# Patient Record
Sex: MALE | Race: WHITE | Employment: FULL TIME | URBAN - METROPOLITAN AREA
[De-identification: names, ages, dates, MRNs, and addresses within clinical notes are randomized per-mention and may not be internally consistent; named-entity substitution may affect disease eponyms.]

---

## 2018-01-02 ENCOUNTER — HOSPITAL ENCOUNTER (OUTPATIENT)
Dept: CT IMAGING | Age: 58
Discharge: HOME | End: 2018-01-02
Attending: EMERGENCY MEDICINE
Payer: COMMERCIAL

## 2018-01-02 VITALS — HEIGHT: 70 IN | WEIGHT: 225 LBS | BODY MASS INDEX: 32.21 KG/M2

## 2018-01-02 DIAGNOSIS — R10.11 ABDOMINAL PAIN, RIGHT UPPER QUADRANT: ICD-10-CM

## 2018-01-02 DIAGNOSIS — R11.0 NAUSEA: ICD-10-CM

## 2018-01-02 DIAGNOSIS — D72.829 LEUKOCYTOSIS (LEUCOCYTOSIS): ICD-10-CM

## 2018-01-02 LAB — CREAT BLD-MCNC: 1 MG/DL (ref 0.8–1.5)

## 2018-01-02 PROCEDURE — 74177 CT ABD & PELVIS W/CONTRAST: CPT

## 2018-01-02 PROCEDURE — 74011636320 HC RX REV CODE- 636/320

## 2018-01-02 PROCEDURE — 74011000258 HC RX REV CODE- 258

## 2018-01-02 PROCEDURE — 82565 ASSAY OF CREATININE: CPT

## 2018-01-02 RX ORDER — SODIUM CHLORIDE 0.9 % (FLUSH) 0.9 %
10 SYRINGE (ML) INJECTION
Status: COMPLETED | OUTPATIENT
Start: 2018-01-02 | End: 2018-01-02

## 2018-01-02 RX ADMIN — IOPAMIDOL 100 ML: 755 INJECTION, SOLUTION INTRAVENOUS at 18:38

## 2018-01-02 RX ADMIN — SODIUM CHLORIDE 100 ML: 900 INJECTION, SOLUTION INTRAVENOUS at 18:39

## 2018-01-02 RX ADMIN — Medication 10 ML: at 18:39

## 2018-01-02 RX ADMIN — DIATRIZOATE MEGLUMINE AND DIATRIZOATE SODIUM 15 ML: 660; 100 LIQUID ORAL; RECTAL at 18:38

## 2019-12-11 PROBLEM — Z86.010 HISTORY OF COLON POLYPS: Status: ACTIVE | Noted: 2019-12-11

## 2019-12-11 PROBLEM — K21.9 GASTROESOPHAGEAL REFLUX DISEASE: Status: ACTIVE | Noted: 2019-12-11

## 2020-01-20 PROBLEM — J42 CHRONIC BRONCHITIS (HCC): Status: ACTIVE | Noted: 2020-01-20

## 2020-11-30 PROBLEM — M79.602 PARESTHESIA AND PAIN OF BOTH UPPER EXTREMITIES: Status: ACTIVE | Noted: 2020-11-30

## 2020-11-30 PROBLEM — R20.2 PARESTHESIA AND PAIN OF BOTH UPPER EXTREMITIES: Status: ACTIVE | Noted: 2020-11-30

## 2020-11-30 PROBLEM — G89.29 CHRONIC RIGHT SHOULDER PAIN: Status: ACTIVE | Noted: 2020-11-30

## 2020-11-30 PROBLEM — M79.601 PARESTHESIA AND PAIN OF BOTH UPPER EXTREMITIES: Status: ACTIVE | Noted: 2020-11-30

## 2020-11-30 PROBLEM — M25.511 CHRONIC RIGHT SHOULDER PAIN: Status: ACTIVE | Noted: 2020-11-30

## 2020-12-09 PROBLEM — M25.521 RIGHT ELBOW PAIN: Status: ACTIVE | Noted: 2020-12-09

## 2020-12-09 PROBLEM — G56.03 BILATERAL CARPAL TUNNEL SYNDROME: Status: ACTIVE | Noted: 2020-12-09

## 2020-12-29 PROBLEM — E66.09 CLASS 1 OBESITY DUE TO EXCESS CALORIES WITH SERIOUS COMORBIDITY AND BODY MASS INDEX (BMI) OF 34.0 TO 34.9 IN ADULT: Status: ACTIVE | Noted: 2020-12-29

## 2022-01-03 PROBLEM — M54.12 RIGHT CERVICAL RADICULOPATHY: Status: ACTIVE | Noted: 2022-01-03

## 2022-02-25 ENCOUNTER — HOSPITAL ENCOUNTER (OUTPATIENT)
Dept: LAB | Age: 62
Discharge: HOME | End: 2022-02-25

## 2022-02-25 PROCEDURE — 88305 TISSUE EXAM BY PATHOLOGIST: CPT

## 2022-02-25 PROCEDURE — 88312 SPECIAL STAINS GROUP 1: CPT

## 2022-03-14 ENCOUNTER — NURSE TRIAGE (OUTPATIENT)
Dept: OTHER | Facility: CLINIC | Age: 62
End: 2022-03-14

## 2022-03-14 NOTE — TELEPHONE ENCOUNTER
Received call from April at [facility] with Red Flag Complaint. Subjective: Caller states \"I think it started in December. I went to one of the [facility] then, they gave me some medication. I have seen Dr. Melissa Roland and he sent me for Colonoscopy and EGD. I had that done. It is just still hurting. They want to do a CT scan and we did that. It came back okay. It also kind of hurts when I urinate\"     Current Symptoms: my whole stomach, burning with urination, left flank pain    Onset: 3 months ago; worsening- abdominal pain, burning with urination started today    Associated Symptoms: NA    Pain Severity: 10/10; sharp and stabbing; intermittent    Temperature: denies     What has been tried: Tylenol     LMP: NA Pregnant: NA    Recommended disposition: See in Office Today    Care advice provided, patient verbalizes understanding; denies any other questions or concerns; instructed to call back for any new or worsening symptoms. Patient/Caller agrees with recommended disposition; writer provided warm transfer to Christopher Godinez at [facility] for appointment scheduling    Attention Provider: Thank you for allowing me to participate in the care of your patient. The patient was connected to triage in response to information provided to the [facility]. Please do not respond through this encounter as the response is not directed to a shared pool.       Reason for Disposition   Side (flank) or lower back pain present    Additional Information   Commented on: Abdominal pain is a chronic symptom (recurrent or ongoing AND lasting > 4 weeks)     He has had multiple test and see multiple doctors for this, including GI    Protocols used: URINARY SYMPTOMS-ADULT-[place], ABDOMINAL PAIN - MALE-ADULT-[place]

## 2022-03-15 ENCOUNTER — HOSPITAL ENCOUNTER (OUTPATIENT)
Dept: ULTRASOUND IMAGING | Age: 62
Discharge: HOME | End: 2022-03-15
Attending: FAMILY MEDICINE
Payer: COMMERCIAL

## 2022-03-15 DIAGNOSIS — Z72.0 TOBACCO ABUSE: ICD-10-CM

## 2022-03-15 DIAGNOSIS — R10.84 GENERALIZED ABDOMINAL PAIN: ICD-10-CM

## 2022-03-15 PROCEDURE — 93978 VASCULAR STUDY: CPT

## 2022-03-15 NOTE — PROGRESS NOTES
Call back no evidence for abdominal aortic aneurysm. Please contact Dr. Michael Ramírez office and see if patient can see Dr. Sharan Toscano right away if possible with abdominal pain remaining significant/severe. Currently visit is not until mid April.

## 2022-03-18 PROBLEM — G56.03 BILATERAL CARPAL TUNNEL SYNDROME: Status: ACTIVE | Noted: 2020-12-09

## 2022-03-18 PROBLEM — K21.9 GASTROESOPHAGEAL REFLUX DISEASE: Status: ACTIVE | Noted: 2019-12-11

## 2022-03-18 PROBLEM — M25.521 RIGHT ELBOW PAIN: Status: ACTIVE | Noted: 2020-12-09

## 2022-03-18 PROBLEM — M54.12 RIGHT CERVICAL RADICULOPATHY: Status: ACTIVE | Noted: 2022-01-03

## 2022-03-19 PROBLEM — M25.511 CHRONIC RIGHT SHOULDER PAIN: Status: ACTIVE | Noted: 2020-11-30

## 2022-03-19 PROBLEM — G89.29 CHRONIC RIGHT SHOULDER PAIN: Status: ACTIVE | Noted: 2020-11-30

## 2022-03-20 PROBLEM — R20.2 PARESTHESIA AND PAIN OF BOTH UPPER EXTREMITIES: Status: ACTIVE | Noted: 2020-11-30

## 2022-03-20 PROBLEM — M79.601 PARESTHESIA AND PAIN OF BOTH UPPER EXTREMITIES: Status: ACTIVE | Noted: 2020-11-30

## 2022-03-20 PROBLEM — E66.09 CLASS 1 OBESITY DUE TO EXCESS CALORIES WITH SERIOUS COMORBIDITY AND BODY MASS INDEX (BMI) OF 34.0 TO 34.9 IN ADULT: Status: ACTIVE | Noted: 2020-12-29

## 2022-03-20 PROBLEM — Z86.010 HISTORY OF COLON POLYPS: Status: ACTIVE | Noted: 2019-12-11

## 2022-03-20 PROBLEM — Z86.0100 HISTORY OF COLON POLYPS: Status: ACTIVE | Noted: 2019-12-11

## 2022-03-20 PROBLEM — M79.602 PARESTHESIA AND PAIN OF BOTH UPPER EXTREMITIES: Status: ACTIVE | Noted: 2020-11-30

## 2022-03-20 PROBLEM — J42 CHRONIC BRONCHITIS (HCC): Status: ACTIVE | Noted: 2020-01-20

## 2022-03-20 PROBLEM — E66.811 CLASS 1 OBESITY DUE TO EXCESS CALORIES WITH SERIOUS COMORBIDITY AND BODY MASS INDEX (BMI) OF 34.0 TO 34.9 IN ADULT: Status: ACTIVE | Noted: 2020-12-29

## 2022-05-12 ENCOUNTER — HOSPITAL ENCOUNTER (OUTPATIENT)
Age: 62
Setting detail: OBSERVATION
Discharge: HOME | End: 2022-05-14
Attending: EMERGENCY MEDICINE | Admitting: HOSPITALIST
Payer: COMMERCIAL

## 2022-05-12 ENCOUNTER — APPOINTMENT (OUTPATIENT)
Dept: CT IMAGING | Age: 62
End: 2022-05-12
Attending: EMERGENCY MEDICINE
Payer: COMMERCIAL

## 2022-05-12 DIAGNOSIS — R18.0 MALIGNANT ASCITES: ICD-10-CM

## 2022-05-12 DIAGNOSIS — R10.84 ABDOMINAL PAIN, GENERALIZED: Primary | ICD-10-CM

## 2022-05-12 PROBLEM — R10.9 ABDOMINAL PAIN: Status: ACTIVE | Noted: 2022-05-12

## 2022-05-12 LAB
ALBUMIN SERPL-MCNC: 3.4 G/DL (ref 3.2–4.6)
ALBUMIN/GLOB SERPL: 1 {RATIO} (ref 1.2–3.5)
ALP SERPL-CCNC: 76 U/L (ref 50–136)
ALT SERPL-CCNC: 18 U/L (ref 12–65)
ANION GAP SERPL CALC-SCNC: 8 MMOL/L (ref 7–16)
AST SERPL-CCNC: 10 U/L (ref 15–37)
BASOPHILS # BLD: 0.1 K/UL (ref 0–0.2)
BASOPHILS NFR BLD: 1 % (ref 0–2)
BILIRUB SERPL-MCNC: 0.4 MG/DL (ref 0.2–1.1)
BILIRUB UR QL: ABNORMAL
BUN SERPL-MCNC: 12 MG/DL (ref 8–23)
CALCIUM SERPL-MCNC: 8.9 MG/DL (ref 8.3–10.4)
CHLORIDE SERPL-SCNC: 110 MMOL/L (ref 98–107)
CO2 SERPL-SCNC: 25 MMOL/L (ref 21–32)
CREAT SERPL-MCNC: 0.7 MG/DL (ref 0.8–1.5)
DIFFERENTIAL METHOD BLD: NORMAL
EOSINOPHIL # BLD: 0.1 K/UL (ref 0–0.8)
EOSINOPHIL NFR BLD: 2 % (ref 0.5–7.8)
ERYTHROCYTE [DISTWIDTH] IN BLOOD BY AUTOMATED COUNT: 12.5 % (ref 11.9–14.6)
GLOBULIN SER CALC-MCNC: 3.3 G/DL (ref 2.3–3.5)
GLUCOSE SERPL-MCNC: 82 MG/DL (ref 65–100)
GLUCOSE UR QL STRIP.AUTO: NEGATIVE MG/DL
HCT VFR BLD AUTO: 43.9 % (ref 41.1–50.3)
HGB BLD-MCNC: 14.3 G/DL (ref 13.6–17.2)
IMM GRANULOCYTES # BLD AUTO: 0 K/UL (ref 0–0.5)
IMM GRANULOCYTES NFR BLD AUTO: 0 % (ref 0–5)
KETONES UR-MCNC: ABNORMAL MG/DL
LEUKOCYTE ESTERASE UR QL STRIP: NEGATIVE
LIPASE SERPL-CCNC: 51 U/L (ref 73–393)
LYMPHOCYTES # BLD: 2.1 K/UL (ref 0.5–4.6)
LYMPHOCYTES NFR BLD: 26 % (ref 13–44)
MCH RBC QN AUTO: 29.2 PG (ref 26.1–32.9)
MCHC RBC AUTO-ENTMCNC: 32.6 G/DL (ref 31.4–35)
MCV RBC AUTO: 89.8 FL (ref 79.6–97.8)
MONOCYTES # BLD: 0.5 K/UL (ref 0.1–1.3)
MONOCYTES NFR BLD: 6 % (ref 4–12)
NEUTS SEG # BLD: 5.3 K/UL (ref 1.7–8.2)
NEUTS SEG NFR BLD: 66 % (ref 43–78)
NITRITE UR QL: NEGATIVE
NRBC # BLD: 0 K/UL (ref 0–0.2)
PH UR: 5.5 [PH] (ref 5–9)
PLATELET # BLD AUTO: 251 K/UL (ref 150–450)
PMV BLD AUTO: 10.3 FL (ref 9.4–12.3)
POTASSIUM SERPL-SCNC: 3.8 MMOL/L (ref 3.5–5.1)
PROT SERPL-MCNC: 6.7 G/DL (ref 6.3–8.2)
PROT UR QL: NEGATIVE MG/DL
RBC # BLD AUTO: 4.89 M/UL (ref 4.23–5.6)
RBC # UR STRIP: NEGATIVE /UL
SERVICE CMNT-IMP: ABNORMAL
SODIUM SERPL-SCNC: 143 MMOL/L (ref 136–145)
SP GR UR: 1.02 (ref 1–1.02)
UROBILINOGEN UR QL: 1 EU/DL (ref 0.2–1)
WBC # BLD AUTO: 8 K/UL (ref 4.3–11.1)

## 2022-05-12 PROCEDURE — 83690 ASSAY OF LIPASE: CPT

## 2022-05-12 PROCEDURE — 96375 TX/PRO/DX INJ NEW DRUG ADDON: CPT

## 2022-05-12 PROCEDURE — 81003 URINALYSIS AUTO W/O SCOPE: CPT

## 2022-05-12 PROCEDURE — 74011000636 HC RX REV CODE- 636: Performed by: EMERGENCY MEDICINE

## 2022-05-12 PROCEDURE — 74177 CT ABD & PELVIS W/CONTRAST: CPT

## 2022-05-12 PROCEDURE — 74011250636 HC RX REV CODE- 250/636: Performed by: EMERGENCY MEDICINE

## 2022-05-12 PROCEDURE — 96374 THER/PROPH/DIAG INJ IV PUSH: CPT

## 2022-05-12 PROCEDURE — 99285 EMERGENCY DEPT VISIT HI MDM: CPT

## 2022-05-12 PROCEDURE — 85025 COMPLETE CBC W/AUTO DIFF WBC: CPT

## 2022-05-12 PROCEDURE — 80053 COMPREHEN METABOLIC PANEL: CPT

## 2022-05-12 PROCEDURE — 74011000258 HC RX REV CODE- 258: Performed by: EMERGENCY MEDICINE

## 2022-05-12 PROCEDURE — 74011000250 HC RX REV CODE- 250: Performed by: EMERGENCY MEDICINE

## 2022-05-12 PROCEDURE — G0378 HOSPITAL OBSERVATION PER HR: HCPCS

## 2022-05-12 RX ORDER — ONDANSETRON 8 MG/1
4 TABLET, ORALLY DISINTEGRATING ORAL
Status: DISCONTINUED | OUTPATIENT
Start: 2022-05-12 | End: 2022-05-14 | Stop reason: HOSPADM

## 2022-05-12 RX ORDER — PANTOPRAZOLE SODIUM 40 MG/1
40 TABLET, DELAYED RELEASE ORAL
Status: DISCONTINUED | OUTPATIENT
Start: 2022-05-13 | End: 2022-05-14 | Stop reason: HOSPADM

## 2022-05-12 RX ORDER — AMLODIPINE BESYLATE 5 MG/1
5 TABLET ORAL DAILY
Status: DISCONTINUED | OUTPATIENT
Start: 2022-05-13 | End: 2022-05-14 | Stop reason: HOSPADM

## 2022-05-12 RX ORDER — ACETAMINOPHEN 650 MG/1
650 SUPPOSITORY RECTAL
Status: DISCONTINUED | OUTPATIENT
Start: 2022-05-12 | End: 2022-05-14 | Stop reason: HOSPADM

## 2022-05-12 RX ORDER — SODIUM CHLORIDE 0.9 % (FLUSH) 0.9 %
10 SYRINGE (ML) INJECTION
Status: COMPLETED | OUTPATIENT
Start: 2022-05-12 | End: 2022-05-12

## 2022-05-12 RX ORDER — SODIUM CHLORIDE 0.9 % (FLUSH) 0.9 %
5-40 SYRINGE (ML) INJECTION EVERY 8 HOURS
Status: DISCONTINUED | OUTPATIENT
Start: 2022-05-12 | End: 2022-05-14 | Stop reason: HOSPADM

## 2022-05-12 RX ORDER — ACETAMINOPHEN 325 MG/1
650 TABLET ORAL
Status: DISCONTINUED | OUTPATIENT
Start: 2022-05-12 | End: 2022-05-14 | Stop reason: HOSPADM

## 2022-05-12 RX ORDER — SODIUM CHLORIDE 0.9 % (FLUSH) 0.9 %
5-10 SYRINGE (ML) INJECTION EVERY 8 HOURS
Status: DISCONTINUED | OUTPATIENT
Start: 2022-05-12 | End: 2022-05-14

## 2022-05-12 RX ORDER — POLYETHYLENE GLYCOL 3350 17 G/17G
17 POWDER, FOR SOLUTION ORAL DAILY PRN
Status: DISCONTINUED | OUTPATIENT
Start: 2022-05-12 | End: 2022-05-14 | Stop reason: HOSPADM

## 2022-05-12 RX ORDER — MORPHINE SULFATE 10 MG/ML
5 INJECTION, SOLUTION INTRAMUSCULAR; INTRAVENOUS
Status: COMPLETED | OUTPATIENT
Start: 2022-05-12 | End: 2022-05-12

## 2022-05-12 RX ORDER — ONDANSETRON 2 MG/ML
4 INJECTION INTRAMUSCULAR; INTRAVENOUS
Status: DISCONTINUED | OUTPATIENT
Start: 2022-05-12 | End: 2022-05-14 | Stop reason: HOSPADM

## 2022-05-12 RX ORDER — SODIUM CHLORIDE 0.9 % (FLUSH) 0.9 %
5-10 SYRINGE (ML) INJECTION AS NEEDED
Status: DISCONTINUED | OUTPATIENT
Start: 2022-05-12 | End: 2022-05-14

## 2022-05-12 RX ORDER — SODIUM CHLORIDE 450 MG/100ML
75 INJECTION, SOLUTION INTRAVENOUS CONTINUOUS
Status: DISCONTINUED | OUTPATIENT
Start: 2022-05-12 | End: 2022-05-14 | Stop reason: HOSPADM

## 2022-05-12 RX ORDER — SODIUM CHLORIDE 0.9 % (FLUSH) 0.9 %
5-40 SYRINGE (ML) INJECTION AS NEEDED
Status: DISCONTINUED | OUTPATIENT
Start: 2022-05-12 | End: 2022-05-14 | Stop reason: HOSPADM

## 2022-05-12 RX ORDER — SODIUM CHLORIDE, SODIUM LACTATE, POTASSIUM CHLORIDE, CALCIUM CHLORIDE 600; 310; 30; 20 MG/100ML; MG/100ML; MG/100ML; MG/100ML
150 INJECTION, SOLUTION INTRAVENOUS CONTINUOUS
Status: DISCONTINUED | OUTPATIENT
Start: 2022-05-12 | End: 2022-05-14

## 2022-05-12 RX ORDER — ONDANSETRON 2 MG/ML
4 INJECTION INTRAMUSCULAR; INTRAVENOUS
Status: COMPLETED | OUTPATIENT
Start: 2022-05-12 | End: 2022-05-12

## 2022-05-12 RX ORDER — HYDROCODONE BITARTRATE AND ACETAMINOPHEN 5; 325 MG/1; MG/1
1 TABLET ORAL
Status: DISCONTINUED | OUTPATIENT
Start: 2022-05-12 | End: 2022-05-14 | Stop reason: HOSPADM

## 2022-05-12 RX ORDER — LISINOPRIL 20 MG/1
20 TABLET ORAL DAILY
Status: DISCONTINUED | OUTPATIENT
Start: 2022-05-13 | End: 2022-05-14 | Stop reason: HOSPADM

## 2022-05-12 RX ADMIN — SODIUM CHLORIDE, PRESERVATIVE FREE 10 ML: 5 INJECTION INTRAVENOUS at 19:06

## 2022-05-12 RX ADMIN — SODIUM CHLORIDE, POTASSIUM CHLORIDE, SODIUM LACTATE AND CALCIUM CHLORIDE 150 ML/HR: 600; 310; 30; 20 INJECTION, SOLUTION INTRAVENOUS at 17:47

## 2022-05-12 RX ADMIN — DIATRIZOATE MEGLUMINE AND DIATRIZOATE SODIUM 15 ML: 660; 100 LIQUID ORAL; RECTAL at 17:47

## 2022-05-12 RX ADMIN — IOPAMIDOL 100 ML: 755 INJECTION, SOLUTION INTRAVENOUS at 19:06

## 2022-05-12 RX ADMIN — SODIUM CHLORIDE 100 ML: 9 INJECTION, SOLUTION INTRAVENOUS at 19:06

## 2022-05-12 RX ADMIN — ONDANSETRON 4 MG: 2 INJECTION INTRAMUSCULAR; INTRAVENOUS at 18:56

## 2022-05-12 RX ADMIN — MORPHINE SULFATE 5 MG: 10 INJECTION INTRAVENOUS at 18:56

## 2022-05-12 NOTE — ED PROVIDER NOTES
[facility] Emergency Department Provider Note                     PCP:                Destiny Lim MD               Age: 62 y.o. Sex: M           ICD-10-CM ICD-9-CM    1. Abdominal pain, generalized  R10.84 789.07    2. Malignant ascites  R18.0 789.51             MDM  Number of Diagnoses or Management Options  Abdominal pain, generalized  Malignant ascites  Diagnosis management comments: I will not be able to diagnose the source of his chronic pain but we will reevaluate and do another CT scan to make sure he has not had recurrence of his partial small bowel obstruction from Monday. Hydration ordered. 8:13 PM  CT results with the patient and his wife. CT findings concerning for metastatic process in the abdomen. I am reaching out to surgery and oncology for advice on how to proceed with the diagnosis. 8:33 PM  Oncology siggests paracentesis of ascites for diagnosis-given it is mild ascites I will ask hospitalist to admit for IR US guided paracentesis in the morning.        Amount and/or Complexity of Data Reviewed  Clinical lab tests: ordered and reviewed  Tests in the radiology section of CPT®: ordered and reviewed  Discuss the patient with other providers: yes    Risk of Complications, Morbidity, and/or Mortality  Presenting problems: moderate  Diagnostic procedures: low  Management options: moderate    Patient Progress  Patient progress: stable      Orders Placed This Encounter    CT ABD PELV W CONT    CBC with Diff    CMP    Lipase    DIET NPO    POC Urine Dipstick    NURSING-MISCELLANEOUS: start oral contrast now please ONE TIME    POC URINE MACROSCOPIC    SALINE LOCK IV ONE TIME Routine    sodium chloride (NS) flush 5-10 mL    sodium chloride (NS) flush 5-10 mL    diatrizoate nannette-diatrizoat sod (MD-GASTROVIEW,GASTROGRAFIN) 66-10 % contrast solution 15 mL    lactated Ringers infusion    sodium chloride 0.9 % bolus infusion 100 mL    iopamidoL (ISOVUE-370) 76 % injection 100 mL    saline peripheral flush soln 10 mL    morphine 10 mg/ml injection 5 mg    ondansetron (ZOFRAN) injection 4 mg        Gerard Lance is a 62 y.o. male who presents to the Emergency Department with chief complaint of    Chief Complaint   Patient presents with    Abdominal Pain      28-year-old male presents with complaint of abdominal pain. His pain has been chronic for months and he has had EGD and colonoscopy with multiple polyps removed. Earlier this week he was set [place] in the emergency department had a CT scan that showed small bowel obstruction. He was admitted overnight but never left the ER and was discharged the following morning. He has had return of his pain but not nausea or vomiting. He had a black stool today but reports having taken Pepto-Bismol. This pain has been chronic and recurring since approximately February. Patient is unable to describe the pain and denies any aggravating or alleviating factors. No fevers or chills. Denies distention but states it feels \"not right\". Most of history is provided by his spouse. He has had diverticulitis in the past and at one point had to have partial bowel resection due to this. None of his 9 polyps removed were cancerous according to his wife. She did report they did say he still had diverticulosis. Review of Systems   Constitutional: Negative for chills and fever. HENT: Negative for congestion, rhinorrhea and sore throat. Respiratory: Negative for cough and shortness of breath. Cardiovascular: Negative for chest pain and leg swelling. Gastrointestinal: Positive for abdominal pain, nausea and vomiting. Negative for blood in stool ( Stool black today but no bright red blood), constipation and diarrhea. Endocrine: Negative for polydipsia and polyuria. Genitourinary: Negative for dysuria and hematuria. Musculoskeletal: Negative for back pain and myalgias.    Neurological: Negative for weakness, numbness and headaches. Psychiatric/Behavioral: Negative for confusion. All other systems reviewed and are negative. All other systems reviewed and are negative. Past Medical History:   Diagnosis Date    Bilateral carpal tunnel syndrome 12/9/2020    Chronic right shoulder pain 11/30/2020    Colon polyps 3/11/2013    Diverticulitis 3/11/2013    HTN (hypertension) 3/11/2013    Hyperlipidemia 3/11/2013    Paresthesia and pain of both upper extremities 11/30/2020    PUD (peptic ulcer disease) 3/11/2013    History of     Right elbow pain 12/9/2020    Wheezing 3/11/2013        Past Surgical History:   Procedure Laterality Date    ENDOSCOPY, COLON, DIAGNOSTIC  2/25/09    colonoscopy per Dr. Payam Larkin with need for repeat every 3 years    HX COLONOSCOPY  02/25/2022    Dr. Marsha Joyce; polyps of the ascending, transverse, descending, and sigmoid colons; internal hemorrhoid; diverticulosis    HX ENDOSCOPY  02/25/2022    Dr. Marsha Joyce; hiatal hernia gastric polyps    [place] ABDOMEN SURGERY PROC UNLISTED  October 2004    partial colon resection per Dr. Tino Pike       Family History   Problem Relation Age of Onset    Hypertension Father     Alcohol abuse Father     OSTEOARTHRITIS Father     Diabetes Father     Heart Disease Mother     Diabetes Mother     Hypertension Mother     Cancer Sister         breast    No Known Problems Brother            Social Connections:     Frequency of Communication with Friends and Family: Not on file    Frequency of Social Gatherings with Friends and Family: Not on file    Attends [employer] Services: Not on file    Active Member of Clubs or Organizations: Not on file    Attends Club or Organization Meetings: Not on file    Marital Status: Not on file        No Known Allergies     Vitals signs and nursing note reviewed. Patient Vitals for the past 4 hrs:   BP SpO2   05/12/22 1737 130/77 95 %          Physical Exam  Vitals and nursing note reviewed.    Constitutional: Appearance: He is well-developed. HENT:      Mouth/Throat:      Mouth: Mucous membranes are moist.      Pharynx: No oropharyngeal exudate. Eyes:      Conjunctiva/sclera: Conjunctivae normal.      Pupils: Pupils are equal, round, and reactive to light. Cardiovascular:      Rate and Rhythm: Normal rate and regular rhythm. Heart sounds: Normal heart sounds. Pulmonary:      Effort: Pulmonary effort is normal.      Breath sounds: Normal breath sounds. Abdominal:      General: Bowel sounds are normal. There is no distension. Palpations: Abdomen is soft. Tenderness: There is generalized abdominal tenderness and tenderness in the right lower quadrant. There is no guarding or rebound. Hernia: No hernia is present. Musculoskeletal:         General: No tenderness. Normal range of motion. Cervical back: Neck supple. Lymphadenopathy:      Cervical: No cervical adenopathy. Skin:     General: Skin is warm and dry. Neurological:      Mental Status: He is alert and oriented to person, place, and time. Procedures    Results for orders placed or performed during the hospital encounter of 05/12/22   CT ABD PELV W CONT    Narrative    CT OF THE ABDOMEN AND PELVIS    INDICATION: Chronic abdominal pain. Multiple axial images were obtained through the abdomen and pelvis. Oral  contrast was used for bowel opacification. 100mL of Isovue 370 intravenous  contrast was used for better evaluation of solid organs and vascular structures. Radiation dose reduction techniques were used for this study. All CT scans  performed at this facility use one or all of the following: Automated exposure  control, adjustment of the mA and/or kVp according to patient's size, iterative  reconstruction. COMPARISON: None    FINDINGS:  LOWER CHEST: Normal.    HEPATOBILIARY: No evidence of focal liver mass. No calcified gallstones.     PANCREAS: Normal.    SPLEEN: Normal.    ADRENAL GLANDS: Normal.    KIDNEYS/BLADDER: Kidneys and bladder are unremarkable. No hydronephrosis or  urinary tract calculi. BOWEL: Dilated fluid-filled loops of small bowel are present throughout the  abdomen. No definite transition point. Oral contrast noted in the colon. No  definite evidence of bowel mass but there is extensive peritoneal nodularity and  trace ascites highly concerning for peritoneal metastatic disease. LYMPH NODES: No enlarged retroperitoneal or pelvic lymph nodes. Peritoneal  nodularity again noted most likely metastatic disease. VASCULATURE: Unremarkable. PELVIC ORGANS: Prostate gland and rectum are unremarkable. Small amount of free  pelvic fluid is noted. MUSCULOSKELETAL: Degenerative spine changes are noted. Mild sclerosis involving  the S1 vertebral body is present on image 74 of series 602. Impression    1. Extensive peritoneal nodularity and mild ascites consistent with peritoneal  metastatic disease. Primary lesion not definitely identified. 2. Dilated fluid-filled loops of small bowel possibly related to  gastroenteritis. No definite obstruction. No obvious bowel mass. 3. Sclerosis S1 vertebral body. Consider follow-up bone scan to assess for bone  metastases. CBC WITH AUTOMATED DIFF   Result Value Ref Range    WBC 8.0 4.3 - 11.1 K/uL    RBC 4.89 4.23 - 5.6 M/uL    HGB 14.3 13.6 - 17.2 g/dL    HCT 43.9 41.1 - 50.3 %    MCV 89.8 79.6 - 97.8 FL    MCH 29.2 26.1 - 32.9 PG    MCHC 32.6 31.4 - 35.0 g/dL    RDW 12.5 11.9 - 14.6 %    PLATELET 293 196 - 209 K/uL    MPV 10.3 9.4 - 12.3 FL    ABSOLUTE NRBC 0.00 0.0 - 0.2 K/uL    DF AUTOMATED      NEUTROPHILS 66 43 - 78 %    LYMPHOCYTES 26 13 - 44 %    MONOCYTES 6 4.0 - 12.0 %    EOSINOPHILS 2 0.5 - 7.8 %    BASOPHILS 1 0.0 - 2.0 %    IMMATURE GRANULOCYTES 0 0.0 - 5.0 %    ABS. NEUTROPHILS 5.3 1.7 - 8.2 K/UL    ABS. LYMPHOCYTES 2.1 0.5 - 4.6 K/UL    ABS. MONOCYTES 0.5 0.1 - 1.3 K/UL    ABS. EOSINOPHILS 0.1 0.0 - 0.8 K/UL    ABS. BASOPHILS 0.1 0.0 - 0.2 K/UL    ABS. IMM. GRANS. 0.0 0.0 - 0.5 K/UL   METABOLIC PANEL, COMPREHENSIVE   Result Value Ref Range    Sodium 143 136 - 145 mmol/L    Potassium 3.8 3.5 - 5.1 mmol/L    Chloride 110 (H) 98 - 107 mmol/L    CO2 25 21 - 32 mmol/L    Anion gap 8 7 - 16 mmol/L    Glucose 82 65 - 100 mg/dL    BUN 12 8 - 23 MG/DL    Creatinine 0.70 (L) 0.8 - 1.5 MG/DL    GFR est AA >60 >60 ml/min/1.73m2    GFR est non-AA >60 >60 ml/min/1.73m2    Calcium 8.9 8.3 - 10.4 MG/DL    Bilirubin, total 0.4 0.2 - 1.1 MG/DL    ALT (SGPT) 18 12 - 65 U/L    AST (SGOT) 10 (L) 15 - 37 U/L    Alk. phosphatase 76 50 - 136 U/L    Protein, total 6.7 6.3 - 8.2 g/dL    Albumin 3.4 3.2 - 4.6 g/dL    Globulin 3.3 2.3 - 3.5 g/dL    A-G Ratio 1.0 (L) 1.2 - 3.5     LIPASE   Result Value Ref Range    Lipase 51 (L) 73 - 393 U/L   POC URINE MACROSCOPIC   Result Value Ref Range    Spec. gravity (POC) 1.025 (H) 1.001 - 1.023      pH, urine  (POC) 5.5 5.0 - 9.0      Protein (POC) Negative NEG mg/dL    Glucose, urine (POC) Negative NEG mg/dL    Ketones (POC) TRACE (A) NEG mg/dL    Bilirubin (POC) SMALL (A) NEG      Blood (POC) Negative NEG      Urobilinogen (POC) 1.0 0.2 - 1.0 EU/dL    Nitrite (POC) Negative NEG      Leukocyte esterase (POC) Negative NEG      [facility]         CT ABD PELV W CONT   Final Result   1. Extensive peritoneal nodularity and mild ascites consistent with peritoneal   metastatic disease. Primary lesion not definitely identified. 2. Dilated fluid-filled loops of small bowel possibly related to   gastroenteritis. No definite obstruction. No obvious bowel mass. 3. Sclerosis S1 vertebral body. Consider follow-up bone scan to assess for bone   metastases. Voice dictation software was used during the making of this note. This software is not perfect and grammatical and other typographical errors may be present. This note has not been completely proofread for errors.

## 2022-05-12 NOTE — ED TRIAGE NOTES
Pt arrives ambulatory masked stating abdominal pain started couple months ago. Pt states he was discharged from [facility] yesterday for the same issue. Pt states worsening pain. Pt states he has a US scheduled next week. Pt states hx of blockage in colon. Pt states n/v/d. Pt states he is unable to keep foods down.  Denies fever at home

## 2022-05-13 ENCOUNTER — APPOINTMENT (OUTPATIENT)
Dept: CT IMAGING | Age: 62
End: 2022-05-13
Payer: COMMERCIAL

## 2022-05-13 ENCOUNTER — APPOINTMENT (OUTPATIENT)
Dept: INTERVENTIONAL RADIOLOGY/VASCULAR | Age: 62
End: 2022-05-13
Attending: INTERNAL MEDICINE
Payer: COMMERCIAL

## 2022-05-13 LAB — CREAT SERPL-MCNC: 0.9 MG/DL (ref 0.8–1.5)

## 2022-05-13 PROCEDURE — G0378 HOSPITAL OBSERVATION PER HR: HCPCS

## 2022-05-13 PROCEDURE — 2709999900 HC NON-CHARGEABLE SUPPLY

## 2022-05-13 PROCEDURE — 82565 ASSAY OF CREATININE: CPT

## 2022-05-13 PROCEDURE — 76705 ECHO EXAM OF ABDOMEN: CPT

## 2022-05-13 PROCEDURE — 74011000250 HC RX REV CODE- 250: Performed by: EMERGENCY MEDICINE

## 2022-05-13 PROCEDURE — 71260 CT THORAX DX C+: CPT

## 2022-05-13 PROCEDURE — 74011000636 HC RX REV CODE- 636: Performed by: HOSPITALIST

## 2022-05-13 PROCEDURE — APPNB15 APP NON BILLABLE TIME 0-15 MINS: Performed by: NURSE PRACTITIONER

## 2022-05-13 PROCEDURE — 74011000250 HC RX REV CODE- 250: Performed by: HOSPITALIST

## 2022-05-13 PROCEDURE — 77030014146 HC TY THORCENT/PARACENT BD -B

## 2022-05-13 PROCEDURE — 74011250637 HC RX REV CODE- 250/637: Performed by: HOSPITALIST

## 2022-05-13 PROCEDURE — 36415 COLL VENOUS BLD VENIPUNCTURE: CPT

## 2022-05-13 PROCEDURE — 74011000258 HC RX REV CODE- 258: Performed by: HOSPITALIST

## 2022-05-13 RX ORDER — LIDOCAINE HYDROCHLORIDE 20 MG/ML
20-200 INJECTION, SOLUTION INFILTRATION; PERINEURAL
Status: DISCONTINUED | OUTPATIENT
Start: 2022-05-13 | End: 2022-05-14 | Stop reason: HOSPADM

## 2022-05-13 RX ORDER — SODIUM CHLORIDE 0.9 % (FLUSH) 0.9 %
10 SYRINGE (ML) INJECTION
Status: ACTIVE | OUTPATIENT
Start: 2022-05-13 | End: 2022-05-13

## 2022-05-13 RX ORDER — SODIUM CHLORIDE 0.9 % (FLUSH) 0.9 %
10 SYRINGE (ML) INJECTION
Status: COMPLETED | OUTPATIENT
Start: 2022-05-13 | End: 2022-05-13

## 2022-05-13 RX ORDER — NAPROXEN SODIUM 220 MG
220 TABLET ORAL AS NEEDED
COMMUNITY
End: 2022-05-14

## 2022-05-13 RX ADMIN — PANTOPRAZOLE SODIUM 40 MG: 40 TABLET, DELAYED RELEASE ORAL at 17:12

## 2022-05-13 RX ADMIN — SODIUM CHLORIDE 75 ML/HR: 450 INJECTION, SOLUTION INTRAVENOUS at 00:46

## 2022-05-13 RX ADMIN — SODIUM CHLORIDE, PRESERVATIVE FREE 5 ML: 5 INJECTION INTRAVENOUS at 11:47

## 2022-05-13 RX ADMIN — SODIUM CHLORIDE, PRESERVATIVE FREE 5 ML: 5 INJECTION INTRAVENOUS at 11:46

## 2022-05-13 RX ADMIN — SODIUM CHLORIDE, PRESERVATIVE FREE 5 ML: 5 INJECTION INTRAVENOUS at 00:47

## 2022-05-13 RX ADMIN — SODIUM CHLORIDE 100 ML: 9 INJECTION, SOLUTION INTRAVENOUS at 20:24

## 2022-05-13 RX ADMIN — SODIUM CHLORIDE, PRESERVATIVE FREE 5 ML: 5 INJECTION INTRAVENOUS at 13:01

## 2022-05-13 RX ADMIN — PANTOPRAZOLE SODIUM 40 MG: 40 TABLET, DELAYED RELEASE ORAL at 11:44

## 2022-05-13 RX ADMIN — HYDROCODONE BITARTRATE AND ACETAMINOPHEN 1 TABLET: 5; 325 TABLET ORAL at 00:39

## 2022-05-13 RX ADMIN — SODIUM CHLORIDE, PRESERVATIVE FREE 10 ML: 5 INJECTION INTRAVENOUS at 20:25

## 2022-05-13 RX ADMIN — HYDROCODONE BITARTRATE AND ACETAMINOPHEN 1 TABLET: 5; 325 TABLET ORAL at 09:31

## 2022-05-13 RX ADMIN — IOPAMIDOL 100 ML: 755 INJECTION, SOLUTION INTRAVENOUS at 20:24

## 2022-05-13 RX ADMIN — SODIUM CHLORIDE, PRESERVATIVE FREE 10 ML: 5 INJECTION INTRAVENOUS at 22:36

## 2022-05-13 RX ADMIN — AMLODIPINE BESYLATE 5 MG: 5 TABLET ORAL at 11:43

## 2022-05-13 RX ADMIN — ACETAMINOPHEN 650 MG: 325 TABLET ORAL at 14:50

## 2022-05-13 RX ADMIN — LISINOPRIL 20 MG: 20 TABLET ORAL at 11:44

## 2022-05-13 RX ADMIN — SODIUM CHLORIDE 75 ML/HR: 450 INJECTION, SOLUTION INTRAVENOUS at 16:40

## 2022-05-13 RX ADMIN — SODIUM CHLORIDE, PRESERVATIVE FREE 10 ML: 5 INJECTION INTRAVENOUS at 22:35

## 2022-05-13 RX ADMIN — HYDROCODONE BITARTRATE AND ACETAMINOPHEN 1 TABLET: 5; 325 TABLET ORAL at 16:40

## 2022-05-13 NOTE — PROGRESS NOTES
TRANSFER - OUT REPORT:    Verbal report given to Hessmer, RN on [address]  being transferred to 6th floor for routine progression of care       Report consisted of patients Situation, Background, Assessment and   Recommendations(SBAR). Information from the following report(s) ED Summary was reviewed with the receiving nurse. Opportunity for questions and clarification was provided.

## 2022-05-13 NOTE — CONSULTS
Department of Interventional Radiology  [phone]        Consult Note     Patient: Armani Burns MRN: [id number]  SSN: [id number]    YOB: 1960  Age: 62 y.o. Sex: male      Referring Physician: Hospitalist    Consult Date: 5/13/2022     Request for paracentesis. Presented to ED with abdominal pain. CT with omental irregularities, mild ascites. Abdomen studied with US. Very small volume perihepatic and peritoneal ascites inaccessible due to lung and bowel loops. Can can reassess in a few days if desired.      Alessandro Tong PA-C

## 2022-05-13 NOTE — PROGRESS NOTES
Patient is being admitted to [facility] from ER      Per notes:    Lives in [place]    Works for [employer]    Full code    No directives

## 2022-05-13 NOTE — DISCHARGE INSTRUCTIONS
[address]  Department of Interventional Radiology  [facility]  [phone] Office  [phone] Fax    PARACENTESIS DISCHARGE INSTRUCTIONS    General Information:  During this procedure, the doctor will insert a needle into the abdomen to drain fluid. After the procedure, you will be able to take a deep breath much easier. The site of the puncture may ooze the first day. This will decrease and eventually stop. Paracentesis (draining fluid from the abdomen) sometimes makes patients hypotensive (low blood pressure). Your doctor may order for you to receive fluids or albumin (a volume booster) during the procedure through an IV site. Home Care Instructions:  Keep the puncture site clean and dry. No tub baths or swimming until puncture site heals. Showering is acceptable. Resume your normal diet, and resume your normal activity slowly and as you tolerate. If you are short of breath, rest. If shortness of breath does not ease, please call your ordering doctor. Fluid can re-accumulate in the chest and/or in the abdomen. If this should occur, your doctor needs to know as you may need to have the procedure done again. Call If:     You should call your Physician and/or the Radiology Nurse if you notice any signs of infection, like pus draining, or if it is swollen or reddened. Also call if you have a fever, or if you are bleeding from the puncture site more than a small amount on the dressing. Call if the puncture site keeps draining fluid. Some oozing is to be expected, but should slow and then stop. Call if you feel like you have pressure in your abdomen. SEEK IMMEDIATE CARE OR CALL 911 IF YOU SUDDENLY HAVE TROUBLE BREATHING, OR IF YOUR LIPS TURN BLUE, OR IF YOU NOTICE BLOOD IN YOUR SPUTUM. Follow-Up Instructions: Please see your ordering doctor as he/she has requested. To Reach Us: Interventional Radiology General Nurse Discharge    After general anesthesia or intravenous sedation, for 24 hours or while taking prescription Narcotics:  · Limit your activities  · Do not drive and operate hazardous machinery  · Do not make important personal or business decisions  · Do  not drink alcoholic beverages  · If you have not urinated within 8 hours after discharge, please contact your surgeon on call. * Please give a list of your current medications to your Primary Care Provider. * Please update this list whenever your medications are discontinued, doses are     changed, or new medications (including over-the-counter products) are added. * Please carry medication information at all times in case of emergency situations. These are general instructions for a healthy lifestyle:    No smoking/ No tobacco products/ Avoid exposure to second hand smoke  Surgeon General's Warning:  Quitting smoking now greatly reduces serious risk to your health. Obesity, smoking, and sedentary lifestyle greatly increases your risk for illness  A healthy diet, regular physical exercise & weight monitoring are important for maintaining a healthy lifestyle    You may be retaining fluid if you have a history of heart failure or if you experience any of the following symptoms:  Weight gain of 3 pounds or more overnight or 5 pounds in a week, increased swelling in our hands or feet or shortness of breath while lying flat in bed. Please call your doctor as soon as you notice any of these symptoms; do not wait until your next office visit. Recognize signs and symptoms of STROKE:  F-face looks uneven    A-arms unable to move or move unevenly    S-speech slurred or non-existent    T-time-call 911 as soon as signs and symptoms begin-DO NOT go       Back to bed or wait to see if you get better-TIME IS BRAIN. If you have any questions about your procedure, please call the Interventional Radiology department at [phone].    After business hours (5pm) and weekends, call the answering service at [phone] and ask for the Radiologist on call to be paged. Si tiene Preguntas acerca del procedimiento, por favor llame al departamento de Radiología Intervencional al [phone]. Después de horas de oficina (5 pm) y los fines de Mobile, llamar al Geni Nascimento al [phone] y pregunte por el Radiologo de [facility].            Date: 5/13/2022  Discharging Nurse: Dora Curran RN

## 2022-05-13 NOTE — H&P
[facility] History and Physical       Name:  Sagar Page  Age:61 y.o. Sex:male   :  1960    MRN:  [id number]   PCP:  Mendel Carolina MD    ER Arrival date and time:  2022  4:34 PM   Chief Complaint: persistent abd pain    Reason for Admission:     Abdominal pain [R10.9]  - pleasant 61 yo man, h/o HTN and hyperlipidemia, long time smoker, persistent abd pain for several months, progressively getting worse, has had 2 recent ER visits at [facility], now here for further evaluation  and CT showing \" Extensive peritoneal nodularity and mild ascites consistent with peritoneal metastatic disease. Primary lesion not definitely identified. \"    Assessment & Plan:     Principal Problem:    Generalized abdominal pain (2022)   Previous CT abd and pelvis 5/10/22 at [facility] showed significant for partial SBO, free fluid in abdomen/pelvis, nonspecific stranding of omentum (findings not present on CTap 3/2022)      Active Problems:    Essential hypertension (3/11/2013)          Hyperlipidemia (3/11/2013)          Gastroesophageal reflux disease (2019)          Abdominal pain (2022)            PLAN:  1) Admit med bed OBS  2) Will hydrate overnight due to issues with decreased po intake and NV  3) Will place IR consult for diagnostic paracentesis. Patient would be amenable to discharge with followup of results as outpatient. Will need adequate pain control, though he is hesitant to take narcotics. Primary lesion has not been identified on CT abd/pelvis. Will check CT chest for completeness in this long time smoker  4) Use SCDS  5) Resume home meds. Declines nicotine patch. Advised smoking cessation      Disposition/Expected LOS: 1  Diet: DIET NPO  DIET ADULT  VTE ppx: scd  Code status: Full  Surrogate decision-maker: spouse      History of Presenting Illness:     Sagar Page is a 62 y.o. male with medical history of HTN and hyperlipidemia who presented to ED with with complaint of abdominal pain. His pain has been chronic for months and he has had EGD (hiatal hernia) and colonoscopy (with multiple polyps removed earlier this year). He continued with intermittent abd pain and earlier this week he was seen at [facility] in the emergency department, had a CT scan that showed partial small bowel obstruction. He was admitted overnight but never left the ER and was discharged the following morning. He has had return of his pain but not nausea with decreased appetite. He has had black stools past few days but reports having taken Pepto-Bismol along with multiple doses NSAIDS for his abd pain. This pain has been chronic and recurring since approximately February. Patient is unable to describe the pain and denies any aggravating or alleviating factors. No fevers or chills. Denies distention but states it feels \"not right\". He has had diverticulitis in the past and at one point had to have partial bowel resection due to this. None of his 9 polyps removed were cancerous according to his wife. She did report they did say he still had diverticulosis. Labs tonight are benign  CT abd and pelvis resulted:     IMPRESSION  1. Extensive peritoneal nodularity and mild ascites consistent with peritoneal  metastatic disease. Primary lesion not definitely identified. 2. Dilated fluid-filled loops of small bowel possibly related to  gastroenteritis. No definite obstruction. No obvious bowel mass. 3. Sclerosis S1 vertebral body. Consider follow-up bone scan to assess for bone  metastases. ER MD discussed with surgery and oncology who suggests paracentesis of ascites for diagnosis-given it is mild ascites      Review of Systems:  A 14 point review of systems was taken and pertinent positive as per HPI.         Past Medical History:   Diagnosis Date    Bilateral carpal tunnel syndrome 12/9/2020    Chronic right shoulder pain 11/30/2020    Colon polyps 3/11/2013    Diverticulitis 3/11/2013    HTN (hypertension) 3/11/2013    Hyperlipidemia 3/11/2013    Paresthesia and pain of both upper extremities 11/30/2020    PUD (peptic ulcer disease) 3/11/2013    History of     Right elbow pain 12/9/2020    Wheezing 3/11/2013       Past Surgical History:   Procedure Laterality Date    ENDOSCOPY, COLON, DIAGNOSTIC  2/25/09    colonoscopy per Dr. Dianne Wright with need for repeat every 3 years    HX COLONOSCOPY  02/25/2022    Dr. Ellis Moon; polyps of the ascending, transverse, descending, and sigmoid colons; internal hemorrhoid; diverticulosis    HX ENDOSCOPY  02/25/2022    Dr. Ellis Moon; hiatal hernia gastric polyps    [place] ABDOMEN SURGERY PROC UNLISTED  October 2004    partial colon resection per Dr. Maggie Mares       Family History : reviewed  Family History   Problem Relation Age of Onset    Hypertension Father     Alcohol abuse Father     OSTEOARTHRITIS Father     Diabetes Father     Heart Disease Mother     Diabetes Mother     Hypertension Mother     Cancer Sister         breast    No Known Problems Brother         Social History     Tobacco Use    Smoking status: Current Every Day Smoker     Packs/day: 1.00     Years: 30.00     Pack years: 30.00    Smokeless tobacco: Never Used   Substance Use Topics    Alcohol use: No       No Known Allergies    Immunization History   Administered Date(s) Administered    COVID-19, Moderna Booster, PF, 0.25mL Dose 11/16/2021    COVID-19, Moderna, Primary or Immunocompromised Series, MRNA, PF, 100mcg/0.5mL 04/02/2021, 04/30/2021    Influenza Vaccine (Quad) PF (>6 Mo Flulaval, Fluarix, and >3 Yrs Afluria, Fluzone 53061) 10/05/2020, 09/17/2021    Pneumococcal Polysaccharide (PPSV-23) 12/29/2020    Tdap 09/21/2020    Zoster Recombinant 09/18/2020, 11/24/2020         PTA Medications:  Current Outpatient Medications   Medication Instructions    amLODIPine-benazepril (LOTREL) 5-20 mg per capsule TAKE ONE CAPSULE BY MOUTH EVERY DAY    diclofenac (VOLTAREN) 4 g, Topical, 4 TIMES DAILY    dicyclomine (BENTYL) 20 mg, Oral, EVERY 6 HOURS    famotidine (PEPCID) 40 mg tablet 1 tablet orally twice daily with food.  gabapentin (NEURONTIN) 300 mg capsule TAKE 1 TO 2 CAPSULES BY MOUTH NIGHTLY AS NEEDED FOR NERVE INFLAMMATION AND PAIN    ibuprofen (MOTRIN) 200 mg, Oral    omeprazole (PRILOSEC) 40 mg, Oral, DAILY    polyethylene glycol (MIRALAX) 17 g, Oral, DAILY    rosuvastatin (CRESTOR) 10 mg, Oral, EVERY BEDTIME    umeclidinium-vilanteroL (Anoro Ellipta) 62.5-25 mcg/actuation inhaler 1 Puff, Inhalation, DAILY       Objective:     Patient Vitals for the past 24 hrs:   Temp Pulse Resp BP SpO2   05/12/22 1737    130/77 95 %   05/12/22 1722    127/79 96 %   05/12/22 1652    131/88 96 %   05/12/22 1637    139/85 97 %   05/12/22 1351 98.2 °F (36.8 °C) 65 20 125/81 94 %       Oxygen Therapy  O2 Sat (%): 95 % (05/12/22 1737)  Pulse via Oximetry: 58 beats per minute (05/12/22 1737)  O2 Device: None (Room air) (05/12/22 1351)    There is no height or weight on file to calculate BMI. Physical Exam:    General:  Alert and oriented x 3, No acute distress, speaking in full sentences  HEENT:  Pupils equal and reactive to light and accommodation, oropharynx is clear   Neck:   Supple, no lymphadenopathy, no JVD   Lungs:  Clear to auscultation bilaterally   CV:   Regular rate, regular rhythm, with normal S1 and S2   Abdomen:  Soft, mild diffuse tenderness, nondistended, normoactive bowel sounds   Extremities:  No cyanosis clubbing or edema   Neuro:  Nonfocal, A&O x3   Psych:  Normal mood and affect       Data Reviewed: I have reviewed all labs, meds, and studies.       Recent Results (from the past 24 hour(s))   CBC WITH AUTOMATED DIFF    Collection Time: 05/12/22  1:56 PM   Result Value Ref Range    WBC 8.0 4.3 - 11.1 K/uL    RBC 4.89 4.23 - 5.6 M/uL    HGB 14.3 13.6 - 17.2 g/dL    HCT 43.9 41.1 - 50.3 %    MCV 89.8 79.6 - 97.8 FL    MCH 29.2 26.1 - 32.9 PG    MCHC 32.6 31.4 - 35.0 g/dL    RDW 12.5 11.9 - 14.6 %    PLATELET 641 081 - 257 K/uL    MPV 10.3 9.4 - 12.3 FL    ABSOLUTE NRBC 0.00 0.0 - 0.2 K/uL    DF AUTOMATED      NEUTROPHILS 66 43 - 78 %    LYMPHOCYTES 26 13 - 44 %    MONOCYTES 6 4.0 - 12.0 %    EOSINOPHILS 2 0.5 - 7.8 %    BASOPHILS 1 0.0 - 2.0 %    IMMATURE GRANULOCYTES 0 0.0 - 5.0 %    ABS. NEUTROPHILS 5.3 1.7 - 8.2 K/UL    ABS. LYMPHOCYTES 2.1 0.5 - 4.6 K/UL    ABS. MONOCYTES 0.5 0.1 - 1.3 K/UL    ABS. EOSINOPHILS 0.1 0.0 - 0.8 K/UL    ABS. BASOPHILS 0.1 0.0 - 0.2 K/UL    ABS. IMM. GRANS. 0.0 0.0 - 0.5 K/UL   METABOLIC PANEL, COMPREHENSIVE    Collection Time: 05/12/22  1:56 PM   Result Value Ref Range    Sodium 143 136 - 145 mmol/L    Potassium 3.8 3.5 - 5.1 mmol/L    Chloride 110 (H) 98 - 107 mmol/L    CO2 25 21 - 32 mmol/L    Anion gap 8 7 - 16 mmol/L    Glucose 82 65 - 100 mg/dL    BUN 12 8 - 23 MG/DL    Creatinine 0.70 (L) 0.8 - 1.5 MG/DL    GFR est AA >60 >60 ml/min/1.73m2    GFR est non-AA >60 >60 ml/min/1.73m2    Calcium 8.9 8.3 - 10.4 MG/DL    Bilirubin, total 0.4 0.2 - 1.1 MG/DL    ALT (SGPT) 18 12 - 65 U/L    AST (SGOT) 10 (L) 15 - 37 U/L    Alk.  phosphatase 76 50 - 136 U/L    Protein, total 6.7 6.3 - 8.2 g/dL    Albumin 3.4 3.2 - 4.6 g/dL    Globulin 3.3 2.3 - 3.5 g/dL    A-G Ratio 1.0 (L) 1.2 - 3.5     LIPASE    Collection Time: 05/12/22  1:56 PM   Result Value Ref Range    Lipase 51 (L) 73 - 393 U/L   POC URINE MACROSCOPIC    Collection Time: 05/12/22  4:55 PM   Result Value Ref Range    Spec. gravity (POC) 1.025 (H) 1.001 - 1.023      pH, urine  (POC) 5.5 5.0 - 9.0      Protein (POC) Negative NEG mg/dL    Glucose, urine (POC) Negative NEG mg/dL    Ketones (POC) TRACE (A) NEG mg/dL    Bilirubin (POC) SMALL (A) NEG      Blood (POC) Negative NEG      Urobilinogen (POC) 1.0 0.2 - 1.0 EU/dL    Nitrite (POC) Negative NEG      Leukocyte esterase (POC) Negative NEG      Performed by Krista Irvin        EKG Results     None          All Micro Results     None Other Studies:  CT ABD PELV W CONT    Result Date: 5/12/2022  CT OF THE ABDOMEN AND PELVIS INDICATION: Chronic abdominal pain. Multiple axial images were obtained through the abdomen and pelvis. Oral contrast was used for bowel opacification. 100mL of Isovue 370 intravenous contrast was used for better evaluation of solid organs and vascular structures. Radiation dose reduction techniques were used for this study. All CT scans performed at this facility use one or all of the following: Automated exposure control, adjustment of the mA and/or kVp according to patient's size, iterative reconstruction. COMPARISON: None FINDINGS: LOWER CHEST: Normal. HEPATOBILIARY: No evidence of focal liver mass. No calcified gallstones. PANCREAS: Normal. SPLEEN: Normal. ADRENAL GLANDS: Normal. KIDNEYS/BLADDER: Kidneys and bladder are unremarkable. No hydronephrosis or urinary tract calculi. BOWEL: Dilated fluid-filled loops of small bowel are present throughout the abdomen. No definite transition point. Oral contrast noted in the colon. No definite evidence of bowel mass but there is extensive peritoneal nodularity and trace ascites highly concerning for peritoneal metastatic disease. LYMPH NODES: No enlarged retroperitoneal or pelvic lymph nodes. Peritoneal nodularity again noted most likely metastatic disease. VASCULATURE: Unremarkable. PELVIC ORGANS: Prostate gland and rectum are unremarkable. Small amount of free pelvic fluid is noted. MUSCULOSKELETAL: Degenerative spine changes are noted. Mild sclerosis involving the S1 vertebral body is present on image 74 of series 602. 1. Extensive peritoneal nodularity and mild ascites consistent with peritoneal metastatic disease. Primary lesion not definitely identified. 2. Dilated fluid-filled loops of small bowel possibly related to gastroenteritis. No definite obstruction. No obvious bowel mass. 3. Sclerosis S1 vertebral body.  Consider follow-up bone scan to assess for bone metastases.          Medications:  Medications Administered      Medications Administered     diatrizoate nannette-diatrizoat sod (MD-GASTROVIEW,GASTROGRAFIN) 66-10 % contrast solution 15 mL     Admin Date  05/12/2022 Action  Given Dose  15 mL Route  Oral Administered By  Evan Thompson RN          iopamidoL (ISOVUE-370) 76 % injection 100 mL     Admin Date  05/12/2022 Action  Given Dose  100 mL Route  IntraVENous Administered By  Jerome Husbands          lactated Ringers infusion     Admin Date  05/12/2022 Action  New Bag Dose  150 mL/hr Rate  150 mL/hr Route  IntraVENous Administered By  Evan Thompson RN          morphine 10 mg/ml injection 5 mg     Admin Date  05/12/2022 Action  Given Dose  5 mg Route  IntraVENous Administered By  Evan Thompson RN          ondansetron [facility] injection 4 mg     Admin Date  05/12/2022 Action  Given Dose  4 mg Route  IntraVENous Administered By  Evan Thompson RN          saline peripheral flush soln 10 mL     Admin Date  05/12/2022 Action  Given Dose  10 mL Route  InterCATHeter Administered By  Jerome Lyles          sodium chloride 0.9 % bolus infusion 100 mL     Admin Date  05/12/2022 Action  New Bag Dose  100 mL Route  IntraVENous Administered By  Jerome Lyles                    Signed By: Elyssa Shell MD   [facility]    May 12, 2022   10:00 PM

## 2022-05-13 NOTE — PROGRESS NOTES
05/13/22 1556   Dual Skin Pressure Injury Assessment   Dual Skin Pressure Injury Assessment WDL   Second Care Provider (Based on [address] [place] [address]) Sandy FAN   Skin Integumentary   Skin Integumentary (WDL) WDL    Pressure  Injury Documentation No Pressure Injury Noted-Pressure Ulcer Prevention Initiated

## 2022-05-13 NOTE — PROGRESS NOTES
Comprehensive Nutrition Assessment    Type and Reason for Visit: Initial,Positive nutrition screen  Best Practice Alert for Malnutrition Screening Tool: Recently Lost Weight Without Trying: Yes, If Yes, How Much Weight Loss: 14 - 23 lbs,      Nutrition Recommendations/Plan:   Meals and Snacks:  Continue current diet. Nutrition Supplement Therapy:   Medical food supplement therapy:  Initiate Ensure Enlive three times per day (this provides 350 kcal and 20 grams protein per bottle)      Malnutrition Assessment:  Malnutrition Status: At risk for malnutrition (specify) (unintented wt loss, gi barriers to oral intake)    Nutrition Assessment:   Nutrition History:    Cultural/Latter day/Ethnic Food Preference(s): None Pt reports hx of ongoing difficulties with oral intake. Relates to abdominal pain and fullness. He indicates the pain moves about his abdomen and his intake varies related to pain. Eating often makes it worse. He experienced vomiting on Sunday, described as bile like. Intake has further declined since then. -245# w wt loss occurring over several months per his recall. He indicates he has lost close to a full waist size in pants. No bee muscle or fat loss appreciated. Unable to quantify nutrition hx, historical description is + for overall decline. Nutrition Background:   PMH of HTN, HLD, GERD,  tobacco use who presented to the ER with a complaint of persistent abdominal pain for several months that was becoming progressively worse. Admitted with generalized abdominal pain, CT with omental irregularities, mild ascites. Nutrition Interval:  Pt reports he was able to eat am meal in the ED with improved abdominal pain, he reports no intake at lunch as he felt it would be too much. He intends to try dinner tonight. Discussed with [facility], RN. Oncology consult pending.       Current Nutrition Therapies:  ADULT DIET Regular    Current Intake:   Average Meal Intake: Unable to assess Average Supplement Intake: None ordered      Anthropometric Measures:  Height: 5' 10\" (177.8 cm)  Current Body Wt: 98.9 kg (218 lb 0.6 oz), Weight source: Bed scale  BMI: 31.3, Obese class 1 (BMI 30.0-34. 9)  Admission Body Weight: 220 lb (stated)  Ideal Body Weight (lbs) (Calculated): 166 lbs (75 kg), 131.3 %  Usual Body Wt: 108.9 kg (240 lb) (IM office January. Pt confirms. ), Percent weight change: -9.2       Edema: No data recorded  Estimated Daily Nutrient Needs:  Energy (kcal/day): 7996-1845 (Kcal/kg Weight used: Current 98.9 kg  Protein (g/day):  (1-1.2 g/kg) Weight Used: (Current) 98.9 kg  Fluid (ml/day):   (1 ml/kcal)    Nutrition Diagnosis:   · Unintended weight loss related to altered GI function as evidenced by  (abdominal pain, fullness limiting po intake, +9% wt loss)    Nutrition Interventions:   Food and/or Nutrient Delivery: Continue current diet,Start oral nutrition supplement     Coordination of Nutrition Care: Continue to monitor while inpatient       Goals: Active Goal: Meet at least 75% of estimated needs,by next RD assessment       Nutrition Monitoring and Evaluation:      Food/Nutrient Intake Outcomes: Food and nutrient intake,Supplement intake  Physical Signs/Symptoms Outcomes: Biochemical data,GI status,Weight    Discharge Planning:     Too soon to determine    Dixie Schmitz RD, LDN   Contact: [phone]

## 2022-05-13 NOTE — PROGRESS NOTES
Hospitalist Progress Note   Admit Date:  2022  4:34 PM   Name:  Michael Greco   Age:  62 y.o. Sex:  male  :  1960   MRN:  [id number]   Room:  ER27/    Presenting Complaint: Abdominal Pain    Reason(s) for Admission: Abdominal pain [R10.9]     Hospital Course & Interval History:   Giuliana Marshall is a 62year old CM with a PMH of HTN, HLD, GERD,  tobacco use who presented to the ER with a complaint of persistent abdominal pain for several months that was becoming progressively worse. He presented to [facility] ER x 2 for evaluation. He has had CT Abd in 2022 with no acute findings, Colonscopy that did not show diverticulitis, Upper GI series, Aorta US that were normal.    CT AP 5/10/22 at [facility] showing partial SBO, free fluid in abdomen/pelvis, nonspecific stranding of omentum (findings not present on CTap 3/2022). CT 22 with Extensive peritoneal nodularity and mild ascites consistent with peritoneal metastatic disease. Primary lesion not definitely identified. Dilated fluid-filled loops of small bowel possibly related to gastroenteritis. No definite obstruction. No obvious bowel mass. Sclerosis S1 vertebral body. Consider follow-up bone scan to assess for bone metastases. Patient admitted and IR consulted for possible Para. Oncology consulted                                                                   Subjective/24hr Events (22): Denies abdominal pain. Tolerating regular diet. Denies fever, chills, SOB, CP    ROS:  10 systems reviewed and negative except as noted above. Assessment & Plan:     Principal Problem:  Generalized abdominal pain (2022)(Resolved)   22  -CT AP as above; CT Chest pending for completeness   -Oncology consulted; appreciate input  -Patient will likely need OP PET scan  -IR attempted Para but too little fluid available for procedure.  IR available for reassessment prn       Active Problems:  Essential hypertension (3/11/2013)   22  -Stable BP; Continue Norvasc. Lisinopril       Hyperlipidemia (3/11/2013)  -Noted  -Unremarkable Lipid panel 22     Gastroesophageal reflux disease (2019)  -PPI             Discharge Plannin-2 days     Diet:  ADULT DIET Regular  DVT PPx: SCD  Code status: Full Code    Hospital Problems as of 2022 Date Reviewed: 2022          Codes Class Noted - Resolved POA    * (Principal) Generalized abdominal pain ICD-10-CM: R10.84  ICD-9-CM: 789.07  2022 - Present Unknown        Abdominal pain ICD-10-CM: R10.9  ICD-9-CM: 789.00  2022 - Present Unknown        Gastroesophageal reflux disease ICD-10-CM: K21.9  ICD-9-CM: 530.81  2019 - Present Yes        Essential hypertension ICD-10-CM: I10  ICD-9-CM: 401.9  3/11/2013 - Present Yes        Hyperlipidemia ICD-10-CM: E78.5  ICD-9-CM: 272.4  3/11/2013 - Present Yes              Objective:     Patient Vitals for the past 24 hrs:   Temp Pulse Resp BP SpO2   22    (!) 124/92 93 %   22 0558    125/82 95 %   22 0358    119/86 95 %   22 0158    115/75 93 %   22 0043 98 °F (36.7 °C) 60 16 129/87 92 %   22 1737    130/77 95 %   22 1722    127/79 96 %   22 1652    131/88 96 %   22 1637    139/85 97 %   22 1351 98.2 °F (36.8 °C) 65 20 125/81 94 %     Oxygen Therapy  O2 Sat (%): 93 % (22)  Pulse via Oximetry: 69 beats per minute (22)  O2 Device: None (Room air) (22)  O2 Flow Rate (L/min): 2 l/min (22)    Estimated body mass index is 31.84 kg/m² as calculated from the following:    Height as of 22: 5' 10\" (1.778 m). Weight as of 22: 100.7 kg (221 lb 14.4 oz). Intake/Output Summary (Last 24 hours) at 2022 1124  Last data filed at 2022 0955  Gross per 24 hour   Intake 100 ml   Output    Net 100 ml         Physical Exam:     Blood pressure (!) 124/92, pulse 60, temperature 98 °F (36.7 °C), resp.  rate 16, SpO2 93 %.  General:    Well nourished. No overt distress  Head:  Normocephalic, atraumatic  Eyes:  Sclerae appear normal.  Pupils equally round. ENT:  Nares appear normal, no drainage. Moist oral mucosa  Neck:  No restricted ROM. Trachea midline   CV:   RRR. No m/r/g. No jugular venous distension. Lungs:   CTAB. No wheezing, rhonchi, or rales. Respirations even, unlabored  Abdomen: Bowel sounds present. Soft, nontender, nondistended. Extremities: No cyanosis or clubbing. No edema  Skin:     No rashes and normal coloration. Warm and dry. Neuro:  CN II-XII grossly intact. A&Ox3  Psych:  Normal mood and affect. I have reviewed ordered lab tests and independently visualized imaging below:    Recent Labs:  Recent Results (from the past 48 hour(s))   CBC WITH AUTOMATED DIFF    Collection Time: 05/12/22  1:56 PM   Result Value Ref Range    WBC 8.0 4.3 - 11.1 K/uL    RBC 4.89 4.23 - 5.6 M/uL    HGB 14.3 13.6 - 17.2 g/dL    HCT 43.9 41.1 - 50.3 %    MCV 89.8 79.6 - 97.8 FL    MCH 29.2 26.1 - 32.9 PG    MCHC 32.6 31.4 - 35.0 g/dL    RDW 12.5 11.9 - 14.6 %    PLATELET 841 895 - 134 K/uL    MPV 10.3 9.4 - 12.3 FL    ABSOLUTE NRBC 0.00 0.0 - 0.2 K/uL    DF AUTOMATED      NEUTROPHILS 66 43 - 78 %    LYMPHOCYTES 26 13 - 44 %    MONOCYTES 6 4.0 - 12.0 %    EOSINOPHILS 2 0.5 - 7.8 %    BASOPHILS 1 0.0 - 2.0 %    IMMATURE GRANULOCYTES 0 0.0 - 5.0 %    ABS. NEUTROPHILS 5.3 1.7 - 8.2 K/UL    ABS. LYMPHOCYTES 2.1 0.5 - 4.6 K/UL    ABS. MONOCYTES 0.5 0.1 - 1.3 K/UL    ABS. EOSINOPHILS 0.1 0.0 - 0.8 K/UL    ABS. BASOPHILS 0.1 0.0 - 0.2 K/UL    ABS. IMM.  GRANS. 0.0 0.0 - 0.5 K/UL   METABOLIC PANEL, COMPREHENSIVE    Collection Time: 05/12/22  1:56 PM   Result Value Ref Range    Sodium 143 136 - 145 mmol/L    Potassium 3.8 3.5 - 5.1 mmol/L    Chloride 110 (H) 98 - 107 mmol/L    CO2 25 21 - 32 mmol/L    Anion gap 8 7 - 16 mmol/L    Glucose 82 65 - 100 mg/dL    BUN 12 8 - 23 MG/DL    Creatinine 0.70 (L) 0.8 - 1.5 MG/DL GFR est AA >60 >60 ml/min/1.73m2    GFR est non-AA >60 >60 ml/min/1.73m2    Calcium 8.9 8.3 - 10.4 MG/DL    Bilirubin, total 0.4 0.2 - 1.1 MG/DL    ALT (SGPT) 18 12 - 65 U/L    AST (SGOT) 10 (L) 15 - 37 U/L    Alk. phosphatase 76 50 - 136 U/L    Protein, total 6.7 6.3 - 8.2 g/dL    Albumin 3.4 3.2 - 4.6 g/dL    Globulin 3.3 2.3 - 3.5 g/dL    A-G Ratio 1.0 (L) 1.2 - 3.5     LIPASE    Collection Time: 05/12/22  1:56 PM   Result Value Ref Range    Lipase 51 (L) 73 - 393 U/L   POC URINE MACROSCOPIC    Collection Time: 05/12/22  4:55 PM   Result Value Ref Range    Spec. gravity (POC) 1.025 (H) 1.001 - 1.023      pH, urine  (POC) 5.5 5.0 - 9.0      Protein (POC) Negative NEG mg/dL    Glucose, urine (POC) Negative NEG mg/dL    Ketones (POC) TRACE (A) NEG mg/dL    Bilirubin (POC) SMALL (A) NEG      Blood (POC) Negative NEG      Urobilinogen (POC) 1.0 0.2 - 1.0 EU/dL    Nitrite (POC) Negative NEG      Leukocyte esterase (POC) Negative NEG      Performed by Aminta Irvin        All Micro Results     None          Other Studies:  CT ABD PELV W CONT    Result Date: 5/12/2022  CT OF THE ABDOMEN AND PELVIS INDICATION: Chronic abdominal pain. Multiple axial images were obtained through the abdomen and pelvis. Oral contrast was used for bowel opacification. 100mL of Isovue 370 intravenous contrast was used for better evaluation of solid organs and vascular structures. Radiation dose reduction techniques were used for this study. All CT scans performed at this facility use one or all of the following: Automated exposure control, adjustment of the mA and/or kVp according to patient's size, iterative reconstruction. COMPARISON: None FINDINGS: LOWER CHEST: Normal. HEPATOBILIARY: No evidence of focal liver mass. No calcified gallstones. PANCREAS: Normal. SPLEEN: Normal. ADRENAL GLANDS: Normal. KIDNEYS/BLADDER: Kidneys and bladder are unremarkable. No hydronephrosis or urinary tract calculi.  BOWEL: Dilated fluid-filled loops of small bowel are present throughout the abdomen. No definite transition point. Oral contrast noted in the colon. No definite evidence of bowel mass but there is extensive peritoneal nodularity and trace ascites highly concerning for peritoneal metastatic disease. LYMPH NODES: No enlarged retroperitoneal or pelvic lymph nodes. Peritoneal nodularity again noted most likely metastatic disease. VASCULATURE: Unremarkable. PELVIC ORGANS: Prostate gland and rectum are unremarkable. Small amount of free pelvic fluid is noted. MUSCULOSKELETAL: Degenerative spine changes are noted. Mild sclerosis involving the S1 vertebral body is present on image 74 of series 602. 1. Extensive peritoneal nodularity and mild ascites consistent with peritoneal metastatic disease. Primary lesion not definitely identified. 2. Dilated fluid-filled loops of small bowel possibly related to gastroenteritis. No definite obstruction. No obvious bowel mass. 3. Sclerosis S1 vertebral body. Consider follow-up bone scan to assess for bone metastases. US ABD LTD    Result Date: 5/13/2022  Exam: Limited abdominal ultrasound. Indication: Ascites and abdominal pain. Concern for malignancy Comparison: CT abdomen and pelvis, 5/12/2022 Findings: A single limited gray scale image was submitted of an undisclosed location in the abdomen. Images demonstrate a small amount of ascites as seen on comparison CT. 1. Small volume ascites.       Current Meds:  Current Facility-Administered Medications   Medication Dose Route Frequency    sodium chloride 0.9 % bolus infusion 100 mL  100 mL IntraVENous RAD ONCE    iopamidoL (ISOVUE-370) 76 % injection 100 mL  100 mL IntraVENous ONCE    saline peripheral flush soln 10 mL  10 mL InterCATHeter RAD ONCE    lidocaine (XYLOCAINE) 20 mg/mL (2 %) injection  mg   mg IntraDERMal Rad Multiple    sodium chloride (NS) flush 5-10 mL  5-10 mL IntraVENous Q8H    sodium chloride (NS) flush 5-10 mL  5-10 mL IntraVENous PRN    lactated Ringers infusion  150 mL/hr IntraVENous CONTINUOUS    sodium chloride (NS) flush 5-40 mL  5-40 mL IntraVENous Q8H    sodium chloride (NS) flush 5-40 mL  5-40 mL IntraVENous PRN    acetaminophen (TYLENOL) tablet 650 mg  650 mg Oral Q6H PRN    Or    acetaminophen (TYLENOL) suppository 650 mg  650 mg Rectal Q6H PRN    polyethylene glycol (MIRALAX) packet 17 g  17 g Oral DAILY PRN    ondansetron (ZOFRAN ODT) tablet 4 mg  4 mg Oral Q8H PRN    Or    ondansetron (ZOFRAN) injection 4 mg  4 mg IntraVENous Q6H PRN    0.45% sodium chloride infusion  75 mL/hr IntraVENous CONTINUOUS    amLODIPine (NORVASC) tablet 5 mg  5 mg Oral DAILY    pantoprazole (PROTONIX) tablet 40 mg  40 mg Oral ACB&D    HYDROcodone-acetaminophen (NORCO) 5-325 mg per tablet 1 Tablet  1 Tablet Oral Q6H PRN    lisinopriL (PRINIVIL, ZESTRIL) tablet 20 mg  20 mg Oral DAILY     Current Outpatient Medications   Medication Sig    omeprazole (PRILOSEC) 40 mg capsule Take 40 mg by mouth daily.  dicyclomine (BENTYL) 20 mg tablet Take 1 Tablet by mouth every six (6) hours.  polyethylene glycol (MIRALAX) 17 gram/dose powder Take 17 g by mouth daily.  rosuvastatin (CRESTOR) 10 mg tablet Take 1 Tablet by mouth nightly.  ibuprofen (MOTRIN) 200 mg tablet Take 200 mg by mouth.  diclofenac (VOLTAREN) 1 % gel Apply 4 g to affected area four (4) times daily.  amLODIPine-benazepril (LOTREL) 5-20 mg per capsule TAKE ONE CAPSULE BY MOUTH EVERY DAY    gabapentin (NEURONTIN) 300 mg capsule TAKE 1 TO 2 CAPSULES BY MOUTH NIGHTLY AS NEEDED FOR NERVE INFLAMMATION AND PAIN    umeclidinium-vilanteroL (Anoro Ellipta) 62.5-25 mcg/actuation inhaler Take 1 Puff by inhalation daily.  famotidine (PEPCID) 40 mg tablet 1 tablet orally twice daily with food. Signed:  Dillon Tam NP    Part of this note may have been written by using a voice dictation software.   The note has been proof read but may still contain some grammatical/other typographical errors.

## 2022-05-14 VITALS
RESPIRATION RATE: 18 BRPM | SYSTOLIC BLOOD PRESSURE: 127 MMHG | HEIGHT: 70 IN | TEMPERATURE: 97.8 F | WEIGHT: 218 LBS | DIASTOLIC BLOOD PRESSURE: 85 MMHG | OXYGEN SATURATION: 96 % | BODY MASS INDEX: 31.21 KG/M2 | HEART RATE: 60 BPM

## 2022-05-14 PROCEDURE — G0378 HOSPITAL OBSERVATION PER HR: HCPCS

## 2022-05-14 PROCEDURE — 74011250637 HC RX REV CODE- 250/637: Performed by: HOSPITALIST

## 2022-05-14 PROCEDURE — 2709999900 HC NON-CHARGEABLE SUPPLY

## 2022-05-14 PROCEDURE — 74011000250 HC RX REV CODE- 250: Performed by: HOSPITALIST

## 2022-05-14 PROCEDURE — 74011000250 HC RX REV CODE- 250: Performed by: EMERGENCY MEDICINE

## 2022-05-14 RX ORDER — PANTOPRAZOLE SODIUM 40 MG/1
40 TABLET, DELAYED RELEASE ORAL
Qty: 60 TABLET | Refills: 0 | Status: SHIPPED | OUTPATIENT
Start: 2022-05-14 | End: 2022-06-13

## 2022-05-14 RX ORDER — TRAMADOL HYDROCHLORIDE 50 MG/1
50 TABLET ORAL
Qty: 9 TABLET | Refills: 0 | Status: SHIPPED | OUTPATIENT
Start: 2022-05-14 | End: 2022-05-18

## 2022-05-14 RX ORDER — NALOXONE HYDROCHLORIDE 4 MG/.1ML
SPRAY NASAL
Qty: 1 EACH | Refills: 0 | Status: SHIPPED | OUTPATIENT
Start: 2022-05-14

## 2022-05-14 RX ORDER — SUCRALFATE 1 G/10ML
1 SUSPENSION ORAL 4 TIMES DAILY
Qty: 280 ML | Refills: 0 | Status: SHIPPED | OUTPATIENT
Start: 2022-05-14 | End: 2022-05-21

## 2022-05-14 RX ADMIN — SODIUM CHLORIDE, PRESERVATIVE FREE 10 ML: 5 INJECTION INTRAVENOUS at 05:08

## 2022-05-14 RX ADMIN — PANTOPRAZOLE SODIUM 40 MG: 40 TABLET, DELAYED RELEASE ORAL at 05:09

## 2022-05-14 RX ADMIN — ACETAMINOPHEN 650 MG: 325 TABLET ORAL at 06:37

## 2022-05-14 RX ADMIN — LISINOPRIL 20 MG: 20 TABLET ORAL at 09:40

## 2022-05-14 RX ADMIN — ACETAMINOPHEN 650 MG: 325 TABLET ORAL at 00:06

## 2022-05-14 RX ADMIN — SODIUM CHLORIDE 75 ML/HR: 450 INJECTION, SOLUTION INTRAVENOUS at 02:10

## 2022-05-14 RX ADMIN — AMLODIPINE BESYLATE 5 MG: 5 TABLET ORAL at 09:40

## 2022-05-14 NOTE — PROGRESS NOTES
Patient is for discharge home today with family and no needs/supportive care orders received for CM at this time. Care Management Interventions  PCP Verified by CM: Yes  Mode of Transport at Discharge:  Other (see comment) (Patient's Spouse Stone Beaulieu [phone])  Transition of Care Consult (CM Consult): Discharge Planning  Physical Therapy Consult: No  Occupational Therapy Consult: No  Support Systems: Spouse/Significant Other  Confirm Follow Up Transport: Self  Discharge Location  Patient Expects to be Discharged to[de-identified] Home

## 2022-05-14 NOTE — DISCHARGE SUMMARY
Hospitalist Discharge Summary   Admit Date:  2022  4:34 PM   DC Note date: 2022  Name:  Consuelo Dias   Age:  62 y.o. Sex:  male  :  1960   MRN:  [id number]   Room:  [facility]  PCP:  Anthony Eisenberg MD    Presenting Complaint: Abdominal Pain    Initial Admission Diagnosis: Abdominal pain [R10.9]     Problem List for this Hospitalization:  Hospital Problems as of 2022 Date Reviewed: 2022          Codes Class Noted - Resolved POA    * (Principal) Generalized abdominal pain ICD-10-CM: R10.84  ICD-9-CM: 789.07  2022 - Present Unknown        Abdominal pain ICD-10-CM: R10.9  ICD-9-CM: 789.00  2022 - Present Unknown        Gastroesophageal reflux disease ICD-10-CM: K21.9  ICD-9-CM: 530.81  2019 - Present Yes        Essential hypertension ICD-10-CM: I10  ICD-9-CM: 401.9  3/11/2013 - Present Yes        Hyperlipidemia ICD-10-CM: E78.5  ICD-9-CM: 272.4  3/11/2013 - Present Yes            Did Patient have Sepsis (YES OR NO): No    Hospital Course:  Janette Delgado is a 62year old CM with a PMH of HTN, HLD, GERD, tobacco use who presented to the ER with a complaint of persistent abdominal pain for several months that was becoming progressively worse. He presented to [facility] ER x 2 for evaluation. He has had CT Abd in 2022 with no acute findings, Colonscopy that did not show diverticulitis, Upper GI series, Aorta US that were normal.     CT AP on 5/10/22 at [facility] w/ partial SBO, free fluid in abdomen/pelvis, nonspecific stranding of omentum (findings not present on CTap 3/2022). CT 22 with Extensive peritoneal nodularity and mild ascites consistent with peritoneal metastatic disease. Primary lesion not definitely identified. Dilated fluid-filled loops of small bowel possibly related to gastroenteritis. No definite obstruction. No obvious bowel mass. Sclerosis S1 vertebral body.  Consider follow-up bone scan to assess for bone metastases.     Patient was admitted to the Hospitalist service and IR consulted for possible paracentesis however very small volume was inaccessible. Oncology was consulted and the patient will need to follow up with them on an outpatient basis. CT Chest obtained May/13 and w/ no evidence of primary malignancy or metastasis. The patient stated that he has another gastroenterology appointment in July. On inquiry, he endorsed taking large amounts of ibuprofen and naproxen \"for quite some time\". Per his description, he was taking approximately 6251-7672 mg of ibuprofen daily. He was instructed to stop NSAIDs and was educated on a safe amount of acetaminophen. He was instructed to follow up with his PCP within a week. His Hgb is stable at 14.3 at discharge. He is afebrile and VSS. WBC normal.  Renal function is normal.    Mr. Pedroza was encouraged to follow up with his PCP and Oncology. He is to also keep his previously scheduled GI appointment in July. He said he wants to go home. He is an independent person and he is appropriate for discharge home on May/14/2022. Thorough instructions were given prior to discharge and Mr. Pedroza and his spouse expressed understanding. A&P  Principal Problem:  Generalized abdominal pain (5/12/2022)   Mild ascittes  Concern for peritoneal metastatic disease  - UA negative for leuk est, nitrites  - CT abd/pel as above; CT Chest w/ no evidence of primary malignancy or metastasis  - IR attempted paracentesis but too little fluid available for procedure.   IR available for reassessment PRN.   - Oncology consulted; patient educated that he will need outpatient follow up     Active Problems:  S/p recent colonoscopy  - Patient stated he had several polyps that were excised  - Follow up with your Gastroenterology group at discharge    Symptomatic JAMES  - Continue pantoprazole BID x 30 days, then daily  - Sucaralfate QID x 7 days  - Stop NSAIDs; acetaminophen ok  - Follow up with PCP and Gastroenterology    Essential hypertension (3/11/2013)  - Stable BP; Continue home medication      Hyperlipidemia (3/11/2013)  - Resume home statin    Obesity Class 1  - Lifestyle modification    Tobacco habituation  - Counseled  - Of note, CT Chest w/ no evidence of primary malignancy or metastasis      Disposition: Home or Self Care  Diet: ADULT DIET Regular  ADULT ORAL NUTRITION SUPPLEMENT Breakfast, Lunch, Dinner; Standard High Calorie/High Protein  Code Status: Full Code    Follow Up Orders: Follow-up Appointments   Procedures    FOLLOW UP VISIT Appointment in: Other (Specify)     Standing Status:   Standing     Number of Occurrences:   1     Order Specific Question:   Appointment in     Answer: Other (Specify)       Follow-up Information     Follow up With Specialties Details Why Contact Info    Zev Virgen MD Family Medicine In 5 days post discharge check up [address] [id number] [facility]      Sid Ambriz MD Hematology and Oncology, Hematology Physician, Oncology In 1 week post discharge follow up [address]  [phone]          Time spent in patient discharge and coordination 39 minutes. Plan was discussed with Mr. Allyssa Lemus, his spouse, and Case mgmt. All questions answered. Patient was stable at time of discharge. Instructions given to call a physician or return if any concerns. Discharge Info:   Current Discharge Medication List      START taking these medications    Details   pantoprazole (PROTONIX) 40 mg tablet Take 1 Tablet by mouth Before breakfast and dinner for 30 days. Qty: 60 Tablet, Refills: 0  Start date: 5/14/2022, End date: 6/13/2022      sucralfate (CARAFATE) 100 mg/mL suspension Take 10 mL by mouth four (4) times daily for 7 days.   Qty: 280 mL, Refills: 0  Start date: 5/14/2022, End date: 5/21/2022      traMADoL (ULTRAM) 50 mg tablet Take 1 Tablet by mouth every eight (8) hours as needed for Pain for up to 3 days. Max Daily Amount: 150 mg.  Qty: 9 Tablet, Refills: 0  Start date: 5/14/2022, End date: 5/17/2022    Associated Diagnoses: Abdominal pain, generalized      naloxone (Narcan) 4 mg/actuation nasal spray Use 1 spray intranasally, then discard. Repeat with new spray every 2 min as needed for opioid overdose symptoms, alternating nostrils. Qty: 1 Each, Refills: 0  Start date: 5/14/2022         CONTINUE these medications which have NOT CHANGED    Details   acetaminophen/diphenhydramine (TYLENOL PM PO) Take  by mouth as needed. dicyclomine (BENTYL) 20 mg tablet Take 1 Tablet by mouth every six (6) hours. Qty: 120 Tablet, Refills: 3    Associated Diagnoses: Generalized abdominal pain      rosuvastatin (CRESTOR) 10 mg tablet Take 1 Tablet by mouth nightly. Qty: 90 Tablet, Refills: 2    Associated Diagnoses: Mixed hyperlipidemia      amLODIPine-benazepril (LOTREL) 5-20 mg per capsule TAKE ONE CAPSULE BY MOUTH EVERY DAY  Qty: 90 Capsule, Refills: 3    Associated Diagnoses: Essential hypertension      umeclidinium-vilanteroL (Anoro Ellipta) 62.5-25 mcg/actuation inhaler Take 1 Puff by inhalation daily. Qty: 3 Each, Refills: 3    Associated Diagnoses: Mucopurulent chronic bronchitis (HCC)         STOP taking these medications       naproxen sodium (Aleve) 220 mg tablet Comments:   Reason for Stopping:         omeprazole (PRILOSEC) 40 mg capsule Comments:   Reason for Stopping:         polyethylene glycol (MIRALAX) 17 gram/dose powder Comments:   Reason for Stopping:         ibuprofen (MOTRIN) 200 mg tablet Comments:   Reason for Stopping:         diclofenac (VOLTAREN) 1 % gel Comments:   Reason for Stopping:         famotidine (PEPCID) 40 mg tablet Comments:   Reason for Stopping:               Procedures done this admission:  * No surgery found *    Consults this admission:  IP CONSULT TO ONCOLOGY    Echocardiogram/EKG results:  No results found for this or any previous visit.        EKG Results     None          Diagnostic Imaging/Tests:   CT CHEST W CONT    Result Date: 5/13/2022  EXAM: CT CHEST WITH CONTRAST INDICATION: abnormal CT, multiple nodules noted on omentum. Smoker. cancer workup. COMPARISON: CT 5/12/2022 TECHNIQUE:  CT imaging was performed of the chest after intravenous injection of 75 mL Isovue 370. Intravenous contrast was used for better evaluation of solid organs and vascular structures. Coronal reformatted imaging provided. Radiation dose reduction techniques were used for this study. Our CT scanners use one or all of the following: Automated exposure control, adjustment of the mA and/or kV according to patient size, iterative reconstruction. FINDINGS: Mediastinum and visualized thyroid: Normal. Heart: Normal. Large Vessels: Normal. Pleura: Normal. Lungs: No lung mass clearly discerned. Mild scarring or atelectasis in the right lung base. No suspicious lung nodule. No consolidation or bee pulmonary edema. Airways: Normal. Lymph nodes: Normal. Bones/Soft tissues: Normal. Visualized abdomen: Partially imaged omental nodules. Perihepatic ascites noted. No evidence of primary malignancy or metastasis within the chest    CT ABD PELV W CONT    Result Date: 5/12/2022  CT OF THE ABDOMEN AND PELVIS INDICATION: Chronic abdominal pain. Multiple axial images were obtained through the abdomen and pelvis. Oral contrast was used for bowel opacification. 100mL of Isovue 370 intravenous contrast was used for better evaluation of solid organs and vascular structures. Radiation dose reduction techniques were used for this study. All CT scans performed at this facility use one or all of the following: Automated exposure control, adjustment of the mA and/or kVp according to patient's size, iterative reconstruction. COMPARISON: None FINDINGS: LOWER CHEST: Normal. HEPATOBILIARY: No evidence of focal liver mass. No calcified gallstones.  PANCREAS: Normal. SPLEEN: Normal. ADRENAL GLANDS: Normal. KIDNEYS/BLADDER: Kidneys and bladder are unremarkable. No hydronephrosis or urinary tract calculi. BOWEL: Dilated fluid-filled loops of small bowel are present throughout the abdomen. No definite transition point. Oral contrast noted in the colon. No definite evidence of bowel mass but there is extensive peritoneal nodularity and trace ascites highly concerning for peritoneal metastatic disease. LYMPH NODES: No enlarged retroperitoneal or pelvic lymph nodes. Peritoneal nodularity again noted most likely metastatic disease. VASCULATURE: Unremarkable. PELVIC ORGANS: Prostate gland and rectum are unremarkable. Small amount of free pelvic fluid is noted. MUSCULOSKELETAL: Degenerative spine changes are noted. Mild sclerosis involving the S1 vertebral body is present on image 74 of series 602. 1. Extensive peritoneal nodularity and mild ascites consistent with peritoneal metastatic disease. Primary lesion not definitely identified. 2. Dilated fluid-filled loops of small bowel possibly related to gastroenteritis. No definite obstruction. No obvious bowel mass. 3. Sclerosis S1 vertebral body. Consider follow-up bone scan to assess for bone metastases. US ABD LTD    Result Date: 5/13/2022  Exam: Limited abdominal ultrasound. Indication: Ascites and abdominal pain. Concern for malignancy Comparison: CT abdomen and pelvis, 5/12/2022 Findings: A single limited gray scale image was submitted of an undisclosed location in the abdomen. Images demonstrate a small amount of ascites as seen on comparison CT. 1. Small volume ascites.       All Micro Results     None          Labs: Results:       BMP, Mg, Phos Recent Labs     05/13/22  1901 05/12/22  1356   NA  --  143   K  --  3.8   CL  --  110*   CO2  --  25   AGAP  --  8   BUN  --  12   CREA 0.90 0.70*   CA  --  8.9   GLU  --  82      CBC Recent Labs     05/12/22  1356   WBC 8.0   RBC 4.89   HGB 14.3   HCT 43.9      GRANS 66   LYMPH 26   EOS 2   MONOS 6   BASOS 1 IG 0   ANEU 5.3   ABL 2.1   ARVIND 0.1   ABM 0.5   ABB 0.1   AIG 0.0      LFT Recent Labs     05/12/22  1356   ALT 18   AP 76   TP 6.7   ALB 3.4   GLOB 3.3   AGRAT 1.0*      Cardiac Testing No results found for: BNPP, BNP, CPK, RCK1, RCK2, RCK3, RCK4, CKMB, CKNDX, CKND1, TROPT, TROIQ   Coagulation Tests No results found for: PTP, INR, APTT, INREXT   A1c Lab Results   Component Value Date/Time    Hemoglobin A1c 6.0 (H) 04/18/2022 10:32 AM      Lipid Panel Lab Results   Component Value Date/Time    Cholesterol, total 138 04/18/2022 10:32 AM    HDL Cholesterol 26 (L) 04/18/2022 10:32 AM    LDL, calculated 88 04/18/2022 10:32 AM    LDL, calculated 128 (H) 12/11/2019 09:32 AM    VLDL, calculated 24 04/18/2022 10:32 AM    VLDL, calculated 25 12/11/2019 09:32 AM    Triglyceride 135 04/18/2022 10:32 AM      Thyroid Panel Lab Results   Component Value Date/Time    TSH 1.220 12/29/2020 08:36 AM    TSH 0.959 12/11/2019 09:32 AM        Most Recent UA No results found for: COLOR, APPRN, REFSG, REYNALDO, PROTU, GLUCU, KETU, BILU, BLDU, UROU, YUVAL, LEUKU, WBCU, RBCU, UEPI, BACTU, CASTS, UCRY, MUCUS, UCOM       All Labs from Last 24 Hrs:  Recent Results (from the past 24 hour(s))   CREATININE    Collection Time: 05/13/22  7:01 PM   Result Value Ref Range    Creatinine 0.90 0.8 - 1.5 MG/DL       Current Med List in Hospital:   Current Facility-Administered Medications   Medication Dose Route Frequency    lidocaine (XYLOCAINE) 20 mg/mL (2 %) injection  mg   mg IntraDERMal Rad Multiple    sodium chloride (NS) flush 5-40 mL  5-40 mL IntraVENous Q8H    sodium chloride (NS) flush 5-40 mL  5-40 mL IntraVENous PRN    acetaminophen (TYLENOL) tablet 650 mg  650 mg Oral Q6H PRN    Or    acetaminophen (TYLENOL) suppository 650 mg  650 mg Rectal Q6H PRN    polyethylene glycol (MIRALAX) packet 17 g  17 g Oral DAILY PRN    ondansetron (ZOFRAN ODT) tablet 4 mg  4 mg Oral Q8H PRN    Or    ondansetron (ZOFRAN) injection 4 mg  4 mg IntraVENous Q6H PRN    0.45% sodium chloride infusion  75 mL/hr IntraVENous CONTINUOUS    amLODIPine (NORVASC) tablet 5 mg  5 mg Oral DAILY    pantoprazole (PROTONIX) tablet 40 mg  40 mg Oral ACB&D    HYDROcodone-acetaminophen (NORCO) 5-325 mg per tablet 1 Tablet  1 Tablet Oral Q6H PRN    lisinopriL (PRINIVIL, ZESTRIL) tablet 20 mg  20 mg Oral DAILY       No Known Allergies  Immunization History   Administered Date(s) Administered    COVID-19, Moderna Booster, PF, 0.25mL Dose 11/16/2021    COVID-19, Moderna, Primary or Immunocompromised Series, MRNA, PF, 100mcg/0.5mL 04/02/2021, 04/30/2021    Influenza Vaccine (Quad) PF (>6 Mo Flulaval, Fluarix, and >3 Yrs Afluria, Fluzone [id number]) 10/05/2020, 09/17/2021    Pneumococcal Polysaccharide (PPSV-23) 12/29/2020    Tdap 09/21/2020    Zoster Recombinant 09/18/2020, 11/24/2020       Recent Vital Data:  Patient Vitals for the past 24 hrs:   Temp Pulse Resp BP SpO2   05/14/22 0704 97.8 °F (36.6 °C) 60 18 127/85 96 %   05/14/22 0420 97.9 °F (36.6 °C) 79 20 (!) 129/92 94 %   05/13/22 2345 98 °F (36.7 °C) 64 18 (!) 121/92 93 %   05/13/22 1935 98.1 °F (36.7 °C) 62 18 123/80 92 %   05/13/22 1529 97.9 °F (36.6 °C)  20 133/87 92 %     Oxygen Therapy  O2 Sat (%): 96 % (05/14/22 0704)  Pulse via Oximetry: 69 beats per minute (05/13/22 0628)  O2 Device: None (Room air) (05/14/22 0509)  O2 Flow Rate (L/min): 2 l/min (05/13/22 0043)    Estimated body mass index is 31.28 kg/m² as calculated from the following:    Height as of this encounter: 5' 10\" (1.778 m). Weight as of this encounter: 98.9 kg (218 lb). Intake/Output Summary (Last 24 hours) at 5/14/2022 1022  Last data filed at 5/14/2022 0509  Gross per 24 hour   Intake 1030 ml   Output 300 ml   Net 730 ml         Physical Exam:  General:    No overt distress  Head:  Normocephalic, atraumatic  Eyes:  Sclerae appear normal.  Pupils equally round. HENT:  Nares appear normal, no drainage.   Moist mucous membranes  Neck:  No restricted ROM. Trachea midline  CV:   RRR. No m/r/g. Lungs: No wheezing. Even, unlabored on room air. Abdomen:   Soft, nondistended. Extremities: Warm and dry. No cyanosis or clubbing. Skin:     No rashes. Normal coloration  Neuro:  CN II-XII grossly intact. Psych:  Normal mood and affect. Signed:  Shaheen Khan NP    Part of this note may have been written by using a voice dictation software. The note has been proof read but may still contain some grammatical/other typographical errors.

## 2022-05-14 NOTE — PROGRESS NOTES
Discharge information completed, Rx given, pt denies any questions. Pt escort via ambulation, condition appear stable.

## 2022-05-14 NOTE — PROGRESS NOTES
Hourly rounds in progress. Up ad geoff in room. States had 2 loose black BM. Tylenol x 1 for abd pain. IVF infusing. Wife stayed at bedside.

## 2022-05-17 ENCOUNTER — HOSPITAL ENCOUNTER (OUTPATIENT)
Dept: ULTRASOUND IMAGING | Age: 62
Discharge: HOME | DRG: 376 | End: 2022-05-17
Attending: FAMILY MEDICINE
Payer: COMMERCIAL

## 2022-05-17 ENCOUNTER — HOSPITAL ENCOUNTER (OUTPATIENT)
Dept: CT IMAGING | Age: 62
DRG: 376 | End: 2022-05-17
Attending: PHYSICIAN ASSISTANT
Payer: COMMERCIAL

## 2022-05-17 DIAGNOSIS — R10.84 GENERALIZED ABDOMINAL PAIN: ICD-10-CM

## 2022-05-17 PROCEDURE — 76700 US EXAM ABDOM COMPLETE: CPT

## 2022-05-17 PROCEDURE — 9990 CHARGE CONVERSION

## 2022-05-17 NOTE — PROGRESS NOTES
Call back abdominal ultrasound revealed some mild fatty infiltration of the liver without any type of mass. There was a gallbladder polyp but no stones or wall thickening suggestive of need for surgery. He had some mild ascites or fluid present. He had a mildly prominent common bile duct. They recommended a hepatobiliary scan or HIDA scan to assess for any common bile duct obstruction. I will order this for the patient.

## 2022-05-18 DIAGNOSIS — C78.6 PERITONEAL METASTASES (HCC): Primary | ICD-10-CM

## 2022-05-18 PROBLEM — F11.99 OPIOID USE, UNSPECIFIED WITH UNSPECIFIED OPIOID-INDUCED DISORDER (HCC): Status: ACTIVE | Noted: 2022-05-18

## 2022-05-20 ENCOUNTER — HOSPITAL ENCOUNTER (INPATIENT)
Age: 62
LOS: 25 days | Discharge: HOME HEALTH | DRG: 356 | End: 2022-06-14
Attending: EMERGENCY MEDICINE | Admitting: EMERGENCY MEDICINE
Payer: COMMERCIAL

## 2022-05-20 ENCOUNTER — HOSPITAL ENCOUNTER (INPATIENT)
Age: 62
LOS: 1 days | Discharge: STILL ADMITTED OR ADMINISTRATIVE | DRG: 376 | End: 2022-05-21
Attending: EMERGENCY MEDICINE | Admitting: SURGERY
Payer: COMMERCIAL

## 2022-05-20 ENCOUNTER — APPOINTMENT (OUTPATIENT)
Dept: GENERAL RADIOLOGY | Age: 62
DRG: 376 | End: 2022-05-20
Attending: NURSE PRACTITIONER
Payer: COMMERCIAL

## 2022-05-20 VITALS
BODY MASS INDEX: 29.63 KG/M2 | SYSTOLIC BLOOD PRESSURE: 129 MMHG | RESPIRATION RATE: 16 BRPM | OXYGEN SATURATION: 95 % | DIASTOLIC BLOOD PRESSURE: 83 MMHG | WEIGHT: 207 LBS | TEMPERATURE: 98.1 F | HEART RATE: 65 BPM | HEIGHT: 70 IN

## 2022-05-20 DIAGNOSIS — C78.6 PERITONEAL METASTASES (HCC): Primary | ICD-10-CM

## 2022-05-20 DIAGNOSIS — R11.2 INTRACTABLE VOMITING WITH NAUSEA, UNSPECIFIED VOMITING TYPE: ICD-10-CM

## 2022-05-20 DIAGNOSIS — G89.3 CANCER ASSOCIATED PAIN: ICD-10-CM

## 2022-05-20 DIAGNOSIS — C76.2 ABDOMINAL CARCINOMATOSIS (HCC): ICD-10-CM

## 2022-05-20 DIAGNOSIS — R10.84 ABDOMINAL PAIN, GENERALIZED: Primary | ICD-10-CM

## 2022-05-20 PROBLEM — K56.600 PARTIAL SMALL BOWEL OBSTRUCTION (HCC): Status: ACTIVE | Noted: 2022-05-20

## 2022-05-20 PROBLEM — K56.609 SBO (SMALL BOWEL OBSTRUCTION) (HCC): Status: ACTIVE | Noted: 2022-05-20

## 2022-05-20 LAB
ALBUMIN SERPL-MCNC: 3.6 G/DL (ref 3.2–4.6)
ALBUMIN/GLOB SERPL: 1.1 {RATIO} (ref 1.2–3.5)
ALP SERPL-CCNC: 86 U/L (ref 50–136)
ALT SERPL-CCNC: 15 U/L (ref 12–65)
ANION GAP SERPL CALC-SCNC: 6 MMOL/L (ref 7–16)
AST SERPL-CCNC: 13 U/L (ref 15–37)
BASOPHILS # BLD: 0.1 K/UL (ref 0–0.2)
BASOPHILS NFR BLD: 1 % (ref 0–2)
BILIRUB SERPL-MCNC: 0.4 MG/DL (ref 0.2–1.1)
BILIRUB UR QL: ABNORMAL
BUN SERPL-MCNC: 13 MG/DL (ref 8–23)
CALCIUM SERPL-MCNC: 9.2 MG/DL (ref 8.3–10.4)
CHLORIDE SERPL-SCNC: 105 MMOL/L (ref 98–107)
CO2 SERPL-SCNC: 27 MMOL/L (ref 21–32)
CREAT SERPL-MCNC: 0.8 MG/DL (ref 0.8–1.5)
DIFFERENTIAL METHOD BLD: NORMAL
EOSINOPHIL # BLD: 0.1 K/UL (ref 0–0.8)
EOSINOPHIL NFR BLD: 1 % (ref 0.5–7.8)
ERYTHROCYTE [DISTWIDTH] IN BLOOD BY AUTOMATED COUNT: 12.7 % (ref 11.9–14.6)
GLOBULIN SER CALC-MCNC: 3.4 G/DL (ref 2.3–3.5)
GLUCOSE SERPL-MCNC: 113 MG/DL (ref 65–100)
GLUCOSE UR QL STRIP.AUTO: NEGATIVE MG/DL
HCT VFR BLD AUTO: 43.7 % (ref 41.1–50.3)
HGB BLD-MCNC: 14.9 G/DL (ref 13.6–17.2)
IMM GRANULOCYTES # BLD AUTO: 0 K/UL (ref 0–0.5)
IMM GRANULOCYTES NFR BLD AUTO: 0 % (ref 0–5)
KETONES UR-MCNC: ABNORMAL MG/DL
LEUKOCYTE ESTERASE UR QL STRIP: NEGATIVE
LIPASE SERPL-CCNC: 43 U/L (ref 73–393)
LYMPHOCYTES # BLD: 2.4 K/UL (ref 0.5–4.6)
LYMPHOCYTES NFR BLD: 25 % (ref 13–44)
MCH RBC QN AUTO: 29.6 PG (ref 26.1–32.9)
MCHC RBC AUTO-ENTMCNC: 34.1 G/DL (ref 31.4–35)
MCV RBC AUTO: 86.9 FL (ref 79.6–97.8)
MONOCYTES # BLD: 0.6 K/UL (ref 0.1–1.3)
MONOCYTES NFR BLD: 6 % (ref 4–12)
NEUTS SEG # BLD: 6.3 K/UL (ref 1.7–8.2)
NEUTS SEG NFR BLD: 67 % (ref 43–78)
NITRITE UR QL: NEGATIVE
NRBC # BLD: 0 K/UL (ref 0–0.2)
PH UR: 6.5 [PH] (ref 5–9)
PLATELET # BLD AUTO: 285 K/UL (ref 150–450)
PMV BLD AUTO: 9.7 FL (ref 9.4–12.3)
POTASSIUM SERPL-SCNC: 3.4 MMOL/L (ref 3.5–5.1)
PROT SERPL-MCNC: 7 G/DL (ref 6.3–8.2)
PROT UR QL: NEGATIVE MG/DL
RBC # BLD AUTO: 5.03 M/UL (ref 4.23–5.6)
RBC # UR STRIP: NEGATIVE /UL
SERVICE CMNT-IMP: ABNORMAL
SODIUM SERPL-SCNC: 138 MMOL/L (ref 138–145)
SP GR UR: 1.02 (ref 1–1.02)
UROBILINOGEN UR QL: 1 EU/DL (ref 0.2–1)
WBC # BLD AUTO: 9.4 K/UL (ref 4.3–11.1)

## 2022-05-20 PROCEDURE — 9990 CHARGE CONVERSION

## 2022-05-20 PROCEDURE — 96376 TX/PRO/DX INJ SAME DRUG ADON: CPT

## 2022-05-20 PROCEDURE — 74011250637 HC RX REV CODE- 250/637: Performed by: NURSE PRACTITIONER

## 2022-05-20 PROCEDURE — 85025 COMPLETE CBC W/AUTO DIFF WBC: CPT

## 2022-05-20 PROCEDURE — 81003 URINALYSIS AUTO W/O SCOPE: CPT

## 2022-05-20 PROCEDURE — 83690 ASSAY OF LIPASE: CPT

## 2022-05-20 PROCEDURE — 96374 THER/PROPH/DIAG INJ IV PUSH: CPT

## 2022-05-20 PROCEDURE — 99285 EMERGENCY DEPT VISIT HI MDM: CPT

## 2022-05-20 PROCEDURE — 74011250636 HC RX REV CODE- 250/636: Performed by: EMERGENCY MEDICINE

## 2022-05-20 PROCEDURE — 65270000029 HC RM PRIVATE

## 2022-05-20 PROCEDURE — 96361 HYDRATE IV INFUSION ADD-ON: CPT

## 2022-05-20 PROCEDURE — 80053 COMPREHEN METABOLIC PANEL: CPT

## 2022-05-20 PROCEDURE — 74011250636 HC RX REV CODE- 250/636: Performed by: NURSE PRACTITIONER

## 2022-05-20 PROCEDURE — 74011000250 HC RX REV CODE- 250: Performed by: SURGERY

## 2022-05-20 PROCEDURE — 74019 RADEX ABDOMEN 2 VIEWS: CPT

## 2022-05-20 PROCEDURE — 96375 TX/PRO/DX INJ NEW DRUG ADDON: CPT

## 2022-05-20 PROCEDURE — 74011250636 HC RX REV CODE- 250/636: Performed by: SURGERY

## 2022-05-20 RX ORDER — OXYCODONE AND ACETAMINOPHEN 10; 325 MG/1; MG/1
1 TABLET ORAL
Status: DISCONTINUED | OUTPATIENT
Start: 2022-05-20 | End: 2022-05-21 | Stop reason: HOSPADM

## 2022-05-20 RX ORDER — ACETAMINOPHEN/DIPHENHYDRAMINE 500MG-25MG
1 TABLET ORAL NIGHTLY PRN
Status: ON HOLD | COMMUNITY
End: 2022-06-14 | Stop reason: HOSPADM

## 2022-05-20 RX ORDER — SODIUM CHLORIDE 0.9 % (FLUSH) 0.9 %
5-40 SYRINGE (ML) INJECTION EVERY 8 HOURS
Status: DISCONTINUED | OUTPATIENT
Start: 2022-05-20 | End: 2022-05-21 | Stop reason: HOSPADM

## 2022-05-20 RX ORDER — NALOXONE HYDROCHLORIDE 4 MG/.1ML
1 SPRAY NASAL PRN
COMMUNITY

## 2022-05-20 RX ORDER — ONDANSETRON 2 MG/ML
4 INJECTION INTRAMUSCULAR; INTRAVENOUS EVERY 4 HOURS PRN
Status: DISCONTINUED | OUTPATIENT
Start: 2022-05-21 | End: 2022-05-31 | Stop reason: SDUPTHER

## 2022-05-20 RX ORDER — HYDROMORPHONE HYDROCHLORIDE 1 MG/ML
1 INJECTION, SOLUTION INTRAMUSCULAR; INTRAVENOUS; SUBCUTANEOUS ONCE
Status: COMPLETED | OUTPATIENT
Start: 2022-05-20 | End: 2022-05-20

## 2022-05-20 RX ORDER — OXYCODONE AND ACETAMINOPHEN 10; 325 MG/1; MG/1
1 TABLET ORAL EVERY 4 HOURS PRN
Status: DISCONTINUED | OUTPATIENT
Start: 2022-05-21 | End: 2022-05-21

## 2022-05-20 RX ORDER — NALOXONE HYDROCHLORIDE 0.4 MG/ML
0.4 INJECTION, SOLUTION INTRAMUSCULAR; INTRAVENOUS; SUBCUTANEOUS PRN
Status: DISCONTINUED | OUTPATIENT
Start: 2022-05-21 | End: 2022-06-14 | Stop reason: HOSPADM

## 2022-05-20 RX ORDER — SODIUM CHLORIDE 0.9 % (FLUSH) 0.9 %
5-10 SYRINGE (ML) INJECTION AS NEEDED
Status: DISCONTINUED | OUTPATIENT
Start: 2022-05-20 | End: 2022-05-21 | Stop reason: HOSPADM

## 2022-05-20 RX ORDER — SODIUM CHLORIDE 0.9 % (FLUSH) 0.9 %
5-40 SYRINGE (ML) INJECTION PRN
Status: DISCONTINUED | OUTPATIENT
Start: 2022-05-21 | End: 2022-06-02 | Stop reason: SDUPTHER

## 2022-05-20 RX ORDER — PANTOPRAZOLE SODIUM 40 MG/1
TABLET, DELAYED RELEASE ORAL
COMMUNITY
Start: 2022-05-14 | End: 2022-07-28 | Stop reason: SDUPTHER

## 2022-05-20 RX ORDER — ONDANSETRON 2 MG/ML
4 INJECTION INTRAMUSCULAR; INTRAVENOUS
Status: COMPLETED | OUTPATIENT
Start: 2022-05-20 | End: 2022-05-20

## 2022-05-20 RX ORDER — HYDROMORPHONE HYDROCHLORIDE 1 MG/ML
1 INJECTION, SOLUTION INTRAMUSCULAR; INTRAVENOUS; SUBCUTANEOUS EVERY 4 HOURS PRN
Status: DISCONTINUED | OUTPATIENT
Start: 2022-05-21 | End: 2022-05-30

## 2022-05-20 RX ORDER — SUCRALFATE 1 G/10ML
1 SUSPENSION ORAL
Status: ON HOLD | COMMUNITY
Start: 2022-05-15 | End: 2022-06-14 | Stop reason: HOSPADM

## 2022-05-20 RX ORDER — NALOXONE HYDROCHLORIDE 0.4 MG/ML
0.4 INJECTION, SOLUTION INTRAMUSCULAR; INTRAVENOUS; SUBCUTANEOUS AS NEEDED
Status: DISCONTINUED | OUTPATIENT
Start: 2022-05-20 | End: 2022-05-21 | Stop reason: HOSPADM

## 2022-05-20 RX ORDER — SODIUM CHLORIDE 0.9 % (FLUSH) 0.9 %
5-10 SYRINGE (ML) INJECTION EVERY 8 HOURS
Status: DISCONTINUED | OUTPATIENT
Start: 2022-05-20 | End: 2022-05-21 | Stop reason: HOSPADM

## 2022-05-20 RX ORDER — SODIUM CHLORIDE 0.9 % (FLUSH) 0.9 %
5-40 SYRINGE (ML) INJECTION AS NEEDED
Status: DISCONTINUED | OUTPATIENT
Start: 2022-05-20 | End: 2022-05-21 | Stop reason: HOSPADM

## 2022-05-20 RX ORDER — ACETAMINOPHEN 500 MG
500 TABLET ORAL EVERY 6 HOURS PRN
Status: ON HOLD | COMMUNITY
End: 2022-06-14 | Stop reason: HOSPADM

## 2022-05-20 RX ORDER — ONDANSETRON 2 MG/ML
4 INJECTION INTRAMUSCULAR; INTRAVENOUS
Status: DISCONTINUED | OUTPATIENT
Start: 2022-05-20 | End: 2022-05-21 | Stop reason: HOSPADM

## 2022-05-20 RX ORDER — SODIUM CHLORIDE 9 MG/ML
INJECTION, SOLUTION INTRAVENOUS CONTINUOUS
Status: DISCONTINUED | OUTPATIENT
Start: 2022-05-21 | End: 2022-05-28

## 2022-05-20 RX ORDER — HYDROMORPHONE HYDROCHLORIDE 1 MG/ML
0.5 INJECTION, SOLUTION INTRAMUSCULAR; INTRAVENOUS; SUBCUTANEOUS ONCE
Status: COMPLETED | OUTPATIENT
Start: 2022-05-20 | End: 2022-05-20

## 2022-05-20 RX ORDER — SODIUM CHLORIDE 9 MG/ML
125 INJECTION, SOLUTION INTRAVENOUS CONTINUOUS
Status: DISCONTINUED | OUTPATIENT
Start: 2022-05-20 | End: 2022-05-21 | Stop reason: HOSPADM

## 2022-05-20 RX ORDER — HYDROMORPHONE HYDROCHLORIDE 1 MG/ML
1 INJECTION, SOLUTION INTRAMUSCULAR; INTRAVENOUS; SUBCUTANEOUS
Status: DISCONTINUED | OUTPATIENT
Start: 2022-05-20 | End: 2022-05-21 | Stop reason: HOSPADM

## 2022-05-20 RX ORDER — SODIUM CHLORIDE 0.9 % (FLUSH) 0.9 %
5-40 SYRINGE (ML) INJECTION EVERY 8 HOURS
Status: DISCONTINUED | OUTPATIENT
Start: 2022-05-21 | End: 2022-06-14 | Stop reason: HOSPADM

## 2022-05-20 RX ORDER — POTASSIUM CHLORIDE 20 MEQ/1
40 TABLET, EXTENDED RELEASE ORAL
Status: COMPLETED | OUTPATIENT
Start: 2022-05-20 | End: 2022-05-20

## 2022-05-20 RX ORDER — OXYCODONE HYDROCHLORIDE AND ACETAMINOPHEN 5; 325 MG/1; MG/1
1 TABLET ORAL EVERY 6 HOURS PRN
Status: ON HOLD | COMMUNITY
Start: 2022-05-18 | End: 2022-06-06 | Stop reason: HOSPADM

## 2022-05-20 RX ADMIN — SODIUM CHLORIDE, PRESERVATIVE FREE 10 ML: 5 INJECTION INTRAVENOUS at 23:26

## 2022-05-20 RX ADMIN — SODIUM CHLORIDE 1000 ML: 9 INJECTION, SOLUTION INTRAVENOUS at 16:33

## 2022-05-20 RX ADMIN — SODIUM CHLORIDE 125 ML/HR: 9 INJECTION, SOLUTION INTRAVENOUS at 22:42

## 2022-05-20 RX ADMIN — HYDROMORPHONE HYDROCHLORIDE 0.5 MG: 1 INJECTION, SOLUTION INTRAMUSCULAR; INTRAVENOUS; SUBCUTANEOUS at 18:05

## 2022-05-20 RX ADMIN — POTASSIUM CHLORIDE 40 MEQ: 20 TABLET, EXTENDED RELEASE ORAL at 16:33

## 2022-05-20 RX ADMIN — HYDROMORPHONE HYDROCHLORIDE 1 MG: 1 INJECTION, SOLUTION INTRAMUSCULAR; INTRAVENOUS; SUBCUTANEOUS at 21:32

## 2022-05-20 RX ADMIN — ONDANSETRON 4 MG: 2 INJECTION INTRAMUSCULAR; INTRAVENOUS at 18:05

## 2022-05-20 RX ADMIN — ONDANSETRON 4 MG: 2 INJECTION INTRAMUSCULAR; INTRAVENOUS at 21:32

## 2022-05-20 NOTE — ED PROVIDER NOTES
[facility] Emergency Department Provider Note                     PCP:                Padmini Dodd MD               Age: 62 y.o. Sex: M           ICD-10-CM ICD-9-CM    1. Abdominal pain, generalized  R10.84 789.07             MDM  Number of Diagnoses or Management Options  78-year-old male in the ED with abdominal pain, weight loss, other rash described below. As in HPI. Well-appearing appears no distress. Patient and wife in ED with expectation of admission by general surgery for further management. Abdomen is soft and diffusely tender. Normal active bowel sounds. Reviewed recent notes, there is some concern for metastatic disease as well as possible SBO. Reviewed recent labs and imaging. Discussed patient with ED attending. I have consulted general surgery NP, Helen Devi, who states their service will admit the patient for further management, discussed with patient and family and they are agreeable    Orders Placed This Encounter    CBC with Diff    CMP    Lipase    DIET NPO    POC Urine Dipstick    POC URINE MACROSCOPIC    SALINE LOCK IV ONE TIME Routine    sodium chloride (NS) flush 5-10 mL    sodium chloride (NS) flush 5-10 mL        Kev Josue is a 62 y.o. male who presents to the Emergency Department with chief complaint of    Chief Complaint   Patient presents with    Abdominal Pain      HPI  78-year-old male in the ED with his wife, with complaints of diffuse abdominal pain, weight loss; reporting that he was sent from general surgeons office today, to be admitted for further evaluation and management of the symptoms. Reports recent admission at this facility for similar symptoms, diagnosis of SBO, noted concerning for peritoneal metastatic disease, no source noted. Was seen in clinic by Dr. Sophia Boyd, general surgery, who advised the patient to present to the emergency department to be admitted for further management. Patient is alert and appropriate oriented.   Not toxic appearing, appears in no acute distress. Endorses left upper and lower quadrants abdominal pain, though states that the pain is often in other locations of the abdomen. States been ongoing problem for greater than 6 months. Symptoms are constant, nothing known to make worse or better. No other known therapeutic measures. States he had some reduced frequency and quantity of stool volume in recent days, had an enema yesterday which she describes being largely liquid, mucus and describes some \"dark dirt,\" in stool. Due to recent care notes in the EMR, specifically from patient's appointment up surgery clinic today:    \"62 y.o. male who presents with concern for peritoneal metastatic disease.     The patient states that he has been having abdominal pain for about 6 months now. He had a recent EGD and colonoscopy on February 25, 2022 which revealed a hiatal hernia, gastric polyps, benign colon polyps, diverticulosis, and hemorrhoids. He continued to have abdominal pain over the last few months. He states that he has lost about 30 lbs over the last 6 months. He has not had an appetite. He has been having worsening of GERD as well over that time. He was taking omeprazole and recently switched to pantoprazole. He states that he used an enema last night, which allowed him to have BMs this AM and pass flatus, however he is still having minimal PO intake.       The patient went to the emergency department at [facility] about 10 days ago for a partial small bowel obstruction. The patient presented to our emergency department 1 week ago with abdominal pain. The patient had a CT scan at that time which revealed extensive peritoneal nodularity and mild ascites consistent with peritoneal metastatic disease. A primary lesion was not definitely identified. There was also sclerosis of the S1 vertebral body, a bone scan has been ordered to assess for possible bone metastasis.  He also had evidence of a partial bowel obstruction. There is no evidence of any metastatic disease within the chest.     The patient does have a history of a low anterior resection for diverticulitis in 2004. He is not on any blood thinning medications. Cont:     IMPRESSION  1. Extensive peritoneal nodularity and mild ascites consistent with peritoneal  metastatic disease. Primary lesion not definitely identified. 2. Dilated fluid-filled loops of small bowel possibly related to  gastroenteritis. No definite obstruction. No obvious bowel mass. 3. Sclerosis S1 vertebral body. Consider follow-up bone scan to assess for bone  metastases. Armani Burns is a 62 y.o. male who has signs and symptoms consistent with concern for peritoneal metastatic disease.      He also is likely dehydrated due to his poor PO intake, and he likely still has a partial small bowel obstruction from the possible peritoneal metastatic disease. It was recommended that he be admitted to the hospital for IV hydration, and to continue his workup. We will consult IR to see if they are able to biopsy the omental nodules. If they are unable to obtain a biopsy, then we will plan for a diagnostic laparoscopy with biopsy next week.      The patient will go the the ER, then be admitted for his partial small bowel obstruction and dehydration. \"             Review of Systems   All other systems reviewed and are negative. Constitutional: Negative for fever. Negative for appetite change, chills, diaphoresis. + unexpected weight change.     HENT: Negative     Eyes: Negative   Respiratory: Negative  Cardiovascular: Negative  GI/: As in HPI  Musculoskeletal: Negative   Skin: Negative     Allergic/Immunologic: Negative  Neurological: Negative                            Past Medical History:   Diagnosis Date    Bilateral carpal tunnel syndrome 12/9/2020    Chronic right shoulder pain 11/30/2020    Colon polyps 3/11/2013    Diverticulitis 3/11/2013    HTN (hypertension) 3/11/2013    Hyperlipidemia 3/11/2013    Paresthesia and pain of both upper extremities 11/30/2020    PUD (peptic ulcer disease) 3/11/2013    History of     Right elbow pain 12/9/2020    Wheezing 3/11/2013        Past Surgical History:   Procedure Laterality Date    ENDOSCOPY, COLON, DIAGNOSTIC  2/25/09    colonoscopy per Dr. Mohsen Salas with need for repeat every 3 years    HX COLONOSCOPY  02/25/2022    Dr. Meli Magana; polyps of the ascending, transverse, descending, and sigmoid colons; internal hemorrhoid; diverticulosis    HX ENDOSCOPY  02/25/2022    Dr. Meli Magana; hiatal hernia gastric polyps    ID ABDOMEN SURGERY PROC UNLISTED  October 2004    partial colon resection per Dr. Yady Palomo Age of Onset    Hypertension Father     Alcohol abuse Father     OSTEOARTHRITIS Father     Diabetes Father     Heart Disease Mother     Diabetes Mother     Hypertension Mother     Cancer Sister         breast    No Known Problems Brother            Social Connections:     Frequency of Communication with Friends and Family: Not on file    Frequency of Social Gatherings with Friends and Family: Not on file    Attends Cheondoism Services: Not on file    Active Member of Clubs or Organizations: Not on file    Attends Club or Organization Meetings: Not on file    Marital Status: Not on file        No Known Allergies     Vitals signs and nursing note reviewed. Patient Vitals for the past 4 hrs:   Temp Pulse Resp BP SpO2   05/20/22 1532 -- -- -- 120/75 95 %   05/20/22 1259 97.7 °F (36.5 °C) 82 16 123/80 97 %          Physical Exam   Constitutional: Oriented to person, place, and time. Appears well-developed and well-nourished. No distress. HENT:    Head: Normocephalic and atraumatic   Right Ear: External ear normal.    Left Ear: External ear normal.     Nose: Nose normal.   Mouth/Throat: Mouth normal.    Eyes: Conjunctivae are normal.   Neck: Supple. No tracheal deviation.    Cardiovascular: Normal rate, intact distal pulses. Brisk capillary refill intact, less than 2 seconds. Regular rhythm present. No pitting edema. Pulmonary/Chest: Lungs are clear & equal bilaterally. No adventitious sounds. No respiratory distress. Abdominal: Soft. There is diffuse tenderness, nonfocal.  No guarding. Abdomen is protuberant. Normoactive bowel sounds. Musculoskeletal: No obvious deformity, erythema, edema. Neurological: Alert and oriented to person, place, and time. No numbness/tingling. No loss of sensation. Positive PMS ×4. GCS= 15. Skin: Skin is warm and dry. Capillary refill takes less than 2 seconds. No abrasion, no lesion, no petechiae and no rash noted. Not diaphoretic. No cyanosis, erythema, or pallor. Psychiatric: Normal mood and affect. Behavior is normal.    Nursing note and vitals reviewed. Procedures    Results for orders placed or performed during the hospital encounter of 05/20/22   CBC WITH AUTOMATED DIFF   Result Value Ref Range    WBC 9.4 4.3 - 11.1 K/uL    RBC 5.03 4.23 - 5.6 M/uL    HGB 14.9 13.6 - 17.2 g/dL    HCT 43.7 41.1 - 50.3 %    MCV 86.9 79.6 - 97.8 FL    MCH 29.6 26.1 - 32.9 PG    MCHC 34.1 31.4 - 35.0 g/dL    RDW 12.7 11.9 - 14.6 %    PLATELET 494 744 - 803 K/uL    MPV 9.7 9.4 - 12.3 FL    ABSOLUTE NRBC 0.00 0.0 - 0.2 K/uL    DF AUTOMATED      NEUTROPHILS 67 43 - 78 %    LYMPHOCYTES 25 13 - 44 %    MONOCYTES 6 4.0 - 12.0 %    EOSINOPHILS 1 0.5 - 7.8 %    BASOPHILS 1 0.0 - 2.0 %    IMMATURE GRANULOCYTES 0 0.0 - 5.0 %    ABS. NEUTROPHILS 6.3 1.7 - 8.2 K/UL    ABS. LYMPHOCYTES 2.4 0.5 - 4.6 K/UL    ABS. MONOCYTES 0.6 0.1 - 1.3 K/UL    ABS. EOSINOPHILS 0.1 0.0 - 0.8 K/UL    ABS. BASOPHILS 0.1 0.0 - 0.2 K/UL    ABS. IMM.  GRANS. 0.0 0.0 - 0.5 K/UL   METABOLIC PANEL, COMPREHENSIVE   Result Value Ref Range    Sodium 138 138 - 145 mmol/L    Potassium 3.4 (L) 3.5 - 5.1 mmol/L    Chloride 105 98 - 107 mmol/L    CO2 27 21 - 32 mmol/L    Anion gap 6 (L) 7 - 16 mmol/L Glucose 113 (H) 65 - 100 mg/dL    BUN 13 8 - 23 MG/DL    Creatinine 0.80 0.8 - 1.5 MG/DL    GFR est AA >60 >60 ml/min/1.73m2    GFR est non-AA >60 >60 ml/min/1.73m2    Calcium 9.2 8.3 - 10.4 MG/DL    Bilirubin, total 0.4 0.2 - 1.1 MG/DL    ALT (SGPT) 15 12 - 65 U/L    AST (SGOT) 13 (L) 15 - 37 U/L    Alk. phosphatase 86 50 - 136 U/L    Protein, total 7.0 6.3 - 8.2 g/dL    Albumin 3.6 3.2 - 4.6 g/dL    Globulin 3.4 2.3 - 3.5 g/dL    A-G Ratio 1.1 (L) 1.2 - 3.5     LIPASE   Result Value Ref Range    Lipase 43 (L) 73 - 393 U/L   POC URINE MACROSCOPIC   Result Value Ref Range    Spec. gravity (POC) 1.025 (H) 1.001 - 1.023      pH, urine  (POC) 6.5 5.0 - 9.0      Protein (POC) Negative NEG mg/dL    Glucose, urine (POC) Negative NEG mg/dL    Ketones (POC) TRACE (A) NEG mg/dL    Bilirubin (POC) SMALL (A) NEG      Blood (POC) Negative NEG      Urobilinogen (POC) 1.0 0.2 - 1.0 EU/dL    Nitrite (POC) Negative NEG      Leukocyte esterase (POC) Negative NEG      Performed by Milderd Maxim         No orders to display         Voice dictation software was used during the making of this note. This software is not perfect and grammatical and other typographical errors may be present. This note has not been completely proofread for errors.

## 2022-05-21 LAB
CANCER AG19-9 SERPL-ACNC: 5.6 U/ML (ref 2–37)
CEA SERPL-MCNC: 0.7 NG/ML (ref 0–3)

## 2022-05-21 PROCEDURE — 86301 IMMUNOASSAY TUMOR CA 19-9: CPT

## 2022-05-21 PROCEDURE — 2140000001 HC CVICU INTERMEDIATE R&B

## 2022-05-21 PROCEDURE — 2500000003 HC RX 250 WO HCPCS: Performed by: SURGERY

## 2022-05-21 PROCEDURE — 6360000002 HC RX W HCPCS: Performed by: SURGERY

## 2022-05-21 PROCEDURE — C9113 INJ PANTOPRAZOLE SODIUM, VIA: HCPCS | Performed by: SURGERY

## 2022-05-21 PROCEDURE — 6370000000 HC RX 637 (ALT 250 FOR IP): Performed by: NURSE PRACTITIONER

## 2022-05-21 PROCEDURE — 36415 COLL VENOUS BLD VENIPUNCTURE: CPT

## 2022-05-21 PROCEDURE — 2580000003 HC RX 258: Performed by: SURGERY

## 2022-05-21 PROCEDURE — 6360000002 HC RX W HCPCS: Performed by: NURSE PRACTITIONER

## 2022-05-21 PROCEDURE — 82378 CARCINOEMBRYONIC ANTIGEN: CPT

## 2022-05-21 PROCEDURE — 1100000000 HC RM PRIVATE

## 2022-05-21 PROCEDURE — A4216 STERILE WATER/SALINE, 10 ML: HCPCS | Performed by: SURGERY

## 2022-05-21 RX ORDER — NICOTINE 21 MG/24HR
1 PATCH, TRANSDERMAL 24 HOURS TRANSDERMAL DAILY
Status: DISCONTINUED | OUTPATIENT
Start: 2022-05-21 | End: 2022-06-14 | Stop reason: HOSPADM

## 2022-05-21 RX ORDER — ACETAMINOPHEN 325 MG/1
650 TABLET ORAL EVERY 4 HOURS PRN
Status: DISCONTINUED | OUTPATIENT
Start: 2022-05-21 | End: 2022-05-28

## 2022-05-21 RX ORDER — ACETAMINOPHEN 325 MG/1
650 TABLET ORAL ONCE
Status: DISCONTINUED | OUTPATIENT
Start: 2022-05-21 | End: 2022-05-21

## 2022-05-21 RX ORDER — ACETAMINOPHEN 325 MG/1
650 TABLET ORAL ONCE AS NEEDED
Status: DISCONTINUED | OUTPATIENT
Start: 2022-05-21 | End: 2022-05-21

## 2022-05-21 RX ORDER — OXYCODONE HYDROCHLORIDE 5 MG/1
5 TABLET ORAL EVERY 4 HOURS PRN
Status: DISCONTINUED | OUTPATIENT
Start: 2022-05-21 | End: 2022-05-28

## 2022-05-21 RX ORDER — POTASSIUM CHLORIDE 7.45 MG/ML
20 INJECTION INTRAVENOUS ONCE
Status: COMPLETED | OUTPATIENT
Start: 2022-05-21 | End: 2022-05-21

## 2022-05-21 RX ADMIN — SODIUM CHLORIDE, PRESERVATIVE FREE 40 MG: 5 INJECTION INTRAVENOUS at 08:39

## 2022-05-21 RX ADMIN — HYDROMORPHONE HYDROCHLORIDE 1 MG: 1 INJECTION, SOLUTION INTRAMUSCULAR; INTRAVENOUS; SUBCUTANEOUS at 08:37

## 2022-05-21 RX ADMIN — POTASSIUM CHLORIDE 20 MEQ: 7.46 INJECTION, SOLUTION INTRAVENOUS at 15:55

## 2022-05-21 RX ADMIN — ACETAMINOPHEN 650 MG: 325 TABLET ORAL at 19:35

## 2022-05-21 RX ADMIN — ONDANSETRON 4 MG: 2 INJECTION INTRAMUSCULAR; INTRAVENOUS at 18:28

## 2022-05-21 RX ADMIN — SODIUM CHLORIDE: 900 INJECTION, SOLUTION INTRAVENOUS at 07:45

## 2022-05-21 RX ADMIN — SODIUM CHLORIDE: 900 INJECTION, SOLUTION INTRAVENOUS at 15:53

## 2022-05-21 RX ADMIN — ONDANSETRON 4 MG: 2 INJECTION INTRAMUSCULAR; INTRAVENOUS at 13:50

## 2022-05-21 RX ADMIN — SODIUM CHLORIDE: 900 INJECTION, SOLUTION INTRAVENOUS at 23:36

## 2022-05-21 RX ADMIN — ACETAMINOPHEN 650 MG: 325 TABLET ORAL at 23:42

## 2022-05-21 RX ADMIN — SODIUM CHLORIDE, PRESERVATIVE FREE 10 ML: 5 INJECTION INTRAVENOUS at 14:42

## 2022-05-21 RX ADMIN — HYDROMORPHONE HYDROCHLORIDE 1 MG: 1 INJECTION, SOLUTION INTRAMUSCULAR; INTRAVENOUS; SUBCUTANEOUS at 02:50

## 2022-05-21 RX ADMIN — SODIUM CHLORIDE, PRESERVATIVE FREE 10 ML: 5 INJECTION INTRAVENOUS at 21:23

## 2022-05-21 RX ADMIN — HYDROMORPHONE HYDROCHLORIDE 1 MG: 1 INJECTION, SOLUTION INTRAMUSCULAR; INTRAVENOUS; SUBCUTANEOUS at 17:07

## 2022-05-21 RX ADMIN — SODIUM CHLORIDE, PRESERVATIVE FREE 10 ML: 5 INJECTION INTRAVENOUS at 06:33

## 2022-05-21 RX ADMIN — OXYCODONE 5 MG: 5 TABLET ORAL at 14:38

## 2022-05-21 ASSESSMENT — PAIN DESCRIPTION - FREQUENCY
FREQUENCY: INTERMITTENT

## 2022-05-21 ASSESSMENT — PAIN DESCRIPTION - LOCATION
LOCATION: ABDOMEN

## 2022-05-21 ASSESSMENT — PAIN - FUNCTIONAL ASSESSMENT
PAIN_FUNCTIONAL_ASSESSMENT: PREVENTS OR INTERFERES SOME ACTIVE ACTIVITIES AND ADLS
PAIN_FUNCTIONAL_ASSESSMENT: ACTIVITIES ARE NOT PREVENTED
PAIN_FUNCTIONAL_ASSESSMENT: PREVENTS OR INTERFERES SOME ACTIVE ACTIVITIES AND ADLS

## 2022-05-21 ASSESSMENT — PAIN DESCRIPTION - ORIENTATION
ORIENTATION: MID
ORIENTATION: ANTERIOR;MID
ORIENTATION: ANTERIOR
ORIENTATION: MID
ORIENTATION: ANTERIOR;MID
ORIENTATION: MID
ORIENTATION: ANTERIOR

## 2022-05-21 ASSESSMENT — PAIN DESCRIPTION - DESCRIPTORS
DESCRIPTORS: ACHING;STABBING
DESCRIPTORS: ACHING;SHARP
DESCRIPTORS: SHARP
DESCRIPTORS: ACHING;STABBING
DESCRIPTORS: ACHING
DESCRIPTORS: ACHING

## 2022-05-21 ASSESSMENT — PAIN DESCRIPTION - PAIN TYPE
TYPE: CHRONIC PAIN

## 2022-05-21 ASSESSMENT — PAIN SCALES - GENERAL
PAINLEVEL_OUTOF10: 0
PAINLEVEL_OUTOF10: 3
PAINLEVEL_OUTOF10: 8
PAINLEVEL_OUTOF10: 3
PAINLEVEL_OUTOF10: 6
PAINLEVEL_OUTOF10: 0
PAINLEVEL_OUTOF10: 0
PAINLEVEL_OUTOF10: 8
PAINLEVEL_OUTOF10: 7
PAINLEVEL_OUTOF10: 3

## 2022-05-21 ASSESSMENT — PAIN DESCRIPTION - ONSET
ONSET: ON-GOING
ONSET: PROGRESSIVE
ONSET: ON-GOING
ONSET: ON-GOING

## 2022-05-21 NOTE — ED NOTES
TRANSFER - OUT REPORT:    Verbal report given to [facility] on Murray Dudley  being transferred to [facility] [place] [id number] for routine progression of care       Report consisted of patients Situation, Background, Assessment and   Recommendations(SBAR). Information from the following report(s) SBAR, ED Summary and MAR was reviewed with the receiving nurse. Lines:   Peripheral IV 05/20/22 Left Antecubital (Active)   Site Assessment Clean, dry, & intact 05/20/22 1303   Phlebitis Assessment 0 05/20/22 1303   Infiltration Assessment 0 05/20/22 1303   Dressing Status Clean, dry, & intact 05/20/22 1303        Opportunity for questions and clarification was provided.       Patient transported with:   [facility]

## 2022-05-21 NOTE — PROGRESS NOTES
Bedside and Verbal shift change report given to self (oncoming nurse) by Marycarmen Lyles (offgoing nurse). Report included the following information Nurse Handoff Report, Index and MAR.

## 2022-05-21 NOTE — H&P
H&P/Consult Note/Progress Note/Office Note:   Olesya Chaidez  MRN: [id number]  :1960  Age:61 y.o.    HPI: Olesya Chaidez is a 62 y.o. male with a past medical history of PUD, HTN, hyperlipidemia, and diverticulitis who was seen in the office by Dr. Meagan Granados 22     with concerns for peritoneal metastatic disease.     The patient states that he has been having abdominal pain for about 6 months now. He had a recent EGD and colonoscopy on 2022 which revealed a hiatal hernia, gastric polyps, benign colon polyps, diverticulosis, and hemorrhoids. He continued to have abdominal pain over the last few months. He states that he has lost about 30 lbs over the last 6 months. He has not had an appetite. He has been having worsening of GERD as well over that time. He was taking omeprazole and recently switched to pantoprazole. He states that he used an enema last night, which allowed him to have BMs this AM and pass flatus, however he is still having minimal PO intake.       The patient went to the emergency department at [facility] about 10 days ago for a partial small bowel obstruction. The patient presented to our emergency department 1 week ago with abdominal pain. The patient had a CT scan at that time which revealed extensive peritoneal nodularity and mild ascites consistent with peritoneal metastatic disease. A primary lesion was not definitely identified. There was also sclerosis of the S1 vertebral body, a bone scan has been ordered to assess for possible bone metastasis. He also had evidence of a partial bowel obstruction. There is no evidence of any metastatic disease within the chest.    Pt was instructed to go to ED for direct admission due to dehydration.     Past Surgical history - low anterior resection for diverticulitis in . Pt does not take blood thinners.     CT Abdomen/ Pelvis  CT abd/pelvis  CT OF THE ABDOMEN AND PELVIS     INDICATION: Chronic abdominal pain.     Multiple axial images were obtained through the abdomen and pelvis.  Oral  contrast was used for bowel opacification.  100mL of Isovue 370 intravenous  contrast was used for better evaluation of solid organs and vascular structures.   Radiation dose reduction techniques were used for this study.  All CT scans  performed at this facility use one or all of the following: Automated exposure  control, adjustment of the mA and/or kVp according to patient's size, iterative  reconstruction.     COMPARISON: None     FINDINGS:  LOWER CHEST: Normal.     HEPATOBILIARY: No evidence of focal liver mass. No calcified gallstones.     PANCREAS: Normal.     SPLEEN: Normal.     ADRENAL GLANDS: Normal.     KIDNEYS/BLADDER: Kidneys and bladder are unremarkable. No hydronephrosis or  urinary tract calculi.     BOWEL: Dilated fluid-filled loops of small bowel are present throughout the  abdomen. No definite transition point. Oral contrast noted in the colon. No  definite evidence of bowel mass but there is extensive peritoneal nodularity and  trace ascites highly concerning for peritoneal metastatic disease.     LYMPH NODES: No enlarged retroperitoneal or pelvic lymph nodes. Peritoneal  nodularity again noted most likely metastatic disease.     VASCULATURE: Unremarkable.     PELVIC ORGANS: Prostate gland and rectum are unremarkable. Small amount of free  pelvic fluid is noted.     MUSCULOSKELETAL: Degenerative spine changes are noted. Mild sclerosis involving  the S1 vertebral body is present on image 74 of series 602.     IMPRESSION  1. Extensive peritoneal nodularity and mild ascites consistent with peritoneal  metastatic disease. Primary lesion not definitely identified. 2. Dilated fluid-filled loops of small bowel possibly related to  gastroenteritis. No definite obstruction. No obvious bowel mass. 3. Sclerosis S1 vertebral body.  Consider follow-up bone scan to assess for bone  Metastases.       Past Medical History:   Diagnosis Date    Bilateral carpal tunnel syndrome 12/9/2020    Chronic right shoulder pain 11/30/2020    Colon polyps 3/11/2013    Diverticulitis 3/11/2013    HTN (hypertension) 3/11/2013    Hyperlipidemia 3/11/2013    Paresthesia and pain of both upper extremities 11/30/2020    PUD (peptic ulcer disease) 3/11/2013    History of     Right elbow pain 12/9/2020    Wheezing 3/11/2013     Past Surgical History:   Procedure Laterality Date    COLONOSCOPY  2/25/09    colonoscopy per Dr. Zoë Delacruz with need for repeat every 3 years    COLONOSCOPY  02/25/2022    Dr. Marika Torres; polyps of the ascending, transverse, descending, and sigmoid colons; internal hemorrhoid; diverticulosis    NV ABDOMEN SURGERY PROC UNLISTED  October 2004    partial colon resection per Dr. Michael Song  02/25/2022    Dr. Marika Torres; hiatal hernia gastric polyps     Current Facility-Administered Medications   Medication Dose Route Frequency    acetaminophen (TYLENOL) tablet 650 mg  650 mg Oral Q4H PRN    oxyCODONE (ROXICODONE) immediate release tablet 5 mg  5 mg Oral Q4H PRN    0.9 % sodium chloride infusion   IntraVENous Continuous    HYDROmorphone HCl PF (DILAUDID) injection 1 mg  1 mg IntraVENous Q4H PRN    naloxone (NARCAN) injection 0.4 mg  0.4 mg IntraVENous PRN    ondansetron (ZOFRAN) injection 4 mg  4 mg IntraVENous Q4H PRN    pantoprazole (PROTONIX) 40 mg in sodium chloride (PF) 10 mL injection  40 mg IntraVENous Daily    sodium chloride flush 0.9 % injection 5-40 mL  5-40 mL IntraVENous PRN    sodium chloride flush 0.9 % injection 5-40 mL  5-40 mL IntraVENous Q8H     Patient has no known allergies.   Social History     Socioeconomic History    Marital status:      Spouse name: Not on file    Number of children: Not on file    Years of education: Not on file    Highest education level: Not on file   Occupational History    Not on file   Tobacco Use    Smoking status: Current Every Day Smoker     Packs/day: 1.00    Smokeless tobacco: Never Used   Substance and Sexual Activity    Alcohol use: No    Drug use: No    Sexual activity: Not on file   Other Topics Concern    Not on file   Social History Narrative    Not on file     Social Determinants of Health     Financial Resource Strain:     Difficulty of Paying Living Expenses: Not on file   Food Insecurity:     Worried About Running Out of Food in the Last Year: Not on file    Briseida of Food in the Last Year: Not on file   Transportation Needs:     Lack of Transportation (Medical): Not on file    Lack of Transportation (Non-Medical):  Not on file   Physical Activity:     Days of Exercise per Week: Not on file    Minutes of Exercise per Session: Not on file   Stress:     Feeling of Stress : Not on file   Social Connections:     Frequency of Communication with Friends and Family: Not on file    Frequency of Social Gatherings with Friends and Family: Not on file    Attends Protestant Services: Not on file    Active Member of [address] [place] [address] or Organizations: Not on file    Attends Club or Organization Meetings: Not on file    Marital Status: Not on file   Intimate Partner Violence:     Fear of Current or Ex-Partner: Not on file    Emotionally Abused: Not on file    Physically Abused: Not on file    Sexually Abused: Not on file   Housing Stability:     Unable to Pay for Housing in the Last Year: Not on file    Number of [place] in the Last Year: Not on file    Unstable Housing in the Last Year: Not on file     Social History     Tobacco Use   Smoking Status Current Every Day Smoker    Packs/day: 1.00   Smokeless Tobacco Never Used     Family History   Problem Relation Age of Onset    No Known Problems Brother     Cancer Sister         breast    Hypertension Mother     Heart Disease Mother     Diabetes Father     Osteoarthritis Father     Alcohol Abuse Father     Hypertension Father     Diabetes Mother      ROS: The patient has no difficulty with chest pain or shortness of breath. No fever or chills. Comprehensive review of systems was otherwise unremarkable except as noted above. Physical Exam:   /75   Pulse 63   Temp 98.2 °F (36.8 °C) (Temporal)   Resp 20   Ht 5' 10\" (1.778 m)   Wt 207 lb 0.2 oz (93.9 kg)   SpO2 93%   BMI 29.70 kg/m²   Vitals:    05/21/22 0400 05/21/22 0722 05/21/22 0837 05/21/22 1200   BP: 117/76 137/83  116/75   Pulse: 60 66  63   Resp: 16 16 16 20   Temp: 99.1 °F (37.3 °C) 97.1 °F (36.2 °C)  98.2 °F (36.8 °C)   TempSrc: Oral Oral  Temporal   SpO2:  93%  93%   Weight:       Height:         No intake/output data recorded. 05/19 1901 - 05/21 0700  In: 810.4 [I.V.:810.4]  Out: -     Constitutional: Alert, cooperative, NAD   Eyes: Sclera are clear. EOMs intact  ENMT: no external lesions gross hearing normal; no obvious neck masses, no ear or lip lesions, nares normal  CV: RRR. Normal perfusion  Resp: No JVD. Breathing is  non-labored; no audible wheezing. GI: soft, protuberant, and non-distended, ttp mid abdomen radiating to left back     Musculoskeletal: unremarkable with normal function. No embolic signs or cyanosis.    Neuro:  Oriented; moves all 4; no focal deficits  Psychiatric: normal affect and mood, no memory impairment    Recent vitals (if inpt):  Patient Vitals for the past 24 hrs:   BP Temp Temp src Pulse Resp SpO2 Height Weight   05/21/22 1200 116/75 98.2 °F (36.8 °C) Temporal 63 20 93 % -- --   05/21/22 0837 -- -- -- -- 16 -- -- --   05/21/22 0722 137/83 97.1 °F (36.2 °C) Oral 66 16 93 % -- --   05/21/22 0400 117/76 99.1 °F (37.3 °C) Oral 60 16 -- -- --   05/20/22 2343 -- -- -- -- -- -- 5' 10\" (1.778 m) 207 lb 0.2 oz (93.9 kg)   05/20/22 2315 129/83 98.1 °F (36.7 °C) Oral 65 16 -- -- --       Amount and/or Complexity of Data Reviewed and Analyzed:  I reviewed and analyzed all of the unique labs and radiologic studies that are shown below as well as any that are in the HPI, and any that are in the expanded problem list below  *Each unique test, order, or document contributes to the combination of 2 or combination of 3 in Category 1 below. For this visit I also reviewed old records and prior notes. Recent Labs     05/20/22  1303   WBC 9.4   HGB 14.9         K 3.4*      CO2 27   BUN 13   ALT 15     Review of most recent CBC  Lab Results   Component Value Date    WBC 9.4 05/20/2022    HGB 14.9 05/20/2022    HCT 43.7 05/20/2022    MCV 86.9 05/20/2022     05/20/2022       Review of most recent BMP  Lab Results   Component Value Date     05/20/2022    K 3.4 05/20/2022     05/20/2022    CO2 27 05/20/2022    BUN 13 05/20/2022    CREATININE 0.80 05/20/2022    GLUCOSE 113 05/20/2022    CALCIUM 9.2 05/20/2022        Review of most recent LFTs (and lipase if done)  Lab Results   Component Value Date    ALT 15 05/20/2022    AST 13 (L) 05/20/2022    ALKPHOS 86 05/20/2022    BILITOT 0.4 05/20/2022     Lab Results   Component Value Date    LIPASE 43 (L) 05/20/2022       No results found for: INR, APTT, LCAD    Review of most recent HgbA1c  Lab Results   Component Value Date    LABA1C 6.0 (H) 04/18/2022     Lab Results   Component Value Date     04/18/2022       Nutritional assessment screen for wound healing issues:  Lab Results   Component Value Date    LABALBU 3.6 05/20/2022       @lastcovr@  @resultlast@    @lastXray@  @lastCT@  @lastUS@    Admission date (for inpatients): 5/20/2022   * No surgery found *  * No surgery found *        ASSESSMENT/PLAN:  [unfilled]  Active Problems:    * No active hospital problems. *  Resolved Problems:    * No resolved hospital problems.  *     Patient Active Problem List    Diagnosis Date Noted    SBO (small bowel obstruction) ([facility] 05/20/2022    Partial small bowel obstruction ([address] 05/20/2022    Peritoneal metastases ([address] 05/18/2022    Opioid use, unspecified with unspecified opioid-induced disorder [facility] 05/18/2022    Generalized abdominal pain 05/12/2022    Abdominal pain 05/12/2022    Right cervical radiculopathy 01/03/2022    Class 1 obesity due to excess calories with serious comorbidity and body mass index (BMI) of 34.0 to 34.9 in adult 12/29/2020    Right elbow pain 12/09/2020    Bilateral carpal tunnel syndrome 12/09/2020    Chronic right shoulder pain 11/30/2020    Paresthesia and pain of both upper extremities 11/30/2020    Chronic bronchitis ([address] 01/20/2020    Gastroesophageal reflux disease 12/11/2019    History of colon polyps 12/11/2019    Hyperlipidemia 03/11/2013    Essential hypertension 03/11/2013    PUD (peptic ulcer disease) 03/11/2013     History of          Tashi Reeder is a 62 y.o. male who has signs and symptoms consistent with concern for peritoneal metastatic disease.      He also is likely dehydrated due to his poor PO intake, and he likely still has a partial small bowel obstruction from the possible peritoneal metastatic disease. It was recommended that he be admitted to the hospital for IV hydration, and to continue his workup. We will consult IR to see if they are able to biopsy the omental nodules. If they are unable to obtain a biopsy, then we will plan for a diagnostic laparoscopy with biopsy next week.      Number and Complexity of Problems addressed and   Risks of complications and/or morbidity of management    Admit to General Surgery  IVF  Pain control  IR consult - possible biopsy of omental nodules  I&O        Angelina Jiang NP

## 2022-05-21 NOTE — ROUTINE PROCESS
TRANSFER - IN REPORT:    Verbal report received from Evelina Orellana (name) on Agnes Moore  being received from ED (unit) for routine progression of care      Report consisted of patients Situation, Background, Assessment and   Recommendations(SBAR). Information from the following report(s) SBAR, ED Summary and MAR was reviewed with the receiving nurse. Opportunity for questions and clarification was provided. Assessment completed upon patients arrival to unit and care assumed.

## 2022-05-21 NOTE — PROGRESS NOTES
Patient is wanting to be unhooked from IV to go smoke I explained that we can not let him do that and explained that this is a smoke free campus. He is clearly agitated about what I told him. Spoke with patient's wife to see if she could help calm him down. Notified NP Frommel to see about getting orders for nicotine withdraw. Awaiting orders and will continue to monitor.

## 2022-05-21 NOTE — PROGRESS NOTES
Verbal report received from Chao Churchill (Emergency department). Pt to room 201. Dual skin assessment performed with Minh Wu RN. Skin c/d/I. Scar on lower abdomen from previous abdominal surgery.  No redness or breakdown noted

## 2022-05-21 NOTE — PLAN OF CARE
Problem: Discharge Planning  Goal: Discharge to home or other facility with appropriate resources  Outcome: Progressing  Flowsheets (Taken 5/21/2022 0554 by Ping Bustamante, RN)  Discharge to home or other facility with appropriate resources:   Refer to discharge planning if patient needs post-hospital services based on physician order or complex needs related to functional status, cognitive ability or social support system   Identify barriers to discharge with patient and caregiver   Identify discharge learning needs (meds, wound care, etc)   Arrange for needed discharge resources and transportation as appropriate   Arrange for interpreters to assist at discharge as needed     Problem: Pain  Goal: Verbalizes/displays adequate comfort level or baseline comfort level  5/21/2022 1701 by Maddie Arguelles RN  Outcome: Progressing  5/21/2022 0605 by Ping Bustamante RN  Outcome: Progressing     Problem: Pain  Goal: Verbalizes/displays adequate comfort level or baseline comfort level  5/21/2022 1701 by Maddie Arguelles RN  Outcome: Progressing  5/21/2022 0605 by Ping Bustamante, RN  Outcome: Progressing

## 2022-05-22 PROCEDURE — 2580000003 HC RX 258: Performed by: SURGERY

## 2022-05-22 PROCEDURE — 1100000000 HC RM PRIVATE

## 2022-05-22 PROCEDURE — 6360000002 HC RX W HCPCS: Performed by: SURGERY

## 2022-05-22 PROCEDURE — 2140000001 HC CVICU INTERMEDIATE R&B

## 2022-05-22 PROCEDURE — 6370000000 HC RX 637 (ALT 250 FOR IP): Performed by: NURSE PRACTITIONER

## 2022-05-22 PROCEDURE — A4216 STERILE WATER/SALINE, 10 ML: HCPCS | Performed by: SURGERY

## 2022-05-22 PROCEDURE — C9113 INJ PANTOPRAZOLE SODIUM, VIA: HCPCS | Performed by: SURGERY

## 2022-05-22 PROCEDURE — 2580000003 HC RX 258: Performed by: NURSE PRACTITIONER

## 2022-05-22 RX ORDER — OXYCODONE AND ACETAMINOPHEN 7.5; 325 MG/1; MG/1
1 TABLET ORAL EVERY 4 HOURS PRN
Status: DISCONTINUED | OUTPATIENT
Start: 2022-05-22 | End: 2022-05-28

## 2022-05-22 RX ADMIN — ONDANSETRON 4 MG: 2 INJECTION INTRAMUSCULAR; INTRAVENOUS at 19:50

## 2022-05-22 RX ADMIN — SODIUM CHLORIDE, PRESERVATIVE FREE 40 MG: 5 INJECTION INTRAVENOUS at 08:25

## 2022-05-22 RX ADMIN — SODIUM CHLORIDE: 900 INJECTION, SOLUTION INTRAVENOUS at 08:25

## 2022-05-22 RX ADMIN — OXYCODONE 5 MG: 5 TABLET ORAL at 03:31

## 2022-05-22 RX ADMIN — HYDROMORPHONE HYDROCHLORIDE 1 MG: 1 INJECTION, SOLUTION INTRAMUSCULAR; INTRAVENOUS; SUBCUTANEOUS at 12:29

## 2022-05-22 RX ADMIN — SODIUM CHLORIDE, PRESERVATIVE FREE 10 ML: 5 INJECTION INTRAVENOUS at 06:04

## 2022-05-22 RX ADMIN — OXYCODONE AND ACETAMINOPHEN 1 TABLET: 7.5; 325 TABLET ORAL at 19:50

## 2022-05-22 RX ADMIN — ONDANSETRON 4 MG: 2 INJECTION INTRAMUSCULAR; INTRAVENOUS at 06:04

## 2022-05-22 RX ADMIN — ONDANSETRON 4 MG: 2 INJECTION INTRAMUSCULAR; INTRAVENOUS at 13:52

## 2022-05-22 RX ADMIN — SODIUM CHLORIDE: 900 INJECTION, SOLUTION INTRAVENOUS at 17:51

## 2022-05-22 RX ADMIN — HYDROMORPHONE HYDROCHLORIDE 1 MG: 1 INJECTION, SOLUTION INTRAMUSCULAR; INTRAVENOUS; SUBCUTANEOUS at 05:54

## 2022-05-22 RX ADMIN — ACETAMINOPHEN 650 MG: 325 TABLET ORAL at 14:57

## 2022-05-22 RX ADMIN — ACETAMINOPHEN 650 MG: 325 TABLET ORAL at 08:24

## 2022-05-22 ASSESSMENT — PAIN SCALES - GENERAL
PAINLEVEL_OUTOF10: 7
PAINLEVEL_OUTOF10: 8
PAINLEVEL_OUTOF10: 6
PAINLEVEL_OUTOF10: 3
PAINLEVEL_OUTOF10: 8
PAINLEVEL_OUTOF10: 2
PAINLEVEL_OUTOF10: 1
PAINLEVEL_OUTOF10: 4
PAINLEVEL_OUTOF10: 8
PAINLEVEL_OUTOF10: 7
PAINLEVEL_OUTOF10: 9
PAINLEVEL_OUTOF10: 3
PAINLEVEL_OUTOF10: 3
PAINLEVEL_OUTOF10: 5
PAINLEVEL_OUTOF10: 7
PAINLEVEL_OUTOF10: 0

## 2022-05-22 ASSESSMENT — PAIN DESCRIPTION - PAIN TYPE
TYPE: ACUTE PAIN
TYPE: CHRONIC PAIN
TYPE: ACUTE PAIN
TYPE: CHRONIC PAIN

## 2022-05-22 ASSESSMENT — PAIN - FUNCTIONAL ASSESSMENT

## 2022-05-22 ASSESSMENT — PAIN DESCRIPTION - LOCATION
LOCATION: ABDOMEN
LOCATION: BACK;ABDOMEN
LOCATION: ABDOMEN

## 2022-05-22 ASSESSMENT — PAIN DESCRIPTION - ORIENTATION
ORIENTATION: ANTERIOR
ORIENTATION: ANTERIOR;MID
ORIENTATION: ANTERIOR
ORIENTATION: ANTERIOR
ORIENTATION: ANTERIOR;MID
ORIENTATION: MID;ANTERIOR;POSTERIOR
ORIENTATION: ANTERIOR;MID
ORIENTATION: ANTERIOR

## 2022-05-22 ASSESSMENT — PAIN DESCRIPTION - DESCRIPTORS
DESCRIPTORS: ACHING

## 2022-05-22 ASSESSMENT — PAIN DESCRIPTION - FREQUENCY
FREQUENCY: CONTINUOUS
FREQUENCY: CONTINUOUS
FREQUENCY: INTERMITTENT
FREQUENCY: INTERMITTENT
FREQUENCY: CONTINUOUS
FREQUENCY: INTERMITTENT
FREQUENCY: INTERMITTENT

## 2022-05-22 ASSESSMENT — PAIN DESCRIPTION - ONSET
ONSET: ON-GOING

## 2022-05-22 ASSESSMENT — PAIN SCALES - WONG BAKER: WONGBAKER_NUMERICALRESPONSE: 0

## 2022-05-22 NOTE — PLAN OF CARE
Problem: Discharge Planning  Goal: Discharge to home or other facility with appropriate resources  5/21/2022 2028 by Georgina Hoover RN  Outcome: Progressing  5/21/2022 1701 by Flo Santillan RN  Outcome: Progressing  Flowsheets (Taken 5/21/2022 5754 by Ky Kelley RN)  Discharge to home or other facility with appropriate resources:   Refer to discharge planning if patient needs post-hospital services based on physician order or complex needs related to functional status, cognitive ability or social support system   Identify barriers to discharge with patient and caregiver   Identify discharge learning needs (meds, wound care, etc)   Arrange for needed discharge resources and transportation as appropriate   Arrange for interpreters to assist at discharge as needed     Problem: Pain  Goal: Verbalizes/displays adequate comfort level or baseline comfort level  5/21/2022 2028 by Georgina Hoover RN  Outcome: Progressing  5/21/2022 1701 by Flo Santillan RN  Outcome: Progressing

## 2022-05-22 NOTE — PROGRESS NOTES
Comprehensive Nutrition Assessment    Type and Reason for Visit: Initial,Positive Nutrition Screen  Malnutrition Screening Tool: Malnutrition Screen  Have you recently lost weight without trying?: 24 to 33 pounds (3 points)  Have you been eating poorly because of a decreased appetite?: No (0 points)  Malnutrition Screening Tool Score: 3    Nutrition Recommendations/Plan:   Meals and Snacks:  Diet: Continue current order liberalize as medically appropriate  Nutrition Supplement Therapy:   Medical food supplement therapy:  Initiate Ensure Enlive three times per day (this provides 350 kcal and 20 grams protein per bottle)      It is reasonable to wait 7-10 days before initiating nutrition support in a patient with no or low nutrition risk. Malnutrition Status: Severe malnutrition    If anticipated patient will remain NPO/Clear liquid for greater than 5-7 days, consider nutrition support for primary needs (with tube feeding route preferred if medically appropriate). If tube feeding not appropriate, would necessitate a central line for TPN for primary nutrition. Patient currently has PIV in right antecubital.   If nutrition support pursued, order appropriate route and an IP Nutrition Consult for RD to manage. Malnutrition Assessment:  Malnutrition Status: Severe malnutrition  Context: Chronic Illness  Findings of clinical characteristics of malnutrition:   Energy Intake:  75% or less estimated energy requirements for 1 month or longer (minimal intake for 3 months)  Weight Loss:  Greater than 5% over 1 month (10.5% in one month)     Body Fat Loss:  No significant body fat loss     Muscle Mass Loss:  No significant muscle mass loss    Fluid Accumulation:  Unable to assess     Strength:  Not Performed  Nutrition Assessment:  Nutrition History:    Do You Have Any Cultural, Adventism, or Ethnic Food Preferences?: No  5/22 Pt and wife in room discussing hx (daughter present but did not participate).  Wife relates his medical problems began in Dec 2021, but weight loss started in March 2022. Has n/v (with limited relief from medications), limited to no appetite. Patient open to ONS, but is put off by the idea of eating or drinking anything at this time for fear of vomiting. Wife states he did tolerate half a sandwich last week, but was unable to finish it. Nutrition Background:   Admitted with abdominal pain for last 6 months- being seen by Dr. Sweta Baldwin with concerns of peritoneal metastatic disease. History of PUD, HTN, hyperlipidemia, and diverticulitis. Nutrition Interval:  Pt seen reclining in bed. NFPE revealed no apparent wasting despite very low intake and significant weight loss. Pt open to ONS, but nervous to upset his stomach. Nutrition Related Findings:   No s/s wasting per NFPE 5/22      Current Nutrition Therapies:  ADULT DIET; Clear Liquid  Diet NPO    Current Intake:   Average Meal Intake: 1-25% (clear liquid) Average Supplements Intake: None Ordered      Anthropometric Measures:  Height: 5' 10\" (177.8 cm)  Current Body Wt: 207 lb 0.2 oz (93.9 kg) (5/20), Weight source: Stated  BMI: 29.7  Admission Body Weight: 207 lb (93.9 kg) (not specified source)  Ideal Body Weight (Kg) (Calculated): 75 kg (166 lbs), 124.7 %  Usual Body Wt: 231 lb 5 oz (104.9 kg) (1 mo ago 4/13 office visit), Percent weight change: -10.5       Edema: No data recorded  BMI Category Overweight (BMI 25.0-29. 9)  Estimated Daily Nutrient Needs:  Energy (kcal/day): 1878 -2348 (Kcal/kg (20-25) Weight used: 93.9 kg Current  Protein (g/day):  Weight Used: (Current) 93.9 kg  Fluid (ml/day):   (1 ml/kcal)    Nutrition Diagnosis:   · Inadequate oral intake related to  (scared to eat, limited appetite) as evidenced by weight loss greater than or equal to 5% in 1 month,nausea,vomiting    · Severe malnutrition,In context of chronic illness related to catabolic illness (nausea/vomiting) as evidenced by Criteria as identified in malnutrition assessment    Nutrition Interventions:   Food and/or Nutrient Delivery: Continue Current Diet,Start Oral Nutrition Supplement (Advance as medically appropriate)     Coordination of Nutrition Care: Continue to monitor while inpatient       Goals: Active Goal: PO intake 50% or greater,by next RD assessment       Nutrition Monitoring and Evaluation:      Food/Nutrient Intake Outcomes: Diet Advancement/Tolerance,Food and Nutrient Intake,Supplement Intake  Physical Signs/Symptoms Outcomes: Nausea or Vomiting,Nutrition Focused Physical Findings,Weight    Discharge Planning:     Too soon to determine    Carlos Figueroa, [address] [place] [address],   Contact: [phone]

## 2022-05-22 NOTE — PROGRESS NOTES
Bedside and Verbal shift change report given to Maty Calzada RN (oncoming nurse) by self Jannie gold). Report included the following information Nurse Handoff Report, Intake/Output, MAR and Recent Results. Saline gtt reviewed at bedside with oncoming RN.

## 2022-05-22 NOTE — PROGRESS NOTES
H&P/Consult Note/Progress Note/Office Note:   Orly Urbano  MRN: [id number]  :1960  Age:61 y.o.    HPI: Orly Urbano is a 62 y.o. male with a past medical history of PUD, HTN, hyperlipidemia, and diverticulitis who was seen in the office by Dr. Harriet Garcia 22     with concerns for peritoneal metastatic disease.     The patient states that he has been having abdominal pain for about 6 months now. He had a recent EGD and colonoscopy on 2022 which revealed a hiatal hernia, gastric polyps, benign colon polyps, diverticulosis, and hemorrhoids. He continued to have abdominal pain over the last few months. He states that he has lost about 30 lbs over the last 6 months. He has not had an appetite. He has been having worsening of GERD as well over that time. He was taking omeprazole and recently switched to pantoprazole. He states that he used an enema last night, which allowed him to have BMs this AM and pass flatus, however he is still having minimal PO intake.       The patient went to the emergency department at [facility] about 10 days ago for a partial small bowel obstruction. The patient presented to our emergency department 1 week ago with abdominal pain. The patient had a CT scan at that time which revealed extensive peritoneal nodularity and mild ascites consistent with peritoneal metastatic disease. A primary lesion was not definitely identified. There was also sclerosis of the S1 vertebral body, a bone scan has been ordered to assess for possible bone metastasis. He also had evidence of a partial bowel obstruction. There is no evidence of any metastatic disease within the chest.    Pt was instructed to go to ED for direct admission due to dehydration.     Past Surgical history - low anterior resection for diverticulitis in . Pt does not take blood thinners.     CT Abdomen/ Pelvis  CT abd/pelvis  CT OF THE ABDOMEN AND PELVIS     INDICATION: Chronic abdominal pain.     Multiple axial images were obtained through the abdomen and pelvis.  Oral  contrast was used for bowel opacification.  100mL of Isovue 370 intravenous  contrast was used for better evaluation of solid organs and vascular structures.   Radiation dose reduction techniques were used for this study.  All CT scans  performed at this facility use one or all of the following: Automated exposure  control, adjustment of the mA and/or kVp according to patient's size, iterative  reconstruction.     COMPARISON: None     FINDINGS:  LOWER CHEST: Normal.     HEPATOBILIARY: No evidence of focal liver mass. No calcified gallstones.     PANCREAS: Normal.     SPLEEN: Normal.     ADRENAL GLANDS: Normal.     KIDNEYS/BLADDER: Kidneys and bladder are unremarkable. No hydronephrosis or  urinary tract calculi.     BOWEL: Dilated fluid-filled loops of small bowel are present throughout the  abdomen. No definite transition point. Oral contrast noted in the colon. No  definite evidence of bowel mass but there is extensive peritoneal nodularity and  trace ascites highly concerning for peritoneal metastatic disease.     LYMPH NODES: No enlarged retroperitoneal or pelvic lymph nodes. Peritoneal  nodularity again noted most likely metastatic disease.     VASCULATURE: Unremarkable.     PELVIC ORGANS: Prostate gland and rectum are unremarkable. Small amount of free  pelvic fluid is noted.     MUSCULOSKELETAL: Degenerative spine changes are noted. Mild sclerosis involving  the S1 vertebral body is present on image 74 of series 602.     IMPRESSION  1. Extensive peritoneal nodularity and mild ascites consistent with peritoneal  metastatic disease. Primary lesion not definitely identified. 2. Dilated fluid-filled loops of small bowel possibly related to  gastroenteritis. No definite obstruction. No obvious bowel mass. 3. Sclerosis S1 vertebral body. Consider follow-up bone scan to assess for bone  Metastases.       5/22/22: Pt lying in bed.  Continues to c/o pain to lower abdomen and back. Reports vomited x 1 overnight. Has been out of bed. Voiding without difficulty. AF, NAD. Past Medical History:   Diagnosis Date    Bilateral carpal tunnel syndrome 12/9/2020    Chronic right shoulder pain 11/30/2020    Colon polyps 3/11/2013    Diverticulitis 3/11/2013    HTN (hypertension) 3/11/2013    Hyperlipidemia 3/11/2013    Paresthesia and pain of both upper extremities 11/30/2020    PUD (peptic ulcer disease) 3/11/2013    History of     Right elbow pain 12/9/2020    Wheezing 3/11/2013     Past Surgical History:   Procedure Laterality Date    COLONOSCOPY  2/25/09    colonoscopy per Dr. Jarred Maguire with need for repeat every 3 years    COLONOSCOPY  02/25/2022    Dr. Mary Jo Solano; polyps of the ascending, transverse, descending, and sigmoid colons; internal hemorrhoid; diverticulosis    DC ABDOMEN SURGERY PROC UNLISTED  October 2004    partial colon resection per Dr. Fabian Azul  02/25/2022    Dr. Mary Jo Solano; hiatal hernia gastric polyps     Current Facility-Administered Medications   Medication Dose Route Frequency    acetaminophen (TYLENOL) tablet 650 mg  650 mg Oral Q4H PRN    oxyCODONE (ROXICODONE) immediate release tablet 5 mg  5 mg Oral Q4H PRN    nicotine (NICODERM CQ) 14 MG/24HR 1 patch  1 patch TransDERmal Daily    0.9 % sodium chloride infusion   IntraVENous Continuous    HYDROmorphone HCl PF (DILAUDID) injection 1 mg  1 mg IntraVENous Q4H PRN    naloxone (NARCAN) injection 0.4 mg  0.4 mg IntraVENous PRN    ondansetron (ZOFRAN) injection 4 mg  4 mg IntraVENous Q4H PRN    pantoprazole (PROTONIX) 40 mg in sodium chloride (PF) 10 mL injection  40 mg IntraVENous Daily    sodium chloride flush 0.9 % injection 5-40 mL  5-40 mL IntraVENous PRN    sodium chloride flush 0.9 % injection 5-40 mL  5-40 mL IntraVENous Q8H     Patient has no known allergies.   Social History     Socioeconomic History    Marital status:      Spouse name: Not on file    Number of children: Not on file    Years of education: Not on file    Highest education level: Not on file   Occupational History    Not on file   Tobacco Use    Smoking status: Current Every Day Smoker     Packs/day: 1.00    Smokeless tobacco: Never Used   Substance and Sexual Activity    Alcohol use: No    Drug use: No    Sexual activity: Not on file   Other Topics Concern    Not on file   Social History Narrative    Not on file     Social Determinants of Health     Financial Resource Strain:     Difficulty of Paying Living Expenses: Not on file   Food Insecurity:     Worried About Running Out of Food in the Last Year: Not on file    Briseida of Food in the Last Year: Not on file   Transportation Needs:     Lack of Transportation (Medical): Not on file    Lack of Transportation (Non-Medical):  Not on file   Physical Activity:     Days of Exercise per Week: Not on file    Minutes of Exercise per Session: Not on file   Stress:     Feeling of Stress : Not on file   Social Connections:     Frequency of Communication with Friends and Family: Not on file    Frequency of Social Gatherings with Friends and Family: Not on file    Attends Scientologist Services: Not on file    Active Member of [address] [place] [address] [employer] or Organizations: Not on file    Attends Club or Organization Meetings: Not on file    Marital Status: Not on file   Intimate Partner Violence:     Fear of Current or Ex-Partner: Not on file    Emotionally Abused: Not on file    Physically Abused: Not on file    Sexually Abused: Not on file   Housing Stability:     Unable to Pay for Housing in the Last Year: Not on file    Number of [place] in the Last Year: Not on file    Unstable Housing in the Last Year: Not on file     Social History     Tobacco Use   Smoking Status Current Every Day Smoker    Packs/day: 1.00   Smokeless Tobacco Never Used     Family History   Problem Relation Age of Onset    No Known Problems Brother     Cancer 16 --   05/21/22 1600 119/73 98.2 °F (36.8 °C) Temporal 62 16 94 %   05/21/22 1438 -- -- -- -- 20 --   05/21/22 1200 116/75 98.2 °F (36.8 °C) Temporal 63 20 93 %       Amount and/or Complexity of Data Reviewed and Analyzed:  I reviewed and analyzed all of the unique labs and radiologic studies that are shown below as well as any that are in the HPI, and any that are in the expanded problem list below  *Each unique test, order, or document contributes to the combination of 2 or combination of 3 in Category 1 below. For this visit I also reviewed old records and prior notes.       Recent Labs     05/20/22  1303   WBC 9.4   HGB 14.9         K 3.4*      CO2 27   BUN 13   ALT 15     Review of most recent CBC  Lab Results   Component Value Date    WBC 9.4 05/20/2022    HGB 14.9 05/20/2022    HCT 43.7 05/20/2022    MCV 86.9 05/20/2022     05/20/2022       Review of most recent BMP  Lab Results   Component Value Date     05/20/2022    K 3.4 05/20/2022     05/20/2022    CO2 27 05/20/2022    BUN 13 05/20/2022    CREATININE 0.80 05/20/2022    GLUCOSE 113 05/20/2022    CALCIUM 9.2 05/20/2022        Review of most recent LFTs (and lipase if done)  Lab Results   Component Value Date    ALT 15 05/20/2022    AST 13 (L) 05/20/2022    ALKPHOS 86 05/20/2022    BILITOT 0.4 05/20/2022     Lab Results   Component Value Date    LIPASE 43 (L) 05/20/2022       No results found for: INR, APTT, LCAD    Review of most recent HgbA1c  Lab Results   Component Value Date    LABA1C 6.0 (H) 04/18/2022     Lab Results   Component Value Date     04/18/2022       Nutritional assessment screen for wound healing issues:  Lab Results   Component Value Date    LABALBU 3.6 05/20/2022       @lastcovr@  @resultlast@    @lastXray@  @lastCT@  @lastUS@    Admission date (for inpatients): 5/20/2022   * No surgery found *  * No surgery found *        ASSESSMENT/PLAN:  [unfilled]  Active Problems:    * No active hospital problems. *  Resolved Problems:    * No resolved hospital problems. *     Patient Active Problem List    Diagnosis Date Noted    SBO (small bowel obstruction) ([facility] 05/20/2022    Partial small bowel obstruction ([facility] 05/20/2022    Peritoneal metastases ([facility] 05/18/2022    Opioid use, unspecified with unspecified opioid-induced disorder ([facility] 05/18/2022    Generalized abdominal pain 05/12/2022    Abdominal pain 05/12/2022    Right cervical radiculopathy 01/03/2022    Class 1 obesity due to excess calories with serious comorbidity and body mass index (BMI) of 34.0 to 34.9 in adult 12/29/2020    Right elbow pain 12/09/2020    Bilateral carpal tunnel syndrome 12/09/2020    Chronic right shoulder pain 11/30/2020    Paresthesia and pain of both upper extremities 11/30/2020    Chronic bronchitis ([facility] 01/20/2020    Gastroesophageal reflux disease 12/11/2019    History of colon polyps 12/11/2019    Hyperlipidemia 03/11/2013    Essential hypertension 03/11/2013    PUD (peptic ulcer disease) 03/11/2013     History of          Tashi Reeder is a 62 y.o. male who has signs and symptoms consistent with concern for peritoneal metastatic disease.      He also is likely dehydrated due to his poor PO intake, and he likely still has a partial small bowel obstruction from the possible peritoneal metastatic disease. It was recommended that he be admitted to the hospital for IV hydration, and to continue his workup. We will consult IR to see if they are able to biopsy the omental nodules. If they are unable to obtain a biopsy, then we will plan for a diagnostic laparoscopy with biopsy next week.      Number and Complexity of Problems addressed and   Risks of complications and/or morbidity of management    Clear liquids as tolerated  NPO after midnight  Protonix  IVF  Pain control  IR consulted - eval for possible biopsy of omental nodules  I&O  OOB  CA 19-9 - 5.60  CEA 0.7    Angelina Jiang, NINFA

## 2022-05-22 NOTE — PLAN OF CARE
Problem: Discharge Planning  Goal: Discharge to home or other facility with appropriate resources  Outcome: Progressing  Flowsheets (Taken 5/22/2022 0830)  Discharge to home or other facility with appropriate resources: Identify barriers to discharge with patient and caregiver     Problem: Pain  Goal: Verbalizes/displays adequate comfort level or baseline comfort level  Outcome: Progressing

## 2022-05-23 ENCOUNTER — APPOINTMENT (OUTPATIENT)
Dept: INTERVENTIONAL RADIOLOGY/VASCULAR | Age: 62
DRG: 356 | End: 2022-05-23
Payer: COMMERCIAL

## 2022-05-23 ENCOUNTER — APPOINTMENT (OUTPATIENT)
Dept: CT IMAGING | Age: 62
DRG: 356 | End: 2022-05-23
Payer: COMMERCIAL

## 2022-05-23 DIAGNOSIS — C78.6 PERITONEAL METASTASES (HCC): Primary | ICD-10-CM

## 2022-05-23 DIAGNOSIS — R93.7 ABNORMAL CT OF SPINE: ICD-10-CM

## 2022-05-23 DIAGNOSIS — R10.9 ABDOMINAL PAIN, UNSPECIFIED ABDOMINAL LOCATION: ICD-10-CM

## 2022-05-23 DIAGNOSIS — C78.6 PERITONEAL METASTASES (HCC): ICD-10-CM

## 2022-05-23 DIAGNOSIS — R10.84 GENERALIZED ABDOMINAL PAIN: Primary | ICD-10-CM

## 2022-05-23 PROBLEM — E43 SEVERE PROTEIN-CALORIE MALNUTRITION (HCC): Status: ACTIVE | Noted: 2022-05-23

## 2022-05-23 LAB
ALBUMIN SERPL-MCNC: 2.8 G/DL (ref 3.2–4.6)
ALBUMIN/GLOB SERPL: 0.9 {RATIO} (ref 1.2–3.5)
ALP SERPL-CCNC: 68 U/L (ref 50–136)
ALT SERPL-CCNC: 13 U/L (ref 12–65)
ANION GAP SERPL CALC-SCNC: 7 MMOL/L (ref 7–16)
AST SERPL-CCNC: 9 U/L (ref 15–37)
BILIRUB SERPL-MCNC: 0.4 MG/DL (ref 0.2–1.1)
BUN SERPL-MCNC: 8 MG/DL (ref 8–23)
CALCIUM SERPL-MCNC: 8.6 MG/DL (ref 8.3–10.4)
CHLORIDE SERPL-SCNC: 110 MMOL/L (ref 98–107)
CO2 SERPL-SCNC: 24 MMOL/L (ref 21–32)
CREAT SERPL-MCNC: 0.6 MG/DL (ref 0.8–1.5)
ERYTHROCYTE [DISTWIDTH] IN BLOOD BY AUTOMATED COUNT: 12.5 % (ref 11.9–14.6)
GLOBULIN SER CALC-MCNC: 3 G/DL (ref 2.3–3.5)
GLUCOSE SERPL-MCNC: 61 MG/DL (ref 65–100)
HCT VFR BLD AUTO: 40.8 % (ref 41.1–50.3)
HGB BLD-MCNC: 13.5 G/DL (ref 13.6–17.2)
MCH RBC QN AUTO: 29.2 PG (ref 26.1–32.9)
MCHC RBC AUTO-ENTMCNC: 33.1 G/DL (ref 31.4–35)
MCV RBC AUTO: 88.1 FL (ref 79.6–97.8)
NRBC # BLD: 0 K/UL (ref 0–0.2)
PLATELET # BLD AUTO: 244 K/UL (ref 150–450)
PMV BLD AUTO: 10.1 FL (ref 9.4–12.3)
POTASSIUM SERPL-SCNC: 3.6 MMOL/L (ref 3.5–5.1)
PROT SERPL-MCNC: 5.8 G/DL (ref 6.3–8.2)
RBC # BLD AUTO: 4.63 M/UL (ref 4.23–5.6)
SODIUM SERPL-SCNC: 141 MMOL/L (ref 136–145)
WBC # BLD AUTO: 7.7 K/UL (ref 4.3–11.1)

## 2022-05-23 PROCEDURE — 2580000003 HC RX 258: Performed by: SURGERY

## 2022-05-23 PROCEDURE — 74176 CT ABD & PELVIS W/O CONTRAST: CPT

## 2022-05-23 PROCEDURE — 87205 SMEAR GRAM STAIN: CPT

## 2022-05-23 PROCEDURE — 99232 SBSQ HOSP IP/OBS MODERATE 35: CPT | Performed by: SURGERY

## 2022-05-23 PROCEDURE — 88112 CYTOPATH CELL ENHANCE TECH: CPT

## 2022-05-23 PROCEDURE — 87102 FUNGUS ISOLATION CULTURE: CPT

## 2022-05-23 PROCEDURE — 88305 TISSUE EXAM BY PATHOLOGIST: CPT

## 2022-05-23 PROCEDURE — 85027 COMPLETE CBC AUTOMATED: CPT

## 2022-05-23 PROCEDURE — A4216 STERILE WATER/SALINE, 10 ML: HCPCS | Performed by: SURGERY

## 2022-05-23 PROCEDURE — 0W9G3ZZ DRAINAGE OF PERITONEAL CAVITY, PERCUTANEOUS APPROACH: ICD-10-PCS | Performed by: RADIOLOGY

## 2022-05-23 PROCEDURE — 1100000000 HC RM PRIVATE

## 2022-05-23 PROCEDURE — 80053 COMPREHEN METABOLIC PANEL: CPT

## 2022-05-23 PROCEDURE — 6360000002 HC RX W HCPCS: Performed by: SURGERY

## 2022-05-23 PROCEDURE — 2580000003 HC RX 258: Performed by: NURSE PRACTITIONER

## 2022-05-23 PROCEDURE — 6370000000 HC RX 637 (ALT 250 FOR IP): Performed by: NURSE PRACTITIONER

## 2022-05-23 PROCEDURE — 2500000003 HC RX 250 WO HCPCS: Performed by: SURGERY

## 2022-05-23 PROCEDURE — C9113 INJ PANTOPRAZOLE SODIUM, VIA: HCPCS | Performed by: SURGERY

## 2022-05-23 PROCEDURE — C1729 CATH, DRAINAGE: HCPCS

## 2022-05-23 PROCEDURE — 36415 COLL VENOUS BLD VENIPUNCTURE: CPT

## 2022-05-23 PROCEDURE — 2500000003 HC RX 250 WO HCPCS: Performed by: RADIOLOGY

## 2022-05-23 RX ORDER — LIDOCAINE HYDROCHLORIDE 20 MG/ML
INJECTION, SOLUTION INFILTRATION; PERINEURAL
Status: COMPLETED | OUTPATIENT
Start: 2022-05-23 | End: 2022-05-23

## 2022-05-23 RX ADMIN — ACETAMINOPHEN 650 MG: 325 TABLET ORAL at 20:42

## 2022-05-23 RX ADMIN — ONDANSETRON 4 MG: 2 INJECTION INTRAMUSCULAR; INTRAVENOUS at 13:42

## 2022-05-23 RX ADMIN — HYDROMORPHONE HYDROCHLORIDE 1 MG: 1 INJECTION, SOLUTION INTRAMUSCULAR; INTRAVENOUS; SUBCUTANEOUS at 03:19

## 2022-05-23 RX ADMIN — LIDOCAINE HYDROCHLORIDE 200 MG: 20 INJECTION, SOLUTION INFILTRATION; PERINEURAL at 15:50

## 2022-05-23 RX ADMIN — SODIUM CHLORIDE: 900 INJECTION, SOLUTION INTRAVENOUS at 20:27

## 2022-05-23 RX ADMIN — HYDROMORPHONE HYDROCHLORIDE 1 MG: 1 INJECTION, SOLUTION INTRAMUSCULAR; INTRAVENOUS; SUBCUTANEOUS at 08:25

## 2022-05-23 RX ADMIN — SODIUM CHLORIDE: 900 INJECTION, SOLUTION INTRAVENOUS at 03:13

## 2022-05-23 RX ADMIN — SODIUM CHLORIDE, PRESERVATIVE FREE 40 MG: 5 INJECTION INTRAVENOUS at 08:25

## 2022-05-23 RX ADMIN — ONDANSETRON 4 MG: 2 INJECTION INTRAMUSCULAR; INTRAVENOUS at 08:25

## 2022-05-23 RX ADMIN — ACETAMINOPHEN 650 MG: 325 TABLET ORAL at 01:04

## 2022-05-23 RX ADMIN — ONDANSETRON 4 MG: 2 INJECTION INTRAMUSCULAR; INTRAVENOUS at 20:23

## 2022-05-23 RX ADMIN — SODIUM CHLORIDE, PRESERVATIVE FREE 10 ML: 5 INJECTION INTRAVENOUS at 22:00

## 2022-05-23 RX ADMIN — OXYCODONE AND ACETAMINOPHEN 1 TABLET: 7.5; 325 TABLET ORAL at 13:37

## 2022-05-23 RX ADMIN — ONDANSETRON 4 MG: 2 INJECTION INTRAMUSCULAR; INTRAVENOUS at 03:19

## 2022-05-23 ASSESSMENT — PAIN DESCRIPTION - FREQUENCY
FREQUENCY: CONTINUOUS

## 2022-05-23 ASSESSMENT — PAIN DESCRIPTION - DIRECTION
RADIATING_TOWARDS: BACK

## 2022-05-23 ASSESSMENT — PAIN DESCRIPTION - ORIENTATION
ORIENTATION: LEFT
ORIENTATION: MID;ANTERIOR
ORIENTATION: LEFT

## 2022-05-23 ASSESSMENT — PAIN SCALES - GENERAL
PAINLEVEL_OUTOF10: 4
PAINLEVEL_OUTOF10: 8
PAINLEVEL_OUTOF10: 6
PAINLEVEL_OUTOF10: 3
PAINLEVEL_OUTOF10: 3
PAINLEVEL_OUTOF10: 8
PAINLEVEL_OUTOF10: 2
PAINLEVEL_OUTOF10: 3
PAINLEVEL_OUTOF10: 3
PAINLEVEL_OUTOF10: 1
PAINLEVEL_OUTOF10: 6

## 2022-05-23 ASSESSMENT — PAIN DESCRIPTION - DESCRIPTORS
DESCRIPTORS: ACHING;STABBING
DESCRIPTORS: STABBING
DESCRIPTORS: STABBING
DESCRIPTORS: STABBING;SQUEEZING

## 2022-05-23 ASSESSMENT — PAIN DESCRIPTION - LOCATION
LOCATION: ABDOMEN;CHEST
LOCATION: ABDOMEN

## 2022-05-23 ASSESSMENT — PAIN - FUNCTIONAL ASSESSMENT
PAIN_FUNCTIONAL_ASSESSMENT: PREVENTS OR INTERFERES SOME ACTIVE ACTIVITIES AND ADLS
PAIN_FUNCTIONAL_ASSESSMENT: PREVENTS OR INTERFERES SOME ACTIVE ACTIVITIES AND ADLS
PAIN_FUNCTIONAL_ASSESSMENT: 0-10
PAIN_FUNCTIONAL_ASSESSMENT: PREVENTS OR INTERFERES SOME ACTIVE ACTIVITIES AND ADLS
PAIN_FUNCTIONAL_ASSESSMENT: PREVENTS OR INTERFERES SOME ACTIVE ACTIVITIES AND ADLS

## 2022-05-23 ASSESSMENT — PAIN DESCRIPTION - ONSET
ONSET: ON-GOING

## 2022-05-23 ASSESSMENT — PAIN DESCRIPTION - PAIN TYPE
TYPE: ACUTE PAIN

## 2022-05-23 NOTE — OR NURSING
TRANSFER - OUT REPORT:           Verbal report given to SARANYA IBARRA on [address]  being transferred to room 201 for routine progression of patient care              Report consisted of patients Situation, Background, Assessment and      Recommendations(SBAR). Information from the following report(s) SBAR, Procedure Summary and MAR was reviewed with the receiving nurse. Opportunity for questions and clarification was provided. Pt tolerated procedure well.                 Peripheral Intravenous Line:   Peripheral IV 05/23/22 Distal;Left;Posterior Forearm (Active)   Site Assessment Clean, dry & intact 05/23/22 1346   Line Status Capped;Flushed 05/23/22 1346   Line Care Connections checked and tightened;Cap changed 05/23/22 1346   Phlebitis Assessment No symptoms 05/23/22 1346   Infiltration Assessment 0 05/23/22 1346   Alcohol Cap Used Yes 05/23/22 1346   Dressing Status New dressing applied 05/23/22 1346   Dressing Type Transparent 05/23/22 1346   Dressing Intervention New 05/23/22 1346

## 2022-05-23 NOTE — BRIEF OP NOTE
Department of Interventional Radiology  [phone]        Interventional Radiology Brief Procedure Note    Patient: Jah Desouza MRN: [id number]  SSN: [id number]    YOB: 1960  Age: 62 y.o. Sex: male      Date of Procedure: 5/23/2022    Pre-Procedure Diagnosis: abdominal pain, possible omental lesions    Post-Procedure Diagnosis: SAME    Procedure(s): Paracentesis    Brief Description of Procedure: as above    Performed By: Paula Corbin MD     Assistants: None    Anesthesia:Lidocaine    Estimated Blood Loss: Less than 10ml    Specimens:  As ordered    Implants:  ImplantsIR: N/A    Findings: no obvious omental lesions that are amenable to biopsy.   Small amount of perihepatic fluid was aspirated    Complications: None    Recommendations: routine post care     Follow Up: as needed    Signed By: Paula Corbin MD     May 23, 2022        Electronically signed by Paula Corbin MD on 5/23/2022 at 4:02 PM

## 2022-05-23 NOTE — PROGRESS NOTES
PRN    pantoprazole (PROTONIX) 40 mg in sodium chloride (PF) 10 mL injection  40 mg IntraVENous Daily    sodium chloride flush 0.9 % injection 5-40 mL  5-40 mL IntraVENous PRN    sodium chloride flush 0.9 % injection 5-40 mL  5-40 mL IntraVENous Q8H     ALLERGIES:  Patient has no known allergies. Social History     Socioeconomic History    Marital status:      Spouse name: Not on file    Number of children: Not on file    Years of education: Not on file    Highest education level: Not on file   Occupational History    Not on file   Tobacco Use    Smoking status: Current Every Day Smoker     Packs/day: 1.00    Smokeless tobacco: Never Used   Substance and Sexual Activity    Alcohol use: No    Drug use: No    Sexual activity: Not on file   Other Topics Concern    Not on file   Social History Narrative    Not on file     Social Determinants of Health     Financial Resource Strain:     Difficulty of Paying Living Expenses: Not on file   Food Insecurity:     Worried About Running Out of Food in the Last Year: Not on file    Briseida of Food in the Last Year: Not on file   Transportation Needs:     Lack of Transportation (Medical): Not on file    Lack of Transportation (Non-Medical):  Not on file   Physical Activity:     Days of Exercise per Week: Not on file    Minutes of Exercise per Session: Not on file   Stress:     Feeling of Stress : Not on file   Social Connections:     Frequency of Communication with Friends and Family: Not on file    Frequency of Social Gatherings with Friends and Family: Not on file    Attends Latter-day Services: Not on file    Active Member of Clubs or Organizations: Not on file    Attends Club or Organization Meetings: Not on file    Marital Status: Not on file   Intimate Partner Violence:     Fear of Current or Ex-Partner: Not on file    Emotionally Abused: Not on file    Physically Abused: Not on file    Sexually Abused: Not on file   Housing Stability:     Unable to Pay for Housing in the Last Year: Not on file    Number of Places Lived in the Last Year: Not on file    Unstable Housing in the Last Year: Not on file     Social History     Tobacco Use   Smoking Status Current Every Day Smoker    Packs/day: 1.00   Smokeless Tobacco Never Used     Family History   Problem Relation Age of Onset    No Known Problems Brother     Cancer Sister         breast    Hypertension Mother     Heart Disease Mother     Diabetes Father     Osteoarthritis Father     Alcohol Abuse Father     Hypertension Father     Diabetes Mother        ROS: The patient has no difficulty with chest pain or shortness of breath. No fever or chills. Comprehensive review of systems was otherwise unremarkable except as noted above. Physical Exam:   Constitutional: Alert oriented cooperative patient in no acute distress. /76   Pulse 71   Temp 98.3 °F (36.8 °C) (Oral)   Resp 20   Ht 5' 10\" (1.778 m)   Wt 213 lb (96.6 kg)   SpO2 94%   BMI 30.56 kg/m²   Eyes:Sclera are clear, pupils symmetric   ENMT: ears have no obvious external lesions; no obvious neck masses; no lip lesions  CV: RRR. Normal perfusion; no embolic signs  Resp: No JVD. Breathing is  non-labored. No audible wheezing   GI: soft and non-distended     Musculoskeletal: no significant asymmetry; normal tone; unremarkable with normal function. Neuro:  No obvious focal deficits; moves all 4; A&O x3  Psychiatric: normal affect and mood, no memory impairment      Labs:  Lab Results   Component Value Date    WBC 7.7 05/23/2022    HGB 13.5 05/23/2022     05/23/2022     05/23/2022    K 3.6 05/23/2022     05/23/2022    CO2 24 05/23/2022    BUN 8 05/23/2022    ALT 13 05/23/2022       No results found for this or any previous visit. I reviewed patient's medications, labs, and independently reviewed relevant radiologic studies if available.   Labs/radiology studies as ordered in [facility] or in scanned in media tab if pt is pre-op. Amt of data reviewed moderate-extensive          Assessment/Plan:   ?peritoneal disease. Continue symptomatic management with pain, nausea controls. Await IR biopsy. May need diagnostic laparoscopy, even laparotomy if SBO is persistent.          Morris Jensen MD,  FACS

## 2022-05-24 PROCEDURE — C9113 INJ PANTOPRAZOLE SODIUM, VIA: HCPCS | Performed by: SURGERY

## 2022-05-24 PROCEDURE — 6370000000 HC RX 637 (ALT 250 FOR IP): Performed by: NURSE PRACTITIONER

## 2022-05-24 PROCEDURE — 6360000002 HC RX W HCPCS

## 2022-05-24 PROCEDURE — 1100000000 HC RM PRIVATE

## 2022-05-24 PROCEDURE — A4216 STERILE WATER/SALINE, 10 ML: HCPCS | Performed by: SURGERY

## 2022-05-24 PROCEDURE — 2580000003 HC RX 258: Performed by: SURGERY

## 2022-05-24 PROCEDURE — 2580000003 HC RX 258: Performed by: NURSE PRACTITIONER

## 2022-05-24 PROCEDURE — 99232 SBSQ HOSP IP/OBS MODERATE 35: CPT | Performed by: SURGERY

## 2022-05-24 PROCEDURE — 2580000003 HC RX 258

## 2022-05-24 PROCEDURE — 6360000002 HC RX W HCPCS: Performed by: SURGERY

## 2022-05-24 PROCEDURE — 6360000002 HC RX W HCPCS: Performed by: NURSE PRACTITIONER

## 2022-05-24 RX ORDER — DIPHENHYDRAMINE HYDROCHLORIDE 50 MG/ML
25 INJECTION INTRAMUSCULAR; INTRAVENOUS ONCE
Status: COMPLETED | OUTPATIENT
Start: 2022-05-24 | End: 2022-05-24

## 2022-05-24 RX ORDER — KETOROLAC TROMETHAMINE 30 MG/ML
30 INJECTION, SOLUTION INTRAMUSCULAR; INTRAVENOUS EVERY 6 HOURS PRN
Status: DISPENSED | OUTPATIENT
Start: 2022-05-24 | End: 2022-05-26

## 2022-05-24 RX ORDER — MORPHINE SULFATE 2 MG/ML
2 INJECTION, SOLUTION INTRAMUSCULAR; INTRAVENOUS EVERY 6 HOURS PRN
Status: DISPENSED | OUTPATIENT
Start: 2022-05-24 | End: 2022-05-26

## 2022-05-24 RX ADMIN — ACETAMINOPHEN 650 MG: 325 TABLET ORAL at 01:56

## 2022-05-24 RX ADMIN — KETOROLAC TROMETHAMINE 30 MG: 30 INJECTION, SOLUTION INTRAMUSCULAR at 19:59

## 2022-05-24 RX ADMIN — OXYCODONE AND ACETAMINOPHEN 1 TABLET: 7.5; 325 TABLET ORAL at 12:56

## 2022-05-24 RX ADMIN — SODIUM CHLORIDE, PRESERVATIVE FREE 10 ML: 5 INJECTION INTRAVENOUS at 05:54

## 2022-05-24 RX ADMIN — SODIUM CHLORIDE: 900 INJECTION, SOLUTION INTRAVENOUS at 22:51

## 2022-05-24 RX ADMIN — MORPHINE SULFATE 2 MG: 2 INJECTION, SOLUTION INTRAMUSCULAR; INTRAVENOUS at 15:16

## 2022-05-24 RX ADMIN — SODIUM CHLORIDE, PRESERVATIVE FREE 10 ML: 5 INJECTION INTRAVENOUS at 21:49

## 2022-05-24 RX ADMIN — OXYCODONE AND ACETAMINOPHEN 1 TABLET: 7.5; 325 TABLET ORAL at 08:48

## 2022-05-24 RX ADMIN — SODIUM CHLORIDE, PRESERVATIVE FREE 40 MG: 5 INJECTION INTRAVENOUS at 08:48

## 2022-05-24 RX ADMIN — DIPHENHYDRAMINE HYDROCHLORIDE 25 MG: 50 INJECTION, SOLUTION INTRAMUSCULAR; INTRAVENOUS at 02:26

## 2022-05-24 RX ADMIN — ONDANSETRON 4 MG: 2 INJECTION INTRAMUSCULAR; INTRAVENOUS at 12:56

## 2022-05-24 RX ADMIN — ONDANSETRON 4 MG: 2 INJECTION INTRAMUSCULAR; INTRAVENOUS at 08:48

## 2022-05-24 RX ADMIN — SODIUM CHLORIDE: 900 INJECTION, SOLUTION INTRAVENOUS at 07:40

## 2022-05-24 ASSESSMENT — PAIN DESCRIPTION - PAIN TYPE: TYPE: ACUTE PAIN

## 2022-05-24 ASSESSMENT — PAIN SCALES - GENERAL
PAINLEVEL_OUTOF10: 0
PAINLEVEL_OUTOF10: 0
PAINLEVEL_OUTOF10: 6
PAINLEVEL_OUTOF10: 3
PAINLEVEL_OUTOF10: 1
PAINLEVEL_OUTOF10: 8
PAINLEVEL_OUTOF10: 5
PAINLEVEL_OUTOF10: 1
PAINLEVEL_OUTOF10: 8
PAINLEVEL_OUTOF10: 8
PAINLEVEL_OUTOF10: 0
PAINLEVEL_OUTOF10: 1
PAINLEVEL_OUTOF10: 8

## 2022-05-24 ASSESSMENT — PAIN DESCRIPTION - LOCATION
LOCATION: ABDOMEN

## 2022-05-24 ASSESSMENT — PAIN DESCRIPTION - ORIENTATION
ORIENTATION: LEFT
ORIENTATION: RIGHT
ORIENTATION: RIGHT;LOWER
ORIENTATION: LEFT

## 2022-05-24 ASSESSMENT — PAIN - FUNCTIONAL ASSESSMENT
PAIN_FUNCTIONAL_ASSESSMENT: PREVENTS OR INTERFERES SOME ACTIVE ACTIVITIES AND ADLS
PAIN_FUNCTIONAL_ASSESSMENT: ACTIVITIES ARE NOT PREVENTED

## 2022-05-24 ASSESSMENT — PAIN DESCRIPTION - DESCRIPTORS
DESCRIPTORS: ACHING
DESCRIPTORS: ACHING;STABBING

## 2022-05-24 ASSESSMENT — PAIN DESCRIPTION - DIRECTION: RADIATING_TOWARDS: BACK

## 2022-05-24 ASSESSMENT — PAIN DESCRIPTION - FREQUENCY: FREQUENCY: CONTINUOUS

## 2022-05-24 ASSESSMENT — PAIN DESCRIPTION - ONSET: ONSET: ON-GOING

## 2022-05-24 NOTE — PROGRESS NOTES
Patient agitated and upset. Requesting to be discharged. Patient stated that \"this shit is ridiculous, I have been here since Friday and have been ripped away from a floor that was taking good care of me. \" patient continued to state that \"I will leave AMA, I do not care. \"     Donald John NP regarding patient's statements and concerns with care. Educated patient on [facility] and how his insurance will not help cover the cost. Pt stated \"I do not care, I will file a compliant with my insurance and this hospital.\" educated patient on his pain medication orders for Toradol and dilaudid, patient stated that he did not want any pain medication. Explained patient plan of care, waiting for lab test results for paracentesis fluid results. Patient stated that he is \"able to wait for the results at home and is going to leave\"     Patient's wife requested to speak with NP or Julianna Lopez MD. NP notified. Patient in bed, bed low and locked, VSS, PIV intact with no redness/drainage. No needs stated. No distress noted.

## 2022-05-24 NOTE — PROGRESS NOTES
[facility]  [address] [place] [address]  [phone]    Office Note/H&P/Consult Note   David Hilton   MRN: [id number]     : 1960        HPI: David Hilton is a 62 y.o. male who is admitted for poss peritoneal disease and IR found no target to biopsy, but aspirated some peritoneal fluid yesterday. He feels better today, feels hungary. + diarrhea.        Past Medical History:   Diagnosis Date    Bilateral carpal tunnel syndrome 2020    Chronic right shoulder pain 2020    Colon polyps 3/11/2013    Diverticulitis 3/11/2013    HTN (hypertension) 3/11/2013    Hyperlipidemia 3/11/2013    Paresthesia and pain of both upper extremities 2020    PUD (peptic ulcer disease) 3/11/2013    History of     Right elbow pain 2020    Wheezing 3/11/2013     Past Surgical History:   Procedure Laterality Date    COLONOSCOPY  09    colonoscopy per Dr. Ubaldo Chicas with need for repeat every 3 years    COLONOSCOPY  2022    Dr. Atilio Saldivar; polyps of the ascending, transverse, descending, and sigmoid colons; internal hemorrhoid; diverticulosis    [place] ABDOMEN SURGERY PROC UNLISTED  2004    partial colon resection per Dr. Wen Goldberg  2022    Dr. Atilio Saldivar; hiatal hernia gastric polyps     Current Facility-Administered Medications   Medication Dose Route Frequency    oxyCODONE-acetaminophen (PERCOCET) 7.5-325 MG per tablet 1 tablet  1 tablet Oral Q4H PRN    acetaminophen (TYLENOL) tablet 650 mg  650 mg Oral Q4H PRN    oxyCODONE (ROXICODONE) immediate release tablet 5 mg  5 mg Oral Q4H PRN    nicotine (NICODERM CQ) 14 MG/24HR 1 patch  1 patch TransDERmal Daily    0.9 % sodium chloride infusion   IntraVENous Continuous    HYDROmorphone HCl PF (DILAUDID) injection 1 mg  1 mg IntraVENous Q4H PRN    naloxone (NARCAN) injection 0.4 mg  0.4 mg IntraVENous PRN    ondansetron (ZOFRAN) injection 4 mg  4 mg IntraVENous Q4H PRN    pantoprazole (PROTONIX) 40 mg in sodium chloride (PF) 10 mL injection  40 mg IntraVENous Daily    sodium chloride flush 0.9 % injection 5-40 mL  5-40 mL IntraVENous PRN    sodium chloride flush 0.9 % injection 5-40 mL  5-40 mL IntraVENous Q8H     ALLERGIES:  Patient has no known allergies. Social History     Socioeconomic History    Marital status:      Spouse name: Not on file    Number of children: Not on file    Years of education: Not on file    Highest education level: Not on file   Occupational History    Not on file   Tobacco Use    Smoking status: Current Every Day Smoker     Packs/day: 1.00    Smokeless tobacco: Never Used   Substance and Sexual Activity    Alcohol use: No    Drug use: No    Sexual activity: Not on file   Other Topics Concern    Not on file   Social History Narrative    Not on file     Social Determinants of Health     Financial Resource Strain:     Difficulty of Paying Living Expenses: Not on file   Food Insecurity:     Worried About Running Out of Food in the Last Year: Not on file    Briseida of Food in the Last Year: Not on file   Transportation Needs:     Lack of Transportation (Medical): Not on file    Lack of Transportation (Non-Medical):  Not on file   Physical Activity:     Days of Exercise per Week: Not on file    Minutes of Exercise per Session: Not on file   Stress:     Feeling of Stress : Not on file   Social Connections:     Frequency of Communication with Friends and Family: Not on file    Frequency of Social Gatherings with Friends and Family: Not on file    Attends Mandaen Services: Not on file    Active Member of Clubs or Organizations: Not on file    Attends Club or Organization Meetings: Not on file    Marital Status: Not on file   Intimate Partner Violence:     Fear of Current or Ex-Partner: Not on file    Emotionally Abused: Not on file    Physically Abused: Not on file    Sexually Abused: Not on file Housing Stability:     Unable to Pay for Housing in the Last Year: Not on file    Number of Places Lived in the Last Year: Not on file    Unstable Housing in the Last Year: Not on file     Social History     Tobacco Use   Smoking Status Current Every Day Smoker    Packs/day: 1.00   Smokeless Tobacco Never Used     Family History   Problem Relation Age of Onset    No Known Problems Brother     Cancer Sister         breast    Hypertension Mother     Heart Disease Mother     Diabetes Father     Osteoarthritis Father     Alcohol Abuse Father     Hypertension Father     Diabetes Mother        ROS: The patient has no difficulty with chest pain or shortness of breath. No fever or chills. Comprehensive review of systems was otherwise unremarkable except as noted above. Physical Exam:   Constitutional: Alert oriented cooperative patient in no acute distress. /79   Pulse 69   Temp 97.6 °F (36.4 °C) (Temporal)   Resp 19   Ht 5' 10\" (1.778 m)   Wt 213 lb (96.6 kg)   SpO2 92%   BMI 30.56 kg/m²   Eyes:Sclera are clear, pupils symmetric   ENMT: ears have no obvious external lesions; no obvious neck masses; no lip lesions  CV: RRR. Normal perfusion; no embolic signs  Resp: No JVD. Breathing is  non-labored. No audible wheezing   GI: soft and non-distended, soft non tender. Musculoskeletal: no significant asymmetry; normal tone; unremarkable with normal function. Neuro:  No obvious focal deficits; moves all 4; A&O x3  Psychiatric: normal affect and mood, no memory impairment      Labs:  Lab Results   Component Value Date    WBC 7.7 05/23/2022    HGB 13.5 05/23/2022     05/23/2022     05/23/2022    K 3.6 05/23/2022     05/23/2022    CO2 24 05/23/2022    BUN 8 05/23/2022    ALT 13 05/23/2022       No results found for this or any previous visit. I reviewed patient's medications, labs, and independently reviewed relevant radiologic studies if available.   Labs/radiology studies as ordered in connect care or in scanned in media tab if pt is pre-op. Amt of data reviewed moderate-extensive          Assessment/Plan:  ? Peritoneal disease. Await cytology from ascites. But likely need diagnostic laparoscopy. Resolving SBO? Try to advance diet.        Zach Quinn MD,  FACS

## 2022-05-24 NOTE — PROGRESS NOTES
TRANSFER - OUT REPORT:    Verbal report given to Renetta Ray on Anson Lamp  being transferred to [facility] for routine progression of patient care       Report consisted of patient's Situation, Background, Assessment and   Recommendations(SBAR). Information from the following report(s) Nurse Handoff Report was reviewed with the receiving nurse. Lines:   Peripheral IV 05/23/22 Distal;Left;Posterior Forearm (Active)   Site Assessment Clean, dry & intact 05/24/22 1443   Line Status Capped; Infusing 05/24/22 1443   Line Care Connections checked and tightened 05/24/22 1443   Phlebitis Assessment No symptoms 05/24/22 1443   Infiltration Assessment 0 05/24/22 1443   Alcohol Cap Used Yes 05/24/22 1443   Dressing Status Clean, dry & intact 05/24/22 1443   Dressing Type Transparent 05/24/22 1443   Dressing Intervention New 05/23/22 1346        Opportunity for questions and clarification was provided.       Patient transported with:  Monitor and Registered Nurse

## 2022-05-24 NOTE — PROGRESS NOTES
CM continues to follow for any CM needs and/or discharge planning. None noted or voiced at this time. Will continue to update as needed. 05/21/22 1248   Service Assessment   Support Systems Spouse/Significant Other;Children   Social/Functional History   Lives With Spouse   Discharge Planning   Type of Residence House   Living Arrangements Spouse/Significant Other;Children   Current Services Prior To Admission None   Potential Assistance Needed N/A   DME Ordered?  No   Potential Assistance Purchasing Medications No   Type of Home Care Services None   Patient expects to be discharged to: House   One/Two Story Residence One story   History of falls? 0

## 2022-05-25 LAB — SPECIMEN SOURCE: NORMAL

## 2022-05-25 PROCEDURE — 6360000002 HC RX W HCPCS: Performed by: SURGERY

## 2022-05-25 PROCEDURE — 2580000003 HC RX 258

## 2022-05-25 PROCEDURE — 99233 SBSQ HOSP IP/OBS HIGH 50: CPT | Performed by: SURGERY

## 2022-05-25 PROCEDURE — 6360000002 HC RX W HCPCS

## 2022-05-25 PROCEDURE — C9113 INJ PANTOPRAZOLE SODIUM, VIA: HCPCS | Performed by: SURGERY

## 2022-05-25 PROCEDURE — 6370000000 HC RX 637 (ALT 250 FOR IP): Performed by: NURSE PRACTITIONER

## 2022-05-25 PROCEDURE — 1100000000 HC RM PRIVATE

## 2022-05-25 PROCEDURE — 2580000003 HC RX 258: Performed by: SURGERY

## 2022-05-25 RX ADMIN — SODIUM CHLORIDE, PRESERVATIVE FREE 10 ML: 5 INJECTION INTRAVENOUS at 08:40

## 2022-05-25 RX ADMIN — KETOROLAC TROMETHAMINE 30 MG: 30 INJECTION, SOLUTION INTRAMUSCULAR at 15:26

## 2022-05-25 RX ADMIN — SODIUM CHLORIDE, PRESERVATIVE FREE 10 ML: 5 INJECTION INTRAVENOUS at 15:30

## 2022-05-25 RX ADMIN — KETOROLAC TROMETHAMINE 30 MG: 30 INJECTION, SOLUTION INTRAMUSCULAR at 09:18

## 2022-05-25 RX ADMIN — SODIUM CHLORIDE, PRESERVATIVE FREE 10 ML: 5 INJECTION INTRAVENOUS at 21:29

## 2022-05-25 RX ADMIN — KETOROLAC TROMETHAMINE 30 MG: 30 INJECTION, SOLUTION INTRAMUSCULAR at 02:24

## 2022-05-25 RX ADMIN — SODIUM CHLORIDE, PRESERVATIVE FREE 40 MG: 5 INJECTION INTRAVENOUS at 08:46

## 2022-05-25 RX ADMIN — KETOROLAC TROMETHAMINE 30 MG: 30 INJECTION, SOLUTION INTRAMUSCULAR at 21:29

## 2022-05-25 RX ADMIN — ACETAMINOPHEN 650 MG: 325 TABLET ORAL at 18:15

## 2022-05-25 RX ADMIN — SODIUM CHLORIDE: 900 INJECTION, SOLUTION INTRAVENOUS at 18:08

## 2022-05-25 RX ADMIN — OXYCODONE AND ACETAMINOPHEN 1 TABLET: 7.5; 325 TABLET ORAL at 21:34

## 2022-05-25 ASSESSMENT — PAIN SCALES - GENERAL
PAINLEVEL_OUTOF10: 8
PAINLEVEL_OUTOF10: 3
PAINLEVEL_OUTOF10: 0
PAINLEVEL_OUTOF10: 6
PAINLEVEL_OUTOF10: 0
PAINLEVEL_OUTOF10: 6
PAINLEVEL_OUTOF10: 6
PAINLEVEL_OUTOF10: 0
PAINLEVEL_OUTOF10: 6
PAINLEVEL_OUTOF10: 3

## 2022-05-25 ASSESSMENT — PAIN DESCRIPTION - LOCATION
LOCATION: ABDOMEN

## 2022-05-25 ASSESSMENT — PAIN - FUNCTIONAL ASSESSMENT
PAIN_FUNCTIONAL_ASSESSMENT: ACTIVITIES ARE NOT PREVENTED

## 2022-05-25 ASSESSMENT — PAIN DESCRIPTION - ORIENTATION
ORIENTATION: LEFT;MID
ORIENTATION: RIGHT
ORIENTATION: LEFT

## 2022-05-25 ASSESSMENT — PAIN DESCRIPTION - PAIN TYPE
TYPE: CHRONIC PAIN

## 2022-05-25 ASSESSMENT — PAIN DESCRIPTION - DESCRIPTORS
DESCRIPTORS: ACHING
DESCRIPTORS: GNAWING;ACHING

## 2022-05-25 ASSESSMENT — PAIN DESCRIPTION - FREQUENCY
FREQUENCY: INTERMITTENT

## 2022-05-25 ASSESSMENT — PAIN DESCRIPTION - ONSET
ONSET: ON-GOING

## 2022-05-25 ASSESSMENT — PAIN DESCRIPTION - DIRECTION: RADIATING_TOWARDS: BACK

## 2022-05-25 NOTE — PROGRESS NOTES
05/25/22 0843   Service Assessment   Patient Orientation Alert and Oriented;Person;Place; Self   Cognition Alert   History Provided By Patient   Primary Caregiver Self   Accompanied By/Relationship Karla Lopez (spouse)   Support Systems Spouse/Significant Other;Children   Patient's Healthcare Decision Maker is: Legal Next of Kin   PCP Verified by CM Yes  Francisca Gonzalez. Gab Vieira MD)   Last Visit to PCP Within last 3 months   Prior Functional Level Independent in ADLs/IADLs   Current Functional Level Independent in ADLs/IADLs   Can patient return to prior living arrangement Yes   Ability to make needs known: Good   Family able to assist with home care needs: Yes   Would you like for me to discuss the discharge plan with any other family members/significant others, and if so, who? No   Social/Functional History   Lives With Spouse   Type of [facility] One level   Home Access Stairs to enter with rails   Entrance Stairs - Number of Steps 6   Entrance Stairs - R [address] Help From Family   ADL Assistance Independent   Homemaking Assistance Independent   Ambulation Assistance Independent   Transfer Assistance Independent   Active  Yes   Mode of Transportation Car   Discharge Planning   Type of Residence House   Living Arrangements Spouse/Significant Other   Current Services Prior To Admission None   Potential Assistance Needed N/A   DME Ordered? No   Potential Assistance Purchasing Medications No   Type of Home Care Services None   Patient expects to be discharged to: House   One/Two Story Residence One story   Services At/After Discharge   Transition of Care Consult (CM Consult) Discharge Unique 1690 Discharge None   [employer] Information Provided?  No   Mode of Transport at Discharge Other (see comment)  (Family)   Confirm Follow Up Transport Family   Condition of Participation: Discharge Planning   The Plan for Transition of Care is related to the following treatment goals: Pt to obtain care to become medically stable and to return with a safe transition. The Patient and/or Patient Representative was provided with a Choice of Provider? Patient   The Patient and/Or Patient Representative agree with the Discharge Plan? Yes   Freedom of Choice list was provided with basic dialogue that supports the patient's individualized plan of care/goals, treatment preferences, and shares the quality data associated with the providers? Yes     Pt transferred on 5-24-22 from OBS room 1102 to room 515 for progression of care. On 5-23-22 pt had a paracentesis and Dr. Elias Marsh is awaiting the results from the fluid sample. Advancing pt's diet. There have been no PT, OT, or SLP consults ordered. Pt is independent with ADLs. No d/c needs identified at the present time. Current d/c plan is for pt to return home with spouse when medically stable. CM will continue to follow and remain available if any needs arise.

## 2022-05-25 NOTE — PROGRESS NOTES
Patient being transferred to room 515 at this time. Report given to SARANYA ZHOU. SBAR was included at this time. No other questions or concerns were addressed at this time. Will call transport to move patient.

## 2022-05-25 NOTE — PROGRESS NOTES
[facility]  [address] [place] [address], [address] [facility]  [phone]    Office Note/H&P/Consult Note   Kandy Issa   MRN: [id number]     : 1960        HPI: Kandy Issa is a 62 y.o. male who continue to c/o abdominal pain. He didn't eat well due to pain, denies nausea/vomiting. He is frustrated with no answer and waiting, I spent quite some time to explain again the reason, which I have already done several times before, not sure how well he understands. He is here for symptomatic control, (pain control and rehydration for partial SBO), more importantly he needs a diagnosis. Paracentesis was attempted, but likely he need laparoscopy/laparotomy.          Past Medical History:   Diagnosis Date    Bilateral carpal tunnel syndrome 2020    Chronic right shoulder pain 2020    Colon polyps 3/11/2013    Diverticulitis 3/11/2013    HTN (hypertension) 3/11/2013    Hyperlipidemia 3/11/2013    Paresthesia and pain of both upper extremities 2020    PUD (peptic ulcer disease) 3/11/2013    History of     Right elbow pain 2020    Wheezing 3/11/2013     Past Surgical History:   Procedure Laterality Date    COLONOSCOPY  09    colonoscopy per Dr. Esther North with need for repeat every 3 years    COLONOSCOPY  2022    Dr. Kalpana Mixon; polyps of the ascending, transverse, descending, and sigmoid colons; internal hemorrhoid; diverticulosis    [place] ABDOMEN SURGERY PROC UNLISTED  2004    partial colon resection per Dr. David Amezcua  2022    Dr. Kalpana Mixon; hiatal hernia gastric polyps     Current Facility-Administered Medications   Medication Dose Route Frequency    morphine injection 2 mg  2 mg IntraVENous Q6H PRN    ketorolac (TORADOL) injection 30 mg  30 mg IntraVENous Q6H PRN    oxyCODONE-acetaminophen (PERCOCET) 7.5-325 MG per tablet 1 tablet  1 tablet Oral Q4H PRN    acetaminophen (TYLENOL) tablet 650 mg  650 mg Oral Q4H PRN    oxyCODONE (ROXICODONE) immediate release tablet 5 mg  5 mg Oral Q4H PRN    nicotine (NICODERM CQ) 14 MG/24HR 1 patch  1 patch TransDERmal Daily    0.9 % sodium chloride infusion   IntraVENous Continuous    HYDROmorphone HCl PF (DILAUDID) injection 1 mg  1 mg IntraVENous Q4H PRN    naloxone (NARCAN) injection 0.4 mg  0.4 mg IntraVENous PRN    ondansetron (ZOFRAN) injection 4 mg  4 mg IntraVENous Q4H PRN    pantoprazole (PROTONIX) 40 mg in sodium chloride (PF) 10 mL injection  40 mg IntraVENous Daily    sodium chloride flush 0.9 % injection 5-40 mL  5-40 mL IntraVENous PRN    sodium chloride flush 0.9 % injection 5-40 mL  5-40 mL IntraVENous Q8H     ALLERGIES:  Patient has no known allergies. Social History     Socioeconomic History    Marital status:      Spouse name: Not on file    Number of children: Not on file    Years of education: Not on file    Highest education level: Not on file   Occupational History    Not on file   Tobacco Use    Smoking status: Current Every Day Smoker     Packs/day: 1.00    Smokeless tobacco: Never Used   Substance and Sexual Activity    Alcohol use: No    Drug use: No    Sexual activity: Not on file   Other Topics Concern    Not on file   Social History Narrative    Not on file     Social Determinants of Health     Financial Resource Strain:     Difficulty of Paying Living Expenses: Not on file   Food Insecurity:     Worried About Running Out of Food in the Last Year: Not on file    Briseida of Food in the Last Year: Not on file   Transportation Needs:     Lack of Transportation (Medical): Not on file    Lack of Transportation (Non-Medical):  Not on file   Physical Activity:     Days of Exercise per Week: Not on file    Minutes of Exercise per Session: Not on file   Stress:     Feeling of Stress : Not on file   Social Connections:     Frequency of Communication with Friends and Family: Not on file    Frequency of Social Gatherings with Friends and Family: Not on file    Attends Catholic Services: Not on file    Active Member of Clubs or Organizations: Not on file    Attends Club or Organization Meetings: Not on file    Marital Status: Not on file   Intimate Partner Violence:     Fear of Current or Ex-Partner: Not on file    Emotionally Abused: Not on file    Physically Abused: Not on file    Sexually Abused: Not on file   Housing Stability:     Unable to Pay for Housing in the Last Year: Not on file    Number of [place] in the Last Year: Not on file    Unstable Housing in the Last Year: Not on file     Social History     Tobacco Use   Smoking Status Current Every Day Smoker    Packs/day: 1.00   Smokeless Tobacco Never Used     Family History   Problem Relation Age of Onset    No Known Problems Brother     Cancer Sister         breast    Hypertension Mother     Heart Disease Mother     Diabetes Father     Osteoarthritis Father     Alcohol Abuse Father     Hypertension Father     Diabetes Mother        ROS: The patient has no difficulty with chest pain or shortness of breath. No fever or chills. Comprehensive review of systems was otherwise unremarkable except as noted above. Physical Exam:   Constitutional: Alert oriented cooperative patient in no acute distress. /87   Pulse 64   Temp 98.4 °F (36.9 °C) (Oral)   Resp 18   Ht 5' 10\" (1.778 m)   Wt 198 lb 1.6 oz (89.9 kg)   SpO2 90%   BMI 28.42 kg/m²   Eyes:Sclera are clear, pupils symmetric   ENMT: ears have no obvious external lesions; no obvious neck masses; no lip lesions  CV: RRR. Normal perfusion; no embolic signs  Resp: No JVD. Breathing is  non-labored. No audible wheezing   GI: soft and non-distended     Musculoskeletal: no significant asymmetry; normal tone; unremarkable with normal function.    Neuro:  No obvious focal deficits; moves all 4; A&O x3  Psychiatric: normal affect and mood, no memory impairment      Labs:  Lab Results   Component Value Date    WBC 7.7 05/23/2022    HGB 13.5 05/23/2022     05/23/2022     05/23/2022    K 3.6 05/23/2022     05/23/2022    CO2 24 05/23/2022    BUN 8 05/23/2022    ALT 13 05/23/2022       No results found for this or any previous visit. I reviewed patient's medications, labs, and independently reviewed relevant radiologic studies if available. Labs/radiology studies as ordered in connect care or in scanned in media tab if pt is pre-op. Amt of data reviewed moderate-extensive          Assessment/Plan:     Concerning for peritoneal carcinomatosis, continue pain control. Plan surgery this Friday, this is discussed extensively with him today.        Poonam Mcdaniel MD,  FACS

## 2022-05-26 ENCOUNTER — ANESTHESIA EVENT (OUTPATIENT)
Dept: SURGERY | Age: 62
DRG: 356 | End: 2022-05-26
Payer: COMMERCIAL

## 2022-05-26 LAB
BACTERIA SPEC CULT: NORMAL
GRAM STN SPEC: NORMAL
GRAM STN SPEC: NORMAL
SERVICE CMNT-IMP: NORMAL

## 2022-05-26 PROCEDURE — 2580000003 HC RX 258: Performed by: SURGERY

## 2022-05-26 PROCEDURE — 6360000002 HC RX W HCPCS

## 2022-05-26 PROCEDURE — 2580000003 HC RX 258

## 2022-05-26 PROCEDURE — 6370000000 HC RX 637 (ALT 250 FOR IP): Performed by: NURSE PRACTITIONER

## 2022-05-26 PROCEDURE — 1100000000 HC RM PRIVATE

## 2022-05-26 PROCEDURE — 99231 SBSQ HOSP IP/OBS SF/LOW 25: CPT | Performed by: SURGERY

## 2022-05-26 PROCEDURE — A4216 STERILE WATER/SALINE, 10 ML: HCPCS | Performed by: SURGERY

## 2022-05-26 PROCEDURE — 6360000002 HC RX W HCPCS: Performed by: SURGERY

## 2022-05-26 PROCEDURE — C9113 INJ PANTOPRAZOLE SODIUM, VIA: HCPCS | Performed by: SURGERY

## 2022-05-26 PROCEDURE — 2500000003 HC RX 250 WO HCPCS: Performed by: SURGERY

## 2022-05-26 RX ORDER — METRONIDAZOLE 500 MG/100ML
500 INJECTION, SOLUTION INTRAVENOUS
Status: COMPLETED | OUTPATIENT
Start: 2022-05-27 | End: 2022-05-27

## 2022-05-26 RX ADMIN — HYDROMORPHONE HYDROCHLORIDE 1 MG: 1 INJECTION, SOLUTION INTRAMUSCULAR; INTRAVENOUS; SUBCUTANEOUS at 18:25

## 2022-05-26 RX ADMIN — OXYCODONE 5 MG: 5 TABLET ORAL at 21:09

## 2022-05-26 RX ADMIN — ONDANSETRON 4 MG: 2 INJECTION INTRAMUSCULAR; INTRAVENOUS at 23:43

## 2022-05-26 RX ADMIN — SODIUM CHLORIDE, PRESERVATIVE FREE 40 MG: 5 INJECTION INTRAVENOUS at 08:51

## 2022-05-26 RX ADMIN — HYDROMORPHONE HYDROCHLORIDE 1 MG: 1 INJECTION, SOLUTION INTRAMUSCULAR; INTRAVENOUS; SUBCUTANEOUS at 23:43

## 2022-05-26 RX ADMIN — ONDANSETRON 4 MG: 2 INJECTION INTRAMUSCULAR; INTRAVENOUS at 18:32

## 2022-05-26 RX ADMIN — SODIUM CHLORIDE, PRESERVATIVE FREE 10 ML: 5 INJECTION INTRAVENOUS at 15:25

## 2022-05-26 RX ADMIN — SODIUM CHLORIDE, PRESERVATIVE FREE 10 ML: 5 INJECTION INTRAVENOUS at 22:09

## 2022-05-26 RX ADMIN — SODIUM CHLORIDE: 900 INJECTION, SOLUTION INTRAVENOUS at 08:43

## 2022-05-26 RX ADMIN — SODIUM CHLORIDE, PRESERVATIVE FREE 10 ML: 5 INJECTION INTRAVENOUS at 06:18

## 2022-05-26 RX ADMIN — OXYCODONE AND ACETAMINOPHEN 1 TABLET: 7.5; 325 TABLET ORAL at 15:27

## 2022-05-26 RX ADMIN — KETOROLAC TROMETHAMINE 30 MG: 30 INJECTION, SOLUTION INTRAMUSCULAR at 03:59

## 2022-05-26 RX ADMIN — OXYCODONE AND ACETAMINOPHEN 1 TABLET: 7.5; 325 TABLET ORAL at 08:54

## 2022-05-26 RX ADMIN — KETOROLAC TROMETHAMINE 30 MG: 30 INJECTION, SOLUTION INTRAMUSCULAR at 08:54

## 2022-05-26 RX ADMIN — OXYCODONE AND ACETAMINOPHEN 1 TABLET: 7.5; 325 TABLET ORAL at 03:59

## 2022-05-26 ASSESSMENT — PAIN SCALES - GENERAL
PAINLEVEL_OUTOF10: 7
PAINLEVEL_OUTOF10: 6
PAINLEVEL_OUTOF10: 0
PAINLEVEL_OUTOF10: 6
PAINLEVEL_OUTOF10: 7
PAINLEVEL_OUTOF10: 8
PAINLEVEL_OUTOF10: 0
PAINLEVEL_OUTOF10: 0
PAINLEVEL_OUTOF10: 10

## 2022-05-26 ASSESSMENT — PAIN DESCRIPTION - FREQUENCY
FREQUENCY: INTERMITTENT

## 2022-05-26 ASSESSMENT — PAIN DESCRIPTION - LOCATION
LOCATION: ABDOMEN

## 2022-05-26 ASSESSMENT — PAIN - FUNCTIONAL ASSESSMENT
PAIN_FUNCTIONAL_ASSESSMENT: ACTIVITIES ARE NOT PREVENTED

## 2022-05-26 ASSESSMENT — PAIN DESCRIPTION - PAIN TYPE
TYPE: CHRONIC PAIN
TYPE: CHRONIC PAIN

## 2022-05-26 ASSESSMENT — PAIN DESCRIPTION - ORIENTATION
ORIENTATION: MID
ORIENTATION: INNER;MID
ORIENTATION: MID
ORIENTATION: LEFT;MID
ORIENTATION: INNER;MID
ORIENTATION: INNER

## 2022-05-26 ASSESSMENT — PAIN SCALES - WONG BAKER: WONGBAKER_NUMERICALRESPONSE: 0

## 2022-05-26 ASSESSMENT — PAIN DESCRIPTION - DESCRIPTORS
DESCRIPTORS: ACHING;CRAMPING;PRESSURE
DESCRIPTORS: ACHING
DESCRIPTORS: ACHING;CRAMPING;PRESSURE
DESCRIPTORS: ACHING;CRAMPING;PRESSURE

## 2022-05-26 ASSESSMENT — PAIN DESCRIPTION - ONSET
ONSET: ON-GOING

## 2022-05-26 NOTE — PROGRESS NOTES
[facility]  [address] [place] [address]  [phone]    Office Note/H&P/Consult Note   Macarena Meza   MRN: [id number]     : 1960        HPI: Macarena Meza is a 62 y.o. male who continue to c/o abd pain, minimal eating due to pain. Current pain regimen appears effective, especially Toradol. Surgical plan is discussed with him, time, purpose of surgery, he understands and agrees to proceed.          Past Medical History:   Diagnosis Date    Bilateral carpal tunnel syndrome 2020    Chronic right shoulder pain 2020    Colon polyps 3/11/2013    Diverticulitis 3/11/2013    HTN (hypertension) 3/11/2013    Hyperlipidemia 3/11/2013    Paresthesia and pain of both upper extremities 2020    PUD (peptic ulcer disease) 3/11/2013    History of     Right elbow pain 2020    Wheezing 3/11/2013     Past Surgical History:   Procedure Laterality Date    COLONOSCOPY  09    colonoscopy per Dr. Connie Laws with need for repeat every 3 years    COLONOSCOPY  2022    Dr. Kaden Chinchilla; polyps of the ascending, transverse, descending, and sigmoid colons; internal hemorrhoid; diverticulosis    [place] ABDOMEN SURGERY PROC UNLISTED  2004    partial colon resection per Dr. Barry Torre  2022    Dr. Kaden Chinchilla; hiatal hernia gastric polyps     Current Facility-Administered Medications   Medication Dose Route Frequency    morphine injection 2 mg  2 mg IntraVENous Q6H PRN    ketorolac (TORADOL) injection 30 mg  30 mg IntraVENous Q6H PRN    oxyCODONE-acetaminophen (PERCOCET) 7.5-325 MG per tablet 1 tablet  1 tablet Oral Q4H PRN    acetaminophen (TYLENOL) tablet 650 mg  650 mg Oral Q4H PRN    oxyCODONE (ROXICODONE) immediate release tablet 5 mg  5 mg Oral Q4H PRN    nicotine (NICODERM CQ) 14 MG/24HR 1 patch  1 patch TransDERmal Daily    0.9 % sodium chloride infusion   IntraVENous Continuous    HYDROmorphone HCl PF (DILAUDID) injection 1 mg  1 mg IntraVENous Q4H PRN    naloxone (NARCAN) injection 0.4 mg  0.4 mg IntraVENous PRN    ondansetron (ZOFRAN) injection 4 mg  4 mg IntraVENous Q4H PRN    pantoprazole (PROTONIX) 40 mg in sodium chloride (PF) 10 mL injection  40 mg IntraVENous Daily    sodium chloride flush 0.9 % injection 5-40 mL  5-40 mL IntraVENous PRN    sodium chloride flush 0.9 % injection 5-40 mL  5-40 mL IntraVENous Q8H     ALLERGIES:  Patient has no known allergies. Social History     Socioeconomic History    Marital status:      Spouse name: Not on file    Number of children: Not on file    Years of education: Not on file    Highest education level: Not on file   Occupational History    Not on file   Tobacco Use    Smoking status: Current Every Day Smoker     Packs/day: 1.00    Smokeless tobacco: Never Used   Substance and Sexual Activity    Alcohol use: No    Drug use: No    Sexual activity: Not on file   Other Topics Concern    Not on file   Social History Narrative    Not on file     Social Determinants of Health     Financial Resource Strain:     Difficulty of Paying Living Expenses: Not on file   Food Insecurity:     Worried About Running Out of Food in the Last Year: Not on file    Briseida of Food in the Last Year: Not on file   Transportation Needs:     Lack of Transportation (Medical): Not on file    Lack of Transportation (Non-Medical):  Not on file   Physical Activity:     Days of Exercise per Week: Not on file    Minutes of Exercise per Session: Not on file   Stress:     Feeling of Stress : Not on file   Social Connections:     Frequency of Communication with Friends and Family: Not on file    Frequency of Social Gatherings with Friends and Family: Not on file    Attends Pentecostalism Services: Not on file    Active Member of Clubs or Organizations: Not on file    Attends Club or Organization Meetings: Not on file    Marital Status: Not on file   Intimate Partner Violence:     Fear of Current or Ex-Partner: Not on file    Emotionally Abused: Not on file    Physically Abused: Not on file    Sexually Abused: Not on file   Housing Stability:     Unable to Pay for Housing in the Last Year: Not on file    Number of Places Lived in the Last Year: Not on file    Unstable Housing in the Last Year: Not on file     Social History     Tobacco Use   Smoking Status Current Every Day Smoker    Packs/day: 1.00   Smokeless Tobacco Never Used     Family History   Problem Relation Age of Onset    No Known Problems Brother     Cancer Sister         breast    Hypertension Mother     Heart Disease Mother     Diabetes Father     Osteoarthritis Father     Alcohol Abuse Father     Hypertension Father     Diabetes Mother        ROS: The patient has no difficulty with chest pain or shortness of breath. No fever or chills. Comprehensive review of systems was otherwise unremarkable except as noted above. Physical Exam:   Constitutional: Alert oriented cooperative patient in no acute distress. BP (!) 137/93   Pulse 66   Temp 97.2 °F (36.2 °C) (Oral)   Resp 18   Ht 5' 10\" (1.778 m)   Wt 198 lb 1.6 oz (89.9 kg)   SpO2 92%   BMI 28.42 kg/m²   Eyes:Sclera are clear, pupils symmetric   ENMT: ears have no obvious external lesions; no obvious neck masses; no lip lesions  CV: RRR. Normal perfusion; no embolic signs  Resp: No JVD. Breathing is  non-labored. No audible wheezing   GI: soft and non-distended     Musculoskeletal: no significant asymmetry; normal tone; unremarkable with normal function.    Neuro:  No obvious focal deficits; moves all 4; A&O x3  Psychiatric: normal affect and mood, no memory impairment      Labs:  Lab Results   Component Value Date    WBC 7.7 05/23/2022    HGB 13.5 05/23/2022     05/23/2022     05/23/2022    K 3.6 05/23/2022     05/23/2022    CO2 24 05/23/2022    BUN 8 05/23/2022    ALT 13 05/23/2022       No results found for this or any previous visit. I reviewed patient's medications, labs, and independently reviewed relevant radiologic studies if available. Labs/radiology studies as ordered in connect care or in scanned in media tab if pt is pre-op. Amt of data reviewed moderate-extensive          Assessment/Plan:   Concerning for peritoneal disease. Surgery is planned tomorrow.  NPO after KARLENE Arcos MD,  FACS

## 2022-05-26 NOTE — ANESTHESIA PRE PROCEDURE
Department of Anesthesiology  Preprocedure Note       Name:  Faustino Graf   Age:  62 y.o.  :  1960                                          MRN:  [id number]         Date:  2022      Surgeon: Rodrigo Del Rio):  Charmaine Pritchard MD    Procedure: Procedure(s):  LAPAROSCOPY DIAGNOSTIC POSSIBLE LAPAROTOMY/ROOM 515    Medications prior to admission:   Prior to Admission medications    Medication Sig Start Date End Date Taking?  Authorizing Provider   acetaminophen (TYLENOL) 500 MG tablet Take 500 mg by mouth every 6 hours as needed for Pain   Yes Historical Provider, MD   diphenhydrAMINE-APAP, sleep, (TYLENOL PM EXTRA STRENGTH)  MG tablet Take 1 tablet by mouth nightly as needed for Sleep   Yes Historical Provider, MD   naloxone 4 MG/0.1ML LIQD nasal spray 1 spray by Nasal route as needed for Opioid Reversal   Yes Historical Provider, MD   pantoprazole (PROTONIX) 40 MG tablet TAKE 1 TABLET BY MOUTH BEFORE BREAKFAST AND DINNER FOR 30 DAYS 22   Historical Provider, MD   CARAFATE 1 GM/10ML suspension Take 1 g by mouth 4 times daily (before meals and nightly)  5/15/22   Historical Provider, MD   amLODIPine-benazepril (LOTREL) 5-20 MG per capsule TAKE ONE CAPSULE BY MOUTH EVERY DAY 1/3/22   Ar Automatic Reconciliation   diclofenac sodium (VOLTAREN) 1 % GEL Apply 4 g topically 4 times daily 1/10/22   Ar Automatic Reconciliation   dicyclomine (BENTYL) 10 MG capsule Take 20 mg by mouth every 6 hours     Ar Automatic Reconciliation   ibuprofen (ADVIL;MOTRIN) 200 MG tablet Take 200 mg by mouth    Ar Automatic Reconciliation   polyethylene glycol (GLYCOLAX) 17 GM/SCOOP powder Take 17 g by mouth daily 22   Ar Automatic Reconciliation   rosuvastatin (CRESTOR) 10 MG tablet Take 10 mg by mouth 22   Ar Automatic Reconciliation   umeclidinium-vilanterol (ANORO ELLIPTA) 62.5-25 MCG/INH AEPB inhaler Inhale 1 puff into the lungs daily 1/3/22   Ar Automatic Reconciliation       Current medications:    Current Facility-Administered Medications   Medication Dose Route Frequency Provider Last Rate Last Admin    oxyCODONE-acetaminophen (PERCOCET) 7.5-325 MG per tablet 1 tablet  1 tablet Oral Q4H PRN LUCY Williamson - CNP   1 tablet at 05/26/22 1527    acetaminophen (TYLENOL) tablet 650 mg  650 mg Oral Q4H PRN Kaylen Quiroz APRN - CNP   650 mg at 05/25/22 1815    oxyCODONE (ROXICODONE) immediate release tablet 5 mg  5 mg Oral Q4H PRN LUCY Williamson - CNP   5 mg at 05/22/22 0331    nicotine (NICODERM CQ) 14 MG/24HR 1 patch  1 patch TransDERmal Daily LUCY Williamson CNP   1 patch at 05/26/22 0901    0.9 % sodium chloride infusion   IntraVENous Continuous LUCY Howard NP 50 mL/hr at 05/26/22 0843 New Bag at 05/26/22 0843    HYDROmorphone HCl PF (DILAUDID) injection 1 mg  1 mg IntraVENous Q4H PRN Marco Degroot MD   1 mg at 05/26/22 1825    naloxone [facility]) injection 0.4 mg  0.4 mg IntraVENous PRN Marco Degroot MD        ondansetron [facility]) injection 4 mg  4 mg IntraVENous Q4H PRN Marco Degroot MD   4 mg at 05/26/22 1832    pantoprazole (PROTONIX) 40 mg in sodium chloride (PF) 10 mL injection  40 mg IntraVENous Daily Marco Degroot MD   40 mg at 05/26/22 0851    sodium chloride flush 0.9 % injection 5-40 mL  5-40 mL IntraVENous PRN Marco Degroot MD        sodium chloride flush 0.9 % injection 5-40 mL  5-40 mL IntraVENous Tia Fraga MD   10 mL at 05/26/22 1525       Allergies:  No Known Allergies    Problem List:    Patient Active Problem List   Diagnosis Code    Right cervical radiculopathy M54.12    Right elbow pain M25.521    Hyperlipidemia E78.5    Bilateral carpal tunnel syndrome G56.03    Essential hypertension I10    Gastroesophageal reflux disease K21.9    PUD (peptic ulcer disease) K27.9    Chronic right shoulder pain M25.511, G89.29    Paresthesia and pain of both upper extremities R20.2, M79.601, M79.602    History of colon polyps Z86.010    Chronic bronchitis (HCC) J42    Class 1 obesity due to excess calories with serious comorbidity and body mass index (BMI) of 34.0 to 34.9 in adult E66.09, Z68.34    Generalized abdominal pain R10.84    Abdominal pain R10.9    Peritoneal metastases (HCC) C78.6    Opioid use, unspecified with unspecified opioid-induced disorder ([facility] F11.99    SBO (small bowel obstruction) ([facility] K56.609    Partial small bowel obstruction ([facility] K56.600    Severe protein-calorie malnutrition ([facility] E43       Past Medical History:        Diagnosis Date    Bilateral carpal tunnel syndrome 12/9/2020    Chronic right shoulder pain 11/30/2020    Colon polyps 3/11/2013    Diverticulitis 3/11/2013    HTN (hypertension) 3/11/2013    Hyperlipidemia 3/11/2013    Paresthesia and pain of both upper extremities 11/30/2020    PUD (peptic ulcer disease) 3/11/2013    History of     Right elbow pain 12/9/2020    Wheezing 3/11/2013       Past Surgical History:        Procedure Laterality Date    COLONOSCOPY  2/25/09    colonoscopy per Dr. Connie Laws with need for repeat every 3 years    COLONOSCOPY  02/25/2022    Dr. Kaden Chinchilla; polyps of the ascending, transverse, descending, and sigmoid colons; internal hemorrhoid; diverticulosis    UT ABDOMEN SURGERY PROC UNLISTED  October 2004    partial colon resection per Dr. Barry Torre  02/25/2022    Dr. Kaden Chinchilla; hiatal hernia gastric polyps       Social History:    Social History     Tobacco Use    Smoking status: Current Every Day Smoker     Packs/day: 1.00    Smokeless tobacco: Never Used   Substance Use Topics    Alcohol use:  No                                Ready to quit: Not Answered  Counseling given: Not Answered      Vital Signs (Current):   Vitals:    05/26/22 1106 05/26/22 1527 05/26/22 1551 05/26/22 1825   BP: (!) 137/93  137/84    Pulse: 66  60    Resp:  18 18 20   Temp: 97.2 °F (36.2 °C)  97.7 °F (36.5 °C)    TempSrc: Oral  Oral    SpO2: 92%  93%    Weight:       Height: BP Readings from Last 3 Encounters:   05/26/22 137/84   05/20/22 129/83   05/20/22 128/80       NPO Status:                                                   Date of last liquid consumption: 05/22/22                        Date of last solid food consumption: 05/22/22    BMI:   Wt Readings from Last 3 Encounters:   05/25/22 198 lb 1.6 oz (89.9 kg)   05/20/22 207 lb (93.9 kg)   05/20/22 207 lb (93.9 kg)     Body mass index is 28.42 kg/m². CBC:   Lab Results   Component Value Date    WBC 7.7 05/23/2022    RBC 4.63 05/23/2022    HGB 13.5 05/23/2022    HCT 40.8 05/23/2022    MCV 88.1 05/23/2022    RDW 12.5 05/23/2022     05/23/2022       CMP:   Lab Results   Component Value Date     05/23/2022    K 3.6 05/23/2022     05/23/2022    CO2 24 05/23/2022    BUN 8 05/23/2022    CREATININE 0.60 05/23/2022    GFRAA >60 05/23/2022    AGRATIO 1.1 05/20/2022    LABGLOM >60 05/23/2022    GLUCOSE 61 05/23/2022    PROT 5.8 05/23/2022    CALCIUM 8.6 05/23/2022    BILITOT 0.4 05/23/2022    ALKPHOS 68 05/23/2022    ALKPHOS 86 05/20/2022    AST 9 05/23/2022    ALT 13 05/23/2022       POC Tests: No results for input(s): POCGLU, POCNA, POCK, POCCL, POCBUN, POCHEMO, POCHCT in the last 72 hours.     Coags: No results found for: PROTIME, INR, APTT    HCG (If Applicable): No results found for: PREGTESTUR, PREGSERUM, HCG, HCGQUANT     ABGs: No results found for: PHART, PO2ART, GEM3GUS, AKB7GUX, BEART, N2UGALHL     Type & Screen (If Applicable):  No results found for: LABABO, LABRH    Drug/Infectious Status (If Applicable):  No results found for: HIV, HEPCAB    COVID-19 Screening (If Applicable): No results found for: COVID19        Anesthesia Evaluation  Patient summary reviewed and Nursing notes reviewed  Airway: Mallampati: II          Dental:    (+) upper dentures      Pulmonary:normal exam    (+) COPD: moderate,  current smoker          Patient did not smoke on day of surgery. Cardiovascular:    (+) hypertension:,         Rhythm: regular  Rate: normal                    Neuro/Psych:   (+) psychiatric history (Opioid use):             ROS comment: Right cervical radiculopathy GI/Hepatic/Renal:   (+) GERD:,          ROS comment: Admitted with SBO     CTA:  1. Extensive peritoneal nodularity and mild ascites consistent with peritoneal   metastatic disease. Primary lesion not definitely identified. 2. Dilated fluid-filled loops of small bowel possibly related to   gastroenteritis. No definite obstruction. No obvious bowel mass. 3. Sclerosis S1 vertebral body. Consider follow-up bone scan to assess for bone   metastases. .   Endo/Other: Negative Endo/Other ROS                     ROS comment: Severe nutrition deficient   Abdominal:             Vascular: Other Findings:           Anesthesia Plan      general     ASA 3       Induction: intravenous and rapid sequence. MIPS: Postoperative opioids intended and Prophylactic antiemetics administered. Anesthetic plan and risks discussed with patient. Plan discussed with CRNA.     Attending anesthesiologist reviewed and agrees with Antonella Dumont MD   5/26/2022

## 2022-05-27 ENCOUNTER — ANESTHESIA (OUTPATIENT)
Dept: SURGERY | Age: 62
DRG: 356 | End: 2022-05-27
Payer: COMMERCIAL

## 2022-05-27 LAB
ABO + RH BLD: NORMAL
BLOOD GROUP ANTIBODIES SERPL: NORMAL
SPECIMEN EXP DATE BLD: NORMAL
TSH W FREE THYROID IF ABNORMAL: 3.2 UIU/ML (ref 0.36–3.74)

## 2022-05-27 PROCEDURE — 84443 ASSAY THYROID STIM HORMONE: CPT

## 2022-05-27 PROCEDURE — 6370000000 HC RX 637 (ALT 250 FOR IP): Performed by: ANESTHESIOLOGY

## 2022-05-27 PROCEDURE — 0DBV0ZZ EXCISION OF MESENTERY, OPEN APPROACH: ICD-10-PCS | Performed by: SURGERY

## 2022-05-27 PROCEDURE — 6360000002 HC RX W HCPCS: Performed by: SURGERY

## 2022-05-27 PROCEDURE — C9113 INJ PANTOPRAZOLE SODIUM, VIA: HCPCS | Performed by: SURGERY

## 2022-05-27 PROCEDURE — 43830 GSTRST OPEN WO CONSTJ TUBE: CPT | Performed by: SURGERY

## 2022-05-27 PROCEDURE — 6370000000 HC RX 637 (ALT 250 FOR IP): Performed by: NURSE PRACTITIONER

## 2022-05-27 PROCEDURE — 2500000003 HC RX 250 WO HCPCS: Performed by: ANESTHESIOLOGY

## 2022-05-27 PROCEDURE — 2580000003 HC RX 258: Performed by: SURGERY

## 2022-05-27 PROCEDURE — 3600000004 HC SURGERY LEVEL 4 BASE: Performed by: SURGERY

## 2022-05-27 PROCEDURE — 2500000003 HC RX 250 WO HCPCS: Performed by: SURGERY

## 2022-05-27 PROCEDURE — 2709999900 HC NON-CHARGEABLE SUPPLY: Performed by: SURGERY

## 2022-05-27 PROCEDURE — 6360000002 HC RX W HCPCS: Performed by: ANESTHESIOLOGY

## 2022-05-27 PROCEDURE — 2580000003 HC RX 258: Performed by: ANESTHESIOLOGY

## 2022-05-27 PROCEDURE — 3700000000 HC ANESTHESIA ATTENDED CARE: Performed by: SURGERY

## 2022-05-27 PROCEDURE — 3700000001 HC ADD 15 MINUTES (ANESTHESIA): Performed by: SURGERY

## 2022-05-27 PROCEDURE — 88305 TISSUE EXAM BY PATHOLOGIST: CPT

## 2022-05-27 PROCEDURE — 6360000002 HC RX W HCPCS: Performed by: REGISTERED NURSE

## 2022-05-27 PROCEDURE — 49204 PR EXCISION/DESTRUCTION OPEN ABDOMINAL TUMORS 5.1-10.0 CM: CPT | Performed by: SURGERY

## 2022-05-27 PROCEDURE — 3600000014 HC SURGERY LEVEL 4 ADDTL 15MIN: Performed by: SURGERY

## 2022-05-27 PROCEDURE — 86901 BLOOD TYPING SEROLOGIC RH(D): CPT

## 2022-05-27 PROCEDURE — 7100000001 HC PACU RECOVERY - ADDTL 15 MIN: Performed by: SURGERY

## 2022-05-27 PROCEDURE — 2500000003 HC RX 250 WO HCPCS: Performed by: REGISTERED NURSE

## 2022-05-27 PROCEDURE — 0DBU0ZZ EXCISION OF OMENTUM, OPEN APPROACH: ICD-10-PCS | Performed by: SURGERY

## 2022-05-27 PROCEDURE — 1100000000 HC RM PRIVATE

## 2022-05-27 PROCEDURE — 2700000000 HC OXYGEN THERAPY PER DAY

## 2022-05-27 PROCEDURE — 64488 TAP BLOCK BI INJECTION: CPT | Performed by: ANESTHESIOLOGY

## 2022-05-27 PROCEDURE — 3E0T3BZ INTRODUCTION OF ANESTHETIC AGENT INTO PERIPHERAL NERVES AND PLEXI, PERCUTANEOUS APPROACH: ICD-10-PCS | Performed by: ANESTHESIOLOGY

## 2022-05-27 PROCEDURE — A4216 STERILE WATER/SALINE, 10 ML: HCPCS | Performed by: SURGERY

## 2022-05-27 PROCEDURE — 7100000000 HC PACU RECOVERY - FIRST 15 MIN: Performed by: SURGERY

## 2022-05-27 PROCEDURE — 36415 COLL VENOUS BLD VENIPUNCTURE: CPT

## 2022-05-27 RX ORDER — SODIUM CHLORIDE 9 MG/ML
INJECTION, SOLUTION INTRAVENOUS PRN
Status: DISCONTINUED | OUTPATIENT
Start: 2022-05-27 | End: 2022-06-02 | Stop reason: SDUPTHER

## 2022-05-27 RX ORDER — SODIUM CHLORIDE, SODIUM LACTATE, POTASSIUM CHLORIDE, CALCIUM CHLORIDE 600; 310; 30; 20 MG/100ML; MG/100ML; MG/100ML; MG/100ML
INJECTION, SOLUTION INTRAVENOUS CONTINUOUS
Status: DISCONTINUED | OUTPATIENT
Start: 2022-05-27 | End: 2022-05-27 | Stop reason: HOSPADM

## 2022-05-27 RX ORDER — SODIUM CHLORIDE 0.9 % (FLUSH) 0.9 %
5-40 SYRINGE (ML) INJECTION PRN
Status: DISCONTINUED | OUTPATIENT
Start: 2022-05-27 | End: 2022-05-27 | Stop reason: HOSPADM

## 2022-05-27 RX ORDER — ACETAMINOPHEN 500 MG
1000 TABLET ORAL ONCE
Status: COMPLETED | OUTPATIENT
Start: 2022-05-27 | End: 2022-05-27

## 2022-05-27 RX ORDER — LIDOCAINE HYDROCHLORIDE 20 MG/ML
INJECTION, SOLUTION EPIDURAL; INFILTRATION; INTRACAUDAL; PERINEURAL PRN
Status: DISCONTINUED | OUTPATIENT
Start: 2022-05-27 | End: 2022-05-27 | Stop reason: SDUPTHER

## 2022-05-27 RX ORDER — PROMETHAZINE HYDROCHLORIDE 12.5 MG/1
12.5 TABLET ORAL ONCE
Status: COMPLETED | OUTPATIENT
Start: 2022-05-27 | End: 2022-05-27

## 2022-05-27 RX ORDER — OXYCODONE HYDROCHLORIDE 5 MG/1
5 TABLET ORAL PRN
Status: ACTIVE | OUTPATIENT
Start: 2022-05-27 | End: 2022-05-27

## 2022-05-27 RX ORDER — OXYCODONE HYDROCHLORIDE 5 MG/1
10 TABLET ORAL PRN
Status: ACTIVE | OUTPATIENT
Start: 2022-05-27 | End: 2022-05-27

## 2022-05-27 RX ORDER — HYDROMORPHONE HYDROCHLORIDE 2 MG/ML
0.5 INJECTION, SOLUTION INTRAMUSCULAR; INTRAVENOUS; SUBCUTANEOUS EVERY 10 MIN PRN
Status: DISCONTINUED | OUTPATIENT
Start: 2022-05-27 | End: 2022-05-28 | Stop reason: HOSPADM

## 2022-05-27 RX ORDER — SODIUM CHLORIDE 0.9 % (FLUSH) 0.9 %
5-40 SYRINGE (ML) INJECTION PRN
Status: DISCONTINUED | OUTPATIENT
Start: 2022-05-27 | End: 2022-06-02 | Stop reason: SDUPTHER

## 2022-05-27 RX ORDER — SODIUM CHLORIDE 0.9 % (FLUSH) 0.9 %
5-40 SYRINGE (ML) INJECTION EVERY 12 HOURS SCHEDULED
Status: DISCONTINUED | OUTPATIENT
Start: 2022-05-27 | End: 2022-05-27 | Stop reason: HOSPADM

## 2022-05-27 RX ORDER — NEOSTIGMINE METHYLSULFATE 3 MG/3 ML
SYRINGE (ML) INTRAVENOUS PRN
Status: DISCONTINUED | OUTPATIENT
Start: 2022-05-27 | End: 2022-05-27 | Stop reason: SDUPTHER

## 2022-05-27 RX ORDER — FENTANYL CITRATE 50 UG/ML
100 INJECTION, SOLUTION INTRAMUSCULAR; INTRAVENOUS
Status: DISCONTINUED | OUTPATIENT
Start: 2022-05-27 | End: 2022-05-27 | Stop reason: HOSPADM

## 2022-05-27 RX ORDER — SODIUM CHLORIDE, SODIUM LACTATE, POTASSIUM CHLORIDE, CALCIUM CHLORIDE 600; 310; 30; 20 MG/100ML; MG/100ML; MG/100ML; MG/100ML
INJECTION, SOLUTION INTRAVENOUS CONTINUOUS
Status: DISCONTINUED | OUTPATIENT
Start: 2022-05-27 | End: 2022-05-28

## 2022-05-27 RX ORDER — MIDAZOLAM HYDROCHLORIDE 2 MG/2ML
2 INJECTION, SOLUTION INTRAMUSCULAR; INTRAVENOUS
Status: DISCONTINUED | OUTPATIENT
Start: 2022-05-27 | End: 2022-05-27 | Stop reason: HOSPADM

## 2022-05-27 RX ORDER — PROPOFOL 10 MG/ML
INJECTION, EMULSION INTRAVENOUS PRN
Status: DISCONTINUED | OUTPATIENT
Start: 2022-05-27 | End: 2022-05-27 | Stop reason: SDUPTHER

## 2022-05-27 RX ORDER — GLYCOPYRROLATE 0.2 MG/ML
INJECTION INTRAMUSCULAR; INTRAVENOUS PRN
Status: DISCONTINUED | OUTPATIENT
Start: 2022-05-27 | End: 2022-05-27 | Stop reason: SDUPTHER

## 2022-05-27 RX ORDER — ONDANSETRON 2 MG/ML
4 INJECTION INTRAMUSCULAR; INTRAVENOUS EVERY 6 HOURS PRN
Status: DISCONTINUED | OUTPATIENT
Start: 2022-05-27 | End: 2022-06-13

## 2022-05-27 RX ORDER — SODIUM CHLORIDE 0.9 % (FLUSH) 0.9 %
5-40 SYRINGE (ML) INJECTION EVERY 12 HOURS SCHEDULED
Status: DISCONTINUED | OUTPATIENT
Start: 2022-05-27 | End: 2022-06-02 | Stop reason: SDUPTHER

## 2022-05-27 RX ORDER — SODIUM CHLORIDE, SODIUM LACTATE, POTASSIUM CHLORIDE, CALCIUM CHLORIDE 600; 310; 30; 20 MG/100ML; MG/100ML; MG/100ML; MG/100ML
INJECTION, SOLUTION INTRAVENOUS CONTINUOUS
Status: ACTIVE | OUTPATIENT
Start: 2022-05-27 | End: 2022-06-02

## 2022-05-27 RX ORDER — SCOLOPAMINE TRANSDERMAL SYSTEM 1 MG/1
1 PATCH, EXTENDED RELEASE TRANSDERMAL
Status: COMPLETED | OUTPATIENT
Start: 2022-05-27 | End: 2022-05-30

## 2022-05-27 RX ORDER — DIPHENHYDRAMINE HYDROCHLORIDE 50 MG/ML
12.5 INJECTION INTRAMUSCULAR; INTRAVENOUS
Status: ACTIVE | OUTPATIENT
Start: 2022-05-27 | End: 2022-05-27

## 2022-05-27 RX ORDER — PHENYLEPHRINE HYDROCHLORIDE 10 MG/ML
INJECTION INTRAVENOUS PRN
Status: DISCONTINUED | OUTPATIENT
Start: 2022-05-27 | End: 2022-05-27 | Stop reason: SDUPTHER

## 2022-05-27 RX ORDER — HYDROMORPHONE HCL 110MG/55ML
PATIENT CONTROLLED ANALGESIA SYRINGE INTRAVENOUS PRN
Status: DISCONTINUED | OUTPATIENT
Start: 2022-05-27 | End: 2022-05-27 | Stop reason: SDUPTHER

## 2022-05-27 RX ORDER — BUPIVACAINE HYDROCHLORIDE 5 MG/ML
INJECTION, SOLUTION EPIDURAL; INTRACAUDAL
Status: DISCONTINUED | OUTPATIENT
Start: 2022-05-27 | End: 2022-05-27 | Stop reason: SDUPTHER

## 2022-05-27 RX ORDER — ONDANSETRON 2 MG/ML
INJECTION INTRAMUSCULAR; INTRAVENOUS PRN
Status: DISCONTINUED | OUTPATIENT
Start: 2022-05-27 | End: 2022-05-27 | Stop reason: SDUPTHER

## 2022-05-27 RX ORDER — ONDANSETRON 4 MG/1
4 TABLET, ORALLY DISINTEGRATING ORAL EVERY 8 HOURS PRN
Status: DISCONTINUED | OUTPATIENT
Start: 2022-05-27 | End: 2022-06-13

## 2022-05-27 RX ORDER — KETAMINE HYDROCHLORIDE 50 MG/ML
INJECTION, SOLUTION, CONCENTRATE INTRAMUSCULAR; INTRAVENOUS PRN
Status: DISCONTINUED | OUTPATIENT
Start: 2022-05-27 | End: 2022-05-27 | Stop reason: SDUPTHER

## 2022-05-27 RX ORDER — DEXAMETHASONE SODIUM PHOSPHATE 4 MG/ML
INJECTION, SOLUTION INTRA-ARTICULAR; INTRALESIONAL; INTRAMUSCULAR; INTRAVENOUS; SOFT TISSUE PRN
Status: DISCONTINUED | OUTPATIENT
Start: 2022-05-27 | End: 2022-05-27 | Stop reason: SDUPTHER

## 2022-05-27 RX ORDER — FENTANYL CITRATE 50 UG/ML
25 INJECTION, SOLUTION INTRAMUSCULAR; INTRAVENOUS EVERY 5 MIN PRN
Status: DISCONTINUED | OUTPATIENT
Start: 2022-05-27 | End: 2022-05-28 | Stop reason: HOSPADM

## 2022-05-27 RX ORDER — SUCCINYLCHOLINE/SOD CL,ISO/PF 200MG/10ML
SYRINGE (ML) INTRAVENOUS PRN
Status: DISCONTINUED | OUTPATIENT
Start: 2022-05-27 | End: 2022-05-27 | Stop reason: SDUPTHER

## 2022-05-27 RX ORDER — ONDANSETRON 2 MG/ML
4 INJECTION INTRAMUSCULAR; INTRAVENOUS
Status: ACTIVE | OUTPATIENT
Start: 2022-05-27 | End: 2022-05-27

## 2022-05-27 RX ORDER — EPHEDRINE SULFATE/0.9% NACL/PF 50 MG/5 ML
SYRINGE (ML) INTRAVENOUS PRN
Status: DISCONTINUED | OUTPATIENT
Start: 2022-05-27 | End: 2022-05-27 | Stop reason: SDUPTHER

## 2022-05-27 RX ORDER — ENOXAPARIN SODIUM 100 MG/ML
40 INJECTION SUBCUTANEOUS EVERY 24 HOURS
Status: DISCONTINUED | OUTPATIENT
Start: 2022-05-27 | End: 2022-06-14 | Stop reason: HOSPADM

## 2022-05-27 RX ORDER — ROCURONIUM BROMIDE 10 MG/ML
INJECTION, SOLUTION INTRAVENOUS PRN
Status: DISCONTINUED | OUTPATIENT
Start: 2022-05-27 | End: 2022-05-27 | Stop reason: SDUPTHER

## 2022-05-27 RX ADMIN — ROCURONIUM BROMIDE 10 MG: 50 INJECTION, SOLUTION INTRAVENOUS at 14:12

## 2022-05-27 RX ADMIN — ONDANSETRON 4 MG: 2 INJECTION INTRAMUSCULAR; INTRAVENOUS at 21:21

## 2022-05-27 RX ADMIN — ONDANSETRON 4 MG: 2 INJECTION INTRAMUSCULAR; INTRAVENOUS at 06:42

## 2022-05-27 RX ADMIN — SODIUM CHLORIDE, POTASSIUM CHLORIDE, SODIUM LACTATE AND CALCIUM CHLORIDE: 600; 310; 30; 20 INJECTION, SOLUTION INTRAVENOUS at 16:43

## 2022-05-27 RX ADMIN — METRONIDAZOLE 500 MG: 500 INJECTION, SOLUTION INTRAVENOUS at 11:27

## 2022-05-27 RX ADMIN — HYDROMORPHONE HYDROCHLORIDE 0.5 MG: 2 INJECTION INTRAMUSCULAR; INTRAVENOUS; SUBCUTANEOUS at 15:40

## 2022-05-27 RX ADMIN — Medication 160 MG: at 12:48

## 2022-05-27 RX ADMIN — SODIUM CHLORIDE, PRESERVATIVE FREE 10 ML: 5 INJECTION INTRAVENOUS at 16:40

## 2022-05-27 RX ADMIN — SODIUM CHLORIDE, PRESERVATIVE FREE 10 ML: 5 INJECTION INTRAVENOUS at 21:16

## 2022-05-27 RX ADMIN — PHENYLEPHRINE HYDROCHLORIDE 50 MCG: 10 INJECTION INTRAVENOUS at 13:01

## 2022-05-27 RX ADMIN — HYDROMORPHONE HYDROCHLORIDE 1 MG: 1 INJECTION, SOLUTION INTRAMUSCULAR; INTRAVENOUS; SUBCUTANEOUS at 06:42

## 2022-05-27 RX ADMIN — LIDOCAINE HYDROCHLORIDE 90 MG: 20 INJECTION, SOLUTION EPIDURAL; INFILTRATION; INTRACAUDAL; PERINEURAL at 12:48

## 2022-05-27 RX ADMIN — SODIUM CHLORIDE, POTASSIUM CHLORIDE, SODIUM LACTATE AND CALCIUM CHLORIDE: 600; 310; 30; 20 INJECTION, SOLUTION INTRAVENOUS at 23:48

## 2022-05-27 RX ADMIN — PROPOFOL 200 MG: 10 INJECTION, EMULSION INTRAVENOUS at 12:48

## 2022-05-27 RX ADMIN — ROCURONIUM BROMIDE 10 MG: 50 INJECTION, SOLUTION INTRAVENOUS at 13:39

## 2022-05-27 RX ADMIN — SODIUM CHLORIDE, SODIUM LACTATE, POTASSIUM CHLORIDE, AND CALCIUM CHLORIDE: 600; 310; 30; 20 INJECTION, SOLUTION INTRAVENOUS at 11:25

## 2022-05-27 RX ADMIN — ONDANSETRON 4 MG: 2 INJECTION INTRAMUSCULAR; INTRAVENOUS at 14:37

## 2022-05-27 RX ADMIN — PROPOFOL 20 MG: 10 INJECTION, EMULSION INTRAVENOUS at 13:04

## 2022-05-27 RX ADMIN — HYDROMORPHONE HYDROCHLORIDE 0.2 MG: 2 INJECTION INTRAMUSCULAR; INTRAVENOUS; SUBCUTANEOUS at 15:10

## 2022-05-27 RX ADMIN — Medication 2 G: at 13:00

## 2022-05-27 RX ADMIN — Medication 5 MG: at 13:01

## 2022-05-27 RX ADMIN — GLYCOPYRROLATE 0.2 MG: 0.2 INJECTION, SOLUTION INTRAMUSCULAR; INTRAVENOUS at 15:05

## 2022-05-27 RX ADMIN — ROCURONIUM BROMIDE 10 MG: 50 INJECTION, SOLUTION INTRAVENOUS at 13:19

## 2022-05-27 RX ADMIN — ONDANSETRON 4 MG: 2 INJECTION INTRAMUSCULAR; INTRAVENOUS at 09:55

## 2022-05-27 RX ADMIN — HYDROMORPHONE HYDROCHLORIDE 1 MG: 1 INJECTION, SOLUTION INTRAMUSCULAR; INTRAVENOUS; SUBCUTANEOUS at 21:15

## 2022-05-27 RX ADMIN — Medication 1 MCG: at 15:02

## 2022-05-27 RX ADMIN — Medication 10 MG: at 12:57

## 2022-05-27 RX ADMIN — KETAMINE HYDROCHLORIDE 20 MG: 50 INJECTION, SOLUTION INTRAMUSCULAR; INTRAVENOUS at 14:20

## 2022-05-27 RX ADMIN — ACETAMINOPHEN 1000 MG: 500 TABLET ORAL at 11:37

## 2022-05-27 RX ADMIN — HYDROMORPHONE HYDROCHLORIDE 0.2 MG: 2 INJECTION INTRAMUSCULAR; INTRAVENOUS; SUBCUTANEOUS at 14:05

## 2022-05-27 RX ADMIN — SODIUM CHLORIDE, PRESERVATIVE FREE 40 MG: 5 INJECTION INTRAVENOUS at 09:54

## 2022-05-27 RX ADMIN — HYDROMORPHONE HYDROCHLORIDE 0.2 MG: 2 INJECTION INTRAMUSCULAR; INTRAVENOUS; SUBCUTANEOUS at 14:51

## 2022-05-27 RX ADMIN — PROPOFOL 20 MG: 10 INJECTION, EMULSION INTRAVENOUS at 14:12

## 2022-05-27 RX ADMIN — HYDROMORPHONE HYDROCHLORIDE 1 MG: 1 INJECTION, SOLUTION INTRAMUSCULAR; INTRAVENOUS; SUBCUTANEOUS at 10:01

## 2022-05-27 RX ADMIN — CEFAZOLIN SODIUM 2000 MG: 100 INJECTION, POWDER, LYOPHILIZED, FOR SOLUTION INTRAVENOUS at 21:15

## 2022-05-27 RX ADMIN — HYDROMORPHONE HYDROCHLORIDE 0.4 MG: 2 INJECTION INTRAMUSCULAR; INTRAVENOUS; SUBCUTANEOUS at 13:41

## 2022-05-27 RX ADMIN — ROCURONIUM BROMIDE 10 MG: 50 INJECTION, SOLUTION INTRAVENOUS at 13:56

## 2022-05-27 RX ADMIN — BUPIVACAINE HYDROCHLORIDE 30 ML: 5 INJECTION, SOLUTION EPIDURAL; INTRACAUDAL; PERINEURAL at 14:55

## 2022-05-27 RX ADMIN — Medication 1 MCG: at 15:03

## 2022-05-27 RX ADMIN — SODIUM CHLORIDE, SODIUM LACTATE, POTASSIUM CHLORIDE, AND CALCIUM CHLORIDE: 600; 310; 30; 20 INJECTION, SOLUTION INTRAVENOUS at 16:43

## 2022-05-27 RX ADMIN — GLYCOPYRROLATE 0.2 MG: 0.2 INJECTION, SOLUTION INTRAMUSCULAR; INTRAVENOUS at 15:03

## 2022-05-27 RX ADMIN — SODIUM CHLORIDE, PRESERVATIVE FREE 10 ML: 5 INJECTION INTRAVENOUS at 06:26

## 2022-05-27 RX ADMIN — GLYCOPYRROLATE 0.2 MG: 0.2 INJECTION, SOLUTION INTRAMUSCULAR; INTRAVENOUS at 15:02

## 2022-05-27 RX ADMIN — DEXAMETHASONE SODIUM PHOSPHATE 4 MG: 4 INJECTION, SOLUTION INTRAMUSCULAR; INTRAVENOUS at 13:00

## 2022-05-27 RX ADMIN — SODIUM CHLORIDE, SODIUM LACTATE, POTASSIUM CHLORIDE, AND CALCIUM CHLORIDE: 600; 310; 30; 20 INJECTION, SOLUTION INTRAVENOUS at 13:48

## 2022-05-27 RX ADMIN — ROCURONIUM BROMIDE 20 MG: 50 INJECTION, SOLUTION INTRAVENOUS at 13:03

## 2022-05-27 RX ADMIN — FENTANYL CITRATE 50 MCG: 50 INJECTION INTRAMUSCULAR; INTRAVENOUS at 12:48

## 2022-05-27 RX ADMIN — KETAMINE HYDROCHLORIDE 40 MG: 50 INJECTION, SOLUTION INTRAMUSCULAR; INTRAVENOUS at 13:20

## 2022-05-27 RX ADMIN — Medication 2 MCG: at 15:05

## 2022-05-27 RX ADMIN — PROMETHAZINE HYDROCHLORIDE 12.5 MG: 12.5 TABLET ORAL at 03:15

## 2022-05-27 RX ADMIN — FENTANYL CITRATE 50 MCG: 50 INJECTION INTRAMUSCULAR; INTRAVENOUS at 13:25

## 2022-05-27 ASSESSMENT — PAIN - FUNCTIONAL ASSESSMENT
PAIN_FUNCTIONAL_ASSESSMENT: 0-10
PAIN_FUNCTIONAL_ASSESSMENT: PREVENTS OR INTERFERES SOME ACTIVE ACTIVITIES AND ADLS
PAIN_FUNCTIONAL_ASSESSMENT: ACTIVITIES ARE NOT PREVENTED
PAIN_FUNCTIONAL_ASSESSMENT: ACTIVITIES ARE NOT PREVENTED
PAIN_FUNCTIONAL_ASSESSMENT: PREVENTS OR INTERFERES SOME ACTIVE ACTIVITIES AND ADLS

## 2022-05-27 ASSESSMENT — COPD QUESTIONNAIRES: CAT_SEVERITY: MODERATE

## 2022-05-27 ASSESSMENT — PAIN DESCRIPTION - PAIN TYPE
TYPE: ACUTE PAIN
TYPE: CHRONIC PAIN
TYPE: CHRONIC PAIN

## 2022-05-27 ASSESSMENT — PAIN SCALES - GENERAL
PAINLEVEL_OUTOF10: 0
PAINLEVEL_OUTOF10: 7
PAINLEVEL_OUTOF10: 7
PAINLEVEL_OUTOF10: 0
PAINLEVEL_OUTOF10: 8
PAINLEVEL_OUTOF10: 4
PAINLEVEL_OUTOF10: 0
PAINLEVEL_OUTOF10: 5
PAINLEVEL_OUTOF10: 5
PAINLEVEL_OUTOF10: 0
PAINLEVEL_OUTOF10: 8

## 2022-05-27 ASSESSMENT — PAIN DESCRIPTION - LOCATION
LOCATION: ABDOMEN

## 2022-05-27 ASSESSMENT — PAIN DESCRIPTION - DESCRIPTORS
DESCRIPTORS: SORE
DESCRIPTORS: ACHING
DESCRIPTORS: ACHING

## 2022-05-27 ASSESSMENT — PAIN DESCRIPTION - ONSET
ONSET: ON-GOING

## 2022-05-27 ASSESSMENT — PAIN DESCRIPTION - FREQUENCY
FREQUENCY: INTERMITTENT
FREQUENCY: INTERMITTENT
FREQUENCY: CONTINUOUS

## 2022-05-27 ASSESSMENT — PAIN DESCRIPTION - DIRECTION
RADIATING_TOWARDS: BACK
RADIATING_TOWARDS: BACK

## 2022-05-27 ASSESSMENT — PAIN DESCRIPTION - ORIENTATION
ORIENTATION: MID
ORIENTATION: LOWER;LEFT

## 2022-05-27 ASSESSMENT — LIFESTYLE VARIABLES: SMOKING_STATUS: 1

## 2022-05-27 NOTE — PERIOP NOTE
TRANSFER - OUT REPORT:    Verbal report given to Leti Valdovinos on Macarena Meza  being transferred to  for routine progression of patient care       Report consisted of patients Situation, Background, Assessment and   Recommendations(SBAR). Information from the following report(s) Nurse Handoff Report, Surgery Report, Intake/Output, MAR and Cardiac Rhythm NSR was reviewed with the receiving nurse. Lines:   Peripheral IV 05/23/22 Distal;Left;Posterior Forearm (Active)   Site Assessment Clean, dry & intact 05/27/22 1520   Line Status Infusing 05/27/22 [address] Connections checked and tightened 05/27/22 1520   Phlebitis Assessment No symptoms 05/27/22 1520   Infiltration Assessment 0 05/27/22 1520   Alcohol Cap Used No 05/27/22 1520   Dressing Status Clean, dry & intact 05/27/22 1520   Dressing Type Transparent 05/27/22 1520   Dressing Intervention New 05/23/22 1346        Opportunity for questions and clarification was provided. Patient transported with:   O2 @ 2 liters  Tech    VTE prophylaxis orders have been written for Macarena Meza.    Patient and family given floor number and nurses name. Family updated re: pt status after security code verified.

## 2022-05-27 NOTE — PROGRESS NOTES
Chart screened by CM for d/c planning. Today pt is having the following procedure: laparoscopy/laparotomy. There have been no PT, OT, or SLP consults ordered. Anticipated d/c plan is for pt to return home with spouse when medically stable. No d/c needs identified at the present time. CM will continue to follow and remain available if any needs arise.

## 2022-05-27 NOTE — PERIOP NOTE
TRANSFER - IN REPORT:    Verbal report received from [place] on [address]  being received from [phone] for ordered procedure      Report consisted of patient's Situation, Background, Assessment and   Recommendations(SBAR). Information from the following report(s) Nurse Handoff Report and MAR was reviewed with the receiving nurse. Opportunity for questions and clarification was provided. Assessment will be completed upon patient's arrival to unit and care assumed.

## 2022-05-27 NOTE — ANESTHESIA PROCEDURE NOTES
Airway  Date/Time: 5/27/2022 12:51 PM  Urgency: elective    Airway not difficult    General Information and Staff    Patient location during procedure: OR  Resident/CRNA: Delgado Damon APRN - CRNA  Performed: resident/CRNA     Indications and Patient Condition  Indications for airway management: anesthesia  Spontaneous Ventilation: absent  Sedation level: deep  Preoxygenated: yes  Patient position: sniffing  Mask difficulty assessment: not attempted    Final Airway Details  Final airway type: endotracheal airway      Successful airway: ETT  Cuffed: yes   Successful intubation technique: direct laryngoscopy  Endotracheal tube insertion site: oral  Blade: Topher  Blade size: #4  ETT size (mm): 8.0  Cormack-Lehane Classification: grade I - full view of glottis  Placement verified by: chest auscultation and capnometry   Measured from: lips  ETT to lips (cm): 23  Number of attempts at approach: 1    Additional Comments  RSI.  Dentition and lips unchanged from preop  no

## 2022-05-27 NOTE — PROGRESS NOTES
Comprehensive Nutrition Assessment    Type and Reason for Visit: Reassess  Malnutrition Screening Tool: Malnutrition Screen  Have you recently lost weight without trying?: 24 to 33 pounds (3 points)  Have you been eating poorly because of a decreased appetite?: No (0 points)  Malnutrition Screening Tool Score: 3    Nutrition Recommendations/Plan:   Meals and Snacks:  Diet: Continue current order Restart diet as medically able following procedure. Nutrition Supplement Therapy:   Medical food supplement therapy:  Initiate Ensure Enlive three times per day (this provides 350 kcal and 20 grams protein per bottle)      It is reasonable to wait 7-10 days before initiating nutrition support in a patient with no or low nutrition risk. Malnutrition Status: Severe malnutrition    If anticipated patient will remain NPO/Clear liquid for greater than 5-7 days, consider nutrition support for primary needs (with tube feeding route preferred if medically appropriate). If tube feeding not appropriate, would necessitate a central line for TPN for primary nutrition. Patient currently has PIV in right antecubital.   If nutrition support pursued, order appropriate route and an IP Nutrition Consult for RD to manage. Malnutrition Assessment:  Malnutrition Status: Severe malnutrition  Context: Chronic Illness  Findings of clinical characteristics of malnutrition:   Energy Intake:  75% or less estimated energy requirements for 1 month or longer (minimal intake for 3 months)  Weight Loss:  Greater than 5% over 1 month (10.5% in one month)     Body Fat Loss:  No significant body fat loss     Muscle Mass Loss:  No significant muscle mass loss    Fluid Accumulation:  Unable to assess     Strength:  Not Performed     Nutrition Assessment:  Nutrition History:    Do You Have Any Cultural, Islam, or Ethnic Food Preferences?: No  5/22 Pt and wife in room discussing hx (daughter present but did not participate).  Wife relates his medical problems began in Dec 2021, but weight loss started in March 2022. Has n/v (with limited relief from medications), limited to no appetite. Patient open to ONS, but is put off by the idea of eating or drinking anything at this time for fear of vomiting. Wife states he did tolerate half a sandwich last week, but was unable to finish it. Nutrition Background:   Admitted with abdominal pain for last 6 months. Presented with possible SBO.  being seen by Dr. Aileen Bradshaw with concerns of peritoneal metastatic disease, with unknown starting origin. History of PUD, HTN, hyperlipidemia, and diverticulitis. Nutrition Interval:  Plan for ex lap today. Pt seen reclining in bed, with 4 family members in room. Patient reports he is still not eating or drinking well due to pain. Family reports blockage just causes pain, no interest in eating. Patient and family hopeful after surgery today things will improve. Nutrition Related Findings:   No s/s wasting per NFPE 5/27, Diet history 5/22 NPO, 5/23 Clear, 5/24 soft+ bite sized, low fiber      Current Nutrition Therapies:  Diet NPO    Current Intake:   Average Meal Intake: 1-25% Average Supplements Intake: Unable to assess      Anthropometric Measures:  Height: 5' 10\" (177.8 cm)  Current Body Wt: 198 lb 3.1 oz (89.9 kg) (5/25), Weight source: Bed Scale  BMI: 28.4  Admission Body Weight: 207 lb (93.9 kg) (not specified source)  Ideal Body Weight (Kg) (Calculated): 75 kg (166 lbs), 119.4 %  Usual Body Wt: 231 lb 5 oz (104.9 kg) (1 mo ago 4/13 office visit), Percent weight change: -10.5       Edema: No data recorded  BMI Category Overweight (BMI 25.0-29. 9)  Estimated Daily Nutrient Needs:  Energy (kcal/day): 1878 -2348 (Kcal/kg (20-25) Weight used: 93.9 kg Current  Protein (g/day):  Weight Used: (Current) 93.9 kg  Fluid (ml/day):   (1 ml/kcal)    Nutrition Diagnosis:   · Inadequate oral intake related to  (scared to eat, limited appetite) as evidenced by weight loss greater than or equal to 5% in 1 month,nausea,vomiting    · Severe malnutrition,In context of chronic illness related to catabolic illness (nausea/vomiting) as evidenced by Criteria as identified in malnutrition assessment    Nutrition Interventions:   Food and/or Nutrient Delivery: Continue Current Diet,Continue Oral Nutrition Supplement     Coordination of Nutrition Care: Continue to monitor while inpatient       Goals:   Previous Goal Met: No Progress toward Goal(s)  Active Goal: PO intake 50% or greater,by next RD assessment       Nutrition Monitoring and Evaluation:      Food/Nutrient Intake Outcomes: Diet Advancement/Tolerance,Food and Nutrient Intake,Supplement Intake  Physical Signs/Symptoms Outcomes: Weight,Nausea or Vomiting,GI Status,Meal Time Behavior    Discharge Planning:     Too soon to determine    Electronically signed by Shaneka Ford MS, RD, LD on 5/27/2022 at 11:18 AM.

## 2022-05-27 NOTE — BRIEF OP NOTE
Brief Postoperative Note      Patient: Radha Hdez  YOB: 1960  MRN: [id number]    Date of Procedure: 5/27/2022    Pre-Op Diagnosis: Small bowel obstruction ([facility] [T77.307]    Post-Op Diagnosis: diffuse peritoneal carcinomatosis and root of mesentery encasement       Procedure:  1 diagnostic laparoscopy switched to laparotomy  2. Omental mass and mesentery mass excision  3. Gastrostomy tube placement for venting    Surgeon(s):  Gage Wall MD    Assistant:  * No surgical staff found *    Anesthesia: General    Estimated Blood Loss (mL): less than 50     Complications: None    Specimens:   ID Type Source Tests Collected by Time Destination   A : Omentum Mass Tissue Abdomen SURGICAL PATHOLOGY Gage Wall MD 5/27/2022 1348    B : Small Bowel Mesentary Mass  Tissue Abdomen SURGICAL PATHOLOGY Gage Wall MD 5/27/2022 1350        Implants:  * No implants in log *      Drains:   Gastrostomy/Enterostomy/Jejunostomy Tube Gastrostomy LUQ 1 24 fr (Active)       Urinary Catheter 2 Way; Dang (Active)       Findings: 1.  Diffuse peritoneal carcinomatosis with complete infiltration of mesentery root, this is not surgical resectable, not even for bypass    Electronically signed by Gage Wall MD on 5/27/2022 at 3:26 PM

## 2022-05-28 LAB
ANION GAP SERPL CALC-SCNC: 8 MMOL/L (ref 7–16)
BASOPHILS # BLD: 0 K/UL (ref 0–0.2)
BASOPHILS NFR BLD: 0 % (ref 0–2)
BUN SERPL-MCNC: 8 MG/DL (ref 8–23)
CALCIUM SERPL-MCNC: 8.5 MG/DL (ref 8.3–10.4)
CHLORIDE SERPL-SCNC: 103 MMOL/L (ref 98–107)
CO2 SERPL-SCNC: 28 MMOL/L (ref 21–32)
CREAT SERPL-MCNC: 0.7 MG/DL (ref 0.8–1.5)
DIFFERENTIAL METHOD BLD: ABNORMAL
EOSINOPHIL # BLD: 0 K/UL (ref 0–0.8)
EOSINOPHIL NFR BLD: 0 % (ref 0.5–7.8)
ERYTHROCYTE [DISTWIDTH] IN BLOOD BY AUTOMATED COUNT: 12.8 % (ref 11.9–14.6)
GLUCOSE SERPL-MCNC: 81 MG/DL (ref 65–100)
HCT VFR BLD AUTO: 42.6 % (ref 41.1–50.3)
HGB BLD-MCNC: 14.3 G/DL (ref 13.6–17.2)
IMM GRANULOCYTES # BLD AUTO: 0.1 K/UL (ref 0–0.5)
IMM GRANULOCYTES NFR BLD AUTO: 0 % (ref 0–5)
LYMPHOCYTES # BLD: 1.3 K/UL (ref 0.5–4.6)
LYMPHOCYTES NFR BLD: 12 % (ref 13–44)
MCH RBC QN AUTO: 29.7 PG (ref 26.1–32.9)
MCHC RBC AUTO-ENTMCNC: 33.6 G/DL (ref 31.4–35)
MCV RBC AUTO: 88.4 FL (ref 79.6–97.8)
MONOCYTES # BLD: 0.9 K/UL (ref 0.1–1.3)
MONOCYTES NFR BLD: 8 % (ref 4–12)
NEUTS SEG # BLD: 9 K/UL (ref 1.7–8.2)
NEUTS SEG NFR BLD: 80 % (ref 43–78)
NRBC # BLD: 0 K/UL (ref 0–0.2)
PLATELET # BLD AUTO: 255 K/UL (ref 150–450)
PMV BLD AUTO: 10.3 FL (ref 9.4–12.3)
POTASSIUM SERPL-SCNC: 3.7 MMOL/L (ref 3.5–5.1)
RBC # BLD AUTO: 4.82 M/UL (ref 4.23–5.6)
SODIUM SERPL-SCNC: 139 MMOL/L (ref 138–145)
WBC # BLD AUTO: 11.3 K/UL (ref 4.3–11.1)

## 2022-05-28 PROCEDURE — 80048 BASIC METABOLIC PNL TOTAL CA: CPT

## 2022-05-28 PROCEDURE — 6370000000 HC RX 637 (ALT 250 FOR IP): Performed by: NURSE PRACTITIONER

## 2022-05-28 PROCEDURE — 6370000000 HC RX 637 (ALT 250 FOR IP): Performed by: SURGERY

## 2022-05-28 PROCEDURE — 2500000003 HC RX 250 WO HCPCS: Performed by: SURGERY

## 2022-05-28 PROCEDURE — 1100000000 HC RM PRIVATE

## 2022-05-28 PROCEDURE — 2700000000 HC OXYGEN THERAPY PER DAY

## 2022-05-28 PROCEDURE — 6360000002 HC RX W HCPCS: Performed by: NURSE PRACTITIONER

## 2022-05-28 PROCEDURE — 2580000003 HC RX 258: Performed by: NURSE PRACTITIONER

## 2022-05-28 PROCEDURE — 2580000003 HC RX 258: Performed by: ANESTHESIOLOGY

## 2022-05-28 PROCEDURE — 85025 COMPLETE CBC W/AUTO DIFF WBC: CPT

## 2022-05-28 PROCEDURE — 6360000002 HC RX W HCPCS: Performed by: SURGERY

## 2022-05-28 PROCEDURE — 2580000003 HC RX 258: Performed by: SURGERY

## 2022-05-28 PROCEDURE — 36415 COLL VENOUS BLD VENIPUNCTURE: CPT

## 2022-05-28 PROCEDURE — A4216 STERILE WATER/SALINE, 10 ML: HCPCS | Performed by: SURGERY

## 2022-05-28 PROCEDURE — 51798 US URINE CAPACITY MEASURE: CPT

## 2022-05-28 PROCEDURE — C9113 INJ PANTOPRAZOLE SODIUM, VIA: HCPCS | Performed by: SURGERY

## 2022-05-28 RX ORDER — FENTANYL 25 UG/H
1 PATCH TRANSDERMAL
Status: DISCONTINUED | OUTPATIENT
Start: 2022-05-28 | End: 2022-05-30

## 2022-05-28 RX ORDER — KETOROLAC TROMETHAMINE 30 MG/ML
30 INJECTION, SOLUTION INTRAMUSCULAR; INTRAVENOUS EVERY 8 HOURS
Status: COMPLETED | OUTPATIENT
Start: 2022-05-28 | End: 2022-05-31

## 2022-05-28 RX ORDER — BACLOFEN 10 MG/1
10 TABLET ORAL 3 TIMES DAILY PRN
Status: DISCONTINUED | OUTPATIENT
Start: 2022-05-28 | End: 2022-06-14 | Stop reason: HOSPADM

## 2022-05-28 RX ORDER — CALCIUM CARBONATE 200(500)MG
500 TABLET,CHEWABLE ORAL 3 TIMES DAILY PRN
Status: DISCONTINUED | OUTPATIENT
Start: 2022-05-28 | End: 2022-06-14 | Stop reason: HOSPADM

## 2022-05-28 RX ADMIN — SODIUM CHLORIDE, SODIUM LACTATE, POTASSIUM CHLORIDE, AND CALCIUM CHLORIDE: 600; 310; 30; 20 INJECTION, SOLUTION INTRAVENOUS at 05:00

## 2022-05-28 RX ADMIN — SODIUM CHLORIDE, PRESERVATIVE FREE 10 ML: 5 INJECTION INTRAVENOUS at 20:42

## 2022-05-28 RX ADMIN — SODIUM CHLORIDE, PRESERVATIVE FREE 10 ML: 5 INJECTION INTRAVENOUS at 14:00

## 2022-05-28 RX ADMIN — HYDROMORPHONE HYDROCHLORIDE 1 MG: 1 INJECTION, SOLUTION INTRAMUSCULAR; INTRAVENOUS; SUBCUTANEOUS at 05:49

## 2022-05-28 RX ADMIN — SODIUM CHLORIDE, SODIUM LACTATE, POTASSIUM CHLORIDE, AND CALCIUM CHLORIDE: 600; 310; 30; 20 INJECTION, SOLUTION INTRAVENOUS at 14:26

## 2022-05-28 RX ADMIN — PHENOL 1 SPRAY: 1.5 LIQUID ORAL at 14:26

## 2022-05-28 RX ADMIN — KETOROLAC TROMETHAMINE 30 MG: 30 INJECTION, SOLUTION INTRAMUSCULAR at 20:41

## 2022-05-28 RX ADMIN — ONDANSETRON 4 MG: 2 INJECTION INTRAMUSCULAR; INTRAVENOUS at 10:19

## 2022-05-28 RX ADMIN — HYDROMORPHONE HYDROCHLORIDE 1 MG: 1 INJECTION, SOLUTION INTRAMUSCULAR; INTRAVENOUS; SUBCUTANEOUS at 17:19

## 2022-05-28 RX ADMIN — PROMETHAZINE HYDROCHLORIDE 25 MG: 25 INJECTION INTRAMUSCULAR; INTRAVENOUS at 05:00

## 2022-05-28 RX ADMIN — KETOROLAC TROMETHAMINE 30 MG: 30 INJECTION, SOLUTION INTRAMUSCULAR at 12:12

## 2022-05-28 RX ADMIN — CALCIUM CARBONATE (ANTACID) CHEW TAB 500 MG 500 MG: 500 CHEW TAB at 01:34

## 2022-05-28 RX ADMIN — HYDROMORPHONE HYDROCHLORIDE 1 MG: 1 INJECTION, SOLUTION INTRAMUSCULAR; INTRAVENOUS; SUBCUTANEOUS at 10:19

## 2022-05-28 RX ADMIN — ONDANSETRON 4 MG: 2 INJECTION INTRAMUSCULAR; INTRAVENOUS at 05:49

## 2022-05-28 RX ADMIN — ONDANSETRON 4 MG: 2 INJECTION INTRAMUSCULAR; INTRAVENOUS at 01:41

## 2022-05-28 RX ADMIN — SODIUM CHLORIDE, PRESERVATIVE FREE 40 MG: 5 INJECTION INTRAVENOUS at 08:10

## 2022-05-28 RX ADMIN — CEFAZOLIN SODIUM 2000 MG: 100 INJECTION, POWDER, LYOPHILIZED, FOR SOLUTION INTRAVENOUS at 05:00

## 2022-05-28 RX ADMIN — SODIUM CHLORIDE, PRESERVATIVE FREE 10 ML: 5 INJECTION INTRAVENOUS at 09:00

## 2022-05-28 RX ADMIN — HYDROMORPHONE HYDROCHLORIDE 1 MG: 1 INJECTION, SOLUTION INTRAMUSCULAR; INTRAVENOUS; SUBCUTANEOUS at 01:34

## 2022-05-28 ASSESSMENT — PAIN DESCRIPTION - ONSET
ONSET: ON-GOING

## 2022-05-28 ASSESSMENT — PAIN DESCRIPTION - DESCRIPTORS
DESCRIPTORS: ACHING
DESCRIPTORS: ACHING
DESCRIPTORS: SORE
DESCRIPTORS: ACHING

## 2022-05-28 ASSESSMENT — PAIN SCALES - GENERAL
PAINLEVEL_OUTOF10: 10
PAINLEVEL_OUTOF10: 8
PAINLEVEL_OUTOF10: 4
PAINLEVEL_OUTOF10: 9
PAINLEVEL_OUTOF10: 0
PAINLEVEL_OUTOF10: 8
PAINLEVEL_OUTOF10: 10
PAINLEVEL_OUTOF10: 7
PAINLEVEL_OUTOF10: 6

## 2022-05-28 ASSESSMENT — PAIN DESCRIPTION - PAIN TYPE
TYPE: ACUTE PAIN
TYPE: OTHER (COMMENT)
TYPE: ACUTE PAIN

## 2022-05-28 ASSESSMENT — PAIN DESCRIPTION - ORIENTATION
ORIENTATION: MID;LEFT;RIGHT;UPPER;LOWER
ORIENTATION: MID;UPPER;LEFT
ORIENTATION: MID;UPPER;LOWER
ORIENTATION: MID;UPPER;LOWER;LEFT;RIGHT

## 2022-05-28 ASSESSMENT — PAIN DESCRIPTION - LOCATION
LOCATION: ABDOMEN

## 2022-05-28 ASSESSMENT — PAIN DESCRIPTION - FREQUENCY
FREQUENCY: CONTINUOUS

## 2022-05-28 ASSESSMENT — PAIN - FUNCTIONAL ASSESSMENT
PAIN_FUNCTIONAL_ASSESSMENT: PREVENTS OR INTERFERES SOME ACTIVE ACTIVITIES AND ADLS

## 2022-05-28 NOTE — PROGRESS NOTES
[facility]  [address] [place] [address]  [phone]    Office Note/H&P/Consult Note   Nieves Bustos   MRN: [id number]     : 1960        HPI: Nieves Bustos is a 62 y.o. male who continue to c/o abd pain, minimal eating due to pain. Current pain regimen appears effective, especially Toradol. Surgical plan is discussed with him, time, purpose of surgery, he understands and agrees to proceed. 22: POD1: Reports pain and nausea not controlled. Wants Oncology consult. AF, NAD. Family at bedside.      Past Medical History:   Diagnosis Date    Bilateral carpal tunnel syndrome 2020    Chronic right shoulder pain 2020    Colon polyps 3/11/2013    Diverticulitis 3/11/2013    HTN (hypertension) 3/11/2013    Hyperlipidemia 3/11/2013    Paresthesia and pain of both upper extremities 2020    PUD (peptic ulcer disease) 3/11/2013    History of     Right elbow pain 2020    Wheezing 3/11/2013     Past Surgical History:   Procedure Laterality Date    COLONOSCOPY  09    colonoscopy per Dr. Inessa Schneider with need for repeat every 3 years    COLONOSCOPY  2022    Dr. Juaquin Munoz; polyps of the ascending, transverse, descending, and sigmoid colons; internal hemorrhoid; diverticulosis    ID ABDOMEN SURGERY PROC UNLISTED  2004    partial colon resection per Dr. Yvone Goodell  2022    Dr. Juaquin Munoz; hiatal hernia gastric polyps     Current Facility-Administered Medications   Medication Dose Route Frequency    fentaNYL (DURAGESIC) 25 MCG/HR 1 patch  1 patch TransDERmal Q72H    calcium carbonate (TUMS) chewable tablet 500 mg  500 mg Oral TID PRN    phenol 1.4 % mouth spray 1 spray  1 spray Mouth/Throat Q2H PRN    ketorolac (TORADOL) injection 30 mg  30 mg IntraVENous q8h    scopolamine (TRANSDERM-SCOP) transdermal patch 1 patch  1 patch TransDERmal Q72H    lactated ringers infusion   IntraVENous Continuous    sodium chloride flush 0.9 % injection 5-40 mL  5-40 mL IntraVENous 2 times per day    sodium chloride flush 0.9 % injection 5-40 mL  5-40 mL IntraVENous PRN    0.9 % sodium chloride infusion   IntraVENous PRN    ondansetron (ZOFRAN-ODT) disintegrating tablet 4 mg  4 mg Oral Q8H PRN    Or    ondansetron (ZOFRAN) injection 4 mg  4 mg IntraVENous Q6H PRN    enoxaparin (LOVENOX) injection 40 mg  40 mg SubCUTAneous Q24H    nicotine (NICODERM CQ) 14 MG/24HR 1 patch  1 patch TransDERmal Daily    HYDROmorphone HCl PF (DILAUDID) injection 1 mg  1 mg IntraVENous Q4H PRN    naloxone (NARCAN) injection 0.4 mg  0.4 mg IntraVENous PRN    ondansetron (ZOFRAN) injection 4 mg  4 mg IntraVENous Q4H PRN    pantoprazole (PROTONIX) 40 mg in sodium chloride (PF) 10 mL injection  40 mg IntraVENous Daily    sodium chloride flush 0.9 % injection 5-40 mL  5-40 mL IntraVENous PRN    sodium chloride flush 0.9 % injection 5-40 mL  5-40 mL IntraVENous Q8H     ALLERGIES:  Patient has no known allergies. Social History     Socioeconomic History    Marital status:      Spouse name: None    Number of children: None    Years of education: None    Highest education level: None   Occupational History    None   Tobacco Use    Smoking status: Current Every Day Smoker     Packs/day: 1.00    Smokeless tobacco: Never Used   Substance and Sexual Activity    Alcohol use: No    Drug use: No    Sexual activity: None   Other Topics Concern    None   Social History Narrative    None     Social Determinants of Health     Financial Resource Strain:     Difficulty of Paying Living Expenses: Not on file   Food Insecurity:     Worried About Running Out of Food in the Last Year: Not on file    Briseida of Food in the Last Year: Not on file   Transportation Needs:     Lack of Transportation (Medical): Not on file    Lack of Transportation (Non-Medical):  Not on file   Physical Activity:     Days of Exercise per Week: Not on file    Minutes of Exercise per Session: Not on file   Stress:     Feeling of Stress : Not on file   Social Connections:     Frequency of Communication with Friends and Family: Not on file    Frequency of Social Gatherings with Friends and Family: Not on file    Attends Scientologist Services: Not on file    Active Member of Clubs or Organizations: Not on file    Attends Club or Organization Meetings: Not on file    Marital Status: Not on file   Intimate Partner Violence:     Fear of Current or Ex-Partner: Not on file    Emotionally Abused: Not on file    Physically Abused: Not on file    Sexually Abused: Not on file   Housing Stability:     Unable to Pay for Housing in the Last Year: Not on file    Number of [place] in the Last Year: Not on file    Unstable Housing in the Last Year: Not on file     Social History     Tobacco Use   Smoking Status Current Every Day Smoker    Packs/day: 1.00   Smokeless Tobacco Never Used     Family History   Problem Relation Age of Onset    No Known Problems Brother     Cancer Sister         breast    Hypertension Mother     Heart Disease Mother     Diabetes Father     Osteoarthritis Father     Alcohol Abuse Father     Hypertension Father     Diabetes Mother        ROS: The patient has no difficulty with chest pain or shortness of breath. No fever or chills. Comprehensive review of systems was otherwise unremarkable except as noted above. Physical Exam:   Constitutional: Alert oriented cooperative patient in no acute distress. BP (!) 147/76   Pulse 69   Temp 99 °F (37.2 °C) (Oral)   Resp 16   Ht 5' 10\" (1.778 m)   Wt 198 lb 1.6 oz (89.9 kg)   SpO2 96%   BMI 28.42 kg/m²   Eyes: Sclera are clear, pupils symmetric   ENMT: ears have no obvious external lesions; no obvious neck masses; no lip lesions  CV: RRR. Normal perfusion; no embolic signs  Resp: No JVD. Breathing is  non-labored.  No audible wheezing   GI: soft and non-distended, Appropriately ttp,  Post op  Dressing c/d/i, J tube to bedside bag    Musculoskeletal: no significant asymmetry; normal tone; unremarkable with normal function.    Neuro:  No obvious focal deficits; moves all 4; A&O x3  Psychiatric: normal affect and mood, no memory impairment      Labs:  Lab Results   Component Value Date    WBC 11.3 05/28/2022    HGB 14.3 05/28/2022     05/28/2022     05/28/2022    K 3.7 05/28/2022     05/28/2022    CO2 28 05/28/2022    BUN 8 05/28/2022    ALT 13 05/23/2022       Plan:  Pain/ Nausea control  Oncology consult  IV Abx - Ancef  Lovenox  Protonix  IVF  Follow labs  OOB    Arvil Blood, FNP-BC

## 2022-05-28 NOTE — OP NOTE
[facility]  OPERATIVE REPORT    Name:  April Cabezas  MR#:  [id number]  :  1960  ACCOUNT #:  [de-identified]  DATE OF SERVICE:  2022    PREOPERATIVE DIAGNOSIS:  Questionable peritoneal carcinomatosis with small bowel obstruction. POSTOPERATIVE DIAGNOSIS:  Diffuse peritoneal carcinomatosis with root of mesentery encasement and small bowel obstruction. PROCEDURES PERFORMED:  1. Diagnostic laparoscopy, switched to laparotomy. 2.  Omental mass and mesentery mass excision. 3.  Gastrostomy tube placement for venting. SURGEON:  Anabella Latif MD    ANESTHESIA:  general    COMPLICATIONS:  none    SPECIMENS REMOVED: as above    IMPLANTS:  G tube    ESTIMATED BLOOD LOSS:  Less than 50 mL. INDICATION:  This is a 26-year-old male who presented with several month history of abdominal pain, low appetite. When I saw him last week he was in quite a bit of pain with nausea and vomiting. He was admitted to the hospital immediately for further workup and he was managed conservatively. IR attempted paracentesis for cytology which was inconclusive. Clinically, he continued having abdominal pain and signs of bowel obstructions. We had extensive discussion for surgery, exploration. At least two purposes need to be met, first for diagnosis and the second is to do something for his bowel obstruction. He appeared to have some difficulty understanding and this has been addressed over several days to him and he eventually agreed with surgery and understood the risks. FINDINGS:  He has diffuse peritoneal carcinomatosis with complete infiltration of mesentery root. The laparoscopy was switched to laparotomy quickly. I explored the possibility of bowel resection and/or bypass. However is not feasible. The entire mesentery is involved. There is no portion of small bowel spared in terms of mesentery invasion. So, a bowel resection or bypass is not feasible.     PROCEDURE:  After informed consent obtained, the patient was brought into the operating room. General anesthesia was administered. The patient's abdomen was prepped and draped in routine fashion. I first used Veress needle technique to create pneumoperitoneum and then the scope over trocar technique was used to placed a trocar at the left upper quadrant. Abdominal wall was entered under direct vision and I was able to enter free space in the peritoneal cavity. The camera was inserted and we immediately encountered diffuse peritoneal disease with dilated small bowel. We immediately took to attention that laparoscopy is not feasible in this case. We started midline laparotomy incision. Peritoneal cavity was entered easily with pneumoperitoneum assistance and then the incision had to be extended both above and below, so we can run the small intestine. We soon found out the root of mesentery was almost completely infiltrated. This started from ligament of Treitz, was completely encased and then extended down to the root of mesentery entirely. Although the small bowel was not directly invaded, the belly is affixed and tethered onto the mesentery and this cannot be resected or bypassed. During explorations I removed part of omentum. This was clearly infiltrated with mass and I also removed part of the small bowel mesentery mass. Both were sent to pathology. Good hemostasis obtained. Then I looked at the upper stomach and the incision has extended upward to see the stomach. The stomach was also infiltrated around and tethered down. With careful manipulations, I was able to mobilize some of the distal stomach. This was able to be brought down below the ribcage for G-tube. I chose the G-tube site off the subcostal margin. A 24-Swedish G-tube was used and this gets through the anterior abdominal wall and then placed in the distal stomach and two rows of pursestring suture was used with 2-0 silk.   Pursestring suture was tied after the tube was placed. Intraluminal position was confirmed and then the stomach was sutured onto the anterior abdominal wall around the tube in all four quadrants. Then surgical field inspected. No evidence of bleeding identified. Then the midline fascia was closed with #1 looped PDS in running fashion. Skin closed with staples. The patient was stable during the procedure with minimal blood loss. He was transferred to recovery room in stable condition. All the instrument count and lap count correct. Estimated blood loss was less than 50 mL.         Rebecca Soriano MD      [id number]  D:  05/27/2022 15:42  T:  05/28/2022 1:57  JOB #:  [id number]

## 2022-05-28 NOTE — ANESTHESIA PROCEDURE NOTES
Peripheral Block    Patient location during procedure: OR  Start time: 5/27/2022 2:55 PM  End time: 5/27/2022 3:05 PM  Staffing  Performed: anesthesiologist   Anesthesiologist: Jose Ward MD  Preanesthetic Checklist  Completed: patient identified, site marked, risks and benefits discussed, equipment checked, pre-op evaluation, timeout performed, anesthesia consent given, oxygen available and monitors applied/VS acknowledged  Peripheral Block  Patient position: supine  Prep: ChloraPrep  Patient monitoring: cardiac monitor, continuous pulse ox, oxygen, IV access, frequent blood pressure checks, responsive to questions and continuous capnometry  Block type: Rectus sheath  Laterality: bilateral  Injection technique: single-shot  Guidance: ultrasound guided  Local infiltration: bupivacaine  Provider prep: mask and sterile gloves  Local infiltration: bupivacaine  Needle  Needle type: insulated echogenic nerve stimulator needle   Needle gauge: 20 G  Needle length: 10 cm  Needle localization: ultrasound guidance (minimal motor response at >0.4 mA)  Assessment  Injection assessment: negative aspiration for heme, local visualized surrounding nerve on ultrasound and no intravascular symptoms (Patient was anesthetized under general anesthesia)  Slow fractionated injection: yes  Hemodynamics: stablepermanent images obtainedOutcomes: patient tolerated procedure well  Additional Notes  Risks/benefits/alternatives discussed including damage to nerve or muscle in preop. .  Needle inserted and placed in close proximity to the nerve under real time ultrasound guidance. Ultrasound was used to visualize the spread of local anesthetic in close proximity to the nerve being blocked. The nerve appeared anatomically normal and there were no abnormal findings. Ultrasound imaged printed and placed on chart.     30ml 0.5% Bupivacaine with 1:200K epinephrine diluted with 10 ml of saline for a total of 40 ml (20 ml injected on right and 20 ml injected on left).   Medications Administered  Bupivacaine (MARCAINE) PF injection 0.5%, 30 mL  Reason for block: post-op pain management and at surgeon's request

## 2022-05-28 NOTE — PLAN OF CARE
Problem: Pain  Goal: Verbalizes/displays adequate comfort level or baseline comfort level  5/28/2022 0935 by Adrian Presley RN  Outcome: Not Progressing  5/27/2022 2033 by Ashley Bennett RN  Outcome: Progressing  Flowsheets  Taken 5/27/2022 1550 by Barrett Edward RN  Verbalizes/displays adequate comfort level or baseline comfort level: Encourage patient to monitor pain and request assistance  Taken 5/27/2022 1520 by Barrett Edward RN  Verbalizes/displays adequate comfort level or baseline comfort level: Encourage patient to monitor pain and request assistance     Problem: Discharge Planning  Goal: Discharge to home or other facility with appropriate resources  5/27/2022 2033 by Ashley Bennett RN  Outcome: Progressing  Flowsheets (Taken 5/27/2022 0735)  Discharge to home or other facility with appropriate resources: Arrange for needed discharge resources and transportation as appropriate     Problem: Safety - Adult  Goal: Free from fall injury  5/28/2022 0935 by Adrian Presley RN  Outcome: Progressing  5/27/2022 2033 by Ashley Bennett RN  Outcome: Progressing

## 2022-05-28 NOTE — ANESTHESIA POSTPROCEDURE EVALUATION
Department of Anesthesiology  Postprocedure Note    Patient: Rosmery Min  MRN: [id number]  YOB: 1960  Date of evaluation: 5/27/2022  Time:  9:01 PM     Procedure Summary     Date: 05/27/22 Room / Location: [facility] MAIN OR 03 / [facility] MAIN OR    Anesthesia Start: 1501 Anesthesia Stop: 7563    Procedure: LAPAROSCOPY DIAGNOSTIC , OPEN EXPLORATORY LAPAROTOMY, MASS EXCISION, G-TUBE PLACEMENT (N/A Abdomen) Diagnosis:       Small bowel obstruction (HCC)      (Small bowel obstruction ([facility] [address] [K56.609])    Providers: Kain Cooley MD Responsible Provider: Quinn Jorgensen MD    Anesthesia Type: general ASA Status: 3          Anesthesia Type: No value filed. Wen Phase I: Wen Score: 8    Wen Phase II:      Last vitals: Reviewed and per EMR flowsheets.        Anesthesia Post Evaluation    Patient location during evaluation: PACU  Patient participation: complete - patient participated  Level of consciousness: awake and alert  Pain score: 1  Airway patency: patent  Nausea & Vomiting: no nausea and no vomiting  Complications: no  Cardiovascular status: hemodynamically stable  Respiratory status: acceptable, nonlabored ventilation and spontaneous ventilation  Hydration status: euvolemic  Comments: /89   Pulse 70   Temp 97.7 °F (36.5 °C) (Oral)   Resp 18   Ht 5' 10\" (1.778 m)   Wt 198 lb 1.6 oz (89.9 kg)   SpO2 94%   BMI 28.42 kg/m²     Minimal abdominal pain     Multimodal analgesia pain management approach

## 2022-05-28 NOTE — PROGRESS NOTES
END OF SHIFT:    Shift Summary:    Pt VSS pt A&O X's 4 complain of pain X's 3 nausea X's 1 gave medication as ordered in mar no other complaints voice by pt pt resting in bed family at bedside call light in reach     I/Os:    I/O this shift:  In: -   Out: 1900 [Urine:1900]  I/O last 3 completed shifts:   In: 5071.8 [I.V.:5071.8]  Out: 2500 [Urine:2385; Emesis/NG output:100; Blood:15]    Lula Wei RN

## 2022-05-28 NOTE — PROGRESS NOTES
PRN dilaudid 1mg IV given x's 3 for pt report of pain  PRN zofran 4mg IV given x's 3 prior to dilaudid as makes pt nauseas  Pt complained of indigestion - On-call Surgery NP paged - Order for tums 500mg TID prn received - given x's 1  Pt complained of uncontrolled nausea - RN was unable to give PRN zofran at that time per order - On-call surgery NP paged - order for 1 time does phenergan 25mg IV received & transfused   Fentanyl patch 25mcg/hr applied to pts L arm per orders  Pt able to sit up on side of bed & stand w/ help of this RN  Pt & wife would like an Oncology consult to discuss prognosis/possible treatments from oncology standpoint as surgery has given grim prognosis - Day shift RN made aware & to address  Pain & nausea not well controlled - Asked day shift RN to address w/ surger    Shift report given to Sam on-coming RN    Vitals:    05/27/22 1624 05/27/22 1946 05/27/22 2302 05/28/22 0332   BP: 139/87 139/89 139/87 (!) 142/88   Pulse: 72 70 76 71   Resp: 18 18 18 18   Temp: 97.4 °F (36.3 °C) 97.7 °F (36.5 °C) 98.4 °F (36.9 °C) 98.6 °F (37 °C)   TempSrc: Oral Oral Oral Oral   SpO2: 90% 94% 94% 92%   Weight:       Height:

## 2022-05-29 LAB
ANION GAP SERPL CALC-SCNC: 3 MMOL/L (ref 7–16)
BASOPHILS # BLD: 0 K/UL (ref 0–0.2)
BASOPHILS NFR BLD: 1 % (ref 0–2)
BUN SERPL-MCNC: 9 MG/DL (ref 8–23)
CALCIUM SERPL-MCNC: 8.7 MG/DL (ref 8.3–10.4)
CHLORIDE SERPL-SCNC: 107 MMOL/L (ref 98–107)
CO2 SERPL-SCNC: 31 MMOL/L (ref 21–32)
CREAT SERPL-MCNC: 0.6 MG/DL (ref 0.8–1.5)
DIFFERENTIAL METHOD BLD: ABNORMAL
EOSINOPHIL # BLD: 0.1 K/UL (ref 0–0.8)
EOSINOPHIL NFR BLD: 2 % (ref 0.5–7.8)
ERYTHROCYTE [DISTWIDTH] IN BLOOD BY AUTOMATED COUNT: 12.9 % (ref 11.9–14.6)
GLUCOSE SERPL-MCNC: 104 MG/DL (ref 65–100)
HAV IGM SER QL: NONREACTIVE
HBV CORE IGM SER QL: NONREACTIVE
HBV SURFACE AG SER QL: NONREACTIVE
HCT VFR BLD AUTO: 38 % (ref 41.1–50.3)
HCV AB SER QL: NONREACTIVE
HGB BLD-MCNC: 12.6 G/DL (ref 13.6–17.2)
HIV 1+2 AB+HIV1 P24 AG SERPL QL IA: NONREACTIVE
HIV 1/2 RESULT COMMENT: ABNORMAL
IMM GRANULOCYTES # BLD AUTO: 0 K/UL (ref 0–0.5)
IMM GRANULOCYTES NFR BLD AUTO: 0 % (ref 0–5)
LDH SERPL L TO P-CCNC: 158 U/L (ref 100–190)
LYMPHOCYTES # BLD: 1.3 K/UL (ref 0.5–4.6)
LYMPHOCYTES NFR BLD: 20 % (ref 13–44)
MCH RBC QN AUTO: 29.4 PG (ref 26.1–32.9)
MCHC RBC AUTO-ENTMCNC: 33.2 G/DL (ref 31.4–35)
MCV RBC AUTO: 88.8 FL (ref 79.6–97.8)
MONOCYTES # BLD: 0.6 K/UL (ref 0.1–1.3)
MONOCYTES NFR BLD: 9 % (ref 4–12)
NEUTS SEG # BLD: 4.4 K/UL (ref 1.7–8.2)
NEUTS SEG NFR BLD: 68 % (ref 43–78)
NRBC # BLD: 0 K/UL (ref 0–0.2)
PLATELET # BLD AUTO: 227 K/UL (ref 150–450)
PMV BLD AUTO: 9.9 FL (ref 9.4–12.3)
POTASSIUM SERPL-SCNC: 3.4 MMOL/L (ref 3.5–5.1)
RBC # BLD AUTO: 4.28 M/UL (ref 4.23–5.6)
SODIUM SERPL-SCNC: 141 MMOL/L (ref 136–145)
WBC # BLD AUTO: 6.5 K/UL (ref 4.3–11.1)

## 2022-05-29 PROCEDURE — 87389 HIV-1 AG W/HIV-1&-2 AB AG IA: CPT

## 2022-05-29 PROCEDURE — 2700000000 HC OXYGEN THERAPY PER DAY

## 2022-05-29 PROCEDURE — 2580000003 HC RX 258: Performed by: ANESTHESIOLOGY

## 2022-05-29 PROCEDURE — 6360000002 HC RX W HCPCS: Performed by: NURSE PRACTITIONER

## 2022-05-29 PROCEDURE — 2500000003 HC RX 250 WO HCPCS: Performed by: SURGERY

## 2022-05-29 PROCEDURE — 80048 BASIC METABOLIC PNL TOTAL CA: CPT

## 2022-05-29 PROCEDURE — 85025 COMPLETE CBC W/AUTO DIFF WBC: CPT

## 2022-05-29 PROCEDURE — 83615 LACTATE (LD) (LDH) ENZYME: CPT

## 2022-05-29 PROCEDURE — 6360000002 HC RX W HCPCS: Performed by: SURGERY

## 2022-05-29 PROCEDURE — 99222 1ST HOSP IP/OBS MODERATE 55: CPT | Performed by: INTERNAL MEDICINE

## 2022-05-29 PROCEDURE — 6370000000 HC RX 637 (ALT 250 FOR IP): Performed by: NURSE PRACTITIONER

## 2022-05-29 PROCEDURE — 2580000003 HC RX 258: Performed by: SURGERY

## 2022-05-29 PROCEDURE — 1100000000 HC RM PRIVATE

## 2022-05-29 PROCEDURE — 82784 ASSAY IGA/IGD/IGG/IGM EACH: CPT

## 2022-05-29 PROCEDURE — APPSS180 APP SPLIT SHARED TIME > 60 MINUTES: Performed by: NURSE PRACTITIONER

## 2022-05-29 PROCEDURE — A4216 STERILE WATER/SALINE, 10 ML: HCPCS | Performed by: SURGERY

## 2022-05-29 PROCEDURE — 36415 COLL VENOUS BLD VENIPUNCTURE: CPT

## 2022-05-29 PROCEDURE — 80074 ACUTE HEPATITIS PANEL: CPT

## 2022-05-29 PROCEDURE — C9113 INJ PANTOPRAZOLE SODIUM, VIA: HCPCS | Performed by: SURGERY

## 2022-05-29 RX ORDER — POTASSIUM CHLORIDE 7.45 MG/ML
10 INJECTION INTRAVENOUS
Status: COMPLETED | OUTPATIENT
Start: 2022-05-29 | End: 2022-05-29

## 2022-05-29 RX ORDER — POTASSIUM CHLORIDE 7.45 MG/ML
20 INJECTION INTRAVENOUS ONCE
Status: DISCONTINUED | OUTPATIENT
Start: 2022-05-29 | End: 2022-05-29

## 2022-05-29 RX ORDER — POTASSIUM CHLORIDE 7.45 MG/ML
10 INJECTION INTRAVENOUS
Status: DISPENSED | OUTPATIENT
Start: 2022-05-29 | End: 2022-05-29

## 2022-05-29 RX ORDER — POTASSIUM CHLORIDE 29.8 MG/ML
20 INJECTION INTRAVENOUS ONCE
Status: DISCONTINUED | OUTPATIENT
Start: 2022-05-29 | End: 2022-05-29

## 2022-05-29 RX ADMIN — HYDROMORPHONE HYDROCHLORIDE 1 MG: 1 INJECTION, SOLUTION INTRAMUSCULAR; INTRAVENOUS; SUBCUTANEOUS at 09:53

## 2022-05-29 RX ADMIN — HYDROMORPHONE HYDROCHLORIDE 1 MG: 1 INJECTION, SOLUTION INTRAMUSCULAR; INTRAVENOUS; SUBCUTANEOUS at 18:14

## 2022-05-29 RX ADMIN — SODIUM CHLORIDE, SODIUM LACTATE, POTASSIUM CHLORIDE, AND CALCIUM CHLORIDE: 600; 310; 30; 20 INJECTION, SOLUTION INTRAVENOUS at 17:57

## 2022-05-29 RX ADMIN — SODIUM CHLORIDE, PRESERVATIVE FREE 40 MG: 5 INJECTION INTRAVENOUS at 08:38

## 2022-05-29 RX ADMIN — KETOROLAC TROMETHAMINE 30 MG: 30 INJECTION, SOLUTION INTRAMUSCULAR at 03:12

## 2022-05-29 RX ADMIN — SODIUM CHLORIDE, SODIUM LACTATE, POTASSIUM CHLORIDE, AND CALCIUM CHLORIDE: 600; 310; 30; 20 INJECTION, SOLUTION INTRAVENOUS at 03:13

## 2022-05-29 RX ADMIN — POTASSIUM CHLORIDE 10 MEQ: 7.46 INJECTION, SOLUTION INTRAVENOUS at 10:58

## 2022-05-29 RX ADMIN — CALCIUM CARBONATE (ANTACID) CHEW TAB 500 MG 500 MG: 500 CHEW TAB at 20:44

## 2022-05-29 RX ADMIN — KETOROLAC TROMETHAMINE 30 MG: 30 INJECTION, SOLUTION INTRAMUSCULAR at 20:44

## 2022-05-29 RX ADMIN — POTASSIUM CHLORIDE 10 MEQ: 7.46 INJECTION, SOLUTION INTRAVENOUS at 13:51

## 2022-05-29 RX ADMIN — CALCIUM CARBONATE (ANTACID) CHEW TAB 500 MG 500 MG: 500 CHEW TAB at 12:18

## 2022-05-29 RX ADMIN — SODIUM CHLORIDE, PRESERVATIVE FREE 10 ML: 5 INJECTION INTRAVENOUS at 13:50

## 2022-05-29 RX ADMIN — KETOROLAC TROMETHAMINE 30 MG: 30 INJECTION, SOLUTION INTRAMUSCULAR at 11:38

## 2022-05-29 RX ADMIN — HYDROMORPHONE HYDROCHLORIDE 1 MG: 1 INJECTION, SOLUTION INTRAMUSCULAR; INTRAVENOUS; SUBCUTANEOUS at 06:04

## 2022-05-29 RX ADMIN — HYDROMORPHONE HYDROCHLORIDE 1 MG: 1 INJECTION, SOLUTION INTRAMUSCULAR; INTRAVENOUS; SUBCUTANEOUS at 14:54

## 2022-05-29 RX ADMIN — SODIUM CHLORIDE, PRESERVATIVE FREE 10 ML: 5 INJECTION INTRAVENOUS at 09:00

## 2022-05-29 ASSESSMENT — PAIN DESCRIPTION - ORIENTATION
ORIENTATION: MID;UPPER;LEFT;RIGHT
ORIENTATION: MID;LEFT;UPPER
ORIENTATION: MID;UPPER;LEFT
ORIENTATION: MID;LEFT;UPPER
ORIENTATION: MID;LEFT;RIGHT;UPPER;LOWER
ORIENTATION: MID;LEFT;RIGHT;UPPER

## 2022-05-29 ASSESSMENT — PAIN SCALES - WONG BAKER: WONGBAKER_NUMERICALRESPONSE: 0

## 2022-05-29 ASSESSMENT — PAIN SCALES - GENERAL
PAINLEVEL_OUTOF10: 9
PAINLEVEL_OUTOF10: 9
PAINLEVEL_OUTOF10: 7
PAINLEVEL_OUTOF10: 9
PAINLEVEL_OUTOF10: 5
PAINLEVEL_OUTOF10: 0
PAINLEVEL_OUTOF10: 7
PAINLEVEL_OUTOF10: 0
PAINLEVEL_OUTOF10: 0
PAINLEVEL_OUTOF10: 4

## 2022-05-29 ASSESSMENT — PAIN - FUNCTIONAL ASSESSMENT
PAIN_FUNCTIONAL_ASSESSMENT: PREVENTS OR INTERFERES SOME ACTIVE ACTIVITIES AND ADLS

## 2022-05-29 ASSESSMENT — PAIN DESCRIPTION - LOCATION
LOCATION: ABDOMEN

## 2022-05-29 ASSESSMENT — PAIN DESCRIPTION - PAIN TYPE
TYPE: OTHER (COMMENT)

## 2022-05-29 ASSESSMENT — PAIN DESCRIPTION - DESCRIPTORS
DESCRIPTORS: ACHING;SORE

## 2022-05-29 ASSESSMENT — PAIN DESCRIPTION - ONSET
ONSET: ON-GOING

## 2022-05-29 ASSESSMENT — PAIN DESCRIPTION - FREQUENCY
FREQUENCY: CONTINUOUS

## 2022-05-29 NOTE — PROGRESS NOTES
[facility]  [facility]. [address] [place] [address]  [phone]    Office Note/H&P/Consult Note   Carlie Gomez   MRN: [id number]     : 1960        HPI: Carlie Gomez is a 62 y.o. male who continue to c/o abd pain, minimal eating due to pain. Current pain regimen appears effective, especially Toradol. Surgical plan is discussed with him, time, purpose of surgery, he understands and agrees to proceed. 22: POD2: Pt awake in bed. Feeling better today. Tolerating Clear liquids. No nausea or vomiting. Wants Oncology consult. AF, NAD. Family at bedside. 22: POD1: Reports pain and nausea not controlled. Has been seen by Oncology. Venting G tube to bed side bag; 175mL out last 24 hours. AF, NAD. Family at bedside.      Past Medical History:   Diagnosis Date    Bilateral carpal tunnel syndrome 2020    Chronic right shoulder pain 2020    Colon polyps 3/11/2013    Diverticulitis 3/11/2013    HTN (hypertension) 3/11/2013    Hyperlipidemia 3/11/2013    Paresthesia and pain of both upper extremities 2020    PUD (peptic ulcer disease) 3/11/2013    History of     Right elbow pain 2020    Wheezing 3/11/2013     Past Surgical History:   Procedure Laterality Date    COLONOSCOPY  09    colonoscopy per Dr. Kailash May with need for repeat every 3 years    COLONOSCOPY  2022    Dr. Meenu Manrique; polyps of the ascending, transverse, descending, and sigmoid colons; internal hemorrhoid; diverticulosis    [place] ABDOMEN SURGERY PROC UNLISTED  2004    partial colon resection per Dr. Lara Gee  2022    Dr. Meenu Manrique; hiatal hernia gastric polyps     Current Facility-Administered Medications   Medication Dose Route Frequency    fentaNYL (DURAGESIC) 25 MCG/HR 1 patch  1 patch TransDERmal Q72H    calcium carbonate (TUMS) chewable tablet 500 mg  500 mg Oral TID PRN    phenol 1.4 % mouth spray 1 spray  1 spray Mouth/Throat Q2H PRN    ketorolac (TORADOL) injection 30 mg  30 mg IntraVENous q8h    baclofen (LIORESAL) tablet 10 mg  10 mg Oral TID PRN    scopolamine (TRANSDERM-SCOP) transdermal patch 1 patch  1 patch TransDERmal Q72H    lactated ringers infusion   IntraVENous Continuous    sodium chloride flush 0.9 % injection 5-40 mL  5-40 mL IntraVENous 2 times per day    sodium chloride flush 0.9 % injection 5-40 mL  5-40 mL IntraVENous PRN    0.9 % sodium chloride infusion   IntraVENous PRN    ondansetron (ZOFRAN-ODT) disintegrating tablet 4 mg  4 mg Oral Q8H PRN    Or    ondansetron (ZOFRAN) injection 4 mg  4 mg IntraVENous Q6H PRN    enoxaparin (LOVENOX) injection 40 mg  40 mg SubCUTAneous Q24H    nicotine (NICODERM CQ) 14 MG/24HR 1 patch  1 patch TransDERmal Daily    HYDROmorphone HCl PF (DILAUDID) injection 1 mg  1 mg IntraVENous Q4H PRN    naloxone (NARCAN) injection 0.4 mg  0.4 mg IntraVENous PRN    ondansetron (ZOFRAN) injection 4 mg  4 mg IntraVENous Q4H PRN    pantoprazole (PROTONIX) 40 mg in sodium chloride (PF) 10 mL injection  40 mg IntraVENous Daily    sodium chloride flush 0.9 % injection 5-40 mL  5-40 mL IntraVENous PRN    sodium chloride flush 0.9 % injection 5-40 mL  5-40 mL IntraVENous Q8H     ALLERGIES:  Patient has no known allergies.     Social History     Socioeconomic History    Marital status:      Spouse name: None    Number of children: None    Years of education: None    Highest education level: None   Occupational History    None   Tobacco Use    Smoking status: Current Every Day Smoker     Packs/day: 1.00    Smokeless tobacco: Never Used   Substance and Sexual Activity    Alcohol use: No    Drug use: No    Sexual activity: None   Other Topics Concern    None   Social History Narrative    None     Social Determinants of Health     Financial Resource Strain:     Difficulty of Paying Living Expenses: Not on file   Food Insecurity:     Worried About Running Out of Food in the Last Year: Not on file    Ran Out of Food in the Last Year: Not on file   Transportation Needs:     Lack of Transportation (Medical): Not on file    Lack of Transportation (Non-Medical): Not on file   Physical Activity:     Days of Exercise per Week: Not on file    Minutes of Exercise per Session: Not on file   Stress:     Feeling of Stress : Not on file   Social Connections:     Frequency of Communication with Friends and Family: Not on file    Frequency of Social Gatherings with Friends and Family: Not on file    Attends [employer] Services: Not on file    Active Member of [address] [place] [address] [employer] or Organizations: Not on file    Attends Club or Organization Meetings: Not on file    Marital Status: Not on file   Intimate Partner Violence:     Fear of Current or Ex-Partner: Not on file    Emotionally Abused: Not on file    Physically Abused: Not on file    Sexually Abused: Not on file   Housing Stability:     Unable to Pay for Housing in the Last Year: Not on file    Number of [place] in the Last Year: Not on file    Unstable Housing in the Last Year: Not on file     Social History     Tobacco Use   Smoking Status Current Every Day Smoker    Packs/day: 1.00   Smokeless Tobacco Never Used     Family History   Problem Relation Age of Onset    No Known Problems Brother     Cancer Sister         breast    Hypertension Mother     Heart Disease Mother     Diabetes Father     Osteoarthritis Father     Alcohol Abuse Father     Hypertension Father     Diabetes Mother        ROS: The patient has no difficulty with chest pain or shortness of breath. No fever or chills. Comprehensive review of systems was otherwise unremarkable except as noted above. Physical Exam:   Constitutional: Alert oriented cooperative patient in no acute distress.    BP (!) 141/85   Pulse 65   Temp 98.1 °F (36.7 °C) (Oral)   Resp 18   Ht 5' 10\" (1.778 m)   Wt 198 lb 1.6 oz (89.9 kg)   SpO2 92%   BMI 28.42 kg/m²   Eyes: Sclera are clear, pupils symmetric   ENMT: ears have no obvious external lesions; no obvious neck masses; no lip lesions  CV: RRR. Normal perfusion; no embolic signs  Resp: No JVD. Breathing is  non-labored. No audible wheezing   GI: soft and non-distended,  Appropriately ttp,  Post op  Dressing c/d/i, J tube to bedside bag    Musculoskeletal: no significant asymmetry; normal tone; unremarkable with normal function.    Neuro:  No obvious focal deficits; moves all 4; A&O x3  Psychiatric: normal affect and mood, no memory impairment      Labs:  Lab Results   Component Value Date    WBC 6.5 05/29/2022    HGB 12.6 05/29/2022     05/29/2022     05/29/2022    K 3.4 05/29/2022     05/29/2022    CO2 31 05/29/2022    BUN 9 05/29/2022    ALT 13 05/23/2022       Plan:  Pain/ Nausea control  Oncology following  Lovenox  Protonix  Clear Liquids w/ ensure supplement  IVF: LR @ 100  Follow labs  OOB  Venting G tube to bedside bag  Consult Palliative care - identify goals/ pain management    KIMMY Iniguez-BC

## 2022-05-29 NOTE — H&P
[facility] Hematology & Oncology        Inpatient Hematology / Oncology History and Physical    Reason for Admission:  No admission diagnoses are documented for this encounter. History of Present Illness:  Teja Sanchez is a 62 y.o. male with a past medical history of PUD, HTN, hyperlipidemia, and diverticulitis. He was seen by Dr. Elena Inman 5/18 and sent to Dr. Charmaine Raya for expedited workup. He was seen in the office by Dr. Charmaine Raya 5/20/22 with concerns for peritoneal metastatic disease.     The patient stated that he has been having abdominal pain for about 6 months now. He had a recent EGD and colonoscopy on February 25, 2022 which revealed a hiatal hernia, gastric polyps, benign colon polyps, diverticulosis, and hemorrhoids. He continued to have abdominal pain over the last few months. He stated that he has lost about 30 lbs over the last 6 months. He has not had an appetite. He was having worsening of GERD as well over that time.      The patient went to the emergency department at [facility] about 10 days ago for a partial small bowel obstruction. The patient presented to our emergency department 1 week ago with abdominal pain. The patient had a CT scan at that time which revealed extensive peritoneal nodularity and mild ascites consistent with peritoneal metastatic disease.  A primary lesion was not definitely identified. There was also sclerosis of the S1 vertebral body, a bone scan has been ordered to assess for possible bone metastasis. He also had evidence of a partial bowel obstruction. There is no evidence of any metastatic disease within the chest. He is POD 2 sp laparotomy. Per Dr. Zuleika Montelongo note diffuse peritoneal carcinomatosis with complete infiltration of mesentery root, this is not surgical resectable, not even for bypass. Tumor markers were not impressive. We are asked to see patient for recommendations    Review of Systems:  Constitutional Denies fever, chills.  + weight loss, appetite changes,    HEENT Denies trauma, blurry vision, hearing loss, ear pain, nosebleeds, sore throat, neck pain   Skin Denies lesions or rashes. Lungs Denies dyspnea, cough, sputum production or hemoptysis. Cardiovascular Denies chest pain, palpitations, or lower extremity edema. Gastrointestinal Denies nausea, vomiting. +abdominal pain.  Denies dysuria, frequency or hesitancy of urination. Neuro Denies headaches, visual changes or ataxia. Denies dizziness, tingling, tremors, sensory change, speech change, focal weakness or headaches. MSK Denies back pain, arthralgias, myalgias or frequent falls. Psychiatric/Behavioral  The patient is not nervous/anxious.          No Known Allergies  Past Medical History:   Diagnosis Date    Bilateral carpal tunnel syndrome 12/9/2020    Chronic right shoulder pain 11/30/2020    Colon polyps 3/11/2013    Diverticulitis 3/11/2013    HTN (hypertension) 3/11/2013    Hyperlipidemia 3/11/2013    Paresthesia and pain of both upper extremities 11/30/2020    PUD (peptic ulcer disease) 3/11/2013    History of     Right elbow pain 12/9/2020    Wheezing 3/11/2013     Past Surgical History:   Procedure Laterality Date    COLONOSCOPY  2/25/09    colonoscopy per Dr. Bhavna Mahajan with need for repeat every 3 years    COLONOSCOPY  02/25/2022    Dr. Maureen Erickson; polyps of the ascending, transverse, descending, and sigmoid colons; internal hemorrhoid; diverticulosis    [place] ABDOMEN SURGERY PROC UNLISTED  October 2004    partial colon resection per Dr. Thompson Flann  02/25/2022    Dr. Maureen Erickson; hiatal hernia gastric polyps     Family History   Problem Relation Age of Onset    No Known Problems Brother     Cancer Sister         breast    Hypertension Mother     Heart Disease Mother     Diabetes Father     Osteoarthritis Father     Alcohol Abuse Father     Hypertension Father     Diabetes Mother      Social History     Socioeconomic History    Marital status:  Spouse name: Not on file    Number of children: Not on file    Years of education: Not on file    Highest education level: Not on file   Occupational History    Not on file   Tobacco Use    Smoking status: Current Every Day Smoker     Packs/day: 1.00    Smokeless tobacco: Never Used   Substance and Sexual Activity    Alcohol use: No    Drug use: No    Sexual activity: Not on file   Other Topics Concern    Not on file   Social History Narrative    Not on file     Social Determinants of Health     Financial Resource Strain:     Difficulty of Paying Living Expenses: Not on file   Food Insecurity:     Worried About Running Out of Food in the Last Year: Not on file    Briseida of Food in the Last Year: Not on file   Transportation Needs:     Lack of Transportation (Medical): Not on file    Lack of Transportation (Non-Medical):  Not on file   Physical Activity:     Days of Exercise per Week: Not on file    Minutes of Exercise per Session: Not on file   Stress:     Feeling of Stress : Not on file   Social Connections:     Frequency of Communication with Friends and Family: Not on file    Frequency of Social Gatherings with Friends and Family: Not on file    Attends Synagogue Services: Not on file    Active Member of [address] [place] [address] [employer] or Organizations: Not on file    Attends Club or Organization Meetings: Not on file    Marital Status: Not on file   Intimate Partner Violence:     Fear of Current or Ex-Partner: Not on file    Emotionally Abused: Not on file    Physically Abused: Not on file    Sexually Abused: Not on file   Housing Stability:     Unable to Pay for Housing in the Last Year: Not on file    Number of [place] in the Last Year: Not on file    Unstable Housing in the Last Year: Not on file     Current Facility-Administered Medications   Medication Dose Route Frequency Provider Last Rate Last Admin    potassium chloride 10 mEq/100 mL IVPB (Peripheral Line)  10 mEq IntraVENous Maranda Rogers MD 100 mL/hr at 05/29/22 1058 10 mEq at 05/29/22 1058    fentaNYL (DURAGESIC) 25 MCG/HR 1 patch  1 patch TransDERmal Q72H Marco Degroot MD   1 patch at 05/28/22 0134    calcium carbonate (TUMS) chewable tablet 500 mg  500 mg Oral TID PRN LUCY Williamson - CNP   500 mg at 05/28/22 0134    phenol 1.4 % mouth spray 1 spray  1 spray Mouth/Throat Q2H PRN LUCY Williamson - CNP   1 spray at 05/28/22 1426    ketorolac (TORADOL) injection 30 mg  30 mg IntraVENous q8h Kaylen Quiroz APRN - CNP   30 mg at 05/29/22 1138    baclofen (LIORESAL) tablet 10 mg  10 mg Oral TID PRN LUCY Williamson - CNP        scopolamine (TRANSDERM-SCOP) transdermal patch 1 patch  1 patch TransDERmal Q72H James Back MD   1 patch at 05/27/22 1133    lactated ringers infusion   IntraVENous Continuous James Back  mL/hr at 05/29/22 0313 New Bag at 05/29/22 0313    sodium chloride flush 0.9 % injection 5-40 mL  5-40 mL IntraVENous 2 times per day Marco Degroot MD   10 mL at 05/29/22 0900    sodium chloride flush 0.9 % injection 5-40 mL  5-40 mL IntraVENous PRN Marco Degroot MD        0.9 % sodium chloride infusion   IntraVENous PRN Marco Degroot MD        ondansetron (ZOFRAN-ODT) disintegrating tablet 4 mg  4 mg Oral Q8H PRN Marco Degroot MD        Or    [facility]) injection 4 mg  4 mg IntraVENous Q6H PRN Marco Degroot MD        enoxaparin (LOVENOX) injection 40 mg  40 mg SubCUTAneous Q24H Marco Degroot MD        nicotine (NICODERM CQ) 14 MG/24HR 1 patch  1 patch TransDERmal Daily LUCY Williamson - CNP   1 patch at 05/29/22 0843    HYDROmorphone HCl PF (DILAUDID) injection 1 mg  1 mg IntraVENous Q4H PRN Marco Degroot MD   1 mg at 05/29/22 0953    naloxone (NARCAN) injection 0.4 mg  0.4 mg IntraVENous PRN Marco Degroot MD        ondansetron [facility]) injection 4 mg  4 mg IntraVENous Q4H PRN Marco Degroot MD   4 mg at 05/28/22 1019    pantoprazole (PROTONIX) 40 mg in sodium chloride (PF) 10 mL injection  40 mg IntraVENous Daily Missy Jennifer Bergeron MD   40 mg at 22 7803    sodium chloride flush 0.9 % injection 5-40 mL  5-40 mL IntraVENous PRN Vivian Arguelles MD        sodium chloride flush 0.9 % injection 5-40 mL  5-40 mL IntraVENous Q8H Vivian Arguelles MD   10 mL at 22 1400       OBJECTIVE:  Patient Vitals for the past 8 hrs:   BP Temp Temp src Pulse Resp SpO2   22 1059 130/73 98.4 °F (36.9 °C) Oral 72 18 91 %   22 0730 117/75 98.3 °F (36.8 °C) Oral 67 18 90 %     Temp (24hrs), Av.7 °F (37.1 °C), Min:98.3 °F (36.8 °C), Max:99.1 °F (37.3 °C)     0701 -  1900  In: -   Out: 250 [Urine:250]    Physical Exam:  Constitutional: Well developed, well nourished male in no acute distress, sitting comfortably in the hospital bed. HEENT: Normocephalic and atraumatic. Oropharynx is clear, mucous membranes are moist.  Pupils are equal, round, and reactive to light. Extraocular muscles are intact. Sclerae anicteric. Skin Warm and dry. No bruising and no rash noted. No erythema. No pallor. Respiratory Lungs are clear to auscultation bilaterally without wheezes, rales or rhonchi, normal air exchange without accessory muscle use. CVS Normal rate, regular rhythm and normal S1 and S2. No murmurs, gallops, or rubs. Abdomen Soft, tender and nondistended, normoactive bowel sounds. Neuro Grossly nonfocal with no obvious sensory or motor deficits. MSK Normal range of motion in general.  No edema and no tenderness. Psych Appropriate mood and affect.         Labs:    Recent Results (from the past 24 hour(s))   Basic Metabolic Panel    Collection Time: 22  5:15 AM   Result Value Ref Range    Sodium 141 136 - 145 mmol/L    Potassium 3.4 (L) 3.5 - 5.1 mmol/L    Chloride 107 98 - 107 mmol/L    CO2 31 21 - 32 mmol/L    Anion Gap 3 (L) 7 - 16 mmol/L    Glucose 104 (H) 65 - 100 mg/dL    BUN 9 8 - 23 MG/DL    CREATININE 0.60 (L) 0.8 - 1.5 MG/DL    GFR African American >60 >60 ml/min/1.73m2    GFR Non- >60 >60 ml/min/1.73m2    Calcium 8.7 8.3 - 10.4 MG/DL   CBC with Auto Differential    Collection Time: 05/29/22  5:15 AM   Result Value Ref Range    WBC 6.5 4.3 - 11.1 K/uL    RBC 4.28 4.23 - 5.6 M/uL    Hemoglobin 12.6 (L) 13.6 - 17.2 g/dL    Hematocrit 38.0 (L) 41.1 - 50.3 %    MCV 88.8 79.6 - 97.8 FL    MCH 29.4 26.1 - 32.9 PG    MCHC 33.2 31.4 - 35.0 g/dL    RDW 12.9 11.9 - 14.6 %    Platelets 575 164 - 051 K/uL    MPV 9.9 9.4 - 12.3 FL    nRBC 0.00 0.0 - 0.2 K/uL    Differential Type AUTOMATED      Seg Neutrophils 68 43 - 78 %    Lymphocytes 20 13 - 44 %    Monocytes 9 4.0 - 12.0 %    Eosinophils % 2 0.5 - 7.8 %    Basophils 1 0.0 - 2.0 %    Immature Granulocytes 0 0.0 - 5.0 %    Segs Absolute 4.4 1.7 - 8.2 K/UL    Absolute Lymph # 1.3 0.5 - 4.6 K/UL    Absolute Mono # 0.6 0.1 - 1.3 K/UL    Absolute Eos # 0.1 0.0 - 0.8 K/UL    Basophils Absolute 0.0 0.0 - 0.2 K/UL    Absolute Immature Granulocyte 0.0 0.0 - 0.5 K/UL       PLAN:  Diffuse peritoneal carcinomatosis  5/29 POD 2. Biopsy results pending. Concern for lymphoma. Labs sent. Lab studies and imaging studies were personally reviewed. Pertinent old records were reviewed. Thank you for allowing us to participate in the care of Mr. Robbie Mahajan. Once biopsy results Dr. Brittney Harrison will decide on treatment plan. LUCY Contreras NP   [facility] Hematology & Oncology  [address] [address]  Office : [phone]  Fax : [phone]         Attending Addendum:  I have personally performed a face to face diagnostic evaluation on this patient. I have reviewed and agree with the care plan as documented by Alex Eduardo N.P. My findings are as follows:  He has abdominal mass, appears weak, heart rate regular without murmurs, abdomen is non-tender, bowel sounds are positive, we will follow-up the results of his biopsy.               Jose Calle MD    [facility] Hematology/Oncology  [address] [address]  Office : [phone]  Fax : [phone]

## 2022-05-29 NOTE — PROGRESS NOTES
Pt complained of hiccups early in the shift  On-call surgery np paged - Order for baclofen received   Per surgery np OK to dc holloway  Pt requested to leave holloway until AM  Holloway dcd 0545 - Pt able to urinate 75cc right after holloway dcd  Pain well controled w/ scheduled tramadol   PRN dilaudid 1mg IV given x's 1 for pt complaint of pain after standing & moving  Pt given IS - instructed on how to use & importance of use as lung sounds are currently diminished upon auscultation     Shift report given to james Toribio RN    Vitals:    05/28/22 1930 05/28/22 2250 05/29/22 0319 05/29/22 0730   BP: (!) 142/84 133/84 135/82 117/75   Pulse: 67 71 72 67   Resp: 17  16 18   Temp: 98.9 °F (37.2 °C) 98.7 °F (37.1 °C) 99.1 °F (37.3 °C) 98.3 °F (36.8 °C)   TempSrc: Oral Oral Oral Oral   SpO2: 94% 91% 91% 90%   Weight:       Height:

## 2022-05-29 NOTE — PROGRESS NOTES
END OF SHIFT:    Shift Summary:    Pt VSS pt A&O X's 4 complain of pain X's 2 gave medication as ordered in MAR no BM during shift voiding well pt not eating much of meals pt did ambulate once in anaya during shift encouraged pt to try and ambulate as often as he can and to sit up in chair more pt/family  verbalized understanding call light in reach instructed to call with nay needs      I/Os:    I/O this shift:  In: -   Out: 750 [Urine:750]  I/O last 3 completed shifts:   In: 0805 [I.V.:3695]  Out: 8945 [Urine:4900; Emesis/NG output:275]    Pipo Shen RN

## 2022-05-30 LAB
ANION GAP SERPL CALC-SCNC: 6 MMOL/L (ref 7–16)
BASOPHILS # BLD: 0.1 K/UL (ref 0–0.2)
BASOPHILS NFR BLD: 1 % (ref 0–2)
BUN SERPL-MCNC: 7 MG/DL (ref 8–23)
CALCIUM SERPL-MCNC: 8.5 MG/DL (ref 8.3–10.4)
CHLORIDE SERPL-SCNC: 105 MMOL/L (ref 98–107)
CO2 SERPL-SCNC: 29 MMOL/L (ref 21–32)
CREAT SERPL-MCNC: 0.6 MG/DL (ref 0.8–1.5)
DIFFERENTIAL METHOD BLD: ABNORMAL
EOSINOPHIL # BLD: 0.3 K/UL (ref 0–0.8)
EOSINOPHIL NFR BLD: 5 % (ref 0.5–7.8)
ERYTHROCYTE [DISTWIDTH] IN BLOOD BY AUTOMATED COUNT: 12.6 % (ref 11.9–14.6)
GLUCOSE SERPL-MCNC: 75 MG/DL (ref 65–100)
HCT VFR BLD AUTO: 38.8 % (ref 41.1–50.3)
HGB BLD-MCNC: 12.6 G/DL (ref 13.6–17.2)
IMM GRANULOCYTES # BLD AUTO: 0 K/UL (ref 0–0.5)
IMM GRANULOCYTES NFR BLD AUTO: 0 % (ref 0–5)
LYMPHOCYTES # BLD: 1.3 K/UL (ref 0.5–4.6)
LYMPHOCYTES NFR BLD: 22 % (ref 13–44)
MCH RBC QN AUTO: 29.2 PG (ref 26.1–32.9)
MCHC RBC AUTO-ENTMCNC: 32.5 G/DL (ref 31.4–35)
MCV RBC AUTO: 89.8 FL (ref 79.6–97.8)
MONOCYTES # BLD: 0.5 K/UL (ref 0.1–1.3)
MONOCYTES NFR BLD: 9 % (ref 4–12)
NEUTS SEG # BLD: 3.8 K/UL (ref 1.7–8.2)
NEUTS SEG NFR BLD: 63 % (ref 43–78)
NRBC # BLD: 0 K/UL (ref 0–0.2)
PLATELET # BLD AUTO: 225 K/UL (ref 150–450)
PMV BLD AUTO: 10.2 FL (ref 9.4–12.3)
POTASSIUM SERPL-SCNC: 3.6 MMOL/L (ref 3.5–5.1)
RBC # BLD AUTO: 4.32 M/UL (ref 4.23–5.6)
SODIUM SERPL-SCNC: 140 MMOL/L (ref 138–145)
WBC # BLD AUTO: 5.9 K/UL (ref 4.3–11.1)

## 2022-05-30 PROCEDURE — 6370000000 HC RX 637 (ALT 250 FOR IP): Performed by: NURSE PRACTITIONER

## 2022-05-30 PROCEDURE — 6360000002 HC RX W HCPCS: Performed by: NURSE PRACTITIONER

## 2022-05-30 PROCEDURE — 2700000000 HC OXYGEN THERAPY PER DAY

## 2022-05-30 PROCEDURE — 85025 COMPLETE CBC W/AUTO DIFF WBC: CPT

## 2022-05-30 PROCEDURE — 94760 N-INVAS EAR/PLS OXIMETRY 1: CPT

## 2022-05-30 PROCEDURE — 2500000003 HC RX 250 WO HCPCS: Performed by: NURSE PRACTITIONER

## 2022-05-30 PROCEDURE — 2580000003 HC RX 258: Performed by: ANESTHESIOLOGY

## 2022-05-30 PROCEDURE — 36415 COLL VENOUS BLD VENIPUNCTURE: CPT

## 2022-05-30 PROCEDURE — 6360000002 HC RX W HCPCS: Performed by: SURGERY

## 2022-05-30 PROCEDURE — 2580000003 HC RX 258: Performed by: SURGERY

## 2022-05-30 PROCEDURE — C9113 INJ PANTOPRAZOLE SODIUM, VIA: HCPCS | Performed by: SURGERY

## 2022-05-30 PROCEDURE — A4216 STERILE WATER/SALINE, 10 ML: HCPCS | Performed by: SURGERY

## 2022-05-30 PROCEDURE — 80048 BASIC METABOLIC PNL TOTAL CA: CPT

## 2022-05-30 PROCEDURE — 2500000003 HC RX 250 WO HCPCS: Performed by: SURGERY

## 2022-05-30 PROCEDURE — 99223 1ST HOSP IP/OBS HIGH 75: CPT | Performed by: NURSE PRACTITIONER

## 2022-05-30 PROCEDURE — 1100000000 HC RM PRIVATE

## 2022-05-30 RX ORDER — HYDROMORPHONE HYDROCHLORIDE 1 MG/ML
1 INJECTION, SOLUTION INTRAMUSCULAR; INTRAVENOUS; SUBCUTANEOUS
Status: DISCONTINUED | OUTPATIENT
Start: 2022-05-30 | End: 2022-05-30

## 2022-05-30 RX ORDER — FENTANYL 50 UG/H
1 PATCH TRANSDERMAL
Status: DISCONTINUED | OUTPATIENT
Start: 2022-05-30 | End: 2022-06-01 | Stop reason: DRUGHIGH

## 2022-05-30 RX ORDER — HYDROMORPHONE HYDROCHLORIDE 1 MG/ML
1 INJECTION, SOLUTION INTRAMUSCULAR; INTRAVENOUS; SUBCUTANEOUS
Status: DISCONTINUED | OUTPATIENT
Start: 2022-05-30 | End: 2022-06-02

## 2022-05-30 RX ORDER — HYDROMORPHONE HYDROCHLORIDE 1 MG/ML
0.5 INJECTION, SOLUTION INTRAMUSCULAR; INTRAVENOUS; SUBCUTANEOUS
Status: DISCONTINUED | OUTPATIENT
Start: 2022-05-30 | End: 2022-06-02

## 2022-05-30 RX ORDER — HYDROMORPHONE HYDROCHLORIDE 1 MG/ML
0.5 INJECTION, SOLUTION INTRAMUSCULAR; INTRAVENOUS; SUBCUTANEOUS
Status: DISCONTINUED | OUTPATIENT
Start: 2022-05-30 | End: 2022-05-30

## 2022-05-30 RX ADMIN — SODIUM CHLORIDE, PRESERVATIVE FREE 10 ML: 5 INJECTION INTRAVENOUS at 14:55

## 2022-05-30 RX ADMIN — HYDROMORPHONE HYDROCHLORIDE 1 MG: 1 INJECTION, SOLUTION INTRAMUSCULAR; INTRAVENOUS; SUBCUTANEOUS at 14:55

## 2022-05-30 RX ADMIN — HYDROMORPHONE HYDROCHLORIDE 1 MG: 1 INJECTION, SOLUTION INTRAMUSCULAR; INTRAVENOUS; SUBCUTANEOUS at 18:02

## 2022-05-30 RX ADMIN — CALCIUM CARBONATE (ANTACID) CHEW TAB 500 MG 500 MG: 500 CHEW TAB at 20:20

## 2022-05-30 RX ADMIN — SODIUM CHLORIDE, PRESERVATIVE FREE 10 ML: 5 INJECTION INTRAVENOUS at 09:24

## 2022-05-30 RX ADMIN — KETOROLAC TROMETHAMINE 30 MG: 30 INJECTION, SOLUTION INTRAMUSCULAR at 12:04

## 2022-05-30 RX ADMIN — KETOROLAC TROMETHAMINE 30 MG: 30 INJECTION, SOLUTION INTRAMUSCULAR at 03:13

## 2022-05-30 RX ADMIN — SODIUM CHLORIDE, SODIUM LACTATE, POTASSIUM CHLORIDE, AND CALCIUM CHLORIDE: 600; 310; 30; 20 INJECTION, SOLUTION INTRAVENOUS at 10:29

## 2022-05-30 RX ADMIN — HYDROMORPHONE HYDROCHLORIDE 1 MG: 1 INJECTION, SOLUTION INTRAMUSCULAR; INTRAVENOUS; SUBCUTANEOUS at 06:56

## 2022-05-30 RX ADMIN — SODIUM CHLORIDE, PRESERVATIVE FREE 40 MG: 5 INJECTION INTRAVENOUS at 09:12

## 2022-05-30 RX ADMIN — SODIUM CHLORIDE, PRESERVATIVE FREE 10 ML: 5 INJECTION INTRAVENOUS at 20:20

## 2022-05-30 RX ADMIN — HYDROMORPHONE HYDROCHLORIDE 1 MG: 1 INJECTION, SOLUTION INTRAMUSCULAR; INTRAVENOUS; SUBCUTANEOUS at 23:54

## 2022-05-30 RX ADMIN — ONDANSETRON 4 MG: 2 INJECTION INTRAMUSCULAR; INTRAVENOUS at 20:20

## 2022-05-30 RX ADMIN — HYDROMORPHONE HYDROCHLORIDE 1 MG: 1 INJECTION, SOLUTION INTRAMUSCULAR; INTRAVENOUS; SUBCUTANEOUS at 00:01

## 2022-05-30 RX ADMIN — HYDROMORPHONE HYDROCHLORIDE 1 MG: 1 INJECTION, SOLUTION INTRAMUSCULAR; INTRAVENOUS; SUBCUTANEOUS at 10:42

## 2022-05-30 RX ADMIN — SODIUM CHLORIDE, SODIUM LACTATE, POTASSIUM CHLORIDE, AND CALCIUM CHLORIDE: 600; 310; 30; 20 INJECTION, SOLUTION INTRAVENOUS at 00:01

## 2022-05-30 RX ADMIN — KETOROLAC TROMETHAMINE 30 MG: 30 INJECTION, SOLUTION INTRAMUSCULAR at 20:20

## 2022-05-30 ASSESSMENT — PAIN DESCRIPTION - ORIENTATION
ORIENTATION: LEFT
ORIENTATION: ANTERIOR
ORIENTATION: ANTERIOR
ORIENTATION: OTHER (COMMENT)
ORIENTATION: MID;UPPER;LEFT;RIGHT
ORIENTATION: ANTERIOR

## 2022-05-30 ASSESSMENT — PAIN DESCRIPTION - DESCRIPTORS
DESCRIPTORS: ACHING;SORE
DESCRIPTORS: ACHING;DISCOMFORT
DESCRIPTORS: ACHING
DESCRIPTORS: ACHING;DISCOMFORT
DESCRIPTORS: ACHING
DESCRIPTORS: ACHING;DISCOMFORT

## 2022-05-30 ASSESSMENT — PAIN DESCRIPTION - LOCATION
LOCATION: ABDOMEN

## 2022-05-30 ASSESSMENT — PAIN - FUNCTIONAL ASSESSMENT
PAIN_FUNCTIONAL_ASSESSMENT: PREVENTS OR INTERFERES SOME ACTIVE ACTIVITIES AND ADLS
PAIN_FUNCTIONAL_ASSESSMENT: PREVENTS OR INTERFERES SOME ACTIVE ACTIVITIES AND ADLS

## 2022-05-30 ASSESSMENT — PAIN SCALES - GENERAL
PAINLEVEL_OUTOF10: 8
PAINLEVEL_OUTOF10: 7
PAINLEVEL_OUTOF10: 0
PAINLEVEL_OUTOF10: 7
PAINLEVEL_OUTOF10: 4
PAINLEVEL_OUTOF10: 7
PAINLEVEL_OUTOF10: 0
PAINLEVEL_OUTOF10: 7
PAINLEVEL_OUTOF10: 0

## 2022-05-30 ASSESSMENT — PAIN DESCRIPTION - FREQUENCY
FREQUENCY: CONTINUOUS
FREQUENCY: CONTINUOUS

## 2022-05-30 ASSESSMENT — PAIN DESCRIPTION - PAIN TYPE
TYPE: OTHER (COMMENT)
TYPE: OTHER (COMMENT)

## 2022-05-30 ASSESSMENT — PAIN DESCRIPTION - ONSET
ONSET: ON-GOING
ONSET: ON-GOING

## 2022-05-30 ASSESSMENT — PAIN SCALES - WONG BAKER: WONGBAKER_NUMERICALRESPONSE: 0

## 2022-05-30 NOTE — CONSULTS
Palliative Care    Patient: David Hilton MRN: [id number]  SSN: [id number]    YOB: 1960  Age: 62 y.o. Sex: male       Date of Request: 5/29/2022  Date of Consult:  5/30/2022  Reason for Consult:  pain and symptom management and goals of care  Requesting Physician: Dr. Alin Waddell NP     Assessment/Plan:     Principal Diagnosis:     Pain, abdomen  R10.9    Additional Diagnoses:   · Constipation, Unspecified  K59.00  · Counseling, Encounter for Medical Advice  Z71.9  · Encounter for Palliative Care  Z51.5    Palliative Performance Scale (PPS):   80%    Medical Decision Making:   Reviewed and summarized chart from admission to present. Discussed case with appropriate providers: Rabia Underwood, primary RN  Reviewed laboratory and x-ray data. Patient resting in bed, his wife and daughter are at the bedside. Patient with venting g tube clamped. He has occasional grimacing from uncontrolled pain; his next IV Dilaudid is due in 40 minutes. Introduced the role of palliative care, both inpatient and our role at the [facility], where patient is established with Dr. Jayla Velasco. Patient's wife shares about the grim outlook following surgery on 5/27. However, she is hopeful after talking to oncology yesterday that maybe it is not as grim as she thought. Validated her emotions. They are hoping for pathology to result by tomorrow afternoon, at which time they can discuss diagnosis and therefore, prognosis, more clearly. In regards to pain, patient states that Dilaudid makes him drowsy, but controls pain adequately. However,  It does not last long enough. In the past 24 hours, patient has had 100 OME of prn IV  Dilaudid. Fentanyl TD increased from 25mcg to 50mcg every 72 hours. Dilaudid ordered 0.5mg-1mg every 3 hours as needed. We also briefly discussed intensol medications available for sublingual use. He says that morphine doesn't last long enough.   Will discuss oxycodone intensol at next visit. Will discuss findings with members of the interdisciplinary team.      Thank you for this referral.          .    Subjective:     History obtained from:  Patient, Family, Care Provider and Chart    Chief Complaint: Abdominal pain  History of Present Illness:  Mr. [facility] is a 64yo male with PMH of tobacco use, HTN, HLD, PUD, and other history as listed below. He has had persistent abdominal pain for several months; CT showed partial SBO and concern for peritoneal metastatic disease (CT 5/12/22). He had his initial visit with Dr. Dre Pope on 5/18. He was rehospitalized on 5/20 for further work up and partial SBO. IR could not identify omental disease amenable for biopsy. Patient had laparotomy with Dr. Sweta Baldwin on 5/27, and found to have diffuse peritoneal carcinomatosis. He had a venting gastrostomy tube placed. Biopsy pending. Oncology following. Palliative care consulted for pain management and goals of care. Advance Directive: No       Code Status:  Full Code            Health Care Power of : No - Patient does not have a [address]     Past Medical History:   Diagnosis Date    Bilateral carpal tunnel syndrome 12/9/2020    Chronic right shoulder pain 11/30/2020    Colon polyps 3/11/2013    Diverticulitis 3/11/2013    HTN (hypertension) 3/11/2013    Hyperlipidemia 3/11/2013    Paresthesia and pain of both upper extremities 11/30/2020    PUD (peptic ulcer disease) 3/11/2013    History of     Right elbow pain 12/9/2020    Wheezing 3/11/2013      Past Surgical History:   Procedure Laterality Date    COLONOSCOPY  2/25/09    colonoscopy per Dr. Pascual Ortiz with need for repeat every 3 years    COLONOSCOPY  02/25/2022    Dr. Shanda Lee; polyps of the ascending, transverse, descending, and sigmoid colons; internal hemorrhoid; diverticulosis    [place] ABDOMEN SURGERY PROC UNLISTED  October 2004    partial colon resection per Dr. Jamee Aragon 02/25/2022    Dr. Acevedo Breath; hiatal hernia gastric polyps     Family History   Problem Relation Age of Onset    No Known Problems Brother     Cancer Sister         breast    Hypertension Mother     Heart Disease Mother     Diabetes Father     Osteoarthritis Father     Alcohol Abuse Father     Hypertension Father     Diabetes Mother       Social History     Tobacco Use    Smoking status: Current Every Day Smoker     Packs/day: 1.00    Smokeless tobacco: Never Used   Substance Use Topics    Alcohol use: No     Prior to Admission medications    Medication Sig Start Date End Date Taking?  Authorizing Provider   acetaminophen (TYLENOL) 500 MG tablet Take 500 mg by mouth every 6 hours as needed for Pain   Yes Historical Provider, MD   diphenhydrAMINE-APAP, sleep, (TYLENOL PM EXTRA STRENGTH)  MG tablet Take 1 tablet by mouth nightly as needed for Sleep   Yes Historical Provider, MD   naloxone 4 MG/0.1ML LIQD nasal spray 1 spray by Nasal route as needed for Opioid Reversal   Yes Historical Provider, MD   pantoprazole (PROTONIX) 40 MG tablet TAKE 1 TABLET BY MOUTH BEFORE BREAKFAST AND DINNER FOR 30 DAYS 5/14/22   Historical Provider, MD   CARAFATE 1 GM/10ML suspension Take 1 g by mouth 4 times daily (before meals and nightly)  5/15/22   Historical Provider, MD   amLODIPine-benazepril (LOTREL) 5-20 MG per capsule TAKE ONE CAPSULE BY MOUTH EVERY DAY 1/3/22   Ar Automatic Reconciliation   diclofenac sodium (VOLTAREN) 1 % GEL Apply 4 g topically 4 times daily 1/10/22   Ar Automatic Reconciliation   dicyclomine (BENTYL) 10 MG capsule Take 20 mg by mouth every 6 hours     Ar Automatic Reconciliation   ibuprofen (ADVIL;MOTRIN) 200 MG tablet Take 200 mg by mouth    Ar Automatic Reconciliation   polyethylene glycol (GLYCOLAX) 17 GM/SCOOP powder Take 17 g by mouth daily 4/18/22   Ar Automatic Reconciliation   rosuvastatin (CRESTOR) 10 MG tablet Take 10 mg by mouth 4/18/22   Ar Automatic Reconciliation umeclidinium-vilanterol (ANORO ELLIPTA) 62.5-25 MCG/INH AEPB inhaler Inhale 1 puff into the lungs daily 1/3/22   Ar Automatic Reconciliation       No Known Allergies     Review of Systems:  A comprehensive review of systems was negative except for:   Gastrointestinal: Positive for abdominal pain. Denies nausea/vomiting with g tube clamped. Objective:     Visit Vitals  /88   Pulse 62   Temp 98.6 °F (37 °C) (Oral)   Resp 15   Ht 5' 10\" (1.778 m)   Wt 201 lb 6.4 oz (91.4 kg)   SpO2 93%   BMI 28.90 kg/m²        Physical Exam:    General:  Cooperative. No acute distress. Eyes:  Conjunctivae/corneas clear. Nose: Nares normal. Septum midline. Neck: Supple, symmetrical, trachea midline. Lungs:   Unlabored   Heart:  Borderline hypertension, regular rate. Abdomen:   Soft, non-distended, venting g tube dressing CDI. Bruise to paracentesis site. Extremities: Normal, atraumatic, no cyanosis or edema   Skin: Skin color, texture, turgor normal. No rash. Neurologic: Nonfocal   Psych: Alert and oriented. Occasional grimacing noted.      Signed By: LUCY Fierro - HEATHER     May 30, 2022

## 2022-05-30 NOTE — PROGRESS NOTES
[facility]  [address] [place] [address]  [phone]    Office Note/H&P/Consult Note   Tabatha Eisenberg   MRN: [id number]     : 1960        HPI: Tabatha Eisenberg is a 62 y.o. male who continue to c/o abd pain, minimal eating due to pain. Current pain regimen appears effective, especially Toradol. Surgical plan is discussed with him, time, purpose of surgery, he understands and agrees to proceed. 22 POD 3. No new concerns. Tolerating CLD. No N/V. AF/NAD.     22: POD2: Pt awake in bed. Feeling better today. Tolerating Clear liquids. No nausea or vomiting. Wants Oncology consult. AF, NAD. Family at bedside. 22: POD1: Reports pain and nausea not controlled. Has been seen by Oncology. Venting G tube to bed side bag; 175mL out last 24 hours. AF, NAD. Family at bedside.      Past Medical History:   Diagnosis Date    Bilateral carpal tunnel syndrome 2020    Chronic right shoulder pain 2020    Colon polyps 3/11/2013    Diverticulitis 3/11/2013    HTN (hypertension) 3/11/2013    Hyperlipidemia 3/11/2013    Paresthesia and pain of both upper extremities 2020    PUD (peptic ulcer disease) 3/11/2013    History of     Right elbow pain 2020    Wheezing 3/11/2013     Past Surgical History:   Procedure Laterality Date    COLONOSCOPY  09    colonoscopy per Dr. Edward Fountain with need for repeat every 3 years    COLONOSCOPY  2022    Dr. Sophia Joy; polyps of the ascending, transverse, descending, and sigmoid colons; internal hemorrhoid; diverticulosis    [place] ABDOMEN SURGERY PROC UNLISTED  2004    partial colon resection per Dr. Leidy Rebolledo  2022    Dr. Sophia Joy; hiatal hernia gastric polyps     Current Facility-Administered Medications   Medication Dose Route Frequency    fentaNYL (DURAGESIC) 50 MCG/HR 1 patch  1 patch TransDERmal Q72H    HYDROmorphone HCl PF (DILAUDID) injection 1 mg  1 mg IntraVENous Q3H PRN    Or    HYDROmorphone HCl PF (DILAUDID) injection 0.5 mg  0.5 mg IntraVENous Q3H PRN    calcium carbonate (TUMS) chewable tablet 500 mg  500 mg Oral TID PRN    phenol 1.4 % mouth spray 1 spray  1 spray Mouth/Throat Q2H PRN    ketorolac (TORADOL) injection 30 mg  30 mg IntraVENous q8h    baclofen (LIORESAL) tablet 10 mg  10 mg Oral TID PRN    lactated ringers infusion   IntraVENous Continuous    sodium chloride flush 0.9 % injection 5-40 mL  5-40 mL IntraVENous 2 times per day    sodium chloride flush 0.9 % injection 5-40 mL  5-40 mL IntraVENous PRN    0.9 % sodium chloride infusion   IntraVENous PRN    ondansetron (ZOFRAN-ODT) disintegrating tablet 4 mg  4 mg Oral Q8H PRN    Or    ondansetron (ZOFRAN) injection 4 mg  4 mg IntraVENous Q6H PRN    enoxaparin (LOVENOX) injection 40 mg  40 mg SubCUTAneous Q24H    nicotine (NICODERM CQ) 14 MG/24HR 1 patch  1 patch TransDERmal Daily    naloxone (NARCAN) injection 0.4 mg  0.4 mg IntraVENous PRN    ondansetron (ZOFRAN) injection 4 mg  4 mg IntraVENous Q4H PRN    pantoprazole (PROTONIX) 40 mg in sodium chloride (PF) 10 mL injection  40 mg IntraVENous Daily    sodium chloride flush 0.9 % injection 5-40 mL  5-40 mL IntraVENous PRN    sodium chloride flush 0.9 % injection 5-40 mL  5-40 mL IntraVENous Q8H     ALLERGIES:  Patient has no known allergies.     Social History     Socioeconomic History    Marital status:      Spouse name: None    Number of children: None    Years of education: None    Highest education level: None   Occupational History    None   Tobacco Use    Smoking status: Current Every Day Smoker     Packs/day: 1.00    Smokeless tobacco: Never Used   Substance and Sexual Activity    Alcohol use: No    Drug use: No    Sexual activity: None   Other Topics Concern    None   Social History Narrative    None     Social Determinants of Health     Financial Resource Strain:     Difficulty of Paying Living Expenses: Not on file   Food Insecurity:     Worried About Running Out of Food in the Last Year: Not on file    Briseida of Food in the Last Year: Not on file   Transportation Needs:     Lack of Transportation (Medical): Not on file    Lack of Transportation (Non-Medical): Not on file   Physical Activity:     Days of Exercise per Week: Not on file    Minutes of Exercise per Session: Not on file   Stress:     Feeling of Stress : Not on file   Social Connections:     Frequency of Communication with Friends and Family: Not on file    Frequency of Social Gatherings with Friends and Family: Not on file    Attends Oriental orthodox Services: Not on file    Active Member of [address] [place] [address] [employer] or Organizations: Not on file    Attends Club or Organization Meetings: Not on file    Marital Status: Not on file   Intimate Partner Violence:     Fear of Current or Ex-Partner: Not on file    Emotionally Abused: Not on file    Physically Abused: Not on file    Sexually Abused: Not on file   Housing Stability:     Unable to Pay for Housing in the Last Year: Not on file    Number of [place] in the Last Year: Not on file    Unstable Housing in the Last Year: Not on file     Social History     Tobacco Use   Smoking Status Current Every Day Smoker    Packs/day: 1.00   Smokeless Tobacco Never Used     Family History   Problem Relation Age of Onset    No Known Problems Brother     Cancer Sister         breast    Hypertension Mother     Heart Disease Mother     Diabetes Father     Osteoarthritis Father     Alcohol Abuse Father     Hypertension Father     Diabetes Mother        ROS: The patient has no difficulty with chest pain or shortness of breath. No fever or chills. Comprehensive review of systems was otherwise unremarkable except as noted above. Physical Exam:   Constitutional: Alert oriented cooperative patient in no acute distress.    BP (!) 143/93   Pulse 64   Temp 97.5 °F (36.4 °C) (Oral)   Resp 16   Ht 5' 10\" (1.778 m)   Wt 201 lb 6.4 oz (91.4 kg)   SpO2 95%   BMI 28.90 kg/m²   Eyes: Sclera are clear, pupils symmetric   ENMT: ears have no obvious external lesions; no obvious neck masses; no lip lesions  CV: RRR. Normal perfusion; no embolic signs  Resp: No JVD. Breathing is  non-labored. No audible wheezing   GI: soft and non-distended,  Appropriately ttp,  Post op  Dressing c/d/i, J tube to bedside bag    Musculoskeletal: no significant asymmetry; normal tone; unremarkable with normal function.    Neuro:  No obvious focal deficits; moves all 4; A&O x3  Psychiatric: normal affect and mood, no memory impairment      Labs:  Lab Results   Component Value Date    WBC 5.9 05/30/2022    HGB 12.6 05/30/2022     05/30/2022     05/30/2022    K 3.6 05/30/2022     05/30/2022    CO2 29 05/30/2022    BUN 7 05/30/2022    ALT 13 05/23/2022       Plan:  Pain/ Nausea control/goals- Palliative consult today  Oncology following  Lovenox  Protonix  Clear Liquids w/ ensure supplement  IVF: LR @ 100  Follow labs  OOB  Venting G tube to bedside bag      LUCY Gloria - CNP

## 2022-05-30 NOTE — PROGRESS NOTES
Chart screened by CM for d/c planning and pt discussed during IDR. On 5-27-22 pt had the following procedures: 1. Diagnostic laparoscopy, switched to laparotomy. 2.  Omental mass and mesentery mass excision. 3.  Gastrostomy tube placement for venting. Pre-Op Dx: Questionable peritoneal carcinomatosis with small bowel obstruction. Post-Op Dx: Diffuse peritoneal carcinomatosis with complete infiltration of mesentery root encasement and small bowel obstruction. This is not surgically resectable even for bypass. Awaiting Bx results. Pt is on a clear liquid diet. There have been no PT/OT consults ordered. Palliative care consult with pt and spouse today. Pt's pain medication adjusted due to pt's pain not being controlled. D/c date undetermined at the present time. CM will continue to follow and remain available if any needs arise.

## 2022-05-30 NOTE — PROGRESS NOTES
END OF SHIFT:    Shift Summary:    Dilaudid given 3x this shift. Pt ambulated the hallway this shift. G tube remains clamped  Dressings to abdomen are intact. Family at bedside pt resting comfortably. I/Os:    I/O this shift:  In: 1550 [P.O.:220; I.V.:1330]  Out: 760 [Urine:760]  I/O last 3 completed shifts:   In: 4171 [P.O.:180; I.V.:3991]  Out: 2900 [Urine:2550; Emesis/NG output:350]    Charmaine Cesar, RN

## 2022-05-30 NOTE — PROGRESS NOTES
PRN tums 500mg chewable tablet given x's 1 for pt report of heartburn   PRN dilaudid 1mg IV given x's 1 for pt report of pain  Pain seems well controlled w/ current regimen  [facility] and pt's request to advance diet - OK to clamp tube - If pt becomes nauseas, unclamp G-tube  Unable to advance diet at this time    Shift report given to james Wilkins RN    Vitals:    05/29/22 1524 05/29/22 2000 05/29/22 2311 05/30/22 0345   BP:  135/84 (!) 150/98 139/87   Pulse:  70 72 85   Resp: 18 16 16 16   Temp:  98.4 °F (36.9 °C) 98.5 °F (36.9 °C) 98.4 °F (36.9 °C)   TempSrc:  Oral Oral Oral   SpO2:  91% 90% 91%   Weight:  201 lb 6.4 oz (91.4 kg)     Height:

## 2022-05-31 PROCEDURE — 1100000000 HC RM PRIVATE

## 2022-05-31 PROCEDURE — 6360000002 HC RX W HCPCS: Performed by: SURGERY

## 2022-05-31 PROCEDURE — C9113 INJ PANTOPRAZOLE SODIUM, VIA: HCPCS | Performed by: SURGERY

## 2022-05-31 PROCEDURE — 6360000002 HC RX W HCPCS: Performed by: NURSE PRACTITIONER

## 2022-05-31 PROCEDURE — 2580000003 HC RX 258: Performed by: ANESTHESIOLOGY

## 2022-05-31 PROCEDURE — 2580000003 HC RX 258: Performed by: SURGERY

## 2022-05-31 PROCEDURE — A4216 STERILE WATER/SALINE, 10 ML: HCPCS | Performed by: SURGERY

## 2022-05-31 PROCEDURE — 6370000000 HC RX 637 (ALT 250 FOR IP): Performed by: NURSE PRACTITIONER

## 2022-05-31 PROCEDURE — 2500000003 HC RX 250 WO HCPCS: Performed by: NURSE PRACTITIONER

## 2022-05-31 PROCEDURE — 6370000000 HC RX 637 (ALT 250 FOR IP): Performed by: SURGERY

## 2022-05-31 RX ADMIN — HYDROMORPHONE HYDROCHLORIDE 1 MG: 1 INJECTION, SOLUTION INTRAMUSCULAR; INTRAVENOUS; SUBCUTANEOUS at 13:54

## 2022-05-31 RX ADMIN — KETOROLAC TROMETHAMINE 30 MG: 30 INJECTION, SOLUTION INTRAMUSCULAR at 03:01

## 2022-05-31 RX ADMIN — ONDANSETRON 4 MG: 4 TABLET, ORALLY DISINTEGRATING ORAL at 10:36

## 2022-05-31 RX ADMIN — HYDROMORPHONE HYDROCHLORIDE 1 MG: 1 INJECTION, SOLUTION INTRAMUSCULAR; INTRAVENOUS; SUBCUTANEOUS at 10:36

## 2022-05-31 RX ADMIN — SODIUM CHLORIDE, PRESERVATIVE FREE 40 MG: 5 INJECTION INTRAVENOUS at 08:15

## 2022-05-31 RX ADMIN — SODIUM CHLORIDE, PRESERVATIVE FREE 10 ML: 5 INJECTION INTRAVENOUS at 08:16

## 2022-05-31 RX ADMIN — SODIUM CHLORIDE, PRESERVATIVE FREE 10 ML: 5 INJECTION INTRAVENOUS at 14:00

## 2022-05-31 RX ADMIN — HYDROMORPHONE HYDROCHLORIDE 0.5 MG: 1 INJECTION, SOLUTION INTRAMUSCULAR; INTRAVENOUS; SUBCUTANEOUS at 05:43

## 2022-05-31 RX ADMIN — SODIUM CHLORIDE, SODIUM LACTATE, POTASSIUM CHLORIDE, AND CALCIUM CHLORIDE: 600; 310; 30; 20 INJECTION, SOLUTION INTRAVENOUS at 12:39

## 2022-05-31 RX ADMIN — CALCIUM CARBONATE (ANTACID) CHEW TAB 500 MG 500 MG: 500 CHEW TAB at 21:48

## 2022-05-31 RX ADMIN — SODIUM CHLORIDE, SODIUM LACTATE, POTASSIUM CHLORIDE, AND CALCIUM CHLORIDE: 600; 310; 30; 20 INJECTION, SOLUTION INTRAVENOUS at 03:01

## 2022-05-31 RX ADMIN — HYDROMORPHONE HYDROCHLORIDE 1 MG: 1 INJECTION, SOLUTION INTRAMUSCULAR; INTRAVENOUS; SUBCUTANEOUS at 16:56

## 2022-05-31 RX ADMIN — BACLOFEN 10 MG: 10 TABLET ORAL at 12:48

## 2022-05-31 RX ADMIN — CALCIUM CARBONATE (ANTACID) CHEW TAB 500 MG 500 MG: 500 CHEW TAB at 03:05

## 2022-05-31 RX ADMIN — HYDROMORPHONE HYDROCHLORIDE 1 MG: 1 INJECTION, SOLUTION INTRAMUSCULAR; INTRAVENOUS; SUBCUTANEOUS at 21:48

## 2022-05-31 RX ADMIN — SODIUM CHLORIDE, PRESERVATIVE FREE 10 ML: 5 INJECTION INTRAVENOUS at 21:48

## 2022-05-31 ASSESSMENT — PAIN DESCRIPTION - DESCRIPTORS
DESCRIPTORS: ACHING;DISCOMFORT
DESCRIPTORS: ACHING

## 2022-05-31 ASSESSMENT — PAIN DESCRIPTION - FREQUENCY
FREQUENCY: CONTINUOUS

## 2022-05-31 ASSESSMENT — PAIN DESCRIPTION - LOCATION
LOCATION: ABDOMEN

## 2022-05-31 ASSESSMENT — PAIN SCALES - WONG BAKER
WONGBAKER_NUMERICALRESPONSE: 0

## 2022-05-31 ASSESSMENT — PAIN SCALES - GENERAL
PAINLEVEL_OUTOF10: 7
PAINLEVEL_OUTOF10: 9
PAINLEVEL_OUTOF10: 3
PAINLEVEL_OUTOF10: 3
PAINLEVEL_OUTOF10: 0
PAINLEVEL_OUTOF10: 0
PAINLEVEL_OUTOF10: 7
PAINLEVEL_OUTOF10: 8
PAINLEVEL_OUTOF10: 0
PAINLEVEL_OUTOF10: 9
PAINLEVEL_OUTOF10: 4
PAINLEVEL_OUTOF10: 0

## 2022-05-31 ASSESSMENT — PAIN - FUNCTIONAL ASSESSMENT
PAIN_FUNCTIONAL_ASSESSMENT: PREVENTS OR INTERFERES SOME ACTIVE ACTIVITIES AND ADLS

## 2022-05-31 ASSESSMENT — PAIN DESCRIPTION - ORIENTATION
ORIENTATION: ANTERIOR
ORIENTATION: OTHER (COMMENT)
ORIENTATION: OTHER (COMMENT)
ORIENTATION: ANTERIOR
ORIENTATION: OTHER (COMMENT)

## 2022-05-31 ASSESSMENT — PAIN DESCRIPTION - PAIN TYPE
TYPE: OTHER (COMMENT)

## 2022-05-31 ASSESSMENT — PAIN DESCRIPTION - ONSET
ONSET: ON-GOING

## 2022-05-31 NOTE — PROGRESS NOTES
Upon assessment, pt complained of increased pain & nausea  Per order, G-tube unclamped - Immediate return of 300cc drainage  Total output per G-tube overnight - 625cc  Pt verbalized decrease in pain & nausea w/ un-clamping of G-tube  PRN tums 500mg chewable tablet given x's 2 for pt report of heartburn  PRN dilaudid 1mg IV given x's 2 for pt report of pain  Zofran 4mg IV given x's 1 for pt report of nausea     Shift report given to james Avery RN    Vitals:    05/30/22 2000 05/30/22 2106 05/30/22 2245 05/31/22 0255   BP: (!) 142/85  133/85 (!) 148/91   Pulse: 63  64 69   Resp:   15 16   Temp: 98.2 °F (36.8 °C)  98.5 °F (36.9 °C) 98.3 °F (36.8 °C)   TempSrc: Oral  Oral Oral   SpO2: 93% 95% 92% 92%   Weight: 201 lb 9.6 oz (91.4 kg)      Height:

## 2022-05-31 NOTE — PROGRESS NOTES
Pt's spouse Eden Shelton [phone] met with MANI to inquire about possible monetary resources to assist with hospital bills/cancer Tx. MANI provided her with contact information for [address] [facility] with the [facility] and Kathryn Hernandez with the [facility] financial office. Per Mariaa's request MANI emailed both individuals with her questions. Oracioclaudine Bishnu became tearful as she discussed having to wait for the Bx results from the peritoneal mass and the stress of not knowing how much the family will owe with all of their hospital bills. CM provided active listening and emotional support. CM will continue to follow and remain available if any needs arise.

## 2022-05-31 NOTE — PROGRESS NOTES
Admit Date: 2022    POD 4 Days Post-Op    Procedure:  Procedure(s):  LAPAROSCOPY DIAGNOSTIC , OPEN EXPLORATORY LAPAROTOMY, MASS EXCISION, G-TUBE PLACEMENT    Subjective: The patient is lying in bed with family at bedside. States that he is feeling better today. He has been tolerating a small amount of clear liquid diet intake. No further nausea or vomiting. Bilious output from G-tube. Objective:       Vitals:    22 2245 22 0255 22 0754 22 1106   BP: 133/85 (!) 148/91 137/85 (!) 142/98   Pulse: 64 69 69 71   Resp: 15    Temp: 98.5 °F (36.9 °C) 98.3 °F (36.8 °C) 98.2 °F (36.8 °C) 98.1 °F (36.7 °C)   TempSrc: Oral Oral Oral Oral   SpO2: 92% 92% 91% 92%   Weight:       Height:           Temp (24hrs), Av.3 °F (36.8 °C), Min:98.1 °F (36.7 °C), Max:98.6 °F (37 °C)  Eyes: Sclera are clear, pupils symmetric   ENMT: ears have no obvious external lesions; no obvious neck masses; no lip lesions  CV: RRR. Normal perfusion; no embolic signs  Resp: No JVD. Breathing is  non-labored. No audible wheezing   GI: soft and non-distended, incisionally tender, J tube to bedside bag, incision is clean, dry, and intact   Musculoskeletal: no significant asymmetry; normal tone; unremarkable with normal function. Neuro:  No obvious focal deficits; moves all 4; A&O x3  Psychiatric: normal affect and mood, no memory impairment    Assessment:     Active Problems:    Severe protein-calorie malnutrition (HCC)    Abdominal mass  Resolved Problems:    * No resolved hospital problems.  *        Plan/Recommendations/Medical Decision Making:     Patient remains admitted for pain control and to discuss goals of care  Palliative care consulted  Oncology following  Lovenox, Protonix  Clear liquid diet  Venting G-tube    Kaylah Arvizu PA-C

## 2022-05-31 NOTE — PROGRESS NOTES
END OF SHIFT:    Shift Summary:    Dilaudid given x 3 this shift  Zofran given x 1  Pt states he had a small amount emesis in trash can pt states he feels better now. No drainage from G tube  I/Os:    I/O this shift:  In: 3971 [P.O.:240; I.V.:1279]  Out: 700 [Urine:700]  I/O last 3 completed shifts:   In: 4593 [P.O.:580; I.V.:4013]  Out: 7370 [Urine:2735; Emesis/NG output:800]    Phoebe Whalen RN

## 2022-06-01 ENCOUNTER — APPOINTMENT (OUTPATIENT)
Dept: GENERAL RADIOLOGY | Age: 62
DRG: 356 | End: 2022-06-01
Payer: COMMERCIAL

## 2022-06-01 ENCOUNTER — APPOINTMENT (OUTPATIENT)
Dept: INTERVENTIONAL RADIOLOGY/VASCULAR | Age: 62
DRG: 356 | End: 2022-06-01
Payer: COMMERCIAL

## 2022-06-01 PROCEDURE — 2500000003 HC RX 250 WO HCPCS: Performed by: RADIOLOGY

## 2022-06-01 PROCEDURE — 2580000003 HC RX 258: Performed by: SURGERY

## 2022-06-01 PROCEDURE — 2500000003 HC RX 250 WO HCPCS: Performed by: NURSE PRACTITIONER

## 2022-06-01 PROCEDURE — 6360000002 HC RX W HCPCS: Performed by: PHYSICIAN ASSISTANT

## 2022-06-01 PROCEDURE — 99232 SBSQ HOSP IP/OBS MODERATE 35: CPT | Performed by: NURSE PRACTITIONER

## 2022-06-01 PROCEDURE — C9113 INJ PANTOPRAZOLE SODIUM, VIA: HCPCS | Performed by: SURGERY

## 2022-06-01 PROCEDURE — 2580000003 HC RX 258: Performed by: ANESTHESIOLOGY

## 2022-06-01 PROCEDURE — 74018 RADEX ABDOMEN 1 VIEW: CPT

## 2022-06-01 PROCEDURE — 6370000000 HC RX 637 (ALT 250 FOR IP): Performed by: RADIOLOGY

## 2022-06-01 PROCEDURE — 1100000000 HC RM PRIVATE

## 2022-06-01 PROCEDURE — C1894 INTRO/SHEATH, NON-LASER: HCPCS

## 2022-06-01 PROCEDURE — 2580000003 HC RX 258: Performed by: RADIOLOGY

## 2022-06-01 PROCEDURE — 6370000000 HC RX 637 (ALT 250 FOR IP): Performed by: NURSE PRACTITIONER

## 2022-06-01 PROCEDURE — A4216 STERILE WATER/SALINE, 10 ML: HCPCS | Performed by: SURGERY

## 2022-06-01 PROCEDURE — 6360000004 HC RX CONTRAST MEDICATION: Performed by: RADIOLOGY

## 2022-06-01 PROCEDURE — 49452 REPLACE G-J TUBE PERC: CPT

## 2022-06-01 PROCEDURE — 0D9670Z DRAINAGE OF STOMACH WITH DRAINAGE DEVICE, VIA NATURAL OR ARTIFICIAL OPENING: ICD-10-PCS | Performed by: RADIOLOGY

## 2022-06-01 PROCEDURE — 6360000002 HC RX W HCPCS: Performed by: SURGERY

## 2022-06-01 PROCEDURE — 6360000002 HC RX W HCPCS: Performed by: RADIOLOGY

## 2022-06-01 RX ORDER — MIDAZOLAM HYDROCHLORIDE 2 MG/2ML
INJECTION, SOLUTION INTRAMUSCULAR; INTRAVENOUS
Status: COMPLETED | OUTPATIENT
Start: 2022-06-01 | End: 2022-06-01

## 2022-06-01 RX ORDER — LORAZEPAM 2 MG/ML
1 INJECTION INTRAMUSCULAR EVERY 4 HOURS PRN
Status: DISCONTINUED | OUTPATIENT
Start: 2022-06-01 | End: 2022-06-08

## 2022-06-01 RX ORDER — LIDOCAINE HYDROCHLORIDE 20 MG/ML
INJECTION, SOLUTION EPIDURAL; INFILTRATION; INTRACAUDAL; PERINEURAL
Status: COMPLETED | OUTPATIENT
Start: 2022-06-01 | End: 2022-06-01

## 2022-06-01 RX ORDER — FENTANYL 75 UG/H
1 PATCH TRANSDERMAL
Status: DISCONTINUED | OUTPATIENT
Start: 2022-06-01 | End: 2022-06-06

## 2022-06-01 RX ORDER — FENTANYL CITRATE 50 UG/ML
INJECTION, SOLUTION INTRAMUSCULAR; INTRAVENOUS
Status: COMPLETED | OUTPATIENT
Start: 2022-06-01 | End: 2022-06-01

## 2022-06-01 RX ORDER — SODIUM CHLORIDE 9 MG/ML
INJECTION, SOLUTION INTRAVENOUS CONTINUOUS PRN
Status: COMPLETED | OUTPATIENT
Start: 2022-06-01 | End: 2022-06-01

## 2022-06-01 RX ORDER — LIDOCAINE HYDROCHLORIDE 20 MG/ML
SOLUTION OROPHARYNGEAL
Status: COMPLETED | OUTPATIENT
Start: 2022-06-01 | End: 2022-06-01

## 2022-06-01 RX ORDER — MIDAZOLAM HYDROCHLORIDE 5 MG/ML
INJECTION INTRAMUSCULAR; INTRAVENOUS
Status: COMPLETED | OUTPATIENT
Start: 2022-06-01 | End: 2022-06-01

## 2022-06-01 RX ORDER — DIPHENHYDRAMINE HYDROCHLORIDE 50 MG/ML
INJECTION INTRAMUSCULAR; INTRAVENOUS
Status: COMPLETED | OUTPATIENT
Start: 2022-06-01 | End: 2022-06-01

## 2022-06-01 RX ADMIN — ONDANSETRON 4 MG: 2 INJECTION INTRAMUSCULAR; INTRAVENOUS at 10:19

## 2022-06-01 RX ADMIN — SODIUM CHLORIDE, PRESERVATIVE FREE 10 ML: 5 INJECTION INTRAVENOUS at 13:38

## 2022-06-01 RX ADMIN — HYDROMORPHONE HYDROCHLORIDE 1 MG: 1 INJECTION, SOLUTION INTRAMUSCULAR; INTRAVENOUS; SUBCUTANEOUS at 13:33

## 2022-06-01 RX ADMIN — LIDOCAINE HYDROCHLORIDE 10 ML: 20 INJECTION, SOLUTION EPIDURAL; INFILTRATION; INTRACAUDAL; PERINEURAL at 17:14

## 2022-06-01 RX ADMIN — HYDROMORPHONE HYDROCHLORIDE 0.5 MG: 1 INJECTION, SOLUTION INTRAMUSCULAR; INTRAVENOUS; SUBCUTANEOUS at 21:02

## 2022-06-01 RX ADMIN — SODIUM CHLORIDE, PRESERVATIVE FREE 40 MG: 5 INJECTION INTRAVENOUS at 08:37

## 2022-06-01 RX ADMIN — HYDROMORPHONE HYDROCHLORIDE 1 MG: 1 INJECTION, SOLUTION INTRAMUSCULAR; INTRAVENOUS; SUBCUTANEOUS at 23:59

## 2022-06-01 RX ADMIN — SODIUM CHLORIDE, PRESERVATIVE FREE 10 ML: 5 INJECTION INTRAVENOUS at 22:01

## 2022-06-01 RX ADMIN — MIDAZOLAM HYDROCHLORIDE 1 MG: 1 INJECTION, SOLUTION INTRAMUSCULAR; INTRAVENOUS at 17:05

## 2022-06-01 RX ADMIN — ONDANSETRON 4 MG: 2 INJECTION INTRAMUSCULAR; INTRAVENOUS at 00:03

## 2022-06-01 RX ADMIN — CALCIUM CARBONATE (ANTACID) CHEW TAB 500 MG 500 MG: 500 CHEW TAB at 10:19

## 2022-06-01 RX ADMIN — MIDAZOLAM HYDROCHLORIDE 1 MG: 1 INJECTION, SOLUTION INTRAMUSCULAR; INTRAVENOUS at 16:45

## 2022-06-01 RX ADMIN — DIPHENHYDRAMINE HYDROCHLORIDE 50 MG: 50 INJECTION, SOLUTION INTRAMUSCULAR; INTRAVENOUS at 16:45

## 2022-06-01 RX ADMIN — SODIUM CHLORIDE 25 ML/HR: 900 INJECTION INTRAVENOUS at 16:42

## 2022-06-01 RX ADMIN — ENOXAPARIN SODIUM 40 MG: 100 INJECTION SUBCUTANEOUS at 21:01

## 2022-06-01 RX ADMIN — SODIUM CHLORIDE, PRESERVATIVE FREE 10 ML: 5 INJECTION INTRAVENOUS at 21:05

## 2022-06-01 RX ADMIN — SODIUM CHLORIDE, SODIUM LACTATE, POTASSIUM CHLORIDE, AND CALCIUM CHLORIDE: 600; 310; 30; 20 INJECTION, SOLUTION INTRAVENOUS at 05:42

## 2022-06-01 RX ADMIN — FENTANYL CITRATE 50 MCG: 50 INJECTION INTRAMUSCULAR; INTRAVENOUS at 17:04

## 2022-06-01 RX ADMIN — FENTANYL CITRATE 50 MCG: 50 INJECTION INTRAMUSCULAR; INTRAVENOUS at 16:45

## 2022-06-01 RX ADMIN — SODIUM CHLORIDE, PRESERVATIVE FREE 8 ML: 5 INJECTION INTRAVENOUS at 08:46

## 2022-06-01 RX ADMIN — HYDROMORPHONE HYDROCHLORIDE 0.5 MG: 1 INJECTION, SOLUTION INTRAMUSCULAR; INTRAVENOUS; SUBCUTANEOUS at 05:49

## 2022-06-01 RX ADMIN — HYDROMORPHONE HYDROCHLORIDE 1 MG: 1 INJECTION, SOLUTION INTRAMUSCULAR; INTRAVENOUS; SUBCUTANEOUS at 18:04

## 2022-06-01 RX ADMIN — HYDROMORPHONE HYDROCHLORIDE 1 MG: 1 INJECTION, SOLUTION INTRAMUSCULAR; INTRAVENOUS; SUBCUTANEOUS at 01:37

## 2022-06-01 RX ADMIN — FENTANYL CITRATE 50 MCG: 50 INJECTION INTRAMUSCULAR; INTRAVENOUS at 16:59

## 2022-06-01 RX ADMIN — HYDROMORPHONE HYDROCHLORIDE 1 MG: 1 INJECTION, SOLUTION INTRAMUSCULAR; INTRAVENOUS; SUBCUTANEOUS at 08:39

## 2022-06-01 RX ADMIN — SODIUM CHLORIDE, SODIUM LACTATE, POTASSIUM CHLORIDE, AND CALCIUM CHLORIDE: 600; 310; 30; 20 INJECTION, SOLUTION INTRAVENOUS at 18:04

## 2022-06-01 RX ADMIN — LORAZEPAM 1 MG: 2 INJECTION INTRAMUSCULAR; INTRAVENOUS at 11:51

## 2022-06-01 RX ADMIN — MIDAZOLAM HYDROCHLORIDE 1 MG: 5 INJECTION INTRAMUSCULAR; INTRAVENOUS at 16:59

## 2022-06-01 RX ADMIN — Medication 10 ML: at 16:57

## 2022-06-01 RX ADMIN — IOPAMIDOL 65 ML: 612 INJECTION, SOLUTION INTRAVENOUS at 17:27

## 2022-06-01 ASSESSMENT — PAIN DESCRIPTION - ORIENTATION
ORIENTATION: LEFT
ORIENTATION: LEFT
ORIENTATION: OTHER (COMMENT)
ORIENTATION: LEFT;MID
ORIENTATION: OTHER (COMMENT)

## 2022-06-01 ASSESSMENT — PAIN DESCRIPTION - LOCATION
LOCATION: ABDOMEN

## 2022-06-01 ASSESSMENT — PAIN DESCRIPTION - ONSET
ONSET: ON-GOING
ONSET: ON-GOING

## 2022-06-01 ASSESSMENT — PAIN SCALES - GENERAL
PAINLEVEL_OUTOF10: 8
PAINLEVEL_OUTOF10: 9
PAINLEVEL_OUTOF10: 7
PAINLEVEL_OUTOF10: 10
PAINLEVEL_OUTOF10: 2
PAINLEVEL_OUTOF10: 2
PAINLEVEL_OUTOF10: 6
PAINLEVEL_OUTOF10: 6
PAINLEVEL_OUTOF10: 5

## 2022-06-01 ASSESSMENT — PAIN DESCRIPTION - PAIN TYPE
TYPE: OTHER (COMMENT)
TYPE: OTHER (COMMENT)

## 2022-06-01 ASSESSMENT — PAIN DESCRIPTION - DESCRIPTORS
DESCRIPTORS: ACHING
DESCRIPTORS: ACHING;DISCOMFORT
DESCRIPTORS: ACHING;SORE;TENDER
DESCRIPTORS: ACHING;DISCOMFORT
DESCRIPTORS: ACHING

## 2022-06-01 ASSESSMENT — PAIN DESCRIPTION - FREQUENCY
FREQUENCY: CONTINUOUS
FREQUENCY: CONTINUOUS

## 2022-06-01 NOTE — PROGRESS NOTES
's initial visit attempted. Palliative Care staff was at bedside due to consult.  follow-up is planned.      Frederik Goldmann, [address]  Board Certified

## 2022-06-01 NOTE — PROGRESS NOTES
Comprehensive Nutrition Assessment    Type and Reason for Visit: Reassess  Malnutrition Screening Tool: Malnutrition Screen  Have you recently lost weight without trying?: 24 to 33 pounds (3 points)  Have you been eating poorly because of a decreased appetite?: No (0 points)  Malnutrition Screening Tool Score: 3    Nutrition Recommendations/Plan:   Meals and Snacks:   Diet: Continue current order NPO   Patient is now D12 with persistent poor PO when allowed to have oral diet. 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 Should consider nutrition support for primary needs with parenteral nutrition likely due to altered gut function. Please consult RD to manage if nutrition support pursued. Malnutrition Assessment:  Malnutrition Status: Severe malnutrition  Context: Chronic Illness  Findings of clinical characteristics of malnutrition:   Energy Intake:  75% or less estimated energy requirements for 1 month or longer (minimal intake for 3 months)  Weight Loss:  Greater than 5% over 1 month (10.5% in one month)     Body Fat Loss:  No significant body fat loss     Muscle Mass Loss:  No significant muscle mass loss    Fluid Accumulation:  Unable to assess     Strength:  Not Performed     Nutrition Assessment:  Nutrition History:    Do You Have Any Cultural, Cheondoism, or Ethnic Food Preferences?: No  5/22 Pt and wife in room discussing hx (daughter present but did not participate). Wife relates his medical problems began in Dec 2021, but weight loss started in March 2022. Has n/v (with limited relief from medications), limited to no appetite. Patient open to ONS, but is put off by the idea of eating or drinking anything at this time for fear of vomiting. Wife states he did tolerate half a sandwich last week, but was unable to finish it. Nutrition Background:   Admitted with abdominal pain for last 6 months. Presented with possible SBO.  being seen by Dr. Mik Mabry with concerns of peritoneal metastatic disease, with unknown starting origin. History of PUD, HTN, hyperlipidemia, and diverticulitis. Nutrition Interval:  5/27: Diagnostic laparoscopy, switched to laparotomy. Omental mass and mesentery mass excision. Gastrostomy tube placement for venting. Findings of \"Diffuse peritoneal carcinomatosis with root of mesentery encasement and small bowel obstruction. \"    Noted events overnight - patient vomiting despite Gtube to drain. Made NPO again. IR consulted to confirm Gtube in appropriate position and to replace if not. Patient seen reclined in bed with several family members present. He states that he was able to tolerate some solid PO prior to surgery. Wife states that patient has only been allowed clear liquids since surgery. She states patient was getting Ensure but surgery states likely should not be drinking that now either (now cancelled). Wife states someone mentioned receiving nutrition in IVF today and asked if RD had been by. Discussed that nutrition plan ultimately per MD discretion but nutrition to continue to follow. Patient only really asking about biopsy results - deferred to primary. Perfect Serve message to JOEL Mathias with surgery, regarding nutrition status and plan/family asking about IV nutrition. He stated that they were waiting on pathology to be able to discuss goals with family. Nutrition Related Findings:   No s/s wasting per NFPE 5/27, Diet history 5/22 NPO, 5/23 Clear, 5/24 soft+ bite sized, low fiber. 5/27: NPO for lap, then CLD.       Current Nutrition Therapies:  Diet NPO    Current Intake:   Average Meal Intake: NPO Average Supplements Intake: Unable to assess      Anthropometric Measures:  Height: 5' 10\" (177.8 cm)  Current Body Wt: 207 lb 0.2 oz (93.9 kg) (5/31), Weight source: Bed Scale  BMI: 29.7  Admission Body Weight: 207 lb (93.9 kg) (not specified source)  Ideal Body Weight (Kg) (Calculated): 75 kg (166 lbs), 119.4 %  Usual Body Wt: 231 lb 5 oz (104.9 kg) (1 mo ago 4/13 office visit), Percent weight change: -10.5       Edema: No data recorded  BMI Category Overweight (BMI 25.0-29. 9)  Estimated Daily Nutrient Needs:  Energy (kcal/day): 1878 -2348 (Kcal/kg (20-25) Weight used: 93.9 kg  (5/31)  Protein (g/day):  Weight Used: (Current) 93.9 kg  Fluid (ml/day):   (1 ml/kcal)    Nutrition Diagnosis:   · Inadequate protein-energy intake related to altered GI function as evidenced by NPO or clear liquid status due to medical condition (venting Gtube)    · Severe malnutrition,In context of chronic illness related to catabolic illness (nausea/vomiting) as evidenced by Criteria as identified in malnutrition assessment    Nutrition Interventions:   Food and/or Nutrient Delivery: Continue NPO     Coordination of Nutrition Care: Continue to monitor while inpatient       Goals:   Previous Goal Met: Progress towards Goal(s) Declining  Active Goal: PO intake 50% or greater,by next RD assessment       Nutrition Monitoring and Evaluation:      Food/Nutrient Intake Outcomes:  (pending GOC)  Physical Signs/Symptoms Outcomes: GI Status,Nausea or Vomiting,Weight    Discharge Planning:     Too soon to determine    Bhakti Martinez, [address] [place] [address], [address], [address]

## 2022-06-01 NOTE — PROGRESS NOTES
Conjunctivae/corneas clear    Nose: Nares normal. Septum midline. Neck: Supple, symmetrical, trachea midline. Lungs:   Unlabored   Heart:  Hypertensive, regular rate. Abdomen:   Soft. Extremities: Normal, atraumatic, no cyanosis or edema. Skin: Skin color, texture, turgor normal. No rash. Neurologic: Nonfocal.   Psych: Alert and oriented.      Signed By: LUCY Murcia - HEATHER     June 1, 2022

## 2022-06-01 NOTE — BRIEF OP NOTE
Department of Interventional Radiology  [phone]        Interventional Radiology Brief Procedure Note    Patient: Naya Rudd MRN: [id number]  SSN: [id number]    YOB: 1960  Age: 62 y.o. Sex: male      Date of Procedure: 6/1/2022    Pre-Procedure Diagnosis:  Painful, dysfunctional G-Tube    Post-Procedure Diagnosis:  SAME    Procedure(s):  Tract recanalized and new G-Tube placed. Brief Description of Procedure:  As above    Performed By:  Ilir Talamantes MD     Assistants:  None    Anesthesia:  Moderate Sedation    Estimated Blood Loss:  None    Specimens:  None    Implants:  20 Fr Balloon Tipped G-Tube (the largest available given peel-away sheath sizes). Findings:  Indwelling G-Tube is NOT in the stomach, Balloon is deflated. New 20 Fr G-Tube is appropriately positioned. Complications:  None    Recommendations:  G-Tube to gravity bag for decompression.        Follow Up:  PRN    Signed By: Ilir Talamantes MD     June 1, 2022

## 2022-06-01 NOTE — PRE SEDATION
Sedation Pre-Procedure Note    Patient Name: Thu    YOB: 1960  Room/Bed: 0/26  Medical Record Number: [id number]  Date: 6/1/2022   Time: 4:26 PM         Vital Signs:   Vitals:    06/01/22 1500   BP: (!) 143/81   Pulse: 73   Resp: 18   Temp: 98.2 °F (36.8 °C)   SpO2: 92%       Mallampati Airway Assessment:  Mallampati Class III - (soft palate & base of uvula are visible)    ASA Classification:  Class 3 - A patient with severe systemic disease that limits activity but is not incapacitating      Electronically signed by Kyree Irizarry MD on 6/1/2022 at 4:26 PM

## 2022-06-01 NOTE — PROGRESS NOTES
Post-procedure report given to [facility] on 5th floor.

## 2022-06-01 NOTE — PROGRESS NOTES
Admit Date: 2022    POD 5 Days Post-Op    Procedure:  Procedure(s):  LAPAROSCOPY DIAGNOSTIC , OPEN EXPLORATORY LAPAROTOMY, MASS EXCISION, G-TUBE PLACEMENT    Subjective: The patient is lying in bed with family at bedside. He had some nausea and emesis overnight. States that he is feeling anxious. Pain remains present, fentanyl was increased by palliative. Objective:       Vitals:    22 2300 22 0321 22 0807 22 1053   BP: 127/72 130/89 (!) 143/90 (!) 147/92   Pulse: 79 74 75 72   Resp: 16 15 16 19   Temp: 98.8 °F (37.1 °C) 98.4 °F (36.9 °C) 98.2 °F (36.8 °C) 98.1 °F (36.7 °C)   TempSrc: Oral Oral Oral Oral   SpO2: 93% 90% 90% 94%   Weight: 207 lb (93.9 kg)      Height:           Temp (24hrs), Av.5 °F (36.9 °C), Min:98.1 °F (36.7 °C), Max:99.1 °F (37.3 °C)  Eyes: Sclera are clear, pupils symmetric   ENMT: ears have no obvious external lesions; no obvious neck masses; no lip lesions  CV: RRR. Normal perfusion; no embolic signs  Resp: No JVD. Breathing is  non-labored. No audible wheezing   GI: soft and non-distended, incisionally tender, G tube to bedside bag, incision is clean, dry, and intact   Musculoskeletal: no significant asymmetry; normal tone; unremarkable with normal function. Neuro:  No obvious focal deficits; moves all 4; A&O x3  Psychiatric: normal affect and mood, no memory impairment    Assessment:     Active Problems:    Severe protein-calorie malnutrition (HCC)    Abdominal mass  Resolved Problems:    * No resolved hospital problems.  *        Plan/Recommendations/Medical Decision Making:     IR consulted to verify placement of G-tube as output has been minimal, if needed we have asked them to replace G-tube  Patient remains admitted for pain control and to discuss goals of care  Ativan added for anxiety  Palliative care following  Oncology following  Lovenox, Protonix  Clear liquid diet    Phil Adhikari PA-C yes

## 2022-06-01 NOTE — PROGRESS NOTES
Pt had increased N/V & pain L abd near G-Tube site  Vomited multiple times  Episode RN able to assess & measure smelled of stool - 300cc out  [facility]  NP inquired if G-Tube was unclamped & draining   G-Tube unclamped yesterday evening due to increased nausea & pain w/ relief when unclamped  Relayed this to NP  Order to flush G-Tube received & order to place NG tube if still vomiting & pt agreeable   Pt made npo  Attempted to flush G-Tube - Pt complained of excruciating pain during  Able to flush G-Tube in AM w/ 10cc, however no return noted prior to end of this shift  Educated pt on NG tube placement  [place] contacted to assess pt as well  Pt declined placement of NG tube at this time  Order for KUB received   PRN dilaudid 1mg IV given x's 2 for pt report of pain  PRN dilaudid 0.5mg IV given x's 1 for pt report of pain  PRN tums 500mg chewable given x's 1 for pt complaint of heartburn    Shift report given to james Snyder RN    Vitals:    05/31/22 1453 05/31/22 2000 05/31/22 2300 06/01/22 0321   BP: (!) 138/91 (!) 142/92 127/72 130/89   Pulse: 69 67 79 74   Resp: 18 17 16 15   Temp: 98.1 °F (36.7 °C) 99.1 °F (37.3 °C) 98.8 °F (37.1 °C) 98.4 °F (36.9 °C)   TempSrc: Oral Oral Oral Oral   SpO2: 96% 93% 93% 90%   Weight:   207 lb (93.9 kg)    Height:

## 2022-06-01 NOTE — PROGRESS NOTES
Palliative care provider at bedside. Provider aware pts pain uncontrolled. Pt is very anxious about pathology results. Pt requesting to speak with surgery team about possible NGT placement. Per Surgery, they will assess pt in an hr.

## 2022-06-01 NOTE — PROGRESS NOTES
Received briefing and referral from staff.  follow-up is planned.      Tina Harvey 69  Board Certified

## 2022-06-01 NOTE — PROGRESS NOTES
Pt returned from IR at this time. G tube replaced with 20 [place] and is draining green/brown drainage to gravity. Pt alert and oriented. Pts family at bedside. Pt medicated per prn pain orders. PT now resting quietly in bed with eyes closed. Hourly rounding completed per protocol during shift.

## 2022-06-01 NOTE — PROGRESS NOTES
Pt moved from IR to biplane room for procedure. Moved self on to table in supine position. Secured to table for safety.

## 2022-06-02 PROCEDURE — 6370000000 HC RX 637 (ALT 250 FOR IP): Performed by: NURSE PRACTITIONER

## 2022-06-02 PROCEDURE — 2500000003 HC RX 250 WO HCPCS: Performed by: SURGERY

## 2022-06-02 PROCEDURE — 2500000003 HC RX 250 WO HCPCS: Performed by: NURSE PRACTITIONER

## 2022-06-02 PROCEDURE — 0D20XUZ CHANGE FEEDING DEVICE IN UPPER INTESTINAL TRACT, EXTERNAL APPROACH: ICD-10-PCS | Performed by: RADIOLOGY

## 2022-06-02 PROCEDURE — 3E0436Z INTRODUCTION OF NUTRITIONAL SUBSTANCE INTO CENTRAL VEIN, PERCUTANEOUS APPROACH: ICD-10-PCS | Performed by: NURSE PRACTITIONER

## 2022-06-02 PROCEDURE — 2580000003 HC RX 258: Performed by: SURGERY

## 2022-06-02 PROCEDURE — 6370000000 HC RX 637 (ALT 250 FOR IP): Performed by: SURGERY

## 2022-06-02 PROCEDURE — 6360000002 HC RX W HCPCS: Performed by: NURSE PRACTITIONER

## 2022-06-02 PROCEDURE — 94760 N-INVAS EAR/PLS OXIMETRY 1: CPT

## 2022-06-02 PROCEDURE — 1100000000 HC RM PRIVATE

## 2022-06-02 PROCEDURE — 2700000000 HC OXYGEN THERAPY PER DAY

## 2022-06-02 PROCEDURE — 99356 PR PROLONGED SVC I/P OR OBS SETTING 1ST HOUR: CPT | Performed by: NURSE PRACTITIONER

## 2022-06-02 PROCEDURE — C9113 INJ PANTOPRAZOLE SODIUM, VIA: HCPCS | Performed by: SURGERY

## 2022-06-02 PROCEDURE — 99233 SBSQ HOSP IP/OBS HIGH 50: CPT | Performed by: NURSE PRACTITIONER

## 2022-06-02 PROCEDURE — 2580000003 HC RX 258: Performed by: ANESTHESIOLOGY

## 2022-06-02 PROCEDURE — C1751 CATH, INF, PER/CENT/MIDLINE: HCPCS

## 2022-06-02 PROCEDURE — 36573 INSJ PICC RS&I 5 YR+: CPT

## 2022-06-02 PROCEDURE — A4216 STERILE WATER/SALINE, 10 ML: HCPCS | Performed by: SURGERY

## 2022-06-02 PROCEDURE — 2580000003 HC RX 258: Performed by: NURSE PRACTITIONER

## 2022-06-02 PROCEDURE — 6360000002 HC RX W HCPCS: Performed by: SURGERY

## 2022-06-02 RX ORDER — OXYCODONE HCL 20 MG/ML
15 CONCENTRATE, ORAL ORAL EVERY 4 HOURS PRN
Status: DISCONTINUED | OUTPATIENT
Start: 2022-06-02 | End: 2022-06-06

## 2022-06-02 RX ORDER — LIDOCAINE HYDROCHLORIDE 10 MG/ML
5 INJECTION, SOLUTION INFILTRATION; PERINEURAL ONCE
Status: COMPLETED | OUTPATIENT
Start: 2022-06-02 | End: 2022-06-02

## 2022-06-02 RX ORDER — SODIUM CHLORIDE 9 MG/ML
25 INJECTION, SOLUTION INTRAVENOUS PRN
Status: DISCONTINUED | OUTPATIENT
Start: 2022-06-02 | End: 2022-06-14 | Stop reason: HOSPADM

## 2022-06-02 RX ORDER — POTASSIUM CHLORIDE 7.45 MG/ML
10 INJECTION INTRAVENOUS PRN
Status: DISCONTINUED | OUTPATIENT
Start: 2022-06-02 | End: 2022-06-14 | Stop reason: HOSPADM

## 2022-06-02 RX ORDER — DIPHENHYDRAMINE HYDROCHLORIDE 50 MG/ML
12.5 INJECTION INTRAMUSCULAR; INTRAVENOUS EVERY 6 HOURS PRN
Status: DISCONTINUED | OUTPATIENT
Start: 2022-06-02 | End: 2022-06-08

## 2022-06-02 RX ORDER — MAGNESIUM SULFATE IN WATER 40 MG/ML
2000 INJECTION, SOLUTION INTRAVENOUS PRN
Status: DISCONTINUED | OUTPATIENT
Start: 2022-06-02 | End: 2022-06-14 | Stop reason: HOSPADM

## 2022-06-02 RX ORDER — HYDROMORPHONE HYDROCHLORIDE 1 MG/ML
1 INJECTION, SOLUTION INTRAMUSCULAR; INTRAVENOUS; SUBCUTANEOUS EVERY 4 HOURS PRN
Status: DISCONTINUED | OUTPATIENT
Start: 2022-06-02 | End: 2022-06-06

## 2022-06-02 RX ORDER — SODIUM CHLORIDE 0.9 % (FLUSH) 0.9 %
5-40 SYRINGE (ML) INJECTION EVERY 12 HOURS SCHEDULED
Status: DISCONTINUED | OUTPATIENT
Start: 2022-06-02 | End: 2022-06-14 | Stop reason: HOSPADM

## 2022-06-02 RX ORDER — SODIUM CHLORIDE 0.9 % (FLUSH) 0.9 %
5-40 SYRINGE (ML) INJECTION PRN
Status: DISCONTINUED | OUTPATIENT
Start: 2022-06-02 | End: 2022-06-14 | Stop reason: HOSPADM

## 2022-06-02 RX ORDER — POTASSIUM CHLORIDE 29.8 MG/ML
20 INJECTION INTRAVENOUS PRN
Status: DISCONTINUED | OUTPATIENT
Start: 2022-06-02 | End: 2022-06-14 | Stop reason: HOSPADM

## 2022-06-02 RX ORDER — HYDROMORPHONE HYDROCHLORIDE 2 MG/ML
1.5 INJECTION, SOLUTION INTRAMUSCULAR; INTRAVENOUS; SUBCUTANEOUS EVERY 4 HOURS PRN
Status: DISCONTINUED | OUTPATIENT
Start: 2022-06-02 | End: 2022-06-06

## 2022-06-02 RX ADMIN — SODIUM CHLORIDE, SODIUM LACTATE, POTASSIUM CHLORIDE, AND CALCIUM CHLORIDE: 600; 310; 30; 20 INJECTION, SOLUTION INTRAVENOUS at 04:28

## 2022-06-02 RX ADMIN — BACLOFEN 10 MG: 10 TABLET ORAL at 09:36

## 2022-06-02 RX ADMIN — SODIUM CHLORIDE, PRESERVATIVE FREE 10 ML: 5 INJECTION INTRAVENOUS at 05:11

## 2022-06-02 RX ADMIN — HYDROMORPHONE HYDROCHLORIDE 1.5 MG: 2 INJECTION INTRAMUSCULAR; INTRAVENOUS; SUBCUTANEOUS at 21:03

## 2022-06-02 RX ADMIN — OXYCODONE HYDROCHLORIDE 15 MG: 100 SOLUTION ORAL at 20:09

## 2022-06-02 RX ADMIN — SODIUM CHLORIDE, PRESERVATIVE FREE 40 MG: 5 INJECTION INTRAVENOUS at 08:11

## 2022-06-02 RX ADMIN — SODIUM CHLORIDE, PRESERVATIVE FREE 10 ML: 5 INJECTION INTRAVENOUS at 22:23

## 2022-06-02 RX ADMIN — SODIUM CHLORIDE, PRESERVATIVE FREE 10 ML: 5 INJECTION INTRAVENOUS at 08:15

## 2022-06-02 RX ADMIN — ONDANSETRON 4 MG: 2 INJECTION INTRAMUSCULAR; INTRAVENOUS at 23:28

## 2022-06-02 RX ADMIN — SODIUM CHLORIDE, PRESERVATIVE FREE 10 ML: 5 INJECTION INTRAVENOUS at 14:00

## 2022-06-02 RX ADMIN — SODIUM CHLORIDE, SODIUM LACTATE, POTASSIUM CHLORIDE, AND CALCIUM CHLORIDE: 600; 310; 30; 20 INJECTION, SOLUTION INTRAVENOUS at 13:45

## 2022-06-02 RX ADMIN — HYDROMORPHONE HYDROCHLORIDE 1 MG: 1 INJECTION, SOLUTION INTRAMUSCULAR; INTRAVENOUS; SUBCUTANEOUS at 17:03

## 2022-06-02 RX ADMIN — HYDROMORPHONE HYDROCHLORIDE 1 MG: 1 INJECTION, SOLUTION INTRAMUSCULAR; INTRAVENOUS; SUBCUTANEOUS at 04:20

## 2022-06-02 RX ADMIN — CALCIUM GLUCONATE: 98 INJECTION, SOLUTION INTRAVENOUS at 17:54

## 2022-06-02 RX ADMIN — HYDROMORPHONE HYDROCHLORIDE 1.5 MG: 2 INJECTION INTRAMUSCULAR; INTRAVENOUS; SUBCUTANEOUS at 13:18

## 2022-06-02 RX ADMIN — LIDOCAINE HYDROCHLORIDE 5 ML: 10 INJECTION, SOLUTION INFILTRATION; PERINEURAL at 16:30

## 2022-06-02 RX ADMIN — ENOXAPARIN SODIUM 40 MG: 100 INJECTION SUBCUTANEOUS at 19:59

## 2022-06-02 RX ADMIN — ONDANSETRON 4 MG: 2 INJECTION INTRAMUSCULAR; INTRAVENOUS at 04:20

## 2022-06-02 RX ADMIN — HYDROMORPHONE HYDROCHLORIDE 1 MG: 1 INJECTION, SOLUTION INTRAMUSCULAR; INTRAVENOUS; SUBCUTANEOUS at 11:11

## 2022-06-02 ASSESSMENT — PAIN SCALES - GENERAL
PAINLEVEL_OUTOF10: 3
PAINLEVEL_OUTOF10: 9
PAINLEVEL_OUTOF10: 0
PAINLEVEL_OUTOF10: 6
PAINLEVEL_OUTOF10: 6
PAINLEVEL_OUTOF10: 8
PAINLEVEL_OUTOF10: 4
PAINLEVEL_OUTOF10: 3
PAINLEVEL_OUTOF10: 8

## 2022-06-02 ASSESSMENT — PAIN DESCRIPTION - ORIENTATION
ORIENTATION: RIGHT;LEFT

## 2022-06-02 ASSESSMENT — PAIN DESCRIPTION - LOCATION
LOCATION: ABDOMEN

## 2022-06-02 ASSESSMENT — PAIN DESCRIPTION - DESCRIPTORS
DESCRIPTORS: ACHING;DISCOMFORT;CRAMPING
DESCRIPTORS: CRAMPING
DESCRIPTORS: SPASM
DESCRIPTORS: ACHING

## 2022-06-02 NOTE — PLAN OF CARE
Problem: Safety - Adult  Goal: Free from fall injury  Outcome: Progressing     Problem: ABCDS Injury Assessment  Goal: Absence of physical injury  Outcome: Progressing     Problem: ABCDS Injury Assessment  Goal: Absence of physical injury  Outcome: Progressing

## 2022-06-02 NOTE — PROGRESS NOTES
Admit Date: 2022    POD 6 Days Post-Op    Procedure:  Procedure(s):  LAPAROSCOPY DIAGNOSTIC , OPEN EXPLORATORY LAPAROTOMY, MASS EXCISION, G-TUBE PLACEMENT    Subjective: The patient is lying in bed with family at bedside. He feels better since having G-tube replaced. He states his nausea has improved. He states he is thirsty. He states he gets some cramping abdominal pain at times. Objective:       Vitals:    22 2009 22 2341 22 0317 22 0850   BP: 132/87 138/82 132/81 (!) 137/91   Pulse: 76 90 79 71   Resp: 17 17 16 18   Temp: 98.5 °F (36.9 °C) 98.9 °F (37.2 °C) 98.2 °F (36.8 °C) 97.5 °F (36.4 °C)   TempSrc: Oral Oral Oral Oral   SpO2: 94% 92% 96% 96%   Weight:       Height:           Temp (24hrs), Av.3 °F (36.8 °C), Min:97.5 °F (36.4 °C), Max:98.9 °F (37.2 °C)  Eyes: Sclera are clear, pupils symmetric   ENMT: ears have no obvious external lesions; no obvious neck masses; no lip lesions  CV: RRR. Normal perfusion; no embolic signs  Resp: No JVD. Breathing is  non-labored. No audible wheezing   GI: soft and non-distended, incisionally tender, G tube to bedside bag, incision is clean, dry, and intact   Musculoskeletal: no significant asymmetry; normal tone; unremarkable with normal function. Neuro:  No obvious focal deficits; moves all 4; A&O x3  Psychiatric: normal affect and mood, no memory impairment    Assessment:     Active Problems:    Severe protein-calorie malnutrition (HCC)    Abdominal mass  Resolved Problems:    * No resolved hospital problems.  *        Plan/Recommendations/Medical Decision Making:     G-tube intact and draining  Patient remains admitted for pain control and to discuss goals of care  Ativan added for anxiety  Palliative care following  Oncology following  Lovenox, Protonix  Clear liquid diet    Danae Mendez PA-C

## 2022-06-02 NOTE — PROGRESS NOTES
PICC Placement Note    PRE-PROCEDURE VERIFICATION    Correct Procedure: yes  Time out completed with assistant Lee Jordan RN, [facility] and all persons present in agreement with time out. Risks and benefits reviewed with patient and informed consent obtained prior to assessment and procedure. Correct Site: yes  Temperature: Temp: 98.1 °F (36.7 °C), Temperature Source:    No results for input(s): BUN, CREA, PLT, INR, WBC in the last 72 hours. Invalid input(s): APTHR  Allergies: Patient has no known allergies. Education materials for [facility] given to patient or family. PROCEDURE DETAIL  A double lumen PICC line was started for Other (comment). The following documentation is in addition to the PICC properties in the lines/airways flowsheet:    Lot #: [id number]  Xylocaine used: Yes  Mid-Arm Circumference: 34 (cm)  Internal Catheter Length: 42 (cm)  External Catheter Length: 0 (cm)  Total Catheter Length: 42 (cm)  Vein Selection for PICC: right basilic  Central Line Insertion Bundle followed: Yes  Complication Related to Insertion: none    Both the insertion guidewire and ECG guidewire were removed intact all ports have positive blood return and were flush well with normal saline. The location of the tip of the PICC is verified using ECG technology. The tip is in the SVC per ECG reading. See image below.              Line is okay to use: yes      Saloni Dupont RN, DR FRANCOIS [facility]

## 2022-06-02 NOTE — CONSULTS
5/22 Pt and wife in room discussing hx (daughter present but did not participate). Wife relates his medical problems began in Dec 2021, but weight loss started in March 2022. Has n/v (with limited relief from medications), limited to no appetite. Patient open to ONS, but is put off by the idea of eating or drinking anything at this time for fear of vomiting. Wife states he did tolerate half a sandwich last week, but was unable to finish it. Nutrition Background:   Admitted with abdominal pain for last 6 months. Presented with possible SBO.  being seen by Dr. Mariana Martinez with concerns of peritoneal metastatic disease, with unknown starting origin. History of PUD, HTN, hyperlipidemia, and diverticulitis. Nutrition Interval:  5/27: Diagnostic laparoscopy, switched to laparotomy. Omental mass and mesentery mass excision. Gastrostomy tube placement for venting. Findings of \"Diffuse peritoneal carcinomatosis with root of mesentery encasement and small bowel obstruction. \"  6/1: s/p venting gtube exchange. Patient seen early per discussion with Ally Adame, NINFA with palliative care. She has messaged surgery regarding nutrition and okay to start PN. PICC placement ordered and currently with only 1 PIV. CLD resumed today. Patient seen with wife and sister present. They state that NP has already explained PN and they have no questions. Patient tearful during visit. Explained starting low with slow progression due to prolonged NPO/CLD/poor PO status. Discussed with RN, Gia García. Patient is high risk for refeeding. Currently on IVF without dextrose provisions.    Abdominal Status (last documented):   Last BM (including prior to admit): 05/20/22 (per pt), GI Symptoms: Hiccuping,Constipation Gtube output: 500 ml thus far today  Pertinent Medications: baclofen PRN, Tums, Zofran PRN (utilizing daily), Protonix  IVF: LR @ 100 ml/hr  Pertinent Labs:   Lab Results   Component Value Date     05/30/2022    K 3.6 05/30/2022     05/30/2022    CO2 29 05/30/2022    BUN 7 05/30/2022    CREATININE 0.60 05/30/2022    GLUCOSE 75 05/30/2022    CALCIUM 8.5 05/30/2022   Remarks: K low normal     Nutrition Related Findings:   No s/s wasting per NFPE 5/27, Diet history 5/22 NPO, 5/23 Clear, 5/24 soft+ bite sized, low fiber. 5/27: NPO for lap, then CLD. 6/1: NPO overnight due to vomiting despite Gtube to drain. Current Nutrition Therapies:  ADULT DIET; Clear Liquid    Current Intake:   Average Meal Intake:  (CLD does not meet needs + Gtube to drain) Average Supplements Intake: Unable to assess      Anthropometric Measures:  Height: 5' 10\" (177.8 cm)  Current Body Wt: 207 lb 0.2 oz (93.9 kg) (5/31), Weight source: Bed Scale  BMI: 29.7  Admission Body Weight: 207 lb (93.9 kg) (not specified source)  Ideal Body Weight (Kg) (Calculated): 75 kg (166 lbs), 119.4 %  Usual Body Wt: 231 lb 5 oz (104.9 kg) (1 mo ago 4/13 office visit), Percent weight change: -10.5       Edema: No data recorded  BMI Category Overweight (BMI 25.0-29. 9)  Estimated Daily Nutrient Needs:  Energy (kcal/day): 1878 -2348 (Kcal/kg (20-25) Weight used: 93.9 kg  (5/31)  Protein (g/day):  (1-1.2 g/kg) Weight Used: (Current) 93.9 kg  Fluid (ml/day):   (1 ml/kcal)    Nutrition Diagnosis:   · Inadequate protein-energy intake related to altered GI function as evidenced by NPO or clear liquid status due to medical condition (venting Gtube)    · Severe malnutrition,In context of chronic illness related to catabolic illness (nausea/vomiting) as evidenced by Criteria as identified in malnutrition assessment    Nutrition Interventions:   Food and/or Nutrient Delivery: Continue Current Diet,Start Parenteral Nutrition     Coordination of Nutrition Care: Continue to monitor while inpatient       Goals:   Previous Goal Met: Progress towards Goal(s) Declining  Active Goal: Tolerate nutrition support at goal rate (within 5 days)       Nutrition Monitoring and Evaluation:      Food/Nutrient

## 2022-06-02 NOTE — PROGRESS NOTES
Palliative Care Progress Note    Patient: Franky Foley MRN: [id number]  SSN: [id number]    YOB: 1960  Age: 62 y.o. Sex: male       Assessment/Plan:     Chief Complaint/Interval History: Abdominal pain     Principal Diagnosis:    · Pain, abdomen  R10.9    Additional Diagnoses:   · Debility, Unspecified  R53.81  · Fatigue, Lethargy  R53.83  · Nausea/Vomiting  R11.2  · Counseling, Encounter for Medical Advice  Z71.9  · Encounter for Palliative Care  Z51.5    Palliative Performance Scale (PPS): 80%       Medical Decision Making:   Reviewed and summarized notes over previous 24 hours  Discussed case with appropriate providers: Anita Lawson RN; Kel Griffith; Kelsi Bennett RD  Reviewed laboratory and x-ray data. Pt resting in bed, appears uncomfortable. Wife, daughter, and several other family members at bedside. Pt reports ongoing abdominal pain. He states the pain improved after his G tube was re-adjusted yesterday, but has worsened through the night. He reports a cramping sensation, as well as a dull ache. He reports no improvement in his pain with the increase in the Fentanyl patch, and also feels the Dilaudid 1 mg IV has not been as effective. Pt voiced a lot of frustration with his hospitalization, mostly with the length of time it is taking to get his biopsy results back. Pt's wife voiced concern about pt's nutrition status, stating he has essentially been NPO since admission. Allowed pt and wife to express their emotions and concerns, and offered help where I was able. Will increase his Dilaudid to 1 mg IV q 4 hours PRN, and Dilaudid 1.5 mg IV q 4 hours PRN. This allows pt to alternate the dosing and have the interval closer together. Will also add Oxycodone Intensol 15 mg SL q 4 hours. Pt also has complaint of an itchy rash to his back, which he states started prior to the hospitalization. Will order Benadryl 12.5 mg IV q 6 hours PRN.  Offered to discuss nutrition with Dr Annita Garcia, which family was appreciative of. I spoke with JOEL Garcia with General surgery. Although TPN is not going to change the ultimate outcome of pt's disease, it will give him the ability to heal post operatively, and hopefully pursue some treatment, which will give him more time with his family. He has expressed that he will do anything for more time with his family. Dr Annita Garcia is agreeable to TPN. Orders placed for Nutrition consult for TPN initiation and management, as well as PICC placement. Discussed above with pt and family. Assured them of our ongoing care. Will continue to follow. Will discuss findings with members of the interdisciplinary team.         In addition to complete visit, prolonged Time In: 1000     Time Out: 1035   Total time spent:  70 minutes     Subjective:     Review of Systems:  A comprehensive review of systems was negative except for:   Gastrointestinal: Positive for Abdominal pain, nausea, vomiting. Objective:     Visit Vitals  BP (!) 137/91   Pulse 71   Temp 97.5 °F (36.4 °C) (Oral)   Resp 18   Ht 5' 10\" (1.778 m)   Wt 207 lb (93.9 kg)   SpO2 96%   BMI 29.70 kg/m²       Physical Exam:    General:  Cooperative. No acute distress. Eyes:  Conjunctivae/corneas clear    Nose: Nares normal. Septum midline. Neck: Supple, symmetrical, trachea midline. Lungs:   Unlabored   Heart:  Hypertensive, regular rate. Abdomen:   Soft. Extremities: Normal, atraumatic, no cyanosis or edema. Skin: Skin color, texture, turgor normal. No rash. Neurologic: Nonfocal.   Psych: Alert and oriented.      Signed By: LUCY Salinas - CNP     June 2, 2022

## 2022-06-03 ENCOUNTER — ANESTHESIA EVENT (OUTPATIENT)
Dept: SURGERY | Age: 62
DRG: 356 | End: 2022-06-03
Payer: COMMERCIAL

## 2022-06-03 ENCOUNTER — APPOINTMENT (OUTPATIENT)
Dept: GENERAL RADIOLOGY | Age: 62
DRG: 356 | End: 2022-06-03
Payer: COMMERCIAL

## 2022-06-03 ENCOUNTER — ANESTHESIA (OUTPATIENT)
Dept: SURGERY | Age: 62
DRG: 356 | End: 2022-06-03
Payer: COMMERCIAL

## 2022-06-03 LAB
ALBUMIN SERPL ELPH-MCNC: 2.8 G/DL (ref 2.9–4.4)
ALBUMIN/GLOB SERPL: 1.2 {RATIO} (ref 0.7–1.7)
ALPHA1 GLOB SERPL ELPH-MCNC: 0.4 G/DL (ref 0–0.4)
ALPHA2 GLOB SERPL ELPH-MCNC: 0.7 G/DL (ref 0.4–1)
ANION GAP SERPL CALC-SCNC: 8 MMOL/L (ref 7–16)
B-GLOBULIN SERPL ELPH-MCNC: 0.7 G/DL (ref 0.7–1.3)
BUN SERPL-MCNC: 7 MG/DL (ref 8–23)
CALCIUM SERPL-MCNC: 8.6 MG/DL (ref 8.3–10.4)
CHLORIDE SERPL-SCNC: 102 MMOL/L (ref 98–107)
CO2 SERPL-SCNC: 30 MMOL/L (ref 21–32)
CREAT SERPL-MCNC: 0.5 MG/DL (ref 0.8–1.5)
GAMMA GLOB SERPL ELPH-MCNC: 0.6 G/DL (ref 0.4–1.8)
GLOBULIN SER-MCNC: 2.4 G/DL (ref 2.2–3.9)
GLUCOSE SERPL-MCNC: 77 MG/DL (ref 65–100)
IGA SERPL-MCNC: 143 MG/DL (ref 90–386)
IGG SERPL-MCNC: 680 MG/DL (ref 603–1613)
IGM SERPL-MCNC: 44 MG/DL (ref 20–172)
INTERPRETATION SERPL IEP-IMP: ABNORMAL
M PROTEIN SERPL ELPH-MCNC: ABNORMAL G/DL
MAGNESIUM SERPL-MCNC: 2.1 MG/DL (ref 1.8–2.4)
PHOSPHATE SERPL-MCNC: 3.7 MG/DL (ref 2.3–3.7)
POTASSIUM SERPL-SCNC: 3.4 MMOL/L (ref 3.5–5.1)
PROT SERPL-MCNC: 5.2 G/DL (ref 6–8.5)
SODIUM SERPL-SCNC: 140 MMOL/L (ref 136–145)
TRIGL SERPL-MCNC: 117 MG/DL (ref 35–150)

## 2022-06-03 PROCEDURE — 84100 ASSAY OF PHOSPHORUS: CPT

## 2022-06-03 PROCEDURE — 6370000000 HC RX 637 (ALT 250 FOR IP): Performed by: SURGERY

## 2022-06-03 PROCEDURE — APPSS45 APP SPLIT SHARED TIME 31-45 MINUTES: Performed by: NURSE PRACTITIONER

## 2022-06-03 PROCEDURE — 2580000003 HC RX 258: Performed by: NURSE ANESTHETIST, CERTIFIED REGISTERED

## 2022-06-03 PROCEDURE — 2709999900 HC NON-CHARGEABLE SUPPLY: Performed by: SURGERY

## 2022-06-03 PROCEDURE — 6360000002 HC RX W HCPCS: Performed by: NURSE ANESTHETIST, CERTIFIED REGISTERED

## 2022-06-03 PROCEDURE — 84478 ASSAY OF TRIGLYCERIDES: CPT

## 2022-06-03 PROCEDURE — 3700000000 HC ANESTHESIA ATTENDED CARE: Performed by: SURGERY

## 2022-06-03 PROCEDURE — 71045 X-RAY EXAM CHEST 1 VIEW: CPT

## 2022-06-03 PROCEDURE — 7100000000 HC PACU RECOVERY - FIRST 15 MIN: Performed by: SURGERY

## 2022-06-03 PROCEDURE — 2580000003 HC RX 258: Performed by: SURGERY

## 2022-06-03 PROCEDURE — 6360000002 HC RX W HCPCS: Performed by: SURGERY

## 2022-06-03 PROCEDURE — 2500000003 HC RX 250 WO HCPCS: Performed by: SURGERY

## 2022-06-03 PROCEDURE — A4216 STERILE WATER/SALINE, 10 ML: HCPCS | Performed by: SURGERY

## 2022-06-03 PROCEDURE — 99232 SBSQ HOSP IP/OBS MODERATE 35: CPT | Performed by: INTERNAL MEDICINE

## 2022-06-03 PROCEDURE — 2580000003 HC RX 258: Performed by: NURSE PRACTITIONER

## 2022-06-03 PROCEDURE — 83735 ASSAY OF MAGNESIUM: CPT

## 2022-06-03 PROCEDURE — C9113 INJ PANTOPRAZOLE SODIUM, VIA: HCPCS | Performed by: SURGERY

## 2022-06-03 PROCEDURE — 3600000002 HC SURGERY LEVEL 2 BASE: Performed by: SURGERY

## 2022-06-03 PROCEDURE — 0JH60WZ INSERTION OF TOTALLY IMPLANTABLE VASCULAR ACCESS DEVICE INTO CHEST SUBCUTANEOUS TISSUE AND FASCIA, OPEN APPROACH: ICD-10-PCS | Performed by: SURGERY

## 2022-06-03 PROCEDURE — 6360000002 HC RX W HCPCS: Performed by: NURSE PRACTITIONER

## 2022-06-03 PROCEDURE — 1100000000 HC RM PRIVATE

## 2022-06-03 PROCEDURE — 2500000003 HC RX 250 WO HCPCS: Performed by: NURSE PRACTITIONER

## 2022-06-03 PROCEDURE — 80048 BASIC METABOLIC PNL TOTAL CA: CPT

## 2022-06-03 PROCEDURE — 3600000012 HC SURGERY LEVEL 2 ADDTL 15MIN: Performed by: SURGERY

## 2022-06-03 PROCEDURE — 3700000001 HC ADD 15 MINUTES (ANESTHESIA): Performed by: SURGERY

## 2022-06-03 PROCEDURE — 36561 INSERT TUNNELED CV CATH: CPT | Performed by: SURGERY

## 2022-06-03 PROCEDURE — C1788 PORT, INDWELLING, IMP: HCPCS | Performed by: SURGERY

## 2022-06-03 PROCEDURE — 7100000001 HC PACU RECOVERY - ADDTL 15 MIN: Performed by: SURGERY

## 2022-06-03 PROCEDURE — 02HV33Z INSERTION OF INFUSION DEVICE INTO SUPERIOR VENA CAVA, PERCUTANEOUS APPROACH: ICD-10-PCS | Performed by: SURGERY

## 2022-06-03 PROCEDURE — 77001 FLUOROGUIDE FOR VEIN DEVICE: CPT | Performed by: SURGERY

## 2022-06-03 DEVICE — PORT INFUS 8FR PLAS ATTCH OPN END POLYUR CATH SIL FILL SUT: Type: IMPLANTABLE DEVICE | Site: CHEST | Status: FUNCTIONAL

## 2022-06-03 RX ORDER — LIDOCAINE HYDROCHLORIDE 20 MG/ML
INJECTION, SOLUTION EPIDURAL; INFILTRATION; INTRACAUDAL; PERINEURAL PRN
Status: DISCONTINUED | OUTPATIENT
Start: 2022-06-03 | End: 2022-06-03 | Stop reason: ALTCHOICE

## 2022-06-03 RX ORDER — POTASSIUM CHLORIDE 7.45 MG/ML
10 INJECTION INTRAVENOUS ONCE
Status: COMPLETED | OUTPATIENT
Start: 2022-06-03 | End: 2022-06-03

## 2022-06-03 RX ORDER — MIDAZOLAM HYDROCHLORIDE 1 MG/ML
INJECTION INTRAMUSCULAR; INTRAVENOUS PRN
Status: DISCONTINUED | OUTPATIENT
Start: 2022-06-03 | End: 2022-06-03 | Stop reason: SDUPTHER

## 2022-06-03 RX ORDER — HEPARIN SODIUM (PORCINE) LOCK FLUSH IV SOLN 100 UNIT/ML 100 UNIT/ML
SOLUTION INTRAVENOUS PRN
Status: DISCONTINUED | OUTPATIENT
Start: 2022-06-03 | End: 2022-06-03 | Stop reason: ALTCHOICE

## 2022-06-03 RX ORDER — 0.9 % SODIUM CHLORIDE 0.9 %
INTRAVENOUS SOLUTION INTRAVENOUS CONTINUOUS PRN
Status: COMPLETED | OUTPATIENT
Start: 2022-06-03 | End: 2022-06-03

## 2022-06-03 RX ORDER — ONDANSETRON 2 MG/ML
INJECTION INTRAMUSCULAR; INTRAVENOUS PRN
Status: DISCONTINUED | OUTPATIENT
Start: 2022-06-03 | End: 2022-06-03 | Stop reason: SDUPTHER

## 2022-06-03 RX ORDER — FENTANYL CITRATE 50 UG/ML
INJECTION, SOLUTION INTRAMUSCULAR; INTRAVENOUS PRN
Status: DISCONTINUED | OUTPATIENT
Start: 2022-06-03 | End: 2022-06-03 | Stop reason: SDUPTHER

## 2022-06-03 RX ORDER — BUPIVACAINE HYDROCHLORIDE 5 MG/ML
INJECTION, SOLUTION EPIDURAL; INTRACAUDAL PRN
Status: DISCONTINUED | OUTPATIENT
Start: 2022-06-03 | End: 2022-06-03 | Stop reason: ALTCHOICE

## 2022-06-03 RX ORDER — CEFAZOLIN SODIUM/WATER 2 G/20 ML
SYRINGE (ML) INTRAVENOUS PRN
Status: DISCONTINUED | OUTPATIENT
Start: 2022-06-03 | End: 2022-06-03 | Stop reason: SDUPTHER

## 2022-06-03 RX ORDER — SODIUM CHLORIDE, SODIUM LACTATE, POTASSIUM CHLORIDE, CALCIUM CHLORIDE 600; 310; 30; 20 MG/100ML; MG/100ML; MG/100ML; MG/100ML
INJECTION, SOLUTION INTRAVENOUS CONTINUOUS PRN
Status: DISCONTINUED | OUTPATIENT
Start: 2022-06-03 | End: 2022-06-03 | Stop reason: SDUPTHER

## 2022-06-03 RX ADMIN — MIDAZOLAM 2 MG: 1 INJECTION INTRAMUSCULAR; INTRAVENOUS at 14:04

## 2022-06-03 RX ADMIN — Medication 2000 MG: at 13:55

## 2022-06-03 RX ADMIN — HYDROMORPHONE HYDROCHLORIDE 1.5 MG: 2 INJECTION INTRAMUSCULAR; INTRAVENOUS; SUBCUTANEOUS at 16:04

## 2022-06-03 RX ADMIN — HYDROMORPHONE HYDROCHLORIDE 1 MG: 1 INJECTION, SOLUTION INTRAMUSCULAR; INTRAVENOUS; SUBCUTANEOUS at 10:45

## 2022-06-03 RX ADMIN — POTASSIUM CHLORIDE 10 MEQ: 7.46 INJECTION, SOLUTION INTRAVENOUS at 16:05

## 2022-06-03 RX ADMIN — ONDANSETRON 2 MG: 2 INJECTION INTRAMUSCULAR; INTRAVENOUS at 14:17

## 2022-06-03 RX ADMIN — SOYBEAN OIL 250 ML: 20 INJECTION, SOLUTION INTRAVENOUS at 18:24

## 2022-06-03 RX ADMIN — SODIUM CHLORIDE, PRESERVATIVE FREE 10 ML: 5 INJECTION INTRAVENOUS at 07:00

## 2022-06-03 RX ADMIN — MIDAZOLAM 2 MG: 1 INJECTION INTRAMUSCULAR; INTRAVENOUS at 13:50

## 2022-06-03 RX ADMIN — SODIUM CHLORIDE, PRESERVATIVE FREE 10 ML: 5 INJECTION INTRAVENOUS at 16:08

## 2022-06-03 RX ADMIN — FENTANYL CITRATE 50 MCG: 50 INJECTION INTRAMUSCULAR; INTRAVENOUS at 14:01

## 2022-06-03 RX ADMIN — SODIUM CHLORIDE, SODIUM LACTATE, POTASSIUM CHLORIDE, AND CALCIUM CHLORIDE: 600; 310; 30; 20 INJECTION, SOLUTION INTRAVENOUS at 13:44

## 2022-06-03 RX ADMIN — HYDROMORPHONE HYDROCHLORIDE 1.5 MG: 2 INJECTION INTRAMUSCULAR; INTRAVENOUS; SUBCUTANEOUS at 07:00

## 2022-06-03 RX ADMIN — SODIUM CHLORIDE, PRESERVATIVE FREE 40 MG: 5 INJECTION INTRAVENOUS at 08:41

## 2022-06-03 RX ADMIN — HYDROMORPHONE HYDROCHLORIDE 1.5 MG: 2 INJECTION INTRAMUSCULAR; INTRAVENOUS; SUBCUTANEOUS at 01:29

## 2022-06-03 RX ADMIN — ENOXAPARIN SODIUM 40 MG: 100 INJECTION SUBCUTANEOUS at 23:14

## 2022-06-03 RX ADMIN — HYDROMORPHONE HYDROCHLORIDE 1.5 MG: 2 INJECTION INTRAMUSCULAR; INTRAVENOUS; SUBCUTANEOUS at 20:23

## 2022-06-03 RX ADMIN — FENTANYL CITRATE 50 MCG: 50 INJECTION INTRAMUSCULAR; INTRAVENOUS at 13:53

## 2022-06-03 RX ADMIN — THIAMINE HYDROCHLORIDE 100 MG: 100 INJECTION, SOLUTION INTRAMUSCULAR; INTRAVENOUS at 17:26

## 2022-06-03 RX ADMIN — CALCIUM GLUCONATE: 98 INJECTION, SOLUTION INTRAVENOUS at 18:25

## 2022-06-03 ASSESSMENT — PAIN SCALES - GENERAL
PAINLEVEL_OUTOF10: 8
PAINLEVEL_OUTOF10: 2
PAINLEVEL_OUTOF10: 9
PAINLEVEL_OUTOF10: 4
PAINLEVEL_OUTOF10: 5
PAINLEVEL_OUTOF10: 7
PAINLEVEL_OUTOF10: 6
PAINLEVEL_OUTOF10: 9

## 2022-06-03 ASSESSMENT — PAIN DESCRIPTION - DESCRIPTORS
DESCRIPTORS: ACHING;CRAMPING;DISCOMFORT
DESCRIPTORS: ACHING;CRAMPING;DISCOMFORT

## 2022-06-03 ASSESSMENT — PAIN SCALES - WONG BAKER: WONGBAKER_NUMERICALRESPONSE: 0

## 2022-06-03 ASSESSMENT — PAIN DESCRIPTION - ORIENTATION: ORIENTATION: RIGHT;LEFT

## 2022-06-03 ASSESSMENT — PAIN DESCRIPTION - LOCATION
LOCATION: ABDOMEN

## 2022-06-03 NOTE — ANESTHESIA POSTPROCEDURE EVALUATION
Department of Anesthesiology  Postprocedure Note    Patient: Alexis Montalvo  MRN: [id number]  YOB: 1960  Date of evaluation: 6/3/2022  Time:  2:40 PM     Procedure Summary     Date: 06/03/22 Room / Location: [facility] OP OR 03 / [facility]    Anesthesia Start: 1344 Anesthesia Stop: 9661    Procedure: PORT INSERTION (N/A Chest) Diagnosis:       Carcinoma ([address]      (Carcinoma ([address] [C80.1])    Surgeons: Aric Gottlieb MD Responsible Provider: Yoli Figueroa MD    Anesthesia Type: MAC ASA Status: 3          Anesthesia Type: No value filed. Wen Phase I: Wen Score: 10    Wen Phase II:      Last vitals: Reviewed and per EMR flowsheets.        Anesthesia Post Evaluation    Patient location during evaluation: PACU  Patient participation: complete - patient participated  Level of consciousness: awake and alert  Airway patency: patent  Nausea & Vomiting: no nausea and no vomiting  Complications: no  Cardiovascular status: hemodynamically stable  Respiratory status: acceptable, nonlabored ventilation and spontaneous ventilation  Hydration status: euvolemic  Comments: BP (!) 142/80   Pulse 78   Temp 98 °F (36.7 °C) (Temporal)   Resp 13   Ht 5' 10\" (1.778 m)   Wt 207 lb (93.9 kg)   SpO2 91%   BMI 29.70 kg/m²     Multimodal analgesia pain management approach

## 2022-06-03 NOTE — PERIOP NOTE
TRANSFER - OUT REPORT:    Verbal report given to SARANYA Garcia on [address]  being transferred to  for routine post-op       Report consisted of patients Situation, Background, Assessment and   Recommendations(SBAR). Information from the following report(s) Nurse Handoff Report, Surgery Report, Intake/Output and MAR was reviewed with the receiving nurse. Lines:   PICC Double Lumen 53/76/06 Right Basilic (Active)   Central Line Being Utilized Yes 06/03/22 0852   Criteria for Appropriate Use Total parenteral nutrition 06/03/22 0852   Site Assessment Clean, dry & intact 06/03/22 0852   Phlebitis Assessment No symptoms 06/03/22 0852   Infiltration Assessment 0 06/03/22 0852   Extremity Circumference (cm) 34 cm 06/03/22 0852   External Catheter Length (cm) 0 cm 06/03/22 0852   Proximal Lumen Color/Status Red 06/03/22 0852   Distal Lumen Color/Status Purple 06/03/22 0852   Alcohol Cap Used Yes 06/03/22 0852   Date of Last Dressing Change 06/02/22 06/03/22 0852   Dressing Type Transparent 06/03/22 0852   Dressing Status Clean, dry & intact 06/03/22 0852       Peripheral IV 05/23/22 Distal;Left;Posterior Forearm (Active)   Site Assessment Clean, dry & intact 06/03/22 1000   Line Status Flushed 06/03/22 1000   Line Care Connections checked and tightened 06/02/22 2019   Phlebitis Assessment No symptoms 06/03/22 0852   Infiltration Assessment 0 06/03/22 0852   Alcohol Cap Used Yes 06/03/22 0852   Dressing Status Clean, dry & intact 06/03/22 1000   Dressing Type Transparent 06/03/22 0852   Dressing Intervention Other (Comment) 06/02/22 2019        Opportunity for questions and clarification was provided. Patient transported with:   O2 @ 2 liters    VTE prophylaxis orders have been written for [address].    Patient and family given floor number and nurses name. Family updated re: pt status after security code verified.

## 2022-06-03 NOTE — PROGRESS NOTES
[facility] Hematology & Oncology        Inpatient Hematology / Oncology Progress Note      Admission Date: 2022 11:11 PM  Reason for Admission/Hospital Course: No admission diagnoses are documented for this encounter. 24 Hour Events:  Venting G tube  Starting TPN  Biopsy + adenocarcinoma   POD 7    ROS:  Constitutional: negative for fever, chills. +weakness, malaise, fatigue. CV: negative for chest pain, palpitations, edema. Respiratory: negative for dyspnea, cough, wheezing. GI: G tube to bedside bag    10 point review of systems is otherwise negative with the exception of the elements mentioned above in the HPI. No Known Allergies    OBJECTIVE:  Patient Vitals for the past 8 hrs:   BP Temp Temp src Pulse Resp SpO2   22 1116 (!) 143/89 98.6 °F (37 °C) Oral 72 17 100 %   22 1045 -- -- -- -- 18 --   22 0730 -- -- -- -- 18 --   22 0723 118/81 98.3 °F (36.8 °C) Oral 68 18 97 %     Temp (24hrs), Av °F (36.7 °C), Min:97.4 °F (36.3 °C), Max:98.6 °F (37 °C)     0701 -  1900  In: -   Out: 250     Physical Exam:  Constitutional: Well developed,male in no acute distress, sitting comfortably in the hospital bed. HEENT: Normocephalic and atraumatic. Oropharynx is clear, mucous membranes are moist.  Pupils are equal, round, and reactive to light. Extraocular muscles are intact. Sclerae anicteric. Skin Warm and dry. No bruising and no rash noted. No erythema. No pallor. Respiratory Lungs are clear to auscultation bilaterally without wheezes, rales or rhonchi, normal air exchange without accessory muscle use. CVS Normal rate, regular rhythm and normal S1 and S2. No murmurs, gallops, or rubs. Abdomen Soft  Non distended. G tube to bedside bed   Neuro Grossly nonfocal with no obvious sensory or motor deficits. MSK Normal range of motion in general.  No edema and no tenderness. Psych Appropriate mood and affect.         Labs:    No results for input(s): WBC, RBC, HGB, HCT, MCV, MCH, MCHC, RDW, PLT, MPV, MONOS, NOHELIA in the last 72 hours.     Invalid input(s): GRANS, LYMPH, EOS, BASOS, IG, DF, ANEU, ABL, ABM, ABB, AIG     Recent Labs     06/03/22  0307      K 3.4*      CO2 30   BUN 7*   GFRAA >60   MG 2.1   PHOS 3.7         Imaging:    Medications:  Current Facility-Administered Medications   Medication Dose Route Frequency    HYDROmorphone HCl PF (DILAUDID) injection 1 mg  1 mg IntraVENous Q4H PRN    HYDROmorphone HCl PF (DILAUDID) injection 1.5 mg  1.5 mg IntraVENous Q4H PRN    oxyCODONE (ROXICODONE INTENSOL) 100 MG/5ML concentrated solution 15 mg  15 mg SubLINGual Q4H PRN    diphenhydrAMINE (BENADRYL) injection 12.5 mg  12.5 mg IntraVENous Q6H PRN    potassium chloride 20 mEq/50 mL IVPB (Central Line)  20 mEq IntraVENous PRN    Or    potassium chloride 10 mEq/100 mL IVPB (Peripheral Line)  10 mEq IntraVENous PRN    magnesium sulfate 2000 mg in 50 mL IVPB premix  2,000 mg IntraVENous PRN    sodium phosphate 10 mmol in sodium chloride 0.9 % 250 mL IVPB  10 mmol IntraVENous PRN    Or    sodium phosphate 15 mmol in sodium chloride 0.9 % 250 mL IVPB  15 mmol IntraVENous PRN    Or    sodium phosphate 20 mmol in sodium chloride 0.9 % 500 mL IVPB  20 mmol IntraVENous PRN    PN-Adult Premix 4.25/5 - Peripheral Line   IntraVENous Continuous TPN    sodium chloride flush 0.9 % injection 5-40 mL  5-40 mL IntraVENous 2 times per day    sodium chloride flush 0.9 % injection 5-40 mL  5-40 mL IntraVENous PRN    0.9 % sodium chloride infusion  25 mL IntraVENous PRN    LORazepam (ATIVAN) injection 1 mg  1 mg IntraVENous Q4H PRN    fentaNYL (DURAGESIC) 75 MCG/HR 1 patch  1 patch TransDERmal Q72H    calcium carbonate (TUMS) chewable tablet 500 mg  500 mg Oral TID PRN    phenol 1.4 % mouth spray 1 spray  1 spray Mouth/Throat Q2H PRN    baclofen (LIORESAL) tablet 10 mg  10 mg Oral TID PRN    ondansetron (ZOFRAN-ODT) disintegrating tablet 4 mg  4 mg Oral Q8H PRN    Or    ondansetron (ZOFRAN) injection 4 mg  4 mg IntraVENous Q6H PRN    enoxaparin (LOVENOX) injection 40 mg  40 mg SubCUTAneous Q24H    nicotine (NICODERM CQ) 14 MG/24HR 1 patch  1 patch TransDERmal Daily    naloxone (NARCAN) injection 0.4 mg  0.4 mg IntraVENous PRN    pantoprazole (PROTONIX) 40 mg in sodium chloride (PF) 10 mL injection  40 mg IntraVENous Daily    sodium chloride flush 0.9 % injection 5-40 mL  5-40 mL IntraVENous Q8H       Denny Guevara is a 61 y. o. male with a past medical history of PUD, HTN, hyperlipidemia, and diverticulitis. He was seen by Dr. Conteh Number 5/18 and sent to Dr. Diane Herrera for expedited workup. He was seen in the office by Dr. Diane Herrera 5/20/22 with concerns for peritoneal metastatic disease.     The patient stated that he has been having abdominal pain for about 6 months now. He had a recent EGD and colonoscopy on February 25, 2022 which revealed a hiatal hernia, gastric polyps, benign colon polyps, diverticulosis, and hemorrhoids. He continued to have abdominal pain over the last few months. He stated that he has lost about 30 lbs over the last 6 months. He has not had an appetite. He was having worsening of GERD as well over that time.      The patient went to the emergency department at [facility] about 10 days ago for a partial small bowel obstruction. The patient presented to our emergency department 1 week ago with abdominal pain. The patient had a CT scan at that time which revealed extensive peritoneal nodularity and mild ascites consistent with peritoneal metastatic disease.  A primary lesion was not definitely identified. There was also sclerosis of the S1 vertebral body, a bone scan has been ordered to assess for possible bone metastasis. He also had evidence of a partial bowel obstruction. There is no evidence of any metastatic disease within the chest. He is POD 2 sp laparotomy.  Per Dr. Anuj Harris note diffuse peritoneal carcinomatosis with complete infiltration of mesentery root, this is not surgical resectable, not even for bypass. Tumor markers were not impressive. We are asked to see patient for recommendations       PLAN:  Diffuse peritoneal carcinomatosis  5/29 POD 2. Biopsy results pending. 6/2 Has venting G tube. Starting TPN. Biopsy omentum + adenocarcinoma     Case discussed with surgery. Dr. Aileen Bradshaw recommends allow ~another week for recovery prior to starting chemo. He is being discharged once home TPN is set up. We had extensive conversation regarding palliative treatment. Goals and plan of care reviewed with the patient wife and sister. All questions answered to the best of our ability. We will arrange for follow up with Dr. Todd Horton ~one week. LUCY Alvarado - NINFA   [facility] Hematology & Oncology  [address] [place] [address]  Office : [phone]  Fax : [phone]       Attending Addendum:  I have personally performed a face to face diagnostic evaluation on this patient. I have reviewed and agree with the care plan as documented above by Chelsea Escobedo N.P.  My findings are as follows: Patient appears lethargic, heart rate regular without murmurs, abdomen is non-tender, bowel sounds are positive. 73-year-old male patient with abdominal carcinomatosis, with recent surgical biopsy showing adenocarcinoma, with IHC most consistent with upper GI source primary. Results discussed with patient and accompanying family members in detail today. Case discussed with surgery, will give more time to heal prior to systemic therapy initiation. He will continue with his TPN as well as venting tube. Patient to follow-up with outpatient oncology Dr. Cassandra Hartley of within a week of discharge.               Erick Thompson MD  [facility] Hematology/Oncology  [address] [place] [address]  Office : [phone]  Fax : [phone]

## 2022-06-03 NOTE — PROGRESS NOTES
's initial visit with Mr. Evans, his wife and his sister. This morning Mr. Evans received news from a surgeon that he has an \"aggressive cancer. \" Mr. Evans and family were processing the news and Mr. Evans appeared in distress. I offered emotional/spiritual support including active listening, affirmation of emotions & steve, and prayer as requested. Family was anticipating a visit from the oncologist to answer their questions. Since entering this progress note, the oncology team has visited patient/family and provided assistance. Chaplains remain available for spiritual/emotional support.         Tina Logan 69  Board Certified

## 2022-06-03 NOTE — BRIEF OP NOTE
Brief Postoperative Note      Patient: Carlos Ace  YOB: 1960  MRN: [id number]    Date of Procedure: 6/3/2022    Pre-Op Diagnosis: peritoneal carcinomatosis ([address] [C80.1]    Post-Op Diagnosis: Same       Procedure(s):  PORT INSERTION with fluoro    Surgeon(s):  Matthew Menjivar MD    Assistant:  * No surgical staff found *    Anesthesia: Monitor Anesthesia Care    Estimated Blood Loss (mL): Minimal    Complications: None    Specimens:   * No specimens in log *    Implants:  Implant Name Type Inv. Item Serial No.  Lot No. LRB No. Used Action   PORT INFUS 8FR PLAS ATTCH OPN END POLYUR CATH KAROLINE FILL SUT - [id number]  PORT INFUS 8FR PLAS ATTCH OPN END POLYUR CATH KAROLINE FILL SUT  [employer] [id number] N/A 1 Implanted         Drains:   Gastrostomy/Enterostomy/Jejunostomy Tube Gastrostomy LUQ 20 fr (Active)   Drainage Appearance Brown 06/03/22 0853   Site Description Clean, dry & intact 06/03/22 0853   J Port Status Open to gravity drainage 06/02/22 2017   Surrounding Skin Clean, dry & intact 06/03/22 0853   Dressing Status Clean, dry & intact 06/03/22 0853   Dressing Type Dry dressing 06/03/22 0853   Output (mL) 250 ml 06/03/22 0853       [REMOVED] Gastrostomy/Enterostomy/Jejunostomy Tube Gastrostomy LUQ 1 24 fr (Removed)   Drainage Appearance Green 06/01/22 0934   Site Description Clean, dry & intact 06/01/22 0934   J Port Status Open to gravity drainage 06/01/22 0934   G Port Status Other(comment) 05/31/22 2148   Surrounding Skin Clean, dry & intact 06/01/22 0934   Dressing Status Other (Comment) 05/31/22 2148   Dressing Type Open to air 06/01/22 0934   G-Tube Care Completed Yes 05/31/22 2148   Output (mL) 0 ml 05/31/22 2148       [REMOVED] Urinary Catheter 2 Way; Dang (Removed)   $ Urethral catheter insertion Inserted for procedure 05/27/22 1520   Catheter Indications Perioperative use for selected surgical procedures 05/28/22 2041   Site Assessment No urethral drainage 05/28/22 2041 Urine Color Yellow 05/28/22 2041   Urine Appearance Clear 05/28/22 2041   Collection Container Standard 05/28/22 2041   Securement Method Securing device (Describe) 05/28/22 2041   Catheter Care Completed Yes 05/28/22 2041   Catheter Best Practices  Drainage tube clipped to bed;Catheter secured to thigh; Tamper seal intact; Bag below bladder;Bag not on floor; Lack of dependent loop in tubing;Drainage bag less than half full 05/28/22 2041   Status Draining;Patent 05/28/22 2041   Output (mL) 750 mL 05/29/22 0545       Findings: fluoro confirmed positioning    Electronically signed by Anabella Latif MD on 6/3/2022 at 2:35 PM

## 2022-06-03 NOTE — PROGRESS NOTES
Admit Date: 2022    POD 7 Days Post-Op    Procedure:  Procedure(s):  LAPAROSCOPY DIAGNOSTIC , OPEN EXPLORATORY LAPAROTOMY, MASS EXCISION, G-TUBE PLACEMENT    Subjective: The patient is lying in bed with no family present. Currently no complaints. Path report discussed with pt and family member on phone. AF, NAD. K+ 3.4    Objective:       Vitals:    22 2340 22 0310 22 0723 22 0730   BP: (!) 144/85 133/80 118/81    Pulse: 72 69 68    Resp: 18 18 18 18   Temp: 97.4 °F (36.3 °C) 98 °F (36.7 °C) 98.3 °F (36.8 °C)    TempSrc: Oral Oral Oral    SpO2: 92% 97% 97%    Weight:       Height:           Temp (24hrs), Av.9 °F (36.6 °C), Min:97.4 °F (36.3 °C), Max:98.3 °F (36.8 °C)  Eyes: Sclera are clear, pupils symmetric   ENMT: ears have no obvious external lesions; no obvious neck masses; no lip lesions  CV: RRR. Normal perfusion; no embolic signs  Resp: No JVD. Breathing is  non-labored. No audible wheezing   GI: soft and non-distended, incisionally tender, G tube to bedside bag 1325 mL 24hr output, incision is clean, dry, and intact   Musculoskeletal: no significant asymmetry; normal tone; unremarkable with normal function. Neuro:  No obvious focal deficits; moves all 4; A&O x3  Psychiatric: normal affect and mood, no memory impairment    Assessment:     Active Problems:    Severe protein-calorie malnutrition (HCC)    Abdominal mass    Debility    Encounter for palliative care    Itching    Fatigue  Resolved Problems:    * No resolved hospital problems.  *        Plan/Recommendations/Medical Decision Making:     G-tube intact and draining  Patient remains admitted for pain control and to discuss goals of care  Ativan for anxiety  Palliative care following  Oncology following  Lovenox, Protonix  Clear liquid diet  TPN  Pt ok to discharge when home TPN set up    Inna Villar 148

## 2022-06-03 NOTE — CARE COORDINATION
Dietician met with CM to discuss starting TPN. On 6/2 pt had a PICC placed and began PPN. TPN to be started this evening. Pt to have a port placed prior to d/c as he wishes to begin OP chemo. Bx results show Adenocarcinoma. Pt has a venting G tube in place. CM has contacted [facility] to begin the referral for TPN. Will order [facility] for TPN management. CM is awaiting a call back from [facility] once they have checked pt's insurance to see if there will be any OOP cost for home TPN. Anticipate d/c this weekend. CM will continue to follow and remain available if any needs arise.

## 2022-06-03 NOTE — ANESTHESIA PRE PROCEDURE
Department of Anesthesiology  Preprocedure Note       Name:  Veda Ng   Age:  62 y.o.  :  1960                                          MRN:  [id number]         Date:  6/3/2022      Surgeon: Leandra Prajapati):  Anabella Latif MD    Procedure: Procedure(s):  PORT INSERTION    Medications prior to admission:   Prior to Admission medications    Medication Sig Start Date End Date Taking?  Authorizing Provider   acetaminophen (TYLENOL) 500 MG tablet Take 500 mg by mouth every 6 hours as needed for Pain   Yes Historical Provider, MD   diphenhydrAMINE-APAP, sleep, (TYLENOL PM EXTRA STRENGTH)  MG tablet Take 1 tablet by mouth nightly as needed for Sleep   Yes Historical Provider, MD   naloxone 4 MG/0.1ML LIQD nasal spray 1 spray by Nasal route as needed for Opioid Reversal   Yes Historical Provider, MD   pantoprazole (PROTONIX) 40 MG tablet TAKE 1 TABLET BY MOUTH BEFORE BREAKFAST AND DINNER FOR 30 DAYS 22   Historical Provider, MD   CARAFATE 1 GM/10ML suspension Take 1 g by mouth 4 times daily (before meals and nightly)  5/15/22   Historical Provider, MD   amLODIPine-benazepril (LOTREL) 5-20 MG per capsule TAKE ONE CAPSULE BY MOUTH EVERY DAY 1/3/22   Ar Automatic Reconciliation   diclofenac sodium (VOLTAREN) 1 % GEL Apply 4 g topically 4 times daily 1/10/22   Ar Automatic Reconciliation   dicyclomine (BENTYL) 10 MG capsule Take 20 mg by mouth every 6 hours     Ar Automatic Reconciliation   ibuprofen (ADVIL;MOTRIN) 200 MG tablet Take 200 mg by mouth    Ar Automatic Reconciliation   polyethylene glycol (GLYCOLAX) 17 GM/SCOOP powder Take 17 g by mouth daily 22   Ar Automatic Reconciliation   rosuvastatin (CRESTOR) 10 MG tablet Take 10 mg by mouth 22   Ar Automatic Reconciliation   umeclidinium-vilanterol (ANORO ELLIPTA) 62.5-25 MCG/INH AEPB inhaler Inhale 1 puff into the lungs daily 1/3/22   Ar Automatic Reconciliation       Current medications:    Current Facility-Administered Medications   Medication Dose Route Frequency Provider Last Rate Last Admin    potassium chloride 10 mEq/100 mL IVPB (Peripheral Line)  10 mEq IntraVENous Once Morris Jensen MD        PN-Adult Premix 5/15 - Central   IntraVENous Continuous TPN LUCY Ag CNP        fat emulsion 20 % infusion 250 mL  250 mL IntraVENous Daily LUCY Ag CNP        thiamine (B-1) 100 mg in sodium chloride 0.9 % 100 mL IVPB  100 mg IntraVENous Q24H LUCY Ag CNP        ceFAZolin (ANCEF) 2000 mg in sterile water 20 mL IV syringe  2,000 mg IntraVENous Once Morris Jensen MD        HYDROmorphone HCl PF (DILAUDID) injection 1 mg  1 mg IntraVENous Q4H PRN Laurent Putt, APRN - CNP   1 mg at 06/03/22 1045    HYDROmorphone HCl PF (DILAUDID) injection 1.5 mg  1.5 mg IntraVENous Q4H PRN Laurent Putt, APRN - CNP   1.5 mg at 06/03/22 0700    oxyCODONE (ROXICODONE INTENSOL) 100 MG/5ML concentrated solution 15 mg  15 mg SubLINGual Q4H PRN Laurent Putt, APRN - CNP   15 mg at 06/02/22 2009    diphenhydrAMINE (BENADRYL) injection 12.5 mg  12.5 mg IntraVENous Q6H PRN Laurent Putt, APRN - CNP        potassium chloride 20 mEq/50 mL IVPB (Central Line)  20 mEq IntraVENous PRN Rozanna JUAN ANTONIO MasonN - CNP        Or    potassium chloride 10 mEq/100 mL IVPB (Peripheral Line)  10 mEq IntraVENous PRN Rozawesleya JUAN ANTONIO MasonN - CNP        magnesium sulfate 2000 mg in 50 mL IVPB premix  2,000 mg IntraVENous PRN Rozanna Marlon, APRN - CNP        sodium phosphate 10 mmol in sodium chloride 0.9 % 250 mL IVPB  10 mmol IntraVENous PRN Rozanna Marlon, APRN - CNP        Or    sodium phosphate 15 mmol in sodium chloride 0.9 % 250 mL IVPB  15 mmol IntraVENous PRN Rozanna Marlon, APRN - CNP        Or    sodium phosphate 20 mmol in sodium chloride 0.9 % 500 mL IVPB  20 mmol IntraVENous PRN Rozanna Marlon, APRN - CNP        PN-Adult Premix 4.25/5 - Peripheral Line   IntraVENous Continuous TPN Donny Mason, APRN - CNP 45 mL/hr at 06/02/22 1754 New Bag at 06/02/22 1754    sodium chloride flush 0.9 % injection 5-40 mL  5-40 mL IntraVENous 2 times per day Kain Cooley MD   10 mL at 06/03/22 0700    sodium chloride flush 0.9 % injection 5-40 mL  5-40 mL IntraVENous PRN Kain Cooley MD        0.9 % sodium chloride infusion  25 mL IntraVENous PRN Kain Cooley MD        LORazepam (ATIVAN) injection 1 mg  1 mg IntraVENous Q4H PRN Katy Barragan PA-C   1 mg at 06/01/22 1151    fentaNYL (DURAGESIC) 75 MCG/HR 1 patch  1 patch TransDERmal Q72H Vianney Albert APRN - CNP   1 patch at 06/01/22 1333    calcium carbonate (TUMS) chewable tablet 500 mg  500 mg Oral TID PRN Piper Orr APRN - CNP   500 mg at 06/01/22 1019    phenol 1.4 % mouth spray 1 spray  1 spray Mouth/Throat Q2H PRN Piper Orr APRN - CNP   1 spray at 05/28/22 1426    baclofen (LIORESAL) tablet 10 mg  10 mg Oral TID PRN Piper Orr APRN - CNP   10 mg at 06/02/22 0936    ondansetron (ZOFRAN-ODT) disintegrating tablet 4 mg  4 mg Oral Q8H PRN Kain Cooley MD   4 mg at 05/31/22 1036    Or    ondansetron (ZOFRAN) injection 4 mg  4 mg IntraVENous Q6H PRN Kain Cooley MD   4 mg at 06/02/22 2328    enoxaparin (LOVENOX) injection 40 mg  40 mg SubCUTAneous Q24H Kain Cooley MD   40 mg at 06/02/22 1959    nicotine (NICODERM CQ) 14 MG/24HR 1 patch  1 patch TransDERmal Daily Kain Cooley MD   1 patch at 06/03/22 0844    naloxone (NARCAN) injection 0.4 mg  0.4 mg IntraVENous PRN Kain Cooley MD        pantoprazole (PROTONIX) 40 mg in sodium chloride (PF) 10 mL injection  40 mg IntraVENous Daily Kain Cooley MD   40 mg at 06/03/22 0841    sodium chloride flush 0.9 % injection 5-40 mL  5-40 mL IntraVENous Kristen Breath, MD   10 mL at 06/03/22 0700       Allergies:  No Known Allergies    Problem List:    Patient Active Problem List   Diagnosis Code    Right cervical radiculopathy M54.12    Right elbow pain M25.521    Hyperlipidemia E78.5    Bilateral carpal tunnel syndrome G56.03    Essential hypertension I10    Gastroesophageal reflux disease K21.9    PUD (peptic ulcer disease) K27.9    Chronic right shoulder pain M25.511, G89.29    Paresthesia and pain of both upper extremities R20.2, M79.601, M79.602    History of colon polyps Z86.010    Chronic bronchitis (HCC) J42    Class 1 obesity due to excess calories with serious comorbidity and body mass index (BMI) of 34.0 to 34.9 in adult E66.09, Z68.34    Generalized abdominal pain R10.84    Abdominal pain R10.9    Peritoneal metastases (HCC) C78.6    Opioid use, unspecified with unspecified opioid-induced disorder ([facility] [address] F11.99    SBO (small bowel obstruction) ([facility] K56.609    Partial small bowel obstruction (HCC) K56.600    Severe protein-calorie malnutrition ([facility] [facility] E43    Abdominal mass R19.00    Debility R53.81    Encounter for palliative care Z51.5    Itching L29.9    Fatigue R53.83       Past Medical History:        Diagnosis Date    Bilateral carpal tunnel syndrome 12/9/2020    Chronic right shoulder pain 11/30/2020    Colon polyps 3/11/2013    Diverticulitis 3/11/2013    HTN (hypertension) 3/11/2013    Hyperlipidemia 3/11/2013    Paresthesia and pain of both upper extremities 11/30/2020    PUD (peptic ulcer disease) 3/11/2013    History of     Right elbow pain 12/9/2020    Wheezing 3/11/2013       Past Surgical History:        Procedure Laterality Date    COLONOSCOPY  2/25/09    colonoscopy per Dr. Ansley Truong with need for repeat every 3 years    COLONOSCOPY  02/25/2022    Dr. Trevor Power; polyps of the ascending, transverse, descending, and sigmoid colons; internal hemorrhoid; diverticulosis    LAPAROSCOPY N/A 5/27/2022    LAPAROSCOPY DIAGNOSTIC , OPEN EXPLORATORY LAPAROTOMY, MASS EXCISION, G-TUBE PLACEMENT performed by Vivian Arguelles MD at [facility] MAIN OR    NM ABDOMEN SURGERY PROC UNLISTED  October 2004    partial colon resection per Dr. Candance Freshwater  02/25/2022    Dr. Trevor Power; hiatal hernia gastric polyps       Social History:    Social History     Tobacco Use    Smoking status: Current Every Day Smoker     Packs/day: 1.00    Smokeless tobacco: Never Used   Substance Use Topics    Alcohol use: No                                Ready to quit: Not Answered  Counseling given: Not Answered      Vital Signs (Current):   Vitals:    06/03/22 1045 06/03/22 1115 06/03/22 1116 06/03/22 1308   BP:   (!) 143/89 (!) 143/86   Pulse:   72 71   Resp: 18 17 17 17   Temp:   98.6 °F (37 °C) 99 °F (37.2 °C)   TempSrc:   Oral Oral   SpO2:   100% 95%   Weight:       Height:                                                  BP Readings from Last 3 Encounters:   06/03/22 (!) 143/86   05/20/22 129/83   05/20/22 128/80       NPO Status: Time of last liquid consumption: 2000                        Time of last solid consumption: 0000                        Date of last liquid consumption: 06/02/22                        Date of last solid food consumption: 05/31/22    BMI:   Wt Readings from Last 3 Encounters:   05/31/22 207 lb (93.9 kg)   05/20/22 207 lb (93.9 kg)   05/20/22 207 lb (93.9 kg)     Body mass index is 29.7 kg/m². CBC:   Lab Results   Component Value Date    WBC 5.9 05/30/2022    RBC 4.32 05/30/2022    HGB 12.6 05/30/2022    HCT 38.8 05/30/2022    MCV 89.8 05/30/2022    RDW 12.6 05/30/2022     05/30/2022       CMP:   Lab Results   Component Value Date     06/03/2022    K 3.4 06/03/2022     06/03/2022    CO2 30 06/03/2022    BUN 7 06/03/2022    CREATININE 0.50 06/03/2022    GFRAA >60 06/03/2022    AGRATIO 1.1 05/20/2022    LABGLOM >60 06/03/2022    GLUCOSE 77 06/03/2022    PROT 5.8 05/23/2022    CALCIUM 8.6 06/03/2022    BILITOT 0.4 05/23/2022    ALKPHOS 68 05/23/2022    ALKPHOS 86 05/20/2022    AST 9 05/23/2022    ALT 13 05/23/2022       POC Tests: No results for input(s): POCGLU, POCNA, POCK, POCCL, POCBUN, POCHEMO, POCHCT in the last 72 hours.     Coags: No results found for: PROTIME, INR, APTT    HCG (If Applicable): No results found for: PREGTESTUR, PREGSERUM, HCG, HCGQUANT     ABGs: No results found for: PHART, PO2ART, TPC5UGX, WMD6OGK, BEART, L8JFVGHW     Type & Screen (If Applicable):  No results found for: LABABO, LABRH    Drug/Infectious Status (If Applicable):  Lab Results   Component Value Date    HEPCAB NONREACTIVE 05/29/2022       COVID-19 Screening (If Applicable): No results found for: COVID19        Anesthesia Evaluation  Patient summary reviewed  Airway: Mallampati: II          Dental: normal exam         Pulmonary:Negative Pulmonary ROS   (+) COPD:  decreased breath sounds: bibasilar                            Cardiovascular:  Exercise tolerance: good (>4 METS),   (+) hypertension:, peripheral edema (in dependent areas.), hyperlipidemia    (-) past MI and murmur      Rhythm: regular  Rate: normal                    Neuro/Psych:   Negative Neuro/Psych ROS  (+) neuromuscular disease:,    (-) CVA           GI/Hepatic/Renal: Neg GI/Hepatic/Renal ROS  (+) GERD:,          ROS comment: Obesity with hiccups, concern for pSBO, concern for stomach contents. .   Endo/Other: Negative Endo/Other ROS   (+) malignancy/cancer. Abdominal:              PE comment: g-tube drainage   Vascular: negative vascular ROS. Other Findings:           Anesthesia Plan      MAC     ASA 3     (Aspiration risk.)  Induction: intravenous. Anesthetic plan and risks discussed with patient and spouse.       Plan discussed with CRNA and surgical team.                    Greg Schmitz MD   6/3/2022

## 2022-06-03 NOTE — PROGRESS NOTES
Comprehensive Nutrition Assessment    Type and Reason for Visit: Reassess  Malnutrition Screening Tool: Malnutrition Screen  Have you recently lost weight without trying?: 24 to 33 pounds (3 points)  Have you been eating poorly because of a decreased appetite?: No (0 points)  Malnutrition Screening Tool Score: 3  TPN management (Palliative)    Nutrition Recommendations/Plan:   Meals and Snacks:   Diet: Continue current order NPO. Parenteral Nutrition:  Total parenteral nutrition  to begin at 1800  Change: Dex 15%, 5% AA 1 L (45ml/hr)   Initiate 250 ml 20% lipids daily   To provide: 1210 kcal/d (64% of needs), 50 grams of protein/d (53% of needs), 150 grams of CHO/d and 1250 ml of total volume/d. Lytes/L:   Total Sodium 120 meq (120 meq NaCl), Total Phosphorus 15 mmol (15 mmol KPO4), 8 meq Mg, 4.5 meq Calcium  KCL not available for inclusion in TPN at this time anticipate pt will require replacements. Given minimal decline in K with initial PPN formula will not include outside of TPN tonight. Other additives: MTE, MVI Monday Wednesday Friday due to national shortages   Nutrition Related Medication Management: Thiamine  mg daily for 7 days. Nutritional Supplement Therapy:   Active electrolyte replacement per electolyte support protocols  Replacement indicated:  K replacement indicated, Ivan Wyman RN notified of PRN order as RD is currently unable to enter orders with system change. Labs:   BMP daily  Mg MWF and daily x 3 days  Phos MWF and daily x 3 days  Triglyceride tomorrow  POC Glucoses/SSI Not indicated   Anticipate transitioning to 2L 5/15 tomorrow to fully meet estimated needs if pt stable on above regimen.        Malnutrition Assessment:  Malnutrition Status: Severe malnutrition  Context: Chronic Illness  Findings of clinical characteristics of malnutrition:   Energy Intake:  75% or less estimated energy requirements for 1 month or longer (minimal intake for 3 months)  Weight Loss:  Greater than 5% over 1 month (10.5% in one month)     Body Fat Loss:  No significant body fat loss     Muscle Mass Loss:  No significant muscle mass loss    Fluid Accumulation:  Unable to assess     Strength:  Not Performed     Nutrition Assessment:  Nutrition History:    5/22 Pt and wife in room discussing hx (daughter present but did not participate). Wife relates his medical problems began in Dec 2021, but weight loss started in March 2022. Has n/v (with limited relief from medications), limited to no appetite. Patient open to ONS, but is put off by the idea of eating or drinking anything at this time for fear of vomiting. Wife states he did tolerate half a sandwich last week, but was unable to finish it. Nutrition Background:     History of PUD, HTN, hyperlipidemia, and diverticulitis. Admitted with abdominal pain for last 6 months. Presented with possible SBO.  being seen by Dr. Flakito Diaz with concerns of peritoneal metastatic disease, with unknown starting origin. Diffuse peritoneal carcinomatosis. Bx omentum + adenocarcinoma POD 7.   5/27: Diagnostic laparoscopy, switched to laparotomy. Omental mass and mesentery mass excision. Gastrostomy tube placement for venting. Findings of \"Diffuse peritoneal carcinomatosis with root of mesentery encasement and small bowel obstruction. \"  6/1: s/p venting gtube exchange. Nutrition Interval:  Visit with pt and family at bedside. Wife reports plan for port and to begin outpt chemo next week. Discussed with Cat, Care Manager, [facility], [address] [place] [address] with [facility] and Ally Correa RN.      Abdominal Status (last documented):   Last BM (including prior to admit): 05/20/22 (per pt), GI Symptoms: Nausea Gtube output: 1325 ml recorded past 24 hours  Pertinent Medications: fentanyl patch, Protonix  IVF: d/c with PPN start 6/2  PRN: tums (last admin 6/1), zofran (daily use)  Pertinent Labs:   Lab Results   Component Value Date     06/03/2022    K 3.4 06/03/2022     06/03/2022 CO2 30 06/03/2022    BUN 7 06/03/2022    CREATININE 0.50 06/03/2022    GLUCOSE 77 06/03/2022    CALCIUM 8.6 06/03/2022    PHOS 3.7 06/03/2022    MG 2.1 06/03/2022     Lab Results   Component Value Date    TRIG 117 06/03/2022    TRIG 135 04/18/2022    TRIG 186 01/03/2022   Labs are relatively stable as anticipated with minimal nutrition infusion via PPN without lipids. K with slight decline, no Mg or Phos for comparison. Phos currently upper end normal as anticipated based on GI function. Nutrition Related Findings:   No s/s wasting per NFPE 5/27, Diet history 5/22 NPO, 5/23 Clear, 5/24 soft+ bite sized, low fiber. 5/27: NPO for lap, then CLD. 6/1: NPO overnight due to vomiting despite Gtube to drain. 6/2: PPN started, PICC placed late afternoon. Current Nutrition Therapies:  PN-Adult Premix 4.25/5 - Peripheral Line  Diet NPO  Current Parenteral Nutrition Orders:  · Type and Formula: Premix Peripheral (5/4.25)   · Lipids: None  · Duration: Continuous  · Rate/Volume: 1 L (45ml/hr)  · Current PN Order Provides: infusing per current goal order  · Goal PN Orders Provides: 340 kcal/d (18% of needs), 43 grams of protein/d (46% of needs), 50 grams of CHO/d and 1000 ml of total volume/d    Current Intake:   Average Meal Intake: NPO Average Supplements Intake: NPO      Anthropometric Measures:  Height: 5' 10\" (177.8 cm)  Current Body Wt: 207 lb 0.2 oz (93.9 kg) (5/31), Weight source: Bed Scale  BMI: 29.7  Admission Body Weight: 207 lb (93.9 kg) (not specified source)  Ideal Body Weight (Kg) (Calculated): 75 kg (166 lbs), 119.4 %  Usual Body Wt: 231 lb 5 oz (104.9 kg) (1 mo ago 4/13 office visit), Percent weight change: -10.5       Edema: No data recorded  BMI Category Overweight (BMI 25.0-29. 9)  Estimated Daily Nutrient Needs:  Energy (kcal/day): 1878 -2348 (Kcal/kg (20-25) Weight used: 93.9 kg  (5/31)  Protein (g/day):  (1-1.2 g/kg) Weight Used: (Current) 93.9 kg  Fluid (ml/day):   (1 ml/kcal)    Nutrition Diagnosis:   · Inadequate protein-energy intake related to altered GI function as evidenced by NPO or clear liquid status due to medical condition (venting Gtube)    · Severe malnutrition,In context of chronic illness related to catabolic illness (nausea/vomiting) as evidenced by Criteria as identified in malnutrition assessment    Nutrition Interventions:   Food and/or Nutrient Delivery: Continue NPO,Modify Parenteral Nutrition     Coordination of Nutrition Care: Continue to monitor while inpatient       Goals:   Previous Goal Met: Progressing toward Goal(s)  Active Goal: Tolerate nutrition support at goal rate (within 5 days)       Nutrition Monitoring and Evaluation:      Food/Nutrient Intake Outcomes: Parenteral Nutrition Intake/Tolerance  Physical Signs/Symptoms Outcomes: Biochemical Data,GI Status,Fluid Status or Edema,Weight    Discharge Planning:     Too soon to determine    Benigno Evert Surjit [address], [address]

## 2022-06-03 NOTE — OP NOTE
[facility]  OPERATIVE REPORT    Name:  Rocio Stewart  MR#:  [id number]  :  1960  ACCOUNT #:  [de-identified]  DATE OF SERVICE:  2022    PREOPERATIVE DIAGNOSIS:  Peritoneal carcinomatosis. POSTOPERATIVE DIAGNOSIS:  Peritoneal carcinomatosis. PROCEDURE PERFORMED:  Port-A-Cath placement under fluoroscopy. SURGEON:  Mayra Arvizu MD    ANESTHESIA:  mac    COMPLICATIONS:  none    SPECIMENS REMOVED:  none    IMPLANTS: port    ESTIMATED BLOOD LOSS:  Minimum. INDICATION:  This is a 71-year-old male who presented with diffuse peritoneal carcinomatosis, most likely from GI origin. He wants to try chemotherapy. Port is offered to him. He understood risks and benefits, agreed to proceed. FINDINGS:  Intraoperative fluoroscopy confirmed positioning of port placement. PROCEDURE:  After informed consent obtained, the patient was brought into the operating room, left in supine position. Managed anesthesia care was administered. The patient's upper chest and neck area were prepped and draped in a routine fashion. The local anesthetic was used to infiltrate the left upper chest and then the left subclavian vein was accessed. Venous blood return was obtained. Guidewire was then advanced under the guidance of fluoroscopy, followed by a dilator introducer sheath. Then the catheter was advanced along the introducer sheath under the guidance of fluoroscopy. At the same time, pocket was made at the left upper chest.  The catheter was tunneled through the pocket, connected to a reservoir. Both the reservoir and the catheter were flushed with heparin solution 100 units/mL with good aspiration and good flush. Then the port was sutured into place with 2-0 Prolene stitch and incision closed with 3-0 Vicryl stitch on dermal layer, 4-0 Vicryl stitch in subcuticular running fashion. The patient tolerated the procedure well, transferred to recovery room in stable condition.   All the instrument count and lap count were correct.       Gerry Obrien MD      [id number]  D:  06/03/2022 14:41  T:  06/03/2022 18:44  JOB #:  [id number]

## 2022-06-04 LAB
ANION GAP SERPL CALC-SCNC: 7 MMOL/L (ref 7–16)
BUN SERPL-MCNC: 7 MG/DL (ref 8–23)
CALCIUM SERPL-MCNC: 8.2 MG/DL (ref 8.3–10.4)
CHLORIDE SERPL-SCNC: 104 MMOL/L (ref 98–107)
CO2 SERPL-SCNC: 31 MMOL/L (ref 21–32)
CREAT SERPL-MCNC: 0.5 MG/DL (ref 0.8–1.5)
GLUCOSE SERPL-MCNC: 110 MG/DL (ref 65–100)
MAGNESIUM SERPL-MCNC: 2.2 MG/DL (ref 1.8–2.4)
PHOSPHATE SERPL-MCNC: 3.1 MG/DL (ref 2.3–3.7)
POTASSIUM SERPL-SCNC: 3.4 MMOL/L (ref 3.5–5.1)
SODIUM SERPL-SCNC: 142 MMOL/L (ref 136–145)
TRIGL SERPL-MCNC: 155 MG/DL (ref 35–150)

## 2022-06-04 PROCEDURE — 84478 ASSAY OF TRIGLYCERIDES: CPT

## 2022-06-04 PROCEDURE — 6370000000 HC RX 637 (ALT 250 FOR IP): Performed by: SURGERY

## 2022-06-04 PROCEDURE — 6360000002 HC RX W HCPCS

## 2022-06-04 PROCEDURE — 6360000002 HC RX W HCPCS: Performed by: PHYSICIAN ASSISTANT

## 2022-06-04 PROCEDURE — 2500000003 HC RX 250 WO HCPCS: Performed by: NURSE PRACTITIONER

## 2022-06-04 PROCEDURE — A4216 STERILE WATER/SALINE, 10 ML: HCPCS | Performed by: SURGERY

## 2022-06-04 PROCEDURE — 80048 BASIC METABOLIC PNL TOTAL CA: CPT

## 2022-06-04 PROCEDURE — 2580000003 HC RX 258: Performed by: SURGERY

## 2022-06-04 PROCEDURE — 6360000002 HC RX W HCPCS: Performed by: NURSE PRACTITIONER

## 2022-06-04 PROCEDURE — 2580000003 HC RX 258: Performed by: NURSE PRACTITIONER

## 2022-06-04 PROCEDURE — 6370000000 HC RX 637 (ALT 250 FOR IP): Performed by: NURSE PRACTITIONER

## 2022-06-04 PROCEDURE — 84100 ASSAY OF PHOSPHORUS: CPT

## 2022-06-04 PROCEDURE — C9113 INJ PANTOPRAZOLE SODIUM, VIA: HCPCS | Performed by: SURGERY

## 2022-06-04 PROCEDURE — 1100000000 HC RM PRIVATE

## 2022-06-04 PROCEDURE — 83735 ASSAY OF MAGNESIUM: CPT

## 2022-06-04 PROCEDURE — 6360000002 HC RX W HCPCS: Performed by: SURGERY

## 2022-06-04 RX ORDER — POTASSIUM CHLORIDE 7.45 MG/ML
10 INJECTION INTRAVENOUS ONCE
Status: COMPLETED | OUTPATIENT
Start: 2022-06-04 | End: 2022-06-04

## 2022-06-04 RX ADMIN — SODIUM CHLORIDE, PRESERVATIVE FREE 5 ML: 5 INJECTION INTRAVENOUS at 22:00

## 2022-06-04 RX ADMIN — ENOXAPARIN SODIUM 40 MG: 100 INJECTION SUBCUTANEOUS at 20:49

## 2022-06-04 RX ADMIN — HYDROMORPHONE HYDROCHLORIDE 1 MG: 1 INJECTION, SOLUTION INTRAMUSCULAR; INTRAVENOUS; SUBCUTANEOUS at 20:49

## 2022-06-04 RX ADMIN — LORAZEPAM 1 MG: 2 INJECTION INTRAMUSCULAR; INTRAVENOUS at 16:37

## 2022-06-04 RX ADMIN — HYDROMORPHONE HYDROCHLORIDE 1.5 MG: 2 INJECTION INTRAMUSCULAR; INTRAVENOUS; SUBCUTANEOUS at 12:41

## 2022-06-04 RX ADMIN — SODIUM CHLORIDE, PRESERVATIVE FREE 5 ML: 5 INJECTION INTRAVENOUS at 20:50

## 2022-06-04 RX ADMIN — OXYCODONE HYDROCHLORIDE 15 MG: 100 SOLUTION ORAL at 11:45

## 2022-06-04 RX ADMIN — SODIUM CHLORIDE, PRESERVATIVE FREE 10 ML: 5 INJECTION INTRAVENOUS at 14:47

## 2022-06-04 RX ADMIN — SOYBEAN OIL 250 ML: 20 INJECTION, SOLUTION INTRAVENOUS at 18:40

## 2022-06-04 RX ADMIN — HYDROMORPHONE HYDROCHLORIDE 1 MG: 1 INJECTION, SOLUTION INTRAMUSCULAR; INTRAVENOUS; SUBCUTANEOUS at 16:38

## 2022-06-04 RX ADMIN — LORAZEPAM 1 MG: 2 INJECTION INTRAMUSCULAR; INTRAVENOUS at 20:49

## 2022-06-04 RX ADMIN — THIAMINE HYDROCHLORIDE 100 MG: 100 INJECTION, SOLUTION INTRAMUSCULAR; INTRAVENOUS at 18:42

## 2022-06-04 RX ADMIN — HYDROMORPHONE HYDROCHLORIDE 1 MG: 1 INJECTION, SOLUTION INTRAMUSCULAR; INTRAVENOUS; SUBCUTANEOUS at 09:09

## 2022-06-04 RX ADMIN — OXYCODONE HYDROCHLORIDE 15 MG: 100 SOLUTION ORAL at 03:32

## 2022-06-04 RX ADMIN — SODIUM CHLORIDE, PRESERVATIVE FREE 10 ML: 5 INJECTION INTRAVENOUS at 09:16

## 2022-06-04 RX ADMIN — HYDROMORPHONE HYDROCHLORIDE 1.5 MG: 2 INJECTION INTRAMUSCULAR; INTRAVENOUS; SUBCUTANEOUS at 00:35

## 2022-06-04 RX ADMIN — POTASSIUM CHLORIDE 10 MEQ: 7.46 INJECTION, SOLUTION INTRAVENOUS at 12:31

## 2022-06-04 RX ADMIN — HYDROMORPHONE HYDROCHLORIDE 1.5 MG: 2 INJECTION INTRAMUSCULAR; INTRAVENOUS; SUBCUTANEOUS at 05:19

## 2022-06-04 RX ADMIN — MAGNESIUM SULFATE HEPTAHYDRATE: 500 INJECTION, SOLUTION INTRAMUSCULAR; INTRAVENOUS at 18:40

## 2022-06-04 RX ADMIN — SODIUM CHLORIDE, PRESERVATIVE FREE 40 MG: 5 INJECTION INTRAVENOUS at 09:11

## 2022-06-04 ASSESSMENT — PAIN DESCRIPTION - DESCRIPTORS
DESCRIPTORS: ACHING
DESCRIPTORS: ACHING;THROBBING;BURNING
DESCRIPTORS: ACHING
DESCRIPTORS: ACHING
DESCRIPTORS: ACHING;SHARP
DESCRIPTORS: ACHING
DESCRIPTORS: ACHING;BURNING
DESCRIPTORS: ACHING
DESCRIPTORS: ACHING;BURNING;SHARP
DESCRIPTORS: ACHING;SHARP

## 2022-06-04 ASSESSMENT — PAIN DESCRIPTION - ORIENTATION
ORIENTATION: LEFT
ORIENTATION: RIGHT
ORIENTATION: LEFT
ORIENTATION: RIGHT
ORIENTATION: RIGHT
ORIENTATION: MID
ORIENTATION: LEFT
ORIENTATION: RIGHT

## 2022-06-04 ASSESSMENT — PAIN SCALES - WONG BAKER
WONGBAKER_NUMERICALRESPONSE: 2
WONGBAKER_NUMERICALRESPONSE: 0
WONGBAKER_NUMERICALRESPONSE: 0

## 2022-06-04 ASSESSMENT — PAIN - FUNCTIONAL ASSESSMENT
PAIN_FUNCTIONAL_ASSESSMENT: ACTIVITIES ARE NOT PREVENTED
PAIN_FUNCTIONAL_ASSESSMENT: ACTIVITIES ARE NOT PREVENTED

## 2022-06-04 ASSESSMENT — PAIN DESCRIPTION - LOCATION
LOCATION: ABDOMEN
LOCATION: ABDOMEN;SHOULDER
LOCATION: ABDOMEN

## 2022-06-04 ASSESSMENT — PAIN SCALES - GENERAL
PAINLEVEL_OUTOF10: 10
PAINLEVEL_OUTOF10: 2
PAINLEVEL_OUTOF10: 8
PAINLEVEL_OUTOF10: 10
PAINLEVEL_OUTOF10: 7
PAINLEVEL_OUTOF10: 0
PAINLEVEL_OUTOF10: 5
PAINLEVEL_OUTOF10: 5
PAINLEVEL_OUTOF10: 7
PAINLEVEL_OUTOF10: 3
PAINLEVEL_OUTOF10: 6
PAINLEVEL_OUTOF10: 9
PAINLEVEL_OUTOF10: 10

## 2022-06-04 ASSESSMENT — PAIN DESCRIPTION - ONSET: ONSET: GRADUAL

## 2022-06-04 ASSESSMENT — PAIN DESCRIPTION - FREQUENCY: FREQUENCY: OTHER (COMMENT)

## 2022-06-04 ASSESSMENT — PAIN DESCRIPTION - PAIN TYPE: TYPE: SURGICAL PAIN

## 2022-06-04 NOTE — PROGRESS NOTES
Admit Date: 2022    POD 8 Days Post-Op    Procedure:  Procedure(s):  LAPAROSCOPY DIAGNOSTIC , OPEN EXPLORATORY LAPAROTOMY, MASS EXCISION, G-TUBE PLACEMENT    Subjective: The patient is lying in bed with wife at bedside. Currently no complaints. Working to arrange home TPN. AF, NAD. K+ 3.4    Objective:       Vitals:    22 0730 22 0909 22 0939 22 1044   BP: 126/79   (!) 141/79   Pulse: 72   67   Resp: 17 17 16 17   Temp: 98.8 °F (37.1 °C)   97.8 °F (36.6 °C)   TempSrc: Oral   Oral   SpO2: 91%   90%   Weight:       Height:           Temp (24hrs), Av.2 °F (36.8 °C), Min:97.5 °F (36.4 °C), Max:99 °F (37.2 °C)  Eyes: Sclera are clear, pupils symmetric   ENMT: ears have no obvious external lesions; no obvious neck masses; no lip lesions  CV: RRR. Normal perfusion; no embolic signs  Resp: No JVD. Breathing is  non-labored. No audible wheezing   GI: soft and non-distended, incisionally tender, G tube to bedside bag 700 mL 24hr output, incision is clean, dry, and intact   Musculoskeletal: no significant asymmetry; normal tone; unremarkable with normal function. Integument - Port Left chest  Neuro:  No obvious focal deficits; moves all 4; A&O x3  Psychiatric: normal affect and mood, no memory impairment    Assessment:     Active Problems:    Severe protein-calorie malnutrition (HCC)    Abdominal mass    Debility    Encounter for palliative care    Itching    Fatigue    Abdominal carcinomatosis (HCC)    Goals of care, counseling/discussion    On total parenteral nutrition  Resolved Problems:    * No resolved hospital problems.  *        Plan/Recommendations/Medical Decision Making:     G-tube intact and draining  Patient remains admitted for pain control and to discuss goals of care  Ativan for anxiety  Palliative care following  Oncology following  Lovenox, Protonix  Clear liquid diet  TPN  Pt ok to discharge when home TPN set up  Port placed 6/3/22    Inna Ponce

## 2022-06-04 NOTE — FLOWSHEET NOTE
06/04/22 0730   Dual Clinician Skin Assessment   Dual Skin Assessment (4 Eyes) WDL   Second Clinical  (First and Last Name) Farhat López RN   Skin Integumentary    Skin Integumentary (WDL) X   Skin Integrity Incision (see LDA)  (drain, and staples with abd and gauze covering. )   Skin Color Other (comment)  (normal skin color, post surgical site is intact and clean)   Skin Condition/Temp Dry   Location abdomen

## 2022-06-04 NOTE — PROGRESS NOTES
Comprehensive Nutrition Assessment    Type and Reason for Visit: Reassess  Malnutrition Screening Tool: Malnutrition Screen  Have you recently lost weight without trying?: 24 to 33 pounds (3 points)  Have you been eating poorly because of a decreased appetite?: No (0 points)  Malnutrition Screening Tool Score: 3  TPN management (Palliative)    Nutrition Recommendations/Plan:   Meals and Snacks:   Diet: Continue current order Clear liquids held for release at present. Educated wife items appropriate for clear liquids. Parenteral Nutrition:  Total parenteral nutrition  to begin at 1800  Change: Dex 15%, 5% AA 2 L (85ml/hr)   Continue 250 ml 20% lipids daily   To provide: 1920 kcal/d (100% of needs), 100 grams of protein/d (100% of needs), 300 grams of CHO/d and 2250 ml of total volume/d. Lytes/L:   Total Sodium 100 meq (100 meq NaCl), Total Phosphorus 15 mmol (15 mmol KPO4), 8 meq Mg, 4.5 meq Calcium  KCL not available for inclusion in TPN. Current additional need covered with replacement. Other additives: MTE, MVI Monday Wednesday Friday due to national shortages   Nutrition Related Medication Management: Thiamine  mg daily active for 7 days. Nutritional Supplement Therapy:   Active electrolyte replacement per electolyte support protocols  Replacement indicated:  K - discussed with Cipriano Petersen RN need for implementation of K replacement per protocol as RD is currently unable to implement. Labs:   BMP daily  Mg MWF and daily x 3 days then MWF  Phos MWF and daily x 3 days then MWF  Triglyceride weekly starting Monday if pt remains hospitalized  POC Glucoses/SSI Not indicated    Home TPN to be managed by [facility] RD at discharge. If pt is too discharge today, RD and Rx should be notified to prevent making TPN inpatient today.        Malnutrition Assessment:  Malnutrition Status: Severe malnutrition  Context: Chronic Illness  Findings of clinical characteristics of malnutrition:   Energy Intake:  75% or less estimated energy requirements for 1 month or longer (minimal intake for 3 months)  Weight Loss:  Greater than 5% over 1 month (10.5% in one month)     Body Fat Loss:  No significant body fat loss     Muscle Mass Loss:  No significant muscle mass loss    Fluid Accumulation:  Unable to assess     Strength:  Not Performed     Nutrition Assessment:  Nutrition History:    5/22 Pt and wife in room discussing hx (daughter present but did not participate). Wife relates his medical problems began in Dec 2021, but weight loss started in March 2022. Has n/v (with limited relief from medications), limited to no appetite. Patient open to ONS, but is put off by the idea of eating or drinking anything at this time for fear of vomiting. Wife states he did tolerate half a sandwich last week, but was unable to finish it. Nutrition Background:     History of PUD, HTN, hyperlipidemia, and diverticulitis. Admitted with abdominal pain for last 6 months. Presented with possible SBO.  being seen by Dr. Armando Dias with concerns of peritoneal metastatic disease, with unknown starting origin. Diffuse peritoneal carcinomatosis. Bx omentum + adenocarcinoma POD 7.   5/27: Diagnostic laparoscopy, switched to laparotomy. Omental mass and mesentery mass excision. Gastrostomy tube placement for venting. Findings of \"Diffuse peritoneal carcinomatosis with root of mesentery encasement and small bowel obstruction. \"  6/1: s/p venting gtube exchange. 6/3: s/ PORT. Nutrition Interval:  Visit with pt and wife at bedside. Pt reports rough night with pain. Wife indicates Vivienne with [employer] coming at 1:00 for home TPN teaching. Discussed with Ashley Negron RN and [facility] ANDREA Care Manager. Planning for home with TPN uncertain if today or tomorrow at this time.   .     Abdominal Status (last documented):   Last BM (including prior to admit): 05/20/22 (per pt), GI Symptoms: Nausea Gtube output: 700 ml recorded past 24 hours  Pertinent Daily Nutrient Needs:  Energy (kcal/day): 1878 -2348 (Kcal/kg (20-25) Weight used: 93.9 kg  (5/31)  Protein (g/day):  (1-1.2 g/kg) Weight Used: (Current) 93.9 kg  Fluid (ml/day):   (1 ml/kcal)    Nutrition Diagnosis:   · Inadequate protein-energy intake related to altered GI function as evidenced by NPO or clear liquid status due to medical condition (venting Gtube)    · Severe malnutrition,In context of chronic illness related to catabolic illness (nausea/vomiting) as evidenced by Criteria as identified in malnutrition assessment    Nutrition Interventions:   Food and/or Nutrient Delivery: Continue Current Diet,Modify Parenteral Nutrition     Coordination of Nutrition Care: Continue to monitor while inpatient       Goals:   Previous Goal Met: Progressing toward Goal(s)  Active Goal: Tolerate nutrition support at goal rate (within 5 days)       Nutrition Monitoring and Evaluation:      Food/Nutrient Intake Outcomes: Parenteral Nutrition Intake/Tolerance,Diet Advancement/Tolerance,Food and Nutrient Intake  Physical Signs/Symptoms Outcomes: Biochemical Data,GI Status,Fluid Status or Edema,Weight    Discharge Planning:    Parenteral Nutrition    Benigno Evert [address], [address]

## 2022-06-04 NOTE — PROGRESS NOTES
END OF SHIFT:    Shift Summary:   Gave shift report at bedside to [facility] [place]. Pt is resting quietly, gave PRN pain medication, see MAR for administration times and pain scale assessment. Pt's pain was finally relieved after giving 1 mg dilaudid along with 1 mg Ativan. Hung TPN and Lipids at end of shift. DC plan is to go home on TPN once  sets up [facility]. I/Os:    No intake/output data recorded. I/O last 3 completed shifts: In: 4758.1 [P.O.:1190;  I.V.:2698.8]  Out: 1430 [Urine:725; Emesis/NG output:700; Blood:5]    Jad Rubio RN

## 2022-06-05 LAB
ANION GAP SERPL CALC-SCNC: 8 MMOL/L (ref 7–16)
BUN SERPL-MCNC: 11 MG/DL (ref 8–23)
CALCIUM SERPL-MCNC: 8.4 MG/DL (ref 8.3–10.4)
CHLORIDE SERPL-SCNC: 104 MMOL/L (ref 98–107)
CO2 SERPL-SCNC: 28 MMOL/L (ref 21–32)
CREAT SERPL-MCNC: 0.5 MG/DL (ref 0.8–1.5)
GLUCOSE SERPL-MCNC: 136 MG/DL (ref 65–100)
MAGNESIUM SERPL-MCNC: 2.3 MG/DL (ref 1.8–2.4)
PHOSPHATE SERPL-MCNC: 3 MG/DL (ref 2.3–3.7)
POTASSIUM SERPL-SCNC: 3.3 MMOL/L (ref 3.5–5.1)
SODIUM SERPL-SCNC: 140 MMOL/L (ref 138–145)

## 2022-06-05 PROCEDURE — 80048 BASIC METABOLIC PNL TOTAL CA: CPT

## 2022-06-05 PROCEDURE — A4216 STERILE WATER/SALINE, 10 ML: HCPCS | Performed by: SURGERY

## 2022-06-05 PROCEDURE — 6360000002 HC RX W HCPCS: Performed by: SURGERY

## 2022-06-05 PROCEDURE — 84100 ASSAY OF PHOSPHORUS: CPT

## 2022-06-05 PROCEDURE — 1100000000 HC RM PRIVATE

## 2022-06-05 PROCEDURE — 6370000000 HC RX 637 (ALT 250 FOR IP): Performed by: SURGERY

## 2022-06-05 PROCEDURE — 2580000003 HC RX 258: Performed by: SURGERY

## 2022-06-05 PROCEDURE — 2500000003 HC RX 250 WO HCPCS: Performed by: NURSE PRACTITIONER

## 2022-06-05 PROCEDURE — C9113 INJ PANTOPRAZOLE SODIUM, VIA: HCPCS | Performed by: SURGERY

## 2022-06-05 PROCEDURE — 6360000002 HC RX W HCPCS: Performed by: PHYSICIAN ASSISTANT

## 2022-06-05 PROCEDURE — 83735 ASSAY OF MAGNESIUM: CPT

## 2022-06-05 PROCEDURE — 2500000003 HC RX 250 WO HCPCS: Performed by: SURGERY

## 2022-06-05 RX ADMIN — LORAZEPAM 1 MG: 2 INJECTION INTRAMUSCULAR; INTRAVENOUS at 21:47

## 2022-06-05 RX ADMIN — HYDROMORPHONE HYDROCHLORIDE 1 MG: 1 INJECTION, SOLUTION INTRAMUSCULAR; INTRAVENOUS; SUBCUTANEOUS at 04:32

## 2022-06-05 RX ADMIN — HYDROMORPHONE HYDROCHLORIDE 1 MG: 1 INJECTION, SOLUTION INTRAMUSCULAR; INTRAVENOUS; SUBCUTANEOUS at 21:47

## 2022-06-05 RX ADMIN — ENOXAPARIN SODIUM 40 MG: 100 INJECTION SUBCUTANEOUS at 21:47

## 2022-06-05 RX ADMIN — HYDROMORPHONE HYDROCHLORIDE 1.5 MG: 2 INJECTION INTRAMUSCULAR; INTRAVENOUS; SUBCUTANEOUS at 15:37

## 2022-06-05 RX ADMIN — THIAMINE HYDROCHLORIDE 100 MG: 100 INJECTION, SOLUTION INTRAMUSCULAR; INTRAVENOUS at 18:30

## 2022-06-05 RX ADMIN — SODIUM CHLORIDE, PRESERVATIVE FREE 5 ML: 5 INJECTION INTRAVENOUS at 21:56

## 2022-06-05 RX ADMIN — SODIUM CHLORIDE, PRESERVATIVE FREE 10 ML: 5 INJECTION INTRAVENOUS at 08:46

## 2022-06-05 RX ADMIN — OXYCODONE HYDROCHLORIDE 15 MG: 100 SOLUTION ORAL at 13:47

## 2022-06-05 RX ADMIN — SODIUM CHLORIDE, PRESERVATIVE FREE 5 ML: 5 INJECTION INTRAVENOUS at 21:47

## 2022-06-05 RX ADMIN — SODIUM CHLORIDE, PRESERVATIVE FREE 10 ML: 5 INJECTION INTRAVENOUS at 15:27

## 2022-06-05 RX ADMIN — OXYCODONE HYDROCHLORIDE 15 MG: 100 SOLUTION ORAL at 18:35

## 2022-06-05 RX ADMIN — ONDANSETRON 4 MG: 2 INJECTION INTRAMUSCULAR; INTRAVENOUS at 18:58

## 2022-06-05 RX ADMIN — HYDROMORPHONE HYDROCHLORIDE 1 MG: 1 INJECTION, SOLUTION INTRAMUSCULAR; INTRAVENOUS; SUBCUTANEOUS at 10:47

## 2022-06-05 RX ADMIN — LORAZEPAM 1 MG: 2 INJECTION INTRAMUSCULAR; INTRAVENOUS at 04:32

## 2022-06-05 RX ADMIN — POTASSIUM CHLORIDE 10 MEQ: 7.46 INJECTION, SOLUTION INTRAVENOUS at 13:34

## 2022-06-05 RX ADMIN — POTASSIUM CHLORIDE 10 MEQ: 7.46 INJECTION, SOLUTION INTRAVENOUS at 15:26

## 2022-06-05 RX ADMIN — SOYBEAN OIL 250 ML: 20 INJECTION, SOLUTION INTRAVENOUS at 18:11

## 2022-06-05 RX ADMIN — CALCIUM GLUCONATE: 98 INJECTION, SOLUTION INTRAVENOUS at 18:11

## 2022-06-05 RX ADMIN — POTASSIUM CHLORIDE 10 MEQ: 7.46 INJECTION, SOLUTION INTRAVENOUS at 18:15

## 2022-06-05 RX ADMIN — SODIUM CHLORIDE, PRESERVATIVE FREE 40 MG: 5 INJECTION INTRAVENOUS at 08:41

## 2022-06-05 RX ADMIN — SODIUM CHLORIDE, PRESERVATIVE FREE 10 ML: 5 INJECTION INTRAVENOUS at 15:31

## 2022-06-05 RX ADMIN — POTASSIUM CHLORIDE 10 MEQ: 7.46 INJECTION, SOLUTION INTRAVENOUS at 11:38

## 2022-06-05 ASSESSMENT — PAIN SCALES - GENERAL
PAINLEVEL_OUTOF10: 2
PAINLEVEL_OUTOF10: 9
PAINLEVEL_OUTOF10: 4
PAINLEVEL_OUTOF10: 6
PAINLEVEL_OUTOF10: 3
PAINLEVEL_OUTOF10: 10
PAINLEVEL_OUTOF10: 8
PAINLEVEL_OUTOF10: 4
PAINLEVEL_OUTOF10: 3

## 2022-06-05 ASSESSMENT — PAIN DESCRIPTION - LOCATION
LOCATION: ABDOMEN

## 2022-06-05 ASSESSMENT — PAIN DESCRIPTION - FREQUENCY
FREQUENCY: INTERMITTENT
FREQUENCY: INTERMITTENT

## 2022-06-05 ASSESSMENT — PAIN DESCRIPTION - DESCRIPTORS
DESCRIPTORS: ACHING
DESCRIPTORS: ACHING
DESCRIPTORS: ACHING;BURNING;SHARP
DESCRIPTORS: ACHING

## 2022-06-05 ASSESSMENT — PAIN DESCRIPTION - ORIENTATION
ORIENTATION: RIGHT
ORIENTATION: RIGHT

## 2022-06-05 ASSESSMENT — PAIN DESCRIPTION - PAIN TYPE
TYPE: SURGICAL PAIN
TYPE: SURGICAL PAIN

## 2022-06-05 ASSESSMENT — PAIN DESCRIPTION - ONSET: ONSET: GRADUAL

## 2022-06-05 ASSESSMENT — PAIN SCALES - WONG BAKER
WONGBAKER_NUMERICALRESPONSE: 0
WONGBAKER_NUMERICALRESPONSE: 0

## 2022-06-05 ASSESSMENT — PAIN - FUNCTIONAL ASSESSMENT: PAIN_FUNCTIONAL_ASSESSMENT: ACTIVITIES ARE NOT PREVENTED

## 2022-06-05 NOTE — PROGRESS NOTES
Comprehensive Nutrition Assessment    Type and Reason for Visit: Reassess  Malnutrition Screening Tool: Malnutrition Screen  Have you recently lost weight without trying?: 24 to 33 pounds (3 points)  Have you been eating poorly because of a decreased appetite?: No (0 points)  Malnutrition Screening Tool Score: 3  TPN management (Palliative)    Nutrition Recommendations/Plan:   Meals and Snacks:   Diet: Continue current order Clear liquids held for release at present. Educated wife items appropriate for clear liquids. Parenteral Nutrition:  Total parenteral nutrition  to begin at 1800  Continue: Dex 15%, 5% AA 2 L (85ml/hr)   Continue 250 ml 20% lipids daily   Lytes/L:  No changes to lytes indicated today  Total Sodium 100 meq (100 meq NaCl), Total Phosphorus 15 mmol (15 mmol KPO4), 8 meq Mg, 4.5 meq Calcium  KCL not available for inclusion in TPN. Current additional need covered with replacement. Other additives: MTE, MVI Monday Wednesday Friday due to national shortages   Nutrition Related Medication Management: Thiamine  mg daily active for 7 days. Nutritional Supplement Therapy:   Active electrolyte replacement per electolyte support protocols  Replacement indicated:  K - discussed with SARANYA Onofre need for implementation of K replacement per protocol as RD is currently unable to implement. Labs:   BMP daily  Mg MWF and daily x 3 days then MWF  Phos MWF and daily x 3 days then MWF  Triglyceride weekly starting Monday if pt remains hospitalized  POC Glucoses/SSI Not indicated    Meals and Snacks:  Diet: Continue current order Clear for comfort. D/C clear supplement as pt taking primarily sips of clears only.       Malnutrition Assessment:  Malnutrition Status: Severe malnutrition  Context: Chronic Illness  Findings of clinical characteristics of malnutrition:   Energy Intake:  75% or less estimated energy requirements for 1 month or longer (minimal intake for 3 months)  Weight Loss:  Greater than 5% over 1 month (10.5% in one month)     Body Fat Loss:  No significant body fat loss     Muscle Mass Loss:  No significant muscle mass loss    Fluid Accumulation:  Unable to assess     Strength:  Not Performed     Nutrition Assessment:  Nutrition History:    5/22 Pt and wife in room discussing hx (daughter present but did not participate). Wife relates his medical problems began in Dec 2021, but weight loss started in March 2022. Has n/v (with limited relief from medications), limited to no appetite. Patient open to ONS, but is put off by the idea of eating or drinking anything at this time for fear of vomiting. Wife states he did tolerate half a sandwich last week, but was unable to finish it. Nutrition Background:     History of PUD, HTN, hyperlipidemia, and diverticulitis. Admitted with abdominal pain for last 6 months. Presented with possible SBO.  being seen by Dr. Bee Calle with concerns of peritoneal metastatic disease, with unknown starting origin. Diffuse peritoneal carcinomatosis. Bx omentum + adenocarcinoma POD 7.   5/27: Diagnostic laparoscopy, switched to laparotomy. Omental mass and mesentery mass excision. Gastrostomy tube placement for venting. Findings of \"Diffuse peritoneal carcinomatosis with root of mesentery encasement and small bowel obstruction. \"  6/1: s/p venting gtube exchange. 6/3: s/p PORT. Nutrition Interval:  Visit with wife at bedside, pt slept throughout majority of visit. [employer] initiated teaching for home TPN yesterday. Wife expressed concerns about pain control, leakage around G tube. Discussed with Phani Riggs RN at bedside K replacement being implemented. Discussed with Candido Joy NP via [employer] D/C not planned today potentially tomorrow am.  Discussed with Finesse Su via phone no D/C today.     Abdominal Status (last documented):   Last BM (including prior to admit): 05/20/22 (per pt), GI Symptoms: Other (Comment) Gtube output: 700 ml recorded past 24 hours  Pertinent Medications: ancef, fentanyl patch, Protonix, thiamine (completes 6/9)  Electrolyte Replacement: 6/3: 10 meq KCl 6/4: KCL 10 meq  PRN: tums (last admin 6/1), zofran (daily use until 6/2)  Pertinent Labs:   Lab Results   Component Value Date     06/05/2022    K 3.3 06/05/2022     06/05/2022    CO2 28 06/05/2022    BUN 11 06/05/2022    CREATININE 0.50 06/05/2022    GLUCOSE 136 06/05/2022    CALCIUM 8.4 06/05/2022    PHOS 3.0 06/05/2022    MG 2.3 06/05/2022     Lab Results   Component Value Date    TRIG 155 06/04/2022    TRIG 117 06/03/2022    TRIG 135 04/18/2022   Labs relatively stable, ongoing hypokalemia (K in TPN limited secondary no KCL available additional phos stable with current [facility] provisions, replacement of only 10 meq 6/3 and 6/4). Mg trending up (remains WNL). Sligh increase in am glucose. Nutrition Related Findings:   No s/s wasting per NFPE 5/27, Diet history 5/22 NPO, 5/23 Clear, 5/24 soft+ bite sized, low fiber. 5/27: NPO for lap, then CLD. 6/1: NPO overnight due to vomiting despite Gtube to drain. 6/2: PPN started, PICC placed late afternoon. Current Nutrition Therapies:  ADULT DIET;  Clear Liquid  ADULT ORAL NUTRITION SUPPLEMENT; Breakfast, Lunch, Dinner; Clear Liquid Oral Supplement  PN-Adult Premix 5/15 - Central  Current Parenteral Nutrition Orders:  · Type and Formula: Premix Central (5/15)   · Lipids: 250ml,Daily  · Duration: Continuous  · Rate/Volume: 2 L (85ml/hr)  · Current PN Order Provides: infusing per current order  · Goal PN Orders Provides: 1920 kcal/d (100% of needs), 100 grams of protein/d (100% of needs), 300 grams of CHO/d and 2250 ml of total volume/d    Current Intake:   Average Meal Intake:  (clears for comfort) Average Supplements Intake: NPO      Anthropometric Measures:  Height: 5' 10\" (177.8 cm)  Current Body Wt: 199 lb 1.2 oz (90.3 kg) (6/4), Weight source: Not Specified  BMI: 28.6  Admission Body Weight: 207 lb (93.9 kg) (not specified source)  Ideal Body Weight (Kg) (Calculated): 75 kg (166 lbs), 119.4 %  Usual Body Wt: 231 lb 5 oz (104.9 kg) (1 mo ago 4/13 office visit), Percent weight change: -10.5       Edema: No data recorded  BMI Category Overweight (BMI 25.0-29. 9)  Estimated Daily Nutrient Needs:  Energy (kcal/day): 1878 -9429 (Kcal/kg (20-25) Weight used: 93.9 kg  (5/31)  Protein (g/day):  (1-1.2 g/kg) Weight Used: (Current) 93.9 kg  Fluid (ml/day):   (1 ml/kcal)    Nutrition Diagnosis:   · Inadequate protein-energy intake related to altered GI function as evidenced by NPO or clear liquid status due to medical condition (venting Gtube)    · Severe malnutrition,In context of chronic illness related to catabolic illness (nausea/vomiting) as evidenced by Criteria as identified in malnutrition assessment    Nutrition Interventions:   Food and/or Nutrient Delivery: Continue Current Diet,Modify Parenteral Nutrition     Coordination of Nutrition Care: Continue to monitor while inpatient       Goals:   Previous Goal Met: Goal(s) Achieved  Active Goal:  (Maintain nutrition support at goal rate)       Nutrition Monitoring and Evaluation:      Food/Nutrient Intake Outcomes: Parenteral Nutrition Intake/Tolerance,Diet Advancement/Tolerance  Physical Signs/Symptoms Outcomes: Biochemical Data,Diarrhea,Fluid Status or Edema,Weight    Discharge Planning:    Parenteral Nutrition    Benigno Eddy [address], [address]

## 2022-06-05 NOTE — PROGRESS NOTES
Admit Date: 2022    POD 9 Days Post-Op    Procedure:  Procedure(s):  LAPAROSCOPY DIAGNOSTIC , OPEN EXPLORATORY LAPAROTOMY, MASS EXCISION, G-TUBE PLACEMENT    Subjective:     Awake and lying in bed with wife, brother, and sister in law at bedside. Reports had some drainage from around Venting G tube earlier today. New dressing appears c/d/i/. Planning to discharge home tomorrow after seen by palliative care. TPN in progress. AF, NAD. K+ 3.4    Objective:       Vitals:    22 0432 22 0502 22 0728 22 1143   BP:   (!) 150/88 (!) 134/92   Pulse:   79 76   Resp: 15 14 16 16   Temp:   97.6 °F (36.4 °C) 98 °F (36.7 °C)   TempSrc:   Oral Oral   SpO2:   93% 95%   Weight:       Height:           Temp (24hrs), Av.8 °F (36.6 °C), Min:97.3 °F (36.3 °C), Max:98.2 °F (36.8 °C)  Eyes: Sclera are clear, pupils symmetric   ENMT: ears have no obvious external lesions; no obvious neck masses; no lip lesions  CV: RRR. Normal perfusion; no embolic signs  Resp: No JVD. Breathing is  non-labored. No audible wheezing On room air  GI: soft and non-distended, incisionally tender, G tube to bedside bag 700 mL 24hr output, incision is clean, dry, and intact   Musculoskeletal: no significant asymmetry; normal tone; unremarkable with normal function. Integument - Port Left chest with dressing c/d/i  Neuro:  No obvious focal deficits; moves all 4; A&O x3  Psychiatric: normal affect and mood, no memory impairment    Assessment:     Active Problems:    Severe protein-calorie malnutrition (HCC)    Abdominal mass    Debility    Encounter for palliative care    Itching    Fatigue    Abdominal carcinomatosis (HCC)    Goals of care, counseling/discussion    On total parenteral nutrition  Resolved Problems:    * No resolved hospital problems.  *        Plan/Recommendations/Medical Decision Making:     G-tube intact and draining  Patient remains admitted for pain control and to discuss goals of care  Ativan for anxiety  Palliative care following  Oncology following  Lovenox, Protonix  Clear liquid diet  TPN  Port placed 6/3/22  Planning to Discharge home tomorrow with TPN.     Inna Rowan 148

## 2022-06-05 NOTE — PROGRESS NOTES
END OF SHIFT:    Shift Summary:    Ativan x2  Dilaudid x2  Pt slept and rest all night    I/Os:    No intake/output data recorded. I/O last 3 completed shifts: In: 5714.2 [P.O.:1190;  I.V.:2398.8]  Out: 2300 [Urine:1150; Emesis/NG output:1150]    Jamari Cowan RN

## 2022-06-06 LAB
ANION GAP SERPL CALC-SCNC: 4 MMOL/L (ref 7–16)
BUN SERPL-MCNC: 14 MG/DL (ref 8–23)
CALCIUM SERPL-MCNC: 8.4 MG/DL (ref 8.3–10.4)
CHLORIDE SERPL-SCNC: 107 MMOL/L (ref 98–107)
CO2 SERPL-SCNC: 27 MMOL/L (ref 21–32)
CREAT SERPL-MCNC: 0.5 MG/DL (ref 0.8–1.5)
GLUCOSE SERPL-MCNC: 116 MG/DL (ref 65–100)
MAGNESIUM SERPL-MCNC: 2.2 MG/DL (ref 1.8–2.4)
PHOSPHATE SERPL-MCNC: 3.6 MG/DL (ref 2.3–3.7)
POTASSIUM SERPL-SCNC: 3.4 MMOL/L (ref 3.5–5.1)
SODIUM SERPL-SCNC: 138 MMOL/L (ref 136–145)

## 2022-06-06 PROCEDURE — A4216 STERILE WATER/SALINE, 10 ML: HCPCS | Performed by: SURGERY

## 2022-06-06 PROCEDURE — 83735 ASSAY OF MAGNESIUM: CPT

## 2022-06-06 PROCEDURE — C9113 INJ PANTOPRAZOLE SODIUM, VIA: HCPCS | Performed by: SURGERY

## 2022-06-06 PROCEDURE — 6360000002 HC RX W HCPCS: Performed by: SURGERY

## 2022-06-06 PROCEDURE — 84100 ASSAY OF PHOSPHORUS: CPT

## 2022-06-06 PROCEDURE — 36592 COLLECT BLOOD FROM PICC: CPT

## 2022-06-06 PROCEDURE — 6370000000 HC RX 637 (ALT 250 FOR IP): Performed by: SURGERY

## 2022-06-06 PROCEDURE — 2500000003 HC RX 250 WO HCPCS: Performed by: NURSE PRACTITIONER

## 2022-06-06 PROCEDURE — 2580000003 HC RX 258: Performed by: SURGERY

## 2022-06-06 PROCEDURE — 99233 SBSQ HOSP IP/OBS HIGH 50: CPT | Performed by: NURSE PRACTITIONER

## 2022-06-06 PROCEDURE — 99356 PR PROLONGED SVC I/P OR OBS SETTING 1ST HOUR: CPT | Performed by: NURSE PRACTITIONER

## 2022-06-06 PROCEDURE — 99357 PR PROLONGED SVC I/P REQ UNIT/FLOOR TIME EA 30 MIN: CPT | Performed by: NURSE PRACTITIONER

## 2022-06-06 PROCEDURE — 2500000003 HC RX 250 WO HCPCS: Performed by: SURGERY

## 2022-06-06 PROCEDURE — 6360000002 HC RX W HCPCS: Performed by: NURSE PRACTITIONER

## 2022-06-06 PROCEDURE — 6370000000 HC RX 637 (ALT 250 FOR IP): Performed by: NURSE PRACTITIONER

## 2022-06-06 PROCEDURE — 80048 BASIC METABOLIC PNL TOTAL CA: CPT

## 2022-06-06 PROCEDURE — 1100000000 HC RM PRIVATE

## 2022-06-06 RX ORDER — HYDROMORPHONE HYDROCHLORIDE 2 MG/ML
2 INJECTION, SOLUTION INTRAMUSCULAR; INTRAVENOUS; SUBCUTANEOUS
Status: DISCONTINUED | OUTPATIENT
Start: 2022-06-06 | End: 2022-06-14 | Stop reason: HOSPADM

## 2022-06-06 RX ORDER — OXYCODONE HCL 20 MG/ML
25 CONCENTRATE, ORAL ORAL EVERY 4 HOURS PRN
Qty: 15 ML | Refills: 0 | Status: SHIPPED | OUTPATIENT
Start: 2022-06-06 | End: 2022-06-06

## 2022-06-06 RX ORDER — FENTANYL 100 UG/H
1 PATCH TRANSDERMAL
Status: DISCONTINUED | OUTPATIENT
Start: 2022-06-06 | End: 2022-06-14 | Stop reason: HOSPADM

## 2022-06-06 RX ORDER — OXYCODONE HCL 20 MG/ML
25 CONCENTRATE, ORAL ORAL EVERY 4 HOURS PRN
Qty: 30 ML | Refills: 0 | Status: SHIPPED | OUTPATIENT
Start: 2022-06-06 | End: 2022-06-10

## 2022-06-06 RX ORDER — ENOXAPARIN SODIUM 100 MG/ML
40 INJECTION SUBCUTANEOUS DAILY
Qty: 4 ML | Refills: 0 | Status: SHIPPED | OUTPATIENT
Start: 2022-06-06 | End: 2022-06-14 | Stop reason: HOSPADM

## 2022-06-06 RX ORDER — OXYCODONE HCL 20 MG/ML
25 CONCENTRATE, ORAL ORAL EVERY 4 HOURS PRN
Status: DISCONTINUED | OUTPATIENT
Start: 2022-06-06 | End: 2022-06-08

## 2022-06-06 RX ORDER — LORAZEPAM 1 MG/1
1 TABLET ORAL EVERY 8 HOURS PRN
Qty: 10 TABLET | Refills: 0 | Status: SHIPPED | OUTPATIENT
Start: 2022-06-06 | End: 2022-06-11

## 2022-06-06 RX ORDER — FENTANYL 100 UG/H
1 PATCH TRANSDERMAL
Qty: 4 PATCH | Refills: 0 | Status: SHIPPED | OUTPATIENT
Start: 2022-06-09 | End: 2022-06-21

## 2022-06-06 RX ORDER — ONDANSETRON 4 MG/1
4 TABLET, ORALLY DISINTEGRATING ORAL EVERY 8 HOURS PRN
Qty: 12 TABLET | Refills: 0 | Status: SHIPPED | OUTPATIENT
Start: 2022-06-06 | End: 2022-06-14 | Stop reason: HOSPADM

## 2022-06-06 RX ADMIN — POTASSIUM CHLORIDE 20 MEQ: 400 INJECTION, SOLUTION INTRAVENOUS at 11:12

## 2022-06-06 RX ADMIN — HYDROMORPHONE HYDROCHLORIDE 1.5 MG: 2 INJECTION INTRAMUSCULAR; INTRAVENOUS; SUBCUTANEOUS at 02:43

## 2022-06-06 RX ADMIN — SODIUM CHLORIDE, PRESERVATIVE FREE 5 ML: 5 INJECTION INTRAVENOUS at 05:56

## 2022-06-06 RX ADMIN — OXYCODONE HYDROCHLORIDE 15 MG: 100 SOLUTION ORAL at 06:09

## 2022-06-06 RX ADMIN — POTASSIUM CHLORIDE 20 MEQ: 400 INJECTION, SOLUTION INTRAVENOUS at 08:01

## 2022-06-06 RX ADMIN — HYDROMORPHONE HYDROCHLORIDE 1.5 MG: 2 INJECTION INTRAMUSCULAR; INTRAVENOUS; SUBCUTANEOUS at 08:38

## 2022-06-06 RX ADMIN — SODIUM CHLORIDE, PRESERVATIVE FREE 10 ML: 5 INJECTION INTRAVENOUS at 15:29

## 2022-06-06 RX ADMIN — SODIUM CHLORIDE, PRESERVATIVE FREE 40 MG: 5 INJECTION INTRAVENOUS at 08:01

## 2022-06-06 RX ADMIN — OXYCODONE HYDROCHLORIDE 25 MG: 100 SOLUTION ORAL at 14:15

## 2022-06-06 RX ADMIN — THIAMINE HYDROCHLORIDE 100 MG: 100 INJECTION, SOLUTION INTRAMUSCULAR; INTRAVENOUS at 18:51

## 2022-06-06 RX ADMIN — HYDROMORPHONE HYDROCHLORIDE 1.5 MG: 2 INJECTION INTRAMUSCULAR; INTRAVENOUS; SUBCUTANEOUS at 15:34

## 2022-06-06 RX ADMIN — HYDROMORPHONE HYDROCHLORIDE 2 MG: 2 INJECTION INTRAMUSCULAR; INTRAVENOUS; SUBCUTANEOUS at 22:59

## 2022-06-06 RX ADMIN — SODIUM CHLORIDE, PRESERVATIVE FREE 10 ML: 5 INJECTION INTRAVENOUS at 22:14

## 2022-06-06 RX ADMIN — SODIUM CHLORIDE, PRESERVATIVE FREE 10 ML: 5 INJECTION INTRAVENOUS at 08:08

## 2022-06-06 RX ADMIN — SODIUM CHLORIDE: 4 INJECTION, SOLUTION, CONCENTRATE INTRAVENOUS at 18:51

## 2022-06-06 RX ADMIN — SODIUM CHLORIDE, PRESERVATIVE FREE 10 ML: 5 INJECTION INTRAVENOUS at 22:15

## 2022-06-06 RX ADMIN — ENOXAPARIN SODIUM 40 MG: 100 INJECTION SUBCUTANEOUS at 21:37

## 2022-06-06 RX ADMIN — SOYBEAN OIL 250 ML: 20 INJECTION, SOLUTION INTRAVENOUS at 18:51

## 2022-06-06 RX ADMIN — SODIUM CHLORIDE, PRESERVATIVE FREE 10 ML: 5 INJECTION INTRAVENOUS at 15:28

## 2022-06-06 ASSESSMENT — PAIN DESCRIPTION - LOCATION
LOCATION: ABDOMEN

## 2022-06-06 ASSESSMENT — PAIN DESCRIPTION - DESCRIPTORS
DESCRIPTORS: ACHING;CRAMPING
DESCRIPTORS: ACHING;BURNING
DESCRIPTORS: ACHING;CRAMPING
DESCRIPTORS: ACHING;PRESSURE

## 2022-06-06 ASSESSMENT — PAIN SCALES - WONG BAKER
WONGBAKER_NUMERICALRESPONSE: 0
WONGBAKER_NUMERICALRESPONSE: 0

## 2022-06-06 ASSESSMENT — PAIN SCALES - GENERAL
PAINLEVEL_OUTOF10: 10
PAINLEVEL_OUTOF10: 10
PAINLEVEL_OUTOF10: 4
PAINLEVEL_OUTOF10: 4
PAINLEVEL_OUTOF10: 10
PAINLEVEL_OUTOF10: 5
PAINLEVEL_OUTOF10: 4
PAINLEVEL_OUTOF10: 4
PAINLEVEL_OUTOF10: 10
PAINLEVEL_OUTOF10: 10
PAINLEVEL_OUTOF10: 7

## 2022-06-06 ASSESSMENT — PAIN DESCRIPTION - PAIN TYPE: TYPE: SURGICAL PAIN

## 2022-06-06 ASSESSMENT — PAIN DESCRIPTION - ONSET: ONSET: GRADUAL

## 2022-06-06 ASSESSMENT — PAIN DESCRIPTION - ORIENTATION
ORIENTATION: RIGHT;LEFT
ORIENTATION: RIGHT
ORIENTATION: RIGHT

## 2022-06-06 ASSESSMENT — PAIN DESCRIPTION - FREQUENCY: FREQUENCY: INTERMITTENT

## 2022-06-06 NOTE — PROGRESS NOTES
END OF SHIFT:    Shift Summary:    Ativan x1  Dilaudid x2  Oxy x1  Pt stated he has pain all the time, especially with the tube. Pt got up and went to bathroom     I/Os:    06/05 1901 - 06/06 0700  In: 1373.8 [P.O.:60]  Out: 800 [Urine:450]  I/O last 3 completed shifts:   In: 7126.2 [P.O.:1080; I.V.:3920.8]  Out: 2250 [Urine:950; Emesis/NG output:1300]    Tevin Gee, RN

## 2022-06-06 NOTE — DISCHARGE SUMMARY
Franky Foley  MRN: [id number]     : 1960     Age: 62 y.o. Admit date: 2022     Discharge date: 2022   Attending Physician: Dr. Cheko Burgos MD  Primary Discharge Diagnosis:   Active Problems:    Severe protein-calorie malnutrition ([facility] [address]    Abdominal mass    Debility    Encounter for palliative care    Itching    Fatigue    Abdominal carcinomatosis ([facility] [address]    Goals of care, counseling/discussion    On total parenteral nutrition  Resolved Problems:    * No resolved hospital problems. *    Primary Operations or Procedures Performed :  Procedure(s):  PORT INSERTION     Brief History and Reason for Admission: Franky Foley was admitted with the following history of present illness. The patient states that he has been having abdominal pain for about 6 months now. He had a recent EGD and colonoscopy on 2022 which revealed a hiatal hernia, gastric polyps, benign colon polyps, diverticulosis, and hemorrhoids. He continued to have abdominal pain over the last few months. He states that he has lost about 30 lbs over the last 6 months. He has not had an appetite. He has been having worsening of GERD as well over that time. He was taking omeprazole and recently switched to pantoprazole. He states that he used an enema last night, which allowed him to have BMs this AM and pass flatus, however he is still having minimal PO intake.       The patient went to the emergency department at [facility] about 10 days ago for a partial small bowel obstruction. The patient presented to our emergency department 1 week ago with abdominal pain.  The patient had a CT scan at that time which revealed extensive peritoneal nodularity and mild ascites consistent with peritoneal metastatic disease.  A primary lesion was not definitely identified. There was also sclerosis of the S1 vertebral body, a bone scan has been ordered to assess for possible bone metastasis.  He also had evidence of a partial bowel obstruction. There is no evidence of any metastatic disease within the chest.     Pt was instructed to go to ED for direct admission due to dehydration.           Hospital Course: The patient was unfortunately found to have diffuse peritoneal metastasis. He had a venting G-tube placed. He has been started on TPN. Home TPN has been arranged. He has met with oncology who he will follow for chemotherapy on discharge. The patient developed a plan with palliative care as well. The patient helped develop and voices understanding with the plan of care. They are amenable without reservations at this time to moving forward with discharge. Condition at Discharge: good    Discharge Medications:   Current Discharge Medication List      START taking these medications    Details   fentaNYL (DURAGESIC) 100 MCG/HR Place 1 patch onto the skin every 72 hours for 12 days. Qty: 4 patch, Refills: 0    Comments: Reduce doses taken as pain becomes manageable  Associated Diagnoses: Peritoneal metastases (HCC)      oxyCODONE (ROXICODONE INTENSOL) 100 MG/5ML concentrated solution Place 1.25 mLs under the tongue every 4 hours as needed for Pain for up to 3 days. Qty: 15 mL, Refills: 0    Comments: Reduce doses taken as pain becomes manageable  Associated Diagnoses: Peritoneal metastases (HCC)      ondansetron (ZOFRAN-ODT) 4 MG disintegrating tablet Take 1 tablet by mouth every 8 hours as needed for Nausea or Vomiting  Qty: 12 tablet, Refills: 0      enoxaparin (LOVENOX) 40 MG/0.4ML Inject 0.4 mLs into the skin daily  Qty: 4 mL, Refills: 0      LORazepam (ATIVAN) 1 MG tablet Take 1 tablet by mouth every 8 hours as needed for Anxiety for up to 5 days.   Qty: 10 tablet, Refills: 0    Associated Diagnoses: Abdominal carcinomatosis ([facility] [address]         CONTINUE these medications which have NOT CHANGED    Details   acetaminophen (TYLENOL) 500 MG tablet Take 500 mg by mouth every 6 hours as needed for Pain      diphenhydrAMINE-APAP, sleep, (TYLENOL PM EXTRA STRENGTH)  MG tablet Take 1 tablet by mouth nightly as needed for Sleep      naloxone 4 MG/0.1ML LIQD nasal spray 1 spray by Nasal route as needed for Opioid Reversal      pantoprazole (PROTONIX) 40 MG tablet TAKE 1 TABLET BY MOUTH BEFORE BREAKFAST AND DINNER FOR 30 DAYS      CARAFATE 1 GM/10ML suspension Take 1 g by mouth 4 times daily (before meals and nightly)       amLODIPine-benazepril (LOTREL) 5-20 MG per capsule TAKE ONE CAPSULE BY MOUTH EVERY DAY      diclofenac sodium (VOLTAREN) 1 % GEL Apply 4 g topically 4 times daily      dicyclomine (BENTYL) 10 MG capsule Take 20 mg by mouth every 6 hours       polyethylene glycol (GLYCOLAX) 17 GM/SCOOP powder Take 17 g by mouth daily      rosuvastatin (CRESTOR) 10 MG tablet Take 10 mg by mouth      umeclidinium-vilanterol (ANORO ELLIPTA) 62.5-25 MCG/INH AEPB inhaler Inhale 1 puff into the lungs daily         STOP taking these medications       oxyCODONE-acetaminophen (PERCOCET) 5-325 MG per tablet Comments:   Reason for Stopping:         famotidine (PEPCID) 40 MG tablet Comments:   Reason for Stopping:         gabapentin (NEURONTIN) 300 MG capsule Comments:   Reason for Stopping:         ibuprofen (ADVIL;MOTRIN) 200 MG tablet Comments:   Reason for Stopping:         omeprazole (PRILOSEC) 20 MG delayed release capsule Comments:   Reason for Stopping:                 Disposition/Discharge Instructions/Follow-up Care:      Discharge home with home health care for home TPN and venting G-tube  Follow-up with oncology to begin chemotherapy  Fentanyl patch to help with pain control  Ativan as needed for anxiety  Clear liquid diet    Signed:  Tyrone Benjamin PA-C  6/6/2022  12:43 PM

## 2022-06-06 NOTE — PROGRESS NOTES
Brief nutrition note:    Perfect Serve message to JOEL Salmeron, regarding potential d/c. Per PA, patient is discharging today with home TPN. Inpatient TPN will not be re-ordered this evening.     [address], [address] [place] [address], [address], [address]

## 2022-06-06 NOTE — CONSULTS
Comprehensive Nutrition Assessment    Type and Reason for Visit: Reassess  Malnutrition Screening Tool: Malnutrition Screen  Have you recently lost weight without trying?: 24 to 33 pounds (3 points)  Have you been eating poorly because of a decreased appetite?: No (0 points)  Malnutrition Screening Tool Score: 3  TPN management (Palliative)  TF management (palliative) \"Patient is not being discharged today after all.  Please restart TPN as soon as can. \"    Nutrition Recommendations/Plan:   Meals and Snacks:   Diet: Continue current order Clear liquids held for release at present. Educated wife items appropriate for clear liquids. Parenteral Nutrition:  Total parenteral nutrition  to begin at 1800  Continue: Dex 15%, 5% AA 2 L (85ml/hr)   Continue 250 ml 20% lipids daily   Lytes/L:  No changes to lytes indicated today  No lytes due to last minute re-order as patient is not being d/c today  Other additives: MTE - hold, MVI Monday Wednesday Friday due to national shortages   Nutrition Related Medication Management: Thiamine  mg daily active for 7 days. Nutritional Supplement Therapy:   Active electrolyte replacement per electolyte support protocols  Replacement indicated:  K - completed this am  Labs:   BMP daily  Mg MWF and daily x 3 days then MWF  Phos MWF and daily x 3 days then MWF  Triglyceride weekly starting Monday if pt remains hospitalized  POC Glucoses/SSI Not indicated    Meals and Snacks:  Diet: Continue current order Clear for comfort. D/C clear supplement as pt taking primarily sips of clears only.       Malnutrition Assessment:  Malnutrition Status: Severe malnutrition  Context: Chronic Illness  Findings of clinical characteristics of malnutrition:   Energy Intake:  75% or less estimated energy requirements for 1 month or longer (minimal intake for 3 months)  Weight Loss:  Greater than 5% over 1 month (10.5% in one month)     Body Fat Loss:  No significant body fat loss     Muscle Mass Loss:  No significant muscle mass loss    Fluid Accumulation:  Unable to assess     Strength:  Not Performed     Nutrition Assessment:  Nutrition History:    5/22 Pt and wife in room discussing hx (daughter present but did not participate). Wife relates his medical problems began in Dec 2021, but weight loss started in March 2022. Has n/v (with limited relief from medications), limited to no appetite. Patient open to ONS, but is put off by the idea of eating or drinking anything at this time for fear of vomiting. Wife states he did tolerate half a sandwich last week, but was unable to finish it. Nutrition Background:     History of PUD, HTN, hyperlipidemia, and diverticulitis. Admitted with abdominal pain for last 6 months. Presented with possible SBO.  being seen by Dr. Jimi Song with concerns of peritoneal metastatic disease, with unknown starting origin. Diffuse peritoneal carcinomatosis. Bx omentum + adenocarcinoma POD 7.   5/27: Diagnostic laparoscopy, switched to laparotomy. Omental mass and mesentery mass excision. Gastrostomy tube placement for venting. Findings of \"Diffuse peritoneal carcinomatosis with root of mesentery encasement and small bowel obstruction. \"  6/1: s/p venting gtube exchange. 6/3: s/p PORT. Nutrition Interval:  Consult received to resume TPN as patient not discharging today due to uncontrolled pain - received after pharmacy cut off time. Visit with patient, wife, and daughter. Wife very concerned with patient's pain level and gtube with decreased output despite moving around a good bit today. She also informed palliative care. Called pharmacy and spoke with [facility] who stated electrolyte free bag could be sent tonight.   Abdominal Status (last documented):   Last BM (including prior to admit): 05/20/22 (per pt), GI Symptoms: Other (Comment) Gtube output: 700 ml recorded past 24 hours  Pertinent Medications: ancef, fentanyl patch, Protonix, thiamine (completes 6/9)  Electrolyte Replacement: 6/3: 10 meq KCl 6/4: KCL 10 meq, 6/5 KCl 10 meq x4, 6/6 KCl 20 mmeq x2  PRN: tums (last admin 6/1), zofran (daily use until 6/2)  Pertinent Labs:   Lab Results   Component Value Date     06/06/2022    K 3.4 06/06/2022     06/06/2022    CO2 27 06/06/2022    BUN 14 06/06/2022    CREATININE 0.50 06/06/2022    GLUCOSE 116 06/06/2022    CALCIUM 8.4 06/06/2022    PHOS 3.6 06/06/2022    MG 2.2 06/06/2022     Lab Results   Component Value Date    TRIG 155 06/04/2022    TRIG 117 06/03/2022    TRIG 135 04/18/2022   Labs reviewed      Nutrition Related Findings:   No s/s wasting per NFPE 5/27, Diet history 5/22 NPO, 5/23 Clear, 5/24 soft+ bite sized, low fiber. 5/27: NPO for lap, then CLD. 6/1: NPO overnight due to vomiting despite Gtube to drain. 6/2: PPN started, PICC placed late afternoon. TPN to goal 6/4. Current Nutrition Therapies:  ADULT DIET; Clear Liquid  PN-Adult Premix 5/15 - Central  Current Parenteral Nutrition Orders:  · Type and Formula: Premix Central (5/15)   · Lipids: 250ml,Daily  · Duration: Continuous  · Rate/Volume: 2 L (85ml/hr)  · Current PN Order Provides: infusing per current order  · Goal PN Orders Provides: 1920 kcal/d (100% of needs), 100 grams of protein/d (100% of needs), 300 grams of CHO/d and 2250 ml of total volume/d    Current Intake:   Average Meal Intake:  (clears for comfort) Average Supplements Intake: NPO      Anthropometric Measures:  Height: 5' 10\" (177.8 cm)  Current Body Wt: 195 lb 8.8 oz (88.7 kg) (6/5), Weight source: Bed Scale  BMI: 28.1  Admission Body Weight: 207 lb (93.9 kg) (not specified source)  Ideal Body Weight (Kg) (Calculated): 75 kg (166 lbs), 119.4 %  Usual Body Wt: 231 lb 5 oz (104.9 kg) (1 mo ago 4/13 office visit), Percent weight change: -10.5       Edema: No data recorded  BMI Category Overweight (BMI 25.0-29. 9)  Estimated Daily Nutrient Needs:  Energy (kcal/day): 1878 -3966 (Kcal/kg (20-25) Weight used: 93.9 kg  (5/31)  Protein (g/day):  (1-1.2 g/kg) Weight Used: (Current) 93.9 kg  Fluid (ml/day):   (1 ml/kcal)    Nutrition Diagnosis:   · Inadequate protein-energy intake related to altered GI function as evidenced by NPO or clear liquid status due to medical condition (venting Gtube)    · Severe malnutrition,In context of chronic illness related to catabolic illness (nausea/vomiting) as evidenced by Criteria as identified in malnutrition assessment    Nutrition Interventions:   Food and/or Nutrient Delivery: Continue Current Diet     Coordination of Nutrition Care: Continue to monitor while inpatient       Goals:   Previous Goal Met: Goal(s) Achieved  Active Goal:  (Maintain nutrition support at goal rate)       Nutrition Monitoring and Evaluation:      Food/Nutrient Intake Outcomes: Parenteral Nutrition Intake/Tolerance  Physical Signs/Symptoms Outcomes: Biochemical Data,GI Status,Fluid Status or Edema,Weight    Discharge Planning:    Parenteral Nutrition    Gavin Phlegm, [address]

## 2022-06-06 NOTE — CARE COORDINATION
Pt's d/c cancelled due to intractable pain. Pt to be seen by palliative care. Surgery consult. Possibility that pt will begin chemo IP. CM called Vivienne with [facility] as well as [facility] to notify both parties of the d/c cancellation. CM will continue to follow.

## 2022-06-06 NOTE — PROGRESS NOTES
Palliative Care Progress Note    Patient: Dewight Epley MRN: [id number]  SSN: [id number]    YOB: 1960  Age: 62 y.o. Sex: male       Assessment/Plan:     Chief Complaint/Interval History: Patient's abdominal pain still high. Principal Diagnosis:    · Pain, abdomen  R10.9    Additional Diagnoses:   · Encounter for Palliative Care  Z51.5  · Peritoneal metastases    Palliative Performance Scale (PPS)       Medical Decision Making:   Reviewed and summarized notes from last palliative care visit to present. Discussed case with appropriate providers: SARANYA Godinez, acting nursing supervisor Maximo Hedrick, General Surgery PA Annelise Dangelo, Onc NP José Romero  Reviewed lab and X-ray data from last palliative care visit to present. Pt resting in bed, wife, dtr and three other family members present. SARANYA Godinez was concerned that the pt was scheduled for discharge home today, but was still on IV Dilaudid for pain in his abdomen. I increased his fentanyl patch to 100 mcg and his oxycodone Intensol to 25 mg q 4 hrs PRN. Addendum:  In the afternoon SARANYA Godinez reported that the pt's pain was no better, and Mr Nilton Raya said that the [facility] didn't have any effect on his pain. After much discussion with the pt and Ms [place]man, the plan is that General Surgery will cancel their discharge order and will ask Oncology to become primary. Consulted Dietary to restart the TPN. Received text from [employer] that Dr Leandra Duran has agreed to become primary at Dr Tashi Morales request.    Tu Rod, will discuss with Oncology additional means of pain management. Will discuss findings with members of the interdisciplinary team.         More than 50% of this 35 minute visit was spent counseling and coordination of care as outlined above. In addition to the E&M described above, more than 50% of a 115-minute prolonged visit, from 1450 - 1640, was spent on counseling and coordination of care.       Subjective:     Review of Systems:  A comprehensive review of systems was negative except as noted above. Objective:     Visit Vitals  BP (!) 139/94   Pulse 77   Temp 97.6 °F (36.4 °C) (Oral)   Resp 17   Ht 5' 10\" (1.778 m)   Wt 195 lb 9.6 oz (88.7 kg)   SpO2 94%   BMI 28.07 kg/m²       Physical Exam:    General:  Cooperative. Abdominal pain of 9   Eyes:  Conjunctivae/corneas clear    Nose: Nares normal. Septum midline. Neck: Supple, symmetrical, trachea midline, no JVD   Lungs:   Clear to auscultation bilaterally, unlabored   Heart:  Regular rate and rhythm, no murmur    Abdomen:   Soft, non-tender, non-distended   Extremities: Normal, atraumatic, no cyanosis or edema   Skin: Skin color, texture, turgor normal. No rash or lesions.    Neurologic: Nonfocal   Psych: Alert and oriented     Signed By: LUCY Jj - HEATHER     June 6, 2022

## 2022-06-06 NOTE — CARE COORDINATION
Pt to d/c home today. Family will provide transportation home. [facility]: SN ordered and [facility] ordered for new home TPN. [facility] has conducted pt and spouse education while pt was IP. Will d/c with venting G tube as well. All catheter care and G tube care orders are in the [facility] order. No other supportive care needs identified. Pt and spouse agree with d/c plan. Milestones met. 05/25/22 3017   Service Assessment   Patient Orientation Alert and Oriented;Person;Place; Self   Cognition Alert   History Provided By Patient   Primary Caregiver Self   Accompanied By/Relationship Dai Sajennifer (spouse)   Support Systems Spouse/Significant Other;Children   Patient's Healthcare Decision Maker is: Legal Next of Kin   PCP Verified by CM Yes  Daily Mcdonald. Cristina Hancock MD)   Last Visit to PCP Within last 3 months   Prior Functional Level Independent in ADLs/IADLs   Current Functional Level Independent in ADLs/IADLs   Can patient return to prior living arrangement Yes   Ability to make needs known: Good   Family able to assist with home care needs: Yes   Would you like for me to discuss the discharge plan with any other family members/significant others, and if so, who? No   Social/Functional History   Lives With Spouse   Type of [address] One level   Home Access Stairs to enter with rails   Entrance Stairs - Number of Steps 6   Entrance Stairs - R [address] Help From Family   ADL Assistance Independent   Homemaking Assistance Independent   Ambulation Assistance Independent   Transfer Assistance Independent   Active  Yes   Mode of Transportation Car   Discharge Planning   Type of Residence House   Living Arrangements Spouse/Significant Other   Current Services Prior To Admission None   Potential Assistance Needed N/A   DME Ordered?  No   Potential Assistance Purchasing Medications No   Type of Home Care Services None   Patient expects to be discharged to: House   One/Two Story Residence One story   Services At/After Discharge   Transition of Care Consult (CM Consult) Discharge Planning;Home Health; Other  ([facility]: SN and [facility] for home TPN)   Internal Home Health No   Reason Outside Agency Chosen Patient already serviced by other home care/hospice agency   Services At/After Discharge Home Health;IV Therapy  ([facility]: SN and [facility] for home TPN)   [employer] Information Provided? No   Mode of Transport at Discharge Other (see comment)  (Family)   Confirm Follow Up Transport Family   Condition of Participation: Discharge Planning   The Plan for Transition of Care is related to the following treatment goals: Pt requires home health and home TPN to return to functional baseline in the community. The Patient and/or Patient Representative was provided with a Choice of Provider? Patient   The Patient and/Or Patient Representative agree with the Discharge Plan? Yes   Freedom of Choice list was provided with basic dialogue that supports the patient's individualized plan of care/goals, treatment preferences, and shares the quality data associated with the providers?   Yes

## 2022-06-07 ENCOUNTER — APPOINTMENT (OUTPATIENT)
Dept: GENERAL RADIOLOGY | Age: 62
DRG: 356 | End: 2022-06-07
Payer: COMMERCIAL

## 2022-06-07 ENCOUNTER — TELEPHONE (OUTPATIENT)
Dept: ONCOLOGY | Age: 62
End: 2022-06-07

## 2022-06-07 LAB
ANION GAP SERPL CALC-SCNC: 8 MMOL/L (ref 7–16)
BUN SERPL-MCNC: 16 MG/DL (ref 8–23)
CALCIUM SERPL-MCNC: 8.4 MG/DL (ref 8.3–10.4)
CHLORIDE SERPL-SCNC: 102 MMOL/L (ref 98–107)
CO2 SERPL-SCNC: 25 MMOL/L (ref 21–32)
CREAT SERPL-MCNC: 0.6 MG/DL (ref 0.8–1.5)
GLUCOSE SERPL-MCNC: 143 MG/DL (ref 65–100)
POTASSIUM SERPL-SCNC: 3.5 MMOL/L (ref 3.5–5.1)
SODIUM SERPL-SCNC: 135 MMOL/L (ref 138–145)

## 2022-06-07 PROCEDURE — 6370000000 HC RX 637 (ALT 250 FOR IP): Performed by: SURGERY

## 2022-06-07 PROCEDURE — 6360000002 HC RX W HCPCS: Performed by: NURSE PRACTITIONER

## 2022-06-07 PROCEDURE — 6360000002 HC RX W HCPCS: Performed by: SURGERY

## 2022-06-07 PROCEDURE — 80048 BASIC METABOLIC PNL TOTAL CA: CPT

## 2022-06-07 PROCEDURE — 99233 SBSQ HOSP IP/OBS HIGH 50: CPT | Performed by: NURSE PRACTITIONER

## 2022-06-07 PROCEDURE — 2500000003 HC RX 250 WO HCPCS: Performed by: NURSE PRACTITIONER

## 2022-06-07 PROCEDURE — 2580000003 HC RX 258: Performed by: SURGERY

## 2022-06-07 PROCEDURE — 1100000000 HC RM PRIVATE

## 2022-06-07 PROCEDURE — A4216 STERILE WATER/SALINE, 10 ML: HCPCS | Performed by: SURGERY

## 2022-06-07 PROCEDURE — 99233 SBSQ HOSP IP/OBS HIGH 50: CPT | Performed by: INTERNAL MEDICINE

## 2022-06-07 PROCEDURE — APPSS45 APP SPLIT SHARED TIME 31-45 MINUTES: Performed by: NURSE PRACTITIONER

## 2022-06-07 PROCEDURE — 2500000003 HC RX 250 WO HCPCS: Performed by: SURGERY

## 2022-06-07 PROCEDURE — C9113 INJ PANTOPRAZOLE SODIUM, VIA: HCPCS | Performed by: SURGERY

## 2022-06-07 PROCEDURE — 74018 RADEX ABDOMEN 1 VIEW: CPT

## 2022-06-07 RX ORDER — PROCHLORPERAZINE EDISYLATE 5 MG/ML
10 INJECTION INTRAMUSCULAR; INTRAVENOUS EVERY 6 HOURS PRN
Status: DISCONTINUED | OUTPATIENT
Start: 2022-06-07 | End: 2022-06-07

## 2022-06-07 RX ORDER — PROMETHAZINE HYDROCHLORIDE 25 MG/ML
6.25 INJECTION, SOLUTION INTRAMUSCULAR; INTRAVENOUS EVERY 6 HOURS PRN
Status: DISCONTINUED | OUTPATIENT
Start: 2022-06-07 | End: 2022-06-07

## 2022-06-07 RX ORDER — PROCHLORPERAZINE EDISYLATE 5 MG/ML
10 INJECTION INTRAMUSCULAR; INTRAVENOUS EVERY 6 HOURS PRN
Status: DISCONTINUED | OUTPATIENT
Start: 2022-06-07 | End: 2022-06-14 | Stop reason: HOSPADM

## 2022-06-07 RX ADMIN — ENOXAPARIN SODIUM 40 MG: 100 INJECTION SUBCUTANEOUS at 20:56

## 2022-06-07 RX ADMIN — PROCHLORPERAZINE EDISYLATE 10 MG: 5 INJECTION INTRAMUSCULAR; INTRAVENOUS at 12:07

## 2022-06-07 RX ADMIN — ONDANSETRON 4 MG: 2 INJECTION INTRAMUSCULAR; INTRAVENOUS at 15:20

## 2022-06-07 RX ADMIN — MAGNESIUM SULFATE HEPTAHYDRATE: 500 INJECTION, SOLUTION INTRAMUSCULAR; INTRAVENOUS at 17:21

## 2022-06-07 RX ADMIN — HYDROMORPHONE HYDROCHLORIDE 0.5 MG: 2 INJECTION INTRAMUSCULAR; INTRAVENOUS; SUBCUTANEOUS at 12:12

## 2022-06-07 RX ADMIN — HYDROMORPHONE HYDROCHLORIDE 1.5 MG: 2 INJECTION INTRAMUSCULAR; INTRAVENOUS; SUBCUTANEOUS at 11:33

## 2022-06-07 RX ADMIN — SOYBEAN OIL 250 ML: 20 INJECTION, SOLUTION INTRAVENOUS at 17:21

## 2022-06-07 RX ADMIN — THIAMINE HYDROCHLORIDE 100 MG: 100 INJECTION, SOLUTION INTRAMUSCULAR; INTRAVENOUS at 16:57

## 2022-06-07 RX ADMIN — ONDANSETRON 4 MG: 2 INJECTION INTRAMUSCULAR; INTRAVENOUS at 20:56

## 2022-06-07 RX ADMIN — SODIUM CHLORIDE, PRESERVATIVE FREE 10 ML: 5 INJECTION INTRAVENOUS at 21:39

## 2022-06-07 RX ADMIN — HYDROMORPHONE HYDROCHLORIDE 2 MG: 2 INJECTION INTRAMUSCULAR; INTRAVENOUS; SUBCUTANEOUS at 15:20

## 2022-06-07 RX ADMIN — ONDANSETRON 4 MG: 2 INJECTION INTRAMUSCULAR; INTRAVENOUS at 08:50

## 2022-06-07 RX ADMIN — HYDROMORPHONE HYDROCHLORIDE 2 MG: 2 INJECTION INTRAMUSCULAR; INTRAVENOUS; SUBCUTANEOUS at 21:06

## 2022-06-07 ASSESSMENT — PAIN DESCRIPTION - PAIN TYPE
TYPE: CHRONIC PAIN
TYPE: ACUTE PAIN

## 2022-06-07 ASSESSMENT — PAIN DESCRIPTION - LOCATION
LOCATION: ABDOMEN

## 2022-06-07 ASSESSMENT — PAIN SCALES - GENERAL
PAINLEVEL_OUTOF10: 8
PAINLEVEL_OUTOF10: 0
PAINLEVEL_OUTOF10: 7
PAINLEVEL_OUTOF10: 8
PAINLEVEL_OUTOF10: 0
PAINLEVEL_OUTOF10: 0

## 2022-06-07 ASSESSMENT — PAIN DESCRIPTION - ORIENTATION
ORIENTATION: LEFT

## 2022-06-07 ASSESSMENT — PAIN DESCRIPTION - ONSET
ONSET: ON-GOING
ONSET: ON-GOING

## 2022-06-07 ASSESSMENT — PAIN SCALES - WONG BAKER
WONGBAKER_NUMERICALRESPONSE: 0
WONGBAKER_NUMERICALRESPONSE: 0

## 2022-06-07 ASSESSMENT — PAIN DESCRIPTION - DESCRIPTORS
DESCRIPTORS: ACHING

## 2022-06-07 ASSESSMENT — PAIN DESCRIPTION - FREQUENCY: FREQUENCY: INTERMITTENT

## 2022-06-07 NOTE — PROGRESS NOTES
[facility]        Inpatient Hematology / Oncology Progress Note      Admission Date: 2022 11:11 PM  Reason for Admission/Hospital Course: No admission diagnoses are documented for this encounter. 24 Hour Events:  VSS, afebrile  Ongoing pain - PC assisting  Continues TPN   Planning inpatient chemotherapy due to ongoing pain    ROS:  Constitutional: negative for fever, chills. +weakness, malaise, fatigue. CV: negative for chest pain, palpitations, edema. Respiratory: negative for dyspnea, cough, wheezing. 10 point review of systems is otherwise negative with the exception of the elements mentioned above in the HPI. No Known Allergies    OBJECTIVE:  Patient Vitals for the past 8 hrs:   BP Temp Temp src Pulse Resp SpO2   22 0720 134/85 98.3 °F (36.8 °C) Oral 72 16 --   22 0310 121/87 97.5 °F (36.4 °C) Oral 75 16 94 %     Temp (24hrs), Av.9 °F (36.6 °C), Min:97.5 °F (36.4 °C), Max:98.3 °F (36.8 °C)     07 -  1900  In: 2294 [I.V.:2294]  Out: -     Physical Exam:  Constitutional: Well developed,male in no acute distress, sitting comfortably in the hospital bed. HEENT: Normocephalic and atraumatic. Oropharynx is clear, mucous membranes are moist. Extraocular muscles are intact. Sclerae anicteric. Skin Warm and dry. No bruising and no rash noted. No erythema. No pallor. Respiratory Lungs are clear to auscultation bilaterally without wheezes, rales or rhonchi, normal air exchange without accessory muscle use. CVS Normal rate, regular rhythm and normal S1 and S2. No murmurs, gallops, or rubs. Abdomen Soft  Non distended. G tube to drainage bag. Neuro Grossly nonfocal with no obvious sensory or motor deficits. MSK Normal range of motion in general.  No edema and no tenderness. Psych Appropriate mood and affect.         Labs:    No results for input(s): WBC, RBC, HGB, HCT, MCV, MCH, MCHC, RDW, PLT, MPV, MONOS, NOHELIA in the last 72 hours.    Invalid input(s): GRANS, LYMPH, EOS, BASOS, IG, DF, ANEU, ABL, ABM, ABB, AIG     Recent Labs     06/05/22  0400 06/06/22  0243 06/07/22  0606    138 135*   K 3.3* 3.4* 3.5    107 102   CO2 28 27 25   BUN 11 14 16   GFRAA >60 >60 >60   MG 2.3 2.2  --    PHOS 3.0 3.6  --          Imaging:    Medications:  Current Facility-Administered Medications   Medication Dose Route Frequency    fentaNYL (DURAGESIC) 100 MCG/HR 1 patch  1 patch TransDERmal Q72H    oxyCODONE (ROXICODONE INTENSOL) 100 MG/5ML concentrated solution 25 mg  25 mg SubLINGual Q4H PRN    HYDROmorphone HCl PF (DILAUDID) injection 2 mg  2 mg IntraVENous Q3H PRN    PN-Adult Premix 5/15 - Central   IntraVENous Continuous TPN    fat emulsion 20 % infusion 250 mL  250 mL IntraVENous Daily    thiamine (B-1) 100 mg in sodium chloride 0.9 % 100 mL IVPB  100 mg IntraVENous Q24H    diphenhydrAMINE (BENADRYL) injection 12.5 mg  12.5 mg IntraVENous Q6H PRN    potassium chloride 20 mEq/50 mL IVPB (Central Line)  20 mEq IntraVENous PRN    Or    potassium chloride 10 mEq/100 mL IVPB (Peripheral Line)  10 mEq IntraVENous PRN    magnesium sulfate 2000 mg in 50 mL IVPB premix  2,000 mg IntraVENous PRN    sodium phosphate 10 mmol in sodium chloride 0.9 % 250 mL IVPB  10 mmol IntraVENous PRN    Or    sodium phosphate 15 mmol in sodium chloride 0.9 % 250 mL IVPB  15 mmol IntraVENous PRN    Or    sodium phosphate 20 mmol in sodium chloride 0.9 % 500 mL IVPB  20 mmol IntraVENous PRN    sodium chloride flush 0.9 % injection 5-40 mL  5-40 mL IntraVENous 2 times per day    sodium chloride flush 0.9 % injection 5-40 mL  5-40 mL IntraVENous PRN    0.9 % sodium chloride infusion  25 mL IntraVENous PRN    LORazepam (ATIVAN) injection 1 mg  1 mg IntraVENous Q4H PRN    calcium carbonate (TUMS) chewable tablet 500 mg  500 mg Oral TID PRN    phenol 1.4 % mouth spray 1 spray  1 spray Mouth/Throat Q2H PRN    baclofen (LIORESAL) tablet 10 mg  10 mg Oral TID PRN    ondansetron (ZOFRAN-ODT) disintegrating tablet 4 mg  4 mg Oral Q8H PRN    Or    ondansetron (ZOFRAN) injection 4 mg  4 mg IntraVENous Q6H PRN    enoxaparin (LOVENOX) injection 40 mg  40 mg SubCUTAneous Q24H    nicotine (NICODERM CQ) 14 MG/24HR 1 patch  1 patch TransDERmal Daily    naloxone (NARCAN) injection 0.4 mg  0.4 mg IntraVENous PRN    pantoprazole (PROTONIX) 40 mg in sodium chloride (PF) 10 mL injection  40 mg IntraVENous Daily    sodium chloride flush 0.9 % injection 5-40 mL  5-40 mL IntraVENous Q8H       Denny Guevara is a 61 y. o. male with a past medical history of PUD, HTN, hyperlipidemia, and diverticulitis. He was seen by Dr. Nisha Cast 5/18 and sent to Dr. Armando Dias for expedited workup. He was seen in the office by Dr. Armando iDas 5/20/22 with concerns for peritoneal metastatic disease.     The patient stated that he has been having abdominal pain for about 6 months now. He had a recent EGD and colonoscopy on February 25, 2022 which revealed a hiatal hernia, gastric polyps, benign colon polyps, diverticulosis, and hemorrhoids. He continued to have abdominal pain over the last few months. He stated that he has lost about 30 lbs over the last 6 months. He has not had an appetite. He was having worsening of GERD as well over that time.      The patient went to the emergency department at [facility] about 10 days ago for a partial small bowel obstruction. The patient presented to our emergency department 1 week ago with abdominal pain. The patient had a CT scan at that time which revealed extensive peritoneal nodularity and mild ascites consistent with peritoneal metastatic disease.  A primary lesion was not definitely identified. There was also sclerosis of the S1 vertebral body, a bone scan has been ordered to assess for possible bone metastasis. He also had evidence of a partial bowel obstruction. There is no evidence of any metastatic disease within the chest. He is POD 2 sp laparotomy.  Per Dr. Dwayne Suazo note diffuse peritoneal carcinomatosis with complete infiltration of mesentery root, this is not surgical resectable, not even for bypass. Tumor markers were not impressive. We are asked to see patient for recommendations       PLAN:    Adenocarcinoma of upper GI origin, diffuse peritoneal metastasis  5/29 POD 2. Biopsy results pending. 6/2 Has venting G tube. Starting TPN. Biopsy omentum + adenocarcinoma, ICH with upper GI primary. Case discussed with surgery. Dr. Jimi Song recommends allow ~another week for recovery prior to starting chemo. He is being discharged once home TPN is set up. We had extensive conversation regarding palliative treatment. Goals and plan of care reviewed with the patient wife and sister. We will arrange for follow up with Dr. Thierry Mcdonough ~one week. 6/7 Was to be [facility] by surgery yesterday. However, ongoing intractable pain. Asked to assume care for potential chemotherapy. Likely will proceed with dose-reduced FOLFIRINOX. Peritoneal carinomatosis / protein calorie malnutrition  - Secondary to peritoneal disease  - Venting G-tube  - TPN (approved for home use)  - RD following    Cancer-related pain  - PC following  - Fentanyl patch and Intensol PO  6/7 Continues to require IV pain meds    Continues home meds  Araseli SOPs  VTE prophylaxis - Lovenox    Dispo - DC home with HH and TPN after chemotherapy and when pain/symptoms controlled. Goals and plan of care reviewed with the patient wife and sister. All questions answered to the best of our ability. We will gladly assume care of our patient. Moe Matias, LUCY - [address] [facility]  [id number] [place] [address]  Office : [phone]  Fax : [phone]         Attending Addendum:  I have personally performed a face to face diagnostic evaluation on this patient.  I have reviewed and agree with the care plan as documented by Toribio Julian, N.P. 36 minutes were spent on patient care, including but not limited to, reviewing the chart and time with the patient and family, more than 50% of the time documented was spent in face-to-face contact with the patient and in the care of the patient on the floor/unit where the patient is located. My findings are as follows:  He has metastatic adenocarcinoma, appears anxious, heart rate regular without murmurs, abdomen is non-tender, bowel sounds are positive, we will transfer him to our service and administer FOLFIRINOX.               Jem Abraham MD    [facility] Hemat[facility]/On[facility]  [address] [place] [address]  Office : [phone]  Fax : [phone]

## 2022-06-07 NOTE — CARE COORDINATION
Chart screened by CM for d/c planning. Pt has been consulted by palliative care for pain management. Receiving TPN. Will begin C1 of Folfirinox likely in the next 24-48 hours. Once pt completes chemo the d/c plan is for him to d/c home with TPN and HH. CM will continue to follow and remain available if any needs arise.

## 2022-06-07 NOTE — PROGRESS NOTES
Comprehensive Nutrition Assessment    Type and Reason for Visit: Reassess  Malnutrition Screening Tool: Malnutrition Screen  Have you recently lost weight without trying?: 24 to 33 pounds (3 points)  Have you been eating poorly because of a decreased appetite?: No (0 points)  Malnutrition Screening Tool Score: 3  TPN management (Palliative)    Nutrition Recommendations/Plan:   Meals and Snacks:   Diet: Continue current order Clear liquids for pleasure  Parenteral Nutrition:  Total parenteral nutrition  to begin at 1800  Continue: Dex 15%, 5% AA 2 L (85ml/hr)   Continue 250 ml 20% lipids daily   Lytes/L:  Resume previous electrolytes prior to 6/6 TPN (electrolyte free due to late order)  100 meq NaCl, 15 mmol KPO4, 8 meq Mg, 4.5 meq Ca  Other additives: MTE, MVI Monday Wednesday Friday due to national shortages   Nutrition Related Medication Management: Thiamine  mg daily active for 7 days (end date 6/9). Nutritional Supplement Therapy:   Active electrolyte replacement per electolyte support protocols  Replacement indicated:  none  Labs:   BMP daily  Mg MWF and daily x 3 days then MWF  Phos MWF and daily x 3 days then MWF  Triglyceride weekly starting Monday if pt remains hospitalized  POC Glucoses/SSI Not indicated       Malnutrition Assessment:  Malnutrition Status: Severe malnutrition  Context: Chronic Illness  Findings of clinical characteristics of malnutrition:   Energy Intake:  75% or less estimated energy requirements for 1 month or longer (minimal intake for 3 months)  Weight Loss:  Greater than 5% over 1 month (10.5% in one month)     Body Fat Loss:  No significant body fat loss     Muscle Mass Loss:  No significant muscle mass loss    Fluid Accumulation:  Unable to assess     Strength:  Not Performed     Nutrition Assessment:  Nutrition History:    5/22 Pt and wife in room discussing hx (daughter present but did not participate).  Wife relates his medical problems began in Dec 2021, but weight loss started in March 2022. Has n/v (with limited relief from medications), limited to no appetite. Patient open to ONS, but is put off by the idea of eating or drinking anything at this time for fear of vomiting. Wife states he did tolerate half a sandwich last week, but was unable to finish it. Nutrition Background:     History of PUD, HTN, hyperlipidemia, and diverticulitis. Admitted with abdominal pain for last 6 months. Presented with possible SBO.  being seen by Dr. Pedro La with concerns of peritoneal metastatic disease, with unknown starting origin. Diffuse peritoneal carcinomatosis. Bx omentum + adenocarcinoma POD 7.   5/27: Diagnostic laparoscopy, switched to laparotomy. Omental mass and mesentery mass excision. Gastrostomy tube placement for venting. Findings of \"Diffuse peritoneal carcinomatosis with root of mesentery encasement and small bowel obstruction. \"  6/1: s/p venting gtube exchange. 6/3: s/p PORT.   6/7: repeat KUB with \"Worsening stool distention of the right colon. \"  Nutrition Interval:  Patient seen and discussed with RN, Chun García. Likely starting reduced dose chemo today or tomorrow. Sister present with patient. Explained late TPN order yesterday and resuming electrolytes tonight. No questions regarding TPN just asks for ice today.   Abdominal Status (last documented):   Last BM (including prior to admit): 05/20/22 (per pt), GI Symptoms: Nausea Gtube output: 200 ml recorded past 24 hours  Pertinent Medications: ancef, fentanyl patch, Protonix, thiamine (completes 6/9)  Electrolyte Replacement: 6/3: 10 meq KCl 6/4: KCL 10 meq, 6/5 KCl 10 meq x4, 6/6 KCl 20 mmeq x2  PRN: tums (last admin 6/1), zofran (utilizing every other day)  Pertinent Labs:   Lab Results   Component Value Date     06/07/2022    K 3.5 06/07/2022     06/07/2022    CO2 25 06/07/2022    BUN 16 06/07/2022    CREATININE 0.60 06/07/2022    GLUCOSE 143 06/07/2022    CALCIUM 8.4 06/07/2022    PHOS 3.6 06/06/2022    MG 2.2 06/06/2022     Lab Results   Component Value Date    TRIG 155 06/04/2022    TRIG 117 06/03/2022    TRIG 135 04/18/2022   Labs reviewed      Nutrition Related Findings:   No s/s wasting per NFPE 5/27, Diet history 5/22 NPO, 5/23 Clear, 5/24 soft+ bite sized, low fiber. 5/27: NPO for lap, then CLD. 6/1: NPO overnight due to vomiting despite Gtube to drain. 6/2: PPN started, PICC placed late afternoon. TPN to goal 6/4. Current Nutrition Therapies:  ADULT DIET; Clear Liquid  PN-Adult Premix 5/15 - Central  Current Parenteral Nutrition Orders:  · Type and Formula: Premix Central (5/15)   · Lipids: 250ml,Daily  · Duration: Continuous  · Rate/Volume: 2 L (85ml/hr)  · Current PN Order Provides: infusing per current order  · Goal PN Orders Provides: 1920 kcal/d (100% of needs), 100 grams of protein/d (100% of needs), 300 grams of CHO/d and 2250 ml of total volume/d    Current Intake:   Average Meal Intake:  (clears for comfort) Average Supplements Intake: NPO      Anthropometric Measures:  Height: 5' 10\" (177.8 cm)  Current Body Wt: 195 lb 8.8 oz (88.7 kg) (6/5), Weight source: Bed Scale  BMI: 28.1  Admission Body Weight: 207 lb (93.9 kg) (not specified source)  Ideal Body Weight (Kg) (Calculated): 75 kg (166 lbs), 119.4 %  Usual Body Wt: 231 lb 5 oz (104.9 kg) (1 mo ago 4/13 office visit), Percent weight change: -10.5       Edema: No data recorded  BMI Category Overweight (BMI 25.0-29. 9)  Estimated Daily Nutrient Needs:  Energy (kcal/day): 1878 -2348 (Kcal/kg (20-25) Weight used: 93.9 kg  (5/31)  Protein (g/day):  (1-1.2 g/kg) Weight Used: (Current) 93.9 kg  Fluid (ml/day):   (1 ml/kcal)    Nutrition Diagnosis:   · Inadequate protein-energy intake related to altered GI function as evidenced by NPO or clear liquid status due to medical condition (venting Gtube)    · Severe malnutrition,In context of chronic illness related to catabolic illness (nausea/vomiting) as evidenced by Criteria as identified in malnutrition assessment    Nutrition Interventions:   Food and/or Nutrient Delivery: Continue Current Diet,Modify Parenteral Nutrition     Coordination of Nutrition Care: Continue to monitor while inpatient       Goals:   Previous Goal Met: Goal(s) Achieved  Active Goal:  (Maintain nutrition support at goal rate)       Nutrition Monitoring and Evaluation:      Food/Nutrient Intake Outcomes: Parenteral Nutrition Intake/Tolerance  Physical Signs/Symptoms Outcomes: Biochemical Data,GI Status,Fluid Status or Edema,Weight    Discharge Planning:    Parenteral Nutrition    Geovany Roque, [address]

## 2022-06-07 NOTE — PROGRESS NOTES
Palliative Care Progress Note    Patient: Dai Friedman MRN: [id number]  SSN: [id number]    YOB: 1960  Age: 62 y.o. Sex: male       Assessment/Plan:     Chief Complaint/Interval History: Patient's abdominal pain still high. Principal Diagnosis:    · Pain, abdomen  R10.9    Additional Diagnoses:   · Nausea/Vomiting  R11.2  · Encounter for Palliative Care  Z51.5  · Peritoneal metastases    Palliative Performance Scale (PPS)       Medical Decision Making:   Reviewed and summarized notes from last palliative care visit to present. Discussed case with appropriate providers: SARANYA Moyer  Reviewed lab and X-ray data from last palliative care visit to present. Pt resting in bed, family member present. Mr [facility] endorsed ongoing pain in the left abdomen at the site of the venting G-tube. The left abdomen is somewhat swollen. The pt said that the increase in the fentanyl patch does seem to have helped some. Mr [facility] has taken just one dose of IV Dilaudid and one dose of Roxicodone Intensol in the last 24 hours. He said that his pain score is currently a 4 while he lays still in bed, although the tension in his face suggested a pain score closer to 7. He told SARANYA Maurer that he was trying to hold off on taking the PRN pain meds so that he would be more awake. He told me that he thought the PRN pain meds might be causing his nausea. He said that he had dry heaves several times during the night, but does not seem to have told his nurse. I encouraged him to let his nurse know right away when he has nausea/dry heaves. I also encouraged him to take pain meds as soon as the pain starts to increase. Appreciate Oncology taking over the pt. Per NP Nicky Leavitt will be started today or tomorrow. Addendum: Received [facility] from [facility] that pt experiencing nausea and cannot yet receive more Zofran. Ordered Phenergan and Compazine.     Will discuss findings with members of the interdisciplinary team.         More than 50% of this 35 minute visit was spent counseling and coordination of care as outlined above. Subjective:     Review of Systems:  A comprehensive review of systems was negative except as noted above. Objective:     Visit Vitals  /85   Pulse 72   Temp 98.3 °F (36.8 °C) (Oral)   Resp 16   Ht 5' 10\" (1.778 m)   Wt 195 lb 9.6 oz (88.7 kg)   SpO2 94%   BMI 28.07 kg/m²       Physical Exam:    General:  Cooperative. Abdominal pain of 4   Eyes:  Conjunctivae/corneas clear    Nose: Nares normal. Septum midline. Neck: Supple, symmetrical, trachea midline, no JVD   Lungs:   Clear to auscultation bilaterally, unlabored   Heart:  Regular rate and rhythm, no murmur    Abdomen:   Soft, non-tender, non-distended   Extremities: Normal, atraumatic, no cyanosis or edema   Skin: Skin color, texture, turgor normal. No rash or lesions.    Neurologic: Nonfocal   Psych: Alert and oriented     Signed By: LUCY Tyson - HEATHER     June 7, 2022

## 2022-06-07 NOTE — PROGRESS NOTES
Admit Date: 2022    POD 10 Days Post-Op    Procedure:  Procedure(s):  LAPAROSCOPY DIAGNOSTIC , OPEN EXPLORATORY LAPAROTOMY, MASS EXCISION, G-TUBE PLACEMENT    Subjective:     Discharge held yesterday as patient could not tolerate pain without IV Dilaudid. The patient continues to have pain at G-tube site. He states he has been having some nausea. He has been having minimal liquid intake. Objective:       Vitals:    22 1952 22 2256 22 0310 22 0720   BP: (!) 140/93 128/88 121/87 134/85   Pulse: 72 75 75 72   Resp: 16 17 16 16   Temp: 98 °F (36.7 °C) 98.3 °F (36.8 °C) 97.5 °F (36.4 °C) 98.3 °F (36.8 °C)   TempSrc: Oral Oral Oral Oral   SpO2: 95% 97% 94%    Weight:       Height:           Temp (24hrs), Av.9 °F (36.6 °C), Min:97.5 °F (36.4 °C), Max:98.3 °F (36.8 °C)  Eyes: Sclera are clear, pupils symmetric   ENMT: ears have no obvious external lesions; no obvious neck masses; no lip lesions  CV: RRR. Normal perfusion; no embolic signs  Resp: No JVD. Breathing is  non-labored. No audible wheezing   GI: soft and non-distended, incisionally tender, G tube to bedside bag incision is clean, dry, and intact   Musculoskeletal: no significant asymmetry; normal tone; unremarkable with normal function. Integument - Port Left chest  Neuro:  No obvious focal deficits; moves all 4; A&O x3  Psychiatric: normal affect and mood, no memory impairment    Assessment:     Active Problems:    Severe protein-calorie malnutrition (HCC)    Abdominal mass    Debility    Encounter for palliative care    Itching    Fatigue    Abdominal carcinomatosis (HCC)    Goals of care, counseling/discussion    On total parenteral nutrition    Pain, abdominal, LUQ  Resolved Problems:    * No resolved hospital problems.  *        Plan/Recommendations/Medical Decision Making:     Oncology to take over as primary, and start chemotherapy while inpatient  Palliative care following  G-tube intact and draining  Ativan for anxiety  Lovenox, Protonix  Clear liquid diet  TPN  Management per oncology team    Jennifer Mendez PA-C

## 2022-06-08 LAB
ANION GAP SERPL CALC-SCNC: 7 MMOL/L (ref 7–16)
BUN SERPL-MCNC: 14 MG/DL (ref 8–23)
CALCIUM SERPL-MCNC: 8.4 MG/DL (ref 8.3–10.4)
CHLORIDE SERPL-SCNC: 104 MMOL/L (ref 98–107)
CO2 SERPL-SCNC: 28 MMOL/L (ref 21–32)
CREAT SERPL-MCNC: 0.6 MG/DL (ref 0.8–1.5)
GLUCOSE SERPL-MCNC: 128 MG/DL (ref 65–100)
MAGNESIUM SERPL-MCNC: 2.3 MG/DL (ref 1.8–2.4)
PHOSPHATE SERPL-MCNC: 3.5 MG/DL (ref 2.3–3.7)
POTASSIUM SERPL-SCNC: 3.6 MMOL/L (ref 3.5–5.1)
SODIUM SERPL-SCNC: 139 MMOL/L (ref 138–145)

## 2022-06-08 PROCEDURE — C1751 CATH, INF, PER/CENT/MIDLINE: HCPCS

## 2022-06-08 PROCEDURE — 86706 HEP B SURFACE ANTIBODY: CPT

## 2022-06-08 PROCEDURE — 6370000000 HC RX 637 (ALT 250 FOR IP): Performed by: INTERNAL MEDICINE

## 2022-06-08 PROCEDURE — C9113 INJ PANTOPRAZOLE SODIUM, VIA: HCPCS | Performed by: SURGERY

## 2022-06-08 PROCEDURE — 2500000003 HC RX 250 WO HCPCS: Performed by: NURSE PRACTITIONER

## 2022-06-08 PROCEDURE — 6360000002 HC RX W HCPCS: Performed by: NURSE PRACTITIONER

## 2022-06-08 PROCEDURE — 2500000003 HC RX 250 WO HCPCS: Performed by: SURGERY

## 2022-06-08 PROCEDURE — 6370000000 HC RX 637 (ALT 250 FOR IP): Performed by: SURGERY

## 2022-06-08 PROCEDURE — APPSS45 APP SPLIT SHARED TIME 31-45 MINUTES: Performed by: NURSE PRACTITIONER

## 2022-06-08 PROCEDURE — 36592 COLLECT BLOOD FROM PICC: CPT

## 2022-06-08 PROCEDURE — 2580000003 HC RX 258: Performed by: SURGERY

## 2022-06-08 PROCEDURE — 84100 ASSAY OF PHOSPHORUS: CPT

## 2022-06-08 PROCEDURE — 6360000002 HC RX W HCPCS: Performed by: SURGERY

## 2022-06-08 PROCEDURE — A4216 STERILE WATER/SALINE, 10 ML: HCPCS | Performed by: SURGERY

## 2022-06-08 PROCEDURE — 83735 ASSAY OF MAGNESIUM: CPT

## 2022-06-08 PROCEDURE — 99233 SBSQ HOSP IP/OBS HIGH 50: CPT | Performed by: INTERNAL MEDICINE

## 2022-06-08 PROCEDURE — 1100000000 HC RM PRIVATE

## 2022-06-08 PROCEDURE — 99233 SBSQ HOSP IP/OBS HIGH 50: CPT | Performed by: NURSE PRACTITIONER

## 2022-06-08 PROCEDURE — 80048 BASIC METABOLIC PNL TOTAL CA: CPT

## 2022-06-08 RX ORDER — MEPERIDINE HYDROCHLORIDE 25 MG/ML
12.5 INJECTION INTRAMUSCULAR; INTRAVENOUS; SUBCUTANEOUS PRN
Status: CANCELLED | OUTPATIENT
Start: 2022-06-09

## 2022-06-08 RX ORDER — SODIUM CHLORIDE 9 MG/ML
5-40 INJECTION INTRAVENOUS PRN
Status: CANCELLED | OUTPATIENT
Start: 2022-06-09

## 2022-06-08 RX ORDER — ONDANSETRON 2 MG/ML
8 INJECTION INTRAMUSCULAR; INTRAVENOUS
Status: CANCELLED | OUTPATIENT
Start: 2022-06-09

## 2022-06-08 RX ORDER — SODIUM CHLORIDE 9 MG/ML
5-250 INJECTION, SOLUTION INTRAVENOUS PRN
Status: CANCELLED | OUTPATIENT
Start: 2022-06-09

## 2022-06-08 RX ORDER — EPINEPHRINE 1 MG/ML
0.3 INJECTION, SOLUTION, CONCENTRATE INTRAVENOUS PRN
Status: CANCELLED | OUTPATIENT
Start: 2022-06-09

## 2022-06-08 RX ORDER — LORAZEPAM 2 MG/ML
1 INJECTION INTRAMUSCULAR EVERY 4 HOURS PRN
Status: DISCONTINUED | OUTPATIENT
Start: 2022-06-08 | End: 2022-06-14

## 2022-06-08 RX ORDER — ACETAMINOPHEN 325 MG/1
650 TABLET ORAL
Status: CANCELLED | OUTPATIENT
Start: 2022-06-09

## 2022-06-08 RX ORDER — HEPARIN SODIUM (PORCINE) LOCK FLUSH IV SOLN 100 UNIT/ML 100 UNIT/ML
500 SOLUTION INTRAVENOUS PRN
Status: CANCELLED | OUTPATIENT
Start: 2022-06-09

## 2022-06-08 RX ORDER — OXYCODONE HCL 20 MG/ML
20 CONCENTRATE, ORAL ORAL EVERY 4 HOURS PRN
Status: DISCONTINUED | OUTPATIENT
Start: 2022-06-08 | End: 2022-06-08

## 2022-06-08 RX ORDER — SODIUM CHLORIDE 9 MG/ML
INJECTION, SOLUTION INTRAVENOUS CONTINUOUS
Status: CANCELLED | OUTPATIENT
Start: 2022-06-09

## 2022-06-08 RX ORDER — DEXTROSE MONOHYDRATE 50 MG/ML
5-250 INJECTION, SOLUTION INTRAVENOUS PRN
Status: CANCELLED | OUTPATIENT
Start: 2022-06-09

## 2022-06-08 RX ORDER — HEPARIN SODIUM (PORCINE) LOCK FLUSH IV SOLN 100 UNIT/ML 100 UNIT/ML
500 SOLUTION INTRAVENOUS PRN
Status: CANCELLED | OUTPATIENT
Start: 2022-06-11

## 2022-06-08 RX ORDER — SODIUM CHLORIDE 0.9 % (FLUSH) 0.9 %
5-40 SYRINGE (ML) INJECTION PRN
Status: CANCELLED | OUTPATIENT
Start: 2022-06-09

## 2022-06-08 RX ORDER — SODIUM CHLORIDE 9 MG/ML
5-40 INJECTION INTRAVENOUS PRN
Status: CANCELLED | OUTPATIENT
Start: 2022-06-11

## 2022-06-08 RX ORDER — OXYCODONE HCL 20 MG/ML
20 CONCENTRATE, ORAL ORAL EVERY 4 HOURS PRN
Status: DISCONTINUED | OUTPATIENT
Start: 2022-06-08 | End: 2022-06-10

## 2022-06-08 RX ORDER — ALBUTEROL SULFATE 90 UG/1
4 AEROSOL, METERED RESPIRATORY (INHALATION) PRN
Status: CANCELLED | OUTPATIENT
Start: 2022-06-09

## 2022-06-08 RX ORDER — SODIUM CHLORIDE 9 MG/ML
5-250 INJECTION, SOLUTION INTRAVENOUS PRN
Status: CANCELLED | OUTPATIENT
Start: 2022-06-11

## 2022-06-08 RX ORDER — SODIUM CHLORIDE 0.9 % (FLUSH) 0.9 %
5-40 SYRINGE (ML) INJECTION PRN
Status: CANCELLED | OUTPATIENT
Start: 2022-06-11

## 2022-06-08 RX ORDER — DIPHENHYDRAMINE HYDROCHLORIDE 50 MG/ML
12.5 INJECTION INTRAMUSCULAR; INTRAVENOUS EVERY 6 HOURS PRN
Status: DISCONTINUED | OUTPATIENT
Start: 2022-06-08 | End: 2022-06-14 | Stop reason: HOSPADM

## 2022-06-08 RX ORDER — DIPHENHYDRAMINE HYDROCHLORIDE 50 MG/ML
50 INJECTION INTRAMUSCULAR; INTRAVENOUS
Status: CANCELLED | OUTPATIENT
Start: 2022-06-09

## 2022-06-08 RX ADMIN — SODIUM CHLORIDE, PRESERVATIVE FREE 10 ML: 5 INJECTION INTRAVENOUS at 20:02

## 2022-06-08 RX ADMIN — HYDROMORPHONE HYDROCHLORIDE 2 MG: 2 INJECTION INTRAMUSCULAR; INTRAVENOUS; SUBCUTANEOUS at 20:58

## 2022-06-08 RX ADMIN — PROCHLORPERAZINE EDISYLATE 10 MG: 5 INJECTION INTRAMUSCULAR; INTRAVENOUS at 06:13

## 2022-06-08 RX ADMIN — PROCHLORPERAZINE EDISYLATE 10 MG: 5 INJECTION INTRAMUSCULAR; INTRAVENOUS at 00:50

## 2022-06-08 RX ADMIN — THIAMINE HYDROCHLORIDE 100 MG: 100 INJECTION, SOLUTION INTRAMUSCULAR; INTRAVENOUS at 18:18

## 2022-06-08 RX ADMIN — ONDANSETRON 4 MG: 2 INJECTION INTRAMUSCULAR; INTRAVENOUS at 19:55

## 2022-06-08 RX ADMIN — ONDANSETRON 4 MG: 2 INJECTION INTRAMUSCULAR; INTRAVENOUS at 04:37

## 2022-06-08 RX ADMIN — SOYBEAN OIL 250 ML: 20 INJECTION, SOLUTION INTRAVENOUS at 17:48

## 2022-06-08 RX ADMIN — ENOXAPARIN SODIUM 40 MG: 100 INJECTION SUBCUTANEOUS at 19:57

## 2022-06-08 RX ADMIN — HYDROMORPHONE HYDROCHLORIDE 2 MG: 2 INJECTION INTRAMUSCULAR; INTRAVENOUS; SUBCUTANEOUS at 09:52

## 2022-06-08 RX ADMIN — ONDANSETRON 4 MG: 2 INJECTION INTRAMUSCULAR; INTRAVENOUS at 09:53

## 2022-06-08 RX ADMIN — MAGNESIUM SULFATE HEPTAHYDRATE: 500 INJECTION, SOLUTION INTRAMUSCULAR; INTRAVENOUS at 17:46

## 2022-06-08 RX ADMIN — LORAZEPAM 1 MG: 2 INJECTION INTRAMUSCULAR; INTRAVENOUS at 19:55

## 2022-06-08 RX ADMIN — HYDROMORPHONE HYDROCHLORIDE 2 MG: 2 INJECTION INTRAMUSCULAR; INTRAVENOUS; SUBCUTANEOUS at 14:17

## 2022-06-08 RX ADMIN — LORAZEPAM 1 MG: 2 INJECTION INTRAMUSCULAR; INTRAVENOUS at 14:27

## 2022-06-08 RX ADMIN — SODIUM CHLORIDE, PRESERVATIVE FREE 10 ML: 5 INJECTION INTRAVENOUS at 21:00

## 2022-06-08 RX ADMIN — HYDROMORPHONE HYDROCHLORIDE 2 MG: 2 INJECTION INTRAMUSCULAR; INTRAVENOUS; SUBCUTANEOUS at 00:50

## 2022-06-08 RX ADMIN — SODIUM CHLORIDE, PRESERVATIVE FREE 10 ML: 5 INJECTION INTRAVENOUS at 05:23

## 2022-06-08 RX ADMIN — SODIUM CHLORIDE, PRESERVATIVE FREE 10 ML: 5 INJECTION INTRAVENOUS at 14:00

## 2022-06-08 RX ADMIN — SODIUM CHLORIDE, PRESERVATIVE FREE 10 ML: 5 INJECTION INTRAVENOUS at 10:03

## 2022-06-08 RX ADMIN — HYDROMORPHONE HYDROCHLORIDE 2 MG: 2 INJECTION INTRAMUSCULAR; INTRAVENOUS; SUBCUTANEOUS at 04:37

## 2022-06-08 RX ADMIN — OXYCODONE HYDROCHLORIDE 20 MG: 100 SOLUTION ORAL at 19:56

## 2022-06-08 RX ADMIN — SODIUM CHLORIDE, PRESERVATIVE FREE 40 MG: 5 INJECTION INTRAVENOUS at 09:51

## 2022-06-08 RX ADMIN — HYDROMORPHONE HYDROCHLORIDE 2 MG: 2 INJECTION INTRAMUSCULAR; INTRAVENOUS; SUBCUTANEOUS at 18:08

## 2022-06-08 ASSESSMENT — PAIN DESCRIPTION - DESCRIPTORS
DESCRIPTORS: ACHING

## 2022-06-08 ASSESSMENT — PAIN SCALES - GENERAL
PAINLEVEL_OUTOF10: 7
PAINLEVEL_OUTOF10: 0
PAINLEVEL_OUTOF10: 7
PAINLEVEL_OUTOF10: 9
PAINLEVEL_OUTOF10: 8
PAINLEVEL_OUTOF10: 9
PAINLEVEL_OUTOF10: 7
PAINLEVEL_OUTOF10: 0
PAINLEVEL_OUTOF10: 6
PAINLEVEL_OUTOF10: 8
PAINLEVEL_OUTOF10: 0
PAINLEVEL_OUTOF10: 7

## 2022-06-08 ASSESSMENT — PAIN DESCRIPTION - LOCATION
LOCATION: ABDOMEN;BACK
LOCATION: ABDOMEN;BACK
LOCATION: ABDOMEN

## 2022-06-08 ASSESSMENT — PAIN - FUNCTIONAL ASSESSMENT
PAIN_FUNCTIONAL_ASSESSMENT: PREVENTS OR INTERFERES SOME ACTIVE ACTIVITIES AND ADLS

## 2022-06-08 ASSESSMENT — PAIN DESCRIPTION - FREQUENCY
FREQUENCY: CONTINUOUS

## 2022-06-08 ASSESSMENT — PAIN DESCRIPTION - ONSET
ONSET: ON-GOING

## 2022-06-08 ASSESSMENT — PAIN DESCRIPTION - ORIENTATION
ORIENTATION: MID;LEFT
ORIENTATION: LEFT
ORIENTATION: MID
ORIENTATION: LEFT;MID
ORIENTATION: LEFT
ORIENTATION: MID
ORIENTATION: MID
ORIENTATION: LEFT;MID

## 2022-06-08 ASSESSMENT — PAIN DESCRIPTION - DIRECTION
RADIATING_TOWARDS: TOWARDS BACK
RADIATING_TOWARDS: BACL
RADIATING_TOWARDS: BACK

## 2022-06-08 ASSESSMENT — PAIN DESCRIPTION - PAIN TYPE
TYPE: SURGICAL PAIN
TYPE: SURGICAL PAIN
TYPE: SURGICAL PAIN;CHRONIC PAIN

## 2022-06-08 NOTE — PROGRESS NOTES
END OF SHIFT:    Shift Summary:      Gave shift report to oncoming nurse, Raisa Bailey RN. Pt received Pain meds q3-4h throughout shift, see MAR, pts pain was controlled best with prn Ativan and dilaudid combo, pt was explained chemotherapy and the regimen, pt was not accepting of the the idea of chemo, but later expressed he will consider it but wants to talk to his wife again. Pts wife went home and will return tomorrow. Hung pts TPN, still plan is to return home with TPN and HH. I/Os:    No intake/output data recorded. I/O last 3 completed shifts: In: 5395.8 [P.O.:120;  I.V.:5275.8]  Out: 7344 [Urine:475; Emesis/NG output:900]    Seema Root RN

## 2022-06-08 NOTE — PROGRESS NOTES
's follow-up visit requested per staff. Mr. Evans seems to be struggling with his recent diagnosis and treatment. I actively listened and offered spiritual/emotional support until patient stated he was \"going in and out\" with staying awake. I abbreviated the visit to allow rest. Chaplains remain available for support.       Tina Harvey 69  Board Certified

## 2022-06-08 NOTE — PROGRESS NOTES
Palliative Care Progress Note    Patient: Macarena Meza MRN: [id number]  SSN: [id number]    YOB: 1960  Age: 62 y.o. Sex: male       Assessment/Plan:     Chief Complaint/Interval History: Ongoing abdominal pain, nausea     Principal Diagnosis:    · Pain, abdomen  R10.9    Additional Diagnoses:   · Nausea/Vomiting  R11.2  · Encounter for Palliative Care  Z51.5  · Peritoneal metastases    Palliative Performance Scale (PPS): 70%       Medical Decision Making:   Reviewed and summarized notes over last 24 hours  Discussed case with appropriate providers:  Steffanie Miller NP  Reviewed lab and X-ray data over last 24 hours    Patient resting in bed with eyes closed for majority of visit. His wife is at the bedside. Patient continues with intermittent nausea. He has Zofran and Compazine available. I have also added nausea as indications for prn Benadryl and Ativan. Patient remains on fentanyl 100mcg, Dilaudid IV prn, and oxycodone intensol prn for abdominal pain. Oxycodone intensol dose reduced to 20mg to see if he tolerates this better. Opioids have historically made him nauseated. Although I hope as his tolerance builds, the nauseating side effect will subside, I will adjust orders for RN to premedicate with antiemetic if possible before prn opioids. Patient with flat affect and has not opened up much about his condition. He tells me that he is ready \"to get this over with\". When asked to clarify what \"this\" is, patient states \"all of it\". Patient wants to receive chemotherapy today. He will receive chemo education prior to chemo. Will explore patient's emotional experience further at follow-up, ideally in a one-on-one situation. Will discuss findings with members of the interdisciplinary team.         More than 50% of this 35 minute visit was spent counseling and coordination of care as outlined above.       Subjective:     Review of Systems:  A comprehensive review of systems was negative except as noted above. Objective:     Visit Vitals  /88   Pulse 70   Temp 98.6 °F (37 °C) (Oral)   Resp 16   Ht 5' 10\" (1.778 m)   Wt 197 lb 8 oz (89.6 kg)   SpO2 91%   BMI 28.34 kg/m²       Physical Exam:    General:  Cooperative. Eyes:  Conjunctivae/corneas clear    Nose: Nares normal. Septum midline. Neck: Supple, symmetrical, trachea midline. Lungs:   Unlabored. Heart:  Regular rate and normotensive. Abdomen:   Soft, venting g tube to left abdomen. Extremities: Normal, atraumatic, no cyanosis or edema. Skin: Skin color, texture, turgor normal. No rash. Neurologic: Nonfocal.   Psych: Alert and oriented.      Signed By: LUCY Hernandes - HEATHER     June 8, 2022

## 2022-06-08 NOTE — PROGRESS NOTES
[facility] Hematology & Oncology        Inpatient Hematology / Oncology Progress Note      Admission Date: 2022 11:11 PM  Reason for Admission/Hospital Course: No admission diagnoses are documented for this encounter. 24 Hour Events:  VSS, afebrile  Ongoing pain - PC assisting  Continues to require IV pain meds  Continues TPN   C1 FOLFIRINOX pending    ROS:  Constitutional: negative for fever, chills. +weakness, malaise, fatigue. CV: negative for chest pain, palpitations, edema. Respiratory: negative for dyspnea, cough, wheezing. 10 point review of systems is otherwise negative with the exception of the elements mentioned above in the HPI. No Known Allergies    OBJECTIVE:  Patient Vitals for the past 8 hrs:   BP Temp Temp src Pulse Resp SpO2   22 0645 125/74 98.2 °F (36.8 °C) Oral 59 15 91 %   22 0445 123/83 98.3 °F (36.8 °C) Oral 75 16 95 %     Temp (24hrs), Av.1 °F (36.7 °C), Min:97.6 °F (36.4 °C), Max:98.4 °F (36.9 °C)    No intake/output data recorded. Physical Exam:  Constitutional: Well developed,male in no acute distress, sitting comfortably in the hospital bed. HEENT: Normocephalic and atraumatic. Oropharynx is clear, mucous membranes are moist. Extraocular muscles are intact. Sclerae anicteric. Skin Warm and dry. No bruising and no rash noted. No erythema. No pallor. Respiratory Lungs are clear to auscultation bilaterally without wheezes, rales or rhonchi, normal air exchange without accessory muscle use. CVS Normal rate, regular rhythm and normal S1 and S2. No murmurs, gallops, or rubs. Abdomen Soft  Non distended. G tube to drainage bag. Neuro Grossly nonfocal with no obvious sensory or motor deficits. MSK Normal range of motion in general.  No edema and no tenderness. Psych Appropriate mood and affect. Labs:    No results for input(s): WBC, RBC, HGB, HCT, MCV, MCH, MCHC, RDW, PLT, MPV, MONOS, NOHELIA in the last 72 hours.     Invalid input(s): GRANS, LYMPH, EOS, BASOS, IG, DF, ANEU, ABL, ABM, ABB, AIG     Recent Labs     06/06/22  0243 06/07/22  0606 06/08/22  0435    135* 139   K 3.4* 3.5 3.6    102 104   CO2 27 25 28   BUN 14 16 14   GFRAA >60 >60 >60   MG 2.2  --  2.3   PHOS 3.6  --  3.5         Imaging:    Medications:  Current Facility-Administered Medications   Medication Dose Route Frequency    PN-Adult Premix 5/15 - Central   IntraVENous Continuous TPN    prochlorperazine (COMPAZINE) injection 10 mg  10 mg IntraVENous Q6H PRN    fentaNYL (DURAGESIC) 100 MCG/HR 1 patch  1 patch TransDERmal Q72H    oxyCODONE (ROXICODONE INTENSOL) 100 MG/5ML concentrated solution 25 mg  25 mg SubLINGual Q4H PRN    HYDROmorphone HCl PF (DILAUDID) injection 2 mg  2 mg IntraVENous Q3H PRN    fat emulsion 20 % infusion 250 mL  250 mL IntraVENous Daily    thiamine (B-1) 100 mg in sodium chloride 0.9 % 100 mL IVPB  100 mg IntraVENous Q24H    diphenhydrAMINE (BENADRYL) injection 12.5 mg  12.5 mg IntraVENous Q6H PRN    potassium chloride 20 mEq/50 mL IVPB (Central Line)  20 mEq IntraVENous PRN    Or    potassium chloride 10 mEq/100 mL IVPB (Peripheral Line)  10 mEq IntraVENous PRN    magnesium sulfate 2000 mg in 50 mL IVPB premix  2,000 mg IntraVENous PRN    sodium phosphate 10 mmol in sodium chloride 0.9 % 250 mL IVPB  10 mmol IntraVENous PRN    Or    sodium phosphate 15 mmol in sodium chloride 0.9 % 250 mL IVPB  15 mmol IntraVENous PRN    Or    sodium phosphate 20 mmol in sodium chloride 0.9 % 500 mL IVPB  20 mmol IntraVENous PRN    sodium chloride flush 0.9 % injection 5-40 mL  5-40 mL IntraVENous 2 times per day    sodium chloride flush 0.9 % injection 5-40 mL  5-40 mL IntraVENous PRN    0.9 % sodium chloride infusion  25 mL IntraVENous PRN    LORazepam (ATIVAN) injection 1 mg  1 mg IntraVENous Q4H PRN    calcium carbonate (TUMS) chewable tablet 500 mg  500 mg Oral TID PRN    phenol 1.4 % mouth spray 1 spray  1 spray Mouth/Throat Q2H PRN    baclofen (LIORESAL) tablet 10 mg  10 mg Oral TID PRN    ondansetron (ZOFRAN-ODT) disintegrating tablet 4 mg  4 mg Oral Q8H PRN    Or    ondansetron (ZOFRAN) injection 4 mg  4 mg IntraVENous Q6H PRN    enoxaparin (LOVENOX) injection 40 mg  40 mg SubCUTAneous Q24H    nicotine (NICODERM CQ) 14 MG/24HR 1 patch  1 patch TransDERmal Daily    naloxone (NARCAN) injection 0.4 mg  0.4 mg IntraVENous PRN    pantoprazole (PROTONIX) 40 mg in sodium chloride (PF) 10 mL injection  40 mg IntraVENous Daily    sodium chloride flush 0.9 % injection 5-40 mL  5-40 mL IntraVENous Q8H       Denny Guevraa is a 61 y. o. male with a past medical history of PUD, HTN, hyperlipidemia, and diverticulitis. He was seen by Dr. Babak Aguilar 5/18 and sent to Dr. Karly Yee for expedited workup. He was seen in the office by Dr. Karly Yee 5/20/22 with concerns for peritoneal metastatic disease.     The patient stated that he has been having abdominal pain for about 6 months now. He had a recent EGD and colonoscopy on February 25, 2022 which revealed a hiatal hernia, gastric polyps, benign colon polyps, diverticulosis, and hemorrhoids. He continued to have abdominal pain over the last few months. He stated that he has lost about 30 lbs over the last 6 months. He has not had an appetite. He was having worsening of GERD as well over that time.      The patient went to the emergency department at [facility] about 10 days ago for a partial small bowel obstruction. The patient presented to our emergency department 1 week ago with abdominal pain. The patient had a CT scan at that time which revealed extensive peritoneal nodularity and mild ascites consistent with peritoneal metastatic disease.  A primary lesion was not definitely identified. There was also sclerosis of the S1 vertebral body, a bone scan has been ordered to assess for possible bone metastasis. He also had evidence of a partial bowel obstruction.  There is no evidence of any metastatic disease within the chest. He is POD 2 sp laparotomy. Per Dr. Tashi Morales note diffuse peritoneal carcinomatosis with complete infiltration of mesentery root, this is not surgical resectable, not even for bypass. Tumor markers were not impressive. We are asked to see patient for recommendations       PLAN:    Adenocarcinoma of upper GI origin, diffuse peritoneal metastasis  5/29 POD 2. Biopsy results pending. 6/2 Has venting G tube. Starting TPN. Biopsy omentum + adenocarcinoma, ICH with upper GI primary. Case discussed with surgery. Dr. Jennifer Bergeron recommends allow ~another week for recovery prior to starting chemo. He is being discharged once home TPN is set up. We had extensive conversation regarding palliative treatment. Goals and plan of care reviewed with the patient wife and sister. We will arrange for follow up with Dr. Cristine Mcmahon ~one week. 6/7 Was to be [facility] by surgery yesterday. However, ongoing intractable pain. Discussed with Dr Mor Maharaj 6/6, per Dr Jennifer Bergeron ok to proceed with chemotherapy given ongoing pain/symptoms related to peritoneal metastasis. Asked to assume care for potential chemotherapy. Likely will proceed with dose-reduced FOLFIRINOX.  6/8 Discussed with Dr Cristine Mcmahon yesterday, pending treatment plan for FOLFIRINOX. However, during chemo-ed today patient expresses desire to think about this further. May need to f/u with Dr Cristine Mcmahon as outpatient for chemotherapy. NGS testing request sent to [employer]. Peritoneal carinomatosis / protein calorie malnutrition  - Secondary to peritoneal disease  - Venting G-tube  - TPN (approved for home use)  - RD following    Cancer-related pain  - PC following  - Fentanyl patch and Intensol PO  6/8 Continues to require IV pain meds. PC following    Continues home meds  Araseli SOPs  VTE prophylaxis - Lovenox    Dispo - DC home with HH and TPN once pain controlled on oral regimen. Goals and plan of care reviewed with the patient wife and sister. All questions answered to the best of our ability. DomingoLUCY [facility] - [address] [facility]ematology [facility] On[facility]  [address] [place] [address]  Office : [phone]  Fax : [phone]         Attending Addendum:  I have personally performed a face to face diagnostic evaluation on this patient. I have reviewed and agree with the care plan as documented by Tin Mercado N.P. 36 minutes were spent on patient care, including but not limited to, reviewing the chart and time with the patient and family, more than 50% of the time documented was spent in face-to-face contact with the patient and in the care of the patient on the floor/unit where the patient is located. My findings are as follows:  He has peritoneal carcinomatosis, appears anxious, heart rate regular without murmurs, abdomen is non-tender, bowel sounds are positive, we will continue TPN and adjust his pain meds.               Christina Hernandez MD    [facility] Hematology/Oncology  [address] [address]  Office : [phone]  Fax : [phone]

## 2022-06-08 NOTE — PROGRESS NOTES
Comprehensive Nutrition Assessment    Type and Reason for Visit: Reassess  Malnutrition Screening Tool: Malnutrition Screen  Have you recently lost weight without trying?: 24 to 33 pounds (3 points)  Have you been eating poorly because of a decreased appetite?: No (0 points)  Malnutrition Screening Tool Score: 3  TPN management (Palliative)    Nutrition Recommendations/Plan:   Meals and Snacks:   Diet: Continue current order Clear liquids for pleasure  Parenteral Nutrition:  Total parenteral nutrition  to begin at 1800  Continue: Dex 15%, 5% AA 2 L (85ml/hr)   Continue 250 ml 20% lipids daily   Lytes/L:  No changes to lytes indicated today  100 meq NaCl, 15 mmol KPO4, 8 meq Mg, 4.5 meq Ca  Other additives: MTE, MVI Monday Wednesday Friday due to national shortages   Nutrition Related Medication Management: Thiamine  mg daily active for 7 days (end date 6/9). Nutritional Supplement Therapy:   Active electrolyte replacement per electolyte support protocols  Replacement indicated:  none  Labs:   BMP daily  Mg MWF   Phos MWF   Triglyceride tomorrow and weekly starting Monday if pt remains hospitalized  POC Glucoses/SSI Not indicated       Malnutrition Assessment:  Malnutrition Status: Severe malnutrition  Context: Chronic Illness  Findings of clinical characteristics of malnutrition:   Energy Intake:  75% or less estimated energy requirements for 1 month or longer (minimal intake for 3 months)  Weight Loss:  Greater than 5% over 1 month (10.5% in one month)     Body Fat Loss:  No significant body fat loss     Muscle Mass Loss:  No significant muscle mass loss    Fluid Accumulation:  Unable to assess     Strength:  Not Performed     Nutrition Assessment:  Nutrition History:    5/22 Pt and wife in room discussing hx (daughter present but did not participate). Wife relates his medical problems began in Dec 2021, but weight loss started in March 2022.  Has n/v (with limited relief from medications), limited to no appetite. Patient open to ONS, but is put off by the idea of eating or drinking anything at this time for fear of vomiting. Wife states he did tolerate half a sandwich last week, but was unable to finish it. Nutrition Background:     History of PUD, HTN, hyperlipidemia, and diverticulitis. Admitted with abdominal pain for last 6 months. Presented with possible SBO.  being seen by Dr. Brook Shabazz with concerns of peritoneal metastatic disease, with unknown starting origin. Diffuse peritoneal carcinomatosis. Bx omentum + adenocarcinoma POD 7.   5/27: Diagnostic laparoscopy, switched to laparotomy. Omental mass and mesentery mass excision. Gastrostomy tube placement for venting. Findings of \"Diffuse peritoneal carcinomatosis with root of mesentery encasement and small bowel obstruction. \"  6/1: s/p venting gtube exchange. 6/3: s/p PORT.   6/7: repeat KUB with \"Worsening stool distention of the right colon. \" Bx + adenocarcinoma consistent with upper GI origin. Nutrition Interval:  Patient seen with wife and RN, [facility], at bedside. Likely starting reduced dose FOLFIRINOX today. Patient not talking today. Wife states hopeful patient can discharge home later this week after chemo. She states patient seems to be in more pain today. Daughter is bringing soup today, but patient overall has only been taking sips. Wife states he is tired of popsicles and dose not like the broth. Discussed avoiding cold things when chemo started as patient previously only accepting ice and popsicles.   Abdominal Status (last documented):   Last BM (including prior to admit): 05/20/22, GI Symptoms: Nausea Gtube output: 200 ml recorded past 24 hours  Pertinent Medications: fentanyl patch, Protonix, thiamine (completes 6/9)  Electrolyte Replacement: 6/3: 10 meq KCl 6/4: KCL 10 meq, 6/5 KCl 10 meq x4, 6/6 KCl 20 mmeq x2  PRN: tums (last admin 6/1), zofran (utilizing daily), compazine  Pertinent Labs:   Lab Results   Component Value Date     06/08/2022    K 3.6 06/08/2022     06/08/2022    CO2 28 06/08/2022    BUN 14 06/08/2022    CREATININE 0.60 06/08/2022    GLUCOSE 128 06/08/2022    CALCIUM 8.4 06/08/2022    PHOS 3.5 06/08/2022    MG 2.3 06/08/2022     Lab Results   Component Value Date    TRIG 155 06/04/2022    TRIG 117 06/03/2022    TRIG 135 04/18/2022   Labs relatively stable      Nutrition Related Findings:   No s/s wasting per NFPE 5/27, Diet history 5/22 NPO, 5/23 Clear, 5/24 soft+ bite sized, low fiber. 5/27: NPO for lap, then CLD. 6/1: NPO overnight due to vomiting despite Gtube to drain. 6/2: PPN started, PICC placed late afternoon. TPN to goal 6/4. Current Nutrition Therapies:  ADULT DIET; Clear Liquid  PN-Adult Premix 5/15 - Central  Current Parenteral Nutrition Orders:  · Type and Formula: Premix Central (5/15)   · Lipids: 250ml,Daily  · Duration: Continuous  · Rate/Volume: 2 L (85ml/hr)  · Current PN Order Provides: infusing per current order  · Goal PN Orders Provides: 1920 kcal/d (100% of needs), 100 grams of protein/d (100% of needs), 300 grams of CHO/d and 2250 ml of total volume/d    Current Intake:   Average Meal Intake:  (clears for comfort) Average Supplements Intake: None Ordered      Anthropometric Measures:  Height: 5' 10\" (177.8 cm)  Current Body Wt: 197 lb 8.5 oz (89.6 kg) (6/7), Weight source: Bed Scale  BMI: 28.3  Admission Body Weight: 207 lb (93.9 kg) (not specified source)  Ideal Body Weight (Kg) (Calculated): 75 kg (166 lbs), 119.4 %  Usual Body Wt: 231 lb 5 oz (104.9 kg) (1 mo ago 4/13 office visit), Percent weight change: -10.5       Edema: No data recorded  BMI Category Overweight (BMI 25.0-29. 9)  Estimated Daily Nutrient Needs:  Energy (kcal/day): 1878 -2348 (Kcal/kg (20-25) Weight used: 93.9 kg  (5/31)  Protein (g/day):  (1-1.2 g/kg) Weight Used: (Current) 93.9 kg  Fluid (ml/day):   (1 ml/kcal)    Nutrition Diagnosis:   · Inadequate protein-energy intake related to altered GI function as evidenced by NPO or clear liquid status due to medical condition (venting Gtube)    · Severe malnutrition,In context of chronic illness related to catabolic illness (nausea/vomiting) as evidenced by Criteria as identified in malnutrition assessment    Nutrition Interventions:   Food and/or Nutrient Delivery: Continue Current Diet,Continue Current Parenteral Nutrition     Coordination of Nutrition Care: Continue to monitor while inpatient       Goals:   Previous Goal Met: Goal(s) Achieved  Active Goal:  (Maintain nutrition support at goal rate)       Nutrition Monitoring and Evaluation:      Food/Nutrient Intake Outcomes: Parenteral Nutrition Intake/Tolerance  Physical Signs/Symptoms Outcomes: Biochemical Data,GI Status,Fluid Status or Edema,Weight    Discharge Planning:    Parenteral Nutrition    Jaison Obrien

## 2022-06-09 LAB
ALBUMIN SERPL-MCNC: 1.9 G/DL (ref 3.2–4.6)
ALBUMIN/GLOB SERPL: 0.7 {RATIO} (ref 1.2–3.5)
ALP SERPL-CCNC: 61 U/L (ref 50–136)
ALT SERPL-CCNC: 19 U/L (ref 12–65)
ANION GAP SERPL CALC-SCNC: 8 MMOL/L (ref 7–16)
AST SERPL-CCNC: 17 U/L (ref 15–37)
BASOPHILS # BLD: 0.1 K/UL (ref 0–0.2)
BASOPHILS NFR BLD: 1 % (ref 0–2)
BILIRUB DIRECT SERPL-MCNC: 0.3 MG/DL
BILIRUB SERPL-MCNC: 0.5 MG/DL (ref 0.2–1.1)
BUN SERPL-MCNC: 16 MG/DL (ref 8–23)
CALCIUM SERPL-MCNC: 8.7 MG/DL (ref 8.3–10.4)
CHLORIDE SERPL-SCNC: 105 MMOL/L (ref 98–107)
CO2 SERPL-SCNC: 27 MMOL/L (ref 21–32)
CREAT SERPL-MCNC: 0.5 MG/DL (ref 0.8–1.5)
DIFFERENTIAL METHOD BLD: ABNORMAL
EOSINOPHIL # BLD: 0.2 K/UL (ref 0–0.8)
EOSINOPHIL NFR BLD: 3 % (ref 0.5–7.8)
ERYTHROCYTE [DISTWIDTH] IN BLOOD BY AUTOMATED COUNT: 12.6 % (ref 11.9–14.6)
GLOBULIN SER CALC-MCNC: 2.7 G/DL (ref 2.3–3.5)
GLUCOSE SERPL-MCNC: 113 MG/DL (ref 65–100)
HBV SURFACE AB SER QL: REACTIVE INDEX VALUE
HCT VFR BLD AUTO: 37.9 % (ref 41.1–50.3)
HGB BLD-MCNC: 10.9 G/DL (ref 13.6–17.2)
IMM GRANULOCYTES # BLD AUTO: 0 K/UL (ref 0–0.5)
IMM GRANULOCYTES NFR BLD AUTO: 0 % (ref 0–5)
LYMPHOCYTES # BLD: 1.3 K/UL (ref 0.5–4.6)
LYMPHOCYTES NFR BLD: 24 % (ref 13–44)
MCH RBC QN AUTO: 29.8 PG (ref 26.1–32.9)
MCHC RBC AUTO-ENTMCNC: 28.8 G/DL (ref 31.4–35)
MCV RBC AUTO: 103.6 FL (ref 79.6–97.8)
MONOCYTES # BLD: 0.6 K/UL (ref 0.1–1.3)
MONOCYTES NFR BLD: 11 % (ref 4–12)
NEUTS SEG # BLD: 3.2 K/UL (ref 1.7–8.2)
NEUTS SEG NFR BLD: 61 % (ref 43–78)
NRBC # BLD: 0 K/UL (ref 0–0.2)
PLATELET # BLD AUTO: 194 K/UL (ref 150–450)
PMV BLD AUTO: 10.2 FL (ref 9.4–12.3)
POTASSIUM SERPL-SCNC: 3.4 MMOL/L (ref 3.5–5.1)
PROT SERPL-MCNC: 4.6 G/DL (ref 6.3–8.2)
RBC # BLD AUTO: 3.66 M/UL (ref 4.23–5.6)
SODIUM SERPL-SCNC: 140 MMOL/L (ref 136–145)
TRIGL SERPL-MCNC: 140 MG/DL (ref 35–150)
WBC # BLD AUTO: 5.3 K/UL (ref 4.3–11.1)

## 2022-06-09 PROCEDURE — 36592 COLLECT BLOOD FROM PICC: CPT

## 2022-06-09 PROCEDURE — 6370000000 HC RX 637 (ALT 250 FOR IP): Performed by: SURGERY

## 2022-06-09 PROCEDURE — 6370000000 HC RX 637 (ALT 250 FOR IP): Performed by: NURSE PRACTITIONER

## 2022-06-09 PROCEDURE — 99233 SBSQ HOSP IP/OBS HIGH 50: CPT | Performed by: NURSE PRACTITIONER

## 2022-06-09 PROCEDURE — 2500000003 HC RX 250 WO HCPCS: Performed by: NURSE PRACTITIONER

## 2022-06-09 PROCEDURE — 6360000002 HC RX W HCPCS: Performed by: SURGERY

## 2022-06-09 PROCEDURE — 6360000002 HC RX W HCPCS: Performed by: NURSE PRACTITIONER

## 2022-06-09 PROCEDURE — 80048 BASIC METABOLIC PNL TOTAL CA: CPT

## 2022-06-09 PROCEDURE — 85025 COMPLETE CBC W/AUTO DIFF WBC: CPT

## 2022-06-09 PROCEDURE — A4216 STERILE WATER/SALINE, 10 ML: HCPCS | Performed by: SURGERY

## 2022-06-09 PROCEDURE — C9113 INJ PANTOPRAZOLE SODIUM, VIA: HCPCS | Performed by: SURGERY

## 2022-06-09 PROCEDURE — 2500000003 HC RX 250 WO HCPCS: Performed by: SURGERY

## 2022-06-09 PROCEDURE — 99233 SBSQ HOSP IP/OBS HIGH 50: CPT | Performed by: INTERNAL MEDICINE

## 2022-06-09 PROCEDURE — 80076 HEPATIC FUNCTION PANEL: CPT

## 2022-06-09 PROCEDURE — 2580000003 HC RX 258: Performed by: SURGERY

## 2022-06-09 PROCEDURE — 84478 ASSAY OF TRIGLYCERIDES: CPT

## 2022-06-09 PROCEDURE — 3E04305 INTRODUCTION OF OTHER ANTINEOPLASTIC INTO CENTRAL VEIN, PERCUTANEOUS APPROACH: ICD-10-PCS | Performed by: INTERNAL MEDICINE

## 2022-06-09 PROCEDURE — 1100000000 HC RM PRIVATE

## 2022-06-09 PROCEDURE — 99356 PR PROLONGED SVC I/P OR OBS SETTING 1ST HOUR: CPT | Performed by: NURSE PRACTITIONER

## 2022-06-09 RX ORDER — POTASSIUM CHLORIDE 7.45 MG/ML
20 INJECTION INTRAVENOUS
Status: COMPLETED | OUTPATIENT
Start: 2022-06-09 | End: 2022-06-09

## 2022-06-09 RX ORDER — ATROPINE SULFATE 0.4 MG/ML
0.4 AMPUL (ML) INJECTION ONCE
Status: COMPLETED | OUTPATIENT
Start: 2022-06-10 | End: 2022-06-10

## 2022-06-09 RX ORDER — ESCITALOPRAM OXALATE 5 MG/5ML
10 SOLUTION ORAL DAILY
Status: DISCONTINUED | OUTPATIENT
Start: 2022-06-09 | End: 2022-06-14 | Stop reason: HOSPADM

## 2022-06-09 RX ORDER — ONDANSETRON 2 MG/ML
8 INJECTION INTRAMUSCULAR; INTRAVENOUS ONCE
Status: COMPLETED | OUTPATIENT
Start: 2022-06-10 | End: 2022-06-10

## 2022-06-09 RX ORDER — BUSPIRONE HYDROCHLORIDE 10 MG/1
10 TABLET ORAL 3 TIMES DAILY
Status: DISCONTINUED | OUTPATIENT
Start: 2022-06-09 | End: 2022-06-14 | Stop reason: HOSPADM

## 2022-06-09 RX ORDER — ESCITALOPRAM OXALATE 10 MG/1
10 TABLET ORAL DAILY
Status: DISCONTINUED | OUTPATIENT
Start: 2022-06-09 | End: 2022-06-09

## 2022-06-09 RX ORDER — ATROPINE SULFATE 0.4 MG/ML
0.4 AMPUL (ML) INJECTION
Status: DISCONTINUED | OUTPATIENT
Start: 2022-06-10 | End: 2022-06-14 | Stop reason: HOSPADM

## 2022-06-09 RX ADMIN — SODIUM CHLORIDE, PRESERVATIVE FREE 10 ML: 5 INJECTION INTRAVENOUS at 22:17

## 2022-06-09 RX ADMIN — ENOXAPARIN SODIUM 40 MG: 100 INJECTION SUBCUTANEOUS at 21:54

## 2022-06-09 RX ADMIN — HYDROMORPHONE HYDROCHLORIDE 2 MG: 2 INJECTION INTRAMUSCULAR; INTRAVENOUS; SUBCUTANEOUS at 21:54

## 2022-06-09 RX ADMIN — SODIUM CHLORIDE, PRESERVATIVE FREE 5 ML: 5 INJECTION INTRAVENOUS at 14:00

## 2022-06-09 RX ADMIN — SODIUM CHLORIDE, PRESERVATIVE FREE 10 ML: 5 INJECTION INTRAVENOUS at 22:16

## 2022-06-09 RX ADMIN — HYDROMORPHONE HYDROCHLORIDE 2 MG: 2 INJECTION INTRAMUSCULAR; INTRAVENOUS; SUBCUTANEOUS at 17:47

## 2022-06-09 RX ADMIN — HYDROMORPHONE HYDROCHLORIDE 2 MG: 2 INJECTION INTRAMUSCULAR; INTRAVENOUS; SUBCUTANEOUS at 02:27

## 2022-06-09 RX ADMIN — SODIUM CHLORIDE, PRESERVATIVE FREE 10 ML: 5 INJECTION INTRAVENOUS at 08:59

## 2022-06-09 RX ADMIN — ESCITALOPRAM OXALATE 10 MG: 5 SOLUTION ORAL at 13:00

## 2022-06-09 RX ADMIN — HYDROMORPHONE HYDROCHLORIDE 2 MG: 2 INJECTION INTRAMUSCULAR; INTRAVENOUS; SUBCUTANEOUS at 12:37

## 2022-06-09 RX ADMIN — CALCIUM CARBONATE (ANTACID) CHEW TAB 500 MG 500 MG: 500 CHEW TAB at 02:27

## 2022-06-09 RX ADMIN — HYDROMORPHONE HYDROCHLORIDE 2 MG: 2 INJECTION INTRAMUSCULAR; INTRAVENOUS; SUBCUTANEOUS at 05:33

## 2022-06-09 RX ADMIN — SOYBEAN OIL 250 ML: 20 INJECTION, SOLUTION INTRAVENOUS at 17:57

## 2022-06-09 RX ADMIN — SODIUM CHLORIDE, PRESERVATIVE FREE 10 ML: 5 INJECTION INTRAVENOUS at 05:33

## 2022-06-09 RX ADMIN — SODIUM CHLORIDE, PRESERVATIVE FREE 40 MG: 5 INJECTION INTRAVENOUS at 08:58

## 2022-06-09 RX ADMIN — HYDROMORPHONE HYDROCHLORIDE 2 MG: 2 INJECTION INTRAMUSCULAR; INTRAVENOUS; SUBCUTANEOUS at 09:51

## 2022-06-09 RX ADMIN — POTASSIUM CHLORIDE 20 MEQ: 7.46 INJECTION, SOLUTION INTRAVENOUS at 11:00

## 2022-06-09 RX ADMIN — BUSPIRONE HYDROCHLORIDE 10 MG: 10 TABLET ORAL at 13:43

## 2022-06-09 RX ADMIN — PROCHLORPERAZINE EDISYLATE 10 MG: 5 INJECTION INTRAMUSCULAR; INTRAVENOUS at 17:50

## 2022-06-09 RX ADMIN — MAGNESIUM SULFATE HEPTAHYDRATE: 500 INJECTION, SOLUTION INTRAMUSCULAR; INTRAVENOUS at 18:02

## 2022-06-09 RX ADMIN — ONDANSETRON 4 MG: 2 INJECTION INTRAMUSCULAR; INTRAVENOUS at 21:54

## 2022-06-09 RX ADMIN — ONDANSETRON 4 MG: 2 INJECTION INTRAMUSCULAR; INTRAVENOUS at 15:39

## 2022-06-09 RX ADMIN — POTASSIUM CHLORIDE 20 MEQ: 7.46 INJECTION, SOLUTION INTRAVENOUS at 10:47

## 2022-06-09 ASSESSMENT — PAIN DESCRIPTION - PAIN TYPE
TYPE: SURGICAL PAIN;CHRONIC PAIN
TYPE: SURGICAL PAIN
TYPE: SURGICAL PAIN;CHRONIC PAIN

## 2022-06-09 ASSESSMENT — PAIN DESCRIPTION - DESCRIPTORS
DESCRIPTORS: ACHING
DESCRIPTORS: ACHING;CRAMPING
DESCRIPTORS: ACHING

## 2022-06-09 ASSESSMENT — PAIN SCALES - GENERAL
PAINLEVEL_OUTOF10: 7
PAINLEVEL_OUTOF10: 7
PAINLEVEL_OUTOF10: 2
PAINLEVEL_OUTOF10: 1
PAINLEVEL_OUTOF10: 0
PAINLEVEL_OUTOF10: 0
PAINLEVEL_OUTOF10: 7
PAINLEVEL_OUTOF10: 8
PAINLEVEL_OUTOF10: 8

## 2022-06-09 ASSESSMENT — PAIN DESCRIPTION - DIRECTION
RADIATING_TOWARDS: BACK

## 2022-06-09 ASSESSMENT — PAIN DESCRIPTION - FREQUENCY
FREQUENCY: CONTINUOUS

## 2022-06-09 ASSESSMENT — PAIN DESCRIPTION - ONSET
ONSET: AWAKENED FROM SLEEP
ONSET: ON-GOING
ONSET: AWAKENED FROM SLEEP
ONSET: ON-GOING

## 2022-06-09 ASSESSMENT — PAIN - FUNCTIONAL ASSESSMENT
PAIN_FUNCTIONAL_ASSESSMENT: PREVENTS OR INTERFERES SOME ACTIVE ACTIVITIES AND ADLS

## 2022-06-09 ASSESSMENT — PAIN DESCRIPTION - LOCATION
LOCATION: ABDOMEN

## 2022-06-09 ASSESSMENT — PAIN DESCRIPTION - ORIENTATION
ORIENTATION: MID;LOWER
ORIENTATION: MID
ORIENTATION: MID;LOWER
ORIENTATION: MID

## 2022-06-09 NOTE — ADT AUTH CERT
Intestinal Obstruction - Care Day 16 (6/5/2022) by Caleb Yates RN       Review Status Review Entered   Completed 6/9/2022 16:24      Criteria Review      Care Day: 16 Care Date: 6/5/2022 Level of Care: Inpatient Floor    Guideline Day 2    Level Of Care    (X) Floor    Clinical Status    (X) * Hypotension absent    6/9/2022 4:24 PM EDT by Gifty Kilpatrick      98.1 78 17 138/84 89%RA    (X) * Nausea and vomiting absent or improved    6/9/2022 4:24 PM EDT by Gifty Kilpatrick      absent    ( ) * Pain absent or managed    (X) * Abdominal exam stable or improved    6/9/2022 4:24 PM EDT by Gifty Kilpatrick      GI: soft and non-distended, incisionally tender, G tube to bedside bag 700 mL 24hr output, incision is clean, dry, and intact    Activity    (X) Activity as tolerated    6/9/2022 4:24 PM EDT by Gifty [facility]      as tolerated    Routes    (X) IV medications    6/9/2022 4:24 PM [facility]      protonix 40mg ivdaily thiamine 100mg ivq24 dilaudid 1mg ivq4 prnx3 dialudid 1.5mg ivq4 prnx1 ativan 1mg ivq4 prnx2 zofran 4mg ivq6 prnx1 potassium 10meq ivx4    ( ) Diet as tolerated    6/9/2022 4:24 PM EDT by Gifty Kilpatrick      fat emulsion 250ml ivdaily   tpn 85ml/hr    Interventions    (X) * NG tube absent    6/9/2022 4:24 PM EDT by Gifty [facility]      absent    * Milestone   Additional Notes   6/5 LOC IP       Labs   Sodium: 140   Potassium: 3.3 (L)   Chloride: 104   CO2: 28   BUN,BUNPL: 11   Creatinine: 0.50 (L)   Anion Gap: 8   GFR Non-: >60   GFR African American: >60   Magnesium: 2.3   GLUCOSE, FASTING,GF: 136 (H)   CALCIUM, SERUM, [id number]: 8.4   Phosphorus: 3.0      OP Note   POD 9 Days Post-Op   Procedure:  Procedure(s):   LAPAROSCOPY DIAGNOSTIC , OPEN EXPLORATORY LAPAROTOMY, MASS EXCISION, G-TUBE PLACEMENT   Awake and lying in bed with wife, brother, and sister in law at bedside. Reports had some drainage from around Venting G tube earlier today. New dressing appears c/d/i/.  Planning to discharge home tomorrow after seen by palliative care. TPN in progress. AF, NAD. K+ 3.4   Plan/Recommendations/Medical Decision Making:   G-tube intact and draining   Patient remains admitted for pain control and to discuss goals of care   Ativan for anxiety   Palliative care following   Oncology following   Lovenox, Protonix   Clear liquid diet   TPN   Port placed 6/3/22      Exam   Eyes: Sclera are clear, pupils symmetric    ENMT: ears have no obvious external lesions; no obvious neck masses; no lip lesions   CV: RRR. Normal perfusion; no embolic signs   Resp: No JVD.  Breathing is  non-labored. No audible wheezing On room air   GI: soft and non-distended, incisionally tender, G tube to bedside bag 700 mL 24hr output, incision is clean, dry, and intact    Musculoskeletal: no significant asymmetry; normal tone; unremarkable with normal function.     Integument - Port Left chest with dressing c/d/i   Neuro:  No obvious focal deficits; moves all 4; A&O x3   Psychiatric: normal affect and mood, no memory impairment        Intestinal Obstruction - Care Day 15 (6/4/2022) by Perla Galvan RN       Review Status Review Entered   Completed 6/9/2022 16:02      Criteria Review      Care Day: 15 Care Date: 6/4/2022 Level of Care: Inpatient Floor    Guideline Day 2    Level Of Care    (X) Floor    Clinical Status    (X) * Hypotension absent    6/9/2022 4:02 PM EDT by Dianna Garcia      97.5 68 19 142/92 98%RA    (X) * Nausea and vomiting absent or improved    6/9/2022 4:02 PM EDT by Dianna Garcia      absent    ( ) * Pain absent or managed    (X) * Abdominal exam stable or improved    6/9/2022 4:02 PM EDT by Dianna Garcia      GI: soft and non-distended, incisionally tender, G tube to bedside bag 700 mL 24hr output, incision is clean, dry, and intact    Activity    (X) Activity as tolerated    6/9/2022 4:02 PM EDT by Dianna Garcia      as tolerated    Routes    (X) IV medications    6/9/2022 4:02 PM EDT by Dianna Garcia   protonix 40mg ivdaily potassium 10meq ivx1 dilaudid 1mg ivq4 prnx3 dilaudid 1.5mg ivq4 prnx3 ativan 1mg ivq4 prnx2    ( ) Diet as tolerated    6/9/2022 4:02 PM EDT by Gifty Kilpatrick      fat emulsion 250ml ivdaily    Interventions    (X) * NG tube absent    6/9/2022 4:02 PM EDT by Gifty Kilpatrick      absent    * Milestone   Additional Notes   6/4 LOC IP       Labs   Sodium: 142   Potassium: 3.4 (L)   Chloride: 104   CO2: 31   BUN,BUNPL: 7 (L)   Creatinine: 0.50 (L)   Anion Gap: 7   GFR Non-: >60   GFR African American: >60   Magnesium: 2.2   GLUCOSE, FASTING,GF: 110 (H)   CALCIUM, SERUM, [id number]: 8.2 (L)   Phosphorus: 3.1   Triglycerides: 155 (H)      Surgery   POD 8 Days Post-Op   Plan/Recommendations/Medical Decision Making:   G-tube intact and draining   Patient remains admitted for pain control and to discuss goals of care   Ativan for anxiety   Palliative care following   Oncology following   Lovenox, Protonix   Clear liquid diet   TPN   Pt ok to discharge when home TPN set up   Port placed 6/3/22      Exam   Eyes: Sclera are clear, pupils symmetric    ENMT: ears have no obvious external lesions; no obvious neck masses; no lip lesions   CV: RRR. Normal perfusion; no embolic signs   Resp: No JVD.  Breathing is  non-labored. No audible wheezing    GI: soft and non-distended, incisionally tender, G tube to bedside bag 700 mL 24hr output, incision is clean, dry, and intact    Musculoskeletal: no significant asymmetry; normal tone; unremarkable with normal function.     Integument - Port Left chest   Neuro:  No obvious focal deficits; moves all 4; A&O x3   Psychiatric: normal affect and mood, no memory impairment

## 2022-06-09 NOTE — PROGRESS NOTES
Palliative Care Progress Note    Patient: Elysia Healy MRN: [id number]  SSN: [id number]    YOB: 1960  Age: 62 y.o. Sex: male       Assessment/Plan:     Chief Complaint/Interval History: Ongoing abdominal pain, nausea     Principal Diagnosis:    · Pain, abdomen  R10.9    Additional Diagnoses:   · Anxiety  F41.1  · Depression  F32.9  · Encounter for Palliative Care  Z51.5  · Peritoneal metastases    Palliative Performance Scale (PPS): 70%       Medical Decision Making:   Reviewed and summarized notes over last 24 hours  Discussed case with appropriate providers:  Tin Mercado, NP, RN Vee England, PharmD Natalia Summers  Reviewed lab and X-ray data over last 24 hours    Attempted to see pt, but he was almost asleep. Will visit again in a little while. On my second attempt to visit, family members were already present, so did not have the opportunity to talk with Mr Roldan one-on-one. Instead, the pt asked the CNA to let me know that he did not want to talk today at all. Had a good conversation in the hallway with Ms Roldan and the pt's sister James Mireles, who is a hospitalist NP at [facility].      Mr Roldan refused the first chemo infusion yesterday, saying he didn't think the side effects were worth it. Sister James Mireles has since encouraged him to reconsider, as the chemo is expected to reduce his abdominal pain. The wife and sister concurred that Mr Roldan has never talked about his feelings, and now has emotionally shut down. In an attempt to reduce his anxiety and depression, have started Lexapro solution 10 mg. For more immediate relief of his anxiety, have started BuSpar 10 mg tablet TID to be crushed and given orally. These medications were first reviewed with PharmD Natalia Summers. Ms Roldan verbalized her understanding that the chemotherapy is unlikely to bring about remission, but can be expected to decrease the pt's pain and give him more time with his family.   They agreed that sister James Mireles will talk with him about the possibility of reframing his hope to these more achievable ends. Ms [facility] hopes that Makayla Rg, who has visited the pt the last couple of days, can visit the pt again toward the end of the day, preferably after 6 PM, when Ms [facility] usually leaves. Ms [facility] expressed hope that Alvarez Wade may assist him in verbalizing his distress. Have consulted Spiritual Care. Will discuss findings with members of the interdisciplinary team.         More than 50% of this 35 minute visit was spent counseling and coordination of care as outlined above. In addition to the E&M described above, more than 50% of a 45-minute prolonged visit, from 40 530 264 - 1200, was spent on counseling and coordination of care. Subjective:     Review of Systems:  A comprehensive review of systems was negative except as noted above. Objective:     Visit Vitals  BP (!) 130/91   Pulse 85   Temp 97.9 °F (36.6 °C) (Oral)   Resp 18   Ht 5' 10\" (1.778 m)   Wt 197 lb 8 oz (89.6 kg)   SpO2 93%   BMI 28.34 kg/m²       Physical Exam:    General:  Somnolent, depressed    Eyes:  Conjunctivae/corneas clear    Nose: Nares normal. Septum midline. Neck: Supple, symmetrical, trachea midline. Lungs:   Unlabored. Heart:  Regular rate and normotensive. Abdomen:   Soft, venting g tube to left abdomen. Extremities: Normal, atraumatic, no cyanosis or edema. Skin: Skin color, texture, turgor normal. No rash. Neurologic: Somnolent    Psych: Somnolent, but oriented when awake.      Signed By: Yani Sin, APRN - CNP     June 9, 2022

## 2022-06-09 NOTE — PROGRESS NOTES
Comprehensive Nutrition Assessment    Type and Reason for Visit: Reassess  Malnutrition Screening Tool: Malnutrition Screen  Have you recently lost weight without trying?: 24 to 33 pounds (3 points)  Have you been eating poorly because of a decreased appetite?: No (0 points)  Malnutrition Screening Tool Score: 3  TPN management (Palliative)    Nutrition Recommendations/Plan:   Meals and Snacks:   Diet: Continue current order Clear liquids for pleasure  Parenteral Nutrition:  Total parenteral nutrition  to begin at 1800  Continue: Dex 15%, 5% AA 2 L (85ml/hr)   Continue 250 ml 20% lipids daily   Lytes/L:   100 meq NaCl, 20 mmol KPO4, 8 meq Mg, 4.5 meq Ca  Other additives: MTE, MVI Monday Wednesday Friday due to national shortages   Nutrition Related Medication Management: Thiamine  mg daily active for 7 days (last dose completed today). Nutritional Supplement Therapy:   Active electrolyte replacement per electolyte support protocols  Replacement indicated:  Ordered by RN  Labs:   BMP daily  Mg MWF   Phos MWF   Triglyceride weekly starting Monday if pt remains hospitalized  POC Glucoses/SSI Not indicated       Malnutrition Assessment:  Malnutrition Status: Severe malnutrition  Context: Chronic Illness  Findings of clinical characteristics of malnutrition:   Energy Intake:  75% or less estimated energy requirements for 1 month or longer (minimal intake for 3 months)  Weight Loss:  Greater than 5% over 1 month (10.5% in one month)     Body Fat Loss:  No significant body fat loss     Muscle Mass Loss:  No significant muscle mass loss    Fluid Accumulation:  Unable to assess     Strength:  Not Performed     Nutrition Assessment:  Nutrition History:    5/22 Pt and wife in room discussing hx (daughter present but did not participate). Wife relates his medical problems began in Dec 2021, but weight loss started in March 2022. Has n/v (with limited relief from medications), limited to no appetite.  Patient open to ONS, but is put off by the idea of eating or drinking anything at this time for fear of vomiting. Wife states he did tolerate half a sandwich last week, but was unable to finish it. Nutrition Background:     History of PUD, HTN, hyperlipidemia, and diverticulitis. Admitted with abdominal pain for last 6 months. Presented with possible SBO.  being seen by Dr. Jennifer Bergeron with concerns of peritoneal metastatic disease, with unknown starting origin. Diffuse peritoneal carcinomatosis. Bx omentum + adenocarcinoma POD 7.   5/27: Diagnostic laparoscopy, switched to laparotomy. Omental mass and mesentery mass excision. Gastrostomy tube placement for venting. Findings of \"Diffuse peritoneal carcinomatosis with root of mesentery encasement and small bowel obstruction. \"  6/1: s/p venting gtube exchange. 6/3: s/p PORT.   6/7: repeat KUB with \"Worsening stool distention of the right colon. \"  Nutrition Interval:  Patient seen with wife, sister, and brother-in-law at bedside. Was supposed to initiate chemo yesterday but had second thoughts. Chemo plan still pending. No questions regarding TPN just asking for pain medications. Discussed with RN, Ryne Nascimento.  She has ordered 20 meq KCl replacement this am.  Abdominal Status (last documented):   Last BM (including prior to admit): 06/09/22 (g tube), GI Symptoms: Nausea Gtube output: no recorded past 24 hours  Pertinent Medications: ancef, fentanyl patch, Protonix, thiamine (completes 6/9)  Electrolyte Replacement: 6/3: 10 meq KCl 6/4: KCL 10 meq, 6/5 KCl 10 meq x4, 6/6 KCl 20 mmeq x2  PRN: tums (last admin 6/9), zofran (utilizing daily), compazine (utilizing daily)  Pertinent Labs:   Lab Results   Component Value Date     06/09/2022    K 3.4 06/09/2022     06/09/2022    CO2 27 06/09/2022    BUN 16 06/09/2022    CREATININE 0.50 06/09/2022    GLUCOSE 113 06/09/2022    CALCIUM 8.7 06/09/2022    PHOS 3.5 06/08/2022    MG 2.3 06/08/2022     Lab Results   Component Value Date    TRIG 140 06/09/2022    TRIG 155 06/04/2022    TRIG 117 06/03/2022   Labs reviewed and only remarkable for low K - being replaced this am and will increase slightly in TPN this evening, otherwise mostly stable      Nutrition Related Findings:   No s/s wasting per NFPE 5/27, Diet history 5/22 NPO, 5/23 Clear, 5/24 soft+ bite sized, low fiber. 5/27: NPO for lap, then CLD. 6/1: NPO overnight due to vomiting despite Gtube to drain. 6/2: PPN started, PICC placed late afternoon. TPN to goal 6/4. Current Nutrition Therapies:  ADULT DIET; Clear Liquid  PN-Adult Premix 5/15 - Central  Current Parenteral Nutrition Orders:  · Type and Formula: Premix Central (5/15)   · Lipids: 250ml,Daily  · Duration: Continuous  · Rate/Volume: 2 L (85ml/hr)  · Current PN Order Provides: infusing per current order  · Goal PN Orders Provides: 1920 kcal/d (100% of needs), 100 grams of protein/d (100% of needs), 300 grams of CHO/d and 2250 ml of total volume/d    Current Intake:   Average Meal Intake:  (clears for comfort) Average Supplements Intake: None Ordered      Anthropometric Measures:  Height: 5' 10\" (177.8 cm)  Current Body Wt: 197 lb 8.5 oz (89.6 kg) (6/7), Weight source: Bed Scale  BMI: 28.3  Admission Body Weight: 207 lb (93.9 kg) (not specified source)  Ideal Body Weight (Kg) (Calculated): 75 kg (166 lbs), 119.4 %  Usual Body Wt: 231 lb 5 oz (104.9 kg) (1 mo ago 4/13 office visit), Percent weight change: -10.5       Edema: No data recorded  BMI Category Overweight (BMI 25.0-29. 9)  Estimated Daily Nutrient Needs:  Energy (kcal/day): 1878 -2348 (Kcal/kg (20-25) Weight used: 93.9 kg  (5/31)  Protein (g/day):  (1-1.2 g/kg) Weight Used: (Current) 93.9 kg  Fluid (ml/day):   (1 ml/kcal)    Nutrition Diagnosis:   · Inadequate protein-energy intake related to altered GI function as evidenced by NPO or clear liquid status due to medical condition (venting Gtube)    · Severe malnutrition,In context of chronic illness related to catabolic illness (nausea/vomiting) as evidenced by Criteria as identified in malnutrition assessment    Nutrition Interventions:   Food and/or Nutrient Delivery: Continue Current Diet,Modify Parenteral Nutrition     Coordination of Nutrition Care: Continue to monitor while inpatient       Goals:   Previous Goal Met: Goal(s) Achieved  Active Goal:  (Maintain nutrition support at goal rate)       Nutrition Monitoring and Evaluation:      Food/Nutrient Intake Outcomes: Parenteral Nutrition Intake/Tolerance  Physical Signs/Symptoms Outcomes: Biochemical Data,GI Status,Fluid Status or Edema,Weight    Discharge Planning:    Parenteral Nutrition    Jaison Obrien

## 2022-06-09 NOTE — PROGRESS NOTES
's follow-up visit per consult. I was briefed by Palliative Care staff prior to the visit. Mr. Ondina Moralez wife was at bedside and patient's daughter arrived during the visit. In the past, Mr. MARIA [facility] has a close relationship with his daughter and during my initial visit he smiled at the mention of her name. Most of the visit was spent talking to patient's wife. Mr. MARIA [facility] declined talking to me today due to his desire to rest after pain medication, but he requested prayer. I affirmed patient's emotions and steve, and offered prayer as requested.  follow-up is planned as patient is available.      Tina Logan 69  Board Certified

## 2022-06-09 NOTE — PROGRESS NOTES
's follow-up visit attempted this morning. Family was involved in a meeting.  follow-up is planned.      Tina Loco 69  Board Certified

## 2022-06-09 NOTE — PROGRESS NOTES
[facility] Hematology & On[facility]        Inpatient Hematology / Oncology Progress Note      Admission Date: 2022 11:11 PM  Reason for Admission/Hospital Course: No admission diagnoses are documented for this encounter. 24 Hour Events:  VSS, afebrile  Ongoing pain - PC assisting  Continues to require IV pain meds  Continues TPN   C1 FOLFIRINOX today - labs pending    ROS:  Constitutional: negative for fever, chills. +weakness, malaise, fatigue. CV: negative for chest pain, palpitations, edema. Respiratory: negative for dyspnea, cough, wheezing. 10 point review of systems is otherwise negative with the exception of the elements mentioned above in the HPI. No Known Allergies    OBJECTIVE:  Patient Vitals for the past 8 hrs:   BP Temp Temp src Pulse Resp SpO2   22 0731 (!) 130/91 97.9 °F (36.6 °C) Oral 85 18 93 %   22 0603 -- -- -- -- 17 --   22 0257 -- -- -- -- 20 --   22 0230 137/78 98.2 °F (36.8 °C) Oral 89 16 94 %     Temp (24hrs), Av.2 °F (36.8 °C), Min:97.3 °F (36.3 °C), Max:99 °F (37.2 °C)    No intake/output data recorded. Physical Exam:  Constitutional: Well developed,male in no acute distress, sitting comfortably in the hospital bed. HEENT: Normocephalic and atraumatic. Oropharynx is clear, mucous membranes are moist. Extraocular muscles are intact. Sclerae anicteric. Skin Warm and dry. No bruising and no rash noted. No erythema. No pallor. Respiratory Lungs are clear to auscultation bilaterally without wheezes, rales or rhonchi, normal air exchange without accessory muscle use. CVS Normal rate, regular rhythm and normal S1 and S2. No murmurs, gallops, or rubs. Abdomen Soft  Non distended. G tube to drainage bag. Neuro Grossly nonfocal with no obvious sensory or motor deficits. MSK Normal range of motion in general.  No edema and no tenderness. Psych Appropriate mood and affect.         Labs:    No results for input(s): WBC, RBC, HGB, HCT, MCV, MCH, MCHC, RDW, PLT, MPV, MONOS, NOHELIA in the last 72 hours.     Invalid input(s): GRANS, LYMPH, EOS, BASOS, IG, DF, ANEU, ABL, ABM, ABB, AIG     Recent Labs     06/07/22  0606 06/08/22  0435 06/09/22  0227   * 139 140   K 3.5 3.6 3.4*    104 105   CO2 25 28 27   BUN 16 14 16   GFRAA >60 >60 >60   MG  --  2.3  --    PHOS  --  3.5  --          Imaging:    Medications:  Current Facility-Administered Medications   Medication Dose Route Frequency    PN-Adult Premix 5/15 - Central   IntraVENous Continuous TPN    diphenhydrAMINE (BENADRYL) injection 12.5 mg  12.5 mg IntraVENous Q6H PRN    LORazepam (ATIVAN) injection 1 mg  1 mg IntraVENous Q4H PRN    oxyCODONE (ROXICODONE INTENSOL) 100 MG/5ML concentrated solution 20 mg  20 mg SubLINGual Q4H PRN    prochlorperazine (COMPAZINE) injection 10 mg  10 mg IntraVENous Q6H PRN    fentaNYL (DURAGESIC) 100 MCG/HR 1 patch  1 patch TransDERmal Q72H    HYDROmorphone HCl PF (DILAUDID) injection 2 mg  2 mg IntraVENous Q3H PRN    fat emulsion 20 % infusion 250 mL  250 mL IntraVENous Daily    potassium chloride 20 mEq/50 mL IVPB (Central Line)  20 mEq IntraVENous PRN    Or    potassium chloride 10 mEq/100 mL IVPB (Peripheral Line)  10 mEq IntraVENous PRN    magnesium sulfate 2000 mg in 50 mL IVPB premix  2,000 mg IntraVENous PRN    sodium phosphate 10 mmol in sodium chloride 0.9 % 250 mL IVPB  10 mmol IntraVENous PRN    Or    sodium phosphate 15 mmol in sodium chloride 0.9 % 250 mL IVPB  15 mmol IntraVENous PRN    Or    sodium phosphate 20 mmol in sodium chloride 0.9 % 500 mL IVPB  20 mmol IntraVENous PRN    sodium chloride flush 0.9 % injection 5-40 mL  5-40 mL IntraVENous 2 times per day    sodium chloride flush 0.9 % injection 5-40 mL  5-40 mL IntraVENous PRN    0.9 % sodium chloride infusion  25 mL IntraVENous PRN    calcium carbonate (TUMS) chewable tablet 500 mg  500 mg Oral TID PRN    phenol 1.4 % mouth spray 1 spray  1 spray Mouth/Throat Q2H PRN    baclofen (LIORESAL) tablet 10 mg  10 mg Oral TID PRN    ondansetron (ZOFRAN-ODT) disintegrating tablet 4 mg  4 mg Oral Q8H PRN    Or    ondansetron (ZOFRAN) injection 4 mg  4 mg IntraVENous Q6H PRN    enoxaparin (LOVENOX) injection 40 mg  40 mg SubCUTAneous Q24H    nicotine (NICODERM CQ) 14 MG/24HR 1 patch  1 patch TransDERmal Daily    naloxone (NARCAN) injection 0.4 mg  0.4 mg IntraVENous PRN    pantoprazole (PROTONIX) 40 mg in sodium chloride (PF) 10 mL injection  40 mg IntraVENous Daily    sodium chloride flush 0.9 % injection 5-40 mL  5-40 mL IntraVENous Q8H       Denny Guevara is a 61 y. o. male with a past medical history of PUD, HTN, hyperlipidemia, and diverticulitis. He was seen by Dr. Roxann Rodriguez 5/18 and sent to Dr. Chase Garcia for expedited workup. He was seen in the office by Dr. Chase Garcia 5/20/22 with concerns for peritoneal metastatic disease.     The patient stated that he has been having abdominal pain for about 6 months now. He had a recent EGD and colonoscopy on February 25, 2022 which revealed a hiatal hernia, gastric polyps, benign colon polyps, diverticulosis, and hemorrhoids. He continued to have abdominal pain over the last few months. He stated that he has lost about 30 lbs over the last 6 months. He has not had an appetite. He was having worsening of GERD as well over that time.      The patient went to the emergency department at [facility] about 10 days ago for a partial small bowel obstruction. The patient presented to our emergency department 1 week ago with abdominal pain. The patient had a CT scan at that time which revealed extensive peritoneal nodularity and mild ascites consistent with peritoneal metastatic disease.  A primary lesion was not definitely identified. There was also sclerosis of the S1 vertebral body, a bone scan has been ordered to assess for possible bone metastasis. He also had evidence of a partial bowel obstruction.  There is no evidence of any metastatic disease within the chest. He is POD 2 sp laparotomy. Per Dr. Anuradha Ruiz note diffuse peritoneal carcinomatosis with complete infiltration of mesentery root, this is not surgical resectable, not even for bypass. Tumor markers were not impressive. We are asked to see patient for recommendations       PLAN:    Adenocarcinoma of upper GI origin, diffuse peritoneal metastasis  5/29 POD 2. Biopsy results pending. 6/2 Has venting G tube. Starting TPN. Biopsy omentum + adenocarcinoma, ICH with upper GI primary. Case discussed with surgery. Dr. Nicki Martínez recommends allow ~another week for recovery prior to starting chemo. He is being discharged once home TPN is set up. We had extensive conversation regarding palliative treatment. Goals and plan of care reviewed with the patient wife and sister. We will arrange for follow up with Dr. Shakira Goodman ~one week. 6/7 Was to be [facility] by surgery yesterday. However, ongoing intractable pain. Discussed with Dr Boston Block 6/6, per Dr Nicki Martínez ok to proceed with chemotherapy given ongoing pain/symptoms related to peritoneal metastasis. Asked to assume care for potential chemotherapy. Likely will proceed with dose-reduced FOLFIRINOX.  6/8 Discussed with Dr Shakira Goodman yesterday, pending treatment plan for FOLFIRINOX. However, during chemo-ed today patient expresses desire to think about this further. May need to f/u with Dr Shakira Goodman as outpatient for chemotherapy. NGS testing request sent to navigation. 6/9 Is agreeable to receiving C1 FOLFIRINOX today - labs pending. Peritoneal carinomatosis / protein calorie malnutrition  - Secondary to peritoneal disease  - Venting G-tube  - TPN (approved for home use)  - RD following    Cancer-related pain  - PC following  - Fentanyl patch and Intensol PO  6/9 Continues to require IV pain meds. PC following    Continues home meds  Araseli SOPs  VTE prophylaxis - Lovenox    Dispo - DC home with HH and TPN once pain controlled on oral regimen.     Goals and plan of care reviewed with the patient wife and sister. All questions answered to the best of our ability. Renae LUCY Donaldson - [address] Hematology [facility] Oncology  [id number] [place] [address]  Office : [phone]  Fax : [phone]         Attending Addendum:  I have personally performed a face to face diagnostic evaluation on this patient. I have reviewed and agree with the care plan as documented by Isa Golden N.P. 36 minutes were spent on patient care, including but not limited to, reviewing the chart and time with the patient and family, more than 50% of the time documented was spent in face-to-face contact with the patient and in the care of the patient on the floor/unit where the patient is located. My findings are as follows:  He has peritoneal carcinomatosis, appears weak, heart rate regular without murmurs, abdomen is non-tender, bowel sounds are positive, we will start Cycle 1 of FOLFIRINOX today.               Olvin Harper MD    [facility] Hematology/Oncology  [address] [address]  Office : [phone]  Fax : [phone]

## 2022-06-10 LAB
ALBUMIN SERPL-MCNC: 2.6 G/DL (ref 3.2–4.6)
ALBUMIN/GLOB SERPL: 0.8 {RATIO} (ref 1.2–3.5)
ALP SERPL-CCNC: 95 U/L (ref 50–136)
ALT SERPL-CCNC: 29 U/L (ref 12–65)
ANION GAP SERPL CALC-SCNC: 4 MMOL/L (ref 7–16)
AST SERPL-CCNC: 21 U/L (ref 15–37)
BASOPHILS # BLD: 0.1 K/UL (ref 0–0.2)
BASOPHILS NFR BLD: 1 % (ref 0–2)
BILIRUB SERPL-MCNC: 0.5 MG/DL (ref 0.2–1.1)
BUN SERPL-MCNC: 12 MG/DL (ref 8–23)
CALCIUM SERPL-MCNC: 8.5 MG/DL (ref 8.3–10.4)
CHLORIDE SERPL-SCNC: 102 MMOL/L (ref 98–107)
CO2 SERPL-SCNC: 30 MMOL/L (ref 21–32)
CREAT SERPL-MCNC: 0.5 MG/DL (ref 0.8–1.5)
DIFFERENTIAL METHOD BLD: ABNORMAL
EOSINOPHIL # BLD: 0.2 K/UL (ref 0–0.8)
EOSINOPHIL NFR BLD: 4 % (ref 0.5–7.8)
ERYTHROCYTE [DISTWIDTH] IN BLOOD BY AUTOMATED COUNT: 12.5 % (ref 11.9–14.6)
GLOBULIN SER CALC-MCNC: 3.4 G/DL (ref 2.3–3.5)
GLUCOSE SERPL-MCNC: 125 MG/DL (ref 65–100)
HCT VFR BLD AUTO: 38.3 % (ref 41.1–50.3)
HGB BLD-MCNC: 12.6 G/DL (ref 13.6–17.2)
IMM GRANULOCYTES # BLD AUTO: 0 K/UL (ref 0–0.5)
IMM GRANULOCYTES NFR BLD AUTO: 0 % (ref 0–5)
LYMPHOCYTES # BLD: 1.6 K/UL (ref 0.5–4.6)
LYMPHOCYTES NFR BLD: 26 % (ref 13–44)
MAGNESIUM SERPL-MCNC: 2.3 MG/DL (ref 1.8–2.4)
MCH RBC QN AUTO: 29.3 PG (ref 26.1–32.9)
MCHC RBC AUTO-ENTMCNC: 32.9 G/DL (ref 31.4–35)
MCV RBC AUTO: 89.1 FL (ref 79.6–97.8)
MONOCYTES # BLD: 0.6 K/UL (ref 0.1–1.3)
MONOCYTES NFR BLD: 10 % (ref 4–12)
NEUTS SEG # BLD: 3.6 K/UL (ref 1.7–8.2)
NEUTS SEG NFR BLD: 59 % (ref 43–78)
NRBC # BLD: 0 K/UL (ref 0–0.2)
PHOSPHATE SERPL-MCNC: 3.9 MG/DL (ref 2.3–3.7)
PLATELET # BLD AUTO: 219 K/UL (ref 150–450)
PMV BLD AUTO: 10.1 FL (ref 9.4–12.3)
POTASSIUM SERPL-SCNC: 3.9 MMOL/L (ref 3.5–5.1)
PROT SERPL-MCNC: 6 G/DL (ref 6.3–8.2)
RBC # BLD AUTO: 4.3 M/UL (ref 4.23–5.6)
SODIUM SERPL-SCNC: 136 MMOL/L (ref 138–145)
WBC # BLD AUTO: 6.1 K/UL (ref 4.3–11.1)

## 2022-06-10 PROCEDURE — APPSS45 APP SPLIT SHARED TIME 31-45 MINUTES: Performed by: NURSE PRACTITIONER

## 2022-06-10 PROCEDURE — C9113 INJ PANTOPRAZOLE SODIUM, VIA: HCPCS | Performed by: SURGERY

## 2022-06-10 PROCEDURE — 80053 COMPREHEN METABOLIC PANEL: CPT

## 2022-06-10 PROCEDURE — 6360000002 HC RX W HCPCS: Performed by: SURGERY

## 2022-06-10 PROCEDURE — 84100 ASSAY OF PHOSPHORUS: CPT

## 2022-06-10 PROCEDURE — 99233 SBSQ HOSP IP/OBS HIGH 50: CPT | Performed by: INTERNAL MEDICINE

## 2022-06-10 PROCEDURE — 6360000002 HC RX W HCPCS: Performed by: INTERNAL MEDICINE

## 2022-06-10 PROCEDURE — 6370000000 HC RX 637 (ALT 250 FOR IP): Performed by: SURGERY

## 2022-06-10 PROCEDURE — 2500000003 HC RX 250 WO HCPCS: Performed by: SURGERY

## 2022-06-10 PROCEDURE — 2500000003 HC RX 250 WO HCPCS: Performed by: NURSE PRACTITIONER

## 2022-06-10 PROCEDURE — 1100000000 HC RM PRIVATE

## 2022-06-10 PROCEDURE — 2580000003 HC RX 258: Performed by: INTERNAL MEDICINE

## 2022-06-10 PROCEDURE — 6360000002 HC RX W HCPCS: Performed by: NURSE PRACTITIONER

## 2022-06-10 PROCEDURE — 85025 COMPLETE CBC W/AUTO DIFF WBC: CPT

## 2022-06-10 PROCEDURE — 83735 ASSAY OF MAGNESIUM: CPT

## 2022-06-10 PROCEDURE — 6370000000 HC RX 637 (ALT 250 FOR IP): Performed by: NURSE PRACTITIONER

## 2022-06-10 PROCEDURE — 2580000003 HC RX 258: Performed by: SURGERY

## 2022-06-10 RX ORDER — OXYCODONE HCL 20 MG/ML
20 CONCENTRATE, ORAL ORAL EVERY 4 HOURS PRN
Status: DISCONTINUED | OUTPATIENT
Start: 2022-06-10 | End: 2022-06-14 | Stop reason: HOSPADM

## 2022-06-10 RX ORDER — OXYCODONE HCL 20 MG/ML
40 CONCENTRATE, ORAL ORAL EVERY 4 HOURS PRN
Status: DISCONTINUED | OUTPATIENT
Start: 2022-06-10 | End: 2022-06-14 | Stop reason: HOSPADM

## 2022-06-10 RX ORDER — DIPHENHYDRAMINE HYDROCHLORIDE 50 MG/ML
50 INJECTION INTRAMUSCULAR; INTRAVENOUS
Status: DISPENSED | OUTPATIENT
Start: 2022-06-10 | End: 2022-06-10

## 2022-06-10 RX ADMIN — FLUOROURACIL 2525 MG: 50 INJECTION, SOLUTION INTRAVENOUS at 16:41

## 2022-06-10 RX ADMIN — HYDROMORPHONE HYDROCHLORIDE 2 MG: 2 INJECTION INTRAMUSCULAR; INTRAVENOUS; SUBCUTANEOUS at 07:50

## 2022-06-10 RX ADMIN — HYDROMORPHONE HYDROCHLORIDE 2 MG: 2 INJECTION INTRAMUSCULAR; INTRAVENOUS; SUBCUTANEOUS at 18:07

## 2022-06-10 RX ADMIN — SODIUM CHLORIDE, PRESERVATIVE FREE 10 ML: 5 INJECTION INTRAVENOUS at 16:47

## 2022-06-10 RX ADMIN — LEUCOVORIN CALCIUM 850 MG: 350 INJECTION, POWDER, LYOPHILIZED, FOR SUSPENSION INTRAMUSCULAR; INTRAVENOUS at 14:57

## 2022-06-10 RX ADMIN — ENOXAPARIN SODIUM 40 MG: 100 INJECTION SUBCUTANEOUS at 20:28

## 2022-06-10 RX ADMIN — ONDANSETRON 4 MG: 4 TABLET, ORALLY DISINTEGRATING ORAL at 08:05

## 2022-06-10 RX ADMIN — SOYBEAN OIL 250 ML: 20 INJECTION, SOLUTION INTRAVENOUS at 17:58

## 2022-06-10 RX ADMIN — MAGNESIUM SULFATE HEPTAHYDRATE: 500 INJECTION, SOLUTION INTRAMUSCULAR; INTRAVENOUS at 17:58

## 2022-06-10 RX ADMIN — SODIUM CHLORIDE, PRESERVATIVE FREE 40 MG: 5 INJECTION INTRAVENOUS at 07:49

## 2022-06-10 RX ADMIN — ESCITALOPRAM OXALATE 10 MG: 5 SOLUTION ORAL at 08:05

## 2022-06-10 RX ADMIN — FOSAPREPITANT 150 MG: 150 INJECTION, POWDER, LYOPHILIZED, FOR SOLUTION INTRAVENOUS at 11:55

## 2022-06-10 RX ADMIN — ATROPINE SULFATE 0.4 MG: 0.4 INJECTION, SOLUTION INTRAMUSCULAR; INTRAVENOUS; SUBCUTANEOUS at 14:50

## 2022-06-10 RX ADMIN — LORAZEPAM 1 MG: 2 INJECTION INTRAMUSCULAR; INTRAVENOUS at 20:44

## 2022-06-10 RX ADMIN — OXALIPLATIN 105 MG: 5 INJECTION, SOLUTION INTRAVENOUS at 12:47

## 2022-06-10 RX ADMIN — ONDANSETRON 8 MG: 2 INJECTION INTRAMUSCULAR; INTRAVENOUS at 11:21

## 2022-06-10 RX ADMIN — DEXAMETHASONE SODIUM PHOSPHATE 12 MG: 4 INJECTION, SOLUTION INTRAMUSCULAR; INTRAVENOUS at 11:22

## 2022-06-10 RX ADMIN — HYDROMORPHONE HYDROCHLORIDE 1.5 MG: 2 INJECTION INTRAMUSCULAR; INTRAVENOUS; SUBCUTANEOUS at 15:13

## 2022-06-10 RX ADMIN — BUSPIRONE HYDROCHLORIDE 10 MG: 10 TABLET ORAL at 07:49

## 2022-06-10 RX ADMIN — ONDANSETRON 4 MG: 2 INJECTION INTRAMUSCULAR; INTRAVENOUS at 03:26

## 2022-06-10 RX ADMIN — HYDROMORPHONE HYDROCHLORIDE 2 MG: 2 INJECTION INTRAMUSCULAR; INTRAVENOUS; SUBCUTANEOUS at 11:07

## 2022-06-10 RX ADMIN — IRINOTECAN HYDROCHLORIDE 220 MG: 20 INJECTION, SOLUTION INTRAVENOUS at 14:58

## 2022-06-10 RX ADMIN — HYDROMORPHONE HYDROCHLORIDE 1.5 MG: 2 INJECTION INTRAMUSCULAR; INTRAVENOUS; SUBCUTANEOUS at 03:11

## 2022-06-10 RX ADMIN — SODIUM CHLORIDE, PRESERVATIVE FREE 10 ML: 5 INJECTION INTRAVENOUS at 08:08

## 2022-06-10 ASSESSMENT — PAIN SCALES - GENERAL
PAINLEVEL_OUTOF10: 8
PAINLEVEL_OUTOF10: 0
PAINLEVEL_OUTOF10: 8

## 2022-06-10 ASSESSMENT — PAIN DESCRIPTION - PAIN TYPE: TYPE: CHRONIC PAIN

## 2022-06-10 ASSESSMENT — PAIN DESCRIPTION - LOCATION
LOCATION: ABDOMEN

## 2022-06-10 ASSESSMENT — PAIN DESCRIPTION - DESCRIPTORS
DESCRIPTORS: SHARP
DESCRIPTORS: ACHING
DESCRIPTORS: ACHING

## 2022-06-10 ASSESSMENT — PAIN DESCRIPTION - ORIENTATION
ORIENTATION: LEFT
ORIENTATION: LOWER
ORIENTATION: MID

## 2022-06-10 ASSESSMENT — PAIN - FUNCTIONAL ASSESSMENT: PAIN_FUNCTIONAL_ASSESSMENT: PREVENTS OR INTERFERES SOME ACTIVE ACTIVITIES AND ADLS

## 2022-06-10 ASSESSMENT — PAIN DESCRIPTION - ONSET: ONSET: ON-GOING

## 2022-06-10 ASSESSMENT — PAIN DESCRIPTION - FREQUENCY: FREQUENCY: INTERMITTENT

## 2022-06-10 NOTE — PROGRESS NOTES
Comprehensive Nutrition Assessment    Type and Reason for Visit: Reassess  Malnutrition Screening Tool: Malnutrition Screen  Have you recently lost weight without trying?: 24 to 33 pounds (3 points)  Have you been eating poorly because of a decreased appetite?: No (0 points)  Malnutrition Screening Tool Score: 3  TPN management (Palliative)    Nutrition Recommendations/Plan:   Meals and Snacks:   Diet: Continue current order Clear liquids for pleasure  Parenteral Nutrition:  Total parenteral nutrition  to begin at 1800  Continue: Dex 15%, 5% AA 2 L (85ml/hr)   Continue 250 ml 20% lipids daily   Lytes/L: no change to lytes  100 meq NaCl, 20 mmol KPO4, 8 meq Mg, 4.5 meq Ca  Other additives: MTE, MVI Monday Wednesday Friday due to national shortages   Nutrition Related Medication Management: Thiamine  mg daily active for 7 days (last dose completed today). Nutritional Supplement Therapy:   Active electrolyte replacement per electolyte support protocols  Replacement indicated:  Ordered by RN  Labs:   BMP daily  Mg MWF   Phos MWF   Triglyceride weekly starting Monday if pt remains hospitalized  POC Glucoses/SSI Not indicated       Malnutrition Assessment:  Malnutrition Status: Severe malnutrition  Context: Chronic Illness  Findings of clinical characteristics of malnutrition:   Energy Intake:  75% or less estimated energy requirements for 1 month or longer (minimal intake for 3 months)  Weight Loss:  Greater than 5% over 1 month (10.5% in one month)     Body Fat Loss:  No significant body fat loss     Muscle Mass Loss:  No significant muscle mass loss    Fluid Accumulation:  Unable to assess     Strength:  Not Performed     Nutrition Assessment:  Nutrition History:    5/22 Pt and wife in room discussing hx (daughter present but did not participate). Wife relates his medical problems began in Dec 2021, but weight loss started in March 2022.  Has n/v (with limited relief from medications), limited to no appetite. Patient open to ONS, but is put off by the idea of eating or drinking anything at this time for fear of vomiting. Wife states he did tolerate half a sandwich last week, but was unable to finish it. Nutrition Background:     History of PUD, HTN, hyperlipidemia, and diverticulitis. Admitted with abdominal pain for last 6 months. Presented with possible SBO.  being seen by Dr. Lambert Son with concerns of peritoneal metastatic disease, with unknown starting origin. Diffuse peritoneal carcinomatosis. Bx omentum + adenocarcinoma POD 7.   5/27: Diagnostic laparoscopy, switched to laparotomy. Omental mass and mesentery mass excision. Gastrostomy tube placement for venting. Findings of \"Diffuse peritoneal carcinomatosis with root of mesentery encasement and small bowel obstruction. \"  6/1: s/p venting gtube exchange. 6/3: s/p PORT.   6/7: repeat KUB with \"Worsening stool distention of the right colon. \"  Nutrition Interval:  Pt seen lying in bed at time of visit. No visitors present. TPN infusing per order. Noted pt received C1 FOLFIRINOX yesterday. Discussed with RN, Katiuska Aldana. Abdominal Status (last documented):   Last BM (including prior to admit): 06/09/22, GI Symptoms: Nausea Gtube output: no recorded past 24 hours  Pertinent Medications: fentanyl patch, Protonix, Decadron (one dose)  Electrolyte Replacement: 6/3: 10 meq KCl 6/4: KCL 10 meq, 6/5 KCl 10 meq x4, 6/6 KCl 20 mmeq x2  PRN: tums (last admin 6/9), zofran (utilizing daily), compazine (utilizing daily)  Pertinent Labs:   Lab Results   Component Value Date     06/10/2022    K 3.9 06/10/2022     06/10/2022    CO2 30 06/10/2022    BUN 12 06/10/2022    CREATININE 0.50 06/10/2022    GLUCOSE 125 06/10/2022    CALCIUM 8.5 06/10/2022    PHOS 3.9 06/10/2022    MG 2.3 06/10/2022     Lab Results   Component Value Date    TRIG 140 06/09/2022    TRIG 155 06/04/2022    TRIG 117 06/03/2022   Labs reviewed and only remarkable for hyponatremia.  Pt with one low Na lab, will defer adjustment in TPN and assess for trend. Nutrition Related Findings:   No s/s wasting per NFPE 5/27, Diet history 5/22 NPO, 5/23 Clear, 5/24 soft+ bite sized, low fiber. 5/27: NPO for lap, then CLD. 6/1: NPO overnight due to vomiting despite Gtube to drain. 6/2: PPN started, PICC placed late afternoon. TPN to goal 6/4. Current Nutrition Therapies:  ADULT DIET; Clear Liquid  PN-Adult Premix 5/15 - Central  Current Parenteral Nutrition Orders:  · Type and Formula: Premix Central (5/15)   · Lipids: 250ml,Daily  · Duration: Continuous  · Rate/Volume: 2 L (85ml/hr)  · Current PN Order Provides: infusing per current order  · Goal PN Orders Provides: 1920 kcal/d (100% of needs), 100 grams of protein/d (100% of needs), 300 grams of CHO/d and 2250 ml of total volume/d    Current Intake:   Average Meal Intake:  (clears for comfort) Average Supplements Intake: None Ordered      Anthropometric Measures:  Height: 5' 10\" (177.8 cm)  Current Body Wt: 196 lb 10.4 oz (89.2 kg) (6/10), Weight source: Bed Scale  BMI: 28.2  Admission Body Weight: 207 lb (93.9 kg) (not specified source)  Ideal Body Weight (Kg) (Calculated): 75 kg (166 lbs), 119.4 %  Usual Body Wt: 231 lb 5 oz (104.9 kg) (1 mo ago 4/13 office visit), Percent weight change: -10.5       Edema: No data recorded  BMI Category Overweight (BMI 25.0-29. 9)  Estimated Daily Nutrient Needs:  Energy (kcal/day): 1878 -2348 (Kcal/kg (20-25) Weight used: 93.9 kg  (5/31)  Protein (g/day):  (1-1.2 g/kg) Weight Used: (Current) 93.9 kg  Fluid (ml/day):   (1 ml/kcal)    Nutrition Diagnosis:   · Inadequate protein-energy intake related to altered GI function as evidenced by NPO or clear liquid status due to medical condition (venting Gtube)    · Severe malnutrition,In context of chronic illness related to catabolic illness (nausea/vomiting) as evidenced by Criteria as identified in malnutrition assessment    Nutrition Interventions:   Food and/or Nutrient Delivery: Continue Current Diet,Continue Current Parenteral Nutrition     Coordination of Nutrition Care: Continue to monitor while inpatient       Goals:   Previous Goal Met: Goal(s) Achieved  Active Goal:  (Maintain nutrition support at goal rate)       Nutrition Monitoring and Evaluation:      Food/Nutrient Intake Outcomes: Parenteral Nutrition Intake/Tolerance  Physical Signs/Symptoms Outcomes: Biochemical Data,GI Status,Fluid Status or Edema,Weight    Discharge Planning:    Parenteral Nutrition    Windy Lemus, [address] [place]

## 2022-06-10 NOTE — PROGRESS NOTES
Pt tolerating chemo well. Fluorourcil infusing now. Pain medicine around the clock. No other needs voiced at this time. Bedside report given to Shazia Johnson [place].

## 2022-06-10 NOTE — PROGRESS NOTES
To assist the pt in weaning off IV Dilaudid in order to discharge home, have changed his Roxicodone Intensol to a a range - 20 mg or 40 mg q 4 hrs PRN. The 40 mg dose is approximately equivalent to the Dilaudid IV 2 mg he has been receiving.

## 2022-06-10 NOTE — PROGRESS NOTES
[facility]        Inpatient Hematology / Oncology Progress Note      Admission Date: 2022 11:11 PM  Reason for Admission/Hospital Course: No admission diagnoses are documented for this encounter. 24 Hour Events:  VSS, afebrile  Ongoing pain - PC assisting  Continues to require IV pain meds  Continues TPN   D1C1 FOLFIRINOX     ROS:  Constitutional: negative for fever, chills. +weakness, malaise, fatigue. CV: negative for chest pain, palpitations, edema. Respiratory: negative for dyspnea, cough, wheezing. 10 point review of systems is otherwise negative with the exception of the elements mentioned above in the HPI. No Known Allergies    OBJECTIVE:  Patient Vitals for the past 8 hrs:   BP Temp Temp src Pulse Resp SpO2 Weight   06/10/22 0730 (!) 144/82 97.7 °F (36.5 °C) Oral 79 16 94 % --   06/10/22 0426 (!) 135/92 97.3 °F (36.3 °C) -- 76 16 92 % 196 lb 11.2 oz (89.2 kg)   06/10/22 0033 (!) 136/91 98.1 °F (36.7 °C) Oral 81 16 92 % --     Temp (24hrs), Av.8 °F (36.6 °C), Min:97.3 °F (36.3 °C), Max:98.1 °F (36.7 °C)    No intake/output data recorded. Physical Exam:  Constitutional: Well developed,male in no acute distress, sitting comfortably in the hospital bed. HEENT: Normocephalic and atraumatic. Oropharynx is clear, mucous membranes are moist. Extraocular muscles are intact. Sclerae anicteric. Skin Warm and dry. No bruising and no rash noted. No erythema. No pallor. Respiratory Lungs are clear to auscultation bilaterally without wheezes, rales or rhonchi, normal air exchange without accessory muscle use. CVS Normal rate, regular rhythm and normal S1 and S2. No murmurs, gallops, or rubs. Abdomen Soft  Non distended. G tube to drainage bag. Neuro Grossly nonfocal with no obvious sensory or motor deficits. MSK Normal range of motion in general.  No edema and no tenderness. Psych Appropriate mood and affect.         Labs:      Recent Labs 06/09/22  1334 06/10/22  0301   WBC 5.3 6.1   RBC 3.66* 4.30   HGB 10.9* 12.6*   HCT 37.9* 38.3*   .6* 89.1   MCH 29.8 29.3   MCHC 28.8* 32.9   RDW 12.6 12.5    219   MPV 10.2 10.1        Recent Labs     06/08/22  0435 06/09/22  0227 06/09/22  1334 06/10/22  0301    140  --  136*   K 3.6 3.4*  --  3.9    105  --  102   CO2 28 27  --  30   BUN 14 16  --  12   GFRAA >60 >60  --  >60   GLOB  --   --  2.7 3.4   MG 2.3  --   --   --    PHOS 3.5  --   --   --          Imaging:    Medications:  Current Facility-Administered Medications   Medication Dose Route Frequency    fosaprepitant (EMEND) 150 mg in sodium chloride 0.9 % 150 mL IVPB  150 mg IntraVENous Once    dexamethasone (DECADRON) 12 mg in sodium chloride 0.9 % IVPB  12 mg IntraVENous Once    ondansetron (ZOFRAN) injection 8 mg  8 mg IntraVENous Once    atropine injection 0.4 mg  0.4 mg SubCUTAneous Once    atropine injection 0.4 mg  0.4 mg SubCUTAneous Q2H PRN    oxaliplatin (ELOXATIN) 105 mg in dextrose 5 % 250 mL chemo IVPB  50 mg/m2 (Treatment Plan Recorded) IntraVENous Once    irinotecan (CAMPTOSAR) 220 mg in dextrose 5 % 250 mL chemo IVPB  100 mg/m2 (Treatment Plan Recorded) IntraVENous Once    leucovorin calcium (WELLCOVORIN) 850 mg in dextrose 5 % 250 mL IVPB  400 mg/m2 (Treatment Plan Recorded) IntraVENous Once    PN-Adult Premix 5/15 - Central   IntraVENous Continuous TPN    busPIRone (BUSPAR) tablet 10 mg  10 mg Oral TID    escitalopram (LEXAPRO) 5 MG/5ML solution 10 mg  10 mg Oral Daily    fluorouracil (ADRUCIL) 2,525 mg in sodium chloride 0.9 % 500 mL chemo infusion  1,200 mg/m2 (Treatment Plan Recorded) IntraVENous Q24H    diphenhydrAMINE (BENADRYL) injection 12.5 mg  12.5 mg IntraVENous Q6H PRN    LORazepam (ATIVAN) injection 1 mg  1 mg IntraVENous Q4H PRN    oxyCODONE (ROXICODONE INTENSOL) 100 MG/5ML concentrated solution 20 mg  20 mg SubLINGual Q4H PRN    prochlorperazine (COMPAZINE) injection 10 mg  10 mg IntraVENous Q6H PRN    fentaNYL (DURAGESIC) 100 MCG/HR 1 patch  1 patch TransDERmal Q72H    HYDROmorphone HCl PF (DILAUDID) injection 2 mg  2 mg IntraVENous Q3H PRN    fat emulsion 20 % infusion 250 mL  250 mL IntraVENous Daily    potassium chloride 20 mEq/50 mL IVPB (Central Line)  20 mEq IntraVENous PRN    Or    potassium chloride 10 mEq/100 mL IVPB (Peripheral Line)  10 mEq IntraVENous PRN    magnesium sulfate 2000 mg in 50 mL IVPB premix  2,000 mg IntraVENous PRN    sodium phosphate 10 mmol in sodium chloride 0.9 % 250 mL IVPB  10 mmol IntraVENous PRN    Or    sodium phosphate 15 mmol in sodium chloride 0.9 % 250 mL IVPB  15 mmol IntraVENous PRN    Or    sodium phosphate 20 mmol in sodium chloride 0.9 % 500 mL IVPB  20 mmol IntraVENous PRN    sodium chloride flush 0.9 % injection 5-40 mL  5-40 mL IntraVENous 2 times per day    sodium chloride flush 0.9 % injection 5-40 mL  5-40 mL IntraVENous PRN    0.9 % sodium chloride infusion  25 mL IntraVENous PRN    calcium carbonate (TUMS) chewable tablet 500 mg  500 mg Oral TID PRN    phenol 1.4 % mouth spray 1 spray  1 spray Mouth/Throat Q2H PRN    baclofen (LIORESAL) tablet 10 mg  10 mg Oral TID PRN    ondansetron (ZOFRAN-ODT) disintegrating tablet 4 mg  4 mg Oral Q8H PRN    Or    ondansetron (ZOFRAN) injection 4 mg  4 mg IntraVENous Q6H PRN    enoxaparin (LOVENOX) injection 40 mg  40 mg SubCUTAneous Q24H    nicotine (NICODERM CQ) 14 MG/24HR 1 patch  1 patch TransDERmal Daily    naloxone (NARCAN) injection 0.4 mg  0.4 mg IntraVENous PRN    pantoprazole (PROTONIX) 40 mg in sodium chloride (PF) 10 mL injection  40 mg IntraVENous Daily    sodium chloride flush 0.9 % injection 5-40 mL  5-40 mL IntraVENous Q8H       Denny Guevara is a 61 y. o. male with a past medical history of PUD, HTN, hyperlipidemia, and diverticulitis. He was seen by Dr. Brittney Harrison 5/18 and sent to Dr. Mariana Martinez for expedited workup.  He was seen in the office by Dr. Mariana Martinez 5/20/22 with concerns for peritoneal metastatic disease.     The patient stated that he has been having abdominal pain for about 6 months now. He had a recent EGD and colonoscopy on February 25, 2022 which revealed a hiatal hernia, gastric polyps, benign colon polyps, diverticulosis, and hemorrhoids. He continued to have abdominal pain over the last few months. He stated that he has lost about 30 lbs over the last 6 months. He has not had an appetite. He was having worsening of GERD as well over that time.      The patient went to the emergency department at [facility] about 10 days ago for a partial small bowel obstruction. The patient presented to our emergency department 1 week ago with abdominal pain. The patient had a CT scan at that time which revealed extensive peritoneal nodularity and mild ascites consistent with peritoneal metastatic disease.  A primary lesion was not definitely identified. There was also sclerosis of the S1 vertebral body, a bone scan has been ordered to assess for possible bone metastasis. He also had evidence of a partial bowel obstruction. There is no evidence of any metastatic disease within the chest. He is POD 2 sp laparotomy. Per Dr. Mary Barton note diffuse peritoneal carcinomatosis with complete infiltration of mesentery root, this is not surgical resectable, not even for bypass. Tumor markers were not impressive. We are asked to see patient for recommendations       PLAN:    Adenocarcinoma of upper GI origin, diffuse peritoneal metastasis  5/29 POD 2. Biopsy results pending. 6/2 Has venting G tube. Starting TPN. Biopsy omentum + adenocarcinoma, ICH with upper GI primary. Case discussed with surgery. Dr. Bee Calle recommends allow ~another week for recovery prior to starting chemo. He is being discharged once home TPN is set up. We had extensive conversation regarding palliative treatment. Goals and plan of care reviewed with the patient wife and sister.  We will arrange for follow up with Dr. Vargas Elizalde ~one week.   6/7 Was to be [facility] by surgery yesterday. However, ongoing intractable pain. Discussed with Dr Ramsey Mccauley 6/6, per Dr Deborah vee to proceed with chemotherapy given ongoing pain/symptoms related to peritoneal metastasis. Asked to assume care for potential chemotherapy. Likely will proceed with dose-reduced FOLFIRINOX.  6/8 Discussed with Dr Edgar Gonzalez yesterday, pending treatment plan for FOLFIRINOX. However, during chemo-ed today patient expresses desire to think about this further. May need to f/u with Dr Edgar Gonzalez as outpatient for chemotherapy. NGS testing request sent to navigation. 6/9 Is agreeable to receiving C1 FOLFIRINOX today - labs pending. 6/10 C1D1 FOLFIRINOX    Peritoneal carinomatosis / protein calorie malnutrition  - Secondary to peritoneal disease  - Venting G-tube  - TPN (approved for home use)  - RD following    Cancer-related pain  - PC following  - Fentanyl patch and Intensol PO  6/10 Continues to require IV pain meds. PC following    Continues home meds  Araseli SOPs  VTE prophylaxis - Lovenox    Dispo - DC home with HH and TPN once pain controlled on oral regimen. Goals and plan of care reviewed with the patient wife and sister. All questions answered to the best of our ability. Mildred Mo, LUCY - NP   [facility]  [address] [place] [address]  Office : [phone]  Fax : [phone]         Attending Addendum:  I have personally performed a face to face diagnostic evaluation on this patient. I have reviewed and agree with the care plan as documented by Mildred Mo, N.P. 36 minutes were spent on patient care, including but not limited to, reviewing the chart and time with the patient and family, more than 50% of the time documented was spent in face-to-face contact with the patient and in the care of the patient on the floor/unit where the patient is located.  My findings are as follows:  He has abdominal carcinomatosis, appears weak, heart rate regular without murmurs, abdomen is non-tender, bowel sounds are positive, we will continue TPN and start Cycle 1 of FOLFIRINOX.               Jose Daniel Feliciano MD    [facility]  [address] [place] [address]  Office : [phone]  Fax : [phone]

## 2022-06-11 PROBLEM — C80.0 CARCINOMATOSIS (HCC): Status: ACTIVE | Noted: 2022-06-11

## 2022-06-11 LAB
ALBUMIN SERPL-MCNC: 2.8 G/DL (ref 3.2–4.6)
ALBUMIN/GLOB SERPL: 0.7 {RATIO} (ref 1.2–3.5)
ALP SERPL-CCNC: 99 U/L (ref 50–136)
ALT SERPL-CCNC: 29 U/L (ref 12–65)
ANION GAP SERPL CALC-SCNC: 8 MMOL/L (ref 7–16)
AST SERPL-CCNC: 14 U/L (ref 15–37)
BASOPHILS # BLD: 0 K/UL (ref 0–0.2)
BASOPHILS NFR BLD: 0 % (ref 0–2)
BILIRUB SERPL-MCNC: 0.6 MG/DL (ref 0.2–1.1)
BUN SERPL-MCNC: 13 MG/DL (ref 8–23)
CALCIUM SERPL-MCNC: 9.3 MG/DL (ref 8.3–10.4)
CHLORIDE SERPL-SCNC: 101 MMOL/L (ref 98–107)
CO2 SERPL-SCNC: 27 MMOL/L (ref 21–32)
CREAT SERPL-MCNC: 0.6 MG/DL (ref 0.8–1.5)
DIFFERENTIAL METHOD BLD: ABNORMAL
EOSINOPHIL # BLD: 0 K/UL (ref 0–0.8)
EOSINOPHIL NFR BLD: 0 % (ref 0.5–7.8)
ERYTHROCYTE [DISTWIDTH] IN BLOOD BY AUTOMATED COUNT: 12 % (ref 11.9–14.6)
GLOBULIN SER CALC-MCNC: 3.8 G/DL (ref 2.3–3.5)
GLUCOSE SERPL-MCNC: 166 MG/DL (ref 65–100)
HCT VFR BLD AUTO: 38.5 % (ref 41.1–50.3)
HGB BLD-MCNC: 12.7 G/DL (ref 13.6–17.2)
IMM GRANULOCYTES # BLD AUTO: 0 K/UL (ref 0–0.5)
IMM GRANULOCYTES NFR BLD AUTO: 0 % (ref 0–5)
LYMPHOCYTES # BLD: 1 K/UL (ref 0.5–4.6)
LYMPHOCYTES NFR BLD: 11 % (ref 13–44)
MCH RBC QN AUTO: 29.1 PG (ref 26.1–32.9)
MCHC RBC AUTO-ENTMCNC: 33 G/DL (ref 31.4–35)
MCV RBC AUTO: 88.3 FL (ref 79.6–97.8)
MONOCYTES # BLD: 0.4 K/UL (ref 0.1–1.3)
MONOCYTES NFR BLD: 5 % (ref 4–12)
NEUTS SEG # BLD: 7.8 K/UL (ref 1.7–8.2)
NEUTS SEG NFR BLD: 84 % (ref 43–78)
NRBC # BLD: 0 K/UL (ref 0–0.2)
PLATELET # BLD AUTO: 249 K/UL (ref 150–450)
PMV BLD AUTO: 10.4 FL (ref 9.4–12.3)
POTASSIUM SERPL-SCNC: 4.3 MMOL/L (ref 3.5–5.1)
PROT SERPL-MCNC: 6.6 G/DL (ref 6.3–8.2)
RBC # BLD AUTO: 4.36 M/UL (ref 4.23–5.6)
SODIUM SERPL-SCNC: 136 MMOL/L (ref 136–145)
WBC # BLD AUTO: 9.2 K/UL (ref 4.3–11.1)

## 2022-06-11 PROCEDURE — 6360000002 HC RX W HCPCS: Performed by: NURSE PRACTITIONER

## 2022-06-11 PROCEDURE — 2580000003 HC RX 258: Performed by: SURGERY

## 2022-06-11 PROCEDURE — C9113 INJ PANTOPRAZOLE SODIUM, VIA: HCPCS | Performed by: SURGERY

## 2022-06-11 PROCEDURE — 36592 COLLECT BLOOD FROM PICC: CPT

## 2022-06-11 PROCEDURE — 6370000000 HC RX 637 (ALT 250 FOR IP): Performed by: SURGERY

## 2022-06-11 PROCEDURE — 2500000003 HC RX 250 WO HCPCS: Performed by: NURSE PRACTITIONER

## 2022-06-11 PROCEDURE — 1100000000 HC RM PRIVATE

## 2022-06-11 PROCEDURE — A4216 STERILE WATER/SALINE, 10 ML: HCPCS | Performed by: SURGERY

## 2022-06-11 PROCEDURE — 6360000002 HC RX W HCPCS: Performed by: SURGERY

## 2022-06-11 PROCEDURE — 2580000003 HC RX 258: Performed by: INTERNAL MEDICINE

## 2022-06-11 PROCEDURE — 2500000003 HC RX 250 WO HCPCS: Performed by: SURGERY

## 2022-06-11 PROCEDURE — 85025 COMPLETE CBC W/AUTO DIFF WBC: CPT

## 2022-06-11 PROCEDURE — 6370000000 HC RX 637 (ALT 250 FOR IP): Performed by: NURSE PRACTITIONER

## 2022-06-11 PROCEDURE — 99233 SBSQ HOSP IP/OBS HIGH 50: CPT | Performed by: INTERNAL MEDICINE

## 2022-06-11 PROCEDURE — 6360000002 HC RX W HCPCS: Performed by: INTERNAL MEDICINE

## 2022-06-11 PROCEDURE — 80053 COMPREHEN METABOLIC PANEL: CPT

## 2022-06-11 RX ADMIN — SODIUM CHLORIDE, PRESERVATIVE FREE 10 ML: 5 INJECTION INTRAVENOUS at 14:21

## 2022-06-11 RX ADMIN — HYDROMORPHONE HYDROCHLORIDE 2 MG: 2 INJECTION INTRAMUSCULAR; INTRAVENOUS; SUBCUTANEOUS at 18:13

## 2022-06-11 RX ADMIN — SODIUM CHLORIDE, PRESERVATIVE FREE 10 ML: 5 INJECTION INTRAVENOUS at 06:19

## 2022-06-11 RX ADMIN — PROCHLORPERAZINE EDISYLATE 10 MG: 5 INJECTION INTRAMUSCULAR; INTRAVENOUS at 18:13

## 2022-06-11 RX ADMIN — OXYCODONE HYDROCHLORIDE 20 MG: 100 SOLUTION ORAL at 14:14

## 2022-06-11 RX ADMIN — FLUOROURACIL 2525 MG: 50 INJECTION, SOLUTION INTRAVENOUS at 16:15

## 2022-06-11 RX ADMIN — ONDANSETRON 4 MG: 2 INJECTION INTRAMUSCULAR; INTRAVENOUS at 15:05

## 2022-06-11 RX ADMIN — SODIUM CHLORIDE, PRESERVATIVE FREE 10 ML: 5 INJECTION INTRAVENOUS at 21:40

## 2022-06-11 RX ADMIN — SODIUM CHLORIDE, PRESERVATIVE FREE 40 MG: 5 INJECTION INTRAVENOUS at 07:50

## 2022-06-11 RX ADMIN — PROCHLORPERAZINE EDISYLATE 10 MG: 5 INJECTION INTRAMUSCULAR; INTRAVENOUS at 08:40

## 2022-06-11 RX ADMIN — BUSPIRONE HYDROCHLORIDE 10 MG: 10 TABLET ORAL at 14:12

## 2022-06-11 RX ADMIN — OXYCODONE HYDROCHLORIDE 20 MG: 100 SOLUTION ORAL at 08:38

## 2022-06-11 RX ADMIN — SOYBEAN OIL 250 ML: 20 INJECTION, SOLUTION INTRAVENOUS at 17:51

## 2022-06-11 RX ADMIN — MAGNESIUM SULFATE HEPTAHYDRATE: 500 INJECTION, SOLUTION INTRAMUSCULAR; INTRAVENOUS at 17:52

## 2022-06-11 RX ADMIN — SODIUM CHLORIDE, PRESERVATIVE FREE 10 ML: 5 INJECTION INTRAVENOUS at 08:43

## 2022-06-11 RX ADMIN — ONDANSETRON 4 MG: 4 TABLET, ORALLY DISINTEGRATING ORAL at 07:50

## 2022-06-11 RX ADMIN — ENOXAPARIN SODIUM 40 MG: 100 INJECTION SUBCUTANEOUS at 21:40

## 2022-06-11 ASSESSMENT — PAIN SCALES - GENERAL
PAINLEVEL_OUTOF10: 0
PAINLEVEL_OUTOF10: 3
PAINLEVEL_OUTOF10: 0
PAINLEVEL_OUTOF10: 0
PAINLEVEL_OUTOF10: 6
PAINLEVEL_OUTOF10: 5
PAINLEVEL_OUTOF10: 3
PAINLEVEL_OUTOF10: 6
PAINLEVEL_OUTOF10: 5

## 2022-06-11 ASSESSMENT — PAIN DESCRIPTION - ORIENTATION
ORIENTATION: LEFT
ORIENTATION: RIGHT;LEFT
ORIENTATION: RIGHT;LEFT

## 2022-06-11 ASSESSMENT — PAIN DESCRIPTION - LOCATION
LOCATION: ABDOMEN

## 2022-06-11 ASSESSMENT — PAIN DESCRIPTION - DESCRIPTORS
DESCRIPTORS: ACHING

## 2022-06-11 ASSESSMENT — PAIN SCALES - WONG BAKER: WONGBAKER_NUMERICALRESPONSE: 0

## 2022-06-11 NOTE — PROGRESS NOTES
[facility] Hematology & Oncology        Inpatient Hematology / Oncology Progress Note      Admission Date: 2022 11:11 PM  Reason for Admission/Hospital Course: No admission diagnoses are documented for this encounter. 24 Hour Events:  VSS, afebrile  Pain-adjusted yesterday by PC-now on Fent and oral intensol   Continues TPN   D2C1 FOLFIRINOX     ROS:  Constitutional: negative for fever, chills. +weakness, malaise, fatigue. CV: negative for chest pain, palpitations, edema. Respiratory: negative for dyspnea, cough, wheezing. GI: positive for nausea and abdominal pain     10 point review of systems is otherwise negative with the exception of the elements mentioned above in the HPI. No Known Allergies    OBJECTIVE:  Patient Vitals for the past 8 hrs:   BP Temp Temp src Pulse Resp SpO2   22 0838 -- -- -- -- 18 --   22 0725 126/78 98.3 °F (36.8 °C) Oral 75 18 93 %   22 0245 131/82 97.5 °F (36.4 °C) Oral 83 17 --     Temp (24hrs), Av.8 °F (36.6 °C), Min:97.4 °F (36.3 °C), Max:98.3 °F (36.8 °C)     0701 -  1900  In: 170 [P.O.:170]  Out: 1750 [Urine:1500]    Physical Exam:  Constitutional: Well developed,male in no acute distress, lying comfortably in the hospital bed. HEENT: Normocephalic and atraumatic. Oropharynx is clear, mucous membranes are moist.  Sclerae anicteric. Skin Warm and dry. No bruising and no rash noted. No erythema. No pallor. Respiratory Lungs are clear to auscultation bilaterally without wheezes, rales or rhonchi, normal air exchange without accessory muscle use. CVS Normal rate, regular rhythm and normal S1 and S2. No murmurs, gallops, or rubs. Abdomen Soft  Non distended. G tube to drainage bag. Staples-intact no sign of infection   Neuro Grossly nonfocal with no obvious sensory or motor deficits. MSK Normal range of motion in general.  No edema and no tenderness. Psych Appropriate mood and affect.         Labs:      Recent Labs 06/09/22 1334 06/10/22  0301 06/11/22  0228   WBC 5.3 6.1 9.2   RBC 3.66* 4.30 4.36   HGB 10.9* 12.6* 12.7*   HCT 37.9* 38.3* 38.5*   .6* 89.1 88.3   MCH 29.8 29.3 29.1   MCHC 28.8* 32.9 33.0   RDW 12.6 12.5 12.0    219 249   MPV 10.2 10.1 10.4        Recent Labs     06/09/22 0227 06/09/22  1334 06/10/22  0301 06/11/22  0228     --  136* 136   K 3.4*  --  3.9 4.3     --  102 101   CO2 27  --  30 27   BUN 16  --  12 13   GFRAA >60  --  >60 >60   GLOB  --  2.7 3.4 3.8*   MG  --   --  2.3  --    PHOS  --   --  3.9*  --          Imaging:    Medications:  Current Facility-Administered Medications   Medication Dose Route Frequency    oxyCODONE (ROXICODONE INTENSOL) 100 MG/5ML concentrated solution 20 mg  20 mg Oral Q4H PRN    Or    oxyCODONE (ROXICODONE INTENSOL) 100 MG/5ML concentrated solution 40 mg  40 mg Oral Q4H PRN    PN-Adult Premix 5/15 - Central   IntraVENous Continuous TPN    atropine injection 0.4 mg  0.4 mg SubCUTAneous Q2H PRN    busPIRone (BUSPAR) tablet 10 mg  10 mg Oral TID    escitalopram (LEXAPRO) 5 MG/5ML solution 10 mg  10 mg Oral Daily    fluorouracil (ADRUCIL) 2,525 mg in sodium chloride 0.9 % 500 mL chemo infusion  1,200 mg/m2 (Treatment Plan Recorded) IntraVENous Q24H    diphenhydrAMINE (BENADRYL) injection 12.5 mg  12.5 mg IntraVENous Q6H PRN    LORazepam (ATIVAN) injection 1 mg  1 mg IntraVENous Q4H PRN    prochlorperazine (COMPAZINE) injection 10 mg  10 mg IntraVENous Q6H PRN    fentaNYL (DURAGESIC) 100 MCG/HR 1 patch  1 patch TransDERmal Q72H    HYDROmorphone HCl PF (DILAUDID) injection 2 mg  2 mg IntraVENous Q3H PRN    fat emulsion 20 % infusion 250 mL  250 mL IntraVENous Daily    potassium chloride 20 mEq/50 mL IVPB (Central Line)  20 mEq IntraVENous PRN    Or    potassium chloride 10 mEq/100 mL IVPB (Peripheral Line)  10 mEq IntraVENous PRN    magnesium sulfate 2000 mg in 50 mL IVPB premix  2,000 mg IntraVENous PRN    sodium phosphate 10 mmol in sodium chloride 0.9 % 250 mL IVPB  10 mmol IntraVENous PRN    Or    sodium phosphate 15 mmol in sodium chloride 0.9 % 250 mL IVPB  15 mmol IntraVENous PRN    Or    sodium phosphate 20 mmol in sodium chloride 0.9 % 500 mL IVPB  20 mmol IntraVENous PRN    sodium chloride flush 0.9 % injection 5-40 mL  5-40 mL IntraVENous 2 times per day    sodium chloride flush 0.9 % injection 5-40 mL  5-40 mL IntraVENous PRN    0.9 % sodium chloride infusion  25 mL IntraVENous PRN    calcium carbonate (TUMS) chewable tablet 500 mg  500 mg Oral TID PRN    phenol 1.4 % mouth spray 1 spray  1 spray Mouth/Throat Q2H PRN    baclofen (LIORESAL) tablet 10 mg  10 mg Oral TID PRN    ondansetron (ZOFRAN-ODT) disintegrating tablet 4 mg  4 mg Oral Q8H PRN    Or    ondansetron (ZOFRAN) injection 4 mg  4 mg IntraVENous Q6H PRN    enoxaparin (LOVENOX) injection 40 mg  40 mg SubCUTAneous Q24H    nicotine (NICODERM CQ) 14 MG/24HR 1 patch  1 patch TransDERmal Daily    naloxone (NARCAN) injection 0.4 mg  0.4 mg IntraVENous PRN    pantoprazole (PROTONIX) 40 mg in sodium chloride (PF) 10 mL injection  40 mg IntraVENous Daily    sodium chloride flush 0.9 % injection 5-40 mL  5-40 mL IntraVENous Q8H       Denny Guevara is a 61 y. o. male with a past medical history of PUD, HTN, hyperlipidemia, and diverticulitis. He was seen by Dr. Shahbaz Bullock 5/18 and sent to Dr. Flakito Diaz for expedited workup. He was seen in the office by Dr. Flakito Diaz 5/20/22 with concerns for peritoneal metastatic disease.     The patient stated that he has been having abdominal pain for about 6 months now. He had a recent EGD and colonoscopy on February 25, 2022 which revealed a hiatal hernia, gastric polyps, benign colon polyps, diverticulosis, and hemorrhoids. He continued to have abdominal pain over the last few months. He stated that he has lost about 30 lbs over the last 6 months. He has not had an appetite.  He was having worsening of GERD as well over that time.      The patient went to the emergency department at [facility] about 10 days ago for a partial small bowel obstruction. The patient presented to our emergency department 1 week ago with abdominal pain. The patient had a CT scan at that time which revealed extensive peritoneal nodularity and mild ascites consistent with peritoneal metastatic disease.  A primary lesion was not definitely identified. There was also sclerosis of the S1 vertebral body, a bone scan has been ordered to assess for possible bone metastasis. He also had evidence of a partial bowel obstruction. There is no evidence of any metastatic disease within the chest. He is POD 2 sp laparotomy. Per Dr. Anuradha Ruiz note diffuse peritoneal carcinomatosis with complete infiltration of mesentery root, this is not surgical resectable, not even for bypass. Tumor markers were not impressive. We are asked to see patient for recommendations       PLAN:    Adenocarcinoma of upper GI origin, diffuse peritoneal metastasis  5/29 POD 2. Biopsy results pending. 6/2 Has venting G tube. Starting TPN. Biopsy omentum + adenocarcinoma, ICH with upper GI primary. Case discussed with surgery. Dr. Nicki Martínez recommends allow ~another week for recovery prior to starting chemo. He is being discharged once home TPN is set up. We had extensive conversation regarding palliative treatment. Goals and plan of care reviewed with the patient wife and sister. We will arrange for follow up with Dr. Shakira Goodman ~one week. 6/7 Was to be [facility] by surgery yesterday. However, ongoing intractable pain. Discussed with Dr Boston Block 6/6, per Dr Nicki Martínez ok to proceed with chemotherapy given ongoing pain/symptoms related to peritoneal metastasis. Asked to assume care for potential chemotherapy. Likely will proceed with dose-reduced FOLFIRINOX.  6/8 Discussed with Dr Shakira Goodman yesterday, pending treatment plan for FOLFIRINOX. However, during chemo-ed today patient expresses desire to think about this further.  May need to f/u with Dr Shakira Goodman as outpatient for chemotherapy. NGS testing request sent to navigation. 6/9 Is agreeable to receiving C1 FOLFIRINOX today - labs pending. 6/10 C1D1 FOLFIRINOX  6/11 tolerating FOLFIRINOX well-notes mental changes that have resolved    Peritoneal carinomatosis / protein calorie malnutrition  - Secondary to peritoneal disease  - Venting G-tube  - TPN (approved for home use)  - RD following  6/11 continue on TPN    Cancer-related pain  - PC following  - Fentanyl patch and Intensol PO  6/10 Continues to require IV pain meds. PC following  6/11 Fent 100 mcg, yesterday transitioned to oral Intensol 40 mg Q4H prn, has requested 1 dose     Continues home meds  Araseli SOPs  VTE prophylaxis - Lovenox    Dispo - DC home with HH and TPN once pain controlled on oral regimen. Goals and plan of care reviewed with the patient wife and sister. All questions answered to the best of our ability.                LUCY Contreras [address] Hematology & Oncology  [address] [address]  Office : [phone]  Fax : [phone]

## 2022-06-11 NOTE — PROGRESS NOTES
Comprehensive Nutrition Assessment    Type and Reason for Visit: Reassess  Malnutrition Screening Tool: Malnutrition Screen  Have you recently lost weight without trying?: 24 to 33 pounds (3 points)  Have you been eating poorly because of a decreased appetite?: No (0 points)  Malnutrition Screening Tool Score: 3  TPN management (Palliative)    Nutrition Recommendations/Plan:   Meals and Snacks:   Diet: Continue current order Clear liquids for pleasure  Parenteral Nutrition:  Total parenteral nutrition  to begin at 1800  Continue: Dex 15%, 5% AA 2 L (85ml/hr)   Continue 250 ml 20% lipids daily   Lytes/L:   100 meq NaCl, 15 mmol KPO4, 8 meq Mg, 4.5 meq Ca  Other additives: MTE, MVI Monday Wednesday Friday due to national shortages   Nutrition Related Medication Management: Thiamine  mg daily active for 7 days (last dose completed today). Nutritional Supplement Therapy:   Active electrolyte replacement per electolyte support protocols  Replacement indicated:  None  Labs:   BMP daily  Mg MWF   Phos MWF   Triglyceride weekly starting Monday if pt remains hospitalized  POC Glucoses/SSI Not indicated       Malnutrition Assessment:  Malnutrition Status: Severe malnutrition  Context: Chronic Illness  Findings of clinical characteristics of malnutrition:   Energy Intake:  75% or less estimated energy requirements for 1 month or longer (minimal intake for 3 months)  Weight Loss:  Greater than 5% over 1 month (10.5% in one month)     Body Fat Loss:  No significant body fat loss     Muscle Mass Loss:  No significant muscle mass loss    Fluid Accumulation:  Unable to assess     Strength:  Not Performed     Nutrition Assessment:  Nutrition History:    5/22 Pt and wife in room discussing hx (daughter present but did not participate). Wife relates his medical problems began in Dec 2021, but weight loss started in March 2022. Has n/v (with limited relief from medications), limited to no appetite.  Patient open to ONS, but is put off by the idea of eating or drinking anything at this time for fear of vomiting. Wife states he did tolerate half a sandwich last week, but was unable to finish it. Nutrition Background:     History of PUD, HTN, hyperlipidemia, and diverticulitis. Admitted with abdominal pain for last 6 months. Presented with possible SBO.  being seen by Dr. Aletha Holstein with concerns of peritoneal metastatic disease, with unknown starting origin. Diffuse peritoneal carcinomatosis. Bx omentum + adenocarcinoma POD 7.   5/27: Diagnostic laparoscopy, switched to laparotomy. Omental mass and mesentery mass excision. Gastrostomy tube placement for venting. Findings of \"Diffuse peritoneal carcinomatosis with root of mesentery encasement and small bowel obstruction. \"  6/1: s/p venting gtube exchange. 6/3: s/p PORT.   6/7: repeat KUB with \"Worsening stool distention of the right colon. \"  Nutrition Interval:  Pt seen lying in bed at time of visit with family at bedside. TPN infusing per order. Noted decrease in g-tube output in the past 24hrs. Discussed with RN, Marlon Olmedo.   Abdominal Status (last documented):   Last BM (including prior to admit):  (pt states 3 weeks ago), GI Symptoms: Nausea Gtube output: 800ml recorded 6/9; 400ml recorded past 24 hours  Pertinent Medications: fluorouracil, fentanyl patch, Protonix, Decadron (one dose)  Electrolyte Replacement: 6/3: 10 meq KCl 6/4: KCL 10 meq, 6/5 KCl 10 meq x4, 6/6 KCl 20 mmeq x2  PRN: tums (last admin 6/9), zofran (utilizing daily), compazine (utilizing daily)  Pertinent Labs:   Lab Results   Component Value Date     06/11/2022    K 4.3 06/11/2022     06/11/2022    CO2 27 06/11/2022    BUN 13 06/11/2022    CREATININE 0.60 06/11/2022    GLUCOSE 166 06/11/2022    CALCIUM 9.3 06/11/2022    PHOS 3.9 06/10/2022    MG 2.3 06/10/2022     Lab Results   Component Value Date    TRIG 140 06/09/2022    TRIG 155 06/04/2022    TRIG 117 06/03/2022   Labs reviewed and remarkable for stable Na and uptrending K. Pt may benefit with slight decrease in K this evening with decreased g-tube output. Nutrition Related Findings:   No s/s wasting per NFPE 5/27, Diet history 5/22 NPO, 5/23 Clear, 5/24 soft+ bite sized, low fiber. 5/27: NPO for lap, then CLD. 6/1: NPO overnight due to vomiting despite Gtube to drain. 6/2: PPN started, PICC placed late afternoon. TPN to goal 6/4. Current Nutrition Therapies:  ADULT DIET; Clear Liquid  PN-Adult Premix 5/15 - Central  Current Parenteral Nutrition Orders:  · Type and Formula: Premix Central (5/15)   · Lipids: 250ml,Daily  · Duration: Continuous  · Rate/Volume: 2 L (85ml/hr)  · Current PN Order Provides: infusing per current order  · Goal PN Orders Provides: 1920 kcal/d (100% of needs), 100 grams of protein/d (100% of needs), 300 grams of CHO/d and 2250 ml of total volume/d    Current Intake:   Average Meal Intake:  (clears for comfort) Average Supplements Intake: None Ordered      Anthropometric Measures:  Height: 5' 10\" (177.8 cm)  Current Body Wt: 196 lb 10.4 oz (89.2 kg) (6/10), Weight source: Bed Scale  BMI: 28.2  Admission Body Weight: 207 lb (93.9 kg) (not specified source)  Ideal Body Weight (Kg) (Calculated): 75 kg (166 lbs), 119.4 %  Usual Body Wt: 231 lb 5 oz (104.9 kg) (1 mo ago 4/13 office visit), Percent weight change: -10.5       Edema: No data recorded  BMI Category Overweight (BMI 25.0-29. 9)  Estimated Daily Nutrient Needs:  Energy (kcal/day): 1878 -2348 (Kcal/kg (20-25) Weight used: 93.9 kg  (5/31)  Protein (g/day):  (1-1.2 g/kg) Weight Used: (Current) 93.9 kg  Fluid (ml/day):   (1 ml/kcal)    Nutrition Diagnosis:   · Inadequate protein-energy intake related to altered GI function as evidenced by NPO or clear liquid status due to medical condition (venting Gtube)    · Severe malnutrition,In context of chronic illness related to catabolic illness (nausea/vomiting) as evidenced by Criteria as identified in malnutrition assessment    Nutrition Interventions:   Food and/or Nutrient Delivery: Continue Current Diet,Modify Parenteral Nutrition     Coordination of Nutrition Care: Continue to monitor while inpatient       Goals:   Previous Goal Met: Goal(s) Achieved  Active Goal:  (Maintain nutrition support at goal rate)       Nutrition Monitoring and Evaluation:      Food/Nutrient Intake Outcomes: Parenteral Nutrition Intake/Tolerance  Physical Signs/Symptoms Outcomes: Biochemical Data,GI Status,Fluid Status or Edema,Weight    Discharge Planning:    Parenteral Nutrition    Cierra Cosme, [address]

## 2022-06-12 LAB
ALBUMIN SERPL-MCNC: 2.5 G/DL (ref 3.2–4.6)
ALBUMIN/GLOB SERPL: 0.7 {RATIO} (ref 1.2–3.5)
ALP SERPL-CCNC: 90 U/L (ref 50–136)
ALT SERPL-CCNC: 58 U/L (ref 12–65)
ANION GAP SERPL CALC-SCNC: 8 MMOL/L (ref 7–16)
AST SERPL-CCNC: 45 U/L (ref 15–37)
BASOPHILS # BLD: 0.1 K/UL (ref 0–0.2)
BASOPHILS NFR BLD: 1 % (ref 0–2)
BILIRUB SERPL-MCNC: 0.5 MG/DL (ref 0.2–1.1)
BUN SERPL-MCNC: 17 MG/DL (ref 8–23)
CALCIUM SERPL-MCNC: 8.8 MG/DL (ref 8.3–10.4)
CHLORIDE SERPL-SCNC: 104 MMOL/L (ref 98–107)
CO2 SERPL-SCNC: 29 MMOL/L (ref 21–32)
CREAT SERPL-MCNC: 0.6 MG/DL (ref 0.8–1.5)
DIFFERENTIAL METHOD BLD: ABNORMAL
EOSINOPHIL # BLD: 0.1 K/UL (ref 0–0.8)
EOSINOPHIL NFR BLD: 1 % (ref 0.5–7.8)
ERYTHROCYTE [DISTWIDTH] IN BLOOD BY AUTOMATED COUNT: 12.3 % (ref 11.9–14.6)
GLOBULIN SER CALC-MCNC: 3.4 G/DL (ref 2.3–3.5)
GLUCOSE SERPL-MCNC: 121 MG/DL (ref 65–100)
HCT VFR BLD AUTO: 37.9 % (ref 41.1–50.3)
HGB BLD-MCNC: 12.4 G/DL (ref 13.6–17.2)
IMM GRANULOCYTES # BLD AUTO: 0 K/UL (ref 0–0.5)
IMM GRANULOCYTES NFR BLD AUTO: 0 % (ref 0–5)
LYMPHOCYTES # BLD: 1.9 K/UL (ref 0.5–4.6)
LYMPHOCYTES NFR BLD: 25 % (ref 13–44)
MCH RBC QN AUTO: 29 PG (ref 26.1–32.9)
MCHC RBC AUTO-ENTMCNC: 32.7 G/DL (ref 31.4–35)
MCV RBC AUTO: 88.8 FL (ref 79.6–97.8)
MONOCYTES # BLD: 0.5 K/UL (ref 0.1–1.3)
MONOCYTES NFR BLD: 6 % (ref 4–12)
NEUTS SEG # BLD: 5.1 K/UL (ref 1.7–8.2)
NEUTS SEG NFR BLD: 67 % (ref 43–78)
NRBC # BLD: 0 K/UL (ref 0–0.2)
PLATELET # BLD AUTO: 225 K/UL (ref 150–450)
PMV BLD AUTO: 10.8 FL (ref 9.4–12.3)
POTASSIUM SERPL-SCNC: 3.9 MMOL/L (ref 3.5–5.1)
PROT SERPL-MCNC: 5.9 G/DL (ref 6.3–8.2)
RBC # BLD AUTO: 4.27 M/UL (ref 4.23–5.6)
SODIUM SERPL-SCNC: 141 MMOL/L (ref 136–145)
WBC # BLD AUTO: 7.6 K/UL (ref 4.3–11.1)

## 2022-06-12 PROCEDURE — 80053 COMPREHEN METABOLIC PANEL: CPT

## 2022-06-12 PROCEDURE — 99233 SBSQ HOSP IP/OBS HIGH 50: CPT | Performed by: INTERNAL MEDICINE

## 2022-06-12 PROCEDURE — 6370000000 HC RX 637 (ALT 250 FOR IP): Performed by: NURSE PRACTITIONER

## 2022-06-12 PROCEDURE — 6360000002 HC RX W HCPCS: Performed by: NURSE PRACTITIONER

## 2022-06-12 PROCEDURE — 2500000003 HC RX 250 WO HCPCS: Performed by: NURSE PRACTITIONER

## 2022-06-12 PROCEDURE — 36592 COLLECT BLOOD FROM PICC: CPT

## 2022-06-12 PROCEDURE — A4216 STERILE WATER/SALINE, 10 ML: HCPCS | Performed by: SURGERY

## 2022-06-12 PROCEDURE — 2500000003 HC RX 250 WO HCPCS: Performed by: SURGERY

## 2022-06-12 PROCEDURE — 2580000003 HC RX 258: Performed by: SURGERY

## 2022-06-12 PROCEDURE — 1100000000 HC RM PRIVATE

## 2022-06-12 PROCEDURE — 6370000000 HC RX 637 (ALT 250 FOR IP): Performed by: SURGERY

## 2022-06-12 PROCEDURE — C9113 INJ PANTOPRAZOLE SODIUM, VIA: HCPCS | Performed by: SURGERY

## 2022-06-12 PROCEDURE — 85025 COMPLETE CBC W/AUTO DIFF WBC: CPT

## 2022-06-12 PROCEDURE — 6360000002 HC RX W HCPCS: Performed by: SURGERY

## 2022-06-12 RX ADMIN — SODIUM CHLORIDE, PRESERVATIVE FREE 40 MG: 5 INJECTION INTRAVENOUS at 09:25

## 2022-06-12 RX ADMIN — ENOXAPARIN SODIUM 40 MG: 100 INJECTION SUBCUTANEOUS at 22:01

## 2022-06-12 RX ADMIN — SODIUM CHLORIDE, PRESERVATIVE FREE 10 ML: 5 INJECTION INTRAVENOUS at 20:16

## 2022-06-12 RX ADMIN — SODIUM CHLORIDE, PRESERVATIVE FREE 10 ML: 5 INJECTION INTRAVENOUS at 16:13

## 2022-06-12 RX ADMIN — PROCHLORPERAZINE EDISYLATE 10 MG: 5 INJECTION INTRAMUSCULAR; INTRAVENOUS at 16:13

## 2022-06-12 RX ADMIN — SOYBEAN OIL 250 ML: 20 INJECTION, SOLUTION INTRAVENOUS at 17:40

## 2022-06-12 RX ADMIN — ONDANSETRON 4 MG: 2 INJECTION INTRAMUSCULAR; INTRAVENOUS at 13:32

## 2022-06-12 RX ADMIN — ONDANSETRON 4 MG: 2 INJECTION INTRAMUSCULAR; INTRAVENOUS at 07:42

## 2022-06-12 RX ADMIN — SODIUM CHLORIDE, PRESERVATIVE FREE 10 ML: 5 INJECTION INTRAVENOUS at 09:28

## 2022-06-12 RX ADMIN — SODIUM CHLORIDE, PRESERVATIVE FREE 10 ML: 5 INJECTION INTRAVENOUS at 05:34

## 2022-06-12 RX ADMIN — MAGNESIUM SULFATE HEPTAHYDRATE: 500 INJECTION, SOLUTION INTRAMUSCULAR; INTRAVENOUS at 17:41

## 2022-06-12 RX ADMIN — SODIUM CHLORIDE, PRESERVATIVE FREE 10 ML: 5 INJECTION INTRAVENOUS at 16:14

## 2022-06-12 RX ADMIN — HYDROMORPHONE HYDROCHLORIDE 2 MG: 2 INJECTION INTRAMUSCULAR; INTRAVENOUS; SUBCUTANEOUS at 07:42

## 2022-06-12 RX ADMIN — ONDANSETRON 4 MG: 2 INJECTION INTRAMUSCULAR; INTRAVENOUS at 19:49

## 2022-06-12 RX ADMIN — LORAZEPAM 1 MG: 2 INJECTION INTRAMUSCULAR; INTRAVENOUS at 22:06

## 2022-06-12 ASSESSMENT — PAIN SCALES - GENERAL
PAINLEVEL_OUTOF10: 0
PAINLEVEL_OUTOF10: 3
PAINLEVEL_OUTOF10: 8
PAINLEVEL_OUTOF10: 0
PAINLEVEL_OUTOF10: 3

## 2022-06-12 ASSESSMENT — PAIN DESCRIPTION - DESCRIPTORS: DESCRIPTORS: ACHING

## 2022-06-12 ASSESSMENT — PAIN SCALES - WONG BAKER
WONGBAKER_NUMERICALRESPONSE: 0
WONGBAKER_NUMERICALRESPONSE: 0

## 2022-06-12 ASSESSMENT — PAIN DESCRIPTION - LOCATION: LOCATION: ABDOMEN

## 2022-06-12 NOTE — PROGRESS NOTES
[facility] Hematology & Oncology        Inpatient Hematology / Oncology Progress Note      Admission Date: 2022 11:11 PM  Reason for Admission/Hospital Course: Carcinomatosis ([address] [C80.0]      24 Hour Events:  VSS, afebrile  Pain controlled   Continues TPN   D3C1 FOLFIRINOX   Nausea    ROS:  Constitutional: negative for fever, chills. +weakness, malaise, fatigue. CV: negative for chest pain, palpitations, edema. Respiratory: negative for dyspnea, cough, wheezing. GI: positive for nausea and abdominal pain     10 point review of systems is otherwise negative with the exception of the elements mentioned above in the HPI. No Known Allergies    OBJECTIVE:  Patient Vitals for the past 8 hrs:   BP Temp Temp src Pulse Resp SpO2 Weight   22 0724 127/83 97.4 °F (36.3 °C) Oral 83 20 91 % --   22 0606 -- -- -- -- -- -- 199 lb 9.6 oz (90.5 kg)   22 0240 119/80 98.1 °F (36.7 °C) Oral 74 16 93 % --     Temp (24hrs), Av.9 °F (36.6 °C), Min:97.4 °F (36.3 °C), Max:98.1 °F (36.7 °C)    No intake/output data recorded. Physical Exam:  Constitutional: Ill appearing,male in no acute distress, lying comfortably in the hospital bed. HEENT: Normocephalic and atraumatic. Oropharynx is clear, mucous membranes are moist.  Sclerae anicteric. Skin Warm and dry. No bruising and no rash noted. No erythema. No pallor. Respiratory Lungs are clear to auscultation bilaterally without wheezes, rales or rhonchi, normal air exchange without accessory muscle use. CVS Normal rate, regular rhythm and normal S1 and S2. No murmurs, gallops, or rubs. Abdomen Soft  Non distended. G tube to drainage bag. Staples-intact no sign of infection   Neuro Grossly nonfocal with no obvious sensory or motor deficits. MSK Normal range of motion in general.  No edema and no tenderness. Psych Appropriate mood and affect.         Labs:      Recent Labs     06/10/22  0301 22  0228 22  0327   WBC 6.1 9.2 7. 6   RBC 4.30 4.36 4.27   HGB 12.6* 12.7* 12.4*   HCT 38.3* 38.5* 37.9*   MCV 89.1 88.3 88.8   MCH 29.3 29.1 29.0   MCHC 32.9 33.0 32.7   RDW 12.5 12.0 12.3    249 225   MPV 10.1 10.4 10.8        Recent Labs     06/10/22  0301 06/11/22  0228 06/12/22  0327   * 136 141   K 3.9 4.3 3.9    101 104   CO2 30 27 29   BUN 12 13 17   GFRAA >60 >60 >60   GLOB 3.4 3.8* 3.4   MG 2.3  --   --    PHOS 3.9*  --   --          Imaging:    Medications:  Current Facility-Administered Medications   Medication Dose Route Frequency    PN-Adult Premix 5/15 - Central   IntraVENous Continuous TPN    oxyCODONE (ROXICODONE INTENSOL) 100 MG/5ML concentrated solution 20 mg  20 mg Oral Q4H PRN    Or    oxyCODONE (ROXICODONE INTENSOL) 100 MG/5ML concentrated solution 40 mg  40 mg Oral Q4H PRN    atropine injection 0.4 mg  0.4 mg SubCUTAneous Q2H PRN    busPIRone (BUSPAR) tablet 10 mg  10 mg Oral TID    escitalopram (LEXAPRO) 5 MG/5ML solution 10 mg  10 mg Oral Daily    fluorouracil (ADRUCIL) 2,525 mg in sodium chloride 0.9 % 500 mL chemo infusion  1,200 mg/m2 (Treatment Plan Recorded) IntraVENous Q24H    diphenhydrAMINE (BENADRYL) injection 12.5 mg  12.5 mg IntraVENous Q6H PRN    LORazepam (ATIVAN) injection 1 mg  1 mg IntraVENous Q4H PRN    prochlorperazine (COMPAZINE) injection 10 mg  10 mg IntraVENous Q6H PRN    fentaNYL (DURAGESIC) 100 MCG/HR 1 patch  1 patch TransDERmal Q72H    HYDROmorphone HCl PF (DILAUDID) injection 2 mg  2 mg IntraVENous Q3H PRN    fat emulsion 20 % infusion 250 mL  250 mL IntraVENous Daily    potassium chloride 20 mEq/50 mL IVPB (Central Line)  20 mEq IntraVENous PRN    Or    potassium chloride 10 mEq/100 mL IVPB (Peripheral Line)  10 mEq IntraVENous PRN    magnesium sulfate 2000 mg in 50 mL IVPB premix  2,000 mg IntraVENous PRN    sodium phosphate 10 mmol in sodium chloride 0.9 % 250 mL IVPB  10 mmol IntraVENous PRN    Or    sodium phosphate 15 mmol in sodium chloride 0.9 % 250 mL IVPB  15 mmol IntraVENous PRN    Or    sodium phosphate 20 mmol in sodium chloride 0.9 % 500 mL IVPB  20 mmol IntraVENous PRN    sodium chloride flush 0.9 % injection 5-40 mL  5-40 mL IntraVENous 2 times per day    sodium chloride flush 0.9 % injection 5-40 mL  5-40 mL IntraVENous PRN    0.9 % sodium chloride infusion  25 mL IntraVENous PRN    calcium carbonate (TUMS) chewable tablet 500 mg  500 mg Oral TID PRN    phenol 1.4 % mouth spray 1 spray  1 spray Mouth/Throat Q2H PRN    baclofen (LIORESAL) tablet 10 mg  10 mg Oral TID PRN    ondansetron (ZOFRAN-ODT) disintegrating tablet 4 mg  4 mg Oral Q8H PRN    Or    ondansetron (ZOFRAN) injection 4 mg  4 mg IntraVENous Q6H PRN    enoxaparin (LOVENOX) injection 40 mg  40 mg SubCUTAneous Q24H    nicotine (NICODERM CQ) 14 MG/24HR 1 patch  1 patch TransDERmal Daily    naloxone (NARCAN) injection 0.4 mg  0.4 mg IntraVENous PRN    pantoprazole (PROTONIX) 40 mg in sodium chloride (PF) 10 mL injection  40 mg IntraVENous Daily    sodium chloride flush 0.9 % injection 5-40 mL  5-40 mL IntraVENous Q8H       Denny Guevara is a 61 y. o. male with a past medical history of PUD, HTN, hyperlipidemia, and diverticulitis. He was seen by Dr. Roxann Rodriguez 5/18 and sent to Dr. Chase Garcia for expedited workup. He was seen in the office by Dr. Chase Garcia 5/20/22 with concerns for peritoneal metastatic disease.     The patient stated that he has been having abdominal pain for about 6 months now. He had a recent EGD and colonoscopy on February 25, 2022 which revealed a hiatal hernia, gastric polyps, benign colon polyps, diverticulosis, and hemorrhoids. He continued to have abdominal pain over the last few months. He stated that he has lost about 30 lbs over the last 6 months. He has not had an appetite. He was having worsening of GERD as well over that time.      The patient went to the emergency department at [facility] about 10 days ago for a partial small bowel obstruction. The patient presented to our emergency department 1 week ago with abdominal pain. The patient had a CT scan at that time which revealed extensive peritoneal nodularity and mild ascites consistent with peritoneal metastatic disease.  A primary lesion was not definitely identified. There was also sclerosis of the S1 vertebral body, a bone scan has been ordered to assess for possible bone metastasis. He also had evidence of a partial bowel obstruction. There is no evidence of any metastatic disease within the chest. He is POD 2 sp laparotomy. Per Dr. Willard Crisostomo note diffuse peritoneal carcinomatosis with complete infiltration of mesentery root, this is not surgical resectable, not even for bypass. Tumor markers were not impressive. We are asked to see patient for recommendations       PLAN:    Adenocarcinoma of upper GI origin, diffuse peritoneal metastasis  5/29 POD 2. Biopsy results pending. 6/2 Has venting G tube. Starting TPN. Biopsy omentum + adenocarcinoma, ICH with upper GI primary. Case discussed with surgery. Dr. Julianna Lopez recommends allow ~another week for recovery prior to starting chemo. He is being discharged once home TPN is set up. We had extensive conversation regarding palliative treatment. Goals and plan of care reviewed with the patient wife and sister. We will arrange for follow up with Dr. Sam Louis ~one week. 6/7 Was to be [facility] by surgery yesterday. However, ongoing intractable pain. Discussed with Dr Jaida Aly 6/6, per Dr Julianna Lopez ok to proceed with chemotherapy given ongoing pain/symptoms related to peritoneal metastasis. Asked to assume care for potential chemotherapy. Likely will proceed with dose-reduced FOLFIRINOX.  6/8 Discussed with Dr Sam Louis yesterday, pending treatment plan for FOLFIRINOX. However, during chemo-ed today patient expresses desire to think about this further. May need to f/u with Dr Sam Louis as outpatient for chemotherapy. NGS testing request sent to navigation.   6/9 Is agreeable to receiving C1 FOLFIRINOX today - labs pending. 6/10 C1D1 FOLFIRINOX  6/11 tolerating FOLFIRINOX well-notes mental changes that have resolved  6/12 will complete C1 FOLFIRINOX today, mild nausea    Peritoneal carinomatosis / protein calorie malnutrition  - Secondary to peritoneal disease  - Venting G-tube  - TPN (approved for home use)  - RD following  6/11 continue on TPN    Cancer-related pain  - PC following  - Fentanyl patch and Intensol PO  6/10 Continues to require IV pain meds. PC following  6/11 Fent 100 mcg, yesterday transitioned to oral Intensol 40 mg Q4H prn, has requested 1 dose   6/12 Received 2 doses of 20 mg intensol and 1 dose of dilaudid 2mg IV both yesterday and this AM, continue on fentanyl     Continues home meds  Araseli PELAEZs  VTE prophylaxis - Lovenox    Dispo - DC home with HH and TPN once pain controlled on oral regimen. Goals and plan of care reviewed with the patient wife and sister. All questions answered to the best of our ability.                [place]Y [place] [address] Hematology & Oncology  [address] [address]  Office : [phone]  Fax : [phone]

## 2022-06-12 NOTE — PROGRESS NOTES
Comprehensive Nutrition Assessment    Type and Reason for Visit: Reassess  Malnutrition Screening Tool: Malnutrition Screen  Have you recently lost weight without trying?: 24 to 33 pounds (3 points)  Have you been eating poorly because of a decreased appetite?: No (0 points)  Malnutrition Screening Tool Score: 3  TPN management (Palliative)    Nutrition Recommendations/Plan:   Meals and Snacks:   Diet: Continue current order Clear liquids for pleasure  Parenteral Nutrition:  Total parenteral nutrition  to begin at 1800  Continue: Dex 15%, 5% AA 2 L (85ml/hr)   Continue 250 ml 20% lipids daily   Lytes/L: no changes to lytes  100 meq NaCl, 15 mmol KPO4, 8 meq Mg, 4.5 meq Ca  Other additives: MTE, MVI Monday Wednesday Friday due to national shortages   Nutrition Related Medication Management: Thiamine  mg daily active for 7 days (last dose completed today). Nutritional Supplement Therapy:   Active electrolyte replacement per electolyte support protocols  Replacement indicated:  None  Labs:   BMP daily  Mg MWF   Phos MWF   Triglyceride weekly starting Monday if pt remains hospitalized  POC Glucoses/SSI Not indicated       Malnutrition Assessment:  Malnutrition Status: Severe malnutrition  Context: Chronic Illness  Findings of clinical characteristics of malnutrition:   Energy Intake:  75% or less estimated energy requirements for 1 month or longer (minimal intake for 3 months)  Weight Loss:  Greater than 5% over 1 month (10.5% in one month)     Body Fat Loss:  No significant body fat loss     Muscle Mass Loss:  No significant muscle mass loss    Fluid Accumulation:  Unable to assess     Strength:  Not Performed     Nutrition Assessment:  Nutrition History:    5/22 Pt and wife in room discussing hx (daughter present but did not participate). Wife relates his medical problems began in Dec 2021, but weight loss started in March 2022. Has n/v (with limited relief from medications), limited to no appetite. Patient open to ONS, but is put off by the idea of eating or drinking anything at this time for fear of vomiting. Wife states he did tolerate half a sandwich last week, but was unable to finish it. Nutrition Background:     History of PUD, HTN, hyperlipidemia, and diverticulitis. Admitted with abdominal pain for last 6 months. Presented with possible SBO.  being seen by Dr. Orlando Nation with concerns of peritoneal metastatic disease, with unknown starting origin. Diffuse peritoneal carcinomatosis. Bx omentum + adenocarcinoma POD 7.   5/27: Diagnostic laparoscopy, switched to laparotomy. Omental mass and mesentery mass excision. Gastrostomy tube placement for venting. Findings of \"Diffuse peritoneal carcinomatosis with root of mesentery encasement and small bowel obstruction. \"  6/1: s/p venting gtube exchange. 6/3: s/p PORT.   6/7: repeat KUB with \"Worsening stool distention of the right colon. \"  Nutrition Interval:  Pt sleeping in bed at time of visit. Wife present in recliner. She denies having any questions at this time. Pt is only taking sips of clears currently. Noted pt completing C1 of FOLFIRINOX. Discussed with RN, Erin Bueno.   Abdominal Status (last documented):   Last BM (including prior to admit):  (pt states 3 weeks ago), GI Symptoms: Nausea Gtube output: 300ml recorded past 24 hours  Pertinent Medications: fluorouracil, fentanyl patch, Protonix  Electrolyte Replacement: 6/3: 10 meq KCl 6/4: KCL 10 meq, 6/5 KCl 10 meq x4, 6/6 KCl 20 mmeq x2  PRN: tums (last admin 6/9), zofran (utilizing daily), compazine (utilizing daily)  Pertinent Labs:   Lab Results   Component Value Date     06/12/2022    K 3.9 06/12/2022     06/12/2022    CO2 29 06/12/2022    BUN 17 06/12/2022    CREATININE 0.60 06/12/2022    GLUCOSE 121 06/12/2022    CALCIUM 8.8 06/12/2022    PHOS 3.9 06/10/2022    MG 2.3 06/10/2022     Lab Results   Component Value Date    TRIG 140 06/09/2022    TRIG 155 06/04/2022    TRIG 117 06/03/2022   Labs stable. Nutrition Related Findings:   No s/s wasting per NFPE 5/27, Diet history 5/22 NPO, 5/23 Clear, 5/24 soft+ bite sized, low fiber. 5/27: NPO for lap, then CLD. 6/1: NPO overnight due to vomiting despite Gtube to drain. 6/2: PPN started, PICC placed late afternoon. TPN to goal 6/4. Current Nutrition Therapies:  ADULT DIET; Clear Liquid  PN-Adult Premix 5/15 - Central  Current Parenteral Nutrition Orders:  · Type and Formula: Premix Central (5/15)   · Lipids: 250ml,Daily  · Duration: Continuous  · Rate/Volume: 2 L (85ml/hr)  · Current PN Order Provides: infusing per current order  · Goal PN Orders Provides: 1920 kcal/d (100% of needs), 100 grams of protein/d (100% of needs), 300 grams of CHO/d and 2250 ml of total volume/d    Current Intake:   Average Meal Intake:  (clears for comfort) Average Supplements Intake: None Ordered      Anthropometric Measures:  Height: 5' 10\" (177.8 cm)  Current Body Wt: 199 lb 8.3 oz (90.5 kg), Weight source: Bed Scale  BMI: 28.6  Admission Body Weight: 207 lb (93.9 kg) (not specified source)  Ideal Body Weight (Kg) (Calculated): 75 kg (166 lbs), 119.4 %  Usual Body Wt: 231 lb 5 oz (104.9 kg) (1 mo ago 4/13 office visit), Percent weight change: -10.5       Edema: No data recorded  BMI Category Overweight (BMI 25.0-29. 9)  Estimated Daily Nutrient Needs:  Energy (kcal/day): 1878 -2348 (Kcal/kg (20-25) Weight used: 93.9 kg  (5/31)  Protein (g/day):  (1-1.2 g/kg) Weight Used: (Current) 93.9 kg  Fluid (ml/day):   (1 ml/kcal)    Nutrition Diagnosis:   · Inadequate protein-energy intake related to altered GI function as evidenced by NPO or clear liquid status due to medical condition (venting Gtube)    · Severe malnutrition,In context of chronic illness related to catabolic illness (nausea/vomiting) as evidenced by Criteria as identified in malnutrition assessment    Nutrition Interventions:   Food and/or Nutrient Delivery: Continue Current Diet,Continue Current Parenteral Nutrition     Coordination of Nutrition Care: Continue to monitor while inpatient       Goals:   Previous Goal Met: Goal(s) Achieved  Active Goal:  (Maintain nutrition support at goal rate)       Nutrition Monitoring and Evaluation:      Food/Nutrient Intake Outcomes: Parenteral Nutrition Intake/Tolerance  Physical Signs/Symptoms Outcomes: Biochemical Data,GI Status,Fluid Status or Edema,Weight    Discharge Planning:    Parenteral Nutrition    Edu Luna, [address] [place]

## 2022-06-13 LAB
ALBUMIN SERPL-MCNC: 2.6 G/DL (ref 3.2–4.6)
ALBUMIN/GLOB SERPL: 0.8 {RATIO} (ref 1.2–3.5)
ALP SERPL-CCNC: 86 U/L (ref 50–136)
ALT SERPL-CCNC: 49 U/L (ref 12–65)
ANION GAP SERPL CALC-SCNC: 7 MMOL/L (ref 7–16)
AST SERPL-CCNC: 21 U/L (ref 15–37)
BASOPHILS # BLD: 0.1 K/UL (ref 0–0.2)
BASOPHILS NFR BLD: 1 % (ref 0–2)
BILIRUB SERPL-MCNC: 0.4 MG/DL (ref 0.2–1.1)
BUN SERPL-MCNC: 16 MG/DL (ref 8–23)
CALCIUM SERPL-MCNC: 8.8 MG/DL (ref 8.3–10.4)
CHLORIDE SERPL-SCNC: 103 MMOL/L (ref 98–107)
CO2 SERPL-SCNC: 29 MMOL/L (ref 21–32)
CREAT SERPL-MCNC: 0.6 MG/DL (ref 0.8–1.5)
DIFFERENTIAL METHOD BLD: ABNORMAL
EOSINOPHIL # BLD: 0.2 K/UL (ref 0–0.8)
EOSINOPHIL NFR BLD: 2 % (ref 0.5–7.8)
ERYTHROCYTE [DISTWIDTH] IN BLOOD BY AUTOMATED COUNT: 12.3 % (ref 11.9–14.6)
GLOBULIN SER CALC-MCNC: 3.3 G/DL (ref 2.3–3.5)
GLUCOSE SERPL-MCNC: 143 MG/DL (ref 65–100)
HCT VFR BLD AUTO: 37.7 % (ref 41.1–50.3)
HGB BLD-MCNC: 12.5 G/DL (ref 13.6–17.2)
IMM GRANULOCYTES # BLD AUTO: 0 K/UL (ref 0–0.5)
IMM GRANULOCYTES NFR BLD AUTO: 0 % (ref 0–5)
LYMPHOCYTES # BLD: 2 K/UL (ref 0.5–4.6)
LYMPHOCYTES NFR BLD: 31 % (ref 13–44)
MAGNESIUM SERPL-MCNC: 2.5 MG/DL (ref 1.8–2.4)
MCH RBC QN AUTO: 29.3 PG (ref 26.1–32.9)
MCHC RBC AUTO-ENTMCNC: 33.2 G/DL (ref 31.4–35)
MCV RBC AUTO: 88.5 FL (ref 79.6–97.8)
MONOCYTES # BLD: 0.2 K/UL (ref 0.1–1.3)
MONOCYTES NFR BLD: 2 % (ref 4–12)
NEUTS SEG # BLD: 4 K/UL (ref 1.7–8.2)
NEUTS SEG NFR BLD: 64 % (ref 43–78)
NRBC # BLD: 0 K/UL (ref 0–0.2)
PHOSPHATE SERPL-MCNC: 4 MG/DL (ref 2.3–3.7)
PLATELET # BLD AUTO: 203 K/UL (ref 150–450)
PMV BLD AUTO: 10.5 FL (ref 9.4–12.3)
POTASSIUM SERPL-SCNC: 3.8 MMOL/L (ref 3.5–5.1)
PROT SERPL-MCNC: 5.9 G/DL (ref 6.3–8.2)
RBC # BLD AUTO: 4.26 M/UL (ref 4.23–5.6)
SODIUM SERPL-SCNC: 139 MMOL/L (ref 136–145)
TRIGL SERPL-MCNC: 132 MG/DL (ref 35–150)
WBC # BLD AUTO: 6.3 K/UL (ref 4.3–11.1)

## 2022-06-13 PROCEDURE — 99232 SBSQ HOSP IP/OBS MODERATE 35: CPT | Performed by: NURSE PRACTITIONER

## 2022-06-13 PROCEDURE — 84100 ASSAY OF PHOSPHORUS: CPT

## 2022-06-13 PROCEDURE — 2580000003 HC RX 258: Performed by: SURGERY

## 2022-06-13 PROCEDURE — 83735 ASSAY OF MAGNESIUM: CPT

## 2022-06-13 PROCEDURE — C9113 INJ PANTOPRAZOLE SODIUM, VIA: HCPCS | Performed by: SURGERY

## 2022-06-13 PROCEDURE — 6360000002 HC RX W HCPCS: Performed by: SURGERY

## 2022-06-13 PROCEDURE — 6360000002 HC RX W HCPCS: Performed by: NURSE PRACTITIONER

## 2022-06-13 PROCEDURE — 6370000000 HC RX 637 (ALT 250 FOR IP): Performed by: SURGERY

## 2022-06-13 PROCEDURE — 1100000000 HC RM PRIVATE

## 2022-06-13 PROCEDURE — 36592 COLLECT BLOOD FROM PICC: CPT

## 2022-06-13 PROCEDURE — 99233 SBSQ HOSP IP/OBS HIGH 50: CPT | Performed by: INTERNAL MEDICINE

## 2022-06-13 PROCEDURE — 6370000000 HC RX 637 (ALT 250 FOR IP): Performed by: NURSE PRACTITIONER

## 2022-06-13 PROCEDURE — 2500000003 HC RX 250 WO HCPCS: Performed by: SURGERY

## 2022-06-13 PROCEDURE — 2500000003 HC RX 250 WO HCPCS: Performed by: NURSE PRACTITIONER

## 2022-06-13 PROCEDURE — A4216 STERILE WATER/SALINE, 10 ML: HCPCS | Performed by: SURGERY

## 2022-06-13 PROCEDURE — 80053 COMPREHEN METABOLIC PANEL: CPT

## 2022-06-13 PROCEDURE — 85025 COMPLETE CBC W/AUTO DIFF WBC: CPT

## 2022-06-13 PROCEDURE — 84478 ASSAY OF TRIGLYCERIDES: CPT

## 2022-06-13 RX ORDER — PROMETHAZINE HYDROCHLORIDE 12.5 MG/1
12.5 TABLET ORAL EVERY 6 HOURS PRN
Status: DISCONTINUED | OUTPATIENT
Start: 2022-06-13 | End: 2022-06-14 | Stop reason: HOSPADM

## 2022-06-13 RX ORDER — ONDANSETRON 2 MG/ML
8 INJECTION INTRAMUSCULAR; INTRAVENOUS EVERY 8 HOURS
Status: DISCONTINUED | OUTPATIENT
Start: 2022-06-13 | End: 2022-06-14

## 2022-06-13 RX ORDER — ONDANSETRON 8 MG/1
8 TABLET, ORALLY DISINTEGRATING ORAL EVERY 8 HOURS
Status: DISCONTINUED | OUTPATIENT
Start: 2022-06-13 | End: 2022-06-14 | Stop reason: HOSPADM

## 2022-06-13 RX ADMIN — ONDANSETRON 8 MG: 2 INJECTION INTRAMUSCULAR; INTRAVENOUS at 21:30

## 2022-06-13 RX ADMIN — SODIUM CHLORIDE, PRESERVATIVE FREE 10 ML: 5 INJECTION INTRAVENOUS at 06:22

## 2022-06-13 RX ADMIN — BUSPIRONE HYDROCHLORIDE 10 MG: 10 TABLET ORAL at 09:07

## 2022-06-13 RX ADMIN — CALCIUM GLUCONATE: 98 INJECTION, SOLUTION INTRAVENOUS at 18:11

## 2022-06-13 RX ADMIN — SODIUM CHLORIDE, PRESERVATIVE FREE 10 ML: 5 INJECTION INTRAVENOUS at 09:08

## 2022-06-13 RX ADMIN — ESCITALOPRAM OXALATE 10 MG: 5 SOLUTION ORAL at 09:22

## 2022-06-13 RX ADMIN — SOYBEAN OIL 250 ML: 20 INJECTION, SOLUTION INTRAVENOUS at 18:11

## 2022-06-13 RX ADMIN — ONDANSETRON 8 MG: 2 INJECTION INTRAMUSCULAR; INTRAVENOUS at 15:37

## 2022-06-13 RX ADMIN — SODIUM CHLORIDE, PRESERVATIVE FREE 10 ML: 5 INJECTION INTRAVENOUS at 21:30

## 2022-06-13 RX ADMIN — BUSPIRONE HYDROCHLORIDE 10 MG: 10 TABLET ORAL at 21:20

## 2022-06-13 RX ADMIN — SODIUM CHLORIDE, PRESERVATIVE FREE 10 ML: 5 INJECTION INTRAVENOUS at 15:41

## 2022-06-13 RX ADMIN — SODIUM CHLORIDE, PRESERVATIVE FREE 10 ML: 5 INJECTION INTRAVENOUS at 15:42

## 2022-06-13 RX ADMIN — SODIUM CHLORIDE, PRESERVATIVE FREE 40 MG: 5 INJECTION INTRAVENOUS at 09:08

## 2022-06-13 RX ADMIN — ENOXAPARIN SODIUM 40 MG: 100 INJECTION SUBCUTANEOUS at 21:21

## 2022-06-13 ASSESSMENT — PAIN SCALES - GENERAL
PAINLEVEL_OUTOF10: 0

## 2022-06-13 ASSESSMENT — PAIN SCALES - WONG BAKER: WONGBAKER_NUMERICALRESPONSE: 0

## 2022-06-13 NOTE — PROGRESS NOTES
[facility] Hematology & Oncology        Inpatient Hematology / Oncology Progress Note      Admission Date: 2022 11:11 PM  Reason for Admission/Hospital Course: Carcinomatosis ([address] [C80.0]      24 Hour Events:  VSS, afebrile  Pain controlled   Continues TPN   Cycle one  FOLFIRINOX completed   Wife at bedside  Have PT/OT evaluate for discharge needs      ROS:  Constitutional: negative for fever, chills. +weakness, malaise, fatigue. CV: negative for chest pain, palpitations, edema. Respiratory: negative for dyspnea, cough, wheezing. GI: positive for nausea and abdominal pain     10 point review of systems is otherwise negative with the exception of the elements mentioned above in the HPI. No Known Allergies    OBJECTIVE:  Patient Vitals for the past 8 hrs:   BP Temp Temp src Pulse Resp SpO2   22 1200 125/80 97.8 °F (36.6 °C) Oral 68 18 --   22 0745 113/77 98.8 °F (37.1 °C) Oral 70 18 91 %     Temp (24hrs), Av °F (36.7 °C), Min:97.4 °F (36.3 °C), Max:98.8 °F (37.1 °C)     0701 -  1900  In: 5070 [I.V.:1518]  Out: 501 [Urine:300]    Physical Exam:  Constitutional: Ill appearing,male in no acute distress, lying comfortably in the hospital bed. HEENT: Normocephalic and atraumatic. Oropharynx is clear, mucous membranes are moist.  Sclerae anicteric. Skin Warm and dry. No bruising and no rash noted. No erythema. No pallor. Respiratory Lungs are clear to auscultation bilaterally without wheezes, rales or rhonchi, normal air exchange without accessory muscle use. CVS Normal rate, regular rhythm and normal S1 and S2. No murmurs, gallops, or rubs. Abdomen Soft  Non distended. G tube to drainage bag. Staples-intact no sign of infection   Neuro Grossly nonfocal with no obvious sensory or motor deficits. MSK Normal range of motion in general.  No edema and no tenderness. Psych Appropriate mood and affect.         Labs:      Recent Labs     22  9283 06/12/22 0327 06/13/22  0426   WBC 9.2 7.6 6.3   RBC 4.36 4.27 4.26   HGB 12.7* 12.4* 12.5*   HCT 38.5* 37.9* 37.7*   MCV 88.3 88.8 88.5   MCH 29.1 29.0 29.3   MCHC 33.0 32.7 33.2   RDW 12.0 12.3 12.3    225 203   MPV 10.4 10.8 10.5        Recent Labs     06/11/22  0228 06/12/22 0327 06/13/22  0426    141 139   K 4.3 3.9 3.8    104 103   CO2 27 29 29   BUN 13 17 16   GFRAA >60 >60 >60   GLOB 3.8* 3.4 3.3   MG  --   --  2.5*   PHOS  --   --  4.0*         Imaging:    Medications:  Current Facility-Administered Medications   Medication Dose Route Frequency    PN-Adult Premix 5/15 - Central   IntraVENous Continuous TPN    PN-Adult Premix 5/15 - Central   IntraVENous Continuous TPN    oxyCODONE (ROXICODONE INTENSOL) 100 MG/5ML concentrated solution 20 mg  20 mg Oral Q4H PRN    Or    oxyCODONE (ROXICODONE INTENSOL) 100 MG/5ML concentrated solution 40 mg  40 mg Oral Q4H PRN    atropine injection 0.4 mg  0.4 mg SubCUTAneous Q2H PRN    busPIRone (BUSPAR) tablet 10 mg  10 mg Oral TID    escitalopram (LEXAPRO) 5 MG/5ML solution 10 mg  10 mg Oral Daily    diphenhydrAMINE (BENADRYL) injection 12.5 mg  12.5 mg IntraVENous Q6H PRN    LORazepam (ATIVAN) injection 1 mg  1 mg IntraVENous Q4H PRN    prochlorperazine (COMPAZINE) injection 10 mg  10 mg IntraVENous Q6H PRN    fentaNYL (DURAGESIC) 100 MCG/HR 1 patch  1 patch TransDERmal Q72H    HYDROmorphone HCl PF (DILAUDID) injection 2 mg  2 mg IntraVENous Q3H PRN    fat emulsion 20 % infusion 250 mL  250 mL IntraVENous Daily    potassium chloride 20 mEq/50 mL IVPB (Central Line)  20 mEq IntraVENous PRN    Or    potassium chloride 10 mEq/100 mL IVPB (Peripheral Line)  10 mEq IntraVENous PRN    magnesium sulfate 2000 mg in 50 mL IVPB premix  2,000 mg IntraVENous PRN    sodium phosphate 10 mmol in sodium chloride 0.9 % 250 mL IVPB  10 mmol IntraVENous PRN    Or    sodium phosphate 15 mmol in sodium chloride 0.9 % 250 mL IVPB  15 mmol IntraVENous PRN Or    sodium phosphate 20 mmol in sodium chloride 0.9 % 500 mL IVPB  20 mmol IntraVENous PRN    sodium chloride flush 0.9 % injection 5-40 mL  5-40 mL IntraVENous 2 times per day    sodium chloride flush 0.9 % injection 5-40 mL  5-40 mL IntraVENous PRN    0.9 % sodium chloride infusion  25 mL IntraVENous PRN    calcium carbonate (TUMS) chewable tablet 500 mg  500 mg Oral TID PRN    phenol 1.4 % mouth spray 1 spray  1 spray Mouth/Throat Q2H PRN    baclofen (LIORESAL) tablet 10 mg  10 mg Oral TID PRN    ondansetron (ZOFRAN-ODT) disintegrating tablet 4 mg  4 mg Oral Q8H PRN    Or    ondansetron (ZOFRAN) injection 4 mg  4 mg IntraVENous Q6H PRN    enoxaparin (LOVENOX) injection 40 mg  40 mg SubCUTAneous Q24H    nicotine (NICODERM CQ) 14 MG/24HR 1 patch  1 patch TransDERmal Daily    naloxone (NARCAN) injection 0.4 mg  0.4 mg IntraVENous PRN    pantoprazole (PROTONIX) 40 mg in sodium chloride (PF) 10 mL injection  40 mg IntraVENous Daily    sodium chloride flush 0.9 % injection 5-40 mL  5-40 mL IntraVENous Q8H       Denny Guevara is a 61 y. o. male with a past medical history of PUD, HTN, hyperlipidemia, and diverticulitis. He was seen by Dr. Vargas Elizalde 5/18 and sent to Dr. Bee Calle for expedited workup. He was seen in the office by Dr. Bee Calle 5/20/22 with concerns for peritoneal metastatic disease.     The patient stated that he has been having abdominal pain for about 6 months now. He had a recent EGD and colonoscopy on February 25, 2022 which revealed a hiatal hernia, gastric polyps, benign colon polyps, diverticulosis, and hemorrhoids. He continued to have abdominal pain over the last few months. He stated that he has lost about 30 lbs over the last 6 months. He has not had an appetite. He was having worsening of GERD as well over that time.      The patient went to the emergency department at [facility] about 10 days ago for a partial small bowel obstruction. The patient presented to our emergency department 1 week ago with abdominal pain. The patient had a CT scan at that time which revealed extensive peritoneal nodularity and mild ascites consistent with peritoneal metastatic disease.  A primary lesion was not definitely identified. There was also sclerosis of the S1 vertebral body, a bone scan has been ordered to assess for possible bone metastasis. He also had evidence of a partial bowel obstruction. There is no evidence of any metastatic disease within the chest. He is POD 2 sp laparotomy. Per Dr. Willard Crisostomo note diffuse peritoneal carcinomatosis with complete infiltration of mesentery root, this is not surgical resectable, not even for bypass. Tumor markers were not impressive. We are asked to see patient for recommendations       PLAN:    Adenocarcinoma of upper GI origin, diffuse peritoneal metastasis  5/29 POD 2. Biopsy results pending. 6/2 Has venting G tube. Starting TPN. Biopsy omentum + adenocarcinoma, ICH with upper GI primary. Case discussed with surgery. Dr. Julianna Lopez recommends allow ~another week for recovery prior to starting chemo. He is being discharged once home TPN is set up. We had extensive conversation regarding palliative treatment. Goals and plan of care reviewed with the patient wife and sister. We will arrange for follow up with Dr. Sam Louis ~one week. 6/7 Was to be [facility] by surgery yesterday. However, ongoing intractable pain. Discussed with Dr Jaida Aly 6/6, per Dr Julianna Lopez ok to proceed with chemotherapy given ongoing pain/symptoms related to peritoneal metastasis. Asked to assume care for potential chemotherapy. Likely will proceed with dose-reduced FOLFIRINOX.  6/8 Discussed with Dr Sam Louis yesterday, pending treatment plan for FOLFIRINOX. However, during chemo-ed today patient expresses desire to think about this further. May need to f/u with Dr Sam Louis as outpatient for chemotherapy. NGS testing request sent to [employer]. 6/9 Is agreeable to receiving C1 FOLFIRINOX today - labs pending.   6/10 C1D1 FOLFIRINOX  6/11 tolerating FOLFIRINOX well-notes mental changes that have resolved  6/12  C1 FOLFIRINOX completed    Peritoneal carinomatosis / protein calorie malnutrition  - Secondary to peritoneal disease  - Venting G-tube  - TPN (approved for home use)  - RD following  6/11 continue on TPN    Cancer-related pain  - PC following  - Fentanyl patch and Intensol PO  6/10 Continues to require IV pain meds. PC following  6/11 Fent 100 mcg, yesterday transitioned to oral Intensol 40 mg Q4H prn, has requested 1 dose   6/13 last dose IV dilaudid yesterday morning    Nausea  6/13 schedule zofran. Added phenergan prn. Constipation  6/13 wife reports that patient finally had BM last pm. Stated that he had not gone since 5/20. Continues home meds  Araseli SOPs  VTE prophylaxis - Lovenox    Dispo - DC home with HH and TPN once pain controlled on oral regimen. Goals and plan of care reviewed with the patient wife and sister. All questions answered to the best of our ability.                LUCY Aden - NP   [facility] Hematology & Oncology  [id number] [facility] [address]  Office : [phone]  Fax : [phone]

## 2022-06-13 NOTE — CARE COORDINATION
Chart screened by CM for d/c planning. Pt completed C1 FOLFIRINOX on 6/12. Tolerated well. His last IV dose of Dilaudid was on 6/12. Currently only receiving PO meds and monitoring to see if pain can remain controlled off of IV. Pt's wife has requested that a representative from [employer] come back and provide more TPN education. She also requested that she be called first so that she can arrange to have her daughter present for the education. CM left a voicemail for Marquita Padilla with this request.  D/C plan is for pt to return home with [facility]: SN and home TPN through [facility] once medically stable and he remains on PO meds only. CM will continue to follow and remain available if any needs arise. LOS = 24 days.

## 2022-06-13 NOTE — PROGRESS NOTES
Comprehensive Nutrition Assessment    Type and Reason for Visit: Reassess  Malnutrition Screening Tool: Malnutrition Screen  Have you recently lost weight without trying?: 24 to 33 pounds (3 points)  Have you been eating poorly because of a decreased appetite?: No (0 points)  Malnutrition Screening Tool Score: 3  TPN management (Palliative)    Nutrition Recommendations/Plan:   Meals and Snacks:   Diet: Continue current order Clear liquids for pleasure  Parenteral Nutrition:  Total parenteral nutrition  to begin at 1800  Continue: Dex 15%, 5% AA 2 L (85ml/hr)   Continue 250 ml 20% lipids daily   Lytes/L: no changes to lytes  100 meq NaCl, 15 mmol KPO4, 8 meq Mg, 4.5 meq Ca  Other additives: MTE, MVI Monday Wednesday Friday due to national shortages   Nutritional Supplement Therapy:   Active electrolyte replacement per electolyte support protocols  Replacement indicated:  None  Labs:   BMP daily  Mg tomorrow and MWF   Phos tomorrow and MWF   Triglyceride weekly starting Monday if pt remains hospitalized  POC Glucoses/SSI Not indicated       Malnutrition Assessment:  Malnutrition Status: Severe malnutrition  Context: Chronic Illness  Findings of clinical characteristics of malnutrition:   Energy Intake:  75% or less estimated energy requirements for 1 month or longer (minimal intake for 3 months)  Weight Loss:  Greater than 5% over 1 month (10.5% in one month)     Body Fat Loss:  No significant body fat loss     Muscle Mass Loss:  No significant muscle mass loss    Fluid Accumulation:  Unable to assess     Strength:  Not Performed     Nutrition Assessment:  Nutrition History:    5/22 Pt and wife in room discussing hx (daughter present but did not participate). Wife relates his medical problems began in Dec 2021, but weight loss started in March 2022. Has n/v (with limited relief from medications), limited to no appetite.  Patient open to ONS, but is put off by the idea of eating or drinking anything at this time for fear of vomiting. Wife states he did tolerate half a sandwich last week, but was unable to finish it. Nutrition Background:     History of PUD, HTN, hyperlipidemia, and diverticulitis. Admitted with abdominal pain for last 6 months. Presented with possible SBO.  being seen by Dr. Donna Catherine with concerns of peritoneal metastatic disease, with unknown starting origin. Diffuse peritoneal carcinomatosis. Bx omentum + adenocarcinoma POD 7.   5/27: Diagnostic laparoscopy, switched to laparotomy. Omental mass and mesentery mass excision. Gastrostomy tube placement for venting. Findings of \"Diffuse peritoneal carcinomatosis with root of mesentery encasement and small bowel obstruction. \"  6/1: s/p venting gtube exchange. 6/3: s/p PORT.   6/7: repeat KUB with \"Worsening stool distention of the right colon. \"  6/12: C1 D3 FOLFIRINOX completed  Nutrition Interval:  Patient sleeping on and off during RD visit. TPN infusing at ordered rate. Wife at beside requesting second TPN training prior to d/c. Patient nods \"yes\" when asking if belly is feeling better. Wife reports he has not been complaining of pain or asking for pain meds but now with more nausea. Discussed with Abby, JEFFERSON, Jemima, RN, NINFA Garvin, and Dr. Rosalino Thompson.   Abdominal Status (last documented):   Last BM (including prior to admit):  (pt states 3 weeks ago), GI Symptoms: Nausea Gtube output/emeisis: 750 ml recorded past 24 hours  Pertinent Medications: fentanyl patch, Protonix  Electrolyte Replacement: 6/3: 10 meq KCl 6/4: KCL 10 meq, 6/5 KCl 10 meq x4, 6/6 KCl 20 mmeq x2  PRN: tums (last admin 6/9), zofran (utilizing daily), compazine (utilizing daily)  Pertinent Labs:   Lab Results   Component Value Date     06/13/2022    K 3.8 06/13/2022     06/13/2022    CO2 29 06/13/2022    BUN 16 06/13/2022    CREATININE 0.60 06/13/2022    GLUCOSE 143 06/13/2022    CALCIUM 8.8 06/13/2022    PHOS 4.0 06/13/2022    MG 2.5 06/13/2022     Lab Results   Component Value Date    TRIG 132 06/13/2022    TRIG 140 06/09/2022    TRIG 155 06/04/2022   Remarks: Labs mostly stable except Mg and Phos slightly elevated - ? Phos levels related to GI status - will continue to monitor Phos and will decrease Mg in PN this evening    Nutrition Related Findings:   No s/s wasting per NFPE 5/27, Diet history 5/22 NPO, 5/23 Clear, 5/24 soft+ bite sized, low fiber. 5/27: NPO for lap, then CLD. 6/1: NPO overnight due to vomiting despite Gtube to drain. 6/2: PPN started, PICC placed late afternoon. TPN to goal 6/4. Current Nutrition Therapies:  ADULT DIET; Clear Liquid  PN-Adult Premix 5/15 - Central  Current Parenteral Nutrition Orders:  · Type and Formula: Premix Central (5/15)   · Lipids: 250ml,Daily  · Duration: Continuous  · Rate/Volume: 2 L (85ml/hr)  · Current PN Order Provides: infusing per current order  · Goal PN Orders Provides: 1920 kcal/d (100% of needs), 100 grams of protein/d (100% of needs), 300 grams of CHO/d and 2250 ml of total volume/d    Current Intake:   Average Meal Intake: 0% Average Supplements Intake: None Ordered      Anthropometric Measures:  Height: 5' 10\" (177.8 cm)  Current Body Wt: 194 lb 3.6 oz (88.1 kg) (6/12), Weight source: Standing Scale  BMI: 27.9  Admission Body Weight: 207 lb (93.9 kg) (not specified source)  Ideal Body Weight (Kg) (Calculated): 75 kg (166 lbs), 119.4 %  Usual Body Wt: 231 lb 5 oz (104.9 kg) (1 mo ago 4/13 office visit), Percent weight change: -10.5       Edema: No data recorded  BMI Category Overweight (BMI 25.0-29. 9)  Estimated Daily Nutrient Needs:  Energy (kcal/day): 1878 -2348 (Kcal/kg (20-25) Weight used: 93.9 kg  (5/31)  Protein (g/day):  (1-1.2 g/kg) Weight Used: (Current) 93.9 kg  Fluid (ml/day):   (1 ml/kcal)    Nutrition Diagnosis:   · Inadequate protein-energy intake related to altered GI function as evidenced by NPO or clear liquid status due to medical condition (venting Gtube)    · Severe malnutrition,In context of chronic illness related to catabolic illness (nausea/vomiting) as evidenced by Criteria as identified in malnutrition assessment    Nutrition Interventions:   Food and/or Nutrient Delivery: Continue Current Diet,Continue Current Parenteral Nutrition     Coordination of Nutrition Care: Continue to monitor while inpatient       Goals:   Previous Goal Met: Goal(s) Achieved  Active Goal:  (Maintain nutrition support at goal rate)       Nutrition Monitoring and Evaluation:      Food/Nutrient Intake Outcomes: Parenteral Nutrition Intake/Tolerance  Physical Signs/Symptoms Outcomes: Biochemical Data,GI Status,Nausea or Vomiting,Weight    Discharge Planning:    Parenteral Nutrition    Kiana Wilder, [address] [place] [address]

## 2022-06-13 NOTE — PROGRESS NOTES
Physical Therapy Note:    Attempted to see patient this PM for physical therapy evaluation session. Patient is not feeling well per RN and would benefit from deferment until tomorrow. Will follow and re-attempt as schedule permits/patient available.  Thank you,    Juan Manuel Corona [address]

## 2022-06-13 NOTE — PROGRESS NOTES
Palliative Care Progress Note    Patient: Cece Newton MRN: [id number]  SSN: [id number]    YOB: 1960  Age: 62 y.o. Sex: male       Assessment/Plan:     Chief Complaint/Interval History: reports pain is improved, has ongoing nausea       Principal Diagnosis:    · Nausea/Vomiting  R11.2    Additional Diagnoses:   · Anxiety  F41.1  · Depression  F32.9  · Fatigue, Lethargy  R53.83  · Pain, abdomen  R10.9  · Counseling, Encounter for Medical Advice  Z71.9  · Encounter for Palliative Care  Z51.5    Palliative Performance Scale (PPS): 70%       Medical Decision Making:   Reviewed and summarized notes over last 24 hours  Discussed case with appropriate providers  Reviewed lab and X-ray data over last 24 hours    Pt resting in bed, appears comfortable. Wife at bedside. Pt kept his eyes closed during the visit, but answered questions. He reports nausea at present. He reports he had mild nausea this morning, and then took the Lexapro solution, and his nausea worsened, resulting in vomiting. He reports his nausea is a little better now. Pt  Reports his pain is well controlled at present. He currently has a Fentanyl 100 mcg patch q 72 hours. He has not had a breakthrough dose of Dilaudid IV or Oxycodone intensol in 24 hours. Encouraged pt to take Oxycodone intensol if breakthrough medication is needed, to ensure that this regimen is adequate for discharge home. Pt and wife voiced understanding. Wife voiced appreciation for care. Will continue to follow. Will discuss findings with members of the interdisciplinary team.         More than 50% of this 25 minute visit was spent counseling and coordination of care as outlined above.         Subjective:     Review of Systems:  A comprehensive review of systems was negative except for:  Constitutional: positive for fatigue  GI: positive for nausea/vomiting, abdominal pain     Objective:     Visit Vitals  /77   Pulse 70   Temp 98.8 °F (37.1 °C) (Oral) Resp 18   Ht 5' 10\" (1.778 m)   Wt 194 lb 4 oz (88.1 kg)   SpO2 91%   BMI 27.87 kg/m²       Physical Exam:    General:  Appears comfortable. Eyes:  Conjunctivae/corneas clear    Nose: Nares normal. Septum midline. Neck: Supple, symmetrical, trachea midline. Lungs:   Unlabored. Heart:  Regular rate   Abdomen:   Soft, venting g tube to left abdomen. Extremities: Normal, atraumatic, no cyanosis or edema. Skin: Skin color, texture, turgor normal. No rash.    Neurologic: Nonfocal   Psych: Nonfocal     Signed By: LUCY Olivo - HEATHER     June 13, 2022

## 2022-06-14 ENCOUNTER — CLINICAL DOCUMENTATION (OUTPATIENT)
Dept: CASE MANAGEMENT | Age: 62
End: 2022-06-14

## 2022-06-14 VITALS
TEMPERATURE: 97.6 F | RESPIRATION RATE: 18 BRPM | OXYGEN SATURATION: 94 % | HEIGHT: 70 IN | WEIGHT: 194.24 LBS | BODY MASS INDEX: 27.81 KG/M2 | DIASTOLIC BLOOD PRESSURE: 87 MMHG | SYSTOLIC BLOOD PRESSURE: 127 MMHG | HEART RATE: 70 BPM

## 2022-06-14 DIAGNOSIS — G89.3 CANCER ASSOCIATED PAIN: Primary | ICD-10-CM

## 2022-06-14 LAB
ALBUMIN SERPL-MCNC: 2.8 G/DL (ref 3.2–4.6)
ALBUMIN/GLOB SERPL: 0.8 {RATIO} (ref 1.2–3.5)
ALP SERPL-CCNC: 94 U/L (ref 50–136)
ALT SERPL-CCNC: 49 U/L (ref 12–65)
ANION GAP SERPL CALC-SCNC: 9 MMOL/L (ref 7–16)
AST SERPL-CCNC: 20 U/L (ref 15–37)
BASOPHILS # BLD: 0 K/UL (ref 0–0.2)
BASOPHILS NFR BLD: 0 % (ref 0–2)
BILIRUB SERPL-MCNC: 0.6 MG/DL (ref 0.2–1.1)
BUN SERPL-MCNC: 17 MG/DL (ref 8–23)
CALCIUM SERPL-MCNC: 7.7 MG/DL (ref 8.3–10.4)
CHLORIDE SERPL-SCNC: 103 MMOL/L (ref 98–107)
CO2 SERPL-SCNC: 27 MMOL/L (ref 21–32)
CREAT SERPL-MCNC: 0.6 MG/DL (ref 0.8–1.5)
DIFFERENTIAL METHOD BLD: ABNORMAL
EOSINOPHIL # BLD: 0.3 K/UL (ref 0–0.8)
EOSINOPHIL NFR BLD: 5 % (ref 0.5–7.8)
ERYTHROCYTE [DISTWIDTH] IN BLOOD BY AUTOMATED COUNT: 12.2 % (ref 11.9–14.6)
GLOBULIN SER CALC-MCNC: 3.5 G/DL (ref 2.3–3.5)
GLUCOSE SERPL-MCNC: 143 MG/DL (ref 65–100)
HCT VFR BLD AUTO: 39.8 % (ref 41.1–50.3)
HGB BLD-MCNC: 13.2 G/DL (ref 13.6–17.2)
IMM GRANULOCYTES # BLD AUTO: 0 K/UL (ref 0–0.5)
IMM GRANULOCYTES NFR BLD AUTO: 0 % (ref 0–5)
LYMPHOCYTES # BLD: 1.7 K/UL (ref 0.5–4.6)
LYMPHOCYTES NFR BLD: 25 % (ref 13–44)
MAGNESIUM SERPL-MCNC: 2.4 MG/DL (ref 1.8–2.4)
MCH RBC QN AUTO: 29.3 PG (ref 26.1–32.9)
MCHC RBC AUTO-ENTMCNC: 33.2 G/DL (ref 31.4–35)
MCV RBC AUTO: 88.2 FL (ref 79.6–97.8)
MONOCYTES # BLD: 0.1 K/UL (ref 0.1–1.3)
MONOCYTES NFR BLD: 2 % (ref 4–12)
NEUTS SEG # BLD: 4.7 K/UL (ref 1.7–8.2)
NEUTS SEG NFR BLD: 68 % (ref 43–78)
NRBC # BLD: 0 K/UL (ref 0–0.2)
PHOSPHATE SERPL-MCNC: 3.3 MG/DL (ref 2.3–3.7)
PLATELET # BLD AUTO: 207 K/UL (ref 150–450)
PMV BLD AUTO: 11.2 FL (ref 9.4–12.3)
POTASSIUM SERPL-SCNC: 3.8 MMOL/L (ref 3.5–5.1)
PROT SERPL-MCNC: 6.3 G/DL (ref 6.3–8.2)
RBC # BLD AUTO: 4.51 M/UL (ref 4.23–5.6)
SODIUM SERPL-SCNC: 139 MMOL/L (ref 136–145)
WBC # BLD AUTO: 6.9 K/UL (ref 4.3–11.1)

## 2022-06-14 PROCEDURE — 2580000003 HC RX 258: Performed by: SURGERY

## 2022-06-14 PROCEDURE — 6360000002 HC RX W HCPCS: Performed by: NURSE PRACTITIONER

## 2022-06-14 PROCEDURE — 85025 COMPLETE CBC W/AUTO DIFF WBC: CPT

## 2022-06-14 PROCEDURE — A4216 STERILE WATER/SALINE, 10 ML: HCPCS | Performed by: SURGERY

## 2022-06-14 PROCEDURE — 97161 PT EVAL LOW COMPLEX 20 MIN: CPT

## 2022-06-14 PROCEDURE — 6370000000 HC RX 637 (ALT 250 FOR IP): Performed by: NURSE PRACTITIONER

## 2022-06-14 PROCEDURE — 97535 SELF CARE MNGMENT TRAINING: CPT

## 2022-06-14 PROCEDURE — 99239 HOSP IP/OBS DSCHRG MGMT >30: CPT | Performed by: INTERNAL MEDICINE

## 2022-06-14 PROCEDURE — 97530 THERAPEUTIC ACTIVITIES: CPT

## 2022-06-14 PROCEDURE — 6360000002 HC RX W HCPCS

## 2022-06-14 PROCEDURE — 84100 ASSAY OF PHOSPHORUS: CPT

## 2022-06-14 PROCEDURE — 6360000002 HC RX W HCPCS: Performed by: SURGERY

## 2022-06-14 PROCEDURE — C9113 INJ PANTOPRAZOLE SODIUM, VIA: HCPCS | Performed by: SURGERY

## 2022-06-14 PROCEDURE — 80053 COMPREHEN METABOLIC PANEL: CPT

## 2022-06-14 PROCEDURE — 97165 OT EVAL LOW COMPLEX 30 MIN: CPT

## 2022-06-14 PROCEDURE — 83735 ASSAY OF MAGNESIUM: CPT

## 2022-06-14 PROCEDURE — 36592 COLLECT BLOOD FROM PICC: CPT

## 2022-06-14 PROCEDURE — 6370000000 HC RX 637 (ALT 250 FOR IP): Performed by: SURGERY

## 2022-06-14 RX ORDER — LORAZEPAM 1 MG/1
1 TABLET ORAL EVERY 6 HOURS PRN
Status: DISCONTINUED | OUTPATIENT
Start: 2022-06-14 | End: 2022-06-14 | Stop reason: HOSPADM

## 2022-06-14 RX ORDER — LORAZEPAM 1 MG/1
1 TABLET ORAL EVERY 8 HOURS PRN
Qty: 60 TABLET | Refills: 0 | Status: SHIPPED | OUTPATIENT
Start: 2022-06-14 | End: 2022-07-14

## 2022-06-14 RX ORDER — PROMETHAZINE HYDROCHLORIDE 12.5 MG/1
12.5 TABLET ORAL EVERY 6 HOURS PRN
Qty: 90 TABLET | Refills: 0 | Status: SHIPPED | OUTPATIENT
Start: 2022-06-14

## 2022-06-14 RX ORDER — ONDANSETRON 8 MG/1
8 TABLET, ORALLY DISINTEGRATING ORAL EVERY 8 HOURS
Qty: 90 TABLET | Refills: 0 | Status: SHIPPED | OUTPATIENT
Start: 2022-06-14

## 2022-06-14 RX ORDER — ESCITALOPRAM OXALATE 10 MG/1
10 TABLET ORAL DAILY
Qty: 30 TABLET | Refills: 0 | Status: SHIPPED | OUTPATIENT
Start: 2022-06-14 | End: 2022-08-04 | Stop reason: SDUPTHER

## 2022-06-14 RX ORDER — OXYCODONE HCL 20 MG/ML
20 CONCENTRATE, ORAL ORAL EVERY 4 HOURS PRN
Qty: 60 ML | Refills: 0 | Status: SHIPPED | OUTPATIENT
Start: 2022-06-14 | End: 2022-09-01

## 2022-06-14 RX ORDER — BUSPIRONE HYDROCHLORIDE 10 MG/1
10 TABLET ORAL 3 TIMES DAILY
Qty: 90 TABLET | Refills: 0 | Status: SHIPPED | OUTPATIENT
Start: 2022-06-14 | End: 2022-10-28

## 2022-06-14 RX ORDER — NICOTINE 21 MG/24HR
1 PATCH, TRANSDERMAL 24 HOURS TRANSDERMAL DAILY
Qty: 30 PATCH | Refills: 3 | Status: SHIPPED | OUTPATIENT
Start: 2022-06-15 | End: 2022-08-17

## 2022-06-14 RX ADMIN — LORAZEPAM 1 MG: 1 TABLET ORAL at 14:04

## 2022-06-14 RX ADMIN — BUSPIRONE HYDROCHLORIDE 10 MG: 10 TABLET ORAL at 14:05

## 2022-06-14 RX ADMIN — SODIUM CHLORIDE, PRESERVATIVE FREE 10 ML: 5 INJECTION INTRAVENOUS at 06:18

## 2022-06-14 RX ADMIN — ONDANSETRON 8 MG: 8 TABLET, ORALLY DISINTEGRATING ORAL at 14:04

## 2022-06-14 RX ADMIN — ESCITALOPRAM OXALATE 10 MG: 5 SOLUTION ORAL at 10:05

## 2022-06-14 RX ADMIN — SODIUM CHLORIDE, PRESERVATIVE FREE 40 MG: 5 INJECTION INTRAVENOUS at 08:28

## 2022-06-14 RX ADMIN — ONDANSETRON 8 MG: 2 INJECTION INTRAMUSCULAR; INTRAVENOUS at 08:28

## 2022-06-14 RX ADMIN — SODIUM CHLORIDE, PRESERVATIVE FREE 10 ML: 5 INJECTION INTRAVENOUS at 14:03

## 2022-06-14 RX ADMIN — LORAZEPAM 1 MG: 2 INJECTION INTRAMUSCULAR; INTRAVENOUS at 05:13

## 2022-06-14 RX ADMIN — SODIUM CHLORIDE, PRESERVATIVE FREE 10 ML: 5 INJECTION INTRAVENOUS at 08:38

## 2022-06-14 NOTE — CARE COORDINATION
Pt to d/c home today. Family is at the bedside and will provide transportation home. [employer] will meet with pt/family between 1430 - 1530 for re-education and to connect pt for home TPN. Will provide home TPN supplies at this time. CM called to notify [facility] that pt will be d/c/ today. CM will fax the d/c summary when it is available. No other supportive care needs identified. Pt and family are in agreement with d/c plan. Milestones met. LOS = 25 days. 05/25/22 0843   Service Assessment   Patient Orientation Alert and Oriented;Person;Place; Self   Cognition Alert   History Provided By Patient   Primary Caregiver Self   Accompanied By/Relationship Kaden Aguilar (spouse)   Support Systems Spouse/Significant Other;Children   Patient's Healthcare Decision Maker is: Legal Next of Kin   PCP Verified by CM Yes  Matilde Winston. Royce Castro MD)   Last Visit to PCP Within last 3 months   Prior Functional Level Independent in ADLs/IADLs   Current Functional Level Independent in ADLs/IADLs   Can patient return to prior living arrangement Yes   Ability to make needs known: Good   Family able to assist with home care needs: Yes   Would you like for me to discuss the discharge plan with any other family members/significant others, and if so, who? No   Social/Functional History   Lives With Spouse   Type of [address] One level   Home Access Stairs to enter with rails   Entrance Stairs - Number of Steps 6   Entrance Stairs - R [address] Help From Family   ADL Assistance Independent   Homemaking Assistance Independent   Ambulation Assistance Independent   Transfer Assistance Independent   Active  Yes   Mode of Transportation Car   Discharge Planning   Type of Residence House   Living Arrangements Spouse/Significant Other   Current Services Prior To Admission None   Potential Assistance Needed N/A   DME Ordered? Infusion pump; Enteral feedings  (Home TPN through [employer] [facility])   Potential Assistance Purchasing Medications No   Type of Home Care Services None   Patient expects to be discharged to: House   One/Two Story Residence One story   Services At/After Discharge   Transition of Care Consult (CM Consult) Discharge Planning;Home Health; Other  ([facility]: SN and [facility] for home TPN)   Internal Home Health No   Reason Outside Agency Chosen Patient already serviced by other home [facility]  ([facility]: SN and [facility] for home TPN)   [employer] Provided? No   Mode of Transport at Discharge Other (see comment)  (Family)   Confirm Follow Up Transport Family   Condition of Participation: Discharge Planning   The Plan for Transition of Care is related to the following treatment goals: Pt requires home health and home TPN to return to functional baseline in the community. The Patient and/or Patient Representative was provided with a Choice of Provider? Patient   The Patient and/Or Patient Representative agree with the Discharge Plan? Yes   Freedom of Choice list was provided with basic dialogue that supports the patient's individualized plan of care/goals, treatment preferences, and shares the quality data associated with the providers?   Yes

## 2022-06-14 NOTE — PROGRESS NOTES
Pt to be discharged soon, as pain is well controlled with current regimen of Fentanyl  mcg q 72 hours and Oxycodone Intensol 20 mg q 4 hours PRN. Will arrange outpatient follow up with PC at [facility] for ongoing symptom management and goals of care.     ANDRES Luna  Palliative Care

## 2022-06-14 NOTE — CARE COORDINATION
MANI left a detailed voicemail for [facility] with [employer] following up on pt's spouse's request yesterday to have home TPN re-education and to call her first so that she can arrange to have her daughter present for the education as well. MANI is awaiting a return call from [facility]. 1019 - Update: [facility] notified MANI that family home TPN re-education is scheduled for today between [id number].

## 2022-06-14 NOTE — DISCHARGE SUMMARY
[facility] Hematology & Oncology: Inpatient Hematology / Oncology Discharge Summary Note    Patient ID:  Franky Foley  [id number]  62 y.o.  1960    Admit Date: 5/20/2022    Discharge Date: 6/14/2022    Admission Diagnoses: Carcinomatosis ([address] [C80.0]    Discharge Diagnoses:  Principal Diagnosis: Carcinomatosis ([address]  Principal Problem:    Carcinomatosis ([address]  Active Problems:    Severe protein-calorie malnutrition ([address]    Abdominal mass    Debility    Encounter for palliative care    Itching    Fatigue    Abdominal carcinomatosis ([address]    Goals of care, counseling/discussion    On total parenteral nutrition    Pain, abdominal, LUQ    Nausea    Anxiety about dying    Depression  Resolved Problems:    * No resolved hospital problems. [facility] Course:  Luis Armas M 43 y. o. male with a past medical history of PUD, HTN, hyperlipidemia, and diverticulitis. He was seen by Dr. Silvia Alvarado 5/18 and sent to Dr. Annita Garcia for expedited workup. He was seen in the office by Dr. Annita Garcia 5/20/22 with concerns for peritoneal metastatic disease.     The patient stated that he has been having abdominal pain for about 6 months now. He had a recent EGD and colonoscopy on February 25, 2022 which revealed a hiatal hernia, gastric polyps, benign colon polyps, diverticulosis, and hemorrhoids. He continued to have abdominal pain over the last few months. He stated that he has lost about 30 lbs over the last 6 months. He has not had an appetite. He was having worsening of GERD as well over that time.      The patient went to the emergency department at [facility] prior to admission for a partial small bowel obstruction. The patient presented to our emergency department on 5/12 with abdominal pain. The patient had a CT scan at that time which revealed extensive peritoneal nodularity and mild ascites consistent with peritoneal metastatic disease.  A primary lesion was not definitely identified. There was also sclerosis of the S1 vertebral body. He also had evidence of a partial bowel obstruction. There was no evidence of any metastatic disease within the chest. He had laparotomy 5/27/2022. A venting G tube was placed. Per Dr. Ashvin Stanton note diffuse peritoneal carcinomatosis with complete infiltration of mesentery root, this is not surgical resectable, not even for bypass. Tumor markers were not impressive. Biopsy results +adenocarcinoma IHC with upper GI primary. He received cycle one FOLFIRINOX 6/10/2022. He is on TPN for nutrition. PICC line will stay in at discharge. His is now off of IV pain medications. He feels that he will be able to manage any nausea with oral antiemetic. Follow up requested for Dr. Shahbaz Bullock early next week and with Dr. Nisha Batista for staple removal 7-10 days. Patient and wife are advised to call with any concerning symptoms. Adenocarcinoma of upper GI origin, diffuse peritoneal metastasis  5/29 POD 2. Biopsy results pending. 6/2 Has venting G tube. Starting TPN. Biopsy omentum + adenocarcinoma, IHC with upper GI primary. Case discussed with surgery. Dr. Flakito Diaz recommends allow ~another week for recovery prior to starting chemo. He is being discharged once home TPN is set up. We had extensive conversation regarding palliative treatment. Goals and plan of care reviewed with the patient wife and sister. We will arrange for follow up with Dr. Shahbaz Bullock ~one week. 6/7 Was to be [facility] by surgery yesterday. However, ongoing intractable pain. Discussed with Dr Eliana Sousa 6/6, per Dr Flakito Diaz ok to proceed with chemotherapy given ongoing pain/symptoms related to peritoneal metastasis. Asked to assume care for potential chemotherapy. Likely will proceed with dose-reduced FOLFIRINOX.  6/8 Discussed with Dr Shahbaz Bullock yesterday, pending treatment plan for FOLFIRINOX. However, during chemo-ed today patient expresses desire to think about this further. May need to f/u with Dr Shahbaz Bullock as outpatient for chemotherapy. NGS testing request sent to navigation.   6/9 Is agreeable to receiving C1 FOLFIRINOX today - labs pending. 6/10 C1D1 FOLFIRINOX  6/11 tolerating FOLFIRINOX well-notes mental changes that have resolved  6/12  C1 FOLFIRINOX completed     Peritoneal carinomatosis / protein calorie malnutrition  - Secondary to peritoneal disease  - Venting G-tube  - TPN (approved for home use)  - RD following  6/11 continue on TPN     Cancer-related pain  - PC following  - Fentanyl patch and Intensol PO  6/10 Continues to require IV pain meds. PC following  6/11 Fent 100 mcg, yesterday transitioned to oral Intensol 40 mg Q4H prn, has requested 1 dose   6/13 last dose IV dilaudid yesterday morning     Nausea  6/13 schedule zofran. Added phenergan prn.      Constipation  6/13 wife reports that patient finally had BM last pm. Stated that he had not gone since 5/20. Consults:  IP CONSULT TO ONCOLOGY  IP CONSULT TO PALLIATIVE CARE  IP CONSULT TO CASE MANAGEMENT  IP CONSULT TO DIETITIAN  FOLLOW UP VISIT  IP CONSULT HOME HEALTH  IP CONSULT TO DIETITIAN  IP CONSULT TO SPIRITUAL SERVICES    Pertinent Diagnostic Studies:   Labs:    Recent Labs     06/12/22 0327 06/13/22  0426 06/14/22  0522   WBC 7.6 6.3 6.9   HGB 12.4* 12.5* 13.2*    203 207      Recent Labs     06/12/22 0327 06/13/22  0426 06/14/22  0522    139 139   K 3.9 3.8 3.8    103 103   CO2 29 29 27   BUN 17 16 17   MG  --  2.5* 2.4   PHOS  --  4.0* 3.3        Medication List      START taking these medications    busPIRone 10 MG tablet  Commonly known as: BUSPAR  Take 1 tablet by mouth 3 times daily     escitalopram 10 MG tablet  Commonly known as: Lexapro  Take 1 tablet by mouth daily     fentaNYL 100 MCG/HR  Commonly known as: DURAGESIC  Place 1 patch onto the skin every 72 hours for 12 days. * LORazepam 1 MG tablet  Commonly known as: ATIVAN  Take 1 tablet by mouth every 8 hours as needed for Anxiety (nausea) for up to 30 days.      nicotine 14 MG/24HR  Commonly known as: NICODERM CQ  Place 1 patch onto the skin daily  Start taking on: Becky 15, 2022     ondansetron 8 MG Tbdp disintegrating tablet  Commonly known as: ZOFRAN-ODT  Take 1 tablet by mouth every 8 hours     * oxyCODONE 100 MG/5ML concentrated solution  Commonly known as: ROXICODONE INTENSOL  Take 1 mL by mouth every 4 hours as needed for Pain for up to 14 days. promethazine 12.5 MG tablet  Commonly known as: PHENERGAN  Take 1 tablet by mouth every 6 hours as needed for Nausea         * This list has 2 medication(s) that are the same as other medications prescribed for you. Read the directions carefully, and ask your doctor or other care provider to review them with you. CONTINUE taking these medications    Anoro Ellipta 62.5-25 MCG/INH Aepb inhaler  Generic drug: umeclidinium-vilanterol     dicyclomine 10 MG capsule  Commonly known as: BENTYL     naloxone 4 MG/0.1ML Liqd nasal spray     pantoprazole 40 MG tablet  Commonly known as: PROTONIX     polyethylene glycol 17 GM/SCOOP powder  Commonly known as: GLYCOLAX     rosuvastatin 10 MG tablet  Commonly known as: CRESTOR        STOP taking these medications    acetaminophen 500 MG tablet  Commonly known as: TYLENOL     amLODIPine-benazepril 5-20 MG per capsule  Commonly known as: LOTREL     Carafate 1 GM/10ML suspension  Generic drug: sucralfate     diclofenac sodium 1 % Gel  Commonly known as: VOLTAREN     ibuprofen 200 MG tablet  Commonly known as: ADVIL;MOTRIN     oxyCODONE-acetaminophen 5-325 MG per tablet  Commonly known as: PERCOCET     Tylenol PM Extra Strength  MG tablet  Generic drug: diphenhydrAMINE-APAP (sleep)        ASK your doctor about these medications    * LORazepam 1 MG tablet  Commonly known as: ATIVAN  Take 1 tablet by mouth every 8 hours as needed for Anxiety for up to 5 days. Ask about: Should I take this medication?      * oxyCODONE 100 MG/5ML concentrated solution  Commonly known as: ROXICODONE INTENSOL  Place 1.25 mLs under the tongue every 4 hours as needed for Pain for up to 4 days. Ask about: Should I take this medication? * This list has 2 medication(s) that are the same as other medications prescribed for you. Read the directions carefully, and ask your doctor or other care provider to review them with you. Where to Get Your Medications      These medications were sent to [facility]/pharmacy #[id number], [facility]    Phone: [phone]   · busPIRone 10 MG tablet  · escitalopram 10 MG tablet  · fentaNYL 100 MCG/HR  · LORazepam 1 MG tablet  · LORazepam 1 MG tablet  · nicotine 14 MG/24HR  · ondansetron 8 MG Tbdp disintegrating tablet  · oxyCODONE 100 MG/5ML concentrated solution  · oxyCODONE 100 MG/5ML concentrated solution  · promethazine 12.5 MG tablet         OBJECTIVE:  Patient Vitals for the past 8 hrs:   BP Temp Temp src Pulse SpO2   22 1107 117/81 98.6 °F (37 °C) Oral 70 94 %   22 0718 111/77 98.5 °F (36.9 °C) Oral 73 92 %     Temp (24hrs), Av.1 °F (36.7 °C), Min:97.7 °F (36.5 °C), Max:98.6 °F (37 °C)     0701 -  1900  In: 166 [I.V.:166]  Out: -     Physical Exam:  Constitutional: Well developed,male in no acute distress, lying comfortably in the hospital bed. HEENT: Normocephalic and atraumatic. Oropharynx is clear, mucous membranes are moist.  Sclerae anicteric. Skin Warm and dry. No bruising and no rash noted. No erythema. No pallor. Respiratory Lungs are clear to auscultation bilaterally without wheezes, rales or rhonchi, normal air exchange without accessory muscle use. CVS Normal rate, regular rhythm and normal S1 and S2. No murmurs, gallops, or rubs. Abdomen Soft  Non distended. G tube to drainage bag. Staples-intact no sign of infection   Neuro Grossly nonfocal with no obvious sensory or motor deficits. MSK Normal range of motion in general.  No edema and no tenderness.    Psych Appropriate mood and affect. ASSESSMENT:    Principal Problem:    Carcinomatosis ([address]  Active Problems:    Severe protein-calorie malnutrition ([address]    Abdominal mass    Debility    Encounter for palliative care    Itching    Fatigue    Abdominal carcinomatosis (HCC)    Goals of care, counseling/discussion    On total parenteral nutrition    Pain, abdominal, LUQ    Nausea    Anxiety about dying    Depression  Resolved Problems:    * No resolved hospital problems. *      DISPOSITION:  Follow up requested for Dr. Ernst Allen early next week and with Dr. Uriel Mayes for staple removal 7-10 days. I have reviewed the patient's controlled substance prescription history, as maintained in the [place] prescription monitoring program, so that the prescriptions(s) for a controlled substance can be given. Over 60 minutes was spent in discharge planning and coordination of care.             LUCY Justice NP  [facility] Hematology & Oncology  [address] [address]  Office : [phone]  Fax : [phone]

## 2022-06-14 NOTE — ADT AUTH CERT
Utilization Reviews         Intestinal Obstruction - Care Day 23 (6/12/2022) by Audrey Cotton RN       Review Status Review Entered   Completed 6/13/2022 19:17      Criteria Review      Care Day: 23 Care Date: 6/12/2022 Level of Care: Inpatient Floor    Guideline Day 2    Level Of Care    (X) Floor    Clinical Status    (X) * Hypotension absent    6/13/2022 7:17 PM EDT by Tanna Armstrong      /82    ( ) * Nausea and vomiting absent or improved    ( ) * Pain absent or managed    (X) * Abdominal exam stable or improved    Activity    (X) Activity as tolerated    6/13/2022 7:17 PM EDT by Tanna Armstrong      activity as tolerated    Routes    (X) IV medications    6/13/2022 7:17 PM EDT by Tanna Armstrong      fat emulsion 250mL IV daily, protonix 40mg IV daily, TPN 85mL/hr, dilaudid 2mg IV X1, ativan 1mg IV X1, zofran 4mg IV Q6h PRN X3, compazine 10mg IV X1    (X) Diet as tolerated    6/13/2022 7:17 PM EDT by Tanna Armstrong      clear liquid diet, TPN/lipids    Interventions    ( ) * NG tube absent    * Milestone   Additional Notes   6/12/22      Vitals: 97.4, 71, 18, 128/82, 96%   Meds: lovenox 40mg SC Q24h, fentanyl patch Q72h, Labs: cr 0.60, glucose 121, AST 45, hgb 12.4, hct 37.9   Plan: clear liquid diet, PT/OT      Oncology:   VSS, afebrile   Pain controlled    Continues TPN    D3C1 FOLFIRINOX    Nausea              PLAN:       Adenocarcinoma of upper GI origin, diffuse peritoneal metastasis   5/29 POD 2. Biopsy results pending. 6/2 Has venting G tube. Starting TPN. Biopsy omentum + adenocarcinoma, ICH with upper GI primary. Case discussed with surgery. Dr. Dustin Pedersen recommends allow ~another week for recovery prior to starting chemo. He is being discharged once home TPN is set up. We had extensive conversation regarding palliative treatment. Goals and plan of care reviewed with the patient wife and sister. We will arrange for follow up with Dr. Brendon Pittman ~one week. 6/7 Was to be [facility] by surgery yesterday. However, ongoing intractable pain. Discussed with Dr Sherryle Canterbury 6/6, per Dr Kiley Haddad ok to proceed with chemotherapy given ongoing pain/symptoms related to peritoneal metastasis. Asked to assume care for potential chemotherapy. Likely will proceed with dose-reduced FOLFIRINOX.   6/8 Discussed with Dr Vidal Stevenson yesterday, pending treatment plan for FOLFIRINOX. However, during chemo-ed today patient expresses desire to think about this further. May need to f/u with Dr Vidal Stevenson as outpatient for chemotherapy. NGS testing request sent to navigation. 6/9 Is agreeable to receiving C1 FOLFIRINOX today - labs pending. 6/10 C1D1 FOLFIRINOX   6/11 tolerating FOLFIRINOX well-notes mental changes that have resolved   6/12 will complete C1 FOLFIRINOX today, mild nausea       Peritoneal carinomatosis / protein calorie malnutrition   - Secondary to peritoneal disease   - Venting G-tube   - TPN (approved for home use)   - RD following   6/11 continue on TPN       Cancer-related pain   - PC following   - Fentanyl patch and Intensol PO   6/10 Continues to require IV pain meds.  PC following   6/11 Fent 100 mcg, yesterday transitioned to oral Intensol 40 mg Q4H prn, has requested 1 dose    6/12 Received 2 doses of 20 mg intensol and 1 dose of dilaudid 2mg IV both yesterday and this AM, continue on fentanyl        Continues home meds   Araseli SOPs   VTE prophylaxis - Lovenox      Dispo - DC home with HH and TPN once pain controlled on oral regimen.            Intestinal Obstruction - Care Day 22 (6/11/2022) by Liliane Aleman RN       Review Status Review Entered   Completed 6/13/2022 17:40      Criteria Review      Care Day: 22 Care Date: 6/11/2022 Level of Care: Inpatient Floor    Guideline Day 2    Level Of Care    (X) Floor    6/13/2022 5:39 PM EDT by Devaughn Mccurdy      medical floor    Clinical Status    (X) * Hypotension absent    6/13/2022 5:39 PM EDT by Devaughn Mccurdy      /83    ( ) * Nausea and vomiting absent or improved    ( ) * Pain absent or managed    (X) * Abdominal exam stable or improved    Activity    (X) Activity as tolerated    6/13/2022 5:39 PM EDT by Renetta Estevez      activity as tolerated with assist    Routes    (X) IV medications    6/13/2022 5:39 PM EDT by Renetta Estevez      fat emulsion 250mL IV daily, adrucil 2525mg IV Q24h, protonix 40mg IV daily, TPN 85mL/hr, dilaudid 2mg IV X1, zofran 4mg IV X1,   compazine 10mg IV Q6h PRN X2    (X) Diet as tolerated    6/13/2022 5:39 PM EDT by Renetta Estevez      clear liquid diet    Interventions    ( ) * NG tube absent    * Milestone   Additional Notes   6/11/22      Vitals: 97.8, 73, 18, 131/83, 93%   Meds: lovenox 40mg SC q24h, zofran 4mg PO x1, roxicodone 20mg PO X2,    Labs: Cr 0.60, glucose 166, AST 14, hgb 12.7, hct 38.5   Plan: PT/OT      Oncology:   VSS, afebrile   Pain-adjusted yesterday by PC-now on Fent and oral intensol    Continues TPN    D2C1 FOLFIRINOX       Adenocarcinoma of upper GI origin, diffuse peritoneal metastasis   6/11 tolerating FOLFIRINOX well-notes mental changes that have resolved       Peritoneal carinomatosis / protein calorie malnutrition   - Secondary to peritoneal disease   - Venting G-tube   - TPN (approved for home use)   - RD following   6/11 continue on TPN       Cancer-related pain   - PC following   - Fentanyl patch and Intensol PO   6/11 Fent 100 mcg, yesterday transitioned to oral Intensol 40 mg Q4H prn, has requested 1 dose        Continues home meds   Araseli Yancey   VTE prophylaxis - Lovenox       Dispo - DC home with HH and TPN once pain controlled on oral regimen.

## 2022-06-14 NOTE — PROGRESS NOTES
OCCUPATIONAL THERAPY Initial Assessment, Daily Note and Discharge       OT Visit Days: 1  Acknowledge Orders  Time  OT Charge Capture  Rehab Caseload Tracker      Josiane Quach is a 62 y.o. male   PRIMARY DIAGNOSIS: Carcinomatosis ([address]  Carcinomatosis ([address] [C80.0]  Procedure(s) (LRB):  PORT INSERTION (N/A)  11 Days Post-Op  Reason for Referral: Generalized Muscle Weakness (M62.81)  Difficulty in walking, Not elsewhere classified (R26.2)  Other abnormalities of gait and mobility (R26.89)  Inpatient: Payor: [facility] / Plan: Gadiel Troy / Product Type: *No Product type* /     ASSESSMENT:     REHAB RECOMMENDATIONS:   Recommendation to date pending progress:  Setting:   No further skilled therapy after discharge from hospital    Equipment:     None--recommended using SC pt already has      ASSESSMENT:  Mr. Evans is a 63 y/o male presents with severe abdominal pain and found to have peritoneal carcinomatosis. Pt now has G-tube and will be going home with TPN. Pt lives with his wife and will have family support at d/c. Today pt able to perform functional transfers and mobility of household distances managing IV pole with supervision initially to assess balance then progressed to independent. Pt performed LE dressing with mod I sitting EOB and toilet transfer with supervision. Pt educated on energy conservation techniques for ADLs in prep for d/c--pt verbalized understanding. Pt does not have acute OT needs or OT needs at d/c. Will discontinue.       [employer] AM-PAC 6 Clicks Daily Activity Inpatient Short Form:    AM-PAC Daily Activity Inpatient   How much help for putting on and taking off regular lower body clothing?: None  How much help for Bathing?: None  How much help for Toileting?: None  How much help for putting on and taking off regular upper body clothing?: None  How much help for taking care of personal grooming?: None  How much help for eating meals?: None  AM-PAC Inpatient Daily Activity Raw Score: 24  AM-PAC Inpatient ADL T-Scale Score : 57.54  ADL Inpatient CMS 0-100% Score: 0  ADL Inpatient CMS G-Code Modifier : CH           SUBJECTIVE:     Mr. Robbie Mahajan states, \"I am so ready to be home\"     Social/Functional Lives With: Spouse  Type of Home: House  Home Layout: One level  Home Access: Stairs to enter with rails  Entrance Stairs - Number of Steps: 6  Entrance Stairs - Rails: Both  Bathroom Shower/Tub: Walk-in shower  Bathroom Toilet: Standard  Bathroom Equipment: Shower chair  Bathroom Accessibility: Accessible  Receives Help From: Family  ADL Assistance: Independent  Homemaking Assistance: Independent  Ambulation Assistance: Independent  Transfer Assistance: Independent  Active : Yes  Mode of Transportation: Car    OBJECTIVE:     Alexis Delacruz / Lu James / Guzman Floresuffer: IV and G-tube    RESTRICTIONS/PRECAUTIONS:  Restrictions/Precautions: None    PAIN: VITALS / O2:   Pre Treatment:   Pain Assessment: None - Denies Pain      Post Treatment: no c/o pain, resting comfortably in bedside chair       Vitals          Oxygen            GROSS EVALUATION: INTACT IMPAIRED   (See Comments)   UE AROM [x] []   UE PROM [x] []   Strength [x]       Posture / Balance [x]     Sensation [x]     Coordination [x]       Tone [x]       Edema [x]    Activity Tolerance [x]       Hand Dominance R [] L []      COGNITION/  PERCEPTION: INTACT IMPAIRED   (See Comments)   Orientation [x]     Vision [x]     Hearing [x]     Cognition  [x]     Perception [x]       MOBILITY: I Mod I S SBA CGA Min Mod Max Total  NT x2 Comments:   Bed Mobility    Rolling [] [] [] [] [] [] [] [] [] [x] []    Supine to Sit [x] [] [] [] [] [] [] [] [] [] []    Scooting [x] [] [] [] [] [] [] [] [] [] []    Sit to Supine [] [] [] [] [] [] [] [] [] [x] []    Transfers    Sit to Stand [x] [] [x] [] [] [] [] [] [] [] [] No AD   Bed to Chair [x] [] [x] [] [] [] [] [] [] [] [] Managing IV pole   Stand to Sit [x] [] [x] [] [] [] [] [] [] [] [] No AD   Tub/Shower [] [] [] [] [] [] [] [] [] [x] []     Toilet [x] [] [x] [] [] [] [] [] [] [] []  No AD    [] [] [] [] [] [] [] [] [] [x] []    I=Independent, Mod I=Modified Independent, S=Supervision/Setup, SBA=Standby Assistance, CGA=Contact Guard Assistance, Min=Minimal Assistance, Mod=Moderate Assistance, Max=Maximal Assistance, Total=Total Assistance, NT=Not Tested    ACTIVITIES OF DAILY LIVING: I Mod I S SBA CGA Min Mod Max Total NT Comments   BASIC ADLs:              Upper Body Bathing  [] [] [] [] [] [] [] [] [] [x]    Lower Body Bathing [] [] [] [] [] [] [] [] [] [x]    Toileting [] [] [] [] [] [] [] [] [] [x]    Upper Body Dressing [] [x] [] [] [] [] [] [] [] [] Donning gown seated EOB   Lower Body Dressing [] [x] [] [] [] [] [] [] [] [] Donning socks seated EOB   Feeding [] [] [] [] [] [] [] [] [] [x]    Grooming [] [] [] [] [] [] [] [] [] [x]    Personal Device Care [] [] [] [] [] [] [] [] [] [x]    Functional Mobility [x] [] [x] [] [] [] [] [] [] [] Managing IV pole   I=Independent, Mod I=Modified Independent, S=Supervision/Setup, SBA=Standby Assistance, CGA=Contact Guard Assistance, Min=Minimal Assistance, Mod=Moderate Assistance, Max=Maximal Assistance, Total=Total Assistance, NT=Not Tested    PLAN:     FREQUENCY/DURATION   OT Plan of Care:  (eval & d/c) for duration of hospital stay or until stated goals are met, whichever comes first.    ACUTE OCCUPATIONAL THERAPY GOALS:   (Developed with and agreed upon by patient and/or caregiver.)  No goals, evaluation and discharge.  Pt at functional baseline      PROBLEM LIST:   (Skilled intervention is medically necessary to address:)  N/A   INTERVENTIONS PLANNED:  (Benefits and precautions of occupational therapy have been discussed with the patient.)  N/A         TREATMENT:     EVALUATION: LOW COMPLEXITY: (Untimed Charge)    TREATMENT:   Self Care (12 minutes): Patient participated in toileting, upper body dressing and lower body dressing ADLs in unsupported sitting with no   cueing to increase independence, decrease assistance required, increase activity tolerance and increase safety awareness. Patient also participated in functional mobility, functional transfer and energy conservation to increase independence and decrease assistance required. TREATMENT GRID:  N/A    AFTER TREATMENT PRECAUTIONS: Call light within reach, Chair, Needs within reach, RN notified and Visitors at bedside    INTERDISCIPLINARY COLLABORATION:  RN/ PCT, PT/ PTA and OT/ MULLINS    EDUCATION:  Education Given To: Patient; Family  Education Provided: Role of Therapy; Energy Conservation  Education Method: Verbal  Education Outcome: Verbalized understanding    TOTAL TREATMENT DURATION AND TIME:  Time In: 1010  Time Out: [address]  Minutes: [facility] [facility],Sixth Floor, OT

## 2022-06-14 NOTE — PROGRESS NOTES
Nutrition Brief Note:    Patient seen and discussed with RN, Marva Heath and NP, Kathy Mancilla. Pain and nausea controlled on PO medication. Patient to d/c home today with TPN. Will not re-order for this evening.     Jaison Baca

## 2022-06-14 NOTE — PLAN OF CARE
Problem: Discharge Planning  Goal: Discharge to home or other facility with appropriate resources  Outcome: Progressing     Problem: Pain  Goal: Verbalizes/displays adequate comfort level or baseline comfort level  Outcome: Progressing     Problem: Safety - Adult  Goal: Free from fall injury  Outcome: Progressing     Problem: ABCDS Injury Assessment  Goal: Absence of physical injury  Outcome: Progressing     Problem: Skin/Tissue Integrity  Goal: Absence of new skin breakdown  Description: 1. Monitor for areas of redness and/or skin breakdown  2. Assess vascular access sites hourly  3. Every 4-6 hours minimum:  Change oxygen saturation probe site  4. Every 4-6 hours:  If on nasal continuous positive airway pressure, respiratory therapy assess nares and determine need for appliance change or resting period.   Outcome: Progressing     Problem: Discharge Planning  Goal: Discharge to home or other facility with appropriate resources  Outcome: Progressing     Problem: Discharge Planning  Goal: Discharge to home or other facility with appropriate resources  Outcome: Progressing

## 2022-06-15 ENCOUNTER — CARE COORDINATION (OUTPATIENT)
Dept: OTHER | Facility: CLINIC | Age: 62
End: 2022-06-15

## 2022-06-15 NOTE — CARE COORDINATION
[facility] Transitions Initial Follow Up Call    Call within 2 business days of discharge: Yes    Patient: Archie Bonds Patient : 1960   MRN: [id number]  Reason for Admission: Abdominal pain  Discharge Date: 22 RARS: Readmission Risk Score: 12.7 ( )      Last Discharge  [address]       Complaint Diagnosis Description Type Department Provider    22 OTHER Peritoneal metastases ([facility] [address] . .. ED to Hosp-Admission (Discharged) Brandon Beyer MD; Emily Solano. .. Transitions of Care Initial Call    Was this an external facility discharge? No Discharge Facility: [facility]    Challenges to be reviewed by the provider   Additional needs identified to be addressed with provider: No  none             Method of communication with provider : none    Advance Care Planning:   Does patient have an Advance Directive: Not on file; family plans to discuss with provider. Care Transition Nurse contacted the family by telephone to perform post hospital discharge assessment. Verified name and  with family as identifiers. Provided introduction to self, and explanation of the CTN role. CTN completed outreach with the patient's spouse, Elli Landin (on [place]) as patient was resting at the time of outreach. CTN reviewed discharge instructions, medical action plan and red flags with family who verbalized understanding. Family given an opportunity to ask questions and does not have any further questions or concerns at this time. Were discharge instructions available to patient? Yes. Reviewed appropriate site of care based on symptoms and resources available to patient including: PCP  Specialist  Home health  When to call [address]  Condition related references. The family agrees to contact the PCP office for questions related to their healthcare. Medication reconciliation was performed with family, who verbalizes understanding of administration of home medications.  Family reports they will be picking up new medications from the pharmacy today and will start. Was patient discharged with a pulse oximeter? no    Non-face-to-face services provided:  Scheduled appointment with PCP-Family declined at this time, patient has follow up in Aug. Family prefers to follow up with Oncology and Surgery first.  Scheduled appointment with Specialist-Oncology will contact family with a follow up per availability  Obtained and reviewed discharge summary and/or continuity of care documents  Education of patient/family/caregiver/guardian to support self-management-of medical conditions, medication management, and symptoms management. Family states they have supplies for TPN from [facility] and [facility] with start of care for today per staff at agency. Care Transitions 24 Hour Call    Schedule Follow Up Appointment with PCP: Completed  Do you have a copy of your discharge instructions?: Yes  Do you have all of your prescriptions and are they filled?: No  Have you been contacted by a [employer] [address]?: No  Have you scheduled your follow up appointment?: Yes  How are you going to get to your appointment?: Car - family or friend to transport  Do you have support at home?: Partner/Spouse/SO, Child  Do you feel like you have everything you need to keep you well at home?: Yes  Are you an active caregiver in your home?: No  Care Transitions Interventions         Follow Up  Future Appointments   Date Time Provider Monica Allen   6/23/2022 10:45 AM DO PEDRO Nelson   8/22/2022  4:00 PM MD MAN Boswell     CTN provided contact information. Plan for follow-up call in 5-7 days based on severity of symptoms and risk factors. Plan for next call: family management of medical conditions and medication management.   Jesus Armijo RN

## 2022-06-16 ENCOUNTER — TELEPHONE (OUTPATIENT)
Dept: FAMILY MEDICINE CLINIC | Facility: CLINIC | Age: 62
End: 2022-06-16

## 2022-06-16 NOTE — TELEPHONE ENCOUNTER
Zara Bosch with [facility] called stating that they have received orders for pt and wanted to know if Dr. Segundo Hughes will sign plan of care orders. I called and gave them the verbal orders for this.

## 2022-06-21 ENCOUNTER — CARE COORDINATION (OUTPATIENT)
Dept: OTHER | Facility: CLINIC | Age: 62
End: 2022-06-21

## 2022-06-21 ENCOUNTER — TELEPHONE (OUTPATIENT)
Dept: FAMILY MEDICINE CLINIC | Facility: CLINIC | Age: 62
End: 2022-06-21

## 2022-06-21 LAB
FUNGAL CULT/SMEAR: NORMAL
FUNGUS (MYCOLOGY) CULTURE: NEGATIVE
FUNGUS SMEAR: NORMAL
REFLEX TO ID: NORMAL
SPECIMEN PROCESSING: NORMAL
SPECIMEN SOURCE: NORMAL
SPECIMEN SOURCE: NORMAL

## 2022-06-21 NOTE — CARE COORDINATION
[facility] Transitions Follow Up Call    2022    Patient: Juhi Estrada  Patient : 1960   MRN: [id number]  Reason for Admission:Abdominal pain, Carcinomatosis   Discharge Date: 22 RARS: Readmission Risk Score: 12.7 ( )    Care Transitions Follow Up Call    Needs to be reviewed by the provider   Additional needs identified to be addressed with provider: No  none             Method of communication with provider : none      Care Transition Nurse contacted the patient by telephone to follow up after admission on 22. Verified name and  with patient as identifiers. Addressed changes since last contact: Patient denies any new or worsening symptoms at this time. Patient reports he continues to TPN and is engaged with [facility]. Patient has good support ffrom his spouse who continues to assist with medications and care. Patient has appointments with Palliative, Oncology, Surgeon, and GI for follow up. Discussed follow-up appointments. If no appointment was previously scheduled, appointment scheduling offered: Yes. Is follow up appointment scheduled within 7 days of discharge? 8 days. CTN reviewed discharge instructions, medical action plan and red flags with patient and discussed any barriers to care and/or understanding of plan of care after discharge. Discussed appropriate site of care based on symptoms and resources available to patient including: PCP  Specialist  Home health  When to call [address]  Condition related references. The patient agrees to contact the PCP office for questions related to their healthcare.      Patients top risk factors for readmission: medical condition-Carcinomatosis, protein calorie malnutrition, Abdominal mass, TPN, Depression, HTN, PUD, GERD, HLD, Diverticulitis, HLD  Interventions to address risk factors: Scheduled appointment with Specialist-Oncology, Palliative, GI, Surgeon, Obtained and reviewed discharge summary and/or continuity of care documents and Education of patient/family/caregiver/guardian to support self-management-of medical conditions. Care Transitions Subsequent and Final Call    Schedule Follow Up Appointment with PCP: Completed  Subsequent and Final Calls  Do you have any ongoing symptoms?: Yes  Onset of Patient-reported symptoms: In the past 7 days  Patient-reported symptoms: Nausea  Have your medications changed?: No  Do you have any questions related to your medications?: No  Do you currently have any active services?: Yes  Are you currently active with any services?: Home Health  Do you have any needs or concerns that I can assist you with?: No  Identified Barriers: None  Care Transitions Interventions  Other Interventions: Follow Up  Future Appointments   Date Time Provider Monica Allen   6/23/2022 10:45 AM Getachew kennedy DO [facility]   6/24/2022  1:30 PM LUCY Sandoval - CNP [facility]   6/24/2022  2:00 PM Kyle Anne MD [facility]   8/22/2022  4:00 PM Nano Lopez MD [facility]     Non-[facility] follow up appointment(s): no    CTN provided contact information for future needs. Plan for follow-up call in 7-10 days based on severity of symptoms and risk factors. Plan for next call: family management of medical conditions and follow up appointments.       Mary Perez RN

## 2022-06-23 NOTE — PROGRESS NOTES
[facility] Hematology and Oncology: Established patient - follow up     Chief Complaint   Patient presents with    Follow-up        Reason for Referral: Abdominal pain; Concern  for peritoneal metastatic disease   Referring Michelet Mclean NP   Primary Care Provider: Melita Ramirez MD   Family History of Cancer/Hematologic Disorders: Family history is significant for sister with breast cancer. Presenting Symptoms: Progressively worsening, persistent abdominal pain x several months    History of Present Illness:  Mr. Medina Esparza  is a 62 y.o. male who presents today for FU regarding adenocarcinoma with peritoneal metastatic disease. The past medical history is significant for tobacco use (recent pack per day smoker x 30+ years - now down to 1/3PPD), PUD, paresthesia/pain of bilateral upper extremities, HLD, HTN, diverticulitis,  colon polyps, hiatal hernia, chronic right shoulder pain, bilateral carpal tunnel syndrome, and partial colon resection. He presented to the [facility] ED on 5/12/22 with a complaint of persistent abdominal pain for several months that was becoming progressively  worse.  EGD and colonoscopy on 2/25/22 revealed findings of hiatal hernia, gastric polyps, several benign colon polyps, diverticulosis, and internal hemorrhoids. CT of the abdomen on 3/8/22 showed no acute findings. He presented to [facility] ER x 2 for  evaluation (5/3/22 and 5/10/22).  RUQ abdominal ultrasound was completed on 5/3/22 in the ED at [facility] demonstrating no acute findings to explain patient's pain; probable hepatic  steatosis; small echogenic mural foci in the gallbladder which are nonmobile, likely representing cholesterol polyps, largest measuring 4 mm; and small volume simple ascites in the right upper quadrant and left lower quadrant, nonspecific.   CT AP with  contrast at [facility] ED 5/10/22 showed a partial small bowel obstruction; small to moderate amount of free fluid in the abdomen and pelvis; and nonspecific stranding of the omentum primarily in the left upper quadrant. Radiologist noted that follow-up  CT was recommended to differentiate passive congestion from omental disease.  On 5/11/22, abdominal series again showed multiple dilated small bowel loops with enteric contrast appearing to extend into the proximal colon, suggesting partial small  bowel obstruction. CT AP in the [facility] ED on 5/12/22 identified extensive peritoneal nodularity and mild ascites consistent with peritoneal  metastatic disease with primary lesion not definitely identified; dilated fluid-filled loops of small bowel possibly related to gastroenteritis with no definite obstruction and no obvious bowel mass; and sclerosis of S1 vertebral body. Radiologist recommended consideration of follow-up bone scan to assess for bone metastases. Patient was admitted to  the Hospitalist service on 5/12/22, and IR consulted for possible paracentesis however very small volume was inaccessible.  CT Chest obtained on 5/13/22 showed No evidence of primary malignancy or metastasis within the chest.  Follow-up hepatobiliary  scan was suggested to assess for common bile duct obstruction. Oncology was consulted but did not see patient inhouse as pt was dischared. Upon discharge on 5/14/22, patient was instructed to follow up with [facility]. During consultation, we discussed his workup. We looked at the images together demonstrating some of the findings concerning for peritoneal nodularity/peritoneal disease. Discussed anatomy of the findings utilizing images to help pt understand what we are looking at and suspecting. He and wife were appreciative of the time spent to review these. We also addressed pt's pain. We also reviewed images of sclerosing lesion - bone scan recommended. He also was recommended to undergo HIDA scan after recent US per PCP. He is tired of tests, frustrated.   Feelings validated and stressed importance of workup to determine why he has pain. Wt loss from 240 --> 209 noted and expressed concern to pt about this. Of note, prior akbar resection with Dr Greg Santana for diverticulitis per pt. Sent note to dr Alissa Joshua re pt's case to expedite workup with his agreement. Mr. Evans is a 62 y.o. 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 He underwent bx of omentum during hospitalization. Kenia Tam had ongoing abd pain despite best efforts to treat and underwent venting tube placement w continued optimization of pain meds. Ultimately, pt was started on dose modified FOLFIRINOX inpt (completed C1 6/12).  His pain med requirements improved significantly. we were able to convert to PO pain and anti-emetics before d/c.  TPN at home. Tx is palliative in nature. Today, he is here with his wife for HFU. He is doing ok,  Pain much better controlled on Fentanyl patch and PO meds; PC seeing pt today too - recs appreciated. Will be starting Zyprexa for nausea. He wishes to c/w tx. NGS testing pending and may help in modifying tx regimen going forward. no s/s of infection. Chronological Events:   EGD REPORT 2/25/22                      COLONOSCOPY REPORT 2/25/22                 CT ABDOMEN PELVIS W CONTRAST 3/8/2022   FINDINGS:   LOWER CHEST: Unremarkable. ABDOMEN AND PELVIS:   Liver: Unremarkable. Biliary system: Unremarkable.     Pancreas: Unremarkable. Spleen: Unremarkable. Adrenals: Unremarkable.     Genitourinary: Unremarkable. Reproductive organs: Unremarkable. Gastrointestinal tract: Colonic diverticulosis without evidence of diverticulitis. There is no evidence of bowel obstruction or inflammation. The appendix is normal   Peritoneum/Extraperitoneum: Unremarkable. No free fluid or air. Vascular: Unremarkable. Musculoskeletal:  Small fat-containing umbilical hernia. Degenerative  changes of thoracolumbar spine. No acute or aggressive osseous lesion.    IMPRESSION: Colonic diverticula without evidence of acute diverticulitis.        RIGHT UPPER QUADRANT ABDOMINAL ULTRASOUND 5/3/2022    FINDINGS:   Pancreas: Not visualized, obscured by bowel gas. Intrahepatic IVC: Normal.   Main  Portal Vein: Patent with normal directional flow. Liver: Liver contour is normal. Liver appears diffusely increased in echogenicity consistent with hepatic steatosis. There is fatty sparing around the gallbladder fossa. Gallbladder: Gallbladder  is normal in size. No evidence of gallbladder wall thickening. No gallstones are seen. There are several nonmobile echogenic mural foci in the proximal gallbladder body/neck, largest measuring 4 mm, without shadowing, likely small cholesterol polyps. Bile ducts: No evidence of biliary ductal dilation. The common bile duct measures 3 mm in diameter. Right kidney: Normal.   Left Upper Quadrant: Limited images of the left upper quadrant are unremarkable. Spleen measures 12.1 cm in length. Free fluid: Trace simple free fluid in the right upper quadrant and left lower quadrant. IMPRESSION:    1. No acute findings to explain patient's pain. 2. Probable hepatic steatosis. 3. Small echogenic mural foci in the gallbladder which are nonmobile, likely representing cholesterol polyps, largest measuring 4 mm. There are no specific ultrasound  follow up recommendations for polyps of this small size. 4. Small volume simple ascites in the right upper quadrant and left lower quadrant, nonspecific.        CT ABDOMEN - PELVIS WITH CONTRAST 5/10/22   FINDINGS:   Liver: Normal    Portal Vein: Normal   Gallbladder - Biliary Tree: Normal   Pancreas: Normal   Spleen: Normal   Retroperitoneum:   Adrenals: Normal   Kidneys:  Normal    Aorto - Cava:  Calcification of the abdominal aorta without aneurysm formation. Lymphatics:  Normal   GI - Mesentery - Peritoneum: Multiple dilated mid small bowel loops without a focal transition point.  The appendix is normal. Small to  moderate amount of free fluid in the pelvis and right flank. Nonspecific stranding of the omentum in the left upper quadrant. Small fatty umbilical hernia. Pelvis: Normal   Lower Chest: Normal   Soft tissues - MSK: Normal    IMPRESSION:   1. Partial small bowel obstruction. 2. Small to moderate amount of free fluid in the abdomen and pelvis. 3. Nonspecific stranding of the omentum primarily  in the left upper quadrant. Follow-up CT is recommended to differentiate passive congestion from omental a static disease.         ABDOMEN SERIES 5/11/22   FINDINGS:   Chest: Heart size within normal limits. Lungs are clear. No pleural effusion or pneumothorax. Bowel: Multiple dilated small bowel loops with air-fluid levels on the upright view. Contrast distends small bowel loops in the left lateral abdomen and lower abdomen. Contrast in the right hemiabdomen appears to be within proximal colon. Scattered colonic  gas. Peritoneum / Retroperitoneum: No pneumoperitoneum. Soft tissues / Bones: No acute osseous findings. The contrast in urinary bladder. Lines / Support Apparatus: None. IMPRESSION: Redemonstrated of multiple dilated small bowel loops with enteric contrast appearing to extend into the proximal colon, suggesting only partial small bowel obstruction. Continued radiographic follow-up is advised.        CT OF THE ABDOMEN AND PELVIS 5/12/22   FINDINGS:   LOWER CHEST: Normal.   HEPATOBILIARY: No evidence of focal liver mass. No calcified gallstones.    PANCREAS: Normal.   SPLEEN: Normal.    ADRENAL GLANDS: Normal.    KIDNEYS/BLADDER: Kidneys and bladder are unremarkable. No hydronephrosis or urinary tract calculi. BOWEL: Dilated fluid-filled loops of small bowel are present throughout the abdomen. No definite transition point. Oral contrast noted in the colon.  No definite evidence of bowel mass but there is extensive peritoneal nodularity and trace ascites highly  concerning for peritoneal metastatic disease. LYMPH NODES: No enlarged retroperitoneal or pelvic lymph nodes. Peritoneal nodularity again noted most likely metastatic disease.    VASCULATURE: Unremarkable.    PELVIC ORGANS: Prostate gland and rectum are unremarkable. Small amount of free pelvic fluid is noted.    MUSCULOSKELETAL: Degenerative spine changes are noted. Mild sclerosis involving the S1 vertebral body is present on image 74 of series 602.    IMPRESSION   1. Extensive peritoneal nodularity and mild ascites consistent with peritoneal metastatic disease. Primary lesion not definitely identified. 2. Dilated fluid-filled loops of small bowel possibly related to gastroenteritis. No definite obstruction. No obvious bowel mass. 3. Sclerosis S1 vertebral body. Consider follow-up bone scan to assess for bone metastases.       LIMITED ABDOMINAL ULTRASOUND 5/13/22   FINDINGS: A single limited gray scale image was submitted of an undisclosed location in the abdomen. Images demonstrate a small amount of  ascites as seen on comparison CT.    IMPRESSION   1. Small volume ascites.       CT CHEST WITH CONTRAST 5/13/22   FINDINGS:   Mediastinum and visualized thyroid: Normal.   Heart: Normal.    Large Vessels: Normal.    Pleura: Normal.    Lungs: No lung mass clearly discerned. Mild scarring or atelectasis in the right lung base. No suspicious lung nodule. No consolidation or zulma pulmonary edema.    Airways: Normal.    Lymph nodes: Normal.    Bones/Soft tissues: Normal.    Visualized abdomen: Partially imaged omental nodules. Perihepatic ascites noted.    IMPRESSION: No evidence of primary malignancy or metastasis within the chest       ABDOMINAL ULTRASOUND 5/17/22   FINDINGS:    LIVER: 17.3 cm.  Slight increase in echogenicity without focal mass. BILE DUCTS: No intrahepatic bile duct dilatation.  CBD diameter = 8 mm. GALLBLADDER: It is unremarkable in appearance without stones or sludge. Echogenic polyp measures 0.5 cm.  No gallbladder wall thickening. Mild ascites. PANCREAS: Normal.   SPLEEN: Borderline enlarged at 12.2 cm. RIGHT KIDNEY: 13.3 cm.  No mass or hydronephrosis. LEFT KIDNEY: 14.3 cm.  No mass or hydronephrosis. ABDOMINAL AORTA AND IVC: Normal in size. ASCITES: Mild   IMPRESSION: Possible mild fatty infiltration liver. No focal mass. Gallbladder polyp but no stones or gallbladder wall thickening. Mild ascites. Mildly prominent common bile duct.  Follow-up hepatobiliary scan could be performed to assess for common bile duct obstruction.        04/02/2021 (COVID-19, Moderna, Primary or Immunocompromised Series, MRNA, PF, 100mcg/0.5mL)   04/30/2021 (COVID-19, Moderna, Primary or Immunocompromised Series, MRNA, PF, 100mcg/0.5mL)   11/16/2021 (COVID-19, Moderna Booster, PF, 0.25mL Dose)      5/18/22 heme/onc consultation   5/20/22 Dr Alexandra Faustin consult - sent to ED for admission/workup   5/23/22 omental bx - IR   5/27/22 Dr Alexandra Faustin - diagnostic lap, omental mass and mesentery mass excision, G-tube placement for venting  6/1/22 painful G-tube - tract recanalized and new G-tube placed   6/12/22 C1 FOLFIRINOX completed - dose adjusted   6/14/22 d/c   6/24/22 FU sx - G tube maint instructions/staples removed - RTC PRN   6/24/22 FU after hospitalization - discussed PC/plan for tx      Family History   Problem Relation Age of Onset    No Known Problems Brother     Cancer Sister         breast    Hypertension Mother     Heart Disease Mother     Diabetes Father     Osteoarthritis Father     Alcohol Abuse Father     Hypertension Father     Diabetes Mother       Social History     Socioeconomic History    Marital status:      Spouse name: None    Number of children: None    Years of education: None    Highest education level: None   Occupational History    None   Tobacco Use    Smoking status: Current Every Day Smoker     Packs/day: 1.00    Smokeless tobacco: Never Used   Substance and Sexual Activity    Alcohol use: No    Drug use: No    Sexual activity: None   Other Topics Concern    None   Social History Narrative    None     Social Determinants of Health     Financial Resource Strain:     Difficulty of Paying Living Expenses: Not on file   Food Insecurity:     Worried About Running Out of Food in the Last Year: Not on file    Briseida of Food in the Last Year: Not on file   Transportation Needs:     Lack of Transportation (Medical): Not on file    Lack of Transportation (Non-Medical): Not on file   Physical Activity:     Days of Exercise per Week: Not on file    Minutes of Exercise per Session: Not on file   Stress:     Feeling of Stress : Not on file   Social Connections:     Frequency of Communication with Friends and Family: Not on file    Frequency of Social Gatherings with Friends and Family: Not on file    Attends Quaker Services: Not on file    Active Member of [employer] Group or Organizations: Not on file    Attends Club or Organization Meetings: Not on file    Marital Status: Not on file   Intimate Partner Violence:     Fear of Current or Ex-Partner: Not on file    Emotionally Abused: Not on file    Physically Abused: Not on file    Sexually Abused: Not on file   Housing Stability:     Unable to Pay for Housing in the Last Year: Not on file    Number of [place] in the Last Year: Not on file    Unstable Housing in the Last Year: Not on file        Review of Systems   Constitutional: Positive for fatigue. Negative for appetite change, diaphoresis, fever and unexpected weight change. HENT:   Negative for sore throat. Eyes: Negative for eye problems and icterus. Respiratory: Negative for cough, hemoptysis and shortness of breath. Cardiovascular: Negative for chest pain, leg swelling and palpitations. Gastrointestinal: Positive for abdominal pain and nausea. Negative for abdominal distention, blood in stool, constipation, diarrhea and vomiting. Endocrine: Negative for hot flashes.    Genitourinary: Negative for dysuria. Musculoskeletal: Positive for arthralgias. Negative for back pain and gait problem. Skin: Negative for itching, rash and wound. Neurological: Negative for dizziness, extremity weakness, gait problem, headaches, light-headedness, numbness, seizures and speech difficulty. Hematological: Negative for adenopathy. Does not bruise/bleed easily. Psychiatric/Behavioral: Positive for depression. Negative for decreased concentration and sleep disturbance. The patient is nervous/anxious.          No Known Allergies  Past Medical History:   Diagnosis Date    Bilateral carpal tunnel syndrome 12/9/2020    Chronic right shoulder pain 11/30/2020    Colon polyps 3/11/2013    Diverticulitis 3/11/2013    HTN (hypertension) 3/11/2013    Hyperlipidemia 3/11/2013    Paresthesia and pain of both upper extremities 11/30/2020    PUD (peptic ulcer disease) 3/11/2013    History of     Right elbow pain 12/9/2020    Wheezing 3/11/2013     Past Surgical History:   Procedure Laterality Date    COLONOSCOPY  2/25/09    colonoscopy per Dr. Sherol Osgood with need for repeat every 3 years    COLONOSCOPY  02/25/2022    Dr. Rinaldo Epley; polyps of the ascending, transverse, descending, and sigmoid colons; internal hemorrhoid; diverticulosis    LAPAROSCOPY N/A 5/27/2022    LAPAROSCOPY DIAGNOSTIC , OPEN EXPLORATORY LAPAROTOMY, MASS EXCISION, G-TUBE PLACEMENT performed by Kevin Ulloa MD at [address]  N/A 6/3/2022    PORT INSERTION performed by Kevin Ulloa MD at [facility] [address] UNLISTED  October 2004    partial colon resection per Dr. Mallika Meng  02/25/2022    Dr. Rinaldo Epley; hiatal hernia gastric polyps     Current Outpatient Medications   Medication Sig Dispense Refill    busPIRone (BUSPAR) 10 MG tablet Take 1 tablet by mouth 3 times daily 90 tablet 0    nicotine (NICODERM CQ) 14 MG/24HR Place 1 patch onto the skin daily 30 patch 3    ondansetron (ZOFRAN-ODT) 8 MG TBDP disintegrating tablet Take 1 tablet by mouth every 8 hours 90 tablet 0    promethazine (PHENERGAN) 12.5 MG tablet Take 1 tablet by mouth every 6 hours as needed for Nausea 90 tablet 0    LORazepam (ATIVAN) 1 MG tablet Take 1 tablet by mouth every 8 hours as needed for Anxiety (nausea) for up to 30 days. 60 tablet 0    escitalopram (LEXAPRO) 10 MG tablet Take 1 tablet by mouth daily 30 tablet 0    pantoprazole (PROTONIX) 40 MG tablet TAKE 1 TABLET BY MOUTH BEFORE BREAKFAST AND DINNER FOR 30 DAYS      dicyclomine (BENTYL) 10 MG capsule Take 20 mg by mouth every 6 hours       polyethylene glycol (GLYCOLAX) 17 GM/SCOOP powder Take 17 g by mouth daily      rosuvastatin (CRESTOR) 10 MG tablet Take 10 mg by mouth      fentaNYL (DURAGESIC) 100 MCG/HR Place 1 patch onto the skin every 72 hours for 15 days. 5 patch 0    OLANZapine zydis (ZYPREXA) 5 MG disintegrating tablet Take 1 tablet by mouth nightly Do not take with promethazine 30 tablet 3    naloxone 4 MG/0.1ML LIQD nasal spray 1 spray by Nasal route as needed for Opioid Reversal  (Patient not taking: Reported on 6/24/2022)      umeclidinium-vilanterol (ANORO ELLIPTA) 62.5-25 MCG/INH AEPB inhaler Inhale 1 puff into the lungs daily (Patient not taking: Reported on 6/24/2022)       No current facility-administered medications for this visit. No flowsheet data found. OBJECTIVE:  /81   Pulse 76   Temp 98.3 °F (36.8 °C)   Resp 16   Wt 184 lb 3.2 oz (83.6 kg)   SpO2 95%   BMI 26.43 kg/m²       ECOG PERFORMANCE STATUS - 1- Restricted in physically strenuous activity but ambulatory and able to carry out work of a light or sedentary nature such as light house work, office work. Pain - /10. Mild to moderate pain, requiring medication - see MAR  currently controlled on regimen - PC seeing pt today. Fatigue - No flowsheet data found. Distress - No flowsheet data found. Physical Exam  Vitals reviewed.  Exam conducted with a chaperone present. Constitutional:       General: He is not in acute distress. Appearance: Normal appearance. He is normal weight. He is not ill-appearing or toxic-appearing. HENT:      Head: Normocephalic and atraumatic. Nose: Nose normal. No congestion. Mouth/Throat:      Mouth: Mucous membranes are moist.   Eyes:      General: No scleral icterus. Extraocular Movements: Extraocular movements intact. Conjunctiva/sclera: Conjunctivae normal.      Pupils: Pupils are equal, round, and reactive to light. Cardiovascular:      Rate and Rhythm: Normal rate and regular rhythm. Heart sounds: No murmur heard. Pulmonary:      Effort: Pulmonary effort is normal. No respiratory distress. Breath sounds: Normal breath sounds. No wheezing, rhonchi or rales. Chest:   Breasts:      Right: No axillary adenopathy or supraclavicular adenopathy. Left: No axillary adenopathy or supraclavicular adenopathy. Abdominal:      General: There is no distension. Palpations: Abdomen is soft. There is no mass. Tenderness: There is no abdominal tenderness. There is no guarding or rebound. Comments: Venting tube with output/brownish    Musculoskeletal:         General: Normal range of motion. Cervical back: Normal range of motion. No rigidity. Right lower leg: No edema. Left lower leg: No edema. Lymphadenopathy:      Cervical: No cervical adenopathy. Upper Body:      Right upper body: No supraclavicular or axillary adenopathy. Left upper body: No supraclavicular or axillary adenopathy. Skin:     General: Skin is warm and dry. Coloration: Skin is not jaundiced or pale. Findings: No bruising or rash. Neurological:      General: No focal deficit present. Mental Status: He is alert and oriented to person, place, and time. Motor: No weakness.       Coordination: Coordination normal.      Gait: Gait normal. Psychiatric:         Behavior: Behavior normal.         Thought Content: Thought content normal.          Labs:  No results found for this or any previous visit (from the past 168 hour(s)). Imaging: reviewed     PATHOLOGY:             6/2022         ASSESSMENT:     Diagnosis Orders   1. Severe protein-calorie malnutrition ([address]     2. Debility     3. Abdominal carcinomatosis ([address]     4. Goals of care, counseling/discussion     5. On total parenteral nutrition     6. Nausea     7. Anxiety about dying     8. Carcinomatosis ([address]     9. Cancer associated pain     10. Opioid use, unspecified with unspecified opioid-induced disorder [facility])         Mr. Evans is here for FU of metastatic adenocarcinoma. 1. Metastatic adenocarcinoma   - s/p omental and mesenteric mass bx - c/w upper GI adenocarcinoma    - hx of diverticular dz - s/p previous bowel resection by dr Lara Beckford at [address] [facility] pending     - in the interim, started on dose modified FOLFIRINOX - tolerated it well and s/s of disease somewhat improved - ie pain is better and meds have been adjusted. He wishes to c/w tx at this time. - sclerotic lesion on imaging at S1 - bone scan recommended. - pain - better - on Fentanyl patch and oral supportive meds per PC. Seeing pt today.   Bowel regimen.    - nausea - PC following for symptom management   - wt loss/FTT - secondary to disease and tx - on TPN, wt  - monitoring closely     - depression/anxiety - currently controlled on regimen, will adjust modify as needed going forward     RTC for tx next week or sooner as needed   [45min encounter - chart review, coordination of care, visit, communication with other provider, charting]    MDM  Number of Diagnoses or Management Options  Abdominal carcinomatosis ([address]): established, improving  Anxiety about dying: established, improving  Cancer associated pain: established, improving  Carcinomatosis ([address]): established, improving  Debility: established, improving  Nausea: established, worsening  On total parenteral nutrition: established, improving  Opioid use, unspecified with unspecified opioid-induced disorder ([facility]): established, improving  Severe protein-calorie malnutrition ([facility]): established, improving     Amount and/or Complexity of Data Reviewed  Clinical lab tests: ordered and reviewed  Tests in the radiology section of CPT®: ordered and reviewed  Tests in the medicine section of CPT®: ordered  Obtain history from someone other than the patient: yes  Review and summarize past medical records: yes  Discuss the patient with other providers: yes  Independent visualization of images, tracings, or specimens: yes    Risk of Complications, Morbidity, and/or Mortality  Presenting problems: moderate  Diagnostic procedures: moderate  Management options: high        Lab studies and imaging studies were personally reviewed. Pertinent old records were reviewed. Historical:    - here with his wife for consultation. During today's visit, we discussed his current workup. We looked at the images together demonstrating some of the findings concerning for peritoneal nodularity/peritoneal disease. Discussed anatomy of the findings  utilizing images to help pt understand what we are looking at and suspecting. He and wife were appreciative of the time spent to review these. Discussed need for visual dx/tissue pathology to determine plan - recommend ex lap - refer to Dr Kenton Sam - personally  reviewed case with Dr Kenton Sam who will expedite the workup and will see pt Fri. US with mild biliary duct dilation - HIDA/ERCP reviewed by PCP   + BM/flatus; He did not like how IV morphine made him feel in the hospital.  He tried tramadol but found no relief and has been taking acetaminophen at home with minimal relief. Discussed different options and he settled on trying  Percocet - he will contact the office to inform us if this is working for him.   We discussed SE - not to drive while

## 2022-06-24 ENCOUNTER — OFFICE VISIT (OUTPATIENT)
Dept: ONCOLOGY | Age: 62
End: 2022-06-24
Payer: COMMERCIAL

## 2022-06-24 ENCOUNTER — OFFICE VISIT (OUTPATIENT)
Dept: PALLATIVE CARE | Age: 62
End: 2022-06-24
Payer: COMMERCIAL

## 2022-06-24 ENCOUNTER — OFFICE VISIT (OUTPATIENT)
Dept: SURGERY | Age: 62
End: 2022-06-24

## 2022-06-24 ENCOUNTER — HOSPITAL ENCOUNTER (OUTPATIENT)
Dept: INFUSION THERAPY | Age: 62
Discharge: HOME | End: 2022-06-24
Payer: COMMERCIAL

## 2022-06-24 VITALS
BODY MASS INDEX: 26.43 KG/M2 | TEMPERATURE: 98.3 F | DIASTOLIC BLOOD PRESSURE: 81 MMHG | WEIGHT: 184.2 LBS | HEART RATE: 76 BPM | OXYGEN SATURATION: 95 % | SYSTOLIC BLOOD PRESSURE: 123 MMHG | RESPIRATION RATE: 16 BRPM

## 2022-06-24 VITALS — BODY MASS INDEX: 26.63 KG/M2 | HEIGHT: 70 IN | WEIGHT: 186 LBS

## 2022-06-24 DIAGNOSIS — G89.3 CANCER ASSOCIATED PAIN: ICD-10-CM

## 2022-06-24 DIAGNOSIS — C78.6 PERITONEAL METASTASES (HCC): ICD-10-CM

## 2022-06-24 DIAGNOSIS — C76.2 ABDOMINAL CARCINOMATOSIS (HCC): ICD-10-CM

## 2022-06-24 DIAGNOSIS — R53.81 DEBILITY: ICD-10-CM

## 2022-06-24 DIAGNOSIS — Z51.5 ENCOUNTER FOR PALLIATIVE CARE: ICD-10-CM

## 2022-06-24 DIAGNOSIS — Z48.89 AFTERCARE FOLLOWING SURGERY: Primary | ICD-10-CM

## 2022-06-24 DIAGNOSIS — F11.99 OPIOID USE, UNSPECIFIED WITH UNSPECIFIED OPIOID-INDUCED DISORDER (HCC): ICD-10-CM

## 2022-06-24 DIAGNOSIS — C79.9 METASTATIC ADENOCARCINOMA (HCC): ICD-10-CM

## 2022-06-24 DIAGNOSIS — R11.0 CHEMOTHERAPY-INDUCED NAUSEA: ICD-10-CM

## 2022-06-24 DIAGNOSIS — T45.1X5A CHEMOTHERAPY-INDUCED NAUSEA: ICD-10-CM

## 2022-06-24 DIAGNOSIS — Z78.9 ON TOTAL PARENTERAL NUTRITION: ICD-10-CM

## 2022-06-24 DIAGNOSIS — R11.0 NAUSEA: ICD-10-CM

## 2022-06-24 DIAGNOSIS — F41.8 ANXIETY ABOUT DYING: ICD-10-CM

## 2022-06-24 DIAGNOSIS — Z71.89 GOALS OF CARE, COUNSELING/DISCUSSION: ICD-10-CM

## 2022-06-24 DIAGNOSIS — G89.3 CANCER ASSOCIATED PAIN: Primary | ICD-10-CM

## 2022-06-24 DIAGNOSIS — E43 SEVERE PROTEIN-CALORIE MALNUTRITION (HCC): Primary | ICD-10-CM

## 2022-06-24 DIAGNOSIS — C80.0 CARCINOMATOSIS (HCC): ICD-10-CM

## 2022-06-24 LAB
ALBUMIN SERPL-MCNC: 3.2 G/DL (ref 3.2–4.6)
ALBUMIN/GLOB SERPL: 0.9 {RATIO} (ref 1.2–3.5)
ALP SERPL-CCNC: 75 U/L (ref 50–136)
ALT SERPL-CCNC: 19 U/L (ref 12–65)
ANION GAP SERPL CALC-SCNC: 6 MMOL/L (ref 7–16)
AST SERPL-CCNC: 12 U/L (ref 15–37)
BASOPHILS # BLD: 0.1 K/UL (ref 0–0.2)
BASOPHILS NFR BLD: 1 % (ref 0–2)
BILIRUB SERPL-MCNC: 0.5 MG/DL (ref 0.2–1.1)
BUN SERPL-MCNC: 20 MG/DL (ref 8–23)
CALCIUM SERPL-MCNC: 8.7 MG/DL (ref 8.3–10.4)
CHLORIDE SERPL-SCNC: 101 MMOL/L (ref 98–107)
CO2 SERPL-SCNC: 27 MMOL/L (ref 21–32)
CREAT SERPL-MCNC: 0.9 MG/DL (ref 0.8–1.5)
DIFFERENTIAL METHOD BLD: ABNORMAL
EOSINOPHIL # BLD: 0.3 K/UL (ref 0–0.8)
EOSINOPHIL NFR BLD: 7 % (ref 0.5–7.8)
ERYTHROCYTE [DISTWIDTH] IN BLOOD BY AUTOMATED COUNT: 12.6 % (ref 11.9–14.6)
GLOBULIN SER CALC-MCNC: 3.5 G/DL (ref 2.3–3.5)
GLUCOSE SERPL-MCNC: 144 MG/DL (ref 65–100)
HBV SURFACE AB SERPL IA-ACNC: 82.75 MIU/ML
HCT VFR BLD AUTO: 39.6 %
HGB BLD-MCNC: 13.3 G/DL (ref 13.6–17.2)
IMM GRANULOCYTES # BLD AUTO: 0 K/UL (ref 0–0.5)
IMM GRANULOCYTES NFR BLD AUTO: 0 % (ref 0–5)
LYMPHOCYTES # BLD: 1 K/UL (ref 0.5–4.6)
LYMPHOCYTES NFR BLD: 20 % (ref 13–44)
MCH RBC QN AUTO: 29.9 PG (ref 26.1–32.9)
MCHC RBC AUTO-ENTMCNC: 33.6 G/DL (ref 31.4–35)
MCV RBC AUTO: 89 FL (ref 79.6–97.8)
MONOCYTES # BLD: 0.7 K/UL (ref 0.1–1.3)
MONOCYTES NFR BLD: 14 % (ref 4–12)
NEUTS SEG # BLD: 2.8 K/UL (ref 1.7–8.2)
NEUTS SEG NFR BLD: 58 % (ref 43–78)
NRBC # BLD: 0 K/UL (ref 0–0.2)
PLATELET # BLD AUTO: 202 K/UL (ref 150–450)
PMV BLD AUTO: 9.9 FL (ref 9.4–12.3)
POTASSIUM SERPL-SCNC: 4.3 MMOL/L (ref 3.5–5.1)
PROT SERPL-MCNC: 6.7 G/DL (ref 6.3–8.2)
RBC # BLD AUTO: 4.45 M/UL (ref 4.23–5.6)
SODIUM SERPL-SCNC: 134 MMOL/L (ref 136–145)
WBC # BLD AUTO: 4.8 K/UL (ref 4.3–11.1)

## 2022-06-24 PROCEDURE — 86706 HEP B SURFACE ANTIBODY: CPT

## 2022-06-24 PROCEDURE — 86704 HEP B CORE ANTIBODY TOTAL: CPT

## 2022-06-24 PROCEDURE — 36415 COLL VENOUS BLD VENIPUNCTURE: CPT

## 2022-06-24 PROCEDURE — 80053 COMPREHEN METABOLIC PANEL: CPT

## 2022-06-24 PROCEDURE — 87340 HEPATITIS B SURFACE AG IA: CPT

## 2022-06-24 PROCEDURE — 99024 POSTOP FOLLOW-UP VISIT: CPT | Performed by: SURGERY

## 2022-06-24 PROCEDURE — 99204 OFFICE O/P NEW MOD 45 MIN: CPT | Performed by: NURSE PRACTITIONER

## 2022-06-24 PROCEDURE — 85025 COMPLETE CBC W/AUTO DIFF WBC: CPT

## 2022-06-24 PROCEDURE — 99215 OFFICE O/P EST HI 40 MIN: CPT | Performed by: INTERNAL MEDICINE

## 2022-06-24 RX ORDER — FENTANYL 100 UG/H
1 PATCH TRANSDERMAL
Qty: 5 PATCH | Refills: 0 | Status: ON HOLD | OUTPATIENT
Start: 2022-06-27 | End: 2022-07-13

## 2022-06-24 RX ORDER — OLANZAPINE 5 MG/1
5 TABLET, ORALLY DISINTEGRATING ORAL NIGHTLY
Qty: 30 TABLET | Refills: 3 | Status: SHIPPED | OUTPATIENT
Start: 2022-06-27

## 2022-06-24 RX ORDER — FENTANYL 100 UG/H
1 PATCH TRANSDERMAL
COMMUNITY
End: 2022-06-24 | Stop reason: SDUPTHER

## 2022-06-24 RX ORDER — SODIUM CHLORIDE 0.9 % (FLUSH) 0.9 %
10 SYRINGE (ML) INJECTION PRN
Status: CANCELLED | OUTPATIENT
Start: 2022-06-24

## 2022-06-24 ASSESSMENT — PATIENT HEALTH QUESTIONNAIRE - PHQ9
SUM OF ALL RESPONSES TO PHQ QUESTIONS 1-9: 0
2. FEELING DOWN, DEPRESSED OR HOPELESS: 0

## 2022-06-24 ASSESSMENT — ENCOUNTER SYMPTOMS
CONSTIPATION: 1
ABDOMINAL PAIN: 1
DIARRHEA: 1

## 2022-06-24 NOTE — PROGRESS NOTES
Outpatient Palliative Care at the  [facility]: Office Visit New Patient H & P    Diagnosis: adenocarcinoma of upper GI    Treatment Plan: mFOLFIRINOX    Treatment Intent: Palliative    Medical Oncologist: Dr. Vane Chavira Oncologist: N/A    Navigator: Nataliia Dorsey RN      Chief Complaint:    Chief Complaint   Patient presents with    Abdominal Pain       History of Present Illness:  Mr. Roldan is a 62 y.o. 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 Patient initially presented with 6 month history of abdominal pain and 30lb weight loss. A GI workup revealed hiatal hernia, gastric polyps, benign colon polyps, diverticulosis and hemorrhoids. He presented to [facility] ER on 5/12 and CT showed extensive peritoneal nodularity, concerning for metastatic disease, with consequential SBO. He had a laparotomy on 5/27 with venting G tube placement. Biopsies confirmed adenocarcinoma of upper GI primary. He started mFOLFIRINOX on 6/10/22 while hospitalized. He comes to [facility] on 6/24 to establish outpatient follow-up with Dr. Elena Inman and palliative medicine. Patient seen in clinic with Dr. Elena Inman and Nataliia Dorsey RN. Patient wife accompanies him. Patient continues TPN via PICC, but he has been tolerating Gatorade PO. He has venting gastrostomy and after a visit with Dr. Charmaine Raya earlier today, he plans to start clamping it as tolerated for a clear liquid diet. He has abdominal pain, but overall much improved after cycle one of chemotherapy. At the time of discharge, he was prescribed fentanyl 100mcg every 72 hours and oxycodone intensol, 20mg every 4 hours prn. He had been taking oxycodone once per day at the most.  However, several days ago, he tripped on his venting g tube and his insertion site has been more sore. He has been taking oxycodone once or twice per day.   He takes Miralax daily as needed- about every other day right now. He is having bowel movements every 2-3 days. He has intermittent nausea, and has been taking Zofran ODT. He has Phenergan tablets and Ativan tablets available. Review of Systems:  Review of Systems   Constitutional: Positive for fatigue and unexpected weight change. Gastrointestinal: Positive for abdominal pain, constipation and diarrhea.            No Known Allergies  Past Medical History:   Diagnosis Date    Bilateral carpal tunnel syndrome 12/9/2020    Chronic right shoulder pain 11/30/2020    Colon polyps 3/11/2013    Diverticulitis 3/11/2013    HTN (hypertension) 3/11/2013    Hyperlipidemia 3/11/2013    Paresthesia and pain of both upper extremities 11/30/2020    PUD (peptic ulcer disease) 3/11/2013    History of     Right elbow pain 12/9/2020    Wheezing 3/11/2013     Past Surgical History:   Procedure Laterality Date    COLONOSCOPY  2/25/09    colonoscopy per Dr. Deandra Snider with need for repeat every 3 years    COLONOSCOPY  02/25/2022    Dr. Genoveva Arriola; polyps of the ascending, transverse, descending, and sigmoid colons; internal hemorrhoid; diverticulosis    LAPAROSCOPY N/A 5/27/2022    LAPAROSCOPY DIAGNOSTIC , OPEN EXPLORATORY LAPAROTOMY, MASS EXCISION, G-TUBE PLACEMENT performed by Mayra Arvizu MD at [address] [facility]  N/A 6/3/2022    PORT INSERTION performed by Mayra Arvizu MD at   [facility] UNLISTED  October 2004    partial colon resection per Dr. Ofelia Toure  02/25/2022    Dr. Genoveva Arriola; hiatal hernia gastric polyps     Family History   Problem Relation Age of Onset    No Known Problems Brother     Cancer Sister         breast    Hypertension Mother     Heart Disease Mother     Diabetes Father     Osteoarthritis Father     Alcohol Abuse Father     Hypertension Father     Diabetes Mother      Social History     Socioeconomic History    Marital status:      Spouse name: Not on file    Number of children: Not on file    Years of education: Not on file    Highest education level: Not on file   Occupational History    Not on file   Tobacco Use    Smoking status: Current Every Day Smoker     Packs/day: 1.00    Smokeless tobacco: Never Used   Substance and Sexual Activity    Alcohol use: No    Drug use: No    Sexual activity: Not on file   Other Topics Concern    Not on file   Social History Narrative    Not on file     Social Determinants of Health     Financial Resource Strain:     Difficulty of Paying Living Expenses: Not on file   Food Insecurity:     Worried About Running Out of Food in the Last Year: Not on file    Briseida of Food in the Last Year: Not on file   Transportation Needs:     Lack of Transportation (Medical): Not on file    Lack of Transportation (Non-Medical): Not on file   Physical Activity:     Days of Exercise per Week: Not on file    Minutes of Exercise per Session: Not on file   Stress:     Feeling of Stress : Not on file   Social Connections:     Frequency of Communication with Friends and Family: Not on file    Frequency of Social Gatherings with Friends and Family: Not on file    Attends Roman [employer] Services: Not on file    Active Member of [address] [place] [address] [employer] or Organizations: Not on file    Attends Club or Organization Meetings: Not on file    Marital Status: Not on file   Intimate Partner Violence:     Fear of Current or Ex-Partner: Not on file    Emotionally Abused: Not on file    Physically Abused: Not on file    Sexually Abused: Not on file   Housing Stability:     Unable to Pay for Housing in the Last Year: Not on file    Number of [place] in the Last Year: Not on file    Unstable Housing in the Last Year: Not on file     Current Outpatient Medications   Medication Sig Dispense Refill    [START ON 6/27/2022] fentaNYL (DURAGESIC) 100 MCG/HR Place 1 patch onto the skin every 72 hours for 15 days.  5 patch 0  [START ON 6/27/2022] OLANZapine zydis (ZYPREXA) 5 MG disintegrating tablet Take 1 tablet by mouth nightly Do not take with promethazine 30 tablet 3    busPIRone (BUSPAR) 10 MG tablet Take 1 tablet by mouth 3 times daily 90 tablet 0    nicotine (NICODERM CQ) 14 MG/24HR Place 1 patch onto the skin daily 30 patch 3    ondansetron (ZOFRAN-ODT) 8 MG TBDP disintegrating tablet Take 1 tablet by mouth every 8 hours 90 tablet 0    promethazine (PHENERGAN) 12.5 MG tablet Take 1 tablet by mouth every 6 hours as needed for Nausea 90 tablet 0    LORazepam (ATIVAN) 1 MG tablet Take 1 tablet by mouth every 8 hours as needed for Anxiety (nausea) for up to 30 days. 60 tablet 0    escitalopram (LEXAPRO) 10 MG tablet Take 1 tablet by mouth daily 30 tablet 0    oxyCODONE (ROXICODONE INTENSOL) 100 MG/5ML concentrated solution Take 1 mL by mouth every 4 hours as needed for Pain for up to 14 days. 60 mL 0    naloxone 4 MG/0.1ML LIQD nasal spray 1 spray by Nasal route as needed for Opioid Reversal  (Patient not taking: Reported on 6/24/2022)      pantoprazole (PROTONIX) 40 MG tablet TAKE 1 TABLET BY MOUTH BEFORE BREAKFAST AND DINNER FOR 30 DAYS      dicyclomine (BENTYL) 10 MG capsule Take 20 mg by mouth every 6 hours       polyethylene glycol (GLYCOLAX) 17 GM/SCOOP powder Take 17 g by mouth daily      rosuvastatin (CRESTOR) 10 MG tablet Take 10 mg by mouth      umeclidinium-vilanterol (ANORO ELLIPTA) 62.5-25 MCG/INH AEPB inhaler Inhale 1 puff into the lungs daily (Patient not taking: Reported on 6/24/2022)       No current facility-administered medications for this visit.        OBJECTIVE:  Wt Readings from Last 1 Encounters:   06/24/22 184 lb 3.2 oz (83.6 kg)     Temp Readings from Last 1 Encounters:   06/24/22 98.3 °F (36.8 °C)     BP Readings from Last 1 Encounters:   06/24/22 123/81     Pulse Readings from Last 1 Encounters:   06/24/22 76        Pain Score: SIX (left side where tube is )      Physical Exam:  Constitutional: Well developed, well nourished male in no acute distress. HEENT: Normocephalic and atraumatic. Pupils are equal, round, and reactive to light. Extraocular muscles are intact. Sclerae anicteric. Neck supple without JVD. Lymph node   deferred   Skin Warm and dry. No bruising and no rash noted. No erythema. No pallor. Respiratory unlabored respiratory effort. CVS Regular rate and normotensive. Abdomen Soft, nontender and nondistended, normoactive bowel sounds. No palpable mass. No hepatosplenomegaly. Neuro Grossly nonfocal with no obvious sensory or motor deficits. MSK Normal range of motion in general.  No edema and no tenderness. Psych Appropriate mood and affect.         Labs:  Recent Results (from the past 24 hour(s))   CBC with Auto Differential    Collection Time: 06/24/22  2:20 PM   Result Value Ref Range    WBC 4.8 4.3 - 11.1 K/uL    RBC 4.45 4.23 - 5.6 M/uL    Hemoglobin 13.3 (L) 13.6 - 17.2 g/dL    Hematocrit 39.6 %    MCV 89.0 79.6 - 97.8 FL    MCH 29.9 26.1 - 32.9 PG    MCHC 33.6 31.4 - 35.0 g/dL    RDW 12.6 11.9 - 14.6 %    Platelets 687 112 - 958 K/uL    MPV 9.9 9.4 - 12.3 FL    nRBC 0.00 0.0 - 0.2 K/uL    Differential Type AUTOMATED      Seg Neutrophils 58 43 - 78 %    Lymphocytes 20 13 - 44 %    Monocytes 14 (H) 4.0 - 12.0 %    Eosinophils % 7 0.5 - 7.8 %    Basophils 1 0.0 - 2.0 %    Immature Granulocytes 0 0.0 - 5.0 %    Segs Absolute 2.8 1.7 - 8.2 K/UL    Absolute Lymph # 1.0 0.5 - 4.6 K/UL    Absolute Mono # 0.7 0.1 - 1.3 K/UL    Absolute Eos # 0.3 0.0 - 0.8 K/UL    Basophils Absolute 0.1 0.0 - 0.2 K/UL    Absolute Immature Granulocyte 0.0 0.0 - 0.5 K/UL   Comprehensive Metabolic Panel    Collection Time: 06/24/22  2:20 PM   Result Value Ref Range    Sodium 134 (L) 136 - 145 mmol/L    Potassium 4.3 3.5 - 5.1 mmol/L    Chloride 101 98 - 107 mmol/L    CO2 27 21 - 32 mmol/L    Anion Gap 6 (L) 7 - 16 mmol/L    Glucose 144 (H) 65 - 100 mg/dL    BUN 20 8 - 23 MG/DL    CREATININE 0.90 0.8 - 1.5 MG/DL    GFR African American >60 >60 ml/min/1.73m2    GFR Non- >60 >60 ml/min/1.73m2    Calcium 8.7 8.3 - 10.4 MG/DL    Total Bilirubin 0.5 0.2 - 1.1 MG/DL    ALT 19 12 - 65 U/L    AST 12 (L) 15 - 37 U/L    Alk Phosphatase 75 50 - 136 U/L    Total Protein 6.7 6.3 - 8.2 g/dL    Albumin 3.2 3.2 - 4.6 g/dL    Globulin 3.5 2.3 - 3.5 g/dL    Albumin/Globulin Ratio 0.9 (L) 1.2 - 3.5         Imaging:  No results found for this or any previous visit. ASSESSMENT:   Diagnosis Orders   1. Cancer associated pain  fentaNYL (DURAGESIC) 100 MCG/HR   2. Chemotherapy-induced nausea  OLANZapine zydis (ZYPREXA) 5 MG disintegrating tablet   3. Peritoneal metastases ([facility]     4. Metastatic adenocarcinoma ([facility]     5. Encounter for palliative care  fentaNYL (DURAGESIC) 100 MCG/HR    OLANZapine zydis (ZYPREXA) 5 MG disintegrating tablet         PLAN:  Lab studies and imaging studies were personally reviewed. Pertinent old records were reviewed from hospitalization. Case discussed with Dr. Shahbaz Bullock. 1. Abdominal pain: Continue fentanyl 100mcg every 72 hours. 2 week supply given. Given improvement after cycle one of chemo, we anticipate further improvement. We will re evaluate fentanyl dose at follow-up prior to cycle 3. Continue oxycodone intensol. Instructed patient/wife that he may take any dose less than 20mg (1mL) during the daytime especially, to reduce drowsiness. 2. Nausea: Continue Zofran ODT. Although patient has Phenergan and Ativan tablets available, I am not confident he can tolerate clamping his venting G-tube to allow for adequate absorption and metabolism of these medications. We discussed Zyprexa 5 mg ODT. They are instructed that he may take either promethazine or olanzapine, but not both within the same day. 3.  Constipation: Continue MiraLAX daily as needed.     Advanced Care Planning Discussed: patient asked Dr. Shahbaz Bullock how long chemotherapy would continue, and Dr. Vidal Stevenson counseled patient and wife that chemotherapy would continue as long as it is effective or until patient told her to stop. Patient verbalizes desire to proceed with chemotherapy as scheduled on 6/27. Will follow up in: approximately 2 weeks    All questions were asked and answered to the best of my ability. In all, I spent 50 minutes in the care of Mr. Stephen Odonnell today, over 50% of which was in direct counseling and coordination of care about symptom management. I have reviewed the patient's controlled substance prescription history, as maintained in the [place] prescription monitoring program, so that the prescriptions(s) for a controlled substance can be given.   Last Date Reviewed: 6/24/22          LUCY Johnson CNP  Outpatient Palliative Care at the  [address]  Office : [phone]  Fax : [phone]

## 2022-06-24 NOTE — PATIENT INSTRUCTIONS
Eos # 06/24/2022 0.3  0.0 - 0.8 K/UL Final    Basophils Absolute 06/24/2022 0.1  0.0 - 0.2 K/UL Final    Absolute Immature Granulocyte 06/24/2022 0.0  0.0 - 0.5 K/UL Final    Sodium 06/24/2022 134* 136 - 145 mmol/L Final    Potassium 06/24/2022 4.3  3.5 - 5.1 mmol/L Final    Chloride 06/24/2022 101  98 - 107 mmol/L Final    CO2 06/24/2022 27  21 - 32 mmol/L Final    Anion Gap 06/24/2022 6* 7 - 16 mmol/L Final    Glucose 06/24/2022 144* 65 - 100 mg/dL Final    BUN 06/24/2022 20  8 - 23 MG/DL Final    CREATININE 06/24/2022 0.90  0.8 - 1.5 MG/DL Final    GFR  06/24/2022 >60  >60 ml/min/1.73m2 Final    GFR Non- 06/24/2022 >60  >60 ml/min/1.73m2 Final    Comment:      Estimated GFR is calculated using the Modification of Diet in Renal Disease (MDRD) Study equation, reported for both  Americans (GFRAA) and non- Americans (GFRNA), and normalized to 1.73m2 body surface area. The physician must decide which value applies to the patient. The MDRD study equation should only be used in individuals age 25 or older. It has not been validated for the following: pregnant women, patients with serious comorbid conditions,or on certain medications, or persons with extremes of body size, muscle mass, or nutritional status.       Calcium 06/24/2022 8.7  8.3 - 10.4 MG/DL Final    Total Bilirubin 06/24/2022 0.5  0.2 - 1.1 MG/DL Final    ALT 06/24/2022 19  12 - 65 U/L Final    AST 06/24/2022 12* 15 - 37 U/L Final    Alk Phosphatase 06/24/2022 75  50 - 136 U/L Final    Total Protein 06/24/2022 6.7  6.3 - 8.2 g/dL Final    Albumin 06/24/2022 3.2  3.2 - 4.6 g/dL Final    Globulin 06/24/2022 3.5  2.3 - 3.5 g/dL Final    Albumin/Globulin Ratio 06/24/2022 0.9* 1.2 - 3.5   Final         Treatment Summary has been discussed and given to patient: n/a        -------------------------------------------------------------------------------------------------------------------  Please call our office at [phone] if you have any  of the following symptoms:   · Fever of 100.5 or greater  · Chills  · Shortness of breath  · Swelling or pain in one leg    After office hours an answering service is available and will contact a provider for emergencies or if you are experiencing any of the above symptoms.  Patient does express an interest in My Chart. My Chart log in information explained on the after visit summary printout at the [facility] [address] desk.     Kiko Pope RN  Nurse Navigator  [address] [facility] [address] [phone]

## 2022-06-25 LAB
HBV CORE AB SERPL QL IA: NEGATIVE
HBV SURFACE AG SERPL QL IA: NEGATIVE

## 2022-06-25 NOTE — PROGRESS NOTES
[facility]  [facility]. [address] [place] [address], [address] [place] [address], [address] [place] [address]  [phone]      SUBJECTIVE: Archie Bonds is a 62 y.o. male is seen for a routine postop check. He was admitted recently for SBO with extensive peritoneal carcinomatosis, he was brought to OR for exploration, and the entire mesentery was encased with cancer, only a venting G tube could be done. Patient refused hospice care and wanted chemotherapy. He was started with TPN, and then had first cycle of chemo 10 days after surgery. He seems to do well from chemo. Today, he reports no problems with the wound. He starts to have appetite, and he has bowel movement. He denies nausea though he had one episode of vomiting on the way to office today. OBJECTIVE: Appears well. Wound is well healed without complications or infection. His abdomen is much softer. PATH:   A:  \"OMENTUM MASS\":  METASTATIC ADENOCARCINOMA, SEE   IMMUNOHISTOCHEMICAL FINDINGS BELOW. B:  \"SMALL BOWEL MESENTERY MASS\":    METASTATIC ADENOCARCINOMA. Immunohistochemical Stain Panel:  Block A1.          Interpretation:  Immunohistochemical findings consistent with   adenocarcinoma of upper gastrointestinal or pancreatobiliary origin.       ASSESSMENT: normal postoperative course, doing well. Peritoneal carcinomatosis with diffuse mesentery encasement and SBO, s/p first cycle of chemo, he has some encouraging signs of response. PLAN: Staples removed. Instructed him to flush G tube appropriately. Ok to clamp G tube, if tolerating, try more clears and advance. Follow with oncology. Return PRN.     Carlotta Arcos MD

## 2022-06-27 ENCOUNTER — HOSPITAL ENCOUNTER (OUTPATIENT)
Dept: INFUSION THERAPY | Age: 62
Discharge: HOME | End: 2022-06-27
Payer: COMMERCIAL

## 2022-06-27 VITALS
TEMPERATURE: 97.8 F | HEART RATE: 87 BPM | SYSTOLIC BLOOD PRESSURE: 109 MMHG | BODY MASS INDEX: 26.57 KG/M2 | OXYGEN SATURATION: 96 % | DIASTOLIC BLOOD PRESSURE: 64 MMHG | RESPIRATION RATE: 18 BRPM | HEIGHT: 70 IN | WEIGHT: 185.6 LBS

## 2022-06-27 DIAGNOSIS — C76.2 ABDOMINAL CARCINOMATOSIS (HCC): Primary | ICD-10-CM

## 2022-06-27 DIAGNOSIS — C78.6 PERITONEAL METASTASES (HCC): ICD-10-CM

## 2022-06-27 PROCEDURE — 96368 THER/DIAG CONCURRENT INF: CPT

## 2022-06-27 PROCEDURE — 96375 TX/PRO/DX INJ NEW DRUG ADDON: CPT

## 2022-06-27 PROCEDURE — 96415 CHEMO IV INFUSION ADDL HR: CPT

## 2022-06-27 PROCEDURE — 2580000003 HC RX 258: Performed by: NURSE PRACTITIONER

## 2022-06-27 PROCEDURE — 96372 THER/PROPH/DIAG INJ SC/IM: CPT

## 2022-06-27 PROCEDURE — G0498 CHEMO EXTEND IV INFUS W/PUMP: HCPCS

## 2022-06-27 PROCEDURE — 6370000000 HC RX 637 (ALT 250 FOR IP): Performed by: NURSE PRACTITIONER

## 2022-06-27 PROCEDURE — 96367 TX/PROPH/DG ADDL SEQ IV INF: CPT

## 2022-06-27 PROCEDURE — 96417 CHEMO IV INFUS EACH ADDL SEQ: CPT

## 2022-06-27 PROCEDURE — 96413 CHEMO IV INFUSION 1 HR: CPT

## 2022-06-27 PROCEDURE — 6360000002 HC RX W HCPCS: Performed by: NURSE PRACTITIONER

## 2022-06-27 RX ORDER — ATROPINE SULFATE 0.4 MG/ML
0.4 AMPUL (ML) INJECTION
Status: CANCELLED | OUTPATIENT
Start: 2022-06-27

## 2022-06-27 RX ORDER — DEXTROSE MONOHYDRATE 50 MG/ML
5-250 INJECTION, SOLUTION INTRAVENOUS PRN
Status: DISCONTINUED | OUTPATIENT
Start: 2022-06-27 | End: 2022-06-28 | Stop reason: HOSPADM

## 2022-06-27 RX ORDER — SODIUM CHLORIDE 9 MG/ML
5-40 INJECTION INTRAVENOUS PRN
Status: CANCELLED | OUTPATIENT
Start: 2022-06-29

## 2022-06-27 RX ORDER — ALBUTEROL SULFATE 90 UG/1
4 AEROSOL, METERED RESPIRATORY (INHALATION) PRN
Status: CANCELLED | OUTPATIENT
Start: 2022-06-27

## 2022-06-27 RX ORDER — HEPARIN SODIUM (PORCINE) LOCK FLUSH IV SOLN 100 UNIT/ML 100 UNIT/ML
500 SOLUTION INTRAVENOUS PRN
Status: CANCELLED | OUTPATIENT
Start: 2022-06-29

## 2022-06-27 RX ORDER — ONDANSETRON 2 MG/ML
8 INJECTION INTRAMUSCULAR; INTRAVENOUS
Status: CANCELLED | OUTPATIENT
Start: 2022-06-27

## 2022-06-27 RX ORDER — SODIUM CHLORIDE 9 MG/ML
5-250 INJECTION, SOLUTION INTRAVENOUS PRN
Status: CANCELLED | OUTPATIENT
Start: 2022-06-27

## 2022-06-27 RX ORDER — DIPHENHYDRAMINE HYDROCHLORIDE 50 MG/ML
50 INJECTION INTRAMUSCULAR; INTRAVENOUS
Status: CANCELLED | OUTPATIENT
Start: 2022-06-27

## 2022-06-27 RX ORDER — ONDANSETRON 2 MG/ML
8 INJECTION INTRAMUSCULAR; INTRAVENOUS ONCE
Status: COMPLETED | OUTPATIENT
Start: 2022-06-27 | End: 2022-06-27

## 2022-06-27 RX ORDER — SODIUM CHLORIDE 0.9 % (FLUSH) 0.9 %
5-40 SYRINGE (ML) INJECTION PRN
Status: CANCELLED | OUTPATIENT
Start: 2022-06-29

## 2022-06-27 RX ORDER — OXYCODONE HYDROCHLORIDE 5 MG/1
10 TABLET ORAL ONCE
Status: COMPLETED | OUTPATIENT
Start: 2022-06-27 | End: 2022-06-27

## 2022-06-27 RX ORDER — SODIUM CHLORIDE 9 MG/ML
INJECTION, SOLUTION INTRAVENOUS CONTINUOUS
Status: CANCELLED | OUTPATIENT
Start: 2022-06-27

## 2022-06-27 RX ORDER — HEPARIN SODIUM (PORCINE) LOCK FLUSH IV SOLN 100 UNIT/ML 100 UNIT/ML
500 SOLUTION INTRAVENOUS PRN
Status: CANCELLED | OUTPATIENT
Start: 2022-06-27

## 2022-06-27 RX ORDER — ACETAMINOPHEN 325 MG/1
650 TABLET ORAL
Status: CANCELLED | OUTPATIENT
Start: 2022-06-27

## 2022-06-27 RX ORDER — SODIUM CHLORIDE 9 MG/ML
5-40 INJECTION INTRAVENOUS PRN
Status: CANCELLED | OUTPATIENT
Start: 2022-06-27

## 2022-06-27 RX ORDER — SODIUM CHLORIDE 9 MG/ML
5-250 INJECTION, SOLUTION INTRAVENOUS PRN
Status: CANCELLED | OUTPATIENT
Start: 2022-06-29

## 2022-06-27 RX ORDER — MEPERIDINE HYDROCHLORIDE 25 MG/ML
12.5 INJECTION INTRAMUSCULAR; INTRAVENOUS; SUBCUTANEOUS PRN
Status: CANCELLED | OUTPATIENT
Start: 2022-06-27

## 2022-06-27 RX ORDER — ATROPINE SULFATE 0.4 MG/ML
0.4 AMPUL (ML) INJECTION ONCE
Status: COMPLETED | OUTPATIENT
Start: 2022-06-27 | End: 2022-06-27

## 2022-06-27 RX ORDER — EPINEPHRINE 1 MG/ML
0.3 INJECTION, SOLUTION, CONCENTRATE INTRAVENOUS PRN
Status: CANCELLED | OUTPATIENT
Start: 2022-06-27

## 2022-06-27 RX ORDER — SODIUM CHLORIDE 0.9 % (FLUSH) 0.9 %
5-40 SYRINGE (ML) INJECTION PRN
Status: CANCELLED | OUTPATIENT
Start: 2022-06-27

## 2022-06-27 RX ADMIN — OXALIPLATIN 100 MG: 5 INJECTION, SOLUTION, CONCENTRATE INTRAVENOUS at 09:35

## 2022-06-27 RX ADMIN — FLUOROURACIL 5000 MG: 50 INJECTION, SOLUTION INTRAVENOUS at 13:20

## 2022-06-27 RX ADMIN — OXYCODONE 10 MG: 5 TABLET ORAL at 11:10

## 2022-06-27 RX ADMIN — DEXAMETHASONE SODIUM PHOSPHATE 12 MG: 4 INJECTION, SOLUTION INTRAMUSCULAR; INTRAVENOUS at 08:50

## 2022-06-27 RX ADMIN — IRINOTECAN HYDROCHLORIDE 220 MG: 20 INJECTION, SOLUTION INTRAVENOUS at 11:43

## 2022-06-27 RX ADMIN — ONDANSETRON 8 MG: 2 INJECTION INTRAMUSCULAR; INTRAVENOUS at 08:47

## 2022-06-27 RX ADMIN — FOSAPREPITANT 150 MG: 150 INJECTION, POWDER, LYOPHILIZED, FOR SOLUTION INTRAVENOUS at 09:07

## 2022-06-27 RX ADMIN — DEXTROSE MONOHYDRATE 50 ML/HR: 50 INJECTION, SOLUTION INTRAVENOUS at 08:30

## 2022-06-27 RX ADMIN — ATROPINE SULFATE 0.4 MG: 0.4 INJECTION, SOLUTION INTRAMUSCULAR; INTRAVENOUS; SUBCUTANEOUS at 11:38

## 2022-06-27 RX ADMIN — LEUCOVORIN CALCIUM 850 MG: 350 INJECTION, POWDER, LYOPHILIZED, FOR SOLUTION INTRAMUSCULAR; INTRAVENOUS at 11:44

## 2022-06-27 ASSESSMENT — PAIN DESCRIPTION - DESCRIPTORS: DESCRIPTORS: SORE;TENDER

## 2022-06-27 ASSESSMENT — PAIN DESCRIPTION - ORIENTATION: ORIENTATION: LEFT

## 2022-06-27 ASSESSMENT — PAIN DESCRIPTION - LOCATION: LOCATION: ABDOMEN;INCISION

## 2022-06-27 ASSESSMENT — PAIN DESCRIPTION - FREQUENCY: FREQUENCY: INTERMITTENT

## 2022-06-27 ASSESSMENT — PAIN SCALES - GENERAL
PAINLEVEL_OUTOF10: 3
PAINLEVEL_OUTOF10: 6

## 2022-06-27 ASSESSMENT — PAIN DESCRIPTION - PAIN TYPE: TYPE: ACUTE PAIN;SURGICAL PAIN

## 2022-06-27 NOTE — PROGRESS NOTES
Clinical Social Work Note  Name: Herminio Araya    : 1960    MRN: [id number]    Date of Service: 2022    Type of Service: Case Management & Health and Behavior Intervention       Patient Diagnosis:   1. Abdominal carcinomatosis ([address]    2. Peritoneal metastases [facility])         Referral Source: Infusion RN    Reason for Visit: F/U    Subject: Gave patient's wife information on [employer]. Patient was sleeping. Wife of 25 years was appreciative. Next Steps:  PORTER SNELL intends to follow up as needed. No flowsheet data found.         Electronically Signed By:  PORTER Zendejas

## 2022-06-27 NOTE — PROGRESS NOTES
Arrived to the [facility]. FOLFIRINOX given and tolerated well. 5FU pump connected and unclamped. Any issues or concerns during appointment: given pain med x1 for incisional pain at G-tube site. Patient given education on 5FU pump and chemo process  Informed of next infusion appointment scheduled for 6/29/22 at 430pm  Instructed patient to notify ordering provider for any issues or worrisome symptoms. They verbalized understanding. Discharged ambulatory with wife.

## 2022-06-28 ENCOUNTER — CARE COORDINATION (OUTPATIENT)
Dept: OTHER | Facility: CLINIC | Age: 62
End: 2022-06-28

## 2022-06-28 NOTE — CARE COORDINATION
[place] [address] Transitions Follow Up Call    2022    Patient: Josiane Quach  Patient : 1960   MRN: [id number]  Reason for Admission: Abdominal pain, Carcinomatosis   Discharge Date: 22 RARS: Readmission Risk Score: 12.7 ( )    Patient has graduated from the Care Transitions program on 22. Patient/family has the ability to self-manage at this time. Patient is followed closely by Oncology and Palliative care. Patient has services with [facility] as well. Spouse is very supportive. Patient has Care Transition Nurse's contact information for any further questions, concerns, or needs. Patients upcoming visits:    Future Appointments   Date Time Provider Monica Allen   2022  4:30 PM POD1A [facility]   2022  9:30 AM LAB CK [facility]   2022 10:00 AM LUCY Nguyen - NP [facility] AMB   2022  9:30 AM POD1B [facility]   2022 10:30 AM LUCY Scruggs - CNP [facility] AMB   2022  4:30 PM POD1B [facility]   2022  1:15 PM LAB CK [facility]   2022  1:45 PM Gene Ly MD [facility] AMB   2022  9:30 AM POD1B [facility]   2022  4:30 PM SS [facility]   2022 11:00 AM LAB CK [facility]   2022 11:30 AM LUCY Nguyen - NINFA [facility] AMB   2022  9:30 AM POD3C [facility]   8/10/2022  4:30 PM INT [facility]   2022  4:00 PM Purnima He MD [facility] G[facility] AMB      Goals Addressed                 This Visit's Progress     Conditions and Symptoms   On track     I will schedule office visits, as directed by my provider. I will keep my appointment or reschedule if I have to cancel. I will notify my provider of any barriers to my plan of care. I will notify my provider of any symptoms that indicate a worsening of my condition. Patient/Family verbalizes understanding of self-management of chronic disease.   Assess barriers to safe and effective d/c AEB  Patient/family is able to verbalize and obtain medicine after d/c  Patient/Family to monitor for signs of infection and reports to MD  Patient/family is aware and attends follow up appointment's s/p d/c    Barriers: none  Plan for overcoming my barriers: N/A  Confidence: 9/10  Anticipated Goal Completion Date: 7/14/2022              Care Transitions Subsequent and Final Call    Schedule Follow Up Appointment with PCP: Completed  Subsequent and Final Calls  Have your medications changed?: No  Do you have any questions related to your medications?: No  Do you currently have any active services?: Yes  Are you currently active with any services?: Home Health  Do you have any needs or concerns that I can assist you with?: No  Identified Barriers: None  Care Transitions Interventions  Other Interventions:          Follow Up  Future Appointments   Date Time Provider Monica Allen   6/29/2022  4:30 PM POD1A [facility]   7/8/2022  9:30 AM LAB CK [facility]   7/8/2022 10:00 AM Zuleika Mcmahon APRN - NP [facility] AMB   7/11/2022  9:30 AM POD1B [facility]   7/11/2022 10:30 AM Herbie Morales APRN - CNP [facility] AMB   7/13/2022  4:30 PM POD1B [facility]   7/22/2022  1:15 PM LAB CK [facility]   7/22/2022  1:45 PM Moises Moreland MD [facility] AMB   7/25/2022  9:30 AM POD1B [address]   7/27/2022  4:30 PM SS [facility]   8/5/2022 11:00 AM LAB CK [facility]   8/5/2022 11:30 AM Zuleika Mcmahon APRN - NP [facility] AMB   8/8/2022  9:30 AM POD3C [facility]   8/10/2022  4:30 PM INT [facility]   8/22/2022  4:00 PM Teressa Ortiz MD [facility] AMB       Jaya Perez RN

## 2022-06-29 ENCOUNTER — HOSPITAL ENCOUNTER (OUTPATIENT)
Dept: INFUSION THERAPY | Age: 62
Discharge: HOME | End: 2022-06-29
Payer: COMMERCIAL

## 2022-06-29 VITALS
RESPIRATION RATE: 20 BRPM | HEART RATE: 78 BPM | DIASTOLIC BLOOD PRESSURE: 70 MMHG | OXYGEN SATURATION: 97 % | TEMPERATURE: 98 F | SYSTOLIC BLOOD PRESSURE: 110 MMHG

## 2022-06-29 DIAGNOSIS — C78.6 PERITONEAL METASTASES (HCC): ICD-10-CM

## 2022-06-29 DIAGNOSIS — C76.2 ABDOMINAL CARCINOMATOSIS (HCC): Primary | ICD-10-CM

## 2022-06-29 PROCEDURE — 96523 IRRIG DRUG DELIVERY DEVICE: CPT

## 2022-06-29 PROCEDURE — 2580000003 HC RX 258: Performed by: NURSE PRACTITIONER

## 2022-06-29 RX ORDER — SODIUM CHLORIDE 0.9 % (FLUSH) 0.9 %
5-40 SYRINGE (ML) INJECTION PRN
Status: DISCONTINUED | OUTPATIENT
Start: 2022-06-29 | End: 2022-06-30 | Stop reason: HOSPADM

## 2022-06-29 RX ADMIN — SODIUM CHLORIDE, PRESERVATIVE FREE 10 ML: 5 INJECTION INTRAVENOUS at 13:30

## 2022-06-29 NOTE — PROGRESS NOTES
Arrived to the [facility]. Adrucil pump completed. Provided education on oral hydration    Patient instructed to report any side affects to ordering provider. Patient tolerated without problems. Any issues or concerns during appointment: no.  Patient aware of next infusion appointment on 7/11/22 (date) at 56 (time). Discharged via TERESA [address] with spouse. Given WC due to large bag of TPN patient was carrting.

## 2022-07-01 ENCOUNTER — TELEPHONE (OUTPATIENT)
Dept: ONCOLOGY | Age: 62
End: 2022-07-01

## 2022-07-01 NOTE — TELEPHONE ENCOUNTER
[facility] is scheduled to do labs for pt on on 7/8. Dr. Danay Parmar also has her scheduled that day. Can we do one or the other so pt doesn't need to be drawn twice? Please call [employer].

## 2022-07-04 ASSESSMENT — ENCOUNTER SYMPTOMS
SORE THROAT: 0
HEMOPTYSIS: 0
BLOOD IN STOOL: 0
SCLERAL ICTERUS: 0
ABDOMINAL DISTENTION: 0
NAUSEA: 1
SHORTNESS OF BREATH: 0
CONSTIPATION: 0
VOMITING: 0
ABDOMINAL PAIN: 1
DIARRHEA: 0
EYE PROBLEMS: 0
COUGH: 0
BACK PAIN: 0

## 2022-07-05 ENCOUNTER — HOSPITAL ENCOUNTER (OUTPATIENT)
Dept: INFUSION THERAPY | Age: 62
Discharge: HOME | End: 2022-07-05
Payer: COMMERCIAL

## 2022-07-05 ENCOUNTER — OFFICE VISIT (OUTPATIENT)
Dept: SURGERY | Age: 62
End: 2022-07-05

## 2022-07-05 ENCOUNTER — TELEPHONE (OUTPATIENT)
Dept: ONCOLOGY | Age: 62
End: 2022-07-05

## 2022-07-05 VITALS
HEART RATE: 78 BPM | SYSTOLIC BLOOD PRESSURE: 98 MMHG | OXYGEN SATURATION: 97 % | TEMPERATURE: 97.8 F | DIASTOLIC BLOOD PRESSURE: 63 MMHG | RESPIRATION RATE: 18 BRPM

## 2022-07-05 VITALS — HEIGHT: 70 IN | BODY MASS INDEX: 26.48 KG/M2 | WEIGHT: 185 LBS

## 2022-07-05 DIAGNOSIS — Z45.2 PICC (PERIPHERALLY INSERTED CENTRAL CATHETER) FLUSH: ICD-10-CM

## 2022-07-05 DIAGNOSIS — Z48.89 AFTERCARE FOLLOWING SURGERY: Primary | ICD-10-CM

## 2022-07-05 DIAGNOSIS — Z45.2 PICC (PERIPHERALLY INSERTED CENTRAL CATHETER) FLUSH: Primary | ICD-10-CM

## 2022-07-05 PROCEDURE — 2580000003 HC RX 258: Performed by: INTERNAL MEDICINE

## 2022-07-05 PROCEDURE — 99024 POSTOP FOLLOW-UP VISIT: CPT | Performed by: SURGERY

## 2022-07-05 PROCEDURE — 96523 IRRIG DRUG DELIVERY DEVICE: CPT

## 2022-07-05 RX ORDER — SODIUM CHLORIDE 0.9 % (FLUSH) 0.9 %
5-40 SYRINGE (ML) INJECTION PRN
Status: DISCONTINUED | OUTPATIENT
Start: 2022-07-05 | End: 2022-07-06 | Stop reason: HOSPADM

## 2022-07-05 RX ORDER — HEPARIN SODIUM (PORCINE) LOCK FLUSH IV SOLN 100 UNIT/ML 100 UNIT/ML
500 SOLUTION INTRAVENOUS PRN
OUTPATIENT
Start: 2022-07-05

## 2022-07-05 RX ORDER — SODIUM CHLORIDE 9 MG/ML
5-250 INJECTION, SOLUTION INTRAVENOUS PRN
OUTPATIENT
Start: 2022-07-05

## 2022-07-05 RX ORDER — SODIUM CHLORIDE 0.9 % (FLUSH) 0.9 %
5-40 SYRINGE (ML) INJECTION PRN
OUTPATIENT
Start: 2022-07-05

## 2022-07-05 RX ORDER — SODIUM CHLORIDE 0.9 % (FLUSH) 0.9 %
5-40 SYRINGE (ML) INJECTION PRN
Status: CANCELLED | OUTPATIENT
Start: 2022-07-05

## 2022-07-05 RX ADMIN — SODIUM CHLORIDE, PRESERVATIVE FREE 20 ML: 5 INJECTION INTRAVENOUS at 16:10

## 2022-07-05 NOTE — TELEPHONE ENCOUNTER
Patient's spouse says picc line stopped working around 2:30 a.m. she tried flushing and changing out Yassine Lainezin and that didn't work. She is worried she might have messed something up. Patient has been without TPN since then. She said their [facility] nurse said they needed to get cath lupe. She is also worried about his G-tube site being infected after he stepped on the tubing. No fevers and they have appt with Dr. Matthew Luna office today at 1:45 pm to evaluate this. I made an appt with infusion center at 3:30 pm today for cath lupe and for them to look at picc line. Updated navigator Cathryn Bose and she will try to see patient in infusion today as well. Wife also states that they have a second opinion at [address] [place] [address] which will overlap with their chemo appt next week. Advised her that she should let us know after she talked with the nurse at [facility] this Wednesday if she would like to reschedule their chemo appts.

## 2022-07-05 NOTE — PROGRESS NOTES
Patient and spouse report that at 0200 the TPN stopped infusing and both PICC line lumens would not flush. Patient arrived, PICC dressing is clean ,dry and intact. Both lumens flush easily and have positive blood return. Explained to patient and spouse that PICC could have gotten pinched during the night.  Discharged ambulatory

## 2022-07-05 NOTE — PROGRESS NOTES
Tube check    He stepped on his tube, some pain at tube site. The tube is patent, working. It's disconnected from Dang bag and capped. Reconnect prn. Lidocaine gel apply around tube site for discomfort.

## 2022-07-05 NOTE — TELEPHONE ENCOUNTER
PT spouse called on behalf of PT/states is having issues with PT picc lines and getting medication to flow through/states was recommended cath flow from home health nurse but wants to talk to someone about it/

## 2022-07-06 DIAGNOSIS — C76.2 ABDOMINAL CARCINOMATOSIS (HCC): Primary | ICD-10-CM

## 2022-07-06 ASSESSMENT — ENCOUNTER SYMPTOMS
EYE PROBLEMS: 0
NAUSEA: 1
BACK PAIN: 0
SCLERAL ICTERUS: 0
HEMOPTYSIS: 0
CONSTIPATION: 0
SORE THROAT: 0
BLOOD IN STOOL: 0
VOMITING: 0
COUGH: 0
ABDOMINAL PAIN: 1
SHORTNESS OF BREATH: 0
DIARRHEA: 0
ABDOMINAL DISTENTION: 0

## 2022-07-06 NOTE — PROGRESS NOTES
nonspecific stranding of the omentum primarily in the left upper quadrant. Radiologist noted that follow-up  CT was recommended to differentiate passive congestion from omental disease.  On 5/11/22, abdominal series again showed multiple dilated small bowel loops with enteric contrast appearing to extend into the proximal colon, suggesting partial small  bowel obstruction. CT AP in the [facility] ED on 5/12/22 identified extensive peritoneal nodularity and mild ascites consistent with peritoneal  metastatic disease with primary lesion not definitely identified; dilated fluid-filled loops of small bowel possibly related to gastroenteritis with no definite obstruction and no obvious bowel mass; and sclerosis of S1 vertebral body. Radiologist recommended consideration of follow-up bone scan to assess for bone metastases. Patient was admitted to  the Hospitalist service on 5/12/22, and IR consulted for possible paracentesis however very small volume was inaccessible.  CT Chest obtained on 5/13/22 showed No evidence of primary malignancy or metastasis within the chest.  Follow-up hepatobiliary  scan was suggested to assess for common bile duct obstruction. Oncology was consulted but did not see patient inhouse as pt was dischared. Upon discharge on 5/14/22, patient was instructed to follow up with [facility]. During consultation, we discussed his workup. We looked at the images together demonstrating some of the findings concerning for peritoneal nodularity/peritoneal disease. Discussed anatomy of the findings utilizing images to help pt understand what we are looking at and suspecting. He and wife were appreciative of the time spent to review these. We also addressed pt's pain. We also reviewed images of sclerosing lesion - bone scan recommended. He also was recommended to undergo HIDA scan after recent US per PCP. He is tired of tests, frustrated.   Feelings validated and stressed importance of workup to determine why he has pain. Wt loss from 240 --> 209 noted and expressed concern to pt about this. Of note, prior akbar resection with Dr Bermudez Blow for diverticulitis per pt. Sent note to dr Collins Gains re pt's case to expedite workup with his agreement. He is here today for follow up. Pt does not provide any history during the visit, wife provides the history. Current main concern is r/t PICC line not giving blood return. They came earlier this week for Cathflo, however blood return was obtained for PICC line and no cathflo was given this day. They did not sleep well as the PICC line is positional with the TPN and pt sleeps on his right side. They plan to see José Miguel Pham in [facility] on Monday. His BP is low, but wife feels this is related to the TPN not infusing well overnight and they will increase fluids at home. They decline IVFs today. He has difficulty keeping on schedule for pain/nausea medication, wife is trying to keep track with detailed spreadsheet. They deny any other current concerns. They decline cath lupe today after learning the length of time this would require. ALT elevated from BL, chemo effect vs. TPN - labs will be repeated prior to chemo on 7/15. Chronological Events:   EGD REPORT 2/25/22                      COLONOSCOPY REPORT 2/25/22                 CT ABDOMEN PELVIS W CONTRAST 3/8/2022   FINDINGS:   LOWER CHEST: Unremarkable. ABDOMEN AND PELVIS:   Liver: Unremarkable. Biliary system: Unremarkable.     Pancreas: Unremarkable. Spleen: Unremarkable. Adrenals: Unremarkable.     Genitourinary: Unremarkable. Reproductive organs: Unremarkable. Gastrointestinal tract: Colonic diverticulosis without evidence of diverticulitis. There is no evidence of bowel obstruction or inflammation. The appendix is normal   Peritoneum/Extraperitoneum: Unremarkable. No free fluid or air. Vascular: Unremarkable. Musculoskeletal:  Small fat-containing umbilical hernia.  Degenerative changes of thoracolumbar spine. No acute or aggressive osseous lesion. IMPRESSION: Colonic diverticula without evidence of acute diverticulitis.        RIGHT UPPER QUADRANT ABDOMINAL ULTRASOUND 5/3/2022    FINDINGS:   Pancreas: Not visualized, obscured by bowel gas. Intrahepatic IVC: Normal.   Main  Portal Vein: Patent with normal directional flow. Liver: Liver contour is normal. Liver appears diffusely increased in echogenicity consistent with hepatic steatosis. There is fatty sparing around the gallbladder fossa. Gallbladder: Gallbladder  is normal in size. No evidence of gallbladder wall thickening. No gallstones are seen. There are several nonmobile echogenic mural foci in the proximal gallbladder body/neck, largest measuring 4 mm, without shadowing, likely small cholesterol polyps. Bile ducts: No evidence of biliary ductal dilation. The common bile duct measures 3 mm in diameter. Right kidney: Normal.   Left Upper Quadrant: Limited images of the left upper quadrant are unremarkable. Spleen measures 12.1 cm in length. Free fluid: Trace simple free fluid in the right upper quadrant and left lower quadrant. IMPRESSION:    1. No acute findings to explain patient's pain. 2. Probable hepatic steatosis. 3. Small echogenic mural foci in the gallbladder which are nonmobile, likely representing cholesterol polyps, largest measuring 4 mm. There are no specific ultrasound  follow up recommendations for polyps of this small size. 4. Small volume simple ascites in the right upper quadrant and left lower quadrant, nonspecific.        CT ABDOMEN - PELVIS WITH CONTRAST 5/10/22   FINDINGS:   Liver: Normal    Portal Vein: Normal   Gallbladder - Biliary Tree: Normal   Pancreas: Normal   Spleen: Normal   Retroperitoneum:   Adrenals: Normal   Kidneys:  Normal    Aorto - Cava:  Calcification of the abdominal aorta without aneurysm formation.    Lymphatics:  Normal   GI - Mesentery - Peritoneum: Multiple dilated mid small bowel loops without a focal transition point. The appendix is normal. Small to  moderate amount of free fluid in the pelvis and right flank. Nonspecific stranding of the omentum in the left upper quadrant. Small fatty umbilical hernia. Pelvis: Normal   Lower Chest: Normal   Soft tissues - MSK: Normal    IMPRESSION:   1. Partial small bowel obstruction. 2. Small to moderate amount of free fluid in the abdomen and pelvis. 3. Nonspecific stranding of the omentum primarily  in the left upper quadrant. Follow-up CT is recommended to differentiate passive congestion from omental a static disease.         ABDOMEN SERIES 5/11/22   FINDINGS:   Chest: Heart size within normal limits. Lungs are clear. No pleural effusion or pneumothorax. Bowel: Multiple dilated small bowel loops with air-fluid levels on the upright view. Contrast distends small bowel loops in the left lateral abdomen and lower abdomen. Contrast in the right hemiabdomen appears to be within proximal colon. Scattered colonic  gas. Peritoneum / Retroperitoneum: No pneumoperitoneum. Soft tissues / Bones: No acute osseous findings. The contrast in urinary bladder. Lines / Support Apparatus: None. IMPRESSION: Redemonstrated of multiple dilated small bowel loops with enteric contrast appearing to extend into the proximal colon, suggesting only partial small bowel obstruction. Continued radiographic follow-up is advised.        CT OF THE ABDOMEN AND PELVIS 5/12/22   FINDINGS:   LOWER CHEST: Normal.   HEPATOBILIARY: No evidence of focal liver mass. No calcified gallstones.    PANCREAS: Normal.   SPLEEN: Normal.    ADRENAL GLANDS: Normal.    KIDNEYS/BLADDER: Kidneys and bladder are unremarkable. No hydronephrosis or urinary tract calculi. BOWEL: Dilated fluid-filled loops of small bowel are present throughout the abdomen. No definite transition point. Oral contrast noted in the colon.  No definite evidence of bowel mass but there is extensive peritoneal nodularity and trace ascites highly  concerning for peritoneal metastatic disease. LYMPH NODES: No enlarged retroperitoneal or pelvic lymph nodes. Peritoneal nodularity again noted most likely metastatic disease.    VASCULATURE: Unremarkable.    PELVIC ORGANS: Prostate gland and rectum are unremarkable. Small amount of free pelvic fluid is noted.    MUSCULOSKELETAL: Degenerative spine changes are noted. Mild sclerosis involving the S1 vertebral body is present on image 74 of series 602.    IMPRESSION   1. Extensive peritoneal nodularity and mild ascites consistent with peritoneal metastatic disease. Primary lesion not definitely identified. 2. Dilated fluid-filled loops of small bowel possibly related to gastroenteritis. No definite obstruction. No obvious bowel mass. 3. Sclerosis S1 vertebral body. Consider follow-up bone scan to assess for bone metastases.       LIMITED ABDOMINAL ULTRASOUND 5/13/22   FINDINGS: A single limited gray scale image was submitted of an undisclosed location in the abdomen. Images demonstrate a small amount of  ascites as seen on comparison CT.    IMPRESSION   1. Small volume ascites.       CT CHEST WITH CONTRAST 5/13/22   FINDINGS:   Mediastinum and visualized thyroid: Normal.   Heart: Normal.    Large Vessels: Normal.    Pleura: Normal.    Lungs: No lung mass clearly discerned. Mild scarring or atelectasis in the right lung base. No suspicious lung nodule. No consolidation or zulma pulmonary edema.    Airways: Normal.    Lymph nodes: Normal.    Bones/Soft tissues: Normal.    Visualized abdomen: Partially imaged omental nodules. Perihepatic ascites noted.    IMPRESSION: No evidence of primary malignancy or metastasis within the chest       ABDOMINAL ULTRASOUND 5/17/22   FINDINGS:    LIVER: 17.3 cm.  Slight increase in echogenicity without focal mass. BILE DUCTS: No intrahepatic bile duct dilatation.  CBD diameter = 8 mm.    GALLBLADDER: It is unremarkable in appearance without stones or sludge. Echogenic polyp measures 0.5 cm. No gallbladder wall thickening. Mild ascites. PANCREAS: Normal.   SPLEEN: Borderline enlarged at 12.2 cm. RIGHT KIDNEY: 13.3 cm.  No mass or hydronephrosis. LEFT KIDNEY: 14.3 cm.  No mass or hydronephrosis. ABDOMINAL AORTA AND IVC: Normal in size. ASCITES: Mild   IMPRESSION: Possible mild fatty infiltration liver. No focal mass. Gallbladder polyp but no stones or gallbladder wall thickening. Mild ascites. Mildly prominent common bile duct. Follow-up hepatobiliary scan could be performed to assess for common bile duct obstruction.        04/02/2021 (COVID-19, Georgeana Crumble, Primary or Immunocompromised Series, MRNA, PF, 100mcg/0.5mL)   04/30/2021 (COVID-19, Moderna, Primary or Immunocompromised Series, MRNA, PF, 100mcg/0.5mL)   11/16/2021 (COVID-19, Moderna Booster, PF, 0.25mL Dose)      5/18/22 heme/onc consultation   5/20/22 Dr Angel Presley consult - sent to ED for admission/workup   5/23/22 omental bx - IR   5/27/22 Dr Angel Presley - diagnostic lap, omental mass and mesentery mass excision, G-tube placement for venting  6/1/22 painful G-tube - tract recanalized and new G-tube placed   6/12/22 C1 FOLFIRINOX completed - dose adjusted   6/14/22 d/c   6/24/22 FU sx - G tube maint instructions/staples removed - RTC PRN   6/24/22 FU after hospitalization - discussed PC/plan for tx  7/8/22 FU - mainly concerned about PICC line without blood return, decline cathflo, seeing José Miguel Pham in [facility] on Monday. Will increase hydration at home. HH for TPN.         Family History   Problem Relation Age of Onset    No Known Problems Brother     Cancer Sister         breast    Hypertension Mother     Heart Disease Mother     Diabetes Father     Osteoarthritis Father     Alcohol Abuse Father     Hypertension Father     Diabetes Mother       Social History     Socioeconomic History    Marital status:      Spouse name: None    Number of children: None  Years of education: None    Highest education level: None   Occupational History    None   Tobacco Use    Smoking status: Current Every Day Smoker     Packs/day: 1.00    Smokeless tobacco: Never Used   Substance and Sexual Activity    Alcohol use: No    Drug use: No    Sexual activity: None   Other Topics Concern    None   Social History Narrative    None     Social Determinants of Health     Financial Resource Strain:     Difficulty of Paying Living Expenses: Not on file   Food Insecurity:     Worried About Running Out of Food in the Last Year: Not on file    Briseida of Food in the Last Year: Not on file   Transportation Needs:     Lack of Transportation (Medical): Not on file    Lack of Transportation (Non-Medical): Not on file   Physical Activity:     Days of Exercise per Week: Not on file    Minutes of Exercise per Session: Not on file   Stress:     Feeling of Stress : Not on file   Social Connections:     Frequency of Communication with Friends and Family: Not on file    Frequency of Social Gatherings with Friends and Family: Not on file    Attends Catholic Services: Not on file    Active Member of [address] [place] [address] or Organizations: Not on file    Attends Club or Organization Meetings: Not on file    Marital Status: Not on file   Intimate Partner Violence:     Fear of Current or Ex-Partner: Not on file    Emotionally Abused: Not on file    Physically Abused: Not on file    Sexually Abused: Not on file   Housing Stability:     Unable to Pay for Housing in the Last Year: Not on file    Number of [place] in the Last Year: Not on file    Unstable Housing in the Last Year: Not on file        Review of Systems   Constitutional: Positive for fatigue. Negative for appetite change, diaphoresis, fever and unexpected weight change. HENT:   Negative for sore throat. Eyes: Negative for eye problems and icterus. Respiratory: Negative for cough, hemoptysis and shortness of breath. Cardiovascular: Negative for chest pain, leg swelling and palpitations. Gastrointestinal: Positive for abdominal pain and nausea. Negative for abdominal distention, blood in stool, constipation, diarrhea and vomiting. Endocrine: Negative for hot flashes. Genitourinary: Negative for dysuria. Musculoskeletal: Positive for arthralgias. Negative for back pain and gait problem. Skin: Negative for itching, rash and wound. Neurological: Negative for dizziness, extremity weakness, gait problem, headaches, light-headedness, numbness, seizures and speech difficulty. Hematological: Negative for adenopathy. Does not bruise/bleed easily. Psychiatric/Behavioral: Positive for depression. Negative for decreased concentration and sleep disturbance. The patient is nervous/anxious.          No Known Allergies  Past Medical History:   Diagnosis Date    Bilateral carpal tunnel syndrome 12/9/2020    Chronic right shoulder pain 11/30/2020    Colon polyps 3/11/2013    Diverticulitis 3/11/2013    HTN (hypertension) 3/11/2013    Hyperlipidemia 3/11/2013    Paresthesia and pain of both upper extremities 11/30/2020    PUD (peptic ulcer disease) 3/11/2013    History of     Right elbow pain 12/9/2020    Wheezing 3/11/2013     Past Surgical History:   Procedure Laterality Date    COLONOSCOPY  2/25/09    colonoscopy per Dr. Joaquín Beckford with need for repeat every 3 years    COLONOSCOPY  02/25/2022    Dr. Lydia Luna; polyps of the ascending, transverse, descending, and sigmoid colons; internal hemorrhoid; diverticulosis    LAPAROSCOPY N/A 5/27/2022    LAPAROSCOPY DIAGNOSTIC , OPEN EXPLORATORY LAPAROTOMY, MASS EXCISION, G-TUBE PLACEMENT performed by Sally Plascencia MD at [facility] MAIN OR    PORT SURGERY N/A 6/3/2022    PORT INSERTION performed by Sally Plascencia MD at [address] [place] [address] UNLISTED  October 2004    partial colon resection per Dr. Derek Monroy  02/25/2022    Dr. Lydia Luna; hiatal hernia gastric polyps     Current Outpatient Medications   Medication Sig Dispense Refill    fentaNYL (DURAGESIC) 100 MCG/HR Place 1 patch onto the skin every 72 hours for 15 days. 5 patch 0    OLANZapine zydis (ZYPREXA) 5 MG disintegrating tablet Take 1 tablet by mouth nightly Do not take with promethazine 30 tablet 3    busPIRone (BUSPAR) 10 MG tablet Take 1 tablet by mouth 3 times daily 90 tablet 0    nicotine (NICODERM CQ) 14 MG/24HR Place 1 patch onto the skin daily 30 patch 3    ondansetron (ZOFRAN-ODT) 8 MG TBDP disintegrating tablet Take 1 tablet by mouth every 8 hours 90 tablet 0    promethazine (PHENERGAN) 12.5 MG tablet Take 1 tablet by mouth every 6 hours as needed for Nausea 90 tablet 0    LORazepam (ATIVAN) 1 MG tablet Take 1 tablet by mouth every 8 hours as needed for Anxiety (nausea) for up to 30 days. 60 tablet 0    escitalopram (LEXAPRO) 10 MG tablet Take 1 tablet by mouth daily 30 tablet 0    naloxone 4 MG/0.1ML LIQD nasal spray 1 spray by Nasal route as needed for Opioid Reversal       pantoprazole (PROTONIX) 40 MG tablet TAKE 1 TABLET BY MOUTH BEFORE BREAKFAST AND DINNER FOR 30 DAYS      dicyclomine (BENTYL) 10 MG capsule Take 20 mg by mouth every 6 hours       polyethylene glycol (GLYCOLAX) 17 GM/SCOOP powder Take 17 g by mouth daily      rosuvastatin (CRESTOR) 10 MG tablet Take 10 mg by mouth      umeclidinium-vilanterol (ANORO ELLIPTA) 62.5-25 MCG/INH AEPB inhaler Inhale 1 puff into the lungs daily        No current facility-administered medications for this visit. Facility-Administered Medications Ordered in Other Visits   Medication Dose Route Frequency Provider Last Rate Last Admin    sodium chloride flush 0.9 % injection 5-40 mL  5-40 mL IntraVENous PRN Marco A Montoya MD   10 mL at 07/08/22 0949       No flowsheet data found.     OBJECTIVE:  BP 89/65   Pulse 80   Temp 97.9 °F (36.6 °C)   Resp 16   Ht 5' 10\" (1.778 m)   Wt 180 lb 8 oz (81.9 kg)   SpO2 96%   BMI 25.90 kg/m²       ECOG PERFORMANCE STATUS - 1- Restricted in physically strenuous activity but ambulatory and able to carry out work of a light or sedentary nature such as light house work, office work. Pain - Pain Score:   3/10. Mild to moderate pain, requiring medication - see MAR  currently controlled on regimen - PC seeing pt. Fatigue - No flowsheet data found. Distress - No flowsheet data found. Physical Exam  Vitals reviewed. Exam conducted with a chaperone present. Constitutional:       General: He is not in acute distress. Appearance: Normal appearance. He is normal weight. He is not ill-appearing or toxic-appearing. HENT:      Head: Normocephalic and atraumatic. Nose: Nose normal. No congestion. Mouth/Throat:      Mouth: Mucous membranes are moist.   Eyes:      General: No scleral icterus. Extraocular Movements: Extraocular movements intact. Conjunctiva/sclera: Conjunctivae normal.      Pupils: Pupils are equal, round, and reactive to light. Cardiovascular:      Rate and Rhythm: Normal rate and regular rhythm. Heart sounds: No murmur heard. Pulmonary:      Effort: Pulmonary effort is normal. No respiratory distress. Breath sounds: Normal breath sounds. No wheezing, rhonchi or rales. Chest:   Breasts:      Right: No axillary adenopathy or supraclavicular adenopathy. Left: No axillary adenopathy or supraclavicular adenopathy. Abdominal:      General: There is no distension. Palpations: Abdomen is soft. There is no mass. Tenderness: There is no abdominal tenderness. There is no guarding or rebound. Comments: Venting tube with output/brownish    Musculoskeletal:         General: Normal range of motion. Cervical back: Normal range of motion. No rigidity. Right lower leg: No edema. Left lower leg: No edema. Lymphadenopathy:      Cervical: No cervical adenopathy.       Upper Body:      Right upper body: No supraclavicular or axillary adenopathy. Left upper body: No supraclavicular or axillary adenopathy. Skin:     General: Skin is warm and dry. Coloration: Skin is not jaundiced or pale. Findings: No bruising or rash. Neurological:      General: No focal deficit present. Mental Status: He is alert and oriented to person, place, and time. Motor: No weakness. Coordination: Coordination normal.      Gait: Gait normal.   Psychiatric:         Behavior: Behavior normal.         Thought Content:  Thought content normal.          Labs:  Recent Results (from the past 168 hour(s))   CBC with Auto Differential    Collection Time: 07/08/22  9:38 AM   Result Value Ref Range    WBC 3.6 (L) 4.3 - 11.1 K/uL    RBC 4.03 (L) 4.23 - 5.6 M/uL    Hemoglobin 11.8 (L) 13.6 - 17.2 g/dL    Hematocrit 34.9 %    MCV 86.6 79.6 - 97.8 FL    MCH 29.3 26.1 - 32.9 PG    MCHC 33.8 31.4 - 35.0 g/dL    RDW 12.3 11.9 - 14.6 %    Platelets 226 694 - 953 K/uL    MPV 10.3 9.4 - 12.3 FL    nRBC 0.00 0.0 - 0.2 K/uL    Differential Type AUTOMATED      Seg Neutrophils 50 43 - 78 %    Lymphocytes 36 13 - 44 %    Monocytes 8 4.0 - 12.0 %    Eosinophils % 5 0.5 - 7.8 %    Basophils 1 0.0 - 2.0 %    Immature Granulocytes 0 0.0 - 5.0 %    Segs Absolute 1.8 1.7 - 8.2 K/UL    Absolute Lymph # 1.3 0.5 - 4.6 K/UL    Absolute Mono # 0.3 0.1 - 1.3 K/UL    Absolute Eos # 0.2 0.0 - 0.8 K/UL    Basophils Absolute 0.0 0.0 - 0.2 K/UL    Absolute Immature Granulocyte 0.0 0.0 - 0.5 K/UL   Comprehensive Metabolic Panel    Collection Time: 07/08/22  9:38 AM   Result Value Ref Range    Sodium 135 (L) 136 - 145 mmol/L    Potassium 3.4 (L) 3.5 - 5.1 mmol/L    Chloride 103 98 - 107 mmol/L    CO2 24 21 - 32 mmol/L    Anion Gap 8 7 - 16 mmol/L    Glucose 127 (H) 65 - 100 mg/dL    BUN 14 8 - 23 MG/DL    CREATININE 0.70 (L) 0.8 - 1.5 MG/DL    GFR African American >60 >60 ml/min/1.73m2    GFR Non- >60 >60 ml/min/1.73m2    Calcium 8.2 (L) 8.3 - 10.4 MG/DL    Total Bilirubin 0.4 0.2 - 1.1 MG/DL     (H) 12 - 65 U/L    AST 46 (H) 15 - 37 U/L    Alk Phosphatase 158 (H) 50 - 136 U/L    Total Protein 5.7 (L) 6.3 - 8.2 g/dL    Albumin 2.5 (L) 3.2 - 4.6 g/dL    Globulin 3.2 2.3 - 3.5 g/dL    Albumin/Globulin Ratio 0.8 (L) 1.2 - 3.5         Imaging: reviewed     PATHOLOGY:             6/2022         ASSESSMENT:     Diagnosis Orders   1. Carcinomatosis ([facility] [address]     2. Peritoneal metastases [facility])         Mr. Elza Casarez is here for FU of metastatic adenocarcinoma. 1. Metastatic adenocarcinoma   - s/p omental and mesenteric mass bx - c/w upper GI adenocarcinoma    - hx of diverticular dz - s/p previous bowel resection by dr Lara Beckford at [address] pending     - here for check in, concerned about PICC line without blood return, declined cath lupe d/t length of time this would require today. Seeing José Miguel Pham on Monday in [place]. Plan for C3 in 1 week, labs will be rechecked prior. - sclerotic lesion on imaging at S1 - bone scan recommended. - pain - better - on Fentanyl patch and oral supportive meds per PC. Will see PC on 7/15.    - nausea - PC following for symptom management   - wt loss/FTT - secondary to disease and tx - on TPN, wt  - monitoring closely     - depression/anxiety - currently controlled on regimen, will adjust modify as needed going forward     RTC for tx next week or sooner as needed   [45min encounter - chart review, coordination of care, visit, communication with other provider, charting]    MDM    Lab studies and imaging studies were personally reviewed. Pertinent old records were reviewed. Historical:    - here with his wife for consultation. During today's visit, we discussed his current workup. We looked at the images together demonstrating some of the findings concerning for peritoneal nodularity/peritoneal disease.   Discussed anatomy of the findings  utilizing images to help pt understand what we are looking at and suspecting. He and wife were appreciative of the time spent to review these. Discussed need for visual dx/tissue pathology to determine plan - recommend ex lap - refer to Dr Phil Zepeda - personally  reviewed case with Dr Phil Zepeda who will expedite the workup and will see pt Fri. US with mild biliary duct dilation - HIDA/ERCP reviewed by PCP   + BM/flatus; He did not like how IV morphine made him feel in the hospital.  He tried tramadol but found no relief and has been taking acetaminophen at home with minimal relief. Discussed different options and he settled on trying  Percocet - he will contact the office to inform us if this is working for him. We discussed SE - not to drive while on the med. Bowel regimen reviewed. He is tired of tests, frustrated. Feelings validated and stressed importance of workup to determine why he has pain. Wt loss from 240 --> 209 noted and expressed concern to pt about this. All questions were asked and answered to the best of my ability. The patient verbalized understanding and agrees with the plan above.           Jãoo Batista, LUCY - NP  [facility] Hematology and Oncology  [address] [address]  Office : [phone]  Fax : [phone]

## 2022-07-07 ENCOUNTER — TELEPHONE (OUTPATIENT)
Dept: ONCOLOGY | Age: 62
End: 2022-07-07

## 2022-07-08 ENCOUNTER — HOSPITAL ENCOUNTER (OUTPATIENT)
Dept: INFUSION THERAPY | Age: 62
Discharge: HOME | End: 2022-07-08
Payer: COMMERCIAL

## 2022-07-08 ENCOUNTER — CLINICAL DOCUMENTATION (OUTPATIENT)
Dept: CASE MANAGEMENT | Age: 62
End: 2022-07-08

## 2022-07-08 ENCOUNTER — OFFICE VISIT (OUTPATIENT)
Dept: ONCOLOGY | Age: 62
End: 2022-07-08
Payer: COMMERCIAL

## 2022-07-08 ENCOUNTER — OFFICE VISIT (OUTPATIENT)
Dept: ONCOLOGY | Age: 62
End: 2022-07-08

## 2022-07-08 VITALS
OXYGEN SATURATION: 96 % | HEIGHT: 70 IN | RESPIRATION RATE: 16 BRPM | HEART RATE: 80 BPM | WEIGHT: 180.5 LBS | SYSTOLIC BLOOD PRESSURE: 89 MMHG | TEMPERATURE: 97.9 F | BODY MASS INDEX: 25.84 KG/M2 | DIASTOLIC BLOOD PRESSURE: 65 MMHG

## 2022-07-08 DIAGNOSIS — C78.6 PERITONEAL METASTASES (HCC): ICD-10-CM

## 2022-07-08 DIAGNOSIS — C80.0 CARCINOMATOSIS (HCC): Primary | ICD-10-CM

## 2022-07-08 DIAGNOSIS — Z00.8 NUTRITIONAL ASSESSMENT: Primary | ICD-10-CM

## 2022-07-08 DIAGNOSIS — Z78.9 ON TOTAL PARENTERAL NUTRITION (TPN): ICD-10-CM

## 2022-07-08 DIAGNOSIS — C78.6 PERITONEAL METASTASES (HCC): Primary | ICD-10-CM

## 2022-07-08 DIAGNOSIS — C76.2 ABDOMINAL CARCINOMATOSIS (HCC): ICD-10-CM

## 2022-07-08 LAB
ALBUMIN SERPL-MCNC: 2.5 G/DL (ref 3.2–4.6)
ALBUMIN/GLOB SERPL: 0.8 {RATIO} (ref 1.2–3.5)
ALP SERPL-CCNC: 158 U/L (ref 50–136)
ALT SERPL-CCNC: 149 U/L (ref 12–65)
ANION GAP SERPL CALC-SCNC: 8 MMOL/L (ref 7–16)
AST SERPL-CCNC: 46 U/L (ref 15–37)
BASOPHILS # BLD: 0 K/UL (ref 0–0.2)
BASOPHILS NFR BLD: 1 % (ref 0–2)
BILIRUB SERPL-MCNC: 0.4 MG/DL (ref 0.2–1.1)
BUN SERPL-MCNC: 14 MG/DL (ref 8–23)
CALCIUM SERPL-MCNC: 8.2 MG/DL (ref 8.3–10.4)
CHLORIDE SERPL-SCNC: 103 MMOL/L (ref 98–107)
CO2 SERPL-SCNC: 24 MMOL/L (ref 21–32)
CREAT SERPL-MCNC: 0.7 MG/DL (ref 0.8–1.5)
DIFFERENTIAL METHOD BLD: ABNORMAL
EOSINOPHIL # BLD: 0.2 K/UL (ref 0–0.8)
EOSINOPHIL NFR BLD: 5 % (ref 0.5–7.8)
ERYTHROCYTE [DISTWIDTH] IN BLOOD BY AUTOMATED COUNT: 12.3 % (ref 11.9–14.6)
GLOBULIN SER CALC-MCNC: 3.2 G/DL (ref 2.3–3.5)
GLUCOSE SERPL-MCNC: 127 MG/DL (ref 65–100)
HCT VFR BLD AUTO: 34.9 %
HGB BLD-MCNC: 11.8 G/DL (ref 13.6–17.2)
IMM GRANULOCYTES # BLD AUTO: 0 K/UL (ref 0–0.5)
IMM GRANULOCYTES NFR BLD AUTO: 0 % (ref 0–5)
LYMPHOCYTES # BLD: 1.3 K/UL (ref 0.5–4.6)
LYMPHOCYTES NFR BLD: 36 % (ref 13–44)
MCH RBC QN AUTO: 29.3 PG (ref 26.1–32.9)
MCHC RBC AUTO-ENTMCNC: 33.8 G/DL (ref 31.4–35)
MCV RBC AUTO: 86.6 FL (ref 79.6–97.8)
MONOCYTES # BLD: 0.3 K/UL (ref 0.1–1.3)
MONOCYTES NFR BLD: 8 % (ref 4–12)
NEUTS SEG # BLD: 1.8 K/UL (ref 1.7–8.2)
NEUTS SEG NFR BLD: 50 % (ref 43–78)
NRBC # BLD: 0 K/UL (ref 0–0.2)
PLATELET # BLD AUTO: 183 K/UL (ref 150–450)
PMV BLD AUTO: 10.3 FL (ref 9.4–12.3)
POTASSIUM SERPL-SCNC: 3.4 MMOL/L (ref 3.5–5.1)
PROT SERPL-MCNC: 5.7 G/DL (ref 6.3–8.2)
RBC # BLD AUTO: 4.03 M/UL (ref 4.23–5.6)
SODIUM SERPL-SCNC: 135 MMOL/L (ref 136–145)
WBC # BLD AUTO: 3.6 K/UL (ref 4.3–11.1)

## 2022-07-08 PROCEDURE — 85025 COMPLETE CBC W/AUTO DIFF WBC: CPT

## 2022-07-08 PROCEDURE — 80053 COMPREHEN METABOLIC PANEL: CPT

## 2022-07-08 PROCEDURE — 96523 IRRIG DRUG DELIVERY DEVICE: CPT

## 2022-07-08 PROCEDURE — 2580000003 HC RX 258: Performed by: INTERNAL MEDICINE

## 2022-07-08 PROCEDURE — 99214 OFFICE O/P EST MOD 30 MIN: CPT | Performed by: NURSE PRACTITIONER

## 2022-07-08 RX ORDER — SODIUM CHLORIDE 0.9 % (FLUSH) 0.9 %
5-40 SYRINGE (ML) INJECTION PRN
Status: DISCONTINUED | OUTPATIENT
Start: 2022-07-08 | End: 2022-07-09 | Stop reason: HOSPADM

## 2022-07-08 RX ADMIN — SODIUM CHLORIDE, PRESERVATIVE FREE 10 ML: 5 INJECTION INTRAVENOUS at 09:56

## 2022-07-08 NOTE — PROGRESS NOTES
PICC flushed and blood return noted from purple line, but unable to draw labs from PICC due to inadequate blood return. Pt requested to have labs peripherally rather than accessing his port today. Labs drawn and pt discharged ambulatory.

## 2022-07-08 NOTE — PATIENT INSTRUCTIONS
Patient Instructions from Today's Visit    Reason for Visit:  Tri Hernandez visit--chemo due on 7-15    Diagnosis Information:  [email or web address]    Plan:  -We will cathflo the PICC line on 7-15 and have you see palliative care    Follow Up:   On 7-15 for infusion    Recent Lab Results:  Hospital Outpatient Visit on 07/08/2022   Component Date Value Ref Range Status    WBC 07/08/2022 3.6* 4.3 - 11.1 K/uL Final    RBC 07/08/2022 4.03* 4.23 - 5.6 M/uL Final    Hemoglobin 07/08/2022 11.8* 13.6 - 17.2 g/dL Final    Hematocrit 07/08/2022 34.9  % Final    MCV 07/08/2022 86.6  79.6 - 97.8 FL Final    MCH 07/08/2022 29.3  26.1 - 32.9 PG Final    MCHC 07/08/2022 33.8  31.4 - 35.0 g/dL Final    RDW 07/08/2022 12.3  11.9 - 14.6 % Final    Platelets 80/09/7554 183  150 - 450 K/uL Final    MPV 07/08/2022 10.3  9.4 - 12.3 FL Final    nRBC 07/08/2022 0.00  0.0 - 0.2 K/uL Final    **Note: Absolute NRBC parameter is now reported with Hemogram**    Differential Type 07/08/2022 AUTOMATED    Final    Seg Neutrophils 07/08/2022 50  43 - 78 % Final    Lymphocytes 07/08/2022 36  13 - 44 % Final    Monocytes 07/08/2022 8  4.0 - 12.0 % Final    Eosinophils % 07/08/2022 5  0.5 - 7.8 % Final    Basophils 07/08/2022 1  0.0 - 2.0 % Final    Immature Granulocytes 07/08/2022 0  0.0 - 5.0 % Final    Segs Absolute 07/08/2022 1.8  1.7 - 8.2 K/UL Final    Absolute Lymph # 07/08/2022 1.3  0.5 - 4.6 K/UL Final    Absolute Mono # 07/08/2022 0.3  0.1 - 1.3 K/UL Final    Absolute Eos # 07/08/2022 0.2  0.0 - 0.8 K/UL Final    Basophils Absolute 07/08/2022 0.0  0.0 - 0.2 K/UL Final    Absolute Immature Granulocyte 07/08/2022 0.0  0.0 - 0.5 K/UL Final       Treatment Summary has been discussed and given to patient: n/a        -------------------------------------------------------------------------------------------------------------------  Please call our office at [phone] if you have any  of the following symptoms:   · Fever of 100.5 or greater  · Chills  · Shortness of breath  · Swelling or pain in one leg    After office hours an answering service is available and will contact a provider for emergencies or if you are experiencing any of the above symptoms.  Patient does express an interest in My Chart. My Chart log in information explained on the after visit summary printout at the [facility] [address] desk.     Dola Siemens, RN  Nurse Navigator  [address] [place] [id number] [phone]

## 2022-07-10 ENCOUNTER — APPOINTMENT (OUTPATIENT)
Dept: GENERAL RADIOLOGY | Age: 62
DRG: 871 | End: 2022-07-10
Payer: COMMERCIAL

## 2022-07-10 ENCOUNTER — HOSPITAL ENCOUNTER (INPATIENT)
Age: 62
LOS: 3 days | Discharge: HOME HEALTH | DRG: 871 | End: 2022-07-13
Attending: EMERGENCY MEDICINE | Admitting: INTERNAL MEDICINE
Payer: COMMERCIAL

## 2022-07-10 DIAGNOSIS — G89.3 CANCER ASSOCIATED PAIN: ICD-10-CM

## 2022-07-10 DIAGNOSIS — D70.9 NEUTROPENIC FEVER (HCC): Primary | ICD-10-CM

## 2022-07-10 DIAGNOSIS — R50.81 NEUTROPENIC FEVER (HCC): Primary | ICD-10-CM

## 2022-07-10 DIAGNOSIS — R60.0 LOCALIZED EDEMA: ICD-10-CM

## 2022-07-10 DIAGNOSIS — Z51.5 ENCOUNTER FOR PALLIATIVE CARE: ICD-10-CM

## 2022-07-10 PROBLEM — G93.41 SEPTIC ENCEPHALOPATHY: Status: ACTIVE | Noted: 2022-07-10

## 2022-07-10 PROBLEM — R10.9 ABDOMINAL PAIN: Status: ACTIVE | Noted: 2022-05-12

## 2022-07-10 PROBLEM — R79.89 ABNORMAL LIVER FUNCTION TEST: Status: ACTIVE | Noted: 2022-07-10

## 2022-07-10 PROBLEM — D72.819 LEUKOPENIA: Status: ACTIVE | Noted: 2022-07-10

## 2022-07-10 PROBLEM — R65.20 SEVERE SEPSIS (HCC): Status: ACTIVE | Noted: 2022-07-10

## 2022-07-10 PROBLEM — C79.9 METASTATIC ADENOCARCINOMA (HCC): Status: ACTIVE | Noted: 2022-07-10

## 2022-07-10 PROBLEM — C16.9 GASTRIC CANCER (HCC): Status: ACTIVE | Noted: 2022-07-10

## 2022-07-10 PROBLEM — T82.598A MALFUNCTION OF PERIPHERAL INSERTED CENTRAL CATHETER (HCC): Status: ACTIVE | Noted: 2022-07-10

## 2022-07-10 PROBLEM — A41.9 SEPSIS (HCC): Status: ACTIVE | Noted: 2022-07-10

## 2022-07-10 PROBLEM — D64.9 ANEMIA: Status: ACTIVE | Noted: 2022-07-10

## 2022-07-10 PROBLEM — E87.6 HYPOKALEMIA: Status: ACTIVE | Noted: 2022-07-10

## 2022-07-10 LAB
ALBUMIN SERPL-MCNC: 2.3 G/DL (ref 3.2–4.6)
ALBUMIN/GLOB SERPL: 0.7 {RATIO} (ref 1.2–3.5)
ALP SERPL-CCNC: 271 U/L (ref 50–136)
ALT SERPL-CCNC: 208 U/L (ref 12–65)
ANION GAP SERPL CALC-SCNC: 7 MMOL/L (ref 7–16)
APPEARANCE UR: CLEAR
AST SERPL-CCNC: 131 U/L (ref 15–37)
BACTERIA URNS QL MICRO: NEGATIVE /HPF
BASOPHILS # BLD: 0 K/UL (ref 0–0.2)
BASOPHILS NFR BLD: 1 % (ref 0–2)
BILIRUB SERPL-MCNC: 1 MG/DL (ref 0.2–1.1)
BILIRUB UR QL: NEGATIVE
BUN SERPL-MCNC: 9 MG/DL (ref 8–23)
CALCIUM SERPL-MCNC: 8.1 MG/DL (ref 8.3–10.4)
CASTS URNS QL MICRO: ABNORMAL /LPF
CHLORIDE SERPL-SCNC: 102 MMOL/L (ref 98–107)
CO2 SERPL-SCNC: 26 MMOL/L (ref 21–32)
COLOR UR: ABNORMAL
CREAT SERPL-MCNC: 0.7 MG/DL (ref 0.8–1.5)
DIFFERENTIAL METHOD BLD: ABNORMAL
EKG ATRIAL RATE: 96 BPM
EKG DIAGNOSIS: NORMAL
EKG P AXIS: 70 DEGREES
EKG P-R INTERVAL: 131 MS
EKG Q-T INTERVAL: 347 MS
EKG QRS DURATION: 93 MS
EKG QTC CALCULATION (BAZETT): 439 MS
EKG R AXIS: 65 DEGREES
EKG T AXIS: 46 DEGREES
EKG VENTRICULAR RATE: 96 BPM
EOSINOPHIL # BLD: 0.1 K/UL (ref 0–0.8)
EOSINOPHIL NFR BLD: 5 % (ref 0.5–7.8)
EPI CELLS #/AREA URNS HPF: ABNORMAL /HPF
ERYTHROCYTE [DISTWIDTH] IN BLOOD BY AUTOMATED COUNT: 12.3 % (ref 11.9–14.6)
GLOBULIN SER CALC-MCNC: 3.1 G/DL (ref 2.3–3.5)
GLUCOSE BLD STRIP.AUTO-MCNC: 82 MG/DL (ref 65–100)
GLUCOSE SERPL-MCNC: 80 MG/DL (ref 65–100)
GLUCOSE UR STRIP.AUTO-MCNC: NEGATIVE MG/DL
HCT VFR BLD AUTO: 31 % (ref 41.1–50.3)
HGB BLD-MCNC: 10.6 G/DL (ref 13.6–17.2)
HGB UR QL STRIP: NEGATIVE
IMM GRANULOCYTES # BLD AUTO: 0 K/UL (ref 0–0.5)
IMM GRANULOCYTES NFR BLD AUTO: 0 % (ref 0–5)
KETONES UR QL STRIP.AUTO: NEGATIVE MG/DL
LACTATE SERPL-SCNC: 1.2 MMOL/L (ref 0.4–2)
LACTATE SERPL-SCNC: 1.3 MMOL/L (ref 0.4–2)
LACTATE SERPL-SCNC: 1.7 MMOL/L (ref 0.4–2)
LEUKOCYTE ESTERASE UR QL STRIP.AUTO: NEGATIVE
LYMPHOCYTES # BLD: 0.8 K/UL (ref 0.5–4.6)
LYMPHOCYTES NFR BLD: 43 % (ref 13–44)
MCH RBC QN AUTO: 29.1 PG (ref 26.1–32.9)
MCHC RBC AUTO-ENTMCNC: 34.2 G/DL (ref 31.4–35)
MCV RBC AUTO: 85.2 FL (ref 79.6–97.8)
MONOCYTES # BLD: 0.4 K/UL (ref 0.1–1.3)
MONOCYTES NFR BLD: 22 % (ref 4–12)
NEUTS SEG # BLD: 0.5 K/UL (ref 1.7–8.2)
NEUTS SEG NFR BLD: 28 % (ref 43–78)
NITRITE UR QL STRIP.AUTO: NEGATIVE
NRBC # BLD: 0 K/UL (ref 0–0.2)
PH UR STRIP: 5 [PH] (ref 5–9)
PLATELET # BLD AUTO: 166 K/UL (ref 150–450)
PMV BLD AUTO: 10.2 FL (ref 9.4–12.3)
POTASSIUM SERPL-SCNC: 3.3 MMOL/L (ref 3.5–5.1)
PROCALCITONIN SERPL-MCNC: 0.5 NG/ML (ref 0–0.49)
PROT SERPL-MCNC: 5.4 G/DL (ref 6.3–8.2)
PROT UR STRIP-MCNC: ABNORMAL MG/DL
RBC # BLD AUTO: 3.64 M/UL (ref 4.23–5.6)
RBC #/AREA URNS HPF: ABNORMAL /HPF
SARS-COV-2 RDRP RESP QL NAA+PROBE: NOT DETECTED
SERVICE CMNT-IMP: NORMAL
SODIUM SERPL-SCNC: 135 MMOL/L (ref 136–145)
SOURCE: NORMAL
SP GR UR REFRACTOMETRY: 1.01 (ref 1–1.02)
UROBILINOGEN UR QL STRIP.AUTO: 1 EU/DL (ref 0.2–1)
WBC # BLD AUTO: 1.8 K/UL (ref 4.3–11.1)
WBC URNS QL MICRO: ABNORMAL /HPF

## 2022-07-10 PROCEDURE — 6360000002 HC RX W HCPCS: Performed by: INTERNAL MEDICINE

## 2022-07-10 PROCEDURE — 80053 COMPREHEN METABOLIC PANEL: CPT

## 2022-07-10 PROCEDURE — 96366 THER/PROPH/DIAG IV INF ADDON: CPT

## 2022-07-10 PROCEDURE — 87040 BLOOD CULTURE FOR BACTERIA: CPT

## 2022-07-10 PROCEDURE — 81001 URINALYSIS AUTO W/SCOPE: CPT

## 2022-07-10 PROCEDURE — 2580000003 HC RX 258: Performed by: INTERNAL MEDICINE

## 2022-07-10 PROCEDURE — 6370000000 HC RX 637 (ALT 250 FOR IP): Performed by: EMERGENCY MEDICINE

## 2022-07-10 PROCEDURE — 83605 ASSAY OF LACTIC ACID: CPT

## 2022-07-10 PROCEDURE — 96365 THER/PROPH/DIAG IV INF INIT: CPT

## 2022-07-10 PROCEDURE — 99222 1ST HOSP IP/OBS MODERATE 55: CPT | Performed by: INTERNAL MEDICINE

## 2022-07-10 PROCEDURE — 36415 COLL VENOUS BLD VENIPUNCTURE: CPT

## 2022-07-10 PROCEDURE — 85025 COMPLETE CBC W/AUTO DIFF WBC: CPT

## 2022-07-10 PROCEDURE — C9113 INJ PANTOPRAZOLE SODIUM, VIA: HCPCS | Performed by: INTERNAL MEDICINE

## 2022-07-10 PROCEDURE — 99285 EMERGENCY DEPT VISIT HI MDM: CPT

## 2022-07-10 PROCEDURE — 2500000003 HC RX 250 WO HCPCS: Performed by: INTERNAL MEDICINE

## 2022-07-10 PROCEDURE — 6370000000 HC RX 637 (ALT 250 FOR IP): Performed by: INTERNAL MEDICINE

## 2022-07-10 PROCEDURE — 87635 SARS-COV-2 COVID-19 AMP PRB: CPT

## 2022-07-10 PROCEDURE — 1100000000 HC RM PRIVATE

## 2022-07-10 PROCEDURE — 82962 GLUCOSE BLOOD TEST: CPT

## 2022-07-10 PROCEDURE — 84145 PROCALCITONIN (PCT): CPT

## 2022-07-10 PROCEDURE — 93005 ELECTROCARDIOGRAM TRACING: CPT | Performed by: EMERGENCY MEDICINE

## 2022-07-10 PROCEDURE — 71045 X-RAY EXAM CHEST 1 VIEW: CPT

## 2022-07-10 PROCEDURE — A4216 STERILE WATER/SALINE, 10 ML: HCPCS | Performed by: INTERNAL MEDICINE

## 2022-07-10 PROCEDURE — 3E0436Z INTRODUCTION OF NUTRITIONAL SUBSTANCE INTO CENTRAL VEIN, PERCUTANEOUS APPROACH: ICD-10-PCS | Performed by: INTERNAL MEDICINE

## 2022-07-10 PROCEDURE — 2580000003 HC RX 258: Performed by: EMERGENCY MEDICINE

## 2022-07-10 PROCEDURE — 6360000002 HC RX W HCPCS: Performed by: EMERGENCY MEDICINE

## 2022-07-10 RX ORDER — THIAMINE HYDROCHLORIDE 100 MG/ML
100 INJECTION, SOLUTION INTRAMUSCULAR; INTRAVENOUS EVERY 24 HOURS
Status: DISCONTINUED | OUTPATIENT
Start: 2022-07-10 | End: 2022-07-13 | Stop reason: HOSPADM

## 2022-07-10 RX ORDER — SODIUM CHLORIDE, SODIUM LACTATE, POTASSIUM CHLORIDE, AND CALCIUM CHLORIDE .6; .31; .03; .02 G/100ML; G/100ML; G/100ML; G/100ML
30 INJECTION, SOLUTION INTRAVENOUS ONCE
Status: COMPLETED | OUTPATIENT
Start: 2022-07-10 | End: 2022-07-10

## 2022-07-10 RX ORDER — SODIUM CHLORIDE 0.9 % (FLUSH) 0.9 %
5-40 SYRINGE (ML) INJECTION PRN
Status: DISCONTINUED | OUTPATIENT
Start: 2022-07-10 | End: 2022-07-13 | Stop reason: HOSPADM

## 2022-07-10 RX ORDER — NICOTINE 21 MG/24HR
1 PATCH, TRANSDERMAL 24 HOURS TRANSDERMAL DAILY
Status: DISCONTINUED | OUTPATIENT
Start: 2022-07-10 | End: 2022-07-13 | Stop reason: HOSPADM

## 2022-07-10 RX ORDER — POTASSIUM CHLORIDE 7.45 MG/ML
20 INJECTION INTRAVENOUS
Status: COMPLETED | OUTPATIENT
Start: 2022-07-10 | End: 2022-07-10

## 2022-07-10 RX ORDER — MAGNESIUM HYDROXIDE/ALUMINUM HYDROXICE/SIMETHICONE 120; 1200; 1200 MG/30ML; MG/30ML; MG/30ML
30 SUSPENSION ORAL EVERY 6 HOURS PRN
Status: DISCONTINUED | OUTPATIENT
Start: 2022-07-10 | End: 2022-07-13 | Stop reason: HOSPADM

## 2022-07-10 RX ORDER — PROMETHAZINE HYDROCHLORIDE 12.5 MG/1
12.5 TABLET ORAL EVERY 6 HOURS PRN
Status: DISCONTINUED | OUTPATIENT
Start: 2022-07-10 | End: 2022-07-13 | Stop reason: HOSPADM

## 2022-07-10 RX ORDER — SODIUM CHLORIDE 0.9 % (FLUSH) 0.9 %
5-40 SYRINGE (ML) INJECTION EVERY 12 HOURS SCHEDULED
Status: DISCONTINUED | OUTPATIENT
Start: 2022-07-10 | End: 2022-07-13 | Stop reason: HOSPADM

## 2022-07-10 RX ORDER — ACETAMINOPHEN 325 MG/1
650 TABLET ORAL EVERY 6 HOURS PRN
Status: DISCONTINUED | OUTPATIENT
Start: 2022-07-10 | End: 2022-07-13 | Stop reason: HOSPADM

## 2022-07-10 RX ORDER — 0.9 % SODIUM CHLORIDE 0.9 %
30 INTRAVENOUS SOLUTION INTRAVENOUS ONCE
Status: COMPLETED | OUTPATIENT
Start: 2022-07-10 | End: 2022-07-10

## 2022-07-10 RX ORDER — BUSPIRONE HYDROCHLORIDE 10 MG/1
10 TABLET ORAL 3 TIMES DAILY
Status: DISCONTINUED | OUTPATIENT
Start: 2022-07-10 | End: 2022-07-13 | Stop reason: HOSPADM

## 2022-07-10 RX ORDER — ONDANSETRON 8 MG/1
8 TABLET, ORALLY DISINTEGRATING ORAL EVERY 8 HOURS
Status: DISCONTINUED | OUTPATIENT
Start: 2022-07-10 | End: 2022-07-13 | Stop reason: HOSPADM

## 2022-07-10 RX ORDER — ESCITALOPRAM OXALATE 10 MG/1
10 TABLET ORAL DAILY
Status: DISCONTINUED | OUTPATIENT
Start: 2022-07-10 | End: 2022-07-13 | Stop reason: HOSPADM

## 2022-07-10 RX ORDER — ACETAMINOPHEN 500 MG
1000 TABLET ORAL
Status: DISCONTINUED | OUTPATIENT
Start: 2022-07-10 | End: 2022-07-10

## 2022-07-10 RX ORDER — NALOXONE HYDROCHLORIDE 4 MG/.1ML
1 SPRAY NASAL PRN
Status: DISCONTINUED | OUTPATIENT
Start: 2022-07-10 | End: 2022-07-13 | Stop reason: HOSPADM

## 2022-07-10 RX ORDER — SODIUM CHLORIDE 9 MG/ML
INJECTION, SOLUTION INTRAVENOUS PRN
Status: DISCONTINUED | OUTPATIENT
Start: 2022-07-10 | End: 2022-07-13 | Stop reason: HOSPADM

## 2022-07-10 RX ORDER — METRONIDAZOLE 500 MG/100ML
500 INJECTION, SOLUTION INTRAVENOUS EVERY 8 HOURS
Status: DISCONTINUED | OUTPATIENT
Start: 2022-07-10 | End: 2022-07-11

## 2022-07-10 RX ORDER — FENTANYL 100 UG/H
1 PATCH TRANSDERMAL
Status: DISCONTINUED | OUTPATIENT
Start: 2022-07-10 | End: 2022-07-13 | Stop reason: HOSPADM

## 2022-07-10 RX ORDER — OXYCODONE HYDROCHLORIDE 5 MG/1
10 TABLET ORAL EVERY 4 HOURS PRN
Status: DISCONTINUED | OUTPATIENT
Start: 2022-07-10 | End: 2022-07-13 | Stop reason: HOSPADM

## 2022-07-10 RX ORDER — ROSUVASTATIN CALCIUM 5 MG/1
10 TABLET, COATED ORAL NIGHTLY
Status: DISCONTINUED | OUTPATIENT
Start: 2022-07-10 | End: 2022-07-13 | Stop reason: HOSPADM

## 2022-07-10 RX ORDER — ACETAMINOPHEN 650 MG/1
650 SUPPOSITORY RECTAL EVERY 6 HOURS PRN
Status: DISCONTINUED | OUTPATIENT
Start: 2022-07-10 | End: 2022-07-11

## 2022-07-10 RX ORDER — DICYCLOMINE HCL 20 MG
20 TABLET ORAL EVERY 6 HOURS
Status: DISCONTINUED | OUTPATIENT
Start: 2022-07-10 | End: 2022-07-13 | Stop reason: HOSPADM

## 2022-07-10 RX ORDER — ACETAMINOPHEN 650 MG/1
975 SUPPOSITORY RECTAL
Status: COMPLETED | OUTPATIENT
Start: 2022-07-10 | End: 2022-07-10

## 2022-07-10 RX ORDER — HYDROMORPHONE HYDROCHLORIDE 1 MG/ML
0.5 INJECTION, SOLUTION INTRAMUSCULAR; INTRAVENOUS; SUBCUTANEOUS
Status: DISCONTINUED | OUTPATIENT
Start: 2022-07-10 | End: 2022-07-13 | Stop reason: HOSPADM

## 2022-07-10 RX ORDER — SODIUM CHLORIDE 9 MG/ML
INJECTION, SOLUTION INTRAVENOUS CONTINUOUS
Status: ACTIVE | OUTPATIENT
Start: 2022-07-10 | End: 2022-07-10

## 2022-07-10 RX ORDER — LORAZEPAM 1 MG/1
1 TABLET ORAL EVERY 8 HOURS PRN
Status: DISCONTINUED | OUTPATIENT
Start: 2022-07-10 | End: 2022-07-13 | Stop reason: HOSPADM

## 2022-07-10 RX ORDER — DICYCLOMINE HYDROCHLORIDE 10 MG/1
20 CAPSULE ORAL EVERY 6 HOURS
Status: DISCONTINUED | OUTPATIENT
Start: 2022-07-10 | End: 2022-07-10

## 2022-07-10 RX ADMIN — SODIUM CHLORIDE, PRESERVATIVE FREE 40 MG: 5 INJECTION INTRAVENOUS at 22:38

## 2022-07-10 RX ADMIN — METRONIDAZOLE 500 MG: 500 INJECTION, SOLUTION INTRAVENOUS at 16:17

## 2022-07-10 RX ADMIN — SODIUM CHLORIDE, PRESERVATIVE FREE 40 MG: 5 INJECTION INTRAVENOUS at 10:05

## 2022-07-10 RX ADMIN — METRONIDAZOLE 500 MG: 500 INJECTION, SOLUTION INTRAVENOUS at 10:04

## 2022-07-10 RX ADMIN — CEFEPIME 2000 MG: 2 INJECTION, POWDER, FOR SOLUTION INTRAVENOUS at 16:16

## 2022-07-10 RX ADMIN — BUSPIRONE HYDROCHLORIDE 10 MG: 10 TABLET ORAL at 10:05

## 2022-07-10 RX ADMIN — SODIUM CHLORIDE, PRESERVATIVE FREE 20 MG: 5 INJECTION INTRAVENOUS at 10:05

## 2022-07-10 RX ADMIN — CEFEPIME 2000 MG: 2 INJECTION, POWDER, FOR SOLUTION INTRAVENOUS at 01:07

## 2022-07-10 RX ADMIN — SODIUM CHLORIDE, PRESERVATIVE FREE 10 ML: 5 INJECTION INTRAVENOUS at 22:43

## 2022-07-10 RX ADMIN — ONDANSETRON 8 MG: 8 TABLET, ORALLY DISINTEGRATING ORAL at 16:17

## 2022-07-10 RX ADMIN — DICYCLOMINE HYDROCHLORIDE 20 MG: 20 TABLET ORAL at 22:46

## 2022-07-10 RX ADMIN — THIAMINE HYDROCHLORIDE 100 MG: 100 INJECTION, SOLUTION INTRAMUSCULAR; INTRAVENOUS at 22:41

## 2022-07-10 RX ADMIN — DICYCLOMINE HYDROCHLORIDE 20 MG: 20 TABLET ORAL at 16:17

## 2022-07-10 RX ADMIN — BUSPIRONE HYDROCHLORIDE 10 MG: 10 TABLET ORAL at 22:46

## 2022-07-10 RX ADMIN — CEFEPIME 2000 MG: 2 INJECTION, POWDER, FOR SOLUTION INTRAVENOUS at 09:55

## 2022-07-10 RX ADMIN — SODIUM CHLORIDE 2448 ML: 9 INJECTION, SOLUTION INTRAVENOUS at 09:51

## 2022-07-10 RX ADMIN — POTASSIUM CHLORIDE 20 MEQ: 7.46 INJECTION, SOLUTION INTRAVENOUS at 09:53

## 2022-07-10 RX ADMIN — VANCOMYCIN HYDROCHLORIDE 2000 MG: 10 INJECTION, POWDER, LYOPHILIZED, FOR SOLUTION INTRAVENOUS at 02:02

## 2022-07-10 RX ADMIN — ACETAMINOPHEN 975 MG: 650 SUPPOSITORY RECTAL at 06:05

## 2022-07-10 RX ADMIN — SODIUM CHLORIDE: 9 INJECTION, SOLUTION INTRAVENOUS at 09:55

## 2022-07-10 RX ADMIN — SODIUM CHLORIDE, POTASSIUM CHLORIDE, SODIUM LACTATE AND CALCIUM CHLORIDE 1086 ML: 600; 310; 30; 20 INJECTION, SOLUTION INTRAVENOUS at 00:29

## 2022-07-10 RX ADMIN — ROSUVASTATIN CALCIUM 10 MG: 5 TABLET, COATED ORAL at 22:46

## 2022-07-10 RX ADMIN — SODIUM CHLORIDE, PRESERVATIVE FREE 10 ML: 5 INJECTION INTRAVENOUS at 10:06

## 2022-07-10 RX ADMIN — POTASSIUM CHLORIDE 20 MEQ: 7.46 INJECTION, SOLUTION INTRAVENOUS at 12:29

## 2022-07-10 RX ADMIN — VANCOMYCIN HYDROCHLORIDE 1000 MG: 1 INJECTION, POWDER, LYOPHILIZED, FOR SOLUTION INTRAVENOUS at 14:49

## 2022-07-10 RX ADMIN — POTASSIUM PHOSPHATE, MONOBASIC POTASSIUM PHOSPHATE, DIBASIC: 224; 236 INJECTION, SOLUTION, CONCENTRATE INTRAVENOUS at 16:45

## 2022-07-10 RX ADMIN — ESCITALOPRAM OXALATE 10 MG: 10 TABLET ORAL at 10:05

## 2022-07-10 ASSESSMENT — PAIN SCALES - GENERAL: PAINLEVEL_OUTOF10: 0

## 2022-07-10 ASSESSMENT — PAIN - FUNCTIONAL ASSESSMENT: PAIN_FUNCTIONAL_ASSESSMENT: 0-10

## 2022-07-10 NOTE — PROGRESS NOTES
Pt is very drowsy and diaphoretic. Pt hard to arouse but awakens to painful stimuli. VS WNL. Pt SR on tele with rate of 70. BGL 83. Dr. Chey Rushing notified of pts decline. ICU Awilda Cooley, also aware. Fentanyl patch held due to decreased consciousness. Pt denies pain when awake. Pt oriented X 4 when awake. Family questioning why abd scans have not been performed. MD aware of family questions.

## 2022-07-10 NOTE — ED TRIAGE NOTES
EMS called for altered mental status, with ability to follow commands after multiple repeated requests. Family expressed concern about g tube infection and stated he \"had more life and was more talkative than usual.\"  Hx: stage 4 stomach cancer with plans to go to [facility] tomorrow. 1000mg tylenol given at 1800. At 2200 his temp was 102 and the patient was \"in and out of awake and asleep, not speaking or responding. \"  500 cc of fluid given /68, 94% on 4L NC with .   BGL 78

## 2022-07-10 NOTE — CONSULTS
Comprehensive Nutrition Assessment    Type and Reason for Visit: Consult  TPN Management (Hospitalists)    Nutrition Recommendations/Plan:   Parenteral Nutrition:  Central parenteral nutrition  begins at 1800 today  Initiate: Dex 15%, 5% AA 2 L (85ml/hr)   Defer 250 ml 20% lipids until triglyceride lab evaluated in AM  To provide: 1420 kcal/d (80% of needs), 100 grams of protein/d (100% of needs), 300 grams of CHO/d and ~2250 ml of total volume/d. Lytes/L:   Sodium (100 meq NaCl), Potassium (KCl not available for inclusion in TPN at this time secondary national shortages) Phosphorus (12.5 mmol KPO4), Calcium (4.5 meq), Magnesium 8 meq   Other additives:   MVI Monday Wednesday Friday due to [employer], MTE  Nutrition Related Medications:  100mg thiamine x7 doses d/t risk for refeeding syndrome   IVF:  Discontinue at 1800  Labs:   BMP daily  Mg daily x 3 days then MWF  Phos daily x 3 days then MWF    Triglyceride tomorrow  POC Glucoses/SSI Not indicated      Malnutrition Assessment:  Malnutrition Status: Moderate malnutrition  Context: Chronic Illness  Findings of clinical characteristics of malnutrition:   Energy Intake:  Unable to assess (Chronic TPN meeting estimated needs)  Weight Loss:  Greater than 7.5% over 3 months (~23% weight loss since MD visit 4/2022)     Body Fat Loss:  Mild body fat loss Buccal region,Triceps,Orbital   Muscle Mass Loss:  Mild muscle mass loss Thigh (quadraceps),Scapula (trapezius),Temples (temporalis)  Fluid Accumulation:  No significant fluid accumulation     Strength:  Not Performed     Nutrition Assessment:  Nutrition History: Pt known to Nutrition from prior admissions. Per RD assessment \"5/2022, 5/22 Pt and wife in room discussing hx (daughter present but did not participate). Wife relates his medical problems began in Dec 2021, but weight loss started in March 2022. Has n/v (with limited relief from medications), limited to no appetite.  Patient open to ONS, but is put off by the idea of eating or drinking anything at this time for fear of vomiting. Wife states he did tolerate half a sandwich last week, but was unable to finish it. \" Daughter at bedside provides further nutrition background. Pt with continued weight loss despite home TPN since discharge 6/14/2022. Daughter reports pt TPN was recently increased in volume to provide additional nutrition. Pt has been sneaking bites of foods, plan was for pt to attempt CLQ diet w/ transition to soft diet PTA. Last PN infusion 7/9. 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 A venting gastrostomy tube was inserted here during hospital stay May 27, pt discharged w/ TPN from [facility] 6/14. He developed change in mental status with high fevers at home and brought to the emergency department by spouse. He is found to have sepsis unknown source with negative chest x-ray negative urinalysis for pyuria borderline procalcitonin level and concerned about possible PEG tube site infection and PICC line malfunction. Nutrition Interval:  Pt seen at bedside, extremely lethargic but awakes to name and answers orientation questions. Discussed pt with family at bedside, provided updated nutrition hx as above. Pt discussed with Migdalia Upton RN. Discussed ? PICC malfunction. PICC team has not assessed line yet, TPN to infuse via port tonight. RN also states pt w/o BM x6 days PTA.     Abdominal Status (last documented):   Last BM (including prior to admit): 07/07/22, GI Symptoms: Nausea,Loss of appetite,Constipation   Pertinent Medications: pepcid, bentyl, flagyl, protonix, vanc  Electrolyte Replacements: 20 meq KCl x2 (7/10)  IVF: NS @ 125ml/hr    Pertinent Labs:   Lab Results   Component Value Date/Time     07/10/2022 12:30 AM    K 3.3 07/10/2022 12:30 AM    CL 102 07/10/2022 12:30 AM    CO2 26 07/10/2022 12:30 AM    BUN 9 07/10/2022 12:30 AM    CREATININE 0.70 07/10/2022 12:30 AM    GLUCOSE 80 07/10/2022 12:30 AM    CALCIUM 8.1 07/10/2022 12:30 AM    PHOS 3.3 06/14/2022 05:22 AM    MG 2.4 06/14/2022 05:22 AM      ,   Lab Results   Component Value Date/Time    POCGLU 82 07/10/2022 12:19 PM     reviewed, K replaced overnight     Current Nutrition Therapies:  Diet NPO Exceptions are: Other (Specify); Specify Other Exceptions: meds via g tube    Current Intake:   Average Meal Intake: NPO Average Supplements Intake: NPO      Anthropometric Measures:  Height: 5' 10\" (177.8 cm)  Current Body Wt: 178 lb 2.1 oz (80.8 kg) (7/10), Weight source: Bed Scale  BMI: 25.6     Ideal Body Weight (Kg) (Calculated): 75 kg (166 lbs), 107.3 %  Usual Body Wt: 231 lb (104.8 kg) (MD office weight 4/21/2022), Percent weight change: -22.9       Edema:Peripheral Vascular  Peripheral Vascular (WDL): Within Defined Limits   BMI Category Overweight (BMI 25.0-29. 9)  Estimated Daily Nutrient Needs:  Energy (kcal/day): 7172-8434 (22-28kcal/kg) (Kcal/kg Weight used: 80.8 kg Current  Protein (g/day):  (1-1.25g/kg) Weight Used: (Current) 80.8 kg  Fluid (ml/day):   (1 ml/kcal)    Nutrition Diagnosis:   · Inadequate oral intake related to altered GI function as evidenced by NPO or clear liquid status due to medical condition (venting GTube, TPN for primary nutrition)    · Moderate malnutrition,In context of chronic illness related to catabolic illness as evidenced by Criteria as identified in malnutrition assessment       Nutrition Interventions:   Food and/or Nutrient Delivery: Continue NPO,Start Parenteral Nutrition     Coordination of Nutrition Care: Continue to monitor while inpatient  Plan of Care discussed with: Cyril Yanez RN    Goals:       Active Goal: Tolerate nutrition support at goal rate,within 7 days       Nutrition Monitoring and Evaluation:      Food/Nutrient Intake Outcomes: Parenteral Nutrition Intake/Tolerance  Physical Signs/Symptoms Outcomes: Biochemical Data,GI Status,Fluid Status or Edema,Weight    Discharge Planning:     Too soon to determine (likely PN)    Sagrario Nixon RD, LEONARD  Contact: [phone]

## 2022-07-10 NOTE — PROGRESS NOTES
CRITICAL CARE OUTREACH NURSE PROGRESS REPORT      SUBJECTIVE: Called to assess patient secondary to RN/provider concern - septic patient w/ history of stage IV cancer. Vitals:    07/10/22 0900 07/10/22 0942 07/10/22 1042 07/10/22 1305   BP: 100/67 99/65 106/73    Pulse: 94 92 95    Resp: 19 20 20    Temp:  98.1 °F (36.7 °C) 98.4 °F (36.9 °C)    TempSrc:  Oral Oral    SpO2:  91% 92%    Weight:    178 lb 3.2 oz (80.8 kg)   Height:    5' 10\" (1.778 m)        ASSESSMENT:  Patient drowsy, but wakes to voice. Oriented x4. Generally ill appearing. Respirations unlabored. Lungs CTA. Heart with RRR. Reports ongoing abdominal pain around G tube site. Site with some yellow drainage. VS, labs, and progress notes reviewed. PLAN:  Will follow per ICU Outreach protocol.     Yashira Polk RN  [phone]

## 2022-07-10 NOTE — ED PROVIDER NOTES
[employer] Emergency Department Provider Note                   PCP:                Len Gould MD               Age: 62 y.o. Sex: male       ICD-10-CM    1. Neutropenic fever ([facility] [address]  D70.9     R50.81        DISPOSITION Admitted 07/10/2022 08:28:04 AM       MDM  Number of Diagnoses or Management Options  Neutropenic fever ([facility] [address]  Diagnosis management comments: Patient has high fever and is neutropenic I am giving him broad-spectrum antibiotics. I have admitted to the hospitalist.       Amount and/or Complexity of Data Reviewed  Clinical lab tests: ordered and reviewed  Tests in the radiology section of CPT®: ordered and reviewed  Discuss the patient with other providers: yes         Orders Placed This Encounter   Procedures    Culture, Blood 1    Culture, Blood 1    COVID-19, Rapid    XR CHEST PORTABLE    Lactic Acid    Lactic Acid    CBC with Auto Differential    CMP    Procalcitonin    Urinalysis w rflx microscopic    Lactate, Sepsis    Procalcitonin    CBC with Auto Differential    Comprehensive Metabolic Panel w/ Reflex to MG    Magnesium    Lactate, Sepsis    Basic Metabolic Panel    Magnesium    Magnesium    Phosphorus    Phosphorus    Triglycerides    Diet NPO Exceptions are: Other (Specify);  Specify Other Exceptions: meds via g tube    Cardiac Monitor - ED Only    Straight Cath (Select if patient is unable to provide a sample)    Vital Signs (Recheck)    Vital signs per unit routine    Telemetry monitoring - 24 hour duration    Notify physician    Up as tolerated    Daily weights    Intake and output    Place intermittent pneumatic compression device    Nursing communication    Full code    Inpatient consult to Case Management    Pharmacy to Dose: Vancomycin    Inpatient consult to Oncology    Inpatient consult to Dietitian    Inpatient consult to Palliative Care    Initiate Oxygen Therapy Protocol    POCT Glucose    EKG 12 Lead    Saline lock IV    ADMIT TO INPATIENT    Transfer Patient    Neutropenic precautions        Alex Dean is a 27[address] [facility] y.o. male who presents to the Emergency Department with chief complaint of    Chief Complaint   Patient presents with    Fever     102.2 tympanic      Patient is a 45-year-old male who has history of recent diagnosis of colon cancer. He has had 2 chemotherapy sessions. The last 1 was at the very end of June. He has had some trouble with his G-tube site as well as his PICC line. His wife is planning on taking him to [facility] tomorrow for further work-up. However he began to have fevers and become confused this evening. His temperature was 102 at home. She did dissolve 2 Tylenol's and gave them to him through the G-tube. He is currently confused and not answering my questions or focusing. .            All other systems reviewed and are negative.     Review of Systems   Unable to perform ROS: Mental status change       Past Medical History:   Diagnosis Date    Bilateral carpal tunnel syndrome 12/9/2020    Chronic right shoulder pain 11/30/2020    Colon polyps 3/11/2013    Diverticulitis 3/11/2013    HTN (hypertension) 3/11/2013    Hyperlipidemia 3/11/2013    Paresthesia and pain of both upper extremities 11/30/2020    PUD (peptic ulcer disease) 3/11/2013    History of     Right elbow pain 12/9/2020    Wheezing 3/11/2013        Past Surgical History:   Procedure Laterality Date    COLONOSCOPY  2/25/09    colonoscopy per Dr. Collazo Even with need for repeat every 3 years    COLONOSCOPY  02/25/2022    Dr. Ismael Kohli; polyps of the ascending, transverse, descending, and sigmoid colons; internal hemorrhoid; diverticulosis    LAPAROSCOPY N/A 5/27/2022    LAPAROSCOPY DIAGNOSTIC , OPEN EXPLORATORY LAPAROTOMY, MASS EXCISION, G-TUBE PLACEMENT performed by Carlos Velasquez MD at [address] [facility]  N/A 6/3/2022    PORT INSERTION performed by Carlos Velasquez MD at [place] [address] October 2004    partial colon resection per Dr. Viola Oconnell  02/25/2022    Dr. Michael Paredes; hiatal hernia gastric polyps        Family History   Problem Relation Age of Onset    No Known Problems Brother     Cancer Sister         breast    Hypertension Mother     Heart Disease Mother     Diabetes Father     Osteoarthritis Father     Alcohol Abuse Father     Hypertension Father     Diabetes Mother            Social Connections:     Frequency of Communication with Friends and Family: Not on file    Frequency of Social Gatherings with Friends and Family: Not on file    Attends Congregational Services: Not on file    Active Member of Clubs or Organizations: Not on file    Attends Club or Organization Meetings: Not on file    Marital Status: Not on file        No Known Allergies     Vitals signs and nursing note reviewed. Patient Vitals for the past 4 hrs:   Temp Pulse Resp BP SpO2   07/10/22 1943 97.5 °F (36.4 °C) 72 18 109/66 98 %          Physical Exam  Vitals and nursing note reviewed. Constitutional:       General: He is not in acute distress. Appearance: Normal appearance. He is normal weight. He is ill-appearing (appears slightly agitatted by nasal cannula and wires ). He is not toxic-appearing or diaphoretic. HENT:      Head: Normocephalic and atraumatic. Nose: Nose normal. No congestion or rhinorrhea. Mouth/Throat:      Mouth: Mucous membranes are moist.      Pharynx: No oropharyngeal exudate or posterior oropharyngeal erythema. Eyes:      General: No scleral icterus. Extraocular Movements: Extraocular movements intact. Conjunctiva/sclera: Conjunctivae normal.      Pupils: Pupils are equal, round, and reactive to light. Neck:      Vascular: No carotid bruit. Cardiovascular:      Rate and Rhythm: Normal rate and regular rhythm. Pulses: Normal pulses. Heart sounds: Normal heart sounds. No murmur heard. No friction rub.  No (*)     Total Protein 5.4 (*)     Albumin 2.3 (*)     Albumin/Globulin Ratio 0.7 (*)     All other components within normal limits   PROCALCITONIN - Abnormal; Notable for the following components:    Procalcitonin 0.50 (*)     All other components within normal limits   URINALYSIS - Abnormal; Notable for the following components:    Protein, UA TRACE (*)     WBC, UA 0-4 (*)     RBC, UA 0-5 (*)     Epithelial Cells UA 0-5 (*)     BACTERIA, URINE Negative (*)     Casts 0-2 (*)     All other components within normal limits   CULTURE, BLOOD 1   CULTURE, BLOOD 1   COVID-19, RAPID   LACTIC ACID   LACTATE, SEPSIS   LACTATE, SEPSIS   PROCALCITONIN   CBC WITH AUTO DIFFERENTIAL   COMPREHENSIVE METABOLIC PANEL W/ REFLEX TO MG FOR LOW K   MAGNESIUM   BASIC METABOLIC PANEL   MAGNESIUM   PHOSPHORUS   TRIGLYCERIDES   POCT GLUCOSE        XR CHEST PORTABLE   Final Result   Allowing for patient positioning, there is no evidence of an acute intrathoracic   process. Voice dictation software was used during the making of this note. This software is not perfect and grammatical and other typographical errors may be present. This note has not been completely proofread for errors.       Lisa Carter MD  07/10/22 1794

## 2022-07-10 NOTE — CONSULTS
Please see H&P dated 7/10/22.             ANDRES Jacob  [facility] Hematology and Oncology  [address] [facility], [address] [place] [address]  Office : [phone]  Fax : [phone]

## 2022-07-10 NOTE — PROGRESS NOTES
Pts wife left bedside at this time. RN informed wife she will be called if any changes occur. Pt resting quietly in bed with eyes closed. Respirations even and non labored. Pt remains diaphoretic. Pt SR on the monitor. [place] will round on pt throughout night. Hourly rounding completed per protocol.

## 2022-07-10 NOTE — PROGRESS NOTES
VANCO DAILY FOLLOW UP NOTE  [address] Pharmacokinetic Monitoring Service - Vancomycin    Consulting Provider: Marcelina Ricardo   Indication: sepsis  Target Concentration: Goal AUC/JOSH 400-600 mg*hr/L  Day of Therapy: 1  Additional Antimicrobials: cefepime    Pertinent Laboratory Values: Wt Readings from Last 1 Encounters:   07/10/22 180 lb (81.6 kg)     Temp Readings from Last 1 Encounters:   07/10/22 98.1 °F (36.7 °C) (Oral)     Recent Labs     07/08/22  0938 07/10/22  0030   BUN 14 9   CREATININE 0.70* 0.70*   WBC 3.6* 1.8*   PROCAL  --  0.50*   LACACIDPL  --  1.2     Estimated Creatinine Clearance: 116 mL/min (A) (based on SCr of 0.7 mg/dL (L)). No results found for: [facility]    MRSA Nasal Swab: N/A. Non-respiratory infection. .      Assessment:  Date/Time Dose Concentration AUC         Note: Serum concentrations collected for AUC dosing may appear elevated if collected in close proximity to the dose administered, this is not necessarily an indication of toxicity  Plan:  Dosing recommendations based on Bayesian software  Start vancomycin 1000 mg q 12 hours  Anticipated AUC of 420 and trough concentration of 12.5 at steady state  Renal labs as indicated   Vancomycin concentrations will be ordered as clinically appropriate   Pharmacy will continue to monitor patient and adjust therapy as indicated    Thank you for the consult,  Jose Conley [facility]

## 2022-07-10 NOTE — H&P
Hospitalist History and Physical   Admit Date:  7/10/2022 12:00 AM   Name:  Charla Whitman   Age:  62 y.o. Sex:  male  :  1960   MRN:  [id number]   Room:  06/    Presenting Complaint: Fever (102.2 tympanic)     Reason(s) for Admission: Sepsis ([address] [A41.9]     History of Present Illness:   Charla Whitman is a 62 y.o. 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 He has been having abdominal pain for 6 months prior to diagnosis in May. He had an associated 30 pound weight loss. He presented to [facility] was found to have partial small bowel obstruction and peritoneal metastasis and eventually diagnosed with upper GI adenocarcinoma. A venting gastrostomy tube was inserted here during hospital stay May 27. Patient also has a past history of peptic ulcer disease hypertension hyperlipidemia and previous diverticulitis. It was explained to him per documentation that his chemotherapy treatment would be palliative. He did start therapy which was ongoing with oncology. Palliative care has been involved as an outpatient. Patient was seeking another opinion at [place] [id number] and was to travel there tomorrow Monday, . He developed change in mental status with high fevers at home and brought to the emergency department by spouse. He is found to have sepsis unknown source with negative chest x-ray negative urinalysis for pyuria borderline procalcitonin level and concerned about possible PEG tube site infection. He is not able to give history he has chronic diffuse abdominal pain and the above-mentioned history of prior diverticulitis. Repeat CT imaging of GI tract has not been performed-multiple recent imaging studies. Blood cultures have been sent and he has received vancomycin and cefepime. Initially hypotensive but appears to have responded to fluids.   He will be admitted for aggressive treatment of sepsis and supportive care. Discussed CODE STATUS with spouse at bedside. Patient is alert to person and place only. Intermittent confusion. Wife reports that patient is full CODE STATUS and that despite explanation that therapy is palliative --spouse reports family has not had time to process or accept a fatal prognosis. Reviewed home meds and he has been receiving fentanyl patch, oxycodone 10 mg every 4 hours elixir, Ativan as well as BuSpar for anxiety and other meds reviewed with spouse. Difficult historian but patient does have abdominal pain and altered mental status no reports of chest pain diaphoresis dyspnea. High fevers. Also malfunctioning PICC line right arm. Patient has peripheral access and does have a port left subclavian through which he received chemotherapy. Review of Systems:  10 systems reviewed and negative except as noted in HPI. Assessment & Plan:     Principal Problem:  Severe sepsis  Plan: Unclear source. Initial hypotension responded to IV fluids. No high lactic acid level. Increased LFTs anemia leukopenia- agree with cefepime vancomycin. Await blood culture results. History of diverticulitis noted. We will add metronidazole    Altered mental status-secondary to septic encephalopathy- monitor-consider imaging study brain if does not improve with sepsis-no focal motor deficits     Right arm PICC line malfunction-peripheral access for now-consider Cathflo if hemoglobin stable next few days    Hypokalemia- IV supplementation check magnesium level follow-up potassium level    Severe protein calorie malnutrition-consult nutrition for TPN-nonurgent today. Leukopenia and anemia-suspect related to malignancy/chemotherapy-monitor    Anxiety related to prognosis-continue home meds-palliative care consult regarding goals of care- spouse and patient request full CODE STATUS despite explanation of palliative nature of ongoing treatment.     Pain-acute and chronic-continue home meds-IV Dilaudid as needed for severe pain. Metastatic upper GI adenocarcinoma-consult oncology team to assume care of the patient when they are available        Dispo/Discharge Planning:   Ideally home with spouse    Diet: Diet NPO Exceptions are: Other (Specify); Specify Other Exceptions: meds via g tube  VTE ppx: SCDs  Code status: Full Code    Hospital Problems:  Principal Problem:    Severe sepsis ([address]  Active Problems:    Severe protein-calorie malnutrition ([address]    Abdominal carcinomatosis ([address]    On total parenteral nutrition    Anxiety about dying    Septic encephalopathy    Metastatic adenocarcinoma ([address]    Gastric cancer ([address]    Malfunction of peripheral inserted central catheter (HCC)    Hypokalemia    Leukopenia    Anemia    Abnormal liver function test    Essential hypertension    Abdominal pain  Resolved Problems:    * No resolved hospital problems.  *       Past History:     Past Medical History:   Diagnosis Date    Bilateral carpal tunnel syndrome 12/9/2020    Chronic right shoulder pain 11/30/2020    Colon polyps 3/11/2013    Diverticulitis 3/11/2013    HTN (hypertension) 3/11/2013    Hyperlipidemia 3/11/2013    Paresthesia and pain of both upper extremities 11/30/2020    PUD (peptic ulcer disease) 3/11/2013    History of     Right elbow pain 12/9/2020    Wheezing 3/11/2013     Past Surgical History:   Procedure Laterality Date    COLONOSCOPY  2/25/09    colonoscopy per Dr. Seema Villeda with need for repeat every 3 years    COLONOSCOPY  02/25/2022    Dr. Gabriele Tai; polyps of the ascending, transverse, descending, and sigmoid colons; internal hemorrhoid; diverticulosis    LAPAROSCOPY N/A 5/27/2022    LAPAROSCOPY DIAGNOSTIC , OPEN EXPLORATORY LAPAROTOMY, MASS EXCISION, G-TUBE PLACEMENT performed by Destini Sanchez MD at [facility] MAIN OR    PORT SURGERY N/A 6/3/2022    PORT INSERTION performed by Destini Sanchez MD at   [facility] UNLISTED  October 2004    partial colon resection per  [place]    UPPER GASTROINTESTINAL ENDOSCOPY  02/25/2022    Dr. Samantha Anne; hiatal hernia gastric polyps      Social History     Tobacco Use    Smoking status: Current Every Day Smoker     Packs/day: 1.00    Smokeless tobacco: Never Used   Substance Use Topics    Alcohol use: No      Social History     Substance and Sexual Activity   Drug Use No     Family History   Problem Relation Age of Onset    No Known Problems Brother     Cancer Sister         breast    Hypertension Mother     Heart Disease Mother     Diabetes Father     Osteoarthritis Father     Alcohol Abuse Father     Hypertension Father     Diabetes Mother       Family history reviewed and negative except as noted above.       Immunization History   Administered Date(s) Administered    COVID-19, MODERNA BLUE border, Primary or Immunocompromised, (age 12y+), IM, 100 mcg/0.5mL 04/02/2021, 04/30/2021, 11/16/2021    Influenza Virus Vaccine 10/05/2020, 09/17/2021    Influenza, Elemr Marinas, IM, PF (6 mo and older Fluzone, Flulaval, Fluarix, and 3 yrs and older Afluria) 10/05/2020, 09/17/2021    Pneumococcal Polysaccharide ([id number]) 12/29/2020    Tdap (Boostrix, Adacel) 09/21/2020    Zoster Recombinant (Shingrix) 09/18/2020, 11/24/2020     No Known Allergies  Prior to Admit Medications:  Current Outpatient Medications   Medication Instructions    busPIRone (BUSPAR) 10 mg, Oral, 3 TIMES DAILY    dicyclomine (BENTYL) 20 mg, Oral, EVERY 6 HOURS    escitalopram (LEXAPRO) 10 mg, Oral, DAILY    fentaNYL (DURAGESIC) 100 MCG/HR 1 patch, TransDERmal, EVERY 72 HOURS    LORazepam (ATIVAN) 1 mg, Oral, EVERY 8 HOURS PRN    naloxone 4 MG/0.1ML LIQD nasal spray 1 spray, Nasal, PRN    nicotine (NICODERM CQ) 14 MG/24HR 1 patch, TransDERmal, DAILY    OLANZapine zydis (ZYPREXA) 5 mg, Oral, NIGHTLY, Do not take with promethazine    ondansetron (ZOFRAN-ODT) 8 mg, Oral, EVERY 8 HOURS    pantoprazole (PROTONIX) 40 MG tablet TAKE 1 TABLET BY MOUTH BEFORE BREAKFAST AND DINNER FOR 30 DAYS    polyethylene glycol (GLYCOLAX) 17 g, Oral, DAILY    promethazine (PHENERGAN) 12.5 mg, Oral, EVERY 6 HOURS PRN    rosuvastatin (CRESTOR) 10 mg, Oral    umeclidinium-vilanterol (ANORO ELLIPTA) 62.5-25 MCG/INH AEPB inhaler 1 puff, Inhalation, DAILY         Objective:     Patient Vitals for the past 24 hrs:   Temp Pulse Resp BP SpO2   07/10/22 0815 99 °F (37.2 °C) (!) 101 20 94/61 --   07/10/22 0800 -- 96 18 -- --   07/10/22 0645 100 °F (37.8 °C) -- -- -- --   07/10/22 0630 -- (!) 114 20 103/64 95 %   07/10/22 0524 -- -- -- -- 95 %   07/10/22 0500 -- -- -- 102/65 --   07/10/22 0445 -- -- -- 94/66 --   07/10/22 0437 -- (!) 111 25 -- --   07/10/22 0430 -- (!) 107 -- (!) 88/54 --   07/10/22 0423 (!) 103.3 °F (39.6 °C) -- -- -- --   07/10/22 0415 -- (!) 105 -- -- 93 %   07/10/22 0400 -- 99 -- -- 94 %   07/10/22 0345 -- 99 -- 96/65 95 %   07/10/22 0330 -- (!) 104 -- 99/66 94 %   07/10/22 0215 -- (!) 116 -- 97/67 --   07/10/22 0200 -- (!) 101 -- 98/64 93 %   07/10/22 0145 -- 98 -- 97/65 92 %   07/10/22 0130 -- (!) 102 -- 103/68 95 %   07/10/22 0115 -- (!) 101 16 112/68 94 %   07/10/22 0100 -- 100 18 (!) 88/57 95 %   07/10/22 0045 -- 98 18 (!) 92/44 96 %   07/10/22 0030 -- 96 20 101/65 (!) 89 %   07/10/22 0014 (!) 103.3 °F (39.6 °C) 97 18 (!) 93/56 97 %       Oxygen Therapy  SpO2: 95 %  Pulse via Oximetry: 104 beats per minute    Estimated body mass index is 25.83 kg/m² as calculated from the following:    Height as of this encounter: 5' 10\" (1.778 m). Weight as of this encounter: 180 lb (81.6 kg). No intake or output data in the 24 hours ending 07/10/22 0842      Physical Exam:    Blood pressure 94/61, pulse (!) 101, temperature 99 °F (37.2 °C), temperature source Oral, resp. rate 20, height 5' 10\" (1.778 m), weight 180 lb (81.6 kg), SpO2 95 %.   General:    Confused-alert to place and person  Head:  Normocephalic, atraumatic  Eyes:  Sclerae appear normal.  Pupils equally round.  ENT:  Nares appear normal, no drainage. Moist oral mucosa  Neck:  No restricted ROM. Trachea midline   CV:   RRR. No m/r/g. No jugular venous distension. Lungs:   CTAB. No wheezing, rhonchi, or rales. Symmetric expansion. Abdomen:   Nondistended, G-tube site does have erythema no obvious gross cellulitis. Extremities: No cyanosis or clubbing. Moves all extremities   Neuro:  CN II-XII grossly intact. Sensation intact.   Confused septic encephalopathy diagnosed    I have reviewed ordered lab tests and independently visualized imaging below:    Last 24hr Labs:  Recent Results (from the past 24 hour(s))   Culture, Blood 1    Collection Time: 07/10/22 12:24 AM    Specimen: Blood   Result Value Ref Range    Special Requests LEFT ANTECUBITAL      Culture PENDING    Lactic Acid    Collection Time: 07/10/22 12:30 AM   Result Value Ref Range    Lactic Acid, Plasma 1.2 0.4 - 2.0 MMOL/L   CBC with Auto Differential    Collection Time: 07/10/22 12:30 AM   Result Value Ref Range    WBC 1.8 (LL) 4.3 - 11.1 K/uL    RBC 3.64 (L) 4.23 - 5.6 M/uL    Hemoglobin 10.6 (L) 13.6 - 17.2 g/dL    Hematocrit 31.0 (L) 41.1 - 50.3 %    MCV 85.2 79.6 - 97.8 FL    MCH 29.1 26.1 - 32.9 PG    MCHC 34.2 31.4 - 35.0 g/dL    RDW 12.3 11.9 - 14.6 %    Platelets 703 309 - 293 K/uL    MPV 10.2 9.4 - 12.3 FL    nRBC 0.00 0.0 - 0.2 K/uL    Differential Type AUTOMATED      Seg Neutrophils 28 (L) 43 - 78 %    Lymphocytes 43 13 - 44 %    Monocytes 22 (H) 4.0 - 12.0 %    Eosinophils % 5 0.5 - 7.8 %    Basophils 1 0.0 - 2.0 %    Immature Granulocytes 0 0.0 - 5.0 %    Segs Absolute 0.5 (L) 1.7 - 8.2 K/UL    Absolute Lymph # 0.8 0.5 - 4.6 K/UL    Absolute Mono # 0.4 0.1 - 1.3 K/UL    Absolute Eos # 0.1 0.0 - 0.8 K/UL    Basophils Absolute 0.0 0.0 - 0.2 K/UL    Absolute Immature Granulocyte 0.0 0.0 - 0.5 K/UL   CMP    Collection Time: 07/10/22 12:30 AM   Result Value Ref Range    Sodium 135 (L) 136 - 145 mmol/L    Potassium 3.3 (L) 3.5 - 5.1 consolidation, pleural effusion, or pneumothorax. No significant osseous abnormalities are observed. Allowing for patient positioning, there is no evidence of an acute intrathoracic process. Echocardiogram:  No results found for this or any previous visit. Meds previously ordered:  Orders Placed This Encounter   Medications    lactated ringers bolus    DISCONTD: vancomycin (VANCOCIN) 1750 mg in sodium chloride 0.9 % 500 mL IVPB     Order Specific Question:   Antimicrobial Indications     Answer:   Febrile Neutropenia    cefepime (MAXIPIME) 2,000 mg in sodium chloride 0.9 % 50 mL IVPB mini-bag     Order Specific Question:   Antimicrobial Indications     Answer:   Febrile Neutropenia    vancomycin (VANCOCIN) 2000 mg in 0.9% sodium chloride 500 mL IVPB     Order Specific Question:   Antimicrobial Indications     Answer:   Febrile Neutropenia    DISCONTD: acetaminophen (TYLENOL) tablet 1,000 mg    acetaminophen (TYLENOL) suppository 975 mg    0.9 % sodium chloride infusion    sodium chloride flush 0.9 % injection 5-40 mL    sodium chloride flush 0.9 % injection 5-40 mL    0.9 % sodium chloride infusion    famotidine (PEPCID) 20 mg in sodium chloride (PF) 10 mL injection    aluminum & magnesium hydroxide-simethicone (MAALOX) 200-200-20 MG/5ML suspension 30 mL    OR Linked Order Group     acetaminophen (TYLENOL) tablet 650 mg     acetaminophen (TYLENOL) suppository 650 mg    0.9 % sodium chloride IV bolus 2,448 mL     Order Specific Question:   Was full 30mL/kg fluid bolus ordered? Answer: Yes    cefepime (MAXIPIME) 2,000 mg in sodium chloride 0.9 % 50 mL IVPB mini-bag     Order Specific Question:   Antimicrobial Indications     Answer:   Sepsis of Unknown Etiology     Order Specific Question:   Sepsis duration of therapy     Answer: Other     Order Specific Question:   Other Sepsis Duration     Answer:    Once    cefepime (MAXIPIME) 2,000 mg in sodium chloride 0.9 % 50 mL IVPB mini-bag     Order Specific Question:   Antimicrobial Indications     Answer:   Sepsis of Unknown Etiology     Order Specific Question:   Sepsis duration of therapy     Answer:   7 days    vancomycin (VANCOCIN) 1250 mg in sodium chloride 0.9% 250 mL IVPB     Order Specific Question:   Antimicrobial Indications     Answer:   Sepsis of Unknown Etiology     Order Specific Question:   Sepsis duration of therapy     Answer:   7 days    potassium chloride 10 mEq/100 mL IVPB (Peripheral Line)    busPIRone (BUSPAR) tablet 10 mg    dicyclomine (BENTYL) capsule 20 mg    escitalopram (LEXAPRO) tablet 10 mg    fentaNYL (DURAGESIC) 100 MCG/HR 1 patch    naloxone opiate overdose kit 4mg    nicotine (NICODERM CQ) 14 MG/24HR 1 patch    ondansetron (ZOFRAN-ODT) disintegrating tablet 8 mg    promethazine (PHENERGAN) tablet 12.5 mg    rosuvastatin (CRESTOR) tablet 10 mg    LORazepam (ATIVAN) tablet 1 mg    pantoprazole (PROTONIX) 40 mg in sodium chloride (PF) 10 mL injection    HYDROmorphone HCl PF (DILAUDID) injection 0.5 mg    oxyCODONE (ROXICODONE) immediate release tablet 10 mg       Blood Product Consent:  I have discussed with the patient the rationale for blood component transfusion; its benefits in treating or preventing fatigue, organ damage, or death; and its risk which includes mild transfusion reactions, rare risk of blood borne infection, or more serious but rare reactions. I have discussed the alternatives to transfusion, including the risk and consequences of not receiving transfusion. The patient had an opportunity to ask questions and had agreed to proceed with transfusion of blood components. Signed:  Mitzy Guerrero DO    Part of this note may have been written by using a voice dictation software. The note has been proof read but may still contain some grammatical/other typographical errors.

## 2022-07-10 NOTE — H&P
[facility] Hematology & Oncology        Inpatient Hematology / Oncology History and Physical    Reason for Admission:  Neutropenic fever ([facility] [D70.9, R50.81]  Sepsis ([facility] [A41.9]    History of Present Illness:  Mr. Elza Casarez is a 62 y.o. male admitted on 7/10/2022 with a primary diagnosis of The encounter diagnosis was Neutropenic fever ([facility] [facility] .      Mr. 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 He is a patient of Dr. Ernst Garrett undergoing treatment for met. Gastric cancer with palliative FOLFIRINOX, s/p C2 on 6/27. C3 due on 7/11, planned for 7/15 as pt was seeking a second opinion in [place] (planned to travel there tomorrow 7/11). He is a known patient of Dr. Karina Hess as well - previously did exploratory surgery found the entire mesentery encased with cancer, placed venting G tube. He is also on home TPN and has a PICC line. Palliative care seeing OP to manage cancer related abdominal pain. He presented to the ER with change in mental status and high fevers at home. [place] to have sepsis of unknown origin. BCx pending. Started on Vanc/Cef. Hypotension responded to fluids. We were asked for recommendations for our known patient. Review of Systems:  Constitutional +fatigue, weakness, weight loss    HEENT Denies trauma, blurry vision, hearing loss, ear pain, nosebleeds, sore throat, neck pain and ear discharge. Skin Denies lesions or rashes. Lungs Denies dyspnea, cough, sputum production or hemoptysis. Cardiovascular Denies chest pain, palpitations, or lower extremity edema. Gastrointestinal +ongoing abdominal pain, +venting G tube    Denies dysuria, frequency or hesitancy of urination. Neuro Denies headaches, visual changes or ataxia. Denies dizziness, tingling, tremors, sensory change, speech change, focal weakness or headaches.      Hematology Denies easy bruising or bleeding, denies gingival bleeding or epistaxis. Endo Denies heat/cold intolerance, denies diabetes or thyroid abnormalities. MSK Denies back pain, arthralgias, myalgias or frequent falls. Psychiatric/Behavioral Denies depression and substance abuse. The patient is not nervous/anxious.          No Known Allergies  Past Medical History:   Diagnosis Date    Bilateral carpal tunnel syndrome 12/9/2020    Chronic right shoulder pain 11/30/2020    Colon polyps 3/11/2013    Diverticulitis 3/11/2013    HTN (hypertension) 3/11/2013    Hyperlipidemia 3/11/2013    Paresthesia and pain of both upper extremities 11/30/2020    PUD (peptic ulcer disease) 3/11/2013    History of     Right elbow pain 12/9/2020    Wheezing 3/11/2013     Past Surgical History:   Procedure Laterality Date    COLONOSCOPY  2/25/09    colonoscopy per Dr. Eveline Khanna with need for repeat every 3 years    COLONOSCOPY  02/25/2022    Dr. Bruce Rizvi; polyps of the ascending, transverse, descending, and sigmoid colons; internal hemorrhoid; diverticulosis    LAPAROSCOPY N/A 5/27/2022    LAPAROSCOPY DIAGNOSTIC , OPEN EXPLORATORY LAPAROTOMY, MASS EXCISION, G-TUBE PLACEMENT performed by North Enriquez MD at [address] [facility]  N/A 6/3/2022    PORT INSERTION performed by North Enriquez MD at   [facility] UNLISTED  October 2004    partial colon resection per Dr. Lindsay Quiles  02/25/2022    Dr. Bruce Rizvi; hiatal hernia gastric polyps     Family History   Problem Relation Age of Onset    No Known Problems Brother     Cancer Sister         breast    Hypertension Mother     Heart Disease Mother     Diabetes Father     Osteoarthritis Father     Alcohol Abuse Father     Hypertension Father     Diabetes Mother      Social History     Socioeconomic History    Marital status:      Spouse name: Not on file    Number of children: Not on file    Years of education: Not on file    Highest education level: Not on file Occupational History    Not on file   Tobacco Use    Smoking status: Current Every Day Smoker     Packs/day: 1.00    Smokeless tobacco: Never Used   Substance and Sexual Activity    Alcohol use: No    Drug use: No    Sexual activity: Not on file   Other Topics Concern    Not on file   Social History Narrative    Not on file     Social Determinants of Health     Financial Resource Strain:     Difficulty of Paying Living Expenses: Not on file   Food Insecurity:     Worried About Running Out of Food in the Last Year: Not on file    Briseida of Food in the Last Year: Not on file   Transportation Needs:     Lack of Transportation (Medical): Not on file    Lack of Transportation (Non-Medical):  Not on file   Physical Activity:     Days of Exercise per Week: Not on file    Minutes of Exercise per Session: Not on file   Stress:     Feeling of Stress : Not on file   Social Connections:     Frequency of Communication with Friends and Family: Not on file    Frequency of Social Gatherings with Friends and Family: Not on file    Attends Christian Services: Not on file    Active Member of [address] [place] [address] or Organizations: Not on file    Attends Club or Organization Meetings: Not on file    Marital Status: Not on file   Intimate Partner Violence:     Fear of Current or Ex-Partner: Not on file    Emotionally Abused: Not on file    Physically Abused: Not on file    Sexually Abused: Not on file   Housing Stability:     Unable to Pay for Housing in the Last Year: Not on file    Number of [place] in the Last Year: Not on file    Unstable Housing in the Last Year: Not on file     Current Facility-Administered Medications   Medication Dose Route Frequency Provider Last Rate Last Admin    0.9 % sodium chloride infusion   IntraVENous Continuous Jonny Sylvester  mL/hr at 07/10/22 0955 New Bag at 07/10/22 0955    sodium chloride flush 0.9 % injection 5-40 mL  5-40 mL IntraVENous 2 times per day Varun Dang Mccall, DO   10 mL at 07/10/22 1006    sodium chloride flush 0.9 % injection 5-40 mL  5-40 mL IntraVENous PRN Molly Moris, DO        0.9 % sodium chloride infusion   IntraVENous PRN Molly Moris, DO        famotidine (PEPCID) 20 mg in sodium chloride (PF) 10 mL injection  20 mg IntraVENous Daily Molly Moris, DO   20 mg at 07/10/22 1005    aluminum & magnesium hydroxide-simethicone (MAALOX) 200-200-20 MG/5ML suspension 30 mL  30 mL Oral Q6H PRN Molly Moris, DO        acetaminophen (TYLENOL) tablet 650 mg  650 mg Oral Q6H PRN Molly Moris, DO        Or    acetaminophen (TYLENOL) suppository 650 mg  650 mg Rectal Q6H PRN Molly Moris, DO        cefepime (MAXIPIME) 2,000 mg in sodium chloride 0.9 % 50 mL IVPB mini-bag  2,000 mg IntraVENous Q8H Molly Moris, DO        busPIRone (BUSPAR) tablet 10 mg  10 mg Oral TID Molly Moris, DO   10 mg at 07/10/22 1005    escitalopram (LEXAPRO) tablet 10 mg  10 mg Oral Daily Molly Moris, DO   10 mg at 07/10/22 1005    fentaNYL (DURAGESIC) 100 MCG/HR 1 patch  1 patch TransDERmal Q72H Molly Moris, DO        naloxone opiate overdose kit 4mg (Patient Supplied)  1 spray Nasal PRN Molly Moris, DO        nicotine (NICODERM CQ) 14 MG/24HR 1 patch  1 patch TransDERmal Daily Molly Moris, DO        ondansetron (ZOFRAN-ODT) disintegrating tablet 8 mg  8 mg Oral Q8H Molly Moris, DO        promethazine (PHENERGAN) tablet 12.5 mg  12.5 mg Oral Q6H PRN Molly Moris, DO        rosuvastatin (CRESTOR) tablet 10 mg  10 mg Oral Nightly Molly Moris, DO        LORazepam (ATIVAN) tablet 1 mg  1 mg Oral Q8H PRN Molly Moris, DO        pantoprazole (PROTONIX) 40 mg in sodium chloride (PF) 10 mL injection  40 mg IntraVENous Q12H Molly Moris, DO   40 mg at 07/10/22 1005    HYDROmorphone HCl PF (DILAUDID) injection 0.5 mg  0.5 mg IntraVENous Q3H PRN Molly Subramanian,         oxyCODONE (Twan Decent) immediate release tablet 10 mg  10 mg Per G Tube Q4H PRN Cesar , DO        metronidazole (FLAGYL) 500 mg in 0.9% NaCl 100 mL IVPB premix  500 mg IntraVENous Q8H Ecsar, DO   Stopped at 07/10/22 1202    dicyclomine (BENTYL) tablet 20 mg  20 mg Oral Q6H Cesar , DO        vancomycin (VANCOCIN) 1,000 mg in sodium chloride 0.9 % 250 mL IVPB ([id number])  1,000 mg IntraVENous Q12H Cesar , DO           OBJECTIVE:  Patient Vitals for the past 8 hrs:   BP Temp Temp src Pulse Resp SpO2 Height Weight   07/10/22 1305 -- -- -- -- -- -- 5' 10\" (1.778 m) 178 lb 3.2 oz (80.8 kg)   07/10/22 1042 106/73 98.4 °F (36.9 °C) Oral 95 20 92 % -- --   07/10/22 0942 99/65 98.1 °F (36.7 °C) Oral 92 20 91 % -- --   07/10/22 0900 100/67 -- -- 94 19 -- -- --   07/10/22 0830 -- -- -- -- -- 96 % -- --   07/10/22 0815 94/61 99 °F (37.2 °C) Oral (!) 101 20 -- -- --   07/10/22 0800 -- -- -- 96 18 -- -- --   07/10/22 0645 -- 100 °F (37.8 °C) Oral -- -- -- -- --   07/10/22 0630 103/64 -- -- (!) 114 20 95 % -- --   07/10/22 0524 -- -- -- -- -- 95 % -- --     Temp (24hrs), Av.4 °F (38 °C), Min:98.1 °F (36.7 °C), Max:103.3 °F (39.6 °C)    07/10 07 - 07/10 1900  In: -   Out: 4928 [Urine:1350]    Physical Exam:  Constitutional: Ill appearing male in no acute distress, sitting comfortably in the hospital bed. HEENT: Normocephalic and atraumatic. Oropharynx is clear, mucous membranes are moist.  Neck supple. Lymph node   Deferred. Skin Warm and dry. No bruising and no rash noted. No erythema. No pallor. +PICC line   Respiratory Lungs are clear to auscultation bilaterally without wheezes, rales or rhonchi, normal air exchange without accessory muscle use. CVS Normal rate, regular rhythm and normal S1 and S2. No murmurs, gallops, or rubs. Abdomen Soft, nontender and nondistended, normoactive bowel sounds. +venting G tube   Neuro Grossly nonfocal with no obvious sensory or motor deficits. MSK Normal range of motion in general.  No edema and no tenderness.    Psych Flat affect, not very communicative       Labs:    Recent Results (from the past 24 hour(s))   Culture, Blood 1    Collection Time: 07/10/22 12:24 AM    Specimen: Blood   Result Value Ref Range    Special Requests LEFT ANTECUBITAL      Culture PENDING    Lactic Acid    Collection Time: 07/10/22 12:30 AM   Result Value Ref Range    Lactic Acid, Plasma 1.2 0.4 - 2.0 MMOL/L   CBC with Auto Differential    Collection Time: 07/10/22 12:30 AM   Result Value Ref Range    WBC 1.8 (LL) 4.3 - 11.1 K/uL    RBC 3.64 (L) 4.23 - 5.6 M/uL    Hemoglobin 10.6 (L) 13.6 - 17.2 g/dL    Hematocrit 31.0 (L) 41.1 - 50.3 %    MCV 85.2 79.6 - 97.8 FL    MCH 29.1 26.1 - 32.9 PG    MCHC 34.2 31.4 - 35.0 g/dL    RDW 12.3 11.9 - 14.6 %    Platelets 955 706 - 388 K/uL    MPV 10.2 9.4 - 12.3 FL    nRBC 0.00 0.0 - 0.2 K/uL    Differential Type AUTOMATED      Seg Neutrophils 28 (L) 43 - 78 %    Lymphocytes 43 13 - 44 %    Monocytes 22 (H) 4.0 - 12.0 %    Eosinophils % 5 0.5 - 7.8 %    Basophils 1 0.0 - 2.0 %    Immature Granulocytes 0 0.0 - 5.0 %    Segs Absolute 0.5 (L) 1.7 - 8.2 K/UL    Absolute Lymph # 0.8 0.5 - 4.6 K/UL    Absolute Mono # 0.4 0.1 - 1.3 K/UL    Absolute Eos # 0.1 0.0 - 0.8 K/UL    Basophils Absolute 0.0 0.0 - 0.2 K/UL    Absolute Immature Granulocyte 0.0 0.0 - 0.5 K/UL   CMP    Collection Time: 07/10/22 12:30 AM   Result Value Ref Range    Sodium 135 (L) 136 - 145 mmol/L    Potassium 3.3 (L) 3.5 - 5.1 mmol/L    Chloride 102 98 - 107 mmol/L    CO2 26 21 - 32 mmol/L    Anion Gap 7 7 - 16 mmol/L    Glucose 80 65 - 100 mg/dL    BUN 9 8 - 23 MG/DL    CREATININE 0.70 (L) 0.8 - 1.5 MG/DL    GFR African American >60 >60 ml/min/1.73m2    GFR Non- >60 >60 ml/min/1.73m2    Calcium 8.1 (L) 8.3 - 10.4 MG/DL    Total Bilirubin 1.0 0.2 - 1.1 MG/DL     (H) 12 - 65 U/L     (H) 15 - 37 U/L    Alk Phosphatase 271 (H) 50 - 136 U/L    Total Protein 5.4 (L) 6.3 - 8.2 g/dL    Albumin 2.3 (L) 3.2 - 4.6 g/dL    Globulin 3.1 2.3 - 3.5 g/dL    Albumin/Globulin Ratio 0.7 (L) 1.2 - 3.5     Procalcitonin    Collection Time: 07/10/22 12:30 AM   Result Value Ref Range    Procalcitonin 0.50 (H) 0.00 - 0.49 ng/mL   COVID-19, Rapid    Collection Time: 07/10/22  2:21 AM    Specimen: Nasopharyngeal   Result Value Ref Range    Source NASAL      SARS-CoV-2, Rapid Not detected NOTD     Urinalysis w rflx microscopic    Collection Time: 07/10/22  2:28 AM   Result Value Ref Range    Color, UA DARK YELLOW      Appearance CLEAR      Specific Gravity, UA 1.013 1.001 - 1.023      pH, Urine 5.0 5.0 - 9.0      Protein, UA TRACE (A) NEG mg/dL    Glucose, UA Negative mg/dL    Ketones, Urine Negative NEG mg/dL    Bilirubin Urine Negative NEG      Blood, Urine Negative NEG      Urobilinogen, Urine 1.0 0.2 - 1.0 EU/dL    Nitrite, Urine Negative NEG      Leukocyte Esterase, Urine Negative NEG      WBC, UA 0-4 (A) NORM /hpf    RBC, UA 0-5 (A) NORM /hpf    Epithelial Cells UA 0-5 (A) NORM /hpf    BACTERIA, URINE Negative (A) NORM /hpf    Casts 0-2 (A) NORM /lpf   Lactate, Sepsis    Collection Time: 07/10/22 10:26 AM   Result Value Ref Range    Lactic Acid, Sepsis 1.3 0.4 - 2.0 MMOL/L   POCT Glucose    Collection Time: 07/10/22 12:19 PM   Result Value Ref Range    POC Glucose 82 65 - 100 mg/dL    Performed by: Cassi        Imaging:  [unfilled]    ASSESSMENT:  Patient Active Problem List   Diagnosis    Right cervical radiculopathy    Right elbow pain    Hyperlipidemia    Bilateral carpal tunnel syndrome    Essential hypertension    Gastroesophageal reflux disease    PUD (peptic ulcer disease)    Chronic right shoulder pain    Paresthesia and pain of both upper extremities    History of colon polyps    Chronic bronchitis (HCC)    Class 1 obesity due to excess calories with serious comorbidity and body mass index (BMI) of 34.0 to 34.9 in adult    Generalized abdominal pain    Abdominal pain    Peritoneal metastases ([address]    Opioid use, unspecified with unspecified opioid-induced disorder ([address]    SBO (small bowel obstruction) ([address]    Partial small bowel obstruction ([address]    Severe protein-calorie malnutrition ([address]    Abdominal mass    Debility    Encounter for palliative care    Itching    Fatigue    Abdominal carcinomatosis ([address]    Goals of care, counseling/discussion    On total parenteral nutrition    Pain, abdominal, LUQ    Nausea    Anxiety about dying    Depression    Carcinomatosis ([address]    Cancer associated pain    PICC (peripherally inserted central catheter) flush    Severe sepsis (HCC)    Septic encephalopathy    Metastatic adenocarcinoma (HCC)    Gastric cancer ([address]    Malfunction of peripheral inserted central catheter ([address]    Hypokalemia    Leukopenia    Anemia    Abnormal liver function test     PLAN:  Metastatic Gastric Cancer  - pt of Dr. Manuel Flynn, s/p C2 of FOLFIRINOX 6/27, due for C3 7/11 but planned for 7/15 as family/pt planned to seek a second opinion in [facility] (planned for 7/11)    Home TPN  - RD consulted for management    Sepsis  - On Cef/Vanc, follow pending cultures    Confusion/AMS  - May need head CT if mental status does not improve    Ongoing Abdominal Pain  - home pain regimen  - consult PC, known to them from office    Lab studies were personally reviewed. Pertinent old records were reviewed. Discharge Plan:  TBD, pending clinical course. Thank you for allowing us to participate in the care of Mr. MARIA [facility].  We will gladly take over care of our patient. LUCY Nelson - NINFA   [facility] Hematology & Oncology  [address] [address]  Office : [phone]  Fax : [phone]         Attending Addendum:  I have personally performed a face to face diagnostic evaluation on this patient.  I have reviewed and agree with the care plan as documented by Nidia Ny N.P. My findings are as follows:  He has metastatic gastric cancer and sepsis, appears weak, heart rate regular without murmurs, abdomen is non-tender, bowel sounds are positive, we will continue IV ABX and follow-up his cultures.               Earnstine Severs, MD    [facility] Hematology/Oncology  [address] [place] [address]  Office : [phone]  Fax : [phone]

## 2022-07-10 NOTE — PROGRESS NOTES
TRANSFER - IN REPORT:    Verbal report received from Edwige  on Roberto Gross  being received from ED  for routine progression of patient care      Report consisted of patient's Situation, Background, Assessment and   Recommendations(SBAR). Information from the following report(s) Nurse Handoff Report was reviewed with the receiving nurse. Opportunity for questions and clarification was provided. Assessment completed upon patient's arrival to unit and care assumed.

## 2022-07-10 NOTE — ED NOTES
TRANSFER - OUT REPORT:    Verbal report given to receiving nurse on Dev Nicolas  being transferred to 8th floor for urgent transfer       Report consisted of patient's Situation, Background, Assessment and   Recommendations(SBAR). Information from the following report(s) Nurse Handoff Report was reviewed with the receiving nurse. Lines:   PICC Double Lumen 71/85/24 Right Basilic (Active)       Single Lumen Implantable Port 06/03/22 Left Chest (Active)       Peripheral IV 07/10/22 Left Forearm (Active)   Site Assessment Clean, dry & intact 07/10/22 0028   Line Status Blood return noted 07/10/22 0028   Phlebitis Assessment No symptoms 07/10/22 0028   Infiltration Assessment 0 07/10/22 0028       Peripheral IV 07/10/22 Right;Posterior Hand (Active)        Opportunity for questions and clarification was provided.       Patient transported with:  Transport        Alin Gutierrez RN  07/10/22 5985

## 2022-07-11 ENCOUNTER — APPOINTMENT (OUTPATIENT)
Dept: CT IMAGING | Age: 62
DRG: 871 | End: 2022-07-11
Payer: COMMERCIAL

## 2022-07-11 ENCOUNTER — HOSPITAL ENCOUNTER (OUTPATIENT)
Dept: INFUSION THERAPY | Age: 62
End: 2022-07-11

## 2022-07-11 LAB
ALBUMIN SERPL-MCNC: 1.8 G/DL (ref 3.2–4.6)
ALBUMIN/GLOB SERPL: 0.6 {RATIO} (ref 1.2–3.5)
ALP SERPL-CCNC: 165 U/L (ref 50–136)
ALT SERPL-CCNC: 111 U/L (ref 12–65)
ANION GAP SERPL CALC-SCNC: 4 MMOL/L (ref 7–16)
AST SERPL-CCNC: 31 U/L (ref 15–37)
BASOPHILS # BLD: 0 K/UL (ref 0–0.2)
BASOPHILS NFR BLD: 1 % (ref 0–2)
BILIRUB SERPL-MCNC: 0.3 MG/DL (ref 0.2–1.1)
BUN SERPL-MCNC: 12 MG/DL (ref 8–23)
CALCIUM SERPL-MCNC: 8.2 MG/DL (ref 8.3–10.4)
CHLORIDE SERPL-SCNC: 111 MMOL/L (ref 98–107)
CO2 SERPL-SCNC: 26 MMOL/L (ref 21–32)
CREAT SERPL-MCNC: 0.5 MG/DL (ref 0.8–1.5)
DIFFERENTIAL METHOD BLD: ABNORMAL
EOSINOPHIL # BLD: 0.1 K/UL (ref 0–0.8)
EOSINOPHIL NFR BLD: 4 % (ref 0.5–7.8)
ERYTHROCYTE [DISTWIDTH] IN BLOOD BY AUTOMATED COUNT: 12.9 % (ref 11.9–14.6)
GLOBULIN SER CALC-MCNC: 3 G/DL (ref 2.3–3.5)
GLUCOSE BLD STRIP.AUTO-MCNC: 206 MG/DL (ref 65–100)
GLUCOSE SERPL-MCNC: 167 MG/DL (ref 65–100)
HCT VFR BLD AUTO: 28 % (ref 41.1–50.3)
HGB BLD-MCNC: 9.3 G/DL (ref 13.6–17.2)
IMM GRANULOCYTES # BLD AUTO: 0.3 K/UL (ref 0–0.5)
IMM GRANULOCYTES NFR BLD AUTO: 10 % (ref 0–5)
LYMPHOCYTES # BLD: 1.1 K/UL (ref 0.5–4.6)
LYMPHOCYTES NFR BLD: 41 % (ref 13–44)
MAGNESIUM SERPL-MCNC: 2.1 MG/DL (ref 1.8–2.4)
MCH RBC QN AUTO: 29 PG (ref 26.1–32.9)
MCHC RBC AUTO-ENTMCNC: 33.2 G/DL (ref 31.4–35)
MCV RBC AUTO: 87.2 FL (ref 79.6–97.8)
MONOCYTES # BLD: 0.4 K/UL (ref 0.1–1.3)
MONOCYTES NFR BLD: 16 % (ref 4–12)
NEUTS SEG # BLD: 0.8 K/UL (ref 1.7–8.2)
NEUTS SEG NFR BLD: 28 % (ref 43–78)
NRBC # BLD: 0 K/UL (ref 0–0.2)
PHOSPHATE SERPL-MCNC: 1.9 MG/DL (ref 2.3–3.7)
PLATELET # BLD AUTO: 171 K/UL (ref 150–450)
PLATELET COMMENT: ADEQUATE
PMV BLD AUTO: 10.5 FL (ref 9.4–12.3)
POTASSIUM SERPL-SCNC: 3.5 MMOL/L (ref 3.5–5.1)
PROCALCITONIN SERPL-MCNC: 5.31 NG/ML (ref 0–0.49)
PROT SERPL-MCNC: 4.8 G/DL (ref 6.3–8.2)
RBC # BLD AUTO: 3.21 M/UL (ref 4.23–5.6)
RBC MORPH BLD: ABNORMAL
SERVICE CMNT-IMP: ABNORMAL
SODIUM SERPL-SCNC: 141 MMOL/L (ref 136–145)
TRIGL SERPL-MCNC: 83 MG/DL (ref 35–150)
VANCOMYCIN SERPL-MCNC: 5.9 UG/ML
WBC # BLD AUTO: 2.7 K/UL (ref 4.3–11.1)
WBC MORPH BLD: ABNORMAL

## 2022-07-11 PROCEDURE — 85025 COMPLETE CBC W/AUTO DIFF WBC: CPT

## 2022-07-11 PROCEDURE — 6360000002 HC RX W HCPCS: Performed by: INTERNAL MEDICINE

## 2022-07-11 PROCEDURE — 99233 SBSQ HOSP IP/OBS HIGH 50: CPT | Performed by: INTERNAL MEDICINE

## 2022-07-11 PROCEDURE — 6370000000 HC RX 637 (ALT 250 FOR IP): Performed by: INTERNAL MEDICINE

## 2022-07-11 PROCEDURE — 6360000004 HC RX CONTRAST MEDICATION: Performed by: NURSE PRACTITIONER

## 2022-07-11 PROCEDURE — 2580000003 HC RX 258: Performed by: INTERNAL MEDICINE

## 2022-07-11 PROCEDURE — A4216 STERILE WATER/SALINE, 10 ML: HCPCS | Performed by: INTERNAL MEDICINE

## 2022-07-11 PROCEDURE — 36415 COLL VENOUS BLD VENIPUNCTURE: CPT

## 2022-07-11 PROCEDURE — 2580000003 HC RX 258: Performed by: NURSE PRACTITIONER

## 2022-07-11 PROCEDURE — 82962 GLUCOSE BLOOD TEST: CPT

## 2022-07-11 PROCEDURE — APPSS45 APP SPLIT SHARED TIME 31-45 MINUTES: Performed by: NURSE PRACTITIONER

## 2022-07-11 PROCEDURE — 6360000002 HC RX W HCPCS: Performed by: FAMILY MEDICINE

## 2022-07-11 PROCEDURE — 2580000003 HC RX 258: Performed by: HOSPITALIST

## 2022-07-11 PROCEDURE — 6360000002 HC RX W HCPCS: Performed by: NURSE PRACTITIONER

## 2022-07-11 PROCEDURE — 84478 ASSAY OF TRIGLYCERIDES: CPT

## 2022-07-11 PROCEDURE — 87106 FUNGI IDENTIFICATION YEAST: CPT

## 2022-07-11 PROCEDURE — 80053 COMPREHEN METABOLIC PANEL: CPT

## 2022-07-11 PROCEDURE — 84145 PROCALCITONIN (PCT): CPT

## 2022-07-11 PROCEDURE — 2500000003 HC RX 250 WO HCPCS: Performed by: INTERNAL MEDICINE

## 2022-07-11 PROCEDURE — 6370000000 HC RX 637 (ALT 250 FOR IP): Performed by: HOSPITALIST

## 2022-07-11 PROCEDURE — 87186 SC STD MICRODIL/AGAR DIL: CPT

## 2022-07-11 PROCEDURE — 1100000000 HC RM PRIVATE

## 2022-07-11 PROCEDURE — 83735 ASSAY OF MAGNESIUM: CPT

## 2022-07-11 PROCEDURE — C9113 INJ PANTOPRAZOLE SODIUM, VIA: HCPCS | Performed by: INTERNAL MEDICINE

## 2022-07-11 PROCEDURE — 87077 CULTURE AEROBIC IDENTIFY: CPT

## 2022-07-11 PROCEDURE — 6360000002 HC RX W HCPCS: Performed by: HOSPITALIST

## 2022-07-11 PROCEDURE — 84100 ASSAY OF PHOSPHORUS: CPT

## 2022-07-11 PROCEDURE — 80202 ASSAY OF VANCOMYCIN: CPT

## 2022-07-11 PROCEDURE — 87205 SMEAR GRAM STAIN: CPT

## 2022-07-11 PROCEDURE — 74177 CT ABD & PELVIS W/CONTRAST: CPT

## 2022-07-11 PROCEDURE — 2500000003 HC RX 250 WO HCPCS: Performed by: NURSE PRACTITIONER

## 2022-07-11 PROCEDURE — 99222 1ST HOSP IP/OBS MODERATE 55: CPT | Performed by: INTERNAL MEDICINE

## 2022-07-11 PROCEDURE — 2580000003 HC RX 258: Performed by: FAMILY MEDICINE

## 2022-07-11 RX ORDER — SODIUM CHLORIDE 0.9 % (FLUSH) 0.9 %
10 SYRINGE (ML) INJECTION
Status: COMPLETED | OUTPATIENT
Start: 2022-07-11 | End: 2022-07-11

## 2022-07-11 RX ORDER — MIDODRINE HYDROCHLORIDE 5 MG/1
10 TABLET ORAL
Status: DISCONTINUED | OUTPATIENT
Start: 2022-07-11 | End: 2022-07-13 | Stop reason: HOSPADM

## 2022-07-11 RX ORDER — 0.9 % SODIUM CHLORIDE 0.9 %
1000 INTRAVENOUS SOLUTION INTRAVENOUS ONCE
Status: COMPLETED | OUTPATIENT
Start: 2022-07-11 | End: 2022-07-11

## 2022-07-11 RX ORDER — SODIUM CHLORIDE 9 MG/ML
INJECTION, SOLUTION INTRAVENOUS CONTINUOUS
Status: DISCONTINUED | OUTPATIENT
Start: 2022-07-11 | End: 2022-07-12

## 2022-07-11 RX ORDER — ENOXAPARIN SODIUM 100 MG/ML
40 INJECTION SUBCUTANEOUS DAILY
Status: DISCONTINUED | OUTPATIENT
Start: 2022-07-11 | End: 2022-07-13 | Stop reason: HOSPADM

## 2022-07-11 RX ORDER — 0.9 % SODIUM CHLORIDE 0.9 %
100 INTRAVENOUS SOLUTION INTRAVENOUS
Status: COMPLETED | OUTPATIENT
Start: 2022-07-11 | End: 2022-07-11

## 2022-07-11 RX ORDER — METRONIDAZOLE 500 MG/100ML
500 INJECTION, SOLUTION INTRAVENOUS EVERY 12 HOURS
Status: DISCONTINUED | OUTPATIENT
Start: 2022-07-11 | End: 2022-07-13

## 2022-07-11 RX ADMIN — SODIUM CHLORIDE, PRESERVATIVE FREE 10 ML: 5 INJECTION INTRAVENOUS at 09:16

## 2022-07-11 RX ADMIN — SODIUM CHLORIDE, PRESERVATIVE FREE 20 MG: 5 INJECTION INTRAVENOUS at 08:56

## 2022-07-11 RX ADMIN — ESCITALOPRAM OXALATE 10 MG: 10 TABLET ORAL at 09:10

## 2022-07-11 RX ADMIN — MIDODRINE HYDROCHLORIDE 10 MG: 5 TABLET ORAL at 16:49

## 2022-07-11 RX ADMIN — MIDODRINE HYDROCHLORIDE 10 MG: 5 TABLET ORAL at 09:10

## 2022-07-11 RX ADMIN — SODIUM CHLORIDE, PRESERVATIVE FREE 10 ML: 5 INJECTION INTRAVENOUS at 22:22

## 2022-07-11 RX ADMIN — SODIUM CHLORIDE: 9 INJECTION, SOLUTION INTRAVENOUS at 09:51

## 2022-07-11 RX ADMIN — SOYBEAN OIL 250 ML: 20 INJECTION, SOLUTION INTRAVENOUS at 17:46

## 2022-07-11 RX ADMIN — SODIUM CHLORIDE 100 ML: 9 INJECTION, SOLUTION INTRAVENOUS at 14:11

## 2022-07-11 RX ADMIN — HYDROMORPHONE HYDROCHLORIDE 0.5 MG: 1 INJECTION, SOLUTION INTRAMUSCULAR; INTRAVENOUS; SUBCUTANEOUS at 17:46

## 2022-07-11 RX ADMIN — CALCIUM GLUCONATE: 98 INJECTION, SOLUTION INTRAVENOUS at 17:47

## 2022-07-11 RX ADMIN — SODIUM CHLORIDE: 9 INJECTION, SOLUTION INTRAVENOUS at 15:32

## 2022-07-11 RX ADMIN — MIDODRINE HYDROCHLORIDE 10 MG: 5 TABLET ORAL at 11:29

## 2022-07-11 RX ADMIN — METRONIDAZOLE 500 MG: 500 INJECTION, SOLUTION INTRAVENOUS at 21:00

## 2022-07-11 RX ADMIN — VANCOMYCIN HYDROCHLORIDE 1000 MG: 1 INJECTION, POWDER, LYOPHILIZED, FOR SOLUTION INTRAVENOUS at 22:20

## 2022-07-11 RX ADMIN — ALTEPLASE 1 MG: 2.2 INJECTION, POWDER, LYOPHILIZED, FOR SOLUTION INTRAVENOUS at 12:01

## 2022-07-11 RX ADMIN — METRONIDAZOLE 500 MG: 500 INJECTION, SOLUTION INTRAVENOUS at 09:09

## 2022-07-11 RX ADMIN — BUSPIRONE HYDROCHLORIDE 10 MG: 10 TABLET ORAL at 12:49

## 2022-07-11 RX ADMIN — SODIUM CHLORIDE, PRESERVATIVE FREE 40 MG: 5 INJECTION INTRAVENOUS at 08:56

## 2022-07-11 RX ADMIN — CEFEPIME 2000 MG: 2 INJECTION, POWDER, FOR SOLUTION INTRAVENOUS at 09:08

## 2022-07-11 RX ADMIN — SODIUM CHLORIDE, PRESERVATIVE FREE 40 MG: 5 INJECTION INTRAVENOUS at 20:50

## 2022-07-11 RX ADMIN — VANCOMYCIN HYDROCHLORIDE 1000 MG: 1 INJECTION, POWDER, LYOPHILIZED, FOR SOLUTION INTRAVENOUS at 02:05

## 2022-07-11 RX ADMIN — BUSPIRONE HYDROCHLORIDE 10 MG: 10 TABLET ORAL at 09:10

## 2022-07-11 RX ADMIN — DIATRIZOATE MEGLUMINE AND DIATRIZOATE SODIUM 15 ML: 660; 100 LIQUID ORAL; RECTAL at 12:16

## 2022-07-11 RX ADMIN — IOPAMIDOL 100 ML: 755 INJECTION, SOLUTION INTRAVENOUS at 14:11

## 2022-07-11 RX ADMIN — OXYCODONE 10 MG: 5 TABLET ORAL at 20:48

## 2022-07-11 RX ADMIN — HYDROMORPHONE HYDROCHLORIDE 0.5 MG: 1 INJECTION, SOLUTION INTRAMUSCULAR; INTRAVENOUS; SUBCUTANEOUS at 22:31

## 2022-07-11 RX ADMIN — SODIUM CHLORIDE 1000 ML: 9 INJECTION, SOLUTION INTRAVENOUS at 08:54

## 2022-07-11 RX ADMIN — DICYCLOMINE HYDROCHLORIDE 20 MG: 20 TABLET ORAL at 05:04

## 2022-07-11 RX ADMIN — ENOXAPARIN SODIUM 40 MG: 40 INJECTION SUBCUTANEOUS at 12:16

## 2022-07-11 RX ADMIN — HYDROCORTISONE SODIUM SUCCINATE 100 MG: 100 INJECTION, POWDER, FOR SOLUTION INTRAMUSCULAR; INTRAVENOUS at 15:32

## 2022-07-11 RX ADMIN — VANCOMYCIN HYDROCHLORIDE 1000 MG: 1 INJECTION, POWDER, LYOPHILIZED, FOR SOLUTION INTRAVENOUS at 12:49

## 2022-07-11 RX ADMIN — CEFEPIME 2000 MG: 2 INJECTION, POWDER, FOR SOLUTION INTRAVENOUS at 15:32

## 2022-07-11 RX ADMIN — METRONIDAZOLE 500 MG: 500 INJECTION, SOLUTION INTRAVENOUS at 00:29

## 2022-07-11 RX ADMIN — BUSPIRONE HYDROCHLORIDE 10 MG: 10 TABLET ORAL at 20:47

## 2022-07-11 RX ADMIN — DICYCLOMINE HYDROCHLORIDE 20 MG: 20 TABLET ORAL at 15:39

## 2022-07-11 RX ADMIN — ONDANSETRON 8 MG: 8 TABLET, ORALLY DISINTEGRATING ORAL at 09:10

## 2022-07-11 RX ADMIN — HYDROCORTISONE SODIUM SUCCINATE 100 MG: 100 INJECTION, POWDER, FOR SOLUTION INTRAMUSCULAR; INTRAVENOUS at 08:56

## 2022-07-11 RX ADMIN — SODIUM PHOSPHATE, MONOBASIC, MONOHYDRATE 30 MMOL: 276; 142 INJECTION, SOLUTION INTRAVENOUS at 11:29

## 2022-07-11 RX ADMIN — ALTEPLASE 1 MG: 2.2 INJECTION, POWDER, LYOPHILIZED, FOR SOLUTION INTRAVENOUS at 08:57

## 2022-07-11 RX ADMIN — LORAZEPAM 1 MG: 1 TABLET ORAL at 20:47

## 2022-07-11 RX ADMIN — ONDANSETRON 8 MG: 8 TABLET, ORALLY DISINTEGRATING ORAL at 00:14

## 2022-07-11 RX ADMIN — CEFEPIME 2000 MG: 2 INJECTION, POWDER, FOR SOLUTION INTRAVENOUS at 00:12

## 2022-07-11 RX ADMIN — DICYCLOMINE HYDROCHLORIDE 20 MG: 20 TABLET ORAL at 09:10

## 2022-07-11 RX ADMIN — THIAMINE HYDROCHLORIDE 100 MG: 100 INJECTION, SOLUTION INTRAMUSCULAR; INTRAVENOUS at 20:54

## 2022-07-11 RX ADMIN — SODIUM CHLORIDE, PRESERVATIVE FREE 10 ML: 5 INJECTION INTRAVENOUS at 14:12

## 2022-07-11 ASSESSMENT — PAIN DESCRIPTION - LOCATION
LOCATION: ABDOMEN

## 2022-07-11 ASSESSMENT — PAIN DESCRIPTION - DESCRIPTORS
DESCRIPTORS: ACHING
DESCRIPTORS: ACHING

## 2022-07-11 ASSESSMENT — PAIN SCALES - GENERAL
PAINLEVEL_OUTOF10: 8
PAINLEVEL_OUTOF10: 0
PAINLEVEL_OUTOF10: 0
PAINLEVEL_OUTOF10: 8
PAINLEVEL_OUTOF10: 5
PAINLEVEL_OUTOF10: 3
PAINLEVEL_OUTOF10: 0

## 2022-07-11 ASSESSMENT — PAIN DESCRIPTION - FREQUENCY: FREQUENCY: INTERMITTENT

## 2022-07-11 ASSESSMENT — PAIN DESCRIPTION - ONSET: ONSET: ON-GOING

## 2022-07-11 ASSESSMENT — PAIN - FUNCTIONAL ASSESSMENT: PAIN_FUNCTIONAL_ASSESSMENT: PREVENTS OR INTERFERES SOME ACTIVE ACTIVITIES AND ADLS

## 2022-07-11 ASSESSMENT — PAIN DESCRIPTION - ORIENTATION
ORIENTATION: MID
ORIENTATION: MID

## 2022-07-11 ASSESSMENT — PAIN DESCRIPTION - PAIN TYPE: TYPE: ACUTE PAIN

## 2022-07-11 NOTE — PROGRESS NOTES
Comprehensive Nutrition Assessment    Type and Reason for Visit: Reassess  TPN Management (Oncology); Oncology as primary 7/11    Nutrition Recommendations/Plan:   Parenteral Nutrition:  Central parenteral nutrition  new bag to begin at 1800 today  Continue: Dex 15%, 5% AA 2 L (85ml/hr)   Initiate 250 ml 20% lipids until triglyceride lab evaluated in AM  To provide: 1920 kcal/d (100% of needs), 100 grams of protein/d (100% of needs), 300 grams of CHO/d and ~2250 ml of total volume/d. Lytes/L:   Sodium (85 meq NaCl), Potassium (KCl not available for inclusion in TPN at this time secondary national shortages) Phosphorus (15 mmol KPO4), Calcium (4.5 meq), Magnesium 8 meq   Other additives:   MVI Monday Wednesday Friday due to [employer], MTE  Nutrition Related Medications:  100mg thiamine x7 doses d/t risk for refeeding syndrome   Labs:   BMP daily  Mg daily x 3 days then MWF  Phos daily x 3 days then MWF    Triglyceride tomorrow  POC Glucoses/SSI Not indicated      Malnutrition Assessment:  Malnutrition Status: Moderate malnutrition  Context: Chronic Illness  Findings of clinical characteristics of malnutrition:   Energy Intake:  Unable to assess (Chronic TPN meeting estimated needs)  Weight Loss:  Greater than 7.5% over 3 months (~23% weight loss since MD visit 4/2022)     Body Fat Loss:  Mild body fat loss Buccal region,Triceps,Orbital   Muscle Mass Loss:  Mild muscle mass loss Thigh (quadraceps),Scapula (trapezius),Temples (temporalis)  Fluid Accumulation:  No significant fluid accumulation     Strength:  Not Performed     Nutrition Assessment:  Nutrition History: Pt known to Nutrition from prior admissions. Per RD assessment \"5/2022, 5/22 Pt and wife in room discussing hx (daughter present but did not participate). Wife relates his medical problems began in Dec 2021, but weight loss started in March 2022. Has n/v (with limited relief from medications), limited to no appetite.  Patient open to ONS, but is put off by the idea of eating or drinking anything at this time for fear of vomiting. Wife states he did tolerate half a sandwich last week, but was unable to finish it. \" Daughter at bedside provides further nutrition background. Pt with continued weight loss despite home TPN since discharge 6/14/2022. Daughter reports pt TPN was recently increased in volume to provide additional nutrition. Pt has been sneaking bites of foods, plan was for pt to attempt CLQ diet w/ transition to soft diet PTA. Last PN infusion 7/9.   7/11: Wife reports pt was previously on a 20hr infusion with plan to increase kcal d/t ongoing wt loss. Pt reportedly had nausea and wife states he was \"tasting TPN'. Wife reports calling [facility] and his infusion rate was changed to 21hr infusion. Wife also reports pt had g-tube capped 7/5 by Dr. Jose Oshea. She denies having g-tube uncapped at home, but does state pt would occasionally report uncapping it and having \"some\" or \"a little\" output. 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 A venting gastrostomy tube was inserted here during hospital stay May 27, pt discharged w/ TPN from [facility] 6/14. He developed change in mental status with high fevers at home and brought to the emergency department by spouse. He is found to have sepsis unknown source with negative chest x-ray negative urinalysis for pyuria borderline procalcitonin level and concerned about possible PEG tube site infection and PICC line malfunction. Nutrition Interval:  Pt seen awake and lying in bed with wife at bedside. She has questions regarding TPN plan d/t differences in color compared to what pt receives at home. Discussed TPN here does not contain lipids like pt's does at home and will receive MVI MWF.  Informed pt and wife lipids will be provided outside of TPN this evening. Pt receiving 1L NS bolus during visit. Discussed continuing current volume today. Pt denies having nausea at time of visit. Observed 250ml output from g-tube. Pt is able to have sips of clears per oncology. He denies having any nausea at time of visit. Pt and wife deny having any questions. Abdominal Status (last documented):   Last BM (including prior to admit): 07/07/22, GI Symptoms: Nausea,Loss of appetite 900ml G-tube output documented in last 24hrs. Pertinent Medications: Maxipime, Bentyl, Pepcid, Solu-Cortef, Zofran (Q8H), Flagyl, Protonix, Thiamine, Vanc  Electrolyte Replacements: 20 meq KCl x2 (7/10), NaPhos (7/11)  IVF: NS @ 125ml/hr (1L bolus)  Pertinent Labs:   Lab Results   Component Value Date/Time     07/11/2022 04:15 AM    K 3.5 07/11/2022 04:15 AM     07/11/2022 04:15 AM    CO2 26 07/11/2022 04:15 AM    BUN 12 07/11/2022 04:15 AM    CREATININE 0.50 07/11/2022 04:15 AM    GLUCOSE 167 07/11/2022 04:15 AM    CALCIUM 8.2 07/11/2022 04:15 AM    PHOS 1.9 07/11/2022 04:15 AM    MG 2.1 07/11/2022 04:15 AM      Lab Results   Component Value Date/Time    TRIG 83 07/11/2022 04:15 AM     Labs reviewed and remarkable for increased Na from 135 overnight. Pt may benefit with slight decrease in TPN tonight. K low normal and phos low this morning. Pt may benefit with increase in KPO4 in TPN this evening. Pt also receiving PO4 replacement during visit. Mg and TG WNL. Initiating lipids this evening. Glucose slightly elevated, but not requiring coverage at this time. Current Nutrition Therapies:  Diet NPO Exceptions are: Other (Specify);  Specify Other Exceptions: meds via g tube  PN-Adult Premix 5/15 - Central    Current Intake:   Average Meal Intake: NPO Average Supplements Intake: NPO      Anthropometric Measures:  Height: 5' 10\" (177.8 cm)  Current Body Wt: 160 lb 0.9 oz (72.6 kg) (7/11), Weight source: Bed Scale  BMI: 23  Admission Body Weight: 178 lb 2.1 oz (80.8 kg) (7/10; bed scale)  Ideal Body Weight (Kg) (Calculated): 75 kg (166 lbs), 107.3 %  Usual Body Wt: 231 lb (104.8 kg) (MD office weight 4/21/2022), Percent weight change: -22.9       Edema:    BMI Category Normal Weight (BMI 18.5-24. 9)  Estimated Daily Nutrient Needs:  Energy (kcal/day): 3788-9227 (22-28kcal/kg) (Kcal/kg Weight used: 80.8 kg Current  Protein (g/day):  (1-1.25g/kg) Weight Used: (Current) 80.8 kg  Fluid (ml/day):   (1 ml/kcal)    Nutrition Diagnosis:   · Inadequate oral intake related to altered GI function as evidenced by NPO or clear liquid status due to medical condition (venting GTube, TPN for primary nutrition)    · Moderate malnutrition,In context of chronic illness related to catabolic illness as evidenced by Criteria as identified in malnutrition assessment       Nutrition Interventions:   Food and/or Nutrient Delivery: Continue NPO,Modify Parenteral Nutrition     Coordination of Nutrition Care: Continue to monitor while inpatient  Plan of Care discussed with:  SARANYA Minaya    Goals:   Previous Goal Met: Progressing toward Goal(s)  Active Goal: Tolerate nutrition support at goal rate,within 7 days       Nutrition Monitoring and Evaluation:      Food/Nutrient Intake Outcomes: Parenteral Nutrition Intake/Tolerance  Physical Signs/Symptoms Outcomes: Biochemical Data,GI Status,Fluid Status or Edema,Hemodynamic Status,Weight    Discharge Planning:    Parenteral Nutrition    Violette Kirby MS, RDN, LD  Contact: [phone]

## 2022-07-11 NOTE — PROGRESS NOTES
CRITICAL CARE OUTREACH NURSE UPDATE    Vitals:    07/10/22 1943 07/10/22 2255 07/11/22 0016 07/11/22 0259   BP: 109/66 (!) 78/57 (!) 86/57 88/60   Pulse: 72 65 64 71   Resp: 18 16 18 16   Temp: 97.5 °F (36.4 °C) 97.7 °F (36.5 °C)  97.5 °F (36.4 °C)   TempSrc:  Oral     SpO2: 98% 95% 92% 91%   Weight:    160 lb 1.6 oz (72.6 kg)   Height:            ASSESSMENT:  Previous outreach assessment was reviewed. There have been no significant changes since previous assessment. PLAN:  Will follow per ICU Outreach protocol.     Kd Joseph RN  [phone]

## 2022-07-11 NOTE — CARE COORDINATION
MSN CM:  spoke with patient and wife this AM about discharge planning. Patient lives at home with wife who is caretaker. Patient also have [facility] nursing which manages PICC line. Patient declines any other help at home. Case Management will continue to follow for any discharge needs that may arise. 07/11/22 1140   Service Assessment   Patient Orientation Alert and Oriented   Cognition Alert   History Provided By Patient   Primary Caregiver Family   Accompanied By/Relationship Estedina Godinez - Wife   Support Systems Spouse/Significant Other   Patient's Healthcare Decision Maker is: Legal Next of Kin  Paxton Huang - Wife)   PCP Verified by CM Yes   Last Visit to PCP Within last 6 months   Prior Functional Level Independent in ADLs/IADLs   Current Functional Level Independent in ADLs/IADLs   Can patient return to prior living arrangement Yes   Ability to make needs known: Good   Family able to assist with home care needs: Yes   Would you like for me to discuss the discharge plan with any other family members/significant others, and if so, who? Yes  Paxton Diaz)   Financial Resources Other (Comment)  (BCBS)   Social/Functional History   Type of Home Mobile home   Home Layout One level   Home Access Stairs to enter with rails   Entrance Stairs - Number of Steps 7   Entrance Stairs - Rails Both   Bathroom Shower/Tub Walk-in shower   Bathroom Toilet Standard   Bathroom Equipment Shower chair   Bathroom Accessibility Accessible   Receives Help From [address] Responsibilities No   Ambulation Assistance Independent   Transfer Assistance Independent   Active  No   Patient's [address] - Wife   Mode of Transportation SUV;Truck   Occupation On disability   Discharge Planning   Type of [address] Prior To Admission None   DME Ordered?  No   Potential Assistance Purchasing Medications No   Type of Home Care Services None   Patient expects to be discharged to: House   One/Two Story Residence One story   History of falls? 0

## 2022-07-11 NOTE — PROGRESS NOTES
CRITICAL CARE OUTREACH NURSE UPDATE    Vitals:    07/11/22 0016 07/11/22 0259 07/11/22 0810 07/11/22 1213   BP: (!) 86/57 88/60 (!) 84/56 107/72   Pulse: 64 71 67 63   Resp: 18 16 16 16   Temp:  97.5 °F (36.4 °C) 97.7 °F (36.5 °C) 97.7 °F (36.5 °C)   TempSrc:   Oral Oral   SpO2: 92% 91% 94% 93%   Weight:  160 lb 1.6 oz (72.6 kg)     Height:            ASSESSMENT:  Previous outreach assessment was reviewed. Patient had wound swab of G Tube. Awaiting CT scan of abdomen, pelvis. VSS. PLAN:  Continue current outreach protocol.      Guillermo Barajas RN  [phone]

## 2022-07-11 NOTE — PROGRESS NOTES
VANCO DAILY FOLLOW UP NOTE  [facility] Pharmacokinetic Monitoring Service - Vancomycin    Consulting Provider: Tawnya Bernardo   Indication: sepsis  Target Concentration: Goal AUC/JOSH 400-600 mg*hr/L  Day of Therapy: 2  Additional Antimicrobials: cefepime    Pertinent Laboratory Values: Wt Readings from Last 1 Encounters:   07/11/22 160 lb 1.6 oz (72.6 kg)     Temp Readings from Last 1 Encounters:   07/11/22 97.7 °F (36.5 °C) (Oral)     Recent Labs     07/08/22  0938 07/10/22  0030 07/11/22  0415   BUN 14 9 12   CREATININE 0.70* 0.70* 0.50*   WBC 3.6* 1.8* 2.7*   PROCAL  --  0.50* 5.31*   LACACIDPL  --  1.2  --      Estimated Creatinine Clearance: 161 mL/min (A) (based on SCr of 0.5 mg/dL (L)). No results found for: Tiburcio Genao    MRSA Nasal Swab: N/A. Non-respiratory infection. .      Assessment:  Date/Time Dose Concentration AUC         Note: Serum concentrations collected for AUC dosing may appear elevated if collected in close proximity to the dose administered, this is not necessarily an indication of toxicity    Plan:  1. Dosing recommendations based on Bayesian software  2. Continue vancomycin 1000 mg q 12 hours  a. Anticipated AUC of 420 and trough concentration of 12.5 at steady state  3. Renal labs as indicated   4. Will check vancomycin random level today  5. Pharmacy will continue to monitor patient and adjust therapy as indicated    Thank you for the consult,  Dianna Garg. DEVIN Chacon [facility]     Addendum:  Random level resulted below goal. Calculated  on vancomycin 1000 mg IV q12h. Will increase dose to 1000 mg IV q8h for predicted AUC of 419. Pharmacy will continue to follow. Please call with any questions.

## 2022-07-11 NOTE — CONSULTS
Patient: Glynn Winn MRN: [id number]  SSN: [id number]    YOB: 1960  Age: 62 y.o. Sex: male       Date of Request: 7/11/2022  Date of Consult:  7/11/2022  Reason for Consult:  pain and symptom management  Requesting Physician: Dr. Guido Lopez     Assessment/Plan:     Principal Diagnosis:     Pain, abdomen  R10.9    Additional Diagnoses:   · Counseling, Encounter for Medical Advice  Z71.9  · Encounter for Palliative Care  Z51.5    Palliative Performance Scale (PPS):       Medical Decision Making:   Reviewed and summarized labs and imaging from admission. Met with pt and spouse at bedside. Pt appears comfortable currently. He denies any acute pain. Spouse states pain was around tube in abdomen. Family was concerned for infection leading to admission. Pt takes fentanyl 100 mcg patch and has oxycodone 10 mg via peg q 4 hr prn but has not required any recent prn meds. Sepsis workup ongoing with CT abd pending per pt. Will follow and assist with symptom management as needed. Will discuss findings with members of the interdisciplinary team.      Thank you for this referral.         Subjective:     History obtained from:  Patient, Family and Chart    Chief Complaint: abdominal pain  History of Present Illness:   62 y.o. male admitted on 7/10/2022 with a primary diagnosis of The encounter diagnosis was Neutropenic fever ([facility] [address] .       Mr. Noam Kirby has a PMH adenocarcinoma of the upper GI tract (mets to peritoneum), recent 30# weight loss, PUD, hypertension, HLD, diverticulitis. He is a patient of Dr. Didi Muñoz undergoing treatment for met. Gastric cancer with palliative FOLFIRINOX, s/p C2 on 6/27. C3 due on 7/11, planned for 7/15 as pt was seeking a second opinion in [facility] (planned to travel there tomorrow 7/11). He is a known patient of Dr. Janiya Prescott as well - previously did exploratory surgery found the entire mesentery encased with cancer, placed venting G tube. He is also on home TPN and has a PICC line. Palliative care seeing OP to manage cancer related abdominal pain. He presented to the ER with change in mental status and high fevers at home. [place] to have sepsis of unknown origin. BCx pending. Started on Vanc/Cef. Hypotension responded to fluids    Advance Directive: No       Code Status:  Full [address] [facility] [address]: No - Patient does not have a [address]     Past Medical History:   Diagnosis Date    Bilateral carpal tunnel syndrome 12/9/2020    Chronic right shoulder pain 11/30/2020    Colon polyps 3/11/2013    Diverticulitis 3/11/2013    HTN (hypertension) 3/11/2013    Hyperlipidemia 3/11/2013    Paresthesia and pain of both upper extremities 11/30/2020    PUD (peptic ulcer disease) 3/11/2013    History of     Right elbow pain 12/9/2020    Wheezing 3/11/2013      Past Surgical History:   Procedure Laterality Date    COLONOSCOPY  2/25/09    colonoscopy per Dr. Karley Dinero with need for repeat every 3 years    COLONOSCOPY  02/25/2022    Dr. Leida Gonzalez; polyps of the ascending, transverse, descending, and sigmoid colons; internal hemorrhoid; diverticulosis    LAPAROSCOPY N/A 5/27/2022    LAPAROSCOPY DIAGNOSTIC , OPEN EXPLORATORY LAPAROTOMY, MASS EXCISION, G-TUBE PLACEMENT performed by Santosh Marvin MD at [address] [facility] [address] [place] N/A 6/3/2022    PORT INSERTION performed by Santosh Marvin MD at   [facility] [address] UNLISTED  October 2004    partial colon resection per Dr. Chana Krishnan  02/25/2022    Dr. Leida Gonzalez; hiatal hernia gastric polyps     Family History   Problem Relation Age of Onset    No Known Problems Brother     Cancer Sister         breast    Hypertension Mother     Heart Disease Mother     Diabetes Father     Osteoarthritis Father     Alcohol Abuse Father     Hypertension Father     Diabetes Mother       Social History     Tobacco Use    Smoking status: Current Every Day Smoker     Packs/day: 1.00 Comprehensive review of systems completed and negative with exception of noted above     Objective:     Visit Vitals  BP (!) 84/56   Pulse 67   Temp 97.7 °F (36.5 °C) (Oral)   Resp 16   Ht 5' 10\" (1.778 m)   Wt 160 lb 1.6 oz (72.6 kg)   SpO2 94%   BMI 22.97 kg/m²        Physical Exam:    General:  Cooperative. No acute distress. Eyes:  Conjunctivae/corneas clear    Nose: Nares normal. Septum midline. Neck: Supple, symmetrical, trachea midline, no JVD   Lungs:   Clear to auscultation bilaterally, unlabored   Heart:  Regular rate and rhythm, no murmur    Abdomen:   Soft, non-tender, non-distended. Positive bowel sounds   Extremities: Normal, atraumatic, no cyanosis or edema   Skin: Skin color, texture, turgor normal. No rash or lesions.    Neurologic: Nonfocal   Psych: Alert and oriented       Signed By: Elizabeth Lemon MD     July 11, 2022

## 2022-07-11 NOTE — PROGRESS NOTES
Patient alert and oriented X4. Repirations are even and unlabored. G tube in place and draining with 400cc of greenish output noted. TPN infusing. All medications given via G tube. Pt remains NPO except ice chip. Voiding well. Up with assistance. Rounded on pt. Bed is low, locked and call light within reach.

## 2022-07-11 NOTE — CARE COORDINATION
MSN, CM:  patient was admitted on 5/20/2022 from MD office for dehydration, Dx was Carcinomatosis. Consults were oncology, palliative care, and dietitian. Patient was discharged home on 6/14/2022 and started on Buspar, Lexapro, Ativan, Duragesic, Nicoderm, Zofran, and Phenergan. Patient was instructed to stop Tylenol, Lotrel, Carafate, Advil, and percocet. Patient was readmtted on 7/10/2022 for fever. 07/11/22 1352   Readmission Assessment   Number of Days since last admission? 8-30 days   Previous Disposition Home with Family   Who is being Darian Sweeney   What was the patient's/caregiver's perception as to why they think they needed to return back to the hospital? Other (Comment)  (fever)   Did you visit your Primary Care Physician after you left the hospital, before you returned this time? Yes   Did you see a specialist, such as Cardiac, Pulmonary, Orthopedic Physician, etc. after you left the hospital? Yes   Who advised the patient to return to the hospital? Physician   Does the patient report anything that got in the way of taking their medications? No   In our efforts to provide the best possible care to you and others like you, can you think of anything that we could have done to help you after you left the hospital the first time, so that you might not have needed to return so soon?  Other (Comment)  (no)

## 2022-07-11 NOTE — PROGRESS NOTES
Pt resting in bed comfortably at this time, alert and oriented times 4. No distress noted, respirations even and unlabored. Pt denies pain at this time. G tube in place and brown contents draining into holloway bag. TPN infusing per order. Pt instructed to call for assistance if needed, call light in place, will continue to monitor.

## 2022-07-11 NOTE — PROGRESS NOTES
Patient transferred from 8th floor. Patient is current with [facility] for SN for maintenance of PICC line. Discharge plan is undtermend at this time.     Will continue to follow for discharge planning needs  Please consult  if any new issues arise

## 2022-07-11 NOTE — PROGRESS NOTES
TRANSFER - OUT REPORT:    Verbal report given to [address] on [address]  being transferred to [address] [place] [address] for routine progression of patient care       Report consisted of patient's Situation, Background, Assessment and   Recommendations(SBAR). Information from the following report(s) Nurse Handoff Report, Intake/Output, MAR and Recent Results was reviewed with the receiving nurse. Lines:   PICC Double Lumen 71/70/87 Right Basilic (Active)   Central Line Being Utilized Yes 07/11/22 0806   Criteria for Appropriate Use Limited/no vessel suitable for conventional peripheral access 07/11/22 0806   Site Assessment Clean, dry & intact 07/11/22 0806   Phlebitis Assessment No symptoms 07/11/22 0806   Infiltration Assessment 0 07/11/22 0806   Extremity Circumference (cm) 34 cm 06/09/22 0140   External Catheter Length (cm) 0 cm 06/06/22 1927   Proximal Lumen Color/Status Purple;Flushed;Blood return noted 07/08/22 0955   Distal Lumen Color/Status Red;Flushed; No blood return 07/08/22 [address] checked and tightened 07/05/22 1600   Alcohol Cap Used Yes 07/08/22 0955   Date of Last Dressing Change 06/30/22 07/08/22 0955   Dressing Type Transparent; Antimicrobial 07/08/22 0955   Dressing Status Clean, dry & intact 07/08/22 0955   Dressing Intervention Other (Comment) 06/12/22 2346       Single Lumen Implantable Port 06/03/22 Left Chest (Active)   Port A Cath Status Accessed 07/11/22 0806   Criteria for Appropriate Use Irritant/vesicant medication 06/27/22 0834   Site Assessment Clean, dry & intact 07/11/22 0806   Alcohol Cap Used Yes 07/10/22 1450   Date of Last Dressing Change 06/27/22 06/29/22 1330   Dressing Type Antimicrobial;Transparent 07/10/22 1450   Dressing Status Clean, dry & intact 07/11/22 0806   Dressing Intervention New 07/10/22 1450   Date Accessed  07/10/22 07/10/22 1450   Access Attempts  1 07/10/22 1450   Access Needle Gauge 20 G 07/10/22 1450   Access Needle Length 0.75 inches 07/10/22 1450 Accessed By: Sonja Aguero RN 07/10/22 1450   Single Lumen Status Alcohol cap applied 07/10/22 1450   De-Access Date 06/29/22 06/29/22 1330   De-Access Time 1330 06/29/22 1330   De-Accessed By DELIO 06/29/22 1330       Peripheral IV 07/10/22 Left Forearm (Active)   Site Assessment Clean, dry & intact 07/11/22 0806   Line Status Capped 07/11/22 0806   Line Care Cap changed 07/11/22 0806   Phlebitis Assessment No symptoms 07/11/22 0806   Infiltration Assessment 0 07/11/22 0806   Alcohol Cap Used Yes 07/11/22 0806   Dressing Status Clean, dry & intact 07/11/22 0806   Dressing Type Transparent 07/11/22 0806       Peripheral IV 07/10/22 Right;Posterior Hand (Active)   Site Assessment Clean, dry & intact 07/11/22 0806   Line Status Capped 07/11/22 0806   Line Care Cap changed 07/11/22 0806   Phlebitis Assessment No symptoms 07/11/22 0806   Infiltration Assessment 0 07/11/22 0806   Alcohol Cap Used Yes 07/11/22 0806   Dressing Status Clean, dry & intact 07/11/22 502        Opportunity for questions and clarification was provided.       Patient transported with:  Registered Nurse

## 2022-07-11 NOTE — PROGRESS NOTES
CRITICAL CARE OUTREACH NURSE PROGRESS REPORT      SUBJECTIVE: Called to assess patient secondary to transfer from critical care. Vitals:    07/10/22 2255 07/11/22 0016 07/11/22 0259 07/11/22 0810   BP: (!) 78/57 (!) 86/57 88/60 (!) 84/56   Pulse: 65 64 71 67   Resp: 16 18 16 16   Temp: 97.7 °F (36.5 °C)  97.5 °F (36.4 °C) 97.7 °F (36.5 °C)   TempSrc: Oral   Oral   SpO2: 95% 92% 91% 94%   Weight:   160 lb 1.6 oz (72.6 kg)    Height:            ASSESSMENT:  Patient is sitting up in bed with wife at bedside. Denies pain and does not appear in any distress. Marginal Bps noted, currently receiving fluid bolus 1000 ml NS and continuous at 125 ml/hr. Midodrine ordered. WBC 2.7, continues on Vanc and Maxipime. Gtube is draining dark brown thick fluid. He is noted to have fentanyl patch on left shoulder. TPN for nutrition. PLAN:  Continue outreach protocol.      Alix Quintero RN  [phone]

## 2022-07-11 NOTE — PROGRESS NOTES
Pt came from 8th floor this afternoon vSS no complaints of pain or discomfort resting in bed call light in reach instructed to call with any needs

## 2022-07-11 NOTE — PROGRESS NOTES
[facility]        Inpatient Hematology / Oncology Progress Note    Reason for Admission:  Neutropenic fever ([facility] [D70.9, R50.81]  Sepsis ([facility] [address] [A41.9]    24 Hour Events:  Afebrile, hypotensive  C/o purulent drainage from G-tube site  Wife at bedside    Transfusions: None  Replacements: Phos      ROS:  Constitutional: +fatigue, weakness. Negative for fever, chills. CV: Negative for chest pain, palpitations, edema. Respiratory: Negative for dyspnea, cough, wheezing. GI: +abd pain at G-tube site. Negative for nausea, diarrhea. 10 point review of systems is otherwise negative with the exception of the elements mentioned above in the HPI.        No Known Allergies  Past Medical History:   Diagnosis Date    Bilateral carpal tunnel syndrome 12/9/2020    Chronic right shoulder pain 11/30/2020    Colon polyps 3/11/2013    Diverticulitis 3/11/2013    HTN (hypertension) 3/11/2013    Hyperlipidemia 3/11/2013    Paresthesia and pain of both upper extremities 11/30/2020    PUD (peptic ulcer disease) 3/11/2013    History of     Right elbow pain 12/9/2020    Wheezing 3/11/2013     Past Surgical History:   Procedure Laterality Date    COLONOSCOPY  2/25/09    colonoscopy per Dr. Gross Failing with need for repeat every 3 years    COLONOSCOPY  02/25/2022    Dr. Qi Larios; polyps of the ascending, transverse, descending, and sigmoid colons; internal hemorrhoid; diverticulosis    LAPAROSCOPY N/A 5/27/2022    LAPAROSCOPY DIAGNOSTIC , OPEN EXPLORATORY LAPAROTOMY, MASS EXCISION, G-TUBE PLACEMENT performed by Herminio Kidd MD at [facility] MAIN OR    PORT SURGERY N/A 6/3/2022    PORT INSERTION performed by Herminio Kidd MD at -[id number] [place] [address] UNLISTED  October 2004    partial colon resection per Dr. Trent Levy  02/25/2022    Dr. Qi Larios; hiatal hernia gastric polyps     Family History   Problem Relation Age of Onset    No Known Problems Brother     Cancer Sister breast    Hypertension Mother     Heart Disease Mother     Diabetes Father     Osteoarthritis Father     Alcohol Abuse Father     Hypertension Father     Diabetes Mother      Social History     Socioeconomic History    Marital status:      Spouse name: Not on file    Number of children: Not on file    Years of education: Not on file    Highest education level: Not on file   Occupational History    Not on file   Tobacco Use    Smoking status: Current Every Day Smoker     Packs/day: 1.00    Smokeless tobacco: Never Used   Substance and Sexual Activity    Alcohol use: No    Drug use: No    Sexual activity: Not on file   Other Topics Concern    Not on file   Social History Narrative    Not on file     Social Determinants of Health     Financial Resource Strain:     Difficulty of Paying Living Expenses: Not on file   Food Insecurity:     Worried About Running Out of Food in the Last Year: Not on file    Briseida of Food in the Last Year: Not on file   Transportation Needs:     Lack of Transportation (Medical): Not on file    Lack of Transportation (Non-Medical):  Not on file   Physical Activity:     Days of Exercise per Week: Not on file    Minutes of Exercise per Session: Not on file   Stress:     Feeling of Stress : Not on file   Social Connections:     Frequency of Communication with Friends and Family: Not on file    Frequency of Social Gatherings with Friends and Family: Not on file    Attends [employer] Services: Not on file    Active Member of Clubs or Organizations: Not on file    Attends Club or Organization Meetings: Not on file    Marital Status: Not on file   Intimate Partner Violence:     Fear of Current or Ex-Partner: Not on file    Emotionally Abused: Not on file    Physically Abused: Not on file    Sexually Abused: Not on file   Housing Stability:     Unable to Pay for Housing in the Last Year: Not on file    Number of [place] in the Last Year: Not on file    Unstable Housing in the Last Year: Not on file     Current Facility-Administered Medications   Medication Dose Route Frequency Provider Last Rate Last Admin    hydrocortisone sodium succinate PF (SOLU-CORTEF) injection 100 mg  100 mg IntraVENous Q8H Moises Finn MD   100 mg at 07/11/22 0856    midodrine (PROAMATINE) tablet 10 mg  10 mg Oral TID  Moises Finn MD   10 mg at 07/11/22 1129    0.9 % sodium chloride infusion   IntraVENous Continuous Darrall Amour APRN -  mL/hr at 07/11/22 0951 New Bag at 07/11/22 0951    sodium phosphate 30 mmol in sodium chloride 0.9 % 250 mL IVPB  30 mmol IntraVENous Once Darrall Amour APRN - CNP 62.5 mL/hr at 07/11/22 1129 30 mmol at 07/11/22 1129    metronidazole (FLAGYL) 500 mg in 0.9% NaCl 100 mL IVPB premix  500 mg IntraVENous 100 [facility] MD        alteplase (CATHFLO) injection 1 mg  1 mg IntraCATHeter Once Olman Tirado MD        sodium chloride flush 0.9 % injection 5-40 mL  5-40 mL IntraVENous 2 times per day Mary Spikes, DO   10 mL at 07/11/22 0916    sodium chloride flush 0.9 % injection 5-40 mL  5-40 mL IntraVENous PRN Mary Spikes, DO        0.9 % sodium chloride infusion   IntraVENous PRN Mary Spikes, DO        famotidine (PEPCID) 20 mg in sodium chloride (PF) 10 mL injection  20 mg IntraVENous Daily Mary Spikes, DO   20 mg at 07/11/22 0856    aluminum & magnesium hydroxide-simethicone (MAALOX) 200-200-20 MG/5ML suspension 30 mL  30 mL Oral Q6H PRN Mary Spikes, DO        acetaminophen (TYLENOL) tablet 650 mg  650 mg Oral Q6H PRN Mary Spikes, DO        Or    acetaminophen (TYLENOL) suppository 650 mg  650 mg Rectal Q6H PRN Mary Spikes, DO        cefepime (MAXIPIME) 2,000 mg in sodium chloride 0.9 % 50 mL IVPB mini-bag  2,000 mg IntraVENous Q8H Mary Spikes, DO 12.5 mL/hr at 07/11/22 0908 2,000 mg at 07/11/22 0908    busPIRone (BUSPAR) tablet 10 mg  10 mg Oral TID Doretha Ibarra Mccall, DO   10 mg at 22 0910    escitalopram (LEXAPRO) tablet 10 mg  10 mg Oral Daily Shaan Green, DO   10 mg at 22 0910    fentaNYL (DURAGESIC) 100 MCG/HR 1 patch  1 patch TransDERmal Q72H Shaan Green, DO   1 patch at 07/10/22 1617    naloxone opiate overdose kit 4mg (Patient Supplied)  1 spray Nasal PRN Shaan Green, DO        nicotine (NICODERM CQ) 14 MG/24HR 1 patch  1 patch TransDERmal Daily Shaan Green, DO   1 patch at 22 0909    ondansetron (ZOFRAN-ODT) disintegrating tablet 8 mg  8 mg Oral Q8H Shaan Green, DO   8 mg at 22 0910    promethazine (PHENERGAN) tablet 12.5 mg  12.5 mg Oral Q6H PRN Shaan Green, DO        rosuvastatin (CRESTOR) tablet 10 mg  10 mg Oral Nightly Shaan Green, DO   10 mg at 07/10/22 2246    LORazepam (ATIVAN) tablet 1 mg  1 mg Oral Q8H PRN Shaan Green, DO        pantoprazole (PROTONIX) 40 mg in sodium chloride (PF) 10 mL injection  40 mg IntraVENous Q12H Shaan Green, DO   40 mg at 22 0856    HYDROmorphone HCl PF (DILAUDID) injection 0.5 mg  0.5 mg IntraVENous Q3H PRN Shaan Green, DO        oxyCODONE (ROXICODONE) immediate release tablet 10 mg  10 mg Per G Tube Q4H PRN Shaan Green, DO        dicyclomine (BENTYL) tablet 20 mg  20 mg Oral Q6H Shaan Green, DO   20 mg at 22 0910    vancomycin (VANCOCIN) 1,000 mg in sodium chloride 0.9 % 250 mL IVPB ([id number])  1,000 mg IntraVENous Q12H Shaan Green, DO   Stopped at 22 0305    PN-Adult Premix 5/15 - Central   IntraVENous Continuous TPN Shaan Green, DO 85 mL/hr at 07/10/22 1645 New Bag at 07/10/22 1645    thiamine (B-1) injection 100 mg  100 mg IntraVENous Q24H Shaan Green, DO   100 mg at 07/10/22 2241       OBJECTIVE:  Patient Vitals for the past 8 hrs:   BP Temp Temp src Pulse Resp SpO2   22 0810 (!) 84/56 97.7 °F (36.5 °C) Oral 67 16 94 %     Temp (24hrs), Av.6 °F (36.4 °C), Min:97.5 °F (36.4 °C), Max:97.7 °F (36.5 °C)    No intake/output data recorded. Physical Exam:  Constitutional: Ill appearing male in no acute distress, sitting comfortably in the hospital bed. HEENT: Normocephalic and atraumatic. Oropharynx is clear, mucous membranes are moist.  Neck supple. Skin Warm and dry. No bruising and no rash noted. No erythema. No pallor. +PICC line +purulent drainage noted at G-tube site   Respiratory Lungs are clear to auscultation bilaterally without wheezes, rales or rhonchi, normal air exchange without accessory muscle use. CVS Normal rate, regular rhythm and normal S1 and S2. No murmurs, gallops, or rubs. Abdomen Soft, tender at G-tube site and nondistended, normoactive bowel sounds. +venting G tube   Neuro Grossly nonfocal with no obvious sensory or motor deficits. MSK Normal range of motion in general.  No edema and no tenderness.    Psych Flat affect, not very communicative       Labs:    Recent Results (from the past 24 hour(s))   POCT Glucose    Collection Time: 07/10/22 12:19 PM   Result Value Ref Range    POC Glucose 82 65 - 100 mg/dL    Performed by: Cassi    Lactate, Sepsis    Collection Time: 07/10/22  2:49 PM   Result Value Ref Range    Lactic Acid, Sepsis 1.7 0.4 - 2.0 MMOL/L   Procalcitonin    Collection Time: 07/11/22  4:15 AM   Result Value Ref Range    Procalcitonin 5.31 (H) 0.00 - 0.49 ng/mL   CBC with Auto Differential    Collection Time: 07/11/22  4:15 AM   Result Value Ref Range    WBC 2.7 (L) 4.3 - 11.1 K/uL    RBC 3.21 (L) 4.23 - 5.6 M/uL    Hemoglobin 9.3 (L) 13.6 - 17.2 g/dL    Hematocrit 28.0 (L) 41.1 - 50.3 %    MCV 87.2 79.6 - 97.8 FL    MCH 29.0 26.1 - 32.9 PG    MCHC 33.2 31.4 - 35.0 g/dL    RDW 12.9 11.9 - 14.6 %    Platelets 695 888 - 874 K/uL    MPV 10.5 9.4 - 12.3 FL    nRBC 0.00 0.0 - 0.2 K/uL    Seg Neutrophils 28 (L) 43 - 78 %    Lymphocytes 41 13 - 44 %    Monocytes 16 (H) 4.0 - 12.0 %    Eosinophils % 4 0.5 - 7.8 % normal  limits. There is no consolidation, pleural effusion, or pneumothorax. No significant osseous abnormalities are observed. Impression  Allowing for patient positioning, there is no evidence of an acute intrathoracic  process. ASSESSMENT:  Patient Active Problem List   Diagnosis    Right cervical radiculopathy    Right elbow pain    Hyperlipidemia    Bilateral carpal tunnel syndrome    Essential hypertension    Gastroesophageal reflux disease    PUD (peptic ulcer disease)    Chronic right shoulder pain    Paresthesia and pain of both upper extremities    History of colon polyps    Chronic bronchitis (HCC)    Class 1 obesity due to excess calories with serious comorbidity and body mass index (BMI) of 34.0 to 34.9 in adult    Generalized abdominal pain    Abdominal pain    Peritoneal metastases (HCC)    Opioid use, unspecified with unspecified opioid-induced disorder ([address]    SBO (small bowel obstruction) ([address]    Partial small bowel obstruction ([address]    Severe protein-calorie malnutrition ([address]    Abdominal mass    Debility    Encounter for palliative care    Itching    Fatigue    Abdominal carcinomatosis ([address]    Goals of care, counseling/discussion    On total parenteral nutrition    Pain, abdominal, LUQ    Nausea    Anxiety about dying    Depression    Carcinomatosis ([address]    Cancer associated pain    PICC (peripherally inserted central catheter) flush    Severe sepsis (HCC)    Septic encephalopathy    Metastatic adenocarcinoma (HCC)    Gastric cancer (HCC)    Malfunction of peripheral inserted central catheter ([address]    Hypokalemia    Leukopenia    Anemia    Abnormal liver function test    Neutropenic fever [facility])     Mr. Manasa Leahy is a 62 y.o. male admitted on 7/10/2022 with a primary diagnosis of The encounter diagnosis was Neutropenic fever ([address] .      Mr. Manasa Leahy has a 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 He is a patient of Dr. Rachel Saini undergoing treatment for met. Gastric cancer with palliative FOLFIRINOX, s/p C2 on 6/27. C3 due on 7/11, planned for 7/15 as pt was seeking a second opinion in [facility] (planned to travel there tomorrow 7/11). He is a known patient of Dr. Latricia Newton as well - previously did exploratory surgery found the entire mesentery encased with cancer, placed venting G tube. He is also on home TPN and has a PICC line. Palliative care seeing OP to manage cancer related abdominal pain. He presented to the ER with change in mental status and high fevers at home. [place] to have sepsis of unknown origin. BCx pending. Started on Vanc/Cef. Hypotension responded to fluids. We were asked for recommendations for our known patient. PLAN:  Metastatic Gastric Cancer  - pt of Dr. Rachel Saini, s/p C2 of FOLFIRINOX 6/27, due for C3 7/11 but planned for 7/15 as family/pt planned to seek a second opinion in [facility] (planned for 7/11). No plans to give chemo while admitted. Sepsis  - On Cef/Vanc, follow pending cultures  7/11 Afebrile. Hypotensive. BCx-NGTD. G-tube site with purulent drainage. Check CT AP. Wound cx ordered. Con't Cef/Flagyl/Vanc. Confusion/AMS  - May need head CT if mental status does not improve  RESOLVED    Ongoing Abdominal Pain  - home pain regimen  - consult PC, known to them from office    Home TPN  - RD consulted for management    Hypotension  - on IVF  - consult hospitalist for mgmt    Continue home meds  Araseli SOPs  Lovenox for DVT ppx    Goals and plan of care reviewed with the patient. All questions answered to the best of our ability. Disposition:  TBD pending clinical course. Transfer to 5th floor requested.             [facility]  [address] [facility] [address]  Office : [phone]  Fax : [phone]

## 2022-07-11 NOTE — PROGRESS NOTES
CRITICAL CARE OUTREACH NURSE PROGRESS REPORT      SUBJECTIVE: Called to assess patient secondary to transfer from critical care. Vitals:    07/10/22 1042 07/10/22 1305 07/10/22 1453 07/10/22 1943   BP: 106/73  91/60 109/66   Pulse: 95  71 72   Resp: 20  20 18   Temp: 98.4 °F (36.9 °C)  97.5 °F (36.4 °C) 97.5 °F (36.4 °C)   TempSrc: Oral  Oral    SpO2: 92%  95% 98%   Weight:  178 lb 3.2 oz (80.8 kg)     Height:  5' 10\" (1.778 m)          ASSESSMENT:  Upon arriving to room, pt resting quietly in bed. Pt wakes easily to voice, A&Ox4. O2 sat 97% on RA. States he is \"feeling better this evening\", no complaints at this time. PLAN:  Will follow per ICU Outreach protocol.     Nneka Braswell RN  [phone]

## 2022-07-12 ENCOUNTER — TELEPHONE (OUTPATIENT)
Dept: ONCOLOGY | Age: 62
End: 2022-07-12

## 2022-07-12 LAB
ALBUMIN SERPL-MCNC: 2 G/DL (ref 3.2–4.6)
ALBUMIN/GLOB SERPL: 0.6 {RATIO} (ref 1.2–3.5)
ALP SERPL-CCNC: 139 U/L (ref 50–136)
ALT SERPL-CCNC: 90 U/L (ref 12–65)
ANION GAP SERPL CALC-SCNC: 2 MMOL/L (ref 7–16)
AST SERPL-CCNC: 18 U/L (ref 15–37)
BASOPHILS # BLD: 0 K/UL (ref 0–0.2)
BASOPHILS NFR BLD: 1 % (ref 0–2)
BILIRUB SERPL-MCNC: 0.2 MG/DL (ref 0.2–1.1)
BUN SERPL-MCNC: 11 MG/DL (ref 8–23)
CALCIUM SERPL-MCNC: 8.1 MG/DL (ref 8.3–10.4)
CHLORIDE SERPL-SCNC: 112 MMOL/L (ref 98–107)
CO2 SERPL-SCNC: 28 MMOL/L (ref 21–32)
CREAT SERPL-MCNC: 0.6 MG/DL (ref 0.8–1.5)
DIFFERENTIAL METHOD BLD: ABNORMAL
EOSINOPHIL # BLD: 0 K/UL (ref 0–0.8)
EOSINOPHIL NFR BLD: 0 % (ref 0.5–7.8)
ERYTHROCYTE [DISTWIDTH] IN BLOOD BY AUTOMATED COUNT: 12.8 % (ref 11.9–14.6)
GLOBULIN SER CALC-MCNC: 3.2 G/DL (ref 2.3–3.5)
GLUCOSE BLD STRIP.AUTO-MCNC: 160 MG/DL (ref 65–100)
GLUCOSE BLD STRIP.AUTO-MCNC: 170 MG/DL (ref 65–100)
GLUCOSE SERPL-MCNC: 195 MG/DL (ref 65–100)
HCT VFR BLD AUTO: 27.8 % (ref 41.1–50.3)
HGB BLD-MCNC: 9.2 G/DL (ref 13.6–17.2)
IMM GRANULOCYTES # BLD AUTO: 0.1 K/UL (ref 0–0.5)
IMM GRANULOCYTES NFR BLD AUTO: 5 % (ref 0–5)
LYMPHOCYTES # BLD: 0.8 K/UL (ref 0.5–4.6)
LYMPHOCYTES NFR BLD: 27 % (ref 13–44)
MAGNESIUM SERPL-MCNC: 2 MG/DL (ref 1.8–2.4)
MCH RBC QN AUTO: 29.1 PG (ref 26.1–32.9)
MCHC RBC AUTO-ENTMCNC: 33.1 G/DL (ref 31.4–35)
MCV RBC AUTO: 88 FL (ref 79.6–97.8)
MONOCYTES # BLD: 0.4 K/UL (ref 0.1–1.3)
MONOCYTES NFR BLD: 13 % (ref 4–12)
NEUTS SEG # BLD: 1.6 K/UL (ref 1.7–8.2)
NEUTS SEG NFR BLD: 55 % (ref 43–78)
NRBC # BLD: 0 K/UL (ref 0–0.2)
PHOSPHATE SERPL-MCNC: 2.7 MG/DL (ref 2.3–3.7)
PLATELET # BLD AUTO: 199 K/UL (ref 150–450)
PMV BLD AUTO: 10.6 FL (ref 9.4–12.3)
POTASSIUM SERPL-SCNC: 3.4 MMOL/L (ref 3.5–5.1)
POTASSIUM SERPL-SCNC: 3.7 MMOL/L (ref 3.5–5.1)
PROT SERPL-MCNC: 5.2 G/DL (ref 6.3–8.2)
RBC # BLD AUTO: 3.16 M/UL (ref 4.23–5.6)
SERVICE CMNT-IMP: ABNORMAL
SERVICE CMNT-IMP: ABNORMAL
SODIUM SERPL-SCNC: 142 MMOL/L (ref 138–145)
TRIGL SERPL-MCNC: 116 MG/DL (ref 35–150)
WBC # BLD AUTO: 2.8 K/UL (ref 4.3–11.1)

## 2022-07-12 PROCEDURE — 83735 ASSAY OF MAGNESIUM: CPT

## 2022-07-12 PROCEDURE — 6360000002 HC RX W HCPCS: Performed by: INTERNAL MEDICINE

## 2022-07-12 PROCEDURE — 82962 GLUCOSE BLOOD TEST: CPT

## 2022-07-12 PROCEDURE — 2580000003 HC RX 258: Performed by: NURSE PRACTITIONER

## 2022-07-12 PROCEDURE — 2580000003 HC RX 258: Performed by: INTERNAL MEDICINE

## 2022-07-12 PROCEDURE — 85025 COMPLETE CBC W/AUTO DIFF WBC: CPT

## 2022-07-12 PROCEDURE — 84100 ASSAY OF PHOSPHORUS: CPT

## 2022-07-12 PROCEDURE — 84132 ASSAY OF SERUM POTASSIUM: CPT

## 2022-07-12 PROCEDURE — 80053 COMPREHEN METABOLIC PANEL: CPT

## 2022-07-12 PROCEDURE — 6370000000 HC RX 637 (ALT 250 FOR IP): Performed by: HOSPITALIST

## 2022-07-12 PROCEDURE — 36415 COLL VENOUS BLD VENIPUNCTURE: CPT

## 2022-07-12 PROCEDURE — 6360000002 HC RX W HCPCS: Performed by: HOSPITALIST

## 2022-07-12 PROCEDURE — 6370000000 HC RX 637 (ALT 250 FOR IP): Performed by: INTERNAL MEDICINE

## 2022-07-12 PROCEDURE — 6360000002 HC RX W HCPCS: Performed by: NURSE PRACTITIONER

## 2022-07-12 PROCEDURE — 1100000000 HC RM PRIVATE

## 2022-07-12 PROCEDURE — 2500000003 HC RX 250 WO HCPCS: Performed by: INTERNAL MEDICINE

## 2022-07-12 PROCEDURE — APPSS45 APP SPLIT SHARED TIME 31-45 MINUTES: Performed by: NURSE PRACTITIONER

## 2022-07-12 PROCEDURE — 84478 ASSAY OF TRIGLYCERIDES: CPT

## 2022-07-12 PROCEDURE — C9113 INJ PANTOPRAZOLE SODIUM, VIA: HCPCS | Performed by: INTERNAL MEDICINE

## 2022-07-12 PROCEDURE — A4216 STERILE WATER/SALINE, 10 ML: HCPCS | Performed by: INTERNAL MEDICINE

## 2022-07-12 PROCEDURE — 99232 SBSQ HOSP IP/OBS MODERATE 35: CPT | Performed by: INTERNAL MEDICINE

## 2022-07-12 RX ORDER — POTASSIUM CHLORIDE 7.45 MG/ML
20 INJECTION INTRAVENOUS ONCE
Status: COMPLETED | OUTPATIENT
Start: 2022-07-12 | End: 2022-07-12

## 2022-07-12 RX ORDER — INSULIN LISPRO 100 [IU]/ML
0-8 INJECTION, SOLUTION INTRAVENOUS; SUBCUTANEOUS EVERY 6 HOURS
Status: DISCONTINUED | OUTPATIENT
Start: 2022-07-12 | End: 2022-07-13 | Stop reason: HOSPADM

## 2022-07-12 RX ORDER — INSULIN LISPRO 100 [IU]/ML
0-4 INJECTION, SOLUTION INTRAVENOUS; SUBCUTANEOUS NIGHTLY
Status: DISCONTINUED | OUTPATIENT
Start: 2022-07-12 | End: 2022-07-12

## 2022-07-12 RX ORDER — POTASSIUM CHLORIDE 20 MEQ/1
20 TABLET, EXTENDED RELEASE ORAL 2 TIMES DAILY WITH MEALS
Status: DISCONTINUED | OUTPATIENT
Start: 2022-07-12 | End: 2022-07-12

## 2022-07-12 RX ORDER — INSULIN LISPRO 100 [IU]/ML
0-4 INJECTION, SOLUTION INTRAVENOUS; SUBCUTANEOUS EVERY 6 HOURS
Status: DISCONTINUED | OUTPATIENT
Start: 2022-07-12 | End: 2022-07-12

## 2022-07-12 RX ORDER — INSULIN LISPRO 100 [IU]/ML
0-8 INJECTION, SOLUTION INTRAVENOUS; SUBCUTANEOUS
Status: DISCONTINUED | OUTPATIENT
Start: 2022-07-12 | End: 2022-07-12

## 2022-07-12 RX ADMIN — VANCOMYCIN HYDROCHLORIDE 1000 MG: 1 INJECTION, POWDER, LYOPHILIZED, FOR SOLUTION INTRAVENOUS at 20:40

## 2022-07-12 RX ADMIN — POTASSIUM CHLORIDE 20 MEQ: 7.46 INJECTION, SOLUTION INTRAVENOUS at 08:17

## 2022-07-12 RX ADMIN — ONDANSETRON 8 MG: 8 TABLET, ORALLY DISINTEGRATING ORAL at 23:34

## 2022-07-12 RX ADMIN — HYDROCORTISONE SODIUM SUCCINATE 100 MG: 100 INJECTION, POWDER, FOR SOLUTION INTRAMUSCULAR; INTRAVENOUS at 00:35

## 2022-07-12 RX ADMIN — DICYCLOMINE HYDROCHLORIDE 20 MG: 20 TABLET ORAL at 16:27

## 2022-07-12 RX ADMIN — MIDODRINE HYDROCHLORIDE 10 MG: 5 TABLET ORAL at 16:27

## 2022-07-12 RX ADMIN — BUSPIRONE HYDROCHLORIDE 10 MG: 10 TABLET ORAL at 16:27

## 2022-07-12 RX ADMIN — ONDANSETRON 8 MG: 8 TABLET, ORALLY DISINTEGRATING ORAL at 07:58

## 2022-07-12 RX ADMIN — BUSPIRONE HYDROCHLORIDE 10 MG: 10 TABLET ORAL at 08:16

## 2022-07-12 RX ADMIN — CEFEPIME 2000 MG: 2 INJECTION, POWDER, FOR SOLUTION INTRAVENOUS at 23:34

## 2022-07-12 RX ADMIN — BUSPIRONE HYDROCHLORIDE 10 MG: 10 TABLET ORAL at 20:58

## 2022-07-12 RX ADMIN — DICYCLOMINE HYDROCHLORIDE 20 MG: 20 TABLET ORAL at 08:28

## 2022-07-12 RX ADMIN — HYDROCORTISONE SODIUM SUCCINATE 100 MG: 100 INJECTION, POWDER, FOR SOLUTION INTRAMUSCULAR; INTRAVENOUS at 16:27

## 2022-07-12 RX ADMIN — HYDROCORTISONE SODIUM SUCCINATE 100 MG: 100 INJECTION, POWDER, FOR SOLUTION INTRAMUSCULAR; INTRAVENOUS at 23:34

## 2022-07-12 RX ADMIN — CEFEPIME 2000 MG: 2 INJECTION, POWDER, FOR SOLUTION INTRAVENOUS at 09:36

## 2022-07-12 RX ADMIN — VANCOMYCIN HYDROCHLORIDE 1000 MG: 1 INJECTION, POWDER, LYOPHILIZED, FOR SOLUTION INTRAVENOUS at 12:00

## 2022-07-12 RX ADMIN — METRONIDAZOLE 500 MG: 500 INJECTION, SOLUTION INTRAVENOUS at 20:49

## 2022-07-12 RX ADMIN — MIDODRINE HYDROCHLORIDE 10 MG: 5 TABLET ORAL at 11:54

## 2022-07-12 RX ADMIN — CEFEPIME 2000 MG: 2 INJECTION, POWDER, FOR SOLUTION INTRAVENOUS at 00:36

## 2022-07-12 RX ADMIN — SODIUM CHLORIDE, PRESERVATIVE FREE 40 MG: 5 INJECTION INTRAVENOUS at 07:57

## 2022-07-12 RX ADMIN — VANCOMYCIN HYDROCHLORIDE 1000 MG: 1 INJECTION, POWDER, LYOPHILIZED, FOR SOLUTION INTRAVENOUS at 05:48

## 2022-07-12 RX ADMIN — ENOXAPARIN SODIUM 40 MG: 40 INJECTION SUBCUTANEOUS at 08:06

## 2022-07-12 RX ADMIN — CEFEPIME 2000 MG: 2 INJECTION, POWDER, FOR SOLUTION INTRAVENOUS at 16:26

## 2022-07-12 RX ADMIN — MIDODRINE HYDROCHLORIDE 10 MG: 5 TABLET ORAL at 07:58

## 2022-07-12 RX ADMIN — ROSUVASTATIN CALCIUM 10 MG: 5 TABLET, COATED ORAL at 20:58

## 2022-07-12 RX ADMIN — SODIUM CHLORIDE, PRESERVATIVE FREE 40 MG: 5 INJECTION INTRAVENOUS at 20:59

## 2022-07-12 RX ADMIN — POTASSIUM PHOSPHATE, MONOBASIC POTASSIUM PHOSPHATE, DIBASIC: 224; 236 INJECTION, SOLUTION, CONCENTRATE INTRAVENOUS at 18:01

## 2022-07-12 RX ADMIN — SODIUM CHLORIDE, PRESERVATIVE FREE 20 MG: 5 INJECTION INTRAVENOUS at 08:09

## 2022-07-12 RX ADMIN — SODIUM CHLORIDE: 9 INJECTION, SOLUTION INTRAVENOUS at 09:15

## 2022-07-12 RX ADMIN — METRONIDAZOLE 500 MG: 500 INJECTION, SOLUTION INTRAVENOUS at 08:30

## 2022-07-12 RX ADMIN — ONDANSETRON 8 MG: 8 TABLET, ORALLY DISINTEGRATING ORAL at 16:39

## 2022-07-12 RX ADMIN — ESCITALOPRAM OXALATE 10 MG: 10 TABLET ORAL at 08:16

## 2022-07-12 RX ADMIN — SODIUM CHLORIDE, PRESERVATIVE FREE 10 ML: 5 INJECTION INTRAVENOUS at 21:19

## 2022-07-12 RX ADMIN — HYDROCORTISONE SODIUM SUCCINATE 100 MG: 100 INJECTION, POWDER, FOR SOLUTION INTRAMUSCULAR; INTRAVENOUS at 08:16

## 2022-07-12 RX ADMIN — THIAMINE HYDROCHLORIDE 100 MG: 100 INJECTION, SOLUTION INTRAMUSCULAR; INTRAVENOUS at 16:32

## 2022-07-12 RX ADMIN — SODIUM CHLORIDE, PRESERVATIVE FREE 10 ML: 5 INJECTION INTRAVENOUS at 11:15

## 2022-07-12 RX ADMIN — DICYCLOMINE HYDROCHLORIDE 20 MG: 20 TABLET ORAL at 21:05

## 2022-07-12 ASSESSMENT — PAIN SCALES - GENERAL: PAINLEVEL_OUTOF10: 0

## 2022-07-12 ASSESSMENT — PAIN SCALES - WONG BAKER: WONGBAKER_NUMERICALRESPONSE: 0

## 2022-07-12 NOTE — PROGRESS NOTES
Oncology has taken over care of their patient. Hospitalist will sign off at this time, please call us with any questions.

## 2022-07-12 NOTE — PROGRESS NOTES
[facility] Hematology & Oncology        Inpatient Hematology / Oncology Progress Note    Reason for Admission:  Neutropenic fever ([facility] [D70.9, R50.81]  Sepsis ([facility] [A41.9]    24 Hour Events:  Afebrile, VSS, BP improved  CT AP with no evidence of infection  Wound cx pending  Became confused/agitated last PM    Transfusions: None  Replacements: K+, Phos (in TPN)      ROS:  Constitutional: +fatigue, weakness. Negative for fever, chills. CV: Negative for chest pain, palpitations, edema. Respiratory: Negative for dyspnea, cough, wheezing. GI: +abd pain at G-tube site (Improved). Negative for nausea, diarrhea. 10 point review of systems is otherwise negative with the exception of the elements mentioned above in the HPI.        No Known Allergies  Past Medical History:   Diagnosis Date    Bilateral carpal tunnel syndrome 12/9/2020    Chronic right shoulder pain 11/30/2020    Colon polyps 3/11/2013    Diverticulitis 3/11/2013    HTN (hypertension) 3/11/2013    Hyperlipidemia 3/11/2013    Paresthesia and pain of both upper extremities 11/30/2020    PUD (peptic ulcer disease) 3/11/2013    History of     Right elbow pain 12/9/2020    Wheezing 3/11/2013     Past Surgical History:   Procedure Laterality Date    COLONOSCOPY  2/25/09    colonoscopy per Dr. John Mcgovern with need for repeat every 3 years    COLONOSCOPY  02/25/2022    Dr. Ramiro Herrera; polyps of the ascending, transverse, descending, and sigmoid colons; internal hemorrhoid; diverticulosis    LAPAROSCOPY N/A 5/27/2022    LAPAROSCOPY DIAGNOSTIC , OPEN EXPLORATORY LAPAROTOMY, MASS EXCISION, G-TUBE PLACEMENT performed by Deja Wynne MD at [facility] MAIN OR    PORT SURGERY N/A 6/3/2022    PORT INSERTION performed by Deja Wynne MD at -[address] [place] [address] UNLISTED  October 2004    partial colon resection per Dr. Nyasia Bueno  02/25/2022    Dr. Ramiro Herrera; hiatal hernia gastric polyps     Family History   Problem Relation Age of Onset    No Known Problems Brother     Cancer Sister         breast    Hypertension Mother     Heart Disease Mother     Diabetes Father     Osteoarthritis Father     Alcohol Abuse Father     Hypertension Father     Diabetes Mother      Social History     Socioeconomic History    Marital status:      Spouse name: Not on file    Number of children: Not on file    Years of education: Not on file    Highest education level: Not on file   Occupational History    Not on file   Tobacco Use    Smoking status: Current Every Day Smoker     Packs/day: 1.00    Smokeless tobacco: Never Used   Substance and Sexual Activity    Alcohol use: No    Drug use: No    Sexual activity: Not on file   Other Topics Concern    Not on file   Social History Narrative    Not on file     Social Determinants of Health     Financial Resource Strain:     Difficulty of Paying Living Expenses: Not on file   Food Insecurity:     Worried About [address] in the Last Year: Not on file    Briseida of Food in the Last Year: Not on file   Transportation Needs:     Lack of Transportation (Medical): Not on file    Lack of Transportation (Non-Medical):  Not on file   Physical Activity:     Days of Exercise per Week: Not on file    Minutes of Exercise per Session: Not on file   Stress:     Feeling of Stress : Not on file   Social Connections:     Frequency of Communication with Friends and Family: Not on file    Frequency of Social Gatherings with Friends and Family: Not on file    Attends Islam Services: Not on file    Active Member of Clubs or Organizations: Not on file    Attends Club or Organization Meetings: Not on file    Marital Status: Not on file   Intimate Partner Violence:     Fear of Current or Ex-Partner: Not on file    Emotionally Abused: Not on file    Physically Abused: Not on file    Sexually Abused: Not on file   Housing Stability:     Unable to Pay for Housing in the Last Year: Not on file    Number of Places Lived in the Last Year: Not on file    Unstable Housing in the Last Year: Not on file     Current Facility-Administered Medications   Medication Dose Route Frequency Provider Last Rate Last Admin    hydrocortisone sodium succinate PF (SOLU-CORTEF) injection 100 mg  100 mg IntraVENous Q8H Virginia Granda MD   100 mg at 07/12/22 0816    midodrine (PROAMATINE) tablet 10 mg  10 mg Oral TID  Virginia Granda MD   10 mg at 07/12/22 0758    0.9 % sodium chloride infusion   IntraVENous Continuous LUCY Wolff -  mL/hr at 07/12/22 0915 New Bag at 07/12/22 0915    metronidazole (FLAGYL) 500 mg in 0.9% NaCl 100 mL IVPB premix  500 mg IntraVENous Q12H Lalo Braun MD   Stopped at 07/12/22 0935    diatrizoate meglumine-sodium (GASTROGRAFIN) 66-10 % solution 15 mL  15 mL Oral ONCE PRN Don Ashley APRN - CNP   15 mL at 07/11/22 1216    enoxaparin (LOVENOX) injection 40 mg  40 mg SubCUTAneous Daily Don Ashley APRN - CNP   40 mg at 07/12/22 0806    PN-Adult Premix 5/15 - Central   IntraVENous Continuous TPN Chau Melendrez MD 85 mL/hr at 07/11/22 1747 New Bag at 07/11/22 1747    fat emulsion 20 % infusion 250 mL  250 mL IntraVENous Continuous TPN Chau Melendrez MD 21 mL/hr at 07/11/22 1746 250 mL at 07/11/22 1746    vancomycin (VANCOCIN) 1,000 mg in sodium chloride 0.9 % 250 mL IVPB ([id number])  1,000 mg IntraVENous Q8H Neeta Ray APRN - NP   Stopped at 07/12/22 0749    sodium chloride flush 0.9 % injection 5-40 mL  5-40 mL IntraVENous 2 times per day Jonny Sylvester, DO   10 mL at 07/11/22 2222    sodium chloride flush 0.9 % injection 5-40 mL  5-40 mL IntraVENous PRN Jonny Sylvester, DO        0.9 % sodium chloride infusion   IntraVENous PRN Jonny Sylvester, DO        famotidine (PEPCID) 20 mg in sodium chloride (PF) 10 mL injection  20 mg IntraVENous Daily Jonny Sylvester DO   20 mg at 07/12/22 0809    aluminum & magnesium hydroxide-simethicone OBJECTIVE:  Patient Vitals for the past 8 hrs:   BP Temp Temp src Pulse Resp SpO2   22 0743 133/83 97.7 °F (36.5 °C) Oral 59 18 96 %   22 0352 113/79 97.8 °F (36.6 °C) Oral 78 16 93 %     Temp (24hrs), Av.7 °F (36.5 °C), Min:97.6 °F (36.4 °C), Max:97.8 °F (36.6 °C)    701 -  1900  In: 120 [P.O.:120]  Out: 200 [Urine:200]    Physical Exam:  Constitutional: Ill appearing male in no acute distress, sitting comfortably in the hospital bed. HEENT: Normocephalic and atraumatic. Oropharynx is clear, mucous membranes are moist.  Neck supple. Skin Warm and dry. No bruising and no rash noted. No erythema. No pallor. +PICC line +purulent drainage noted at G-tube site   Respiratory Lungs are clear to auscultation bilaterally without wheezes, rales or rhonchi, normal air exchange without accessory muscle use. CVS Normal rate, regular rhythm and normal S1 and S2. No murmurs, gallops, or rubs. Abdomen Soft, tender at G-tube site and nondistended, normoactive bowel sounds. +venting G tube   Neuro Grossly nonfocal with no obvious sensory or motor deficits. MSK Normal range of motion in general.  No edema and no tenderness.    Psych Flat affect, not very communicative       Labs:    Recent Results (from the past 24 hour(s))   Vancomycin Level, Random    Collection Time: 22 11:59 AM   Result Value Ref Range    Vancomycin Rm 5.9 UG/ML   Culture, Wound    Collection Time: 22 12:19 PM    Specimen: Left    G TUBE   Result Value Ref Range    Special Requests NO SPECIAL REQUESTS      Gram stain PENDING     Culture SCANT GRAM NEGATIVE RODS SUBCULTURE IN PROGRESS (A)      Culture LIGHT YEAST SUBCULTURE IN PROGRESS (A)     POCT Glucose    Collection Time: 22  1:39 PM   Result Value Ref Range    POC Glucose 206 (H) 65 - 100 mg/dL    Performed by: Fannie    Phosphorus    Collection Time: 22  2:51 AM   Result Value Ref Range    Phosphorus 2.7 2.3 - 3.7 MG/DL Triglyceride    Collection Time: 07/12/22  2:51 AM   Result Value Ref Range    Triglycerides 116 35 - 150 MG/DL   CBC with Auto Differential    Collection Time: 07/12/22  2:51 AM   Result Value Ref Range    WBC 2.8 (L) 4.3 - 11.1 K/uL    RBC 3.16 (L) 4.23 - 5.6 M/uL    Hemoglobin 9.2 (L) 13.6 - 17.2 g/dL    Hematocrit 27.8 (L) 41.1 - 50.3 %    MCV 88.0 79.6 - 97.8 FL    MCH 29.1 26.1 - 32.9 PG    MCHC 33.1 31.4 - 35.0 g/dL    RDW 12.8 11.9 - 14.6 %    Platelets 120 843 - 942 K/uL    MPV 10.6 9.4 - 12.3 FL    nRBC 0.00 0.0 - 0.2 K/uL    Differential Type AUTOMATED      Seg Neutrophils 55 43 - 78 %    Lymphocytes 27 13 - 44 %    Monocytes 13 (H) 4.0 - 12.0 %    Eosinophils % 0 (L) 0.5 - 7.8 %    Basophils 1 0.0 - 2.0 %    Immature Granulocytes 5 0.0 - 5.0 %    Segs Absolute 1.6 (L) 1.7 - 8.2 K/UL    Absolute Lymph # 0.8 0.5 - 4.6 K/UL    Absolute Mono # 0.4 0.1 - 1.3 K/UL    Absolute Eos # 0.0 0.0 - 0.8 K/UL    Basophils Absolute 0.0 0.0 - 0.2 K/UL    Absolute Immature Granulocyte 0.1 0.0 - 0.5 K/UL   Comprehensive Metabolic Panel    Collection Time: 07/12/22  2:51 AM   Result Value Ref Range    Sodium 142 138 - 145 mmol/L    Potassium 3.4 (L) 3.5 - 5.1 mmol/L    Chloride 112 (H) 98 - 107 mmol/L    CO2 28 21 - 32 mmol/L    Anion Gap 2 (L) 7 - 16 mmol/L    Glucose 195 (H) 65 - 100 mg/dL    BUN 11 8 - 23 MG/DL    CREATININE 0.60 (L) 0.8 - 1.5 MG/DL    GFR African American >60 >60 ml/min/1.73m2    GFR Non- >60 >60 ml/min/1.73m2    Calcium 8.1 (L) 8.3 - 10.4 MG/DL    Total Bilirubin 0.2 0.2 - 1.1 MG/DL    ALT 90 (H) 12 - 65 U/L    AST 18 15 - 37 U/L    Alk Phosphatase 139 (H) 50 - 136 U/L    Total Protein 5.2 (L) 6.3 - 8.2 g/dL    Albumin 2.0 (L) 3.2 - 4.6 g/dL    Globulin 3.2 2.3 - 3.5 g/dL    Albumin/Globulin Ratio 0.6 (L) 1.2 - 3.5     Magnesium    Collection Time: 07/12/22  2:51 AM   Result Value Ref Range    Magnesium 2.0 1.8 - 2.4 mg/dL       Imaging:  Xray Result (most recent):  XR CHEST PORTABLE 07/10/2022    Narrative  EXAM: XR CHEST PORTABLE    HISTORY: Sepsis. TECHNIQUE: Frontal chest.    COMPARISON: 6/3/2022    FINDINGS:  Stable left-sided MediPort. Stable right PICC line. The patient is rotated to the right and is quite lordotic. The cardiac silhouette, mediastinum, and pulmonary vasculature are within normal  limits. There is no consolidation, pleural effusion, or pneumothorax. No significant osseous abnormalities are observed. Impression  Allowing for patient positioning, there is no evidence of an acute intrathoracic  process.         ASSESSMENT:  Patient Active Problem List   Diagnosis    Right cervical radiculopathy    Right elbow pain    Hyperlipidemia    Bilateral carpal tunnel syndrome    Essential hypertension    Gastroesophageal reflux disease    PUD (peptic ulcer disease)    Chronic right shoulder pain    Paresthesia and pain of both upper extremities    History of colon polyps    Chronic bronchitis (HCC)    Class 1 obesity due to excess calories with serious comorbidity and body mass index (BMI) of 34.0 to 34.9 in adult    Generalized abdominal pain    Abdominal pain    Peritoneal metastases (HCC)    Opioid use, unspecified with unspecified opioid-induced disorder ([address]    SBO (small bowel obstruction) ([address]    Partial small bowel obstruction ([address]    Severe protein-calorie malnutrition ([address]    Abdominal mass    Debility    Encounter for palliative care    Itching    Fatigue    Abdominal carcinomatosis ([address]    Goals of care, counseling/discussion    On total parenteral nutrition    Pain, abdominal, LUQ    Nausea    Anxiety about dying    Depression    Carcinomatosis ([address]    Cancer associated pain    PICC (peripherally inserted central catheter) flush    Severe sepsis (HCC)    Septic encephalopathy    Metastatic adenocarcinoma (HCC)    Gastric cancer ([address]    Malfunction of peripheral inserted central catheter ([address]    Hypokalemia    Leukopenia    Anemia    Abnormal liver function test    Neutropenic fever [facility])     Mr. Nilton Raya is a 62 y.o. male admitted on 7/10/2022 with a primary diagnosis of The encounter diagnosis was Neutropenic fever ([address] .      Mr. 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 He is a patient of Dr. Governor Caceres undergoing treatment for met. Gastric cancer with palliative FOLFIRINOX, s/p C2 on 6/27. C3 due on 7/11, planned for 7/15 as pt was seeking a second opinion in [facility] (planned to travel there tomorrow 7/11). He is a known patient of Dr. Roxi Lancaster as well - previously did exploratory surgery found the entire mesentery encased with cancer, placed venting G tube. He is also on home TPN and has a PICC line. Palliative care seeing OP to manage cancer related abdominal pain. He presented to the ER with change in mental status and high fevers at home. [place] to have sepsis of unknown origin. BCx pending. Started on Vanc/Cef. Hypotension responded to fluids. We were asked for recommendations for our known patient. PLAN:  Metastatic Gastric Cancer  - pt of Dr. Governor Caceres, s/p C2 of FOLFIRINOX 6/27, due for C3 7/11 but planned for 7/15 as family/pt planned to seek a second opinion in [facility] (planned for 7/11). No plans to give chemo while admitted. Sepsis  - On Cef/Vanc, follow pending cultures  7/11 Afebrile. Hypotensive. BCx-NGTD. G-tube site with purulent drainage. Check CT AP. Wound cx ordered. Con't Cef/Flagyl/Vanc.  7/12 Remains afebrile, BP improved. CT AP with no evidence of infection. Wound cx pending. Con't Cef/Flagyl/Vanc. Confusion/AMS  - May need head CT if mental status does not improve  RESOLVED    Ongoing Abdominal Pain  - home pain regimen  - consult PC, known to them from office  7/12 Pain improved    Home TPN  - RD consulted for management  7/12 Pt requests advancement in diet.   OK to cap tube and start FLD.  Con't TPN. Hypotension  - on IVF  - consult hospitalist for mgmt  7/12 BP much improved    Continue home meds  Araseli SOPs  Lovenox for DVT ppx    Goals and plan of care reviewed with the patient. All questions answered to the best of our ability. Disposition:  Hopeful for discharge home tomorrow, 7/13.             Justice Faith, APRN - [id number] [facility]ology [facility] Oncology  [address] [address]  Office : [phone]  Fax : [phone]

## 2022-07-12 NOTE — TELEPHONE ENCOUNTER
PT wife called in regards to PT/stated [place] and stated  is septic and will not be able to attend the upcoming appts/would like to discus PT plan

## 2022-07-12 NOTE — PROGRESS NOTES
Called to room by primary RN. Pt belligerent and had pulled out PIV, unhooked IVF's and TPN, disconnected G-tube from BS drainage bag. Reports he needed to use BR and was not waiting for staff to come help. He admitted to not calling for help. Walking around room and very agitated. Refusing PO meds. Pt called wife and was discussing what was going on. Wife called the  and I spoke with her regarding pt's behavior and noncompliance. Wife reported he is very upset and asked if we could give him something to help him rest.     2231: Pt is calm @ present. Allowed RN to restart IVF's, IVAB and pt medicated for c/o pain with Dilaudid IV per order. Dressing changed to G-tube. Site pink, with small amount purulent drainage noted. G-tube reconnected to BSD.       2258: Called wife to update on pt's status.

## 2022-07-12 NOTE — PROGRESS NOTES
CRITICAL CARE OUTREACH NURSE PROGRESS REPORT      SUBJECTIVE: Called to assess patient secondary to RN/provider concern - hypotension. Vitals:    07/11/22 0259 07/11/22 0810 07/11/22 1213 07/11/22 1445   BP: 88/60 (!) 84/56 107/72 109/69   Pulse: 71 67 63 61   Resp: 16 16 16 18   Temp: 97.5 °F (36.4 °C) 97.7 °F (36.5 °C) 97.7 °F (36.5 °C) 97.6 °F (36.4 °C)   TempSrc:  Oral Oral Oral   SpO2: 91% 94% 93% 94%   Weight: 160 lb 1.6 oz (72.6 kg)   188 lb 6.4 oz (85.5 kg)   Height:    5' 10\" (1.778 m)        ASSESSMENT:  Upon arrival to room pt in bed, watching television. A&Ox4, respirations unlabored, denies any shortness of breath/dyspnea. Per pt, Gtube with additional yellowish drainage- already cultured this AM. Pt denies pain, no complaints at this time. PLAN:  Will follow per ICU Outreach protocol.     Boyd Mckeon RN  [phone]

## 2022-07-12 NOTE — PROGRESS NOTES
Palliative Care Progress Note    Patient: Cheryl Araiza MRN: [id number]  SSN: [id number]    YOB: 1960  Age: 62 y.o. Sex: male       Assessment/Plan:     Chief Complaint/Interval History: pt alert, denies pain. Some confusion overnight but improved this am    Principal Diagnosis:    · Pain, abdomen  R10.9    Additional Diagnoses:   · Altered Mental Status R41.82  · Counseling, Encounter for Medical Advice  Z71.9  · Encounter for Palliative Care  Z51.5    Palliative Performance Scale (PPS)       Medical Decision Making:   Reviewed and summarized labs and imaging over past 24 hours. Pt alert and oriented this am.  Mentation improved from admission. States pain in abdomen has improved. Denies any nausea, dyspnea. Asking about discharge plans. Referred pt to oncology regarding plans. Recommend resuming home pain medication regimen on discharge. Will sign off but remain available if needed. Will discuss findings with members of the interdisciplinary team.      More than 50% of this 25 minute visit was spent counseling and coordination of care as outlined above. Subjective:     Comprehensive Review of Systems completed and negative with the exception of as noted above     Objective:     Visit Vitals  /87   Pulse 60   Temp 97.3 °F (36.3 °C) (Oral)   Resp 18   Ht 5' 10\" (1.778 m)   Wt 188 lb 6.4 oz (85.5 kg)   SpO2 96%   BMI 27.03 kg/m²       Physical Exam:    General:  Cooperative. No acute distress. Eyes:  Conjunctivae/corneas clear    Nose: Nares normal. Septum midline. Neck: Supple, symmetrical, trachea midline, no JVD   Lungs:   Clear to auscultation bilaterally, unlabored   Heart:  Regular rate and rhythm, no murmur    Abdomen:   Soft, non-tender, non-distended   Extremities: Normal, atraumatic, no cyanosis or edema   Skin: Skin color, texture, turgor normal. No rash or lesions.    Neurologic: Nonfocal   Psych: Alert and oriented     Signed By: Elizabeth Lemon MD     July 12, 2022

## 2022-07-12 NOTE — PROGRESS NOTES
Came on shift and pt was compliant but not necessarily agreeable. Early in shift patient accused primary rn of not giving all of requested pain meds- despite having told patient each medication before give and amount. Vitamin B was what was given half of the vial dt dose prescribed. Patient was made aware . Called to room later in night to find patient disconnecting all lines, tele, and trying to get to restroom. Had not called for help despite telling patient to. Became very rude with staff. Throughout night was stopping and restarting his own pump, clamping tubes, and continuing to remove tele. Told patient multiple times we were here to help him. Did calm throughout the night .

## 2022-07-12 NOTE — PROGRESS NOTES
Comprehensive Nutrition Assessment    Type and Reason for Visit: Reassess  TPN Management (Oncology)    Nutrition Recommendations/Plan:   Parenteral Nutrition:  Central parenteral nutrition  new bag to begin at 1800 today  Continue: Dex 15%, 5% AA 2 L (85ml/hr)   Continue 250 ml 20% lipids until triglyceride lab evaluated in AM  Lytes/L:   Sodium (85 meq NaCl), Potassium (KCl not available for inclusion in TPN at this time secondary national shortages) Phosphorus (17 mmol KPO4), Calcium (4.5 meq), Magnesium 8 meq   Other additives:   MVI Monday Wednesday Friday due to Inter[employer], MTdoo  Nutrition Related Medications:  100mg thiamine x7 doses d/t risk for refeeding syndrome   Labs:   BMP daily  Mg daily x 3 days then MWF  Phos daily x 3 days then MWF    POC Glucoses/SSI Activate: AM glucose >180 mg/dL   Meals and Snacks:  Diet: Continue current order     Malnutrition Assessment:  Malnutrition Status: Moderate malnutrition  Context: Chronic Illness  Findings of clinical characteristics of malnutrition:   Energy Intake:  Unable to assess (Chronic TPN meeting estimated needs)  Weight Loss:  Greater than 7.5% over 3 months (~23% weight loss since MD visit 4/2022)     Body Fat Loss:  Mild body fat loss Buccal region,Triceps,Orbital   Muscle Mass Loss:  Mild muscle mass loss Thigh (quadraceps),Scapula (trapezius),Temples (temporalis)  Fluid Accumulation:  No significant fluid accumulation     Strength:  Not Performed     Nutrition Assessment:  Nutrition History: Pt known to Nutrition from prior admissions. Per RD assessment \"5/2022, 5/22 Pt and wife in room discussing hx (daughter present but did not participate). Wife relates his medical problems began in Dec 2021, but weight loss started in March 2022. Has n/v (with limited relief from medications), limited to no appetite. Patient open to ONS, but is put off by the idea of eating or drinking anything at this time for fear of vomiting.  Wife states he did tolerate half a sandwich last week, but was unable to finish it. \" Daughter at bedside provides further nutrition background. Pt with continued weight loss despite home TPN since discharge 6/14/2022. Daughter reports pt TPN was recently increased in volume to provide additional nutrition. Pt has been sneaking bites of foods, plan was for pt to attempt CLQ diet w/ transition to soft diet PTA. Last PN infusion 7/9.   7/11: Wife reports pt was previously on a 20hr infusion with plan to increase kcal d/t ongoing wt loss. Pt reportedly had nausea and wife states he was \"tasting TPN'. Wife reports calling [facility] and his infusion rate was changed to 21hr infusion. Wife also reports pt had g-tube capped 7/5 by Dr. Marcelina Carmen. She denies having g-tube uncapped at home, but does state pt would occasionally report uncapping it and having \"some\" or \"a little\" output. 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 A venting gastrostomy tube was inserted here during hospital stay May 27, pt discharged w/ TPN from [facility] 6/14. He developed change in mental status with high fevers at home and brought to the emergency department by spouse. He is found to have sepsis unknown source with negative chest x-ray negative urinalysis for pyuria borderline procalcitonin level and concerned about possible PEG tube site infection and PICC line malfunction. Nutrition Interval:  Pt seen awake and lying in bed with wife at bedside. Diet advanced to full liquids today per oncology and g-tube to be capped today. Per discussion with oncology, ok to have IVF stopped today. Noted pt disconnected TPN overnight. Per discussion with Lourdes Cohen RN, pt doesn't want broth which is already specified in diet order. Pt denies having any other diet preferences.  Pt and wife deny having any questions at this time. Abdominal Status (last documented):   Last BM (including prior to admit): 07/07/22, GI Symptoms: Cramping 1000ml G-tube output documented in last 24hrs. Pertinent Medications: Maalox PRN, Maxipime, Bentyl, Pepcid, Solu-Cortef, Zofran (Q8H), Flagyl, Protonix, Thiamine  Electrolyte Replacements: 20 meq KCl x2 (7/10), NaPhos (7/11), KCl 20meq x1 7/12  IVF: NS @ 125ml/hr (d/c today per oncology)  Pertinent Labs:   Lab Results   Component Value Date/Time     07/12/2022 02:51 AM    K 3.4 07/12/2022 02:51 AM     07/12/2022 02:51 AM    CO2 28 07/12/2022 02:51 AM    BUN 11 07/12/2022 02:51 AM    CREATININE 0.60 07/12/2022 02:51 AM    GLUCOSE 195 07/12/2022 02:51 AM    CALCIUM 8.1 07/12/2022 02:51 AM    PHOS 2.7 07/12/2022 02:51 AM    MG 2.0 07/12/2022 02:51 AM      Lab Results   Component Value Date/Time    TRIG 116 07/12/2022 02:51 AM     Labs reviewed and remarkable slight increase in Na with decrease in Na yesterday. Hypokalemic today with replacement mentioned above. Increasing K slightly in TPN this evening as mentioned above. Possible decrease in K likely d/t g-tube output. As mentioned above, capping g-tube today per oncology. Glucose >180 today. Starting SSI. Mg and phos WNL. TG remain WNL and appropriate for continuing daily lipids. Current Nutrition Therapies:  PN-Adult Premix 5/15 - Central  ADULT DIET;  Full Liquid; does not like broth   Current Parenteral Nutrition Orders:  · Type and Formula: Premix Central (15% Dex/ 5% AA)   · Lipids: 250ml,Daily  · Duration: Continuous  · Rate/Volume: 2L (85ml/hr)  · Current PN Order Provides: infusing per current order  · Goal PN Orders Provides: 1920 kcal/d (100% of needs), 100 grams of protein/d (100% of needs), 300 grams of CHO/d and ~2250 ml of total volume/d    Current Intake:   Average Meal Intake: 0% (Diet started, no meal received yet) Average Supplements Intake: None Ordered      Anthropometric Measures:  Height: 5' 10\" (177.8 cm)  Current Body Wt: 188 lb 7.9 oz (85.5 kg) (7/11), Weight source: Standing Scale  BMI: 27  Admission Body Weight: 178 lb 2.1 oz (80.8 kg) (7/10; bed scale)  Ideal Body Weight (Kg) (Calculated): 75 kg (166 lbs), 107.3 %  Usual Body Wt: 231 lb (104.8 kg) (MD office weight 4/21/2022), Percent weight change: -22.9       Edema:Peripheral Vascular  Peripheral Vascular (WDL): Exceptions to WDL  Edema: Right upper extremity  RUE Edema: +2   BMI Category Normal Weight (BMI 18.5-24. 9)  Estimated Daily Nutrient Needs:  Energy (kcal/day): 9845-4273 (22-28kcal/kg) (Kcal/kg Weight used: 80.8 kg Current  Protein (g/day):  (1-1.25g/kg) Weight Used: (Current) 80.8 kg  Fluid (ml/day):   (1 ml/kcal)    Nutrition Diagnosis:   · Inadequate oral intake related to altered GI function as evidenced by  (gut dismotility d/t gastric carcinomatosis; TPN for primary)    · Moderate malnutrition,In context of chronic illness related to catabolic illness as evidenced by Criteria as identified in malnutrition assessment       Nutrition Interventions:   Food and/or Nutrient Delivery: Continue Current Diet,Modify Parenteral Nutrition     Coordination of Nutrition Care: Continue to monitor while inpatient  Plan of Care discussed with: Daryl De La Torre NP, Dr. Jesus Thompson, RN    Goals:   Previous Goal Met: Goal(s) Achieved  Active Goal:  (Maintain TPN for primary estimated nutrition needs.)       Nutrition Monitoring and Evaluation:      Food/Nutrient Intake Outcomes: Food and Nutrient Intake,Parenteral Nutrition Intake/Tolerance  Physical Signs/Symptoms Outcomes: Biochemical Data,GI Status,Weight,Meal Time Behavior    Discharge Planning:    Parenteral Nutrition    Yaa Carr MS, RDN, LD  Contact: [phone]

## 2022-07-12 NOTE — PROGRESS NOTES
Nutrition:  RD was unable to see pt during today's office visit - originally was going to cancel for appointment at [address] in [facility] - kept this visit, but moved chemo (#3 FOLFIRINOX) to next Friday. Pt is TPN dependent for 100% of estimated nutrition needs, on cyclic TPN, managed by [facility], issues infusing d/t PICC line - in right arm which is the side he prefers to sleep on, also issues w/ blood return this week - declined Cathflo today. Pt w/ venting G-tube, hypotensive - declines IVF today as well. RD to follow up during next office visit for full nutrition assessment.      [address] [facility], [address], [address] [facility]

## 2022-07-12 NOTE — PROGRESS NOTES
Physical Therapy Note:    Attempted to see patient this PM for physical therapy treatment  session. Patient adamantly refused, stating he had just walked the whole floor. Will follow and re-attempt as schedule permits/patient available.  Thank you,    Constantine Saldivar, PT     Rehab Caseload Tracker

## 2022-07-12 NOTE — PLAN OF CARE
Patient progressing towards goals.      Problem: Safety - Adult  Goal: Free from fall injury  Recent Flowsheet Documentation  Taken 7/12/2022 1427 by Vanessa Lomax  Free From Fall Injury:   Instruct family/caregiver on patient safety   Based on caregiver fall risk screen, instruct family/caregiver to ask for assistance with transferring infant if caregiver noted to have fall risk factors  Taken 7/12/2022 0743 by Naty Phoenix RN  Free From Fall Injury:   Instruct family/caregiver on patient safety   Based on caregiver fall risk screen, instruct family/caregiver to ask for assistance with transferring infant if caregiver noted to have fall risk factors     Problem: ABCDS Injury Assessment  Goal: Absence of physical injury  Recent Flowsheet Documentation  Taken 7/12/2022 1427 by Vanessa Lomax  Absence of Physical Injury: Implement safety measures based on patient assessment  Taken 7/12/2022 0743 by Naty Phoenix RN  Absence of Physical Injury: Implement safety measures based on patient assessment

## 2022-07-12 NOTE — PROGRESS NOTES
VANCO DAILY FOLLOW UP NOTE  [facility] Pharmacokinetic Monitoring Service - Vancomycin    Consulting Provider: Michelle Swanson   Indication: sepsis  Target Concentration: Goal AUC/JOSH 400-600 mg*hr/L  Day of Therapy: 3  Additional Antimicrobials: cefepime    Pertinent Laboratory Values: Wt Readings from Last 1 Encounters:   07/11/22 188 lb 6.4 oz (85.5 kg)     Temp Readings from Last 1 Encounters:   07/12/22 97.7 °F (36.5 °C)     Recent Labs     07/10/22  0030 07/11/22  0415 07/12/22  0251   BUN 9 12 11   CREATININE 0.70* 0.50* 0.60*   WBC 1.8* 2.7* 2.8*   PROCAL 0.50* 5.31*  --    LACACIDPL 1.2  --   --      Estimated Creatinine Clearance: 135 mL/min (A) (based on SCr of 0.6 mg/dL (L)). Lab Results   Component Value Date/Time    VANCORANDOM 5.9 07/11/2022 11:59 AM       MRSA Nasal Swab: N/A. Non-respiratory infection. .      Assessment:  Date/Time Dose Concentration AUC   7/11 1159 1000 mg q12h 5.9 279   Note: Serum concentrations collected for AUC dosing may appear elevated if collected in close proximity to the dose administered, this is not necessarily an indication of toxicity    Plan:  Current dosing regimen is therapeutic.   Continue 1000 mg q8h  Repeat vancomycin concentrations will be ordered as clinically appropriate   Pharmacy will continue to monitor patient and adjust therapy as indicated    Thank you for the consult,  Addie Danielle, PharmD, BCOP  Clinical Pharmacist  Contact via [employer] Serve

## 2022-07-13 ENCOUNTER — APPOINTMENT (OUTPATIENT)
Dept: ULTRASOUND IMAGING | Age: 62
DRG: 871 | End: 2022-07-13
Payer: COMMERCIAL

## 2022-07-13 VITALS
HEART RATE: 52 BPM | DIASTOLIC BLOOD PRESSURE: 86 MMHG | WEIGHT: 197.8 LBS | TEMPERATURE: 98.1 F | BODY MASS INDEX: 28.32 KG/M2 | RESPIRATION RATE: 17 BRPM | OXYGEN SATURATION: 95 % | HEIGHT: 70 IN | SYSTOLIC BLOOD PRESSURE: 153 MMHG

## 2022-07-13 LAB
ALBUMIN SERPL-MCNC: 2.2 G/DL (ref 3.2–4.6)
ALBUMIN/GLOB SERPL: 0.7 {RATIO} (ref 1.2–3.5)
ALP SERPL-CCNC: 142 U/L (ref 50–136)
ALT SERPL-CCNC: 82 U/L (ref 12–65)
ANION GAP SERPL CALC-SCNC: 5 MMOL/L (ref 7–16)
AST SERPL-CCNC: 26 U/L (ref 15–37)
BASOPHILS # BLD: 0.1 K/UL (ref 0–0.2)
BASOPHILS NFR BLD: 1 % (ref 0–2)
BILIRUB SERPL-MCNC: 0.3 MG/DL (ref 0.2–1.1)
BUN SERPL-MCNC: 12 MG/DL (ref 8–23)
CALCIUM SERPL-MCNC: 8.4 MG/DL (ref 8.3–10.4)
CHLORIDE SERPL-SCNC: 111 MMOL/L (ref 98–107)
CO2 SERPL-SCNC: 27 MMOL/L (ref 21–32)
CREAT SERPL-MCNC: 0.7 MG/DL (ref 0.8–1.5)
DIFFERENTIAL METHOD BLD: ABNORMAL
EOSINOPHIL # BLD: 0 K/UL (ref 0–0.8)
EOSINOPHIL NFR BLD: 0 % (ref 0.5–7.8)
ERYTHROCYTE [DISTWIDTH] IN BLOOD BY AUTOMATED COUNT: 13 % (ref 11.9–14.6)
GLOBULIN SER CALC-MCNC: 3 G/DL (ref 2.3–3.5)
GLUCOSE BLD STRIP.AUTO-MCNC: 152 MG/DL (ref 65–100)
GLUCOSE BLD STRIP.AUTO-MCNC: 230 MG/DL (ref 65–100)
GLUCOSE BLD STRIP.AUTO-MCNC: 240 MG/DL (ref 65–100)
GLUCOSE SERPL-MCNC: 232 MG/DL (ref 65–100)
HCT VFR BLD AUTO: 28.9 % (ref 41.1–50.3)
HGB BLD-MCNC: 9.4 G/DL (ref 13.6–17.2)
IMM GRANULOCYTES # BLD AUTO: 0.6 K/UL (ref 0–0.5)
IMM GRANULOCYTES NFR BLD AUTO: 11 % (ref 0–5)
LYMPHOCYTES # BLD: 1.1 K/UL (ref 0.5–4.6)
LYMPHOCYTES NFR BLD: 21 % (ref 13–44)
MAGNESIUM SERPL-MCNC: 2.2 MG/DL (ref 1.8–2.4)
MCH RBC QN AUTO: 28.5 PG (ref 26.1–32.9)
MCHC RBC AUTO-ENTMCNC: 32.5 G/DL (ref 31.4–35)
MCV RBC AUTO: 87.6 FL (ref 79.6–97.8)
MONOCYTES # BLD: 0.5 K/UL (ref 0.1–1.3)
MONOCYTES NFR BLD: 10 % (ref 4–12)
NEUTS SEG # BLD: 3.1 K/UL (ref 1.7–8.2)
NEUTS SEG NFR BLD: 57 % (ref 43–78)
NRBC # BLD: 0.03 K/UL (ref 0–0.2)
PHOSPHATE SERPL-MCNC: 2.6 MG/DL (ref 2.3–3.7)
PLATELET # BLD AUTO: 218 K/UL (ref 150–450)
PLATELET COMMENT: ADEQUATE
PMV BLD AUTO: 10.7 FL (ref 9.4–12.3)
POTASSIUM SERPL-SCNC: 3.5 MMOL/L (ref 3.5–5.1)
PROCALCITONIN SERPL-MCNC: 1.89 NG/ML (ref 0–0.49)
PROT SERPL-MCNC: 5.2 G/DL (ref 6.3–8.2)
RBC # BLD AUTO: 3.3 M/UL (ref 4.23–5.6)
RBC MORPH BLD: ABNORMAL
SERVICE CMNT-IMP: ABNORMAL
SODIUM SERPL-SCNC: 143 MMOL/L (ref 136–145)
WBC # BLD AUTO: 5.4 K/UL (ref 4.3–11.1)
WBC MORPH BLD: ABNORMAL

## 2022-07-13 PROCEDURE — 6370000000 HC RX 637 (ALT 250 FOR IP): Performed by: HOSPITALIST

## 2022-07-13 PROCEDURE — 80053 COMPREHEN METABOLIC PANEL: CPT

## 2022-07-13 PROCEDURE — C9113 INJ PANTOPRAZOLE SODIUM, VIA: HCPCS | Performed by: INTERNAL MEDICINE

## 2022-07-13 PROCEDURE — 6370000000 HC RX 637 (ALT 250 FOR IP): Performed by: INTERNAL MEDICINE

## 2022-07-13 PROCEDURE — 6360000002 HC RX W HCPCS: Performed by: INTERNAL MEDICINE

## 2022-07-13 PROCEDURE — APPSS180 APP SPLIT SHARED TIME > 60 MINUTES: Performed by: NURSE PRACTITIONER

## 2022-07-13 PROCEDURE — 85025 COMPLETE CBC W/AUTO DIFF WBC: CPT

## 2022-07-13 PROCEDURE — 6360000002 HC RX W HCPCS: Performed by: NURSE PRACTITIONER

## 2022-07-13 PROCEDURE — 97161 PT EVAL LOW COMPLEX 20 MIN: CPT

## 2022-07-13 PROCEDURE — 83735 ASSAY OF MAGNESIUM: CPT

## 2022-07-13 PROCEDURE — 2500000003 HC RX 250 WO HCPCS: Performed by: INTERNAL MEDICINE

## 2022-07-13 PROCEDURE — 2580000003 HC RX 258: Performed by: NURSE PRACTITIONER

## 2022-07-13 PROCEDURE — 2580000003 HC RX 258: Performed by: INTERNAL MEDICINE

## 2022-07-13 PROCEDURE — 36592 COLLECT BLOOD FROM PICC: CPT

## 2022-07-13 PROCEDURE — 82962 GLUCOSE BLOOD TEST: CPT

## 2022-07-13 PROCEDURE — 93971 EXTREMITY STUDY: CPT

## 2022-07-13 PROCEDURE — 6370000000 HC RX 637 (ALT 250 FOR IP): Performed by: NURSE PRACTITIONER

## 2022-07-13 PROCEDURE — 99239 HOSP IP/OBS DSCHRG MGMT >30: CPT | Performed by: INTERNAL MEDICINE

## 2022-07-13 PROCEDURE — 02PYX3Z REMOVAL OF INFUSION DEVICE FROM GREAT VESSEL, EXTERNAL APPROACH: ICD-10-PCS | Performed by: INTERNAL MEDICINE

## 2022-07-13 PROCEDURE — 84100 ASSAY OF PHOSPHORUS: CPT

## 2022-07-13 PROCEDURE — 84145 PROCALCITONIN (PCT): CPT

## 2022-07-13 RX ORDER — FENTANYL 100 UG/H
1 PATCH TRANSDERMAL
Qty: 10 PATCH | Refills: 0 | Status: SHIPPED | OUTPATIENT
Start: 2022-07-13 | End: 2022-08-12

## 2022-07-13 RX ORDER — LEVOFLOXACIN 500 MG/1
500 TABLET, FILM COATED ORAL DAILY
Status: DISCONTINUED | OUTPATIENT
Start: 2022-07-13 | End: 2022-07-13 | Stop reason: HOSPADM

## 2022-07-13 RX ORDER — OXYCODONE HYDROCHLORIDE 10 MG/1
10 TABLET ORAL EVERY 4 HOURS PRN
Qty: 30 TABLET | Refills: 0 | Status: SHIPPED | OUTPATIENT
Start: 2022-07-13 | End: 2022-09-01

## 2022-07-13 RX ORDER — LEVOFLOXACIN 500 MG/1
500 TABLET, FILM COATED ORAL DAILY
Qty: 10 TABLET | Refills: 0 | Status: SHIPPED | OUTPATIENT
Start: 2022-07-14 | End: 2022-07-24

## 2022-07-13 RX ADMIN — ENOXAPARIN SODIUM 40 MG: 40 INJECTION SUBCUTANEOUS at 08:36

## 2022-07-13 RX ADMIN — SODIUM CHLORIDE, PRESERVATIVE FREE 10 ML: 5 INJECTION INTRAVENOUS at 10:45

## 2022-07-13 RX ADMIN — MIDODRINE HYDROCHLORIDE 10 MG: 5 TABLET ORAL at 11:51

## 2022-07-13 RX ADMIN — MIDODRINE HYDROCHLORIDE 10 MG: 5 TABLET ORAL at 08:37

## 2022-07-13 RX ADMIN — INSULIN LISPRO 2 UNITS: 100 INJECTION, SOLUTION INTRAVENOUS; SUBCUTANEOUS at 02:37

## 2022-07-13 RX ADMIN — ONDANSETRON 8 MG: 8 TABLET, ORALLY DISINTEGRATING ORAL at 08:37

## 2022-07-13 RX ADMIN — VANCOMYCIN HYDROCHLORIDE 1000 MG: 1 INJECTION, POWDER, LYOPHILIZED, FOR SOLUTION INTRAVENOUS at 04:33

## 2022-07-13 RX ADMIN — METRONIDAZOLE 500 MG: 500 INJECTION, SOLUTION INTRAVENOUS at 08:38

## 2022-07-13 RX ADMIN — BUSPIRONE HYDROCHLORIDE 10 MG: 10 TABLET ORAL at 08:38

## 2022-07-13 RX ADMIN — ESCITALOPRAM OXALATE 10 MG: 10 TABLET ORAL at 08:38

## 2022-07-13 RX ADMIN — DICYCLOMINE HYDROCHLORIDE 20 MG: 20 TABLET ORAL at 09:45

## 2022-07-13 RX ADMIN — LEVOFLOXACIN 500 MG: 500 TABLET, FILM COATED ORAL at 09:45

## 2022-07-13 RX ADMIN — INSULIN LISPRO 2 UNITS: 100 INJECTION, SOLUTION INTRAVENOUS; SUBCUTANEOUS at 08:36

## 2022-07-13 RX ADMIN — SODIUM CHLORIDE, PRESERVATIVE FREE 40 MG: 5 INJECTION INTRAVENOUS at 08:37

## 2022-07-13 RX ADMIN — CEFEPIME 2000 MG: 2 INJECTION, POWDER, FOR SOLUTION INTRAVENOUS at 08:36

## 2022-07-13 RX ADMIN — SODIUM CHLORIDE, PRESERVATIVE FREE 20 MG: 5 INJECTION INTRAVENOUS at 08:36

## 2022-07-13 ASSESSMENT — PAIN SCALES - GENERAL: PAINLEVEL_OUTOF10: 0

## 2022-07-13 ASSESSMENT — PAIN SCALES - WONG BAKER: WONGBAKER_NUMERICALRESPONSE: 0

## 2022-07-13 NOTE — DISCHARGE SUMMARY
[address] [place] [address] Hematology & Oncology: Inpatient Hematology / Oncology Discharge Summary Note    Patient ID:  Syd Liriano  [id number]  62 y.o.  1960    Admit Date: 7/10/2022    Discharge Date: 7/13/2022    Admission Diagnoses: Neutropenic fever ([address] [D70.9, R50.81]  Sepsis ([address] [A41.9]    Discharge Diagnoses:  Principal Diagnosis: Severe sepsis ([address]  Principal Problem:    Severe sepsis ([address]  Active Problems:    Severe protein-calorie malnutrition ([address]    Abdominal carcinomatosis ([address]    On total parenteral nutrition    Anxiety about dying    Septic encephalopathy    Metastatic adenocarcinoma ([address]    Gastric cancer ([address]    Malfunction of peripheral inserted central catheter ([address]    Hypokalemia    Leukopenia    Anemia    Abnormal liver function test    Neutropenic fever ([address]    Essential hypertension    Abdominal pain  Resolved Problems:    * No resolved hospital problems. [facility] Course:  Mr. Evans is a 62 y.o. male admitted on 7/10/2022 with a primary diagnosis of The encounter diagnosis was Neutropenic fever ([address] .       Mr. Evans has a PMH adenocarcinoma of the upper GI tract (mets to peritoneum), recent 30# weight loss, PUD, hypertension, HLD, diverticulitis. He is a patient of Dr. Kalpana Cardozo undergoing treatment for met. Gastric cancer with palliative FOLFIRINOX, s/p C2 on 6/27. C3 due on 7/11, planned for 7/15 as pt was seeking a second opinion in [facility] (planned to travel there tomorrow 7/11). He is a known patient of Dr. Florence Wood as well - previously did exploratory surgery found the entire mesentery encased with cancer, placed venting G tube. He is also on home TPN and has a PICC line. Palliative care seeing OP to manage cancer related abdominal pain. He presented to the ER with change in mental status and high fevers at home. [place] to have sepsis of unknown origin. BCx pending. Started on Vanc/Cef. Hypotension responded to fluids. He has remained afebrile. BP improved.   G-tube site cx +GNR, I/S pending. BCx-NGTD. He was treated with Cef/Vanc/Flagyl and will continue po Levaquin for an additional 10 days upon discharge. RUE edema noted today, RUE US with nonocclusive thrombus in the RUE. Removed PICC line. Will not send home on [facility] as thrombus is line-associated. He will continue TPN upon discharge, tolerating full liquid diet. Appt with Dr. Keke Hackett requested within one week. Advised to call with fever, chills, uncontrollable symptoms, or with any other concerns. Metastatic Gastric Cancer  - pt of Dr. Keke Hackett, s/p C2 of FOLFIRINOX 6/27, due for C3 7/11 but planned for 7/15 as family/pt planned to seek a second opinion in [facility] (planned for 7/11). No plans to give chemo while admitted.     Sepsis  - On Cef/Vanc, follow pending cultures  7/11 Afebrile. Hypotensive. BCx-NGTD. G-tube site with purulent drainage. Check CT AP. Wound cx ordered. Con't Cef/Flagyl/Vanc.  7/12 Remains afebrile, BP improved. CT AP with no evidence of infection. Wound cx pending. Con't Cef/Flagyl/Vanc.     Confusion/AMS  - May need head CT if mental status does not improve  RESOLVED     Ongoing Abdominal Pain  - home pain regimen  - consult PC, known to them from office  7/12 Pain improved     Home TPN  - RD consulted for management  7/12 Pt requests advancement in diet. OK to cap tube and start FLD.   Con't TPN.     Hypotension  - on IVF  - consult hospitalist for mgmt  7/12 BP much improved    Consults:  IP CONSULT TO CASE MANAGEMENT  PHARMACY TO DOSE VANCOMYCIN  IP CONSULT TO ONCOLOGY  IP CONSULT TO DIETITIAN  IP CONSULT TO PALLIATIVE CARE  IP CONSULT TO HOSPITALIST    Pertinent Diagnostic Studies:   Labs:    Recent Labs     07/11/22  0415 07/12/22  0251 07/13/22  0226   WBC 2.7* 2.8* 5.4   HGB 9.3* 9.2* 9.4*    199 218      Recent Labs     07/11/22  0415 07/11/22  0415 07/12/22  0251 07/12/22  1147 07/13/22  0226     --  142  --  143   K 3.5   < > 3.4* 3.7 3.5   *  --  112*  --  111* CO2 26  --  28  --  27   BUN 12  --  11  --  12   MG 2.1  --  2.0  --  2.2   PHOS 1.9*  --  2.7  --  2.6    < > = values in this interval not displayed. Imaging:  US Result (most recent):  US ABDOMEN COMPLETE 05/17/2022    Narrative  ABDOMINAL  ULTRASOUND    INDICATION: Epigastric pain. COMPARISON: CT chest 5/13/2022. Gallbladder ultrasound 5/13/2022. Transabdominal imaging of the upper abdomen was performed. FINDINGS:  LIVER: 17.3 cm. Slight increase in echogenicity without focal mass. BILE DUCTS: No intrahepatic bile duct dilatation. CBD diameter = 8 mm. GALLBLADDER: It is unremarkable in appearance without stones or sludge. Echogenic polyp measures 0.5 cm. No gallbladder wall thickening. Mild ascites. PANCREAS: Normal.  SPLEEN: Borderline enlarged at 12.2 cm. RIGHT KIDNEY: 13.3 cm. No mass or hydronephrosis. LEFT KIDNEY: 14.3 cm. No mass or hydronephrosis. ABDOMINAL AORTA AND IVC: Normal in size. ASCITES: Mild    Impression  Possible mild fatty infiltration liver. No focal mass. Gallbladder  polyp but no stones or gallbladder wall thickening. Mild ascites. Mildly  prominent common bile duct. Follow-up hepatobiliary scan could be performed to  assess for common bile duct obstruction. CT Result (most recent):  CT ABDOMEN PELVIS W IV CONTRAST 07/11/2022    Narrative  CT OF THE ABDOMEN AND PELVIS    INDICATION: Fever, drainage around G-tube    Multiple axial images were obtained through the abdomen and pelvis. Oral  contrast was used for bowel opacification. 100mL of Isovue 370 intravenous  contrast was used for better evaluation of solid organs and vascular structures. Radiation dose reduction techniques were used for this study. All CT scans  performed at this facility use one or all of the following: Automated exposure  control, adjustment of the mA and/or kVp according to patient's size, iterative  reconstruction.     COMPARISON: 05/23/2022    FINDINGS:  - LUNG BASES: Minimal bibasilar atelectasis. - LIVER: Normal in size and appearance. - GALLBLADDER/BILE DUCTS: No gallstones or bile duct dilatation.  - PANCREAS: Normal.  - SPLEEN: Normal.    - ADRENALS: Normal.  - KIDNEYS/URETERS: No hydronephrosis or significant mass. - BLADDER: Normal.  - REPRODUCTIVE ORGANS: No pelvic masses. - BOWEL: G-tube in place in the stomach. No inflammatory changes or fluid  collection around the tube. Mild bowel wall thickening noted within pelvic  small bowel loops. No significant bowel distention.  - LYMPH NODES: No significant retroperitoneal, mesenteric, or pelvic adenopathy.  - BONES: No fracture or significant bone lesion.  - VASCULATURE: Normal  - OTHER: Decreased peritoneal nodularity. Impression  1. No evidence of inflammation or fluid around the G-tube. 2.  Decreased peritoneal nodularity. Mild mesenteric edema and small bowel wall  thickening is present in the pelvis. If there are any questions about this report, I can be reached on [employer]  or at [phone]    Xray Result (most recent):  XR CHEST PORTABLE 07/10/2022    Narrative  EXAM: XR CHEST PORTABLE    HISTORY: Sepsis. TECHNIQUE: Frontal chest.    COMPARISON: 6/3/2022    FINDINGS:  Stable left-sided MediPort. Stable right PICC line. The patient is rotated to the right and is quite lordotic. The cardiac silhouette, mediastinum, and pulmonary vasculature are within normal  limits. There is no consolidation, pleural effusion, or pneumothorax. No significant osseous abnormalities are observed. Impression  Allowing for patient positioning, there is no evidence of an acute intrathoracic  process.            Medication List      START taking these medications    levoFLOXacin 500 MG tablet  Commonly known as: LEVAQUIN  Take 1 tablet by mouth daily for 10 days  Start taking on: July 14, 2022     oxyCODONE HCl 10 MG immediate release tablet  Commonly known as: OXY-IR  Take 1 tablet by mouth every 4 hours as needed for Pain for up to 7 days. CONTINUE taking these medications    Anoro Ellipta 62.5-25 MCG/INH Aepb inhaler  Generic drug: umeclidinium-vilanterol     busPIRone 10 MG tablet  Commonly known as: BUSPAR  Take 1 tablet by mouth 3 times daily     dicyclomine 10 MG capsule  Commonly known as: BENTYL     escitalopram 10 MG tablet  Commonly known as: Lexapro  Take 1 tablet by mouth daily     fentaNYL 100 MCG/HR  Commonly known as: DURAGESIC  Place 1 patch onto the skin every 72 hours for 30 days. LORazepam 1 MG tablet  Commonly known as: ATIVAN  Take 1 tablet by mouth every 8 hours as needed for Anxiety (nausea) for up to 30 days. naloxone 4 MG/0.1ML Liqd nasal spray     nicotine 14 MG/24HR  Commonly known as: NICODERM CQ  Place 1 patch onto the skin daily     OLANZapine zydis 5 MG disintegrating tablet  Commonly known as: ZYPREXA  Take 1 tablet by mouth nightly Do not take with promethazine     ondansetron 8 MG Tbdp disintegrating tablet  Commonly known as: ZOFRAN-ODT  Take 1 tablet by mouth every 8 hours     pantoprazole 40 MG tablet  Commonly known as: PROTONIX     polyethylene glycol 17 GM/SCOOP powder  Commonly known as: GLYCOLAX     promethazine 12.5 MG tablet  Commonly known as: PHENERGAN  Take 1 tablet by mouth every 6 hours as needed for Nausea     rosuvastatin 10 MG tablet  Commonly known as: CRESTOR           Where to Get Your Medications      These medications were sent to [facility]/pharmacy #[id number] [place] [address]    Phone: [phone]   · fentaNYL 100 MCG/HR  · levoFLOXacin 500 MG tablet  · oxyCODONE HCl 10 MG immediate release tablet       I have reviewed the patient's controlled substance prescription history, as maintained in the [place] prescription monitoring program, so that the prescriptions(s) for a controlled substance can be given.       OBJECTIVE:  Patient Vitals for the past 8 hrs:   BP Temp Temp src Pulse Resp SpO2   22 0748 (!) 144/97 97.8 °F (36.6 °C) Oral 60 18 98 %     Temp (24hrs), Av.5 °F (36.4 °C), Min:97.3 °F (36.3 °C), Max:97.9 °F (36.6 °C)    No intake/output data recorded. Physical Exam:  Constitutional: Well developed male in no acute distress, sitting comfortably on the hospital bed. Wife at bedside   HEENT: Normocephalic and atraumatic. Oropharynx is clear, mucous membranes are moist.  Extraocular muscles are intact. Sclerae anicteric. Neck supple without JVD. No thyromegaly present. +G-tube with purulent drainage at site (improved)   Skin Warm and dry. No bruising and no rash noted. No erythema. No pallor. Respiratory Lungs are clear to auscultation bilaterally without wheezes, rales or rhonchi, normal air exchange without accessory muscle use. CVS Normal rate, regular rhythm and normal S1 and S2. No murmurs, gallops, or rubs. Abdomen Soft, nontender and nondistended, normoactive bowel sounds. No palpable mass. No hepatosplenomegaly. +Venting G-tube. Neuro Grossly nonfocal with no obvious sensory or motor deficits. MSK Normal range of motion in general.  No edema and no tenderness. Psych Appropriate mood and affect. ASSESSMENT:    Principal Problem:    Severe sepsis (HCC)  Active Problems:    Severe protein-calorie malnutrition ([address]    Abdominal carcinomatosis ([address]    On total parenteral nutrition    Anxiety about dying    Septic encephalopathy    Metastatic adenocarcinoma (HCC)    Gastric cancer ([address]    Malfunction of peripheral inserted central catheter (HCC)    Hypokalemia    Leukopenia    Anemia    Abnormal liver function test    Neutropenic fever (HCC)    Essential hypertension    Abdominal pain  Resolved Problems:    * No resolved hospital problems. *      DISPOSITION:  Follow up appt with Dr. Anthony Magana requested within one week      Over 60 minutes was spent in discharge planning and coordination of care. LUCY [facility] Hematology [facility] Oncology  [address] [address]  Office : [phone]  Fax : [phone]

## 2022-07-13 NOTE — PROGRESS NOTES
Brief Nutrition Note     Type and Reason for Visit: Reassess  TPN Management (Oncology)     Nutrition Recommendations/Plan:   · Wean Parenteral Nutrition:  · Wean TPN to half rate (42ml/hr) now and stop after 1 hr at half rate     Pt to d/c today and resume home TPN. Discussed weaning TPN with Tammy Arrieta RN.     Maggie Nowak MS, RDN, LD  Contact: [phone]

## 2022-07-13 NOTE — PROGRESS NOTES
Attempted PT evaluation but Mr. Guevara asked PT to return later. Will return as time allows.   [facility], PT

## 2022-07-13 NOTE — CARE COORDINATION
Pt is for discharge home today with [facility] for [address]: [facility]  [facility] Port dressing changes per facility protocal  Faxed referral [phone]  Phone: [phone] spoke with Gage Funk and unpdated information given that referral was faxed and patient was being discharged  Home Infusion: [facility]   Order sent via [facility] also [facility] [phone] contacted that patient discharge     No further needs/supportive care orders received for CM at this time.   Pt will have close follow up at the [address]    Milestones met    ASSESSMENT NOTE    Attending Physician: Trista Glez MD  Admit Problem: Neutropenic fever ([address] [D70.9, R50.81]  Sepsis ([address] [A41.9]  Date/Time of Admission: 7/10/2022 12:00 AM  Problem List:  Patient Active Problem List   Diagnosis    Right cervical radiculopathy    Right elbow pain    Hyperlipidemia    Bilateral carpal tunnel syndrome    Essential hypertension    Gastroesophageal reflux disease    PUD (peptic ulcer disease)    Chronic right shoulder pain    Paresthesia and pain of both upper extremities    History of colon polyps    Chronic bronchitis (HCC)    Class 1 obesity due to excess calories with serious comorbidity and body mass index (BMI) of 34.0 to 34.9 in adult    Generalized abdominal pain    Abdominal pain    Peritoneal metastases ([address]    Opioid use, unspecified with unspecified opioid-induced disorder (HCC)    SBO (small bowel obstruction) (HCC)    Partial small bowel obstruction (HCC)    Severe protein-calorie malnutrition ([address]    Abdominal mass    Debility    Encounter for palliative care    Itching    Fatigue    Abdominal carcinomatosis ([address]    Goals of care, counseling/discussion    On total parenteral nutrition    Pain, abdominal, LUQ    Nausea    Anxiety about dying    Depression    Carcinomatosis ([address]    Cancer associated pain    PICC (peripherally inserted central catheter) flush    Severe sepsis (HCC)    Septic encephalopathy    Metastatic adenocarcinoma (HCC)    Gastric cancer (HCC)    Malfunction of peripheral inserted central catheter (HCC)    Hypokalemia    Leukopenia    Anemia    Abnormal liver function test    Neutropenic fever [facility])       Service Assessment  Patient Orientation Alert and Oriented   Cognition Alert   History Provided By Patient   Primary Caregiver Family   Accompanied By/Relationship Maria R Diaz   Support Systems Spouse/Significant Other   Patient's Healthcare Decision Maker is: Legal Next of Annie 70 Mohsen Velasquezn - Wife)   PCP Verified by CM Yes   Last Visit to PCP Within last 6 months   Prior Functional Level Independent in ADLs/IADLs   Current Functional Level Independent in ADLs/IADLs   Can patient return to prior living arrangement Yes   Ability to make needs known: Good   Family able to assist with home care needs: Yes   Would you like for me to discuss the discharge plan with any other family members/significant others, and if so, who?  Yes Mohsen Chatterjee - Wife)   Financial Resources Other (Comment) ([employer]BS)   Community Resources     CM/SW Referral       Social/Functional History  Lives With     Type of [facility] One level   [place] [address] to enter with rails   Entrance Stairs - Number of Steps 7   Entrance Stairs - Rails Both   Bathroom Shower/Tub Walk-in shower   Bathroom Toilet Standard   Bathroom Equipment Shower chair   [address] Help From Marilyn [address] Work     Driving     Shopping          Other (Mae)     [address] Responsibilities No   Meal [facility] Paying/Finance [address] [place] [address] Street provided with a Choice of Provider? [address] Representative   Name of the Patient Representative who was provided with the Choice of Provider and agrees with the Discharge Plan? spouse Flako Smithinrich   The Patient and/or Patient Representative Agree with the Discharge Plan? Yes   Freedom of Choice list was provided with basic dialogue that supports the individualized plan of care/goals, treatment preferences, and shares the quality data associated with the providers?  Yes         Yolis Pickering RN 07/13/22 12:54 PM

## 2022-07-13 NOTE — PLAN OF CARE
Problem: Discharge Planning  Goal: Discharge to home or other facility with appropriate resources  Outcome: Adequate for Discharge  Flowsheets (Taken 7/13/2022 0840)  Discharge to home or other facility with appropriate resources:   Identify barriers to discharge with patient and caregiver   Arrange for needed discharge resources and transportation as appropriate     Problem: Pain  Goal: Verbalizes/displays adequate comfort level or baseline comfort level  7/13/2022 1342 by Claudean Butcher, RN  Outcome: Adequate for Discharge  7/13/2022 0130 by Patricia Lnych RN  Outcome: Progressing     Problem: Safety - Adult  Goal: Free from fall injury  7/13/2022 1342 by Claudean Butcher, RN  Outcome: Adequate for Discharge  Flowsheets (Taken 7/13/2022 0840)  Free From Fall Injury:   Instruct family/caregiver on patient safety   Based on caregiver fall risk screen, instruct family/caregiver to ask for assistance with transferring infant if caregiver noted to have fall risk factors  7/13/2022 0130 by Patricia Lynch RN  Outcome: Progressing  Flowsheets (Taken 7/12/2022 1427 by Benjy Lott)  Free From Fall Injury:   Instruct family/caregiver on patient safety   Based on caregiver fall risk screen, instruct family/caregiver to ask for assistance with transferring infant if caregiver noted to have fall risk factors     Problem: Skin/Tissue Integrity  Goal: Absence of new skin breakdown  Description: 1. Monitor for areas of redness and/or skin breakdown  2. Assess vascular access sites hourly  3. Every 4-6 hours minimum:  Change oxygen saturation probe site  4. Every 4-6 hours:  If on nasal continuous positive airway pressure, respiratory therapy assess nares and determine need for appliance change or resting period.   Outcome: Adequate for Discharge     Problem: ABCDS Injury Assessment  Goal: Absence of physical injury  Outcome: Adequate for Discharge  Flowsheets (Taken 7/13/2022 0840)  Absence of Physical Injury: Implement safety measures based on patient assessment

## 2022-07-13 NOTE — PROGRESS NOTES
PHYSICAL THERAPY Initial Assessment and Discharge  (Link to Caseload Tracking:    Acknowledge Orders  Time In/Out  PT Charge Capture  Rehab Caseload Tracker    Sigrid Jones is a 62 y.o. male   PRIMARY DIAGNOSIS: Severe sepsis [facility]  Neutropenic fever ([facility] [D70.9, R50.81]  Sepsis ([facility] [A41.9]       Reason for Referral: Generalized Muscle Weakness (M62.81)  Difficulty in walking, Not elsewhere classified (R26.2)  Inpatient: Payor: Ally Vance / Plan: Janice THOMAS / Product Type: *No Product type* /     ASSESSMENT:     REHAB RECOMMENDATIONS:   Recommendation to date pending progress:  Setting:   No further skilled therapy after discharge from hospital    Equipment:     None     ASSESSMENT:   [facility] was agreeable to PT this morning. He was able to perform all mobility and gait unassisted. He no longer requires any skilled PT needs. [place] [facility]\"" [address] [address] AM-PAC 6 Clicks Basic Mobility Inpatient Short Form  AM-PAC Mobility Inpatient   How much difficulty turning over in bed?: None  How much difficulty sitting down on / standing up from a chair with arms?: None  How much difficulty moving from lying on back to sitting on side of bed?: None  How much help from another person moving to and from a bed to a chair?: None  How much help from another person needed to walk in hospital room?: None  How much help from another person for climbing 3-5 steps with a railing?: None  [facility] Inpatient Mobility Raw Score : 24  AM-PAC Inpatient T-Scale Score : 61.14  Mobility Inpatient CMS 0-100% Score: 0  Mobility Inpatient CMS G-Code Modifier : CH    SUBJECTIVE:    [facility] states, \"how did I do? \"     Social/Functional Type of Home: Mobile home  Home Layout: One level  Home Access: Stairs to enter with rails  Entrance Stairs - Number of Steps: 7  Entrance Stairs - Rails: Both  Bathroom Shower/Tub: Walk-in shower  Bathroom Toilet: Standard  Bathroom Equipment: Shower chair  Bathroom Accessibility: Accessible  Receives Help From: Family  ADL Assistance: Independent  Homemaking Assistance: Independent  Homemaking Responsibilities: No  Ambulation Assistance: Independent  Transfer Assistance: Independent  Active : No  Patient's  Info: Kathy Dumas - Wife  Mode of Transportation: SUV,Truck  Occupation: On disability    OBJECTIVE:     PAIN: VITALS / O2: PRECAUTION / Shanta Diaz / Valiant Dawley:   Pre Treatment: 0         Post Treatment: 0 Vitals        Oxygen      PEG    RESTRICTIONS/PRECAUTIONS:                    GROSS EVALUATION: Intact Impaired (Comments):   AROM [x]     PROM []    Strength []     Balance [x]     Posture [] N/A   Sensation []     Coordination []      Tone []     Edema []    Activity Tolerance [x]      []      COGNITION/  PERCEPTION: Intact Impaired (Comments):   Orientation [x]     Vision [x]     Hearing [x]     Cognition  [x]       MOBILITY: I Mod I S SBA CGA Min Mod Max Total  NT x2 Comments:   Bed Mobility    Rolling [x] [] [] [] [] [] [] [] [] [] []    Supine to Sit [x] [] [] [] [] [] [] [] [] [] []    Scooting [x] [] [] [] [] [] [] [] [] [] []    Sit to Supine [x] [] [] [] [] [] [] [] [] [] []    Transfers    Sit to Stand [x] [] [] [] [] [] [] [] [] [] []    Bed to Chair [x] [] [] [] [] [] [] [] [] [] []    Stand to Sit [x] [] [] [] [] [] [] [] [] [] []     [] [] [] [] [] [] [] [] [] [] []    I=Independent, Mod I=Modified Independent, S=Supervision, SBA=Standby Assistance, CGA=Contact Guard Assistance,   Min=Minimal Assistance, Mod=Moderate Assistance, Max=Maximal Assistance, Total=Total Assistance, NT=Not Tested    GAIT: I Mod I S SBA CGA Min Mod Max Total  NT x2 Comments:   Level of Assistance [x] [] [] [] [] [] [] [] [] [] []    Distance 300 + feet    DME N/A    Gait Quality N/A    Weightbearing Status      Stairs      I=Independent, Mod I=Modified Independent, S=Supervision, SBA=Standby Assistance, CGA=Contact Guard Assistance,   Min=Minimal Assistance, Mod=Moderate Assistance, Max=Maximal Assistance, Total=Total

## 2022-07-13 NOTE — PROGRESS NOTES
CRITICAL CARE OUTREACH NURSE PROGRESS REPORT      SUBJECTIVE: Called to assess patient secondary to RN/provider concern. Vitals:    07/12/22 1924 07/12/22 2104 07/12/22 2306 07/13/22 0259   BP: 137/89  (!) 140/86 (!) 142/94   Pulse: 55  50 51   Resp: 18  19 18   Temp: 97.3 °F (36.3 °C)  97.9 °F (36.6 °C) 97.3 °F (36.3 °C)   TempSrc: Oral  Oral Oral   SpO2: 98%  97% 96%   Weight:  197 lb 12.8 oz (89.7 kg)     Height:            ASSESSMENT:  Pt resting quietly in bed, A&Ox4. Respirations even and unlabored, no needs at this time. PLAN:  Will follow per ICU Outreach protocol.     Boyd Mckeon RN  [phone]

## 2022-07-14 ENCOUNTER — CARE COORDINATION (OUTPATIENT)
Dept: CARE COORDINATION | Facility: CLINIC | Age: 62
End: 2022-07-14

## 2022-07-14 NOTE — CARE COORDINATION
[facility] Transitions Initial Follow Up Call    Call within 2 business days of discharge: Yes    Patient: Nasima Yip Patient : 1960   MRN: [id number]  Reason for Admission: very lethargic and confused. Discharge Date: 22 RARS: Readmission Risk Score: 23 ( )      Last Discharge [facility]       Complaint Diagnosis Description Type Department Provider    7/10/22 Fever Neutropenic fever ([address] . .. ED to Hosp-Admission (Discharged) (ADMITTED) Ember Bragg MD; Matthew Bowie. .. Transitions of Care Initial Call    Was this an external facility discharge? No Discharge Facility: [facility]    Challenges to be reviewed by the provider   Additional needs identified to be addressed with provider: No  none             Method of communication with provider : none    Advance Care Planning:   Does patient have an Advance Directive: not on file. LPN Care Coordinator contacted the family by telephone to perform post hospital discharge assessment. Verified name and  with family as identifiers. Provided introduction to self, and explanation of the LPN CC role. LPN CC reviewed discharge instructions, medical action plan and red flags with family who verbalized understanding. Family given an opportunity to ask questions and does not have any further questions or concerns at this time. Were discharge instructions available to patient? Yes. Reviewed appropriate site of care based on symptoms and resources available to patient including: PCP  Specialist  Urgent care clinics  [facility]  When to call [address] The family agrees to contact the PCP office for questions related to their healthcare. Medication reconciliation was performed with family, who verbalizes understanding of administration of home medications. Advised obtaining a 90-day supply of all daily and as-needed medications. Was patient discharged with a pulse oximeter?  no    LPN CC provided

## 2022-07-15 ENCOUNTER — TELEPHONE (OUTPATIENT)
Dept: ONCOLOGY | Age: 62
End: 2022-07-15

## 2022-07-15 ENCOUNTER — HOSPITAL ENCOUNTER (OUTPATIENT)
Dept: INFUSION THERAPY | Age: 62
End: 2022-07-15

## 2022-07-15 LAB
BACTERIA SPEC CULT: ABNORMAL
BACTERIA SPEC CULT: NORMAL
BACTERIA SPEC CULT: NORMAL
GRAM STN SPEC: ABNORMAL
GRAM STN SPEC: ABNORMAL
SERVICE CMNT-IMP: ABNORMAL
SERVICE CMNT-IMP: NORMAL
SERVICE CMNT-IMP: NORMAL

## 2022-07-15 NOTE — TELEPHONE ENCOUNTER
Cisco from [facility] LONG TERM stating they have resumed care for him.

## 2022-07-18 ENCOUNTER — OFFICE VISIT (OUTPATIENT)
Dept: PALLATIVE CARE | Age: 62
End: 2022-07-18
Payer: COMMERCIAL

## 2022-07-18 ENCOUNTER — OFFICE VISIT (OUTPATIENT)
Dept: ONCOLOGY | Age: 62
End: 2022-07-18

## 2022-07-18 ENCOUNTER — HOSPITAL ENCOUNTER (OUTPATIENT)
Dept: LAB | Age: 62
Discharge: HOME | End: 2022-07-21
Payer: COMMERCIAL

## 2022-07-18 ENCOUNTER — OFFICE VISIT (OUTPATIENT)
Dept: ONCOLOGY | Age: 62
End: 2022-07-18
Payer: COMMERCIAL

## 2022-07-18 VITALS
SYSTOLIC BLOOD PRESSURE: 86 MMHG | RESPIRATION RATE: 17 BRPM | DIASTOLIC BLOOD PRESSURE: 56 MMHG | HEART RATE: 72 BPM | TEMPERATURE: 97.8 F | HEIGHT: 70 IN | BODY MASS INDEX: 26.56 KG/M2 | WEIGHT: 185.5 LBS | OXYGEN SATURATION: 95 %

## 2022-07-18 DIAGNOSIS — Z00.8 NUTRITIONAL ASSESSMENT: Primary | ICD-10-CM

## 2022-07-18 DIAGNOSIS — K59.01 SLOW TRANSIT CONSTIPATION: ICD-10-CM

## 2022-07-18 DIAGNOSIS — C78.6 PERITONEAL METASTASES (HCC): ICD-10-CM

## 2022-07-18 DIAGNOSIS — R53.83 FATIGUE DUE TO TREATMENT: ICD-10-CM

## 2022-07-18 DIAGNOSIS — Z51.5 ENCOUNTER FOR PALLIATIVE CARE: ICD-10-CM

## 2022-07-18 DIAGNOSIS — I95.1 ORTHOSTATIC HYPOTENSION: ICD-10-CM

## 2022-07-18 DIAGNOSIS — G89.3 CANCER ASSOCIATED PAIN: Primary | ICD-10-CM

## 2022-07-18 DIAGNOSIS — G89.3 CANCER ASSOCIATED PAIN: ICD-10-CM

## 2022-07-18 DIAGNOSIS — C16.9 MALIGNANT NEOPLASM OF STOMACH, UNSPECIFIED LOCATION (HCC): Primary | ICD-10-CM

## 2022-07-18 DIAGNOSIS — Z78.9 ON TOTAL PARENTERAL NUTRITION (TPN): ICD-10-CM

## 2022-07-18 DIAGNOSIS — C79.9 METASTATIC ADENOCARCINOMA (HCC): ICD-10-CM

## 2022-07-18 LAB
ALBUMIN SERPL-MCNC: 2.3 G/DL (ref 3.2–4.6)
ALBUMIN/GLOB SERPL: 0.7 {RATIO} (ref 1.2–3.5)
ALP SERPL-CCNC: 125 U/L (ref 50–136)
ALT SERPL-CCNC: 111 U/L (ref 12–65)
ANION GAP SERPL CALC-SCNC: 4 MMOL/L (ref 7–16)
AST SERPL-CCNC: 70 U/L (ref 15–37)
BASOPHILS # BLD: 0.1 K/UL (ref 0–0.2)
BASOPHILS NFR BLD: 1 % (ref 0–2)
BILIRUB SERPL-MCNC: 0.3 MG/DL (ref 0.2–1.1)
BUN SERPL-MCNC: 12 MG/DL (ref 8–23)
CALCIUM SERPL-MCNC: 8.3 MG/DL (ref 8.3–10.4)
CEA SERPL-MCNC: 1 NG/ML (ref 0–3)
CHLORIDE SERPL-SCNC: 106 MMOL/L (ref 98–107)
CO2 SERPL-SCNC: 29 MMOL/L (ref 21–32)
CREAT SERPL-MCNC: 0.6 MG/DL (ref 0.8–1.5)
DIFFERENTIAL METHOD BLD: ABNORMAL
EOSINOPHIL # BLD: 0.2 K/UL (ref 0–0.8)
EOSINOPHIL NFR BLD: 2 % (ref 0.5–7.8)
ERYTHROCYTE [DISTWIDTH] IN BLOOD BY AUTOMATED COUNT: 13.6 % (ref 11.9–14.6)
GLOBULIN SER CALC-MCNC: 3.5 G/DL (ref 2.3–3.5)
GLUCOSE SERPL-MCNC: 125 MG/DL (ref 65–100)
HCT VFR BLD AUTO: 34.8 %
HGB BLD-MCNC: 11.2 G/DL (ref 13.6–17.2)
IMM GRANULOCYTES # BLD AUTO: 0.3 K/UL (ref 0–0.5)
IMM GRANULOCYTES NFR BLD AUTO: 3 % (ref 0–5)
LYMPHOCYTES # BLD: 2.1 K/UL (ref 0.5–4.6)
LYMPHOCYTES NFR BLD: 23 % (ref 13–44)
MAGNESIUM SERPL-MCNC: 2.2 MG/DL (ref 1.8–2.4)
MCH RBC QN AUTO: 28.7 PG (ref 26.1–32.9)
MCHC RBC AUTO-ENTMCNC: 32.2 G/DL (ref 31.4–35)
MCV RBC AUTO: 89.2 FL (ref 79.6–97.8)
MONOCYTES # BLD: 0.7 K/UL (ref 0.1–1.3)
MONOCYTES NFR BLD: 7 % (ref 4–12)
NEUTS SEG # BLD: 6.1 K/UL (ref 1.7–8.2)
NEUTS SEG NFR BLD: 65 % (ref 43–78)
NRBC # BLD: 0 K/UL (ref 0–0.2)
PLATELET # BLD AUTO: 248 K/UL (ref 150–450)
PMV BLD AUTO: 10.5 FL (ref 9.4–12.3)
POTASSIUM SERPL-SCNC: 3.7 MMOL/L (ref 3.5–5.1)
PROT SERPL-MCNC: 5.8 G/DL (ref 6.3–8.2)
RBC # BLD AUTO: 3.9 M/UL (ref 4.23–5.6)
SODIUM SERPL-SCNC: 139 MMOL/L (ref 136–145)
WBC # BLD AUTO: 9.5 K/UL (ref 4.3–11.1)

## 2022-07-18 PROCEDURE — 83735 ASSAY OF MAGNESIUM: CPT

## 2022-07-18 PROCEDURE — 85025 COMPLETE CBC W/AUTO DIFF WBC: CPT

## 2022-07-18 PROCEDURE — 80053 COMPREHEN METABOLIC PANEL: CPT

## 2022-07-18 PROCEDURE — 82378 CARCINOEMBRYONIC ANTIGEN: CPT

## 2022-07-18 PROCEDURE — 99214 OFFICE O/P EST MOD 30 MIN: CPT | Performed by: NURSE PRACTITIONER

## 2022-07-18 PROCEDURE — 99215 OFFICE O/P EST HI 40 MIN: CPT | Performed by: NURSE PRACTITIONER

## 2022-07-18 ASSESSMENT — ENCOUNTER SYMPTOMS
COUGH: 0
SCLERAL ICTERUS: 0
DIARRHEA: 0
SORE THROAT: 0
EYE PROBLEMS: 0
VOMITING: 0
BACK PAIN: 0
CONSTIPATION: 0
ABDOMINAL PAIN: 1
SHORTNESS OF BREATH: 0
BLOOD IN STOOL: 0
ABDOMINAL DISTENTION: 0
HEMOPTYSIS: 0

## 2022-07-18 ASSESSMENT — PATIENT HEALTH QUESTIONNAIRE - PHQ9
SUM OF ALL RESPONSES TO PHQ QUESTIONS 1-9: 0
1. LITTLE INTEREST OR PLEASURE IN DOING THINGS: 0
2. FEELING DOWN, DEPRESSED OR HOPELESS: 0
SUM OF ALL RESPONSES TO PHQ QUESTIONS 1-9: 0
SUM OF ALL RESPONSES TO PHQ9 QUESTIONS 1 & 2: 0

## 2022-07-18 NOTE — PROGRESS NOTES
[facility] Hematology and Oncology: Established patient - follow up     Chief Complaint   Patient presents with    Follow-up      Reason for Referral: Abdominal pain; Concern  for peritoneal metastatic disease   Referring Provider: Judson Larios NP   Primary Care Provider: Ron Lemus MD   Family History of Cancer/Hematologic Disorders: Family history is significant for sister with breast cancer. Presenting Symptoms: Progressively worsening, persistent abdominal pain x several months    History of Present Illness:  Mr. Roldan  is a 27[address] [facility] y.o. male who presents today for FU regarding adenocarcinoma with peritoneal metastatic disease. The past medical history is significant for tobacco use (recent pack per day smoker x 30+ years - now down to 1/3PPD), PUD, paresthesia/pain of bilateral upper extremities, HLD, HTN, diverticulitis,  colon polyps, hiatal hernia, chronic right shoulder pain, bilateral carpal tunnel syndrome, and partial colon resection. He presented to the [facility] ED on 5/12/22 with a complaint of persistent abdominal pain for several months that was becoming progressively  worse. EGD and colonoscopy on 2/25/22 revealed findings of hiatal hernia, gastric polyps, several benign colon polyps, diverticulosis, and internal hemorrhoids. CT of the abdomen on 3/8/22 showed no acute findings. He presented to [facility] ER x 2 for  evaluation (5/3/22 and 5/10/22). RUQ abdominal ultrasound was completed on 5/3/22 in the ED at [facility] demonstrating no acute findings to explain patient's pain; probable hepatic  steatosis; small echogenic mural foci in the gallbladder which are nonmobile, likely representing cholesterol polyps, largest measuring 4 mm; and small volume simple ascites in the right upper quadrant and left lower quadrant, nonspecific.   CT AP with  contrast at [facility] ED 5/10/22 showed a partial small bowel obstruction; small to moderate amount of free fluid in the abdomen and pelvis; and nonspecific stranding of the omentum primarily in the left upper quadrant. Radiologist noted that follow-up  CT was recommended to differentiate passive congestion from omental disease. On 5/11/22, abdominal series again showed multiple dilated small bowel loops with enteric contrast appearing to extend into the proximal colon, suggesting partial small  bowel obstruction. CT AP in the [facility] ED on 5/12/22 identified extensive peritoneal nodularity and mild ascites consistent with peritoneal  metastatic disease with primary lesion not definitely identified; dilated fluid-filled loops of small bowel possibly related to gastroenteritis with no definite obstruction and no obvious bowel mass; and sclerosis of S1 vertebral body. Radiologist recommended consideration of follow-up bone scan to assess for bone metastases. Patient was admitted to  the Hospitalist service on 5/12/22, and IR consulted for possible paracentesis however very small volume was inaccessible. CT Chest obtained on 5/13/22 showed No evidence of primary malignancy or metastasis within the chest.  Follow-up hepatobiliary  scan was suggested to assess for common bile duct obstruction. Oncology was consulted but did not see patient inhouse as pt was dischared. Upon discharge on 5/14/22, patient was instructed to follow up with [facility]. During consultation, we discussed his workup. We looked at the images together demonstrating some of the findings concerning for peritoneal nodularity/peritoneal disease. Discussed anatomy of the findings utilizing images to help pt understand what we are looking at and suspecting. He and wife were appreciative of the time spent to review these. We also addressed pt's pain. We also reviewed images of sclerosing lesion - bone scan recommended. He also was recommended to undergo HIDA scan after recent US per PCP. He is tired of tests, frustrated.   Feelings validated and stressed importance of workup to determine why he has pain. Wt loss from 240 --> 209 noted and expressed concern to pt about this. Of note, prior akbar resection with Dr Horace Zhong for diverticulitis per pt. Sent note to dr Florence Wood re pt's case to expedite workup with his agreement. He is here today for follow up after hospitalization for abdominal pain and sepsis. He was empirically placed on vancomycin and cefepime. CT scanning disclosed no intra-abdominal fluid collection or abscess but did suggest that his tumor burden was somewhat smaller. He had some purulent drainage from around his G-tube. This was cultured and grew klebsiella pneumoniae and citrobacter freundii both of which were sensitive to Levaquin which he was discharged home on for 10 days. This site now looks excellent without increased pain, no erythema or drainage. His pain is well controlled on 100 mcg Fentanyl patch and one or two oxycodone for breakthrough a day. He has been able to tolerate some soft foods but bulk of nutrition is coming from TPN right now. He denies any nausea or vomiting. Bowels moving but slow. He has some mild hypotension and we will add a liter of IVF every other day as he has responded to fluids in the past. He has had no fevers or other infectious symptoms. He and his wife request another increase in his chemo dosing as he has been doing so well. We will plan to schedule this for Thursday and will have Dr. Kalpana Cardozo adjust doses. Chronological Events:   EGD REPORT 2/25/22                      COLONOSCOPY REPORT 2/25/22                 CT ABDOMEN PELVIS W CONTRAST 3/8/2022   FINDINGS:   LOWER CHEST: Unremarkable. ABDOMEN AND PELVIS:   Liver: Unremarkable. Biliary system: Unremarkable. Pancreas: Unremarkable. Spleen: Unremarkable. Adrenals: Unremarkable. Genitourinary: Unremarkable. Reproductive organs: Unremarkable. Gastrointestinal tract: Colonic diverticulosis without evidence of diverticulitis.   There is no evidence of bowel obstruction or inflammation. The appendix is normal   Peritoneum/Extraperitoneum: Unremarkable. No free fluid or air. Vascular: Unremarkable. Musculoskeletal:  Small fat-containing umbilical hernia. Degenerative  changes of thoracolumbar spine. No acute or aggressive osseous lesion. IMPRESSION: Colonic diverticula without evidence of acute diverticulitis. RIGHT UPPER QUADRANT ABDOMINAL ULTRASOUND 5/3/2022    FINDINGS:   Pancreas: Not visualized, obscured by bowel gas. Intrahepatic IVC: Normal.   Main  Portal Vein: Patent with normal directional flow. Liver: Liver contour is normal. Liver appears diffusely increased in echogenicity consistent with hepatic steatosis. There is fatty sparing around the gallbladder fossa. Gallbladder: Gallbladder  is normal in size. No evidence of gallbladder wall thickening. No gallstones are seen. There are several nonmobile echogenic mural foci in the proximal gallbladder body/neck, largest measuring 4 mm, without shadowing, likely small cholesterol polyps. Bile ducts: No evidence of biliary ductal dilation. The common bile duct measures 3 mm in diameter. Right kidney: Normal.   Left Upper Quadrant: Limited images of the left upper quadrant are unremarkable. Spleen measures 12.1 cm in length. Free fluid: Trace simple free fluid in the right upper quadrant and left lower quadrant. IMPRESSION:    1. No acute findings to explain patient's pain. 2. Probable hepatic steatosis. 3. Small echogenic mural foci in the gallbladder which are nonmobile, likely representing cholesterol polyps, largest measuring 4 mm. There are no specific ultrasound  follow up recommendations for polyps of this small size. 4. Small volume simple ascites in the right upper quadrant and left lower quadrant, nonspecific.         CT ABDOMEN - PELVIS WITH CONTRAST 5/10/22   FINDINGS:   Liver: Normal    Portal Vein: Normal   Gallbladder - Biliary Tree: Normal   Pancreas: Normal   Spleen: tract calculi. BOWEL: Dilated fluid-filled loops of small bowel are present throughout the abdomen. No definite transition point. Oral contrast noted in the colon. No definite evidence of bowel mass but there is extensive peritoneal nodularity and trace ascites highly  concerning for peritoneal metastatic disease. LYMPH NODES: No enlarged retroperitoneal or pelvic lymph nodes. Peritoneal nodularity again noted most likely metastatic disease. VASCULATURE: Unremarkable. PELVIC ORGANS: Prostate gland and rectum are unremarkable. Small amount of free pelvic fluid is noted. MUSCULOSKELETAL: Degenerative spine changes are noted. Mild sclerosis involving the S1 vertebral body is present on image 74 of series 602. IMPRESSION   1. Extensive peritoneal nodularity and mild ascites consistent with peritoneal metastatic disease. Primary lesion not definitely identified. 2. Dilated fluid-filled loops of small bowel possibly related to gastroenteritis. No definite obstruction. No obvious bowel mass. 3. Sclerosis S1 vertebral body. Consider follow-up bone scan to assess for bone metastases. LIMITED ABDOMINAL ULTRASOUND 5/13/22   FINDINGS: A single limited gray scale image was submitted of an undisclosed location in the abdomen. Images demonstrate a small amount of  ascites as seen on comparison CT. IMPRESSION   1. Small volume ascites. CT CHEST WITH CONTRAST 5/13/22   FINDINGS:   Mediastinum and visualized thyroid: Normal.   Heart: Normal.    Large Vessels: Normal.    Pleura: Normal.    Lungs: No lung mass clearly discerned. Mild scarring or atelectasis in the right lung base. No suspicious lung nodule. No consolidation or zulma pulmonary edema. Airways: Normal.    Lymph nodes: Normal.    Bones/Soft tissues: Normal.    Visualized abdomen: Partially imaged omental nodules. Perihepatic ascites noted.     IMPRESSION: No evidence of primary malignancy or metastasis within the chest       ABDOMINAL ULTRASOUND 5/17/22   FINDINGS:    LIVER: 17.3 cm. Slight increase in echogenicity without focal mass. BILE DUCTS: No intrahepatic bile duct dilatation. CBD diameter = 8 mm. GALLBLADDER: It is unremarkable in appearance without stones or sludge. Echogenic polyp measures 0.5 cm. No gallbladder wall thickening. Mild ascites. PANCREAS: Normal.   SPLEEN: Borderline enlarged at 12.2 cm. RIGHT KIDNEY: 13.3 cm. No mass or hydronephrosis. LEFT KIDNEY: 14.3 cm. No mass or hydronephrosis. ABDOMINAL AORTA AND IVC: Normal in size. ASCITES: Mild   IMPRESSION: Possible mild fatty infiltration liver. No focal mass. Gallbladder polyp but no stones or gallbladder wall thickening. Mild ascites. Mildly prominent common bile duct. Follow-up hepatobiliary scan could be performed to assess for common bile duct obstruction. 04/02/2021 (COVID-19, Berton Gals, Primary or Immunocompromised Series, MRNA, PF, 100mcg/0.5mL)   04/30/2021 (COVID-19, Berton Gals, Primary or Immunocompromised Series, MRNA, PF, 100mcg/0.5mL)   11/16/2021 (COVID-19, Moderna Booster, PF, 0.25mL Dose)      5/18/22 heme/onc consultation   5/20/22 Dr Nicole Jasmine consult - sent to ED for admission/workup   5/23/22 omental bx - IR   5/27/22 Dr Nicole Jasmine - diagnostic lap, omental mass and mesentery mass excision, G-tube placement for venting  6/1/22 painful G-tube - tract recanalized and new G-tube placed   6/12/22 C1 FOLFIRINOX completed - dose adjusted   6/14/22 d/c   6/24/22 FU sx - G tube maint instructions/staples removed - RTC PRN   6/24/22 FU after hospitalization - discussed PC/plan for tx  7/8/22 FU - mainly concerned about PICC line without blood return, decline cathflo, seeing José Miguel Pham in [place] on Monday. Will increase hydration at home. HH for TPN.    7/18/22 Follow up after hospitalization. He will complete course of Levaquin as prescribed for infection around G-tube. Would like to proceed with cycle 3 FOLFIRINOX with increased dosing. Family History   Problem Relation Age of Onset    No Known Problems Brother     Cancer Sister         breast    Hypertension Mother     Heart Disease Mother     Diabetes Father     Osteoarthritis Father     Alcohol Abuse Father     Hypertension Father     Diabetes Mother       Social History     Socioeconomic History    Marital status:      Spouse name: None    Number of children: None    Years of education: None    Highest education level: None   Tobacco Use    Smoking status: Every Day     Packs/day: 1.00     Types: Cigarettes    Smokeless tobacco: Never   Substance and Sexual Activity    Alcohol use: No    Drug use: No        Review of Systems   Constitutional:  Positive for fatigue. Negative for appetite change, diaphoresis, fever and unexpected weight change. HENT:   Negative for sore throat. Eyes:  Negative for eye problems and icterus. Respiratory:  Negative for cough, hemoptysis and shortness of breath. Cardiovascular:  Negative for chest pain, leg swelling and palpitations. Gastrointestinal:  Positive for abdominal pain (much improved). Negative for abdominal distention, blood in stool, constipation, diarrhea and vomiting. Endocrine: Negative for hot flashes. Genitourinary:  Negative for dysuria. Musculoskeletal:  Negative for back pain and gait problem. Skin:  Negative for itching, rash and wound. Neurological:  Negative for dizziness, extremity weakness, gait problem, headaches, light-headedness, numbness, seizures and speech difficulty. Hematological:  Negative for adenopathy. Does not bruise/bleed easily. Psychiatric/Behavioral:  Positive for depression. Negative for decreased concentration and sleep disturbance. The patient is nervous/anxious.        No Known Allergies  Past Medical History:   Diagnosis Date    Bilateral carpal tunnel syndrome 12/9/2020    Chronic right shoulder pain 11/30/2020    Colon polyps 3/11/2013    Diverticulitis 3/11/2013    HTN naloxone 4 MG/0.1ML LIQD nasal spray 1 spray by Nasal route as needed for Opioid Reversal       pantoprazole (PROTONIX) 40 MG tablet TAKE 1 TABLET BY MOUTH BEFORE BREAKFAST AND DINNER FOR 30 DAYS      dicyclomine (BENTYL) 10 MG capsule Take 20 mg by mouth every 6 hours       polyethylene glycol (GLYCOLAX) 17 GM/SCOOP powder Take 17 g by mouth daily      rosuvastatin (CRESTOR) 10 MG tablet Take 10 mg by mouth      umeclidinium-vilanterol (ANORO ELLIPTA) 62.5-25 MCG/INH AEPB inhaler Inhale 1 puff into the lungs daily        No current facility-administered medications for this visit. No flowsheet data found. OBJECTIVE:  BP (!) 86/56 (Site: Left Upper Arm, Position: Standing, Cuff Size: Medium Adult)   Pulse 72   Temp 97.8 °F (36.6 °C) (Oral)   Resp 17   Ht 5' 10\" (1.778 m)   Wt 185 lb 8 oz (84.1 kg)   SpO2 95%   BMI 26.62 kg/m²       ECOG PERFORMANCE STATUS - 1- Restricted in physically strenuous activity but ambulatory and able to carry out work of a light or sedentary nature such as light house work, office work. Pain - Pain Score:   5 (fatigue-8)/10. Mild to moderate pain, requiring medication - see MAR  currently controlled on regimen - PC seeing pt. Fatigue - No flowsheet data found. Distress - No flowsheet data found. Physical Exam  Vitals reviewed. Exam conducted with a chaperone present. Constitutional:       General: He is not in acute distress. Appearance: Normal appearance. He is normal weight. He is not ill-appearing or toxic-appearing. HENT:      Head: Normocephalic and atraumatic. Nose: Nose normal. No congestion. Mouth/Throat:      Mouth: Mucous membranes are moist.   Eyes:      General: No scleral icterus. Extraocular Movements: Extraocular movements intact. Conjunctiva/sclera: Conjunctivae normal.      Pupils: Pupils are equal, round, and reactive to light. Cardiovascular:      Rate and Rhythm: Normal rate and regular rhythm.       Heart sounds: No murmur heard. Pulmonary:      Effort: Pulmonary effort is normal. No respiratory distress. Breath sounds: Normal breath sounds. No wheezing, rhonchi or rales. Chest:   Breasts:     Right: No axillary adenopathy or supraclavicular adenopathy. Left: No axillary adenopathy or supraclavicular adenopathy. Abdominal:      General: There is no distension. Palpations: Abdomen is soft. There is no mass. Tenderness: There is no abdominal tenderness. There is no guarding or rebound. Comments: Venting tube with output/brownish    Musculoskeletal:         General: Normal range of motion. Cervical back: Normal range of motion. No rigidity. Right lower leg: No edema. Left lower leg: No edema. Lymphadenopathy:      Cervical: No cervical adenopathy. Upper Body:      Right upper body: No supraclavicular or axillary adenopathy. Left upper body: No supraclavicular or axillary adenopathy. Skin:     General: Skin is warm and dry. Coloration: Skin is not jaundiced or pale. Findings: No bruising or rash. Neurological:      General: No focal deficit present. Mental Status: He is alert and oriented to person, place, and time. Motor: No weakness. Coordination: Coordination normal.      Gait: Gait normal.   Psychiatric:         Behavior: Behavior normal.         Thought Content:  Thought content normal.        Labs:  Recent Results (from the past 168 hour(s))   Phosphorus    Collection Time: 07/12/22  2:51 AM   Result Value Ref Range    Phosphorus 2.7 2.3 - 3.7 MG/DL   Triglyceride    Collection Time: 07/12/22  2:51 AM   Result Value Ref Range    Triglycerides 116 35 - 150 MG/DL   CBC with Auto Differential    Collection Time: 07/12/22  2:51 AM   Result Value Ref Range    WBC 2.8 (L) 4.3 - 11.1 K/uL    RBC 3.16 (L) 4.23 - 5.6 M/uL    Hemoglobin 9.2 (L) 13.6 - 17.2 g/dL    Hematocrit 27.8 (L) 41.1 - 50.3 %    MCV 88.0 79.6 - 97.8 FL    MCH 29.1 26.1 - 32.9 PG    MCHC 33.1 31.4 - 35.0 g/dL    RDW 12.8 11.9 - 14.6 %    Platelets 694 745 - 517 K/uL    MPV 10.6 9.4 - 12.3 FL    nRBC 0.00 0.0 - 0.2 K/uL    Differential Type AUTOMATED      Seg Neutrophils 55 43 - 78 %    Lymphocytes 27 13 - 44 %    Monocytes 13 (H) 4.0 - 12.0 %    Eosinophils % 0 (L) 0.5 - 7.8 %    Basophils 1 0.0 - 2.0 %    Immature Granulocytes 5 0.0 - 5.0 %    Segs Absolute 1.6 (L) 1.7 - 8.2 K/UL    Absolute Lymph # 0.8 0.5 - 4.6 K/UL    Absolute Mono # 0.4 0.1 - 1.3 K/UL    Absolute Eos # 0.0 0.0 - 0.8 K/UL    Basophils Absolute 0.0 0.0 - 0.2 K/UL    Absolute Immature Granulocyte 0.1 0.0 - 0.5 K/UL   Comprehensive Metabolic Panel    Collection Time: 07/12/22  2:51 AM   Result Value Ref Range    Sodium 142 138 - 145 mmol/L    Potassium 3.4 (L) 3.5 - 5.1 mmol/L    Chloride 112 (H) 98 - 107 mmol/L    CO2 28 21 - 32 mmol/L    Anion Gap 2 (L) 7 - 16 mmol/L    Glucose 195 (H) 65 - 100 mg/dL    BUN 11 8 - 23 MG/DL    CREATININE 0.60 (L) 0.8 - 1.5 MG/DL    GFR African American >60 >60 ml/min/1.73m2    GFR Non- >60 >60 ml/min/1.73m2    Calcium 8.1 (L) 8.3 - 10.4 MG/DL    Total Bilirubin 0.2 0.2 - 1.1 MG/DL    ALT 90 (H) 12 - 65 U/L    AST 18 15 - 37 U/L    Alk Phosphatase 139 (H) 50 - 136 U/L    Total Protein 5.2 (L) 6.3 - 8.2 g/dL    Albumin 2.0 (L) 3.2 - 4.6 g/dL    Globulin 3.2 2.3 - 3.5 g/dL    Albumin/Globulin Ratio 0.6 (L) 1.2 - 3.5     Magnesium    Collection Time: 07/12/22  2:51 AM   Result Value Ref Range    Magnesium 2.0 1.8 - 2.4 mg/dL   Potassium    Collection Time: 07/12/22 11:47 AM   Result Value Ref Range    Potassium 3.7 3.5 - 5.1 mmol/L   POCT Glucose    Collection Time: 07/12/22  6:13 PM   Result Value Ref Range    POC Glucose 170 (H) 65 - 100 mg/dL    Performed by: Abigail    POCT Glucose    Collection Time: 07/12/22  9:22 PM   Result Value Ref Range    POC Glucose 160 (H) 65 - 100 mg/dL    Performed by: Amada    Procalcitonin    Collection Time: (L) 1.2 - 3.5     Magnesium    Collection Time: 07/13/22  2:26 AM   Result Value Ref Range    Magnesium 2.2 1.8 - 2.4 mg/dL   POCT Glucose    Collection Time: 07/13/22  2:34 AM   Result Value Ref Range    POC Glucose 230 (H) 65 - 100 mg/dL    Performed by: Wade Perez    POCT Glucose    Collection Time: 07/13/22  7:54 AM   Result Value Ref Range    POC Glucose 240 (H) 65 - 100 mg/dL    Performed by:  Elaine    POCT Glucose    Collection Time: 07/13/22 11:59 AM   Result Value Ref Range    POC Glucose 152 (H) 65 - 100 mg/dL    Performed by: Rosemary    CBC with Auto Differential    Collection Time: 07/18/22  1:43 PM   Result Value Ref Range    WBC 9.5 4.3 - 11.1 K/uL    RBC 3.90 (L) 4.23 - 5.6 M/uL    Hemoglobin 11.2 (L) 13.6 - 17.2 g/dL    Hematocrit 34.8 %    MCV 89.2 79.6 - 97.8 FL    MCH 28.7 26.1 - 32.9 PG    MCHC 32.2 31.4 - 35.0 g/dL    RDW 13.6 11.9 - 14.6 %    Platelets 156 819 - 204 K/uL    MPV 10.5 9.4 - 12.3 FL    nRBC 0.00 0.0 - 0.2 K/uL    Differential Type AUTOMATED      Seg Neutrophils 65 43 - 78 %    Lymphocytes 23 13 - 44 %    Monocytes 7 4.0 - 12.0 %    Eosinophils % 2 0.5 - 7.8 %    Basophils 1 0.0 - 2.0 %    Immature Granulocytes 3 0.0 - 5.0 %    Segs Absolute 6.1 1.7 - 8.2 K/UL    Absolute Lymph # 2.1 0.5 - 4.6 K/UL    Absolute Mono # 0.7 0.1 - 1.3 K/UL    Absolute Eos # 0.2 0.0 - 0.8 K/UL    Basophils Absolute 0.1 0.0 - 0.2 K/UL    Absolute Immature Granulocyte 0.3 0.0 - 0.5 K/UL   Comprehensive Metabolic Panel    Collection Time: 07/18/22  1:43 PM   Result Value Ref Range    Sodium 139 136 - 145 mmol/L    Potassium 3.7 3.5 - 5.1 mmol/L    Chloride 106 98 - 107 mmol/L    CO2 29 21 - 32 mmol/L    Anion Gap 4 (L) 7 - 16 mmol/L    Glucose 125 (H) 65 - 100 mg/dL    BUN 12 8 - 23 MG/DL    CREATININE 0.60 (L) 0.8 - 1.5 MG/DL    GFR African American >60 >60 ml/min/1.73m2    GFR Non- >60 >60 ml/min/1.73m2    Calcium 8.3 8.3 - 10.4 MG/DL    Total Bilirubin 0.3 0.2 - 1.1 MG/DL     (H) 12 - 65 U/L    AST 70 (H) 15 - 37 U/L    Alk Phosphatase 125 50 - 136 U/L    Total Protein 5.8 (L) 6.3 - 8.2 g/dL    Albumin 2.3 (L) 3.2 - 4.6 g/dL    Globulin 3.5 2.3 - 3.5 g/dL    Albumin/Globulin Ratio 0.7 (L) 1.2 - 3.5     Magnesium    Collection Time: 07/18/22  1:43 PM   Result Value Ref Range    Magnesium 2.2 1.8 - 2.4 mg/dL   CEA    Collection Time: 07/18/22  1:43 PM   Result Value Ref Range    CEA 1.0 0.0 - 3.0 ng/mL       Imaging: reviewed     PATHOLOGY:             6/2022         ASSESSMENT:     Diagnosis Orders   1. Malignant neoplasm of stomach, unspecified location ([facility] [address]        2. Peritoneal metastases ([facility] [address]        3. Fatigue due to treatment        4. Cancer associated pain        5. Orthostatic hypotension            Mr. Evans is here for FU of metastatic adenocarcinoma. 1. Metastatic adenocarcinoma   - s/p omental and mesenteric mass bx - c/w upper GI adenocarcinoma    - hx of diverticular dz - s/p previous bowel resection by dr Cindy Calle at [address] [place] [address] pending     - here for hospital follow up - doing well. - labs reviewed and adequate, AST/ALT mildly elevated   - complete course of Levaquin for klebsiella pneumoniae and citrobacter freundii both of which were sensitive to Levaquin found around G-tube site. Now resolved. - ready for cycle 3 - increase doses further per Dr. Rosa M Pringle. - sclerotic lesion on imaging at S1 - bone scan recommended. - pain - better - on Fentanyl patch and oral supportive meds per PC. Can likely start to reduce doses soon. - nausea - resolved. Has Zyprexa QHS (not taking currently) and Zofran PRN. - wt loss/FTT - secondary to disease and tx - on TPN, wt up to 185 180 - monitoring closely     - depression/anxiety - currently controlled on regimen, will adjust modify as needed going forward   - constipation - Miralax as needed.    - hypotension - increase fluids, IVF 1000 mL every other day through home health  - increase diet as tolerated, on TPN now. RD following.   - Continue good oral nutrition and hydration.   - Encouraged frequent activity throughout the day and rest as needed to combat fatigue.   - Call with any fevers, uncontrolled side effects from treatment or any other worrisome/concerning symptoms. RTC for tx later this week or sooner as needed       MDM      Lab studies and imaging studies were personally reviewed. Pertinent old records were reviewed. Historical:    - here with his wife for consultation. During today's visit, we discussed his current workup. We looked at the images together demonstrating some of the findings concerning for peritoneal nodularity/peritoneal disease. Discussed anatomy of the findings  utilizing images to help pt understand what we are looking at and suspecting. He and wife were appreciative of the time spent to review these. Discussed need for visual dx/tissue pathology to determine plan - recommend ex lap - refer to Dr Mica Gtz - personally  reviewed case with Dr Mica Gtz who will expedite the workup and will see pt Fri. US with mild biliary duct dilation - HIDA/ERCP reviewed by PCP   + BM/flatus; He did not like how IV morphine made him feel in the hospital.  He tried tramadol but found no relief and has been taking acetaminophen at home with minimal relief. Discussed different options and he settled on trying  Percocet - he will contact the office to inform us if this is working for him. We discussed SE - not to drive while on the med. Bowel regimen reviewed. He is tired of tests, frustrated. Feelings validated and stressed importance of workup to determine why he has pain. Wt loss from 240 --> 209 noted and expressed concern to pt about this. All questions were asked and answered to the best of my ability. The patient verbalized understanding and agrees with the plan above.           Araceli Garzon, NP-C  [facility]  [address]  Office : [phone]  Fax : [phone]

## 2022-07-18 NOTE — PATIENT INSTRUCTIONS
Patient Instructions from Today's Visit    Reason for Visit  Hospital Follow up    Diagnosis Information:  [email or web address] [email or web address]-us/[email or web address]-[email or web address]-[email or web address]-materials/[email or web address]-kvuuank-[email or web address]-sheets    Plan:  -It is okay to try dairy products  -We will add 1 liter of fluids every other day with Home Health  -We will plan to restart chemotherapy    Follow Up:  As scheduled    Recent Lab Results:  Hospital Outpatient Visit on 07/18/2022   Component Date Value Ref Range Status    WBC 07/18/2022 9.5  4.3 - 11.1 K/uL Final    RBC 07/18/2022 3.90 (A) 4.23 - 5.6 M/uL Final    Hemoglobin 07/18/2022 11.2 (A) 13.6 - 17.2 g/dL Final    Hematocrit 07/18/2022 34.8  % Final    MCV 07/18/2022 89.2  79.6 - 97.8 FL Final    MCH 07/18/2022 28.7  26.1 - 32.9 PG Final    MCHC 07/18/2022 32.2  31.4 - 35.0 g/dL Final    RDW 07/18/2022 13.6  11.9 - 14.6 % Final    Platelets 58/46/7474 248  150 - 450 K/uL Final    MPV 07/18/2022 10.5  9.4 - 12.3 FL Final    nRBC 07/18/2022 0.00  0.0 - 0.2 K/uL Final    **Note: Absolute NRBC parameter is now reported with Hemogram**    Differential Type 07/18/2022 AUTOMATED    Final    Seg Neutrophils 07/18/2022 65  43 - 78 % Final    Lymphocytes 07/18/2022 23  13 - 44 % Final    Monocytes 07/18/2022 7  4.0 - 12.0 % Final    Eosinophils % 07/18/2022 2  0.5 - 7.8 % Final    Basophils 07/18/2022 1  0.0 - 2.0 % Final    Immature Granulocytes 07/18/2022 3  0.0 - 5.0 % Final    Segs Absolute 07/18/2022 6.1  1.7 - 8.2 K/UL Final    Absolute Lymph # 07/18/2022 2.1  0.5 - 4.6 K/UL Final    Absolute Mono # 07/18/2022 0.7  0.1 - 1.3 K/UL Final    Absolute Eos # 07/18/2022 0.2  0.0 - 0.8 K/UL Final    Basophils Absolute 07/18/2022 0.1  0.0 - 0.2 K/UL Final    Absolute Immature Granulocyte 07/18/2022 0.3  0.0 - 0.5 K/UL Final        Treatment Summary has been discussed and given to patient: n/a        -------------------------------------------------------------------------------------------------------------------  Please call our office at [phone] if you have any  of the following symptoms:   Fever of 100.5 or greater  Chills  Shortness of breath  Swelling or pain in one leg    After office hours an answering service is available and will contact a provider for emergencies or if you are experiencing any of the above symptoms. Patient does express an interest in My Chart. My Chart log in information explained on the after visit summary printout at the [facility] [address] desk.     Patricio Begum RN  Nurse Navigator  [address] [place] [phone]

## 2022-07-19 ASSESSMENT — ENCOUNTER SYMPTOMS
ABDOMINAL PAIN: 1
CONSTIPATION: 1
NAUSEA: 0

## 2022-07-19 NOTE — PROGRESS NOTES
Outpatient Palliative Care at the  [facility]: Office Visit Established Patient    Diagnosis: adenocarcinoma of upper GI    Treatment Plan: mFOLFIRINOX    Treatment Intent: Palliative    Medical Oncologist: Dr. Marda Cushing Oncologist: N/A    Navigator: Juan Ramon RN      Chief Complaint:    Chief Complaint   Patient presents with    Abdominal Pain       History of Present Illness:  Mr. Roldan is a 62 y.o. 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 Patient initially presented with 6 month history of abdominal pain and 30lb weight loss. A GI workup revealed hiatal hernia, gastric polyps, benign colon polyps, diverticulosis and hemorrhoids. He presented to [facility] ER on 5/12 and CT showed extensive peritoneal nodularity, concerning for metastatic disease, with consequential SBO. He had a laparotomy on 5/27 with venting G tube placement. Biopsies confirmed adenocarcinoma of upper GI primary. He started mFOLFIRINOX on 6/10/22 while hospitalized. Following cycle 2, patient was admitted for sepsis, source determined to be g tube insertion site. Interval History:  Patient seen in clinic with Uzma Alonzo NP and Juan Ramon RN. Patient's wife is with him. Patient continues with TPN, over 21 hours per day. He has a venting g tube, that he tolerates clamping for periods at a time. Since starting treatment, patient's abdominal bloating has improved. He has no nausea; he has Zyprexa and Zofran available but is not taking. He has advanced his diet to full liquids, and is experimenting with this. He has abdominal pain, for which he is prescribed fentanyl 100mcg every 72 hours and oxycodone 10mg as needed. He is taking 1-2 doses of oxycodone per day. He is having normal bowel movements, but not every day.   Patient is discouraged at his activity intolerance, as he typically likes to spend time outdoors and the heat is limiting him now. He does not have indoor hobbies. This is depressing for him. Review of Systems:  Review of Systems   Constitutional:  Positive for fatigue. Gastrointestinal:  Positive for abdominal pain and constipation. Negative for nausea. Psychiatric/Behavioral:  Positive for dysphoric mood.         No Known Allergies  Past Medical History:   Diagnosis Date    Bilateral carpal tunnel syndrome 12/9/2020    Chronic right shoulder pain 11/30/2020    Colon polyps 3/11/2013    Diverticulitis 3/11/2013    HTN (hypertension) 3/11/2013    Hyperlipidemia 3/11/2013    Paresthesia and pain of both upper extremities 11/30/2020    PUD (peptic ulcer disease) 3/11/2013    History of     Right elbow pain 12/9/2020    Wheezing 3/11/2013     Past Surgical History:   Procedure Laterality Date    COLONOSCOPY  2/25/09    colonoscopy per Dr. Aleks Kimball with need for repeat every 3 years    COLONOSCOPY  02/25/2022    Dr. Everlyn Holstein; polyps of the ascending, transverse, descending, and sigmoid colons; internal hemorrhoid; diverticulosis    LAPAROSCOPY N/A 5/27/2022    LAPAROSCOPY DIAGNOSTIC , OPEN EXPLORATORY LAPAROTOMY, MASS EXCISION, G-TUBE PLACEMENT performed by Claudia Sagastume MD at [address] N/A 6/3/2022    PORT INSERTION performed by Claudia Sagastume MD at [address] UNLISTED  October 2004    partial colon resection per Dr. Du Johnson  02/25/2022    Dr. Everlyn Holstein; hiatal hernia gastric polyps     Family History   Problem Relation Age of Onset    No Known Problems Brother     Cancer Sister         breast    Hypertension Mother     Heart Disease Mother     Diabetes Father     Osteoarthritis Father     Alcohol Abuse Father     Hypertension Father     Diabetes Mother      Social History     Socioeconomic History    Marital status:      Spouse name: Not on file    Number of children: Not on file    Years of education: Not on file    Highest education level: Not on file   Occupational History    Not on file   Tobacco Use    Smoking status: Every Day     Packs/day: 1.00     Types: Cigarettes    Smokeless tobacco: Never   Substance and Sexual Activity    Alcohol use: No    Drug use: No    Sexual activity: Not on file   Other Topics Concern    Not on file   Social History Narrative    Not on file     Social Determinants of Health     Financial Resource Strain: Not on file   Food Insecurity: Not on file   Transportation Needs: Not on file   Physical Activity: Not on file   Stress: Not on file   Social Connections: Not on file   Intimate Partner Violence: Not on file   Housing Stability: Not on file     Current Outpatient Medications   Medication Sig Dispense Refill    fentaNYL (DURAGESIC) 100 MCG/HR Place 1 patch onto the skin every 72 hours for 30 days. 10 patch 0    levoFLOXacin (LEVAQUIN) 500 MG tablet Take 1 tablet by mouth daily for 10 days 10 tablet 0    oxyCODONE (OXY-IR) 10 MG immediate release tablet Take 1 tablet by mouth every 4 hours as needed for Pain for up to 7 days.  30 tablet 0    OLANZapine zydis (ZYPREXA) 5 MG disintegrating tablet Take 1 tablet by mouth nightly Do not take with promethazine 30 tablet 3    busPIRone (BUSPAR) 10 MG tablet Take 1 tablet by mouth 3 times daily 90 tablet 0    nicotine (NICODERM CQ) 14 MG/24HR Place 1 patch onto the skin daily 30 patch 3    ondansetron (ZOFRAN-ODT) 8 MG TBDP disintegrating tablet Take 1 tablet by mouth every 8 hours 90 tablet 0    promethazine (PHENERGAN) 12.5 MG tablet Take 1 tablet by mouth every 6 hours as needed for Nausea 90 tablet 0    escitalopram (LEXAPRO) 10 MG tablet Take 1 tablet by mouth daily 30 tablet 0    naloxone 4 MG/0.1ML LIQD nasal spray 1 spray by Nasal route as needed for Opioid Reversal       pantoprazole (PROTONIX) 40 MG tablet TAKE 1 TABLET BY MOUTH BEFORE BREAKFAST AND DINNER FOR 30 DAYS      dicyclomine (BENTYL) 10 MG capsule Take 20 mg by mouth every 6 hours       polyethylene glycol (GLYCOLAX) 17 GM/SCOOP powder Take 17 g by mouth daily      rosuvastatin (CRESTOR) 10 MG tablet Take 10 mg by mouth      umeclidinium-vilanterol (ANORO ELLIPTA) 62.5-25 MCG/INH AEPB inhaler Inhale 1 puff into the lungs daily        No current facility-administered medications for this visit. OBJECTIVE:  Wt Readings from Last 1 Encounters:   07/18/22 185 lb 8 oz (84.1 kg)     Temp Readings from Last 1 Encounters:   07/18/22 97.8 °F (36.6 °C) (Oral)     BP Readings from Last 1 Encounters:   07/18/22 (!) 86/56     Pulse Readings from Last 1 Encounters:   07/18/22 72        Pain Score:   5 (fatigue-8)      Physical Exam:  Constitutional: Well developed, well nourished male in no acute distress. HEENT: Normocephalic and atraumatic. Pupils are equal, round, and reactive to light. Extraocular muscles are intact. Sclerae anicteric. Neck supple without JVD. Lymph node   deferred   Skin Warm and dry. No bruising and no rash noted. No erythema. No pallor. Respiratory unlabored respiratory effort. CVS Regular rate, hypotensive. Abdomen Soft, nontender and nondistended. Venting g tube clamped. Neuro Grossly nonfocal with no obvious sensory or motor deficits. MSK Normal range of motion in general.  No edema and no tenderness. Psych Flat affect.        Labs:  Recent Results (from the past 24 hour(s))   CBC with Auto Differential    Collection Time: 07/18/22  1:43 PM   Result Value Ref Range    WBC 9.5 4.3 - 11.1 K/uL    RBC 3.90 (L) 4.23 - 5.6 M/uL    Hemoglobin 11.2 (L) 13.6 - 17.2 g/dL    Hematocrit 34.8 %    MCV 89.2 79.6 - 97.8 FL    MCH 28.7 26.1 - 32.9 PG    MCHC 32.2 31.4 - 35.0 g/dL    RDW 13.6 11.9 - 14.6 %    Platelets 602 463 - 047 K/uL    MPV 10.5 9.4 - 12.3 FL    nRBC 0.00 0.0 - 0.2 K/uL    Differential Type AUTOMATED      Seg Neutrophils 65 43 - 78 %    Lymphocytes 23 13 - 44 %    Monocytes 7 4.0 - 12.0 %    Eosinophils % 2 0.5 - 7.8 % Basophils 1 0.0 - 2.0 %    Immature Granulocytes 3 0.0 - 5.0 %    Segs Absolute 6.1 1.7 - 8.2 K/UL    Absolute Lymph # 2.1 0.5 - 4.6 K/UL    Absolute Mono # 0.7 0.1 - 1.3 K/UL    Absolute Eos # 0.2 0.0 - 0.8 K/UL    Basophils Absolute 0.1 0.0 - 0.2 K/UL    Absolute Immature Granulocyte 0.3 0.0 - 0.5 K/UL   Comprehensive Metabolic Panel    Collection Time: 07/18/22  1:43 PM   Result Value Ref Range    Sodium 139 136 - 145 mmol/L    Potassium 3.7 3.5 - 5.1 mmol/L    Chloride 106 98 - 107 mmol/L    CO2 29 21 - 32 mmol/L    Anion Gap 4 (L) 7 - 16 mmol/L    Glucose 125 (H) 65 - 100 mg/dL    BUN 12 8 - 23 MG/DL    CREATININE 0.60 (L) 0.8 - 1.5 MG/DL    GFR African American >60 >60 ml/min/1.73m2    GFR Non- >60 >60 ml/min/1.73m2    Calcium 8.3 8.3 - 10.4 MG/DL    Total Bilirubin 0.3 0.2 - 1.1 MG/DL     (H) 12 - 65 U/L    AST 70 (H) 15 - 37 U/L    Alk Phosphatase 125 50 - 136 U/L    Total Protein 5.8 (L) 6.3 - 8.2 g/dL    Albumin 2.3 (L) 3.2 - 4.6 g/dL    Globulin 3.5 2.3 - 3.5 g/dL    Albumin/Globulin Ratio 0.7 (L) 1.2 - 3.5     Magnesium    Collection Time: 07/18/22  1:43 PM   Result Value Ref Range    Magnesium 2.2 1.8 - 2.4 mg/dL   CEA    Collection Time: 07/18/22  1:43 PM   Result Value Ref Range    CEA 1.0 0.0 - 3.0 ng/mL       Imaging:  No results found for this or any previous visit. ASSESSMENT:   Diagnosis Orders   1. Cancer associated pain        2. Slow transit constipation        3. Peritoneal metastases ([facility] [address]        4. Metastatic adenocarcinoma ([facility] [address]        5. Encounter for palliative care              PLAN:  Lab studies and imaging studies were personally reviewed. Pertinent old records were reviewed from hospitalization. 1. Abdominal pain: Continue fentanyl 100mcg every 72 hours. They have a 1 month supply. Will continue 100mcg dose for now and discuss weaning to 75mcg when refill is due. Continue oxycodone intensol for breakthrough pain. 2. Nausea: Currently denies. Continue Zyprexa HS and Zofran ODT as needed. They have been instructed that he may take either promethazine or olanzapine, but not both within the same day. 3.  Constipation: Continue MiraLAX daily as needed. 4. Dysphoric mood: Validated the frustration felt, and patient's worsening mood at home vs the hospital as he is surrounded by the tasks and activities he wishes he could do. He is prescribed Lexapro 10mg and Buspar 10mg TID; will inquire about compliance with these meds at follow-up. Advanced Care Planning Discussed: Patient and wife verbalize their desire to be aggressive with the dose of chemotherapy and wonder if it could be increased back to standard dosing as patient has improved since receiving 2 cycles of chemo. NP to discuss with Dr. Danay Parmar. Will follow up in: 4 weeks or sooner if needed. All questions were asked and answered to the best of my ability. In all, I spent 50 minutes in the care of Mr. Roldan today, over 50% of which was in direct counseling and coordination of care about symptom management. I have reviewed the patient's controlled substance prescription history, as maintained in the [place] prescription monitoring program, so that the prescriptions(s) for a controlled substance can be given.   Last Date Reviewed: 7/18/22          LUCY Gleason CNP  Outpatient Palliative Care at the  [address]  Office : [phone]  Fax : [phone]

## 2022-07-20 RX ORDER — SODIUM CHLORIDE 0.9 % (FLUSH) 0.9 %
5-40 SYRINGE (ML) INJECTION PRN
Status: CANCELLED | OUTPATIENT
Start: 2022-07-23

## 2022-07-20 RX ORDER — ALBUTEROL SULFATE 90 UG/1
4 AEROSOL, METERED RESPIRATORY (INHALATION) PRN
Status: CANCELLED | OUTPATIENT
Start: 2022-07-21

## 2022-07-20 RX ORDER — SODIUM CHLORIDE 9 MG/ML
5-40 INJECTION INTRAVENOUS PRN
Status: CANCELLED | OUTPATIENT
Start: 2022-07-23

## 2022-07-20 RX ORDER — SODIUM CHLORIDE 0.9 % (FLUSH) 0.9 %
5-40 SYRINGE (ML) INJECTION PRN
Status: CANCELLED | OUTPATIENT
Start: 2022-07-21

## 2022-07-20 RX ORDER — ATROPINE SULFATE 0.4 MG/ML
0.4 AMPUL (ML) INJECTION
Status: CANCELLED | OUTPATIENT
Start: 2022-07-21

## 2022-07-20 RX ORDER — HEPARIN SODIUM (PORCINE) LOCK FLUSH IV SOLN 100 UNIT/ML 100 UNIT/ML
500 SOLUTION INTRAVENOUS PRN
Status: CANCELLED | OUTPATIENT
Start: 2022-07-21

## 2022-07-20 RX ORDER — SODIUM CHLORIDE 9 MG/ML
INJECTION, SOLUTION INTRAVENOUS CONTINUOUS
Status: CANCELLED | OUTPATIENT
Start: 2022-07-21

## 2022-07-20 RX ORDER — ACETAMINOPHEN 325 MG/1
650 TABLET ORAL
Status: CANCELLED | OUTPATIENT
Start: 2022-07-21

## 2022-07-20 RX ORDER — MEPERIDINE HYDROCHLORIDE 25 MG/ML
12.5 INJECTION INTRAMUSCULAR; INTRAVENOUS; SUBCUTANEOUS PRN
Status: CANCELLED | OUTPATIENT
Start: 2022-07-21

## 2022-07-20 RX ORDER — SODIUM CHLORIDE 9 MG/ML
5-250 INJECTION, SOLUTION INTRAVENOUS PRN
Status: CANCELLED | OUTPATIENT
Start: 2022-07-21

## 2022-07-20 RX ORDER — 0.9 % SODIUM CHLORIDE 0.9 %
1000 INTRAVENOUS SOLUTION INTRAVENOUS ONCE
Status: CANCELLED
Start: 2022-07-23 | End: 2022-07-23

## 2022-07-20 RX ORDER — HEPARIN SODIUM (PORCINE) LOCK FLUSH IV SOLN 100 UNIT/ML 100 UNIT/ML
500 SOLUTION INTRAVENOUS PRN
Status: CANCELLED | OUTPATIENT
Start: 2022-07-23

## 2022-07-20 RX ORDER — SODIUM CHLORIDE 9 MG/ML
5-250 INJECTION, SOLUTION INTRAVENOUS PRN
Status: CANCELLED | OUTPATIENT
Start: 2022-07-23

## 2022-07-20 RX ORDER — EPINEPHRINE 1 MG/ML
0.3 INJECTION, SOLUTION, CONCENTRATE INTRAVENOUS PRN
Status: CANCELLED | OUTPATIENT
Start: 2022-07-21

## 2022-07-20 RX ORDER — SODIUM CHLORIDE 9 MG/ML
5-40 INJECTION INTRAVENOUS PRN
Status: CANCELLED | OUTPATIENT
Start: 2022-07-21

## 2022-07-20 RX ORDER — ONDANSETRON 2 MG/ML
8 INJECTION INTRAMUSCULAR; INTRAVENOUS
Status: CANCELLED | OUTPATIENT
Start: 2022-07-21

## 2022-07-20 RX ORDER — DIPHENHYDRAMINE HYDROCHLORIDE 50 MG/ML
50 INJECTION INTRAMUSCULAR; INTRAVENOUS
Status: CANCELLED | OUTPATIENT
Start: 2022-07-21

## 2022-07-21 ENCOUNTER — HOSPITAL ENCOUNTER (OUTPATIENT)
Dept: INFUSION THERAPY | Age: 62
Discharge: HOME | End: 2022-07-21
Payer: COMMERCIAL

## 2022-07-21 ENCOUNTER — CARE COORDINATION (OUTPATIENT)
Dept: CARE COORDINATION | Facility: CLINIC | Age: 62
End: 2022-07-21

## 2022-07-21 VITALS
SYSTOLIC BLOOD PRESSURE: 79 MMHG | RESPIRATION RATE: 18 BRPM | BODY MASS INDEX: 26.75 KG/M2 | TEMPERATURE: 98.1 F | DIASTOLIC BLOOD PRESSURE: 54 MMHG | WEIGHT: 186.4 LBS | HEART RATE: 67 BPM

## 2022-07-21 DIAGNOSIS — C76.2 ABDOMINAL CARCINOMATOSIS (HCC): Primary | ICD-10-CM

## 2022-07-21 DIAGNOSIS — C78.6 PERITONEAL METASTASES (HCC): ICD-10-CM

## 2022-07-21 PROCEDURE — 2580000003 HC RX 258: Performed by: NURSE PRACTITIONER

## 2022-07-21 PROCEDURE — 96375 TX/PRO/DX INJ NEW DRUG ADDON: CPT

## 2022-07-21 PROCEDURE — 6360000002 HC RX W HCPCS: Performed by: NURSE PRACTITIONER

## 2022-07-21 PROCEDURE — 96415 CHEMO IV INFUSION ADDL HR: CPT

## 2022-07-21 PROCEDURE — G0498 CHEMO EXTEND IV INFUS W/PUMP: HCPCS

## 2022-07-21 PROCEDURE — 96367 TX/PROPH/DG ADDL SEQ IV INF: CPT

## 2022-07-21 PROCEDURE — 96368 THER/DIAG CONCURRENT INF: CPT

## 2022-07-21 PROCEDURE — 96413 CHEMO IV INFUSION 1 HR: CPT

## 2022-07-21 PROCEDURE — 96372 THER/PROPH/DIAG INJ SC/IM: CPT

## 2022-07-21 PROCEDURE — 96417 CHEMO IV INFUS EACH ADDL SEQ: CPT

## 2022-07-21 RX ORDER — 0.9 % SODIUM CHLORIDE 0.9 %
1000 INTRAVENOUS SOLUTION INTRAVENOUS ONCE
Status: COMPLETED | OUTPATIENT
Start: 2022-07-21 | End: 2022-07-21

## 2022-07-21 RX ORDER — ONDANSETRON 2 MG/ML
8 INJECTION INTRAMUSCULAR; INTRAVENOUS ONCE
Status: COMPLETED | OUTPATIENT
Start: 2022-07-21 | End: 2022-07-21

## 2022-07-21 RX ORDER — DEXTROSE MONOHYDRATE 50 MG/ML
5-250 INJECTION, SOLUTION INTRAVENOUS PRN
Status: DISCONTINUED | OUTPATIENT
Start: 2022-07-21 | End: 2022-07-22 | Stop reason: HOSPADM

## 2022-07-21 RX ORDER — ATROPINE SULFATE 0.4 MG/ML
0.4 AMPUL (ML) INJECTION ONCE
Status: COMPLETED | OUTPATIENT
Start: 2022-07-21 | End: 2022-07-21

## 2022-07-21 RX ADMIN — ONDANSETRON 8 MG: 2 INJECTION INTRAMUSCULAR; INTRAVENOUS at 08:10

## 2022-07-21 RX ADMIN — IRINOTECAN HYDROCHLORIDE 260 MG: 20 INJECTION, SOLUTION INTRAVENOUS at 11:01

## 2022-07-21 RX ADMIN — SODIUM CHLORIDE 1000 ML: 900 INJECTION, SOLUTION INTRAVENOUS at 07:20

## 2022-07-21 RX ADMIN — OXALIPLATIN 125 MG: 5 INJECTION, SOLUTION INTRAVENOUS at 08:55

## 2022-07-21 RX ADMIN — DEXTROSE MONOHYDRATE 50 ML/HR: 5 INJECTION, SOLUTION INTRAVENOUS at 08:53

## 2022-07-21 RX ADMIN — ATROPINE SULFATE 0.4 MG: 0.4 INJECTION, SOLUTION INTRAMUSCULAR; INTRAVENOUS; SUBCUTANEOUS at 10:56

## 2022-07-21 RX ADMIN — FOSAPREPITANT 150 MG: 150 INJECTION, POWDER, LYOPHILIZED, FOR SOLUTION INTRAVENOUS at 08:14

## 2022-07-21 RX ADMIN — LEUCOVORIN CALCIUM 850 MG: 350 INJECTION, POWDER, LYOPHILIZED, FOR SUSPENSION INTRAMUSCULAR; INTRAVENOUS at 11:01

## 2022-07-21 RX ADMIN — DEXAMETHASONE SODIUM PHOSPHATE 12 MG: 4 INJECTION, SOLUTION INTRAMUSCULAR; INTRAVENOUS at 08:37

## 2022-07-21 RX ADMIN — FLUOROURACIL 5000 MG: 50 INJECTION, SOLUTION INTRAVENOUS at 12:41

## 2022-07-21 NOTE — PROGRESS NOTES
Nutrition:  Assessment based on referral for Nutrition Services per Dr. Christine Pal, on TPN, venting G-tube    Food/Nutrition and Pertinent Medical History:  Diet: Full Liquids as tolerated  Pt with newly diagnosed metastatic gastric cancer, peritoneal carcinomatosis, receiving FOLFIRINOX (cycle 3 this week). Pt has venting G-tube in place - remains clamped most of the time now w/ improvement in nausea, G-tube infection resolved. Pt denies N/V, tolerating small bites of full liquids, and has attempted some soft solids - most advanced food he has tried was a fish stick. Pt has Fentanyl patch (100 mcg) in place and pain controlled, using Oxycodone IR (10 mg) intermittently for breakthrough pain. TPN dependent for majority of estimated nutrition needs, managed by [facility], 2.6 L infuses over 21 hrs/day. Anthropometrics:  Ht: 70 inches, Current BW: 186#, 112% IBW, 78% UBW (240#, 22% wt loss x 6 months c/w severe wt loss), BMI = 26.7 (overweight)    Estimated Nutrition Needs:  EER: 1523-8537 kcal (25-30 kcal/kg BW)   EPR:  gm protein (1-1.3 gm/kg IBW)   H2O: 1 ml/kcal or per MD    Nutrition Diagnosis:  Altered GI function R/T metastatic gastric cancer as evidenced by peritoneal carcinomatosis, venting G-tube in place, slowly advancing po diet w/ G-tube clamped, wt loss of ~55# x 6 months. Intervention:  1. Continue w/ FL diet, advance to soft solids as able  2. Continue w/ current TPN per [facility] - called and spoke w/ RD and Pharm D w/ plans to give 2.6 L of NS on 5-FU pump days (48 hrs), and adding an additional 1 L NS daily in addition to TPN given hypotension. 3. Continue w/ 100 mcg Fentanyl patch, Oxycodone IR (10 mg) q 4 hrs prn for breakthrough pain  4.  Has Zofran and Zyprexa for nausea - improved and has not needed recently  5. G-tube clamped as tolerated      Monitoring/Evaluation:  1. RD to follow up during next office visit - follow up wt status, tolerance/intake of po diet, symptom

## 2022-07-21 NOTE — PROGRESS NOTES
Arrived to the [facility] ambulatory. Folfirinox completed and CI 5fu pump started over 46hours. Patient tolerated well. Any issues or concerns during appointment: no.  Patient aware of next infusion appointment on 7/23  at 1435   Patient instructed to call provider with temperature of 100.4 or greater or nausea/vomiting/ diarrhea or pain not controlled by medications  Discharged to home ambulatory.

## 2022-07-22 ENCOUNTER — TELEPHONE (OUTPATIENT)
Dept: ONCOLOGY | Age: 62
End: 2022-07-22

## 2022-07-22 NOTE — TELEPHONE ENCOUNTER
Patient's wife states pt had no symptoms with his chemo yesterday. Wondering if the chemo was increased as requested on Monday when he was here. He did so well with previous chemo, so requested to increase dosage for yesterday's chemo. Please let her know.

## 2022-07-22 NOTE — TELEPHONE ENCOUNTER
Upon chart review dose for oxali and irinotecan were increased for treatment yesterday. Yesenia Wei notified. She would like to know percentage of increase and percentage of normal dosing pt is currently receiving. Reviewed folfirinox regimen and answered questions to the best of my ability. Further possible dose adjustments can be discussed at next appt based on this cycle's tolerability. Yesenia Wei appreciative of discussion and VU.

## 2022-07-23 ENCOUNTER — HOSPITAL ENCOUNTER (OUTPATIENT)
Dept: INFUSION THERAPY | Age: 62
Discharge: HOME | End: 2022-07-23
Payer: COMMERCIAL

## 2022-07-23 VITALS
HEART RATE: 76 BPM | DIASTOLIC BLOOD PRESSURE: 51 MMHG | SYSTOLIC BLOOD PRESSURE: 85 MMHG | RESPIRATION RATE: 18 BRPM | TEMPERATURE: 97.7 F

## 2022-07-23 DIAGNOSIS — C76.2 ABDOMINAL CARCINOMATOSIS (HCC): Primary | ICD-10-CM

## 2022-07-23 DIAGNOSIS — C78.6 PERITONEAL METASTASES (HCC): ICD-10-CM

## 2022-07-23 PROCEDURE — 2580000003 HC RX 258: Performed by: NURSE PRACTITIONER

## 2022-07-23 PROCEDURE — 96523 IRRIG DRUG DELIVERY DEVICE: CPT

## 2022-07-23 RX ORDER — 0.9 % SODIUM CHLORIDE 0.9 %
1000 INTRAVENOUS SOLUTION INTRAVENOUS ONCE
Status: DISCONTINUED | OUTPATIENT
Start: 2022-07-23 | End: 2022-07-24 | Stop reason: HOSPADM

## 2022-07-23 RX ORDER — SODIUM CHLORIDE 0.9 % (FLUSH) 0.9 %
5-40 SYRINGE (ML) INJECTION PRN
Status: DISCONTINUED | OUTPATIENT
Start: 2022-07-23 | End: 2022-07-24 | Stop reason: HOSPADM

## 2022-07-23 RX ADMIN — SODIUM CHLORIDE, PRESERVATIVE FREE 10 ML: 5 INJECTION INTRAVENOUS at 14:14

## 2022-07-23 NOTE — PROGRESS NOTES
Pt arrived ambulatory, chemo pump removed, pt tolerated well, refused IVF, discharged home ambulatory

## 2022-07-26 ENCOUNTER — TELEPHONE (OUTPATIENT)
Dept: FAMILY MEDICINE CLINIC | Facility: CLINIC | Age: 62
End: 2022-07-26

## 2022-07-27 ENCOUNTER — CARE COORDINATION (OUTPATIENT)
Dept: CARE COORDINATION | Facility: CLINIC | Age: 62
End: 2022-07-27

## 2022-07-27 NOTE — CARE COORDINATION
Care Transitions Outreach Attempt    Call within 2 business days of discharge: Yes   Attempted to reach patient for transitions of care follow up. Unable to reach patient. Patient: Paddy Burkitt Patient : 1960 MRN: [id number]    Last Discharge [address] [address]       Date Complaint Diagnosis Description Type Department Provider    7/10/22 Fever Neutropenic fever ([address] . .. ED to Hosp-Admission (Discharged) (ADMITTED) Deidre Ramsay MD; Ya Hebert. .. Was this an external facility discharge? No Discharge Facility: [facility]    Noted following upcoming appointments from discharge chart review:   [facility] follow up appointment(s):   Future Appointments   Date Time Provider Monica Pali   2022  8:15 AM LAB CK [facility]   2022  8:45 AM LUCIANA Chávez [facility] AMB   2022  8:45 AM Estee English RD [facility] AMB   2022  9:30 AM POD3B  [facility]   2022  3:00 PM POD3A [facility]   2022  9:00 AM LAB CK [facility]   2022  9:30 AM Marco A Montoya MD [facility] AMB   2022 10:00 AM LUCY Becerra PCT[facility] AMB   2022  9:30 AM POD1B [facility]   2022  2:00 PM SS [facility]   2022  4:00 PM Chato Hermosillo MD [facility] AMB   2022  9:15 AM LAB CK [facility]   2022  9:45 AM Marco A Montoya MD [facility] AMB   2022 10:30 AM POD3B [address] [place] [place] [address]   [facility]     Non-BS follow up appointment(s): na    MURTAZA outreach follow up call. Left message, identified self, reason for call, return call contact information, Trish Reid [address]

## 2022-07-28 ENCOUNTER — TELEPHONE (OUTPATIENT)
Dept: ONCOLOGY | Age: 62
End: 2022-07-28

## 2022-07-28 RX ORDER — PANTOPRAZOLE SODIUM 40 MG/1
40 TABLET, DELAYED RELEASE ORAL
Qty: 60 TABLET | Refills: 2 | Status: SHIPPED | OUTPATIENT
Start: 2022-07-28 | End: 2022-09-13

## 2022-07-28 NOTE — TELEPHONE ENCOUNTER
PT need a refill on his prescription pantoprazole 40mg.           Pharmacy: [facility] in [place]

## 2022-07-28 NOTE — TELEPHONE ENCOUNTER
Called to clarify with patient his dose of protonix. Shania states that he takes it twice a day. He has been out for 2 days and has had real bad heartburn.  New prescription sent

## 2022-08-01 DIAGNOSIS — R10.84 GENERALIZED ABDOMINAL PAIN: ICD-10-CM

## 2022-08-01 RX ORDER — DICYCLOMINE HCL 20 MG
TABLET ORAL
Qty: 360 TABLET | Refills: 0 | Status: SHIPPED | OUTPATIENT
Start: 2022-08-01 | End: 2022-10-28

## 2022-08-02 ENCOUNTER — CLINICAL DOCUMENTATION (OUTPATIENT)
Dept: ONCOLOGY | Age: 62
End: 2022-08-02

## 2022-08-02 RX ORDER — FLUCONAZOLE 100 MG/1
200 TABLET ORAL 2 TIMES DAILY
Qty: 28 TABLET | Refills: 0 | Status: SHIPPED | OUTPATIENT
Start: 2022-08-02 | End: 2022-08-09

## 2022-08-02 RX ORDER — LEVOFLOXACIN 750 MG/1
750 TABLET ORAL DAILY
Qty: 7 TABLET | Refills: 0 | Status: SHIPPED | OUTPATIENT
Start: 2022-08-02 | End: 2022-08-09

## 2022-08-02 NOTE — PROGRESS NOTES
Nutrition:  Pt's wife sent My Chart message to NP w/ concerns of infection at venting G-tube site, RD reviewed picture and did not appear to be infected, is red and excoriated w/ some drainage at site, low grade fever. Message also sent to Palliative Care, and discussed with Cody Pfeiffer, NINFA and plan to give Levaquin (750 mg) daily x 7 days, and Fluconazole (200 mg) - 2 tabs BID x 7 days; both prescriptions sent to pt's [facility] and wife aware. Pt's wife reports that he had been keeping G-tube clamped most of the time until the past week - he's been more active and not feeling well in the evenings, wife feels he's \"over doing it\" at times, so he's been venting G-tube at night. Suspect venting throughout the night is pulling on G-tube which is making the tract larger, and creating more drainage of stomach acid onto skin. Pt's scheduled for follow up with NP on Thursday (8/4). Pt's wife also verbalizes concerns about surgeon and requesting new surgeon, current G-tube placed by IR - will call in the AM and have them review the picture in chart to see if it needs to be exchanged. Pt is able to eat and drink po, remains inadequate, and receiving TPN daily for majority of nutrition needs, bowels moving, having intermittent vomiting per wife.       [address] [facility], [address], [address] [facility]

## 2022-08-03 DIAGNOSIS — C16.9 MALIGNANT NEOPLASM OF STOMACH, UNSPECIFIED LOCATION (HCC): Primary | ICD-10-CM

## 2022-08-03 RX ORDER — SODIUM CHLORIDE 0.9 % (FLUSH) 0.9 %
10 SYRINGE (ML) INJECTION PRN
Status: CANCELLED | OUTPATIENT
Start: 2022-08-03

## 2022-08-04 ENCOUNTER — HOSPITAL ENCOUNTER (OUTPATIENT)
Dept: INFUSION THERAPY | Age: 62
Discharge: HOME | End: 2022-08-04
Payer: COMMERCIAL

## 2022-08-04 ENCOUNTER — OFFICE VISIT (OUTPATIENT)
Dept: ONCOLOGY | Age: 62
End: 2022-08-04

## 2022-08-04 ENCOUNTER — OFFICE VISIT (OUTPATIENT)
Dept: ONCOLOGY | Age: 62
End: 2022-08-04
Payer: COMMERCIAL

## 2022-08-04 VITALS
SYSTOLIC BLOOD PRESSURE: 87 MMHG | RESPIRATION RATE: 16 BRPM | HEART RATE: 69 BPM | HEIGHT: 70 IN | WEIGHT: 186.1 LBS | DIASTOLIC BLOOD PRESSURE: 64 MMHG | TEMPERATURE: 97.7 F | BODY MASS INDEX: 26.64 KG/M2 | OXYGEN SATURATION: 98 %

## 2022-08-04 DIAGNOSIS — I95.1 ORTHOSTATIC HYPOTENSION: ICD-10-CM

## 2022-08-04 DIAGNOSIS — Z78.9 ON TOTAL PARENTERAL NUTRITION (TPN): ICD-10-CM

## 2022-08-04 DIAGNOSIS — C78.6 PERITONEAL METASTASES (HCC): ICD-10-CM

## 2022-08-04 DIAGNOSIS — C16.9 MALIGNANT NEOPLASM OF STOMACH, UNSPECIFIED LOCATION (HCC): Primary | ICD-10-CM

## 2022-08-04 DIAGNOSIS — Z00.8 NUTRITIONAL ASSESSMENT: Primary | ICD-10-CM

## 2022-08-04 DIAGNOSIS — R53.83 FATIGUE DUE TO TREATMENT: ICD-10-CM

## 2022-08-04 DIAGNOSIS — C16.9 MALIGNANT NEOPLASM OF STOMACH, UNSPECIFIED LOCATION (HCC): ICD-10-CM

## 2022-08-04 DIAGNOSIS — C76.2 ABDOMINAL CARCINOMATOSIS (HCC): ICD-10-CM

## 2022-08-04 DIAGNOSIS — C76.2 ABDOMINAL CARCINOMATOSIS (HCC): Primary | ICD-10-CM

## 2022-08-04 LAB
ALBUMIN SERPL-MCNC: 2.7 G/DL (ref 3.2–4.6)
ALBUMIN/GLOB SERPL: 0.8 {RATIO} (ref 1.2–3.5)
ALP SERPL-CCNC: 93 U/L (ref 50–136)
ALT SERPL-CCNC: 66 U/L (ref 12–65)
ANION GAP SERPL CALC-SCNC: 5 MMOL/L (ref 7–16)
AST SERPL-CCNC: 30 U/L (ref 15–37)
BASOPHILS # BLD: 0 K/UL (ref 0–0.2)
BASOPHILS NFR BLD: 1 % (ref 0–2)
BILIRUB SERPL-MCNC: 0.3 MG/DL (ref 0.2–1.1)
BUN SERPL-MCNC: 12 MG/DL (ref 8–23)
CALCIUM SERPL-MCNC: 8.5 MG/DL (ref 8.3–10.4)
CHLORIDE SERPL-SCNC: 106 MMOL/L (ref 98–107)
CO2 SERPL-SCNC: 28 MMOL/L (ref 21–32)
CREAT SERPL-MCNC: 0.7 MG/DL (ref 0.8–1.5)
DIFFERENTIAL METHOD BLD: ABNORMAL
EOSINOPHIL # BLD: 0.7 K/UL (ref 0–0.8)
EOSINOPHIL NFR BLD: 13 % (ref 0.5–7.8)
ERYTHROCYTE [DISTWIDTH] IN BLOOD BY AUTOMATED COUNT: 14.8 % (ref 11.9–14.6)
GLOBULIN SER CALC-MCNC: 3.6 G/DL (ref 2.3–3.5)
GLUCOSE SERPL-MCNC: 103 MG/DL (ref 65–100)
HCT VFR BLD AUTO: 35.7 %
HGB BLD-MCNC: 11.2 G/DL (ref 13.6–17.2)
IMM GRANULOCYTES # BLD AUTO: 0 K/UL (ref 0–0.5)
IMM GRANULOCYTES NFR BLD AUTO: 0 % (ref 0–5)
LYMPHOCYTES # BLD: 1.9 K/UL (ref 0.5–4.6)
LYMPHOCYTES NFR BLD: 37 % (ref 13–44)
MCH RBC QN AUTO: 28.2 PG (ref 26.1–32.9)
MCHC RBC AUTO-ENTMCNC: 31.4 G/DL (ref 31.4–35)
MCV RBC AUTO: 89.9 FL (ref 79.6–97.8)
MONOCYTES # BLD: 0.4 K/UL (ref 0.1–1.3)
MONOCYTES NFR BLD: 8 % (ref 4–12)
NEUTS SEG # BLD: 2.2 K/UL (ref 1.7–8.2)
NEUTS SEG NFR BLD: 42 % (ref 43–78)
NRBC # BLD: 0 K/UL (ref 0–0.2)
PLATELET # BLD AUTO: 203 K/UL (ref 150–450)
PMV BLD AUTO: 10.8 FL (ref 9.4–12.3)
POTASSIUM SERPL-SCNC: 3.8 MMOL/L (ref 3.5–5.1)
PROT SERPL-MCNC: 6.3 G/DL (ref 6.3–8.2)
RBC # BLD AUTO: 3.97 M/UL (ref 4.23–5.6)
SODIUM SERPL-SCNC: 139 MMOL/L (ref 136–145)
WBC # BLD AUTO: 5.2 K/UL (ref 4.3–11.1)

## 2022-08-04 PROCEDURE — G0498 CHEMO EXTEND IV INFUS W/PUMP: HCPCS

## 2022-08-04 PROCEDURE — 6360000002 HC RX W HCPCS: Performed by: NURSE PRACTITIONER

## 2022-08-04 PROCEDURE — 85025 COMPLETE CBC W/AUTO DIFF WBC: CPT

## 2022-08-04 PROCEDURE — 36591 DRAW BLOOD OFF VENOUS DEVICE: CPT

## 2022-08-04 PROCEDURE — 96415 CHEMO IV INFUSION ADDL HR: CPT

## 2022-08-04 PROCEDURE — 2580000003 HC RX 258: Performed by: NURSE PRACTITIONER

## 2022-08-04 PROCEDURE — 96372 THER/PROPH/DIAG INJ SC/IM: CPT

## 2022-08-04 PROCEDURE — 96368 THER/DIAG CONCURRENT INF: CPT

## 2022-08-04 PROCEDURE — 80053 COMPREHEN METABOLIC PANEL: CPT

## 2022-08-04 PROCEDURE — 99214 OFFICE O/P EST MOD 30 MIN: CPT | Performed by: NURSE PRACTITIONER

## 2022-08-04 PROCEDURE — 96375 TX/PRO/DX INJ NEW DRUG ADDON: CPT

## 2022-08-04 PROCEDURE — 96367 TX/PROPH/DG ADDL SEQ IV INF: CPT

## 2022-08-04 PROCEDURE — 96417 CHEMO IV INFUS EACH ADDL SEQ: CPT

## 2022-08-04 PROCEDURE — 96413 CHEMO IV INFUSION 1 HR: CPT

## 2022-08-04 RX ORDER — FAMOTIDINE 10 MG/ML
20 INJECTION, SOLUTION INTRAVENOUS
Status: CANCELLED | OUTPATIENT
Start: 2022-08-04

## 2022-08-04 RX ORDER — ONDANSETRON 2 MG/ML
8 INJECTION INTRAMUSCULAR; INTRAVENOUS ONCE
Status: CANCELLED | OUTPATIENT
Start: 2022-08-04 | End: 2022-08-04

## 2022-08-04 RX ORDER — SODIUM CHLORIDE 0.9 % (FLUSH) 0.9 %
5-40 SYRINGE (ML) INJECTION PRN
Status: CANCELLED | OUTPATIENT
Start: 2022-08-04

## 2022-08-04 RX ORDER — MEPERIDINE HYDROCHLORIDE 50 MG/ML
12.5 INJECTION INTRAMUSCULAR; INTRAVENOUS; SUBCUTANEOUS PRN
Status: CANCELLED | OUTPATIENT
Start: 2022-08-04

## 2022-08-04 RX ORDER — 0.9 % SODIUM CHLORIDE 0.9 %
1000 INTRAVENOUS SOLUTION INTRAVENOUS ONCE
Status: CANCELLED
Start: 2022-08-06 | End: 2022-08-06

## 2022-08-04 RX ORDER — DIPHENHYDRAMINE HYDROCHLORIDE 50 MG/ML
50 INJECTION INTRAMUSCULAR; INTRAVENOUS
Status: CANCELLED | OUTPATIENT
Start: 2022-08-04

## 2022-08-04 RX ORDER — 0.9 % SODIUM CHLORIDE 0.9 %
1000 INTRAVENOUS SOLUTION INTRAVENOUS ONCE
Status: COMPLETED | OUTPATIENT
Start: 2022-08-04 | End: 2022-08-04

## 2022-08-04 RX ORDER — HEPARIN SODIUM (PORCINE) LOCK FLUSH IV SOLN 100 UNIT/ML 100 UNIT/ML
500 SOLUTION INTRAVENOUS PRN
Status: CANCELLED | OUTPATIENT
Start: 2022-08-04

## 2022-08-04 RX ORDER — DEXTROSE MONOHYDRATE 50 MG/ML
5-250 INJECTION, SOLUTION INTRAVENOUS PRN
Status: CANCELLED | OUTPATIENT
Start: 2022-08-04

## 2022-08-04 RX ORDER — SODIUM CHLORIDE 0.9 % (FLUSH) 0.9 %
5-40 SYRINGE (ML) INJECTION PRN
Status: CANCELLED | OUTPATIENT
Start: 2022-08-06

## 2022-08-04 RX ORDER — SODIUM CHLORIDE 9 MG/ML
INJECTION, SOLUTION INTRAVENOUS CONTINUOUS
Status: CANCELLED | OUTPATIENT
Start: 2022-08-04

## 2022-08-04 RX ORDER — ATROPINE SULFATE 0.4 MG/ML
0.4 AMPUL (ML) INJECTION
Status: CANCELLED | OUTPATIENT
Start: 2022-08-04

## 2022-08-04 RX ORDER — MIDODRINE HYDROCHLORIDE 5 MG/1
5 TABLET ORAL 2 TIMES DAILY
Qty: 60 TABLET | Refills: 3 | Status: SHIPPED | OUTPATIENT
Start: 2022-08-04 | End: 2022-10-28

## 2022-08-04 RX ORDER — HEPARIN SODIUM (PORCINE) LOCK FLUSH IV SOLN 100 UNIT/ML 100 UNIT/ML
500 SOLUTION INTRAVENOUS PRN
Status: CANCELLED | OUTPATIENT
Start: 2022-08-06

## 2022-08-04 RX ORDER — 0.9 % SODIUM CHLORIDE 0.9 %
1000 INTRAVENOUS SOLUTION INTRAVENOUS ONCE
Status: CANCELLED
Start: 2022-08-04 | End: 2022-08-04

## 2022-08-04 RX ORDER — ACETAMINOPHEN 325 MG/1
650 TABLET ORAL
Status: CANCELLED | OUTPATIENT
Start: 2022-08-04

## 2022-08-04 RX ORDER — ESCITALOPRAM OXALATE 10 MG/1
10 TABLET ORAL DAILY
Qty: 30 TABLET | Refills: 5 | Status: SHIPPED | OUTPATIENT
Start: 2022-08-04

## 2022-08-04 RX ORDER — ATROPINE SULFATE 0.4 MG/ML
0.4 AMPUL (ML) INJECTION ONCE
Status: COMPLETED | OUTPATIENT
Start: 2022-08-04 | End: 2022-08-04

## 2022-08-04 RX ORDER — ATROPINE SULFATE 0.4 MG/ML
0.4 AMPUL (ML) INJECTION ONCE
Status: CANCELLED | OUTPATIENT
Start: 2022-08-04 | End: 2022-08-04

## 2022-08-04 RX ORDER — DEXTROSE MONOHYDRATE 50 MG/ML
5-250 INJECTION, SOLUTION INTRAVENOUS PRN
Status: DISCONTINUED | OUTPATIENT
Start: 2022-08-04 | End: 2022-08-05 | Stop reason: HOSPADM

## 2022-08-04 RX ORDER — ALBUTEROL SULFATE 90 UG/1
4 AEROSOL, METERED RESPIRATORY (INHALATION) PRN
Status: CANCELLED | OUTPATIENT
Start: 2022-08-04

## 2022-08-04 RX ORDER — SODIUM CHLORIDE 9 MG/ML
5-40 INJECTION INTRAVENOUS PRN
Status: CANCELLED | OUTPATIENT
Start: 2022-08-04

## 2022-08-04 RX ORDER — SODIUM CHLORIDE 9 MG/ML
5-250 INJECTION, SOLUTION INTRAVENOUS PRN
Status: CANCELLED | OUTPATIENT
Start: 2022-08-06

## 2022-08-04 RX ORDER — EPINEPHRINE 1 MG/ML
0.3 INJECTION, SOLUTION, CONCENTRATE INTRAVENOUS PRN
Status: CANCELLED | OUTPATIENT
Start: 2022-08-04

## 2022-08-04 RX ORDER — ONDANSETRON 2 MG/ML
8 INJECTION INTRAMUSCULAR; INTRAVENOUS
Status: CANCELLED | OUTPATIENT
Start: 2022-08-04

## 2022-08-04 RX ORDER — ONDANSETRON 2 MG/ML
8 INJECTION INTRAMUSCULAR; INTRAVENOUS ONCE
Status: COMPLETED | OUTPATIENT
Start: 2022-08-04 | End: 2022-08-04

## 2022-08-04 RX ORDER — SODIUM CHLORIDE 9 MG/ML
5-40 INJECTION INTRAVENOUS PRN
Status: CANCELLED | OUTPATIENT
Start: 2022-08-06

## 2022-08-04 RX ORDER — SODIUM CHLORIDE 9 MG/ML
5-250 INJECTION, SOLUTION INTRAVENOUS PRN
Status: CANCELLED | OUTPATIENT
Start: 2022-08-04

## 2022-08-04 RX ORDER — SODIUM CHLORIDE 0.9 % (FLUSH) 0.9 %
5-40 SYRINGE (ML) INJECTION PRN
Status: DISCONTINUED | OUTPATIENT
Start: 2022-08-04 | End: 2022-08-05 | Stop reason: HOSPADM

## 2022-08-04 RX ADMIN — FLUOROURACIL 5000 MG: 50 INJECTION, SOLUTION INTRAVENOUS at 15:20

## 2022-08-04 RX ADMIN — DEXTROSE MONOHYDRATE 150 ML/HR: 50 INJECTION, SOLUTION INTRAVENOUS at 11:26

## 2022-08-04 RX ADMIN — IRINOTECAN HYDROCHLORIDE 320 MG: 20 INJECTION, SOLUTION INTRAVENOUS at 13:40

## 2022-08-04 RX ADMIN — LEUCOVORIN CALCIUM 850 MG: 350 INJECTION, POWDER, LYOPHILIZED, FOR SOLUTION INTRAMUSCULAR; INTRAVENOUS at 13:40

## 2022-08-04 RX ADMIN — OXALIPLATIN 180 MG: 5 INJECTION, SOLUTION INTRAVENOUS at 11:35

## 2022-08-04 RX ADMIN — SODIUM CHLORIDE, PRESERVATIVE FREE 10 ML: 5 INJECTION INTRAVENOUS at 09:46

## 2022-08-04 RX ADMIN — ATROPINE SULFATE 0.4 MG: 0.4 INJECTION, SOLUTION INTRAMUSCULAR; INTRAVENOUS; SUBCUTANEOUS at 15:14

## 2022-08-04 RX ADMIN — ONDANSETRON 8 MG: 2 INJECTION INTRAMUSCULAR; INTRAVENOUS at 10:05

## 2022-08-04 RX ADMIN — DEXAMETHASONE SODIUM PHOSPHATE 12 MG: 4 INJECTION, SOLUTION INTRAMUSCULAR; INTRAVENOUS at 10:24

## 2022-08-04 RX ADMIN — SODIUM CHLORIDE 1000 ML: 9 INJECTION, SOLUTION INTRAVENOUS at 09:47

## 2022-08-04 RX ADMIN — FOSAPREPITANT 150 MG: 150 INJECTION, POWDER, LYOPHILIZED, FOR SOLUTION INTRAVENOUS at 10:40

## 2022-08-04 ASSESSMENT — ENCOUNTER SYMPTOMS
BACK PAIN: 0
SCLERAL ICTERUS: 0
SORE THROAT: 0
DIARRHEA: 0
ABDOMINAL DISTENTION: 0
EYE PROBLEMS: 0
VOMITING: 0
SHORTNESS OF BREATH: 0
COUGH: 0
ABDOMINAL PAIN: 1
CONSTIPATION: 0
BLOOD IN STOOL: 0
HEMOPTYSIS: 0

## 2022-08-04 NOTE — PROGRESS NOTES
[employer] [facility]: Established patient - follow up     Chief Complaint   Patient presents with    Follow-up      Reason for Referral: Abdominal pain; Concern  for peritoneal metastatic disease   Referring Provider: Phyllis Yang NP   Primary Care Provider: Emmy Chowdhury MD   Family History of Cancer/Hematologic Disorders: Family history is significant for sister with breast cancer. Presenting Symptoms: Progressively worsening, persistent abdominal pain x several months    History of Present Illness:  Mr. Nilton Raya  is a 62 y.o. male who presents today for FU regarding adenocarcinoma with peritoneal metastatic disease. The past medical history is significant for tobacco use (recent pack per day smoker x 30+ years - now down to 1/3PPD), PUD, paresthesia/pain of bilateral upper extremities, HLD, HTN, diverticulitis,  colon polyps, hiatal hernia, chronic right shoulder pain, bilateral carpal tunnel syndrome, and partial colon resection. He presented to the [facility] ED on 5/12/22 with a complaint of persistent abdominal pain for several months that was becoming progressively  worse. EGD and colonoscopy on 2/25/22 revealed findings of hiatal hernia, gastric polyps, several benign colon polyps, diverticulosis, and internal hemorrhoids. CT of the abdomen on 3/8/22 showed no acute findings. He presented to [facility] ER x 2 for  evaluation (5/3/22 and 5/10/22). RUQ abdominal ultrasound was completed on 5/3/22 in the ED at [facility] demonstrating no acute findings to explain patient's pain; probable hepatic  steatosis; small echogenic mural foci in the gallbladder which are nonmobile, likely representing cholesterol polyps, largest measuring 4 mm; and small volume simple ascites in the right upper quadrant and left lower quadrant, nonspecific.   CT AP with  contrast at [facility] ED 5/10/22 showed a partial small bowel obstruction; small to moderate amount of free fluid in the abdomen and pelvis; and why he has pain. Wt loss from 240 --> 209 noted and expressed concern to pt about this. Of note, prior akbar resection with Dr Cindy Calle for diverticulitis per pt. Sent note to dr Nasima Dockery re pt's case to expedite workup with his agreement. He was hospitalized for abdominal pain and sepsis. He was empirically placed on vancomycin and cefepime. CT scanning disclosed no intra-abdominal fluid collection or abscess but did suggest that his tumor burden was somewhat smaller. He had some purulent drainage from around his G-tube. This was cultured and grew klebsiella pneumoniae and citrobacter freundii both of which were sensitive to Levaquin which he was discharged home on for 10 days. Here for cycle 4 FOLFIRINOX (dose reduced but slowly increasing with each cycle). He is tolerating chemotherapy excellently. He doesn't have uncontrolled nausea - utilizes Zofran and an occasional Compazine as needed. His bowels are moving appropriately. He denies any neuropathy. His venting G-tube started getting red again with some purulent drainage - he was placed on Levaquin and Diflucan again and has been taking this for 2 days now with improvement. No fevers, chills or other infectious symptoms. He would like to have this removed if able. He unclamps at night and gets minimal output. His pain is well controlled on 100 mcg Fentanyl patch and he has not needed breakthrough oxycodone every day which is an improvement. He has been able to tolerate some soft foods but bulk of nutrition is coming from TPN right now. He has ongoing orthostatic hypotension. We added IVF every other day but this did not solve issue though his sitting BP is improved. He will start midodrine 5 mg BID - once upon wakening and again in mid-afternoon. He is instructed to not lie down after taking. They would like to continue to escalate chemotherapy doses.               Chronological Events:   EGD REPORT 2/25/22                      COLONOSCOPY REPORT 2/25/22                 CT ABDOMEN PELVIS W CONTRAST 3/8/2022   FINDINGS:   LOWER CHEST: Unremarkable. ABDOMEN AND PELVIS:   Liver: Unremarkable. Biliary system: Unremarkable. Pancreas: Unremarkable. Spleen: Unremarkable. Adrenals: Unremarkable. Genitourinary: Unremarkable. Reproductive organs: Unremarkable. Gastrointestinal tract: Colonic diverticulosis without evidence of diverticulitis. There is no evidence of bowel obstruction or inflammation. The appendix is normal   Peritoneum/Extraperitoneum: Unremarkable. No free fluid or air. Vascular: Unremarkable. Musculoskeletal:  Small fat-containing umbilical hernia. Degenerative  changes of thoracolumbar spine. No acute or aggressive osseous lesion. IMPRESSION: Colonic diverticula without evidence of acute diverticulitis. RIGHT UPPER QUADRANT ABDOMINAL ULTRASOUND 5/3/2022    FINDINGS:   Pancreas: Not visualized, obscured by bowel gas. Intrahepatic IVC: Normal.   Main  Portal Vein: Patent with normal directional flow. Liver: Liver contour is normal. Liver appears diffusely increased in echogenicity consistent with hepatic steatosis. There is fatty sparing around the gallbladder fossa. Gallbladder: Gallbladder  is normal in size. No evidence of gallbladder wall thickening. No gallstones are seen. There are several nonmobile echogenic mural foci in the proximal gallbladder body/neck, largest measuring 4 mm, without shadowing, likely small cholesterol polyps. Bile ducts: No evidence of biliary ductal dilation. The common bile duct measures 3 mm in diameter. Right kidney: Normal.   Left Upper Quadrant: Limited images of the left upper quadrant are unremarkable. Spleen measures 12.1 cm in length. Free fluid: Trace simple free fluid in the right upper quadrant and left lower quadrant. IMPRESSION:    1. No acute findings to explain patient's pain. 2. Probable hepatic steatosis.    3. Small echogenic mural foci in the gallbladder which are nonmobile, likely representing cholesterol polyps, largest measuring 4 mm. There are no specific ultrasound  follow up recommendations for polyps of this small size. 4. Small volume simple ascites in the right upper quadrant and left lower quadrant, nonspecific. CT ABDOMEN - PELVIS WITH CONTRAST 5/10/22   FINDINGS:   Liver: Normal    Portal Vein: Normal   Gallbladder - Biliary Tree: Normal   Pancreas: Normal   Spleen: Normal   Retroperitoneum:   Adrenals: Normal   Kidneys:  Normal    Aorto - Cava:  Calcification of the abdominal aorta without aneurysm formation. Lymphatics:  Normal   GI - Mesentery - Peritoneum: Multiple dilated mid small bowel loops without a focal transition point. The appendix is normal. Small to  moderate amount of free fluid in the pelvis and right flank. Nonspecific stranding of the omentum in the left upper quadrant. Small fatty umbilical hernia. Pelvis: Normal   Lower Chest: Normal   Soft tissues - MSK: Normal    IMPRESSION:   1. Partial small bowel obstruction. 2. Small to moderate amount of free fluid in the abdomen and pelvis. 3. Nonspecific stranding of the omentum primarily  in the left upper quadrant. Follow-up CT is recommended to differentiate passive congestion from omental a static disease. ABDOMEN SERIES 5/11/22   FINDINGS:   Chest: Heart size within normal limits. Lungs are clear. No pleural effusion or pneumothorax. Bowel: Multiple dilated small bowel loops with air-fluid levels on the upright view. Contrast distends small bowel loops in the left lateral abdomen and lower abdomen. Contrast in the right hemiabdomen appears to be within proximal colon. Scattered colonic  gas. Peritoneum / Retroperitoneum: No pneumoperitoneum. Soft tissues / Bones: No acute osseous findings. The contrast in urinary bladder. Lines / Support Apparatus: None.     IMPRESSION: Redemonstrated of multiple dilated small bowel loops with enteric contrast appearing to extend into the proximal colon, suggesting only partial small bowel obstruction. Continued radiographic follow-up is advised. CT OF THE ABDOMEN AND PELVIS 5/12/22   FINDINGS:   LOWER CHEST: Normal.   HEPATOBILIARY: No evidence of focal liver mass. No calcified gallstones. PANCREAS: Normal.   SPLEEN: Normal.    ADRENAL GLANDS: Normal.    KIDNEYS/BLADDER: Kidneys and bladder are unremarkable. No hydronephrosis or urinary tract calculi. BOWEL: Dilated fluid-filled loops of small bowel are present throughout the abdomen. No definite transition point. Oral contrast noted in the colon. No definite evidence of bowel mass but there is extensive peritoneal nodularity and trace ascites highly  concerning for peritoneal metastatic disease. LYMPH NODES: No enlarged retroperitoneal or pelvic lymph nodes. Peritoneal nodularity again noted most likely metastatic disease. VASCULATURE: Unremarkable. PELVIC ORGANS: Prostate gland and rectum are unremarkable. Small amount of free pelvic fluid is noted. MUSCULOSKELETAL: Degenerative spine changes are noted. Mild sclerosis involving the S1 vertebral body is present on image 74 of series 602. IMPRESSION   1. Extensive peritoneal nodularity and mild ascites consistent with peritoneal metastatic disease. Primary lesion not definitely identified. 2. Dilated fluid-filled loops of small bowel possibly related to gastroenteritis. No definite obstruction. No obvious bowel mass. 3. Sclerosis S1 vertebral body. Consider follow-up bone scan to assess for bone metastases. LIMITED ABDOMINAL ULTRASOUND 5/13/22   FINDINGS: A single limited gray scale image was submitted of an undisclosed location in the abdomen. Images demonstrate a small amount of  ascites as seen on comparison CT. IMPRESSION   1. Small volume ascites.        CT CHEST WITH CONTRAST 5/13/22   FINDINGS:   Mediastinum and visualized thyroid: Normal.   Heart: Normal.    Large removed - RTC PRN   6/24/22 FU after hospitalization - discussed PC/plan for tx  7/8/22 FU - mainly concerned about PICC line without blood return, decline cathflo, seeing José Miguel Pham in [facility] on Monday. Will increase hydration at home. HH for TPN.    7/18/22 Follow up after hospitalization. He will complete course of Levaquin as prescribed for infection around G-tube. Would like to proceed with cycle 3 FOLFIRINOX with increased dosing. 8/4/22 Cycle 4 FOLFIRINOX - continue dose escalation. He will complete course of Levaquin/Diflucan as prescribed for infection around G-tube, site looks good today. Family History   Problem Relation Age of Onset    No Known Problems Brother     Cancer Sister         breast    Hypertension Mother     Heart Disease Mother     Diabetes Father     Osteoarthritis Father     Alcohol Abuse Father     Hypertension Father     Diabetes Mother       Social History     Socioeconomic History    Marital status:      Spouse name: None    Number of children: None    Years of education: None    Highest education level: None   Tobacco Use    Smoking status: Every Day     Packs/day: 1.00     Types: Cigarettes    Smokeless tobacco: Never   Substance and Sexual Activity    Alcohol use: No    Drug use: No        Review of Systems   Constitutional:  Positive for fatigue. Negative for appetite change, diaphoresis, fever and unexpected weight change. HENT:   Negative for sore throat. Eyes:  Negative for eye problems and icterus. Respiratory:  Negative for cough, hemoptysis and shortness of breath. Cardiovascular:  Negative for chest pain, leg swelling and palpitations. Gastrointestinal:  Positive for abdominal pain (much improved). Negative for abdominal distention, blood in stool, constipation, diarrhea and vomiting. Endocrine: Negative for hot flashes. Genitourinary:  Negative for dysuria. Musculoskeletal:  Negative for back pain and gait problem.    Skin: Negative for itching, rash and wound. Neurological:  Negative for dizziness, extremity weakness, gait problem, headaches, light-headedness, numbness, seizures and speech difficulty. Hematological:  Negative for adenopathy. Does not bruise/bleed easily. Psychiatric/Behavioral:  Positive for depression. Negative for decreased concentration and sleep disturbance. The patient is nervous/anxious. No Known Allergies  Past Medical History:   Diagnosis Date    Bilateral carpal tunnel syndrome 12/9/2020    Chronic right shoulder pain 11/30/2020    Colon polyps 3/11/2013    Diverticulitis 3/11/2013    HTN (hypertension) 3/11/2013    Hyperlipidemia 3/11/2013    Paresthesia and pain of both upper extremities 11/30/2020    PUD (peptic ulcer disease) 3/11/2013    History of     Right elbow pain 12/9/2020    Wheezing 3/11/2013     Past Surgical History:   Procedure Laterality Date    COLONOSCOPY  2/25/09    colonoscopy per Dr. Jose Juan Sanchez with need for repeat every 3 years    COLONOSCOPY  02/25/2022    Dr. Naya Daurte; polyps of the ascending, transverse, descending, and sigmoid colons; internal hemorrhoid; diverticulosis    LAPAROSCOPY N/A 5/27/2022    LAPAROSCOPY DIAGNOSTIC , OPEN EXPLORATORY LAPAROTOMY, MASS EXCISION, G-TUBE PLACEMENT performed by Daniel Erickson MD at [address] N/A 6/3/2022    PORT INSERTION performed by Daniel Erickson MD at [address] UNLISTED  October 2004    partial colon resection per Dr. Wesley Oppenheim  02/25/2022    Dr. Naya Duarte; hiatal hernia gastric polyps     Current Outpatient Medications   Medication Sig Dispense Refill    escitalopram (LEXAPRO) 10 MG tablet Take 1 tablet by mouth in the morning. 30 tablet 5    midodrine (PROAMATINE) 5 MG tablet Take 1 tablet by mouth in the morning and 1 tablet before bedtime. Take 1 tab in the AM and 1 tab mid afternoon.  60 tablet 3    levoFLOXacin (LEVAQUIN) 750 MG tablet Take 1 tablet by mouth in the morning for 7 days. 7 tablet 0    fluconazole (DIFLUCAN) 100 MG tablet Take 2 tablets by mouth in the morning and at bedtime for 7 days 28 tablet 0    dicyclomine (BENTYL) 20 MG tablet TAKE 1 TABLET BY MOUTH EVERY SIX (6) HOURS. 360 tablet 0    pantoprazole (PROTONIX) 40 MG tablet Take 1 tablet by mouth in the morning and 1 tablet in the evening. Take before meals. 60 tablet 2    fentaNYL (DURAGESIC) 100 MCG/HR Place 1 patch onto the skin every 72 hours for 30 days. 10 patch 0    OLANZapine zydis (ZYPREXA) 5 MG disintegrating tablet Take 1 tablet by mouth nightly Do not take with promethazine 30 tablet 3    busPIRone (BUSPAR) 10 MG tablet Take 1 tablet by mouth 3 times daily 90 tablet 0    nicotine (NICODERM CQ) 14 MG/24HR Place 1 patch onto the skin daily 30 patch 3    ondansetron (ZOFRAN-ODT) 8 MG TBDP disintegrating tablet Take 1 tablet by mouth every 8 hours 90 tablet 0    promethazine (PHENERGAN) 12.5 MG tablet Take 1 tablet by mouth every 6 hours as needed for Nausea 90 tablet 0    naloxone 4 MG/0.1ML LIQD nasal spray 1 spray by Nasal route as needed for Opioid Reversal       dicyclomine (BENTYL) 10 MG capsule Take 20 mg by mouth every 6 hours       polyethylene glycol (GLYCOLAX) 17 GM/SCOOP powder Take 17 g by mouth daily      rosuvastatin (CRESTOR) 10 MG tablet Take 10 mg by mouth      umeclidinium-vilanterol (ANORO ELLIPTA) 62.5-25 MCG/INH AEPB inhaler Inhale 1 puff into the lungs daily        No current facility-administered medications for this visit.      Facility-Administered Medications Ordered in Other Visits   Medication Dose Route Frequency Provider Last Rate Last Admin    atropine injection 0.4 mg  0.4 mg SubCUTAneous Once Severiano Jack NP-IVON        oxaliplatin (ELOXATIN) 180 mg in dextrose 5 % 250 mL chemo IVPB  85 mg/m2 (Treatment Plan Recorded) IntraVENous Once Lusapna Jack NP-C 155.5 mL/hr at 08/04/22 1135 180 mg at 08/04/22 1135    irinotecan (CAMPTOSAR) 320 mg in dextrose 5 % 250 mL chemo IVPB  150 mg/m2 (Treatment Plan Recorded) IntraVENous Once Therisa Hoyles, NP-C        leucovorin calcium (WELLCOVORIN) 850 mg in dextrose 5 % 250 mL IVPB  400 mg/m2 (Treatment Plan Recorded) IntraVENous Once Therisa Hoyles, NP-C        fluorouracil (ADRUCIL) 5,000 mg in sodium chloride 0.9 % 230 mL chemo elastomeric infusion  5,000 mg IntraVENous Once Therisa Hoyles, NP-C        sodium chloride flush 0.9 % injection 5-40 mL  5-40 mL IntraVENous PRN Therisa Hoyles, NP-C   10 mL at 08/04/22 0946    dextrose 5 % solution  5-250 mL/hr IntraVENous PRN Therisa Hoyles, NP-C 150 mL/hr at 08/04/22 1126 150 mL/hr at 08/04/22 1126       No flowsheet data found. OBJECTIVE:  BP 87/64 (Position: Standing)   Pulse 69   Temp 97.7 °F (36.5 °C)   Resp 16   Ht 5' 10\" (1.778 m)   Wt 186 lb 1.6 oz (84.4 kg)   SpO2 98%   BMI 26.70 kg/m²       ECOG PERFORMANCE STATUS - 1- Restricted in physically strenuous activity but ambulatory and able to carry out work of a light or sedentary nature such as light house work, office work. Pain - Pain Score:   0 - No pain/10. Mild to moderate pain, requiring medication - see MAR  currently controlled on regimen - PC seeing pt. Fatigue - No flowsheet data found. Distress - No flowsheet data found. Physical Exam  Vitals reviewed. Exam conducted with a chaperone present. Constitutional:       General: He is not in acute distress. Appearance: Normal appearance. He is normal weight. He is not ill-appearing or toxic-appearing. HENT:      Head: Normocephalic and atraumatic. Nose: Nose normal. No congestion. Mouth/Throat:      Mouth: Mucous membranes are moist.   Eyes:      General: No scleral icterus. Extraocular Movements: Extraocular movements intact. Conjunctiva/sclera: Conjunctivae normal.      Pupils: Pupils are equal, round, and reactive to light.    Cardiovascular:      Rate and Rhythm: Normal rate and regular rhythm. Heart sounds: No murmur heard. Pulmonary:      Effort: Pulmonary effort is normal. No respiratory distress. Breath sounds: Normal breath sounds. No wheezing, rhonchi or rales. Chest:   Breasts:     Right: No axillary adenopathy or supraclavicular adenopathy. Left: No axillary adenopathy or supraclavicular adenopathy. Abdominal:      General: There is no distension. Palpations: Abdomen is soft. There is no mass. Tenderness: There is no abdominal tenderness. There is no guarding or rebound. Comments: Venting tube - has minimal redness at site with small amt purulent discharge. Musculoskeletal:         General: Normal range of motion. Cervical back: Normal range of motion. No rigidity. Right lower leg: No edema. Left lower leg: No edema. Lymphadenopathy:      Cervical: No cervical adenopathy. Upper Body:      Right upper body: No supraclavicular or axillary adenopathy. Left upper body: No supraclavicular or axillary adenopathy. Skin:     General: Skin is warm and dry. Coloration: Skin is not jaundiced or pale. Findings: No bruising or rash. Neurological:      General: No focal deficit present. Mental Status: He is alert and oriented to person, place, and time. Motor: No weakness. Coordination: Coordination normal.      Gait: Gait normal.   Psychiatric:         Behavior: Behavior normal.         Thought Content:  Thought content normal.        Labs:  Recent Results (from the past 168 hour(s))   CBC with Auto Differential    Collection Time: 08/04/22  8:27 AM   Result Value Ref Range    WBC 5.2 4.3 - 11.1 K/uL    RBC 3.97 (L) 4.23 - 5.6 M/uL    Hemoglobin 11.2 (L) 13.6 - 17.2 g/dL    Hematocrit 35.7 %    MCV 89.9 79.6 - 97.8 FL    MCH 28.2 26.1 - 32.9 PG    MCHC 31.4 31.4 - 35.0 g/dL    RDW 14.8 (H) 11.9 - 14.6 %    Platelets 634 737 - 152 K/uL    MPV 10.8 9.4 - 12.3 FL    nRBC 0.00 0.0 - 0.2 K/uL of which were sensitive to Levaquin found around G-tube site. - sclerotic lesion on imaging at S1 - bone scan recommended. - pain - better - on Fentanyl patch and oral supportive meds per PC. Does not routinely need oral oxycodone.     - nausea - resolved. Has Zyprexa QHS (not taking currently) and Zofran PRN. - wt loss/FTT - secondary to disease and tx - on TPN, wt up to 187 and stable. - depression/anxiety - currently controlled on regimen, will adjust modify as needed going forward   - constipation - Miralax as needed. - hypotension -  IVF 1000 mL every other day through home health, start Midodrine 5 mg BID. - increase diet as tolerated, on TPN now. RD following.   - Continue good oral nutrition and hydration.   - Encouraged frequent activity throughout the day and rest as needed to combat fatigue.   - Call with any fevers, uncontrolled side effects from treatment or any other worrisome/concerning symptoms. RTC two weeks or sooner if needed. MDM      Lab studies and imaging studies were personally reviewed. Pertinent old records were reviewed. Historical:    - here with his wife for consultation. During today's visit, we discussed his current workup. We looked at the images together demonstrating some of the findings concerning for peritoneal nodularity/peritoneal disease. Discussed anatomy of the findings  utilizing images to help pt understand what we are looking at and suspecting. He and wife were appreciative of the time spent to review these. Discussed need for visual dx/tissue pathology to determine plan - recommend ex lap - refer to Dr Rufino Bynum - personally  reviewed case with Dr Rufino Bynum who will expedite the workup and will see pt Fri. US with mild biliary duct dilation - HIDA/ERCP reviewed by PCP   + BM/flatus;  He did not like how IV morphine made him feel in the hospital.  He tried tramadol but found no relief and has been taking acetaminophen at home with minimal relief. Discussed different options and he settled on trying  Percocet - he will contact the office to inform us if this is working for him. We discussed SE - not to drive while on the med. Bowel regimen reviewed. He is tired of tests, frustrated. Feelings validated and stressed importance of workup to determine why he has pain. Wt loss from 240 --> 209 noted and expressed concern to pt about this. All questions were asked and answered to the best of my ability. The patient verbalized understanding and agrees with the plan above.           Sandeep Elena NP-IVON  [address] [place] [address] Hematology and Oncology  [address] [place] [address]  Office : [phone]  Fax : [phone]

## 2022-08-04 NOTE — PATIENT INSTRUCTIONS
Patient Instructions from Today's Visit    Reason for Visit:  Pre chemo cycle 4 FOLFIRINOX    Plan:  Proceed to infusion   Prescription sent to pharmacy     Follow Up:  2 weeks    Recent Lab Results:  Hospital Outpatient Visit on 08/04/2022   Component Date Value Ref Range Status    WBC 08/04/2022 5.2  4.3 - 11.1 K/uL Final    RBC 08/04/2022 3.97 (A) 4.23 - 5.6 M/uL Final    Hemoglobin 08/04/2022 11.2 (A) 13.6 - 17.2 g/dL Final    Hematocrit 08/04/2022 35.7  % Final    MCV 08/04/2022 89.9  79.6 - 97.8 FL Final    MCH 08/04/2022 28.2  26.1 - 32.9 PG Final    MCHC 08/04/2022 31.4  31.4 - 35.0 g/dL Final    RDW 08/04/2022 14.8 (A) 11.9 - 14.6 % Final    Platelets 65/04/9182 203  150 - 450 K/uL Final    MPV 08/04/2022 10.8  9.4 - 12.3 FL Final    nRBC 08/04/2022 0.00  0.0 - 0.2 K/uL Final    **Note: Absolute NRBC parameter is now reported with Hemogram**    Differential Type 08/04/2022 AUTOMATED    Final    Seg Neutrophils 08/04/2022 42 (A) 43 - 78 % Final    Lymphocytes 08/04/2022 37  13 - 44 % Final    Monocytes 08/04/2022 8  4.0 - 12.0 % Final    Eosinophils % 08/04/2022 13 (A) 0.5 - 7.8 % Final    Basophils 08/04/2022 1  0.0 - 2.0 % Final    Immature Granulocytes 08/04/2022 0  0.0 - 5.0 % Final    Segs Absolute 08/04/2022 2.2  1.7 - 8.2 K/UL Final    Absolute Lymph # 08/04/2022 1.9  0.5 - 4.6 K/UL Final    Absolute Mono # 08/04/2022 0.4  0.1 - 1.3 K/UL Final    Absolute Eos # 08/04/2022 0.7  0.0 - 0.8 K/UL Final    Basophils Absolute 08/04/2022 0.0  0.0 - 0.2 K/UL Final    Absolute Immature Granulocyte 08/04/2022 0.0  0.0 - 0.5 K/UL Final         Treatment Summary has been discussed and given to patient: no        -------------------------------------------------------------------------------------------------------------------  Please call our office at [phone] if you have any  of the following symptoms:   Fever of 100.5 or greater  Chills  Shortness of breath  Swelling or pain in one leg    After office hours an answering service is available and will contact a provider for emergencies or if you are experiencing any of the above symptoms. Patient did express an interest in My Chart. My Chart log in information explained on the after visit summary printout at the [facility] [address] desk.     SARANYA Pratt RN  Nurse Navigator  [address] [place] [id number] [phone]

## 2022-08-04 NOTE — PROGRESS NOTES
Clinical Social Work Note  Name: Yossi Myers    : 1960    MRN: [id number]    Date of Service: 2022    Type of Service: Health and Behavior Intervention     Length of Service: 30 minutes    Patient Diagnosis:   1. Abdominal carcinomatosis ([address]    2. Peritoneal metastases [facility])         Referral Source: Infusion RN    Reason for Visit: F/U    CM Note: Seen patient's wife, patient was asleep. Mrs. Charles Barfield was tearful, provided therapeutic reassurance. Couple will celebrate their  anniversary on December. Their have two children, 26 yo and 25 yo. PORTER-LAUREEN discussed Self-Compassion, Cancer and Mental Health. Patient does not work, wife works for [employer] and is having problems with billing. Undersigned referred to financial navigator, Oskar Valdez. Mini Mental Status Exam: Not able to assess    Protective Factors: Current care for physical and family support. Next Steps: PORTER-CP gave contact information and encouraged to call should any needs arise. Wife verbalized understanding. PORTER -CP intends to follow up as needed. No flowsheet data found.         Electronically Signed By:  PORTER Hull

## 2022-08-04 NOTE — PROGRESS NOTES
Arrived to the [facility]. Folfirinox completed. Patient tolerated well. Any issues or concerns during appointment: none. Patient aware of next infusion appointment on 8/6/22 (date) at 1500 (time). Patient aware of next lab and [facility] office visit on 8/17/22 (date) at 0900 (time). Patient instructed to call provider with temperature of 100.4 or greater or nausea/vomiting/ diarrhea or pain not controlled by medications  Discharged in [facility].

## 2022-08-04 NOTE — PROGRESS NOTES
Patient arrived to port lab for port access and lab draw   [address] accessed and labs drawn per protocol   *Port remains accessed   Patient discharged from port lab ambulatory

## 2022-08-06 ENCOUNTER — HOSPITAL ENCOUNTER (OUTPATIENT)
Dept: INFUSION THERAPY | Age: 62
Discharge: HOME | End: 2022-08-06
Payer: COMMERCIAL

## 2022-08-06 VITALS
TEMPERATURE: 97.6 F | DIASTOLIC BLOOD PRESSURE: 74 MMHG | SYSTOLIC BLOOD PRESSURE: 129 MMHG | HEART RATE: 68 BPM | RESPIRATION RATE: 18 BRPM

## 2022-08-06 DIAGNOSIS — C78.6 PERITONEAL METASTASES (HCC): ICD-10-CM

## 2022-08-06 DIAGNOSIS — C76.2 ABDOMINAL CARCINOMATOSIS (HCC): Primary | ICD-10-CM

## 2022-08-06 PROCEDURE — 96523 IRRIG DRUG DELIVERY DEVICE: CPT

## 2022-08-06 PROCEDURE — 2580000003 HC RX 258: Performed by: NURSE PRACTITIONER

## 2022-08-06 RX ORDER — 0.9 % SODIUM CHLORIDE 0.9 %
1000 INTRAVENOUS SOLUTION INTRAVENOUS ONCE
Status: DISCONTINUED | OUTPATIENT
Start: 2022-08-06 | End: 2022-08-07 | Stop reason: HOSPADM

## 2022-08-06 RX ORDER — SODIUM CHLORIDE 0.9 % (FLUSH) 0.9 %
5-40 SYRINGE (ML) INJECTION PRN
Status: DISCONTINUED | OUTPATIENT
Start: 2022-08-06 | End: 2022-08-07 | Stop reason: HOSPADM

## 2022-08-06 RX ADMIN — SODIUM CHLORIDE, PRESERVATIVE FREE 10 ML: 5 INJECTION INTRAVENOUS at 15:27

## 2022-08-06 NOTE — PROGRESS NOTES
Arrived to the [facility]. Chemotherapy pump completed & disconnected. Patient tolerated well. Any issues or concerns during appointment: none. Patient aware of next infusion appointment on 8-18-22 (date) at 0930 (time). Patient instructed to call provider with temperature of 100.4 or greater or nausea/vomiting/ diarrhea or pain not controlled by medications  Discharged via ambulatory.

## 2022-08-08 NOTE — PROGRESS NOTES
Nutrition F/U:  Assessment:  Pt seen during office visit w/ NP, cycle 4 of FOLFIRINOX this week. Pt has started taking his Levaquin and oral Diflucan for potential G-tube infection - appears excoriated, bumper has pulled away from the skin - tightened during visit. Pt remains on his 100 mcg Fentanyl patch, needing minimal breakthrough pain meds, receiving 1 L NS every other day in addition to his daily TPN, receives 2.6 L of NS on chemo pump days. Pt is tolerating a FL/regular diet (soft foods) po, drinking chocolate milkshakes, intake remains inadequate, bowels moving every few day, venting G-tube at night w/ < 250 ml output, clamps during the day and will try to keep clamped between now and when he has follow up with Dr. Rosa M Pringle. Pt w/ intermittent nausea, not taking anti-emetics consistently - encouraged to try this vs venting G-tube if possible, notices increased nausea w/ high fat foods. Current BW: 186#, stable over the past 2 weeks. Intervention:  1. Continue w/ Regular diet as tolerated, soft foods, continue chocolate milkshakes   2. Bumper on G-tube adjusted, continue antibiotics and antifungal  3. Try to keep G-tube clamped as much as possible over the next 2 weeks - Dr. Rosa M Pringle or surgeon to make decision about removal, has to tolerate not using for venting/decompression. Try to maximize use of anti-emetics, has Zofran and Zyprexa. 4. TPN and IVF per [facility]     Monitoring/Evaluation:  1. RD to follow up during next office visit - follow up wt status, tolerance/intake of po diet, symptom management.     3 [facility], [address], [address] [facility]

## 2022-08-10 ENCOUNTER — CARE COORDINATION (OUTPATIENT)
Dept: CARE COORDINATION | Facility: CLINIC | Age: 62
End: 2022-08-10

## 2022-08-10 NOTE — CARE COORDINATION
Patient has graduated from the Care Transitions program on 08/10/2022. Patient/family has the ability to self-manage at this time. Patient has no further care management needs, no referral to the [facility] team for further management. Patient has Care Transition Nurse's contact information for any further questions, concerns, or needs. Enid [address] [facility] [phone].   Patients upcoming visits:    Future Appointments   Date Time Provider Monica Tiffany   8/17/2022  9:00 AM LAB [facility]   8/17/2022  9:30 AM Sugey Trinidad MD [facility] AMB   8/17/2022  9:30 AM Ramez Mendes RD [facility] AMB   8/17/2022 10:00 AM LUCY Patino [facility] AMB   8/18/2022  9:30 AM POD1B [facility]   8/20/2022  2:00 PM SS [facility] GCC   8/22/2022  4:00 PM Paulette Crowell MD [facility] AMB   9/1/2022  9:15 AM LAB [facility]   9/1/2022  9:45 AM Sugey Trinidad MD [facility] AMB   9/1/2022 10:30 AM POD3B 27 [facility]   9/3/2022  3:30 PM SS [facility] GCC

## 2022-08-12 NOTE — ADDENDUM NOTE
Encounter addended by: Yamilka Campos RN on: 8/12/2022 5:25 PM   Actions taken: MAR administration accepted

## 2022-08-15 ASSESSMENT — ENCOUNTER SYMPTOMS
SHORTNESS OF BREATH: 0
COUGH: 0
EYE PROBLEMS: 0
HEMOPTYSIS: 0
ABDOMINAL DISTENTION: 0
ABDOMINAL PAIN: 1
SCLERAL ICTERUS: 0
CONSTIPATION: 0
BLOOD IN STOOL: 0
BACK PAIN: 0
SORE THROAT: 0
VOMITING: 0
DIARRHEA: 0

## 2022-08-15 NOTE — PROGRESS NOTES
[facility] Hematology and Oncology: Established patient - follow up     Chief Complaint   Patient presents with    Follow-up      Reason for Referral: Abdominal pain; Concern  for peritoneal metastatic disease   Referring Provider: Matisa Elizondo NP   Primary Care Provider: Melquiades Martinez MD   Family History of Cancer/Hematologic Disorders: Family history is significant for sister with breast cancer. Presenting Symptoms: Progressively worsening, persistent abdominal pain x several months    History of Present Illness:  Mr. Evans  is a 62 y.o. male who presents today for FU regarding adenocarcinoma with peritoneal metastatic disease. The past medical history is significant for tobacco use (recent pack per day smoker x 30+ years - now down to 1/3PPD), PUD, paresthesia/pain of bilateral upper extremities, HLD, HTN, diverticulitis,  colon polyps, hiatal hernia, chronic right shoulder pain, bilateral carpal tunnel syndrome, and partial colon resection. He presented to the [facility] ED on 5/12/22 with a complaint of persistent abdominal pain for several months that was becoming progressively  worse. EGD and colonoscopy on 2/25/22 revealed findings of hiatal hernia, gastric polyps, several benign colon polyps, diverticulosis, and internal hemorrhoids. CT of the abdomen on 3/8/22 showed no acute findings. He presented to [facility] ER x 2 for  evaluation (5/3/22 and 5/10/22). RUQ abdominal ultrasound was completed on 5/3/22 in the ED at [facility] demonstrating no acute findings to explain patient's pain; probable hepatic  steatosis; small echogenic mural foci in the gallbladder which are nonmobile, likely representing cholesterol polyps, largest measuring 4 mm; and small volume simple ascites in the right upper quadrant and left lower quadrant, nonspecific.   CT AP with  contrast at [facility] ED 5/10/22 showed a partial small bowel obstruction; small to moderate amount of free fluid in the abdomen and pelvis; and nonspecific stranding of the omentum primarily in the left upper quadrant. Radiologist noted that follow-up  CT was recommended to differentiate passive congestion from omental disease. On 5/11/22, abdominal series again showed multiple dilated small bowel loops with enteric contrast appearing to extend into the proximal colon, suggesting partial small  bowel obstruction. CT AP in the [facility] ED on 5/12/22 identified extensive peritoneal nodularity and mild ascites consistent with peritoneal  metastatic disease with primary lesion not definitely identified; dilated fluid-filled loops of small bowel possibly related to gastroenteritis with no definite obstruction and no obvious bowel mass; and sclerosis of S1 vertebral body. Radiologist recommended consideration of follow-up bone scan to assess for bone metastases. Patient was admitted to  the Hospitalist service on 5/12/22, and IR consulted for possible paracentesis however very small volume was inaccessible. CT Chest obtained on 5/13/22 showed No evidence of primary malignancy or metastasis within the chest.  Follow-up hepatobiliary  scan was suggested to assess for common bile duct obstruction. Oncology was consulted but did not see patient inhouse as pt was dischared. Upon discharge on 5/14/22, patient was instructed to follow up with [facility]. During consultation, we discussed his workup. We looked at the images together demonstrating some of the findings concerning for peritoneal nodularity/peritoneal disease. Discussed anatomy of the findings utilizing images to help pt understand what we are looking at and suspecting. He and wife were appreciative of the time spent to review these. We also addressed pt's pain. We also reviewed images of sclerosing lesion - bone scan recommended. He also was recommended to undergo HIDA scan after recent US per PCP. He is tired of tests, frustrated.   Feelings validated and stressed importance of workup to determine why he has pain. Wt loss from 240 --> 209 noted and expressed concern to pt about this. Of note, prior akbar resection with Dr Edin Silver for diverticulitis per pt. Sent note to dr Robert Ware re pt's case to expedite workup with his agreement. He was hospitalized for abdominal pain and sepsis. He was empirically placed on vancomycin and cefepime. CT scanning disclosed no intra-abdominal fluid collection or abscess but did suggest that his tumor burden was somewhat smaller. He had some purulent drainage from around his G-tube. This was cultured and grew klebsiella pneumoniae and citrobacter freundii both of which were sensitive to Levaquin which he was discharged home on for 10 days. Today, Mr. Evans is here with his wife for follow-up prior to cycle 5 FOLFIRINOX. He has been tolerating treatment quite well. We have been increasing his chemotherapy dosing gradually to assess for tolerance per their wishes. He feels ready to advance again. CT from July reviewed with patient and wife that showed reassuring improvement. He is able to eat more. He continues on TPN. He was somewhat sleepy on 8/8 after last cycle of treatment with increased dosing and also had a temp of 100.4 on 8/15 treated with Tylenol with no other symptoms and resolution. He has started on midodrine with improvement in blood pressure. He continues to be on IV fluids q. other day. Pain is improved so significantly that he is willing to go down on his fentanyl patch to 75 mcg. Anticipate he will tolerate this adjustment well. As for the venting G-tube, he is ready for it to be removed. Discussed risks associated with removal - including infection/admission/sepsis and need to place it back again. He is not using it. His bowels are moving appropriately. His case was discussed with Dr. Izabel Manzano from interventional radiology and they shall remove the tube. This can be replaced as needed in the future.   No current signs and symptoms of infection patient wishes to proceed with chemo as planned. No neuropathy. Chronological Events:   EGD REPORT 2/25/22                      COLONOSCOPY REPORT 2/25/22                 CT ABDOMEN PELVIS W CONTRAST 3/8/2022   FINDINGS:   LOWER CHEST: Unremarkable. ABDOMEN AND PELVIS:   Liver: Unremarkable. Biliary system: Unremarkable. Pancreas: Unremarkable. Spleen: Unremarkable. Adrenals: Unremarkable. Genitourinary: Unremarkable. Reproductive organs: Unremarkable. Gastrointestinal tract: Colonic diverticulosis without evidence of diverticulitis. There is no evidence of bowel obstruction or inflammation. The appendix is normal   Peritoneum/Extraperitoneum: Unremarkable. No free fluid or air. Vascular: Unremarkable. Musculoskeletal:  Small fat-containing umbilical hernia. Degenerative  changes of thoracolumbar spine. No acute or aggressive osseous lesion. IMPRESSION: Colonic diverticula without evidence of acute diverticulitis. RIGHT UPPER QUADRANT ABDOMINAL ULTRASOUND 5/3/2022    FINDINGS:   Pancreas: Not visualized, obscured by bowel gas. Intrahepatic IVC: Normal.   Main  Portal Vein: Patent with normal directional flow. Liver: Liver contour is normal. Liver appears diffusely increased in echogenicity consistent with hepatic steatosis. There is fatty sparing around the gallbladder fossa. Gallbladder: Gallbladder  is normal in size. No evidence of gallbladder wall thickening. No gallstones are seen. There are several nonmobile echogenic mural foci in the proximal gallbladder body/neck, largest measuring 4 mm, without shadowing, likely small cholesterol polyps. Bile ducts: No evidence of biliary ductal dilation. The common bile duct measures 3 mm in diameter. Right kidney: Normal.   Left Upper Quadrant: Limited images of the left upper quadrant are unremarkable. Spleen measures 12.1 cm in length.     Free fluid: Trace simple free fluid in the right upper quadrant findings. The contrast in urinary bladder. Lines / Support Apparatus: None. IMPRESSION: Redemonstrated of multiple dilated small bowel loops with enteric contrast appearing to extend into the proximal colon, suggesting only partial small bowel obstruction. Continued radiographic follow-up is advised. CT OF THE ABDOMEN AND PELVIS 5/12/22   FINDINGS:   LOWER CHEST: Normal.   HEPATOBILIARY: No evidence of focal liver mass. No calcified gallstones. PANCREAS: Normal.   SPLEEN: Normal.    ADRENAL GLANDS: Normal.    KIDNEYS/BLADDER: Kidneys and bladder are unremarkable. No hydronephrosis or urinary tract calculi. BOWEL: Dilated fluid-filled loops of small bowel are present throughout the abdomen. No definite transition point. Oral contrast noted in the colon. No definite evidence of bowel mass but there is extensive peritoneal nodularity and trace ascites highly  concerning for peritoneal metastatic disease. LYMPH NODES: No enlarged retroperitoneal or pelvic lymph nodes. Peritoneal nodularity again noted most likely metastatic disease. VASCULATURE: Unremarkable. PELVIC ORGANS: Prostate gland and rectum are unremarkable. Small amount of free pelvic fluid is noted. MUSCULOSKELETAL: Degenerative spine changes are noted. Mild sclerosis involving the S1 vertebral body is present on image 74 of series 602. IMPRESSION   1. Extensive peritoneal nodularity and mild ascites consistent with peritoneal metastatic disease. Primary lesion not definitely identified. 2. Dilated fluid-filled loops of small bowel possibly related to gastroenteritis. No definite obstruction. No obvious bowel mass. 3. Sclerosis S1 vertebral body. Consider follow-up bone scan to assess for bone metastases. LIMITED ABDOMINAL ULTRASOUND 5/13/22   FINDINGS: A single limited gray scale image was submitted of an undisclosed location in the abdomen. Images demonstrate a small amount of  ascites as seen on comparison CT. IMPRESSION   1. Small volume ascites. CT CHEST WITH CONTRAST 5/13/22   FINDINGS:   Mediastinum and visualized thyroid: Normal.   Heart: Normal.    Large Vessels: Normal.    Pleura: Normal.    Lungs: No lung mass clearly discerned. Mild scarring or atelectasis in the right lung base. No suspicious lung nodule. No consolidation or zulma pulmonary edema. Airways: Normal.    Lymph nodes: Normal.    Bones/Soft tissues: Normal.    Visualized abdomen: Partially imaged omental nodules. Perihepatic ascites noted. IMPRESSION: No evidence of primary malignancy or metastasis within the chest       ABDOMINAL ULTRASOUND 5/17/22   FINDINGS:    LIVER: 17.3 cm. Slight increase in echogenicity without focal mass. BILE DUCTS: No intrahepatic bile duct dilatation. CBD diameter = 8 mm. GALLBLADDER: It is unremarkable in appearance without stones or sludge. Echogenic polyp measures 0.5 cm. No gallbladder wall thickening. Mild ascites. PANCREAS: Normal.   SPLEEN: Borderline enlarged at 12.2 cm. RIGHT KIDNEY: 13.3 cm. No mass or hydronephrosis. LEFT KIDNEY: 14.3 cm. No mass or hydronephrosis. ABDOMINAL AORTA AND IVC: Normal in size. ASCITES: Mild   IMPRESSION: Possible mild fatty infiltration liver. No focal mass. Gallbladder polyp but no stones or gallbladder wall thickening. Mild ascites. Mildly prominent common bile duct. Follow-up hepatobiliary scan could be performed to assess for common bile duct obstruction.         04/02/2021 (COVID-19, Hayes Scrape, Primary or Immunocompromised Series, MRNA, PF, 100mcg/0.5mL)   04/30/2021 (COVID-19, Hayes Scrape, Primary or Immunocompromised Series, MRNA, PF, 100mcg/0.5mL)   11/16/2021 (COVID-19, Moderna Booster, PF, 0.25mL Dose)      5/18/22 heme/onc consultation   5/20/22 Dr Manuel Ashby consult - sent to ED for admission/workup   5/23/22 omental bx - IR   5/27/22 Dr Manuel Ashby - diagnostic lap, omental mass and mesentery mass excision, G-tube placement for venting  6/1/22 painful G-tube - tract recanalized and new G-tube placed   6/12/22 C1 FOLFIRINOX completed - dose adjusted   6/14/22 d/c   6/24/22 FU sx - G tube maint instructions/staples removed - RTC PRN   6/24/22 FU after hospitalization - discussed PC/plan for tx  7/8/22 FU - mainly concerned about PICC line without blood return, decline cathflo, seeing José Miguel Pham in [facility] on Monday. Will increase hydration at home. HH for TPN.    7/18/22 Follow up after hospitalization. He will complete course of Levaquin as prescribed for infection around G-tube. Would like to proceed with cycle 3 FOLFIRINOX with increased dosing. 8/4/22 Cycle 4 FOLFIRINOX - continue dose escalation. He will complete course of Levaquin/Diflucan as prescribed for infection around G-tube, site looks good today. 8/17/22 C5 FOLFIRINOX - wishes to pw full dose accepting risks; wishes for G tube to be removed - will need to time with labs; plan to drop dose in opioids - PC seeing pt today. RD seeing pt. Plan for restaging in ~Oct.      Family History   Problem Relation Age of Onset    No Known Problems Brother     Cancer Sister         breast    Hypertension Mother     Heart Disease Mother     Diabetes Father     Osteoarthritis Father     Alcohol Abuse Father     Hypertension Father     Diabetes Mother       Social History     Socioeconomic History    Marital status:      Spouse name: None    Number of children: None    Years of education: None    Highest education level: None   Tobacco Use    Smoking status: Every Day     Packs/day: 1.00     Types: Cigarettes    Smokeless tobacco: Never   Substance and Sexual Activity    Alcohol use: No    Drug use: No        Review of Systems   Constitutional:  Positive for fatigue. Negative for appetite change, diaphoresis, fever and unexpected weight change. HENT:   Negative for sore throat. Eyes:  Negative for eye problems and icterus. Respiratory:  Negative for cough, hemoptysis and shortness of breath. Cardiovascular:  Negative for chest pain, leg swelling and palpitations. Gastrointestinal:  Positive for abdominal pain (much improved). Negative for abdominal distention, blood in stool, constipation, diarrhea and vomiting. Endocrine: Negative for hot flashes. Genitourinary:  Negative for dysuria. Musculoskeletal:  Negative for back pain and gait problem. Skin:  Negative for itching, rash and wound. Neurological:  Negative for dizziness, extremity weakness, gait problem, headaches, light-headedness, numbness, seizures and speech difficulty. Hematological:  Negative for adenopathy. Does not bruise/bleed easily. Psychiatric/Behavioral:  Positive for depression. Negative for decreased concentration and sleep disturbance. The patient is nervous/anxious.        No Known Allergies  Past Medical History:   Diagnosis Date    Bilateral carpal tunnel syndrome 12/9/2020    Chronic right shoulder pain 11/30/2020    Colon polyps 3/11/2013    Diverticulitis 3/11/2013    HTN (hypertension) 3/11/2013    Hyperlipidemia 3/11/2013    Paresthesia and pain of both upper extremities 11/30/2020    PUD (peptic ulcer disease) 3/11/2013    History of     Right elbow pain 12/9/2020    Wheezing 3/11/2013     Past Surgical History:   Procedure Laterality Date    COLONOSCOPY  2/25/09    colonoscopy per Dr. Maynor Rivers with need for repeat every 3 years    COLONOSCOPY  02/25/2022    Dr. Makeda Sanchez; polyps of the ascending, transverse, descending, and sigmoid colons; internal hemorrhoid; diverticulosis    LAPAROSCOPY N/A 5/27/2022    LAPAROSCOPY DIAGNOSTIC , OPEN EXPLORATORY LAPAROTOMY, MASS EXCISION, G-TUBE PLACEMENT performed by Sergio López MD at [address] N/A 6/3/2022    PORT INSERTION performed by Sergio López MD at [address] UNLISTED  October 2004    partial colon resection per Dr. Zarina Sierra  02/25/2022    Dr. Makeda Sanchez; hiatal hernia gastric polyps     Current Outpatient Medications   Medication Sig Dispense Refill    escitalopram (LEXAPRO) 10 MG tablet Take 1 tablet by mouth in the morning. 30 tablet 5    midodrine (PROAMATINE) 5 MG tablet Take 1 tablet by mouth in the morning and 1 tablet before bedtime. Take 1 tab in the AM and 1 tab mid afternoon. 60 tablet 3    dicyclomine (BENTYL) 20 MG tablet TAKE 1 TABLET BY MOUTH EVERY SIX (6) HOURS. 360 tablet 0    pantoprazole (PROTONIX) 40 MG tablet Take 1 tablet by mouth in the morning and 1 tablet in the evening. Take before meals. 60 tablet 2    OLANZapine zydis (ZYPREXA) 5 MG disintegrating tablet Take 1 tablet by mouth nightly Do not take with promethazine 30 tablet 3    busPIRone (BUSPAR) 10 MG tablet Take 1 tablet by mouth 3 times daily 90 tablet 0    ondansetron (ZOFRAN-ODT) 8 MG TBDP disintegrating tablet Take 1 tablet by mouth every 8 hours 90 tablet 0    promethazine (PHENERGAN) 12.5 MG tablet Take 1 tablet by mouth every 6 hours as needed for Nausea 90 tablet 0    dicyclomine (BENTYL) 10 MG capsule Take 20 mg by mouth every 6 hours       rosuvastatin (CRESTOR) 10 MG tablet Take 10 mg by mouth      umeclidinium-vilanterol (ANORO ELLIPTA) 62.5-25 MCG/INH AEPB inhaler Inhale 1 puff into the lungs daily       fentaNYL (DURAGESIC) 75 MCG/HR Place 1 patch onto the skin every 72 hours for 15 days. 5 patch 0    nicotine (NICODERM CQ) 14 MG/24HR Place 1 patch onto the skin daily (Patient not taking: Reported on 8/17/2022) 30 patch 3    naloxone 4 MG/0.1ML LIQD nasal spray 1 spray by Nasal route as needed for Opioid Reversal       polyethylene glycol (GLYCOLAX) 17 GM/SCOOP powder Take 17 g by mouth daily (Patient not taking: Reported on 8/17/2022)       No current facility-administered medications for this visit. No flowsheet data found.     OBJECTIVE:  /73   Pulse 68   Temp 98.6 °F (37 °C)   Resp 16   Ht 5' 10\" (1.778 m)   Wt 188 lb (85.3 kg)   SpO2 96%   BMI 26.98 kg/m²       ECOG PERFORMANCE STATUS - 1- Restricted in physically strenuous activity but ambulatory and able to carry out work of a light or sedentary nature such as light house work, office work. Pain - 0 - No pain/10. None/Minimal pain - not affecting QOL     Fatigue - No flowsheet data found. Distress - No flowsheet data found. Physical Exam  Vitals reviewed. Exam conducted with a chaperone present. Constitutional:       General: He is not in acute distress. Appearance: Normal appearance. He is normal weight. He is not ill-appearing or toxic-appearing. HENT:      Head: Normocephalic and atraumatic. Nose: Nose normal. No congestion. Mouth/Throat:      Mouth: Mucous membranes are moist.   Eyes:      General: No scleral icterus. Extraocular Movements: Extraocular movements intact. Conjunctiva/sclera: Conjunctivae normal.      Pupils: Pupils are equal, round, and reactive to light. Cardiovascular:      Rate and Rhythm: Normal rate and regular rhythm. Heart sounds: No murmur heard. Pulmonary:      Effort: Pulmonary effort is normal. No respiratory distress. Breath sounds: Normal breath sounds. No wheezing, rhonchi or rales. Chest:   Breasts:     Right: No axillary adenopathy or supraclavicular adenopathy. Left: No axillary adenopathy or supraclavicular adenopathy. Abdominal:      General: There is no distension. Palpations: Abdomen is soft. There is no mass. Tenderness: There is no abdominal tenderness. There is no guarding or rebound. Comments: Venting tube - has minimal redness at site with small amt purulent discharge. Musculoskeletal:         General: Normal range of motion. Cervical back: Normal range of motion. No rigidity. Right lower leg: No edema. Left lower leg: No edema. Lymphadenopathy:      Cervical: No cervical adenopathy. Upper Body:      Right upper body: No supraclavicular or axillary adenopathy.       Left upper body: No supraclavicular or axillary adenopathy. Skin:     General: Skin is warm and dry. Coloration: Skin is not jaundiced or pale. Findings: No bruising or rash. Neurological:      General: No focal deficit present. Mental Status: He is alert and oriented to person, place, and time. Motor: No weakness. Coordination: Coordination normal.      Gait: Gait normal.   Psychiatric:         Behavior: Behavior normal.         Thought Content:  Thought content normal.        Labs:  Recent Results (from the past 168 hour(s))   CBC with Auto Differential    Collection Time: 08/17/22  9:05 AM   Result Value Ref Range    WBC 4.9 4.3 - 11.1 K/uL    RBC 3.92 (L) 4.23 - 5.6 M/uL    Hemoglobin 11.1 (L) 13.6 - 17.2 g/dL    Hematocrit 35.3 %    MCV 90.1 79.6 - 97.8 FL    MCH 28.3 26.1 - 32.9 PG    MCHC 31.4 31.4 - 35.0 g/dL    RDW 15.1 (H) 11.9 - 14.6 %    Platelets 095 386 - 335 K/uL    MPV 10.5 9.4 - 12.3 FL    nRBC 0.00 0.0 - 0.2 K/uL    Differential Type AUTOMATED      Seg Neutrophils 55 43 - 78 %    Lymphocytes 31 13 - 44 %    Monocytes 9 4.0 - 12.0 %    Eosinophils % 3 0.5 - 7.8 %    Basophils 1 0.0 - 2.0 %    Immature Granulocytes 1 0.0 - 5.0 %    Segs Absolute 2.7 1.7 - 8.2 K/UL    Absolute Lymph # 1.5 0.5 - 4.6 K/UL    Absolute Mono # 0.4 0.1 - 1.3 K/UL    Absolute Eos # 0.2 0.0 - 0.8 K/UL    Basophils Absolute 0.0 0.0 - 0.2 K/UL    Absolute Immature Granulocyte 0.0 0.0 - 0.5 K/UL   Comprehensive Metabolic Panel    Collection Time: 08/17/22  9:05 AM   Result Value Ref Range    Sodium 138 136 - 145 mmol/L    Potassium 4.1 3.5 - 5.1 mmol/L    Chloride 107 98 - 107 mmol/L    CO2 27 21 - 32 mmol/L    Anion Gap 4 (L) 7 - 16 mmol/L    Glucose 157 (H) 65 - 100 mg/dL    BUN 13 8 - 23 MG/DL    Creatinine 0.70 (L) 0.8 - 1.5 MG/DL    GFR African American >60 >60 ml/min/1.73m2    GFR Non- >60 >60 ml/min/1.73m2    Calcium 8.3 8.3 - 10.4 MG/DL    Total Bilirubin 0.3 0.2 - 1.1 MG/DL    ALT 45 12 - 65 U/L    AST 19 15 - 37 Mortality  Presenting problems: high  Diagnostic procedures: high  Management options: high        Lab studies and imaging studies were personally reviewed. Pertinent old records were reviewed. Historical:    - here with his wife for consultation. During today's visit, we discussed his current workup. We looked at the images together demonstrating some of the findings concerning for peritoneal nodularity/peritoneal disease. Discussed anatomy of the findings  utilizing images to help pt understand what we are looking at and suspecting. He and wife were appreciative of the time spent to review these. Discussed need for visual dx/tissue pathology to determine plan - recommend ex lap - refer to Dr Parag Haq - personally  reviewed case with Dr Parag Haq who will expedite the workup and will see pt Fri. US with mild biliary duct dilation - HIDA/ERCP reviewed by PCP   + BM/flatus; He did not like how IV morphine made him feel in the hospital.  He tried tramadol but found no relief and has been taking acetaminophen at home with minimal relief. Discussed different options and he settled on trying  Percocet - he will contact the office to inform us if this is working for him. We discussed SE - not to drive while on the med. Bowel regimen reviewed. He is tired of tests, frustrated. Feelings validated and stressed importance of workup to determine why he has pain. Wt loss from 240 --> 209 noted and expressed concern to pt about this. All questions were asked and answered to the best of my ability. The patient verbalized understanding and agrees with the plan above.           Yesenia Solorio MD, MD  [facility] Hematology and Oncology  [address] [place] [address]  Office : [phone]  Fax : [phone]

## 2022-08-17 ENCOUNTER — OFFICE VISIT (OUTPATIENT)
Dept: ONCOLOGY | Age: 62
End: 2022-08-17

## 2022-08-17 ENCOUNTER — HOSPITAL ENCOUNTER (OUTPATIENT)
Dept: LAB | Age: 62
Discharge: HOME | End: 2022-08-20
Payer: COMMERCIAL

## 2022-08-17 ENCOUNTER — OFFICE VISIT (OUTPATIENT)
Dept: PALLATIVE CARE | Age: 62
End: 2022-08-17
Payer: COMMERCIAL

## 2022-08-17 ENCOUNTER — OFFICE VISIT (OUTPATIENT)
Dept: ONCOLOGY | Age: 62
End: 2022-08-17
Payer: COMMERCIAL

## 2022-08-17 VITALS
HEART RATE: 68 BPM | DIASTOLIC BLOOD PRESSURE: 73 MMHG | TEMPERATURE: 98.6 F | RESPIRATION RATE: 16 BRPM | SYSTOLIC BLOOD PRESSURE: 108 MMHG | BODY MASS INDEX: 26.92 KG/M2 | WEIGHT: 188 LBS | OXYGEN SATURATION: 96 % | HEIGHT: 70 IN

## 2022-08-17 DIAGNOSIS — C78.6 MALIGNANT NEOPLASM METASTATIC TO PERITONEUM (HCC): ICD-10-CM

## 2022-08-17 DIAGNOSIS — R11.0 CHEMOTHERAPY-INDUCED NAUSEA: ICD-10-CM

## 2022-08-17 DIAGNOSIS — E43 SEVERE PROTEIN-CALORIE MALNUTRITION (HCC): ICD-10-CM

## 2022-08-17 DIAGNOSIS — C78.6 PERITONEAL METASTASES (HCC): ICD-10-CM

## 2022-08-17 DIAGNOSIS — K59.01 SLOW TRANSIT CONSTIPATION: ICD-10-CM

## 2022-08-17 DIAGNOSIS — G89.3 CANCER ASSOCIATED PAIN: ICD-10-CM

## 2022-08-17 DIAGNOSIS — Z00.8 NUTRITIONAL ASSESSMENT: Primary | ICD-10-CM

## 2022-08-17 DIAGNOSIS — T45.1X5A CHEMOTHERAPY-INDUCED NAUSEA: ICD-10-CM

## 2022-08-17 DIAGNOSIS — C16.9 MALIGNANT NEOPLASM OF STOMACH, UNSPECIFIED LOCATION (HCC): ICD-10-CM

## 2022-08-17 DIAGNOSIS — Z51.5 ENCOUNTER FOR PALLIATIVE CARE: ICD-10-CM

## 2022-08-17 DIAGNOSIS — G89.3 CANCER ASSOCIATED PAIN: Primary | ICD-10-CM

## 2022-08-17 DIAGNOSIS — Z71.89 GOALS OF CARE, COUNSELING/DISCUSSION: ICD-10-CM

## 2022-08-17 DIAGNOSIS — C79.9 METASTATIC ADENOCARCINOMA (HCC): ICD-10-CM

## 2022-08-17 DIAGNOSIS — Z78.9 ON TOTAL PARENTERAL NUTRITION: ICD-10-CM

## 2022-08-17 DIAGNOSIS — C76.2 ABDOMINAL CARCINOMATOSIS (HCC): Primary | ICD-10-CM

## 2022-08-17 DIAGNOSIS — C80.0 CARCINOMATOSIS (HCC): ICD-10-CM

## 2022-08-17 DIAGNOSIS — F11.99 OPIOID USE, UNSPECIFIED WITH UNSPECIFIED OPIOID-INDUCED DISORDER (HCC): ICD-10-CM

## 2022-08-17 LAB
ALBUMIN SERPL-MCNC: 2.7 G/DL (ref 3.2–4.6)
ALBUMIN/GLOB SERPL: 0.7 {RATIO} (ref 1.2–3.5)
ALP SERPL-CCNC: 87 U/L (ref 50–136)
ALT SERPL-CCNC: 45 U/L (ref 12–65)
ANION GAP SERPL CALC-SCNC: 4 MMOL/L (ref 7–16)
AST SERPL-CCNC: 19 U/L (ref 15–37)
BASOPHILS # BLD: 0 K/UL (ref 0–0.2)
BASOPHILS NFR BLD: 1 % (ref 0–2)
BILIRUB SERPL-MCNC: 0.3 MG/DL (ref 0.2–1.1)
BUN SERPL-MCNC: 13 MG/DL (ref 8–23)
CALCIUM SERPL-MCNC: 8.3 MG/DL (ref 8.3–10.4)
CEA SERPL-MCNC: 1.3 NG/ML (ref 0–3)
CHLORIDE SERPL-SCNC: 107 MMOL/L (ref 98–107)
CO2 SERPL-SCNC: 27 MMOL/L (ref 21–32)
CREAT SERPL-MCNC: 0.7 MG/DL (ref 0.8–1.5)
DIFFERENTIAL METHOD BLD: ABNORMAL
EOSINOPHIL # BLD: 0.2 K/UL (ref 0–0.8)
EOSINOPHIL NFR BLD: 3 % (ref 0.5–7.8)
ERYTHROCYTE [DISTWIDTH] IN BLOOD BY AUTOMATED COUNT: 15.1 % (ref 11.9–14.6)
GLOBULIN SER CALC-MCNC: 3.7 G/DL (ref 2.3–3.5)
GLUCOSE SERPL-MCNC: 157 MG/DL (ref 65–100)
HCT VFR BLD AUTO: 35.3 %
HGB BLD-MCNC: 11.1 G/DL (ref 13.6–17.2)
IMM GRANULOCYTES # BLD AUTO: 0 K/UL (ref 0–0.5)
IMM GRANULOCYTES NFR BLD AUTO: 1 % (ref 0–5)
LYMPHOCYTES # BLD: 1.5 K/UL (ref 0.5–4.6)
LYMPHOCYTES NFR BLD: 31 % (ref 13–44)
MAGNESIUM SERPL-MCNC: 2.1 MG/DL (ref 1.8–2.4)
MCH RBC QN AUTO: 28.3 PG (ref 26.1–32.9)
MCHC RBC AUTO-ENTMCNC: 31.4 G/DL (ref 31.4–35)
MCV RBC AUTO: 90.1 FL (ref 79.6–97.8)
MONOCYTES # BLD: 0.4 K/UL (ref 0.1–1.3)
MONOCYTES NFR BLD: 9 % (ref 4–12)
NEUTS SEG # BLD: 2.7 K/UL (ref 1.7–8.2)
NEUTS SEG NFR BLD: 55 % (ref 43–78)
NRBC # BLD: 0 K/UL (ref 0–0.2)
PLATELET # BLD AUTO: 197 K/UL (ref 150–450)
PMV BLD AUTO: 10.5 FL (ref 9.4–12.3)
POTASSIUM SERPL-SCNC: 4.1 MMOL/L (ref 3.5–5.1)
PROT SERPL-MCNC: 6.4 G/DL (ref 6.3–8.2)
RBC # BLD AUTO: 3.92 M/UL (ref 4.23–5.6)
SODIUM SERPL-SCNC: 138 MMOL/L (ref 136–145)
WBC # BLD AUTO: 4.9 K/UL (ref 4.3–11.1)

## 2022-08-17 PROCEDURE — 99214 OFFICE O/P EST MOD 30 MIN: CPT | Performed by: NURSE PRACTITIONER

## 2022-08-17 PROCEDURE — 99215 OFFICE O/P EST HI 40 MIN: CPT | Performed by: INTERNAL MEDICINE

## 2022-08-17 PROCEDURE — 85025 COMPLETE CBC W/AUTO DIFF WBC: CPT

## 2022-08-17 PROCEDURE — 80053 COMPREHEN METABOLIC PANEL: CPT

## 2022-08-17 PROCEDURE — 36415 COLL VENOUS BLD VENIPUNCTURE: CPT

## 2022-08-17 PROCEDURE — 82378 CARCINOEMBRYONIC ANTIGEN: CPT

## 2022-08-17 PROCEDURE — 83735 ASSAY OF MAGNESIUM: CPT

## 2022-08-17 RX ORDER — SODIUM CHLORIDE 9 MG/ML
5-250 INJECTION, SOLUTION INTRAVENOUS PRN
Status: CANCELLED | OUTPATIENT
Start: 2022-08-18

## 2022-08-17 RX ORDER — ACETAMINOPHEN 325 MG/1
650 TABLET ORAL
Status: CANCELLED | OUTPATIENT
Start: 2022-08-18

## 2022-08-17 RX ORDER — SODIUM CHLORIDE 9 MG/ML
INJECTION, SOLUTION INTRAVENOUS CONTINUOUS
Status: CANCELLED | OUTPATIENT
Start: 2022-08-18

## 2022-08-17 RX ORDER — EPINEPHRINE 1 MG/ML
0.3 INJECTION, SOLUTION, CONCENTRATE INTRAVENOUS PRN
Status: CANCELLED | OUTPATIENT
Start: 2022-08-18

## 2022-08-17 RX ORDER — SODIUM CHLORIDE 9 MG/ML
5-40 INJECTION INTRAVENOUS PRN
Status: CANCELLED | OUTPATIENT
Start: 2022-08-18

## 2022-08-17 RX ORDER — ONDANSETRON 2 MG/ML
8 INJECTION INTRAMUSCULAR; INTRAVENOUS ONCE
Status: CANCELLED | OUTPATIENT
Start: 2022-08-18 | End: 2022-08-18

## 2022-08-17 RX ORDER — ATROPINE SULFATE 0.4 MG/ML
0.4 AMPUL (ML) INJECTION ONCE
Status: CANCELLED | OUTPATIENT
Start: 2022-08-18 | End: 2022-08-18

## 2022-08-17 RX ORDER — ALBUTEROL SULFATE 90 UG/1
4 AEROSOL, METERED RESPIRATORY (INHALATION) PRN
Status: CANCELLED | OUTPATIENT
Start: 2022-08-18

## 2022-08-17 RX ORDER — MEPERIDINE HYDROCHLORIDE 50 MG/ML
12.5 INJECTION INTRAMUSCULAR; INTRAVENOUS; SUBCUTANEOUS PRN
Status: CANCELLED | OUTPATIENT
Start: 2022-08-18

## 2022-08-17 RX ORDER — HEPARIN SODIUM (PORCINE) LOCK FLUSH IV SOLN 100 UNIT/ML 100 UNIT/ML
500 SOLUTION INTRAVENOUS PRN
Status: CANCELLED | OUTPATIENT
Start: 2022-08-20

## 2022-08-17 RX ORDER — FAMOTIDINE 10 MG/ML
20 INJECTION, SOLUTION INTRAVENOUS
Status: CANCELLED | OUTPATIENT
Start: 2022-08-18

## 2022-08-17 RX ORDER — HEPARIN SODIUM (PORCINE) LOCK FLUSH IV SOLN 100 UNIT/ML 100 UNIT/ML
500 SOLUTION INTRAVENOUS PRN
Status: CANCELLED | OUTPATIENT
Start: 2022-08-18

## 2022-08-17 RX ORDER — DIPHENHYDRAMINE HYDROCHLORIDE 50 MG/ML
50 INJECTION INTRAMUSCULAR; INTRAVENOUS
Status: CANCELLED | OUTPATIENT
Start: 2022-08-18

## 2022-08-17 RX ORDER — ONDANSETRON 2 MG/ML
8 INJECTION INTRAMUSCULAR; INTRAVENOUS
Status: CANCELLED | OUTPATIENT
Start: 2022-08-18

## 2022-08-17 RX ORDER — SODIUM CHLORIDE 0.9 % (FLUSH) 0.9 %
5-40 SYRINGE (ML) INJECTION PRN
Status: CANCELLED | OUTPATIENT
Start: 2022-08-20

## 2022-08-17 RX ORDER — SODIUM CHLORIDE 9 MG/ML
5-40 INJECTION INTRAVENOUS PRN
Status: CANCELLED | OUTPATIENT
Start: 2022-08-20

## 2022-08-17 RX ORDER — DEXTROSE MONOHYDRATE 50 MG/ML
5-250 INJECTION, SOLUTION INTRAVENOUS PRN
Status: CANCELLED | OUTPATIENT
Start: 2022-08-18

## 2022-08-17 RX ORDER — SODIUM CHLORIDE 9 MG/ML
5-250 INJECTION, SOLUTION INTRAVENOUS PRN
Status: CANCELLED | OUTPATIENT
Start: 2022-08-20

## 2022-08-17 RX ORDER — SODIUM CHLORIDE 0.9 % (FLUSH) 0.9 %
5-40 SYRINGE (ML) INJECTION PRN
Status: CANCELLED | OUTPATIENT
Start: 2022-08-18

## 2022-08-17 RX ORDER — ATROPINE SULFATE 0.4 MG/ML
0.4 AMPUL (ML) INJECTION
Status: CANCELLED | OUTPATIENT
Start: 2022-08-18

## 2022-08-17 RX ORDER — FENTANYL 75 UG/H
1 PATCH TRANSDERMAL
Qty: 5 PATCH | Refills: 0 | Status: SHIPPED | OUTPATIENT
Start: 2022-08-17 | End: 2022-09-01 | Stop reason: DRUGHIGH

## 2022-08-17 ASSESSMENT — PATIENT HEALTH QUESTIONNAIRE - PHQ9
1. LITTLE INTEREST OR PLEASURE IN DOING THINGS: 0
4. FEELING TIRED OR HAVING LITTLE ENERGY: 3
SUM OF ALL RESPONSES TO PHQ QUESTIONS 1-9: 9
SUM OF ALL RESPONSES TO PHQ QUESTIONS 1-9: 9
5. POOR APPETITE OR OVEREATING: 2
SUM OF ALL RESPONSES TO PHQ9 QUESTIONS 1 & 2: 0
SUM OF ALL RESPONSES TO PHQ QUESTIONS 1-9: 9
6. FEELING BAD ABOUT YOURSELF - OR THAT YOU ARE A FAILURE OR HAVE LET YOURSELF OR YOUR FAMILY DOWN: 2
2. FEELING DOWN, DEPRESSED OR HOPELESS: 0
3. TROUBLE FALLING OR STAYING ASLEEP: 2
SUM OF ALL RESPONSES TO PHQ QUESTIONS 1-9: 9
7. TROUBLE CONCENTRATING ON THINGS, SUCH AS READING THE NEWSPAPER OR WATCHING TELEVISION: 0
8. MOVING OR SPEAKING SO SLOWLY THAT OTHER PEOPLE COULD HAVE NOTICED. OR THE OPPOSITE, BEING SO FIGETY OR RESTLESS THAT YOU HAVE BEEN MOVING AROUND A LOT MORE THAN USUAL: 0
9. THOUGHTS THAT YOU WOULD BE BETTER OFF DEAD, OR OF HURTING YOURSELF: 0

## 2022-08-17 ASSESSMENT — ANXIETY QUESTIONNAIRES
3. WORRYING TOO MUCH ABOUT DIFFERENT THINGS: 0
7. FEELING AFRAID AS IF SOMETHING AWFUL MIGHT HAPPEN: 0
GAD7 TOTAL SCORE: 4
6. BECOMING EASILY ANNOYED OR IRRITABLE: 3
2. NOT BEING ABLE TO STOP OR CONTROL WORRYING: 0
5. BEING SO RESTLESS THAT IT IS HARD TO SIT STILL: 0
IF YOU CHECKED OFF ANY PROBLEMS ON THIS QUESTIONNAIRE, HOW DIFFICULT HAVE THESE PROBLEMS MADE IT FOR YOU TO DO YOUR WORK, TAKE CARE OF THINGS AT HOME, OR GET ALONG WITH OTHER PEOPLE: VERY DIFFICULT
1. FEELING NERVOUS, ANXIOUS, OR ON EDGE: 1
4. TROUBLE RELAXING: 0

## 2022-08-17 ASSESSMENT — ENCOUNTER SYMPTOMS
CONSTIPATION: 1
RESPIRATORY NEGATIVE: 1
NAUSEA: 0
ABDOMINAL PAIN: 1
EYES NEGATIVE: 1
ALLERGIC/IMMUNOLOGIC NEGATIVE: 1

## 2022-08-17 NOTE — PATIENT INSTRUCTIONS
Patient Instructions from Today's Visit    Reason for Visit:  Prechemo visit    Diagnosis Information:  [email or web address]-[email or web address]-[email or web address]    Plan:  -Home blood pressure checks once or twice a day   -CT in October  -Remove feeding tube in Interventional Radiology-early next week  -Chemo tomorrow- we will add 5FU bolus  -Decrease Duragesic Fentanyl patch to 75mcg-finish 100mcg patch (3) and we will lower to 75mcg  -We will plan labs prior to tube removal     Follow Up:  As scheduled    Recent Lab Results:  Hospital Outpatient Visit on 08/17/2022   Component Date Value Ref Range Status    WBC 08/17/2022 4.9  4.3 - 11.1 K/uL Final    RBC 08/17/2022 3.92 (A) 4.23 - 5.6 M/uL Final    Hemoglobin 08/17/2022 11.1 (A) 13.6 - 17.2 g/dL Final    Hematocrit 08/17/2022 35.3  % Final    MCV 08/17/2022 90.1  79.6 - 97.8 FL Final    MCH 08/17/2022 28.3  26.1 - 32.9 PG Final    MCHC 08/17/2022 31.4  31.4 - 35.0 g/dL Final    RDW 08/17/2022 15.1 (A) 11.9 - 14.6 % Final    Platelets 42/63/1934 197  150 - 450 K/uL Final    MPV 08/17/2022 10.5  9.4 - 12.3 FL Final    nRBC 08/17/2022 0.00  0.0 - 0.2 K/uL Final    **Note: Absolute NRBC parameter is now reported with Hemogram**    Differential Type 08/17/2022 AUTOMATED    Final    Seg Neutrophils 08/17/2022 55  43 - 78 % Final    Lymphocytes 08/17/2022 31  13 - 44 % Final    Monocytes 08/17/2022 9  4.0 - 12.0 % Final    Eosinophils % 08/17/2022 3  0.5 - 7.8 % Final    Basophils 08/17/2022 1  0.0 - 2.0 % Final    Immature Granulocytes 08/17/2022 1  0.0 - 5.0 % Final    Segs Absolute 08/17/2022 2.7  1.7 - 8.2 K/UL Final    Absolute Lymph # 08/17/2022 1.5  0.5 - 4.6 K/UL Final    Absolute Mono # 08/17/2022 0.4  0.1 - 1.3 K/UL Final    Absolute Eos # 08/17/2022 0.2  0.0 - 0.8 K/UL Final    Basophils Absolute 08/17/2022 0.0  0.0 - 0.2 K/UL Final    Absolute Immature Granulocyte 08/17/2022 0.0  0.0 - 0.5 K/UL Final    Sodium 08/17/2022 138  136 - 145 mmol/L Final    Potassium 08/17/2022 4.1  3.5 - 5.1 mmol/L Final    Chloride 08/17/2022 107  98 - 107 mmol/L Final    CO2 08/17/2022 27  21 - 32 mmol/L Final    Anion Gap 08/17/2022 4 (A) 7 - 16 mmol/L Final    Glucose 08/17/2022 157 (A) 65 - 100 mg/dL Final    BUN 08/17/2022 13  8 - 23 MG/DL Final    Creatinine 08/17/2022 0.70 (A) 0.8 - 1.5 MG/DL Final    GFR  08/17/2022 >60  >60 ml/min/1.73m2 Final    GFR Non- 08/17/2022 >60  >60 ml/min/1.73m2 Final    Comment:      Estimated GFR is calculated using the Modification of Diet in Renal Disease (MDRD) Study equation, reported for both  Americans (GFRAA) and non- Americans (GFRNA), and normalized to 1.73m2 body surface area. The physician must decide which value applies to the patient. The MDRD study equation should only be used in individuals age 25 or older. It has not been validated for the following: pregnant women, patients with serious comorbid conditions,or on certain medications, or persons with extremes of body size, muscle mass, or nutritional status. Calcium 08/17/2022 8.3  8.3 - 10.4 MG/DL Final    Total Bilirubin 08/17/2022 0.3  0.2 - 1.1 MG/DL Final    ALT 08/17/2022 45  12 - 65 U/L Final    AST 08/17/2022 19  15 - 37 U/L Final    Alk Phosphatase 08/17/2022 87  50 - 136 U/L Final    Total Protein 08/17/2022 6.4  6.3 - 8.2 g/dL Final    Albumin 08/17/2022 2.7 (A) 3.2 - 4.6 g/dL Final    Globulin 08/17/2022 3.7 (A) 2.3 - 3.5 g/dL Final    Albumin/Globulin Ratio 08/17/2022 0.7 (A) 1.2 - 3.5   Final    Magnesium 08/17/2022 2.1  1.8 - 2.4 mg/dL Final    CEA 08/17/2022 1.3  0.0 - 3.0 ng/mL Final    Comment: Nonsmoker:  <3.0 ng/mL  Smoker:     <5.0 ng/mL  [employer]. Patient's results of tumor marker testing may not be comparable to labs using different manufacturers/methods.           Treatment Summary has been discussed and given to patient:

## 2022-08-17 NOTE — PROGRESS NOTES
Outpatient Palliative Care at the  [facility]: Office Visit Established Patient    Diagnosis: adenocarcinoma of upper GI    Treatment Plan: mFOLFIRINOX    Treatment Intent: Palliative    Medical Oncologist: Dr. Horace Garcia Oncologist: N/A    Navigator: Patricio Begum RN      Chief Complaint:    Chief Complaint   Patient presents with    Follow-up    Abdominal Pain     History of Present Illness:  Mr. Evans is a 62 y.o. 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 Patient initially presented with 6 month history of abdominal pain and 30lb weight loss. A GI workup revealed hiatal hernia, gastric polyps, benign colon polyps, diverticulosis and hemorrhoids. He presented to [facility] ER on 5/12 and CT showed extensive peritoneal nodularity, concerning for metastatic disease, with consequential SBO. He had a laparotomy on 5/27 with venting G tube placement. Biopsies confirmed adenocarcinoma of upper GI primary. He started mFOLFIRINOX on 6/10/22 while hospitalized. Following cycle 2, patient was admitted for sepsis, source determined to be g tube insertion site. Interval History:  Patient seen in clinic with Dr. Juaquin Padilla and Patricio Begum RN. Patient's wife is with him. Patient continues TPN. He has a venting g tube, that he tolerates clamping for periods at a time. Plan will be to have tube removed in the next week. He has no nausea; he has Zyprexa and Zofran available but is not taking. He has abdominal pain, for which he is prescribed fentanyl 100mcg every 72 hours and oxycodone 10mg as needed. He is taking 1-2 doses of oxycodone per day. States when patch was due to be changed last, he decided to just not change it for another day. He did not have increased pain with this experiment. He is having normal bowel movements, but not every day.   Patient is discouraged at his activity intolerance, as he typically likes to spend time outdoors and the heat is limiting him now. Pt is quiet throughout visit. Is taking lexapro. Has not had buspar for the past week and spouse states she has not noticed any increased in anxiety. Pt is taking ativan intermittently for sleep but isn't sure this is necessary. Review of Systems:  Review of Systems   Constitutional:  Positive for fatigue. HENT: Negative. Eyes: Negative. Respiratory: Negative. Cardiovascular: Negative. Gastrointestinal:  Positive for abdominal pain and constipation. Negative for nausea. Genitourinary: Negative. Musculoskeletal: Negative. Skin: Negative. Allergic/Immunologic: Negative. Neurological: Negative. Psychiatric/Behavioral:  Positive for dysphoric mood.         No Known Allergies  Past Medical History:   Diagnosis Date    Bilateral carpal tunnel syndrome 12/9/2020    Chronic right shoulder pain 11/30/2020    Colon polyps 3/11/2013    Diverticulitis 3/11/2013    HTN (hypertension) 3/11/2013    Hyperlipidemia 3/11/2013    Paresthesia and pain of both upper extremities 11/30/2020    PUD (peptic ulcer disease) 3/11/2013    History of     Right elbow pain 12/9/2020    Wheezing 3/11/2013     Past Surgical History:   Procedure Laterality Date    COLONOSCOPY  2/25/09    colonoscopy per Dr. Elle Stoll with need for repeat every 3 years    COLONOSCOPY  02/25/2022    Dr. Syd Pressley; polyps of the ascending, transverse, descending, and sigmoid colons; internal hemorrhoid; diverticulosis    LAPAROSCOPY N/A 5/27/2022    LAPAROSCOPY DIAGNOSTIC , OPEN EXPLORATORY LAPAROTOMY, MASS EXCISION, G-TUBE PLACEMENT performed by Howard Walsh MD at [address] N/A 6/3/2022    PORT INSERTION performed by Howard Walsh MD at [address] UNLISTED  October 2004    partial colon resection per Dr. Richard Zapata  02/25/2022    Dr. Syd Pressley; hiatal hernia gastric polyps     Family History   Problem Relation Age of Onset    No Known Problems Brother     Cancer Sister         breast    Hypertension Mother     Heart Disease Mother     Diabetes Father     Osteoarthritis Father     Alcohol Abuse Father     Hypertension Father     Diabetes Mother      Social History     Socioeconomic History    Marital status:      Spouse name: Not on file    Number of children: Not on file    Years of education: Not on file    Highest education level: Not on file   Occupational History    Not on file   Tobacco Use    Smoking status: Every Day     Packs/day: 1.00     Types: Cigarettes    Smokeless tobacco: Never   Substance and Sexual Activity    Alcohol use: No    Drug use: No    Sexual activity: Not on file   Other Topics Concern    Not on file   Social History Narrative    Not on file     Social Determinants of Health     Financial Resource Strain: Not on file   Food Insecurity: Not on file   Transportation Needs: Not on file   Physical Activity: Not on file   Stress: Not on file   Social Connections: Not on file   Intimate Partner Violence: Not on file   Housing Stability: Not on file     Current Outpatient Medications   Medication Sig Dispense Refill    escitalopram (LEXAPRO) 10 MG tablet Take 1 tablet by mouth in the morning. 30 tablet 5    midodrine (PROAMATINE) 5 MG tablet Take 1 tablet by mouth in the morning and 1 tablet before bedtime. Take 1 tab in the AM and 1 tab mid afternoon. 60 tablet 3    dicyclomine (BENTYL) 20 MG tablet TAKE 1 TABLET BY MOUTH EVERY SIX (6) HOURS. 360 tablet 0    pantoprazole (PROTONIX) 40 MG tablet Take 1 tablet by mouth in the morning and 1 tablet in the evening. Take before meals.  60 tablet 2    OLANZapine zydis (ZYPREXA) 5 MG disintegrating tablet Take 1 tablet by mouth nightly Do not take with promethazine 30 tablet 3    busPIRone (BUSPAR) 10 MG tablet Take 1 tablet by mouth 3 times daily 90 tablet 0    nicotine (NICODERM CQ) 14 MG/24HR Place 1 patch onto the skin daily 30 patch 3    ondansetron (ZOFRAN-ODT) 8 MG TBDP disintegrating tablet Take 1 tablet by mouth every 8 hours 90 tablet 0    promethazine (PHENERGAN) 12.5 MG tablet Take 1 tablet by mouth every 6 hours as needed for Nausea 90 tablet 0    naloxone 4 MG/0.1ML LIQD nasal spray 1 spray by Nasal route as needed for Opioid Reversal       dicyclomine (BENTYL) 10 MG capsule Take 20 mg by mouth every 6 hours       polyethylene glycol (GLYCOLAX) 17 GM/SCOOP powder Take 17 g by mouth daily      rosuvastatin (CRESTOR) 10 MG tablet Take 10 mg by mouth      umeclidinium-vilanterol (ANORO ELLIPTA) 62.5-25 MCG/INH AEPB inhaler Inhale 1 puff into the lungs daily        No current facility-administered medications for this visit. OBJECTIVE:  Wt Readings from Last 1 Encounters:   08/17/22 188 lb (85.3 kg)     Temp Readings from Last 1 Encounters:   08/17/22 98.6 °F (37 °C)     BP Readings from Last 1 Encounters:   08/17/22 108/73     Pulse Readings from Last 1 Encounters:   08/17/22 68        Pain Score:   0 - No pain      Physical Exam:  Constitutional: Well developed, well nourished male in no acute distress. HEENT: Normocephalic and atraumatic. Pupils are equal, round, and reactive to light. Extraocular muscles are intact. Sclerae anicteric. Neck supple without JVD. Lymph node   deferred   Skin Warm and dry. No bruising and no rash noted. No erythema. No pallor. Respiratory unlabored respiratory effort. CVS Regular rate, hypotensive. Abdomen Soft, nontender and nondistended. Venting g tube clamped. Neuro Grossly nonfocal with no obvious sensory or motor deficits. MSK Normal range of motion in general.  No edema and no tenderness. Psych Flat affect.        Labs:  Recent Results (from the past 24 hour(s))   CBC with Auto Differential    Collection Time: 08/17/22  9:05 AM   Result Value Ref Range    WBC 4.9 4.3 - 11.1 K/uL    RBC 3.92 (L) 4.23 - 5.6 M/uL    Hemoglobin 11.1 (L) 13.6 - 17.2 g/dL    Hematocrit 35.3 %    MCV 90.1 79.6 - 97.8 FL    MCH 28.3 26.1 - 32.9 PG    MCHC 31.4 31.4 - 35.0 g/dL    RDW 15.1 (H) 11.9 - 14.6 %    Platelets 745 642 - 694 K/uL    MPV 10.5 9.4 - 12.3 FL    nRBC 0.00 0.0 - 0.2 K/uL    Differential Type AUTOMATED      Seg Neutrophils 55 43 - 78 %    Lymphocytes 31 13 - 44 %    Monocytes 9 4.0 - 12.0 %    Eosinophils % 3 0.5 - 7.8 %    Basophils 1 0.0 - 2.0 %    Immature Granulocytes 1 0.0 - 5.0 %    Segs Absolute 2.7 1.7 - 8.2 K/UL    Absolute Lymph # 1.5 0.5 - 4.6 K/UL    Absolute Mono # 0.4 0.1 - 1.3 K/UL    Absolute Eos # 0.2 0.0 - 0.8 K/UL    Basophils Absolute 0.0 0.0 - 0.2 K/UL    Absolute Immature Granulocyte 0.0 0.0 - 0.5 K/UL   Comprehensive Metabolic Panel    Collection Time: 08/17/22  9:05 AM   Result Value Ref Range    Sodium 138 136 - 145 mmol/L    Potassium 4.1 3.5 - 5.1 mmol/L    Chloride 107 98 - 107 mmol/L    CO2 27 21 - 32 mmol/L    Anion Gap 4 (L) 7 - 16 mmol/L    Glucose 157 (H) 65 - 100 mg/dL    BUN 13 8 - 23 MG/DL    Creatinine 0.70 (L) 0.8 - 1.5 MG/DL    GFR African American >60 >60 ml/min/1.73m2    GFR Non- >60 >60 ml/min/1.73m2    Calcium 8.3 8.3 - 10.4 MG/DL    Total Bilirubin 0.3 0.2 - 1.1 MG/DL    ALT 45 12 - 65 U/L    AST 19 15 - 37 U/L    Alk Phosphatase 87 50 - 136 U/L    Total Protein 6.4 6.3 - 8.2 g/dL    Albumin 2.7 (L) 3.2 - 4.6 g/dL    Globulin 3.7 (H) 2.3 - 3.5 g/dL    Albumin/Globulin Ratio 0.7 (L) 1.2 - 3.5     Magnesium    Collection Time: 08/17/22  9:05 AM   Result Value Ref Range    Magnesium 2.1 1.8 - 2.4 mg/dL   CEA    Collection Time: 08/17/22  9:05 AM   Result Value Ref Range    CEA 1.3 0.0 - 3.0 ng/mL       Imaging:  No results found for this or any previous visit. ASSESSMENT:   Diagnosis Orders   1. Cancer associated pain        2. Peritoneal metastases ([address]        3. Encounter for palliative care        4. Metastatic adenocarcinoma ([facility] [address]        5. Slow transit constipation        6. Chemotherapy-induced nausea              PLAN:  Lab studies and imaging studies were personally reviewed. Pertinent old records were reviewed from [facility]. 1. Abdominal pain: Continue fentanyl 100mcg every 72 hours for \"two more patches\", then will change to 75mcg. I will send in a 2 week supply as we may need to return to 100mcg dosing, or may be able to wean further. Continue oxycodone intensol for breakthrough pain. 2. Nausea: Currently denies. Continue Zyprexa HS and Zofran ODT as needed. They have been instructed that he may take either promethazine or olanzapine, but not both within the same day. 3.  Constipation: Continue MiraLAX daily as needed. 4. Dysphoric mood: continue Lexapro as prescribed. He agrees with remaining off Buspar as he noticed no change for the past week. Ativan prn qhs. Advanced Care Planning Discussed: none today. Pt will have venting G tube removed. Remains on TPN but will hope to be able to consider weaning this as PO intake improves. Will follow up in: 2 weeks or sooner if needed. I have reviewed the patient's controlled substance prescription history, as maintained in the [place] prescription monitoring program, so that the prescriptions(s) for a controlled substance can be given.   Last Date Reviewed: 8/17/22            LUCY Palma  Outpatient Palliative Care at the  [facility]  [id number] [place] [address]  Office : [phone]  Fax : [phone]

## 2022-08-18 ENCOUNTER — HOSPITAL ENCOUNTER (OUTPATIENT)
Dept: INFUSION THERAPY | Age: 62
Discharge: HOME | End: 2022-08-18
Payer: COMMERCIAL

## 2022-08-18 ENCOUNTER — CLINICAL DOCUMENTATION (OUTPATIENT)
Dept: ONCOLOGY | Age: 62
End: 2022-08-18

## 2022-08-18 VITALS
BODY MASS INDEX: 27.09 KG/M2 | OXYGEN SATURATION: 97 % | DIASTOLIC BLOOD PRESSURE: 71 MMHG | TEMPERATURE: 97.4 F | RESPIRATION RATE: 18 BRPM | SYSTOLIC BLOOD PRESSURE: 106 MMHG | HEART RATE: 71 BPM | WEIGHT: 188.8 LBS

## 2022-08-18 DIAGNOSIS — C76.2 ABDOMINAL CARCINOMATOSIS (HCC): Primary | ICD-10-CM

## 2022-08-18 DIAGNOSIS — C78.6 PERITONEAL METASTASES (HCC): ICD-10-CM

## 2022-08-18 PROCEDURE — G0498 CHEMO EXTEND IV INFUS W/PUMP: HCPCS

## 2022-08-18 PROCEDURE — 96415 CHEMO IV INFUSION ADDL HR: CPT

## 2022-08-18 PROCEDURE — 96372 THER/PROPH/DIAG INJ SC/IM: CPT

## 2022-08-18 PROCEDURE — 96411 CHEMO IV PUSH ADDL DRUG: CPT

## 2022-08-18 PROCEDURE — 96375 TX/PRO/DX INJ NEW DRUG ADDON: CPT

## 2022-08-18 PROCEDURE — 6360000002 HC RX W HCPCS: Performed by: NURSE PRACTITIONER

## 2022-08-18 PROCEDURE — 96368 THER/DIAG CONCURRENT INF: CPT

## 2022-08-18 PROCEDURE — 96417 CHEMO IV INFUS EACH ADDL SEQ: CPT

## 2022-08-18 PROCEDURE — 6360000002 HC RX W HCPCS: Performed by: INTERNAL MEDICINE

## 2022-08-18 PROCEDURE — 96413 CHEMO IV INFUSION 1 HR: CPT

## 2022-08-18 PROCEDURE — 96367 TX/PROPH/DG ADDL SEQ IV INF: CPT

## 2022-08-18 PROCEDURE — 2580000003 HC RX 258: Performed by: INTERNAL MEDICINE

## 2022-08-18 RX ORDER — ONDANSETRON 2 MG/ML
8 INJECTION INTRAMUSCULAR; INTRAVENOUS ONCE
Status: COMPLETED | OUTPATIENT
Start: 2022-08-18 | End: 2022-08-18

## 2022-08-18 RX ORDER — DEXTROSE MONOHYDRATE 50 MG/ML
5-250 INJECTION, SOLUTION INTRAVENOUS PRN
Status: DISCONTINUED | OUTPATIENT
Start: 2022-08-18 | End: 2022-08-19 | Stop reason: HOSPADM

## 2022-08-18 RX ORDER — DIPHENHYDRAMINE HYDROCHLORIDE 50 MG/ML
50 INJECTION INTRAMUSCULAR; INTRAVENOUS
Status: ACTIVE | OUTPATIENT
Start: 2022-08-18 | End: 2022-08-18

## 2022-08-18 RX ORDER — FLUOROURACIL 50 MG/ML
400 INJECTION, SOLUTION INTRAVENOUS ONCE
Status: COMPLETED | OUTPATIENT
Start: 2022-08-18 | End: 2022-08-18

## 2022-08-18 RX ORDER — ATROPINE SULFATE 0.4 MG/ML
0.4 AMPUL (ML) INJECTION ONCE
Status: COMPLETED | OUTPATIENT
Start: 2022-08-18 | End: 2022-08-18

## 2022-08-18 RX ORDER — SODIUM CHLORIDE 0.9 % (FLUSH) 0.9 %
5-40 SYRINGE (ML) INJECTION PRN
Status: DISCONTINUED | OUTPATIENT
Start: 2022-08-18 | End: 2022-08-19 | Stop reason: HOSPADM

## 2022-08-18 RX ADMIN — ATROPINE SULFATE 0.4 MG: 0.4 INJECTION, SOLUTION INTRAMUSCULAR; INTRAVENOUS; SUBCUTANEOUS at 12:12

## 2022-08-18 RX ADMIN — LEUCOVORIN CALCIUM 850 MG: 350 INJECTION, POWDER, LYOPHILIZED, FOR SOLUTION INTRAMUSCULAR; INTRAVENOUS at 13:05

## 2022-08-18 RX ADMIN — ONDANSETRON 8 MG: 2 INJECTION INTRAMUSCULAR; INTRAVENOUS at 09:58

## 2022-08-18 RX ADMIN — DEXTROSE MONOHYDRATE 100 ML/HR: 50 INJECTION, SOLUTION INTRAVENOUS at 09:54

## 2022-08-18 RX ADMIN — FOSAPREPITANT 150 MG: 150 INJECTION, POWDER, LYOPHILIZED, FOR SOLUTION INTRAVENOUS at 10:28

## 2022-08-18 RX ADMIN — FLUOROURACIL 850 MG: 50 INJECTION, SOLUTION INTRAVENOUS at 14:50

## 2022-08-18 RX ADMIN — IRINOTECAN HYDROCHLORIDE 320 MG: 20 INJECTION, SOLUTION INTRAVENOUS at 13:05

## 2022-08-18 RX ADMIN — SODIUM CHLORIDE, PRESERVATIVE FREE 10 ML: 5 INJECTION INTRAVENOUS at 14:58

## 2022-08-18 RX ADMIN — DEXAMETHASONE SODIUM PHOSPHATE 12 MG: 4 INJECTION, SOLUTION INTRAMUSCULAR; INTRAVENOUS at 10:07

## 2022-08-18 RX ADMIN — SODIUM CHLORIDE, PRESERVATIVE FREE 10 ML: 5 INJECTION INTRAVENOUS at 09:50

## 2022-08-18 RX ADMIN — OXALIPLATIN 180 MG: 5 INJECTION, SOLUTION INTRAVENOUS at 10:55

## 2022-08-18 RX ADMIN — FLUOROURACIL 5000 MG: 50 INJECTION, SOLUTION INTRAVENOUS at 14:58

## 2022-08-18 NOTE — PROGRESS NOTES
Nutrition F/U:  Assessment:  Pt seen during office visit w/ Dr. Daniel Rome, receiving his 5th dose of FOLFIRINOX tomorrow, has been clamping G-tube and tolerating a mostly regular diet, does have nausea w/ greasy foods, portions remain small, bowels moving. Pt remains on TPN q HS, managed by [facility], receives 1 L NS every other day for extra hydration, and will receive TPN volume in NS for the next 2 days w/ 5-FU pump on. Dr. Daniel Rome has discussed w/ Dr. Jacobo Hernández and plan is to remove venting G-tube - causing more harm at this time w/ recurrent infections. Labs notable for glucose (157), Albumin (2.7). Current BW: 188#, up 2# over the past 2 weeks. Intervention:  1. Continue w/ Regular diet as tolerated  2. TPN per [facility], 1 L NS every other day in addition to TPN, 2.6 L of NS daily while 5-FU pump infusing. 3. Referral to IR to remove venting G-tube. CBC will be checked the morning prior to check counts for tube removal.     Monitoring/Evaluation:  1. RD to follow up during next office visit - follow up wt status, tolerance/intake of po diet/nutrition related lab values w/ TPN, symptom management.       3 [facility], [address], [address] [facility]

## 2022-08-18 NOTE — PROGRESS NOTES
Clinical Social Work Note  Name: Jesica Au    : 1960    MRN: [id number]    Date of Service: 2022    Type of Service: Health and Behavior Intervention     Length of Service: 16 minutes    Patient Diagnosis:  Gastric cancer    Referral Source: Infusion RN    Reason for Visit: F/U    Seen patient with his wife. Jesica Au was dressed properly. No abnormal psychomotor movements observed. Intellectual functioning appeared to be intact. Insight was adequate. Judgment was adequate. Patient did not report suicidal ideations, intent or plans. Speech was coherent. Thought process was clear. Patient did not report homicidal ideations, intent or plans. Patient was oriented to self, place, time and situation. Current care for physical and mental illness, adequate insight and judgment, family support, cultural and Caodaism beliefs and values that support self-care. PORTER-LAUREEN gave contact information and encouraged pt to call should any needs arise. Pt verbalized understanding. PORTER -CP intends to follow up as needed. No flowsheet data found.         Electronically Signed By:  PORTER Hodge

## 2022-08-20 ENCOUNTER — HOSPITAL ENCOUNTER (OUTPATIENT)
Dept: INFUSION THERAPY | Age: 62
Discharge: HOME | End: 2022-08-20
Payer: COMMERCIAL

## 2022-08-20 VITALS
RESPIRATION RATE: 18 BRPM | HEART RATE: 77 BPM | DIASTOLIC BLOOD PRESSURE: 93 MMHG | OXYGEN SATURATION: 95 % | TEMPERATURE: 97.5 F | SYSTOLIC BLOOD PRESSURE: 129 MMHG

## 2022-08-20 DIAGNOSIS — C78.6 PERITONEAL METASTASES (HCC): ICD-10-CM

## 2022-08-20 DIAGNOSIS — C76.2 ABDOMINAL CARCINOMATOSIS (HCC): Primary | ICD-10-CM

## 2022-08-20 PROCEDURE — 2580000003 HC RX 258: Performed by: INTERNAL MEDICINE

## 2022-08-20 PROCEDURE — 96523 IRRIG DRUG DELIVERY DEVICE: CPT

## 2022-08-20 RX ORDER — SODIUM CHLORIDE 0.9 % (FLUSH) 0.9 %
5-40 SYRINGE (ML) INJECTION PRN
Status: DISCONTINUED | OUTPATIENT
Start: 2022-08-20 | End: 2022-08-21 | Stop reason: HOSPADM

## 2022-08-20 RX ADMIN — SODIUM CHLORIDE, PRESERVATIVE FREE 10 ML: 5 INJECTION INTRAVENOUS at 14:30

## 2022-08-20 NOTE — PROGRESS NOTES
Arrived to the [facility]. Continuous chemo pump completed prior to arrival.  Patient also has continuous IVF infusing, however, patient and wife state that IV stopped at approximately 1000 this AM and unable to flush line. Old port needle removed and port re-accessed per procedure and excellent venous returns. Port flushed and wife reconnected to home fluids. Patient tolerated well. Any issues or concerns during appointment: as noted above. Patient aware of next infusion appointment on 9/1 (date) at 56 (time). Patient aware of next lab and [facility] office visit on 8/22 (date) at 0 (time). Patient instructed to call provider with temperature of 100.4 or greater or nausea/vomiting/ diarrhea or pain not controlled by medications  Discharged ambulatory.

## 2022-08-22 ENCOUNTER — TELEPHONE (OUTPATIENT)
Dept: ONCOLOGY | Age: 62
End: 2022-08-22

## 2022-08-22 ENCOUNTER — HOSPITAL ENCOUNTER (OUTPATIENT)
Dept: LAB | Age: 62
Discharge: HOME | End: 2022-08-25
Payer: COMMERCIAL

## 2022-08-22 ENCOUNTER — HOSPITAL ENCOUNTER (OUTPATIENT)
Dept: INTERVENTIONAL RADIOLOGY/VASCULAR | Age: 62
Discharge: HOME | End: 2022-08-25

## 2022-08-22 ENCOUNTER — TELEPHONE (OUTPATIENT)
Dept: FAMILY MEDICINE CLINIC | Facility: CLINIC | Age: 62
End: 2022-08-22

## 2022-08-22 VITALS
HEART RATE: 67 BPM | SYSTOLIC BLOOD PRESSURE: 108 MMHG | TEMPERATURE: 97.8 F | DIASTOLIC BLOOD PRESSURE: 74 MMHG | RESPIRATION RATE: 18 BRPM | OXYGEN SATURATION: 95 %

## 2022-08-22 DIAGNOSIS — C78.6 MALIGNANT NEOPLASM METASTATIC TO PERITONEUM (HCC): ICD-10-CM

## 2022-08-22 LAB
BASOPHILS # BLD: 0 K/UL (ref 0–0.2)
BASOPHILS NFR BLD: 1 % (ref 0–2)
DIFFERENTIAL METHOD BLD: ABNORMAL
EOSINOPHIL # BLD: 0.2 K/UL (ref 0–0.8)
EOSINOPHIL NFR BLD: 4 % (ref 0.5–7.8)
ERYTHROCYTE [DISTWIDTH] IN BLOOD BY AUTOMATED COUNT: 14.3 % (ref 11.9–14.6)
HCT VFR BLD AUTO: 35.5 %
HGB BLD-MCNC: 11.3 G/DL (ref 13.6–17.2)
IMM GRANULOCYTES # BLD AUTO: 0 K/UL (ref 0–0.5)
IMM GRANULOCYTES NFR BLD AUTO: 0 % (ref 0–5)
LYMPHOCYTES # BLD: 1.7 K/UL (ref 0.5–4.6)
LYMPHOCYTES NFR BLD: 44 % (ref 13–44)
MCH RBC QN AUTO: 28.6 PG (ref 26.1–32.9)
MCHC RBC AUTO-ENTMCNC: 31.8 G/DL (ref 31.4–35)
MCV RBC AUTO: 89.9 FL (ref 79.6–97.8)
MONOCYTES # BLD: 0.1 K/UL (ref 0.1–1.3)
MONOCYTES NFR BLD: 2 % (ref 4–12)
NEUTS SEG # BLD: 1.9 K/UL (ref 1.7–8.2)
NEUTS SEG NFR BLD: 49 % (ref 43–78)
NRBC # BLD: 0 K/UL (ref 0–0.2)
PLATELET # BLD AUTO: 203 K/UL (ref 150–450)
PMV BLD AUTO: 9.8 FL (ref 9.4–12.3)
RBC # BLD AUTO: 3.95 M/UL (ref 4.23–5.6)
WBC # BLD AUTO: 3.9 K/UL (ref 4.3–11.1)

## 2022-08-22 PROCEDURE — 36415 COLL VENOUS BLD VENIPUNCTURE: CPT

## 2022-08-22 PROCEDURE — 85025 COMPLETE CBC W/AUTO DIFF WBC: CPT

## 2022-08-22 NOTE — DISCHARGE INSTRUCTIONS
[facility]     Department of Interventional Radiology     [facility] Radiology     [phone] Office     [phone] Fax         DRAIN/PORT/CATHETER REMOVAL DISCHARGE INSTRUCTIONS           General Information:        Your doctor has ordered for us to remove your drain, port, or catheter. This could be that you do not need it anymore, it is not doing its job, your physician has decided on another plan for your treatment and/or it may need replacing. Home Care Instructions: You can resume your regular diet and medication regimen. Do not drink alcohol, drive, or make any important legal decisions in the next 24 hours. Do not lift anything heavier than a gallon of milk, or do anything strenuous for the next 24 hours. You will notice a dressing over the site of the removal. This dressing should stay in place until the site is healed. The dressing should be changed at least every 48 hours. You should change the dressing sooner if it becomes soiled or wet. The nurse who discharges you to home should review with you any wound care instructions. Resume your normal level of activity slowly. You may shower after 24 hours, but do not take a bath or swim or immerse yourself in water until the site has healed, and keep the dressing dry with plastic wrap while showering. The site may ooze for a couple of days. This drainage should lessen with each passing day. Call If:        You should call your Physician and/or the Radiology Nurse if you have any bleeding other than a small spot on your bandage. Call if you have any signs of infection, fever, or increased pain at the site of the tube. Call if the oozing increases, if it changes color, or begins to have an odor. Follow-Up Instructions: Please see your ordering doctor as he/she has requested. To Reach Us:      If you have any questions about your procedure, please call the notice any of these symptoms; do not wait until your next office visit. Recognize signs and symptoms of STROKE:  F-face looks uneven    A-arms unable to move or move unevenly    S-speech slurred or non-existent    T-time-call 911 as soon as signs and symptoms begin-DO NOT go       Back to bed or wait to see if you get better-TIME IS BRAIN.

## 2022-08-22 NOTE — BRIEF OP NOTE
Brief Postoperative Note      Patient: Amberly Castro  YOB: 1960  MRN: [id number]    Date of Procedure: 8/22/2022    Pre-Op Diagnosis: * No pre-op diagnosis entered * Patient and Wife request G-Tube removal.  They note drainage and \"infection\" around the tube. They state that he is eating and has bowel movements. I reminded them of Dr Mercedes Jackson intraoperative assessment of complete bowel obstruction, but they still want the GT removed. I explained that he will leak gastric content for 3-5 days, longer if he has a bowel stricture or obstruction. They still want the GT removed.       Post-Op Diagnosis: Same       * No procedures listed *GT removal    * No surgeons listed *    Assistant:  * No surgical staff found *    Anesthesia: * No anesthesia type entered *    Estimated Blood Loss (mL): Minimal    Complications: None    Specimens:   * No specimens in log *    Implants:  * No implants in log *      Drains:   Gastrostomy/Enterostomy/Jejunostomy Tube Gastrostomy LUQ 20 fr (Active)       Findings: GT removed in its entirety      Electronically signed by Evelyn Flower MD on 8/22/2022 at 2:13 PM

## 2022-08-22 NOTE — TELEPHONE ENCOUNTER
PT spouse called regarding lab results/states waiting outside of [facility] right now for the lab results to see if PT can have his procedure today

## 2022-08-22 NOTE — PROGRESS NOTES
Recovery period without difficulty. Pt alert and oriented and denies pain. Dressing is clean, dry, and intact. Reviewed discharge instructions with patient and family, both verbalized understanding.

## 2022-08-23 DIAGNOSIS — C78.6 MALIGNANT NEOPLASM METASTATIC TO PERITONEUM (HCC): Primary | ICD-10-CM

## 2022-08-23 NOTE — PROGRESS NOTES
I saw patient on 8-17-22 with Dr Kaden Guzman. Pt would like feeding tube removed and this was scheduled and completed on 8-22-22. Pt had labs prior to tube placement and the okay was given to patient via phone call after labs completed. Pt received added 5FU bolus dose of chemo and we will see how he handles dosing at next viait. Instructed pt and wife to call with any questions or concerns. Pt is now starting to eat small amounts and tolerate. TPN by [place] will continue.

## 2022-08-29 DIAGNOSIS — Z78.9 ON TOTAL PARENTERAL NUTRITION: ICD-10-CM

## 2022-08-29 DIAGNOSIS — C78.6 MALIGNANT NEOPLASM METASTATIC TO PERITONEUM (HCC): Primary | ICD-10-CM

## 2022-08-30 ENCOUNTER — TELEPHONE (OUTPATIENT)
Dept: FAMILY MEDICINE CLINIC | Facility: CLINIC | Age: 62
End: 2022-08-30

## 2022-08-30 NOTE — TELEPHONE ENCOUNTER
Home Health Certification and Plan of Care, order # [id number] received from [facility]. Placed in Dr Yoseph Gonzalez.

## 2022-08-31 ASSESSMENT — ENCOUNTER SYMPTOMS
SCLERAL ICTERUS: 0
CONSTIPATION: 0
ABDOMINAL PAIN: 1
BLOOD IN STOOL: 0
SHORTNESS OF BREATH: 0
SORE THROAT: 0
ABDOMINAL DISTENTION: 0
BACK PAIN: 0
HEMOPTYSIS: 0
VOMITING: 0
EYE PROBLEMS: 0
COUGH: 0
DIARRHEA: 0

## 2022-08-31 NOTE — PROGRESS NOTES
[employer] [facility]: Established patient - follow up     Chief Complaint   Patient presents with    Follow-up      Reason for Referral: Abdominal pain; Concern  for peritoneal metastatic disease   Referring Provider: Kathlee Simmonds, NP   Primary Care Provider: Levi Sprague MD   Family History of Cancer/Hematologic Disorders: Family history is significant for sister with breast cancer. Presenting Symptoms: Progressively worsening, persistent abdominal pain x several months    History of Present Illness:  Mr. Roldan  is a 62 y.o. male who presents today for FU regarding adenocarcinoma with peritoneal metastatic disease. The past medical history is significant for tobacco use (recent pack per day smoker x 30+ years - now down to 1/3PPD), PUD, paresthesia/pain of bilateral upper extremities, HLD, HTN, diverticulitis,  colon polyps, hiatal hernia, chronic right shoulder pain, bilateral carpal tunnel syndrome, and partial colon resection. He presented to the [facility] ED on 5/12/22 with a complaint of persistent abdominal pain for several months that was becoming progressively  worse. EGD and colonoscopy on 2/25/22 revealed findings of hiatal hernia, gastric polyps, several benign colon polyps, diverticulosis, and internal hemorrhoids. CT of the abdomen on 3/8/22 showed no acute findings. He presented to [facility] ER x 2 for  evaluation (5/3/22 and 5/10/22). RUQ abdominal ultrasound was completed on 5/3/22 in the ED at [facility] demonstrating no acute findings to explain patient's pain; probable hepatic  steatosis; small echogenic mural foci in the gallbladder which are nonmobile, likely representing cholesterol polyps, largest measuring 4 mm; and small volume simple ascites in the right upper quadrant and left lower quadrant, nonspecific.   CT AP with  contrast at [facility] ED 5/10/22 showed a partial small bowel obstruction; small to moderate amount of free fluid in the abdomen and pelvis; and nonspecific stranding of the omentum primarily in the left upper quadrant. Radiologist noted that follow-up  CT was recommended to differentiate passive congestion from omental disease. On 5/11/22, abdominal series again showed multiple dilated small bowel loops with enteric contrast appearing to extend into the proximal colon, suggesting partial small  bowel obstruction. CT AP in the [facility] ED on 5/12/22 identified extensive peritoneal nodularity and mild ascites consistent with peritoneal  metastatic disease with primary lesion not definitely identified; dilated fluid-filled loops of small bowel possibly related to gastroenteritis with no definite obstruction and no obvious bowel mass; and sclerosis of S1 vertebral body. Radiologist recommended consideration of follow-up bone scan to assess for bone metastases. Patient was admitted to  the Hospitalist service on 5/12/22, and IR consulted for possible paracentesis however very small volume was inaccessible. CT Chest obtained on 5/13/22 showed No evidence of primary malignancy or metastasis within the chest.  Follow-up hepatobiliary  scan was suggested to assess for common bile duct obstruction. Oncology was consulted but did not see patient inhouse as pt was dischared. Upon discharge on 5/14/22, patient was instructed to follow up with [facility]. During consultation, we discussed his workup. We looked at the images together demonstrating some of the findings concerning for peritoneal nodularity/peritoneal disease. Discussed anatomy of the findings utilizing images to help pt understand what we are looking at and suspecting. He and wife were appreciative of the time spent to review these. We also addressed pt's pain. We also reviewed images of sclerosing lesion - bone scan recommended. He also was recommended to undergo HIDA scan after recent US per PCP. He is tired of tests, frustrated.   Feelings validated and stressed importance of workup to determine why he has pain. Wt loss from 240 --> 209 noted and expressed concern to pt about this. Of note, prior akbar resection with Dr Suyapa Butts for diverticulitis per pt. Sent note to dr Angel Presley re pt's case to expedite workup with his agreement. He was hospitalized for abdominal pain and sepsis. He was empirically placed on vancomycin and cefepime. CT scanning disclosed no intra-abdominal fluid collection or abscess but did suggest that his tumor burden was somewhat smaller. He had some purulent drainage from around his G-tube. This was cultured and grew klebsiella pneumoniae and citrobacter freundii both of which were sensitive to Levaquin which he was discharged home on for 10 days. Today, he is here for follow-up prior to cycle 6 FOLFIRINOX. He did exceptionally well after last cycle which was full dose per their wishes. He expressed some interest in discontinuing treatment since he is feeling so well but we discussed that he has advanced disease and the reason why he is feeling well is because the treatment is working. G-tube out. He continues with TPN but less hours and on increased p.o. intake. RD here for the visit and planning to modify duration dosing of TPN after submitting patient's log of foods he is eating. He wishes for more independence. He wants to drive he wants to be able to go back to work. He is currently on fentanyl patch and this is being weaned by PCP. Patient was advised not to stop anything cold turkey as this may lead to withdrawal symptoms. Having said that, PC is dropping his dose of fentanyl patch from 75-25 and we will reassess. Signs and symptoms of withdrawal discussed with patient and wife. It was his birthday yesterday and wished him best wishes. No current signs and symptoms of infection. He wishes to go fishing which he can do. Plan for surveillance reviewed. Labs discussed.   He wishes to continue on the current regimen until the next scan in October, at that time he may wish to proceed with unconventional scheduling of may be FOLFIRINOX every 3 weeks. He does understand inherent risks with alternate schedule and how this would affect progression of disease. Chronological Events:   EGD REPORT 2/25/22                      COLONOSCOPY REPORT 2/25/22                 CT ABDOMEN PELVIS W CONTRAST 3/8/2022   FINDINGS:   LOWER CHEST: Unremarkable. ABDOMEN AND PELVIS:   Liver: Unremarkable. Biliary system: Unremarkable. Pancreas: Unremarkable. Spleen: Unremarkable. Adrenals: Unremarkable. Genitourinary: Unremarkable. Reproductive organs: Unremarkable. Gastrointestinal tract: Colonic diverticulosis without evidence of diverticulitis. There is no evidence of bowel obstruction or inflammation. The appendix is normal   Peritoneum/Extraperitoneum: Unremarkable. No free fluid or air. Vascular: Unremarkable. Musculoskeletal:  Small fat-containing umbilical hernia. Degenerative  changes of thoracolumbar spine. No acute or aggressive osseous lesion. IMPRESSION: Colonic diverticula without evidence of acute diverticulitis. RIGHT UPPER QUADRANT ABDOMINAL ULTRASOUND 5/3/2022    FINDINGS:   Pancreas: Not visualized, obscured by bowel gas. Intrahepatic IVC: Normal.   Main  Portal Vein: Patent with normal directional flow. Liver: Liver contour is normal. Liver appears diffusely increased in echogenicity consistent with hepatic steatosis. There is fatty sparing around the gallbladder fossa. Gallbladder: Gallbladder  is normal in size. No evidence of gallbladder wall thickening. No gallstones are seen. There are several nonmobile echogenic mural foci in the proximal gallbladder body/neck, largest measuring 4 mm, without shadowing, likely small cholesterol polyps. Bile ducts: No evidence of biliary ductal dilation. The common bile duct measures 3 mm in diameter.    Right kidney: Normal.   Left Upper Quadrant: Limited images of the left upper colon. Scattered colonic  gas. Peritoneum / Retroperitoneum: No pneumoperitoneum. Soft tissues / Bones: No acute osseous findings. The contrast in urinary bladder. Lines / Support Apparatus: None. IMPRESSION: Redemonstrated of multiple dilated small bowel loops with enteric contrast appearing to extend into the proximal colon, suggesting only partial small bowel obstruction. Continued radiographic follow-up is advised. CT OF THE ABDOMEN AND PELVIS 5/12/22   FINDINGS:   LOWER CHEST: Normal.   HEPATOBILIARY: No evidence of focal liver mass. No calcified gallstones. PANCREAS: Normal.   SPLEEN: Normal.    ADRENAL GLANDS: Normal.    KIDNEYS/BLADDER: Kidneys and bladder are unremarkable. No hydronephrosis or urinary tract calculi. BOWEL: Dilated fluid-filled loops of small bowel are present throughout the abdomen. No definite transition point. Oral contrast noted in the colon. No definite evidence of bowel mass but there is extensive peritoneal nodularity and trace ascites highly  concerning for peritoneal metastatic disease. LYMPH NODES: No enlarged retroperitoneal or pelvic lymph nodes. Peritoneal nodularity again noted most likely metastatic disease. VASCULATURE: Unremarkable. PELVIC ORGANS: Prostate gland and rectum are unremarkable. Small amount of free pelvic fluid is noted. MUSCULOSKELETAL: Degenerative spine changes are noted. Mild sclerosis involving the S1 vertebral body is present on image 74 of series 602. IMPRESSION   1. Extensive peritoneal nodularity and mild ascites consistent with peritoneal metastatic disease. Primary lesion not definitely identified. 2. Dilated fluid-filled loops of small bowel possibly related to gastroenteritis. No definite obstruction. No obvious bowel mass. 3. Sclerosis S1 vertebral body. Consider follow-up bone scan to assess for bone metastases.        LIMITED ABDOMINAL ULTRASOUND 5/13/22   FINDINGS: A single limited gray scale image was submitted of an undisclosed location in the abdomen. Images demonstrate a small amount of  ascites as seen on comparison CT. IMPRESSION   1. Small volume ascites. CT CHEST WITH CONTRAST 5/13/22   FINDINGS:   Mediastinum and visualized thyroid: Normal.   Heart: Normal.    Large Vessels: Normal.    Pleura: Normal.    Lungs: No lung mass clearly discerned. Mild scarring or atelectasis in the right lung base. No suspicious lung nodule. No consolidation or zulma pulmonary edema. Airways: Normal.    Lymph nodes: Normal.    Bones/Soft tissues: Normal.    Visualized abdomen: Partially imaged omental nodules. Perihepatic ascites noted. IMPRESSION: No evidence of primary malignancy or metastasis within the chest       ABDOMINAL ULTRASOUND 5/17/22   FINDINGS:    LIVER: 17.3 cm. Slight increase in echogenicity without focal mass. BILE DUCTS: No intrahepatic bile duct dilatation. CBD diameter = 8 mm. GALLBLADDER: It is unremarkable in appearance without stones or sludge. Echogenic polyp measures 0.5 cm. No gallbladder wall thickening. Mild ascites. PANCREAS: Normal.   SPLEEN: Borderline enlarged at 12.2 cm. RIGHT KIDNEY: 13.3 cm. No mass or hydronephrosis. LEFT KIDNEY: 14.3 cm. No mass or hydronephrosis. ABDOMINAL AORTA AND IVC: Normal in size. ASCITES: Mild   IMPRESSION: Possible mild fatty infiltration liver. No focal mass. Gallbladder polyp but no stones or gallbladder wall thickening. Mild ascites. Mildly prominent common bile duct. Follow-up hepatobiliary scan could be performed to assess for common bile duct obstruction.         04/02/2021 (COVID-19, Marco Griffin, Primary or Immunocompromised Series, MRNA, PF, 100mcg/0.5mL)   04/30/2021 (COVID-19, Marco Griffin, Primary or Immunocompromised Series, MRNA, PF, 100mcg/0.5mL)   11/16/2021 (COVID-19, Moderna Booster, PF, 0.25mL Dose)      5/18/22 heme/onc consultation   5/20/22 Dr Jossie Rogers consult - sent to ED for admission/workup   5/23/22 omental bx - IR 5/27/22 Dr Mica Gtz - diagnostic lap, omental mass and mesentery mass excision, G-tube placement for venting  6/1/22 painful G-tube - tract recanalized and new G-tube placed   6/12/22 C1 FOLFIRINOX completed - dose adjusted   6/14/22 d/c   6/24/22 FU sx - G tube maint instructions/staples removed - RTC PRN   6/24/22 FU after hospitalization - discussed PC/plan for tx  7/8/22 FU - mainly concerned about PICC line without blood return, decline cathflo, seeing José Miguel Pham in [facility] on Monday. Will increase hydration at home. HH for TPN.    7/18/22 Follow up after hospitalization. He will complete course of Levaquin as prescribed for infection around G-tube. Would like to proceed with cycle 3 FOLFIRINOX with increased dosing. 8/4/22 Cycle 4 FOLFIRINOX - continue dose escalation. He will complete course of Levaquin/Diflucan as prescribed for infection around G-tube, site looks good today. 8/17/22 C5 FOLFIRINOX - wishes to pw full dose accepting risks; wishes for G tube to be removed - will need to time with labs; plan to drop dose in opioids - PC seeing pt today. RD seeing pt. Plan for restaging in ~Oct.    9/1/22 FU FOLFIRINOX C5 full dose now; imaging in Oct     Family History   Problem Relation Age of Onset    No Known Problems Brother     Cancer Sister         breast    Hypertension Mother     Heart Disease Mother     Diabetes Father     Osteoarthritis Father     Alcohol Abuse Father     Hypertension Father     Diabetes Mother       Social History     Socioeconomic History    Marital status:      Spouse name: None    Number of children: None    Years of education: None    Highest education level: None   Tobacco Use    Smoking status: Every Day     Packs/day: 1.00     Types: Cigarettes    Smokeless tobacco: Never   Substance and Sexual Activity    Alcohol use: No    Drug use: No        Review of Systems   Constitutional:  Positive for fatigue.  Negative for appetite change, diaphoresis, fever and unexpected weight change. HENT:   Negative for sore throat. Eyes:  Negative for eye problems and icterus. Respiratory:  Negative for cough, hemoptysis and shortness of breath. Cardiovascular:  Negative for chest pain, leg swelling and palpitations. Gastrointestinal:  Positive for abdominal pain (much improved). Negative for abdominal distention, blood in stool, constipation, diarrhea, nausea and vomiting. Endocrine: Negative for hot flashes. Genitourinary:  Negative for dysuria. Musculoskeletal:  Negative for arthralgias, back pain and gait problem. Skin:  Negative for itching, rash and wound. Neurological:  Negative for dizziness, extremity weakness, gait problem, headaches, light-headedness, numbness, seizures and speech difficulty. Hematological:  Negative for adenopathy. Does not bruise/bleed easily. Psychiatric/Behavioral:  Positive for depression. Negative for decreased concentration and sleep disturbance. The patient is nervous/anxious.        No Known Allergies  Past Medical History:   Diagnosis Date    Bilateral carpal tunnel syndrome 12/9/2020    Chronic right shoulder pain 11/30/2020    Colon polyps 3/11/2013    Diverticulitis 3/11/2013    HTN (hypertension) 3/11/2013    Hyperlipidemia 3/11/2013    Paresthesia and pain of both upper extremities 11/30/2020    PUD (peptic ulcer disease) 3/11/2013    History of     Right elbow pain 12/9/2020    Wheezing 3/11/2013     Past Surgical History:   Procedure Laterality Date    COLONOSCOPY  2/25/09    colonoscopy per Dr. Riya Tilley with need for repeat every 3 years    COLONOSCOPY  02/25/2022    Dr. Anastasiia Garvey; polyps of the ascending, transverse, descending, and sigmoid colons; internal hemorrhoid; diverticulosis    LAPAROSCOPY N/A 5/27/2022    LAPAROSCOPY DIAGNOSTIC , OPEN EXPLORATORY LAPAROTOMY, MASS EXCISION, G-TUBE PLACEMENT performed by Rosa Rios MD at [address] N/A 6/3/2022    PORT INSERTION performed by Rosa Rios MD at [facility]    [place] ABDOMEN SURGERY PROC UNLISTED  October 2004    partial colon resection per Dr. Eileen Herrera  02/25/2022    Dr. Justino Breaux; hiatal hernia gastric polyps     Current Outpatient Medications   Medication Sig Dispense Refill    escitalopram (LEXAPRO) 10 MG tablet Take 1 tablet by mouth in the morning. 30 tablet 5    dicyclomine (BENTYL) 20 MG tablet TAKE 1 TABLET BY MOUTH EVERY SIX (6) HOURS. 360 tablet 0    pantoprazole (PROTONIX) 40 MG tablet Take 1 tablet by mouth in the morning and 1 tablet in the evening. Take before meals. 60 tablet 2    OLANZapine zydis (ZYPREXA) 5 MG disintegrating tablet Take 1 tablet by mouth nightly Do not take with promethazine 30 tablet 3    ondansetron (ZOFRAN-ODT) 8 MG TBDP disintegrating tablet Take 1 tablet by mouth every 8 hours 90 tablet 0    promethazine (PHENERGAN) 12.5 MG tablet Take 1 tablet by mouth every 6 hours as needed for Nausea 90 tablet 0    rosuvastatin (CRESTOR) 10 MG tablet Take 10 mg by mouth      fentaNYL (DURAGESIC) 25 MCG/HR Place 1 patch onto the skin every 72 hours for 15 days. 5 patch 0    fentaNYL (DURAGESIC) 12 MCG/HR Place 1 patch onto the skin every 3 days for 15 days. Intended supply: 30 days 5 patch 0    midodrine (PROAMATINE) 5 MG tablet Take 1 tablet by mouth in the morning and 1 tablet before bedtime. Take 1 tab in the AM and 1 tab mid afternoon.  (Patient not taking: Reported on 9/1/2022) 60 tablet 3    busPIRone (BUSPAR) 10 MG tablet Take 1 tablet by mouth 3 times daily (Patient not taking: Reported on 9/1/2022) 90 tablet 0    naloxone 4 MG/0.1ML LIQD nasal spray 1 spray by Nasal route as needed for Opioid Reversal  (Patient not taking: No sig reported)      dicyclomine (BENTYL) 10 MG capsule Take 20 mg by mouth every 6 hours  (Patient not taking: Reported on 9/1/2022)      polyethylene glycol (GLYCOLAX) 17 GM/SCOOP powder Take 17 g by mouth daily (Patient not taking: No sig reported)      umeclidinium-vilanterol (ANORO ELLIPTA) 62.5-25 MCG/INH AEPB inhaler Inhale 1 puff into the lungs daily  (Patient not taking: Reported on 9/1/2022)       No current facility-administered medications for this visit. No flowsheet data found. OBJECTIVE:  BP 97/61 Comment: standing  Pulse 73   Temp 97.7 °F (36.5 °C) (Oral)   Resp 18   Ht 5' 10\" (1.778 m)   Wt 183 lb 14.4 oz (83.4 kg)   SpO2 98%   BMI 26.39 kg/m²       ECOG PERFORMANCE STATUS - 1- Restricted in physically strenuous activity but ambulatory and able to carry out work of a light or sedentary nature such as light house work, office work. Pain - 0 - No pain/10. None/Minimal pain - not affecting QOL     Fatigue - No flowsheet data found. Distress - No flowsheet data found. Physical Exam  Vitals reviewed. Exam conducted with a chaperone present. Constitutional:       General: He is not in acute distress. Appearance: Normal appearance. He is normal weight. He is not ill-appearing or toxic-appearing. HENT:      Head: Normocephalic and atraumatic. Nose: Nose normal. No congestion. Mouth/Throat:      Mouth: Mucous membranes are moist.   Eyes:      General: No scleral icterus. Extraocular Movements: Extraocular movements intact. Conjunctiva/sclera: Conjunctivae normal.      Pupils: Pupils are equal, round, and reactive to light. Cardiovascular:      Rate and Rhythm: Normal rate and regular rhythm. Heart sounds: No murmur heard. Pulmonary:      Effort: Pulmonary effort is normal. No respiratory distress. Breath sounds: Normal breath sounds. No wheezing, rhonchi or rales. Chest:   Breasts:     Right: No axillary adenopathy or supraclavicular adenopathy. Left: No axillary adenopathy or supraclavicular adenopathy. Abdominal:      General: There is no distension. Palpations: Abdomen is soft. There is no mass. Tenderness: There is no abdominal tenderness. There is no guarding or rebound.       Comments: Venting Comprehensive Metabolic Panel    Collection Time: 09/01/22  9:33 AM   Result Value Ref Range    Sodium 141 136 - 145 mmol/L    Potassium 4.0 3.5 - 5.1 mmol/L    Chloride 111 (H) 101 - 110 mmol/L    CO2 25 21 - 32 mmol/L    Anion Gap 5 4 - 13 mmol/L    Glucose 144 (H) 65 - 100 mg/dL    BUN 14 8 - 23 MG/DL    Creatinine 0.70 (L) 0.8 - 1.5 MG/DL    GFR African American >60 >60 ml/min/1.73m2    GFR Non- >60 >60 ml/min/1.73m2    Calcium 8.3 8.3 - 10.4 MG/DL    Total Bilirubin 0.2 0.2 - 1.1 MG/DL    ALT 42 12 - 65 U/L    AST 23 15 - 37 U/L    Alk Phosphatase 96 50 - 136 U/L    Total Protein 5.8 (L) 6.3 - 8.2 g/dL    Albumin 2.8 (L) 3.2 - 4.6 g/dL    Globulin 3.0 2.3 - 3.5 g/dL    Albumin/Globulin Ratio 0.9 (L) 1.2 - 3.5     Magnesium    Collection Time: 09/01/22  9:33 AM   Result Value Ref Range    Magnesium 2.1 1.8 - 2.4 mg/dL   Cancer Antigen 19-9    Collection Time: 09/01/22  9:33 AM   Result Value Ref Range    CA 19-9 3.60 2.0 - 37.0 U/mL   Phosphorus    Collection Time: 09/01/22  9:33 AM   Result Value Ref Range    Phosphorus 3.6 2.3 - 3.7 MG/DL       Imaging: reviewed     PATHOLOGY:             6/2022         ASSESSMENT:     Diagnosis Orders   1.  Abdominal carcinomatosis (HCC)  CBC With Auto Differential    Comprehensive metabolic panel    DISCONTINUED: dextrose 5 % solution    DISCONTINUED: fosaprepitant (EMEND) 150 mg in sodium chloride 0.9 % 150 mL IVPB    DISCONTINUED: dexamethasone (DECADRON) 12 mg in sodium chloride 0.9 % 50 mL IVPB    DISCONTINUED: ondansetron (ZOFRAN) injection 8 mg    DISCONTINUED: atropine injection 0.4 mg    DISCONTINUED: oxaliplatin (ELOXATIN) 180 mg in dextrose 5 % 250 mL chemo IVPB    DISCONTINUED: irinotecan (CAMPTOSAR) 320 mg in dextrose 5 % 250 mL chemo IVPB    DISCONTINUED: leucovorin calcium (WELLCOVORIN) 850 mg in dextrose 5 % 250 mL IVPB    DISCONTINUED: fluorouracil (ADRUCIL) chemo syringe 850 mg    DISCONTINUED: fluorouracil (ADRUCIL) 5,050 mg in sodium chloride 0.9 % 230 mL chemo elastomeric infusion    DISCONTINUED: sodium chloride flush 0.9 % injection 5-40 mL    DISCONTINUED: famotidine (PEPCID) injection 20 mg    DISCONTINUED: hydrocortisone sodium succinate PF (SOLU-CORTEF) injection 100 mg    DISCONTINUED: acetaminophen (TYLENOL) tablet 650 mg    DISCONTINUED: sodium chloride flush 0.9 % injection 5-40 mL      2. Peritoneal metastases ([address]  CBC With Auto Differential    Comprehensive metabolic panel    DISCONTINUED: dextrose 5 % solution    DISCONTINUED: fosaprepitant (EMEND) 150 mg in sodium chloride 0.9 % 150 mL IVPB    DISCONTINUED: dexamethasone (DECADRON) 12 mg in sodium chloride 0.9 % 50 mL IVPB    DISCONTINUED: ondansetron (ZOFRAN) injection 8 mg    DISCONTINUED: atropine injection 0.4 mg    DISCONTINUED: oxaliplatin (ELOXATIN) 180 mg in dextrose 5 % 250 mL chemo IVPB    DISCONTINUED: irinotecan (CAMPTOSAR) 320 mg in dextrose 5 % 250 mL chemo IVPB    DISCONTINUED: leucovorin calcium (WELLCOVORIN) 850 mg in dextrose 5 % 250 mL IVPB    DISCONTINUED: fluorouracil (ADRUCIL) chemo syringe 850 mg    DISCONTINUED: fluorouracil (ADRUCIL) 5,050 mg in sodium chloride 0.9 % 230 mL chemo elastomeric infusion    DISCONTINUED: sodium chloride flush 0.9 % injection 5-40 mL    DISCONTINUED: famotidine (PEPCID) injection 20 mg    DISCONTINUED: hydrocortisone sodium succinate PF (SOLU-CORTEF) injection 100 mg    DISCONTINUED: acetaminophen (TYLENOL) tablet 650 mg    DISCONTINUED: sodium chloride flush 0.9 % injection 5-40 mL            Mr. Evans is here for FU of metastatic adenocarcinoma. 1. Metastatic adenocarcinoma   - s/p omental and mesenteric mass bx - c/w upper GI adenocarcinoma    - hx of diverticular dz - s/p previous bowel resection by dr Chaz Hernandez at [address] [place] [address] pending     - here cycle 6 FOLFIRINOX - labs reviewed and proceed. here for follow-up prior to cycle 5 FOLFIRINOX.   He did exceptionally well after last cycle which was full dose per their wishes. He expressed some interest in discontinuing treatment since he is feeling so well but we discussed that he has advanced disease and the reason why he is feeling well is because the treatment is working.    - nutrition - G-tube out. He continues with TPN but less hours and on increased p.o. intake. RD here for the visit and planning to modify duration dosing of TPN after submitting patient's log of foods he is eating. He wishes for more independence. He wants to drive he wants to be able to go back to work. - pain - He is currently on fentanyl patch and this is being weaned by PCP. Patient was advised not to stop anything cold turkey as this may lead to withdrawal symptoms. Having said that, PC is dropping his dose of fentanyl patch from 75-25 and we will reassess. Signs and symptoms of withdrawal discussed with patient and wife. - Plan for surveillance reviewed. Labs discussed. He wishes to continue on the current regimen until the next scan in October, at that time he may wish to proceed with unconventional scheduling of may be FOLFIRINOX every 3 weeks. He does understand inherent risks with alternate schedule and how this would affect progression of disease. - sclerotic lesion on imaging at S[facility] - bone scan recommended. - nausea - resolved. Has Zyprexa QHS (not taking currently) and Zofran PRN. - wt loss/FTT - secondary to disease and tx - on TPN and eating more    - depression/anxiety - better affect, making eye contact and joking today; restarted Lexapro   - constipation - Miralax as needed. - hypotension -  IVF 1000 mL every other day through home health, started Midodrine 5 mg BID; also tapering opioids   - Continue good oral nutrition and hydration.   - Encouraged frequent activity throughout the day and rest as needed to combat fatigue.   - Call with any fevers, uncontrolled side effects from treatment or any other worrisome/concerning symptoms.      RTC for labs prior to IR procedure and then two weeks or sooner if needed. [40min encounter - chart review, visit, discussion re current issues, coordination of care, communication with other provider and charting ]    MDM      Lab studies and imaging studies were personally reviewed. Pertinent old records were reviewed. Historical:    - here with his wife for consultation. During today's visit, we discussed his current workup. We looked at the images together demonstrating some of the findings concerning for peritoneal nodularity/peritoneal disease. Discussed anatomy of the findings  utilizing images to help pt understand what we are looking at and suspecting. He and wife were appreciative of the time spent to review these. Discussed need for visual dx/tissue pathology to determine plan - recommend ex lap - refer to Dr Rafaela Thomas - personally  reviewed case with Dr Rafaela Thomas who will expedite the workup and will see pt Fri. US with mild biliary duct dilation - HIDA/ERCP reviewed by PCP   + BM/flatus; He did not like how IV morphine made him feel in the hospital.  He tried tramadol but found no relief and has been taking acetaminophen at home with minimal relief. Discussed different options and he settled on trying  Percocet - he will contact the office to inform us if this is working for him. We discussed SE - not to drive while on the med. Bowel regimen reviewed. He is tired of tests, frustrated. Feelings validated and stressed importance of workup to determine why he has pain. Wt loss from 240 --> 209 noted and expressed concern to pt about this. - completed course of Levaquin/Diflucan for klebsiella pneumoniae and citrobacter freundii both of which were sensitive to Levaquin found around G-tube site.   Wishes for tube removal - reviewed risks of this during chemotherapy - I would like for him to have labs day before scheduled procedure to make sure his counts are adequate per above; case reviewed with Dr Kayleen Vickers by me via tel today     All questions were asked and answered to the best of my ability. The patient verbalized understanding and agrees with the plan above.           Elizabeth Villeda MD, MD  [facility] Hematology and Oncology  [address] [facility] [address], [address] [place] [address]  Office : [phone]  Fax : [phone]

## 2022-09-01 ENCOUNTER — OFFICE VISIT (OUTPATIENT)
Dept: ONCOLOGY | Age: 62
End: 2022-09-01

## 2022-09-01 ENCOUNTER — HOSPITAL ENCOUNTER (OUTPATIENT)
Dept: INFUSION THERAPY | Age: 62
Discharge: HOME | End: 2022-09-01
Payer: COMMERCIAL

## 2022-09-01 ENCOUNTER — OFFICE VISIT (OUTPATIENT)
Dept: PALLATIVE CARE | Age: 62
End: 2022-09-01
Payer: COMMERCIAL

## 2022-09-01 ENCOUNTER — OFFICE VISIT (OUTPATIENT)
Dept: ONCOLOGY | Age: 62
End: 2022-09-01
Payer: COMMERCIAL

## 2022-09-01 VITALS
WEIGHT: 183.9 LBS | HEIGHT: 70 IN | TEMPERATURE: 97.7 F | DIASTOLIC BLOOD PRESSURE: 61 MMHG | OXYGEN SATURATION: 98 % | RESPIRATION RATE: 18 BRPM | SYSTOLIC BLOOD PRESSURE: 97 MMHG | HEART RATE: 73 BPM | BODY MASS INDEX: 26.33 KG/M2

## 2022-09-01 DIAGNOSIS — Z51.5 ENCOUNTER FOR PALLIATIVE CARE: ICD-10-CM

## 2022-09-01 DIAGNOSIS — C79.9 METASTATIC ADENOCARCINOMA (HCC): ICD-10-CM

## 2022-09-01 DIAGNOSIS — C76.2 ABDOMINAL CARCINOMATOSIS (HCC): Primary | ICD-10-CM

## 2022-09-01 DIAGNOSIS — G89.3 CANCER ASSOCIATED PAIN: Primary | ICD-10-CM

## 2022-09-01 DIAGNOSIS — Z78.9 ON TOTAL PARENTERAL NUTRITION (TPN): ICD-10-CM

## 2022-09-01 DIAGNOSIS — C78.6 MALIGNANT NEOPLASM METASTATIC TO PERITONEUM (HCC): ICD-10-CM

## 2022-09-01 DIAGNOSIS — C78.6 PERITONEAL METASTASES (HCC): ICD-10-CM

## 2022-09-01 DIAGNOSIS — Z78.9 ON TOTAL PARENTERAL NUTRITION: ICD-10-CM

## 2022-09-01 DIAGNOSIS — Z00.8 NUTRITIONAL ASSESSMENT: Primary | ICD-10-CM

## 2022-09-01 LAB
ALBUMIN SERPL-MCNC: 2.8 G/DL (ref 3.2–4.6)
ALBUMIN/GLOB SERPL: 0.9 {RATIO} (ref 1.2–3.5)
ALP SERPL-CCNC: 96 U/L (ref 50–136)
ALT SERPL-CCNC: 42 U/L (ref 12–65)
ANION GAP SERPL CALC-SCNC: 5 MMOL/L (ref 4–13)
AST SERPL-CCNC: 23 U/L (ref 15–37)
BASOPHILS # BLD: 0 K/UL (ref 0–0.2)
BASOPHILS NFR BLD: 1 % (ref 0–2)
BILIRUB SERPL-MCNC: 0.2 MG/DL (ref 0.2–1.1)
BUN SERPL-MCNC: 14 MG/DL (ref 8–23)
CALCIUM SERPL-MCNC: 8.3 MG/DL (ref 8.3–10.4)
CANCER AG19-9 SERPL-ACNC: 3.6 U/ML (ref 2–37)
CHLORIDE SERPL-SCNC: 111 MMOL/L (ref 101–110)
CO2 SERPL-SCNC: 25 MMOL/L (ref 21–32)
CREAT SERPL-MCNC: 0.7 MG/DL (ref 0.8–1.5)
DIFFERENTIAL METHOD BLD: ABNORMAL
EOSINOPHIL # BLD: 0.2 K/UL (ref 0–0.8)
EOSINOPHIL NFR BLD: 4 % (ref 0.5–7.8)
ERYTHROCYTE [DISTWIDTH] IN BLOOD BY AUTOMATED COUNT: 15.9 % (ref 11.9–14.6)
GLOBULIN SER CALC-MCNC: 3 G/DL (ref 2.3–3.5)
GLUCOSE SERPL-MCNC: 144 MG/DL (ref 65–100)
HCT VFR BLD AUTO: 32.6 %
HGB BLD-MCNC: 10.2 G/DL (ref 13.6–17.2)
IMM GRANULOCYTES # BLD AUTO: 0 K/UL (ref 0–0.5)
IMM GRANULOCYTES NFR BLD AUTO: 0 % (ref 0–5)
LYMPHOCYTES # BLD: 1.6 K/UL (ref 0.5–4.6)
LYMPHOCYTES NFR BLD: 41 % (ref 13–44)
MAGNESIUM SERPL-MCNC: 2.1 MG/DL (ref 1.8–2.4)
MCH RBC QN AUTO: 28.6 PG (ref 26.1–32.9)
MCHC RBC AUTO-ENTMCNC: 31.3 G/DL (ref 31.4–35)
MCV RBC AUTO: 91.3 FL (ref 79.6–97.8)
MONOCYTES # BLD: 0.4 K/UL (ref 0.1–1.3)
MONOCYTES NFR BLD: 9 % (ref 4–12)
NEUTS SEG # BLD: 1.8 K/UL (ref 1.7–8.2)
NEUTS SEG NFR BLD: 45 % (ref 43–78)
NRBC # BLD: 0 K/UL (ref 0–0.2)
PHOSPHATE SERPL-MCNC: 3.6 MG/DL (ref 2.3–3.7)
PLATELET # BLD AUTO: 166 K/UL (ref 150–450)
PLATELET COMMENT: ADEQUATE
PMV BLD AUTO: 10.6 FL (ref 9.4–12.3)
POTASSIUM SERPL-SCNC: 4 MMOL/L (ref 3.5–5.1)
PROT SERPL-MCNC: 5.8 G/DL (ref 6.3–8.2)
RBC # BLD AUTO: 3.57 M/UL (ref 4.23–5.6)
RBC MORPH BLD: ABNORMAL
SODIUM SERPL-SCNC: 141 MMOL/L (ref 136–145)
WBC # BLD AUTO: 4 K/UL (ref 4.3–11.1)
WBC MORPH BLD: ABNORMAL

## 2022-09-01 PROCEDURE — 36591 DRAW BLOOD OFF VENOUS DEVICE: CPT

## 2022-09-01 PROCEDURE — G0498 CHEMO EXTEND IV INFUS W/PUMP: HCPCS

## 2022-09-01 PROCEDURE — 96368 THER/DIAG CONCURRENT INF: CPT

## 2022-09-01 PROCEDURE — 86301 IMMUNOASSAY TUMOR CA 19-9: CPT

## 2022-09-01 PROCEDURE — 96367 TX/PROPH/DG ADDL SEQ IV INF: CPT

## 2022-09-01 PROCEDURE — 83735 ASSAY OF MAGNESIUM: CPT

## 2022-09-01 PROCEDURE — 96417 CHEMO IV INFUS EACH ADDL SEQ: CPT

## 2022-09-01 PROCEDURE — 2580000003 HC RX 258: Performed by: INTERNAL MEDICINE

## 2022-09-01 PROCEDURE — 96411 CHEMO IV PUSH ADDL DRUG: CPT

## 2022-09-01 PROCEDURE — 84100 ASSAY OF PHOSPHORUS: CPT

## 2022-09-01 PROCEDURE — 96372 THER/PROPH/DIAG INJ SC/IM: CPT

## 2022-09-01 PROCEDURE — 99214 OFFICE O/P EST MOD 30 MIN: CPT | Performed by: NURSE PRACTITIONER

## 2022-09-01 PROCEDURE — 96415 CHEMO IV INFUSION ADDL HR: CPT

## 2022-09-01 PROCEDURE — 96375 TX/PRO/DX INJ NEW DRUG ADDON: CPT

## 2022-09-01 PROCEDURE — 99215 OFFICE O/P EST HI 40 MIN: CPT | Performed by: INTERNAL MEDICINE

## 2022-09-01 PROCEDURE — 96413 CHEMO IV INFUSION 1 HR: CPT

## 2022-09-01 PROCEDURE — 6360000002 HC RX W HCPCS: Performed by: INTERNAL MEDICINE

## 2022-09-01 PROCEDURE — 85025 COMPLETE CBC W/AUTO DIFF WBC: CPT

## 2022-09-01 PROCEDURE — 80053 COMPREHEN METABOLIC PANEL: CPT

## 2022-09-01 RX ORDER — SODIUM CHLORIDE 0.9 % (FLUSH) 0.9 %
5-40 SYRINGE (ML) INJECTION PRN
Status: CANCELLED | OUTPATIENT
Start: 2022-09-01

## 2022-09-01 RX ORDER — FLUOROURACIL 50 MG/ML
400 INJECTION, SOLUTION INTRAVENOUS ONCE
Status: CANCELLED
Start: 2022-09-01 | End: 2022-09-01

## 2022-09-01 RX ORDER — DEXTROSE MONOHYDRATE 50 MG/ML
5-250 INJECTION, SOLUTION INTRAVENOUS PRN
Status: DISCONTINUED | OUTPATIENT
Start: 2022-09-01 | End: 2022-09-02 | Stop reason: HOSPADM

## 2022-09-01 RX ORDER — SODIUM CHLORIDE 9 MG/ML
5-250 INJECTION, SOLUTION INTRAVENOUS PRN
Status: CANCELLED | OUTPATIENT
Start: 2022-09-03

## 2022-09-01 RX ORDER — FENTANYL 25 UG/H
1 PATCH TRANSDERMAL
Qty: 5 PATCH | Refills: 0 | Status: SHIPPED | OUTPATIENT
Start: 2022-09-01 | End: 2022-09-16

## 2022-09-01 RX ORDER — SODIUM CHLORIDE 9 MG/ML
5-250 INJECTION, SOLUTION INTRAVENOUS PRN
Status: CANCELLED | OUTPATIENT
Start: 2022-09-01

## 2022-09-01 RX ORDER — ATROPINE SULFATE 0.4 MG/ML
0.4 AMPUL (ML) INJECTION
Status: CANCELLED | OUTPATIENT
Start: 2022-09-01

## 2022-09-01 RX ORDER — DEXTROSE MONOHYDRATE 50 MG/ML
5-250 INJECTION, SOLUTION INTRAVENOUS PRN
Status: CANCELLED | OUTPATIENT
Start: 2022-09-01

## 2022-09-01 RX ORDER — SODIUM CHLORIDE 9 MG/ML
INJECTION, SOLUTION INTRAVENOUS CONTINUOUS
Status: CANCELLED | OUTPATIENT
Start: 2022-09-01

## 2022-09-01 RX ORDER — ATROPINE SULFATE 0.4 MG/ML
0.4 AMPUL (ML) INJECTION ONCE
Status: CANCELLED | OUTPATIENT
Start: 2022-09-01 | End: 2022-09-01

## 2022-09-01 RX ORDER — ACETAMINOPHEN 325 MG/1
650 TABLET ORAL
Status: ACTIVE | OUTPATIENT
Start: 2022-09-01 | End: 2022-09-01

## 2022-09-01 RX ORDER — ACETAMINOPHEN 325 MG/1
650 TABLET ORAL
Status: CANCELLED | OUTPATIENT
Start: 2022-09-01

## 2022-09-01 RX ORDER — SODIUM CHLORIDE 0.9 % (FLUSH) 0.9 %
5-40 SYRINGE (ML) INJECTION PRN
Status: DISCONTINUED | OUTPATIENT
Start: 2022-09-01 | End: 2022-09-02 | Stop reason: HOSPADM

## 2022-09-01 RX ORDER — FAMOTIDINE 10 MG/ML
20 INJECTION, SOLUTION INTRAVENOUS
Status: CANCELLED | OUTPATIENT
Start: 2022-09-01

## 2022-09-01 RX ORDER — ONDANSETRON 2 MG/ML
8 INJECTION INTRAMUSCULAR; INTRAVENOUS
Status: CANCELLED | OUTPATIENT
Start: 2022-09-01

## 2022-09-01 RX ORDER — ONDANSETRON 2 MG/ML
8 INJECTION INTRAMUSCULAR; INTRAVENOUS ONCE
Status: CANCELLED | OUTPATIENT
Start: 2022-09-01 | End: 2022-09-01

## 2022-09-01 RX ORDER — FLUOROURACIL 50 MG/ML
400 INJECTION, SOLUTION INTRAVENOUS ONCE
Status: COMPLETED | OUTPATIENT
Start: 2022-09-01 | End: 2022-09-01

## 2022-09-01 RX ORDER — MEPERIDINE HYDROCHLORIDE 50 MG/ML
12.5 INJECTION INTRAMUSCULAR; INTRAVENOUS; SUBCUTANEOUS PRN
Status: CANCELLED | OUTPATIENT
Start: 2022-09-01

## 2022-09-01 RX ORDER — ATROPINE SULFATE 0.4 MG/ML
0.4 AMPUL (ML) INJECTION ONCE
Status: COMPLETED | OUTPATIENT
Start: 2022-09-01 | End: 2022-09-01

## 2022-09-01 RX ORDER — SODIUM CHLORIDE 9 MG/ML
5-40 INJECTION INTRAVENOUS PRN
Status: CANCELLED | OUTPATIENT
Start: 2022-09-01

## 2022-09-01 RX ORDER — SODIUM CHLORIDE 9 MG/ML
5-40 INJECTION INTRAVENOUS PRN
Status: CANCELLED | OUTPATIENT
Start: 2022-09-03

## 2022-09-01 RX ORDER — SODIUM CHLORIDE 0.9 % (FLUSH) 0.9 %
5-40 SYRINGE (ML) INJECTION PRN
Status: CANCELLED | OUTPATIENT
Start: 2022-09-03

## 2022-09-01 RX ORDER — DIPHENHYDRAMINE HYDROCHLORIDE 50 MG/ML
50 INJECTION INTRAMUSCULAR; INTRAVENOUS
Status: CANCELLED | OUTPATIENT
Start: 2022-09-01

## 2022-09-01 RX ORDER — HEPARIN SODIUM (PORCINE) LOCK FLUSH IV SOLN 100 UNIT/ML 100 UNIT/ML
500 SOLUTION INTRAVENOUS PRN
Status: CANCELLED | OUTPATIENT
Start: 2022-09-03

## 2022-09-01 RX ORDER — ALBUTEROL SULFATE 90 UG/1
4 AEROSOL, METERED RESPIRATORY (INHALATION) PRN
Status: CANCELLED | OUTPATIENT
Start: 2022-09-01

## 2022-09-01 RX ORDER — ONDANSETRON 2 MG/ML
8 INJECTION INTRAMUSCULAR; INTRAVENOUS ONCE
Status: COMPLETED | OUTPATIENT
Start: 2022-09-01 | End: 2022-09-01

## 2022-09-01 RX ORDER — EPINEPHRINE 1 MG/ML
0.3 INJECTION, SOLUTION, CONCENTRATE INTRAVENOUS PRN
Status: CANCELLED | OUTPATIENT
Start: 2022-09-01

## 2022-09-01 RX ORDER — HEPARIN SODIUM (PORCINE) LOCK FLUSH IV SOLN 100 UNIT/ML 100 UNIT/ML
500 SOLUTION INTRAVENOUS PRN
Status: CANCELLED | OUTPATIENT
Start: 2022-09-01

## 2022-09-01 RX ORDER — SODIUM CHLORIDE 0.9 % (FLUSH) 0.9 %
10 SYRINGE (ML) INJECTION PRN
Status: DISCONTINUED | OUTPATIENT
Start: 2022-09-01 | End: 2022-09-02 | Stop reason: HOSPADM

## 2022-09-01 RX ORDER — FENTANYL 12 UG/H
1 PATCH TRANSDERMAL
Qty: 5 PATCH | Refills: 0 | Status: SHIPPED | OUTPATIENT
Start: 2022-09-01 | End: 2022-09-16

## 2022-09-01 RX ORDER — FENTANYL 75 UG/H
1 PATCH TRANSDERMAL
COMMUNITY
End: 2022-09-01 | Stop reason: DRUGHIGH

## 2022-09-01 RX ADMIN — LEUCOVORIN CALCIUM 850 MG: 350 INJECTION, POWDER, LYOPHILIZED, FOR SOLUTION INTRAMUSCULAR; INTRAVENOUS at 14:40

## 2022-09-01 RX ADMIN — DEXTROSE MONOHYDRATE 25 ML/HR: 50 INJECTION, SOLUTION INTRAVENOUS at 11:30

## 2022-09-01 RX ADMIN — FOSAPREPITANT 150 MG: 150 INJECTION, POWDER, LYOPHILIZED, FOR SOLUTION INTRAVENOUS at 12:10

## 2022-09-01 RX ADMIN — OXALIPLATIN 180 MG: 5 INJECTION, SOLUTION INTRAVENOUS at 12:35

## 2022-09-01 RX ADMIN — DEXAMETHASONE SODIUM PHOSPHATE 12 MG: 4 INJECTION, SOLUTION INTRAMUSCULAR; INTRAVENOUS at 11:50

## 2022-09-01 RX ADMIN — IRINOTECAN HYDROCHLORIDE 320 MG: 20 INJECTION, SOLUTION INTRAVENOUS at 14:40

## 2022-09-01 RX ADMIN — ONDANSETRON 8 MG: 2 INJECTION INTRAMUSCULAR; INTRAVENOUS at 11:46

## 2022-09-01 RX ADMIN — Medication 10 ML: at 09:41

## 2022-09-01 RX ADMIN — FLUOROURACIL 5000 MG: 50 INJECTION, SOLUTION INTRAVENOUS at 16:35

## 2022-09-01 RX ADMIN — FLUOROURACIL 850 MG: 50 INJECTION, SOLUTION INTRAVENOUS at 16:26

## 2022-09-01 RX ADMIN — SODIUM CHLORIDE, PRESERVATIVE FREE 10 ML: 5 INJECTION INTRAVENOUS at 11:35

## 2022-09-01 RX ADMIN — ATROPINE SULFATE 0.4 MG: 0.4 INJECTION, SOLUTION INTRAMUSCULAR; INTRAVENOUS; SUBCUTANEOUS at 14:36

## 2022-09-01 ASSESSMENT — PATIENT HEALTH QUESTIONNAIRE - PHQ9
SUM OF ALL RESPONSES TO PHQ QUESTIONS 1-9: 0
SUM OF ALL RESPONSES TO PHQ QUESTIONS 1-9: 0
1. LITTLE INTEREST OR PLEASURE IN DOING THINGS: 0
SUM OF ALL RESPONSES TO PHQ9 QUESTIONS 1 & 2: 0
SUM OF ALL RESPONSES TO PHQ QUESTIONS 1-9: 0
2. FEELING DOWN, DEPRESSED OR HOPELESS: 0
SUM OF ALL RESPONSES TO PHQ QUESTIONS 1-9: 0

## 2022-09-01 ASSESSMENT — ENCOUNTER SYMPTOMS
EYES NEGATIVE: 1
CONSTIPATION: 1
ALLERGIC/IMMUNOLOGIC NEGATIVE: 1
NAUSEA: 0
ABDOMINAL PAIN: 1
RESPIRATORY NEGATIVE: 1

## 2022-09-01 NOTE — PROGRESS NOTES
Patient arrived to port lab for port access and lab draw   [address] accessed and labs drawn per protocol   *Port remains accessed   Patient discharged from port lab ambulatory*

## 2022-09-01 NOTE — PATIENT INSTRUCTIONS
Patient Instructions from Today's Visit    Reason for Visit:  Prechemo visit    Diagnosis Information:  [email or web address] [email or web address]-us/[email or web address]-answers-patient-education-materials/nalv-hcbuqwo-rcpd-sheets      Plan:  Prechemo visit    Follow Up:  As scheduled    Recent Lab Results:  Hospital Outpatient Visit on 09/01/2022   Component Date Value Ref Range Status    WBC 09/01/2022 4.0 (A) 4.3 - 11.1 K/uL Final    PERIPHERAL REVIEW TO FOLLOW    RBC 09/01/2022 3.57 (A) 4.23 - 5.6 M/uL Final    Hemoglobin 09/01/2022 10.2 (A) 13.6 - 17.2 g/dL Final    Hematocrit 09/01/2022 32.6  % Final    MCV 09/01/2022 91.3  79.6 - 97.8 FL Final    MCH 09/01/2022 28.6  26.1 - 32.9 PG Final    MCHC 09/01/2022 31.3 (A) 31.4 - 35.0 g/dL Final    RDW 09/01/2022 15.9 (A) 11.9 - 14.6 % Final    Platelets 46/81/2828 166  150 - 450 K/uL Final    MPV 09/01/2022 10.6  9.4 - 12.3 FL Final    nRBC 09/01/2022 0.00  0.0 - 0.2 K/uL Final    **Note: Absolute NRBC parameter is now reported with Hemogram**    Differential Type 09/01/2022 PENDING   Incomplete    Sodium 09/01/2022 141  136 - 145 mmol/L Final    Potassium 09/01/2022 4.0  3.5 - 5.1 mmol/L Final    Chloride 09/01/2022 111 (A) 101 - 110 mmol/L Final    CO2 09/01/2022 25  21 - 32 mmol/L Final    Anion Gap 09/01/2022 5  4 - 13 mmol/L Final    Glucose 09/01/2022 144 (A) 65 - 100 mg/dL Final    BUN 09/01/2022 14  8 - 23 MG/DL Final    Creatinine 09/01/2022 0.70 (A) 0.8 - 1.5 MG/DL Final    GFR  09/01/2022 >60  >60 ml/min/1.73m2 Final    GFR Non- 09/01/2022 >60  >60 ml/min/1.73m2 Final    Comment:      Estimated GFR is calculated using the Modification of Diet in Renal Disease (MDRD) Study equation, reported for both  Americans (GFRAA) and non- Americans (GFRNA), and normalized to 1.73m2 body surface area. The physician must decide which value applies to the patient. The MDRD study equation should only be used in individuals age 25 or older.  It has not been validated for the following: pregnant women, patients with serious comorbid conditions,or on certain medications, or persons with extremes of body size, muscle mass, or nutritional status. Calcium 09/01/2022 8.3  8.3 - 10.4 MG/DL Final    Total Bilirubin 09/01/2022 0.2  0.2 - 1.1 MG/DL Final    ALT 09/01/2022 42  12 - 65 U/L Final    AST 09/01/2022 23  15 - 37 U/L Final    Alk Phosphatase 09/01/2022 96  50 - 136 U/L Final    Total Protein 09/01/2022 5.8 (A) 6.3 - 8.2 g/dL Final    Albumin 09/01/2022 2.8 (A) 3.2 - 4.6 g/dL Final    Globulin 09/01/2022 3.0  2.3 - 3.5 g/dL Final    Albumin/Globulin Ratio 09/01/2022 0.9 (A) 1.2 - 3.5   Final    Magnesium 09/01/2022 2.1  1.8 - 2.4 mg/dL Final    CA 19-9 09/01/2022 3.60  2.0 - 37.0 U/mL Final    [facility]. Patient's results of tumor marker testing may not be comparable to labs using different manufacturers/methods. Phosphorus 09/01/2022 3.6  2.3 - 3.7 MG/DL Final        Treatment Summary has been discussed and given to patient: n/a        -------------------------------------------------------------------------------------------------------------------  Please call our office at [phone] if you have any  of the following symptoms:   Fever of 100.5 or greater  Chills  Shortness of breath  Swelling or pain in one leg    After office hours an answering service is available and will contact a provider for emergencies or if you are experiencing any of the above symptoms. Patient does express an interest in My Chart. My Chart log in information explained on the after visit summary printout at the [facility] desk.     Dola Siemens, RN  Nurse Navigator  [address] [place] [id number] [phone]

## 2022-09-01 NOTE — PROGRESS NOTES
Arrived to the [facility]. Folfirinox completed. Patient tolerated well. Any issues or concerns during appointment: none. Patient aware of next infusion appointment on 9/3/22 (date) at 0 (time). Patient aware of next lab and [facility] office visit on 9/15/22 (date) at 0800 (time). Patient instructed to call provider with temperature of 100.4 or greater or nausea/vomiting/ diarrhea or pain not controlled by medications  Discharged ambulatory accompanied by spouse.

## 2022-09-01 NOTE — PROGRESS NOTES
Outpatient Palliative Care at the  [facility]: Office Visit Established Patient    Diagnosis: adenocarcinoma of upper GI    Treatment Plan: mFOLFIRINOX    Treatment Intent: Palliative    Medical Oncologist: Dr. Pastora Salgado Oncologist: N/A    Navigator: Cole Marsh RN      Chief Complaint:    Chief Complaint   Patient presents with    Abdominal Pain     History of Present Illness:  Mr. Evans is a 65 y.o. 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 Patient initially presented with 6 month history of abdominal pain and 30lb weight loss. A GI workup revealed hiatal hernia, gastric polyps, benign colon polyps, diverticulosis and hemorrhoids. He presented to [facility] ER on 5/12 and CT showed extensive peritoneal nodularity, concerning for metastatic disease, with consequential SBO. He had a laparotomy on 5/27 with venting G tube placement. Biopsies confirmed adenocarcinoma of upper GI primary. He started mFOLFIRINOX on 6/10/22 while hospitalized. Following cycle 2, patient was admitted for sepsis, source determined to be g tube insertion site. Interval History:  Patient seen in clinic in coordination with his office visit with Dr. Kylee Kay RN, and Sarina Mckoy RD. Patient's wife is with him today. Patient appears well, stronger over past weeks. He had his venting g tube removed on 8/22. This site has healed well. Other than immediately post op, he has not had an increase in nausea. He has been eating some by mouth, but remains on TPN 14 hours a day. His abdominal pain remains minimal; tolerated reduction in fentanyl to 75mcg very well with no prn oxycodone. He last changed his fentanyl patch 5 days ago because he doesn't feel that he needs it- he is still wearing the patch applied 5 days ago.     Patient is frustrated that he is not driving nor working. He is not able to tolerate activity as much as he'd like. He was able to go fishing with his children in recent weeks. Review of Systems:  Review of Systems   Constitutional:  Positive for fatigue. HENT: Negative. Eyes: Negative. Respiratory: Negative. Cardiovascular: Negative. Gastrointestinal:  Positive for abdominal pain and constipation. Negative for nausea. Genitourinary: Negative. Musculoskeletal: Negative. Skin: Negative. Allergic/Immunologic: Negative. Neurological: Negative. Psychiatric/Behavioral:  Positive for dysphoric mood.          In better spirits today       No Known Allergies  Past Medical History:   Diagnosis Date    Bilateral carpal tunnel syndrome 12/9/2020    Chronic right shoulder pain 11/30/2020    Colon polyps 3/11/2013    Diverticulitis 3/11/2013    HTN (hypertension) 3/11/2013    Hyperlipidemia 3/11/2013    Paresthesia and pain of both upper extremities 11/30/2020    PUD (peptic ulcer disease) 3/11/2013    History of     Right elbow pain 12/9/2020    Wheezing 3/11/2013     Past Surgical History:   Procedure Laterality Date    COLONOSCOPY  2/25/09    colonoscopy per Dr. Agustin Diaz with need for repeat every 3 years    COLONOSCOPY  02/25/2022    Dr. Amarilys Ogden; polyps of the ascending, transverse, descending, and sigmoid colons; internal hemorrhoid; diverticulosis    LAPAROSCOPY N/A 5/27/2022    LAPAROSCOPY DIAGNOSTIC , OPEN EXPLORATORY LAPAROTOMY, MASS EXCISION, G-TUBE PLACEMENT performed by Kamila Prajapati MD at [facility] MAIN OR    PORT SURGERY N/A 6/3/2022    PORT INSERTION performed by Kamila Prajapati MD at [address] [place], [place] [address]ISTED  October 2004    partial colon resection per Dr. Beckie Martinez  02/25/2022    Dr. Amarilys Ogden; hiatal hernia gastric polyps     Family History   Problem Relation Age of Onset    No Known Problems Brother     Cancer Sister         breast    Hypertension Mother     Heart Disease Mother Diabetes Father     Osteoarthritis Father     Alcohol Abuse Father     Hypertension Father     Diabetes Mother      Social History     Socioeconomic History    Marital status:      Spouse name: Not on file    Number of children: Not on file    Years of education: Not on file    Highest education level: Not on file   Occupational History    Not on file   Tobacco Use    Smoking status: Every Day     Packs/day: 1.00     Types: Cigarettes    Smokeless tobacco: Never   Substance and Sexual Activity    Alcohol use: No    Drug use: No    Sexual activity: Not on file   Other Topics Concern    Not on file   Social History Narrative    Not on file     Social Determinants of Health     Financial Resource Strain: Not on file   Food Insecurity: Not on file   Transportation Needs: Not on file   Physical Activity: Not on file   Stress: Not on file   Social Connections: Not on file   Intimate Partner Violence: Not on file   Housing Stability: Not on file     Current Outpatient Medications   Medication Sig Dispense Refill    fentaNYL (DURAGESIC) 25 MCG/HR Place 1 patch onto the skin every 72 hours for 15 days. 5 patch 0    fentaNYL (DURAGESIC) 12 MCG/HR Place 1 patch onto the skin every 3 days for 15 days. Intended supply: 30 days 5 patch 0    escitalopram (LEXAPRO) 10 MG tablet Take 1 tablet by mouth in the morning. 30 tablet 5    midodrine (PROAMATINE) 5 MG tablet Take 1 tablet by mouth in the morning and 1 tablet before bedtime. Take 1 tab in the AM and 1 tab mid afternoon. (Patient not taking: Reported on 9/1/2022) 60 tablet 3    dicyclomine (BENTYL) 20 MG tablet TAKE 1 TABLET BY MOUTH EVERY SIX (6) HOURS. 360 tablet 0    pantoprazole (PROTONIX) 40 MG tablet Take 1 tablet by mouth in the morning and 1 tablet in the evening. Take before meals. 60 tablet 2    oxyCODONE (OXY-IR) 10 MG immediate release tablet Take 1 tablet by mouth every 4 hours as needed for Pain for up to 7 days.  30 tablet 0    OLANZapine zydis (ZYPREXA) 5 MG disintegrating tablet Take 1 tablet by mouth nightly Do not take with promethazine 30 tablet 3    busPIRone (BUSPAR) 10 MG tablet Take 1 tablet by mouth 3 times daily (Patient not taking: Reported on 9/1/2022) 90 tablet 0    ondansetron (ZOFRAN-ODT) 8 MG TBDP disintegrating tablet Take 1 tablet by mouth every 8 hours 90 tablet 0    promethazine (PHENERGAN) 12.5 MG tablet Take 1 tablet by mouth every 6 hours as needed for Nausea 90 tablet 0    oxyCODONE (ROXICODONE INTENSOL) 100 MG/5ML concentrated solution Take 1 mL by mouth every 4 hours as needed for Pain for up to 14 days. 60 mL 0    naloxone 4 MG/0.1ML LIQD nasal spray 1 spray by Nasal route as needed for Opioid Reversal  (Patient not taking: No sig reported)      dicyclomine (BENTYL) 10 MG capsule Take 20 mg by mouth every 6 hours  (Patient not taking: Reported on 9/1/2022)      polyethylene glycol (GLYCOLAX) 17 GM/SCOOP powder Take 17 g by mouth daily (Patient not taking: No sig reported)      rosuvastatin (CRESTOR) 10 MG tablet Take 10 mg by mouth      umeclidinium-vilanterol (ANORO ELLIPTA) 62.5-25 MCG/INH AEPB inhaler Inhale 1 puff into the lungs daily  (Patient not taking: Reported on 9/1/2022)       No current facility-administered medications for this visit.      Facility-Administered Medications Ordered in Other Visits   Medication Dose Route Frequency Provider Last Rate Last Admin    sodium chloride flush 0.9 % injection 10 mL  10 mL IntraVENous PRN Elizabeth Villeda MD   10 mL at 09/01/22 0941    dextrose 5 % solution  5-250 mL/hr IntraVENous PRN Elizabeth Villeda MD   Stopped at 09/01/22 1634    sodium chloride flush 0.9 % injection 5-40 mL  5-40 mL IntraVENous PRN Elizabeth Villeda MD   10 mL at 09/01/22 1135    hydrocortisone sodium succinate PF (SOLU-CORTEF) injection 100 mg  100 mg IntraVENous Once PRN Elizabeth Villeda MD        acetaminophen (TYLENOL) tablet 650 mg  650 mg Oral Once PRN Elizabeth Villeda MD        famotidine (PEPCID) 20 mg in sodium chloride (PF) 10 mL injection  20 mg IntraVENous Once PRN Sugey Trinidad MD        fluorouracil (ADRUCIL) 5,000 mg in sodium chloride 0.9 % 230 mL chemo elastomeric infusion  5,000 mg IntraVENous Once Sugey Trinidad MD   5,000 mg at 09/01/22 1635       OBJECTIVE:  Wt Readings from Last 1 Encounters:   09/01/22 183 lb 14.4 oz (83.4 kg)     Temp Readings from Last 1 Encounters:   09/01/22 97.7 °F (36.5 °C) (Oral)     BP Readings from Last 1 Encounters:   09/01/22 97/61     Pulse Readings from Last 1 Encounters:   09/01/22 73        Pain Score:   0 - No pain (fatigue-0)      Physical Exam:  Constitutional: Well developed, well nourished male in no acute distress. HEENT: Normocephalic and atraumatic. Pupils are equal, round, and reactive to light. Extraocular muscles are intact. Sclerae anicteric. Neck supple without JVD. Lymph node   deferred   Skin Warm and dry. No bruising and no rash noted. No erythema. No pallor. Respiratory Unlabored respiratory effort. CVS Regular rate, borderline hypotensive. Abdomen Soft, nontender and nondistended. Venting g tube site healed s/p removal.   Neuro Grossly nonfocal with no obvious sensory or motor deficits. MSK Normal range of motion in general.  No edema and no tenderness. Psych Flat affect.        Labs:  Recent Results (from the past 24 hour(s))   CBC with Auto Differential    Collection Time: 09/01/22  9:33 AM   Result Value Ref Range    WBC 4.0 (L) 4.3 - 11.1 K/uL    RBC 3.57 (L) 4.23 - 5.6 M/uL    Hemoglobin 10.2 (L) 13.6 - 17.2 g/dL    Hematocrit 32.6 %    MCV 91.3 79.6 - 97.8 FL    MCH 28.6 26.1 - 32.9 PG    MCHC 31.3 (L) 31.4 - 35.0 g/dL    RDW 15.9 (H) 11.9 - 14.6 %    Platelets 282 022 - 691 K/uL    MPV 10.6 9.4 - 12.3 FL    nRBC 0.00 0.0 - 0.2 K/uL    Seg Neutrophils 45 43 - 78 %    Lymphocytes 41 13 - 44 %    Monocytes 9 4.0 - 12.0 %    Eosinophils % 4 0.5 - 7.8 %    Basophils 1 0.0 - 2.0 %    Immature Granulocytes 0 0.0 - 5.0 %    Segs Absolute 1. 8 1.7 - 8.2 K/UL    Absolute Lymph # 1.6 0.5 - 4.6 K/UL    Absolute Mono # 0.4 0.1 - 1.3 K/UL    Absolute Eos # 0.2 0.0 - 0.8 K/UL    Basophils Absolute 0.0 0.0 - 0.2 K/UL    Absolute Immature Granulocyte 0.0 0.0 - 0.5 K/UL    RBC Comment SLIGHT  ANISOCYTOSIS + POIKILOCYTOSIS        WBC Comment Result Confirmed By Smear      Platelet Comment ADEQUATE      Differential Type AUTOMATED     Comprehensive Metabolic Panel    Collection Time: 09/01/22  9:33 AM   Result Value Ref Range    Sodium 141 136 - 145 mmol/L    Potassium 4.0 3.5 - 5.1 mmol/L    Chloride 111 (H) 101 - 110 mmol/L    CO2 25 21 - 32 mmol/L    Anion Gap 5 4 - 13 mmol/L    Glucose 144 (H) 65 - 100 mg/dL    BUN 14 8 - 23 MG/DL    Creatinine 0.70 (L) 0.8 - 1.5 MG/DL    GFR African American >60 >60 ml/min/1.73m2    GFR Non- >60 >60 ml/min/1.73m2    Calcium 8.3 8.3 - 10.4 MG/DL    Total Bilirubin 0.2 0.2 - 1.1 MG/DL    ALT 42 12 - 65 U/L    AST 23 15 - 37 U/L    Alk Phosphatase 96 50 - 136 U/L    Total Protein 5.8 (L) 6.3 - 8.2 g/dL    Albumin 2.8 (L) 3.2 - 4.6 g/dL    Globulin 3.0 2.3 - 3.5 g/dL    Albumin/Globulin Ratio 0.9 (L) 1.2 - 3.5     Magnesium    Collection Time: 09/01/22  9:33 AM   Result Value Ref Range    Magnesium 2.1 1.8 - 2.4 mg/dL   Cancer Antigen 19-9    Collection Time: 09/01/22  9:33 AM   Result Value Ref Range    CA 19-9 3.60 2.0 - 37.0 U/mL   Phosphorus    Collection Time: 09/01/22  9:33 AM   Result Value Ref Range    Phosphorus 3.6 2.3 - 3.7 MG/DL       Imaging:  No results found for this or any previous visit. ASSESSMENT:   Diagnosis Orders   1. Cancer associated pain  fentaNYL (DURAGESIC) 25 MCG/HR    fentaNYL (DURAGESIC) 12 MCG/HR      2. Peritoneal metastases ([address]        3. Metastatic adenocarcinoma ([facility] [address]        4. Encounter for palliative care  fentaNYL (DURAGESIC) 25 MCG/HR    fentaNYL (DURAGESIC) 12 MCG/HR            PLAN:  Lab studies and imaging studies were personally reviewed.     Pertinent old records were reviewed from [facility]. 1. Abdominal pain: Patient is very eager to wean opioids and resume driving. Counseled patient on the clinical necessity to wean opioids as prescribed. Reduce fentanyl to 25 mcg every 72 hours for 2 weeks, then proceed with fentanyl 12 mcg every 72 hours for 2 weeks. I have given him permission to reduce from 25 to 12 mcg after 1 week if he feels ready. Continue oxycodone for breakthrough pain, and explained that he may need this more frequently as fentanyl patches are discontinued. 2. Nausea: Currently denies. Continue Zyprexa HS and Zofran ODT as needed. They have been instructed that he may take either promethazine or olanzapine, but not both within the same day. 3.  Constipation: Continue MiraLAX daily as needed. 4. Dysphoric mood: Continue Lexapro daily. Advanced Care Planning Discussed: none today. Patient states during visit today that he wants to \"come off of all of this\". He confirms that he is speaking of both TPN and chemotherapy. He states that he feels so much better that he does not even feel like he has cancer sometimes. And he is discouraged at having to come to the cancer center every 2 weeks. Dr. Bronwen Boeck reassured patient that she will always work to improve quality of life and disease control as much as possible. Patient agrees to continue chemotherapy every 2 weeks until CT scan in October and then discuss regimen. Will follow up in: 4 weeks or sooner if needed. I have reviewed the patient's controlled substance prescription history, as maintained in the [facility] prescription monitoring program, so that the prescriptions(s) for a controlled substance can be given.   Last Date Reviewed: 9/1/22            LUCY Carter CNP  Outpatient Palliative Care at the  [place] [address] [employer]  Office : [phone]  Fax : [phone]

## 2022-09-02 NOTE — PROGRESS NOTES
Nutrition F/U:  Assessment:  Pt seen during office visit w/ Dr. Danay Parmar, receiving his 6th cycle of FOLFIRINOX today, G-tube removed after cycle 5 - pt w/ N/V initially after removal but was laying down right after meals - this has resolved now. Pt remains TPN dependent for majority of estimated nutrition needs, infusing over 14 hrs/day, and taking 1 L NS every other day for persistent hypotension, receives 2.5 L of NS vs TPN on the 2 days he has 5-FU pump infusing. Pt is tolerating a Regular diet w/ improving appetite/po intake, wife keeps daily intake log and pt is consuming ~950 kcal and 30 gm protein in his 3 meals/day, po intake meeting 45% of estimated calorie needs (~2100 kcal/day). Current BW: 183#, down 5# over the past 2 weeks. Intervention:  1. Continue w/ Regular diet, try adding 1-2 snacks per day in between meals. Push intake of electrolyte/calorie containing beverages during the day. 2. Calorie count showing pt meeting ~45% of estimated calorie needs daily (minimum of 2100 kcal/day)  3. Continue w/ TPN and IVF at home, will update RD at [facility] with calorie count results. Monitoring/Evaluation:  1. RD to follow up during next office visit - follow up wt status, tolerance/intake of po diet, symptom management.       [facility], [address], [address] [facility]

## 2022-09-03 ENCOUNTER — HOSPITAL ENCOUNTER (OUTPATIENT)
Dept: INFUSION THERAPY | Age: 62
Discharge: HOME | End: 2022-09-03
Payer: COMMERCIAL

## 2022-09-03 VITALS — HEART RATE: 66 BPM | TEMPERATURE: 98.2 F | DIASTOLIC BLOOD PRESSURE: 69 MMHG | SYSTOLIC BLOOD PRESSURE: 102 MMHG

## 2022-09-03 DIAGNOSIS — C78.6 PERITONEAL METASTASES (HCC): ICD-10-CM

## 2022-09-03 DIAGNOSIS — C76.2 ABDOMINAL CARCINOMATOSIS (HCC): Primary | ICD-10-CM

## 2022-09-03 PROCEDURE — 2580000003 HC RX 258: Performed by: INTERNAL MEDICINE

## 2022-09-03 PROCEDURE — 96523 IRRIG DRUG DELIVERY DEVICE: CPT

## 2022-09-03 RX ORDER — SODIUM CHLORIDE 0.9 % (FLUSH) 0.9 %
5-40 SYRINGE (ML) INJECTION PRN
Status: DISCONTINUED | OUTPATIENT
Start: 2022-09-03 | End: 2022-09-04 | Stop reason: HOSPADM

## 2022-09-03 RX ADMIN — SODIUM CHLORIDE, PRESERVATIVE FREE 10 ML: 5 INJECTION INTRAVENOUS at 15:50

## 2022-09-03 NOTE — PROGRESS NOTES
Arrived to the [facility]. Fluorouracil pump removal completed. Patient tolerated without difficulty. Any issues or concerns during appointment: None. Patient aware of next infusion appointment on 9/15 (date) at 0930 (time). Patient instructed to call provider with temperature of 100.4 or greater or nausea/vomiting/ diarrhea or pain not controlled by medications  Discharged ambulatory.

## 2022-09-06 DIAGNOSIS — C78.6 MALIGNANT NEOPLASM METASTATIC TO PERITONEUM (HCC): Primary | ICD-10-CM

## 2022-09-06 ASSESSMENT — ENCOUNTER SYMPTOMS: NAUSEA: 0

## 2022-09-06 NOTE — PROGRESS NOTES
Saw patient on 9-1-22 with Dr Nasima Sousa prior to Cycle 6 Folfirinox. He is doing well overall and building strength. He is started to incorporate more soft foods into diet-we encouraged more po fluids. Pt remains on 3 bags fluids weekly with Homecare services. He verbalizes how he paulino not desire to be on chemo very much longer-education reinforced about nature of disease with Dr Nasima Sousa. Navigation will be following.  Duragesic to 25mcg

## 2022-09-13 RX ORDER — PANTOPRAZOLE SODIUM 40 MG/1
40 TABLET, DELAYED RELEASE ORAL
Qty: 180 TABLET | Refills: 0 | Status: SHIPPED | OUTPATIENT
Start: 2022-09-13 | End: 2022-10-28 | Stop reason: SDUPTHER

## 2022-09-15 ENCOUNTER — HOSPITAL ENCOUNTER (OUTPATIENT)
Dept: INFUSION THERAPY | Age: 62
Discharge: HOME | End: 2022-09-15
Payer: COMMERCIAL

## 2022-09-15 ENCOUNTER — OFFICE VISIT (OUTPATIENT)
Dept: ONCOLOGY | Age: 62
End: 2022-09-15
Payer: COMMERCIAL

## 2022-09-15 ENCOUNTER — OFFICE VISIT (OUTPATIENT)
Dept: ONCOLOGY | Age: 62
End: 2022-09-15

## 2022-09-15 ENCOUNTER — CLINICAL DOCUMENTATION (OUTPATIENT)
Dept: CASE MANAGEMENT | Age: 62
End: 2022-09-15

## 2022-09-15 VITALS
HEIGHT: 70 IN | BODY MASS INDEX: 27.83 KG/M2 | OXYGEN SATURATION: 97 % | WEIGHT: 194.4 LBS | TEMPERATURE: 98.1 F | DIASTOLIC BLOOD PRESSURE: 66 MMHG | SYSTOLIC BLOOD PRESSURE: 101 MMHG | HEART RATE: 82 BPM | RESPIRATION RATE: 14 BRPM

## 2022-09-15 DIAGNOSIS — C76.2 ABDOMINAL CARCINOMATOSIS (HCC): Primary | ICD-10-CM

## 2022-09-15 DIAGNOSIS — C76.2 ABDOMINAL CARCINOMATOSIS (HCC): ICD-10-CM

## 2022-09-15 DIAGNOSIS — Z78.9 ON TOTAL PARENTERAL NUTRITION (TPN): ICD-10-CM

## 2022-09-15 DIAGNOSIS — Z00.8 NUTRITIONAL ASSESSMENT: Primary | ICD-10-CM

## 2022-09-15 DIAGNOSIS — C78.6 MALIGNANT NEOPLASM METASTATIC TO PERITONEUM (HCC): ICD-10-CM

## 2022-09-15 DIAGNOSIS — C78.6 PERITONEAL METASTASES (HCC): ICD-10-CM

## 2022-09-15 DIAGNOSIS — C16.9 MALIGNANT NEOPLASM OF STOMACH, UNSPECIFIED LOCATION (HCC): ICD-10-CM

## 2022-09-15 DIAGNOSIS — C16.9 MALIGNANT NEOPLASM OF STOMACH, UNSPECIFIED LOCATION (HCC): Primary | ICD-10-CM

## 2022-09-15 PROBLEM — G93.41 SEPTIC ENCEPHALOPATHY: Status: RESOLVED | Noted: 2022-07-10 | Resolved: 2022-09-15

## 2022-09-15 PROBLEM — R79.89 ABNORMAL LIVER FUNCTION TEST: Status: RESOLVED | Noted: 2022-07-10 | Resolved: 2022-09-15

## 2022-09-15 PROBLEM — E66.09 CLASS 1 OBESITY DUE TO EXCESS CALORIES WITH SERIOUS COMORBIDITY AND BODY MASS INDEX (BMI) OF 34.0 TO 34.9 IN ADULT: Status: RESOLVED | Noted: 2020-12-29 | Resolved: 2022-09-15

## 2022-09-15 PROBLEM — E66.811 CLASS 1 OBESITY DUE TO EXCESS CALORIES WITH SERIOUS COMORBIDITY AND BODY MASS INDEX (BMI) OF 34.0 TO 34.9 IN ADULT: Status: RESOLVED | Noted: 2020-12-29 | Resolved: 2022-09-15

## 2022-09-15 PROBLEM — A41.9 SEVERE SEPSIS (HCC): Status: RESOLVED | Noted: 2022-07-10 | Resolved: 2022-09-15

## 2022-09-15 PROBLEM — E43 SEVERE PROTEIN-CALORIE MALNUTRITION (HCC): Status: RESOLVED | Noted: 2022-05-23 | Resolved: 2022-09-15

## 2022-09-15 PROBLEM — R65.20 SEVERE SEPSIS (HCC): Status: RESOLVED | Noted: 2022-07-10 | Resolved: 2022-09-15

## 2022-09-15 LAB
ALBUMIN SERPL-MCNC: 2.9 G/DL (ref 3.2–4.6)
ALBUMIN/GLOB SERPL: 0.9 {RATIO} (ref 1.2–3.5)
ALP SERPL-CCNC: 102 U/L (ref 50–136)
ALT SERPL-CCNC: 37 U/L (ref 12–65)
ANION GAP SERPL CALC-SCNC: 7 MMOL/L (ref 4–13)
AST SERPL-CCNC: 20 U/L (ref 15–37)
BASOPHILS # BLD: 0 K/UL (ref 0–0.2)
BASOPHILS NFR BLD: 1 % (ref 0–2)
BILIRUB SERPL-MCNC: 0.2 MG/DL (ref 0.2–1.1)
BUN SERPL-MCNC: 15 MG/DL (ref 8–23)
CALCIUM SERPL-MCNC: 8.5 MG/DL (ref 8.3–10.4)
CEA SERPL-MCNC: 2 NG/ML (ref 0–3)
CHLORIDE SERPL-SCNC: 107 MMOL/L (ref 101–110)
CO2 SERPL-SCNC: 25 MMOL/L (ref 21–32)
CREAT SERPL-MCNC: 0.7 MG/DL (ref 0.8–1.5)
DIFFERENTIAL METHOD BLD: ABNORMAL
EOSINOPHIL # BLD: 0.1 K/UL (ref 0–0.8)
EOSINOPHIL NFR BLD: 1 % (ref 0.5–7.8)
ERYTHROCYTE [DISTWIDTH] IN BLOOD BY AUTOMATED COUNT: 15.8 % (ref 11.9–14.6)
GLOBULIN SER CALC-MCNC: 3.3 G/DL (ref 2.3–3.5)
GLUCOSE SERPL-MCNC: 143 MG/DL (ref 65–100)
HCT VFR BLD AUTO: 32.3 %
HGB BLD-MCNC: 10.3 G/DL (ref 13.6–17.2)
IMM GRANULOCYTES # BLD AUTO: 0 K/UL (ref 0–0.5)
IMM GRANULOCYTES NFR BLD AUTO: 0 % (ref 0–5)
LYMPHOCYTES # BLD: 1.7 K/UL (ref 0.5–4.6)
LYMPHOCYTES NFR BLD: 39 % (ref 13–44)
MAGNESIUM SERPL-MCNC: 2.2 MG/DL (ref 1.8–2.4)
MCH RBC QN AUTO: 28.8 PG (ref 26.1–32.9)
MCHC RBC AUTO-ENTMCNC: 31.9 G/DL (ref 31.4–35)
MCV RBC AUTO: 90.2 FL (ref 79.6–97.8)
MONOCYTES # BLD: 0.6 K/UL (ref 0.1–1.3)
MONOCYTES NFR BLD: 14 % (ref 4–12)
NEUTS SEG # BLD: 1.9 K/UL (ref 1.7–8.2)
NEUTS SEG NFR BLD: 45 % (ref 43–78)
NRBC # BLD: 0 K/UL (ref 0–0.2)
PLATELET # BLD AUTO: 165 K/UL (ref 150–450)
PMV BLD AUTO: 10.2 FL (ref 9.4–12.3)
POTASSIUM SERPL-SCNC: 4 MMOL/L (ref 3.5–5.1)
PROT SERPL-MCNC: 6.2 G/DL (ref 6.3–8.2)
RBC # BLD AUTO: 3.58 M/UL (ref 4.23–5.6)
SODIUM SERPL-SCNC: 139 MMOL/L (ref 136–145)
WBC # BLD AUTO: 4.3 K/UL (ref 4.3–11.1)

## 2022-09-15 PROCEDURE — 96411 CHEMO IV PUSH ADDL DRUG: CPT

## 2022-09-15 PROCEDURE — 96375 TX/PRO/DX INJ NEW DRUG ADDON: CPT

## 2022-09-15 PROCEDURE — 2580000003 HC RX 258: Performed by: NURSE PRACTITIONER

## 2022-09-15 PROCEDURE — 6360000002 HC RX W HCPCS: Performed by: NURSE PRACTITIONER

## 2022-09-15 PROCEDURE — 96367 TX/PROPH/DG ADDL SEQ IV INF: CPT

## 2022-09-15 PROCEDURE — 96372 THER/PROPH/DIAG INJ SC/IM: CPT

## 2022-09-15 PROCEDURE — 85025 COMPLETE CBC W/AUTO DIFF WBC: CPT

## 2022-09-15 PROCEDURE — 96413 CHEMO IV INFUSION 1 HR: CPT

## 2022-09-15 PROCEDURE — 80053 COMPREHEN METABOLIC PANEL: CPT

## 2022-09-15 PROCEDURE — 83735 ASSAY OF MAGNESIUM: CPT

## 2022-09-15 PROCEDURE — 36591 DRAW BLOOD OFF VENOUS DEVICE: CPT

## 2022-09-15 PROCEDURE — G0498 CHEMO EXTEND IV INFUS W/PUMP: HCPCS

## 2022-09-15 PROCEDURE — 96415 CHEMO IV INFUSION ADDL HR: CPT

## 2022-09-15 PROCEDURE — 96368 THER/DIAG CONCURRENT INF: CPT

## 2022-09-15 PROCEDURE — 96417 CHEMO IV INFUS EACH ADDL SEQ: CPT

## 2022-09-15 PROCEDURE — 6370000000 HC RX 637 (ALT 250 FOR IP): Performed by: NURSE PRACTITIONER

## 2022-09-15 PROCEDURE — 2580000003 HC RX 258: Performed by: INTERNAL MEDICINE

## 2022-09-15 PROCEDURE — 82378 CARCINOEMBRYONIC ANTIGEN: CPT

## 2022-09-15 PROCEDURE — 99214 OFFICE O/P EST MOD 30 MIN: CPT | Performed by: NURSE PRACTITIONER

## 2022-09-15 RX ORDER — HEPARIN SODIUM (PORCINE) LOCK FLUSH IV SOLN 100 UNIT/ML 100 UNIT/ML
500 SOLUTION INTRAVENOUS PRN
Status: DISCONTINUED | OUTPATIENT
Start: 2022-09-15 | End: 2022-09-16 | Stop reason: HOSPADM

## 2022-09-15 RX ORDER — SODIUM CHLORIDE 9 MG/ML
INJECTION, SOLUTION INTRAVENOUS CONTINUOUS
Status: CANCELLED | OUTPATIENT
Start: 2022-09-15

## 2022-09-15 RX ORDER — SODIUM CHLORIDE 9 MG/ML
5-40 INJECTION INTRAVENOUS PRN
Status: CANCELLED | OUTPATIENT
Start: 2022-09-15

## 2022-09-15 RX ORDER — FLUOROURACIL 50 MG/ML
400 INJECTION, SOLUTION INTRAVENOUS ONCE
Status: CANCELLED
Start: 2022-09-15 | End: 2022-09-15

## 2022-09-15 RX ORDER — SODIUM CHLORIDE 9 MG/ML
5-250 INJECTION, SOLUTION INTRAVENOUS PRN
Status: CANCELLED | OUTPATIENT
Start: 2022-09-15

## 2022-09-15 RX ORDER — FLUOROURACIL 50 MG/ML
400 INJECTION, SOLUTION INTRAVENOUS ONCE
Status: COMPLETED | OUTPATIENT
Start: 2022-09-15 | End: 2022-09-15

## 2022-09-15 RX ORDER — DEXTROSE MONOHYDRATE 50 MG/ML
5-250 INJECTION, SOLUTION INTRAVENOUS PRN
Status: DISCONTINUED | OUTPATIENT
Start: 2022-09-15 | End: 2022-09-16 | Stop reason: HOSPADM

## 2022-09-15 RX ORDER — HEPARIN SODIUM (PORCINE) LOCK FLUSH IV SOLN 100 UNIT/ML 100 UNIT/ML
500 SOLUTION INTRAVENOUS PRN
Status: CANCELLED | OUTPATIENT
Start: 2022-09-15

## 2022-09-15 RX ORDER — SODIUM CHLORIDE 0.9 % (FLUSH) 0.9 %
10 SYRINGE (ML) INJECTION PRN
Status: DISCONTINUED | OUTPATIENT
Start: 2022-09-15 | End: 2022-09-16 | Stop reason: HOSPADM

## 2022-09-15 RX ORDER — OXYCODONE HCL 20 MG/ML
10 CONCENTRATE, ORAL ORAL EVERY 4 HOURS PRN
COMMUNITY

## 2022-09-15 RX ORDER — ONDANSETRON 2 MG/ML
8 INJECTION INTRAMUSCULAR; INTRAVENOUS
Status: CANCELLED | OUTPATIENT
Start: 2022-09-15

## 2022-09-15 RX ORDER — ACETAMINOPHEN 325 MG/1
650 TABLET ORAL
Status: CANCELLED | OUTPATIENT
Start: 2022-09-15

## 2022-09-15 RX ORDER — ATROPINE SULFATE 0.4 MG/ML
0.4 AMPUL (ML) INJECTION ONCE
Status: COMPLETED | OUTPATIENT
Start: 2022-09-15 | End: 2022-09-15

## 2022-09-15 RX ORDER — MEPERIDINE HYDROCHLORIDE 50 MG/ML
12.5 INJECTION INTRAMUSCULAR; INTRAVENOUS; SUBCUTANEOUS PRN
Status: CANCELLED | OUTPATIENT
Start: 2022-09-15

## 2022-09-15 RX ORDER — HEPARIN SODIUM (PORCINE) LOCK FLUSH IV SOLN 100 UNIT/ML 100 UNIT/ML
500 SOLUTION INTRAVENOUS PRN
Status: CANCELLED | OUTPATIENT
Start: 2022-09-17

## 2022-09-15 RX ORDER — EPINEPHRINE 1 MG/ML
0.3 INJECTION, SOLUTION, CONCENTRATE INTRAVENOUS PRN
Status: CANCELLED | OUTPATIENT
Start: 2022-09-15

## 2022-09-15 RX ORDER — ONDANSETRON 2 MG/ML
8 INJECTION INTRAMUSCULAR; INTRAVENOUS ONCE
Status: COMPLETED | OUTPATIENT
Start: 2022-09-15 | End: 2022-09-15

## 2022-09-15 RX ORDER — SODIUM CHLORIDE 9 MG/ML
5-40 INJECTION INTRAVENOUS PRN
Status: DISCONTINUED | OUTPATIENT
Start: 2022-09-15 | End: 2022-09-16 | Stop reason: HOSPADM

## 2022-09-15 RX ORDER — OXYCODONE HYDROCHLORIDE 5 MG/1
10 TABLET ORAL EVERY 4 HOURS PRN
COMMUNITY
Start: 2022-05-11 | End: 2022-10-14

## 2022-09-15 RX ORDER — DIPHENHYDRAMINE HYDROCHLORIDE 50 MG/ML
50 INJECTION INTRAMUSCULAR; INTRAVENOUS
Status: CANCELLED | OUTPATIENT
Start: 2022-09-15

## 2022-09-15 RX ORDER — ATROPINE SULFATE 0.4 MG/ML
0.4 AMPUL (ML) INJECTION ONCE
Status: CANCELLED | OUTPATIENT
Start: 2022-09-15 | End: 2022-09-15

## 2022-09-15 RX ORDER — SODIUM CHLORIDE 9 MG/ML
5-250 INJECTION, SOLUTION INTRAVENOUS PRN
Status: DISCONTINUED | OUTPATIENT
Start: 2022-09-15 | End: 2022-09-16 | Stop reason: HOSPADM

## 2022-09-15 RX ORDER — ATROPINE SULFATE 0.4 MG/ML
0.4 AMPUL (ML) INJECTION
Status: CANCELLED | OUTPATIENT
Start: 2022-09-15

## 2022-09-15 RX ORDER — ONDANSETRON 2 MG/ML
8 INJECTION INTRAMUSCULAR; INTRAVENOUS ONCE
Status: CANCELLED | OUTPATIENT
Start: 2022-09-15 | End: 2022-09-15

## 2022-09-15 RX ORDER — SODIUM CHLORIDE 9 MG/ML
5-40 INJECTION INTRAVENOUS PRN
Status: CANCELLED | OUTPATIENT
Start: 2022-09-17

## 2022-09-15 RX ORDER — ALBUTEROL SULFATE 90 UG/1
4 AEROSOL, METERED RESPIRATORY (INHALATION) PRN
Status: CANCELLED | OUTPATIENT
Start: 2022-09-15

## 2022-09-15 RX ORDER — SODIUM CHLORIDE 0.9 % (FLUSH) 0.9 %
5-40 SYRINGE (ML) INJECTION PRN
Status: DISCONTINUED | OUTPATIENT
Start: 2022-09-15 | End: 2022-09-16 | Stop reason: HOSPADM

## 2022-09-15 RX ORDER — FAMOTIDINE 10 MG/ML
20 INJECTION, SOLUTION INTRAVENOUS
Status: CANCELLED | OUTPATIENT
Start: 2022-09-15

## 2022-09-15 RX ORDER — SODIUM CHLORIDE 0.9 % (FLUSH) 0.9 %
5-40 SYRINGE (ML) INJECTION PRN
Status: CANCELLED | OUTPATIENT
Start: 2022-09-15

## 2022-09-15 RX ORDER — SODIUM CHLORIDE 0.9 % (FLUSH) 0.9 %
5-40 SYRINGE (ML) INJECTION PRN
Status: CANCELLED | OUTPATIENT
Start: 2022-09-17

## 2022-09-15 RX ORDER — SODIUM CHLORIDE 9 MG/ML
5-250 INJECTION, SOLUTION INTRAVENOUS PRN
Status: CANCELLED | OUTPATIENT
Start: 2022-09-17

## 2022-09-15 RX ORDER — ACETAMINOPHEN 325 MG/1
650 TABLET ORAL
Status: COMPLETED | OUTPATIENT
Start: 2022-09-15 | End: 2022-09-15

## 2022-09-15 RX ORDER — DEXTROSE MONOHYDRATE 50 MG/ML
5-250 INJECTION, SOLUTION INTRAVENOUS PRN
Status: CANCELLED | OUTPATIENT
Start: 2022-09-15

## 2022-09-15 RX ADMIN — IRINOTECAN HYDROCHLORIDE 320 MG: 20 INJECTION, SOLUTION INTRAVENOUS at 13:00

## 2022-09-15 RX ADMIN — LEUCOVORIN CALCIUM 850 MG: 100 INJECTION, POWDER, LYOPHILIZED, FOR SUSPENSION INTRAMUSCULAR; INTRAVENOUS at 11:03

## 2022-09-15 RX ADMIN — ACETAMINOPHEN 650 MG: 325 TABLET ORAL at 12:15

## 2022-09-15 RX ADMIN — ONDANSETRON 8 MG: 2 INJECTION INTRAMUSCULAR; INTRAVENOUS at 09:42

## 2022-09-15 RX ADMIN — OXALIPLATIN 180 MG: 5 INJECTION, SOLUTION INTRAVENOUS at 10:56

## 2022-09-15 RX ADMIN — DEXAMETHASONE SODIUM PHOSPHATE 12 MG: 4 INJECTION, SOLUTION INTRAMUSCULAR; INTRAVENOUS at 09:45

## 2022-09-15 RX ADMIN — DEXTROSE MONOHYDRATE 250 ML/HR: 5 INJECTION, SOLUTION INTRAVENOUS at 10:21

## 2022-09-15 RX ADMIN — FLUOROURACIL 850 MG: 50 INJECTION, SOLUTION INTRAVENOUS at 14:42

## 2022-09-15 RX ADMIN — SODIUM CHLORIDE, PRESERVATIVE FREE 10 ML: 5 INJECTION INTRAVENOUS at 10:00

## 2022-09-15 RX ADMIN — FOSAPREPITANT 150 MG: 150 INJECTION, POWDER, LYOPHILIZED, FOR SOLUTION INTRAVENOUS at 10:01

## 2022-09-15 RX ADMIN — FLUOROURACIL 5000 MG: 50 INJECTION, SOLUTION INTRAVENOUS at 14:50

## 2022-09-15 RX ADMIN — ATROPINE SULFATE 0.4 MG: 0.4 INJECTION, SOLUTION INTRAMUSCULAR; INTRAVENOUS; SUBCUTANEOUS at 10:50

## 2022-09-15 RX ADMIN — SODIUM CHLORIDE, PRESERVATIVE FREE 10 ML: 5 INJECTION INTRAVENOUS at 08:33

## 2022-09-15 ASSESSMENT — ENCOUNTER SYMPTOMS
COUGH: 0
BLOOD IN STOOL: 0
SORE THROAT: 0
CONSTIPATION: 1
NAUSEA: 0
EYE PROBLEMS: 0
VOMITING: 0
BACK PAIN: 0
SCLERAL ICTERUS: 0
ABDOMINAL PAIN: 1
ABDOMINAL DISTENTION: 0
DIARRHEA: 0
SHORTNESS OF BREATH: 0
HEMOPTYSIS: 0

## 2022-09-15 ASSESSMENT — PATIENT HEALTH QUESTIONNAIRE - PHQ9
SUM OF ALL RESPONSES TO PHQ9 QUESTIONS 1 & 2: 0
SUM OF ALL RESPONSES TO PHQ QUESTIONS 1-9: 0
SUM OF ALL RESPONSES TO PHQ QUESTIONS 1-9: 0
1. LITTLE INTEREST OR PLEASURE IN DOING THINGS: 0
SUM OF ALL RESPONSES TO PHQ QUESTIONS 1-9: 0
SUM OF ALL RESPONSES TO PHQ QUESTIONS 1-9: 0
2. FEELING DOWN, DEPRESSED OR HOPELESS: 0

## 2022-09-15 NOTE — PROGRESS NOTES
9/15/22 saw pt today with NINFA Moreno for pre chemo cycle 7 FOLFIRINOX. He is tolerating chemo well. Weight is up. Pain well controlled. Proceed to infusion. Follow up in 2 weeks. Encouraged to call with any concerns. Navigation will continue to follow.

## 2022-09-15 NOTE — PATIENT INSTRUCTIONS
(A) 0.8 - 1.5 MG/DL Final    GFR  09/15/2022 >60  >60 ml/min/1.73m2 Final    GFR Non- 09/15/2022 >60  >60 ml/min/1.73m2 Final    Comment:      Estimated GFR is calculated using the Modification of Diet in Renal Disease (MDRD) Study equation, reported for both  Americans (GFRAA) and non- Americans (GFRNA), and normalized to 1.73m2 body surface area. The physician must decide which value applies to the patient. The MDRD study equation should only be used in individuals age 25 or older. It has not been validated for the following: pregnant women, patients with serious comorbid conditions,or on certain medications, or persons with extremes of body size, muscle mass, or nutritional status. Calcium 09/15/2022 8.5  8.3 - 10.4 MG/DL Final    Total Bilirubin 09/15/2022 0.2  0.2 - 1.1 MG/DL Final    ALT 09/15/2022 37  12 - 65 U/L Final    AST 09/15/2022 20  15 - 37 U/L Final    Alk Phosphatase 09/15/2022 102  50 - 136 U/L Final    Total Protein 09/15/2022 6.2 (A) 6.3 - 8.2 g/dL Final    Albumin 09/15/2022 2.9 (A) 3.2 - 4.6 g/dL Final    Globulin 09/15/2022 3.3  2.3 - 3.5 g/dL Final    Albumin/Globulin Ratio 09/15/2022 0.9 (A) 1.2 - 3.5   Final    Magnesium 09/15/2022 2.2  1.8 - 2.4 mg/dL Final    CEA 09/15/2022 2.0  0.0 - 3.0 ng/mL Final    Comment: Nonsmoker:  <3.0 ng/mL  Smoker:     <5.0 ng/mL  [employer]. Patient's results of tumor marker testing may not be comparable to labs using different manufacturers/methods.            Treatment Summary has been discussed and given to patient: no        -------------------------------------------------------------------------------------------------------------------  Please call our office at [phone] if you have any  of the following symptoms:   Fever of 100.5 or greater  Chills  Shortness of breath  Swelling or pain in one leg    After office hours an answering service is available and will contact a provider for emergencies or if you are experiencing any of the above symptoms. Patient did express an interest in My Chart. My Chart log in information explained on the after visit summary printout at the [facility] [address] desk.     Gemini Mills, RN, BSN  Nurse Navigator    Arley Henry, RN  Nurse Navigator  [address] [place] [id number] [phone]

## 2022-09-15 NOTE — PROGRESS NOTES
[facility]: Established patient - follow up     Chief Complaint   Patient presents with    Follow-up      Reason for Referral: Abdominal pain; Concern  for peritoneal metastatic disease   Referring Provider: Rey Gonzalez NP   Primary Care Provider: Saige Muir MD   Family History of Cancer/Hematologic Disorders: Family history is significant for sister with breast cancer. Presenting Symptoms: Progressively worsening, persistent abdominal pain x several months    History of Present Illness:  Mr. Evans  is a 62 y.o. male who presents today for FU regarding adenocarcinoma with peritoneal metastatic disease. The past medical history is significant for tobacco use (recent pack per day smoker x 30+ years - now down to 1/3PPD), PUD, paresthesia/pain of bilateral upper extremities, HLD, HTN, diverticulitis,  colon polyps, hiatal hernia, chronic right shoulder pain, bilateral carpal tunnel syndrome, and partial colon resection. He presented to the [facility] ED on 5/12/22 with a complaint of persistent abdominal pain for several months that was becoming progressively  worse. EGD and colonoscopy on 2/25/22 revealed findings of hiatal hernia, gastric polyps, several benign colon polyps, diverticulosis, and internal hemorrhoids. CT of the abdomen on 3/8/22 showed no acute findings. He presented to [facility] ER x 2 for  evaluation (5/3/22 and 5/10/22). RUQ abdominal ultrasound was completed on 5/3/22 in the ED at [facility] demonstrating no acute findings to explain patient's pain; probable hepatic  steatosis; small echogenic mural foci in the gallbladder which are nonmobile, likely representing cholesterol polyps, largest measuring 4 mm; and small volume simple ascites in the right upper quadrant and left lower quadrant, nonspecific.   CT AP with  contrast at [facility] ED 5/10/22 showed a partial small bowel obstruction; small to moderate amount of free fluid in the abdomen and pelvis; and nonspecific stranding of the omentum primarily in the left upper quadrant. Radiologist noted that follow-up  CT was recommended to differentiate passive congestion from omental disease. On 5/11/22, abdominal series again showed multiple dilated small bowel loops with enteric contrast appearing to extend into the proximal colon, suggesting partial small  bowel obstruction. CT AP in the [facility] ED on 5/12/22 identified extensive peritoneal nodularity and mild ascites consistent with peritoneal  metastatic disease with primary lesion not definitely identified; dilated fluid-filled loops of small bowel possibly related to gastroenteritis with no definite obstruction and no obvious bowel mass; and sclerosis of S1 vertebral body. Radiologist recommended consideration of follow-up bone scan to assess for bone metastases. Patient was admitted to  the Hospitalist service on 5/12/22, and IR consulted for possible paracentesis however very small volume was inaccessible. CT Chest obtained on 5/13/22 showed No evidence of primary malignancy or metastasis within the chest.  Follow-up hepatobiliary  scan was suggested to assess for common bile duct obstruction. Oncology was consulted but did not see patient inhouse as pt was dischared. Upon discharge on 5/14/22, patient was instructed to follow up with [facility]. During consultation, we discussed his workup. We looked at the images together demonstrating some of the findings concerning for peritoneal nodularity/peritoneal disease. Discussed anatomy of the findings utilizing images to help pt understand what we are looking at and suspecting. He and wife were appreciative of the time spent to review these. We also addressed pt's pain. We also reviewed images of sclerosing lesion - bone scan recommended. He also was recommended to undergo HIDA scan after recent US per PCP. He is tired of tests, frustrated.   Feelings validated and stressed importance of workup to determine why he has pain. Wt loss from 240 --> 209 noted and expressed concern to pt about this. Of note, prior akbar resection with Dr Jeanie Vazquez for diverticulitis per pt. Sent note to dr Roxi Lancaster re pt's case to expedite workup with his agreement. He was hospitalized for abdominal pain and sepsis. He was empirically placed on vancomycin and cefepime. CT scanning disclosed no intra-abdominal fluid collection or abscess but did suggest that his tumor burden was somewhat smaller. He had some purulent drainage from around his G-tube. This was cultured and grew klebsiella pneumoniae and citrobacter freundii both of which were sensitive to Levaquin which he was discharged home on for 10 days. Today, he is here for follow-up prior to cycle 7 FOLFIRINOX. He again tolerated treatment well. There is no nausea and has decreased his miralax. He notes mildly increased intermittent abdominal pain, however this is likely related to his diet being much improved, weight is up 11#! He continues to wean off TPN. There is no cough, shortness of breath, or edema. Cold sensitivity is present, but no true neuropathy. Since last visit fentanyl was decreased to 25 mcg, from 9/4-9/10 he did not take any oxycodone, since 9/11 he has taken 1 dose daily. Denies any fevers or infectious symptoms. Chronological Events:   EGD REPORT 2/25/22                      COLONOSCOPY REPORT 2/25/22                 CT ABDOMEN PELVIS W CONTRAST 3/8/2022   FINDINGS:   LOWER CHEST: Unremarkable. ABDOMEN AND PELVIS:   Liver: Unremarkable. Biliary system: Unremarkable. Pancreas: Unremarkable. Spleen: Unremarkable. Adrenals: Unremarkable. Genitourinary: Unremarkable. Reproductive organs: Unremarkable. Gastrointestinal tract: Colonic diverticulosis without evidence of diverticulitis. There is no evidence of bowel obstruction or inflammation. The appendix is normal   Peritoneum/Extraperitoneum: Unremarkable.  No free abdominal aorta without aneurysm formation. Lymphatics:  Normal   GI - Mesentery - Peritoneum: Multiple dilated mid small bowel loops without a focal transition point. The appendix is normal. Small to  moderate amount of free fluid in the pelvis and right flank. Nonspecific stranding of the omentum in the left upper quadrant. Small fatty umbilical hernia. Pelvis: Normal   Lower Chest: Normal   Soft tissues - MSK: Normal    IMPRESSION:   1. Partial small bowel obstruction. 2. Small to moderate amount of free fluid in the abdomen and pelvis. 3. Nonspecific stranding of the omentum primarily  in the left upper quadrant. Follow-up CT is recommended to differentiate passive congestion from omental a static disease. ABDOMEN SERIES 5/11/22   FINDINGS:   Chest: Heart size within normal limits. Lungs are clear. No pleural effusion or pneumothorax. Bowel: Multiple dilated small bowel loops with air-fluid levels on the upright view. Contrast distends small bowel loops in the left lateral abdomen and lower abdomen. Contrast in the right hemiabdomen appears to be within proximal colon. Scattered colonic  gas. Peritoneum / Retroperitoneum: No pneumoperitoneum. Soft tissues / Bones: No acute osseous findings. The contrast in urinary bladder. Lines / Support Apparatus: None. IMPRESSION: Redemonstrated of multiple dilated small bowel loops with enteric contrast appearing to extend into the proximal colon, suggesting only partial small bowel obstruction. Continued radiographic follow-up is advised. CT OF THE ABDOMEN AND PELVIS 5/12/22   FINDINGS:   LOWER CHEST: Normal.   HEPATOBILIARY: No evidence of focal liver mass. No calcified gallstones. PANCREAS: Normal.   SPLEEN: Normal.    ADRENAL GLANDS: Normal.    KIDNEYS/BLADDER: Kidneys and bladder are unremarkable. No hydronephrosis or urinary tract calculi. BOWEL: Dilated fluid-filled loops of small bowel are present throughout the abdomen.  No definite transition point. Oral contrast noted in the colon. No definite evidence of bowel mass but there is extensive peritoneal nodularity and trace ascites highly  concerning for peritoneal metastatic disease. LYMPH NODES: No enlarged retroperitoneal or pelvic lymph nodes. Peritoneal nodularity again noted most likely metastatic disease. VASCULATURE: Unremarkable. PELVIC ORGANS: Prostate gland and rectum are unremarkable. Small amount of free pelvic fluid is noted. MUSCULOSKELETAL: Degenerative spine changes are noted. Mild sclerosis involving the S1 vertebral body is present on image 74 of series 602. IMPRESSION   1. Extensive peritoneal nodularity and mild ascites consistent with peritoneal metastatic disease. Primary lesion not definitely identified. 2. Dilated fluid-filled loops of small bowel possibly related to gastroenteritis. No definite obstruction. No obvious bowel mass. 3. Sclerosis S1 vertebral body. Consider follow-up bone scan to assess for bone metastases. LIMITED ABDOMINAL ULTRASOUND 5/13/22   FINDINGS: A single limited gray scale image was submitted of an undisclosed location in the abdomen. Images demonstrate a small amount of  ascites as seen on comparison CT. IMPRESSION   1. Small volume ascites. CT CHEST WITH CONTRAST 5/13/22   FINDINGS:   Mediastinum and visualized thyroid: Normal.   Heart: Normal.    Large Vessels: Normal.    Pleura: Normal.    Lungs: No lung mass clearly discerned. Mild scarring or atelectasis in the right lung base. No suspicious lung nodule. No consolidation or zulma pulmonary edema. Airways: Normal.    Lymph nodes: Normal.    Bones/Soft tissues: Normal.    Visualized abdomen: Partially imaged omental nodules. Perihepatic ascites noted. IMPRESSION: No evidence of primary malignancy or metastasis within the chest       ABDOMINAL ULTRASOUND 5/17/22   FINDINGS:    LIVER: 17.3 cm. Slight increase in echogenicity without focal mass. BILE DUCTS: No intrahepatic bile duct dilatation. CBD diameter = 8 mm. GALLBLADDER: It is unremarkable in appearance without stones or sludge. Echogenic polyp measures 0.5 cm. No gallbladder wall thickening. Mild ascites. PANCREAS: Normal.   SPLEEN: Borderline enlarged at 12.2 cm. RIGHT KIDNEY: 13.3 cm. No mass or hydronephrosis. LEFT KIDNEY: 14.3 cm. No mass or hydronephrosis. ABDOMINAL AORTA AND IVC: Normal in size. ASCITES: Mild   IMPRESSION: Possible mild fatty infiltration liver. No focal mass. Gallbladder polyp but no stones or gallbladder wall thickening. Mild ascites. Mildly prominent common bile duct. Follow-up hepatobiliary scan could be performed to assess for common bile duct obstruction. 04/02/2021 (COVID-19, Aldona Raid, Primary or Immunocompromised Series, MRNA, PF, 100mcg/0.5mL)   04/30/2021 (COVID-19, Aldona Raid, Primary or Immunocompromised Series, MRNA, PF, 100mcg/0.5mL)   11/16/2021 (COVID-19, Moderna Booster, PF, 0.25mL Dose)      5/18/22 heme/onc consultation   5/20/22 Dr Carlos Nelson consult - sent to ED for admission/workup   5/23/22 omental bx - IR   5/27/22 Dr Carlos Nelson - diagnostic lap, omental mass and mesentery mass excision, G-tube placement for venting  6/1/22 painful G-tube - tract recanalized and new G-tube placed   6/12/22 C1 FOLFIRINOX completed - dose adjusted   6/14/22 d/c   6/24/22 FU sx - G tube maint instructions/staples removed - RTC PRN   6/24/22 FU after hospitalization - discussed PC/plan for tx  7/8/22 FU - mainly concerned about PICC line without blood return, decline cathflo, [facility]. Will increase hydration at home. HH for TPN.    7/18/22 Follow up after hospitalization. He will complete course of Levaquin as prescribed for infection around G-tube. Would like to proceed with cycle 3 FOLFIRINOX with increased dosing. 8/4/22 Cycle 4 FOLFIRINOX - continue dose escalation.  He will complete course of Levaquin/Diflucan as prescribed for infection around G-tube, site looks good today. 8/17/22 C5 FOLFIRINOX - wishes to pw full dose accepting risks; wishes for G tube to be removed - will need to time with labs; plan to drop dose in opioids - PC seeing pt today. RD seeing pt. Plan for restaging in ~Oct.    9/1/22 FU FOLFIRINOX C5 full dose now; imaging in Oct   9/15/22 FU FOLFIRINOX C6-doing well, weight up 11#    Family History   Problem Relation Age of Onset    No Known Problems Brother     Cancer Sister         breast    Hypertension Mother     Heart Disease Mother     Diabetes Father     Osteoarthritis Father     Alcohol Abuse Father     Hypertension Father     Diabetes Mother       Social History     Socioeconomic History    Marital status:      Spouse name: None    Number of children: None    Years of education: None    Highest education level: None   Tobacco Use    Smoking status: Former     Packs/day: 1.00     Types: Cigarettes    Smokeless tobacco: Never   Substance and Sexual Activity    Alcohol use: No    Drug use: No        Review of Systems   Constitutional:  Positive for fatigue. Negative for appetite change, diaphoresis, fever and unexpected weight change. HENT:   Negative for sore throat. Eyes:  Negative for eye problems and icterus. Respiratory:  Negative for cough, hemoptysis and shortness of breath. Cardiovascular:  Negative for chest pain, leg swelling and palpitations. Gastrointestinal:  Positive for abdominal pain (much improved) and constipation (controlled). Negative for abdominal distention, blood in stool, diarrhea, nausea and vomiting. Endocrine: Negative for hot flashes. Genitourinary:  Negative for dysuria. Musculoskeletal:  Negative for arthralgias, back pain and gait problem. Skin:  Negative for itching, rash and wound. Neurological:  Negative for dizziness, extremity weakness, gait problem, headaches, light-headedness, numbness, seizures and speech difficulty.    Hematological: Negative for adenopathy. Does not bruise/bleed easily. Psychiatric/Behavioral:  Positive for depression. Negative for decreased concentration and sleep disturbance. The patient is nervous/anxious. No Known Allergies  Past Medical History:   Diagnosis Date    Bilateral carpal tunnel syndrome 12/9/2020    Chronic right shoulder pain 11/30/2020    Colon polyps 3/11/2013    Diverticulitis 3/11/2013    HTN (hypertension) 3/11/2013    Hyperlipidemia 3/11/2013    Paresthesia and pain of both upper extremities 11/30/2020    PUD (peptic ulcer disease) 3/11/2013    History of     Right elbow pain 12/9/2020    Wheezing 3/11/2013     Past Surgical History:   Procedure Laterality Date    COLONOSCOPY  2/25/09    colonoscopy per Dr. Victor Hugo Molina with need for repeat every 3 years    COLONOSCOPY  02/25/2022    Dr. Bibi Wayne; polyps of the ascending, transverse, descending, and sigmoid colons; internal hemorrhoid; diverticulosis    LAPAROSCOPY N/A 5/27/2022    LAPAROSCOPY DIAGNOSTIC , OPEN EXPLORATORY LAPAROTOMY, MASS EXCISION, G-TUBE PLACEMENT performed by Tye Baker MD at [address] N/A 6/3/2022    PORT INSERTION performed by Tye Baker MD at [address] UNLISTED  October 2004    partial colon resection per Dr. Zeeshan Berman  02/25/2022    Dr. Bibi Wayne; hiatal hernia gastric polyps     Current Outpatient Medications   Medication Sig Dispense Refill    oxyCODONE (ROXICODONE) 5 MG immediate release tablet Take 10 mg by mouth every 4 hours as needed. oxyCODONE (ROXICODONE INTENSOL) 100 MG/5ML concentrated solution Take 10 mg by mouth every 4 hours as needed for Pain. 1 mls under tongue      pantoprazole (PROTONIX) 40 MG tablet TAKE 1 TABLET BY MOUTH IN THE MORNING AND 1 TABLET IN THE EVENING. TAKE BEFORE MEALS. 180 tablet 0    fentaNYL (DURAGESIC) 25 MCG/HR Place 1 patch onto the skin every 72 hours for 15 days.  5 patch 0    escitalopram (LEXAPRO) 10 MG tablet Take 1 tablet by mouth in the morning. 30 tablet 5    dicyclomine (BENTYL) 20 MG tablet TAKE 1 TABLET BY MOUTH EVERY SIX (6) HOURS. 360 tablet 0    OLANZapine zydis (ZYPREXA) 5 MG disintegrating tablet Take 1 tablet by mouth nightly Do not take with promethazine 30 tablet 3    ondansetron (ZOFRAN-ODT) 8 MG TBDP disintegrating tablet Take 1 tablet by mouth every 8 hours 90 tablet 0    promethazine (PHENERGAN) 12.5 MG tablet Take 1 tablet by mouth every 6 hours as needed for Nausea 90 tablet 0    rosuvastatin (CRESTOR) 10 MG tablet Take 10 mg by mouth      fentaNYL (DURAGESIC) 12 MCG/HR Place 1 patch onto the skin every 3 days for 15 days. Intended supply: 30 days (Patient not taking: Reported on 9/15/2022) 5 patch 0    midodrine (PROAMATINE) 5 MG tablet Take 1 tablet by mouth in the morning and 1 tablet before bedtime. Take 1 tab in the AM and 1 tab mid afternoon. (Patient not taking: Reported on 9/1/2022) 60 tablet 3    busPIRone (BUSPAR) 10 MG tablet Take 1 tablet by mouth 3 times daily (Patient not taking: Reported on 9/1/2022) 90 tablet 0    naloxone 4 MG/0.1ML LIQD nasal spray 1 spray by Nasal route as needed for Opioid Reversal  (Patient not taking: No sig reported)      dicyclomine (BENTYL) 10 MG capsule Take 20 mg by mouth every 6 hours  (Patient not taking: Reported on 9/1/2022)      polyethylene glycol (GLYCOLAX) 17 GM/SCOOP powder Take 17 g by mouth daily (Patient not taking: No sig reported)      umeclidinium-vilanterol (ANORO ELLIPTA) 62.5-25 MCG/INH AEPB inhaler Inhale 1 puff into the lungs daily  (Patient not taking: Reported on 9/1/2022)       No current facility-administered medications for this visit.      Facility-Administered Medications Ordered in Other Visits   Medication Dose Route Frequency Provider Last Rate Last Admin    sodium chloride flush 0.9 % injection 10 mL  10 mL IntraVENous PRN Faith Bang MD   10 mL at 09/15/22 0833    dextrose 5 % solution  5-250 mL/hr IntraVENous PRN Mirian Closs LUCY Cuellar CNP        fosaprepitant (EMEND) 150 mg in sodium chloride 0.9 % 150 mL IVPB  150 mg IntraVENous Once LUCY Estrada - HEATHER        dexamethasone (DECADRON) 12 mg in sodium chloride 0.9 % 58 mL IVPB  12 mg IntraVENous Once LUCY Estrada - HEATHER        oxaliplatin (ELOXATIN) 180 mg in dextrose 5 % 250 mL chemo IVPB  85 mg/m2 (Treatment Plan Recorded) IntraVENous Once Zay Hogan APRN - .5 mL/hr at 09/15/22 1056 180 mg at 09/15/22 1056    irinotecan (CAMPTOSAR) 320 mg in dextrose 5 % 250 mL chemo IVPB  150 mg/m2 (Treatment Plan Recorded) IntraVENous Once LUCY Estrada -  mL/hr at 09/15/22 1053 320 mg at 09/15/22 1053    leucovorin calcium (WELLCOVORIN) 850 mg in dextrose 5 % 250 mL IVPB  400 mg/m2 (Treatment Plan Recorded) IntraVENous Once LUCY Estrada -  mL/hr at 09/15/22 1103 850 mg at 09/15/22 1103    fluorouracil (ADRUCIL) chemo syringe 850 mg  400 mg/m2 (Treatment Plan Recorded) IntraVENous Once LUCY Estrada - HEATHER        fluorouracil (ADRUCIL) 5,000 mg in sodium chloride 0.9 % 230 mL chemo elastomeric infusion  5,000 mg IntraVENous Once LUCY Estrada - CNP        sodium chloride flush 0.9 % injection 5-40 mL  5-40 mL IntraVENous PRN LUCY Estrada - CNP        sodium chloride (PF) 0.9 % injection 5-40 mL  5-40 mL IntraVENous PRN Zay Hogan, APRN - CNP        0.9 % sodium chloride infusion  5-250 mL/hr IntraVENous PRN Zay Hogan, APRN - CNP        heparin flush 100 UNIT/ML injection 500 Units  500 Units IntraCATHeter PRN Zay Hogan, LUCY - CNP        alteplase (CATHFLO) 2 mg in sterile water 2 mL injection  2 mg IntraCATHeter PRN Zay Hogan, APRN - CNP           No flowsheet data found.     OBJECTIVE:  /66 (Site: Left Upper Arm, Position: Standing, Cuff Size: Large Adult)   Pulse 82   Temp 98.1 °F (36.7 °C) (Oral)   Resp 14   Ht 5' 10\" (1.778 m)   Wt 194 lb 6.4 oz (88.2 kg)   SpO2 97%   BMI 27.89 kg/m²       ECOG PERFORMANCE STATUS - 1- Restricted in physically strenuous activity but ambulatory and able to carry out work of a light or sedentary nature such as light house work, office work. Pain - 0 - No pain/10. None/Minimal pain - not affecting QOL     Fatigue - No flowsheet data found. Distress - No flowsheet data found. Physical Exam  Vitals reviewed. Exam conducted with a chaperone present. Constitutional:       General: He is not in acute distress. Appearance: Normal appearance. He is normal weight. He is not ill-appearing or toxic-appearing. HENT:      Head: Normocephalic and atraumatic. Nose: Nose normal. No congestion. Mouth/Throat:      Mouth: Mucous membranes are moist.   Eyes:      General: No scleral icterus. Conjunctiva/sclera: Conjunctivae normal.   Cardiovascular:      Rate and Rhythm: Normal rate and regular rhythm. Heart sounds: No murmur heard. Pulmonary:      Effort: Pulmonary effort is normal. No respiratory distress. Breath sounds: Normal breath sounds. No wheezing, rhonchi or rales. Abdominal:      General: There is no distension. Palpations: Abdomen is soft. There is no mass. Tenderness: There is no abdominal tenderness. There is no guarding or rebound. Musculoskeletal:         General: Normal range of motion. Cervical back: Normal range of motion. No rigidity. Right lower leg: No edema. Left lower leg: No edema. Skin:     General: Skin is warm and dry. Coloration: Skin is not jaundiced or pale. Findings: No bruising or rash. Neurological:      General: No focal deficit present. Mental Status: He is alert and oriented to person, place, and time. Motor: No weakness. Coordination: Coordination normal.      Gait: Gait normal.   Psychiatric:         Behavior: Behavior normal.         Thought Content:  Thought content normal.        Labs:  Recent Results (from the past 168 hour(s)) CBC with Auto Differential    Collection Time: 09/15/22  8:24 AM   Result Value Ref Range    WBC 4.3 4.3 - 11.1 K/uL    RBC 3.58 (L) 4.23 - 5.6 M/uL    Hemoglobin 10.3 (L) 13.6 - 17.2 g/dL    Hematocrit 32.3 %    MCV 90.2 79.6 - 97.8 FL    MCH 28.8 26.1 - 32.9 PG    MCHC 31.9 31.4 - 35.0 g/dL    RDW 15.8 (H) 11.9 - 14.6 %    Platelets 627 052 - 066 K/uL    MPV 10.2 9.4 - 12.3 FL    nRBC 0.00 0.0 - 0.2 K/uL    Differential Type AUTOMATED      Seg Neutrophils 45 43 - 78 %    Lymphocytes 39 13 - 44 %    Monocytes 14 (H) 4.0 - 12.0 %    Eosinophils % 1 0.5 - 7.8 %    Basophils 1 0.0 - 2.0 %    Immature Granulocytes 0 0.0 - 5.0 %    Segs Absolute 1.9 1.7 - 8.2 K/UL    Absolute Lymph # 1.7 0.5 - 4.6 K/UL    Absolute Mono # 0.6 0.1 - 1.3 K/UL    Absolute Eos # 0.1 0.0 - 0.8 K/UL    Basophils Absolute 0.0 0.0 - 0.2 K/UL    Absolute Immature Granulocyte 0.0 0.0 - 0.5 K/UL   Comprehensive Metabolic Panel    Collection Time: 09/15/22  8:24 AM   Result Value Ref Range    Sodium 139 136 - 145 mmol/L    Potassium 4.0 3.5 - 5.1 mmol/L    Chloride 107 101 - 110 mmol/L    CO2 25 21 - 32 mmol/L    Anion Gap 7 4 - 13 mmol/L    Glucose 143 (H) 65 - 100 mg/dL    BUN 15 8 - 23 MG/DL    Creatinine 0.70 (L) 0.8 - 1.5 MG/DL    GFR African American >60 >60 ml/min/1.73m2    GFR Non- >60 >60 ml/min/1.73m2    Calcium 8.5 8.3 - 10.4 MG/DL    Total Bilirubin 0.2 0.2 - 1.1 MG/DL    ALT 37 12 - 65 U/L    AST 20 15 - 37 U/L    Alk Phosphatase 102 50 - 136 U/L    Total Protein 6.2 (L) 6.3 - 8.2 g/dL    Albumin 2.9 (L) 3.2 - 4.6 g/dL    Globulin 3.3 2.3 - 3.5 g/dL    Albumin/Globulin Ratio 0.9 (L) 1.2 - 3.5     Magnesium    Collection Time: 09/15/22  8:24 AM   Result Value Ref Range    Magnesium 2.2 1.8 - 2.4 mg/dL   CEA    Collection Time: 09/15/22  8:24 AM   Result Value Ref Range    CEA 2.0 0.0 - 3.0 ng/mL       Imaging: reviewed     PATHOLOGY:             6/2022         ASSESSMENT:     Diagnosis Orders   1.  Malignant neoplasm of stomach, unspecified location ([facility] [address]  CBC with Auto Differential    Comprehensive Metabolic Panel    Magnesium    CT CHEST ABDOMEN PELVIS W CONTRAST    CBC With Auto Differential    Comprehensive metabolic panel    DISCONTINUED: dextrose 5 % solution    DISCONTINUED: fosaprepitant (EMEND) 150 mg in sodium chloride 0.9 % 150 mL IVPB    DISCONTINUED: dexamethasone (DECADRON) 12 mg in sodium chloride 0.9 % 50 mL IVPB    DISCONTINUED: ondansetron (ZOFRAN) injection 8 mg    DISCONTINUED: atropine injection 0.4 mg    DISCONTINUED: oxaliplatin (ELOXATIN) 180 mg in dextrose 5 % 250 mL chemo IVPB    DISCONTINUED: irinotecan (CAMPTOSAR) 320 mg in dextrose 5 % 250 mL chemo IVPB    DISCONTINUED: leucovorin calcium (WELLCOVORIN) 850 mg in dextrose 5 % 250 mL IVPB    DISCONTINUED: fluorouracil (ADRUCIL) chemo syringe 850 mg    DISCONTINUED: fluorouracil (ADRUCIL) 5,050 mg in sodium chloride 0.9 % 230 mL chemo elastomeric infusion    DISCONTINUED: sodium chloride flush 0.9 % injection 5-40 mL    DISCONTINUED: sodium chloride (PF) 0.9 % injection 5-40 mL    DISCONTINUED: 0.9 % sodium chloride infusion    DISCONTINUED: heparin flush 100 UNIT/ML injection 500 Units    DISCONTINUED: alteplase (CATHFLO) 2 mg in sterile water 2 mL injection      2.  [facility]  CT CHEST ABDOMEN PELVIS W CONTRAST    CBC With Auto Differential    Comprehensive metabolic panel    DISCONTINUED: dextrose 5 % solution    DISCONTINUED: fosaprepitant (EMEND) 150 mg in sodium chloride 0.9 % 150 mL IVPB    DISCONTINUED: dexamethasone (DECADRON) 12 mg in sodium chloride 0.9 % 50 mL IVPB    DISCONTINUED: ondansetron (ZOFRAN) injection 8 mg    DISCONTINUED: atropine injection 0.4 mg    DISCONTINUED: oxaliplatin (ELOXATIN) 180 mg in dextrose 5 % 250 mL chemo IVPB    DISCONTINUED: irinotecan (CAMPTOSAR) 320 mg in dextrose 5 % 250 mL chemo IVPB    DISCONTINUED: leucovorin calcium (WELLCOVORIN) 850 mg in dextrose 5 % 250 mL IVPB    DISCONTINUED: fluorouracil (ADRUCIL) chemo syringe 850 mg    DISCONTINUED: fluorouracil (ADRUCIL) 5,050 mg in sodium chloride 0.9 % 230 mL chemo elastomeric infusion    DISCONTINUED: sodium chloride flush 0.9 % injection 5-40 mL    DISCONTINUED: sodium chloride (PF) 0.9 % injection 5-40 mL    DISCONTINUED: 0.9 % sodium chloride infusion    DISCONTINUED: heparin flush 100 UNIT/ML injection 500 Units    DISCONTINUED: alteplase (CATHFLO) 2 mg in sterile water 2 mL injection      3. Abdominal carcinomatosis ([facility])  CBC With Auto Differential    Comprehensive metabolic panel    DISCONTINUED: dextrose 5 % solution    DISCONTINUED: fosaprepitant (EMEND) 150 mg in sodium chloride 0.9 % 150 mL IVPB    DISCONTINUED: dexamethasone (DECADRON) 12 mg in sodium chloride 0.9 % 50 mL IVPB    DISCONTINUED: ondansetron (ZOFRAN) injection 8 mg    DISCONTINUED: atropine injection 0.4 mg    DISCONTINUED: oxaliplatin (ELOXATIN) 180 mg in dextrose 5 % 250 mL chemo IVPB    DISCONTINUED: irinotecan (CAMPTOSAR) 320 mg in dextrose 5 % 250 mL chemo IVPB    DISCONTINUED: leucovorin calcium (WELLCOVORIN) 850 mg in dextrose 5 % 250 mL IVPB    DISCONTINUED: fluorouracil (ADRUCIL) chemo syringe 850 mg    DISCONTINUED: fluorouracil (ADRUCIL) 5,050 mg in sodium chloride 0.9 % 230 mL chemo elastomeric infusion    DISCONTINUED: sodium chloride flush 0.9 % injection 5-40 mL    DISCONTINUED: sodium chloride (PF) 0.9 % injection 5-40 mL    DISCONTINUED: 0.9 % sodium chloride infusion    DISCONTINUED: heparin flush 100 UNIT/ML injection 500 Units    DISCONTINUED: alteplase (CATHFLO) 2 mg in sterile water 2 mL injection            Mr. Agustin Andujar is here for FU of metastatic adenocarcinoma. 1. Metastatic adenocarcinoma   - s/p omental and mesenteric mass bx - c/w upper GI adenocarcinoma    - hx of diverticular dz - s/p previous bowel resection by dr Solomon Chung at [address] [place] [address] pending     - here cycle 7 FOLFIRINOX - labs reviewed and proceed. - nutrition - G-tube out.   He is being weaned off TPN as his oral intake has greatly improved, weight up 11# in past 2 weeks    - pain - Fentanyl 25mcg with prn oxycodone 10 mg, plans to decrease fentanyl to 12.5 mcg next week -PC following  - Plan for surveillance reviewed. Labs discussed. He wishes to continue on the current regimen until the next scan in October, at that time he may wish to proceed with unconventional scheduling of may be FOLFIRINOX every 3 weeks. He does understand inherent risks with alternate schedule and how this would affect progression of disease. - sclerotic lesion on imaging at S1 - bone scan recommended. - nausea - resolved. Has Zyprexa QHS (not taking currently) and Zofran PRN. - wt loss/FTT - secondary to disease and tx - on TPN and eating more    - depression/anxiety - better affect, making eye contact and joking today; restarted Lexapro   - constipation - Miralax as needed. - hypotension -  IVF 1000 mL every other day through home health, started Midodrine 5 mg BID; also tapering opioids   - Continue good oral nutrition and hydration.   - Encouraged frequent activity throughout the day and rest as needed to combat fatigue.   - Call with any fevers, uncontrolled side effects from treatment or any other worrisome/concerning symptoms. RTC fin 2 weeks for follow up and C8 or sooner if needed      MDM      Lab studies and imaging studies were personally reviewed. Pertinent old records were reviewed. Historical:    - here with his wife for consultation. During today's visit, we discussed his current workup. We looked at the images together demonstrating some of the findings concerning for peritoneal nodularity/peritoneal disease. Discussed anatomy of the findings  utilizing images to help pt understand what we are looking at and suspecting. He and wife were appreciative of the time spent to review these.   Discussed need for visual dx/tissue pathology to determine plan - recommend ex lap - refer to Dr Rafaela Thomas - personally  reviewed case with Dr Rafaela Thomas who will expedite the workup and will see pt Fri. US with mild biliary duct dilation - HIDA/ERCP reviewed by PCP   + BM/marlen; He did not like how IV morphine made him feel in the hospital.  He tried tramadol but found no relief and has been taking acetaminophen at home with minimal relief. Discussed different options and he settled on trying  Percocet - he will contact the office to inform us if this is working for him. We discussed SE - not to drive while on the med. Bowel regimen reviewed. He is tired of tests, frustrated. Feelings validated and stressed importance of workup to determine why he has pain. Wt loss from 240 --> 209 noted and expressed concern to pt about this. - completed course of Levaquin/Diflucan for klebsiella pneumoniae and citrobacter freundii both of which were sensitive to Levaquin found around G-tube site. Wishes for tube removal - reviewed risks of this during chemotherapy - I would like for him to have labs day before scheduled procedure to make sure his counts are adequate per above; case reviewed with Dr Kayleen Vickers by me via tel today     All questions were asked and answered to the best of my ability. The patient verbalized understanding and agrees with the plan above.           LUCY Bob - [address] [facility]  [address] [address]  Office : [phone]  Fax : [phone]

## 2022-09-15 NOTE — PROGRESS NOTES
Pt. Discharged ambulatory. Tolerated infusion well. No distress noted. To call physician with any problems or concerns. Understanding voiced. To return to Infusions on  9/17/22. Elastomeric pump in place and functioning properly.

## 2022-09-15 NOTE — PROGRESS NOTES
Labs drawn from port per protocol. Port was accessed by [place] RN yesterday per pt report. Port remains accessed for infusion appt today. Pt discharged from port lab ambulatory.

## 2022-09-17 ENCOUNTER — HOSPITAL ENCOUNTER (OUTPATIENT)
Dept: INFUSION THERAPY | Age: 62
Discharge: HOME | End: 2022-09-17
Payer: COMMERCIAL

## 2022-09-17 VITALS — RESPIRATION RATE: 18 BRPM | TEMPERATURE: 97.9 F | DIASTOLIC BLOOD PRESSURE: 78 MMHG | SYSTOLIC BLOOD PRESSURE: 118 MMHG

## 2022-09-17 DIAGNOSIS — C76.2 ABDOMINAL CARCINOMATOSIS (HCC): Primary | ICD-10-CM

## 2022-09-17 DIAGNOSIS — C78.6 PERITONEAL METASTASES (HCC): ICD-10-CM

## 2022-09-17 DIAGNOSIS — C16.9 MALIGNANT NEOPLASM OF STOMACH, UNSPECIFIED LOCATION (HCC): ICD-10-CM

## 2022-09-17 PROCEDURE — 2580000003 HC RX 258: Performed by: NURSE PRACTITIONER

## 2022-09-17 PROCEDURE — 96523 IRRIG DRUG DELIVERY DEVICE: CPT

## 2022-09-17 RX ORDER — SODIUM CHLORIDE 0.9 % (FLUSH) 0.9 %
5-40 SYRINGE (ML) INJECTION PRN
Status: DISCONTINUED | OUTPATIENT
Start: 2022-09-17 | End: 2022-09-18 | Stop reason: HOSPADM

## 2022-09-17 RX ADMIN — SODIUM CHLORIDE, PRESERVATIVE FREE 10 ML: 5 INJECTION INTRAVENOUS at 16:28

## 2022-09-30 ENCOUNTER — OFFICE VISIT (OUTPATIENT)
Dept: ONCOLOGY | Age: 62
End: 2022-09-30

## 2022-09-30 ENCOUNTER — OFFICE VISIT (OUTPATIENT)
Dept: ONCOLOGY | Age: 62
End: 2022-09-30
Payer: COMMERCIAL

## 2022-09-30 ENCOUNTER — HOSPITAL ENCOUNTER (OUTPATIENT)
Dept: INFUSION THERAPY | Age: 62
Discharge: HOME | End: 2022-09-30

## 2022-09-30 ENCOUNTER — HOSPITAL ENCOUNTER (OUTPATIENT)
Dept: INFUSION THERAPY | Age: 62
Discharge: HOME | End: 2022-09-30
Payer: COMMERCIAL

## 2022-09-30 ENCOUNTER — OFFICE VISIT (OUTPATIENT)
Dept: PALLATIVE CARE | Age: 62
End: 2022-09-30
Payer: COMMERCIAL

## 2022-09-30 VITALS
RESPIRATION RATE: 14 BRPM | OXYGEN SATURATION: 98 % | BODY MASS INDEX: 27.92 KG/M2 | HEART RATE: 83 BPM | HEIGHT: 70 IN | TEMPERATURE: 97.7 F | SYSTOLIC BLOOD PRESSURE: 92 MMHG | WEIGHT: 195 LBS | DIASTOLIC BLOOD PRESSURE: 68 MMHG

## 2022-09-30 DIAGNOSIS — Z51.5 ENCOUNTER FOR PALLIATIVE CARE: ICD-10-CM

## 2022-09-30 DIAGNOSIS — C76.2 ABDOMINAL CARCINOMATOSIS (HCC): ICD-10-CM

## 2022-09-30 DIAGNOSIS — Z00.8 NUTRITIONAL ASSESSMENT: Primary | ICD-10-CM

## 2022-09-30 DIAGNOSIS — R11.0 CHEMOTHERAPY-INDUCED NAUSEA: ICD-10-CM

## 2022-09-30 DIAGNOSIS — K59.03 THERAPEUTIC OPIOID INDUCED CONSTIPATION: ICD-10-CM

## 2022-09-30 DIAGNOSIS — C16.9 MALIGNANT NEOPLASM OF STOMACH, UNSPECIFIED LOCATION (HCC): Primary | ICD-10-CM

## 2022-09-30 DIAGNOSIS — C78.6 PERITONEAL METASTASES (HCC): ICD-10-CM

## 2022-09-30 DIAGNOSIS — C16.9 MALIGNANT NEOPLASM OF STOMACH, UNSPECIFIED LOCATION (HCC): ICD-10-CM

## 2022-09-30 DIAGNOSIS — T40.2X5A THERAPEUTIC OPIOID INDUCED CONSTIPATION: ICD-10-CM

## 2022-09-30 DIAGNOSIS — Z78.9 ON TOTAL PARENTERAL NUTRITION (TPN): ICD-10-CM

## 2022-09-30 DIAGNOSIS — T45.1X5A CHEMOTHERAPY-INDUCED NAUSEA: ICD-10-CM

## 2022-09-30 DIAGNOSIS — G89.3 CANCER ASSOCIATED PAIN: ICD-10-CM

## 2022-09-30 DIAGNOSIS — R53.83 FATIGUE DUE TO TREATMENT: ICD-10-CM

## 2022-09-30 DIAGNOSIS — G89.3 CANCER ASSOCIATED PAIN: Primary | ICD-10-CM

## 2022-09-30 DIAGNOSIS — C79.9 METASTATIC ADENOCARCINOMA (HCC): ICD-10-CM

## 2022-09-30 LAB
ALBUMIN SERPL-MCNC: 2.9 G/DL (ref 3.2–4.6)
ALBUMIN/GLOB SERPL: 0.9 {RATIO} (ref 1.2–3.5)
ALP SERPL-CCNC: 135 U/L (ref 50–136)
ALT SERPL-CCNC: 94 U/L (ref 12–65)
ANION GAP SERPL CALC-SCNC: 3 MMOL/L (ref 4–13)
AST SERPL-CCNC: 30 U/L (ref 15–37)
BASOPHILS # BLD: 0 K/UL (ref 0–0.2)
BASOPHILS NFR BLD: 1 % (ref 0–2)
BILIRUB SERPL-MCNC: 0.2 MG/DL (ref 0.2–1.1)
BUN SERPL-MCNC: 14 MG/DL (ref 8–23)
CALCIUM SERPL-MCNC: 8.6 MG/DL (ref 8.3–10.4)
CHLORIDE SERPL-SCNC: 107 MMOL/L (ref 101–110)
CO2 SERPL-SCNC: 25 MMOL/L (ref 21–32)
CREAT SERPL-MCNC: 0.7 MG/DL (ref 0.8–1.5)
DIFFERENTIAL METHOD BLD: ABNORMAL
EOSINOPHIL # BLD: 0.1 K/UL (ref 0–0.8)
EOSINOPHIL NFR BLD: 3 % (ref 0.5–7.8)
ERYTHROCYTE [DISTWIDTH] IN BLOOD BY AUTOMATED COUNT: 15.7 % (ref 11.9–14.6)
GLOBULIN SER CALC-MCNC: 3.4 G/DL (ref 2.3–3.5)
GLUCOSE SERPL-MCNC: 135 MG/DL (ref 65–100)
HCT VFR BLD AUTO: 33.1 %
HGB BLD-MCNC: 10.3 G/DL (ref 13.6–17.2)
IMM GRANULOCYTES # BLD AUTO: 0 K/UL (ref 0–0.5)
IMM GRANULOCYTES NFR BLD AUTO: 0 % (ref 0–5)
LYMPHOCYTES # BLD: 1.8 K/UL (ref 0.5–4.6)
LYMPHOCYTES NFR BLD: 63 % (ref 13–44)
MAGNESIUM SERPL-MCNC: 2.2 MG/DL (ref 1.8–2.4)
MCH RBC QN AUTO: 28.2 PG (ref 26.1–32.9)
MCHC RBC AUTO-ENTMCNC: 31.1 G/DL (ref 31.4–35)
MCV RBC AUTO: 90.7 FL (ref 79.6–97.8)
MONOCYTES # BLD: 0.4 K/UL (ref 0.1–1.3)
MONOCYTES NFR BLD: 14 % (ref 4–12)
NEUTS SEG # BLD: 0.5 K/UL (ref 1.7–8.2)
NEUTS SEG NFR BLD: 19 % (ref 43–78)
NRBC # BLD: 0 K/UL (ref 0–0.2)
PLATELET # BLD AUTO: 200 K/UL (ref 150–450)
PLATELET COMMENT: ADEQUATE
PMV BLD AUTO: 10.7 FL (ref 9.4–12.3)
POTASSIUM SERPL-SCNC: 3.9 MMOL/L (ref 3.5–5.1)
PROT SERPL-MCNC: 6.3 G/DL (ref 6.3–8.2)
RBC # BLD AUTO: 3.65 M/UL (ref 4.23–5.6)
RBC MORPH BLD: ABNORMAL
SODIUM SERPL-SCNC: 135 MMOL/L (ref 136–145)
WBC # BLD AUTO: 2.8 K/UL (ref 4.3–11.1)
WBC MORPH BLD: ABNORMAL

## 2022-09-30 PROCEDURE — 83735 ASSAY OF MAGNESIUM: CPT

## 2022-09-30 PROCEDURE — 80053 COMPREHEN METABOLIC PANEL: CPT

## 2022-09-30 PROCEDURE — 2580000003 HC RX 258: Performed by: INTERNAL MEDICINE

## 2022-09-30 PROCEDURE — 85025 COMPLETE CBC W/AUTO DIFF WBC: CPT

## 2022-09-30 PROCEDURE — 99214 OFFICE O/P EST MOD 30 MIN: CPT | Performed by: NURSE PRACTITIONER

## 2022-09-30 PROCEDURE — 99215 OFFICE O/P EST HI 40 MIN: CPT | Performed by: NURSE PRACTITIONER

## 2022-09-30 PROCEDURE — 36591 DRAW BLOOD OFF VENOUS DEVICE: CPT

## 2022-09-30 RX ORDER — SODIUM CHLORIDE 0.9 % (FLUSH) 0.9 %
10 SYRINGE (ML) INJECTION PRN
Status: DISCONTINUED | OUTPATIENT
Start: 2022-09-30 | End: 2022-10-01 | Stop reason: HOSPADM

## 2022-09-30 RX ORDER — FENTANYL 12 UG/H
1 PATCH TRANSDERMAL
Qty: 5 PATCH | Refills: 0 | Status: SHIPPED | OUTPATIENT
Start: 2022-09-30 | End: 2022-10-14

## 2022-09-30 RX ADMIN — SODIUM CHLORIDE, PRESERVATIVE FREE 10 ML: 5 INJECTION INTRAVENOUS at 09:05

## 2022-09-30 ASSESSMENT — ENCOUNTER SYMPTOMS
ALLERGIC/IMMUNOLOGIC NEGATIVE: 1
RESPIRATORY NEGATIVE: 1
BACK PAIN: 0
NAUSEA: 0
BLOOD IN STOOL: 0
DIARRHEA: 0
VOMITING: 0
COUGH: 0
ABDOMINAL DISTENTION: 0
ABDOMINAL PAIN: 1
SHORTNESS OF BREATH: 0
CONSTIPATION: 1
HEMOPTYSIS: 0
CONSTIPATION: 1
EYE PROBLEMS: 0
EYES NEGATIVE: 1
ABDOMINAL PAIN: 1
SORE THROAT: 0
NAUSEA: 0
SCLERAL ICTERUS: 0

## 2022-09-30 NOTE — PROGRESS NOTES
Outpatient Palliative Care at the  [facility]: Office Visit Established Patient    Diagnosis: adenocarcinoma of upper GI    Treatment Plan: mFOLFIRINOX    Treatment Intent: Palliative    Medical Oncologist: Dr. Rhianna Stone Oncologist: N/A    Navigator: Omega Carty RN      Chief Complaint:    Chief Complaint   Patient presents with    Abdominal Pain     History of Present Illness:  Mr. Evans is a 65 y.o. 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 Patient initially presented with 6 month history of abdominal pain and 30lb weight loss. A GI workup revealed hiatal hernia, gastric polyps, benign colon polyps, diverticulosis and hemorrhoids. He presented to [facility] ER on 5/12 and CT showed extensive peritoneal nodularity, concerning for metastatic disease, with consequential SBO. He had a laparotomy on 5/27 with venting G tube placement. Biopsies confirmed adenocarcinoma of upper GI primary. He started mFOLFIRINOX on 6/10/22 while hospitalized. Following cycle 2, patient was admitted for sepsis, source determined to be g tube insertion site. Interval History:  Patient seen in clinic in coordination with his office visit with Carolyn Fisher NP. Patient's wife is present with him. Patient is doing very well. Over the past few weeks since his last palliative care visit, patient has successfully weaned fentanyl from 75 mcg to 12 mcg. He started 12 mcg fentanyl patch on 9/21. Over the past week, he has only utilized oxycodone 1 time. See discussion below for opioid management. He has intermittent constipation, controlled with MiraLAX as needed. Patient only has nausea in the days following chemotherapy. He takes Zyprexa at bedtime, Compazine, and Zofran as needed. Patient has been able to go fishing, do yard work, and other things that improve his mood. He endorses limited endurance. He continues Lexapro 10 mg every morning. He is weaning TPN. Review of Systems:  Review of Systems   Constitutional:  Positive for fatigue. HENT: Negative. Eyes: Negative. Respiratory: Negative. Cardiovascular: Negative. Gastrointestinal:  Positive for abdominal pain and constipation. Negative for nausea. Genitourinary: Negative. Musculoskeletal: Negative. Skin: Negative. Allergic/Immunologic: Negative. Neurological: Negative. Psychiatric/Behavioral:  Positive for agitation and dysphoric mood.          On chemo days       No Known Allergies  Past Medical History:   Diagnosis Date    Bilateral carpal tunnel syndrome 12/9/2020    Chronic right shoulder pain 11/30/2020    Colon polyps 3/11/2013    Diverticulitis 3/11/2013    HTN (hypertension) 3/11/2013    Hyperlipidemia 3/11/2013    Paresthesia and pain of both upper extremities 11/30/2020    PUD (peptic ulcer disease) 3/11/2013    History of     Right elbow pain 12/9/2020    Wheezing 3/11/2013     Past Surgical History:   Procedure Laterality Date    COLONOSCOPY  2/25/09    colonoscopy per Dr. John Mcgovern with need for repeat every 3 years    COLONOSCOPY  02/25/2022    Dr. Ramiro Herrera; polyps of the ascending, transverse, descending, and sigmoid colons; internal hemorrhoid; diverticulosis    LAPAROSCOPY N/A 5/27/2022    LAPAROSCOPY DIAGNOSTIC , OPEN EXPLORATORY LAPAROTOMY, MASS EXCISION, G-TUBE PLACEMENT performed by Deja Wynne MD at [facility] MAIN OR    PORT SURGERY N/A 6/3/2022    PORT INSERTION performed by Deja Wynne MD at [address] [place], [place] [address]ISTED  October 2004    partial colon resection per Dr. Johnston Zamzam  02/25/2022    Dr. Ramiro Herrera; hiatal hernia gastric polyps     Family History   Problem Relation Age of Onset    No Known Problems Brother     Cancer Sister         breast    Hypertension Mother     Heart Disease Mother     Diabetes Father     Osteoarthritis Father     Alcohol Abuse Father     Hypertension Father     Diabetes Mother      Social History     Socioeconomic History    Marital status:      Spouse name: Not on file    Number of children: Not on file    Years of education: Not on file    Highest education level: Not on file   Occupational History    Not on file   Tobacco Use    Smoking status: Former     Packs/day: 1.00     Types: Cigarettes    Smokeless tobacco: Never   Substance and Sexual Activity    Alcohol use: No    Drug use: No    Sexual activity: Not on file   Other Topics Concern    Not on file   Social History Narrative    Not on file     Social Determinants of Health     Financial Resource Strain: Not on file   Food Insecurity: Not on file   Transportation Needs: Not on file   Physical Activity: Not on file   Stress: Not on file   Social Connections: Not on file   Intimate Partner Violence: Not on file   Housing Stability: Not on file     Current Outpatient Medications   Medication Sig Dispense Refill    oxyCODONE (ROXICODONE) 5 MG immediate release tablet Take 10 mg by mouth every 4 hours as needed. oxyCODONE (ROXICODONE INTENSOL) 100 MG/5ML concentrated solution Take 10 mg by mouth every 4 hours as needed for Pain. 1 mls under tongue      pantoprazole (PROTONIX) 40 MG tablet TAKE 1 TABLET BY MOUTH IN THE MORNING AND 1 TABLET IN THE EVENING. TAKE BEFORE MEALS. 180 tablet 0    escitalopram (LEXAPRO) 10 MG tablet Take 1 tablet by mouth in the morning. 30 tablet 5    midodrine (PROAMATINE) 5 MG tablet Take 1 tablet by mouth in the morning and 1 tablet before bedtime. Take 1 tab in the AM and 1 tab mid afternoon.  (Patient not taking: Reported on 9/1/2022) 60 tablet 3    dicyclomine (BENTYL) 20 MG tablet TAKE 1 TABLET BY MOUTH EVERY SIX (6) HOURS. 360 tablet 0    OLANZapine zydis (ZYPREXA) 5 MG disintegrating tablet Take 1 tablet by mouth nightly Do not take with promethazine 30 tablet 3    busPIRone (BUSPAR) 10 MG tablet Take 1 tablet by mouth 3 times daily (Patient not taking: Reported on 9/1/2022) 90 tablet 0    ondansetron (ZOFRAN-ODT) 8 MG TBDP disintegrating tablet Take 1 tablet by mouth every 8 hours 90 tablet 0    promethazine (PHENERGAN) 12.5 MG tablet Take 1 tablet by mouth every 6 hours as needed for Nausea 90 tablet 0    naloxone 4 MG/0.1ML LIQD nasal spray 1 spray by Nasal route as needed for Opioid Reversal  (Patient not taking: No sig reported)      dicyclomine (BENTYL) 10 MG capsule Take 20 mg by mouth every 6 hours  (Patient not taking: Reported on 9/1/2022)      polyethylene glycol (GLYCOLAX) 17 GM/SCOOP powder Take 17 g by mouth daily (Patient not taking: No sig reported)      rosuvastatin (CRESTOR) 10 MG tablet Take 10 mg by mouth      umeclidinium-vilanterol (ANORO ELLIPTA) 62.5-25 MCG/INH AEPB inhaler Inhale 1 puff into the lungs daily  (Patient not taking: Reported on 9/1/2022)       No current facility-administered medications for this visit. Facility-Administered Medications Ordered in Other Visits   Medication Dose Route Frequency Provider Last Rate Last Admin    sodium chloride flush 0.9 % injection 10 mL  10 mL IntraVENous PRN Ismael De Dios MD   10 mL at 09/30/22 0905       OBJECTIVE:  Wt Readings from Last 1 Encounters:   09/30/22 195 lb (88.5 kg)     Temp Readings from Last 1 Encounters:   09/30/22 97.7 °F (36.5 °C) (Oral)     BP Readings from Last 1 Encounters:   09/30/22 92/68     Pulse Readings from Last 1 Encounters:   09/30/22 83        Pain Score: Zero (fatigue-0)      Physical Exam:  Constitutional: Well developed, well nourished male in no acute distress. HEENT: Normocephalic and atraumatic. Pupils are equal, round, and reactive to light. Extraocular muscles are intact. Sclerae anicteric. Neck supple without JVD. Lymph node   deferred   Skin Warm and dry. No bruising and no rash noted. No erythema. No pallor. Respiratory Unlabored respiratory effort. CVS Regular rate, borderline hypotensive. Abdomen Soft, nontender and nondistended. Venting g tube site healed s/p removal.   Neuro Grossly nonfocal with no obvious sensory or motor deficits. MSK Normal range of motion in general.  No edema and no tenderness. Psych Flat affect. Labs:  Recent Results (from the past 24 hour(s))   CBC with Auto Differential    Collection Time: 09/30/22  9:05 AM   Result Value Ref Range    WBC 2.8 (L) 4.3 - 11.1 K/uL    RBC 3.65 (L) 4.23 - 5.6 M/uL    Hemoglobin 10.3 (L) 13.6 - 17.2 g/dL    Hematocrit 33.1 %    MCV 90.7 79.6 - 97.8 FL    MCH 28.2 26.1 - 32.9 PG    MCHC 31.1 (L) 31.4 - 35.0 g/dL    RDW 15.7 (H) 11.9 - 14.6 %    Platelets 302 486 - 739 K/uL    MPV 10.7 9.4 - 12.3 FL    nRBC 0.00 0.0 - 0.2 K/uL    Differential Type PENDING    Comprehensive Metabolic Panel    Collection Time: 09/30/22  9:05 AM   Result Value Ref Range    Sodium 135 (L) 136 - 145 mmol/L    Potassium 3.9 3.5 - 5.1 mmol/L    Chloride 107 101 - 110 mmol/L    CO2 25 21 - 32 mmol/L    Anion Gap 3 (L) 4 - 13 mmol/L    Glucose 135 (H) 65 - 100 mg/dL    BUN 14 8 - 23 MG/DL    Creatinine 0.70 (L) 0.8 - 1.5 MG/DL    GFR African American >60 >60 ml/min/1.73m2    GFR Non- >60 >60 ml/min/1.73m2    Calcium 8.6 8.3 - 10.4 MG/DL    Total Bilirubin 0.2 0.2 - 1.1 MG/DL    ALT 94 (H) 12 - 65 U/L    AST 30 15 - 37 U/L    Alk Phosphatase 135 50 - 136 U/L    Total Protein 6.3 6.3 - 8.2 g/dL    Albumin 2.9 (L) 3.2 - 4.6 g/dL    Globulin 3.4 2.3 - 3.5 g/dL    Albumin/Globulin Ratio 0.9 (L) 1.2 - 3.5     Magnesium    Collection Time: 09/30/22  9:05 AM   Result Value Ref Range    Magnesium 2.2 1.8 - 2.4 mg/dL       Imaging:  No results found for this or any previous visit. ASSESSMENT:   Diagnosis Orders   1. Cancer associated pain        2. Chemotherapy-induced nausea        3. Therapeutic opioid induced constipation        4. Metastatic adenocarcinoma ([facility]        5.  Encounter for palliative care              PLAN:  Lab studies and imaging studies were personally reviewed. Pertinent old records were reviewed from [facility]. 1. Abdominal pain: Patient will remove fentanyl 12mcg patch on 10/2/22. Counseled on side effects of opioid withdraw and instructed patient to take 10mg of oxycodone as needed. He has oxycodone solution 100mg/5mL. For pain, he will take 10-20mg/0.5-1mL. Patient is looking forward to driving more, feeling more independent and contributing more. 2. Nausea: Currently denies. He has mild nausea post chemo. He takes Zyprexa HS, Zofran, and Compazine all prn.    3.  Constipation: Continue MiraLAX daily as needed. 4. Dysphoric mood: He is frustrated on days he needs to come to the cancer center. Continue Lexapro at 10mg daily. Encouraged use of Buspar. Advanced Care Planning Discussed: Patient clarifies his intent and desire to continue chemotherapy. Although this is not where he wants to be and angry at times about his diagnosis, he certainly wants cancer-directed treatment. Will follow up in: 4 weeks or sooner if needed. I have reviewed the patient's controlled substance prescription history, as maintained in the [place] prescription monitoring program, so that the prescriptions(s) for a controlled substance can be given.   Last Date Reviewed: 9/30/22          LUCY Traylor - CNP  Outpatient Palliative Care at the  [facility]  [address] [facility] [address]  Office : [phone]  Fax : [phone]

## 2022-09-30 NOTE — PROGRESS NOTES
Patient arrived to port lab for port access and lab draw   [facility] Carcamo already accessed on 9/28/22 by home health RN.  Port flushed and labs drawn per protocol   *Port remains accessed infusion  Patient discharged from port lab ambulatory*

## 2022-09-30 NOTE — PROGRESS NOTES
[facility] Hematology and Oncology: Established patient - follow up     Chief Complaint   Patient presents with    Follow-up      Reason for Referral: Abdominal pain; Concern  for peritoneal metastatic disease   Referring Provider: Giuseppe Calle NP   Primary Care Provider: Jono Valdez MD   Family History of Cancer/Hematologic Disorders: Family history is significant for sister with breast cancer. Presenting Symptoms: Progressively worsening, persistent abdominal pain x several months    History of Present Illness:  Mr. Roldan  is a 62 y.o. male who presents today for FU regarding adenocarcinoma with peritoneal metastatic disease. The past medical history is significant for tobacco use (recent pack per day smoker x 30+ years - now down to 1/3PPD), PUD, paresthesia/pain of bilateral upper extremities, HLD, HTN, diverticulitis,  colon polyps, hiatal hernia, chronic right shoulder pain, bilateral carpal tunnel syndrome, and partial colon resection. He presented to the [facility] ED on 5/12/22 with a complaint of persistent abdominal pain for several months that was becoming progressively  worse. EGD and colonoscopy on 2/25/22 revealed findings of hiatal hernia, gastric polyps, several benign colon polyps, diverticulosis, and internal hemorrhoids. CT of the abdomen on 3/8/22 showed no acute findings. He presented to [facility] ER x 2 for  evaluation (5/3/22 and 5/10/22). RUQ abdominal ultrasound was completed on 5/3/22 in the ED at [facility] demonstrating no acute findings to explain patient's pain; probable hepatic  steatosis; small echogenic mural foci in the gallbladder which are nonmobile, likely representing cholesterol polyps, largest measuring 4 mm; and small volume simple ascites in the right upper quadrant and left lower quadrant, nonspecific.   CT AP with  contrast at [facility] ED 5/10/22 showed a partial small bowel obstruction; small to moderate amount of free fluid in the abdomen and pelvis; and nonspecific stranding of the omentum primarily in the left upper quadrant. Radiologist noted that follow-up  CT was recommended to differentiate passive congestion from omental disease. On 5/11/22, abdominal series again showed multiple dilated small bowel loops with enteric contrast appearing to extend into the proximal colon, suggesting partial small  bowel obstruction. CT AP in the [facility] ED on 5/12/22 identified extensive peritoneal nodularity and mild ascites consistent with peritoneal  metastatic disease with primary lesion not definitely identified; dilated fluid-filled loops of small bowel possibly related to gastroenteritis with no definite obstruction and no obvious bowel mass; and sclerosis of S1 vertebral body. Radiologist recommended consideration of follow-up bone scan to assess for bone metastases. Patient was admitted to  the Hospitalist service on 5/12/22, and IR consulted for possible paracentesis however very small volume was inaccessible. CT Chest obtained on 5/13/22 showed No evidence of primary malignancy or metastasis within the chest.  Follow-up hepatobiliary  scan was suggested to assess for common bile duct obstruction. Oncology was consulted but did not see patient inhouse as pt was dischared. Upon discharge on 5/14/22, patient was instructed to follow up with [facility]. During consultation, we discussed his workup. We looked at the images together demonstrating some of the findings concerning for peritoneal nodularity/peritoneal disease. Discussed anatomy of the findings utilizing images to help pt understand what we are looking at and suspecting. He and wife were appreciative of the time spent to review these. We also addressed pt's pain. We also reviewed images of sclerosing lesion - bone scan recommended. He also was recommended to undergo HIDA scan after recent US per PCP. He is tired of tests, frustrated.   Feelings validated and stressed importance of workup to determine why he has pain. Wt loss from 240 --> 209 noted and expressed concern to pt about this. Of note, prior akbar resection with Dr Alberto Henry for diverticulitis per pt. Sent note to dr Richmond Melendrez re pt's case to expedite workup with his agreement. He was hospitalized for abdominal pain and sepsis. He was empirically placed on vancomycin and cefepime. CT scanning disclosed no intra-abdominal fluid collection or abscess but did suggest that his tumor burden was somewhat smaller. He had some purulent drainage from around his G-tube. This was cultured and grew klebsiella pneumoniae and citrobacter freundii both of which were sensitive to Levaquin which he was discharged home on for 10 days. He returns for follow-up prior to cycle 8 FOLFIRINOX. He has been well overall since last seen. He has no nausea and He again tolerated treatment well. There is no nausea with use of anti-emetics/zyprexa prn and uses miralax prn for constipation. No mucositis. He is eating and drinking well, weight up 1#, continues to wean off TPN, only 3 days/week now. Fatigue is present, however he remains active. Cold sensitivity is present, but no true neuropathy. He has been on fentanyl 12 mcg patch since 9/21 and not needing breakthrough oxycodone. Denies any fevers or infectious symptoms. Chronological Events:   EGD REPORT 2/25/22                      COLONOSCOPY REPORT 2/25/22                 CT ABDOMEN PELVIS W CONTRAST 3/8/2022   FINDINGS:   LOWER CHEST: Unremarkable. ABDOMEN AND PELVIS:   Liver: Unremarkable. Biliary system: Unremarkable. Pancreas: Unremarkable. Spleen: Unremarkable. Adrenals: Unremarkable. Genitourinary: Unremarkable. Reproductive organs: Unremarkable. Gastrointestinal tract: Colonic diverticulosis without evidence of diverticulitis. There is no evidence of bowel obstruction or inflammation. The appendix is normal   Peritoneum/Extraperitoneum: Unremarkable.  No free fluid or air.   Vascular: Unremarkable. Musculoskeletal:  Small fat-containing umbilical hernia. Degenerative  changes of thoracolumbar spine. No acute or aggressive osseous lesion. IMPRESSION: Colonic diverticula without evidence of acute diverticulitis. RIGHT UPPER QUADRANT ABDOMINAL ULTRASOUND 5/3/2022    FINDINGS:   Pancreas: Not visualized, obscured by bowel gas. Intrahepatic IVC: Normal.   Main  Portal Vein: Patent with normal directional flow. Liver: Liver contour is normal. Liver appears diffusely increased in echogenicity consistent with hepatic steatosis. There is fatty sparing around the gallbladder fossa. Gallbladder: Gallbladder  is normal in size. No evidence of gallbladder wall thickening. No gallstones are seen. There are several nonmobile echogenic mural foci in the proximal gallbladder body/neck, largest measuring 4 mm, without shadowing, likely small cholesterol polyps. Bile ducts: No evidence of biliary ductal dilation. The common bile duct measures 3 mm in diameter. Right kidney: Normal.   Left Upper Quadrant: Limited images of the left upper quadrant are unremarkable. Spleen measures 12.1 cm in length. Free fluid: Trace simple free fluid in the right upper quadrant and left lower quadrant. IMPRESSION:    1. No acute findings to explain patient's pain. 2. Probable hepatic steatosis. 3. Small echogenic mural foci in the gallbladder which are nonmobile, likely representing cholesterol polyps, largest measuring 4 mm. There are no specific ultrasound  follow up recommendations for polyps of this small size. 4. Small volume simple ascites in the right upper quadrant and left lower quadrant, nonspecific.         CT ABDOMEN - PELVIS WITH CONTRAST 5/10/22   FINDINGS:   Liver: Normal    Portal Vein: Normal   Gallbladder - Biliary Tree: Normal   Pancreas: Normal   Spleen: Normal   Retroperitoneum:   Adrenals: Normal   Kidneys:  Normal    Aorto - Cava:  Calcification of the abdominal aorta without aneurysm formation. Lymphatics:  Normal   GI - Mesentery - Peritoneum: Multiple dilated mid small bowel loops without a focal transition point. The appendix is normal. Small to  moderate amount of free fluid in the pelvis and right flank. Nonspecific stranding of the omentum in the left upper quadrant. Small fatty umbilical hernia. Pelvis: Normal   Lower Chest: Normal   Soft tissues - MSK: Normal    IMPRESSION:   1. Partial small bowel obstruction. 2. Small to moderate amount of free fluid in the abdomen and pelvis. 3. Nonspecific stranding of the omentum primarily  in the left upper quadrant. Follow-up CT is recommended to differentiate passive congestion from omental a static disease. ABDOMEN SERIES 5/11/22   FINDINGS:   Chest: Heart size within normal limits. Lungs are clear. No pleural effusion or pneumothorax. Bowel: Multiple dilated small bowel loops with air-fluid levels on the upright view. Contrast distends small bowel loops in the left lateral abdomen and lower abdomen. Contrast in the right hemiabdomen appears to be within proximal colon. Scattered colonic  gas. Peritoneum / Retroperitoneum: No pneumoperitoneum. Soft tissues / Bones: No acute osseous findings. The contrast in urinary bladder. Lines / Support Apparatus: None. IMPRESSION: Redemonstrated of multiple dilated small bowel loops with enteric contrast appearing to extend into the proximal colon, suggesting only partial small bowel obstruction. Continued radiographic follow-up is advised. CT OF THE ABDOMEN AND PELVIS 5/12/22   FINDINGS:   LOWER CHEST: Normal.   HEPATOBILIARY: No evidence of focal liver mass. No calcified gallstones. PANCREAS: Normal.   SPLEEN: Normal.    ADRENAL GLANDS: Normal.    KIDNEYS/BLADDER: Kidneys and bladder are unremarkable. No hydronephrosis or urinary tract calculi. BOWEL: Dilated fluid-filled loops of small bowel are present throughout the abdomen.  No definite transition point. Oral contrast noted in the colon. No definite evidence of bowel mass but there is extensive peritoneal nodularity and trace ascites highly  concerning for peritoneal metastatic disease. LYMPH NODES: No enlarged retroperitoneal or pelvic lymph nodes. Peritoneal nodularity again noted most likely metastatic disease. VASCULATURE: Unremarkable. PELVIC ORGANS: Prostate gland and rectum are unremarkable. Small amount of free pelvic fluid is noted. MUSCULOSKELETAL: Degenerative spine changes are noted. Mild sclerosis involving the S1 vertebral body is present on image 74 of series 602. IMPRESSION   1. Extensive peritoneal nodularity and mild ascites consistent with peritoneal metastatic disease. Primary lesion not definitely identified. 2. Dilated fluid-filled loops of small bowel possibly related to gastroenteritis. No definite obstruction. No obvious bowel mass. 3. Sclerosis S1 vertebral body. Consider follow-up bone scan to assess for bone metastases. LIMITED ABDOMINAL ULTRASOUND 5/13/22   FINDINGS: A single limited gray scale image was submitted of an undisclosed location in the abdomen. Images demonstrate a small amount of  ascites as seen on comparison CT. IMPRESSION   1. Small volume ascites. CT CHEST WITH CONTRAST 5/13/22   FINDINGS:   Mediastinum and visualized thyroid: Normal.   Heart: Normal.    Large Vessels: Normal.    Pleura: Normal.    Lungs: No lung mass clearly discerned. Mild scarring or atelectasis in the right lung base. No suspicious lung nodule. No consolidation or zulma pulmonary edema. Airways: Normal.    Lymph nodes: Normal.    Bones/Soft tissues: Normal.    Visualized abdomen: Partially imaged omental nodules. Perihepatic ascites noted. IMPRESSION: No evidence of primary malignancy or metastasis within the chest       ABDOMINAL ULTRASOUND 5/17/22   FINDINGS:    LIVER: 17.3 cm. Slight increase in echogenicity without focal mass.    BILE DUCTS: No intrahepatic bile duct dilatation. CBD diameter = 8 mm. GALLBLADDER: It is unremarkable in appearance without stones or sludge. Echogenic polyp measures 0.5 cm. No gallbladder wall thickening. Mild ascites. PANCREAS: Normal.   SPLEEN: Borderline enlarged at 12.2 cm. RIGHT KIDNEY: 13.3 cm. No mass or hydronephrosis. LEFT KIDNEY: 14.3 cm. No mass or hydronephrosis. ABDOMINAL AORTA AND IVC: Normal in size. ASCITES: Mild   IMPRESSION: Possible mild fatty infiltration liver. No focal mass. Gallbladder polyp but no stones or gallbladder wall thickening. Mild ascites. Mildly prominent common bile duct. Follow-up hepatobiliary scan could be performed to assess for common bile duct obstruction. 04/02/2021 (COVID-19, Georgeana Crumble, Primary or Immunocompromised Series, MRNA, PF, 100mcg/0.5mL)   04/30/2021 (COVID-19, Georgeana Crumble, Primary or Immunocompromised Series, MRNA, PF, 100mcg/0.5mL)   11/16/2021 (COVID-19, Moderna Booster, PF, 0.25mL Dose)      5/18/22 heme/onc consultation   5/20/22 Dr Angel Presley consult - sent to ED for admission/workup   5/23/22 omental bx - IR   5/27/22 Dr Angel Presley - diagnostic lap, omental mass and mesentery mass excision, G-tube placement for venting  6/1/22 painful G-tube - tract recanalized and new G-tube placed   6/12/22 C1 FOLFIRINOX completed - dose adjusted   6/14/22 d/c   6/24/22 FU sx - G tube maint instructions/staples removed - RTC PRN   6/24/22 FU after hospitalization - discussed PC/plan for tx  7/8/22 FU - mainly concerned about PICC line without blood return, decline cathflo, seeing José Miguel Pham in [facility] on Monday. Will increase hydration at home. HH for TPN.    7/18/22 Follow up after hospitalization. He will complete course of Levaquin as prescribed for infection around G-tube. Would like to proceed with cycle 3 FOLFIRINOX with increased dosing. 8/4/22 Cycle 4 FOLFIRINOX - continue dose escalation.  He will complete course of Levaquin/Diflucan as prescribed for infection around G-tube, site looks good today. 8/17/22 C5 FOLFIRINOX - wishes to pw full dose accepting risks; wishes for G tube to be removed - will need to time with labs; plan to drop dose in opioids - PC seeing pt today. RD seeing pt. Plan for restaging in ~Oct.    9/1/22 FU FOLFIRINOX C5 full dose now; imaging in Oct   9/15/22 FU FOLFIRINOX C6-doing well, weight up 11#  9/30/22 FU FOLFIRINOX-C8 defer 1 week due to neutropenia, ANC 0.5    Family History   Problem Relation Age of Onset    No Known Problems Brother     Cancer Sister         breast    Hypertension Mother     Heart Disease Mother     Diabetes Father     Osteoarthritis Father     Alcohol Abuse Father     Hypertension Father     Diabetes Mother       Social History     Socioeconomic History    Marital status:      Spouse name: None    Number of children: None    Years of education: None    Highest education level: None   Tobacco Use    Smoking status: Former     Packs/day: 1.00     Types: Cigarettes    Smokeless tobacco: Never   Substance and Sexual Activity    Alcohol use: No    Drug use: No        Review of Systems   Constitutional:  Positive for fatigue. Negative for appetite change, diaphoresis, fever and unexpected weight change. HENT:   Negative for sore throat. Eyes:  Negative for eye problems and icterus. Respiratory:  Negative for cough, hemoptysis and shortness of breath. Cardiovascular:  Negative for chest pain, leg swelling and palpitations. Gastrointestinal:  Positive for abdominal pain (much improved) and constipation (controlled). Negative for abdominal distention, blood in stool, diarrhea, nausea and vomiting. Endocrine: Negative for hot flashes. Genitourinary:  Negative for dysuria. Musculoskeletal:  Negative for arthralgias, back pain and gait problem. Skin:  Negative for itching, rash and wound.    Neurological:  Negative for dizziness, extremity weakness, gait problem, headaches, light-headedness, numbness, seizures and speech difficulty. Hematological:  Negative for adenopathy. Does not bruise/bleed easily. Psychiatric/Behavioral:  Positive for depression. Negative for decreased concentration and sleep disturbance. The patient is nervous/anxious. No Known Allergies  Past Medical History:   Diagnosis Date    Bilateral carpal tunnel syndrome 12/9/2020    Chronic right shoulder pain 11/30/2020    Colon polyps 3/11/2013    Diverticulitis 3/11/2013    HTN (hypertension) 3/11/2013    Hyperlipidemia 3/11/2013    Paresthesia and pain of both upper extremities 11/30/2020    PUD (peptic ulcer disease) 3/11/2013    History of     Right elbow pain 12/9/2020    Wheezing 3/11/2013     Past Surgical History:   Procedure Laterality Date    COLONOSCOPY  2/25/09    colonoscopy per Dr. Staci Chavez with need for repeat every 3 years    COLONOSCOPY  02/25/2022    Dr. Ashley Shukla; polyps of the ascending, transverse, descending, and sigmoid colons; internal hemorrhoid; diverticulosis    LAPAROSCOPY N/A 5/27/2022    LAPAROSCOPY DIAGNOSTIC , OPEN EXPLORATORY LAPAROTOMY, MASS EXCISION, G-TUBE PLACEMENT performed by Kirstin Doll MD at [address] N/A 6/3/2022    PORT INSERTION performed by Kirstin Doll MD at [address] UNLISTED  October 2004    partial colon resection per Dr. Gudelia Avila  02/25/2022    Dr. Ashley Shukla; hiatal hernia gastric polyps     Current Outpatient Medications   Medication Sig Dispense Refill    oxyCODONE (ROXICODONE) 5 MG immediate release tablet Take 10 mg by mouth every 4 hours as needed. oxyCODONE (ROXICODONE INTENSOL) 100 MG/5ML concentrated solution Take 10 mg by mouth every 4 hours as needed for Pain. 1 mls under tongue      pantoprazole (PROTONIX) 40 MG tablet TAKE 1 TABLET BY MOUTH IN THE MORNING AND 1 TABLET IN THE EVENING. TAKE BEFORE MEALS. 180 tablet 0    escitalopram (LEXAPRO) 10 MG tablet Take 1 tablet by mouth in the morning.  27 tablet 5    dicyclomine (BENTYL) 20 MG tablet TAKE 1 TABLET BY MOUTH EVERY SIX (6) HOURS. 360 tablet 0    OLANZapine zydis (ZYPREXA) 5 MG disintegrating tablet Take 1 tablet by mouth nightly Do not take with promethazine 30 tablet 3    ondansetron (ZOFRAN-ODT) 8 MG TBDP disintegrating tablet Take 1 tablet by mouth every 8 hours 90 tablet 0    promethazine (PHENERGAN) 12.5 MG tablet Take 1 tablet by mouth every 6 hours as needed for Nausea 90 tablet 0    rosuvastatin (CRESTOR) 10 MG tablet Take 10 mg by mouth      fentaNYL (DURAGESIC) 12 MCG/HR Place 1 patch onto the skin every 3 days for 15 days. Intended supply: 30 days 5 patch 0    midodrine (PROAMATINE) 5 MG tablet Take 1 tablet by mouth in the morning and 1 tablet before bedtime. Take 1 tab in the AM and 1 tab mid afternoon. (Patient not taking: Reported on 9/1/2022) 60 tablet 3    busPIRone (BUSPAR) 10 MG tablet Take 1 tablet by mouth 3 times daily (Patient not taking: Reported on 9/1/2022) 90 tablet 0    naloxone 4 MG/0.1ML LIQD nasal spray 1 spray by Nasal route as needed for Opioid Reversal  (Patient not taking: No sig reported)      dicyclomine (BENTYL) 10 MG capsule Take 20 mg by mouth every 6 hours  (Patient not taking: Reported on 9/1/2022)      polyethylene glycol (GLYCOLAX) 17 GM/SCOOP powder Take 17 g by mouth daily (Patient not taking: No sig reported)      umeclidinium-vilanterol (ANORO ELLIPTA) 62.5-25 MCG/INH AEPB inhaler Inhale 1 puff into the lungs daily  (Patient not taking: Reported on 9/1/2022)       No current facility-administered medications for this visit. Facility-Administered Medications Ordered in Other Visits   Medication Dose Route Frequency Provider Last Rate Last Admin    sodium chloride flush 0.9 % injection 10 mL  10 mL IntraVENous PRN Mirna Chavarria MD   10 mL at 09/30/22 0905       No flowsheet data found.     OBJECTIVE:  BP 92/68 (Site: Left Upper Arm, Position: Standing, Cuff Size: Large Adult)   Pulse 83   Temp 97.7 °F (36.5 °C) (Oral)   Resp 14   Ht 5' 10\" (1.778 m)   Wt 195 lb (88.5 kg)   SpO2 98%   BMI 27.98 kg/m²       ECOG PERFORMANCE STATUS - 1- Restricted in physically strenuous activity but ambulatory and able to carry out work of a light or sedentary nature such as light house work, office work. Pain - 0 - No pain/10. None/Minimal pain - not affecting QOL     Fatigue - No flowsheet data found. Distress - No flowsheet data found. Physical Exam  Vitals reviewed. Exam conducted with a chaperone present. Constitutional:       General: He is not in acute distress. Appearance: Normal appearance. He is normal weight. He is not ill-appearing or toxic-appearing. HENT:      Head: Normocephalic and atraumatic. Nose: Nose normal. No congestion. Mouth/Throat:      Mouth: Mucous membranes are moist.   Eyes:      General: No scleral icterus. Conjunctiva/sclera: Conjunctivae normal.   Cardiovascular:      Rate and Rhythm: Normal rate and regular rhythm. Heart sounds: No murmur heard. Pulmonary:      Effort: Pulmonary effort is normal. No respiratory distress. Breath sounds: Normal breath sounds. No wheezing, rhonchi or rales. Abdominal:      General: There is no distension. Palpations: Abdomen is soft. There is no mass. Tenderness: There is no abdominal tenderness. There is no guarding or rebound. Musculoskeletal:         General: Normal range of motion. Cervical back: Normal range of motion. No rigidity. Right lower leg: No edema. Left lower leg: No edema. Skin:     General: Skin is warm and dry. Coloration: Skin is not jaundiced or pale. Findings: No bruising or rash. Neurological:      General: No focal deficit present. Mental Status: He is alert and oriented to person, place, and time. Motor: No weakness.       Coordination: Coordination normal.      Gait: Gait normal.   Psychiatric:         Behavior: Behavior normal.         Thought Content:  Thought content normal.        Labs:  Recent Results (from the past 168 hour(s))   CBC with Auto Differential    Collection Time: 09/30/22  9:05 AM   Result Value Ref Range    WBC 2.8 (L) 4.3 - 11.1 K/uL    RBC 3.65 (L) 4.23 - 5.6 M/uL    Hemoglobin 10.3 (L) 13.6 - 17.2 g/dL    Hematocrit 33.1 %    MCV 90.7 79.6 - 97.8 FL    MCH 28.2 26.1 - 32.9 PG    MCHC 31.1 (L) 31.4 - 35.0 g/dL    RDW 15.7 (H) 11.9 - 14.6 %    Platelets 498 110 - 562 K/uL    MPV 10.7 9.4 - 12.3 FL    nRBC 0.00 0.0 - 0.2 K/uL    Seg Neutrophils 19 (L) 43 - 78 %    Lymphocytes 63 (H) 13 - 44 %    Monocytes 14 (H) 4.0 - 12.0 %    Eosinophils % 3 0.5 - 7.8 %    Basophils 1 0.0 - 2.0 %    Immature Granulocytes 0 0.0 - 5.0 %    Segs Absolute 0.5 (L) 1.7 - 8.2 K/UL    Absolute Lymph # 1.8 0.5 - 4.6 K/UL    Absolute Mono # 0.4 0.1 - 1.3 K/UL    Absolute Eos # 0.1 0.0 - 0.8 K/UL    Basophils Absolute 0.0 0.0 - 0.2 K/UL    Absolute Immature Granulocyte 0.0 0.0 - 0.5 K/UL    RBC Comment SLIGHT  ANISOCYTOSIS + POIKILOCYTOSIS        RBC Comment OCCASIONAL  OVALOCYTES        RBC Comment SLIGHT  POLYCHROMASIA        WBC Comment Result Confirmed By Smear      Platelet Comment ADEQUATE      Differential Type AUTOMATED     Comprehensive Metabolic Panel    Collection Time: 09/30/22  9:05 AM   Result Value Ref Range    Sodium 135 (L) 136 - 145 mmol/L    Potassium 3.9 3.5 - 5.1 mmol/L    Chloride 107 101 - 110 mmol/L    CO2 25 21 - 32 mmol/L    Anion Gap 3 (L) 4 - 13 mmol/L    Glucose 135 (H) 65 - 100 mg/dL    BUN 14 8 - 23 MG/DL    Creatinine 0.70 (L) 0.8 - 1.5 MG/DL    GFR African American >60 >60 ml/min/1.73m2    GFR Non- >60 >60 ml/min/1.73m2    Calcium 8.6 8.3 - 10.4 MG/DL    Total Bilirubin 0.2 0.2 - 1.1 MG/DL    ALT 94 (H) 12 - 65 U/L    AST 30 15 - 37 U/L    Alk Phosphatase 135 50 - 136 U/L    Total Protein 6.3 6.3 - 8.2 g/dL    Albumin 2.9 (L) 3.2 - 4.6 g/dL    Globulin 3.4 2.3 - 3.5 g/dL    Albumin/Globulin Ratio 0.9 (L) 1.2 - 3.5 Magnesium    Collection Time: 09/30/22  9:05 AM   Result Value Ref Range    Magnesium 2.2 1.8 - 2.4 mg/dL       Imaging: reviewed     PATHOLOGY:             6/2022         ASSESSMENT:     Diagnosis Orders   1. Malignant neoplasm of stomach, unspecified location ([facility] [address]        2. On total parenteral nutrition (TPN)        3. Peritoneal metastases ([facility] [address]        4. Abdominal carcinomatosis ([facility] [address]        5. Cancer associated pain        6. Fatigue due to treatment              Mr. Evans is here for FU of metastatic adenocarcinoma. 1. Metastatic adenocarcinoma   - s/p omental and mesenteric mass bx - c/w upper GI adenocarcinoma    - hx of diverticular dz - s/p previous bowel resection by dr Monica Suárez at [address] [place] [address] pending     - here cycle 8 FOLFIRINOX - labs reviewed-defer 1 week due to neutropenia. - nutrition - G-tube out. He is being weaned off TPN (3x week now) as his oral intake has greatly improved, weight up    - pain - Fentanyl 12mcg with prn oxycodone 10 mg, plans to stop patch on Nader 10/2 PC following  - Plan for surveillance reviewed. Labs discussed. He wishes to continue on the current regimen until the next scan in October, at that time he may wish to proceed with unconventional scheduling of may be FOLFIRINOX every 3 weeks. He does understand inherent risks with alternate schedule and how this would affect progression of disease. - sclerotic lesion on imaging at S[facility] - bone scan recommended. - nausea - resolved. Has Zyprexa QHS (not taking currently) and Zofran PRN. - wt loss/FTT - secondary to disease and tx - on TPN and eating more    - depression/anxiety - better affect, on lexapro 20mg daily, PC adding buspar prn  - constipation - Miralax as needed.    - hypotension -  Midodrine 5 mg BID  - Continue good oral nutrition and hydration.   - Encouraged frequent activity throughout the day and rest as needed to combat fatigue.   - Call with any fevers, uncontrolled side effects from treatment or any other worrisome/concerning symptoms. RTC in 1 week for labs/infusion or sooner if needed      MDM      Lab studies and imaging studies were personally reviewed. Pertinent old records were reviewed. Historical:    - here with his wife for consultation. During today's visit, we discussed his current workup. We looked at the images together demonstrating some of the findings concerning for peritoneal nodularity/peritoneal disease. Discussed anatomy of the findings  utilizing images to help pt understand what we are looking at and suspecting. He and wife were appreciative of the time spent to review these. Discussed need for visual dx/tissue pathology to determine plan - recommend ex lap - refer to Dr Zara Collins - personally  reviewed case with Dr Zara Collins who will expedite the workup and will see pt Fri. US with mild biliary duct dilation - HIDA/ERCP reviewed by PCP   + BM/flatus; He did not like how IV morphine made him feel in the hospital.  He tried tramadol but found no relief and has been taking acetaminophen at home with minimal relief. Discussed different options and he settled on trying  Percocet - he will contact the office to inform us if this is working for him. We discussed SE - not to drive while on the med. Bowel regimen reviewed. He is tired of tests, frustrated. Feelings validated and stressed importance of workup to determine why he has pain. Wt loss from 240 --> 209 noted and expressed concern to pt about this. - completed course of Levaquin/Diflucan for klebsiella pneumoniae and citrobacter freundii both of which were sensitive to Levaquin found around G-tube site. Wishes for tube removal - reviewed risks of this during chemotherapy - I would like for him to have labs day before scheduled procedure to make sure his counts are adequate per above; case reviewed with Dr Brandon Escamilla by me via tel today     All questions were asked and answered to the best of my ability.   The patient verbalized understanding and agrees with the plan above.           Gianna Cummins, APRN - [facility]  [address] [address]  Office : [phone]  Fax : [phone]

## 2022-10-03 NOTE — PROGRESS NOTES
Nutrition F/U:  Assessment:  Pt seen during office visit w/ NP, receiving his 8th dose of FOLFIRINOX, continues w/ a good appetite/po intake - eating 3 meals/day + snacks intermittently, TPN continues to be weaned, pt is more active, Fentanyl patch will be completed/discontinued on Sunday this week. Pt no longer taking extra 1 L NS every other day, has 1 extra bag to use prn this week as wife hold 2.6 L IVF that he was receiving on 5-FU pump days. Labs notable for Na (135), glucose (135), Albumin (2.9). Current BW: 195#, stable over the past 2 weeks. Intervention:  1. Continue w/ Regular diet, 3 meals/day + snacks   2. TPN per [facility] - continue weaning    Monitoring/Evaluation:  1. RD to follow up during next office visit - follow up wt status, tolerance/intake of po diet, symptom management.       [facility], [address] [facility]

## 2022-10-06 ENCOUNTER — TELEPHONE (OUTPATIENT)
Dept: FAMILY MEDICINE CLINIC | Facility: CLINIC | Age: 62
End: 2022-10-06

## 2022-10-06 DIAGNOSIS — K62.89 ANAL OR RECTAL PAIN: Primary | ICD-10-CM

## 2022-10-06 NOTE — TELEPHONE ENCOUNTER
Patient called in for an appointment but Dr Leos [facility] has nothing until Dec. Patient is having some rectal bleeding with severe pain when trying to use the restroom.  Patient has stated he has tried soaking in a tub with baking soda and salt and using  Over the counter cream prep H please advise patient

## 2022-10-06 NOTE — TELEPHONE ENCOUNTER
Patient called in for an appointment but Dr Lyric Valera has nothing until Dec. Patient is having some rectal bleeding with severe pain when trying to use the restroom.  Patient has stated he has tried soaking in a tub with baking soda and salt and using  Over the counter cream prep H please advise patient

## 2022-10-07 ENCOUNTER — TELEPHONE (OUTPATIENT)
Dept: FAMILY MEDICINE CLINIC | Facility: CLINIC | Age: 62
End: 2022-10-07

## 2022-10-07 RX ORDER — PRAMOXINE HYDROCHLORIDE 10 MG/G
AEROSOL, FOAM TOPICAL
Qty: 15 G | Refills: 1 | Status: SHIPPED | OUTPATIENT
Start: 2022-10-07 | End: 2022-10-14

## 2022-10-07 NOTE — TELEPHONE ENCOUNTER
I called pt and no answer , LM that Dr. Blessing Werner did send him in a RX and to call us if he has any questions or concerns

## 2022-10-07 NOTE — TELEPHONE ENCOUNTER
----- Message from Tiki Neff sent at 10/7/2022  2:52 PM EDT -----  Subject: Message to Provider    QUESTIONS  Information for Provider? Patient stated that he has changed physicians.  ---------------------------------------------------------------------------  --------------  [address]  [phone]; OK to leave message on voicemail  ---------------------------------------------------------------------------  --------------  SCRIPT ANSWERS  Relationship to Patient?  Self

## 2022-10-11 ASSESSMENT — ENCOUNTER SYMPTOMS
NAUSEA: 0
HEMOPTYSIS: 0
BACK PAIN: 0
COUGH: 0
EYE PROBLEMS: 0
DIARRHEA: 0
BLOOD IN STOOL: 0
SORE THROAT: 0
SHORTNESS OF BREATH: 0
ABDOMINAL DISTENTION: 0
SCLERAL ICTERUS: 0
CONSTIPATION: 1
VOMITING: 0

## 2022-10-11 NOTE — PROGRESS NOTES
[facility] Hematology and Oncology: Established patient - follow up     Chief Complaint   Patient presents with    Follow-up      Reason for Referral: Abdominal pain; peritoneal metastatic disease   Referring Provider: Radha Collazo NP   Primary Care Provider: Severiano Johnson MD   Family History of Cancer/Hematologic Disorders: Family history is significant for sister with breast cancer. Presenting Symptoms: Progressively worsening, persistent abdominal pain x several months    History of Present Illness:  Mr. MARIA [facility]  is a 62 y.o. male who presents today for FU regarding adenocarcinoma with peritoneal metastatic disease. The past medical history is significant for tobacco use (recent pack per day smoker x 30+ years - now down to 1/3PPD), PUD, paresthesia/pain of bilateral upper extremities, HLD, HTN, diverticulitis,  colon polyps, hiatal hernia, chronic right shoulder pain, bilateral carpal tunnel syndrome, and partial colon resection. He presented to the [facility] ED on 5/12/22 with a complaint of persistent abdominal pain for several months that was becoming progressively  worse. EGD and colonoscopy on 2/25/22 revealed findings of hiatal hernia, gastric polyps, several benign colon polyps, diverticulosis, and internal hemorrhoids. CT of the abdomen on 3/8/22 showed no acute findings. He presented to [facility] ER x 2 for  evaluation (5/3/22 and 5/10/22). RUQ abdominal ultrasound was completed on 5/3/22 in the ED at [facility] demonstrating no acute findings to explain patient's pain; probable hepatic  steatosis; small echogenic mural foci in the gallbladder which are nonmobile, likely representing cholesterol polyps, largest measuring 4 mm; and small volume simple ascites in the right upper quadrant and left lower quadrant, nonspecific.   CT AP with  contrast at [facility] ED 5/10/22 showed a partial small bowel obstruction; small to moderate amount of free fluid in the abdomen and pelvis; and nonspecific stranding of the omentum primarily in the left upper quadrant. Radiologist noted that follow-up  CT was recommended to differentiate passive congestion from omental disease. On 5/11/22, abdominal series again showed multiple dilated small bowel loops with enteric contrast appearing to extend into the proximal colon, suggesting partial small  bowel obstruction. CT AP in the [facility] ED on 5/12/22 identified extensive peritoneal nodularity and mild ascites consistent with peritoneal  metastatic disease with primary lesion not definitely identified; dilated fluid-filled loops of small bowel possibly related to gastroenteritis with no definite obstruction and no obvious bowel mass; and sclerosis of S1 vertebral body. Radiologist recommended consideration of follow-up bone scan to assess for bone metastases. Patient was admitted to  the Hospitalist service on 5/12/22, and IR consulted for possible paracentesis however very small volume was inaccessible. CT Chest obtained on 5/13/22 showed No evidence of primary malignancy or metastasis within the chest.  Follow-up hepatobiliary  scan was suggested to assess for common bile duct obstruction. Oncology was consulted but did not see patient inhouse as pt was dischared. Upon discharge on 5/14/22, patient was instructed to follow up with [facility]. During consultation, we discussed his workup. We looked at the images together demonstrating some of the findings concerning for peritoneal nodularity/peritoneal disease. Discussed anatomy of the findings utilizing images to help pt understand what we are looking at and suspecting. He and wife were appreciative of the time spent to review these. We also addressed pt's pain. We also reviewed images of sclerosing lesion - bone scan recommended. He also was recommended to undergo HIDA scan after recent US per PCP. He is tired of tests, frustrated. Feelings validated and stressed importance of workup to determine why he has pain.   Wt loss from 240 --> 209 noted and expressed concern to pt about this. Of note, prior akbar resection with  [facility] for diverticulitis per pt. Sent note to dr Linda Gray re pt's case to expedite workup with his agreement. He was hospitalized for abdominal pain and sepsis. He was empirically placed on vancomycin and cefepime. CT scanning disclosed no intra-abdominal fluid collection or abscess but did suggest that his tumor burden was somewhat smaller. He had some purulent drainage from around his G-tube. This was cultured and grew klebsiella pneumoniae and citrobacter freundii both of which were sensitive to Levaquin which he was discharged home on for 10 days. Today, he is here for follow-up prior to cycle 8 FOLFIRINOX. Counts were not adequate last time and treatment was held. He is here for reevaluation. Counts are adequate and he wishes to proceed with full dose treatment. CT with no evidence of progressive disease. He and his wife are very pleased with the results. He has been off pain medications completely. Last fentanyl patch was placed at the end of September and he has not needed to put another one on. He is also off Oxy. He has not been using any nausea medicines. I did advise him to use some with chemotherapy as nausea may worsen. TPN is being tapered and is currently at 2 times a week. Weight is up almost 10 pounds at 205 today and his appetite is great. He is advised to increase p.o. intake of fluids. Not dizzy. Able to drive and staying busy. he shared some pictures of his daughter and son today. No current signs and symptoms of infection. We did discuss that his disease is not curable and he VU but wife stated that he believes he can beat this. Chronological Events:   EGD REPORT 2/25/22                      COLONOSCOPY REPORT 2/25/22                 CT ABDOMEN PELVIS W CONTRAST 3/8/2022   FINDINGS:   LOWER CHEST: Unremarkable.    ABDOMEN AND PELVIS:   Liver: Unremarkable. Biliary system: Unremarkable. Pancreas: Unremarkable. Spleen: Unremarkable. Adrenals: Unremarkable. Genitourinary: Unremarkable. Reproductive organs: Unremarkable. Gastrointestinal tract: Colonic diverticulosis without evidence of diverticulitis. There is no evidence of bowel obstruction or inflammation. The appendix is normal   Peritoneum/Extraperitoneum: Unremarkable. No free fluid or air. Vascular: Unremarkable. Musculoskeletal:  Small fat-containing umbilical hernia. Degenerative  changes of thoracolumbar spine. No acute or aggressive osseous lesion. IMPRESSION: Colonic diverticula without evidence of acute diverticulitis. RIGHT UPPER QUADRANT ABDOMINAL ULTRASOUND 5/3/2022    FINDINGS:   Pancreas: Not visualized, obscured by bowel gas. Intrahepatic IVC: Normal.   Main  Portal Vein: Patent with normal directional flow. Liver: Liver contour is normal. Liver appears diffusely increased in echogenicity consistent with hepatic steatosis. There is fatty sparing around the gallbladder fossa. Gallbladder: Gallbladder  is normal in size. No evidence of gallbladder wall thickening. No gallstones are seen. There are several nonmobile echogenic mural foci in the proximal gallbladder body/neck, largest measuring 4 mm, without shadowing, likely small cholesterol polyps. Bile ducts: No evidence of biliary ductal dilation. The common bile duct measures 3 mm in diameter. Right kidney: Normal.   Left Upper Quadrant: Limited images of the left upper quadrant are unremarkable. Spleen measures 12.1 cm in length. Free fluid: Trace simple free fluid in the right upper quadrant and left lower quadrant. IMPRESSION:    1. No acute findings to explain patient's pain. 2. Probable hepatic steatosis. 3. Small echogenic mural foci in the gallbladder which are nonmobile, likely representing cholesterol polyps, largest measuring 4 mm.  There are no specific ultrasound  follow up recommendations for polyps of this small size. 4. Small volume simple ascites in the right upper quadrant and left lower quadrant, nonspecific. CT ABDOMEN - PELVIS WITH CONTRAST 5/10/22   FINDINGS:   Liver: Normal    Portal Vein: Normal   Gallbladder - Biliary Tree: Normal   Pancreas: Normal   Spleen: Normal   Retroperitoneum:   Adrenals: Normal   Kidneys:  Normal    Aorto - Cava:  Calcification of the abdominal aorta without aneurysm formation. Lymphatics:  Normal   GI - Mesentery - Peritoneum: Multiple dilated mid small bowel loops without a focal transition point. The appendix is normal. Small to  moderate amount of free fluid in the pelvis and right flank. Nonspecific stranding of the omentum in the left upper quadrant. Small fatty umbilical hernia. Pelvis: Normal   Lower Chest: Normal   Soft tissues - MSK: Normal    IMPRESSION:   1. Partial small bowel obstruction. 2. Small to moderate amount of free fluid in the abdomen and pelvis. 3. Nonspecific stranding of the omentum primarily  in the left upper quadrant. Follow-up CT is recommended to differentiate passive congestion from omental a static disease. ABDOMEN SERIES 5/11/22   FINDINGS:   Chest: Heart size within normal limits. Lungs are clear. No pleural effusion or pneumothorax. Bowel: Multiple dilated small bowel loops with air-fluid levels on the upright view. Contrast distends small bowel loops in the left lateral abdomen and lower abdomen. Contrast in the right hemiabdomen appears to be within proximal colon. Scattered colonic  gas. Peritoneum / Retroperitoneum: No pneumoperitoneum. Soft tissues / Bones: No acute osseous findings. The contrast in urinary bladder. Lines / Support Apparatus: None. IMPRESSION: Redemonstrated of multiple dilated small bowel loops with enteric contrast appearing to extend into the proximal colon, suggesting only partial small bowel obstruction. Continued radiographic follow-up is advised. CT OF THE ABDOMEN AND PELVIS 5/12/22   FINDINGS:   LOWER CHEST: Normal.   HEPATOBILIARY: No evidence of focal liver mass. No calcified gallstones. PANCREAS: Normal.   SPLEEN: Normal.    ADRENAL GLANDS: Normal.    KIDNEYS/BLADDER: Kidneys and bladder are unremarkable. No hydronephrosis or urinary tract calculi. BOWEL: Dilated fluid-filled loops of small bowel are present throughout the abdomen. No definite transition point. Oral contrast noted in the colon. No definite evidence of bowel mass but there is extensive peritoneal nodularity and trace ascites highly  concerning for peritoneal metastatic disease. LYMPH NODES: No enlarged retroperitoneal or pelvic lymph nodes. Peritoneal nodularity again noted most likely metastatic disease. VASCULATURE: Unremarkable. PELVIC ORGANS: Prostate gland and rectum are unremarkable. Small amount of free pelvic fluid is noted. MUSCULOSKELETAL: Degenerative spine changes are noted. Mild sclerosis involving the S1 vertebral body is present on image 74 of series 602. IMPRESSION   1. Extensive peritoneal nodularity and mild ascites consistent with peritoneal metastatic disease. Primary lesion not definitely identified. 2. Dilated fluid-filled loops of small bowel possibly related to gastroenteritis. No definite obstruction. No obvious bowel mass. 3. Sclerosis S1 vertebral body. Consider follow-up bone scan to assess for bone metastases. LIMITED ABDOMINAL ULTRASOUND 5/13/22   FINDINGS: A single limited gray scale image was submitted of an undisclosed location in the abdomen. Images demonstrate a small amount of  ascites as seen on comparison CT. IMPRESSION   1. Small volume ascites. CT CHEST WITH CONTRAST 5/13/22   FINDINGS:   Mediastinum and visualized thyroid: Normal.   Heart: Normal.    Large Vessels: Normal.    Pleura: Normal.    Lungs: No lung mass clearly discerned. Mild scarring or atelectasis in the right lung base.  No suspicious lung nodule. No consolidation or zulma pulmonary edema. Airways: Normal.    Lymph nodes: Normal.    Bones/Soft tissues: Normal.    Visualized abdomen: Partially imaged omental nodules. Perihepatic ascites noted. IMPRESSION: No evidence of primary malignancy or metastasis within the chest       ABDOMINAL ULTRASOUND 5/17/22   FINDINGS:    LIVER: 17.3 cm. Slight increase in echogenicity without focal mass. BILE DUCTS: No intrahepatic bile duct dilatation. CBD diameter = 8 mm. GALLBLADDER: It is unremarkable in appearance without stones or sludge. Echogenic polyp measures 0.5 cm. No gallbladder wall thickening. Mild ascites. PANCREAS: Normal.   SPLEEN: Borderline enlarged at 12.2 cm. RIGHT KIDNEY: 13.3 cm. No mass or hydronephrosis. LEFT KIDNEY: 14.3 cm. No mass or hydronephrosis. ABDOMINAL AORTA AND IVC: Normal in size. ASCITES: Mild   IMPRESSION: Possible mild fatty infiltration liver. No focal mass. Gallbladder polyp but no stones or gallbladder wall thickening. Mild ascites. Mildly prominent common bile duct. Follow-up hepatobiliary scan could be performed to assess for common bile duct obstruction.         04/02/2021 (COVID-19, Garth Early, Primary or Immunocompromised Series, MRNA, PF, 100mcg/0.5mL)   04/30/2021 (COVID-19, Garth Early, Primary or Immunocompromised Series, MRNA, PF, 100mcg/0.5mL)   11/16/2021 (COVID-19, Moderna Booster, PF, 0.25mL Dose)      5/18/22 heme/onc consultation   5/20/22 Dr Jr Gomez consult - sent to ED for admission/workup   5/23/22 omental bx - IR   5/27/22 Dr Jr Gomez - diagnostic lap, omental mass and mesentery mass excision, G-tube placement for venting  6/1/22 painful G-tube - tract recanalized and new G-tube placed   6/12/22 C1 FOLFIRINOX completed - dose adjusted   6/14/22 d/c   6/24/22 FU sx - G tube maint instructions/staples removed - RTC PRN   6/24/22 FU after hospitalization - discussed PC/plan for tx  7/8/22 FU - mainly concerned about PICC line without blood return, decline melissa, seeing José Miguel Pham in [facility] on Monday. Will increase hydration at home. HH for TPN.    7/18/22 Follow up after hospitalization. He will complete course of Levaquin as prescribed for infection around G-tube. Would like to proceed with cycle 3 FOLFIRINOX with increased dosing. 8/4/22 Cycle 4 FOLFIRINOX - continue dose escalation. He will complete course of Levaquin/Diflucan as prescribed for infection around G-tube, site looks good today. 8/17/22 C5 FOLFIRINOX - wishes to pw full dose accepting risks; wishes for G tube to be removed - will need to time with labs; plan to drop dose in opioids - PC seeing pt today. RD seeing pt. Plan for restaging in ~Oct.    9/1/22 FU FOLFIRINOX C5 full dose now; imaging in Oct   9/15/22 FU FOLFIRINOX C6-doing well, weight up 11#  9/30/22 FU FOLFIRINOX-C8 defer 1 week due to neutropenia, ANC 0.5  10/13/22 FU FOLFIRINOX 8; CT CAP reviewed and no POD     Family History   Problem Relation Age of Onset    No Known Problems Brother     Cancer Sister         breast    Hypertension Mother     Heart Disease Mother     Diabetes Father     Osteoarthritis Father     Alcohol Abuse Father     Hypertension Father     Diabetes Mother       Social History     Socioeconomic History    Marital status:      Spouse name: None    Number of children: None    Years of education: None    Highest education level: None   Tobacco Use    Smoking status: Former     Packs/day: 1.00     Types: Cigarettes    Smokeless tobacco: Never   Substance and Sexual Activity    Alcohol use: No    Drug use: No        Review of Systems   Constitutional:  Negative for appetite change, diaphoresis, fatigue, fever and unexpected weight change. HENT:   Negative for sore throat. Eyes:  Negative for eye problems and icterus. Respiratory:  Negative for cough, hemoptysis and shortness of breath. Cardiovascular:  Negative for chest pain, leg swelling and palpitations.    Gastrointestinal: Positive for constipation (controlled). Negative for abdominal distention, abdominal pain, blood in stool, diarrhea, nausea and vomiting. Endocrine: Negative for hot flashes. Genitourinary:  Negative for dysuria. Musculoskeletal:  Negative for arthralgias, back pain and gait problem. Skin:  Negative for itching, rash and wound. Neurological:  Negative for dizziness, extremity weakness, gait problem, headaches, light-headedness, numbness, seizures and speech difficulty. Hematological:  Negative for adenopathy. Does not bruise/bleed easily. Psychiatric/Behavioral:  Positive for depression (better). Negative for decreased concentration and sleep disturbance. The patient is nervous/anxious (better).        No Known Allergies  Past Medical History:   Diagnosis Date    Bilateral carpal tunnel syndrome 12/9/2020    Chronic right shoulder pain 11/30/2020    Colon polyps 3/11/2013    Diverticulitis 3/11/2013    HTN (hypertension) 3/11/2013    Hyperlipidemia 3/11/2013    Paresthesia and pain of both upper extremities 11/30/2020    PUD (peptic ulcer disease) 3/11/2013    History of     Right elbow pain 12/9/2020    Wheezing 3/11/2013     Past Surgical History:   Procedure Laterality Date    COLONOSCOPY  2/25/09    colonoscopy per Dr. Fagan Files with need for repeat every 3 years    COLONOSCOPY  02/25/2022    Dr. Vinay Romeo; polyps of the ascending, transverse, descending, and sigmoid colons; internal hemorrhoid; diverticulosis    LAPAROSCOPY N/A 5/27/2022    LAPAROSCOPY DIAGNOSTIC , OPEN EXPLORATORY LAPAROTOMY, MASS EXCISION, G-TUBE PLACEMENT performed by Julia Flynn MD at [address] N/A 6/3/2022    PORT INSERTION performed by Julia Flynn MD at [address] UNLISTED  October 2004    partial colon resection per Dr. Srinivasan Marquez  02/25/2022    Dr. Vinay Romeo; hiatal hernia gastric polyps     Current Outpatient Medications   Medication Sig Dispense Refill Multiple Vitamins-Minerals (MULTIVITAMIN MEN 50+ PO) Take by mouth      pantoprazole (PROTONIX) 40 MG tablet TAKE 1 TABLET BY MOUTH IN THE MORNING AND 1 TABLET IN THE EVENING. TAKE BEFORE MEALS. 180 tablet 0    escitalopram (LEXAPRO) 10 MG tablet Take 1 tablet by mouth in the morning. 30 tablet 5    dicyclomine (BENTYL) 20 MG tablet TAKE 1 TABLET BY MOUTH EVERY SIX (6) HOURS. 360 tablet 0    polyethylene glycol (GLYCOLAX) 17 GM/SCOOP powder Take 17 g by mouth daily      rosuvastatin (CRESTOR) 10 MG tablet Take 10 mg by mouth      pramoxine HCl 1 % foam Apply to area of rectal pain every 8 hours as needed (Patient not taking: Reported on 10/13/2022) 15 g 1    fentaNYL (DURAGESIC) 12 MCG/HR Place 1 patch onto the skin every 3 days for 15 days. Intended supply: 30 days (Patient not taking: Reported on 10/13/2022) 5 patch 0    oxyCODONE (ROXICODONE) 5 MG immediate release tablet Take 10 mg by mouth every 4 hours as needed. (Patient not taking: Reported on 10/13/2022)      oxyCODONE (ROXICODONE INTENSOL) 100 MG/5ML concentrated solution Take 10 mg by mouth every 4 hours as needed for Pain. 1 mls under tongue (Patient not taking: Reported on 10/13/2022)      midodrine (PROAMATINE) 5 MG tablet Take 1 tablet by mouth in the morning and 1 tablet before bedtime. Take 1 tab in the AM and 1 tab mid afternoon.  (Patient not taking: No sig reported) 60 tablet 3    OLANZapine zydis (ZYPREXA) 5 MG disintegrating tablet Take 1 tablet by mouth nightly Do not take with promethazine (Patient not taking: Reported on 10/13/2022) 30 tablet 3    busPIRone (BUSPAR) 10 MG tablet Take 1 tablet by mouth 3 times daily (Patient not taking: No sig reported) 90 tablet 0    ondansetron (ZOFRAN-ODT) 8 MG TBDP disintegrating tablet Take 1 tablet by mouth every 8 hours (Patient not taking: Reported on 10/13/2022) 90 tablet 0    promethazine (PHENERGAN) 12.5 MG tablet Take 1 tablet by mouth every 6 hours as needed for Nausea (Patient not taking: Reported on 10/13/2022) 90 tablet 0    naloxone 4 MG/0.1ML LIQD nasal spray 1 spray by Nasal route as needed for Opioid Reversal  (Patient not taking: No sig reported)      dicyclomine (BENTYL) 10 MG capsule Take 20 mg by mouth every 6 hours  (Patient not taking: No sig reported)      umeclidinium-vilanterol (ANORO ELLIPTA) 62.5-25 MCG/INH AEPB inhaler Inhale 1 puff into the lungs daily  (Patient not taking: No sig reported)       No current facility-administered medications for this visit.      Facility-Administered Medications Ordered in Other Visits   Medication Dose Route Frequency Provider Last Rate Last Admin    dextrose 5 % solution  5-250 mL/hr IntraVENous PRN Loni Mcburney, MD 25 mL/hr at 10/14/22 0815 25 mL/hr at 10/14/22 0815    fosaprepitant (EMEND) 150 mg in sodium chloride 0.9 % 150 mL IVPB  150 mg IntraVENous Once Loni Mcburney, MD        dexamethasone (DECADRON) 12 mg in sodium chloride 0.9 % 50 mL IVPB  12 mg IntraVENous Once Loni Mcburney, MD        ondansetron [facility]) injection 8 mg  8 mg IntraVENous Once Loni Mcburney, MD        atropine injection 0.4 mg  0.4 mg SubCUTAneous Once Loni Mcburney, MD        oxaliplatin (ELOXATIN) 180 mg in dextrose 5 % 250 mL chemo IVPB  85 mg/m2 (Treatment Plan Recorded) IntraVENous Once Loni Mcburney, MD        irinotecan (CAMPTOSAR) 320 mg in dextrose 5 % 250 mL chemo IVPB  150 mg/m2 (Treatment Plan Recorded) IntraVENous Once Loni Mcburney, MD        leucovorin calcium (WELLCOVORIN) 850 mg in dextrose 5 % 250 mL IVPB  400 mg/m2 (Treatment Plan Recorded) IntraVENous Once Loni Mcburney, MD        fluorouracil (ADRUCIL) chemo syringe 850 mg  400 mg/m2 (Treatment Plan Recorded) IntraVENous Once Loni Mcburney, MD        fluorouracil (ADRUCIL) 5,050 mg in sodium chloride 0.9 % 230 mL chemo elastomeric infusion  2,400 mg/m2 (Treatment Plan Recorded) IntraVENous Once Loni Mcburney, MD        sodium chloride flush 0.9 % injection 5-40 mL  5-40 mL IntraVENous PRN Mingo Mueller MD   10 mL at 10/14/22 0815    diatrizoate meglumine-sodium (GASTROGRAFIN) 66-10 % solution 15 mL  15 mL Oral ONCE PRN LUCY Godwin - CNP   15 mL at 10/12/22 0950       No flowsheet data found. OBJECTIVE:  BP 95/62 (Site: Right Upper Arm, Position: Standing, Cuff Size: Large Adult)   Pulse 81   Temp 97.7 °F (36.5 °C) (Oral)   Resp 16   Ht 5' 10\" (1.778 m)   Wt 205 lb 1.6 oz (93 kg)   SpO2 96%   BMI 29.43 kg/m²       ECOG PERFORMANCE STATUS - 1- Restricted in physically strenuous activity but ambulatory and able to carry out work of a light or sedentary nature such as light house work, office work. Pain - 0/10. None/Minimal pain - not affecting QOL     Fatigue - No flowsheet data found. Distress - No flowsheet data found. Physical Exam  Vitals reviewed. Exam conducted with a chaperone present. Constitutional:       General: He is not in acute distress. Appearance: Normal appearance. He is normal weight. He is not ill-appearing or toxic-appearing. HENT:      Head: Normocephalic and atraumatic. Nose: Nose normal. No congestion. Mouth/Throat:      Mouth: Mucous membranes are moist.   Eyes:      General: No scleral icterus. Conjunctiva/sclera: Conjunctivae normal.   Cardiovascular:      Rate and Rhythm: Normal rate and regular rhythm. Heart sounds: No murmur heard. Pulmonary:      Effort: Pulmonary effort is normal. No respiratory distress. Breath sounds: Normal breath sounds. No wheezing, rhonchi or rales. Abdominal:      General: There is no distension. Palpations: Abdomen is soft. There is no mass. Tenderness: There is no abdominal tenderness. There is no guarding or rebound. Musculoskeletal:         General: Normal range of motion. Cervical back: Normal range of motion. No rigidity. Right lower leg: No edema. Left lower leg: No edema. Skin:     General: Skin is warm and dry.       Coloration: Skin is not jaundiced or pale. Findings: No bruising or rash. Neurological:      General: No focal deficit present. Mental Status: He is alert and oriented to person, place, and time. Motor: No weakness. Coordination: Coordination normal.      Gait: Gait normal.   Psychiatric:         Behavior: Behavior normal.         Thought Content:  Thought content normal.        Labs:  Recent Results (from the past 168 hour(s))   CBC with Auto Differential    Collection Time: 10/13/22  9:21 AM   Result Value Ref Range    WBC 5.9 4.3 - 11.1 K/uL    RBC 3.86 (L) 4.23 - 5.6 M/uL    Hemoglobin 11.0 (L) 13.6 - 17.2 g/dL    Hematocrit 36.0 %    MCV 93.3 82.0 - 102.0 FL    MCH 28.5 26.1 - 32.9 PG    MCHC 30.6 (L) 31.4 - 35.0 g/dL    RDW 17.0 (H) 11.9 - 14.6 %    Platelets 463 595 - 668 K/uL    MPV 10.6 9.4 - 12.3 FL    nRBC 0.00 0.0 - 0.2 K/uL    Seg Neutrophils 60 43 - 78 %    Lymphocytes 31 13 - 44 %    Monocytes 7 4.0 - 12.0 %    Eosinophils % 1 0.5 - 7.8 %    Basophils 1 0.0 - 2.0 %    Immature Granulocytes 0 0.0 - 5.0 %    Segs Absolute 3.6 1.7 - 8.2 K/UL    Absolute Lymph # 1.8 0.5 - 4.6 K/UL    Absolute Mono # 0.4 0.1 - 1.3 K/UL    Absolute Eos # 0.1 0.0 - 0.8 K/UL    Basophils Absolute 0.1 0.0 - 0.2 K/UL    Absolute Immature Granulocyte 0.0 0.0 - 0.5 K/UL    Differential Type AUTOMATED     Comprehensive Metabolic Panel    Collection Time: 10/13/22  9:21 AM   Result Value Ref Range    Sodium 141 133 - 143 mmol/L    Potassium 3.6 3.5 - 5.1 mmol/L    Chloride 110 101 - 110 mmol/L    CO2 26 21 - 32 mmol/L    Anion Gap 5 2 - 11 mmol/L    Glucose 208 (H) 65 - 100 mg/dL    BUN 13 8 - 23 MG/DL    Creatinine 0.80 0.8 - 1.5 MG/DL    Est, Glom Filt Rate >60 >60 ml/min/1.73m2    Calcium 8.9 8.3 - 10.4 MG/DL    Total Bilirubin 0.3 0.2 - 1.1 MG/DL    ALT 28 12 - 65 U/L    AST 16 15 - 37 U/L    Alk Phosphatase 86 50 - 136 U/L    Total Protein 6.3 6.3 - 8.2 g/dL    Albumin 3.1 (L) 3.2 - 4.6 g/dL    Globulin 3.2 2.8 - 4.5 g/dL DISCONTINUED: sodium chloride flush 0.9 % injection 5-40 mL      3. High risk medication use        4. Chemotherapy follow-up examination              Mr. MARIA [facility] is here for FU of metastatic adenocarcinoma. 1. Metastatic adenocarcinoma   - s/p omental and mesenteric mass bx - c/w upper GI adenocarcinoma    - hx of diverticular dz - s/p previous bowel resection by dr Jose E Figueroa at [address] pending   - We did discuss that his disease is not curable and he VU but wife stated that he believes he can beat this. - here for follow-up prior to cycle 8 FOLFIRINOX. Counts were not adequate last time and treatment was held. He is here for reevaluation. Counts are adequate and he wishes to proceed with full dose treatment.    - CT with no evidence of progressive disease. He and his wife are very pleased with the results. - pain - He has been off pain medications completely. Last fentanyl patch was placed at the end of September and he has not needed to put another one on. He is also off Oxy.    - nausea - He has not been using any nausea medicines. I did advise him to use some with chemotherapy as nausea may worsen. - appetite/wt loss - TPN is being tapered and is currently at 2 times a week. Weight is up almost 10 pounds at 205 today and his appetite is great. He is advised to increase p.o. intake of fluids. Not dizzy. Able to drive and staying busy. - He wishes to continue on the current regimen; he previously expressed that in the future, he may wish to proceed with unconventional scheduling of maybe FOLFIRINOX every 3 weeks. He does understand inherent risks with alternate schedule and how this would affect progression of disease. - sclerotic lesion on imaging at [facility] - bone scan recommended, has not been completed   - constipation - Miralax as needed.    - hypotension -  Midodrine 5 mg BID  - Continue good oral nutrition and hydration.   - Encouraged frequent activity throughout the day and rest as needed to combat fatigue.   - Call with any fevers, uncontrolled side effects from treatment or any other worrisome/concerning symptoms. RTC per protocol or sooner if needed      MDM  Number of Diagnoses or Management Options  Abdominal carcinomatosis ([address]): established, improving  Chemotherapy follow-up examination: established, improving  High risk medication use: established, improving  Peritoneal metastases [facility]): established, improving     Amount and/or Complexity of Data Reviewed  Clinical lab tests: ordered and reviewed  Tests in the radiology section of CPT®: ordered and reviewed  Tests in the medicine section of CPT®: ordered  Obtain history from someone other than the patient: yes  Review and summarize past medical records: yes  Independent visualization of images, tracings, or specimens: yes    Risk of Complications, Morbidity, and/or Mortality  Presenting problems: moderate  Diagnostic procedures: moderate  Management options: high        Lab studies and imaging studies were personally reviewed. Pertinent old records were reviewed. Historical:    - here with his wife for consultation. During today's visit, we discussed his current workup. We looked at the images together demonstrating some of the findings concerning for peritoneal nodularity/peritoneal disease. Discussed anatomy of the findings  utilizing images to help pt understand what we are looking at and suspecting. He and wife were appreciative of the time spent to review these. Discussed need for visual dx/tissue pathology to determine plan - recommend ex lap - refer to Dr Nico Soto - personally  reviewed case with Dr Nico Soto who will expedite the workup and will see pt Fri. US with mild biliary duct dilation - HIDA/ERCP reviewed by PCP   + BM/flatus; He did not like how IV morphine made him feel in the hospital.  He tried tramadol but found no relief and has been taking acetaminophen at home with minimal relief.   Discussed different options and he settled on trying  Percocet - he will contact the office to inform us if this is working for him. We discussed SE - not to drive while on the med. Bowel regimen reviewed. He is tired of tests, frustrated. Feelings validated and stressed importance of workup to determine why he has pain. Wt loss from 240 --> 209 noted and expressed concern to pt about this. - completed course of Levaquin/Diflucan for klebsiella pneumoniae and citrobacter freundii both of which were sensitive to Levaquin found around G-tube site. Wishes for tube removal - reviewed risks of this during chemotherapy - I would like for him to have labs day before scheduled procedure to make sure his counts are adequate per above; case reviewed with Dr Collette Al by me via tel today   - here cycle 8 FOLFIRINOX - labs reviewed-defer 1 week due to neutropenia. - nutrition - G-tube out. He is being weaned off TPN (3x week now) as his oral intake has greatly improved, weight up    - pain - Fentanyl 12mcg with prn oxycodone 10 mg, plans to stop patch on Nader 10/2 PC following  - Plan for surveillance reviewed. Labs discussed. - nausea - resolved. Has Zyprexa QHS (not taking currently) and Zofran PRN. - wt loss/FTT - secondary to disease and tx - on TPN and eating more    - depression/anxiety - better affect, on lexapro 20mg daily, PC adding buspar prn    All questions were asked and answered to the best of my ability. The patient verbalized understanding and agrees with the plan above.           Ray Hernandez MD, MD  [facility]  [address] [place] [address]  Office : [phone]  Fax : [phone]

## 2022-10-12 ENCOUNTER — HOSPITAL ENCOUNTER (OUTPATIENT)
Dept: CT IMAGING | Age: 62
Discharge: HOME | End: 2022-10-15
Payer: COMMERCIAL

## 2022-10-12 DIAGNOSIS — C16.9 MALIGNANT NEOPLASM OF STOMACH, UNSPECIFIED LOCATION (HCC): ICD-10-CM

## 2022-10-12 DIAGNOSIS — C78.6 PERITONEAL METASTASES (HCC): ICD-10-CM

## 2022-10-12 PROCEDURE — 74177 CT ABD & PELVIS W/CONTRAST: CPT

## 2022-10-12 PROCEDURE — 6360000004 HC RX CONTRAST MEDICATION: Performed by: NURSE PRACTITIONER

## 2022-10-12 PROCEDURE — 2580000003 HC RX 258: Performed by: NURSE PRACTITIONER

## 2022-10-12 RX ORDER — 0.9 % SODIUM CHLORIDE 0.9 %
100 INTRAVENOUS SOLUTION INTRAVENOUS
Status: COMPLETED | OUTPATIENT
Start: 2022-10-12 | End: 2022-10-12

## 2022-10-12 RX ORDER — SODIUM CHLORIDE 0.9 % (FLUSH) 0.9 %
10 SYRINGE (ML) INJECTION
Status: COMPLETED | OUTPATIENT
Start: 2022-10-12 | End: 2022-10-12

## 2022-10-12 RX ADMIN — SODIUM CHLORIDE 100 ML: 9 INJECTION, SOLUTION INTRAVENOUS at 09:50

## 2022-10-12 RX ADMIN — IOPAMIDOL 100 ML: 755 INJECTION, SOLUTION INTRAVENOUS at 09:50

## 2022-10-12 RX ADMIN — SODIUM CHLORIDE, PRESERVATIVE FREE 10 ML: 5 INJECTION INTRAVENOUS at 09:50

## 2022-10-12 RX ADMIN — DIATRIZOATE MEGLUMINE AND DIATRIZOATE SODIUM 15 ML: 660; 100 LIQUID ORAL; RECTAL at 09:50

## 2022-10-13 ENCOUNTER — OFFICE VISIT (OUTPATIENT)
Dept: ONCOLOGY | Age: 62
End: 2022-10-13

## 2022-10-13 ENCOUNTER — HOSPITAL ENCOUNTER (OUTPATIENT)
Dept: LAB | Age: 62
Discharge: HOME | End: 2022-10-16
Payer: COMMERCIAL

## 2022-10-13 ENCOUNTER — OFFICE VISIT (OUTPATIENT)
Dept: ONCOLOGY | Age: 62
End: 2022-10-13
Payer: COMMERCIAL

## 2022-10-13 VITALS
RESPIRATION RATE: 16 BRPM | BODY MASS INDEX: 29.36 KG/M2 | DIASTOLIC BLOOD PRESSURE: 62 MMHG | OXYGEN SATURATION: 96 % | HEIGHT: 70 IN | WEIGHT: 205.1 LBS | SYSTOLIC BLOOD PRESSURE: 95 MMHG | HEART RATE: 81 BPM | TEMPERATURE: 97.7 F

## 2022-10-13 DIAGNOSIS — Z79.899 HIGH RISK MEDICATION USE: ICD-10-CM

## 2022-10-13 DIAGNOSIS — C76.2 ABDOMINAL CARCINOMATOSIS (HCC): Primary | ICD-10-CM

## 2022-10-13 DIAGNOSIS — C78.6 PERITONEAL METASTASES (HCC): ICD-10-CM

## 2022-10-13 DIAGNOSIS — C76.2 ABDOMINAL CARCINOMATOSIS (HCC): ICD-10-CM

## 2022-10-13 DIAGNOSIS — Z09 CHEMOTHERAPY FOLLOW-UP EXAMINATION: ICD-10-CM

## 2022-10-13 DIAGNOSIS — Z00.8 NUTRITIONAL ASSESSMENT: Primary | ICD-10-CM

## 2022-10-13 DIAGNOSIS — Z78.9 ON TOTAL PARENTERAL NUTRITION (TPN): ICD-10-CM

## 2022-10-13 LAB
ALBUMIN SERPL-MCNC: 3.1 G/DL (ref 3.2–4.6)
ALBUMIN/GLOB SERPL: 1 {RATIO} (ref 0.4–1.6)
ALP SERPL-CCNC: 86 U/L (ref 50–136)
ALT SERPL-CCNC: 28 U/L (ref 12–65)
ANION GAP SERPL CALC-SCNC: 5 MMOL/L (ref 2–11)
AST SERPL-CCNC: 16 U/L (ref 15–37)
BASOPHILS # BLD: 0.1 K/UL (ref 0–0.2)
BASOPHILS NFR BLD: 1 % (ref 0–2)
BILIRUB SERPL-MCNC: 0.3 MG/DL (ref 0.2–1.1)
BUN SERPL-MCNC: 13 MG/DL (ref 8–23)
CALCIUM SERPL-MCNC: 8.9 MG/DL (ref 8.3–10.4)
CHLORIDE SERPL-SCNC: 110 MMOL/L (ref 101–110)
CO2 SERPL-SCNC: 26 MMOL/L (ref 21–32)
CREAT SERPL-MCNC: 0.8 MG/DL (ref 0.8–1.5)
DIFFERENTIAL METHOD BLD: ABNORMAL
EOSINOPHIL # BLD: 0.1 K/UL (ref 0–0.8)
EOSINOPHIL NFR BLD: 1 % (ref 0.5–7.8)
ERYTHROCYTE [DISTWIDTH] IN BLOOD BY AUTOMATED COUNT: 17 % (ref 11.9–14.6)
GLOBULIN SER CALC-MCNC: 3.2 G/DL (ref 2.8–4.5)
GLUCOSE SERPL-MCNC: 208 MG/DL (ref 65–100)
HCT VFR BLD AUTO: 36 %
HGB BLD-MCNC: 11 G/DL (ref 13.6–17.2)
IMM GRANULOCYTES # BLD AUTO: 0 K/UL (ref 0–0.5)
IMM GRANULOCYTES NFR BLD AUTO: 0 % (ref 0–5)
LYMPHOCYTES # BLD: 1.8 K/UL (ref 0.5–4.6)
LYMPHOCYTES NFR BLD: 31 % (ref 13–44)
MAGNESIUM SERPL-MCNC: 2.2 MG/DL (ref 1.8–2.4)
MCH RBC QN AUTO: 28.5 PG (ref 26.1–32.9)
MCHC RBC AUTO-ENTMCNC: 30.6 G/DL (ref 31.4–35)
MCV RBC AUTO: 93.3 FL (ref 82–102)
MONOCYTES # BLD: 0.4 K/UL (ref 0.1–1.3)
MONOCYTES NFR BLD: 7 % (ref 4–12)
NEUTS SEG # BLD: 3.6 K/UL (ref 1.7–8.2)
NEUTS SEG NFR BLD: 60 % (ref 43–78)
NRBC # BLD: 0 K/UL (ref 0–0.2)
PLATELET # BLD AUTO: 186 K/UL (ref 150–450)
PMV BLD AUTO: 10.6 FL (ref 9.4–12.3)
POTASSIUM SERPL-SCNC: 3.6 MMOL/L (ref 3.5–5.1)
PROT SERPL-MCNC: 6.3 G/DL (ref 6.3–8.2)
RBC # BLD AUTO: 3.86 M/UL (ref 4.23–5.6)
SODIUM SERPL-SCNC: 141 MMOL/L (ref 133–143)
WBC # BLD AUTO: 5.9 K/UL (ref 4.3–11.1)

## 2022-10-13 PROCEDURE — 99214 OFFICE O/P EST MOD 30 MIN: CPT | Performed by: INTERNAL MEDICINE

## 2022-10-13 PROCEDURE — 83735 ASSAY OF MAGNESIUM: CPT

## 2022-10-13 PROCEDURE — 85025 COMPLETE CBC W/AUTO DIFF WBC: CPT

## 2022-10-13 PROCEDURE — 36415 COLL VENOUS BLD VENIPUNCTURE: CPT

## 2022-10-13 PROCEDURE — 80053 COMPREHEN METABOLIC PANEL: CPT

## 2022-10-13 RX ORDER — DEXTROSE MONOHYDRATE 50 MG/ML
5-250 INJECTION, SOLUTION INTRAVENOUS PRN
Status: CANCELLED | OUTPATIENT
Start: 2022-10-14

## 2022-10-13 RX ORDER — SODIUM CHLORIDE 9 MG/ML
5-250 INJECTION, SOLUTION INTRAVENOUS PRN
Status: CANCELLED | OUTPATIENT
Start: 2022-10-16

## 2022-10-13 RX ORDER — HEPARIN SODIUM (PORCINE) LOCK FLUSH IV SOLN 100 UNIT/ML 100 UNIT/ML
500 SOLUTION INTRAVENOUS PRN
Status: CANCELLED | OUTPATIENT
Start: 2022-10-16

## 2022-10-13 RX ORDER — EPINEPHRINE 1 MG/ML
0.3 INJECTION, SOLUTION, CONCENTRATE INTRAVENOUS PRN
Status: CANCELLED | OUTPATIENT
Start: 2022-10-14

## 2022-10-13 RX ORDER — ACETAMINOPHEN 325 MG/1
650 TABLET ORAL
Status: CANCELLED | OUTPATIENT
Start: 2022-10-14

## 2022-10-13 RX ORDER — FAMOTIDINE 10 MG/ML
20 INJECTION, SOLUTION INTRAVENOUS
Status: CANCELLED | OUTPATIENT
Start: 2022-10-14

## 2022-10-13 RX ORDER — SODIUM CHLORIDE 9 MG/ML
5-250 INJECTION, SOLUTION INTRAVENOUS PRN
Status: CANCELLED | OUTPATIENT
Start: 2022-10-14

## 2022-10-13 RX ORDER — SODIUM CHLORIDE 0.9 % (FLUSH) 0.9 %
5-40 SYRINGE (ML) INJECTION PRN
Status: CANCELLED | OUTPATIENT
Start: 2022-10-14

## 2022-10-13 RX ORDER — SODIUM CHLORIDE 9 MG/ML
5-40 INJECTION INTRAVENOUS PRN
Status: CANCELLED | OUTPATIENT
Start: 2022-10-14

## 2022-10-13 RX ORDER — ATROPINE SULFATE 0.4 MG/ML
0.4 AMPUL (ML) INJECTION ONCE
Status: CANCELLED | OUTPATIENT
Start: 2022-10-14 | End: 2022-10-13

## 2022-10-13 RX ORDER — ONDANSETRON 2 MG/ML
8 INJECTION INTRAMUSCULAR; INTRAVENOUS
Status: CANCELLED | OUTPATIENT
Start: 2022-10-14

## 2022-10-13 RX ORDER — SODIUM CHLORIDE 9 MG/ML
INJECTION, SOLUTION INTRAVENOUS CONTINUOUS
Status: CANCELLED | OUTPATIENT
Start: 2022-10-14

## 2022-10-13 RX ORDER — MEPERIDINE HYDROCHLORIDE 50 MG/ML
12.5 INJECTION INTRAMUSCULAR; INTRAVENOUS; SUBCUTANEOUS PRN
Status: CANCELLED | OUTPATIENT
Start: 2022-10-14

## 2022-10-13 RX ORDER — FLUOROURACIL 50 MG/ML
400 INJECTION, SOLUTION INTRAVENOUS ONCE
Status: CANCELLED
Start: 2022-10-14 | End: 2022-10-13

## 2022-10-13 RX ORDER — SODIUM CHLORIDE 9 MG/ML
5-40 INJECTION INTRAVENOUS PRN
Status: CANCELLED | OUTPATIENT
Start: 2022-10-16

## 2022-10-13 RX ORDER — ONDANSETRON 2 MG/ML
8 INJECTION INTRAMUSCULAR; INTRAVENOUS ONCE
Status: CANCELLED | OUTPATIENT
Start: 2022-10-14 | End: 2022-10-13

## 2022-10-13 RX ORDER — SODIUM CHLORIDE 0.9 % (FLUSH) 0.9 %
5-40 SYRINGE (ML) INJECTION PRN
Status: CANCELLED | OUTPATIENT
Start: 2022-10-16

## 2022-10-13 RX ORDER — DIPHENHYDRAMINE HYDROCHLORIDE 50 MG/ML
50 INJECTION INTRAMUSCULAR; INTRAVENOUS
Status: CANCELLED | OUTPATIENT
Start: 2022-10-14

## 2022-10-13 RX ORDER — ALBUTEROL SULFATE 90 UG/1
4 AEROSOL, METERED RESPIRATORY (INHALATION) PRN
Status: CANCELLED | OUTPATIENT
Start: 2022-10-14

## 2022-10-13 RX ORDER — HEPARIN SODIUM (PORCINE) LOCK FLUSH IV SOLN 100 UNIT/ML 100 UNIT/ML
500 SOLUTION INTRAVENOUS PRN
Status: CANCELLED | OUTPATIENT
Start: 2022-10-14

## 2022-10-13 RX ORDER — ATROPINE SULFATE 0.4 MG/ML
0.4 AMPUL (ML) INJECTION
Status: CANCELLED | OUTPATIENT
Start: 2022-10-14

## 2022-10-13 NOTE — PATIENT INSTRUCTIONS
Patient Instructions from Today's Visit    Reason for Visit:  Prechemo visit    Diagnosis Information:  https://www.[employer]/. net/about-us/asco-answers-patient-education-materials/kxdt-iytitfx-aouc-sheets      Plan:  -The scans look great!  -Cycle 8 Folfox today    Follow Up:  As scheduled    Recent Lab Results:  Hospital Outpatient Visit on 10/13/2022   Component Date Value Ref Range Status    WBC 10/13/2022 5.9  4.3 - 11.1 K/uL Final    RBC 10/13/2022 3.86 (A)  4.23 - 5.6 M/uL Final    Hemoglobin 10/13/2022 11.0 (A)  13.6 - 17.2 g/dL Final    Hematocrit 10/13/2022 36.0  % Final    MCV 10/13/2022 93.3  82.0 - 102.0 FL Final    MCH 10/13/2022 28.5  26.1 - 32.9 PG Final    MCHC 10/13/2022 30.6 (A)  31.4 - 35.0 g/dL Final    RDW 10/13/2022 17.0 (A)  11.9 - 14.6 % Final    Platelets 96/72/8149 186  150 - 450 K/uL Final    MPV 10/13/2022 10.6  9.4 - 12.3 FL Final    nRBC 10/13/2022 0.00  0.0 - 0.2 K/uL Final    **Note: Absolute NRBC parameter is now reported with Hemogram**    Seg Neutrophils 10/13/2022 60  43 - 78 % Final    Lymphocytes 10/13/2022 31  13 - 44 % Final    Monocytes 10/13/2022 7  4.0 - 12.0 % Final    Eosinophils % 10/13/2022 1  0.5 - 7.8 % Final    Basophils 10/13/2022 1  0.0 - 2.0 % Final    Immature Granulocytes 10/13/2022 0  0.0 - 5.0 % Final    Segs Absolute 10/13/2022 3.6  1.7 - 8.2 K/UL Final    Absolute Lymph # 10/13/2022 1.8  0.5 - 4.6 K/UL Final    Absolute Mono # 10/13/2022 0.4  0.1 - 1.3 K/UL Final    Absolute Eos # 10/13/2022 0.1  0.0 - 0.8 K/UL Final    Basophils Absolute 10/13/2022 0.1  0.0 - 0.2 K/UL Final    Absolute Immature Granulocyte 10/13/2022 0.0  0.0 - 0.5 K/UL Final    Differential Type 10/13/2022 AUTOMATED    Final    Sodium 10/13/2022 141  133 - 143 mmol/L Final    Potassium 10/13/2022 3.6  3.5 - 5.1 mmol/L Final    Chloride 10/13/2022 110  101 - 110 mmol/L Final    CO2 10/13/2022 26  21 - 32 mmol/L Final    Anion Gap 10/13/2022 5  2 - 11 mmol/L Final    Glucose 10/13/2022 208 (A)  65 - 100 mg/dL Final    BUN 10/13/2022 13  8 - 23 MG/DL Final    Creatinine 10/13/2022 0.80  0.8 - 1.5 MG/DL Final    Est, Glom Filt Rate 10/13/2022 >60  >60 ml/min/1.73m2 Final    Comment:      Pediatric calculator link: Miguel.at. org/professionals/kdoqi/gfr_calculatorped       Effective Oct 3, 2022       These results are not intended for use in patients <25years of age. eGFR results are calculated without a race factor using  the 2021 CKD-EPI equation. Careful clinical correlation is recommended, particularly when comparing to results calculated using previous equations. The CKD-EPI equation is less accurate in patients with extremes of muscle mass, extra-renal metabolism of creatinine, excessive creatine ingestion, or following therapy that affects renal tubular secretion. Calcium 10/13/2022 8.9  8.3 - 10.4 MG/DL Final    Total Bilirubin 10/13/2022 0.3  0.2 - 1.1 MG/DL Final    ALT 10/13/2022 28  12 - 65 U/L Final    AST 10/13/2022 16  15 - 37 U/L Final    Alk Phosphatase 10/13/2022 86  50 - 136 U/L Final    Total Protein 10/13/2022 6.3  6.3 - 8.2 g/dL Final    Albumin 10/13/2022 3.1 (A)  3.2 - 4.6 g/dL Final    Globulin 10/13/2022 3.2  2.8 - 4.5 g/dL Final    Albumin/Globulin Ratio 10/13/2022 1.0  0.4 - 1.6   Final    Magnesium 10/13/2022 2.2  1.8 - 2.4 mg/dL Final        Treatment Summary has been discussed and given to patient: n/a        -------------------------------------------------------------------------------------------------------------------  Please call our office at [phone] if you have any  of the following symptoms:   Fever of 100.5 or greater  Chills  Shortness of breath  Swelling or pain in one leg    After office hours an answering service is available and will contact a provider for emergencies or if you are experiencing any of the above symptoms. Patient does express an interest in My Chart.   My Chart log in information explained on the after visit summary printout at the [facility] 90 desk.     Tamera Michele RN  Nurse Navigator  [address] [facility] [address] [phone]

## 2022-10-14 ENCOUNTER — HOSPITAL ENCOUNTER (OUTPATIENT)
Dept: INFUSION THERAPY | Age: 62
Discharge: HOME | End: 2022-10-14
Payer: COMMERCIAL

## 2022-10-14 VITALS
SYSTOLIC BLOOD PRESSURE: 112 MMHG | RESPIRATION RATE: 16 BRPM | DIASTOLIC BLOOD PRESSURE: 72 MMHG | HEART RATE: 87 BPM | BODY MASS INDEX: 29.84 KG/M2 | TEMPERATURE: 97.6 F | OXYGEN SATURATION: 96 % | WEIGHT: 208 LBS

## 2022-10-14 DIAGNOSIS — C78.6 MALIGNANT NEOPLASM METASTATIC TO PERITONEUM (HCC): Primary | ICD-10-CM

## 2022-10-14 DIAGNOSIS — C76.2 ABDOMINAL CARCINOMATOSIS (HCC): Primary | ICD-10-CM

## 2022-10-14 DIAGNOSIS — C78.6 PERITONEAL METASTASES (HCC): ICD-10-CM

## 2022-10-14 PROBLEM — Z79.899 HIGH RISK MEDICATION USE: Status: ACTIVE | Noted: 2022-10-14

## 2022-10-14 PROBLEM — R10.84 GENERALIZED ABDOMINAL PAIN: Status: RESOLVED | Noted: 2022-05-12 | Resolved: 2022-10-14

## 2022-10-14 PROBLEM — Z09 CHEMOTHERAPY FOLLOW-UP EXAMINATION: Status: ACTIVE | Noted: 2022-10-14

## 2022-10-14 PROBLEM — R10.9 ABDOMINAL PAIN: Status: RESOLVED | Noted: 2022-05-12 | Resolved: 2022-10-14

## 2022-10-14 PROCEDURE — 96368 THER/DIAG CONCURRENT INF: CPT

## 2022-10-14 PROCEDURE — 96411 CHEMO IV PUSH ADDL DRUG: CPT

## 2022-10-14 PROCEDURE — 6360000002 HC RX W HCPCS: Performed by: INTERNAL MEDICINE

## 2022-10-14 PROCEDURE — 96375 TX/PRO/DX INJ NEW DRUG ADDON: CPT

## 2022-10-14 PROCEDURE — 96417 CHEMO IV INFUS EACH ADDL SEQ: CPT

## 2022-10-14 PROCEDURE — 96413 CHEMO IV INFUSION 1 HR: CPT

## 2022-10-14 PROCEDURE — 96415 CHEMO IV INFUSION ADDL HR: CPT

## 2022-10-14 PROCEDURE — 2580000003 HC RX 258: Performed by: INTERNAL MEDICINE

## 2022-10-14 PROCEDURE — 96372 THER/PROPH/DIAG INJ SC/IM: CPT

## 2022-10-14 PROCEDURE — G0498 CHEMO EXTEND IV INFUS W/PUMP: HCPCS

## 2022-10-14 PROCEDURE — 96367 TX/PROPH/DG ADDL SEQ IV INF: CPT

## 2022-10-14 RX ORDER — DEXTROSE MONOHYDRATE 50 MG/ML
5-250 INJECTION, SOLUTION INTRAVENOUS PRN
Status: DISCONTINUED | OUTPATIENT
Start: 2022-10-14 | End: 2022-10-15 | Stop reason: HOSPADM

## 2022-10-14 RX ORDER — SODIUM CHLORIDE 0.9 % (FLUSH) 0.9 %
5-40 SYRINGE (ML) INJECTION PRN
Status: DISCONTINUED | OUTPATIENT
Start: 2022-10-14 | End: 2022-10-15 | Stop reason: HOSPADM

## 2022-10-14 RX ORDER — FLUOROURACIL 50 MG/ML
400 INJECTION, SOLUTION INTRAVENOUS ONCE
Status: COMPLETED | OUTPATIENT
Start: 2022-10-14 | End: 2022-10-14

## 2022-10-14 RX ORDER — ATROPINE SULFATE 0.4 MG/ML
0.4 INJECTION, SOLUTION INTRAVENOUS ONCE
Status: COMPLETED | OUTPATIENT
Start: 2022-10-14 | End: 2022-10-14

## 2022-10-14 RX ORDER — ONDANSETRON 2 MG/ML
8 INJECTION INTRAMUSCULAR; INTRAVENOUS ONCE
Status: COMPLETED | OUTPATIENT
Start: 2022-10-14 | End: 2022-10-14

## 2022-10-14 RX ADMIN — IRINOTECAN HYDROCHLORIDE 320 MG: 20 INJECTION, SOLUTION INTRAVENOUS at 11:56

## 2022-10-14 RX ADMIN — DEXTROSE MONOHYDRATE 25 ML/HR: 50 INJECTION, SOLUTION INTRAVENOUS at 08:15

## 2022-10-14 RX ADMIN — LEUCOVORIN CALCIUM 850 MG: 350 INJECTION, POWDER, LYOPHILIZED, FOR SUSPENSION INTRAMUSCULAR; INTRAVENOUS at 11:55

## 2022-10-14 RX ADMIN — ATROPINE SULFATE 0.4 MG: 0.4 INJECTION, SOLUTION INTRAVENOUS at 12:02

## 2022-10-14 RX ADMIN — DEXAMETHASONE SODIUM PHOSPHATE 12 MG: 4 INJECTION, SOLUTION INTRAMUSCULAR; INTRAVENOUS at 08:50

## 2022-10-14 RX ADMIN — FLUOROURACIL 850 MG: 50 INJECTION, SOLUTION INTRAVENOUS at 13:32

## 2022-10-14 RX ADMIN — ONDANSETRON 8 MG: 2 INJECTION INTRAMUSCULAR; INTRAVENOUS at 08:49

## 2022-10-14 RX ADMIN — SODIUM CHLORIDE, PRESERVATIVE FREE 10 ML: 5 INJECTION INTRAVENOUS at 08:15

## 2022-10-14 RX ADMIN — FOSAPREPITANT 150 MG: 150 INJECTION, POWDER, LYOPHILIZED, FOR SOLUTION INTRAVENOUS at 09:08

## 2022-10-14 RX ADMIN — FLUOROURACIL 5000 MG: 50 INJECTION, SOLUTION INTRAVENOUS at 13:40

## 2022-10-14 RX ADMIN — OXALIPLATIN 180 MG: 5 INJECTION, SOLUTION INTRAVENOUS at 09:35

## 2022-10-14 ASSESSMENT — ENCOUNTER SYMPTOMS: ABDOMINAL PAIN: 0

## 2022-10-14 NOTE — PROGRESS NOTES
Saw patient with Dr Rola Fried prior to cycle 8 Folfirinox. Dr Rola Fried reviewed scans with patient with good results. Pt is still on 2 bags of TPN at home but is weaning and eating by mouth--gaining weight. Pt is doing well overall.  Navigation is following

## 2022-10-14 NOTE — PROGRESS NOTES
Arrived to the [facility]. Folfirinox completed. Patient tolerated well. Any issues or concerns during appointment: well. Patient aware of next infusion appointment on 10/16 (date) at 36 (time). Patient aware of next lab and [facility] office visit on 10/28 (date) at [phone] (time). Patient instructed to call provider with temperature of 100.4 or greater or nausea/vomiting/ diarrhea or pain not controlled by medications  Discharged ambulatory in stable condition.

## 2022-10-16 ENCOUNTER — HOSPITAL ENCOUNTER (OUTPATIENT)
Dept: INFUSION THERAPY | Age: 62
Discharge: HOME | End: 2022-10-16
Payer: COMMERCIAL

## 2022-10-16 VITALS
HEART RATE: 82 BPM | SYSTOLIC BLOOD PRESSURE: 105 MMHG | DIASTOLIC BLOOD PRESSURE: 64 MMHG | TEMPERATURE: 98.2 F | OXYGEN SATURATION: 99 % | RESPIRATION RATE: 16 BRPM

## 2022-10-16 DIAGNOSIS — C76.2 ABDOMINAL CARCINOMATOSIS (HCC): Primary | ICD-10-CM

## 2022-10-16 DIAGNOSIS — C78.6 PERITONEAL METASTASES (HCC): ICD-10-CM

## 2022-10-16 PROCEDURE — 2580000003 HC RX 258: Performed by: INTERNAL MEDICINE

## 2022-10-16 PROCEDURE — 96523 IRRIG DRUG DELIVERY DEVICE: CPT

## 2022-10-16 RX ORDER — SODIUM CHLORIDE 0.9 % (FLUSH) 0.9 %
5-40 SYRINGE (ML) INJECTION PRN
Status: DISCONTINUED | OUTPATIENT
Start: 2022-10-16 | End: 2022-10-17 | Stop reason: HOSPADM

## 2022-10-16 RX ADMIN — SODIUM CHLORIDE, PRESERVATIVE FREE 10 ML: 5 INJECTION INTRAVENOUS at 11:45

## 2022-10-17 NOTE — PROGRESS NOTES
Nutrition F/U:  Assessment:  Pt seen during office visit w/ Dr. Ondina Hernandez, CT scan showing no evidence of disease to the chest/abd/pelvis. Pt receiving his 9th dose of FOLFIRINOX today. Pt continues to tolerate a Regular diet w/ excellent appetite/po intake, hydrating well at home - no longer needing IVF support, TPN weaned down to 3 days/week last week, and 2 days/week this week - hopefully will be discontinued in the next 1-2 weeks. Current BW: 205#, up 10# over the past 2 weeks. Intervention:  1. Continue w/ Regular diet, push intake of fluids   2. Continue to wean TPN, managed by [facility]     Monitoring/Evaluation:  1. RD to follow up during next office visit - follow up wt status, tolerance/intake of po diet, symptom management.       [facility], [address], [address] [facility]

## 2022-10-19 ENCOUNTER — TELEPHONE (OUTPATIENT)
Dept: ONCOLOGY | Age: 62
End: 2022-10-19

## 2022-10-19 NOTE — TELEPHONE ENCOUNTER
Arian Angry from [facility] called on behalf of PT/wanting to know if continuing TPN/states is schedule to see PT next week and is needing to know if that visit will be a discharge visit or not

## 2022-10-19 NOTE — TELEPHONE ENCOUNTER
Nutrition:  Called and spoke to The barbara, RN at [facility] - pt has 1 bag of TPN left to give between now and next Wednesday. TPN will be discontinued as pt no longer needs for nutrition, and home health services to discontinue as well.       [address] [facility], [address], [facility]

## 2022-10-19 NOTE — TELEPHONE ENCOUNTER
PT spouse called in regards to PT chemo schedule/states has chemo on 10/28/22 and 11/10/22/states was advised previously that chemo days needs to be 14 days a part in order for insurance to cover/current schedule is 13 days apart/if that is the case then they wanting to know if 11/10/22 appts can be moved to 11/11/22 for insurance and their schedule

## 2022-10-28 ENCOUNTER — HOSPITAL ENCOUNTER (OUTPATIENT)
Dept: ULTRASOUND IMAGING | Age: 62
Discharge: HOME | End: 2022-10-31

## 2022-10-28 ENCOUNTER — HOSPITAL ENCOUNTER (OUTPATIENT)
Dept: INFUSION THERAPY | Age: 62
Discharge: HOME | End: 2022-10-28
Payer: COMMERCIAL

## 2022-10-28 ENCOUNTER — OFFICE VISIT (OUTPATIENT)
Dept: PALLATIVE CARE | Age: 62
End: 2022-10-28
Payer: COMMERCIAL

## 2022-10-28 ENCOUNTER — OFFICE VISIT (OUTPATIENT)
Dept: ONCOLOGY | Age: 62
End: 2022-10-28

## 2022-10-28 ENCOUNTER — OFFICE VISIT (OUTPATIENT)
Dept: ONCOLOGY | Age: 62
End: 2022-10-28
Payer: COMMERCIAL

## 2022-10-28 ENCOUNTER — CLINICAL DOCUMENTATION (OUTPATIENT)
Dept: ONCOLOGY | Age: 62
End: 2022-10-28

## 2022-10-28 VITALS
TEMPERATURE: 98.5 F | HEART RATE: 73 BPM | DIASTOLIC BLOOD PRESSURE: 80 MMHG | RESPIRATION RATE: 16 BRPM | OXYGEN SATURATION: 99 % | BODY MASS INDEX: 30.71 KG/M2 | SYSTOLIC BLOOD PRESSURE: 120 MMHG | WEIGHT: 214 LBS

## 2022-10-28 DIAGNOSIS — I82.621 ACUTE DEEP VEIN THROMBOSIS (DVT) OF RIGHT UPPER EXTREMITY, UNSPECIFIED VEIN (HCC): ICD-10-CM

## 2022-10-28 DIAGNOSIS — R22.31 LOCALIZED SWELLING OF RIGHT UPPER EXTREMITY: ICD-10-CM

## 2022-10-28 DIAGNOSIS — R11.0 CHEMOTHERAPY-INDUCED NAUSEA: ICD-10-CM

## 2022-10-28 DIAGNOSIS — E83.42 HYPOMAGNESEMIA: ICD-10-CM

## 2022-10-28 DIAGNOSIS — R10.84 GENERALIZED ABDOMINAL PAIN: ICD-10-CM

## 2022-10-28 DIAGNOSIS — Z51.5 ENCOUNTER FOR PALLIATIVE CARE: ICD-10-CM

## 2022-10-28 DIAGNOSIS — C76.2 ABDOMINAL CARCINOMATOSIS (HCC): Primary | ICD-10-CM

## 2022-10-28 DIAGNOSIS — T45.1X5A CHEMOTHERAPY-INDUCED NAUSEA: ICD-10-CM

## 2022-10-28 DIAGNOSIS — C76.2 ABDOMINAL CARCINOMATOSIS (HCC): ICD-10-CM

## 2022-10-28 DIAGNOSIS — C78.6 MALIGNANT NEOPLASM METASTATIC TO PERITONEUM (HCC): ICD-10-CM

## 2022-10-28 DIAGNOSIS — C16.9 MALIGNANT NEOPLASM OF STOMACH, UNSPECIFIED LOCATION (HCC): Primary | ICD-10-CM

## 2022-10-28 DIAGNOSIS — E87.6 HYPOKALEMIA: ICD-10-CM

## 2022-10-28 DIAGNOSIS — C79.9 METASTATIC ADENOCARCINOMA (HCC): ICD-10-CM

## 2022-10-28 DIAGNOSIS — C78.6 PERITONEAL METASTASES (HCC): ICD-10-CM

## 2022-10-28 DIAGNOSIS — F41.8 ANXIETY ABOUT HEALTH: Primary | ICD-10-CM

## 2022-10-28 DIAGNOSIS — Z00.8 NUTRITIONAL ASSESSMENT: Primary | ICD-10-CM

## 2022-10-28 PROBLEM — F11.99 OPIOID USE, UNSPECIFIED WITH UNSPECIFIED OPIOID-INDUCED DISORDER (HCC): Status: RESOLVED | Noted: 2022-05-18 | Resolved: 2022-10-28

## 2022-10-28 LAB
ALBUMIN SERPL-MCNC: 3 G/DL (ref 3.2–4.6)
ALBUMIN/GLOB SERPL: 1 {RATIO} (ref 0.4–1.6)
ALP SERPL-CCNC: 75 U/L (ref 50–136)
ALT SERPL-CCNC: 21 U/L (ref 12–65)
ANION GAP SERPL CALC-SCNC: 4 MMOL/L (ref 2–11)
AST SERPL-CCNC: 18 U/L (ref 15–37)
BASOPHILS # BLD: 0 K/UL (ref 0–0.2)
BASOPHILS NFR BLD: 1 % (ref 0–2)
BILIRUB SERPL-MCNC: 0.3 MG/DL (ref 0.2–1.1)
BUN SERPL-MCNC: 8 MG/DL (ref 8–23)
CALCIUM SERPL-MCNC: 8.3 MG/DL (ref 8.3–10.4)
CEA SERPL-MCNC: 1.5 NG/ML (ref 0–3)
CHLORIDE SERPL-SCNC: 116 MMOL/L (ref 101–110)
CO2 SERPL-SCNC: 28 MMOL/L (ref 21–32)
CREAT SERPL-MCNC: 0.8 MG/DL (ref 0.8–1.5)
DIFFERENTIAL METHOD BLD: ABNORMAL
EOSINOPHIL # BLD: 0.2 K/UL (ref 0–0.8)
EOSINOPHIL NFR BLD: 4 % (ref 0.5–7.8)
ERYTHROCYTE [DISTWIDTH] IN BLOOD BY AUTOMATED COUNT: 15.8 % (ref 11.9–14.6)
GLOBULIN SER CALC-MCNC: 2.9 G/DL (ref 2.8–4.5)
GLUCOSE SERPL-MCNC: 102 MG/DL (ref 65–100)
HCT VFR BLD AUTO: 30.5 %
HGB BLD-MCNC: 9.5 G/DL (ref 13.6–17.2)
IMM GRANULOCYTES # BLD AUTO: 0 K/UL (ref 0–0.5)
IMM GRANULOCYTES NFR BLD AUTO: 0 % (ref 0–5)
LYMPHOCYTES # BLD: 1.7 K/UL (ref 0.5–4.6)
LYMPHOCYTES NFR BLD: 51 % (ref 13–44)
MAGNESIUM SERPL-MCNC: 1.6 MG/DL (ref 1.8–2.4)
MCH RBC QN AUTO: 29.1 PG (ref 26.1–32.9)
MCHC RBC AUTO-ENTMCNC: 31.1 G/DL (ref 31.4–35)
MCV RBC AUTO: 93.6 FL (ref 82–102)
MONOCYTES # BLD: 0.3 K/UL (ref 0.1–1.3)
MONOCYTES NFR BLD: 8 % (ref 4–12)
NEUTS SEG # BLD: 1.2 K/UL (ref 1.7–8.2)
NEUTS SEG NFR BLD: 36 % (ref 43–78)
NRBC # BLD: 0 K/UL (ref 0–0.2)
PLATELET # BLD AUTO: 136 K/UL (ref 150–450)
PMV BLD AUTO: 10.3 FL (ref 9.4–12.3)
POTASSIUM SERPL-SCNC: 3.1 MMOL/L (ref 3.5–5.1)
PROT SERPL-MCNC: 5.9 G/DL (ref 6.3–8.2)
RBC # BLD AUTO: 3.26 M/UL (ref 4.23–5.6)
SODIUM SERPL-SCNC: 148 MMOL/L (ref 133–143)
WBC # BLD AUTO: 3.4 K/UL (ref 4.3–11.1)

## 2022-10-28 PROCEDURE — 96372 THER/PROPH/DIAG INJ SC/IM: CPT

## 2022-10-28 PROCEDURE — 3074F SYST BP LT 130 MM HG: CPT | Performed by: NURSE PRACTITIONER

## 2022-10-28 PROCEDURE — 36591 DRAW BLOOD OFF VENOUS DEVICE: CPT

## 2022-10-28 PROCEDURE — G0498 CHEMO EXTEND IV INFUS W/PUMP: HCPCS

## 2022-10-28 PROCEDURE — 96417 CHEMO IV INFUS EACH ADDL SEQ: CPT

## 2022-10-28 PROCEDURE — 85025 COMPLETE CBC W/AUTO DIFF WBC: CPT

## 2022-10-28 PROCEDURE — 3078F DIAST BP <80 MM HG: CPT | Performed by: NURSE PRACTITIONER

## 2022-10-28 PROCEDURE — 82378 CARCINOEMBRYONIC ANTIGEN: CPT

## 2022-10-28 PROCEDURE — 2580000003 HC RX 258: Performed by: INTERNAL MEDICINE

## 2022-10-28 PROCEDURE — 99213 OFFICE O/P EST LOW 20 MIN: CPT | Performed by: NURSE PRACTITIONER

## 2022-10-28 PROCEDURE — 96411 CHEMO IV PUSH ADDL DRUG: CPT

## 2022-10-28 PROCEDURE — 6370000000 HC RX 637 (ALT 250 FOR IP): Performed by: NURSE PRACTITIONER

## 2022-10-28 PROCEDURE — 96415 CHEMO IV INFUSION ADDL HR: CPT

## 2022-10-28 PROCEDURE — 96413 CHEMO IV INFUSION 1 HR: CPT

## 2022-10-28 PROCEDURE — 99214 OFFICE O/P EST MOD 30 MIN: CPT | Performed by: NURSE PRACTITIONER

## 2022-10-28 PROCEDURE — 96367 TX/PROPH/DG ADDL SEQ IV INF: CPT

## 2022-10-28 PROCEDURE — 96375 TX/PRO/DX INJ NEW DRUG ADDON: CPT

## 2022-10-28 PROCEDURE — 6360000002 HC RX W HCPCS: Performed by: NURSE PRACTITIONER

## 2022-10-28 PROCEDURE — 2580000003 HC RX 258: Performed by: NURSE PRACTITIONER

## 2022-10-28 PROCEDURE — 96368 THER/DIAG CONCURRENT INF: CPT

## 2022-10-28 PROCEDURE — 83735 ASSAY OF MAGNESIUM: CPT

## 2022-10-28 PROCEDURE — 80053 COMPREHEN METABOLIC PANEL: CPT

## 2022-10-28 RX ORDER — DEXTROSE MONOHYDRATE 50 MG/ML
5-250 INJECTION, SOLUTION INTRAVENOUS PRN
Status: CANCELLED | OUTPATIENT
Start: 2022-10-28

## 2022-10-28 RX ORDER — SODIUM CHLORIDE 9 MG/ML
5-40 INJECTION INTRAVENOUS PRN
Status: CANCELLED | OUTPATIENT
Start: 2022-10-30

## 2022-10-28 RX ORDER — POTASSIUM CHLORIDE 750 MG/1
40 TABLET, EXTENDED RELEASE ORAL ONCE
Status: COMPLETED | OUTPATIENT
Start: 2022-10-28 | End: 2022-10-28

## 2022-10-28 RX ORDER — EPINEPHRINE 1 MG/ML
0.3 INJECTION, SOLUTION, CONCENTRATE INTRAVENOUS PRN
Status: CANCELLED | OUTPATIENT
Start: 2022-10-28

## 2022-10-28 RX ORDER — SODIUM CHLORIDE 0.9 % (FLUSH) 0.9 %
5-40 SYRINGE (ML) INJECTION PRN
Status: DISCONTINUED | OUTPATIENT
Start: 2022-10-28 | End: 2022-10-29 | Stop reason: HOSPADM

## 2022-10-28 RX ORDER — DICYCLOMINE HCL 20 MG
TABLET ORAL
Qty: 180 TABLET | Refills: 0 | Status: SHIPPED | OUTPATIENT
Start: 2022-10-28

## 2022-10-28 RX ORDER — POTASSIUM CHLORIDE 20 MEQ/1
40 TABLET, EXTENDED RELEASE ORAL ONCE
Status: CANCELLED
Start: 2022-10-28 | End: 2022-10-28

## 2022-10-28 RX ORDER — MEPERIDINE HYDROCHLORIDE 50 MG/ML
12.5 INJECTION INTRAMUSCULAR; INTRAVENOUS; SUBCUTANEOUS PRN
Status: CANCELLED | OUTPATIENT
Start: 2022-10-28

## 2022-10-28 RX ORDER — ONDANSETRON 2 MG/ML
8 INJECTION INTRAMUSCULAR; INTRAVENOUS
Status: CANCELLED | OUTPATIENT
Start: 2022-10-28

## 2022-10-28 RX ORDER — FAMOTIDINE 10 MG/ML
20 INJECTION, SOLUTION INTRAVENOUS
Status: CANCELLED | OUTPATIENT
Start: 2022-10-28

## 2022-10-28 RX ORDER — HEPARIN SODIUM (PORCINE) LOCK FLUSH IV SOLN 100 UNIT/ML 100 UNIT/ML
500 SOLUTION INTRAVENOUS PRN
Status: CANCELLED | OUTPATIENT
Start: 2022-10-30

## 2022-10-28 RX ORDER — SODIUM CHLORIDE 0.9 % (FLUSH) 0.9 %
5-40 SYRINGE (ML) INJECTION PRN
Status: CANCELLED | OUTPATIENT
Start: 2022-10-30

## 2022-10-28 RX ORDER — BUSPIRONE HYDROCHLORIDE 7.5 MG/1
7.5 TABLET ORAL DAILY
Qty: 15 TABLET | Refills: 0 | Status: SHIPPED | OUTPATIENT
Start: 2022-10-28

## 2022-10-28 RX ORDER — ONDANSETRON 2 MG/ML
8 INJECTION INTRAMUSCULAR; INTRAVENOUS ONCE
Status: COMPLETED | OUTPATIENT
Start: 2022-10-28 | End: 2022-10-28

## 2022-10-28 RX ORDER — DEXTROSE MONOHYDRATE 50 MG/ML
5-250 INJECTION, SOLUTION INTRAVENOUS PRN
Status: DISCONTINUED | OUTPATIENT
Start: 2022-10-28 | End: 2022-10-29 | Stop reason: HOSPADM

## 2022-10-28 RX ORDER — ALBUTEROL SULFATE 90 UG/1
4 AEROSOL, METERED RESPIRATORY (INHALATION) PRN
Status: CANCELLED | OUTPATIENT
Start: 2022-10-28

## 2022-10-28 RX ORDER — POTASSIUM CHLORIDE 20 MEQ/1
20 TABLET, EXTENDED RELEASE ORAL 2 TIMES DAILY
Qty: 14 TABLET | Refills: 0 | Status: SHIPPED | OUTPATIENT
Start: 2022-10-28 | End: 2022-11-11 | Stop reason: SDUPTHER

## 2022-10-28 RX ORDER — SODIUM CHLORIDE 9 MG/ML
5-250 INJECTION, SOLUTION INTRAVENOUS PRN
Status: CANCELLED | OUTPATIENT
Start: 2022-10-28

## 2022-10-28 RX ORDER — SODIUM CHLORIDE 0.9 % (FLUSH) 0.9 %
10 SYRINGE (ML) INJECTION PRN
Status: DISCONTINUED | OUTPATIENT
Start: 2022-10-28 | End: 2022-10-29 | Stop reason: HOSPADM

## 2022-10-28 RX ORDER — FLUOROURACIL 50 MG/ML
400 INJECTION, SOLUTION INTRAVENOUS ONCE
Status: CANCELLED
Start: 2022-10-28 | End: 2022-10-28

## 2022-10-28 RX ORDER — SODIUM CHLORIDE 9 MG/ML
5-250 INJECTION, SOLUTION INTRAVENOUS PRN
Status: CANCELLED | OUTPATIENT
Start: 2022-10-30

## 2022-10-28 RX ORDER — MAGNESIUM OXIDE 400 MG/1
400 TABLET ORAL 3 TIMES DAILY
Qty: 90 TABLET | Refills: 2 | Status: SHIPPED | OUTPATIENT
Start: 2022-10-28

## 2022-10-28 RX ORDER — ATROPINE SULFATE 0.4 MG/ML
0.4 AMPUL (ML) INJECTION ONCE
Status: CANCELLED | OUTPATIENT
Start: 2022-10-28 | End: 2022-10-28

## 2022-10-28 RX ORDER — ATROPINE SULFATE 0.4 MG/ML
0.4 AMPUL (ML) INJECTION
Status: CANCELLED | OUTPATIENT
Start: 2022-10-28

## 2022-10-28 RX ORDER — SODIUM CHLORIDE 0.9 % (FLUSH) 0.9 %
5-40 SYRINGE (ML) INJECTION PRN
Status: CANCELLED | OUTPATIENT
Start: 2022-10-28

## 2022-10-28 RX ORDER — PANTOPRAZOLE SODIUM 40 MG/1
40 TABLET, DELAYED RELEASE ORAL DAILY
Qty: 30 TABLET | Refills: 1 | Status: SHIPPED | OUTPATIENT
Start: 2022-10-28

## 2022-10-28 RX ORDER — DIPHENHYDRAMINE HYDROCHLORIDE 50 MG/ML
50 INJECTION INTRAMUSCULAR; INTRAVENOUS
Status: CANCELLED | OUTPATIENT
Start: 2022-10-28

## 2022-10-28 RX ORDER — ATROPINE SULFATE 0.4 MG/ML
0.4 INJECTION, SOLUTION INTRAVENOUS ONCE
Status: COMPLETED | OUTPATIENT
Start: 2022-10-28 | End: 2022-10-28

## 2022-10-28 RX ORDER — FLUOROURACIL 50 MG/ML
400 INJECTION, SOLUTION INTRAVENOUS ONCE
Status: COMPLETED | OUTPATIENT
Start: 2022-10-28 | End: 2022-10-28

## 2022-10-28 RX ORDER — SODIUM CHLORIDE 9 MG/ML
5-40 INJECTION INTRAVENOUS PRN
Status: CANCELLED | OUTPATIENT
Start: 2022-10-28

## 2022-10-28 RX ORDER — HEPARIN SODIUM (PORCINE) LOCK FLUSH IV SOLN 100 UNIT/ML 100 UNIT/ML
500 SOLUTION INTRAVENOUS PRN
Status: CANCELLED | OUTPATIENT
Start: 2022-10-28

## 2022-10-28 RX ORDER — ACETAMINOPHEN 325 MG/1
650 TABLET ORAL
Status: CANCELLED | OUTPATIENT
Start: 2022-10-28

## 2022-10-28 RX ORDER — ONDANSETRON 2 MG/ML
8 INJECTION INTRAMUSCULAR; INTRAVENOUS ONCE
Status: CANCELLED | OUTPATIENT
Start: 2022-10-28 | End: 2022-10-28

## 2022-10-28 RX ORDER — SODIUM CHLORIDE 9 MG/ML
INJECTION, SOLUTION INTRAVENOUS CONTINUOUS
Status: CANCELLED | OUTPATIENT
Start: 2022-10-28

## 2022-10-28 RX ADMIN — Medication 10 ML: at 10:46

## 2022-10-28 RX ADMIN — FOSAPREPITANT 150 MG: 150 INJECTION, POWDER, LYOPHILIZED, FOR SOLUTION INTRAVENOUS at 14:35

## 2022-10-28 RX ADMIN — ATROPINE SULFATE 0.4 MG: 0.4 INJECTION, SOLUTION INTRAVENOUS at 16:23

## 2022-10-28 RX ADMIN — FLUOROURACIL 5000 MG: 50 INJECTION, SOLUTION INTRAVENOUS at 18:50

## 2022-10-28 RX ADMIN — DEXTROSE MONOHYDRATE 100 ML/HR: 50 INJECTION, SOLUTION INTRAVENOUS at 13:58

## 2022-10-28 RX ADMIN — SODIUM CHLORIDE, PRESERVATIVE FREE 10 ML: 5 INJECTION INTRAVENOUS at 13:55

## 2022-10-28 RX ADMIN — ONDANSETRON 8 MG: 2 INJECTION INTRAMUSCULAR; INTRAVENOUS at 14:03

## 2022-10-28 RX ADMIN — DEXAMETHASONE SODIUM PHOSPHATE 12 MG: 4 INJECTION, SOLUTION INTRAMUSCULAR; INTRAVENOUS at 14:14

## 2022-10-28 RX ADMIN — LEUCOVORIN CALCIUM 850 MG: 350 INJECTION, POWDER, LYOPHILIZED, FOR SUSPENSION INTRAMUSCULAR; INTRAVENOUS at 17:12

## 2022-10-28 RX ADMIN — IRINOTECAN HYDROCHLORIDE 320 MG: 20 INJECTION, SOLUTION INTRAVENOUS at 17:12

## 2022-10-28 RX ADMIN — FLUOROURACIL 850 MG: 50 INJECTION, SOLUTION INTRAVENOUS at 18:46

## 2022-10-28 RX ADMIN — POTASSIUM CHLORIDE 40 MEQ: 750 TABLET, EXTENDED RELEASE ORAL at 14:03

## 2022-10-28 RX ADMIN — OXALIPLATIN 180 MG: 5 INJECTION, SOLUTION INTRAVENOUS at 15:04

## 2022-10-28 RX ADMIN — SODIUM CHLORIDE, PRESERVATIVE FREE 10 ML: 5 INJECTION INTRAVENOUS at 18:50

## 2022-10-28 ASSESSMENT — ENCOUNTER SYMPTOMS
DIARRHEA: 0
HEMOPTYSIS: 0
VOMITING: 0
EYES NEGATIVE: 1
BACK PAIN: 0
SORE THROAT: 0
NAUSEA: 0
BLOOD IN STOOL: 0
NAUSEA: 0
CONSTIPATION: 1
ABDOMINAL PAIN: 0
ABDOMINAL DISTENTION: 0
EYE PROBLEMS: 0
RESPIRATORY NEGATIVE: 1
SHORTNESS OF BREATH: 0
COUGH: 0
ALLERGIC/IMMUNOLOGIC NEGATIVE: 1
SCLERAL ICTERUS: 0
CONSTIPATION: 1
ABDOMINAL PAIN: 0

## 2022-10-28 ASSESSMENT — PATIENT HEALTH QUESTIONNAIRE - PHQ9
SUM OF ALL RESPONSES TO PHQ QUESTIONS 1-9: 0
SUM OF ALL RESPONSES TO PHQ9 QUESTIONS 1 & 2: 0
SUM OF ALL RESPONSES TO PHQ QUESTIONS 1-9: 0
2. FEELING DOWN, DEPRESSED OR HOPELESS: 0
1. LITTLE INTEREST OR PLEASURE IN DOING THINGS: 0
SUM OF ALL RESPONSES TO PHQ QUESTIONS 1-9: 0
SUM OF ALL RESPONSES TO PHQ QUESTIONS 1-9: 0

## 2022-10-28 NOTE — PROGRESS NOTES
Arrived to the [facility]. Folfirinox completed. Patient received additional Potassium Chloride of 40 meq orally for Potassium level of 3.1 as ordered. Patient tolerated without difficulty. Any issues or concerns during appointment: None. Patient aware of next infusion appointment on  at 11/01/2022  at 0800. Patient aware of next Patient instructed to call provider with temperature of 100.4 or greater or nausea/vomiting/ diarrhea or pain not controlled by medications. Patient aware to avoid cold temperatures and cold foods and beverages due to receiving Oxaliplatin medication. Discharged Ambulatory with family member with Fluoruracil chemo pump infusing at 5ml/hr via port with clamps x 2 unclamped.

## 2022-10-28 NOTE — PROGRESS NOTES
[facility]: Established patient - follow up     Chief Complaint   Patient presents with    Cancer    Chemotherapy    Follow-up        Reason for Referral: Abdominal pain; peritoneal metastatic disease   Referring Provider: Darien Hernandez NP   Primary Care Provider: Julissa Bob MD   Family History of Cancer/Hematologic Disorders: Family history is significant for sister with breast cancer. Presenting Symptoms: Progressively worsening, persistent abdominal pain x several months    History of Present Illness:  Mr. Roldan  is a 62 y.o. male who presents today for FU regarding adenocarcinoma with peritoneal metastatic disease. The past medical history is significant for tobacco use (recent pack per day smoker x 30+ years - now down to 1/3PPD), PUD, paresthesia/pain of bilateral upper extremities, HLD, HTN, diverticulitis,  colon polyps, hiatal hernia, chronic right shoulder pain, bilateral carpal tunnel syndrome, and partial colon resection. He presented to the [facility] ED on 5/12/22 with a complaint of persistent abdominal pain for several months that was becoming progressively  worse. EGD and colonoscopy on 2/25/22 revealed findings of hiatal hernia, gastric polyps, several benign colon polyps, diverticulosis, and internal hemorrhoids. CT of the abdomen on 3/8/22 showed no acute findings. He presented to [facility] ER x 2 for  evaluation (5/3/22 and 5/10/22). RUQ abdominal ultrasound was completed on 5/3/22 in the ED at [facility] demonstrating no acute findings to explain patient's pain; probable hepatic  steatosis; small echogenic mural foci in the gallbladder which are nonmobile, likely representing cholesterol polyps, largest measuring 4 mm; and small volume simple ascites in the right upper quadrant and left lower quadrant, nonspecific.   CT AP with  contrast at [facility] ED 5/10/22 showed a partial small bowel obstruction; small to moderate amount of free fluid in the abdomen and pelvis; and nonspecific stranding of the omentum primarily in the left upper quadrant. Radiologist noted that follow-up  CT was recommended to differentiate passive congestion from omental disease. On 5/11/22, abdominal series again showed multiple dilated small bowel loops with enteric contrast appearing to extend into the proximal colon, suggesting partial small  bowel obstruction. CT AP in the [facility] ED on 5/12/22 identified extensive peritoneal nodularity and mild ascites consistent with peritoneal  metastatic disease with primary lesion not definitely identified; dilated fluid-filled loops of small bowel possibly related to gastroenteritis with no definite obstruction and no obvious bowel mass; and sclerosis of S1 vertebral body. Radiologist recommended consideration of follow-up bone scan to assess for bone metastases. Patient was admitted to  the Hospitalist service on 5/12/22, and IR consulted for possible paracentesis however very small volume was inaccessible. CT Chest obtained on 5/13/22 showed No evidence of primary malignancy or metastasis within the chest.  Follow-up hepatobiliary  scan was suggested to assess for common bile duct obstruction. Oncology was consulted but did not see patient inhouse as pt was dischared. Upon discharge on 5/14/22, patient was instructed to follow up with [facility]. During consultation, we discussed his workup. We looked at the images together demonstrating some of the findings concerning for peritoneal nodularity/peritoneal disease. Discussed anatomy of the findings utilizing images to help pt understand what we are looking at and suspecting. He and wife were appreciative of the time spent to review these. We also addressed pt's pain. We also reviewed images of sclerosing lesion - bone scan recommended. He also was recommended to undergo HIDA scan after recent US per PCP. He is tired of tests, frustrated.   Feelings validated and stressed importance of workup to diverticulitis. RIGHT UPPER QUADRANT ABDOMINAL ULTRASOUND 5/3/2022    FINDINGS:   Pancreas: Not visualized, obscured by bowel gas. Intrahepatic IVC: Normal.   Main  Portal Vein: Patent with normal directional flow. Liver: Liver contour is normal. Liver appears diffusely increased in echogenicity consistent with hepatic steatosis. There is fatty sparing around the gallbladder fossa. Gallbladder: Gallbladder  is normal in size. No evidence of gallbladder wall thickening. No gallstones are seen. There are several nonmobile echogenic mural foci in the proximal gallbladder body/neck, largest measuring 4 mm, without shadowing, likely small cholesterol polyps. Bile ducts: No evidence of biliary ductal dilation. The common bile duct measures 3 mm in diameter. Right kidney: Normal.   Left Upper Quadrant: Limited images of the left upper quadrant are unremarkable. Spleen measures 12.1 cm in length. Free fluid: Trace simple free fluid in the right upper quadrant and left lower quadrant. IMPRESSION:    1. No acute findings to explain patient's pain. 2. Probable hepatic steatosis. 3. Small echogenic mural foci in the gallbladder which are nonmobile, likely representing cholesterol polyps, largest measuring 4 mm. There are no specific ultrasound  follow up recommendations for polyps of this small size. 4. Small volume simple ascites in the right upper quadrant and left lower quadrant, nonspecific. CT ABDOMEN - PELVIS WITH CONTRAST 5/10/22   FINDINGS:   Liver: Normal    Portal Vein: Normal   Gallbladder - Biliary Tree: Normal   Pancreas: Normal   Spleen: Normal   Retroperitoneum:   Adrenals: Normal   Kidneys:  Normal    Aorto - Cava:  Calcification of the abdominal aorta without aneurysm formation. Lymphatics:  Normal   GI - Mesentery - Peritoneum: Multiple dilated mid small bowel loops without a focal transition point.  The appendix is normal. Small to  moderate amount of free fluid in the pelvis and right flank. Nonspecific stranding of the omentum in the left upper quadrant. Small fatty umbilical hernia. Pelvis: Normal   Lower Chest: Normal   Soft tissues - MSK: Normal    IMPRESSION:   1. Partial small bowel obstruction. 2. Small to moderate amount of free fluid in the abdomen and pelvis. 3. Nonspecific stranding of the omentum primarily  in the left upper quadrant. Follow-up CT is recommended to differentiate passive congestion from omental a static disease. ABDOMEN SERIES 5/11/22   FINDINGS:   Chest: Heart size within normal limits. Lungs are clear. No pleural effusion or pneumothorax. Bowel: Multiple dilated small bowel loops with air-fluid levels on the upright view. Contrast distends small bowel loops in the left lateral abdomen and lower abdomen. Contrast in the right hemiabdomen appears to be within proximal colon. Scattered colonic  gas. Peritoneum / Retroperitoneum: No pneumoperitoneum. Soft tissues / Bones: No acute osseous findings. The contrast in urinary bladder. Lines / Support Apparatus: None. IMPRESSION: Redemonstrated of multiple dilated small bowel loops with enteric contrast appearing to extend into the proximal colon, suggesting only partial small bowel obstruction. Continued radiographic follow-up is advised. CT OF THE ABDOMEN AND PELVIS 5/12/22   FINDINGS:   LOWER CHEST: Normal.   HEPATOBILIARY: No evidence of focal liver mass. No calcified gallstones. PANCREAS: Normal.   SPLEEN: Normal.    ADRENAL GLANDS: Normal.    KIDNEYS/BLADDER: Kidneys and bladder are unremarkable. No hydronephrosis or urinary tract calculi. BOWEL: Dilated fluid-filled loops of small bowel are present throughout the abdomen. No definite transition point. Oral contrast noted in the colon. No definite evidence of bowel mass but there is extensive peritoneal nodularity and trace ascites highly  concerning for peritoneal metastatic disease.    LYMPH NODES: No enlarged retroperitoneal or pelvic lymph nodes. Peritoneal nodularity again noted most likely metastatic disease. VASCULATURE: Unremarkable. PELVIC ORGANS: Prostate gland and rectum are unremarkable. Small amount of free pelvic fluid is noted. MUSCULOSKELETAL: Degenerative spine changes are noted. Mild sclerosis involving the S1 vertebral body is present on image 74 of series 602. IMPRESSION   1. Extensive peritoneal nodularity and mild ascites consistent with peritoneal metastatic disease. Primary lesion not definitely identified. 2. Dilated fluid-filled loops of small bowel possibly related to gastroenteritis. No definite obstruction. No obvious bowel mass. 3. Sclerosis S1 vertebral body. Consider follow-up bone scan to assess for bone metastases. LIMITED ABDOMINAL ULTRASOUND 5/13/22   FINDINGS: A single limited gray scale image was submitted of an undisclosed location in the abdomen. Images demonstrate a small amount of  ascites as seen on comparison CT. IMPRESSION   1. Small volume ascites. CT CHEST WITH CONTRAST 5/13/22   FINDINGS:   Mediastinum and visualized thyroid: Normal.   Heart: Normal.    Large Vessels: Normal.    Pleura: Normal.    Lungs: No lung mass clearly discerned. Mild scarring or atelectasis in the right lung base. No suspicious lung nodule. No consolidation or zulma pulmonary edema. Airways: Normal.    Lymph nodes: Normal.    Bones/Soft tissues: Normal.    Visualized abdomen: Partially imaged omental nodules. Perihepatic ascites noted. IMPRESSION: No evidence of primary malignancy or metastasis within the chest       ABDOMINAL ULTRASOUND 5/17/22   FINDINGS:    LIVER: 17.3 cm. Slight increase in echogenicity without focal mass. BILE DUCTS: No intrahepatic bile duct dilatation. CBD diameter = 8 mm. GALLBLADDER: It is unremarkable in appearance without stones or sludge. Echogenic polyp measures 0.5 cm. No gallbladder wall thickening. Mild ascites.    PANCREAS: Normal.   SPLEEN: Borderline enlarged at 12.2 cm. RIGHT KIDNEY: 13.3 cm. No mass or hydronephrosis. LEFT KIDNEY: 14.3 cm. No mass or hydronephrosis. ABDOMINAL AORTA AND IVC: Normal in size. ASCITES: Mild   IMPRESSION: Possible mild fatty infiltration liver. No focal mass. Gallbladder polyp but no stones or gallbladder wall thickening. Mild ascites. Mildly prominent common bile duct. Follow-up hepatobiliary scan could be performed to assess for common bile duct obstruction. 04/02/2021 (COVID-19, Wallie Salon, Primary or Immunocompromised Series, MRNA, PF, 100mcg/0.5mL)   04/30/2021 (COVID-19, Wallie Salon, Primary or Immunocompromised Series, MRNA, PF, 100mcg/0.5mL)   11/16/2021 (COVID-19, Moderna Booster, PF, 0.25mL Dose)      5/18/22 heme/onc consultation   5/20/22 Dr Violeta Bueno consult - sent to ED for admission/workup   5/23/22 omental bx - IR   5/27/22 Dr Violeta Bueno - diagnostic lap, omental mass and mesentery mass excision, G-tube placement for venting  6/1/22 painful G-tube - tract recanalized and new G-tube placed   6/12/22 C1 FOLFIRINOX completed - dose adjusted   6/14/22 d/c   6/24/22 FU sx - G tube maint instructions/staples removed - RTC PRN   6/24/22 FU after hospitalization - discussed PC/plan for tx  7/8/22 FU - mainly concerned about PICC line without blood return, decline cathflo, seeing José Miguel Pham in [facility] on Monday. Will increase hydration at home. HH for TPN.    7/18/22 Follow up after hospitalization. He will complete course of Levaquin as prescribed for infection around G-tube. Would like to proceed with cycle 3 FOLFIRINOX with increased dosing. 8/4/22 Cycle 4 FOLFIRINOX - continue dose escalation. He will complete course of Levaquin/Diflucan as prescribed for infection around G-tube, site looks good today. 8/17/22 C5 FOLFIRINOX - wishes to pw full dose accepting risks; wishes for G tube to be removed - will need to time with labs; plan to drop dose in opioids - PC seeing pt today.   RD seeing pt. Plan for restaging in ~Oct.    9/1/22 FU FOLFIRINOX C5 full dose now; imaging in Oct   9/15/22 FU FOLFIRINOX C6-doing well, weight up 11#  9/30/22 FU FOLFIRINOX-C8 defer 1 week due to neutropenia, ANC 0.5  10/13/22 FU FOLFIRINOX 8; CT CAP reviewed and no POD. 10/28/22 Stat ultrasound RUE and then proceed with cycle 9 FOLFIRINOX. Family History   Problem Relation Age of Onset    No Known Problems Brother     Cancer Sister         breast    Hypertension Mother     Heart Disease Mother     Diabetes Father     Osteoarthritis Father     Alcohol Abuse Father     Hypertension Father     Diabetes Mother       Social History     Socioeconomic History    Marital status:      Spouse name: None    Number of children: None    Years of education: None    Highest education level: None   Tobacco Use    Smoking status: Former     Packs/day: 1.00     Types: Cigarettes    Smokeless tobacco: Never   Substance and Sexual Activity    Alcohol use: No    Drug use: No        Review of Systems   Constitutional:  Negative for appetite change, diaphoresis, fatigue, fever and unexpected weight change. HENT:   Negative for sore throat. Eyes:  Negative for eye problems and icterus. Respiratory:  Negative for cough, hemoptysis and shortness of breath. Cardiovascular:  Negative for chest pain, leg swelling and palpitations. Gastrointestinal:  Positive for constipation (controlled). Negative for abdominal distention, abdominal pain, blood in stool, diarrhea, nausea and vomiting. Endocrine: Negative for hot flashes. Genitourinary:  Negative for dysuria. Musculoskeletal:  Negative for arthralgias, back pain and gait problem. Skin:  Negative for itching, rash and wound. Neurological:  Negative for dizziness, extremity weakness, gait problem, headaches, light-headedness, numbness, seizures and speech difficulty. Hematological:  Negative for adenopathy. Does not bruise/bleed easily. Psychiatric/Behavioral:  Positive for depression (better). Negative for decreased concentration and sleep disturbance. The patient is nervous/anxious (better). No Known Allergies  Past Medical History:   Diagnosis Date    Bilateral carpal tunnel syndrome 12/9/2020    Chronic right shoulder pain 11/30/2020    Colon polyps 3/11/2013    Diverticulitis 3/11/2013    HTN (hypertension) 3/11/2013    Hyperlipidemia 3/11/2013    Paresthesia and pain of both upper extremities 11/30/2020    PUD (peptic ulcer disease) 3/11/2013    History of     Right elbow pain 12/9/2020    Wheezing 3/11/2013     Past Surgical History:   Procedure Laterality Date    COLONOSCOPY  2/25/09    colonoscopy per Dr. Merlene Fofana with need for repeat every 3 years    COLONOSCOPY  02/25/2022    Dr. Paige Jackman; polyps of the ascending, transverse, descending, and sigmoid colons; internal hemorrhoid; diverticulosis    LAPAROSCOPY N/A 5/27/2022    LAPAROSCOPY DIAGNOSTIC , OPEN EXPLORATORY LAPAROTOMY, MASS EXCISION, G-TUBE PLACEMENT performed by Rajeev Keith MD at [address] N/A 6/3/2022    PORT INSERTION performed by Rajeev Keith MD at [address] UNLISTED  October 2004    partial colon resection per Dr. Pramod Schwarz  02/25/2022    Dr. Paige Jackman; hiatal hernia gastric polyps     Current Outpatient Medications   Medication Sig Dispense Refill    potassium chloride (KLOR-CON M) 20 MEQ extended release tablet Take 1 tablet by mouth 2 times daily for 7 days 14 tablet 0    magnesium oxide (MAG-OX) 400 MG tablet Take 1 tablet by mouth in the morning, at noon, and at bedtime 90 tablet 2    Multiple Vitamins-Minerals (MULTIVITAMIN MEN 50+ PO) Take by mouth      pantoprazole (PROTONIX) 40 MG tablet TAKE 1 TABLET BY MOUTH IN THE MORNING AND 1 TABLET IN THE EVENING. TAKE BEFORE MEALS. 180 tablet 0    escitalopram (LEXAPRO) 10 MG tablet Take 1 tablet by mouth in the morning.  30 tablet 5    dicyclomine (BENTYL) 20 MG tablet TAKE 1 TABLET BY MOUTH EVERY SIX (6) HOURS. 360 tablet 0    polyethylene glycol (GLYCOLAX) 17 GM/SCOOP powder Take 17 g by mouth daily      rosuvastatin (CRESTOR) 10 MG tablet Take 10 mg by mouth      oxyCODONE (ROXICODONE INTENSOL) 100 MG/5ML concentrated solution Take 10 mg by mouth every 4 hours as needed for Pain. 1 mls under tongue (Patient not taking: Reported on 10/13/2022)      OLANZapine zydis (ZYPREXA) 5 MG disintegrating tablet Take 1 tablet by mouth nightly Do not take with promethazine (Patient not taking: Reported on 10/13/2022) 30 tablet 3    busPIRone (BUSPAR) 10 MG tablet Take 1 tablet by mouth 3 times daily (Patient not taking: No sig reported) 90 tablet 0    ondansetron (ZOFRAN-ODT) 8 MG TBDP disintegrating tablet Take 1 tablet by mouth every 8 hours (Patient not taking: Reported on 10/13/2022) 90 tablet 0    promethazine (PHENERGAN) 12.5 MG tablet Take 1 tablet by mouth every 6 hours as needed for Nausea (Patient not taking: Reported on 10/13/2022) 90 tablet 0    naloxone 4 MG/0.1ML LIQD nasal spray 1 spray by Nasal route as needed for Opioid Reversal  (Patient not taking: No sig reported)      dicyclomine (BENTYL) 10 MG capsule Take 20 mg by mouth every 6 hours  (Patient not taking: No sig reported)      umeclidinium-vilanterol (ANORO ELLIPTA) 62.5-25 MCG/INH AEPB inhaler Inhale 1 puff into the lungs daily  (Patient not taking: No sig reported)       No current facility-administered medications for this visit. Facility-Administered Medications Ordered in Other Visits   Medication Dose Route Frequency Provider Last Rate Last Admin    sodium chloride flush 0.9 % injection 10 mL  10 mL IntraVENous PRN Ernesto Clark MD   10 mL at 10/28/22 1046       No flowsheet data found.     OBJECTIVE:  /80   Pulse 73   Temp 98.5 °F (36.9 °C)   Resp 16   Wt 214 lb (97.1 kg)   SpO2 99%   BMI 30.71 kg/m²       ECOG PERFORMANCE STATUS - 1- Restricted in physically strenuous activity but ambulatory and able to carry out work of a light or sedentary nature such as light house work, office work. Pain - 00 - No pain/10. None/Minimal pain - not affecting QOL     Fatigue - No flowsheet data found. Distress - No flowsheet data found. Physical Exam  Vitals reviewed. Exam conducted with a chaperone present. Constitutional:       General: He is not in acute distress. Appearance: Normal appearance. He is normal weight. He is not ill-appearing or toxic-appearing. HENT:      Head: Normocephalic and atraumatic. Nose: Nose normal. No congestion. Mouth/Throat:      Mouth: Mucous membranes are moist.   Eyes:      General: No scleral icterus. Conjunctiva/sclera: Conjunctivae normal.   Cardiovascular:      Rate and Rhythm: Normal rate and regular rhythm. Heart sounds: No murmur heard. Pulmonary:      Effort: Pulmonary effort is normal. No respiratory distress. Breath sounds: Normal breath sounds. No wheezing, rhonchi or rales. Abdominal:      General: There is no distension. Palpations: Abdomen is soft. There is no mass. Tenderness: There is no abdominal tenderness. There is no guarding or rebound. Musculoskeletal:         General: Swelling (RUE) present. Normal range of motion. Cervical back: Normal range of motion. No rigidity. Right lower leg: No edema. Left lower leg: No edema. Skin:     General: Skin is warm and dry. Coloration: Skin is not jaundiced or pale. Findings: No bruising or rash. Neurological:      General: No focal deficit present. Mental Status: He is alert and oriented to person, place, and time. Motor: No weakness. Coordination: Coordination normal.      Gait: Gait normal.   Psychiatric:         Behavior: Behavior normal.         Thought Content:  Thought content normal.        Labs:  Recent Results (from the past 168 hour(s))   CBC with Auto Differential    Collection Time: 10/28/22 10:38 AM   Result Value Ref Range    WBC 3.4 (L) 4.3 - 11.1 K/uL    RBC 3.26 (L) 4.23 - 5.6 M/uL    Hemoglobin 9.5 (L) 13.6 - 17.2 g/dL    Hematocrit 30.5 %    MCV 93.6 82.0 - 102.0 FL    MCH 29.1 26.1 - 32.9 PG    MCHC 31.1 (L) 31.4 - 35.0 g/dL    RDW 15.8 (H) 11.9 - 14.6 %    Platelets 533 (L) 183 - 450 K/uL    MPV 10.3 9.4 - 12.3 FL    nRBC 0.00 0.0 - 0.2 K/uL    Seg Neutrophils 36 (L) 43 - 78 %    Lymphocytes 51 (H) 13 - 44 %    Monocytes 8 4.0 - 12.0 %    Eosinophils % 4 0.5 - 7.8 %    Basophils 1 0.0 - 2.0 %    Immature Granulocytes 0 0.0 - 5.0 %    Segs Absolute 1.2 (L) 1.7 - 8.2 K/UL    Absolute Lymph # 1.7 0.5 - 4.6 K/UL    Absolute Mono # 0.3 0.1 - 1.3 K/UL    Absolute Eos # 0.2 0.0 - 0.8 K/UL    Basophils Absolute 0.0 0.0 - 0.2 K/UL    Absolute Immature Granulocyte 0.0 0.0 - 0.5 K/UL    Differential Type AUTOMATED     Comprehensive Metabolic Panel    Collection Time: 10/28/22 10:38 AM   Result Value Ref Range    Sodium 148 (H) 133 - 143 mmol/L    Potassium 3.1 (L) 3.5 - 5.1 mmol/L    Chloride 116 (H) 101 - 110 mmol/L    CO2 28 21 - 32 mmol/L    Anion Gap 4 2 - 11 mmol/L    Glucose 102 (H) 65 - 100 mg/dL    BUN 8 8 - 23 MG/DL    Creatinine 0.80 0.8 - 1.5 MG/DL    Est, Glom Filt Rate >60 >60 ml/min/1.73m2    Calcium 8.3 8.3 - 10.4 MG/DL    Total Bilirubin 0.3 0.2 - 1.1 MG/DL    ALT 21 12 - 65 U/L    AST 18 15 - 37 U/L    Alk Phosphatase 75 50 - 136 U/L    Total Protein 5.9 (L) 6.3 - 8.2 g/dL    Albumin 3.0 (L) 3.2 - 4.6 g/dL    Globulin 2.9 2.8 - 4.5 g/dL    Albumin/Globulin Ratio 1.0 0.4 - 1.6     Magnesium    Collection Time: 10/28/22 10:38 AM   Result Value Ref Range    Magnesium 1.6 (L) 1.8 - 2.4 mg/dL   CEA    Collection Time: 10/28/22 10:38 AM   Result Value Ref Range    CEA 1.5 0.0 - 3.0 ng/mL       Imaging: reviewed     PATHOLOGY:             6/2022         ASSESSMENT:     Diagnosis Orders   1.  Malignant neoplasm of stomach, unspecified location ([facility] [address]  CBC with Auto Differential    Comprehensive Metabolic Panel    Magnesium      2. Localized swelling of right upper extremity  Vascular duplex upper extremity venous right      3. Hypokalemia  potassium chloride (KLOR-CON M) 20 MEQ extended release tablet      4. Hypomagnesemia  magnesium oxide (MAG-OX) 400 MG tablet      5. Abdominal carcinomatosis [facility])              Mr. CANDACE CARTY [facility] is here for FU of metastatic adenocarcinoma. 1. Metastatic adenocarcinoma   - s/p omental and mesenteric mass bx - c/w upper GI adenocarcinoma    - hx of diverticular dz - s/p previous bowel resection by dr Shelby De La Vega at [address] pending   - We did discuss that his disease is not curable and he VU but wife stated that he believes he can beat this. - here for follow-up prior to cycle 9 FOLFIRINOX. Counts are adequate and he wishes to proceed with full dose treatment. - new right upper extremity swelling - stat ultrasound with DVT, start Xarelto 15 mg BID x 3 weeks and then 20 mg daily. Will need Rx for 20 mg daily after start pack complete.   - CT with no evidence of progressive disease. He and his wife are very pleased with the results. - pain - denies and off of prescribed medications    - nausea - resolved. - appetite/wt loss - TPN has been stopped and he is eating without issue. Weight continues to increase.     - hypokalemia - K+ 3.1, potassium chloride 40 meq now and start potassium chloride 20 meq BID x 7 days  - hypomagnesemia - Mg+ 1.6, start mag oxide 400 mg TID   - He wishes to continue on the current regimen; he previously expressed that in the future, he may wish to proceed with unconventional scheduling of maybe FOLFIRINOX every 3 weeks. He does understand inherent risks with alternate schedule and how this would affect progression of disease. - sclerotic lesion on imaging at S1 - bone scan recommended, has not been completed   - constipation - Miralax as needed.    - Continue good oral nutrition and hydration.   - Encouraged frequent activity throughout the day and rest as needed to combat fatigue.   - Call with any fevers, uncontrolled side effects from treatment or any other worrisome/concerning symptoms. RTC per protocol or sooner if needed      MDM      Lab studies and imaging studies were personally reviewed. Pertinent old records were reviewed. Historical:    - here with his wife for consultation. During today's visit, we discussed his current workup. We looked at the images together demonstrating some of the findings concerning for peritoneal nodularity/peritoneal disease. Discussed anatomy of the findings  utilizing images to help pt understand what we are looking at and suspecting. He and wife were appreciative of the time spent to review these. Discussed need for visual dx/tissue pathology to determine plan - recommend ex lap - refer to Dr Moira Larios - personally  reviewed case with Dr Moira Larios who will expedite the workup and will see pt Fri. US with mild biliary duct dilation - HIDA/ERCP reviewed by PCP   + BM/flatus; He did not like how IV morphine made him feel in the hospital.  He tried tramadol but found no relief and has been taking acetaminophen at home with minimal relief. Discussed different options and he settled on trying  Percocet - he will contact the office to inform us if this is working for him. We discussed SE - not to drive while on the med. Bowel regimen reviewed. He is tired of tests, frustrated. Feelings validated and stressed importance of workup to determine why he has pain. Wt loss from 240 --> 209 noted and expressed concern to pt about this. - completed course of Levaquin/Diflucan for klebsiella pneumoniae and citrobacter freundii both of which were sensitive to Levaquin found around G-tube site.   Wishes for tube removal - reviewed risks of this during chemotherapy - I would like for him to have labs day before scheduled procedure to make sure his counts are adequate per above; case reviewed with Dr Elissa Brantley by me via tel today   - here cycle 8 FOLFIRINOX - labs reviewed-defer 1 week due to neutropenia. - nutrition - G-tube out. He is being weaned off TPN (3x week now) as his oral intake has greatly improved, weight up    - pain - Fentanyl 12mcg with prn oxycodone 10 mg, plans to stop patch on Nader 10/2 PC following  - Plan for surveillance reviewed. Labs discussed. - nausea - resolved. Has Zyprexa QHS (not taking currently) and Zofran PRN. - wt loss/FTT - secondary to disease and tx - on TPN and eating more    - depression/anxiety - better affect, on lexapro 20mg daily, PC adding buspar prn    All questions were asked and answered to the best of my ability. The patient verbalized understanding and agrees with the plan above.           LUCIANA Gaming  [facility]  [id number] [place] [address]  Office : [phone]  Fax : [phone]

## 2022-10-28 NOTE — PROGRESS NOTES
Patient and wife directed to  blood thinner from [address]  and to take as directed for blood clot to right arm. Instructed to use electric razor and if cut occurs to apply direct steady pressure. Wife verbalizes understanding and reports she will  prescription from pharmacy before 1630 today.

## 2022-10-28 NOTE — PROGRESS NOTES
Outpatient Palliative Care at the  [facility]: Office Visit Established Patient    Diagnosis: adenocarcinoma of upper GI    Treatment Plan: mFOLFIRINOX    Treatment Intent: Palliative    Medical Oncologist: Dr. Farshad Linda Oncologist: N/A    Navigator: Chari Pond RN      Chief Complaint:    Chief Complaint   Patient presents with    Follow-up     History of Present Illness:  Mr. Evans is a 65 y.o. 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 Patient initially presented with 6 month history of abdominal pain and 30lb weight loss. A GI workup revealed hiatal hernia, gastric polyps, benign colon polyps, diverticulosis and hemorrhoids. He presented to [facility] ER on 5/12 and CT showed extensive peritoneal nodularity, concerning for metastatic disease, with consequential SBO. He had a laparotomy on 5/27 with venting G tube placement. Biopsies confirmed adenocarcinoma of upper GI primary. He started mFOLFIRINOX on 6/10/22 while hospitalized. Following cycle 2, patient was admitted for sepsis, source determined to be g tube insertion site. CT scan October 2022 with disease response. Interval History:  Patient seen in clinic in coordination with oncology NP. Patient's wife accompanies him today. Overall, patient is feeling well. He has not had any opioids in weeks. He has Zyprexa, Compazine, and Zofran available for nausea, but has not needed. He continues to have some frustration and anxiety related to treatment days. But he has felt much better since he has been able to drive, is only on 1 day of TPN per week, etc.  He has mild constipation, and takes stool softener or Miralax daily. Patient has 5 day history of RUE swelling. Review of Systems:  Review of Systems   Constitutional:  Positive for fatigue. HENT: Negative. Eyes: Negative. Respiratory: Negative. Cardiovascular: Negative. Gastrointestinal:  Positive for constipation. Negative for abdominal pain and nausea. Genitourinary: Negative. Musculoskeletal: Negative. Skin: Negative. Allergic/Immunologic: Negative. Neurological: Negative. Psychiatric/Behavioral:  Positive for agitation and dysphoric mood.          On chemo days       No Known Allergies  Past Medical History:   Diagnosis Date    Bilateral carpal tunnel syndrome 12/9/2020    Chronic right shoulder pain 11/30/2020    Colon polyps 3/11/2013    Diverticulitis 3/11/2013    HTN (hypertension) 3/11/2013    Hyperlipidemia 3/11/2013    Paresthesia and pain of both upper extremities 11/30/2020    PUD (peptic ulcer disease) 3/11/2013    History of     Right elbow pain 12/9/2020    Wheezing 3/11/2013     Past Surgical History:   Procedure Laterality Date    COLONOSCOPY  2/25/09    colonoscopy per Dr. Keira Mullen with need for repeat every 3 years    COLONOSCOPY  02/25/2022    Dr. Sophia Mckeon; polyps of the ascending, transverse, descending, and sigmoid colons; internal hemorrhoid; diverticulosis    LAPAROSCOPY N/A 5/27/2022    LAPAROSCOPY DIAGNOSTIC , OPEN EXPLORATORY LAPAROTOMY, MASS EXCISION, G-TUBE PLACEMENT performed by Sarah Vicente MD at [address] N/A 6/3/2022    PORT INSERTION performed by Sarah Vicente MD at [address] UNLISTED  October 2004    partial colon resection per Dr. Marquez Schmitt  02/25/2022    Dr. Sophia Mckeon; hiatal hernia gastric polyps     Family History   Problem Relation Age of Onset    No Known Problems Brother     Cancer Sister         breast    Hypertension Mother     Heart Disease Mother     Diabetes Father     Osteoarthritis Father     Alcohol Abuse Father     Hypertension Father     Diabetes Mother      Social History     Socioeconomic History    Marital status:      Spouse name: Not on file    Number of children: Not on file Years of education: Not on file    Highest education level: Not on file   Occupational History    Not on file   Tobacco Use    Smoking status: Former     Packs/day: 1.00     Types: Cigarettes    Smokeless tobacco: Never   Substance and Sexual Activity    Alcohol use: No    Drug use: No    Sexual activity: Not on file   Other Topics Concern    Not on file   Social History Narrative    Not on file     Social Determinants of Health     Financial Resource Strain: Not on file   Food Insecurity: Not on file   Transportation Needs: Not on file   Physical Activity: Not on file   Stress: Not on file   Social Connections: Not on file   Intimate Partner Violence: Not on file   Housing Stability: Not on file     Current Outpatient Medications   Medication Sig Dispense Refill    busPIRone (BUSPAR) 7.5 MG tablet Take 1 tablet by mouth daily 15 tablet 0    pantoprazole (PROTONIX) 40 MG tablet Take 1 tablet by mouth daily 30 tablet 1    dicyclomine (BENTYL) 20 MG tablet Take one tab orally twice daily 180 tablet 0    potassium chloride (KLOR-CON M) 20 MEQ extended release tablet Take 1 tablet by mouth 2 times daily for 7 days 14 tablet 0    magnesium oxide (MAG-OX) 400 MG tablet Take 1 tablet by mouth in the morning, at noon, and at bedtime 90 tablet 2    Multiple Vitamins-Minerals (MULTIVITAMIN MEN 50+ PO) Take by mouth      oxyCODONE (ROXICODONE INTENSOL) 100 MG/5ML concentrated solution Take 10 mg by mouth every 4 hours as needed for Pain. 1 mls under tongue (Patient not taking: Reported on 10/13/2022)      escitalopram (LEXAPRO) 10 MG tablet Take 1 tablet by mouth in the morning.  30 tablet 5    OLANZapine zydis (ZYPREXA) 5 MG disintegrating tablet Take 1 tablet by mouth nightly Do not take with promethazine (Patient not taking: Reported on 10/13/2022) 30 tablet 3    ondansetron (ZOFRAN-ODT) 8 MG TBDP disintegrating tablet Take 1 tablet by mouth every 8 hours (Patient not taking: Reported on 10/13/2022) 90 tablet 0 promethazine (PHENERGAN) 12.5 MG tablet Take 1 tablet by mouth every 6 hours as needed for Nausea (Patient not taking: Reported on 10/13/2022) 90 tablet 0    naloxone 4 MG/0.1ML LIQD nasal spray 1 spray by Nasal route as needed for Opioid Reversal  (Patient not taking: No sig reported)      polyethylene glycol (GLYCOLAX) 17 GM/SCOOP powder Take 17 g by mouth daily      rosuvastatin (CRESTOR) 10 MG tablet Take 10 mg by mouth      umeclidinium-vilanterol (ANORO ELLIPTA) 62.5-25 MCG/INH AEPB inhaler Inhale 1 puff into the lungs daily  (Patient not taking: No sig reported)       No current facility-administered medications for this visit. Facility-Administered Medications Ordered in Other Visits   Medication Dose Route Frequency Provider Last Rate Last Admin    sodium chloride flush 0.9 % injection 10 mL  10 mL IntraVENous PRN Rina Felder MD   10 mL at 10/28/22 1046       OBJECTIVE:  Wt Readings from Last 1 Encounters:   10/28/22 214 lb (97.1 kg)     Temp Readings from Last 1 Encounters:   10/28/22 98.5 °F (36.9 °C)     BP Readings from Last 1 Encounters:   10/28/22 120/80     Pulse Readings from Last 1 Encounters:   10/28/22 73        Pain Score: Zero      Physical Exam:  Constitutional: Well developed, well nourished male in no acute distress. HEENT: Normocephalic and atraumatic. Pupils are equal, round, and reactive to light. Extraocular muscles are intact. Sclerae anicteric. Neck supple without JVD. Lymph node   deferred   Skin Warm and dry. No bruising and no rash noted. No erythema. No pallor. Respiratory Unlabored respiratory effort. CVS Regular rate, borderline hypotensive. Abdomen Soft, nontender and nondistended. Venting g tube site healed s/p removal.   Neuro Grossly nonfocal with no obvious sensory or motor deficits. MSK Normal range of motion in general.  RUE swelling. Psych Appropriate affect.        Labs:  Recent Results (from the past 24 hour(s))   CBC with Auto Differential Collection Time: 10/28/22 10:38 AM   Result Value Ref Range    WBC 3.4 (L) 4.3 - 11.1 K/uL    RBC 3.26 (L) 4.23 - 5.6 M/uL    Hemoglobin 9.5 (L) 13.6 - 17.2 g/dL    Hematocrit 30.5 %    MCV 93.6 82.0 - 102.0 FL    MCH 29.1 26.1 - 32.9 PG    MCHC 31.1 (L) 31.4 - 35.0 g/dL    RDW 15.8 (H) 11.9 - 14.6 %    Platelets 119 (L) 365 - 450 K/uL    MPV 10.3 9.4 - 12.3 FL    nRBC 0.00 0.0 - 0.2 K/uL    Seg Neutrophils 36 (L) 43 - 78 %    Lymphocytes 51 (H) 13 - 44 %    Monocytes 8 4.0 - 12.0 %    Eosinophils % 4 0.5 - 7.8 %    Basophils 1 0.0 - 2.0 %    Immature Granulocytes 0 0.0 - 5.0 %    Segs Absolute 1.2 (L) 1.7 - 8.2 K/UL    Absolute Lymph # 1.7 0.5 - 4.6 K/UL    Absolute Mono # 0.3 0.1 - 1.3 K/UL    Absolute Eos # 0.2 0.0 - 0.8 K/UL    Basophils Absolute 0.0 0.0 - 0.2 K/UL    Absolute Immature Granulocyte 0.0 0.0 - 0.5 K/UL    Differential Type AUTOMATED     Comprehensive Metabolic Panel    Collection Time: 10/28/22 10:38 AM   Result Value Ref Range    Sodium 148 (H) 133 - 143 mmol/L    Potassium 3.1 (L) 3.5 - 5.1 mmol/L    Chloride 116 (H) 101 - 110 mmol/L    CO2 28 21 - 32 mmol/L    Anion Gap 4 2 - 11 mmol/L    Glucose 102 (H) 65 - 100 mg/dL    BUN 8 8 - 23 MG/DL    Creatinine 0.80 0.8 - 1.5 MG/DL    Est, Glom Filt Rate >60 >60 ml/min/1.73m2    Calcium 8.3 8.3 - 10.4 MG/DL    Total Bilirubin 0.3 0.2 - 1.1 MG/DL    ALT 21 12 - 65 U/L    AST 18 15 - 37 U/L    Alk Phosphatase 75 50 - 136 U/L    Total Protein 5.9 (L) 6.3 - 8.2 g/dL    Albumin 3.0 (L) 3.2 - 4.6 g/dL    Globulin 2.9 2.8 - 4.5 g/dL    Albumin/Globulin Ratio 1.0 0.4 - 1.6     Magnesium    Collection Time: 10/28/22 10:38 AM   Result Value Ref Range    Magnesium 1.6 (L) 1.8 - 2.4 mg/dL   CEA    Collection Time: 10/28/22 10:38 AM   Result Value Ref Range    CEA 1.5 0.0 - 3.0 ng/mL       Imaging:  No results found for this or any previous visit. ASSESSMENT:   Diagnosis Orders   1. Anxiety about health  busPIRone (BUSPAR) 7.5 MG tablet      2. Chemotherapy-induced nausea        3. Metastatic adenocarcinoma ([address]        4. Encounter for palliative care  busPIRone (BUSPAR) 7.5 MG tablet            PLAN:  Lab studies and imaging studies were personally reviewed. Pertinent old records were reviewed from [facility]. 1. Abdominal pain: Resolved. Has not taken fentanyl/oxycodone in weeks. 2. Nausea: Currently denies. He has mild nausea post chemo. He takes Zyprexa HS, Zofran, and Compazine all prn.    3.  Constipation: Continue stool softener or Miralax daily. 4. Dysphoric mood: Mainly on chemotherapy days. Continue Lexapro daily. Provided Buspar 7.5mg daily- may take on chemotherapy days. Advanced Care Planning Discussed: None today    Will follow up in: 2-4 weeks or sooner if needed. I have reviewed the patient's controlled substance prescription history, as maintained in the [place] prescription monitoring program, so that the prescriptions(s) for a controlled substance can be given.   Last Date Reviewed: 10/28/22          LUCY Best - CNP  Outpatient Palliative Care at the  [address]  Office : [phone]  Fax : [phone]

## 2022-10-28 NOTE — TELEPHONE ENCOUNTER
Patient takes 20 mg  States only takes 2 pills a day  Does not need a refill at this time.   Made aware to call for a refill when approx 30 pills left

## 2022-10-28 NOTE — PROGRESS NOTES
Stat call from Penelope Draper at [address] still has some residual DVT on right arm since the last US.   I reported to NINFA Landa Tito

## 2022-10-28 NOTE — PATIENT INSTRUCTIONS
Patient Instructions from Today's Visit    Reason for Visit:  Prechemo    Plan:  Doppler Ultrasound of right upper extremity    Follow Up:  Office visit as schedule    Recent Lab Results:  Hospital Outpatient Visit on 10/28/2022   Component Date Value Ref Range Status    WBC 10/28/2022 3.4 (A)  4.3 - 11.1 K/uL Final    RBC 10/28/2022 3.26 (A)  4.23 - 5.6 M/uL Final    Hemoglobin 10/28/2022 9.5 (A)  13.6 - 17.2 g/dL Final    Hematocrit 10/28/2022 30.5  % Final    MCV 10/28/2022 93.6  82.0 - 102.0 FL Final    MCH 10/28/2022 29.1  26.1 - 32.9 PG Final    MCHC 10/28/2022 31.1 (A)  31.4 - 35.0 g/dL Final    RDW 10/28/2022 15.8 (A)  11.9 - 14.6 % Final    Platelets 51/51/8633 136 (A)  150 - 450 K/uL Final    MPV 10/28/2022 10.3  9.4 - 12.3 FL Final    nRBC 10/28/2022 0.00  0.0 - 0.2 K/uL Final    **Note: Absolute NRBC parameter is now reported with Hemogram**    Seg Neutrophils 10/28/2022 36 (A)  43 - 78 % Final    Lymphocytes 10/28/2022 51 (A)  13 - 44 % Final    Monocytes 10/28/2022 8  4.0 - 12.0 % Final    Eosinophils % 10/28/2022 4  0.5 - 7.8 % Final    Basophils 10/28/2022 1  0.0 - 2.0 % Final    Immature Granulocytes 10/28/2022 0  0.0 - 5.0 % Final    Segs Absolute 10/28/2022 1.2 (A)  1.7 - 8.2 K/UL Final    Absolute Lymph # 10/28/2022 1.7  0.5 - 4.6 K/UL Final    Absolute Mono # 10/28/2022 0.3  0.1 - 1.3 K/UL Final    Absolute Eos # 10/28/2022 0.2  0.0 - 0.8 K/UL Final    Basophils Absolute 10/28/2022 0.0  0.0 - 0.2 K/UL Final    Absolute Immature Granulocyte 10/28/2022 0.0  0.0 - 0.5 K/UL Final    Differential Type 10/28/2022 AUTOMATED    Final    Sodium 10/28/2022 148 (A)  133 - 143 mmol/L Final    Potassium 10/28/2022 3.1 (A)  3.5 - 5.1 mmol/L Final    Chloride 10/28/2022 116 (A)  101 - 110 mmol/L Final    CO2 10/28/2022 28  21 - 32 mmol/L Final    Anion Gap 10/28/2022 4  2 - 11 mmol/L Final    Glucose 10/28/2022 102 (A)  65 - 100 mg/dL Final    BUN 10/28/2022 8  8 - 23 MG/DL Final    Creatinine 10/28/2022 0. 80  0.8 - 1.5 MG/DL Final    Est, Glom Filt Rate 10/28/2022 >60  >60 ml/min/1.73m2 Final    Comment:      Pediatric calculator link: Miguel.at. org/professionals/kdoqi/gfr_calculatorped       Effective Oct 3, 2022       These results are not intended for use in patients <25years of age. eGFR results are calculated without a race factor using  the 2021 CKD-EPI equation. Careful clinical correlation is recommended, particularly when comparing to results calculated using previous equations. The CKD-EPI equation is less accurate in patients with extremes of muscle mass, extra-renal metabolism of creatinine, excessive creatine ingestion, or following therapy that affects renal tubular secretion. Calcium 10/28/2022 8.3  8.3 - 10.4 MG/DL Final    Total Bilirubin 10/28/2022 0.3  0.2 - 1.1 MG/DL Final    ALT 10/28/2022 21  12 - 65 U/L Final    AST 10/28/2022 18  15 - 37 U/L Final    Alk Phosphatase 10/28/2022 75  50 - 136 U/L Final    Total Protein 10/28/2022 5.9 (A)  6.3 - 8.2 g/dL Final    Albumin 10/28/2022 3.0 (A)  3.2 - 4.6 g/dL Final    Globulin 10/28/2022 2.9  2.8 - 4.5 g/dL Final    Albumin/Globulin Ratio 10/28/2022 1.0  0.4 - 1.6   Final    Magnesium 10/28/2022 1.6 (A)  1.8 - 2.4 mg/dL Final    CEA 10/28/2022 1.5  0.0 - 3.0 ng/mL Final    Comment: Nonsmoker:  <3.0 ng/mL  Smoker:     <5.0 ng/mL  [facility]. Patient's results of tumor marker testing may not be comparable to labs using different manufacturers/methods.          Treatment Summary has been discussed and given to patient: n/a    -------------------------------------------------------------------------------------------------------------------  Please call our office at [phone] if you have any  of the following symptoms:   Fever of 100.5 or greater  Chills  Shortness of breath  Swelling or pain in one leg    After office hours an answering service is available and will contact a provider for emergencies or if you are experiencing any of the above symptoms. Patient does express an interest in My Chart. My Chart log in information explained on the after visit summary printout at the . [address] desk.     Kenya Martin RN

## 2022-10-30 DIAGNOSIS — C16.9 MALIGNANT NEOPLASM OF STOMACH, UNSPECIFIED LOCATION (HCC): Primary | ICD-10-CM

## 2022-10-31 ENCOUNTER — HOSPITAL ENCOUNTER (OUTPATIENT)
Dept: INFUSION THERAPY | Age: 62
Discharge: HOME | End: 2022-10-31
Payer: COMMERCIAL

## 2022-10-31 VITALS
RESPIRATION RATE: 16 BRPM | OXYGEN SATURATION: 96 % | SYSTOLIC BLOOD PRESSURE: 126 MMHG | TEMPERATURE: 98 F | DIASTOLIC BLOOD PRESSURE: 81 MMHG | HEART RATE: 82 BPM

## 2022-10-31 PROCEDURE — 2580000003 HC RX 258: Performed by: INTERNAL MEDICINE

## 2022-10-31 PROCEDURE — 96523 IRRIG DRUG DELIVERY DEVICE: CPT

## 2022-10-31 RX ORDER — SODIUM CHLORIDE 0.9 % (FLUSH) 0.9 %
5-40 SYRINGE (ML) INJECTION 2 TIMES DAILY
Status: DISCONTINUED | OUTPATIENT
Start: 2022-10-31 | End: 2022-11-01 | Stop reason: HOSPADM

## 2022-10-31 RX ADMIN — SODIUM CHLORIDE, PRESERVATIVE FREE 10 ML: 5 INJECTION INTRAVENOUS at 08:26

## 2022-10-31 NOTE — PROGRESS NOTES
Arrived to the [facility]. Pump D/C completed. Provided education on hydration    Patient instructed to report any side affects to ordering provider. Patient tolerated well. Any issues or concerns during appointment: none. Patient aware of next infusion appointment on 11/11/2022 (date) at 0930 (time). Discharged ambulatory.

## 2022-11-01 NOTE — PROGRESS NOTES
Nutrition F/U:  Assessment:  Pt seen during office visit w/ NP, receiving his 10th dose of FOLFIRINOX today, needs doppler US of right arm prior to chemo today. Pt has been weaned from TPN, continues to tolerate a Regular diet and continues to gain weight. Labs notable for Na (148), potassium (3.1), CL (116), magnesium (1.6). Current BW: 214#, up 9# over the past 2 weeks - swelling in the right arm noted. Intervention:  1. Continue w/ Regular diet, push intake of fluids  2. TPN weaned/discontinued  3. Oral potassium (20 mEq) BID added today, and oral magnesium (400 mg) TID     Monitoring/Evaluation:  1. RD to follow up during next office visit - follow up wt status, tolerance/intake of po diet, symptom management.       3 [facility], [address], [address] [facility]

## 2022-11-05 DIAGNOSIS — Z51.5 ENCOUNTER FOR PALLIATIVE CARE: ICD-10-CM

## 2022-11-05 DIAGNOSIS — F41.8 ANXIETY ABOUT HEALTH: ICD-10-CM

## 2022-11-07 RX ORDER — BUSPIRONE HYDROCHLORIDE 7.5 MG/1
TABLET ORAL
Qty: 15 TABLET | Refills: 0 | OUTPATIENT
Start: 2022-11-07

## 2022-11-09 ASSESSMENT — ENCOUNTER SYMPTOMS
SHORTNESS OF BREATH: 0
SCLERAL ICTERUS: 0
BACK PAIN: 0
ABDOMINAL DISTENTION: 0
HEMOPTYSIS: 0
CONSTIPATION: 1
DIARRHEA: 0
SORE THROAT: 0
COUGH: 0
ABDOMINAL PAIN: 0
VOMITING: 0
NAUSEA: 0
BLOOD IN STOOL: 0
EYE PROBLEMS: 0

## 2022-11-09 NOTE — PROGRESS NOTES
[facility] Hematology and Oncology: Established patient - follow up     Chief Complaint   Patient presents with    Follow-up      Reason for Referral: Abdominal pain; peritoneal metastatic disease   Referring Provider: Miak Vega NP   Primary Care Provider: Cindy Mclaughlin MD   Family History of Cancer/Hematologic Disorders: Family history is significant for sister with breast cancer. Presenting Symptoms: Progressively worsening, persistent abdominal pain x several months    History of Present Illness:  Mr. Roldan  is a 62 y.o. male who presents today for FU regarding adenocarcinoma with peritoneal metastatic disease. The past medical history is significant for tobacco use (recent pack per day smoker x 30+ years - now down to 1/3PPD), PUD, paresthesia/pain of bilateral upper extremities, HLD, HTN, diverticulitis,  colon polyps, hiatal hernia, chronic right shoulder pain, bilateral carpal tunnel syndrome, and partial colon resection. He presented to the [facility] ED on 5/12/22 with a complaint of persistent abdominal pain for several months that was becoming progressively  worse. EGD and colonoscopy on 2/25/22 revealed findings of hiatal hernia, gastric polyps, several benign colon polyps, diverticulosis, and internal hemorrhoids. CT of the abdomen on 3/8/22 showed no acute findings. He presented to [facility] ER x 2 for  evaluation (5/3/22 and 5/10/22). RUQ abdominal ultrasound was completed on 5/3/22 in the ED at [facility] demonstrating no acute findings to explain patient's pain; probable hepatic  steatosis; small echogenic mural foci in the gallbladder which are nonmobile, likely representing cholesterol polyps, largest measuring 4 mm; and small volume simple ascites in the right upper quadrant and left lower quadrant, nonspecific.   CT AP with  contrast at [facility] ED 5/10/22 showed a partial small bowel obstruction; small to moderate amount of free fluid in the abdomen and pelvis; and nonspecific stranding of the omentum primarily in the left upper quadrant. Radiologist noted that follow-up  CT was recommended to differentiate passive congestion from omental disease. On 5/11/22, abdominal series again showed multiple dilated small bowel loops with enteric contrast appearing to extend into the proximal colon, suggesting partial small  bowel obstruction. CT AP in the [facility] ED on 5/12/22 identified extensive peritoneal nodularity and mild ascites consistent with peritoneal  metastatic disease with primary lesion not definitely identified; dilated fluid-filled loops of small bowel possibly related to gastroenteritis with no definite obstruction and no obvious bowel mass; and sclerosis of S1 vertebral body. Radiologist recommended consideration of follow-up bone scan to assess for bone metastases. Patient was admitted to  the Hospitalist service on 5/12/22, and IR consulted for possible paracentesis however very small volume was inaccessible. CT Chest obtained on 5/13/22 showed No evidence of primary malignancy or metastasis within the chest.  Follow-up hepatobiliary  scan was suggested to assess for common bile duct obstruction. Oncology was consulted but did not see patient inhouse as pt was dischared. Upon discharge on 5/14/22, patient was instructed to follow up with [facility]. During consultation, we discussed his workup. We looked at the images together demonstrating some of the findings concerning for peritoneal nodularity/peritoneal disease. Discussed anatomy of the findings utilizing images to help pt understand what we are looking at and suspecting. He and wife were appreciative of the time spent to review these. We also addressed pt's pain. We also reviewed images of sclerosing lesion - bone scan recommended. He also was recommended to undergo HIDA scan after recent US per PCP. He is tired of tests, frustrated. Feelings validated and stressed importance of workup to determine why he has pain.   Wt loss from 240 --> 209 noted and expressed concern to pt about this. Of note, prior akbar resection with Dr Anabella Johnson for diverticulitis per pt. Sent note to dr Jemima Chaudhary re pt's case to expedite workup with his agreement. He was hospitalized for abdominal pain and sepsis. He was empirically placed on vancomycin and cefepime. CT scanning disclosed no intra-abdominal fluid collection or abscess but did suggest that his tumor burden was somewhat smaller. He had some purulent drainage from around his G-tube. This was cultured and grew klebsiella pneumoniae and citrobacter freundii both of which were sensitive to Levaquin which he was discharged home on for 10 days. He is here today for follow up prior to c10 FOLFIRINOX. He has been well since last seen. At last visit, c/o RUE swelling --> found to have DVT and started on Xarelto (on D14 of started pack). He denies any GI or bowel complaints. He denies any shortness of breath. He continues to eat/drink well and weight stable at 214#. He denies any neuropathy. He denies any new pain, chronic joint pains are stable. He denies any fevers or other infectious symptoms. No bleeding on Xarelto. Chronological Events:   EGD REPORT 2/25/22                      COLONOSCOPY REPORT 2/25/22                 CT ABDOMEN PELVIS W CONTRAST 3/8/2022   FINDINGS:   LOWER CHEST: Unremarkable. ABDOMEN AND PELVIS:   Liver: Unremarkable. Biliary system: Unremarkable. Pancreas: Unremarkable. Spleen: Unremarkable. Adrenals: Unremarkable. Genitourinary: Unremarkable. Reproductive organs: Unremarkable. Gastrointestinal tract: Colonic diverticulosis without evidence of diverticulitis. There is no evidence of bowel obstruction or inflammation. The appendix is normal   Peritoneum/Extraperitoneum: Unremarkable. No free fluid or air. Vascular: Unremarkable. Musculoskeletal:  Small fat-containing umbilical hernia.  Degenerative  changes of thoracolumbar spine. No acute or aggressive osseous lesion. IMPRESSION: Colonic diverticula without evidence of acute diverticulitis. RIGHT UPPER QUADRANT ABDOMINAL ULTRASOUND 5/3/2022    FINDINGS:   Pancreas: Not visualized, obscured by bowel gas. Intrahepatic IVC: Normal.   Main  Portal Vein: Patent with normal directional flow. Liver: Liver contour is normal. Liver appears diffusely increased in echogenicity consistent with hepatic steatosis. There is fatty sparing around the gallbladder fossa. Gallbladder: Gallbladder  is normal in size. No evidence of gallbladder wall thickening. No gallstones are seen. There are several nonmobile echogenic mural foci in the proximal gallbladder body/neck, largest measuring 4 mm, without shadowing, likely small cholesterol polyps. Bile ducts: No evidence of biliary ductal dilation. The common bile duct measures 3 mm in diameter. Right kidney: Normal.   Left Upper Quadrant: Limited images of the left upper quadrant are unremarkable. Spleen measures 12.1 cm in length. Free fluid: Trace simple free fluid in the right upper quadrant and left lower quadrant. IMPRESSION:    1. No acute findings to explain patient's pain. 2. Probable hepatic steatosis. 3. Small echogenic mural foci in the gallbladder which are nonmobile, likely representing cholesterol polyps, largest measuring 4 mm. There are no specific ultrasound  follow up recommendations for polyps of this small size. 4. Small volume simple ascites in the right upper quadrant and left lower quadrant, nonspecific. CT ABDOMEN - PELVIS WITH CONTRAST 5/10/22   FINDINGS:   Liver: Normal    Portal Vein: Normal   Gallbladder - Biliary Tree: Normal   Pancreas: Normal   Spleen: Normal   Retroperitoneum:   Adrenals: Normal   Kidneys:  Normal    Aorto - Cava:  Calcification of the abdominal aorta without aneurysm formation.    Lymphatics:  Normal   GI - Mesentery - Peritoneum: Multiple dilated mid small bowel loops without a focal transition point. The appendix is normal. Small to  moderate amount of free fluid in the pelvis and right flank. Nonspecific stranding of the omentum in the left upper quadrant. Small fatty umbilical hernia. Pelvis: Normal   Lower Chest: Normal   Soft tissues - MSK: Normal    IMPRESSION:   1. Partial small bowel obstruction. 2. Small to moderate amount of free fluid in the abdomen and pelvis. 3. Nonspecific stranding of the omentum primarily  in the left upper quadrant. Follow-up CT is recommended to differentiate passive congestion from omental a static disease. ABDOMEN SERIES 5/11/22   FINDINGS:   Chest: Heart size within normal limits. Lungs are clear. No pleural effusion or pneumothorax. Bowel: Multiple dilated small bowel loops with air-fluid levels on the upright view. Contrast distends small bowel loops in the left lateral abdomen and lower abdomen. Contrast in the right hemiabdomen appears to be within proximal colon. Scattered colonic  gas. Peritoneum / Retroperitoneum: No pneumoperitoneum. Soft tissues / Bones: No acute osseous findings. The contrast in urinary bladder. Lines / Support Apparatus: None. IMPRESSION: Redemonstrated of multiple dilated small bowel loops with enteric contrast appearing to extend into the proximal colon, suggesting only partial small bowel obstruction. Continued radiographic follow-up is advised. CT OF THE ABDOMEN AND PELVIS 5/12/22   FINDINGS:   LOWER CHEST: Normal.   HEPATOBILIARY: No evidence of focal liver mass. No calcified gallstones. PANCREAS: Normal.   SPLEEN: Normal.    ADRENAL GLANDS: Normal.    KIDNEYS/BLADDER: Kidneys and bladder are unremarkable. No hydronephrosis or urinary tract calculi. BOWEL: Dilated fluid-filled loops of small bowel are present throughout the abdomen. No definite transition point. Oral contrast noted in the colon.  No definite evidence of bowel mass but there is extensive peritoneal nodularity and trace ascites highly  concerning for peritoneal metastatic disease. LYMPH NODES: No enlarged retroperitoneal or pelvic lymph nodes. Peritoneal nodularity again noted most likely metastatic disease. VASCULATURE: Unremarkable. PELVIC ORGANS: Prostate gland and rectum are unremarkable. Small amount of free pelvic fluid is noted. MUSCULOSKELETAL: Degenerative spine changes are noted. Mild sclerosis involving the S1 vertebral body is present on image 74 of series 602. IMPRESSION   1. Extensive peritoneal nodularity and mild ascites consistent with peritoneal metastatic disease. Primary lesion not definitely identified. 2. Dilated fluid-filled loops of small bowel possibly related to gastroenteritis. No definite obstruction. No obvious bowel mass. 3. Sclerosis S1 vertebral body. Consider follow-up bone scan to assess for bone metastases. LIMITED ABDOMINAL ULTRASOUND 5/13/22   FINDINGS: A single limited gray scale image was submitted of an undisclosed location in the abdomen. Images demonstrate a small amount of  ascites as seen on comparison CT. IMPRESSION   1. Small volume ascites. CT CHEST WITH CONTRAST 5/13/22   FINDINGS:   Mediastinum and visualized thyroid: Normal.   Heart: Normal.    Large Vessels: Normal.    Pleura: Normal.    Lungs: No lung mass clearly discerned. Mild scarring or atelectasis in the right lung base. No suspicious lung nodule. No consolidation or zulma pulmonary edema. Airways: Normal.    Lymph nodes: Normal.    Bones/Soft tissues: Normal.    Visualized abdomen: Partially imaged omental nodules. Perihepatic ascites noted. IMPRESSION: No evidence of primary malignancy or metastasis within the chest       ABDOMINAL ULTRASOUND 5/17/22   FINDINGS:    LIVER: 17.3 cm. Slight increase in echogenicity without focal mass. BILE DUCTS: No intrahepatic bile duct dilatation. CBD diameter = 8 mm.    GALLBLADDER: It is unremarkable in appearance without stones or sludge. Echogenic polyp measures 0.5 cm. No gallbladder wall thickening. Mild ascites. PANCREAS: Normal.   SPLEEN: Borderline enlarged at 12.2 cm. RIGHT KIDNEY: 13.3 cm. No mass or hydronephrosis. LEFT KIDNEY: 14.3 cm. No mass or hydronephrosis. ABDOMINAL AORTA AND IVC: Normal in size. ASCITES: Mild   IMPRESSION: Possible mild fatty infiltration liver. No focal mass. Gallbladder polyp but no stones or gallbladder wall thickening. Mild ascites. Mildly prominent common bile duct. Follow-up hepatobiliary scan could be performed to assess for common bile duct obstruction. 04/02/2021 (COVID-19, Tania Nam, Primary or Immunocompromised Series, MRNA, PF, 100mcg/0.5mL)   04/30/2021 (COVID-19, Collyoni Piper, Primary or Immunocompromised Series, MRNA, PF, 100mcg/0.5mL)   11/16/2021 (COVID-19, Moderna Booster, PF, 0.25mL Dose)      5/18/22 heme/onc consultation   5/20/22 Dr Nico Soto consult - sent to ED for admission/workup   5/23/22 omental bx - IR   5/27/22 Dr Nico Soto - diagnostic lap, omental mass and mesentery mass excision, G-tube placement for venting  6/1/22 painful G-tube - tract recanalized and new G-tube placed   6/12/22 C1 FOLFIRINOX completed - dose adjusted   6/14/22 d/c   6/24/22 FU sx - G tube maint instructions/staples removed - RTC PRN   6/24/22 FU after hospitalization - discussed PC/plan for tx  7/8/22 FU - mainly concerned about PICC line without blood return, decline cathflo, seeing José Miguel Pham in [place] on Monday. Will increase hydration at home. HH for TPN.    7/18/22 Follow up after hospitalization. He will complete course of Levaquin as prescribed for infection around G-tube. Would like to proceed with cycle 3 FOLFIRINOX with increased dosing. 8/4/22 Cycle 4 FOLFIRINOX - continue dose escalation. He will complete course of Levaquin/Diflucan as prescribed for infection around G-tube, site looks good today.    8/17/22 C5 FOLFIRINOX - wishes to pw full dose accepting risks; wishes for G tube to be removed - will need to time with labs; plan to drop dose in opioids - PC seeing pt today. RD seeing pt. Plan for restaging in ~Oct.    9/1/22 FU FOLFIRINOX C5 full dose now; imaging in Oct   9/15/22 FU FOLFIRINOX C6-doing well, weight up 11#  9/30/22 FU FOLFIRINOX-C8 defer 1 week due to neutropenia, ANC 0.5  10/13/22 FU FOLFIRINOX 8; CT CAP reviewed and no POD. 10/28/22 Stat ultrasound RUE and then proceed with cycle 9 FOLFIRINOX. Doppler - Persistent DVT, nonocclusive, within the right axillary and basilic veins. The previously seen nonocclusive thrombus within the right innominate and subclavian veins are no longer seen. No abnormality of the right brachial vein demonstrated. Started on Xarelto. 11/11/22 FU - C10 FOLFIRINOX. Doing well. Wt stable. RUE swelling better. No new issues/concerns. Family History   Problem Relation Age of Onset    No Known Problems Brother     Cancer Sister         breast    Hypertension Mother     Heart Disease Mother     Diabetes Father     Osteoarthritis Father     Alcohol Abuse Father     Hypertension Father     Diabetes Mother       Social History     Socioeconomic History    Marital status:      Spouse name: None    Number of children: None    Years of education: None    Highest education level: None   Tobacco Use    Smoking status: Former     Packs/day: 1.00     Types: Cigarettes    Smokeless tobacco: Never   Substance and Sexual Activity    Alcohol use: No    Drug use: No        Review of Systems   Constitutional:  Negative for appetite change, diaphoresis, fatigue, fever and unexpected weight change. HENT:   Negative for sore throat. Eyes:  Negative for eye problems and icterus. Respiratory:  Negative for cough, hemoptysis and shortness of breath. Cardiovascular:  Negative for chest pain, leg swelling and palpitations. Gastrointestinal:  Positive for constipation (controlled).  Negative for abdominal distention, abdominal pain, blood in stool, diarrhea, nausea and vomiting. Endocrine: Negative for hot flashes. Genitourinary:  Negative for dysuria. Musculoskeletal:  Negative for arthralgias, back pain and gait problem. Skin:  Negative for itching, rash and wound. Neurological:  Negative for dizziness, extremity weakness, gait problem, headaches, light-headedness, numbness, seizures and speech difficulty. Hematological:  Negative for adenopathy. Does not bruise/bleed easily. Psychiatric/Behavioral:  Positive for depression (better). Negative for decreased concentration and sleep disturbance. The patient is nervous/anxious (better).        No Known Allergies  Past Medical History:   Diagnosis Date    Bilateral carpal tunnel syndrome 12/9/2020    Chronic right shoulder pain 11/30/2020    Colon polyps 3/11/2013    Diverticulitis 3/11/2013    HTN (hypertension) 3/11/2013    Hyperlipidemia 3/11/2013    Paresthesia and pain of both upper extremities 11/30/2020    PUD (peptic ulcer disease) 3/11/2013    History of     Right elbow pain 12/9/2020    Wheezing 3/11/2013     Past Surgical History:   Procedure Laterality Date    COLONOSCOPY  2/25/09    colonoscopy per Dr. Nguyen Yusuf with need for repeat every 3 years    COLONOSCOPY  02/25/2022    Dr. Beto Romero; polyps of the ascending, transverse, descending, and sigmoid colons; internal hemorrhoid; diverticulosis    LAPAROSCOPY N/A 5/27/2022    LAPAROSCOPY DIAGNOSTIC , OPEN EXPLORATORY LAPAROTOMY, MASS EXCISION, G-TUBE PLACEMENT performed by Patti Solano MD at [address] N/A 6/3/2022    PORT INSERTION performed by Patti Solano MD at [address] UNLISTED  October 2004    partial colon resection per Dr. Alice Perez  02/25/2022    Dr. Beto Romero; hiatal hernia gastric polyps     Current Outpatient Medications   Medication Sig Dispense Refill    rivaroxaban (XARELTO) 20 MG TABS tablet Take 1 tablet by mouth daily (with breakfast) 30 tablet 3    potassium chloride (KLOR-CON M) 20 MEQ extended release tablet Take 1 tablet by mouth 2 times daily for 28 days 14 days 28 tablet 1    dicyclomine (BENTYL) 20 MG tablet Take one tab orally twice daily 180 tablet 0    magnesium oxide (MAG-OX) 400 MG tablet Take 1 tablet by mouth in the morning, at noon, and at bedtime 90 tablet 2    busPIRone (BUSPAR) 7.5 MG tablet Take 1 tablet by mouth daily 15 tablet 0    pantoprazole (PROTONIX) 40 MG tablet Take 1 tablet by mouth daily 30 tablet 1    rivaroxaban 15 & 20 MG Starter Pack Take as directed on package. 1 each 0    Multiple Vitamins-Minerals (MULTIVITAMIN MEN 50+ PO) Take by mouth      escitalopram (LEXAPRO) 10 MG tablet Take 1 tablet by mouth in the morning. 30 tablet 5    OLANZapine zydis (ZYPREXA) 5 MG disintegrating tablet Take 1 tablet by mouth nightly Do not take with promethazine 30 tablet 3    promethazine (PHENERGAN) 12.5 MG tablet Take 1 tablet by mouth every 6 hours as needed for Nausea 90 tablet 0    polyethylene glycol (GLYCOLAX) 17 GM/SCOOP powder Take 17 g by mouth daily      rosuvastatin (CRESTOR) 10 MG tablet Take 10 mg by mouth      oxyCODONE (ROXICODONE INTENSOL) 100 MG/5ML concentrated solution Take 10 mg by mouth every 4 hours as needed for Pain. 1 mls under tongue (Patient not taking: Reported on 10/13/2022)      ondansetron (ZOFRAN-ODT) 8 MG TBDP disintegrating tablet Take 1 tablet by mouth every 8 hours (Patient not taking: No sig reported) 90 tablet 0    naloxone 4 MG/0.1ML LIQD nasal spray 1 spray by Nasal route as needed for Opioid Reversal  (Patient not taking: No sig reported)      umeclidinium-vilanterol (ANORO ELLIPTA) 62.5-25 MCG/INH AEPB inhaler Inhale 1 puff into the lungs daily  (Patient not taking: No sig reported)       No current facility-administered medications for this visit.      Facility-Administered Medications Ordered in Other Visits   Medication Dose Route Frequency Provider Last Rate Last Admin    sodium chloride flush 0.9 % injection 10 mL  10 mL IntraVENous PRN Etienne Villa MD   10 mL at 11/11/22 0815    potassium chloride 10 mEq/100 mL IVPB (Peripheral Line)  10 mEq IntraVENous Q1H Lieutenant Anastasiya, APRN -  mL/hr at 11/11/22 1050 Restarted at 11/11/22 1050    dextrose 5 % solution  5-250 mL/hr IntraVENous PRN Lieutenant Anastasiya, APRN -  mL/hr at 11/11/22 0933 100 mL/hr at 11/11/22 0933    atropine injection 0.4 mg  0.4 mg SubCUTAneous Once Lieutenant Anastasiya, APRN - NP        oxaliplatin (ELOXATIN) 180 mg in dextrose 5 % 250 mL chemo IVPB  85 mg/m2 (Treatment Plan Recorded) IntraVENous Once Lieutenant Anastasiya, APRN - .5 mL/hr at 11/11/22 1055 180 mg at 11/11/22 1055    irinotecan (CAMPTOSAR) 320 mg in dextrose 5 % 250 mL chemo IVPB  150 mg/m2 (Treatment Plan Recorded) IntraVENous Once Lieutenant Anastasiya, APRN - NP        leucovorin calcium (WELLCOVORIN) 850 mg in dextrose 5 % 250 mL IVPB  400 mg/m2 (Treatment Plan Recorded) IntraVENous Once Lieutenant Anastasiya, APRN - NP        fluorouracil (ADRUCIL) 5,000 mg in sodium chloride 0.9 % 230 mL chemo elastomeric infusion  5,000 mg IntraVENous Over 46 hours Lieutenant Anastasiya, APRN - NP        sodium chloride flush 0.9 % injection 5-40 mL  5-40 mL IntraVENous PRN Lieutenant Anastasiya, APRN - NP        fluorouracil (ADRUCIL) chemo syringe 850 mg  400 mg/m2 (Treatment Plan Recorded) IntraVENous Once Lieutenant Anastasiya, APRN - NP           No flowsheet data found. OBJECTIVE:  /68 (Site: Left Upper Arm, Position: Standing, Cuff Size: Large Adult)   Pulse 70   Temp 98 °F (36.7 °C) (Oral)   Resp 16   Ht 5' 10\" (1.778 m)   Wt 214 lb 12.8 oz (97.4 kg)   SpO2 96%   BMI 30.82 kg/m²       ECOG PERFORMANCE STATUS - 1- Restricted in physically strenuous activity but ambulatory and able to carry out work of a light or sedentary nature such as light house work, office work. Pain - Pain Score:   0 - No pain (fatigue-0)0 - No pain/10.  None/Minimal pain - not affecting QOL     Fatigue - No flowsheet data found. Distress - No flowsheet data found. Physical Exam  Vitals reviewed. Exam conducted with a chaperone present. Constitutional:       General: He is not in acute distress. Appearance: Normal appearance. He is normal weight. He is not ill-appearing or toxic-appearing. HENT:      Head: Normocephalic and atraumatic. Nose: Nose normal. No congestion. Mouth/Throat:      Mouth: Mucous membranes are moist.   Eyes:      General: No scleral icterus. Conjunctiva/sclera: Conjunctivae normal.   Cardiovascular:      Rate and Rhythm: Normal rate and regular rhythm. Heart sounds: No murmur heard. Pulmonary:      Effort: Pulmonary effort is normal. No respiratory distress. Breath sounds: Normal breath sounds. No wheezing, rhonchi or rales. Abdominal:      General: There is no distension. Palpations: Abdomen is soft. There is no mass. Tenderness: There is no abdominal tenderness. There is no guarding or rebound. Musculoskeletal:         General: Swelling (RUE- better) present. Normal range of motion. Cervical back: Normal range of motion. No rigidity. Right lower leg: No edema. Left lower leg: No edema. Skin:     General: Skin is warm and dry. Coloration: Skin is not jaundiced or pale. Findings: No bruising or rash. Neurological:      General: No focal deficit present. Mental Status: He is alert and oriented to person, place, and time. Motor: No weakness. Coordination: Coordination normal.      Gait: Gait normal.   Psychiatric:         Behavior: Behavior normal.         Thought Content:  Thought content normal.        Labs:  Recent Results (from the past 168 hour(s))   CBC with Auto Differential    Collection Time: 11/11/22  8:08 AM   Result Value Ref Range    WBC 3.0 (L) 4.3 - 11.1 K/uL    RBC 3.32 (L) 4.23 - 5.6 M/uL    Hemoglobin 9.7 (L) 13.6 - 17.2 g/dL    Hematocrit 31.1 %    MCV 93.7 82.0 - 102.0 FL    MCH 29.2 26.1 - 32.9 PG    MCHC 31.2 (L) 31.4 - 35.0 g/dL    RDW 15.3 (H) 11.9 - 14.6 %    Platelets 817 (L) 809 - 450 K/uL    MPV 10.4 9.4 - 12.3 FL    nRBC 0.00 0.0 - 0.2 K/uL    Seg Neutrophils 37 (L) 43 - 78 %    Lymphocytes 51 (H) 13 - 44 %    Monocytes 9 4.0 - 12.0 %    Eosinophils % 2 0.5 - 7.8 %    Basophils 1 0.0 - 2.0 %    Immature Granulocytes 0 0.0 - 5.0 %    Segs Absolute 1.1 (L) 1.7 - 8.2 K/UL    Absolute Lymph # 1.5 0.5 - 4.6 K/UL    Absolute Mono # 0.3 0.1 - 1.3 K/UL    Absolute Eos # 0.1 0.0 - 0.8 K/UL    Basophils Absolute 0.0 0.0 - 0.2 K/UL    Absolute Immature Granulocyte 0.0 0.0 - 0.5 K/UL    Differential Type AUTOMATED     Comprehensive Metabolic Panel    Collection Time: 11/11/22  8:08 AM   Result Value Ref Range    Sodium 144 (H) 133 - 143 mmol/L    Potassium 3.0 (L) 3.5 - 5.1 mmol/L    Chloride 113 (H) 101 - 110 mmol/L    CO2 27 21 - 32 mmol/L    Anion Gap 4 2 - 11 mmol/L    Glucose 172 (H) 65 - 100 mg/dL    BUN 8 8 - 23 MG/DL    Creatinine 0.80 0.8 - 1.5 MG/DL    Est, Glom Filt Rate >60 >60 ml/min/1.73m2    Calcium 8.3 8.3 - 10.4 MG/DL    Total Bilirubin 0.3 0.2 - 1.1 MG/DL    ALT 34 12 - 65 U/L    AST 16 15 - 37 U/L    Alk Phosphatase 103 50 - 136 U/L    Total Protein 6.0 (L) 6.3 - 8.2 g/dL    Albumin 3.1 (L) 3.2 - 4.6 g/dL    Globulin 2.9 2.8 - 4.5 g/dL    Albumin/Globulin Ratio 1.1 0.4 - 1.6     Magnesium    Collection Time: 11/11/22  8:08 AM   Result Value Ref Range    Magnesium 1.7 (L) 1.8 - 2.4 mg/dL       Imaging: reviewed     PATHOLOGY:             6/2022         ASSESSMENT:     Diagnosis Orders   1. Malignant neoplasm of stomach, unspecified location (HCC)  rivaroxaban (XARELTO) 20 MG TABS tablet      2. Acute deep vein thrombosis (DVT) of right upper extremity, unspecified vein (HCC)  rivaroxaban (XARELTO) 20 MG TABS tablet      3.  Abdominal carcinomatosis (HCC)  CBC With Auto Differential    Comprehensive metabolic panel    DISCONTINUED: dextrose 5 % solution    DISCONTINUED: fosaprepitant (EMEND) 150 mg in sodium chloride 0.9 % 150 mL IVPB    DISCONTINUED: dexamethasone (DECADRON) 12 mg in sodium chloride 0.9 % 50 mL IVPB    DISCONTINUED: ondansetron (ZOFRAN) injection 8 mg    DISCONTINUED: atropine injection 0.4 mg    DISCONTINUED: oxaliplatin (ELOXATIN) 180 mg in dextrose 5 % 250 mL chemo IVPB    DISCONTINUED: irinotecan (CAMPTOSAR) 320 mg in dextrose 5 % 250 mL chemo IVPB    DISCONTINUED: leucovorin calcium (WELLCOVORIN) 850 mg in dextrose 5 % 250 mL IVPB    DISCONTINUED: fluorouracil (ADRUCIL) 5,050 mg in sodium chloride 0.9 % 230 mL chemo elastomeric infusion    DISCONTINUED: sodium chloride flush 0.9 % injection 5-40 mL      4. Peritoneal metastases ([address]  CBC With Auto Differential    Comprehensive metabolic panel    DISCONTINUED: dextrose 5 % solution    DISCONTINUED: fosaprepitant (EMEND) 150 mg in sodium chloride 0.9 % 150 mL IVPB    DISCONTINUED: dexamethasone (DECADRON) 12 mg in sodium chloride 0.9 % 50 mL IVPB    DISCONTINUED: ondansetron (ZOFRAN) injection 8 mg    DISCONTINUED: atropine injection 0.4 mg    DISCONTINUED: oxaliplatin (ELOXATIN) 180 mg in dextrose 5 % 250 mL chemo IVPB    DISCONTINUED: irinotecan (CAMPTOSAR) 320 mg in dextrose 5 % 250 mL chemo IVPB    DISCONTINUED: leucovorin calcium (WELLCOVORIN) 850 mg in dextrose 5 % 250 mL IVPB    DISCONTINUED: fluorouracil (ADRUCIL) 5,050 mg in sodium chloride 0.9 % 230 mL chemo elastomeric infusion    DISCONTINUED: sodium chloride flush 0.9 % injection 5-40 mL      5. Hypokalemia  potassium chloride (KLOR-CON M) 20 MEQ extended release tablet    DISCONTINUED: potassium chloride 10 mEq/100 mL IVPB (Peripheral Line)            Mr. Evans is here for FU of metastatic adenocarcinoma.       1. Metastatic adenocarcinoma   - s/p omental and mesenteric mass bx - c/w upper GI adenocarcinoma    - hx of diverticular dz - s/p previous bowel resection by dr Anabella Johnson at [address] [place] [address] pending   - We did discuss that his disease is not curable and he VU but wife stated that he believes he can beat this. - here for follow-up prior to cycle 10 FOLFIRINOX. Counts are adequate and he wishes to proceed with full dose treatment. - new right upper extremity swelling - stat ultrasound with DVT, start Xarelto 15 mg BID x 3 weeks and then 20 mg daily. Will need Rx for 20 mg daily after start pack complete - Buzz is aware and sent new RX.   - CT October 2022 previously with no evidence of progressive disease. Due for scan ~ January. - pain - denies and off of prescribed medications    - nausea - resolved. - appetite/wt loss - TPN has been stopped and he is eating without issue. Weight stable. - hypokalemia - K+ 3.0, will give IV replacement and send more potassium 20 meq BID orally  - hypomagnesemia - Mg+ 1.7, on mag oxide 400 mg TID   - He wishes to continue on the current regimen; he previously expressed that in the future, he may wish to proceed with unconventional scheduling of maybe FOLFIRINOX every 3 weeks. He does understand inherent risks with alternate schedule and how this would affect progression of disease. - sclerotic lesion on imaging at S[facility] - bone scan recommended, has not been completed   - constipation - Miralax as needed. - Continue good oral nutrition and hydration.   - Encouraged frequent activity throughout the day and rest as needed to combat fatigue.   - Call with any fevers, uncontrolled side effects from treatment or any other worrisome/concerning symptoms. RTC per protocol or sooner if needed      MDM      Lab studies and imaging studies were personally reviewed. Pertinent old records were reviewed. Historical:    - here with his wife for consultation. During today's visit, we discussed his current workup. We looked at the images together demonstrating some of the findings concerning for peritoneal nodularity/peritoneal disease.   Discussed anatomy of the findings utilizing images to help pt understand what we are looking at and suspecting. He and wife were appreciative of the time spent to review these. Discussed need for visual dx/tissue pathology to determine plan - recommend ex lap - refer to Dr Wilton Kovacs - personally  reviewed case with Dr Wilton Kovacs who will expedite the workup and will see pt Fri. US with mild biliary duct dilation - HIDA/ERCP reviewed by PCP   + BM/flatus; He did not like how IV morphine made him feel in the hospital.  He tried tramadol but found no relief and has been taking acetaminophen at home with minimal relief. Discussed different options and he settled on trying  Percocet - he will contact the office to inform us if this is working for him. We discussed SE - not to drive while on the med. Bowel regimen reviewed. He is tired of tests, frustrated. Feelings validated and stressed importance of workup to determine why he has pain. Wt loss from 240 --> 209 noted and expressed concern to pt about this. - completed course of Levaquin/Diflucan for klebsiella pneumoniae and citrobacter freundii both of which were sensitive to Levaquin found around G-tube site. Wishes for tube removal - reviewed risks of this during chemotherapy - I would like for him to have labs day before scheduled procedure to make sure his counts are adequate per above; case reviewed with Dr Collette Al by me via tel today   - here cycle 8 FOLFIRINOX - labs reviewed-defer 1 week due to neutropenia. - nutrition - G-tube out. He is being weaned off TPN (3x week now) as his oral intake has greatly improved, weight up    - pain - Fentanyl 12mcg with prn oxycodone 10 mg, plans to stop patch on Nader 10/2 PC following  - Plan for surveillance reviewed. Labs discussed. - nausea - resolved. Has Zyprexa QHS (not taking currently) and Zofran PRN.    - wt loss/FTT - secondary to disease and tx - on TPN and eating more    - depression/anxiety - better affect, on lexapro 20mg daily, PC adding buspar prn    All questions were asked and answered to the best of my ability. The patient verbalized understanding and agrees with the plan above.           LUCY Garcia - NP  [facility]  [address] [place] [address]  Office : [phone]  Fax : [phone]

## 2022-11-11 ENCOUNTER — OFFICE VISIT (OUTPATIENT)
Dept: ONCOLOGY | Age: 62
End: 2022-11-11
Payer: COMMERCIAL

## 2022-11-11 ENCOUNTER — HOSPITAL ENCOUNTER (OUTPATIENT)
Dept: INFUSION THERAPY | Age: 62
Discharge: HOME | End: 2022-11-11
Payer: COMMERCIAL

## 2022-11-11 VITALS
TEMPERATURE: 98 F | HEART RATE: 70 BPM | BODY MASS INDEX: 30.75 KG/M2 | RESPIRATION RATE: 16 BRPM | SYSTOLIC BLOOD PRESSURE: 116 MMHG | OXYGEN SATURATION: 96 % | HEIGHT: 70 IN | DIASTOLIC BLOOD PRESSURE: 68 MMHG | WEIGHT: 214.8 LBS

## 2022-11-11 DIAGNOSIS — C16.9 MALIGNANT NEOPLASM OF STOMACH, UNSPECIFIED LOCATION (HCC): ICD-10-CM

## 2022-11-11 DIAGNOSIS — C76.2 ABDOMINAL CARCINOMATOSIS (HCC): ICD-10-CM

## 2022-11-11 DIAGNOSIS — C16.9 MALIGNANT NEOPLASM OF STOMACH, UNSPECIFIED LOCATION (HCC): Primary | ICD-10-CM

## 2022-11-11 DIAGNOSIS — E87.6 HYPOKALEMIA: Primary | ICD-10-CM

## 2022-11-11 DIAGNOSIS — I82.621 ACUTE DEEP VEIN THROMBOSIS (DVT) OF RIGHT UPPER EXTREMITY, UNSPECIFIED VEIN (HCC): ICD-10-CM

## 2022-11-11 DIAGNOSIS — C78.6 PERITONEAL METASTASES (HCC): ICD-10-CM

## 2022-11-11 DIAGNOSIS — E87.6 HYPOKALEMIA: ICD-10-CM

## 2022-11-11 LAB
ALBUMIN SERPL-MCNC: 3.1 G/DL (ref 3.2–4.6)
ALBUMIN/GLOB SERPL: 1.1 {RATIO} (ref 0.4–1.6)
ALP SERPL-CCNC: 103 U/L (ref 50–136)
ALT SERPL-CCNC: 34 U/L (ref 12–65)
ANION GAP SERPL CALC-SCNC: 4 MMOL/L (ref 2–11)
AST SERPL-CCNC: 16 U/L (ref 15–37)
BASOPHILS # BLD: 0 K/UL (ref 0–0.2)
BASOPHILS NFR BLD: 1 % (ref 0–2)
BILIRUB SERPL-MCNC: 0.3 MG/DL (ref 0.2–1.1)
BUN SERPL-MCNC: 8 MG/DL (ref 8–23)
CALCIUM SERPL-MCNC: 8.3 MG/DL (ref 8.3–10.4)
CHLORIDE SERPL-SCNC: 113 MMOL/L (ref 101–110)
CO2 SERPL-SCNC: 27 MMOL/L (ref 21–32)
CREAT SERPL-MCNC: 0.8 MG/DL (ref 0.8–1.5)
DIFFERENTIAL METHOD BLD: ABNORMAL
EOSINOPHIL # BLD: 0.1 K/UL (ref 0–0.8)
EOSINOPHIL NFR BLD: 2 % (ref 0.5–7.8)
ERYTHROCYTE [DISTWIDTH] IN BLOOD BY AUTOMATED COUNT: 15.3 % (ref 11.9–14.6)
GLOBULIN SER CALC-MCNC: 2.9 G/DL (ref 2.8–4.5)
GLUCOSE SERPL-MCNC: 172 MG/DL (ref 65–100)
HCT VFR BLD AUTO: 31.1 %
HGB BLD-MCNC: 9.7 G/DL (ref 13.6–17.2)
IMM GRANULOCYTES # BLD AUTO: 0 K/UL (ref 0–0.5)
IMM GRANULOCYTES NFR BLD AUTO: 0 % (ref 0–5)
LYMPHOCYTES # BLD: 1.5 K/UL (ref 0.5–4.6)
LYMPHOCYTES NFR BLD: 51 % (ref 13–44)
MAGNESIUM SERPL-MCNC: 1.7 MG/DL (ref 1.8–2.4)
MCH RBC QN AUTO: 29.2 PG (ref 26.1–32.9)
MCHC RBC AUTO-ENTMCNC: 31.2 G/DL (ref 31.4–35)
MCV RBC AUTO: 93.7 FL (ref 82–102)
MONOCYTES # BLD: 0.3 K/UL (ref 0.1–1.3)
MONOCYTES NFR BLD: 9 % (ref 4–12)
NEUTS SEG # BLD: 1.1 K/UL (ref 1.7–8.2)
NEUTS SEG NFR BLD: 37 % (ref 43–78)
NRBC # BLD: 0 K/UL (ref 0–0.2)
PLATELET # BLD AUTO: 137 K/UL (ref 150–450)
PMV BLD AUTO: 10.4 FL (ref 9.4–12.3)
POTASSIUM SERPL-SCNC: 3 MMOL/L (ref 3.5–5.1)
PROT SERPL-MCNC: 6 G/DL (ref 6.3–8.2)
RBC # BLD AUTO: 3.32 M/UL (ref 4.23–5.6)
SODIUM SERPL-SCNC: 144 MMOL/L (ref 133–143)
WBC # BLD AUTO: 3 K/UL (ref 4.3–11.1)

## 2022-11-11 PROCEDURE — 96417 CHEMO IV INFUS EACH ADDL SEQ: CPT

## 2022-11-11 PROCEDURE — 80053 COMPREHEN METABOLIC PANEL: CPT

## 2022-11-11 PROCEDURE — 96368 THER/DIAG CONCURRENT INF: CPT

## 2022-11-11 PROCEDURE — 2580000003 HC RX 258: Performed by: NURSE PRACTITIONER

## 2022-11-11 PROCEDURE — 36591 DRAW BLOOD OFF VENOUS DEVICE: CPT

## 2022-11-11 PROCEDURE — 85025 COMPLETE CBC W/AUTO DIFF WBC: CPT

## 2022-11-11 PROCEDURE — 96413 CHEMO IV INFUSION 1 HR: CPT

## 2022-11-11 PROCEDURE — 2580000003 HC RX 258: Performed by: INTERNAL MEDICINE

## 2022-11-11 PROCEDURE — 3074F SYST BP LT 130 MM HG: CPT | Performed by: NURSE PRACTITIONER

## 2022-11-11 PROCEDURE — 96372 THER/PROPH/DIAG INJ SC/IM: CPT

## 2022-11-11 PROCEDURE — 99214 OFFICE O/P EST MOD 30 MIN: CPT | Performed by: NURSE PRACTITIONER

## 2022-11-11 PROCEDURE — 96415 CHEMO IV INFUSION ADDL HR: CPT

## 2022-11-11 PROCEDURE — G0498 CHEMO EXTEND IV INFUS W/PUMP: HCPCS

## 2022-11-11 PROCEDURE — 96375 TX/PRO/DX INJ NEW DRUG ADDON: CPT

## 2022-11-11 PROCEDURE — 96367 TX/PROPH/DG ADDL SEQ IV INF: CPT

## 2022-11-11 PROCEDURE — 6360000002 HC RX W HCPCS: Performed by: NURSE PRACTITIONER

## 2022-11-11 PROCEDURE — 83735 ASSAY OF MAGNESIUM: CPT

## 2022-11-11 PROCEDURE — 96411 CHEMO IV PUSH ADDL DRUG: CPT

## 2022-11-11 PROCEDURE — 3078F DIAST BP <80 MM HG: CPT | Performed by: NURSE PRACTITIONER

## 2022-11-11 RX ORDER — ONDANSETRON 2 MG/ML
8 INJECTION INTRAMUSCULAR; INTRAVENOUS
Status: CANCELLED | OUTPATIENT
Start: 2022-11-11

## 2022-11-11 RX ORDER — DIPHENHYDRAMINE HYDROCHLORIDE 50 MG/ML
50 INJECTION INTRAMUSCULAR; INTRAVENOUS
Status: DISCONTINUED | OUTPATIENT
Start: 2022-11-11 | End: 2022-11-12 | Stop reason: HOSPADM

## 2022-11-11 RX ORDER — HEPARIN SODIUM (PORCINE) LOCK FLUSH IV SOLN 100 UNIT/ML 100 UNIT/ML
500 SOLUTION INTRAVENOUS PRN
Status: DISCONTINUED | OUTPATIENT
Start: 2022-11-11 | End: 2022-11-12 | Stop reason: HOSPADM

## 2022-11-11 RX ORDER — FAMOTIDINE 10 MG/ML
20 INJECTION, SOLUTION INTRAVENOUS
Status: CANCELLED | OUTPATIENT
Start: 2022-11-11

## 2022-11-11 RX ORDER — ALBUTEROL SULFATE 90 UG/1
4 AEROSOL, METERED RESPIRATORY (INHALATION) PRN
Status: CANCELLED | OUTPATIENT
Start: 2022-11-11

## 2022-11-11 RX ORDER — HEPARIN SODIUM (PORCINE) LOCK FLUSH IV SOLN 100 UNIT/ML 100 UNIT/ML
500 SOLUTION INTRAVENOUS PRN
Status: CANCELLED | OUTPATIENT
Start: 2022-11-11

## 2022-11-11 RX ORDER — DEXTROSE MONOHYDRATE 50 MG/ML
5-250 INJECTION, SOLUTION INTRAVENOUS PRN
Status: CANCELLED | OUTPATIENT
Start: 2022-11-11

## 2022-11-11 RX ORDER — SODIUM CHLORIDE 9 MG/ML
5-40 INJECTION INTRAVENOUS PRN
Status: DISCONTINUED | OUTPATIENT
Start: 2022-11-11 | End: 2022-11-12 | Stop reason: HOSPADM

## 2022-11-11 RX ORDER — SODIUM CHLORIDE 9 MG/ML
5-250 INJECTION, SOLUTION INTRAVENOUS PRN
Status: DISCONTINUED | OUTPATIENT
Start: 2022-11-11 | End: 2022-11-12 | Stop reason: HOSPADM

## 2022-11-11 RX ORDER — POTASSIUM CHLORIDE 20 MEQ/1
20 TABLET, EXTENDED RELEASE ORAL 2 TIMES DAILY
Qty: 28 TABLET | Refills: 1 | Status: SHIPPED | OUTPATIENT
Start: 2022-11-11 | End: 2022-12-09

## 2022-11-11 RX ORDER — POTASSIUM CHLORIDE 7.45 MG/ML
10 INJECTION INTRAVENOUS
Status: COMPLETED | OUTPATIENT
Start: 2022-11-11 | End: 2022-11-11

## 2022-11-11 RX ORDER — SODIUM CHLORIDE 0.9 % (FLUSH) 0.9 %
5-40 SYRINGE (ML) INJECTION PRN
Status: CANCELLED | OUTPATIENT
Start: 2022-11-13

## 2022-11-11 RX ORDER — ONDANSETRON 2 MG/ML
8 INJECTION INTRAMUSCULAR; INTRAVENOUS ONCE
Status: CANCELLED | OUTPATIENT
Start: 2022-11-11 | End: 2022-11-11

## 2022-11-11 RX ORDER — DEXTROSE MONOHYDRATE 50 MG/ML
5-250 INJECTION, SOLUTION INTRAVENOUS PRN
Status: DISCONTINUED | OUTPATIENT
Start: 2022-11-11 | End: 2022-11-12 | Stop reason: HOSPADM

## 2022-11-11 RX ORDER — ATROPINE SULFATE 0.4 MG/ML
0.4 AMPUL (ML) INJECTION
Status: CANCELLED | OUTPATIENT
Start: 2022-11-11

## 2022-11-11 RX ORDER — ATROPINE SULFATE 0.4 MG/ML
0.4 AMPUL (ML) INJECTION ONCE
Status: CANCELLED | OUTPATIENT
Start: 2022-11-11 | End: 2022-11-11

## 2022-11-11 RX ORDER — ACETAMINOPHEN 325 MG/1
650 TABLET ORAL
Status: CANCELLED | OUTPATIENT
Start: 2022-11-11

## 2022-11-11 RX ORDER — ACETAMINOPHEN 325 MG/1
650 TABLET ORAL
Status: DISCONTINUED | OUTPATIENT
Start: 2022-11-11 | End: 2022-11-12 | Stop reason: HOSPADM

## 2022-11-11 RX ORDER — SODIUM CHLORIDE 9 MG/ML
INJECTION, SOLUTION INTRAVENOUS CONTINUOUS
Status: DISCONTINUED | OUTPATIENT
Start: 2022-11-11 | End: 2022-11-12 | Stop reason: HOSPADM

## 2022-11-11 RX ORDER — SODIUM CHLORIDE 9 MG/ML
5-250 INJECTION, SOLUTION INTRAVENOUS PRN
Status: CANCELLED | OUTPATIENT
Start: 2022-11-11

## 2022-11-11 RX ORDER — HEPARIN SODIUM (PORCINE) LOCK FLUSH IV SOLN 100 UNIT/ML 100 UNIT/ML
500 SOLUTION INTRAVENOUS PRN
Status: CANCELLED | OUTPATIENT
Start: 2022-11-13

## 2022-11-11 RX ORDER — ATROPINE SULFATE 0.4 MG/ML
0.4 INJECTION, SOLUTION INTRAVENOUS
Status: DISCONTINUED | OUTPATIENT
Start: 2022-11-11 | End: 2022-11-12 | Stop reason: HOSPADM

## 2022-11-11 RX ORDER — SODIUM CHLORIDE 0.9 % (FLUSH) 0.9 %
10 SYRINGE (ML) INJECTION PRN
Status: DISCONTINUED | OUTPATIENT
Start: 2022-11-11 | End: 2022-11-12 | Stop reason: HOSPADM

## 2022-11-11 RX ORDER — FLUOROURACIL 50 MG/ML
400 INJECTION, SOLUTION INTRAVENOUS ONCE
Status: COMPLETED | OUTPATIENT
Start: 2022-11-11 | End: 2022-11-11

## 2022-11-11 RX ORDER — MEPERIDINE HYDROCHLORIDE 25 MG/ML
12.5 INJECTION INTRAMUSCULAR; INTRAVENOUS; SUBCUTANEOUS PRN
Status: DISCONTINUED | OUTPATIENT
Start: 2022-11-11 | End: 2022-11-12 | Stop reason: HOSPADM

## 2022-11-11 RX ORDER — SODIUM CHLORIDE 9 MG/ML
5-40 INJECTION INTRAVENOUS PRN
Status: CANCELLED | OUTPATIENT
Start: 2022-11-11

## 2022-11-11 RX ORDER — DIPHENHYDRAMINE HYDROCHLORIDE 50 MG/ML
50 INJECTION INTRAMUSCULAR; INTRAVENOUS
Status: CANCELLED | OUTPATIENT
Start: 2022-11-11

## 2022-11-11 RX ORDER — EPINEPHRINE 1 MG/ML
0.3 INJECTION, SOLUTION, CONCENTRATE INTRAVENOUS PRN
Status: DISCONTINUED | OUTPATIENT
Start: 2022-11-11 | End: 2022-11-12 | Stop reason: HOSPADM

## 2022-11-11 RX ORDER — SODIUM CHLORIDE 9 MG/ML
5-40 INJECTION INTRAVENOUS PRN
Status: CANCELLED | OUTPATIENT
Start: 2022-11-13

## 2022-11-11 RX ORDER — POTASSIUM CHLORIDE 7.45 MG/ML
10 INJECTION INTRAVENOUS
Status: CANCELLED
Start: 2022-11-11

## 2022-11-11 RX ORDER — ALBUTEROL SULFATE 90 UG/1
4 AEROSOL, METERED RESPIRATORY (INHALATION) PRN
Status: DISCONTINUED | OUTPATIENT
Start: 2022-11-11 | End: 2022-11-12 | Stop reason: HOSPADM

## 2022-11-11 RX ORDER — SODIUM CHLORIDE 9 MG/ML
INJECTION, SOLUTION INTRAVENOUS CONTINUOUS
Status: CANCELLED | OUTPATIENT
Start: 2022-11-11

## 2022-11-11 RX ORDER — ONDANSETRON 2 MG/ML
8 INJECTION INTRAMUSCULAR; INTRAVENOUS
Status: DISCONTINUED | OUTPATIENT
Start: 2022-11-11 | End: 2022-11-12 | Stop reason: HOSPADM

## 2022-11-11 RX ORDER — SODIUM CHLORIDE 0.9 % (FLUSH) 0.9 %
5-40 SYRINGE (ML) INJECTION PRN
Status: CANCELLED | OUTPATIENT
Start: 2022-11-11

## 2022-11-11 RX ORDER — EPINEPHRINE 1 MG/ML
0.3 INJECTION, SOLUTION, CONCENTRATE INTRAVENOUS PRN
Status: CANCELLED | OUTPATIENT
Start: 2022-11-11

## 2022-11-11 RX ORDER — ONDANSETRON 2 MG/ML
8 INJECTION INTRAMUSCULAR; INTRAVENOUS ONCE
Status: COMPLETED | OUTPATIENT
Start: 2022-11-11 | End: 2022-11-11

## 2022-11-11 RX ORDER — MEPERIDINE HYDROCHLORIDE 50 MG/ML
12.5 INJECTION INTRAMUSCULAR; INTRAVENOUS; SUBCUTANEOUS PRN
Status: CANCELLED | OUTPATIENT
Start: 2022-11-11

## 2022-11-11 RX ORDER — ATROPINE SULFATE 0.4 MG/ML
0.4 INJECTION, SOLUTION INTRAVENOUS ONCE
Status: COMPLETED | OUTPATIENT
Start: 2022-11-11 | End: 2022-11-11

## 2022-11-11 RX ORDER — SODIUM CHLORIDE 9 MG/ML
5-250 INJECTION, SOLUTION INTRAVENOUS PRN
Status: CANCELLED | OUTPATIENT
Start: 2022-11-13

## 2022-11-11 RX ORDER — SODIUM CHLORIDE 0.9 % (FLUSH) 0.9 %
5-40 SYRINGE (ML) INJECTION PRN
Status: DISCONTINUED | OUTPATIENT
Start: 2022-11-11 | End: 2022-11-12 | Stop reason: HOSPADM

## 2022-11-11 RX ADMIN — ONDANSETRON 8 MG: 2 INJECTION INTRAMUSCULAR; INTRAVENOUS at 09:55

## 2022-11-11 RX ADMIN — LEUCOVORIN CALCIUM 850 MG: 200 INJECTION, POWDER, LYOPHILIZED, FOR SUSPENSION INTRAMUSCULAR; INTRAVENOUS at 12:56

## 2022-11-11 RX ADMIN — DEXAMETHASONE SODIUM PHOSPHATE 12 MG: 4 INJECTION, SOLUTION INTRAMUSCULAR; INTRAVENOUS at 09:58

## 2022-11-11 RX ADMIN — DEXTROSE MONOHYDRATE 100 ML/HR: 50 INJECTION, SOLUTION INTRAVENOUS at 09:33

## 2022-11-11 RX ADMIN — ATROPINE SULFATE 0.4 MG: 0.4 INJECTION, SOLUTION INTRAVENOUS at 12:53

## 2022-11-11 RX ADMIN — SODIUM CHLORIDE, PRESERVATIVE FREE 10 ML: 5 INJECTION INTRAVENOUS at 09:52

## 2022-11-11 RX ADMIN — SODIUM CHLORIDE, PRESERVATIVE FREE 10 ML: 5 INJECTION INTRAVENOUS at 08:15

## 2022-11-11 RX ADMIN — FOSAPREPITANT 150 MG: 150 INJECTION, POWDER, LYOPHILIZED, FOR SOLUTION INTRAVENOUS at 10:16

## 2022-11-11 RX ADMIN — IRINOTECAN HYDROCHLORIDE 320 MG: 20 INJECTION, SOLUTION INTRAVENOUS at 12:56

## 2022-11-11 RX ADMIN — OXALIPLATIN 180 MG: 5 INJECTION, SOLUTION INTRAVENOUS at 10:55

## 2022-11-11 RX ADMIN — POTASSIUM CHLORIDE 10 MEQ: 7.46 INJECTION, SOLUTION INTRAVENOUS at 11:19

## 2022-11-11 RX ADMIN — FLUOROURACIL 850 MG: 50 INJECTION, SOLUTION INTRAVENOUS at 14:30

## 2022-11-11 RX ADMIN — FLUOROURACIL 5000 MG: 50 INJECTION, SOLUTION INTRAVENOUS at 14:35

## 2022-11-11 RX ADMIN — POTASSIUM CHLORIDE 10 MEQ: 7.46 INJECTION, SOLUTION INTRAVENOUS at 09:32

## 2022-11-11 ASSESSMENT — PATIENT HEALTH QUESTIONNAIRE - PHQ9
1. LITTLE INTEREST OR PLEASURE IN DOING THINGS: 0
SUM OF ALL RESPONSES TO PHQ9 QUESTIONS 1 & 2: 0
2. FEELING DOWN, DEPRESSED OR HOPELESS: 0
SUM OF ALL RESPONSES TO PHQ QUESTIONS 1-9: 0

## 2022-11-11 NOTE — PROGRESS NOTES
Pt arrived ambulatory, chemo completed, chemo pump connected at 1435 and unclamped, pt discharged home ambulatory, aware of appointment on Sunday at 2pm

## 2022-11-11 NOTE — PATIENT INSTRUCTIONS
Patient Instructions from Today's Visit    Reason for Visit:  Prechemo visit    Diagnosis Information:  [email or web address]/. net/about-us/asco-answers-patient-education-materials/wwbx-fiuetdt-jfvh-sheets    Plan:  -Folfirinox today  -Potassium 20 meq twice  a day for 14 days  -We will send Xarelto 20mg daily for you to have after starter pack     Follow Up:  As scheduled    Recent Lab Results:  Hospital Outpatient Visit on 11/11/2022   Component Date Value Ref Range Status    WBC 11/11/2022 3.0 (A)  4.3 - 11.1 K/uL Final    RBC 11/11/2022 3.32 (A)  4.23 - 5.6 M/uL Final    Hemoglobin 11/11/2022 9.7 (A)  13.6 - 17.2 g/dL Final    Hematocrit 11/11/2022 31.1  % Final    MCV 11/11/2022 93.7  82.0 - 102.0 FL Final    MCH 11/11/2022 29.2  26.1 - 32.9 PG Final    MCHC 11/11/2022 31.2 (A)  31.4 - 35.0 g/dL Final    RDW 11/11/2022 15.3 (A)  11.9 - 14.6 % Final    Platelets 15/69/7829 137 (A)  150 - 450 K/uL Final    MPV 11/11/2022 10.4  9.4 - 12.3 FL Final    nRBC 11/11/2022 0.00  0.0 - 0.2 K/uL Final    **Note: Absolute NRBC parameter is now reported with Hemogram**    Seg Neutrophils 11/11/2022 37 (A)  43 - 78 % Final    Lymphocytes 11/11/2022 51 (A)  13 - 44 % Final    Monocytes 11/11/2022 9  4.0 - 12.0 % Final    Eosinophils % 11/11/2022 2  0.5 - 7.8 % Final    Basophils 11/11/2022 1  0.0 - 2.0 % Final    Immature Granulocytes 11/11/2022 0  0.0 - 5.0 % Final    Segs Absolute 11/11/2022 1.1 (A)  1.7 - 8.2 K/UL Final    Absolute Lymph # 11/11/2022 1.5  0.5 - 4.6 K/UL Final    Absolute Mono # 11/11/2022 0.3  0.1 - 1.3 K/UL Final    Absolute Eos # 11/11/2022 0.1  0.0 - 0.8 K/UL Final    Basophils Absolute 11/11/2022 0.0  0.0 - 0.2 K/UL Final    Absolute Immature Granulocyte 11/11/2022 0.0  0.0 - 0.5 K/UL Final    Differential Type 11/11/2022 AUTOMATED    Final    Sodium 11/11/2022 144 (A)  133 - 143 mmol/L Final    Potassium 11/11/2022 3.0 (A)  3.5 - 5.1 mmol/L Final    Chloride 11/11/2022 113 (A)  101 - 110 mmol/L Final CO2 11/11/2022 27  21 - 32 mmol/L Final    Anion Gap 11/11/2022 4  2 - 11 mmol/L Final    Glucose 11/11/2022 172 (A)  65 - 100 mg/dL Final    BUN 11/11/2022 8  8 - 23 MG/DL Final    Creatinine 11/11/2022 0.80  0.8 - 1.5 MG/DL Final    Est, Glom Filt Rate 11/11/2022 >60  >60 ml/min/1.73m2 Final    Comment:      Pediatric calculator link: Miguel.at. org/professionals/kdoqi/gfr_calculatorped       Effective Oct 3, 2022       These results are not intended for use in patients <25years of age. eGFR results are calculated without a race factor using  the 2021 CKD-EPI equation. Careful clinical correlation is recommended, particularly when comparing to results calculated using previous equations. The CKD-EPI equation is less accurate in patients with extremes of muscle mass, extra-renal metabolism of creatinine, excessive creatine ingestion, or following therapy that affects renal tubular secretion.       Calcium 11/11/2022 8.3  8.3 - 10.4 MG/DL Final    Total Bilirubin 11/11/2022 0.3  0.2 - 1.1 MG/DL Final    ALT 11/11/2022 34  12 - 65 U/L Final    AST 11/11/2022 16  15 - 37 U/L Final    Alk Phosphatase 11/11/2022 103  50 - 136 U/L Final    Total Protein 11/11/2022 6.0 (A)  6.3 - 8.2 g/dL Final    Albumin 11/11/2022 3.1 (A)  3.2 - 4.6 g/dL Final    Globulin 11/11/2022 2.9  2.8 - 4.5 g/dL Final    Albumin/Globulin Ratio 11/11/2022 1.1  0.4 - 1.6   Final    Magnesium 11/11/2022 1.7 (A)  1.8 - 2.4 mg/dL Final        Treatment Summary has been discussed and given to patient: n/a        -------------------------------------------------------------------------------------------------------------------  Please call our office at [phone] if you have any  of the following symptoms:   Fever of 100.5 or greater  Chills  Shortness of breath  Swelling or pain in one leg    After office hours an answering service is available and will contact a provider for emergencies or if you are experiencing any of the above symptoms. Patient does express an interest in My Chart. My Chart log in information explained on the after visit summary printout at the . [address] desk.     Leelee Frey RN  Nurse Navigator  [address] [place] [id number] [phone]

## 2022-11-13 ENCOUNTER — HOSPITAL ENCOUNTER (OUTPATIENT)
Dept: INFUSION THERAPY | Age: 62
Discharge: HOME | End: 2022-11-13
Payer: COMMERCIAL

## 2022-11-13 VITALS
RESPIRATION RATE: 18 BRPM | OXYGEN SATURATION: 97 % | SYSTOLIC BLOOD PRESSURE: 132 MMHG | HEART RATE: 78 BPM | DIASTOLIC BLOOD PRESSURE: 85 MMHG | TEMPERATURE: 98.1 F

## 2022-11-13 DIAGNOSIS — C78.6 PERITONEAL METASTASES (HCC): ICD-10-CM

## 2022-11-13 DIAGNOSIS — C76.2 ABDOMINAL CARCINOMATOSIS (HCC): Primary | ICD-10-CM

## 2022-11-13 PROBLEM — Z09 CHEMOTHERAPY FOLLOW-UP EXAMINATION: Status: RESOLVED | Noted: 2022-10-14 | Resolved: 2022-11-13

## 2022-11-13 PROCEDURE — 2580000003 HC RX 258: Performed by: NURSE PRACTITIONER

## 2022-11-13 PROCEDURE — 96523 IRRIG DRUG DELIVERY DEVICE: CPT

## 2022-11-13 RX ORDER — SODIUM CHLORIDE 0.9 % (FLUSH) 0.9 %
5-40 SYRINGE (ML) INJECTION PRN
Status: DISCONTINUED | OUTPATIENT
Start: 2022-11-13 | End: 2022-11-14 | Stop reason: HOSPADM

## 2022-11-13 RX ADMIN — SODIUM CHLORIDE, PRESERVATIVE FREE 10 ML: 5 INJECTION INTRAVENOUS at 12:40

## 2022-11-13 NOTE — PROGRESS NOTES
Arrived to the [facility]. D/c pump completed. Provided education on d/c pump    Patient instructed to report any side affects to ordering provider. Patient tolerated well. Any issues or concerns during appointment: none. Patient aware of next infusion appointment on 11/25/22 (date) at 0930 (time). Discharged ambulatory accompanied by daughter.

## 2022-11-22 ENCOUNTER — CLINICAL DOCUMENTATION (OUTPATIENT)
Dept: ONCOLOGY | Age: 62
End: 2022-11-22

## 2022-11-25 ENCOUNTER — HOSPITAL ENCOUNTER (OUTPATIENT)
Dept: INFUSION THERAPY | Age: 62
Discharge: HOME | End: 2022-11-25
Payer: COMMERCIAL

## 2022-11-25 ENCOUNTER — OFFICE VISIT (OUTPATIENT)
Dept: ONCOLOGY | Age: 62
End: 2022-11-25
Payer: COMMERCIAL

## 2022-11-25 ENCOUNTER — OFFICE VISIT (OUTPATIENT)
Dept: PALLATIVE CARE | Age: 62
End: 2022-11-25
Payer: COMMERCIAL

## 2022-11-25 VITALS
DIASTOLIC BLOOD PRESSURE: 80 MMHG | TEMPERATURE: 97.8 F | SYSTOLIC BLOOD PRESSURE: 127 MMHG | BODY MASS INDEX: 30.42 KG/M2 | OXYGEN SATURATION: 97 % | RESPIRATION RATE: 16 BRPM | WEIGHT: 212 LBS

## 2022-11-25 DIAGNOSIS — C78.6 PERITONEAL METASTASES (HCC): ICD-10-CM

## 2022-11-25 DIAGNOSIS — Z51.5 ENCOUNTER FOR PALLIATIVE CARE: ICD-10-CM

## 2022-11-25 DIAGNOSIS — C16.9 MALIGNANT NEOPLASM OF STOMACH, UNSPECIFIED LOCATION (HCC): ICD-10-CM

## 2022-11-25 DIAGNOSIS — C76.2 ABDOMINAL CARCINOMATOSIS (HCC): Primary | ICD-10-CM

## 2022-11-25 DIAGNOSIS — F41.8 ANXIETY ABOUT HEALTH: Primary | ICD-10-CM

## 2022-11-25 DIAGNOSIS — C79.9 METASTATIC ADENOCARCINOMA (HCC): ICD-10-CM

## 2022-11-25 LAB
ALBUMIN SERPL-MCNC: 3.2 G/DL (ref 3.2–4.6)
ALBUMIN/GLOB SERPL: 1 {RATIO} (ref 0.4–1.6)
ALP SERPL-CCNC: 118 U/L (ref 50–136)
ALT SERPL-CCNC: 22 U/L (ref 12–65)
ANION GAP SERPL CALC-SCNC: 6 MMOL/L (ref 2–11)
AST SERPL-CCNC: 15 U/L (ref 15–37)
BASOPHILS # BLD: 0.1 K/UL (ref 0–0.2)
BASOPHILS NFR BLD: 1 % (ref 0–2)
BILIRUB SERPL-MCNC: 0.2 MG/DL (ref 0.2–1.1)
BUN SERPL-MCNC: 14 MG/DL (ref 8–23)
CALCIUM SERPL-MCNC: 8.7 MG/DL (ref 8.3–10.4)
CEA SERPL-MCNC: 1.7 NG/ML (ref 0–3)
CHLORIDE SERPL-SCNC: 112 MMOL/L (ref 101–110)
CO2 SERPL-SCNC: 26 MMOL/L (ref 21–32)
CREAT SERPL-MCNC: 0.8 MG/DL (ref 0.8–1.5)
DIFFERENTIAL METHOD BLD: ABNORMAL
EOSINOPHIL # BLD: 0 K/UL (ref 0–0.8)
EOSINOPHIL NFR BLD: 1 % (ref 0.5–7.8)
ERYTHROCYTE [DISTWIDTH] IN BLOOD BY AUTOMATED COUNT: 14.6 % (ref 11.9–14.6)
GLOBULIN SER CALC-MCNC: 3.2 G/DL (ref 2.8–4.5)
GLUCOSE SERPL-MCNC: 116 MG/DL (ref 65–100)
HCT VFR BLD AUTO: 34.9 %
HGB BLD-MCNC: 11 G/DL (ref 13.6–17.2)
IMM GRANULOCYTES # BLD AUTO: 0 K/UL (ref 0–0.5)
IMM GRANULOCYTES NFR BLD AUTO: 0 % (ref 0–5)
LYMPHOCYTES # BLD: 1.7 K/UL (ref 0.5–4.6)
LYMPHOCYTES NFR BLD: 43 % (ref 13–44)
MAGNESIUM SERPL-MCNC: 2 MG/DL (ref 1.8–2.4)
MCH RBC QN AUTO: 29.6 PG (ref 26.1–32.9)
MCHC RBC AUTO-ENTMCNC: 31.5 G/DL (ref 31.4–35)
MCV RBC AUTO: 93.8 FL (ref 82–102)
MONOCYTES # BLD: 0.3 K/UL (ref 0.1–1.3)
MONOCYTES NFR BLD: 8 % (ref 4–12)
NEUTS SEG # BLD: 1.9 K/UL (ref 1.7–8.2)
NEUTS SEG NFR BLD: 47 % (ref 43–78)
NRBC # BLD: 0 K/UL (ref 0–0.2)
PLATELET # BLD AUTO: 157 K/UL (ref 150–450)
PMV BLD AUTO: 10.5 FL (ref 9.4–12.3)
POTASSIUM SERPL-SCNC: 3.9 MMOL/L (ref 3.5–5.1)
PROT SERPL-MCNC: 6.4 G/DL (ref 6.3–8.2)
RBC # BLD AUTO: 3.72 M/UL (ref 4.23–5.6)
SODIUM SERPL-SCNC: 144 MMOL/L (ref 133–143)
WBC # BLD AUTO: 4.1 K/UL (ref 4.3–11.1)

## 2022-11-25 PROCEDURE — 96375 TX/PRO/DX INJ NEW DRUG ADDON: CPT

## 2022-11-25 PROCEDURE — 99214 OFFICE O/P EST MOD 30 MIN: CPT | Performed by: NURSE PRACTITIONER

## 2022-11-25 PROCEDURE — 96415 CHEMO IV INFUSION ADDL HR: CPT

## 2022-11-25 PROCEDURE — 3078F DIAST BP <80 MM HG: CPT | Performed by: NURSE PRACTITIONER

## 2022-11-25 PROCEDURE — G0498 CHEMO EXTEND IV INFUS W/PUMP: HCPCS

## 2022-11-25 PROCEDURE — 2580000003 HC RX 258: Performed by: NURSE PRACTITIONER

## 2022-11-25 PROCEDURE — 6360000002 HC RX W HCPCS: Performed by: NURSE PRACTITIONER

## 2022-11-25 PROCEDURE — 80053 COMPREHEN METABOLIC PANEL: CPT

## 2022-11-25 PROCEDURE — 2580000003 HC RX 258: Performed by: INTERNAL MEDICINE

## 2022-11-25 PROCEDURE — 96411 CHEMO IV PUSH ADDL DRUG: CPT

## 2022-11-25 PROCEDURE — 96372 THER/PROPH/DIAG INJ SC/IM: CPT

## 2022-11-25 PROCEDURE — 82378 CARCINOEMBRYONIC ANTIGEN: CPT

## 2022-11-25 PROCEDURE — 3074F SYST BP LT 130 MM HG: CPT | Performed by: NURSE PRACTITIONER

## 2022-11-25 PROCEDURE — 96417 CHEMO IV INFUS EACH ADDL SEQ: CPT

## 2022-11-25 PROCEDURE — 85025 COMPLETE CBC W/AUTO DIFF WBC: CPT

## 2022-11-25 PROCEDURE — 96368 THER/DIAG CONCURRENT INF: CPT

## 2022-11-25 PROCEDURE — 96413 CHEMO IV INFUSION 1 HR: CPT

## 2022-11-25 PROCEDURE — 36591 DRAW BLOOD OFF VENOUS DEVICE: CPT

## 2022-11-25 PROCEDURE — 96367 TX/PROPH/DG ADDL SEQ IV INF: CPT

## 2022-11-25 PROCEDURE — 83735 ASSAY OF MAGNESIUM: CPT

## 2022-11-25 RX ORDER — SODIUM CHLORIDE 0.9 % (FLUSH) 0.9 %
10 SYRINGE (ML) INJECTION PRN
Status: DISCONTINUED | OUTPATIENT
Start: 2022-11-25 | End: 2022-11-26 | Stop reason: HOSPADM

## 2022-11-25 RX ORDER — HEPARIN SODIUM (PORCINE) LOCK FLUSH IV SOLN 100 UNIT/ML 100 UNIT/ML
500 SOLUTION INTRAVENOUS PRN
Status: CANCELLED | OUTPATIENT
Start: 2022-11-27

## 2022-11-25 RX ORDER — SODIUM CHLORIDE 9 MG/ML
5-250 INJECTION, SOLUTION INTRAVENOUS PRN
Status: CANCELLED | OUTPATIENT
Start: 2022-11-27

## 2022-11-25 RX ORDER — DEXTROSE MONOHYDRATE 50 MG/ML
5-250 INJECTION, SOLUTION INTRAVENOUS PRN
Status: CANCELLED | OUTPATIENT
Start: 2022-11-25

## 2022-11-25 RX ORDER — FAMOTIDINE 10 MG/ML
20 INJECTION, SOLUTION INTRAVENOUS
Status: CANCELLED | OUTPATIENT
Start: 2022-11-25

## 2022-11-25 RX ORDER — ACETAMINOPHEN 325 MG/1
650 TABLET ORAL
Status: CANCELLED | OUTPATIENT
Start: 2022-11-25

## 2022-11-25 RX ORDER — ALBUTEROL SULFATE 90 UG/1
4 AEROSOL, METERED RESPIRATORY (INHALATION) PRN
Status: CANCELLED | OUTPATIENT
Start: 2022-11-25

## 2022-11-25 RX ORDER — DEXTROSE MONOHYDRATE 50 MG/ML
5-250 INJECTION, SOLUTION INTRAVENOUS PRN
Status: DISCONTINUED | OUTPATIENT
Start: 2022-11-25 | End: 2022-11-26 | Stop reason: HOSPADM

## 2022-11-25 RX ORDER — FLUOROURACIL 50 MG/ML
400 INJECTION, SOLUTION INTRAVENOUS ONCE
Status: COMPLETED | OUTPATIENT
Start: 2022-11-25 | End: 2022-11-25

## 2022-11-25 RX ORDER — ONDANSETRON 2 MG/ML
8 INJECTION INTRAMUSCULAR; INTRAVENOUS
Status: CANCELLED | OUTPATIENT
Start: 2022-11-25

## 2022-11-25 RX ORDER — EPINEPHRINE 1 MG/ML
0.3 INJECTION, SOLUTION, CONCENTRATE INTRAVENOUS PRN
Status: CANCELLED | OUTPATIENT
Start: 2022-11-25

## 2022-11-25 RX ORDER — FLUOROURACIL 50 MG/ML
400 INJECTION, SOLUTION INTRAVENOUS ONCE
Status: CANCELLED
Start: 2022-11-25 | End: 2022-11-25

## 2022-11-25 RX ORDER — SODIUM CHLORIDE 9 MG/ML
5-250 INJECTION, SOLUTION INTRAVENOUS PRN
Status: CANCELLED | OUTPATIENT
Start: 2022-11-25

## 2022-11-25 RX ORDER — ATROPINE SULFATE 0.4 MG/ML
0.4 INJECTION, SOLUTION INTRAVENOUS ONCE
Status: COMPLETED | OUTPATIENT
Start: 2022-11-25 | End: 2022-11-25

## 2022-11-25 RX ORDER — SODIUM CHLORIDE 0.9 % (FLUSH) 0.9 %
5-40 SYRINGE (ML) INJECTION PRN
Status: CANCELLED | OUTPATIENT
Start: 2022-11-25

## 2022-11-25 RX ORDER — SODIUM CHLORIDE 9 MG/ML
5-40 INJECTION INTRAVENOUS PRN
Status: CANCELLED | OUTPATIENT
Start: 2022-11-27

## 2022-11-25 RX ORDER — ONDANSETRON 2 MG/ML
8 INJECTION INTRAMUSCULAR; INTRAVENOUS ONCE
Status: CANCELLED | OUTPATIENT
Start: 2022-11-25 | End: 2022-11-25

## 2022-11-25 RX ORDER — ATROPINE SULFATE 0.4 MG/ML
0.4 AMPUL (ML) INJECTION ONCE
Status: CANCELLED | OUTPATIENT
Start: 2022-11-25 | End: 2022-11-25

## 2022-11-25 RX ORDER — ATROPINE SULFATE 0.4 MG/ML
0.4 AMPUL (ML) INJECTION
Status: CANCELLED | OUTPATIENT
Start: 2022-11-25

## 2022-11-25 RX ORDER — HEPARIN SODIUM (PORCINE) LOCK FLUSH IV SOLN 100 UNIT/ML 100 UNIT/ML
500 SOLUTION INTRAVENOUS PRN
Status: CANCELLED | OUTPATIENT
Start: 2022-11-25

## 2022-11-25 RX ORDER — MEPERIDINE HYDROCHLORIDE 50 MG/ML
12.5 INJECTION INTRAMUSCULAR; INTRAVENOUS; SUBCUTANEOUS PRN
Status: CANCELLED | OUTPATIENT
Start: 2022-11-25

## 2022-11-25 RX ORDER — SODIUM CHLORIDE 9 MG/ML
INJECTION, SOLUTION INTRAVENOUS CONTINUOUS
Status: CANCELLED | OUTPATIENT
Start: 2022-11-25

## 2022-11-25 RX ORDER — ONDANSETRON 2 MG/ML
8 INJECTION INTRAMUSCULAR; INTRAVENOUS ONCE
Status: COMPLETED | OUTPATIENT
Start: 2022-11-25 | End: 2022-11-25

## 2022-11-25 RX ORDER — SODIUM CHLORIDE 0.9 % (FLUSH) 0.9 %
5-40 SYRINGE (ML) INJECTION PRN
Status: CANCELLED | OUTPATIENT
Start: 2022-11-27

## 2022-11-25 RX ORDER — SODIUM CHLORIDE 0.9 % (FLUSH) 0.9 %
5-40 SYRINGE (ML) INJECTION PRN
Status: DISCONTINUED | OUTPATIENT
Start: 2022-11-25 | End: 2022-11-26 | Stop reason: HOSPADM

## 2022-11-25 RX ORDER — SODIUM CHLORIDE 9 MG/ML
5-40 INJECTION INTRAVENOUS PRN
Status: CANCELLED | OUTPATIENT
Start: 2022-11-25

## 2022-11-25 RX ORDER — DIPHENHYDRAMINE HYDROCHLORIDE 50 MG/ML
50 INJECTION INTRAMUSCULAR; INTRAVENOUS
Status: CANCELLED | OUTPATIENT
Start: 2022-11-25

## 2022-11-25 RX ADMIN — FLUOROURACIL 850 MG: 50 INJECTION, SOLUTION INTRAVENOUS at 14:55

## 2022-11-25 RX ADMIN — DEXAMETHASONE SODIUM PHOSPHATE 12 MG: 4 INJECTION, SOLUTION INTRAMUSCULAR; INTRAVENOUS at 10:35

## 2022-11-25 RX ADMIN — LEUCOVORIN CALCIUM 850 MG: 350 INJECTION, POWDER, LYOPHILIZED, FOR SOLUTION INTRAMUSCULAR; INTRAVENOUS at 13:20

## 2022-11-25 RX ADMIN — FLUOROURACIL 5000 MG: 50 INJECTION, SOLUTION INTRAVENOUS at 15:00

## 2022-11-25 RX ADMIN — ATROPINE SULFATE 0.4 MG: 0.4 INJECTION, SOLUTION INTRAVENOUS at 12:28

## 2022-11-25 RX ADMIN — SODIUM CHLORIDE, PRESERVATIVE FREE 10 ML: 5 INJECTION INTRAVENOUS at 08:30

## 2022-11-25 RX ADMIN — ONDANSETRON 8 MG: 2 INJECTION INTRAMUSCULAR; INTRAVENOUS at 10:30

## 2022-11-25 RX ADMIN — IRINOTECAN HYDROCHLORIDE 320 MG: 20 INJECTION, SOLUTION INTRAVENOUS at 13:20

## 2022-11-25 RX ADMIN — DEXTROSE MONOHYDRATE 50 ML/HR: 50 INJECTION, SOLUTION INTRAVENOUS at 10:00

## 2022-11-25 RX ADMIN — OXALIPLATIN 180 MG: 5 INJECTION, SOLUTION INTRAVENOUS at 11:15

## 2022-11-25 RX ADMIN — FOSAPREPITANT 150 MG: 150 INJECTION, POWDER, LYOPHILIZED, FOR SOLUTION INTRAVENOUS at 10:51

## 2022-11-25 ASSESSMENT — PATIENT HEALTH QUESTIONNAIRE - PHQ9
SUM OF ALL RESPONSES TO PHQ QUESTIONS 1-9: 0
1. LITTLE INTEREST OR PLEASURE IN DOING THINGS: 0
SUM OF ALL RESPONSES TO PHQ QUESTIONS 1-9: 0
SUM OF ALL RESPONSES TO PHQ QUESTIONS 1-9: 0
2. FEELING DOWN, DEPRESSED OR HOPELESS: 0
SUM OF ALL RESPONSES TO PHQ QUESTIONS 1-9: 0
SUM OF ALL RESPONSES TO PHQ9 QUESTIONS 1 & 2: 0

## 2022-11-25 ASSESSMENT — ENCOUNTER SYMPTOMS
NAUSEA: 0
RESPIRATORY NEGATIVE: 1
COUGH: 0
ABDOMINAL PAIN: 0
BLOOD IN STOOL: 0
ALLERGIC/IMMUNOLOGIC NEGATIVE: 1
ABDOMINAL DISTENTION: 0
ABDOMINAL PAIN: 0
EYE PROBLEMS: 0
DIARRHEA: 0
BACK PAIN: 0
CONSTIPATION: 1
SHORTNESS OF BREATH: 0
VOMITING: 0
SCLERAL ICTERUS: 0
NAUSEA: 0
EYES NEGATIVE: 1
SORE THROAT: 0
HEMOPTYSIS: 0
CONSTIPATION: 1

## 2022-11-25 ASSESSMENT — PAIN SCALES - GENERAL: PAINLEVEL_OUTOF10: 0

## 2022-11-25 NOTE — PROGRESS NOTES
Outpatient Palliative Care at the  [facility]: Office Visit Established Patient    Diagnosis: adenocarcinoma of upper GI    Treatment Plan: mFOLFIRINOX    Treatment Intent: Palliative    Medical Oncologist: Dr. Mignon Jose Oncologist: N/A    Navigator: Flo Weiner RN      Chief Complaint:    Chief Complaint   Patient presents with    Follow-up    Anxiety     History of Present Illness:  Mr. Evans is a 65 y.o. 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 Patient initially presented with 6 month history of abdominal pain and 30lb weight loss. A GI workup revealed hiatal hernia, gastric polyps, benign colon polyps, diverticulosis and hemorrhoids. He presented to [facility] ER on 5/12 and CT showed extensive peritoneal nodularity, concerning for metastatic disease, with consequential SBO. He had a laparotomy on 5/27 with venting G tube placement. Biopsies confirmed adenocarcinoma of upper GI primary. He started mFOLFIRINOX on 6/10/22 while hospitalized. Following cycle 2, patient was admitted for sepsis, source determined to be g tube insertion site. CT scan October 2022 with disease response. Interval History:  Patient seen in clinic in coordination with oncology NP. Patient's wife accompanies him today. Overall, patient is feeling well. He remains off opioids. He has Zyprexa, Compazine, and Zofran available for nausea, but has not needed. Continues Lexapro and Buspar. Less anxiety on treatment days, but still states he would rather not have to come q 2 weeks for tx. He has mild constipation, and takes stool softener or Miralax daily. C/o hemorrhoids. Has transient peripheral neuropathy/cold sensitivity following chemotherapy. Review of Systems:  Review of Systems   Constitutional:  Positive for fatigue. HENT: Negative. Eyes: Negative. Respiratory: Negative. Cardiovascular: Negative. Gastrointestinal:  Positive for constipation. Negative for abdominal pain and nausea. Improved  +hemorrhoids   Genitourinary: Negative. Musculoskeletal: Negative. Skin: Negative. Allergic/Immunologic: Negative. Neurological: Negative. Numbness/tingling/cold sensitivity to fingers/toes after chemotherapy   Psychiatric/Behavioral:  Positive for agitation and dysphoric mood.          On chemo days: improving       No Known Allergies  Past Medical History:   Diagnosis Date    Bilateral carpal tunnel syndrome 12/9/2020    Chronic right shoulder pain 11/30/2020    Colon polyps 3/11/2013    Diverticulitis 3/11/2013    HTN (hypertension) 3/11/2013    Hyperlipidemia 3/11/2013    Paresthesia and pain of both upper extremities 11/30/2020    PUD (peptic ulcer disease) 3/11/2013    History of     Right elbow pain 12/9/2020    Wheezing 3/11/2013     Past Surgical History:   Procedure Laterality Date    COLONOSCOPY  2/25/09    colonoscopy per Dr. Regina Gordillo with need for repeat every 3 years    COLONOSCOPY  02/25/2022    Dr. Matilde Foley; polyps of the ascending, transverse, descending, and sigmoid colons; internal hemorrhoid; diverticulosis    LAPAROSCOPY N/A 5/27/2022    LAPAROSCOPY DIAGNOSTIC , OPEN EXPLORATORY LAPAROTOMY, MASS EXCISION, G-TUBE PLACEMENT performed by Rey Serrato MD at [facility] MAIN OR    PORT SURGERY N/A 6/3/2022    PORT INSERTION performed by Rey Serrato MD at [address] [place] [address]  October 2004    partial colon resection per Dr. Herman Cid  02/25/2022    Dr. Matilde Foley; hiatal hernia gastric polyps     Family History   Problem Relation Age of Onset    No Known Problems Brother     Cancer Sister         breast    Hypertension Mother     Heart Disease Mother     Diabetes Father     Osteoarthritis Father     Alcohol Abuse Father     Hypertension Father     Diabetes Mother      Social History Socioeconomic History    Marital status:      Spouse name: Not on file    Number of children: Not on file    Years of education: Not on file    Highest education level: Not on file   Occupational History    Not on file   Tobacco Use    Smoking status: Former     Packs/day: 1.00     Types: Cigarettes    Smokeless tobacco: Never   Substance and Sexual Activity    Alcohol use: No    Drug use: No    Sexual activity: Not on file   Other Topics Concern    Not on file   Social History Narrative    Not on file     Social Determinants of Health     Financial Resource Strain: Not on file   Food Insecurity: Not on file   Transportation Needs: Not on file   Physical Activity: Not on file   Stress: Not on file   Social Connections: Not on file   Intimate Partner Violence: Not on file   Housing Stability: Not on file     Current Outpatient Medications   Medication Sig Dispense Refill    rivaroxaban (XARELTO) 20 MG TABS tablet Take 1 tablet by mouth daily (with breakfast) 30 tablet 3    potassium chloride (KLOR-CON M) 20 MEQ extended release tablet Take 1 tablet by mouth 2 times daily for 28 days 14 days 28 tablet 1    dicyclomine (BENTYL) 20 MG tablet Take one tab orally twice daily 180 tablet 0    magnesium oxide (MAG-OX) 400 MG tablet Take 1 tablet by mouth in the morning, at noon, and at bedtime 90 tablet 2    busPIRone (BUSPAR) 7.5 MG tablet Take 1 tablet by mouth daily 15 tablet 0    pantoprazole (PROTONIX) 40 MG tablet Take 1 tablet by mouth daily 30 tablet 1    rivaroxaban 15 & 20 MG Starter Pack Take as directed on package. 1 each 0    Multiple Vitamins-Minerals (MULTIVITAMIN MEN 50+ PO) Take by mouth      oxyCODONE (ROXICODONE INTENSOL) 100 MG/5ML concentrated solution Take 10 mg by mouth every 4 hours as needed for Pain. 1 mls under tongue (Patient not taking: Reported on 10/13/2022)      escitalopram (LEXAPRO) 10 MG tablet Take 1 tablet by mouth in the morning.  30 tablet 5    OLANZapine zydis (ZYPREXA) 5 MG disintegrating tablet Take 1 tablet by mouth nightly Do not take with promethazine 30 tablet 3    ondansetron (ZOFRAN-ODT) 8 MG TBDP disintegrating tablet Take 1 tablet by mouth every 8 hours (Patient not taking: No sig reported) 90 tablet 0    promethazine (PHENERGAN) 12.5 MG tablet Take 1 tablet by mouth every 6 hours as needed for Nausea 90 tablet 0    naloxone 4 MG/0.1ML LIQD nasal spray 1 spray by Nasal route as needed for Opioid Reversal  (Patient not taking: No sig reported)      polyethylene glycol (GLYCOLAX) 17 GM/SCOOP powder Take 17 g by mouth daily      rosuvastatin (CRESTOR) 10 MG tablet Take 10 mg by mouth      umeclidinium-vilanterol (ANORO ELLIPTA) 62.5-25 MCG/INH AEPB inhaler Inhale 1 puff into the lungs daily  (Patient not taking: No sig reported)       No current facility-administered medications for this visit. Facility-Administered Medications Ordered in Other Visits   Medication Dose Route Frequency Provider Last Rate Last Admin    sodium chloride flush 0.9 % injection 10 mL  10 mL IntraVENous PRN Bharathi Wong MD           OBJECTIVE:  Wt Readings from Last 1 Encounters:   11/25/22 212 lb (96.2 kg)     Temp Readings from Last 1 Encounters:   11/25/22 97.8 °F (36.6 °C)     BP Readings from Last 1 Encounters:   11/25/22 127/80     Pulse Readings from Last 1 Encounters:   11/13/22 78        Pain Score: Zero      Physical Exam:  Constitutional: Well developed, well nourished male in no acute distress. Spouse present. HEENT: Normocephalic and atraumatic. Pupils are equal, round, and reactive to light. Extraocular muscles are intact. Sclerae anicteric. Neck supple without JVD. Lymph node   deferred   Skin Warm and dry. No bruising and no rash noted. No erythema. No pallor. Respiratory Unlabored respiratory effort. CVS Regular rate, normotensive. Abdomen Soft, nontender and nondistended. Neuro Grossly nonfocal with no obvious sensory or motor deficits.    MSK Normal range of motion in general.     Psych Appropriate mood/affect.        Labs:  Recent Results (from the past 24 hour(s))   CEA    Collection Time: 11/25/22  8:16 AM   Result Value Ref Range    CEA 1.7 0.0 - 3.0 ng/mL   Magnesium    Collection Time: 11/25/22  8:16 AM   Result Value Ref Range    Magnesium 2.0 1.8 - 2.4 mg/dL   Comprehensive Metabolic Panel    Collection Time: 11/25/22  8:16 AM   Result Value Ref Range    Sodium 144 (H) 133 - 143 mmol/L    Potassium 3.9 3.5 - 5.1 mmol/L    Chloride 112 (H) 101 - 110 mmol/L    CO2 26 21 - 32 mmol/L    Anion Gap 6 2 - 11 mmol/L    Glucose 116 (H) 65 - 100 mg/dL    BUN 14 8 - 23 MG/DL    Creatinine 0.80 0.8 - 1.5 MG/DL    Est, Glom Filt Rate >60 >60 ml/min/1.73m2    Calcium 8.7 8.3 - 10.4 MG/DL    Total Bilirubin 0.2 0.2 - 1.1 MG/DL    ALT 22 12 - 65 U/L    AST 15 15 - 37 U/L    Alk Phosphatase 118 50 - 136 U/L    Total Protein 6.4 6.3 - 8.2 g/dL    Albumin 3.2 3.2 - 4.6 g/dL    Globulin 3.2 2.8 - 4.5 g/dL    Albumin/Globulin Ratio 1.0 0.4 - 1.6     CBC with Auto Differential    Collection Time: 11/25/22  8:16 AM   Result Value Ref Range    WBC 4.1 (L) 4.3 - 11.1 K/uL    RBC 3.72 (L) 4.23 - 5.6 M/uL    Hemoglobin 11.0 (L) 13.6 - 17.2 g/dL    Hematocrit 34.9 %    MCV 93.8 82.0 - 102.0 FL    MCH 29.6 26.1 - 32.9 PG    MCHC 31.5 31.4 - 35.0 g/dL    RDW 14.6 11.9 - 14.6 %    Platelets 794 160 - 548 K/uL    MPV 10.5 9.4 - 12.3 FL    nRBC 0.00 0.0 - 0.2 K/uL    Seg Neutrophils 47 43 - 78 %    Lymphocytes 43 13 - 44 %    Monocytes 8 4.0 - 12.0 %    Eosinophils % 1 0.5 - 7.8 %    Basophils 1 0.0 - 2.0 %    Immature Granulocytes 0 0.0 - 5.0 %    Segs Absolute 1.9 1.7 - 8.2 K/UL    Absolute Lymph # 1.7 0.5 - 4.6 K/UL    Absolute Mono # 0.3 0.1 - 1.3 K/UL    Absolute Eos # 0.0 0.0 - 0.8 K/UL    Basophils Absolute 0.1 0.0 - 0.2 K/UL    Absolute Immature Granulocyte 0.0 0.0 - 0.5 K/UL    Differential Type AUTOMATED         Imaging:  No results found for this or any previous visit.    ASSESSMENT:   Diagnosis Orders   1. Anxiety about health        2. Encounter for palliative care        3. Metastatic adenocarcinoma ([address]          PLAN:  Lab studies and imaging studies were personally reviewed. Pertinent old records were reviewed from [facility]. 1. Abdominal pain: Resolved. Has not taken fentanyl/oxycodone in weeks. 2. Nausea: Currently denies. He has mild nausea post chemo. He takes Zyprexa HS, Zofran, and Compazine all prn.    3.  Constipation: Continue stool softener or Miralax daily. Preparation H for hemorrhoids. 4. Dysphoric mood: Mainly on chemotherapy days. Continue Lexapro daily. Buspar 7.5mg daily prn; mood improved. Advanced Care Planning Discussed: None today    Will follow up in: 4 weeks or sooner if needed. I have reviewed the patient's controlled substance prescription history, as maintained in the [place] prescription monitoring program, so that the prescriptions(s) for a controlled substance can be given.   Last Date Reviewed: 11/25/22          LUCY Hoang  Outpatient Palliative Care at the  [address]  Office : [phone]  Fax : [phone]

## 2022-11-25 NOTE — PROGRESS NOTES
[facility]: Established patient - follow up     Chief Complaint   Patient presents with    Follow-up      Reason for Referral: Abdominal pain; peritoneal metastatic disease   Referring Provider: Marielle Messina NP   Primary Care Provider: Gato Escamilla MD   Family History of Cancer/Hematologic Disorders: Family history is significant for sister with breast cancer. Presenting Symptoms: Progressively worsening, persistent abdominal pain x several months    History of Present Illness:  Mr. Evans  is a 62 y.o. male who presents today for FU regarding adenocarcinoma with peritoneal metastatic disease. The past medical history is significant for tobacco use (recent pack per day smoker x 30+ years - now down to 1/3PPD), PUD, paresthesia/pain of bilateral upper extremities, HLD, HTN, diverticulitis,  colon polyps, hiatal hernia, chronic right shoulder pain, bilateral carpal tunnel syndrome, and partial colon resection. He presented to the [facility] ED on 5/12/22 with a complaint of persistent abdominal pain for several months that was becoming progressively  worse. EGD and colonoscopy on 2/25/22 revealed findings of hiatal hernia, gastric polyps, several benign colon polyps, diverticulosis, and internal hemorrhoids. CT of the abdomen on 3/8/22 showed no acute findings. He presented to [facility] ER x 2 for  evaluation (5/3/22 and 5/10/22). RUQ abdominal ultrasound was completed on 5/3/22 in the ED at [facility] demonstrating no acute findings to explain patient's pain; probable hepatic  steatosis; small echogenic mural foci in the gallbladder which are nonmobile, likely representing cholesterol polyps, largest measuring 4 mm; and small volume simple ascites in the right upper quadrant and left lower quadrant, nonspecific.   CT AP with  contrast at [facility] ED 5/10/22 showed a partial small bowel obstruction; small to moderate amount of free fluid in the abdomen and pelvis; and nonspecific stranding of the omentum primarily in the left upper quadrant. Radiologist noted that follow-up  CT was recommended to differentiate passive congestion from omental disease. On 5/11/22, abdominal series again showed multiple dilated small bowel loops with enteric contrast appearing to extend into the proximal colon, suggesting partial small  bowel obstruction. CT AP in the [facility] ED on 5/12/22 identified extensive peritoneal nodularity and mild ascites consistent with peritoneal  metastatic disease with primary lesion not definitely identified; dilated fluid-filled loops of small bowel possibly related to gastroenteritis with no definite obstruction and no obvious bowel mass; and sclerosis of S1 vertebral body. Radiologist recommended consideration of follow-up bone scan to assess for bone metastases. Patient was admitted to  the Hospitalist service on 5/12/22, and IR consulted for possible paracentesis however very small volume was inaccessible. CT Chest obtained on 5/13/22 showed No evidence of primary malignancy or metastasis within the chest.  Follow-up hepatobiliary  scan was suggested to assess for common bile duct obstruction. Oncology was consulted but did not see patient inhouse as pt was dischared. Upon discharge on 5/14/22, patient was instructed to follow up with [facility]. During consultation, we discussed his workup. We looked at the images together demonstrating some of the findings concerning for peritoneal nodularity/peritoneal disease. Discussed anatomy of the findings utilizing images to help pt understand what we are looking at and suspecting. He and wife were appreciative of the time spent to review these. We also addressed pt's pain. We also reviewed images of sclerosing lesion - bone scan recommended. He also was recommended to undergo HIDA scan after recent US per PCP. He is tired of tests, frustrated. Feelings validated and stressed importance of workup to determine why he has pain.   Wt loss from 240 --> 209 noted and expressed concern to pt about this. Of note, prior akbar resection with Dr Asia Petersen for diverticulitis per pt. Sent note to dr Mukund Julian re pt's case to expedite workup with his agreement. He was hospitalized for abdominal pain and sepsis. He was empirically placed on vancomycin and cefepime. CT scanning disclosed no intra-abdominal fluid collection or abscess but did suggest that his tumor burden was somewhat smaller. He had some purulent drainage from around his G-tube. This was cultured and grew klebsiella pneumoniae and citrobacter freundii both of which were sensitive to Levaquin which he was discharged home on for 10 days. He returns for follow up prior to c11 FOLFIRINOX. Since last seen he has been well. There are no GI or bowel complaints outside of intermittent hemorrhoid bleeding/discomfort. He is eating and drinking well. Energy level is very good. There is no cough, shortness of breath, or edema. Cold sensitivity  is present, no true neuropathy or pain. Denies any fevers or infectious symptoms. Chronological Events:   EGD REPORT 2/25/22                      COLONOSCOPY REPORT 2/25/22                 CT ABDOMEN PELVIS W CONTRAST 3/8/2022   FINDINGS:   LOWER CHEST: Unremarkable. ABDOMEN AND PELVIS:   Liver: Unremarkable. Biliary system: Unremarkable. Pancreas: Unremarkable. Spleen: Unremarkable. Adrenals: Unremarkable. Genitourinary: Unremarkable. Reproductive organs: Unremarkable. Gastrointestinal tract: Colonic diverticulosis without evidence of diverticulitis. There is no evidence of bowel obstruction or inflammation. The appendix is normal   Peritoneum/Extraperitoneum: Unremarkable. No free fluid or air. Vascular: Unremarkable. Musculoskeletal:  Small fat-containing umbilical hernia. Degenerative  changes of thoracolumbar spine. No acute or aggressive osseous lesion.    IMPRESSION: Colonic diverticula without evidence of acute diverticulitis. RIGHT UPPER QUADRANT ABDOMINAL ULTRASOUND 5/3/2022    FINDINGS:   Pancreas: Not visualized, obscured by bowel gas. Intrahepatic IVC: Normal.   Main  Portal Vein: Patent with normal directional flow. Liver: Liver contour is normal. Liver appears diffusely increased in echogenicity consistent with hepatic steatosis. There is fatty sparing around the gallbladder fossa. Gallbladder: Gallbladder  is normal in size. No evidence of gallbladder wall thickening. No gallstones are seen. There are several nonmobile echogenic mural foci in the proximal gallbladder body/neck, largest measuring 4 mm, without shadowing, likely small cholesterol polyps. Bile ducts: No evidence of biliary ductal dilation. The common bile duct measures 3 mm in diameter. Right kidney: Normal.   Left Upper Quadrant: Limited images of the left upper quadrant are unremarkable. Spleen measures 12.1 cm in length. Free fluid: Trace simple free fluid in the right upper quadrant and left lower quadrant. IMPRESSION:    1. No acute findings to explain patient's pain. 2. Probable hepatic steatosis. 3. Small echogenic mural foci in the gallbladder which are nonmobile, likely representing cholesterol polyps, largest measuring 4 mm. There are no specific ultrasound  follow up recommendations for polyps of this small size. 4. Small volume simple ascites in the right upper quadrant and left lower quadrant, nonspecific. CT ABDOMEN - PELVIS WITH CONTRAST 5/10/22   FINDINGS:   Liver: Normal    Portal Vein: Normal   Gallbladder - Biliary Tree: Normal   Pancreas: Normal   Spleen: Normal   Retroperitoneum:   Adrenals: Normal   Kidneys:  Normal    Aorto - Cava:  Calcification of the abdominal aorta without aneurysm formation. Lymphatics:  Normal   GI - Mesentery - Peritoneum: Multiple dilated mid small bowel loops without a focal transition point.  The appendix is normal. Small to  moderate amount of free fluid in the pelvis and right flank. Nonspecific stranding of the omentum in the left upper quadrant. Small fatty umbilical hernia. Pelvis: Normal   Lower Chest: Normal   Soft tissues - MSK: Normal    IMPRESSION:   1. Partial small bowel obstruction. 2. Small to moderate amount of free fluid in the abdomen and pelvis. 3. Nonspecific stranding of the omentum primarily  in the left upper quadrant. Follow-up CT is recommended to differentiate passive congestion from omental a static disease. ABDOMEN SERIES 5/11/22   FINDINGS:   Chest: Heart size within normal limits. Lungs are clear. No pleural effusion or pneumothorax. Bowel: Multiple dilated small bowel loops with air-fluid levels on the upright view. Contrast distends small bowel loops in the left lateral abdomen and lower abdomen. Contrast in the right hemiabdomen appears to be within proximal colon. Scattered colonic  gas. Peritoneum / Retroperitoneum: No pneumoperitoneum. Soft tissues / Bones: No acute osseous findings. The contrast in urinary bladder. Lines / Support Apparatus: None. IMPRESSION: Redemonstrated of multiple dilated small bowel loops with enteric contrast appearing to extend into the proximal colon, suggesting only partial small bowel obstruction. Continued radiographic follow-up is advised. CT OF THE ABDOMEN AND PELVIS 5/12/22   FINDINGS:   LOWER CHEST: Normal.   HEPATOBILIARY: No evidence of focal liver mass. No calcified gallstones. PANCREAS: Normal.   SPLEEN: Normal.    ADRENAL GLANDS: Normal.    KIDNEYS/BLADDER: Kidneys and bladder are unremarkable. No hydronephrosis or urinary tract calculi. BOWEL: Dilated fluid-filled loops of small bowel are present throughout the abdomen. No definite transition point. Oral contrast noted in the colon. No definite evidence of bowel mass but there is extensive peritoneal nodularity and trace ascites highly  concerning for peritoneal metastatic disease.    LYMPH NODES: No enlarged retroperitoneal or pelvic lymph nodes. Peritoneal nodularity again noted most likely metastatic disease. VASCULATURE: Unremarkable. PELVIC ORGANS: Prostate gland and rectum are unremarkable. Small amount of free pelvic fluid is noted. MUSCULOSKELETAL: Degenerative spine changes are noted. Mild sclerosis involving the S1 vertebral body is present on image 74 of series 602. IMPRESSION   1. Extensive peritoneal nodularity and mild ascites consistent with peritoneal metastatic disease. Primary lesion not definitely identified. 2. Dilated fluid-filled loops of small bowel possibly related to gastroenteritis. No definite obstruction. No obvious bowel mass. 3. Sclerosis S1 vertebral body. Consider follow-up bone scan to assess for bone metastases. LIMITED ABDOMINAL ULTRASOUND 5/13/22   FINDINGS: A single limited gray scale image was submitted of an undisclosed location in the abdomen. Images demonstrate a small amount of  ascites as seen on comparison CT. IMPRESSION   1. Small volume ascites. CT CHEST WITH CONTRAST 5/13/22   FINDINGS:   Mediastinum and visualized thyroid: Normal.   Heart: Normal.    Large Vessels: Normal.    Pleura: Normal.    Lungs: No lung mass clearly discerned. Mild scarring or atelectasis in the right lung base. No suspicious lung nodule. No consolidation or zulma pulmonary edema. Airways: Normal.    Lymph nodes: Normal.    Bones/Soft tissues: Normal.    Visualized abdomen: Partially imaged omental nodules. Perihepatic ascites noted. IMPRESSION: No evidence of primary malignancy or metastasis within the chest       ABDOMINAL ULTRASOUND 5/17/22   FINDINGS:    LIVER: 17.3 cm. Slight increase in echogenicity without focal mass. BILE DUCTS: No intrahepatic bile duct dilatation. CBD diameter = 8 mm. GALLBLADDER: It is unremarkable in appearance without stones or sludge. Echogenic polyp measures 0.5 cm. No gallbladder wall thickening. Mild ascites.    PANCREAS: Normal.   SPLEEN: Borderline enlarged at 12.2 cm. RIGHT KIDNEY: 13.3 cm. No mass or hydronephrosis. LEFT KIDNEY: 14.3 cm. No mass or hydronephrosis. ABDOMINAL AORTA AND IVC: Normal in size. ASCITES: Mild   IMPRESSION: Possible mild fatty infiltration liver. No focal mass. Gallbladder polyp but no stones or gallbladder wall thickening. Mild ascites. Mildly prominent common bile duct. Follow-up hepatobiliary scan could be performed to assess for common bile duct obstruction. 04/02/2021 (COVID-19, Blomkest Brooms, Primary or Immunocompromised Series, MRNA, PF, 100mcg/0.5mL)   04/30/2021 (COVID-19, Blomkest Brooms, Primary or Immunocompromised Series, MRNA, PF, 100mcg/0.5mL)   11/16/2021 (COVID-19, Moderna Booster, PF, 0.25mL Dose)      5/18/22 heme/onc consultation   5/20/22 Dr Ruddy Murphy consult - sent to ED for admission/workup   5/23/22 omental bx - IR   5/27/22 Dr Ruddy Murphy - diagnostic lap, omental mass and mesentery mass excision, G-tube placement for venting  6/1/22 painful G-tube - tract recanalized and new G-tube placed   6/12/22 C1 FOLFIRINOX completed - dose adjusted   6/14/22 d/c   6/24/22 FU sx - G tube maint instructions/staples removed - RTC PRN   6/24/22 FU after hospitalization - discussed PC/plan for tx  7/8/22 FU - mainly concerned about PICC line without blood return, decline cathflo, seeing José Miguel Pahm in [facility] on Monday. Will increase hydration at home. HH for TPN.    7/18/22 Follow up after hospitalization. He will complete course of Levaquin as prescribed for infection around G-tube. Would like to proceed with cycle 3 FOLFIRINOX with increased dosing. 8/4/22 Cycle 4 FOLFIRINOX - continue dose escalation. He will complete course of Levaquin/Diflucan as prescribed for infection around G-tube, site looks good today. 8/17/22 C5 FOLFIRINOX - wishes to pw full dose accepting risks; wishes for G tube to be removed - will need to time with labs; plan to drop dose in opioids - PC seeing pt today.   RD seeing pt. Plan for restaging in ~Oct.    9/1/22 FU FOLFIRINOX C5 full dose now; imaging in Oct   9/15/22 FU FOLFIRINOX C6-doing well, weight up 11#  9/30/22 FU FOLFIRINOX-C8 defer 1 week due to neutropenia, ANC 0.5  10/13/22 FU FOLFIRINOX 8; CT CAP reviewed and no POD. 10/28/22 Stat ultrasound RUE and then proceed with cycle 9 FOLFIRINOX. Doppler - Persistent DVT, nonocclusive, within the right axillary and basilic veins. The previously seen nonocclusive thrombus within the right innominate and subclavian veins are no longer seen. No abnormality of the right brachial vein demonstrated. Started on Xarelto. 11/11/22 FU - C10 FOLFIRINOX. Doing well. Wt stable. RUE swelling better. No new issues/concerns. 11/25/22 FU and C11 FOLFIRINOX, doing very well         Family History   Problem Relation Age of Onset    No Known Problems Brother     Cancer Sister         breast    Hypertension Mother     Heart Disease Mother     Diabetes Father     Osteoarthritis Father     Alcohol Abuse Father     Hypertension Father     Diabetes Mother       Social History     Socioeconomic History    Marital status:      Spouse name: None    Number of children: None    Years of education: None    Highest education level: None   Tobacco Use    Smoking status: Former     Packs/day: 1.00     Types: Cigarettes    Smokeless tobacco: Never   Substance and Sexual Activity    Alcohol use: No    Drug use: No        Review of Systems   Constitutional:  Negative for appetite change, diaphoresis, fatigue, fever and unexpected weight change. HENT:   Negative for sore throat. Eyes:  Negative for eye problems and icterus. Respiratory:  Negative for cough, hemoptysis and shortness of breath. Cardiovascular:  Negative for chest pain, leg swelling and palpitations. Gastrointestinal:  Positive for constipation (controlled). Negative for abdominal distention, abdominal pain, blood in stool, diarrhea, nausea and vomiting. Endocrine: Negative for hot flashes. Genitourinary:  Negative for dysuria. Musculoskeletal:  Negative for arthralgias, back pain and gait problem. Skin:  Negative for itching, rash and wound. Neurological:  Negative for dizziness, extremity weakness, gait problem, headaches, light-headedness, numbness, seizures and speech difficulty. Hematological:  Negative for adenopathy. Does not bruise/bleed easily. Psychiatric/Behavioral:  Positive for depression (better). Negative for decreased concentration and sleep disturbance. The patient is nervous/anxious (better).        No Known Allergies  Past Medical History:   Diagnosis Date    Bilateral carpal tunnel syndrome 12/9/2020    Chronic right shoulder pain 11/30/2020    Colon polyps 3/11/2013    Diverticulitis 3/11/2013    HTN (hypertension) 3/11/2013    Hyperlipidemia 3/11/2013    Paresthesia and pain of both upper extremities 11/30/2020    PUD (peptic ulcer disease) 3/11/2013    History of     Right elbow pain 12/9/2020    Wheezing 3/11/2013     Past Surgical History:   Procedure Laterality Date    COLONOSCOPY  2/25/09    colonoscopy per Dr. Hannah Fountain with need for repeat every 3 years    COLONOSCOPY  02/25/2022    Dr. Jamel Andrade; polyps of the ascending, transverse, descending, and sigmoid colons; internal hemorrhoid; diverticulosis    LAPAROSCOPY N/A 5/27/2022    LAPAROSCOPY DIAGNOSTIC , OPEN EXPLORATORY LAPAROTOMY, MASS EXCISION, G-TUBE PLACEMENT performed by Sagar Genao MD at [address] N/A 6/3/2022    PORT INSERTION performed by Sagar Genao MD at [address] UNLISTED  October 2004    partial colon resection per Dr. Estela Garibay  02/25/2022    Dr. Jamel Andrade; hiatal hernia gastric polyps     Current Outpatient Medications   Medication Sig Dispense Refill    rivaroxaban (XARELTO) 20 MG TABS tablet Take 1 tablet by mouth daily (with breakfast) 30 tablet 3    potassium chloride (KLOR-CON M) 20 MEQ extended release tablet Take 1 tablet by mouth 2 times daily for 28 days 14 days 28 tablet 1    dicyclomine (BENTYL) 20 MG tablet Take one tab orally twice daily 180 tablet 0    magnesium oxide (MAG-OX) 400 MG tablet Take 1 tablet by mouth in the morning, at noon, and at bedtime 90 tablet 2    busPIRone (BUSPAR) 7.5 MG tablet Take 1 tablet by mouth daily 15 tablet 0    pantoprazole (PROTONIX) 40 MG tablet Take 1 tablet by mouth daily 30 tablet 1    rivaroxaban 15 & 20 MG Starter Pack Take as directed on package. 1 each 0    Multiple Vitamins-Minerals (MULTIVITAMIN MEN 50+ PO) Take by mouth      oxyCODONE (ROXICODONE INTENSOL) 100 MG/5ML concentrated solution Take 10 mg by mouth every 4 hours as needed for Pain. 1 mls under tongue (Patient not taking: Reported on 10/13/2022)      escitalopram (LEXAPRO) 10 MG tablet Take 1 tablet by mouth in the morning. 30 tablet 5    OLANZapine zydis (ZYPREXA) 5 MG disintegrating tablet Take 1 tablet by mouth nightly Do not take with promethazine 30 tablet 3    ondansetron (ZOFRAN-ODT) 8 MG TBDP disintegrating tablet Take 1 tablet by mouth every 8 hours (Patient not taking: No sig reported) 90 tablet 0    promethazine (PHENERGAN) 12.5 MG tablet Take 1 tablet by mouth every 6 hours as needed for Nausea 90 tablet 0    naloxone 4 MG/0.1ML LIQD nasal spray 1 spray by Nasal route as needed for Opioid Reversal  (Patient not taking: No sig reported)      polyethylene glycol (GLYCOLAX) 17 GM/SCOOP powder Take 17 g by mouth daily      rosuvastatin (CRESTOR) 10 MG tablet Take 10 mg by mouth      umeclidinium-vilanterol (ANORO ELLIPTA) 62.5-25 MCG/INH AEPB inhaler Inhale 1 puff into the lungs daily  (Patient not taking: No sig reported)       No current facility-administered medications for this visit.      Facility-Administered Medications Ordered in Other Visits   Medication Dose Route Frequency Provider Last Rate Last Admin    sodium chloride flush 0.9 % injection 10 mL  10 mL IntraVENous JYALENE Kitchen MD   10 mL at 11/25/22 0830       No flowsheet data found. OBJECTIVE:  /80   Temp 97.8 °F (36.6 °C)   Resp 16   Wt 212 lb (96.2 kg)   SpO2 97%   BMI 30.42 kg/m²       ECOG PERFORMANCE STATUS - 1- Restricted in physically strenuous activity but ambulatory and able to carry out work of a light or sedentary nature such as light house work, office work. Pain - Pain Score:   0 - No pain0 - No pain/10. None/Minimal pain - not affecting QOL     Fatigue - No flowsheet data found. Distress - No flowsheet data found. Physical Exam  Vitals reviewed. Exam conducted with a chaperone present. Constitutional:       General: He is not in acute distress. Appearance: Normal appearance. He is normal weight. He is not ill-appearing or toxic-appearing. HENT:      Head: Normocephalic and atraumatic. Nose: Nose normal. No congestion. Mouth/Throat:      Mouth: Mucous membranes are moist.   Eyes:      General: No scleral icterus. Conjunctiva/sclera: Conjunctivae normal.   Cardiovascular:      Rate and Rhythm: Normal rate and regular rhythm. Heart sounds: No murmur heard. Pulmonary:      Effort: Pulmonary effort is normal. No respiratory distress. Breath sounds: Normal breath sounds. No wheezing, rhonchi or rales. Abdominal:      General: Bowel sounds are normal. There is no distension. Palpations: Abdomen is soft. There is no mass. Tenderness: There is no abdominal tenderness. There is no guarding or rebound. Musculoskeletal:         General: No swelling. Normal range of motion. Cervical back: Normal range of motion. No rigidity. Right lower leg: No edema. Left lower leg: No edema. Skin:     General: Skin is warm and dry. Coloration: Skin is not jaundiced or pale. Findings: No bruising or rash. Neurological:      General: No focal deficit present. Mental Status: He is alert and oriented to person, place, and time. Motor: No weakness. Coordination: Coordination normal.      Gait: Gait normal.   Psychiatric:         Behavior: Behavior normal.         Thought Content:  Thought content normal.        Labs:  Recent Results (from the past 168 hour(s))   CEA    Collection Time: 11/25/22  8:16 AM   Result Value Ref Range    CEA 1.7 0.0 - 3.0 ng/mL   Magnesium    Collection Time: 11/25/22  8:16 AM   Result Value Ref Range    Magnesium 2.0 1.8 - 2.4 mg/dL   Comprehensive Metabolic Panel    Collection Time: 11/25/22  8:16 AM   Result Value Ref Range    Sodium 144 (H) 133 - 143 mmol/L    Potassium 3.9 3.5 - 5.1 mmol/L    Chloride 112 (H) 101 - 110 mmol/L    CO2 26 21 - 32 mmol/L    Anion Gap 6 2 - 11 mmol/L    Glucose 116 (H) 65 - 100 mg/dL    BUN 14 8 - 23 MG/DL    Creatinine 0.80 0.8 - 1.5 MG/DL    Est, Glom Filt Rate >60 >60 ml/min/1.73m2    Calcium 8.7 8.3 - 10.4 MG/DL    Total Bilirubin 0.2 0.2 - 1.1 MG/DL    ALT 22 12 - 65 U/L    AST 15 15 - 37 U/L    Alk Phosphatase 118 50 - 136 U/L    Total Protein 6.4 6.3 - 8.2 g/dL    Albumin 3.2 3.2 - 4.6 g/dL    Globulin 3.2 2.8 - 4.5 g/dL    Albumin/Globulin Ratio 1.0 0.4 - 1.6     CBC with Auto Differential    Collection Time: 11/25/22  8:16 AM   Result Value Ref Range    WBC 4.1 (L) 4.3 - 11.1 K/uL    RBC 3.72 (L) 4.23 - 5.6 M/uL    Hemoglobin 11.0 (L) 13.6 - 17.2 g/dL    Hematocrit 34.9 %    MCV 93.8 82.0 - 102.0 FL    MCH 29.6 26.1 - 32.9 PG    MCHC 31.5 31.4 - 35.0 g/dL    RDW 14.6 11.9 - 14.6 %    Platelets 772 963 - 365 K/uL    MPV 10.5 9.4 - 12.3 FL    nRBC 0.00 0.0 - 0.2 K/uL    Seg Neutrophils 47 43 - 78 %    Lymphocytes 43 13 - 44 %    Monocytes 8 4.0 - 12.0 %    Eosinophils % 1 0.5 - 7.8 %    Basophils 1 0.0 - 2.0 %    Immature Granulocytes 0 0.0 - 5.0 %    Segs Absolute 1.9 1.7 - 8.2 K/UL    Absolute Lymph # 1.7 0.5 - 4.6 K/UL    Absolute Mono # 0.3 0.1 - 1.3 K/UL    Absolute Eos # 0.0 0.0 - 0.8 K/UL    Basophils Absolute 0.1 0.0 - 0.2 K/UL    Absolute Immature Granulocyte 0.0 0.0 - 0.5 K/UL    Differential Type AUTOMATED         Imaging: reviewed     PATHOLOGY:             6/2022         ASSESSMENT:     Diagnosis Orders   1. Abdominal carcinomatosis (HCC)  CBC With Auto Differential    Comprehensive metabolic panel      2. Peritoneal metastases ([facility] [address]  CBC With Auto Differential    Comprehensive metabolic panel      3. Malignant neoplasm of stomach, unspecified location ([facility] [address]  CBC With Auto Differential    Comprehensive metabolic panel            Mr. Evans is here for FU of metastatic adenocarcinoma. 1. Metastatic adenocarcinoma   - s/p omental and mesenteric mass bx - c/w upper GI adenocarcinoma    - hx of diverticular dz - s/p previous bowel resection by dr Anabella Johnson at [address] pending   - We did discuss that his disease is not curable and he VU but wife stated that he believes he can beat this. - here for follow-up prior to cycle 11 FOLFIRINOX. Labs reviewed and adequate. - RUE DVT, on Xarelto   - CT October 2022 previously with no evidence of progressive disease. Due for scan ~ January. - pain - denies and off of prescribed medications    - appetite-eating well Weight stable. - hypokalemia - resolved  - hypomagnesemia - on mag oxide 400 mg TID   - He wishes to continue on the current regimen; he previously expressed that in the future, he may wish to proceed with unconventional scheduling of maybe FOLFIRINOX every 3 weeks. He does understand inherent risks with alternate schedule and how this would affect progression of disease. - sclerotic lesion on imaging at [facility] - bone scan recommended, has not been completed   - constipation - Miralax as needed. - Continue good oral nutrition and hydration.   - Encouraged frequent activity throughout the day and rest as needed to combat fatigue.   - Call with any fevers, uncontrolled side effects from treatment or any other worrisome/concerning symptoms.      RTC per protocol or sooner if needed MDM      Lab studies and imaging studies were personally reviewed. Pertinent old records were reviewed. Historical:    - here with his wife for consultation. During today's visit, we discussed his current workup. We looked at the images together demonstrating some of the findings concerning for peritoneal nodularity/peritoneal disease. Discussed anatomy of the findings  utilizing images to help pt understand what we are looking at and suspecting. He and wife were appreciative of the time spent to review these. Discussed need for visual dx/tissue pathology to determine plan - recommend ex lap - refer to Dr Jones White - personally  reviewed case with Dr Jones White who will expedite the workup and will see pt Fri. US with mild biliary duct dilation - HIDA/ERCP reviewed by PCP   + BM/flatus; He did not like how IV morphine made him feel in the hospital.  He tried tramadol but found no relief and has been taking acetaminophen at home with minimal relief. Discussed different options and he settled on trying  Percocet - he will contact the office to inform us if this is working for him. We discussed SE - not to drive while on the med. Bowel regimen reviewed. He is tired of tests, frustrated. Feelings validated and stressed importance of workup to determine why he has pain. Wt loss from 240 --> 209 noted and expressed concern to pt about this. - completed course of Levaquin/Diflucan for klebsiella pneumoniae and citrobacter freundii both of which were sensitive to Levaquin found around G-tube site. Wishes for tube removal - reviewed risks of this during chemotherapy - I would like for him to have labs day before scheduled procedure to make sure his counts are adequate per above; case reviewed with Dr Nicole Valladares by me via tel today   - here cycle 8 FOLFIRINOX - labs reviewed-defer 1 week due to neutropenia. - nutrition - G-tube out.   He is being weaned off TPN (3x week now) as his oral intake has greatly improved, weight up    - pain - Fentanyl 12mcg with prn oxycodone 10 mg, plans to stop patch on Nader 10/2 PC following  - Plan for surveillance reviewed. Labs discussed. - nausea - resolved. Has Zyprexa QHS (not taking currently) and Zofran PRN. - wt loss/FTT - secondary to disease and tx - on TPN and eating more    - depression/anxiety - better affect, on lexapro 20mg daily, PC adding buspar prn    All questions were asked and answered to the best of my ability. The patient verbalized understanding and agrees with the plan above.           LUCY Mendez - [id number]  [address] [place] [address]  Office : [phone]  Fax : [phone]

## 2022-11-25 NOTE — PROGRESS NOTES
Patient arrived to port lab for port access and lab draw   [address] accessed and labs drawn per protocol   Port remains accessed  Patient discharged from port lab ambulatory

## 2022-11-25 NOTE — PROGRESS NOTES
Arrived to the [facility]. Folfirinox completed. Patient tolerated well. Fluorouracil elastomeric CI connected to pt. Any issues or concerns during appointment: none  Patient aware of next infusion appointment on 11-27-22 (date) at [phone] (time). Patient instructed to call provider with temperature of 100.4 or greater or nausea/vomiting/ diarrhea or pain not controlled by medications  Discharged via ambulatory.

## 2022-11-25 NOTE — PATIENT INSTRUCTIONS
Patient Instructions from Today's Visit    Reason for Visit:  Prechemo visit    Diagnosis Information:  [email or web address]-[email or web address]-answers-[email or web address]-materials/[email or web address]-[email or web address]-sheets    Plan:  -Chemo today    Follow Up:  As scheduled    Recent Lab Results:  Hospital Outpatient Visit on 11/25/2022   Component Date Value Ref Range Status    WBC 11/25/2022 4.1 (A)  4.3 - 11.1 K/uL Final    RBC 11/25/2022 3.72 (A)  4.23 - 5.6 M/uL Final    Hemoglobin 11/25/2022 11.0 (A)  13.6 - 17.2 g/dL Final    Hematocrit 11/25/2022 34.9  % Final    MCV 11/25/2022 93.8  82.0 - 102.0 FL Final    MCH 11/25/2022 29.6  26.1 - 32.9 PG Final    MCHC 11/25/2022 31.5  31.4 - 35.0 g/dL Final    RDW 11/25/2022 14.6  11.9 - 14.6 % Final    Platelets 74/06/8880 157  150 - 450 K/uL Final    MPV 11/25/2022 10.5  9.4 - 12.3 FL Final    nRBC 11/25/2022 0.00  0.0 - 0.2 K/uL Final    **Note: Absolute NRBC parameter is now reported with Hemogram**    Seg Neutrophils 11/25/2022 47  43 - 78 % Final    Lymphocytes 11/25/2022 43  13 - 44 % Final    Monocytes 11/25/2022 8  4.0 - 12.0 % Final    Eosinophils % 11/25/2022 1  0.5 - 7.8 % Final    Basophils 11/25/2022 1  0.0 - 2.0 % Final    Immature Granulocytes 11/25/2022 0  0.0 - 5.0 % Final    Segs Absolute 11/25/2022 1.9  1.7 - 8.2 K/UL Final    Absolute Lymph # 11/25/2022 1.7  0.5 - 4.6 K/UL Final    Absolute Mono # 11/25/2022 0.3  0.1 - 1.3 K/UL Final    Absolute Eos # 11/25/2022 0.0  0.0 - 0.8 K/UL Final    Basophils Absolute 11/25/2022 0.1  0.0 - 0.2 K/UL Final    Absolute Immature Granulocyte 11/25/2022 0.0  0.0 - 0.5 K/UL Final    Differential Type 11/25/2022 AUTOMATED    Final        Treatment Summary has been discussed and given to patient: n/a        -------------------------------------------------------------------------------------------------------------------  Please call our office at [phone] if you have any  of the following symptoms:   Fever of 100.5 or greater  Chills  Shortness of breath  Swelling or pain in one leg    After office hours an answering service is available and will contact a provider for emergencies or if you are experiencing any of the above symptoms. Patient does  express an interest in My Chart. My Chart log in information explained on the after visit summary printout at the [facility].     Chari Pond RN  Nurse Navigator  [address] [place] [phone]

## 2022-11-27 ENCOUNTER — HOSPITAL ENCOUNTER (OUTPATIENT)
Dept: INFUSION THERAPY | Age: 62
Discharge: HOME | End: 2022-11-27
Payer: COMMERCIAL

## 2022-11-27 VITALS
DIASTOLIC BLOOD PRESSURE: 67 MMHG | OXYGEN SATURATION: 98 % | RESPIRATION RATE: 18 BRPM | TEMPERATURE: 98 F | SYSTOLIC BLOOD PRESSURE: 108 MMHG | HEART RATE: 72 BPM

## 2022-11-27 DIAGNOSIS — C16.9 MALIGNANT NEOPLASM OF STOMACH, UNSPECIFIED LOCATION (HCC): ICD-10-CM

## 2022-11-27 DIAGNOSIS — C78.6 PERITONEAL METASTASES (HCC): ICD-10-CM

## 2022-11-27 DIAGNOSIS — C76.2 ABDOMINAL CARCINOMATOSIS (HCC): Primary | ICD-10-CM

## 2022-11-27 PROCEDURE — 2580000003 HC RX 258: Performed by: NURSE PRACTITIONER

## 2022-11-27 PROCEDURE — 96523 IRRIG DRUG DELIVERY DEVICE: CPT

## 2022-11-27 RX ORDER — SODIUM CHLORIDE 0.9 % (FLUSH) 0.9 %
5-40 SYRINGE (ML) INJECTION PRN
Status: DISCONTINUED | OUTPATIENT
Start: 2022-11-27 | End: 2022-11-28 | Stop reason: HOSPADM

## 2022-11-27 RX ADMIN — SODIUM CHLORIDE, PRESERVATIVE FREE 10 ML: 5 INJECTION INTRAVENOUS at 13:00

## 2022-11-27 NOTE — PROGRESS NOTES
Arrived to the [facility]. Adrucil pump completed. Provided education on oral hydration    Patient instructed to report any side affects to ordering provider. Patient tolerated without problems. Any issues or concerns during appointment: no.  Patient aware of next infusion appointment on 12/9/22 (date) at 0930 (time). Discharged ambulatory.

## 2022-12-09 ENCOUNTER — OFFICE VISIT (OUTPATIENT)
Dept: ONCOLOGY | Age: 62
End: 2022-12-09

## 2022-12-09 ENCOUNTER — HOSPITAL ENCOUNTER (OUTPATIENT)
Dept: INFUSION THERAPY | Age: 62
Discharge: HOME | End: 2022-12-09
Payer: COMMERCIAL

## 2022-12-09 ENCOUNTER — OFFICE VISIT (OUTPATIENT)
Dept: ONCOLOGY | Age: 62
End: 2022-12-09
Payer: COMMERCIAL

## 2022-12-09 ENCOUNTER — CLINICAL DOCUMENTATION (OUTPATIENT)
Dept: ONCOLOGY | Age: 62
End: 2022-12-09

## 2022-12-09 VITALS
TEMPERATURE: 97.5 F | WEIGHT: 215.9 LBS | SYSTOLIC BLOOD PRESSURE: 106 MMHG | RESPIRATION RATE: 18 BRPM | DIASTOLIC BLOOD PRESSURE: 78 MMHG | HEART RATE: 76 BPM | HEIGHT: 70 IN | OXYGEN SATURATION: 98 % | BODY MASS INDEX: 30.91 KG/M2

## 2022-12-09 DIAGNOSIS — C16.9 MALIGNANT NEOPLASM OF STOMACH, UNSPECIFIED LOCATION (HCC): Primary | ICD-10-CM

## 2022-12-09 DIAGNOSIS — C78.6 PERITONEAL METASTASES (HCC): ICD-10-CM

## 2022-12-09 DIAGNOSIS — C16.9 MALIGNANT NEOPLASM OF STOMACH, UNSPECIFIED LOCATION (HCC): ICD-10-CM

## 2022-12-09 DIAGNOSIS — C76.2 ABDOMINAL CARCINOMATOSIS (HCC): Primary | ICD-10-CM

## 2022-12-09 DIAGNOSIS — E87.6 HYPOKALEMIA: ICD-10-CM

## 2022-12-09 DIAGNOSIS — Z00.8 NUTRITIONAL ASSESSMENT: Primary | ICD-10-CM

## 2022-12-09 DIAGNOSIS — C76.2 ABDOMINAL CARCINOMATOSIS (HCC): ICD-10-CM

## 2022-12-09 LAB
ALBUMIN SERPL-MCNC: 3.1 G/DL (ref 3.2–4.6)
ALBUMIN/GLOB SERPL: 0.9 {RATIO} (ref 0.4–1.6)
ALP SERPL-CCNC: 97 U/L (ref 50–136)
ALT SERPL-CCNC: 20 U/L (ref 12–65)
ANION GAP SERPL CALC-SCNC: 5 MMOL/L (ref 2–11)
AST SERPL-CCNC: 16 U/L (ref 15–37)
BASOPHILS # BLD: 0 K/UL (ref 0–0.2)
BASOPHILS NFR BLD: 1 % (ref 0–2)
BILIRUB SERPL-MCNC: 0.2 MG/DL (ref 0.2–1.1)
BUN SERPL-MCNC: 14 MG/DL (ref 8–23)
CALCIUM SERPL-MCNC: 8.6 MG/DL (ref 8.3–10.4)
CHLORIDE SERPL-SCNC: 112 MMOL/L (ref 101–110)
CO2 SERPL-SCNC: 27 MMOL/L (ref 21–32)
CREAT SERPL-MCNC: 0.9 MG/DL (ref 0.8–1.5)
DIFFERENTIAL METHOD BLD: ABNORMAL
EOSINOPHIL # BLD: 0.1 K/UL (ref 0–0.8)
EOSINOPHIL NFR BLD: 2 % (ref 0.5–7.8)
ERYTHROCYTE [DISTWIDTH] IN BLOOD BY AUTOMATED COUNT: 14.5 % (ref 11.9–14.6)
GLOBULIN SER CALC-MCNC: 3.3 G/DL (ref 2.8–4.5)
GLUCOSE SERPL-MCNC: 137 MG/DL (ref 65–100)
HCT VFR BLD AUTO: 32.7 %
HGB BLD-MCNC: 10.3 G/DL (ref 13.6–17.2)
IMM GRANULOCYTES # BLD AUTO: 0 K/UL (ref 0–0.5)
IMM GRANULOCYTES NFR BLD AUTO: 0 % (ref 0–5)
LYMPHOCYTES # BLD: 1.8 K/UL (ref 0.5–4.6)
LYMPHOCYTES NFR BLD: 45 % (ref 13–44)
MAGNESIUM SERPL-MCNC: 1.7 MG/DL (ref 1.8–2.4)
MCH RBC QN AUTO: 29.3 PG (ref 26.1–32.9)
MCHC RBC AUTO-ENTMCNC: 31.5 G/DL (ref 31.4–35)
MCV RBC AUTO: 93.2 FL (ref 82–102)
MONOCYTES # BLD: 0.3 K/UL (ref 0.1–1.3)
MONOCYTES NFR BLD: 8 % (ref 4–12)
NEUTS SEG # BLD: 1.8 K/UL (ref 1.7–8.2)
NEUTS SEG NFR BLD: 44 % (ref 43–78)
NRBC # BLD: 0 K/UL (ref 0–0.2)
PLATELET # BLD AUTO: 132 K/UL (ref 150–450)
PMV BLD AUTO: 9.8 FL (ref 9.4–12.3)
POTASSIUM SERPL-SCNC: 3.4 MMOL/L (ref 3.5–5.1)
PROT SERPL-MCNC: 6.4 G/DL (ref 6.3–8.2)
RBC # BLD AUTO: 3.51 M/UL (ref 4.23–5.6)
SODIUM SERPL-SCNC: 144 MMOL/L (ref 133–143)
WBC # BLD AUTO: 4 K/UL (ref 4.3–11.1)

## 2022-12-09 PROCEDURE — 3078F DIAST BP <80 MM HG: CPT | Performed by: NURSE PRACTITIONER

## 2022-12-09 PROCEDURE — G0498 CHEMO EXTEND IV INFUS W/PUMP: HCPCS

## 2022-12-09 PROCEDURE — 36591 DRAW BLOOD OFF VENOUS DEVICE: CPT

## 2022-12-09 PROCEDURE — 2580000003 HC RX 258: Performed by: NURSE PRACTITIONER

## 2022-12-09 PROCEDURE — 96417 CHEMO IV INFUS EACH ADDL SEQ: CPT

## 2022-12-09 PROCEDURE — 83735 ASSAY OF MAGNESIUM: CPT

## 2022-12-09 PROCEDURE — 96367 TX/PROPH/DG ADDL SEQ IV INF: CPT

## 2022-12-09 PROCEDURE — 99214 OFFICE O/P EST MOD 30 MIN: CPT | Performed by: NURSE PRACTITIONER

## 2022-12-09 PROCEDURE — 3074F SYST BP LT 130 MM HG: CPT | Performed by: NURSE PRACTITIONER

## 2022-12-09 PROCEDURE — 85025 COMPLETE CBC W/AUTO DIFF WBC: CPT

## 2022-12-09 PROCEDURE — 96413 CHEMO IV INFUSION 1 HR: CPT

## 2022-12-09 PROCEDURE — 96411 CHEMO IV PUSH ADDL DRUG: CPT

## 2022-12-09 PROCEDURE — 6360000002 HC RX W HCPCS: Performed by: NURSE PRACTITIONER

## 2022-12-09 PROCEDURE — 96375 TX/PRO/DX INJ NEW DRUG ADDON: CPT

## 2022-12-09 PROCEDURE — 96372 THER/PROPH/DIAG INJ SC/IM: CPT

## 2022-12-09 PROCEDURE — 80053 COMPREHEN METABOLIC PANEL: CPT

## 2022-12-09 PROCEDURE — 2580000003 HC RX 258: Performed by: INTERNAL MEDICINE

## 2022-12-09 PROCEDURE — 96415 CHEMO IV INFUSION ADDL HR: CPT

## 2022-12-09 PROCEDURE — 96368 THER/DIAG CONCURRENT INF: CPT

## 2022-12-09 RX ORDER — ATROPINE SULFATE 0.4 MG/ML
0.4 AMPUL (ML) INJECTION ONCE
Status: CANCELLED | OUTPATIENT
Start: 2022-12-09 | End: 2022-12-09

## 2022-12-09 RX ORDER — ACETAMINOPHEN 325 MG/1
650 TABLET ORAL
Status: CANCELLED | OUTPATIENT
Start: 2022-12-09

## 2022-12-09 RX ORDER — SODIUM CHLORIDE 9 MG/ML
INJECTION, SOLUTION INTRAVENOUS CONTINUOUS
Status: CANCELLED | OUTPATIENT
Start: 2022-12-09

## 2022-12-09 RX ORDER — HEPARIN SODIUM (PORCINE) LOCK FLUSH IV SOLN 100 UNIT/ML 100 UNIT/ML
500 SOLUTION INTRAVENOUS PRN
OUTPATIENT
Start: 2022-12-11

## 2022-12-09 RX ORDER — SODIUM CHLORIDE 9 MG/ML
5-40 INJECTION INTRAVENOUS PRN
Status: CANCELLED | OUTPATIENT
Start: 2022-12-09

## 2022-12-09 RX ORDER — ONDANSETRON 2 MG/ML
8 INJECTION INTRAMUSCULAR; INTRAVENOUS ONCE
Status: CANCELLED | OUTPATIENT
Start: 2022-12-09 | End: 2022-12-09

## 2022-12-09 RX ORDER — HEPARIN SODIUM (PORCINE) LOCK FLUSH IV SOLN 100 UNIT/ML 100 UNIT/ML
500 SOLUTION INTRAVENOUS PRN
Status: CANCELLED | OUTPATIENT
Start: 2022-12-09

## 2022-12-09 RX ORDER — ATROPINE SULFATE 0.4 MG/ML
0.4 INJECTION, SOLUTION INTRAVENOUS ONCE
Status: COMPLETED | OUTPATIENT
Start: 2022-12-09 | End: 2022-12-09

## 2022-12-09 RX ORDER — ATROPINE SULFATE 0.4 MG/ML
0.4 AMPUL (ML) INJECTION
Status: CANCELLED | OUTPATIENT
Start: 2022-12-09

## 2022-12-09 RX ORDER — SODIUM CHLORIDE 9 MG/ML
5-250 INJECTION, SOLUTION INTRAVENOUS PRN
OUTPATIENT
Start: 2022-12-11

## 2022-12-09 RX ORDER — DEXTROSE MONOHYDRATE 50 MG/ML
5-250 INJECTION, SOLUTION INTRAVENOUS PRN
Status: CANCELLED | OUTPATIENT
Start: 2022-12-09

## 2022-12-09 RX ORDER — SODIUM CHLORIDE 9 MG/ML
5-40 INJECTION INTRAVENOUS PRN
OUTPATIENT
Start: 2022-12-11

## 2022-12-09 RX ORDER — DIPHENHYDRAMINE HYDROCHLORIDE 50 MG/ML
50 INJECTION INTRAMUSCULAR; INTRAVENOUS
Status: CANCELLED | OUTPATIENT
Start: 2022-12-09

## 2022-12-09 RX ORDER — FAMOTIDINE 10 MG/ML
20 INJECTION, SOLUTION INTRAVENOUS
Status: CANCELLED | OUTPATIENT
Start: 2022-12-09

## 2022-12-09 RX ORDER — SODIUM CHLORIDE 0.9 % (FLUSH) 0.9 %
5-40 SYRINGE (ML) INJECTION PRN
Status: CANCELLED | OUTPATIENT
Start: 2022-12-11

## 2022-12-09 RX ORDER — SODIUM CHLORIDE 9 MG/ML
5-250 INJECTION, SOLUTION INTRAVENOUS PRN
Status: CANCELLED | OUTPATIENT
Start: 2022-12-09

## 2022-12-09 RX ORDER — MEPERIDINE HYDROCHLORIDE 50 MG/ML
12.5 INJECTION INTRAMUSCULAR; INTRAVENOUS; SUBCUTANEOUS PRN
Status: CANCELLED | OUTPATIENT
Start: 2022-12-09

## 2022-12-09 RX ORDER — SODIUM CHLORIDE 0.9 % (FLUSH) 0.9 %
10 SYRINGE (ML) INJECTION PRN
Status: DISCONTINUED | OUTPATIENT
Start: 2022-12-09 | End: 2022-12-10 | Stop reason: HOSPADM

## 2022-12-09 RX ORDER — POTASSIUM CHLORIDE 20 MEQ/1
20 TABLET, EXTENDED RELEASE ORAL 2 TIMES DAILY
Qty: 28 TABLET | Refills: 1 | Status: SHIPPED | OUTPATIENT
Start: 2022-12-09 | End: 2023-01-06

## 2022-12-09 RX ORDER — SODIUM CHLORIDE 0.9 % (FLUSH) 0.9 %
5-40 SYRINGE (ML) INJECTION PRN
Status: DISCONTINUED | OUTPATIENT
Start: 2022-12-09 | End: 2022-12-10 | Stop reason: HOSPADM

## 2022-12-09 RX ORDER — SODIUM CHLORIDE 0.9 % (FLUSH) 0.9 %
5-40 SYRINGE (ML) INJECTION PRN
Status: CANCELLED | OUTPATIENT
Start: 2022-12-09

## 2022-12-09 RX ORDER — ALBUTEROL SULFATE 90 UG/1
4 AEROSOL, METERED RESPIRATORY (INHALATION) PRN
Status: CANCELLED | OUTPATIENT
Start: 2022-12-09

## 2022-12-09 RX ORDER — ONDANSETRON 2 MG/ML
8 INJECTION INTRAMUSCULAR; INTRAVENOUS ONCE
Status: COMPLETED | OUTPATIENT
Start: 2022-12-09 | End: 2022-12-09

## 2022-12-09 RX ORDER — FLUOROURACIL 50 MG/ML
400 INJECTION, SOLUTION INTRAVENOUS ONCE
Status: COMPLETED | OUTPATIENT
Start: 2022-12-09 | End: 2022-12-09

## 2022-12-09 RX ORDER — FLUOROURACIL 50 MG/ML
400 INJECTION, SOLUTION INTRAVENOUS ONCE
Status: CANCELLED
Start: 2022-12-09 | End: 2022-12-09

## 2022-12-09 RX ORDER — DEXTROSE MONOHYDRATE 50 MG/ML
5-250 INJECTION, SOLUTION INTRAVENOUS PRN
Status: DISCONTINUED | OUTPATIENT
Start: 2022-12-09 | End: 2022-12-10 | Stop reason: HOSPADM

## 2022-12-09 RX ORDER — ONDANSETRON 2 MG/ML
8 INJECTION INTRAMUSCULAR; INTRAVENOUS
Status: CANCELLED | OUTPATIENT
Start: 2022-12-09

## 2022-12-09 RX ORDER — EPINEPHRINE 1 MG/ML
0.3 INJECTION, SOLUTION, CONCENTRATE INTRAVENOUS PRN
Status: CANCELLED | OUTPATIENT
Start: 2022-12-09

## 2022-12-09 RX ADMIN — FOSAPREPITANT 150 MG: 150 INJECTION, POWDER, LYOPHILIZED, FOR SOLUTION INTRAVENOUS at 10:28

## 2022-12-09 RX ADMIN — LEUCOVORIN CALCIUM 850 MG: 500 INJECTION, POWDER, LYOPHILIZED, FOR SOLUTION INTRAMUSCULAR; INTRAVENOUS at 13:08

## 2022-12-09 RX ADMIN — FLUOROURACIL 850 MG: 50 INJECTION, SOLUTION INTRAVENOUS at 14:50

## 2022-12-09 RX ADMIN — DEXTROSE MONOHYDRATE 100 ML/HR: 50 INJECTION, SOLUTION INTRAVENOUS at 09:59

## 2022-12-09 RX ADMIN — FLUOROURACIL 5000 MG: 50 INJECTION, SOLUTION INTRAVENOUS at 14:55

## 2022-12-09 RX ADMIN — ONDANSETRON 8 MG: 2 INJECTION INTRAMUSCULAR; INTRAVENOUS at 09:56

## 2022-12-09 RX ADMIN — SODIUM CHLORIDE, PRESERVATIVE FREE 10 ML: 5 INJECTION INTRAVENOUS at 09:55

## 2022-12-09 RX ADMIN — DEXAMETHASONE SODIUM PHOSPHATE 12 MG: 4 INJECTION, SOLUTION INTRAMUSCULAR; INTRAVENOUS at 10:08

## 2022-12-09 RX ADMIN — OXALIPLATIN 180 MG: 5 INJECTION, SOLUTION, CONCENTRATE INTRAVENOUS at 10:58

## 2022-12-09 RX ADMIN — SODIUM CHLORIDE, PRESERVATIVE FREE 10 ML: 5 INJECTION INTRAVENOUS at 14:55

## 2022-12-09 RX ADMIN — IRINOTECAN HYDROCHLORIDE 320 MG: 20 INJECTION, SOLUTION INTRAVENOUS at 13:08

## 2022-12-09 RX ADMIN — SODIUM CHLORIDE, PRESERVATIVE FREE 10 ML: 5 INJECTION INTRAVENOUS at 08:33

## 2022-12-09 RX ADMIN — ATROPINE SULFATE 0.4 MG: 0.4 INJECTION, SOLUTION INTRAVENOUS at 12:29

## 2022-12-09 ASSESSMENT — ENCOUNTER SYMPTOMS
SCLERAL ICTERUS: 0
EYE PROBLEMS: 0
COUGH: 0
NAUSEA: 0
DIARRHEA: 0
CONSTIPATION: 1
SORE THROAT: 0
SHORTNESS OF BREATH: 0
ABDOMINAL DISTENTION: 0
BACK PAIN: 0
BLOOD IN STOOL: 0
VOMITING: 0
ABDOMINAL PAIN: 0
HEMOPTYSIS: 0

## 2022-12-09 ASSESSMENT — PATIENT HEALTH QUESTIONNAIRE - PHQ9
SUM OF ALL RESPONSES TO PHQ QUESTIONS 1-9: 0
1. LITTLE INTEREST OR PLEASURE IN DOING THINGS: 0
SUM OF ALL RESPONSES TO PHQ QUESTIONS 1-9: 0
SUM OF ALL RESPONSES TO PHQ9 QUESTIONS 1 & 2: 0
2. FEELING DOWN, DEPRESSED OR HOPELESS: 0
SUM OF ALL RESPONSES TO PHQ QUESTIONS 1-9: 0
SUM OF ALL RESPONSES TO PHQ QUESTIONS 1-9: 0

## 2022-12-09 NOTE — PROGRESS NOTES
Arrived to the [facility]. Folfirinox completed. Patient tolerated without difficulty. Any issues or concerns during appointment: None. Fluorouracil pump infusing at 5ml/hr x 46 hours. Clamps x 2 unclamped and secured by Daryl Klein RN. Patient aware of next infusion appointment on 12/11/2022 (date) at [phone] 10 (time). Patient instructed to call provider with temperature of 100.4 or greater or nausea/vomiting/ diarrhea or pain not controlled by medications  Discharged ambulatory.

## 2022-12-09 NOTE — PROGRESS NOTES
[address] [place] [address] Hematology and Oncology: Established patient - follow up     Chief Complaint   Patient presents with    Follow-up      Reason for Referral: Abdominal pain; peritoneal metastatic disease   Referring Provider: Aspen Kingston NP   Primary Care Provider: Sudeep Rodriguez MD   Family History of Cancer/Hematologic Disorders: Family history is significant for sister with breast cancer. Presenting Symptoms: Progressively worsening, persistent abdominal pain x several months    History of Present Illness:  Mr. Soto Zuluaga  is a 62 y.o. male who presents today for FU regarding adenocarcinoma with peritoneal metastatic disease. The past medical history is significant for tobacco use (recent pack per day smoker x 30+ years - now down to 1/3PPD), PUD, paresthesia/pain of bilateral upper extremities, HLD, HTN, diverticulitis,  colon polyps, hiatal hernia, chronic right shoulder pain, bilateral carpal tunnel syndrome, and partial colon resection. He presented to the [facility] ED on 5/12/22 with a complaint of persistent abdominal pain for several months that was becoming progressively  worse. EGD and colonoscopy on 2/25/22 revealed findings of hiatal hernia, gastric polyps, several benign colon polyps, diverticulosis, and internal hemorrhoids. CT of the abdomen on 3/8/22 showed no acute findings. He presented to [facility] ER x 2 for  evaluation (5/3/22 and 5/10/22). RUQ abdominal ultrasound was completed on 5/3/22 in the ED at [facility] demonstrating no acute findings to explain patient's pain; probable hepatic  steatosis; small echogenic mural foci in the gallbladder which are nonmobile, likely representing cholesterol polyps, largest measuring 4 mm; and small volume simple ascites in the right upper quadrant and left lower quadrant, nonspecific.   CT AP with  contrast at [facility] ED 5/10/22 showed a partial small bowel obstruction; small to moderate amount of free fluid in the abdomen and pelvis; and nonspecific stranding of the omentum primarily in the left upper quadrant. Radiologist noted that follow-up  CT was recommended to differentiate passive congestion from omental disease. On 5/11/22, abdominal series again showed multiple dilated small bowel loops with enteric contrast appearing to extend into the proximal colon, suggesting partial small  bowel obstruction. CT AP in the [facility] ED on 5/12/22 identified extensive peritoneal nodularity and mild ascites consistent with peritoneal  metastatic disease with primary lesion not definitely identified; dilated fluid-filled loops of small bowel possibly related to gastroenteritis with no definite obstruction and no obvious bowel mass; and sclerosis of S1 vertebral body. Radiologist recommended consideration of follow-up bone scan to assess for bone metastases. Patient was admitted to  the Hospitalist service on 5/12/22, and IR consulted for possible paracentesis however very small volume was inaccessible. CT Chest obtained on 5/13/22 showed No evidence of primary malignancy or metastasis within the chest.  Follow-up hepatobiliary  scan was suggested to assess for common bile duct obstruction. Oncology was consulted but did not see patient inhouse as pt was dischared. Upon discharge on 5/14/22, patient was instructed to follow up with [facility]. During consultation, we discussed his workup. We looked at the images together demonstrating some of the findings concerning for peritoneal nodularity/peritoneal disease. Discussed anatomy of the findings utilizing images to help pt understand what we are looking at and suspecting. He and wife were appreciative of the time spent to review these. We also addressed pt's pain. We also reviewed images of sclerosing lesion - bone scan recommended. He also was recommended to undergo HIDA scan after recent US per PCP. He is tired of tests, frustrated. Feelings validated and stressed importance of workup to determine why he has pain.   Wt loss from 240 --> 209 noted and expressed concern to pt about this. Of note, prior akbar resection with Dr Sloane Pressley for diverticulitis per pt. Sent note to dr Jones White re pt's case to expedite workup with his agreement. He was hospitalized for abdominal pain and sepsis. He was empirically placed on vancomycin and cefepime. CT scanning disclosed no intra-abdominal fluid collection or abscess but did suggest that his tumor burden was somewhat smaller. He had some purulent drainage from around his G-tube. This was cultured and grew klebsiella pneumoniae and citrobacter freundii both of which were sensitive to Levaquin which he was discharged home on for 10 days. He is here today for follow up and C12 FOLFIRINOX. He has been well overall. He denies any changes or concerns. He continues to have some occasionally painful hemorrhoids, using hemorrhoid cream and soaking in salt baths which is helping. He has had occasional bleeding from the hemorrhoids, has been better recently. He does take stool softeners daily. He has been eating/drinking well and weight has been stable. He denies any shortness of breath. He still has cold sensitivity, no true neuropathy or pain. He denies any fevers or other infectious symptoms. Chronological Events:   EGD REPORT 2/25/22                      COLONOSCOPY REPORT 2/25/22                 CT ABDOMEN PELVIS W CONTRAST 3/8/2022   FINDINGS:   LOWER CHEST: Unremarkable. ABDOMEN AND PELVIS:   Liver: Unremarkable. Biliary system: Unremarkable. Pancreas: Unremarkable. Spleen: Unremarkable. Adrenals: Unremarkable. Genitourinary: Unremarkable. Reproductive organs: Unremarkable. Gastrointestinal tract: Colonic diverticulosis without evidence of diverticulitis. There is no evidence of bowel obstruction or inflammation. The appendix is normal   Peritoneum/Extraperitoneum: Unremarkable. No free fluid or air. Vascular: Unremarkable.    Musculoskeletal: Small fat-containing umbilical hernia. Degenerative  changes of thoracolumbar spine. No acute or aggressive osseous lesion. IMPRESSION: Colonic diverticula without evidence of acute diverticulitis. RIGHT UPPER QUADRANT ABDOMINAL ULTRASOUND 5/3/2022    FINDINGS:   Pancreas: Not visualized, obscured by bowel gas. Intrahepatic IVC: Normal.   Main  Portal Vein: Patent with normal directional flow. Liver: Liver contour is normal. Liver appears diffusely increased in echogenicity consistent with hepatic steatosis. There is fatty sparing around the gallbladder fossa. Gallbladder: Gallbladder  is normal in size. No evidence of gallbladder wall thickening. No gallstones are seen. There are several nonmobile echogenic mural foci in the proximal gallbladder body/neck, largest measuring 4 mm, without shadowing, likely small cholesterol polyps. Bile ducts: No evidence of biliary ductal dilation. The common bile duct measures 3 mm in diameter. Right kidney: Normal.   Left Upper Quadrant: Limited images of the left upper quadrant are unremarkable. Spleen measures 12.1 cm in length. Free fluid: Trace simple free fluid in the right upper quadrant and left lower quadrant. IMPRESSION:    1. No acute findings to explain patient's pain. 2. Probable hepatic steatosis. 3. Small echogenic mural foci in the gallbladder which are nonmobile, likely representing cholesterol polyps, largest measuring 4 mm. There are no specific ultrasound  follow up recommendations for polyps of this small size. 4. Small volume simple ascites in the right upper quadrant and left lower quadrant, nonspecific. CT ABDOMEN - PELVIS WITH CONTRAST 5/10/22   FINDINGS:   Liver: Normal    Portal Vein: Normal   Gallbladder - Biliary Tree: Normal   Pancreas: Normal   Spleen: Normal   Retroperitoneum:   Adrenals: Normal   Kidneys:  Normal    Aorto - Cava:  Calcification of the abdominal aorta without aneurysm formation.    Lymphatics: Normal   GI - Mesentery - Peritoneum: Multiple dilated mid small bowel loops without a focal transition point. The appendix is normal. Small to  moderate amount of free fluid in the pelvis and right flank. Nonspecific stranding of the omentum in the left upper quadrant. Small fatty umbilical hernia. Pelvis: Normal   Lower Chest: Normal   Soft tissues - MSK: Normal    IMPRESSION:   1. Partial small bowel obstruction. 2. Small to moderate amount of free fluid in the abdomen and pelvis. 3. Nonspecific stranding of the omentum primarily  in the left upper quadrant. Follow-up CT is recommended to differentiate passive congestion from omental a static disease. ABDOMEN SERIES 5/11/22   FINDINGS:   Chest: Heart size within normal limits. Lungs are clear. No pleural effusion or pneumothorax. Bowel: Multiple dilated small bowel loops with air-fluid levels on the upright view. Contrast distends small bowel loops in the left lateral abdomen and lower abdomen. Contrast in the right hemiabdomen appears to be within proximal colon. Scattered colonic  gas. Peritoneum / Retroperitoneum: No pneumoperitoneum. Soft tissues / Bones: No acute osseous findings. The contrast in urinary bladder. Lines / Support Apparatus: None. IMPRESSION: Redemonstrated of multiple dilated small bowel loops with enteric contrast appearing to extend into the proximal colon, suggesting only partial small bowel obstruction. Continued radiographic follow-up is advised. CT OF THE ABDOMEN AND PELVIS 5/12/22   FINDINGS:   LOWER CHEST: Normal.   HEPATOBILIARY: No evidence of focal liver mass. No calcified gallstones. PANCREAS: Normal.   SPLEEN: Normal.    ADRENAL GLANDS: Normal.    KIDNEYS/BLADDER: Kidneys and bladder are unremarkable. No hydronephrosis or urinary tract calculi. BOWEL: Dilated fluid-filled loops of small bowel are present throughout the abdomen. No definite transition point. Oral contrast noted in the colon. No definite evidence of bowel mass but there is extensive peritoneal nodularity and trace ascites highly  concerning for peritoneal metastatic disease. LYMPH NODES: No enlarged retroperitoneal or pelvic lymph nodes. Peritoneal nodularity again noted most likely metastatic disease. VASCULATURE: Unremarkable. PELVIC ORGANS: Prostate gland and rectum are unremarkable. Small amount of free pelvic fluid is noted. MUSCULOSKELETAL: Degenerative spine changes are noted. Mild sclerosis involving the S1 vertebral body is present on image 74 of series 602. IMPRESSION   1. Extensive peritoneal nodularity and mild ascites consistent with peritoneal metastatic disease. Primary lesion not definitely identified. 2. Dilated fluid-filled loops of small bowel possibly related to gastroenteritis. No definite obstruction. No obvious bowel mass. 3. Sclerosis S1 vertebral body. Consider follow-up bone scan to assess for bone metastases. LIMITED ABDOMINAL ULTRASOUND 5/13/22   FINDINGS: A single limited gray scale image was submitted of an undisclosed location in the abdomen. Images demonstrate a small amount of  ascites as seen on comparison CT. IMPRESSION   1. Small volume ascites. CT CHEST WITH CONTRAST 5/13/22   FINDINGS:   Mediastinum and visualized thyroid: Normal.   Heart: Normal.    Large Vessels: Normal.    Pleura: Normal.    Lungs: No lung mass clearly discerned. Mild scarring or atelectasis in the right lung base. No suspicious lung nodule. No consolidation or zulma pulmonary edema. Airways: Normal.    Lymph nodes: Normal.    Bones/Soft tissues: Normal.    Visualized abdomen: Partially imaged omental nodules. Perihepatic ascites noted. IMPRESSION: No evidence of primary malignancy or metastasis within the chest       ABDOMINAL ULTRASOUND 5/17/22   FINDINGS:    LIVER: 17.3 cm. Slight increase in echogenicity without focal mass. BILE DUCTS: No intrahepatic bile duct dilatation.   CBD diameter = 8 mm. GALLBLADDER: It is unremarkable in appearance without stones or sludge. Echogenic polyp measures 0.5 cm. No gallbladder wall thickening. Mild ascites. PANCREAS: Normal.   SPLEEN: Borderline enlarged at 12.2 cm. RIGHT KIDNEY: 13.3 cm. No mass or hydronephrosis. LEFT KIDNEY: 14.3 cm. No mass or hydronephrosis. ABDOMINAL AORTA AND IVC: Normal in size. ASCITES: Mild   IMPRESSION: Possible mild fatty infiltration liver. No focal mass. Gallbladder polyp but no stones or gallbladder wall thickening. Mild ascites. Mildly prominent common bile duct. Follow-up hepatobiliary scan could be performed to assess for common bile duct obstruction. 04/02/2021 (COVID-19, [place], Primary or Immunocompromised Series, MRNA, PF, 100mcg/0.5mL)   04/30/2021 (COVID-19, [place], Primary or Immunocompromised Series, MRNA, PF, 100mcg/0.5mL)   11/16/2021 (COVID-19, Moderna Booster, PF, 0.25mL Dose)      5/18/22 heme/onc consultation   5/20/22 Dr Ruddy Murphy consult - sent to ED for admission/workup   5/23/22 omental bx - IR   5/27/22 Dr Ruddy Murphy - diagnostic lap, omental mass and mesentery mass excision, G-tube placement for venting  6/1/22 painful G-tube - tract recanalized and new G-tube placed   6/12/22 C1 FOLFIRINOX completed - dose adjusted   6/14/22 d/c   6/24/22 FU sx - G tube maint instructions/staples removed - RTC PRN   6/24/22 FU after hospitalization - discussed PC/plan for tx  7/8/22 FU - mainly concerned about PICC line without blood return, decline cathflo, seeing José Miguel Pham in [facility] on Monday. Will increase hydration at home.  for TPN.    7/18/22 Follow up after hospitalization. He will complete course of Levaquin as prescribed for infection around G-tube. Would like to proceed with cycle 3 FOLFIRINOX with increased dosing. 8/4/22 Cycle 4 FOLFIRINOX - continue dose escalation. He will complete course of Levaquin/Diflucan as prescribed for infection around G-tube, site looks good today. 8/17/22 C5 FOLFIRINOX - wishes to pw full dose accepting risks; wishes for G tube to be removed - will need to time with labs; plan to drop dose in opioids - PC seeing pt today. RD seeing pt. Plan for restaging in ~Oct.    9/1/22 FU FOLFIRINOX C5 full dose now; imaging in Oct   9/15/22 FU FOLFIRINOX C6-doing well, weight up 11#  9/30/22 FU FOLFIRINOX-C8 defer 1 week due to neutropenia, ANC 0.5  10/13/22 FU FOLFIRINOX 8; CT CAP reviewed and no POD. 10/28/22 Stat ultrasound RUE and then proceed with cycle 9 FOLFIRINOX. Doppler - Persistent DVT, nonocclusive, within the right axillary and basilic veins. The previously seen nonocclusive thrombus within the right innominate and subclavian veins are no longer seen. No abnormality of the right brachial vein demonstrated. Started on Xarelto. 11/11/22 FU - C10 FOLFIRINOX. Doing well. Wt stable. RUE swelling better. No new issues/concerns. 11/25/22 FU and C11 FOLFIRINOX, doing very well   12/9/22 FU and C12 FOLFIRINOX. Doing well. Weight stable. Family History   Problem Relation Age of Onset    No Known Problems Brother     Cancer Sister         breast    Hypertension Mother     Heart Disease Mother     Diabetes Father     Osteoarthritis Father     Alcohol Abuse Father     Hypertension Father     Diabetes Mother       Social History     Socioeconomic History    Marital status:      Spouse name: None    Number of children: None    Years of education: None    Highest education level: None   Tobacco Use    Smoking status: Former     Packs/day: 1.00     Types: Cigarettes    Smokeless tobacco: Never   Substance and Sexual Activity    Alcohol use: No    Drug use: No        Review of Systems   Constitutional:  Negative for appetite change, diaphoresis, fatigue, fever and unexpected weight change. HENT:   Negative for sore throat. Eyes:  Negative for eye problems and icterus.    Respiratory:  Negative for cough, hemoptysis and shortness of breath. Cardiovascular:  Negative for chest pain, leg swelling and palpitations. Gastrointestinal:  Positive for constipation (controlled). Negative for abdominal distention, abdominal pain, blood in stool, diarrhea, nausea and vomiting. Endocrine: Negative for hot flashes. Genitourinary:  Negative for dysuria. Musculoskeletal:  Negative for arthralgias, back pain and gait problem. Skin:  Negative for itching, rash and wound. Neurological:  Negative for dizziness, extremity weakness, gait problem, headaches, light-headedness, numbness, seizures and speech difficulty. Hematological:  Negative for adenopathy. Does not bruise/bleed easily. Psychiatric/Behavioral:  Positive for depression (better). Negative for decreased concentration and sleep disturbance. The patient is nervous/anxious (better).        No Known Allergies  Past Medical History:   Diagnosis Date    Bilateral carpal tunnel syndrome 12/9/2020    Chronic right shoulder pain 11/30/2020    Colon polyps 3/11/2013    Diverticulitis 3/11/2013    HTN (hypertension) 3/11/2013    Hyperlipidemia 3/11/2013    Paresthesia and pain of both upper extremities 11/30/2020    PUD (peptic ulcer disease) 3/11/2013    History of     Right elbow pain 12/9/2020    Wheezing 3/11/2013     Past Surgical History:   Procedure Laterality Date    COLONOSCOPY  2/25/09    colonoscopy per Dr. Darrel Hutton with need for repeat every 3 years    COLONOSCOPY  02/25/2022    Dr. Cosme Campos; polyps of the ascending, transverse, descending, and sigmoid colons; internal hemorrhoid; diverticulosis    LAPAROSCOPY N/A 5/27/2022    LAPAROSCOPY DIAGNOSTIC , OPEN EXPLORATORY LAPAROTOMY, MASS EXCISION, G-TUBE PLACEMENT performed by Courtney Watts MD at [address] N/A 6/3/2022    PORT INSERTION performed by Courtney Watts MD at [address] UNLISTED  October 2004    partial colon resection per Dr. Greg Hurt  02/25/2022     ; hiatal hernia gastric polyps     Current Outpatient Medications   Medication Sig Dispense Refill    potassium chloride (KLOR-CON M) 20 MEQ extended release tablet Take 1 tablet by mouth 2 times daily for 28 days 14 days 28 tablet 1    rivaroxaban (XARELTO) 20 MG TABS tablet Take 1 tablet by mouth daily (with breakfast) 30 tablet 3    dicyclomine (BENTYL) 20 MG tablet Take one tab orally twice daily 180 tablet 0    magnesium oxide (MAG-OX) 400 MG tablet Take 1 tablet by mouth in the morning, at noon, and at bedtime 90 tablet 2    busPIRone (BUSPAR) 7.5 MG tablet Take 1 tablet by mouth daily 15 tablet 0    pantoprazole (PROTONIX) 40 MG tablet Take 1 tablet by mouth daily 30 tablet 1    Multiple Vitamins-Minerals (MULTIVITAMIN MEN 50+ PO) Take by mouth      escitalopram (LEXAPRO) 10 MG tablet Take 1 tablet by mouth in the morning. 30 tablet 5    promethazine (PHENERGAN) 12.5 MG tablet Take 1 tablet by mouth every 6 hours as needed for Nausea 90 tablet 0    naloxone 4 MG/0.1ML LIQD nasal spray 1 spray by Nasal route as needed for Opioid Reversal      rosuvastatin (CRESTOR) 10 MG tablet Take 10 mg by mouth      rivaroxaban 15 & 20 MG Starter Pack Take as directed on package. (Patient not taking: Reported on 12/9/2022) 1 each 0    oxyCODONE (ROXICODONE INTENSOL) 100 MG/5ML concentrated solution Take 10 mg by mouth every 4 hours as needed for Pain.  1 mls under tongue (Patient not taking: No sig reported)      OLANZapine zydis (ZYPREXA) 5 MG disintegrating tablet Take 1 tablet by mouth nightly Do not take with promethazine (Patient not taking: Reported on 12/9/2022) 30 tablet 3    ondansetron (ZOFRAN-ODT) 8 MG TBDP disintegrating tablet Take 1 tablet by mouth every 8 hours (Patient not taking: No sig reported) 90 tablet 0    polyethylene glycol (GLYCOLAX) 17 GM/SCOOP powder Take 17 g by mouth daily (Patient not taking: Reported on 12/9/2022)      umeclidinium-vilanterol (ANORO ELLIPTA) 62.5-25 MCG/INH AEPB inhaler Inhale 1 puff into the lungs daily  (Patient not taking: No sig reported)       No current facility-administered medications for this visit. Facility-Administered Medications Ordered in Other Visits   Medication Dose Route Frequency Provider Last Rate Last Admin    sodium chloride flush 0.9 % injection 10 mL  10 mL IntraVENous PRN Fer García MD   10 mL at 12/09/22 0833       No flowsheet data found. OBJECTIVE:  /78 (Site: Left Upper Arm, Position: Sitting, Cuff Size: Large Adult)   Pulse 76   Temp 97.5 °F (36.4 °C) (Oral)   Resp 18   Ht 5' 10\" (1.778 m)   Wt 215 lb 14.4 oz (97.9 kg)   SpO2 98%   BMI 30.98 kg/m²       ECOG PERFORMANCE STATUS - 1- Restricted in physically strenuous activity but ambulatory and able to carry out work of a light or sedentary nature such as light house work, office work. Pain - Pain Score:   0 - No pain (fatigue-0)0 - No pain/10. None/Minimal pain - not affecting QOL     Fatigue - No flowsheet data found. Distress - No flowsheet data found. Physical Exam  Vitals reviewed. Exam conducted with a chaperone present. Constitutional:       General: He is not in acute distress. Appearance: Normal appearance. He is normal weight. He is not ill-appearing or toxic-appearing. HENT:      Head: Normocephalic and atraumatic. Nose: Nose normal. No congestion. Mouth/Throat:      Mouth: Mucous membranes are moist.   Eyes:      General: No scleral icterus. Conjunctiva/sclera: Conjunctivae normal.   Cardiovascular:      Rate and Rhythm: Normal rate and regular rhythm. Heart sounds: No murmur heard. Pulmonary:      Effort: Pulmonary effort is normal. No respiratory distress. Breath sounds: Normal breath sounds. No wheezing, rhonchi or rales. Abdominal:      General: Bowel sounds are normal. There is no distension. Palpations: Abdomen is soft. There is no mass. Tenderness: There is no abdominal tenderness.  There is no guarding or rebound. Musculoskeletal:         General: No swelling. Normal range of motion. Cervical back: Normal range of motion. No rigidity. Right lower leg: No edema. Left lower leg: No edema. Skin:     General: Skin is warm and dry. Coloration: Skin is not jaundiced or pale. Findings: No bruising or rash. Neurological:      General: No focal deficit present. Mental Status: He is alert and oriented to person, place, and time. Motor: No weakness. Coordination: Coordination normal.      Gait: Gait normal.   Psychiatric:         Behavior: Behavior normal.         Thought Content:  Thought content normal.        Labs:  Recent Results (from the past 168 hour(s))   Comprehensive Metabolic Panel    Collection Time: 12/09/22  8:26 AM   Result Value Ref Range    Sodium 144 (H) 133 - 143 mmol/L    Potassium 3.4 (L) 3.5 - 5.1 mmol/L    Chloride 112 (H) 101 - 110 mmol/L    CO2 27 21 - 32 mmol/L    Anion Gap 5 2 - 11 mmol/L    Glucose 137 (H) 65 - 100 mg/dL    BUN 14 8 - 23 MG/DL    Creatinine 0.90 0.8 - 1.5 MG/DL    Est, Glom Filt Rate >60 >60 ml/min/1.73m2    Calcium 8.6 8.3 - 10.4 MG/DL    Total Bilirubin 0.2 0.2 - 1.1 MG/DL    ALT 20 12 - 65 U/L    AST 16 15 - 37 U/L    Alk Phosphatase 97 50 - 136 U/L    Total Protein 6.4 6.3 - 8.2 g/dL    Albumin 3.1 (L) 3.2 - 4.6 g/dL    Globulin 3.3 2.8 - 4.5 g/dL    Albumin/Globulin Ratio 0.9 0.4 - 1.6     CBC with Auto Differential    Collection Time: 12/09/22  8:26 AM   Result Value Ref Range    WBC 4.0 (L) 4.3 - 11.1 K/uL    RBC 3.51 (L) 4.23 - 5.6 M/uL    Hemoglobin 10.3 (L) 13.6 - 17.2 g/dL    Hematocrit 32.7 %    MCV 93.2 82.0 - 102.0 FL    MCH 29.3 26.1 - 32.9 PG    MCHC 31.5 31.4 - 35.0 g/dL    RDW 14.5 11.9 - 14.6 %    Platelets 388 (L) 750 - 450 K/uL    MPV 9.8 9.4 - 12.3 FL    nRBC 0.00 0.0 - 0.2 K/uL    Differential Type AUTOMATED      Seg Neutrophils 44 43 - 78 %    Lymphocytes 45 (H) 13 - 44 %    Monocytes 8 4.0 - 12.0 % Eosinophils % 2 0.5 - 7.8 %    Basophils 1 0.0 - 2.0 %    Immature Granulocytes 0 0.0 - 5.0 %    Segs Absolute 1.8 1.7 - 8.2 K/UL    Absolute Lymph # 1.8 0.5 - 4.6 K/UL    Absolute Mono # 0.3 0.1 - 1.3 K/UL    Absolute Eos # 0.1 0.0 - 0.8 K/UL    Basophils Absolute 0.0 0.0 - 0.2 K/UL    Absolute Immature Granulocyte 0.0 0.0 - 0.5 K/UL   Magnesium    Collection Time: 12/09/22  8:26 AM   Result Value Ref Range    Magnesium 1.7 (L) 1.8 - 2.4 mg/dL       Imaging: reviewed     PATHOLOGY:             6/2022         ASSESSMENT:     Diagnosis Orders   1. Malignant neoplasm of stomach, unspecified location [facility])  CT CHEST ABDOMEN PELVIS W CONTRAST Additional Contrast? None      2. Peritoneal metastases ([address]  CBC With Auto Differential    Comprehensive metabolic panel      3. Hypokalemia  potassium chloride (KLOR-CON M) 20 MEQ extended release tablet      4. Abdominal carcinomatosis (HCC)  CBC With Auto Differential    Comprehensive metabolic panel             [facility] is here for FU of metastatic adenocarcinoma. 1. Metastatic adenocarcinoma   - s/p omental and mesenteric mass bx - c/w upper GI adenocarcinoma    - hx of diverticular dz - s/p previous bowel resection by dr Chloe Montenegro at [address] [place] [address] pending   - We did discuss that his disease is not curable and he VU but wife stated that he believes he can beat this. - here for follow-up prior to cycle 12 FOLFIRINOX. Labs reviewed and adequate. - hx RUE DVT, on Xarelto   - CT October 2022 previously with no evidence of progressive disease. Due for scan ~ January. - pain - denies and off of prescribed medications    - appetite- eating well and weight stable. - hypokalemia - resolved  - hypomagnesemia - on mag oxide 400 mg TID   - Previously, He wishes to continue on the current regimen; he previously expressed that in the future, he may wish to proceed with unconventional scheduling of maybe FOLFIRINOX every 3 weeks.   He does understand inherent risks with alternate schedule and how this would affect progression of disease. - sclerotic lesion on imaging at S[facility] - bone scan recommended, has not been completed   - constipation - Miralax as needed. - Continue good oral nutrition and hydration.   - Encouraged frequent activity throughout the day and rest as needed to combat fatigue.   - Call with any fevers, uncontrolled side effects from treatment or any other worrisome/concerning symptoms. RTC per protocol or sooner if needed      MDM      Lab studies and imaging studies were personally reviewed. Pertinent old records were reviewed. Historical:    - here with his wife for consultation. During today's visit, we discussed his current workup. We looked at the images together demonstrating some of the findings concerning for peritoneal nodularity/peritoneal disease. Discussed anatomy of the findings  utilizing images to help pt understand what we are looking at and suspecting. He and wife were appreciative of the time spent to review these. Discussed need for visual dx/tissue pathology to determine plan - recommend ex lap - refer to Dr Jr Gomez - personally  reviewed case with Dr Jr Gomez who will expedite the workup and will see pt Fri. US with mild biliary duct dilation - HIDA/ERCP reviewed by PCP   + BM/flatus; He did not like how IV morphine made him feel in the hospital.  He tried tramadol but found no relief and has been taking acetaminophen at home with minimal relief. Discussed different options and he settled on trying  Percocet - he will contact the office to inform us if this is working for him. We discussed SE - not to drive while on the med. Bowel regimen reviewed. He is tired of tests, frustrated. Feelings validated and stressed importance of workup to determine why he has pain. Wt loss from 240 --> 209 noted and expressed concern to pt about this.     - completed course of Levaquin/Diflucan for klebsiella pneumoniae and citrobacter freundii both of which were sensitive to Levaquin found around G-tube site. Wishes for tube removal - reviewed risks of this during chemotherapy - I would like for him to have labs day before scheduled procedure to make sure his counts are adequate per above; case reviewed with Dr Austin Christianson by me via tel today   - here cycle 8 FOLFIRINOX - labs reviewed-defer 1 week due to neutropenia. - nutrition - G-tube out. He is being weaned off TPN (3x week now) as his oral intake has greatly improved, weight up    - pain - Fentanyl 12mcg with prn oxycodone 10 mg, plans to stop patch on Nader 10/2 PC following  - Plan for surveillance reviewed. Labs discussed. - nausea - resolved. Has Zyprexa QHS (not taking currently) and Zofran PRN. - wt loss/FTT - secondary to disease and tx - on TPN and eating more    - depression/anxiety - better affect, on lexapro 20mg daily, PC adding buspar prn    All questions were asked and answered to the best of my ability. The patient verbalized understanding and agrees with the plan above.           LUCY Welch - NP  [facility]  [id number] [place] [address]  Office : [phone]  Fax : [phone]

## 2022-12-09 NOTE — PROGRESS NOTES
12/92022 - Pt seen by Despina Jeans, NP for pre-chemo visit. Weight stable. Tolerating tx well overall. Pt requesting to \"stretch\" tx out to every 3 or 4 weeks, if possible. \" Advised pt to ask Dr. Iker Avina at his 1/6 FU appt. Labs reviewed; OK to proceed to infusion. Encouraged to call with questions. Navigation will continue to follow.

## 2022-12-09 NOTE — PATIENT INSTRUCTIONS
Patient Instructions from Today's Visit    Reason for Visit:  Pre-chemo visit    Diagnosis Information:  https://www.[employer]/. net/about-us/asco-answers-patient-education-materials/ltiw-ymtsgqb-xljk-sheets    Plan:  Continue soaking in Epsom salt for your hemorrhoids. Continue taking your stool softeners for constipation. Keep up the good appetite! Drink, drink dink plenty of fluids! Labs reviewed; OK to proceed to infusion      Constipation  Pain medications, nausea medications and chemo can possibly cause constipation so watch for this very closely  Eat more high fiber foods   Drink at least 8 cups of water or other fluids each day  Prune juice, hot tea, or coffee can sometimes help stimulate bowel movements  Over the counter stool softeners - colace, Senokot S, etc  Take 1-2 tablets/capsules 1-2 times per day  May take Miralax in addition to stool softeners if needed. Controlling this is different for everyone. You will have to play with the medications to figure out what works for you. Follow Up: In 2 weeks    Recent Lab Results:  Hospital Outpatient Visit on 12/09/2022   Component Date Value Ref Range Status    Sodium 12/09/2022 144 (A)  133 - 143 mmol/L Final    Potassium 12/09/2022 3.4 (A)  3.5 - 5.1 mmol/L Final    Chloride 12/09/2022 112 (A)  101 - 110 mmol/L Final    CO2 12/09/2022 27  21 - 32 mmol/L Final    Anion Gap 12/09/2022 5  2 - 11 mmol/L Final    Glucose 12/09/2022 137 (A)  65 - 100 mg/dL Final    BUN 12/09/2022 14  8 - 23 MG/DL Final    Creatinine 12/09/2022 0.90  0.8 - 1.5 MG/DL Final    Est, Glom Filt Rate 12/09/2022 >60  >60 ml/min/1.73m2 Final    Comment:      Pediatric calculator link: Miguel.at. org/professionals/kdoqi/gfr_calculatorped       These results are not intended for use in patients <25years of age. eGFR results are calculated without a race factor using  the 2021 CKD-EPI equation.  Careful clinical correlation is recommended, particularly when comparing to results calculated using previous equations. The CKD-EPI equation is less accurate in patients with extremes of muscle mass, extra-renal metabolism of creatinine, excessive creatine ingestion, or following therapy that affects renal tubular secretion.       Calcium 12/09/2022 8.6  8.3 - 10.4 MG/DL Final    Total Bilirubin 12/09/2022 0.2  0.2 - 1.1 MG/DL Final    ALT 12/09/2022 20  12 - 65 U/L Final    AST 12/09/2022 16  15 - 37 U/L Final    Alk Phosphatase 12/09/2022 97  50 - 136 U/L Final    Total Protein 12/09/2022 6.4  6.3 - 8.2 g/dL Final    Albumin 12/09/2022 3.1 (A)  3.2 - 4.6 g/dL Final    Globulin 12/09/2022 3.3  2.8 - 4.5 g/dL Final    Albumin/Globulin Ratio 12/09/2022 0.9  0.4 - 1.6   Final    WBC 12/09/2022 4.0 (A)  4.3 - 11.1 K/uL Final    RBC 12/09/2022 3.51 (A)  4.23 - 5.6 M/uL Final    Hemoglobin 12/09/2022 10.3 (A)  13.6 - 17.2 g/dL Final    Hematocrit 12/09/2022 32.7  % Final    MCV 12/09/2022 93.2  82.0 - 102.0 FL Final    MCH 12/09/2022 29.3  26.1 - 32.9 PG Final    MCHC 12/09/2022 31.5  31.4 - 35.0 g/dL Final    RDW 12/09/2022 14.5  11.9 - 14.6 % Final    Platelets 48/79/3937 132 (A)  150 - 450 K/uL Final    MPV 12/09/2022 9.8  9.4 - 12.3 FL Final    nRBC 12/09/2022 0.00  0.0 - 0.2 K/uL Final    **Note: Absolute NRBC parameter is now reported with Hemogram**    Differential Type 12/09/2022 AUTOMATED    Final    Seg Neutrophils 12/09/2022 44  43 - 78 % Final    Lymphocytes 12/09/2022 45 (A)  13 - 44 % Final    Monocytes 12/09/2022 8  4.0 - 12.0 % Final    Eosinophils % 12/09/2022 2  0.5 - 7.8 % Final    Basophils 12/09/2022 1  0.0 - 2.0 % Final    Immature Granulocytes 12/09/2022 0  0.0 - 5.0 % Final    Segs Absolute 12/09/2022 1.8  1.7 - 8.2 K/UL Final    Absolute Lymph # 12/09/2022 1.8  0.5 - 4.6 K/UL Final    Absolute Mono # 12/09/2022 0.3  0.1 - 1.3 K/UL Final    Absolute Eos # 12/09/2022 0.1  0.0 - 0.8 K/UL Final    Basophils Absolute 12/09/2022 0.0  0.0 - 0.2 K/UL Final    Absolute

## 2022-12-11 ENCOUNTER — HOSPITAL ENCOUNTER (OUTPATIENT)
Dept: INFUSION THERAPY | Age: 62
Discharge: HOME | End: 2022-12-11
Payer: COMMERCIAL

## 2022-12-11 VITALS
SYSTOLIC BLOOD PRESSURE: 109 MMHG | DIASTOLIC BLOOD PRESSURE: 70 MMHG | RESPIRATION RATE: 16 BRPM | HEART RATE: 59 BPM | TEMPERATURE: 98 F | OXYGEN SATURATION: 92 %

## 2022-12-11 DIAGNOSIS — C76.2 ABDOMINAL CARCINOMATOSIS (HCC): Primary | ICD-10-CM

## 2022-12-11 DIAGNOSIS — C78.6 PERITONEAL METASTASES (HCC): ICD-10-CM

## 2022-12-11 PROCEDURE — 96523 IRRIG DRUG DELIVERY DEVICE: CPT

## 2022-12-11 PROCEDURE — 2580000003 HC RX 258: Performed by: NURSE PRACTITIONER

## 2022-12-11 RX ORDER — SODIUM CHLORIDE 0.9 % (FLUSH) 0.9 %
5-40 SYRINGE (ML) INJECTION PRN
Status: DISCONTINUED | OUTPATIENT
Start: 2022-12-11 | End: 2022-12-12 | Stop reason: HOSPADM

## 2022-12-11 RX ADMIN — SODIUM CHLORIDE, PRESERVATIVE FREE 10 ML: 5 INJECTION INTRAVENOUS at 13:31

## 2022-12-11 RX ADMIN — SODIUM CHLORIDE, PRESERVATIVE FREE 10 ML: 5 INJECTION INTRAVENOUS at 13:33

## 2022-12-11 NOTE — PROGRESS NOTES
Arrived to the [facility]. Adrucil elastomeric pump completed and disconnected. Discarded into chemogator. Pt tolerated well. Patient instructed to report any side affects to ordering provider. Any issues or concerns during appointment: none. Sent home with pouch for elastomeric pump and instructed to return it in a ziplock bag on 12/23 for next chemo appt. Patient aware of next infusion appointment on 12/23/22 (date) at 0900 (time). Discharged ambulatory.

## 2022-12-21 ENCOUNTER — TELEPHONE (OUTPATIENT)
Dept: FAMILY MEDICINE CLINIC | Facility: CLINIC | Age: 62
End: 2022-12-21

## 2022-12-21 NOTE — TELEPHONE ENCOUNTER
Patient came in yesterday stating he had an appointment with Nga Ruelas  but was not on the schedule, patient stated he spoke with \"that lady at the other window\" referring to [facility] and she scheduled him for yesterday,but patient had no appointment, patient then started getting upset and loud even after us continually Miguel A Mcdonald then noticed we had an open appointment with Dr Raven John around the same time so asked the patient if he would be willing to see her instead while he was here and he agreed, after checking patient in and he had a seat in the lobby patient started talking loudly in a irate tone, the started cursing loudly using the \"F\" word saying this office is bullshit I then told him from my window that we will not allow him to talk like that in our lobby and if he didn't calm down we will have to ask him to leave. He then said \"I will leave because this is Bullshit I had an appointment\". I then said I understand and I am sorry but we made it right and your still being seen. After 10 minutes patient again became loud and irate in the lobby, the jumped up out of his seat and told his wife \"lets get the \"F\" out of here\" as they proceeded to the door the patient yelled from the door way \"you can you take me off the scheduled,this office has went to [facility] and I am not waiting for this bullshit\". .. After being disrespectful to other patients and  verbally abusing staff patient should be considered for dismissal as MA states patient is always verbally abuse to her while rooming him during his visits here.

## 2022-12-23 ENCOUNTER — HOSPITAL ENCOUNTER (OUTPATIENT)
Dept: INFUSION THERAPY | Age: 62
Discharge: HOME | End: 2022-12-23
Payer: COMMERCIAL

## 2022-12-23 ENCOUNTER — OFFICE VISIT (OUTPATIENT)
Dept: ONCOLOGY | Age: 62
End: 2022-12-23
Payer: COMMERCIAL

## 2022-12-23 VITALS
TEMPERATURE: 98.9 F | BODY MASS INDEX: 30.92 KG/M2 | HEART RATE: 81 BPM | OXYGEN SATURATION: 99 % | DIASTOLIC BLOOD PRESSURE: 75 MMHG | RESPIRATION RATE: 22 BRPM | SYSTOLIC BLOOD PRESSURE: 108 MMHG | WEIGHT: 216 LBS | HEIGHT: 70 IN

## 2022-12-23 DIAGNOSIS — C16.9 MALIGNANT NEOPLASM OF STOMACH, UNSPECIFIED LOCATION (HCC): ICD-10-CM

## 2022-12-23 DIAGNOSIS — C78.6 PERITONEAL METASTASES (HCC): ICD-10-CM

## 2022-12-23 DIAGNOSIS — C16.9 MALIGNANT NEOPLASM OF STOMACH, UNSPECIFIED LOCATION (HCC): Primary | ICD-10-CM

## 2022-12-23 DIAGNOSIS — E87.6 HYPOKALEMIA: ICD-10-CM

## 2022-12-23 DIAGNOSIS — C76.2 ABDOMINAL CARCINOMATOSIS (HCC): ICD-10-CM

## 2022-12-23 DIAGNOSIS — C76.2 ABDOMINAL CARCINOMATOSIS (HCC): Primary | ICD-10-CM

## 2022-12-23 DIAGNOSIS — R53.83 FATIGUE DUE TO TREATMENT: ICD-10-CM

## 2022-12-23 DIAGNOSIS — E83.42 HYPOMAGNESEMIA: ICD-10-CM

## 2022-12-23 LAB
ALBUMIN SERPL-MCNC: 3.2 G/DL (ref 3.2–4.6)
ALBUMIN/GLOB SERPL: 1 {RATIO} (ref 0.4–1.6)
ALP SERPL-CCNC: 102 U/L (ref 50–136)
ALT SERPL-CCNC: 23 U/L (ref 12–65)
ANION GAP SERPL CALC-SCNC: 3 MMOL/L (ref 2–11)
AST SERPL-CCNC: 17 U/L (ref 15–37)
BASOPHILS # BLD: 0 K/UL (ref 0–0.2)
BASOPHILS NFR BLD: 1 % (ref 0–2)
BILIRUB SERPL-MCNC: 0.3 MG/DL (ref 0.2–1.1)
BUN SERPL-MCNC: 10 MG/DL (ref 8–23)
CALCIUM SERPL-MCNC: 8.5 MG/DL (ref 8.3–10.4)
CHLORIDE SERPL-SCNC: 113 MMOL/L (ref 101–110)
CO2 SERPL-SCNC: 28 MMOL/L (ref 21–32)
CREAT SERPL-MCNC: 0.9 MG/DL (ref 0.8–1.5)
DIFFERENTIAL METHOD BLD: ABNORMAL
EOSINOPHIL # BLD: 0.1 K/UL (ref 0–0.8)
EOSINOPHIL NFR BLD: 2 % (ref 0.5–7.8)
ERYTHROCYTE [DISTWIDTH] IN BLOOD BY AUTOMATED COUNT: 14.7 % (ref 11.9–14.6)
GLOBULIN SER CALC-MCNC: 3.2 G/DL (ref 2.8–4.5)
GLUCOSE SERPL-MCNC: 163 MG/DL (ref 65–100)
HCT VFR BLD AUTO: 33.9 %
HGB BLD-MCNC: 10.7 G/DL (ref 13.6–17.2)
IMM GRANULOCYTES # BLD AUTO: 0 K/UL (ref 0–0.5)
IMM GRANULOCYTES NFR BLD AUTO: 0 % (ref 0–5)
LYMPHOCYTES # BLD: 1.6 K/UL (ref 0.5–4.6)
LYMPHOCYTES NFR BLD: 43 % (ref 13–44)
MCH RBC QN AUTO: 29.5 PG (ref 26.1–32.9)
MCHC RBC AUTO-ENTMCNC: 31.6 G/DL (ref 31.4–35)
MCV RBC AUTO: 93.4 FL (ref 82–102)
MONOCYTES # BLD: 0.4 K/UL (ref 0.1–1.3)
MONOCYTES NFR BLD: 11 % (ref 4–12)
NEUTS SEG # BLD: 1.7 K/UL (ref 1.7–8.2)
NEUTS SEG NFR BLD: 44 % (ref 43–78)
NRBC # BLD: 0 K/UL (ref 0–0.2)
PLATELET # BLD AUTO: 133 K/UL (ref 150–450)
PMV BLD AUTO: 9.5 FL (ref 9.4–12.3)
POTASSIUM SERPL-SCNC: 3.6 MMOL/L (ref 3.5–5.1)
PROT SERPL-MCNC: 6.4 G/DL (ref 6.3–8.2)
RBC # BLD AUTO: 3.63 M/UL (ref 4.23–5.6)
SODIUM SERPL-SCNC: 144 MMOL/L (ref 133–143)
WBC # BLD AUTO: 3.8 K/UL (ref 4.3–11.1)

## 2022-12-23 PROCEDURE — 6360000002 HC RX W HCPCS: Performed by: NURSE PRACTITIONER

## 2022-12-23 PROCEDURE — 96368 THER/DIAG CONCURRENT INF: CPT

## 2022-12-23 PROCEDURE — 99214 OFFICE O/P EST MOD 30 MIN: CPT | Performed by: NURSE PRACTITIONER

## 2022-12-23 PROCEDURE — 80053 COMPREHEN METABOLIC PANEL: CPT

## 2022-12-23 PROCEDURE — 96372 THER/PROPH/DIAG INJ SC/IM: CPT

## 2022-12-23 PROCEDURE — 96415 CHEMO IV INFUSION ADDL HR: CPT

## 2022-12-23 PROCEDURE — 3074F SYST BP LT 130 MM HG: CPT | Performed by: NURSE PRACTITIONER

## 2022-12-23 PROCEDURE — 36591 DRAW BLOOD OFF VENOUS DEVICE: CPT

## 2022-12-23 PROCEDURE — 2580000003 HC RX 258: Performed by: INTERNAL MEDICINE

## 2022-12-23 PROCEDURE — 96411 CHEMO IV PUSH ADDL DRUG: CPT

## 2022-12-23 PROCEDURE — 85025 COMPLETE CBC W/AUTO DIFF WBC: CPT

## 2022-12-23 PROCEDURE — 3078F DIAST BP <80 MM HG: CPT | Performed by: NURSE PRACTITIONER

## 2022-12-23 PROCEDURE — G0498 CHEMO EXTEND IV INFUS W/PUMP: HCPCS

## 2022-12-23 PROCEDURE — 2580000003 HC RX 258: Performed by: NURSE PRACTITIONER

## 2022-12-23 PROCEDURE — 96367 TX/PROPH/DG ADDL SEQ IV INF: CPT

## 2022-12-23 PROCEDURE — 96413 CHEMO IV INFUSION 1 HR: CPT

## 2022-12-23 PROCEDURE — 96417 CHEMO IV INFUS EACH ADDL SEQ: CPT

## 2022-12-23 PROCEDURE — 96375 TX/PRO/DX INJ NEW DRUG ADDON: CPT

## 2022-12-23 RX ORDER — MEPERIDINE HYDROCHLORIDE 50 MG/ML
12.5 INJECTION INTRAMUSCULAR; INTRAVENOUS; SUBCUTANEOUS PRN
Status: CANCELLED | OUTPATIENT
Start: 2022-12-23

## 2022-12-23 RX ORDER — ONDANSETRON 2 MG/ML
8 INJECTION INTRAMUSCULAR; INTRAVENOUS
Status: CANCELLED | OUTPATIENT
Start: 2022-12-23

## 2022-12-23 RX ORDER — SODIUM CHLORIDE 0.9 % (FLUSH) 0.9 %
5-40 SYRINGE (ML) INJECTION PRN
Status: CANCELLED | OUTPATIENT
Start: 2022-12-23

## 2022-12-23 RX ORDER — EPINEPHRINE 1 MG/ML
0.3 INJECTION, SOLUTION, CONCENTRATE INTRAVENOUS PRN
Status: CANCELLED | OUTPATIENT
Start: 2022-12-23

## 2022-12-23 RX ORDER — ATROPINE SULFATE 0.4 MG/ML
0.4 AMPUL (ML) INJECTION
Status: CANCELLED | OUTPATIENT
Start: 2022-12-23

## 2022-12-23 RX ORDER — ATROPINE SULFATE 0.4 MG/ML
0.4 INJECTION, SOLUTION INTRAVENOUS ONCE
Status: COMPLETED | OUTPATIENT
Start: 2022-12-23 | End: 2022-12-23

## 2022-12-23 RX ORDER — FLUOROURACIL 50 MG/ML
400 INJECTION, SOLUTION INTRAVENOUS ONCE
Status: COMPLETED | OUTPATIENT
Start: 2022-12-23 | End: 2022-12-23

## 2022-12-23 RX ORDER — SODIUM CHLORIDE 0.9 % (FLUSH) 0.9 %
10 SYRINGE (ML) INJECTION PRN
Status: DISCONTINUED | OUTPATIENT
Start: 2022-12-23 | End: 2022-12-24 | Stop reason: HOSPADM

## 2022-12-23 RX ORDER — SODIUM CHLORIDE 9 MG/ML
INJECTION, SOLUTION INTRAVENOUS CONTINUOUS
Status: CANCELLED | OUTPATIENT
Start: 2022-12-23

## 2022-12-23 RX ORDER — ATROPINE SULFATE 0.4 MG/ML
0.4 INJECTION, SOLUTION INTRAVENOUS ONCE
Status: CANCELLED | OUTPATIENT
Start: 2022-12-23 | End: 2022-12-23

## 2022-12-23 RX ORDER — SODIUM CHLORIDE 9 MG/ML
5-40 INJECTION INTRAVENOUS PRN
Status: CANCELLED | OUTPATIENT
Start: 2022-12-23

## 2022-12-23 RX ORDER — DEXTROSE MONOHYDRATE 50 MG/ML
5-250 INJECTION, SOLUTION INTRAVENOUS PRN
Status: DISCONTINUED | OUTPATIENT
Start: 2022-12-23 | End: 2022-12-24 | Stop reason: HOSPADM

## 2022-12-23 RX ORDER — ONDANSETRON 2 MG/ML
8 INJECTION INTRAMUSCULAR; INTRAVENOUS ONCE
Status: CANCELLED | OUTPATIENT
Start: 2022-12-23 | End: 2022-12-23

## 2022-12-23 RX ORDER — FAMOTIDINE 10 MG/ML
20 INJECTION, SOLUTION INTRAVENOUS
Status: CANCELLED | OUTPATIENT
Start: 2022-12-23

## 2022-12-23 RX ORDER — SODIUM CHLORIDE 9 MG/ML
5-250 INJECTION, SOLUTION INTRAVENOUS PRN
Status: CANCELLED | OUTPATIENT
Start: 2022-12-23

## 2022-12-23 RX ORDER — DEXTROSE MONOHYDRATE 50 MG/ML
5-250 INJECTION, SOLUTION INTRAVENOUS PRN
Status: CANCELLED | OUTPATIENT
Start: 2022-12-23

## 2022-12-23 RX ORDER — SODIUM CHLORIDE 9 MG/ML
5-40 INJECTION INTRAVENOUS PRN
OUTPATIENT
Start: 2022-12-25

## 2022-12-23 RX ORDER — HEPARIN SODIUM (PORCINE) LOCK FLUSH IV SOLN 100 UNIT/ML 100 UNIT/ML
500 SOLUTION INTRAVENOUS PRN
Status: CANCELLED | OUTPATIENT
Start: 2022-12-23

## 2022-12-23 RX ORDER — ALBUTEROL SULFATE 90 UG/1
4 AEROSOL, METERED RESPIRATORY (INHALATION) PRN
Status: CANCELLED | OUTPATIENT
Start: 2022-12-23

## 2022-12-23 RX ORDER — HEPARIN SODIUM (PORCINE) LOCK FLUSH IV SOLN 100 UNIT/ML 100 UNIT/ML
500 SOLUTION INTRAVENOUS PRN
OUTPATIENT
Start: 2022-12-25

## 2022-12-23 RX ORDER — SODIUM CHLORIDE 9 MG/ML
5-250 INJECTION, SOLUTION INTRAVENOUS PRN
OUTPATIENT
Start: 2022-12-25

## 2022-12-23 RX ORDER — FLUOROURACIL 50 MG/ML
400 INJECTION, SOLUTION INTRAVENOUS ONCE
Status: CANCELLED | OUTPATIENT
Start: 2022-12-23 | End: 2022-12-23

## 2022-12-23 RX ORDER — ONDANSETRON 2 MG/ML
8 INJECTION INTRAMUSCULAR; INTRAVENOUS ONCE
Status: COMPLETED | OUTPATIENT
Start: 2022-12-23 | End: 2022-12-23

## 2022-12-23 RX ORDER — DIPHENHYDRAMINE HYDROCHLORIDE 50 MG/ML
50 INJECTION INTRAMUSCULAR; INTRAVENOUS
Status: CANCELLED | OUTPATIENT
Start: 2022-12-23

## 2022-12-23 RX ORDER — SODIUM CHLORIDE 0.9 % (FLUSH) 0.9 %
5-40 SYRINGE (ML) INJECTION PRN
OUTPATIENT
Start: 2022-12-25

## 2022-12-23 RX ORDER — ACETAMINOPHEN 325 MG/1
650 TABLET ORAL
Status: CANCELLED | OUTPATIENT
Start: 2022-12-23

## 2022-12-23 RX ADMIN — OXALIPLATIN 180 MG: 5 INJECTION, SOLUTION INTRAVENOUS at 10:22

## 2022-12-23 RX ADMIN — IRINOTECAN HYDROCHLORIDE 320 MG: 20 INJECTION, SOLUTION INTRAVENOUS at 12:28

## 2022-12-23 RX ADMIN — FOSAPREPITANT 150 MG: 150 INJECTION, POWDER, LYOPHILIZED, FOR SOLUTION INTRAVENOUS at 09:45

## 2022-12-23 RX ADMIN — SODIUM CHLORIDE, PRESERVATIVE FREE 10 ML: 5 INJECTION INTRAVENOUS at 08:30

## 2022-12-23 RX ADMIN — ATROPINE SULFATE 0.4 MG: 0.4 INJECTION, SOLUTION INTRAVENOUS at 12:25

## 2022-12-23 RX ADMIN — ONDANSETRON 8 MG: 2 INJECTION INTRAMUSCULAR; INTRAVENOUS at 09:25

## 2022-12-23 RX ADMIN — FLUOROURACIL 5000 MG: 50 INJECTION, SOLUTION INTRAVENOUS at 14:09

## 2022-12-23 RX ADMIN — LEUCOVORIN CALCIUM 850 MG: 350 INJECTION, POWDER, LYOPHILIZED, FOR SUSPENSION INTRAMUSCULAR; INTRAVENOUS at 12:28

## 2022-12-23 RX ADMIN — DEXAMETHASONE SODIUM PHOSPHATE 12 MG: 4 INJECTION, SOLUTION INTRAMUSCULAR; INTRAVENOUS at 09:28

## 2022-12-23 RX ADMIN — FLUOROURACIL 850 MG: 50 INJECTION, SOLUTION INTRAVENOUS at 14:03

## 2022-12-23 RX ADMIN — DEXTROSE MONOHYDRATE 25 ML/HR: 50 INJECTION, SOLUTION INTRAVENOUS at 09:20

## 2022-12-23 ASSESSMENT — PATIENT HEALTH QUESTIONNAIRE - PHQ9
1. LITTLE INTEREST OR PLEASURE IN DOING THINGS: 0
SUM OF ALL RESPONSES TO PHQ QUESTIONS 1-9: 0
2. FEELING DOWN, DEPRESSED OR HOPELESS: 0
SUM OF ALL RESPONSES TO PHQ QUESTIONS 1-9: 0
SUM OF ALL RESPONSES TO PHQ9 QUESTIONS 1 & 2: 0
SUM OF ALL RESPONSES TO PHQ QUESTIONS 1-9: 0
SUM OF ALL RESPONSES TO PHQ QUESTIONS 1-9: 0

## 2022-12-23 ASSESSMENT — ENCOUNTER SYMPTOMS
BLOOD IN STOOL: 0
CONSTIPATION: 1
EYE PROBLEMS: 0
NAUSEA: 0
ABDOMINAL PAIN: 0
VOMITING: 0
DIARRHEA: 0
ABDOMINAL DISTENTION: 0
COUGH: 0
SCLERAL ICTERUS: 0
SORE THROAT: 0
HEMOPTYSIS: 0
BACK PAIN: 0
SHORTNESS OF BREATH: 0

## 2022-12-23 NOTE — PROGRESS NOTES
[employer] [facility]: Established patient - follow up     Chief Complaint   Patient presents with    Follow-up      Reason for Referral: Abdominal pain; peritoneal metastatic disease   Referring Provider: Merry Wolfe NP   Primary Care Provider: Cherylene Antu, MD   Family History of Cancer/Hematologic Disorders: Family history is significant for sister with breast cancer. Presenting Symptoms: Progressively worsening, persistent abdominal pain x several months    History of Present Illness:  Mr. Evans  is a 62 y.o. male who presents today for FU regarding adenocarcinoma with peritoneal metastatic disease. The past medical history is significant for tobacco use (recent pack per day smoker x 30+ years - now down to 1/3PPD), PUD, paresthesia/pain of bilateral upper extremities, HLD, HTN, diverticulitis,  colon polyps, hiatal hernia, chronic right shoulder pain, bilateral carpal tunnel syndrome, and partial colon resection. He presented to the [facility] ED on 5/12/22 with a complaint of persistent abdominal pain for several months that was becoming progressively  worse. EGD and colonoscopy on 2/25/22 revealed findings of hiatal hernia, gastric polyps, several benign colon polyps, diverticulosis, and internal hemorrhoids. CT of the abdomen on 3/8/22 showed no acute findings. He presented to [facility] ER x 2 for  evaluation (5/3/22 and 5/10/22). RUQ abdominal ultrasound was completed on 5/3/22 in the ED at [facility] demonstrating no acute findings to explain patient's pain; probable hepatic  steatosis; small echogenic mural foci in the gallbladder which are nonmobile, likely representing cholesterol polyps, largest measuring 4 mm; and small volume simple ascites in the right upper quadrant and left lower quadrant, nonspecific.   CT AP with  contrast at [facility] ED 5/10/22 showed a partial small bowel obstruction; small to moderate amount of free fluid in the abdomen and pelvis; and nonspecific stranding of the omentum primarily in the left upper quadrant. Radiologist noted that follow-up  CT was recommended to differentiate passive congestion from omental disease. On 5/11/22, abdominal series again showed multiple dilated small bowel loops with enteric contrast appearing to extend into the proximal colon, suggesting partial small  bowel obstruction. CT AP in the [facility] ED on 5/12/22 identified extensive peritoneal nodularity and mild ascites consistent with peritoneal  metastatic disease with primary lesion not definitely identified; dilated fluid-filled loops of small bowel possibly related to gastroenteritis with no definite obstruction and no obvious bowel mass; and sclerosis of S1 vertebral body. Radiologist recommended consideration of follow-up bone scan to assess for bone metastases. Patient was admitted to  the Hospitalist service on 5/12/22, and IR consulted for possible paracentesis however very small volume was inaccessible. CT Chest obtained on 5/13/22 showed No evidence of primary malignancy or metastasis within the chest.  Follow-up hepatobiliary  scan was suggested to assess for common bile duct obstruction. Oncology was consulted but did not see patient inhouse as pt was dischared. Upon discharge on 5/14/22, patient was instructed to follow up with [facility]. During consultation, we discussed his workup. We looked at the images together demonstrating some of the findings concerning for peritoneal nodularity/peritoneal disease. Discussed anatomy of the findings utilizing images to help pt understand what we are looking at and suspecting. He and wife were appreciative of the time spent to review these. We also addressed pt's pain. We also reviewed images of sclerosing lesion - bone scan recommended. He also was recommended to undergo HIDA scan after recent US per PCP. He is tired of tests, frustrated. Feelings validated and stressed importance of workup to determine why he has pain.   Wt loss from 240 --> 209 noted and expressed concern to pt about this. Of note, prior akbar resection with Dr Aníbal Lora for diverticulitis per pt. Sent note to dr Mohsen Brown re pt's case to expedite workup with his agreement. He was hospitalized for abdominal pain and sepsis. He was empirically placed on vancomycin and cefepime. CT scanning disclosed no intra-abdominal fluid collection or abscess but did suggest that his tumor burden was somewhat smaller. He had some purulent drainage from around his G-tube. This was cultured and grew klebsiella pneumoniae and citrobacter freundii both of which were sensitive to Levaquin which he was discharged home on for 10 days. He is here today for follow up and C13 FOLFIRINOX. Overall, he has been well since last seen. There are no GI or bowel complaints, hemorrhoids remain. Appetite is great, weight up 1#. Energy level is OK. There is an occasional cough, no shortness of breath or edema. No true neuropathy, however cold sensitivity is ongoing. No pain. Denies any fevers or infectious symptoms. Chronological Events:   EGD REPORT 2/25/22                      COLONOSCOPY REPORT 2/25/22                 CT ABDOMEN PELVIS W CONTRAST 3/8/2022   FINDINGS:   LOWER CHEST: Unremarkable. ABDOMEN AND PELVIS:   Liver: Unremarkable. Biliary system: Unremarkable. Pancreas: Unremarkable. Spleen: Unremarkable. Adrenals: Unremarkable. Genitourinary: Unremarkable. Reproductive organs: Unremarkable. Gastrointestinal tract: Colonic diverticulosis without evidence of diverticulitis. There is no evidence of bowel obstruction or inflammation. The appendix is normal   Peritoneum/Extraperitoneum: Unremarkable. No free fluid or air. Vascular: Unremarkable. Musculoskeletal:  Small fat-containing umbilical hernia. Degenerative  changes of thoracolumbar spine. No acute or aggressive osseous lesion.    IMPRESSION: Colonic diverticula without evidence of acute diverticulitis. RIGHT UPPER QUADRANT ABDOMINAL ULTRASOUND 5/3/2022    FINDINGS:   Pancreas: Not visualized, obscured by bowel gas. Intrahepatic IVC: Normal.   Main  Portal Vein: Patent with normal directional flow. Liver: Liver contour is normal. Liver appears diffusely increased in echogenicity consistent with hepatic steatosis. There is fatty sparing around the gallbladder fossa. Gallbladder: Gallbladder  is normal in size. No evidence of gallbladder wall thickening. No gallstones are seen. There are several nonmobile echogenic mural foci in the proximal gallbladder body/neck, largest measuring 4 mm, without shadowing, likely small cholesterol polyps. Bile ducts: No evidence of biliary ductal dilation. The common bile duct measures 3 mm in diameter. Right kidney: Normal.   Left Upper Quadrant: Limited images of the left upper quadrant are unremarkable. Spleen measures 12.1 cm in length. Free fluid: Trace simple free fluid in the right upper quadrant and left lower quadrant. IMPRESSION:    1. No acute findings to explain patient's pain. 2. Probable hepatic steatosis. 3. Small echogenic mural foci in the gallbladder which are nonmobile, likely representing cholesterol polyps, largest measuring 4 mm. There are no specific ultrasound  follow up recommendations for polyps of this small size. 4. Small volume simple ascites in the right upper quadrant and left lower quadrant, nonspecific. CT ABDOMEN - PELVIS WITH CONTRAST 5/10/22   FINDINGS:   Liver: Normal    Portal Vein: Normal   Gallbladder - Biliary Tree: Normal   Pancreas: Normal   Spleen: Normal   Retroperitoneum:   Adrenals: Normal   Kidneys:  Normal    Aorto - Cava:  Calcification of the abdominal aorta without aneurysm formation. Lymphatics:  Normal   GI - Mesentery - Peritoneum: Multiple dilated mid small bowel loops without a focal transition point.  The appendix is normal. Small to  moderate amount of free fluid in the pelvis and right flank. Nonspecific stranding of the omentum in the left upper quadrant. Small fatty umbilical hernia. Pelvis: Normal   Lower Chest: Normal   Soft tissues - MSK: Normal    IMPRESSION:   1. Partial small bowel obstruction. 2. Small to moderate amount of free fluid in the abdomen and pelvis. 3. Nonspecific stranding of the omentum primarily  in the left upper quadrant. Follow-up CT is recommended to differentiate passive congestion from omental a static disease. ABDOMEN SERIES 5/11/22   FINDINGS:   Chest: Heart size within normal limits. Lungs are clear. No pleural effusion or pneumothorax. Bowel: Multiple dilated small bowel loops with air-fluid levels on the upright view. Contrast distends small bowel loops in the left lateral abdomen and lower abdomen. Contrast in the right hemiabdomen appears to be within proximal colon. Scattered colonic  gas. Peritoneum / Retroperitoneum: No pneumoperitoneum. Soft tissues / Bones: No acute osseous findings. The contrast in urinary bladder. Lines / Support Apparatus: None. IMPRESSION: Redemonstrated of multiple dilated small bowel loops with enteric contrast appearing to extend into the proximal colon, suggesting only partial small bowel obstruction. Continued radiographic follow-up is advised. CT OF THE ABDOMEN AND PELVIS 5/12/22   FINDINGS:   LOWER CHEST: Normal.   HEPATOBILIARY: No evidence of focal liver mass. No calcified gallstones. PANCREAS: Normal.   SPLEEN: Normal.    ADRENAL GLANDS: Normal.    KIDNEYS/BLADDER: Kidneys and bladder are unremarkable. No hydronephrosis or urinary tract calculi. BOWEL: Dilated fluid-filled loops of small bowel are present throughout the abdomen. No definite transition point. Oral contrast noted in the colon. No definite evidence of bowel mass but there is extensive peritoneal nodularity and trace ascites highly  concerning for peritoneal metastatic disease.    LYMPH NODES: No enlarged retroperitoneal or pelvic lymph nodes. Peritoneal nodularity again noted most likely metastatic disease. VASCULATURE: Unremarkable. PELVIC ORGANS: Prostate gland and rectum are unremarkable. Small amount of free pelvic fluid is noted. MUSCULOSKELETAL: Degenerative spine changes are noted. Mild sclerosis involving the S1 vertebral body is present on image 74 of series 602. IMPRESSION   1. Extensive peritoneal nodularity and mild ascites consistent with peritoneal metastatic disease. Primary lesion not definitely identified. 2. Dilated fluid-filled loops of small bowel possibly related to gastroenteritis. No definite obstruction. No obvious bowel mass. 3. Sclerosis S1 vertebral body. Consider follow-up bone scan to assess for bone metastases. LIMITED ABDOMINAL ULTRASOUND 5/13/22   FINDINGS: A single limited gray scale image was submitted of an undisclosed location in the abdomen. Images demonstrate a small amount of  ascites as seen on comparison CT. IMPRESSION   1. Small volume ascites. CT CHEST WITH CONTRAST 5/13/22   FINDINGS:   Mediastinum and visualized thyroid: Normal.   Heart: Normal.    Large Vessels: Normal.    Pleura: Normal.    Lungs: No lung mass clearly discerned. Mild scarring or atelectasis in the right lung base. No suspicious lung nodule. No consolidation or zulma pulmonary edema. Airways: Normal.    Lymph nodes: Normal.    Bones/Soft tissues: Normal.    Visualized abdomen: Partially imaged omental nodules. Perihepatic ascites noted. IMPRESSION: No evidence of primary malignancy or metastasis within the chest       ABDOMINAL ULTRASOUND 5/17/22   FINDINGS:    LIVER: 17.3 cm. Slight increase in echogenicity without focal mass. BILE DUCTS: No intrahepatic bile duct dilatation. CBD diameter = 8 mm. GALLBLADDER: It is unremarkable in appearance without stones or sludge. Echogenic polyp measures 0.5 cm. No gallbladder wall thickening. Mild ascites.    PANCREAS: Normal.   SPLEEN: Borderline enlarged at 12.2 cm. RIGHT KIDNEY: 13.3 cm. No mass or hydronephrosis. LEFT KIDNEY: 14.3 cm. No mass or hydronephrosis. ABDOMINAL AORTA AND IVC: Normal in size. ASCITES: Mild   IMPRESSION: Possible mild fatty infiltration liver. No focal mass. Gallbladder polyp but no stones or gallbladder wall thickening. Mild ascites. Mildly prominent common bile duct. Follow-up hepatobiliary scan could be performed to assess for common bile duct obstruction. 04/02/2021 (COVID-19, López Pester, Primary or Immunocompromised Series, MRNA, PF, 100mcg/0.5mL)   04/30/2021 (COVID-19, López Pester, Primary or Immunocompromised Series, MRNA, PF, 100mcg/0.5mL)   11/16/2021 (COVID-19, Moderna Booster, PF, 0.25mL Dose)      5/18/22 heme/onc consultation   5/20/22 Dr Natalia Maguire consult - sent to ED for admission/workup   5/23/22 omental bx - IR   5/27/22 Dr Natalia Maguire - diagnostic lap, omental mass and mesentery mass excision, G-tube placement for venting  6/1/22 painful G-tube - tract recanalized and new G-tube placed   6/12/22 C1 FOLFIRINOX completed - dose adjusted   6/14/22 d/c   6/24/22 FU sx - G tube maint instructions/staples removed - RTC PRN   6/24/22 FU after hospitalization - discussed PC/plan for tx  7/8/22 FU - mainly concerned about PICC line without blood return, decline cathflo, seeing José Miguel Pham in [facility] on Monday. Will increase hydration at home. HH for TPN.    7/18/22 Follow up after hospitalization. He will complete course of Levaquin as prescribed for infection around G-tube. Would like to proceed with cycle 3 FOLFIRINOX with increased dosing. 8/4/22 Cycle 4 FOLFIRINOX - continue dose escalation. He will complete course of Levaquin/Diflucan as prescribed for infection around G-tube, site looks good today. 8/17/22 C5 FOLFIRINOX - wishes to pw full dose accepting risks; wishes for G tube to be removed - will need to time with labs; plan to drop dose in opioids - PC seeing pt today.   RD seeing pt. Plan for restaging in ~Oct.    9/1/22 FU FOLFIRINOX C5 full dose now; imaging in Oct   9/15/22 FU FOLFIRINOX C6-doing well, weight up 11#  9/30/22 FU FOLFIRINOX-C8 defer 1 week due to neutropenia, ANC 0.5  10/13/22 FU FOLFIRINOX 8; CT CAP reviewed and no POD. 10/28/22 Stat ultrasound RUE and then proceed with cycle 9 FOLFIRINOX. Doppler - Persistent DVT, nonocclusive, within the right axillary and basilic veins. The previously seen nonocclusive thrombus within the right innominate and subclavian veins are no longer seen. No abnormality of the right brachial vein demonstrated. Started on Xarelto. 11/11/22 FU - C10 FOLFIRINOX. Doing well. Wt stable. RUE swelling better. No new issues/concerns. 11/25/22 FU and C11 FOLFIRINOX, doing very well   12/9/22 FU and C12 FOLFIRINOX. Doing well. Weight stable. 12/23/22 FU and C13 FOLFIRINOX, doing well            Family History   Problem Relation Age of Onset    No Known Problems Brother     Cancer Sister         breast    Hypertension Mother     Heart Disease Mother     Diabetes Father     Osteoarthritis Father     Alcohol Abuse Father     Hypertension Father     Diabetes Mother       Social History     Socioeconomic History    Marital status:      Spouse name: None    Number of children: None    Years of education: None    Highest education level: None   Tobacco Use    Smoking status: Former     Packs/day: 1.00     Types: Cigarettes    Smokeless tobacco: Never   Substance and Sexual Activity    Alcohol use: No    Drug use: No        Review of Systems   Constitutional:  Negative for appetite change, diaphoresis, fatigue, fever and unexpected weight change. HENT:   Negative for sore throat. Eyes:  Negative for eye problems and icterus. Respiratory:  Negative for cough, hemoptysis and shortness of breath. Cardiovascular:  Negative for chest pain, leg swelling and palpitations.    Gastrointestinal:  Positive for constipation (controlled). Negative for abdominal distention, abdominal pain, blood in stool, diarrhea, nausea and vomiting. Endocrine: Negative for hot flashes. Genitourinary:  Negative for dysuria. Musculoskeletal:  Negative for arthralgias, back pain and gait problem. Skin:  Negative for itching, rash and wound. Neurological:  Negative for dizziness, extremity weakness, gait problem, headaches, light-headedness, numbness, seizures and speech difficulty. Hematological:  Negative for adenopathy. Does not bruise/bleed easily. Psychiatric/Behavioral:  Positive for depression (better). Negative for decreased concentration and sleep disturbance. The patient is nervous/anxious (better).        No Known Allergies  Past Medical History:   Diagnosis Date    Bilateral carpal tunnel syndrome 12/9/2020    Chronic right shoulder pain 11/30/2020    Colon polyps 3/11/2013    Diverticulitis 3/11/2013    HTN (hypertension) 3/11/2013    Hyperlipidemia 3/11/2013    Paresthesia and pain of both upper extremities 11/30/2020    PUD (peptic ulcer disease) 3/11/2013    History of     Right elbow pain 12/9/2020    Wheezing 3/11/2013     Past Surgical History:   Procedure Laterality Date    COLONOSCOPY  2/25/09    colonoscopy per Dr. Meliza Simmons with need for repeat every 3 years    COLONOSCOPY  02/25/2022    Dr. Yahaira Valera; polyps of the ascending, transverse, descending, and sigmoid colons; internal hemorrhoid; diverticulosis    LAPAROSCOPY N/A 5/27/2022    LAPAROSCOPY DIAGNOSTIC , OPEN EXPLORATORY LAPAROTOMY, MASS EXCISION, G-TUBE PLACEMENT performed by Ciara Koenig MD at [address] N/A 6/3/2022    PORT INSERTION performed by Ciara Koenig MD at [address] UNLISTED  October 2004    partial colon resection per Dr. Bhavik Galloway  02/25/2022    Dr. Yahaira Valera; hiatal hernia gastric polyps     Current Outpatient Medications   Medication Sig Dispense Refill    potassium chloride (KLOR-CON M) 20 MEQ extended release tablet Take 1 tablet by mouth 2 times daily for 28 days 14 days 28 tablet 1    rivaroxaban (XARELTO) 20 MG TABS tablet Take 1 tablet by mouth daily (with breakfast) 30 tablet 3    dicyclomine (BENTYL) 20 MG tablet Take one tab orally twice daily 180 tablet 0    magnesium oxide (MAG-OX) 400 MG tablet Take 1 tablet by mouth in the morning, at noon, and at bedtime 90 tablet 2    busPIRone (BUSPAR) 7.5 MG tablet Take 1 tablet by mouth daily 15 tablet 0    pantoprazole (PROTONIX) 40 MG tablet Take 1 tablet by mouth daily 30 tablet 1    Multiple Vitamins-Minerals (MULTIVITAMIN MEN 50+ PO) Take by mouth      escitalopram (LEXAPRO) 10 MG tablet Take 1 tablet by mouth in the morning. 30 tablet 5    promethazine (PHENERGAN) 12.5 MG tablet Take 1 tablet by mouth every 6 hours as needed for Nausea 90 tablet 0    naloxone 4 MG/0.1ML LIQD nasal spray 1 spray by Nasal route as needed for Opioid Reversal      rosuvastatin (CRESTOR) 10 MG tablet Take 10 mg by mouth      rivaroxaban 15 & 20 MG Starter Pack Take as directed on package. (Patient not taking: No sig reported) 1 each 0    oxyCODONE (ROXICODONE INTENSOL) 100 MG/5ML concentrated solution Take 10 mg by mouth every 4 hours as needed for Pain. 1 mls under tongue (Patient not taking: No sig reported)      OLANZapine zydis (ZYPREXA) 5 MG disintegrating tablet Take 1 tablet by mouth nightly Do not take with promethazine (Patient not taking: No sig reported) 30 tablet 3    ondansetron (ZOFRAN-ODT) 8 MG TBDP disintegrating tablet Take 1 tablet by mouth every 8 hours (Patient not taking: No sig reported) 90 tablet 0    polyethylene glycol (GLYCOLAX) 17 GM/SCOOP powder Take 17 g by mouth daily (Patient not taking: No sig reported)      umeclidinium-vilanterol (ANORO ELLIPTA) 62.5-25 MCG/INH AEPB inhaler Inhale 1 puff into the lungs daily  (Patient not taking: No sig reported)       No current facility-administered medications for this visit. No flowsheet data found. OBJECTIVE:  /75 Comment: standing  Pulse 81   Temp 98.9 °F (37.2 °C) (Oral)   Resp 22   Ht 5' 10\" (1.778 m)   Wt 216 lb (98 kg)   SpO2 99%   BMI 30.99 kg/m²       ECOG PERFORMANCE STATUS - 1- Restricted in physically strenuous activity but ambulatory and able to carry out work of a light or sedentary nature such as light house work, office work. Pain - Pain Score:   0 - No pain (fatigue-0)0 - No pain/10. None/Minimal pain - not affecting QOL     Fatigue - No flowsheet data found. Distress - No flowsheet data found. Physical Exam  Vitals reviewed. Exam conducted with a chaperone present. Constitutional:       General: He is not in acute distress. Appearance: Normal appearance. He is normal weight. He is not ill-appearing or toxic-appearing. HENT:      Head: Normocephalic and atraumatic. Nose: Nose normal. No congestion. Mouth/Throat:      Mouth: Mucous membranes are moist.   Eyes:      General: No scleral icterus. Conjunctiva/sclera: Conjunctivae normal.   Cardiovascular:      Rate and Rhythm: Normal rate and regular rhythm. Heart sounds: No murmur heard. Pulmonary:      Effort: Pulmonary effort is normal. No respiratory distress. Breath sounds: Normal breath sounds. No wheezing, rhonchi or rales. Abdominal:      General: Bowel sounds are normal. There is no distension. Palpations: Abdomen is soft. There is no mass. Tenderness: There is no abdominal tenderness. There is no guarding or rebound. Musculoskeletal:         General: No swelling. Normal range of motion. Cervical back: Normal range of motion. No rigidity. Right lower leg: No edema. Left lower leg: No edema. Skin:     General: Skin is warm and dry. Coloration: Skin is not jaundiced or pale. Findings: No bruising or rash. Neurological:      General: No focal deficit present.       Mental Status: He is alert and oriented to person, place, and time. Motor: No weakness. Coordination: Coordination normal.      Gait: Gait normal.   Psychiatric:         Behavior: Behavior normal.         Thought Content: Thought content normal.        Labs:  No results found for this or any previous visit (from the past 168 hour(s)). Imaging: reviewed     PATHOLOGY:             6/2022         ASSESSMENT:     Diagnosis Orders   1. Malignant neoplasm of stomach, unspecified location ([address]        2. Peritoneal metastases ([address]        3. Abdominal carcinomatosis ([address]        4. Fatigue due to treatment        5. Hypokalemia        6. Hypomagnesemia              Mr. [facility] is here for FU of metastatic adenocarcinoma. 1. Metastatic adenocarcinoma   - s/p omental and mesenteric mass bx - c/w upper GI adenocarcinoma    - hx of diverticular dz - s/p previous bowel resection by dr Shelby De La Vega at [facility] [place] pending   - We did discuss that his disease is not curable and he VU but wife stated that he believes he can beat this. - here for follow-up prior to cycle 13 FOLFIRINOX. Labs reviewed and adequate. - hx RUE DVT, on Xarelto   - CT October 2022 previously with no evidence of progressive disease. Due for scan ~ January. - pain - denies and off of prescribed medications    - appetite- eating well and weight stable. - hypokalemia - resolved  - hypomagnesemia - on mag oxide 400 mg TID   - Previously, He wishes to continue on the current regimen; he previously expressed that in the future, he may wish to proceed with unconventional scheduling of maybe FOLFIRINOX every 3 weeks. He does understand inherent risks with alternate schedule and how this would affect progression of disease. - sclerotic lesion on imaging at S1 - bone scan recommended, has not been completed   - constipation - Miralax as needed.    - Continue good oral nutrition and hydration.   - Encouraged frequent activity throughout the day and rest as needed to combat fatigue.   - Call with any fevers, uncontrolled side effects from treatment or any other worrisome/concerning symptoms. RTC per protocol or sooner if needed      MDM      Lab studies and imaging studies were personally reviewed. Pertinent old records were reviewed. Historical:    - here with his wife for consultation. During today's visit, we discussed his current workup. We looked at the images together demonstrating some of the findings concerning for peritoneal nodularity/peritoneal disease. Discussed anatomy of the findings  utilizing images to help pt understand what we are looking at and suspecting. He and wife were appreciative of the time spent to review these. Discussed need for visual dx/tissue pathology to determine plan - recommend ex lap - refer to Dr Ruddy Murphy - personally  reviewed case with Dr Ruddy Murphy who will expedite the workup and will see pt Fri. US with mild biliary duct dilation - HIDA/ERCP reviewed by PCP   + BM/flatus; He did not like how IV morphine made him feel in the hospital.  He tried tramadol but found no relief and has been taking acetaminophen at home with minimal relief. Discussed different options and he settled on trying  Percocet - he will contact the office to inform us if this is working for him. We discussed SE - not to drive while on the med. Bowel regimen reviewed. He is tired of tests, frustrated. Feelings validated and stressed importance of workup to determine why he has pain. Wt loss from 240 --> 209 noted and expressed concern to pt about this. - completed course of Levaquin/Diflucan for klebsiella pneumoniae and citrobacter freundii both of which were sensitive to Levaquin found around G-tube site.   Wishes for tube removal - reviewed risks of this during chemotherapy - I would like for him to have labs day before scheduled procedure to make sure his counts are adequate per above; case reviewed with Dr Austin Christianson by me via tel today   - here cycle 8 FOLFIRINOX - labs reviewed-defer 1 week due to neutropenia. - nutrition - G-tube out. He is being weaned off TPN (3x week now) as his oral intake has greatly improved, weight up    - pain - Fentanyl 12mcg with prn oxycodone 10 mg, plans to stop patch on Nader 10/2 PC following  - Plan for surveillance reviewed. Labs discussed. - nausea - resolved. Has Zyprexa QHS (not taking currently) and Zofran PRN. - wt loss/FTT - secondary to disease and tx - on TPN and eating more    - depression/anxiety - better affect, on lexapro 20mg daily, PC adding buspar prn    All questions were asked and answered to the best of my ability. The patient verbalized understanding and agrees with the plan above.           [facility]  [address] [place] [address]  Office : [phone]  Fax : [phone]

## 2022-12-23 NOTE — PROGRESS NOTES
Patient arrived to port lab for port access and lab draw   [address] accessed and labs drawn per protocol   *Port remains accessed  Patient discharged from port lab in satisfactory condition

## 2022-12-23 NOTE — PROGRESS NOTES
Arrived to the [facility]. Folfirinox completed CI 5fu pump started over 46hours. Patient tolerated well. Any issues or concerns during appointment: no.  Patient aware of next infusion appointment on 12/26 at 0830  Patient instructed to call provider with temperature of 100.4 or greater or nausea/vomiting/ diarrhea or pain not controlled by medications  Discharged to home ambulatory.

## 2022-12-23 NOTE — PATIENT INSTRUCTIONS
Patient Instructions from Today's Visit    Reason for Visit:  Prechemo visit    Diagnosis Information:  [email or web address] [email or web address]-us/[email or web address]-answers-[email or web address]-education-materials/[email or web address]-[email or web address]-vfhk-sheets    Plan:  -Cycle 13 Folfirinox (CMP not resulted in office)    Follow Up:  -as scheduled    Recent Lab Results:  Hospital Outpatient Visit on 12/23/2022   Component Date Value Ref Range Status    WBC 12/23/2022 3.8 (A)  4.3 - 11.1 K/uL Final    RBC 12/23/2022 3.63 (A)  4.23 - 5.6 M/uL Final    Hemoglobin 12/23/2022 10.7 (A)  13.6 - 17.2 g/dL Final    Hematocrit 12/23/2022 33.9  % Final    MCV 12/23/2022 93.4  82.0 - 102.0 FL Final    MCH 12/23/2022 29.5  26.1 - 32.9 PG Final    MCHC 12/23/2022 31.6  31.4 - 35.0 g/dL Final    RDW 12/23/2022 14.7 (A)  11.9 - 14.6 % Final    Platelets 26/44/1703 133 (A)  150 - 450 K/uL Final    MPV 12/23/2022 9.5  9.4 - 12.3 FL Final    nRBC 12/23/2022 0.00  0.0 - 0.2 K/uL Final    **Note: Absolute NRBC parameter is now reported with Hemogram**    Differential Type 12/23/2022 AUTOMATED    Final    Seg Neutrophils 12/23/2022 44  43 - 78 % Final    Lymphocytes 12/23/2022 43  13 - 44 % Final    Monocytes 12/23/2022 11  4.0 - 12.0 % Final    Eosinophils % 12/23/2022 2  0.5 - 7.8 % Final    Basophils 12/23/2022 1  0.0 - 2.0 % Final    Immature Granulocytes 12/23/2022 0  0.0 - 5.0 % Final    Segs Absolute 12/23/2022 1.7  1.7 - 8.2 K/UL Final    Absolute Lymph # 12/23/2022 1.6  0.5 - 4.6 K/UL Final    Absolute Mono # 12/23/2022 0.4  0.1 - 1.3 K/UL Final    Absolute Eos # 12/23/2022 0.1  0.0 - 0.8 K/UL Final    Basophils Absolute 12/23/2022 0.0  0.0 - 0.2 K/UL Final    Absolute Immature Granulocyte 12/23/2022 0.0  0.0 - 0.5 K/UL Final        Treatment Summary has been discussed and given to patient: n/a        -------------------------------------------------------------------------------------------------------------------  Please call our office at [phone] if you have any  of the following symptoms:   Fever of 100.5 or greater  Chills  Shortness of breath  Swelling or pain in one leg    After office hours an answering service is available and will contact a provider for emergencies or if you are experiencing any of the above symptoms. Patient does express an interest in My Chart. My Chart log in information explained on the after visit summary printout at the [facility] desk.     Elizabeth De La Fuente RN  Nurse Navigator  [address] [place] [phone]

## 2022-12-23 NOTE — Clinical Note
Arrived to the port lab. Port accessed and labs drawn. Flushed and left in placed. Discharged to MD visit in satisfactory condition.

## 2022-12-25 ENCOUNTER — HOSPITAL ENCOUNTER (OUTPATIENT)
Dept: INFUSION THERAPY | Age: 62
End: 2022-12-25

## 2022-12-26 ENCOUNTER — HOSPITAL ENCOUNTER (OUTPATIENT)
Dept: INFUSION THERAPY | Age: 62
Discharge: HOME | End: 2022-12-26
Payer: COMMERCIAL

## 2022-12-26 VITALS
HEART RATE: 88 BPM | RESPIRATION RATE: 16 BRPM | DIASTOLIC BLOOD PRESSURE: 71 MMHG | OXYGEN SATURATION: 96 % | SYSTOLIC BLOOD PRESSURE: 119 MMHG | TEMPERATURE: 98.1 F

## 2022-12-26 DIAGNOSIS — C16.9 MALIGNANT NEOPLASM OF STOMACH, UNSPECIFIED LOCATION (HCC): Primary | ICD-10-CM

## 2022-12-26 PROCEDURE — 2580000003 HC RX 258: Performed by: INTERNAL MEDICINE

## 2022-12-26 PROCEDURE — 96523 IRRIG DRUG DELIVERY DEVICE: CPT

## 2022-12-26 RX ORDER — SODIUM CHLORIDE 0.9 % (FLUSH) 0.9 %
5-40 SYRINGE (ML) INJECTION PRN
Status: DISCONTINUED | OUTPATIENT
Start: 2022-12-26 | End: 2022-12-27 | Stop reason: HOSPADM

## 2022-12-26 RX ADMIN — SODIUM CHLORIDE, PRESERVATIVE FREE 10 ML: 5 INJECTION INTRAVENOUS at 10:40

## 2022-12-26 NOTE — PROGRESS NOTES
12/26/2022  Pt to Infusion without complaints. Adrucil pump removed per order, tolerated well. Aware of next Infusion appt on  [email or web address] . Patient instructed to call provider with any issues/concerns. Discharged home.

## 2022-12-31 ENCOUNTER — TELEPHONE (OUTPATIENT)
Dept: ONCOLOGY | Age: 62
End: 2022-12-31

## 2022-12-31 NOTE — PROGRESS NOTES
Wife called for report that he tested + for Covid today on home test. Report mild symptoms and does not desire RX for Paxlovid. Encouraged supportive care and to report to ED if any breathing difficulties.

## 2023-01-09 ENCOUNTER — TELEPHONE (OUTPATIENT)
Dept: FAMILY MEDICINE CLINIC | Facility: CLINIC | Age: 63
End: 2023-01-09

## 2023-01-09 NOTE — TELEPHONE ENCOUNTER
I talked to this patient this a.m and he was extremely upset that he was dismissed. I explained to him that he was dismissed due to actions from his last visit. He and his wife declined his behavior that is documented even though this was seen by several of our staff members. He then mentioned that he was upset because \" I can never see Dr. Lima Vides". I told him that Dr. Neyda Menezes has a lot of patients and that it is not always possible to be seen by Dr. Neyda Menezes and that is why we have Darby Vázquez. He then started telling me how juwan De Souza was to him at the window and how it is unfair that he is getting punished for our mistake ( thought he had appt but did not) I then proceeded to tell him that we indeed tried to fix our mistake the day he was here by offering him an appt with Dr. Josie Nicholson as a work-in but then he walked out angry. He was upset that myself or Dr. Neyda Menezes did not call and get his side, however, he was still cursing this morning and placing blame on everyone else but himself. I let him know that all the documentation was sent to Dr. Neyda Menezes and that it was Dr. Neyda Menezes who sent us the message to dismiss him.  Nothing I said to him or his wife helped the situation, I disconnected the call telling him that I would send a message but that we stood by our decision of dismissal.

## 2023-01-10 DIAGNOSIS — E87.6 HYPOKALEMIA: ICD-10-CM

## 2023-01-13 RX ORDER — POTASSIUM CHLORIDE 1500 MG/1
TABLET, EXTENDED RELEASE ORAL
Qty: 28 TABLET | Refills: 1 | OUTPATIENT
Start: 2023-01-13

## 2023-01-16 ENCOUNTER — HOSPITAL ENCOUNTER (OUTPATIENT)
Dept: CT IMAGING | Age: 63
Discharge: HOME | End: 2023-01-19

## 2023-01-16 DIAGNOSIS — C16.9 MALIGNANT NEOPLASM OF STOMACH, UNSPECIFIED LOCATION (HCC): ICD-10-CM

## 2023-01-16 PROCEDURE — 2580000003 HC RX 258: Performed by: INTERNAL MEDICINE

## 2023-01-16 PROCEDURE — 6360000004 HC RX CONTRAST MEDICATION: Performed by: INTERNAL MEDICINE

## 2023-01-16 PROCEDURE — 6360000002 HC RX W HCPCS: Performed by: INTERNAL MEDICINE

## 2023-01-16 PROCEDURE — 71260 CT THORAX DX C+: CPT

## 2023-01-16 RX ORDER — HEPARIN SODIUM (PORCINE) LOCK FLUSH IV SOLN 100 UNIT/ML 100 UNIT/ML
500 SOLUTION INTRAVENOUS PRN
Status: DISCONTINUED | OUTPATIENT
Start: 2023-01-16 | End: 2023-01-20 | Stop reason: HOSPADM

## 2023-01-16 RX ORDER — HEPARIN SODIUM (PORCINE) LOCK FLUSH IV SOLN 100 UNIT/ML 100 UNIT/ML
100 SOLUTION INTRAVENOUS PRN
Status: DISCONTINUED | OUTPATIENT
Start: 2023-01-16 | End: 2023-01-16

## 2023-01-16 RX ORDER — 0.9 % SODIUM CHLORIDE 0.9 %
100 INTRAVENOUS SOLUTION INTRAVENOUS
Status: COMPLETED | OUTPATIENT
Start: 2023-01-16 | End: 2023-01-16

## 2023-01-16 RX ORDER — SODIUM CHLORIDE 0.9 % (FLUSH) 0.9 %
10 SYRINGE (ML) INJECTION
Status: COMPLETED | OUTPATIENT
Start: 2023-01-16 | End: 2023-01-16

## 2023-01-16 RX ADMIN — SODIUM CHLORIDE 100 ML: 9 INJECTION, SOLUTION INTRAVENOUS at 15:13

## 2023-01-16 RX ADMIN — Medication 500 UNITS: at 15:18

## 2023-01-16 RX ADMIN — SODIUM CHLORIDE, PRESERVATIVE FREE 10 ML: 5 INJECTION INTRAVENOUS at 15:13

## 2023-01-16 RX ADMIN — DIATRIZOATE MEGLUMINE AND DIATRIZOATE SODIUM 15 ML: 660; 100 LIQUID ORAL; RECTAL at 15:13

## 2023-01-16 RX ADMIN — IOPAMIDOL 100 ML: 755 INJECTION, SOLUTION INTRAVENOUS at 15:13

## 2023-01-19 ASSESSMENT — ENCOUNTER SYMPTOMS
BACK PAIN: 0
CONSTIPATION: 1
NAUSEA: 0
BLOOD IN STOOL: 0
SORE THROAT: 0
EYE PROBLEMS: 0
SCLERAL ICTERUS: 0
ABDOMINAL PAIN: 0
VOMITING: 0
ABDOMINAL DISTENTION: 0
COUGH: 0
HEMOPTYSIS: 0
SHORTNESS OF BREATH: 0
DIARRHEA: 0

## 2023-01-19 NOTE — PROGRESS NOTES
[facility]ology [facility] On[facility]: Established patient - follow up     Chief Complaint   Patient presents with    Follow-up      Reason for Referral: Abdominal pain; peritoneal metastatic disease   Referring Provider: Lilliam Barth NP   Primary Care Provider: Parag Davila MD   Family History of Cancer/Hematologic Disorders: Family history is significant for sister with breast cancer. Presenting Symptoms: Progressively worsening, persistent abdominal pain x several months    History of Present Illness:  Mr. Alberto Yao  is a 62 y.o. male who presents today for FU regarding adenocarcinoma with peritoneal metastatic disease. The past medical history is significant for tobacco use (recent pack per day smoker x 30+ years - now down to 1/3PPD), PUD, paresthesia/pain of bilateral upper extremities, HLD, HTN, diverticulitis,  colon polyps, hiatal hernia, chronic right shoulder pain, bilateral carpal tunnel syndrome, and partial colon resection. He presented to the [facility] ED on 5/12/22 with a complaint of persistent abdominal pain for several months that was becoming progressively  worse. EGD and colonoscopy on 2/25/22 revealed findings of hiatal hernia, gastric polyps, several benign colon polyps, diverticulosis, and internal hemorrhoids. CT of the abdomen on 3/8/22 showed no acute findings. He presented to [facility] ER x 2 for  evaluation (5/3/22 and 5/10/22). RUQ abdominal ultrasound was completed on 5/3/22 in the ED at [facility] demonstrating no acute findings to explain patient's pain; probable hepatic  steatosis; small echogenic mural foci in the gallbladder which are nonmobile, likely representing cholesterol polyps, largest measuring 4 mm; and small volume simple ascites in the right upper quadrant and left lower quadrant, nonspecific.   CT AP with  contrast at [facility] ED 5/10/22 showed a partial small bowel obstruction; small to moderate amount of free fluid in the abdomen and pelvis; and nonspecific stranding of the omentum primarily in the left upper quadrant. Radiologist noted that follow-up  CT was recommended to differentiate passive congestion from omental disease. On 5/11/22, abdominal series again showed multiple dilated small bowel loops with enteric contrast appearing to extend into the proximal colon, suggesting partial small  bowel obstruction. CT AP in the [facility] ED on 5/12/22 identified extensive peritoneal nodularity and mild ascites consistent with peritoneal  metastatic disease with primary lesion not definitely identified; dilated fluid-filled loops of small bowel possibly related to gastroenteritis with no definite obstruction and no obvious bowel mass; and sclerosis of S1 vertebral body. Radiologist recommended consideration of follow-up bone scan to assess for bone metastases. Patient was admitted to  the Hospitalist service on 5/12/22, and IR consulted for possible paracentesis however very small volume was inaccessible. CT Chest obtained on 5/13/22 showed No evidence of primary malignancy or metastasis within the chest.  Follow-up hepatobiliary  scan was suggested to assess for common bile duct obstruction. Oncology was consulted but did not see patient inhouse as pt was dischared. Upon discharge on 5/14/22, patient was instructed to follow up with [facility]. During consultation, we discussed his workup. We looked at the images together demonstrating some of the findings concerning for peritoneal nodularity/peritoneal disease. Discussed anatomy of the findings utilizing images to help pt understand what we are looking at and suspecting. He and wife were appreciative of the time spent to review these. We also addressed pt's pain. We also reviewed images of sclerosing lesion - bone scan recommended. He also was recommended to undergo HIDA scan after recent US per PCP. He is tired of tests, frustrated. Feelings validated and stressed importance of workup to determine why he has pain.   Wt loss from 240 --> 209 noted and expressed concern to pt about this. Of note, prior akbar resection with Dr Dannie Fay for diverticulitis per pt. Sent note to dr Darel Peabody re pt's case to expedite workup with his agreement. He was hospitalized for abdominal pain and sepsis. He was empirically placed on vancomycin and cefepime. CT scanning disclosed no intra-abdominal fluid collection or abscess but did suggest that his tumor burden was somewhat smaller. He had some purulent drainage from around his G-tube. This was cultured and grew klebsiella pneumoniae and citrobacter freundii both of which were sensitive to Levaquin which he was discharged home on for 10 days. He is here today for follow up and C14 FOLFIRINOX. Reporting cold sensitivity and some neuropathy - at this time, will d/c oxaliplatin and continue to monitor. CT CAP with LAYTON and no evid of POD. He is pleased with the results. He understands that he will remain on tx as it is working. He can start B complex for some neuropathy. He is also reporting some numbness/tingling down both arms and we will p/w cervical spine imaging. Labs reviewed. He is here with his wife. Stays active and busy. No current resp/GI or cardiac concerns. Wt at 223lbs. Eating well. No s/s of infection. No pain. Chronological Events:   EGD REPORT 2/25/22                      COLONOSCOPY REPORT 2/25/22                 CT ABDOMEN PELVIS W CONTRAST 3/8/2022   FINDINGS:   LOWER CHEST: Unremarkable. ABDOMEN AND PELVIS:   Liver: Unremarkable. Biliary system: Unremarkable. Pancreas: Unremarkable. Spleen: Unremarkable. Adrenals: Unremarkable. Genitourinary: Unremarkable. Reproductive organs: Unremarkable. Gastrointestinal tract: Colonic diverticulosis without evidence of diverticulitis. There is no evidence of bowel obstruction or inflammation. The appendix is normal   Peritoneum/Extraperitoneum: Unremarkable. No free fluid or air. Vascular: Unremarkable. Musculoskeletal:  Small fat-containing umbilical hernia. Degenerative  changes of thoracolumbar spine. No acute or aggressive osseous lesion. IMPRESSION: Colonic diverticula without evidence of acute diverticulitis. RIGHT UPPER QUADRANT ABDOMINAL ULTRASOUND 5/3/2022    FINDINGS:   Pancreas: Not visualized, obscured by bowel gas. Intrahepatic IVC: Normal.   Main  Portal Vein: Patent with normal directional flow. Liver: Liver contour is normal. Liver appears diffusely increased in echogenicity consistent with hepatic steatosis. There is fatty sparing around the gallbladder fossa. Gallbladder: Gallbladder  is normal in size. No evidence of gallbladder wall thickening. No gallstones are seen. There are several nonmobile echogenic mural foci in the proximal gallbladder body/neck, largest measuring 4 mm, without shadowing, likely small cholesterol polyps. Bile ducts: No evidence of biliary ductal dilation. The common bile duct measures 3 mm in diameter. Right kidney: Normal.   Left Upper Quadrant: Limited images of the left upper quadrant are unremarkable. Spleen measures 12.1 cm in length. Free fluid: Trace simple free fluid in the right upper quadrant and left lower quadrant. IMPRESSION:    1. No acute findings to explain patient's pain. 2. Probable hepatic steatosis. 3. Small echogenic mural foci in the gallbladder which are nonmobile, likely representing cholesterol polyps, largest measuring 4 mm. There are no specific ultrasound  follow up recommendations for polyps of this small size. 4. Small volume simple ascites in the right upper quadrant and left lower quadrant, nonspecific.         CT ABDOMEN - PELVIS WITH CONTRAST 5/10/22   FINDINGS:   Liver: Normal    Portal Vein: Normal   Gallbladder - Biliary Tree: Normal   Pancreas: Normal   Spleen: Normal   Retroperitoneum:   Adrenals: Normal   Kidneys:  Normal    Aorto - Cava:  Calcification of the abdominal aorta without aneurysm formation. Lymphatics:  Normal   GI - Mesentery - Peritoneum: Multiple dilated mid small bowel loops without a focal transition point. The appendix is normal. Small to  moderate amount of free fluid in the pelvis and right flank. Nonspecific stranding of the omentum in the left upper quadrant. Small fatty umbilical hernia. Pelvis: Normal   Lower Chest: Normal   Soft tissues - MSK: Normal    IMPRESSION:   1. Partial small bowel obstruction. 2. Small to moderate amount of free fluid in the abdomen and pelvis. 3. Nonspecific stranding of the omentum primarily  in the left upper quadrant. Follow-up CT is recommended to differentiate passive congestion from omental a static disease. ABDOMEN SERIES 5/11/22   FINDINGS:   Chest: Heart size within normal limits. Lungs are clear. No pleural effusion or pneumothorax. Bowel: Multiple dilated small bowel loops with air-fluid levels on the upright view. Contrast distends small bowel loops in the left lateral abdomen and lower abdomen. Contrast in the right hemiabdomen appears to be within proximal colon. Scattered colonic  gas. Peritoneum / Retroperitoneum: No pneumoperitoneum. Soft tissues / Bones: No acute osseous findings. The contrast in urinary bladder. Lines / Support Apparatus: None. IMPRESSION: Redemonstrated of multiple dilated small bowel loops with enteric contrast appearing to extend into the proximal colon, suggesting only partial small bowel obstruction. Continued radiographic follow-up is advised. CT OF THE ABDOMEN AND PELVIS 5/12/22   FINDINGS:   LOWER CHEST: Normal.   HEPATOBILIARY: No evidence of focal liver mass. No calcified gallstones. PANCREAS: Normal.   SPLEEN: Normal.    ADRENAL GLANDS: Normal.    KIDNEYS/BLADDER: Kidneys and bladder are unremarkable. No hydronephrosis or urinary tract calculi. BOWEL: Dilated fluid-filled loops of small bowel are present throughout the abdomen.  No definite transition point. Oral contrast noted in the colon. No definite evidence of bowel mass but there is extensive peritoneal nodularity and trace ascites highly  concerning for peritoneal metastatic disease. LYMPH NODES: No enlarged retroperitoneal or pelvic lymph nodes. Peritoneal nodularity again noted most likely metastatic disease. VASCULATURE: Unremarkable. PELVIC ORGANS: Prostate gland and rectum are unremarkable. Small amount of free pelvic fluid is noted. MUSCULOSKELETAL: Degenerative spine changes are noted. Mild sclerosis involving the S1 vertebral body is present on image 74 of series 602. IMPRESSION   1. Extensive peritoneal nodularity and mild ascites consistent with peritoneal metastatic disease. Primary lesion not definitely identified. 2. Dilated fluid-filled loops of small bowel possibly related to gastroenteritis. No definite obstruction. No obvious bowel mass. 3. Sclerosis S1 vertebral body. Consider follow-up bone scan to assess for bone metastases. LIMITED ABDOMINAL ULTRASOUND 5/13/22   FINDINGS: A single limited gray scale image was submitted of an undisclosed location in the abdomen. Images demonstrate a small amount of  ascites as seen on comparison CT. IMPRESSION   1. Small volume ascites. CT CHEST WITH CONTRAST 5/13/22   FINDINGS:   Mediastinum and visualized thyroid: Normal.   Heart: Normal.    Large Vessels: Normal.    Pleura: Normal.    Lungs: No lung mass clearly discerned. Mild scarring or atelectasis in the right lung base. No suspicious lung nodule. No consolidation or zulma pulmonary edema. Airways: Normal.    Lymph nodes: Normal.    Bones/Soft tissues: Normal.    Visualized abdomen: Partially imaged omental nodules. Perihepatic ascites noted. IMPRESSION: No evidence of primary malignancy or metastasis within the chest       ABDOMINAL ULTRASOUND 5/17/22   FINDINGS:    LIVER: 17.3 cm. Slight increase in echogenicity without focal mass.    BILE DUCTS: No intrahepatic bile duct dilatation. CBD diameter = 8 mm. GALLBLADDER: It is unremarkable in appearance without stones or sludge. Echogenic polyp measures 0.5 cm. No gallbladder wall thickening. Mild ascites. PANCREAS: Normal.   SPLEEN: Borderline enlarged at 12.2 cm. RIGHT KIDNEY: 13.3 cm. No mass or hydronephrosis. LEFT KIDNEY: 14.3 cm. No mass or hydronephrosis. ABDOMINAL AORTA AND IVC: Normal in size. ASCITES: Mild   IMPRESSION: Possible mild fatty infiltration liver. No focal mass. Gallbladder polyp but no stones or gallbladder wall thickening. Mild ascites. Mildly prominent common bile duct. Follow-up hepatobiliary scan could be performed to assess for common bile duct obstruction. 04/02/2021 (COVID-19, Kaylynn April, Primary or Immunocompromised Series, MRNA, PF, 100mcg/0.5mL)   04/30/2021 (COVID-19, Kaylynn April, Primary or Immunocompromised Series, MRNA, PF, 100mcg/0.5mL)   11/16/2021 (COVID-19, Moderna Booster, PF, 0.25mL Dose)      5/18/22 heme/onc consultation   5/20/22 Dr Aydee Santos consult - sent to ED for admission/workup   5/23/22 omental bx - IR   5/27/22 Dr Aydee Santos - diagnostic lap, omental mass and mesentery mass excision, G-tube placement for venting  6/1/22 painful G-tube - tract recanalized and new G-tube placed   6/12/22 C1 FOLFIRINOX completed - dose adjusted   6/14/22 d/c   6/24/22 FU sx - G tube maint instructions/staples removed - RTC PRN   6/24/22 FU after hospitalization - discussed PC/plan for tx  7/8/22 FU - mainly concerned about PICC line without blood return, decline cathflo, seeing José Miguel Pham in [facility] on Monday. Will increase hydration at home. HH for TPN.    7/18/22 Follow up after hospitalization. He will complete course of Levaquin as prescribed for infection around G-tube. Would like to proceed with cycle 3 FOLFIRINOX with increased dosing. 8/4/22 Cycle 4 FOLFIRINOX - continue dose escalation.  He will complete course of Levaquin/Diflucan as prescribed for infection around G-tube, site looks good today. 8/17/22 C5 FOLFIRINOX - wishes to pw full dose accepting risks; wishes for G tube to be removed - will need to time with labs; plan to drop dose in opioids - PC seeing pt today. RD seeing pt. Plan for restaging in ~Oct.    9/1/22 FU FOLFIRINOX C5 full dose now; imaging in Oct   9/15/22 FU FOLFIRINOX C6-doing well, weight up 11#  9/30/22 FU FOLFIRINOX-C8 defer 1 week due to neutropenia, ANC 0.5  10/13/22 FU FOLFIRINOX 8; CT CAP reviewed and no POD. 10/28/22 Stat ultrasound RUE and then proceed with cycle 9 FOLFIRINOX. Doppler - Persistent DVT, nonocclusive, within the right axillary and basilic veins. The previously seen nonocclusive thrombus within the right innominate and subclavian veins are no longer seen. No abnormality of the right brachial vein demonstrated. Started on Xarelto. 11/11/22 FU - C10 FOLFIRINOX. Doing well. Wt stable. RUE swelling better. No new issues/concerns. 11/25/22 FU and C11 FOLFIRINOX, doing very well   12/9/22 FU and C12 FOLFIRINOX. Doing well. Weight stable.   12/23/22 FU and C13 FOLFIRINOX, doing well      1/20/23 FU and C14 - will stop oxali due to cold/neuropathy - p/w FOLFIRI for now; CT reviewed; cervical spine imaging         Family History   Problem Relation Age of Onset    No Known Problems Brother     Cancer Sister         breast    Hypertension Mother     Heart Disease Mother     Diabetes Father     Osteoarthritis Father     Alcohol Abuse Father     Hypertension Father     Diabetes Mother       Social History     Socioeconomic History    Marital status:      Spouse name: None    Number of children: None    Years of education: None    Highest education level: None   Tobacco Use    Smoking status: Former     Packs/day: 1.00     Types: Cigarettes    Smokeless tobacco: Never   Substance and Sexual Activity    Alcohol use: No    Drug use: No        Review of Systems   Constitutional:  Negative for appetite change, diaphoresis, fatigue, fever and unexpected weight change. HENT:   Negative for sore throat. Eyes:  Negative for eye problems and icterus. Respiratory:  Negative for cough, hemoptysis and shortness of breath. Cardiovascular:  Negative for chest pain, leg swelling and palpitations. Gastrointestinal:  Positive for constipation (controlled). Negative for abdominal distention, abdominal pain, blood in stool, diarrhea, nausea and vomiting. Endocrine: Negative for hot flashes. Genitourinary:  Negative for dysuria. Musculoskeletal:  Negative for arthralgias, back pain and gait problem. Skin:  Negative for itching, rash and wound. Neurological:  Negative for dizziness, extremity weakness, gait problem, headaches, light-headedness, numbness, seizures and speech difficulty. Hematological:  Negative for adenopathy. Does not bruise/bleed easily. Psychiatric/Behavioral:  Positive for depression (better). Negative for decreased concentration and sleep disturbance. The patient is nervous/anxious (better).        No Known Allergies  Past Medical History:   Diagnosis Date    Bilateral carpal tunnel syndrome 12/9/2020    Chronic right shoulder pain 11/30/2020    Colon polyps 3/11/2013    Diverticulitis 3/11/2013    HTN (hypertension) 3/11/2013    Hyperlipidemia 3/11/2013    Paresthesia and pain of both upper extremities 11/30/2020    PUD (peptic ulcer disease) 3/11/2013    History of     Right elbow pain 12/9/2020    Wheezing 3/11/2013     Past Surgical History:   Procedure Laterality Date    COLONOSCOPY  2/25/09    colonoscopy per Dr. Chris Bravo with need for repeat every 3 years    COLONOSCOPY  02/25/2022    Dr. Taylor Rae; polyps of the ascending, transverse, descending, and sigmoid colons; internal hemorrhoid; diverticulosis    LAPAROSCOPY N/A 5/27/2022    LAPAROSCOPY DIAGNOSTIC , OPEN EXPLORATORY LAPAROTOMY, MASS EXCISION, G-TUBE PLACEMENT performed by Latoya Wilkins MD at [facility] [place] 3 6/3/2022    PORT INSERTION performed by Santosh Marvin MD at [address] [facility]  October 2004    partial colon resection per Dr. Radha Richardson  02/25/2022    Dr. Leida Gonzalez; hiatal hernia gastric polyps     Current Outpatient Medications   Medication Sig Dispense Refill    potassium chloride (KLOR-CON M) 20 MEQ extended release tablet Take 1 tablet by mouth 2 times daily for 28 days 14 days 28 tablet 1    rivaroxaban (XARELTO) 20 MG TABS tablet Take 1 tablet by mouth daily (with breakfast) 30 tablet 3    dicyclomine (BENTYL) 20 MG tablet Take one tab orally twice daily 180 tablet 0    magnesium oxide (MAG-OX) 400 MG tablet Take 1 tablet by mouth in the morning, at noon, and at bedtime 90 tablet 2    busPIRone (BUSPAR) 7.5 MG tablet Take 1 tablet by mouth daily 15 tablet 0    pantoprazole (PROTONIX) 40 MG tablet Take 1 tablet by mouth daily 30 tablet 1    Multiple Vitamins-Minerals (MULTIVITAMIN MEN 50+ PO) Take by mouth      escitalopram (LEXAPRO) 10 MG tablet Take 1 tablet by mouth in the morning. 30 tablet 5    naloxone 4 MG/0.1ML LIQD nasal spray 1 spray by Nasal route as needed for Opioid Reversal      rosuvastatin (CRESTOR) 10 MG tablet Take 10 mg by mouth      rivaroxaban 15 & 20 MG Starter Pack Take as directed on package. (Patient not taking: No sig reported) 1 each 0    oxyCODONE (ROXICODONE INTENSOL) 100 MG/5ML concentrated solution Take 10 mg by mouth every 4 hours as needed for Pain.  1 mls under tongue (Patient not taking: No sig reported)      OLANZapine zydis (ZYPREXA) 5 MG disintegrating tablet Take 1 tablet by mouth nightly Do not take with promethazine (Patient not taking: No sig reported) 30 tablet 3    ondansetron (ZOFRAN-ODT) 8 MG TBDP disintegrating tablet Take 1 tablet by mouth every 8 hours (Patient not taking: No sig reported) 90 tablet 0    promethazine (PHENERGAN) 12.5 MG tablet Take 1 tablet by mouth every 6 hours as needed for Nausea 90 tablet 0    polyethylene glycol (GLYCOLAX) 17 GM/SCOOP powder Take 17 g by mouth daily (Patient not taking: No sig reported)      umeclidinium-vilanterol (ANORO ELLIPTA) 62.5-25 MCG/INH AEPB inhaler Inhale 1 puff into the lungs daily  (Patient not taking: No sig reported)       No current facility-administered medications for this visit. No flowsheet data found. OBJECTIVE:  /79   Pulse 83   Temp 97.7 °F (36.5 °C)   Resp 16   Ht 5' 10\" (1.778 m)   Wt 223 lb (101.2 kg)   SpO2 95%   BMI 32.00 kg/m²       ECOG PERFORMANCE STATUS - 1- Restricted in physically strenuous activity but ambulatory and able to carry out work of a light or sedentary nature such as light house work, office work. Pain - Pain Score:   22/10. None/Minimal pain - not affecting QOL     Fatigue - No flowsheet data found. Distress - No flowsheet data found. Physical Exam  Vitals reviewed. Exam conducted with a chaperone present. Constitutional:       General: He is not in acute distress. Appearance: Normal appearance. He is normal weight. He is not ill-appearing or toxic-appearing. HENT:      Head: Normocephalic and atraumatic. Nose: Nose normal. No congestion. Mouth/Throat:      Mouth: Mucous membranes are moist.   Eyes:      General: No scleral icterus. Conjunctiva/sclera: Conjunctivae normal.   Cardiovascular:      Rate and Rhythm: Normal rate and regular rhythm. Heart sounds: No murmur heard. Pulmonary:      Effort: Pulmonary effort is normal. No respiratory distress. Breath sounds: Normal breath sounds. No wheezing, rhonchi or rales. Abdominal:      General: Bowel sounds are normal. There is no distension. Palpations: Abdomen is soft. There is no mass. Tenderness: There is no abdominal tenderness. There is no guarding or rebound. Musculoskeletal:         General: No swelling. Normal range of motion. Cervical back: Normal range of motion.  No rigidity. Right lower leg: No edema. Left lower leg: No edema. Skin:     General: Skin is warm and dry. Coloration: Skin is not jaundiced or pale. Findings: No bruising or rash. Neurological:      General: No focal deficit present. Mental Status: He is alert and oriented to person, place, and time. Motor: No weakness. Coordination: Coordination normal.      Gait: Gait normal.   Psychiatric:         Behavior: Behavior normal.         Thought Content:  Thought content normal.        Labs:  Recent Results (from the past 168 hour(s))   Magnesium    Collection Time: 01/20/23  8:25 AM   Result Value Ref Range    Magnesium 1.8 1.8 - 2.4 mg/dL   Comprehensive Metabolic Panel    Collection Time: 01/20/23  8:25 AM   Result Value Ref Range    Sodium 142 133 - 143 mmol/L    Potassium 3.6 3.5 - 5.1 mmol/L    Chloride 110 101 - 110 mmol/L    CO2 27 21 - 32 mmol/L    Anion Gap 5 2 - 11 mmol/L    Glucose 206 (H) 65 - 100 mg/dL    BUN 9 8 - 23 MG/DL    Creatinine 0.90 0.8 - 1.5 MG/DL    Est, Glom Filt Rate >60 >60 ml/min/1.73m2    Calcium 8.8 8.3 - 10.4 MG/DL    Total Bilirubin 0.3 0.2 - 1.1 MG/DL    ALT 26 12 - 65 U/L    AST 18 15 - 37 U/L    Alk Phosphatase 113 50 - 136 U/L    Total Protein 6.7 6.3 - 8.2 g/dL    Albumin 3.2 3.2 - 4.6 g/dL    Globulin 3.5 2.8 - 4.5 g/dL    Albumin/Globulin Ratio 0.9 0.4 - 1.6     CBC with Auto Differential    Collection Time: 01/20/23  8:25 AM   Result Value Ref Range    WBC 5.7 4.3 - 11.1 K/uL    RBC 3.80 (L) 4.23 - 5.6 M/uL    Hemoglobin 11.2 (L) 13.6 - 17.2 g/dL    Hematocrit 35.6 %    MCV 93.7 82.0 - 102.0 FL    MCH 29.5 26.1 - 32.9 PG    MCHC 31.5 31.4 - 35.0 g/dL    RDW 15.8 (H) 11.9 - 14.6 %    Platelets 886 244 - 688 K/uL    MPV 10.5 9.4 - 12.3 FL    nRBC 0.00 0.0 - 0.2 K/uL    Differential Type AUTOMATED      Seg Neutrophils 51 43 - 78 %    Lymphocytes 36 13 - 44 %    Monocytes 8 4.0 - 12.0 %    Eosinophils % 2 0.5 - 7.8 %    Basophils 1 0.0 - 2.0 % Immature Granulocytes 1 0.0 - 5.0 %    Segs Absolute 2.9 1.7 - 8.2 K/UL    Absolute Lymph # 2.1 0.5 - 4.6 K/UL    Absolute Mono # 0.5 0.1 - 1.3 K/UL    Absolute Eos # 0.1 0.0 - 0.8 K/UL    Basophils Absolute 0.1 0.0 - 0.2 K/UL    Absolute Immature Granulocyte 0.1 0.0 - 0.5 K/UL       Imaging: reviewed     PATHOLOGY:             6/2022         ASSESSMENT:     Diagnosis Orders   1. Neck pain  MRI CERVICAL SPINE W WO CONTRAST      2. Referred by primary care physician        3. Referred by health care professional  External Referral to Internal Medicine      4. Abdominal carcinomatosis (HCC)  CBC With Auto Differential    Comprehensive metabolic panel    DISCONTINUED: dextrose 5 % solution    DISCONTINUED: fosaprepitant (EMEND) 150 mg in sodium chloride 0.9 % 150 mL IVPB    DISCONTINUED: dexamethasone (DECADRON) 12 mg in sodium chloride 0.9 % 50 mL IVPB    DISCONTINUED: ondansetron (ZOFRAN) injection 8 mg    DISCONTINUED: atropine injection 0.4 mg    DISCONTINUED: irinotecan (CAMPTOSAR) 320 mg in dextrose 5 % 250 mL chemo IVPB    DISCONTINUED: leucovorin calcium (WELLCOVORIN) 850 mg in dextrose 5 % 250 mL IVPB    DISCONTINUED: fluorouracil (ADRUCIL) chemo syringe 850 mg    DISCONTINUED: fluorouracil (ADRUCIL) 5,000 mg in sodium chloride 0.9 % 230 mL chemo elastomeric infusion    DISCONTINUED: sodium chloride flush 0.9 % injection 5-40 mL    DISCONTINUED: diphenhydrAMINE (BENADRYL) injection 50 mg    DISCONTINUED: famotidine (PEPCID) injection 20 mg    DISCONTINUED: hydrocortisone sodium succinate PF (SOLU-CORTEF) injection 100 mg    DISCONTINUED: acetaminophen (TYLENOL) tablet 650 mg    DISCONTINUED: sodium chloride flush 0.9 % injection 5-40 mL      5. Peritoneal metastases (HCC)  CBC With Auto Differential    Comprehensive metabolic panel    DISCONTINUED: dextrose 5 % solution    DISCONTINUED: fosaprepitant (EMEND) 150 mg in sodium chloride 0.9 % 150 mL IVPB    DISCONTINUED: dexamethasone (DECADRON) 12 mg in sodium  chloride 0.9 % 50 mL IVPB    DISCONTINUED: ondansetron (ZOFRAN) injection 8 mg    DISCONTINUED: atropine injection 0.4 mg    DISCONTINUED: irinotecan (CAMPTOSAR) 320 mg in dextrose 5 % 250 mL chemo IVPB    DISCONTINUED: leucovorin calcium (WELLCOVORIN) 850 mg in dextrose 5 % 250 mL IVPB    DISCONTINUED: fluorouracil (ADRUCIL) chemo syringe 850 mg    DISCONTINUED: fluorouracil (ADRUCIL) 5,000 mg in sodium chloride 0.9 % 230 mL chemo elastomeric infusion    DISCONTINUED: sodium chloride flush 0.9 % injection 5-40 mL    DISCONTINUED: diphenhydrAMINE (BENADRYL) injection 50 mg    DISCONTINUED: famotidine (PEPCID) injection 20 mg    DISCONTINUED: hydrocortisone sodium succinate PF (SOLU-CORTEF) injection 100 mg    DISCONTINUED: acetaminophen (TYLENOL) tablet 650 mg    DISCONTINUED: sodium chloride flush 0.9 % injection 5-40 mL          Mr. CANDACE [facility] is here for FU of metastatic adenocarcinoma. 1. Metastatic adenocarcinoma   - s/p omental and mesenteric mass bx - c/w upper GI adenocarcinoma    - hx of diverticular dz - s/p previous bowel resection by dr Cherry Kong at [address] [facility] -    - We did discuss that his disease is not curable and he VU but wife stated that he believes he can beat this. - here today for follow up and C14 FOLFIRINOX. Reporting cold sensitivity and some neuropathy - at this time, will d/c oxaliplatin and continue to monitor. CT CAP with LAYTON and no evid of POD. He is pleased with the results. He understands that he will remain on tx as it is working. Next restaging imaging will be in April  - neuropathy - He can start B complex for some neuropathy. - bilateral down arms numbness/tingling - p/w cervical spine imaging.    - appetite - Wt at 223lbs. Eating well.    - hx RUE DVT, on Xarelto   - pain - denies and off of prescribed medications    - hypokalemia - resolved  - hypomagnesemia - on mag oxide 400 mg TID   - Previously, He wishes to continue on the current regimen; he previously expressed that in the future, he may wish to proceed with unconventional scheduling of maybe every 3 weeks. He does understand inherent risks with alternate schedule and how this would affect progression of disease. - sclerotic lesion on imaging at S[facility] - bone scan recommended, has not been completed   - constipation - Miralax as needed. - Continue good oral nutrition and hydration.   - Encouraged frequent activity throughout the day and rest as needed to combat fatigue.   - Call with any fevers, uncontrolled side effects from treatment or any other worrisome/concerning symptoms. - asked for PCP referral in [facility] per protocol or sooner if needed    [40min - chart review, review of results and discussion of options, next steps, coordination of care and charting ]      MDM  Number of Diagnoses or Management Options  Abdominal carcinomatosis ([address]): established, improving  Neck pain: new, needed workup  Peritoneal metastases [facility]): established, improving  Referred by health care professional: new, no workup  Referred by primary care physician: new, no workup     Amount and/or Complexity of Data Reviewed  Clinical lab tests: ordered and reviewed  Tests in the radiology section of CPT®: ordered and reviewed  Tests in the medicine section of CPT®: ordered  Obtain history from someone other than the patient: yes  Review and summarize past medical records: yes  Independent visualization of images, tracings, or specimens: yes    Risk of Complications, Morbidity, and/or Mortality  Presenting problems: moderate  Diagnostic procedures: moderate  Management options: high        Lab studies and imaging studies were personally reviewed. Pertinent old records were reviewed. Historical:    - here with his wife for consultation. During today's visit, we discussed his current workup. We looked at the images together demonstrating some of the findings concerning for peritoneal nodularity/peritoneal disease. Discussed anatomy of the findings  utilizing images to help pt understand what we are looking at and suspecting. He and wife were appreciative of the time spent to review these. Discussed need for visual dx/tissue pathology to determine plan - recommend ex lap - refer to Dr Jose Oshea - personally  reviewed case with Dr Jose Oshea who will expedite the workup and will see pt Fri. US with mild biliary duct dilation - HIDA/ERCP reviewed by PCP   + BM/flatus; He did not like how IV morphine made him feel in the hospital.  He tried tramadol but found no relief and has been taking acetaminophen at home with minimal relief. Discussed different options and he settled on trying  Percocet - he will contact the office to inform us if this is working for him. We discussed SE - not to drive while on the med. Bowel regimen reviewed. He is tired of tests, frustrated. Feelings validated and stressed importance of workup to determine why he has pain. Wt loss from 240 --> 209 noted and expressed concern to pt about this. - completed course of Levaquin/Diflucan for klebsiella pneumoniae and citrobacter freundii both of which were sensitive to Levaquin found around G-tube site. Wishes for tube removal - reviewed risks of this during chemotherapy - I would like for him to have labs day before scheduled procedure to make sure his counts are adequate per above; case reviewed with Dr Eliezer Ulloa by me via tel today   - here cycle 8 FOLFIRINOX - labs reviewed-defer 1 week due to neutropenia. - nutrition - G-tube out. He is being weaned off TPN (3x week now) as his oral intake has greatly improved, weight up    - pain - Fentanyl 12mcg with prn oxycodone 10 mg, plans to stop patch on Nader 10/2 PC following  - Plan for surveillance reviewed. Labs discussed. - nausea - resolved. Has Zyprexa QHS (not taking currently) and Zofran PRN.    - wt loss/FTT - secondary to disease and tx - on TPN and eating more    - depression/anxiety - better affect, on lexapro 20mg daily, PC adding buspar prn  - here for follow-up prior to cycle 13 FOLFIRINOX. Labs reviewed and adequate. - CT October 2022 previously with no evidence of progressive disease. Due for scan ~ January. All questions were asked and answered to the best of my ability. The patient verbalized understanding and agrees with the plan above.           Susana Hardin MD, MD  [facility]  [address] [place] [address]  Office : [phone]  Fax : [phone]

## 2023-01-20 ENCOUNTER — HOSPITAL ENCOUNTER (OUTPATIENT)
Dept: INFUSION THERAPY | Age: 63
Discharge: HOME | End: 2023-01-20
Payer: COMMERCIAL

## 2023-01-20 ENCOUNTER — OFFICE VISIT (OUTPATIENT)
Dept: ONCOLOGY | Age: 63
End: 2023-01-20
Payer: COMMERCIAL

## 2023-01-20 VITALS
SYSTOLIC BLOOD PRESSURE: 120 MMHG | OXYGEN SATURATION: 95 % | WEIGHT: 223 LBS | TEMPERATURE: 97.7 F | DIASTOLIC BLOOD PRESSURE: 79 MMHG | HEIGHT: 70 IN | HEART RATE: 83 BPM | BODY MASS INDEX: 31.92 KG/M2 | RESPIRATION RATE: 16 BRPM

## 2023-01-20 DIAGNOSIS — C76.2 ABDOMINAL CARCINOMATOSIS (HCC): Primary | ICD-10-CM

## 2023-01-20 DIAGNOSIS — C76.2 ABDOMINAL CARCINOMATOSIS (HCC): ICD-10-CM

## 2023-01-20 DIAGNOSIS — C78.6 PERITONEAL METASTASES (HCC): ICD-10-CM

## 2023-01-20 DIAGNOSIS — Z02.89 REFERRED BY HEALTH CARE PROFESSIONAL: ICD-10-CM

## 2023-01-20 DIAGNOSIS — C16.9 MALIGNANT NEOPLASM OF STOMACH, UNSPECIFIED LOCATION (HCC): ICD-10-CM

## 2023-01-20 DIAGNOSIS — M54.2 NECK PAIN: ICD-10-CM

## 2023-01-20 DIAGNOSIS — Z76.89 REFERRED BY PRIMARY CARE PHYSICIAN: ICD-10-CM

## 2023-01-20 DIAGNOSIS — C79.9 METASTATIC ADENOCARCINOMA (HCC): Primary | ICD-10-CM

## 2023-01-20 DIAGNOSIS — R20.0 NUMBNESS OF UPPER EXTREMITY: ICD-10-CM

## 2023-01-20 LAB
ALBUMIN SERPL-MCNC: 3.2 G/DL (ref 3.2–4.6)
ALBUMIN/GLOB SERPL: 0.9 (ref 0.4–1.6)
ALP SERPL-CCNC: 113 U/L (ref 50–136)
ALT SERPL-CCNC: 26 U/L (ref 12–65)
ANION GAP SERPL CALC-SCNC: 5 MMOL/L (ref 2–11)
AST SERPL-CCNC: 18 U/L (ref 15–37)
BASOPHILS # BLD: 0.1 K/UL (ref 0–0.2)
BASOPHILS NFR BLD: 1 % (ref 0–2)
BILIRUB SERPL-MCNC: 0.3 MG/DL (ref 0.2–1.1)
BUN SERPL-MCNC: 9 MG/DL (ref 8–23)
CALCIUM SERPL-MCNC: 8.8 MG/DL (ref 8.3–10.4)
CHLORIDE SERPL-SCNC: 110 MMOL/L (ref 101–110)
CO2 SERPL-SCNC: 27 MMOL/L (ref 21–32)
CREAT SERPL-MCNC: 0.9 MG/DL (ref 0.8–1.5)
DIFFERENTIAL METHOD BLD: ABNORMAL
EOSINOPHIL # BLD: 0.1 K/UL (ref 0–0.8)
EOSINOPHIL NFR BLD: 2 % (ref 0.5–7.8)
ERYTHROCYTE [DISTWIDTH] IN BLOOD BY AUTOMATED COUNT: 15.8 % (ref 11.9–14.6)
GLOBULIN SER CALC-MCNC: 3.5 G/DL (ref 2.8–4.5)
GLUCOSE SERPL-MCNC: 206 MG/DL (ref 65–100)
HCT VFR BLD AUTO: 35.6 %
HGB BLD-MCNC: 11.2 G/DL (ref 13.6–17.2)
IMM GRANULOCYTES # BLD AUTO: 0.1 K/UL (ref 0–0.5)
IMM GRANULOCYTES NFR BLD AUTO: 1 % (ref 0–5)
LYMPHOCYTES # BLD: 2.1 K/UL (ref 0.5–4.6)
LYMPHOCYTES NFR BLD: 36 % (ref 13–44)
MAGNESIUM SERPL-MCNC: 1.8 MG/DL (ref 1.8–2.4)
MCH RBC QN AUTO: 29.5 PG (ref 26.1–32.9)
MCHC RBC AUTO-ENTMCNC: 31.5 G/DL (ref 31.4–35)
MCV RBC AUTO: 93.7 FL (ref 82–102)
MONOCYTES # BLD: 0.5 K/UL (ref 0.1–1.3)
MONOCYTES NFR BLD: 8 % (ref 4–12)
NEUTS SEG # BLD: 2.9 K/UL (ref 1.7–8.2)
NEUTS SEG NFR BLD: 51 % (ref 43–78)
NRBC # BLD: 0 K/UL (ref 0–0.2)
PLATELET # BLD AUTO: 167 K/UL (ref 150–450)
PMV BLD AUTO: 10.5 FL (ref 9.4–12.3)
POTASSIUM SERPL-SCNC: 3.6 MMOL/L (ref 3.5–5.1)
PROT SERPL-MCNC: 6.7 G/DL (ref 6.3–8.2)
RBC # BLD AUTO: 3.8 M/UL (ref 4.23–5.6)
SODIUM SERPL-SCNC: 142 MMOL/L (ref 133–143)
WBC # BLD AUTO: 5.7 K/UL (ref 4.3–11.1)

## 2023-01-20 PROCEDURE — 99215 OFFICE O/P EST HI 40 MIN: CPT | Performed by: INTERNAL MEDICINE

## 2023-01-20 PROCEDURE — 80053 COMPREHEN METABOLIC PANEL: CPT

## 2023-01-20 PROCEDURE — 2580000003 HC RX 258: Performed by: INTERNAL MEDICINE

## 2023-01-20 PROCEDURE — 85025 COMPLETE CBC W/AUTO DIFF WBC: CPT

## 2023-01-20 PROCEDURE — 96375 TX/PRO/DX INJ NEW DRUG ADDON: CPT

## 2023-01-20 PROCEDURE — 96413 CHEMO IV INFUSION 1 HR: CPT

## 2023-01-20 PROCEDURE — 83735 ASSAY OF MAGNESIUM: CPT

## 2023-01-20 PROCEDURE — 6360000002 HC RX W HCPCS: Performed by: INTERNAL MEDICINE

## 2023-01-20 PROCEDURE — 3078F DIAST BP <80 MM HG: CPT | Performed by: INTERNAL MEDICINE

## 2023-01-20 PROCEDURE — G0498 CHEMO EXTEND IV INFUS W/PUMP: HCPCS

## 2023-01-20 PROCEDURE — 3074F SYST BP LT 130 MM HG: CPT | Performed by: INTERNAL MEDICINE

## 2023-01-20 PROCEDURE — 96367 TX/PROPH/DG ADDL SEQ IV INF: CPT

## 2023-01-20 PROCEDURE — 36591 DRAW BLOOD OFF VENOUS DEVICE: CPT

## 2023-01-20 PROCEDURE — 96411 CHEMO IV PUSH ADDL DRUG: CPT

## 2023-01-20 PROCEDURE — 96372 THER/PROPH/DIAG INJ SC/IM: CPT

## 2023-01-20 PROCEDURE — 96368 THER/DIAG CONCURRENT INF: CPT

## 2023-01-20 RX ORDER — ONDANSETRON 2 MG/ML
8 INJECTION INTRAMUSCULAR; INTRAVENOUS ONCE
Status: CANCELLED | OUTPATIENT
Start: 2023-01-20 | End: 2023-01-20

## 2023-01-20 RX ORDER — DEXTROSE MONOHYDRATE 50 MG/ML
5-250 INJECTION, SOLUTION INTRAVENOUS PRN
Status: CANCELLED | OUTPATIENT
Start: 2023-01-20

## 2023-01-20 RX ORDER — FLUOROURACIL 50 MG/ML
400 INJECTION, SOLUTION INTRAVENOUS ONCE
Status: COMPLETED | OUTPATIENT
Start: 2023-01-20 | End: 2023-01-20

## 2023-01-20 RX ORDER — HEPARIN SODIUM (PORCINE) LOCK FLUSH IV SOLN 100 UNIT/ML 100 UNIT/ML
500 SOLUTION INTRAVENOUS PRN
OUTPATIENT
Start: 2023-01-22

## 2023-01-20 RX ORDER — ONDANSETRON 2 MG/ML
8 INJECTION INTRAMUSCULAR; INTRAVENOUS ONCE
Status: COMPLETED | OUTPATIENT
Start: 2023-01-20 | End: 2023-01-20

## 2023-01-20 RX ORDER — SODIUM CHLORIDE 0.9 % (FLUSH) 0.9 %
5-40 SYRINGE (ML) INJECTION PRN
Status: CANCELLED | OUTPATIENT
Start: 2023-01-22

## 2023-01-20 RX ORDER — DIPHENHYDRAMINE HYDROCHLORIDE 50 MG/ML
50 INJECTION INTRAMUSCULAR; INTRAVENOUS
Status: CANCELLED | OUTPATIENT
Start: 2023-01-20

## 2023-01-20 RX ORDER — SODIUM CHLORIDE 9 MG/ML
5-40 INJECTION INTRAVENOUS PRN
OUTPATIENT
Start: 2023-01-22

## 2023-01-20 RX ORDER — FAMOTIDINE 10 MG/ML
20 INJECTION, SOLUTION INTRAVENOUS
Status: CANCELLED | OUTPATIENT
Start: 2023-01-20

## 2023-01-20 RX ORDER — SODIUM CHLORIDE 0.9 % (FLUSH) 0.9 %
5-40 SYRINGE (ML) INJECTION PRN
Status: CANCELLED | OUTPATIENT
Start: 2023-01-20

## 2023-01-20 RX ORDER — SODIUM CHLORIDE 9 MG/ML
INJECTION, SOLUTION INTRAVENOUS CONTINUOUS
Status: CANCELLED | OUTPATIENT
Start: 2023-01-20

## 2023-01-20 RX ORDER — ALBUTEROL SULFATE 90 UG/1
4 AEROSOL, METERED RESPIRATORY (INHALATION) PRN
Status: CANCELLED | OUTPATIENT
Start: 2023-01-20

## 2023-01-20 RX ORDER — SODIUM CHLORIDE 0.9 % (FLUSH) 0.9 %
5-40 SYRINGE (ML) INJECTION PRN
Status: DISCONTINUED | OUTPATIENT
Start: 2023-01-20 | End: 2023-01-21 | Stop reason: HOSPADM

## 2023-01-20 RX ORDER — FLUOROURACIL 50 MG/ML
400 INJECTION, SOLUTION INTRAVENOUS ONCE
Status: CANCELLED | OUTPATIENT
Start: 2023-01-20 | End: 2023-01-20

## 2023-01-20 RX ORDER — ATROPINE SULFATE 0.4 MG/ML
0.4 INJECTION, SOLUTION INTRAVENOUS ONCE
Status: COMPLETED | OUTPATIENT
Start: 2023-01-20 | End: 2023-01-20

## 2023-01-20 RX ORDER — HEPARIN SODIUM (PORCINE) LOCK FLUSH IV SOLN 100 UNIT/ML 100 UNIT/ML
500 SOLUTION INTRAVENOUS PRN
Status: CANCELLED | OUTPATIENT
Start: 2023-01-20

## 2023-01-20 RX ORDER — ONDANSETRON 2 MG/ML
8 INJECTION INTRAMUSCULAR; INTRAVENOUS
Status: CANCELLED | OUTPATIENT
Start: 2023-01-20

## 2023-01-20 RX ORDER — SODIUM CHLORIDE 9 MG/ML
5-250 INJECTION, SOLUTION INTRAVENOUS PRN
OUTPATIENT
Start: 2023-01-22

## 2023-01-20 RX ORDER — DIPHENHYDRAMINE HYDROCHLORIDE 50 MG/ML
50 INJECTION INTRAMUSCULAR; INTRAVENOUS
Status: DISCONTINUED | OUTPATIENT
Start: 2023-01-20 | End: 2023-01-21 | Stop reason: HOSPADM

## 2023-01-20 RX ORDER — SODIUM CHLORIDE 9 MG/ML
5-40 INJECTION INTRAVENOUS PRN
Status: CANCELLED | OUTPATIENT
Start: 2023-01-20

## 2023-01-20 RX ORDER — MEPERIDINE HYDROCHLORIDE 50 MG/ML
12.5 INJECTION INTRAMUSCULAR; INTRAVENOUS; SUBCUTANEOUS PRN
Status: CANCELLED | OUTPATIENT
Start: 2023-01-20

## 2023-01-20 RX ORDER — SODIUM CHLORIDE 9 MG/ML
5-250 INJECTION, SOLUTION INTRAVENOUS PRN
Status: CANCELLED | OUTPATIENT
Start: 2023-01-20

## 2023-01-20 RX ORDER — EPINEPHRINE 1 MG/ML
0.3 INJECTION, SOLUTION, CONCENTRATE INTRAVENOUS PRN
Status: CANCELLED | OUTPATIENT
Start: 2023-01-20

## 2023-01-20 RX ORDER — ACETAMINOPHEN 325 MG/1
650 TABLET ORAL
Status: CANCELLED | OUTPATIENT
Start: 2023-01-20

## 2023-01-20 RX ORDER — ATROPINE SULFATE 0.4 MG/ML
0.4 AMPUL (ML) INJECTION
Status: CANCELLED | OUTPATIENT
Start: 2023-01-20

## 2023-01-20 RX ORDER — ACETAMINOPHEN 325 MG/1
650 TABLET ORAL
Status: DISCONTINUED | OUTPATIENT
Start: 2023-01-20 | End: 2023-01-21 | Stop reason: HOSPADM

## 2023-01-20 RX ORDER — DEXTROSE MONOHYDRATE 50 MG/ML
5-250 INJECTION, SOLUTION INTRAVENOUS PRN
Status: DISCONTINUED | OUTPATIENT
Start: 2023-01-20 | End: 2023-01-21 | Stop reason: HOSPADM

## 2023-01-20 RX ORDER — ATROPINE SULFATE 0.4 MG/ML
0.4 AMPUL (ML) INJECTION ONCE
Status: CANCELLED | OUTPATIENT
Start: 2023-01-20 | End: 2023-01-20

## 2023-01-20 RX ADMIN — SODIUM CHLORIDE, PRESERVATIVE FREE 10 ML: 5 INJECTION INTRAVENOUS at 08:25

## 2023-01-20 RX ADMIN — SODIUM CHLORIDE, PRESERVATIVE FREE 10 ML: 5 INJECTION INTRAVENOUS at 10:00

## 2023-01-20 RX ADMIN — FOSAPREPITANT 150 MG: 150 INJECTION, POWDER, LYOPHILIZED, FOR SOLUTION INTRAVENOUS at 11:00

## 2023-01-20 RX ADMIN — IRINOTECAN HYDROCHLORIDE 340 MG: 20 INJECTION, SOLUTION INTRAVENOUS at 11:38

## 2023-01-20 RX ADMIN — DEXTROSE MONOHYDRATE 50 ML/HR: 50 INJECTION, SOLUTION INTRAVENOUS at 10:00

## 2023-01-20 RX ADMIN — FLUOROURACIL 5375 MG: 50 INJECTION, SOLUTION INTRAVENOUS at 13:25

## 2023-01-20 RX ADMIN — FLUOROURACIL 900 MG: 50 INJECTION, SOLUTION INTRAVENOUS at 13:20

## 2023-01-20 RX ADMIN — LEUCOVORIN CALCIUM 900 MG: 200 INJECTION, POWDER, LYOPHILIZED, FOR SUSPENSION INTRAMUSCULAR; INTRAVENOUS at 11:37

## 2023-01-20 RX ADMIN — ATROPINE SULFATE 0.4 MG: 0.4 INJECTION, SOLUTION INTRAVENOUS at 10:35

## 2023-01-20 RX ADMIN — DEXAMETHASONE SODIUM PHOSPHATE 12 MG: 4 INJECTION, SOLUTION INTRAMUSCULAR; INTRAVENOUS at 10:40

## 2023-01-20 RX ADMIN — ONDANSETRON 8 MG: 2 INJECTION INTRAMUSCULAR; INTRAVENOUS at 10:34

## 2023-01-20 ASSESSMENT — PATIENT HEALTH QUESTIONNAIRE - PHQ9
1. LITTLE INTEREST OR PLEASURE IN DOING THINGS: 0
SUM OF ALL RESPONSES TO PHQ QUESTIONS 1-9: 0
SUM OF ALL RESPONSES TO PHQ9 QUESTIONS 1 & 2: 0
2. FEELING DOWN, DEPRESSED OR HOPELESS: 0

## 2023-01-20 NOTE — PATIENT INSTRUCTIONS
Patient Instructions from Today's Visit    Reason for Visit:  Prechemo visit    Diagnosis Information:  [email or web address]    Plan:  -Cycle 14 Folfirinox today however we will omit Oxaliplatin due to neuropathy   -We will get MRI of cervical spine for neck pain  -Refill Potassium  -Take B Complex to help with neuopathy    Follow Up:  As scheduled    Recent Lab Results:  Hospital Outpatient Visit on 01/20/2023   Component Date Value Ref Range Status    WBC 01/20/2023 5.7  4.3 - 11.1 K/uL Final    RBC 01/20/2023 3.80 (A)  4.23 - 5.6 M/uL Final    Hemoglobin 01/20/2023 11.2 (A)  13.6 - 17.2 g/dL Final    Hematocrit 01/20/2023 35.6  % Final    MCV 01/20/2023 93.7  82.0 - 102.0 FL Final    MCH 01/20/2023 29.5  26.1 - 32.9 PG Final    MCHC 01/20/2023 31.5  31.4 - 35.0 g/dL Final    RDW 01/20/2023 15.8 (A)  11.9 - 14.6 % Final    Platelets 89/40/8711 167  150 - 450 K/uL Final    MPV 01/20/2023 10.5  9.4 - 12.3 FL Final    nRBC 01/20/2023 0.00  0.0 - 0.2 K/uL Final    **Note: Absolute NRBC parameter is now reported with Hemogram**    Differential Type 01/20/2023 AUTOMATED    Final    Seg Neutrophils 01/20/2023 51  43 - 78 % Final    Lymphocytes 01/20/2023 36  13 - 44 % Final    Monocytes 01/20/2023 8  4.0 - 12.0 % Final    Eosinophils % 01/20/2023 2  0.5 - 7.8 % Final    Basophils 01/20/2023 1  0.0 - 2.0 % Final    Immature Granulocytes 01/20/2023 1  0.0 - 5.0 % Final    Segs Absolute 01/20/2023 2.9  1.7 - 8.2 K/UL Final    Absolute Lymph # 01/20/2023 2.1  0.5 - 4.6 K/UL Final    Absolute Mono # 01/20/2023 0.5  0.1 - 1.3 K/UL Final    Absolute Eos # 01/20/2023 0.1  0.0 - 0.8 K/UL Final    Basophils Absolute 01/20/2023 0.1  0.0 - 0.2 K/UL Final    Absolute Immature Granulocyte 01/20/2023 0.1  0.0 - 0.5 K/UL Final        Treatment Summary has been discussed and given to patient: n/a        -------------------------------------------------------------------------------------------------------------------  Please call our office at [phone] if you have any  of the following symptoms:   Fever of 100.5 or greater  Chills  Shortness of breath  Swelling or pain in one leg    After office hours an answering service is available and will contact a provider for emergencies or if you are experiencing any of the above symptoms. Patient does express an interest in My Chart. My Chart log in information explained on the after visit summary printout at the . [address] desk.     Dola Siemens, RN  Nurse Navigator  [address] [facility] [id number] [phone]

## 2023-01-20 NOTE — PROGRESS NOTES
Arrived to the [facility]. Folfiri infusions completed. Patient tolerated well. Any issues or concerns during appointment: no.  Patient aware of next infusion appointment on 1/22/2023 (date) at [phone] (time). Patient aware of next lab and [facility] office visit on 2/3/2023 (date) at 0830 (time). Patient instructed to call provider with temperature of 100.4 or greater or nausea/vomiting/ diarrhea or pain not controlled by medications  Discharged ambulatory.

## 2023-01-22 ENCOUNTER — HOSPITAL ENCOUNTER (OUTPATIENT)
Dept: INFUSION THERAPY | Age: 63
Discharge: HOME | End: 2023-01-22
Payer: COMMERCIAL

## 2023-01-22 VITALS
DIASTOLIC BLOOD PRESSURE: 70 MMHG | HEART RATE: 79 BPM | SYSTOLIC BLOOD PRESSURE: 117 MMHG | RESPIRATION RATE: 17 BRPM | OXYGEN SATURATION: 97 % | TEMPERATURE: 98 F

## 2023-01-22 DIAGNOSIS — C78.6 PERITONEAL METASTASES (HCC): ICD-10-CM

## 2023-01-22 DIAGNOSIS — C76.2 ABDOMINAL CARCINOMATOSIS (HCC): Primary | ICD-10-CM

## 2023-01-22 PROCEDURE — 96523 IRRIG DRUG DELIVERY DEVICE: CPT

## 2023-01-22 PROCEDURE — 2580000003 HC RX 258: Performed by: INTERNAL MEDICINE

## 2023-01-22 RX ORDER — SODIUM CHLORIDE 0.9 % (FLUSH) 0.9 %
5-40 SYRINGE (ML) INJECTION PRN
Status: DISCONTINUED | OUTPATIENT
Start: 2023-01-22 | End: 2023-01-23 | Stop reason: HOSPADM

## 2023-01-22 RX ADMIN — SODIUM CHLORIDE, PRESERVATIVE FREE 10 ML: 5 INJECTION INTRAVENOUS at 11:58

## 2023-01-22 NOTE — PROGRESS NOTES
Arrived to the [facility]. Pump D/C completed. Provided education on hydration    Patient instructed to report any side affects to ordering provider. Patient tolerated well. Any issues or concerns during appointment: none. Patient aware of next infusion appointment on 02/03/2023 (date) at 0930 (time). Discharged ambulatory.

## 2023-01-24 DIAGNOSIS — Z02.89 REFERRED BY HEALTH CARE PROFESSIONAL: Primary | ICD-10-CM

## 2023-01-24 DIAGNOSIS — R10.84 GENERALIZED ABDOMINAL PAIN: ICD-10-CM

## 2023-01-24 PROBLEM — M54.2 NECK PAIN: Status: ACTIVE | Noted: 2023-01-24

## 2023-01-24 PROBLEM — R20.0 NUMBNESS OF UPPER EXTREMITY: Status: ACTIVE | Noted: 2023-01-24

## 2023-01-24 RX ORDER — DICYCLOMINE HCL 20 MG
TABLET ORAL
Qty: 180 TABLET | Refills: 0 | OUTPATIENT
Start: 2023-01-24

## 2023-01-26 ENCOUNTER — HOSPITAL ENCOUNTER (OUTPATIENT)
Dept: MRI IMAGING | Age: 63
Discharge: HOME | End: 2023-01-26
Payer: COMMERCIAL

## 2023-01-26 DIAGNOSIS — M54.2 NECK PAIN: ICD-10-CM

## 2023-01-26 PROCEDURE — A9579 GAD-BASE MR CONTRAST NOS,1ML: HCPCS | Performed by: INTERNAL MEDICINE

## 2023-01-26 PROCEDURE — 72156 MRI NECK SPINE W/O & W/DYE: CPT

## 2023-01-26 PROCEDURE — 2580000003 HC RX 258: Performed by: INTERNAL MEDICINE

## 2023-01-26 PROCEDURE — 6360000004 HC RX CONTRAST MEDICATION: Performed by: INTERNAL MEDICINE

## 2023-01-26 RX ORDER — SODIUM CHLORIDE 0.9 % (FLUSH) 0.9 %
10 SYRINGE (ML) INJECTION AS NEEDED
Status: DISCONTINUED | OUTPATIENT
Start: 2023-01-26 | End: 2023-01-30 | Stop reason: HOSPADM

## 2023-01-26 RX ADMIN — GADOTERIDOL 20 ML: 279.3 INJECTION, SOLUTION INTRAVENOUS at 09:54

## 2023-01-26 RX ADMIN — SODIUM CHLORIDE, PRESERVATIVE FREE 10 ML: 5 INJECTION INTRAVENOUS at 09:54

## 2023-02-03 ENCOUNTER — HOSPITAL ENCOUNTER (OUTPATIENT)
Dept: INFUSION THERAPY | Age: 63
Discharge: HOME | End: 2023-02-03
Payer: COMMERCIAL

## 2023-02-03 ENCOUNTER — OFFICE VISIT (OUTPATIENT)
Dept: ONCOLOGY | Age: 63
End: 2023-02-03
Payer: COMMERCIAL

## 2023-02-03 VITALS
HEIGHT: 70 IN | DIASTOLIC BLOOD PRESSURE: 79 MMHG | WEIGHT: 225.9 LBS | BODY MASS INDEX: 32.34 KG/M2 | TEMPERATURE: 98.2 F | RESPIRATION RATE: 16 BRPM | HEART RATE: 79 BPM | SYSTOLIC BLOOD PRESSURE: 104 MMHG | OXYGEN SATURATION: 6 %

## 2023-02-03 DIAGNOSIS — C16.9 MALIGNANT NEOPLASM OF STOMACH, UNSPECIFIED LOCATION (HCC): ICD-10-CM

## 2023-02-03 DIAGNOSIS — C78.6 PERITONEAL METASTASES (HCC): ICD-10-CM

## 2023-02-03 DIAGNOSIS — E87.6 HYPOKALEMIA: ICD-10-CM

## 2023-02-03 DIAGNOSIS — Z51.5 ENCOUNTER FOR PALLIATIVE CARE: ICD-10-CM

## 2023-02-03 DIAGNOSIS — C76.2 ABDOMINAL CARCINOMATOSIS (HCC): ICD-10-CM

## 2023-02-03 DIAGNOSIS — C16.9 MALIGNANT NEOPLASM OF STOMACH, UNSPECIFIED LOCATION (HCC): Primary | ICD-10-CM

## 2023-02-03 DIAGNOSIS — R53.83 FATIGUE DUE TO TREATMENT: ICD-10-CM

## 2023-02-03 DIAGNOSIS — F41.8 ANXIETY ABOUT HEALTH: ICD-10-CM

## 2023-02-03 DIAGNOSIS — C76.2 ABDOMINAL CARCINOMATOSIS (HCC): Primary | ICD-10-CM

## 2023-02-03 LAB
ALBUMIN SERPL-MCNC: 3.1 G/DL (ref 3.2–4.6)
ALBUMIN/GLOB SERPL: 0.9 (ref 0.4–1.6)
ALP SERPL-CCNC: 92 U/L (ref 50–136)
ALT SERPL-CCNC: 22 U/L (ref 12–65)
ANION GAP SERPL CALC-SCNC: 4 MMOL/L (ref 2–11)
AST SERPL-CCNC: 15 U/L (ref 15–37)
BASOPHILS # BLD: 0 K/UL (ref 0–0.2)
BASOPHILS NFR BLD: 1 % (ref 0–2)
BILIRUB SERPL-MCNC: 0.3 MG/DL (ref 0.2–1.1)
BUN SERPL-MCNC: 7 MG/DL (ref 8–23)
CALCIUM SERPL-MCNC: 8.5 MG/DL (ref 8.3–10.4)
CHLORIDE SERPL-SCNC: 111 MMOL/L (ref 101–110)
CO2 SERPL-SCNC: 28 MMOL/L (ref 21–32)
CREAT SERPL-MCNC: 0.8 MG/DL (ref 0.8–1.5)
DIFFERENTIAL METHOD BLD: ABNORMAL
EOSINOPHIL # BLD: 0.2 K/UL (ref 0–0.8)
EOSINOPHIL NFR BLD: 3 % (ref 0.5–7.8)
ERYTHROCYTE [DISTWIDTH] IN BLOOD BY AUTOMATED COUNT: 14.8 % (ref 11.9–14.6)
GLOBULIN SER CALC-MCNC: 3.4 G/DL (ref 2.8–4.5)
GLUCOSE SERPL-MCNC: 164 MG/DL (ref 65–100)
HCT VFR BLD AUTO: 32.3 % (ref 41.1–50.3)
HGB BLD-MCNC: 10.4 G/DL (ref 13.6–17.2)
IMM GRANULOCYTES # BLD AUTO: 0 K/UL (ref 0–0.5)
IMM GRANULOCYTES NFR BLD AUTO: 0 % (ref 0–5)
LYMPHOCYTES # BLD: 1.7 K/UL (ref 0.5–4.6)
LYMPHOCYTES NFR BLD: 32 % (ref 13–44)
MCH RBC QN AUTO: 30.1 PG (ref 26.1–32.9)
MCHC RBC AUTO-ENTMCNC: 32.2 G/DL (ref 31.4–35)
MCV RBC AUTO: 93.4 FL (ref 82–102)
MONOCYTES # BLD: 0.3 K/UL (ref 0.1–1.3)
MONOCYTES NFR BLD: 5 % (ref 4–12)
NEUTS SEG # BLD: 3 K/UL (ref 1.7–8.2)
NEUTS SEG NFR BLD: 58 % (ref 43–78)
NRBC # BLD: 0 K/UL (ref 0–0.2)
PLATELET # BLD AUTO: 150 K/UL (ref 150–450)
PMV BLD AUTO: 10.3 FL (ref 9.4–12.3)
POTASSIUM SERPL-SCNC: 3.4 MMOL/L (ref 3.5–5.1)
PROT SERPL-MCNC: 6.5 G/DL (ref 6.3–8.2)
RBC # BLD AUTO: 3.46 M/UL (ref 4.23–5.6)
SODIUM SERPL-SCNC: 143 MMOL/L (ref 133–143)
WBC # BLD AUTO: 5.2 K/UL (ref 4.3–11.1)

## 2023-02-03 PROCEDURE — 2580000003 HC RX 258: Performed by: NURSE PRACTITIONER

## 2023-02-03 PROCEDURE — 3078F DIAST BP <80 MM HG: CPT | Performed by: NURSE PRACTITIONER

## 2023-02-03 PROCEDURE — 3074F SYST BP LT 130 MM HG: CPT | Performed by: NURSE PRACTITIONER

## 2023-02-03 PROCEDURE — 85025 COMPLETE CBC W/AUTO DIFF WBC: CPT

## 2023-02-03 PROCEDURE — 96413 CHEMO IV INFUSION 1 HR: CPT

## 2023-02-03 PROCEDURE — 96368 THER/DIAG CONCURRENT INF: CPT

## 2023-02-03 PROCEDURE — 96415 CHEMO IV INFUSION ADDL HR: CPT

## 2023-02-03 PROCEDURE — 80053 COMPREHEN METABOLIC PANEL: CPT

## 2023-02-03 PROCEDURE — 2580000003 HC RX 258: Performed by: INTERNAL MEDICINE

## 2023-02-03 PROCEDURE — 96375 TX/PRO/DX INJ NEW DRUG ADDON: CPT

## 2023-02-03 PROCEDURE — 99214 OFFICE O/P EST MOD 30 MIN: CPT | Performed by: NURSE PRACTITIONER

## 2023-02-03 PROCEDURE — 6360000002 HC RX W HCPCS: Performed by: NURSE PRACTITIONER

## 2023-02-03 PROCEDURE — 96367 TX/PROPH/DG ADDL SEQ IV INF: CPT

## 2023-02-03 PROCEDURE — G0498 CHEMO EXTEND IV INFUS W/PUMP: HCPCS

## 2023-02-03 PROCEDURE — 36591 DRAW BLOOD OFF VENOUS DEVICE: CPT

## 2023-02-03 PROCEDURE — 96411 CHEMO IV PUSH ADDL DRUG: CPT

## 2023-02-03 PROCEDURE — 96372 THER/PROPH/DIAG INJ SC/IM: CPT

## 2023-02-03 RX ORDER — ATROPINE SULFATE 0.4 MG/ML
0.4 AMPUL (ML) INJECTION ONCE
Status: CANCELLED | OUTPATIENT
Start: 2023-02-03 | End: 2023-02-03

## 2023-02-03 RX ORDER — HEPARIN SODIUM (PORCINE) LOCK FLUSH IV SOLN 100 UNIT/ML 100 UNIT/ML
500 SOLUTION INTRAVENOUS PRN
Status: CANCELLED | OUTPATIENT
Start: 2023-02-03

## 2023-02-03 RX ORDER — HEPARIN SODIUM (PORCINE) LOCK FLUSH IV SOLN 100 UNIT/ML 100 UNIT/ML
500 SOLUTION INTRAVENOUS PRN
OUTPATIENT
Start: 2023-02-05

## 2023-02-03 RX ORDER — MEPERIDINE HYDROCHLORIDE 50 MG/ML
12.5 INJECTION INTRAMUSCULAR; INTRAVENOUS; SUBCUTANEOUS PRN
Status: CANCELLED | OUTPATIENT
Start: 2023-02-03

## 2023-02-03 RX ORDER — ONDANSETRON 2 MG/ML
8 INJECTION INTRAMUSCULAR; INTRAVENOUS ONCE
Status: COMPLETED | OUTPATIENT
Start: 2023-02-03 | End: 2023-02-03

## 2023-02-03 RX ORDER — ACETAMINOPHEN 325 MG/1
650 TABLET ORAL
Status: CANCELLED | OUTPATIENT
Start: 2023-02-03

## 2023-02-03 RX ORDER — BUSPIRONE HYDROCHLORIDE 7.5 MG/1
7.5 TABLET ORAL DAILY
Qty: 90 TABLET | Refills: 0 | Status: SHIPPED | OUTPATIENT
Start: 2023-02-03

## 2023-02-03 RX ORDER — SODIUM CHLORIDE 0.9 % (FLUSH) 0.9 %
5-40 SYRINGE (ML) INJECTION PRN
Status: DISCONTINUED | OUTPATIENT
Start: 2023-02-03 | End: 2023-02-04 | Stop reason: HOSPADM

## 2023-02-03 RX ORDER — ATROPINE SULFATE 0.4 MG/ML
0.4 INJECTION, SOLUTION INTRAVENOUS
Status: DISCONTINUED | OUTPATIENT
Start: 2023-02-03 | End: 2023-02-04 | Stop reason: HOSPADM

## 2023-02-03 RX ORDER — SODIUM CHLORIDE 9 MG/ML
5-40 INJECTION INTRAVENOUS PRN
Status: CANCELLED | OUTPATIENT
Start: 2023-02-03

## 2023-02-03 RX ORDER — FLUOROURACIL 50 MG/ML
400 INJECTION, SOLUTION INTRAVENOUS ONCE
Status: COMPLETED | OUTPATIENT
Start: 2023-02-03 | End: 2023-02-03

## 2023-02-03 RX ORDER — DEXTROSE MONOHYDRATE 50 MG/ML
5-250 INJECTION, SOLUTION INTRAVENOUS PRN
Status: DISCONTINUED | OUTPATIENT
Start: 2023-02-03 | End: 2023-02-04 | Stop reason: HOSPADM

## 2023-02-03 RX ORDER — SODIUM CHLORIDE 9 MG/ML
5-250 INJECTION, SOLUTION INTRAVENOUS PRN
OUTPATIENT
Start: 2023-02-05

## 2023-02-03 RX ORDER — SODIUM CHLORIDE 0.9 % (FLUSH) 0.9 %
10 SYRINGE (ML) INJECTION PRN
Status: DISCONTINUED | OUTPATIENT
Start: 2023-02-03 | End: 2023-02-04 | Stop reason: HOSPADM

## 2023-02-03 RX ORDER — SODIUM CHLORIDE 9 MG/ML
INJECTION, SOLUTION INTRAVENOUS CONTINUOUS
Status: CANCELLED | OUTPATIENT
Start: 2023-02-03

## 2023-02-03 RX ORDER — SODIUM CHLORIDE 0.9 % (FLUSH) 0.9 %
5-40 SYRINGE (ML) INJECTION PRN
Status: CANCELLED | OUTPATIENT
Start: 2023-02-05

## 2023-02-03 RX ORDER — SODIUM CHLORIDE 0.9 % (FLUSH) 0.9 %
5-40 SYRINGE (ML) INJECTION PRN
Status: CANCELLED | OUTPATIENT
Start: 2023-02-03

## 2023-02-03 RX ORDER — POTASSIUM CHLORIDE 20 MEQ/1
20 TABLET, EXTENDED RELEASE ORAL DAILY
Qty: 30 TABLET | Refills: 1 | Status: SHIPPED | OUTPATIENT
Start: 2023-02-03 | End: 2023-03-03

## 2023-02-03 RX ORDER — ONDANSETRON 2 MG/ML
8 INJECTION INTRAMUSCULAR; INTRAVENOUS
Status: CANCELLED | OUTPATIENT
Start: 2023-02-03

## 2023-02-03 RX ORDER — EPINEPHRINE 1 MG/ML
0.3 INJECTION, SOLUTION, CONCENTRATE INTRAVENOUS PRN
Status: CANCELLED | OUTPATIENT
Start: 2023-02-03

## 2023-02-03 RX ORDER — ATROPINE SULFATE 0.4 MG/ML
0.4 INJECTION, SOLUTION INTRAVENOUS ONCE
Status: COMPLETED | OUTPATIENT
Start: 2023-02-03 | End: 2023-02-03

## 2023-02-03 RX ORDER — ESCITALOPRAM OXALATE 10 MG/1
10 TABLET ORAL DAILY
Qty: 30 TABLET | Refills: 5 | Status: SHIPPED | OUTPATIENT
Start: 2023-02-03

## 2023-02-03 RX ORDER — DIPHENHYDRAMINE HYDROCHLORIDE 50 MG/ML
50 INJECTION INTRAMUSCULAR; INTRAVENOUS
Status: CANCELLED | OUTPATIENT
Start: 2023-02-03

## 2023-02-03 RX ORDER — SODIUM CHLORIDE 9 MG/ML
5-250 INJECTION, SOLUTION INTRAVENOUS PRN
Status: CANCELLED | OUTPATIENT
Start: 2023-02-03

## 2023-02-03 RX ORDER — ONDANSETRON 2 MG/ML
8 INJECTION INTRAMUSCULAR; INTRAVENOUS ONCE
Status: CANCELLED | OUTPATIENT
Start: 2023-02-03 | End: 2023-02-03

## 2023-02-03 RX ORDER — FAMOTIDINE 10 MG/ML
20 INJECTION, SOLUTION INTRAVENOUS
Status: CANCELLED | OUTPATIENT
Start: 2023-02-03

## 2023-02-03 RX ORDER — DEXTROSE MONOHYDRATE 50 MG/ML
5-250 INJECTION, SOLUTION INTRAVENOUS PRN
Status: CANCELLED | OUTPATIENT
Start: 2023-02-03

## 2023-02-03 RX ORDER — ALBUTEROL SULFATE 90 UG/1
4 AEROSOL, METERED RESPIRATORY (INHALATION) PRN
Status: CANCELLED | OUTPATIENT
Start: 2023-02-03

## 2023-02-03 RX ORDER — ATROPINE SULFATE 0.4 MG/ML
0.4 AMPUL (ML) INJECTION
Status: CANCELLED | OUTPATIENT
Start: 2023-02-03

## 2023-02-03 RX ORDER — FLUOROURACIL 50 MG/ML
400 INJECTION, SOLUTION INTRAVENOUS ONCE
Status: CANCELLED | OUTPATIENT
Start: 2023-02-03 | End: 2023-02-03

## 2023-02-03 RX ORDER — SODIUM CHLORIDE 9 MG/ML
5-40 INJECTION INTRAVENOUS PRN
OUTPATIENT
Start: 2023-02-05

## 2023-02-03 RX ADMIN — FLUOROURACIL 900 MG: 50 INJECTION, SOLUTION INTRAVENOUS at 13:18

## 2023-02-03 RX ADMIN — DEXAMETHASONE SODIUM PHOSPHATE 12 MG: 4 INJECTION, SOLUTION INTRAMUSCULAR; INTRAVENOUS at 10:42

## 2023-02-03 RX ADMIN — LEUCOVORIN CALCIUM 900 MG: 100 INJECTION, POWDER, LYOPHILIZED, FOR SUSPENSION INTRAMUSCULAR; INTRAVENOUS at 11:38

## 2023-02-03 RX ADMIN — ATROPINE SULFATE 0.4 MG: 0.4 INJECTION, SOLUTION INTRAVENOUS at 10:36

## 2023-02-03 RX ADMIN — FOSAPREPITANT 150 MG: 150 INJECTION, POWDER, LYOPHILIZED, FOR SOLUTION INTRAVENOUS at 11:03

## 2023-02-03 RX ADMIN — FLUOROURACIL 5375 MG: 50 INJECTION, SOLUTION INTRAVENOUS at 13:22

## 2023-02-03 RX ADMIN — SODIUM CHLORIDE, PRESERVATIVE FREE 10 ML: 5 INJECTION INTRAVENOUS at 10:30

## 2023-02-03 RX ADMIN — IRINOTECAN HYDROCHLORIDE 340 MG: 20 INJECTION, SOLUTION INTRAVENOUS at 11:39

## 2023-02-03 RX ADMIN — ONDANSETRON 8 MG: 2 INJECTION INTRAMUSCULAR; INTRAVENOUS at 10:38

## 2023-02-03 RX ADMIN — SODIUM CHLORIDE, PRESERVATIVE FREE 10 ML: 5 INJECTION INTRAVENOUS at 08:48

## 2023-02-03 RX ADMIN — DEXTROSE MONOHYDRATE 50 ML/HR: 50 INJECTION, SOLUTION INTRAVENOUS at 10:30

## 2023-02-03 ASSESSMENT — PATIENT HEALTH QUESTIONNAIRE - PHQ9
SUM OF ALL RESPONSES TO PHQ QUESTIONS 1-9: 0
SUM OF ALL RESPONSES TO PHQ9 QUESTIONS 1 & 2: 0
2. FEELING DOWN, DEPRESSED OR HOPELESS: 0
SUM OF ALL RESPONSES TO PHQ QUESTIONS 1-9: 0
1. LITTLE INTEREST OR PLEASURE IN DOING THINGS: 0

## 2023-02-03 ASSESSMENT — ENCOUNTER SYMPTOMS
VOMITING: 0
ABDOMINAL PAIN: 0
BLOOD IN STOOL: 0
SORE THROAT: 0
HEMOPTYSIS: 0
NAUSEA: 0
SHORTNESS OF BREATH: 0
EYE PROBLEMS: 0
COUGH: 0
ABDOMINAL DISTENTION: 0
SCLERAL ICTERUS: 0
DIARRHEA: 0
CONSTIPATION: 1
BACK PAIN: 0

## 2023-02-03 NOTE — PROGRESS NOTES
[employer] [facility]: Established patient - follow up     Chief Complaint   Patient presents with    Follow-up      Reason for Referral: Abdominal pain; peritoneal metastatic disease   Referring Provider: Erin Estrada NP   Primary Care Provider: Aj Morgan MD   Family History of Cancer/Hematologic Disorders: Family history is significant for sister with breast cancer. Presenting Symptoms: Progressively worsening, persistent abdominal pain x several months    History of Present Illness:  [facility]  is a [age] [facility] y.o. male who presents today for FU regarding adenocarcinoma with peritoneal metastatic disease. The past medical history is significant for tobacco use (recent pack per day smoker x 30+ years - now down to 1/3PPD), PUD, paresthesia/pain of bilateral upper extremities, HLD, HTN, diverticulitis,  colon polyps, hiatal hernia, chronic right shoulder pain, bilateral carpal tunnel syndrome, and partial colon resection. He presented to the [facility] ED on 5/12/22 with a complaint of persistent abdominal pain for several months that was becoming progressively  worse. EGD and colonoscopy on 2/25/22 revealed findings of hiatal hernia, gastric polyps, several benign colon polyps, diverticulosis, and internal hemorrhoids. CT of the abdomen on 3/8/22 showed no acute findings. He presented to [facility] ER x 2 for  evaluation (5/3/22 and 5/10/22). RUQ abdominal ultrasound was completed on 5/3/22 in the ED at [facility] demonstrating no acute findings to explain patient's pain; probable hepatic  steatosis; small echogenic mural foci in the gallbladder which are nonmobile, likely representing cholesterol polyps, largest measuring 4 mm; and small volume simple ascites in the right upper quadrant and left lower quadrant, nonspecific.   CT AP with  contrast at [facility] ED 5/10/22 showed a partial small bowel obstruction; small to moderate amount of free fluid in the abdomen and pelvis; and nonspecific stranding of the omentum primarily in the left upper quadrant. Radiologist noted that follow-up  CT was recommended to differentiate passive congestion from omental disease. On 5/11/22, abdominal series again showed multiple dilated small bowel loops with enteric contrast appearing to extend into the proximal colon, suggesting partial small  bowel obstruction. CT AP in the [facility] ED on 5/12/22 identified extensive peritoneal nodularity and mild ascites consistent with peritoneal  metastatic disease with primary lesion not definitely identified; dilated fluid-filled loops of small bowel possibly related to gastroenteritis with no definite obstruction and no obvious bowel mass; and sclerosis of S1 vertebral body. Radiologist recommended consideration of follow-up bone scan to assess for bone metastases. Patient was admitted to  the Hospitalist service on 5/12/22, and IR consulted for possible paracentesis however very small volume was inaccessible. CT Chest obtained on 5/13/22 showed No evidence of primary malignancy or metastasis within the chest.  Follow-up hepatobiliary  scan was suggested to assess for common bile duct obstruction. Oncology was consulted but did not see patient inhouse as pt was dischared. Upon discharge on 5/14/22, patient was instructed to follow up with [facility]. During consultation, we discussed his workup. We looked at the images together demonstrating some of the findings concerning for peritoneal nodularity/peritoneal disease. Discussed anatomy of the findings utilizing images to help pt understand what we are looking at and suspecting. He and wife were appreciative of the time spent to review these. We also addressed pt's pain. We also reviewed images of sclerosing lesion - bone scan recommended. He also was recommended to undergo HIDA scan after recent US per PCP. He is tired of tests, frustrated. Feelings validated and stressed importance of workup to determine why he has pain.   Wt loss from 240 --> 209 noted and expressed concern to pt about this. Of note, prior akbar resection with Dr Robel Armendariz for diverticulitis per pt. Sent note to dr Violeta Bueno re pt's case to expedite workup with his agreement. He was hospitalized for abdominal pain and sepsis. He was empirically placed on vancomycin and cefepime. CT scanning disclosed no intra-abdominal fluid collection or abscess but did suggest that his tumor burden was somewhat smaller. He had some purulent drainage from around his G-tube. This was cultured and grew klebsiella pneumoniae and citrobacter freundii both of which were sensitive to Levaquin which he was discharged home on for 10 days. He returns today with wife for follow up and C15 FOLFIRI (Oxaliplatin removed with C14 due to neuropathy). He reports going back to work for a full day and then paying for it with fatigue and some pain. He is looking into working part time or another job that isn't so physical.  There is no nausea and bowels are OK. No oral sores, but dry mouth is ongoing. Appetite is good and he definitely enjoyed having cold items/milkshakes, weight up 2#! There is no cough, shortness of breath, or edema. Neuropathy is unchanged, may be slightly increased. Denies any fevers or infectious symptoms. Chronological Events:   EGD REPORT 2/25/22                      COLONOSCOPY REPORT 2/25/22                 CT ABDOMEN PELVIS W CONTRAST 3/8/2022   FINDINGS:   LOWER CHEST: Unremarkable. ABDOMEN AND PELVIS:   Liver: Unremarkable. Biliary system: Unremarkable. Pancreas: Unremarkable. Spleen: Unremarkable. Adrenals: Unremarkable. Genitourinary: Unremarkable. Reproductive organs: Unremarkable. Gastrointestinal tract: Colonic diverticulosis without evidence of diverticulitis. There is no evidence of bowel obstruction or inflammation. The appendix is normal   Peritoneum/Extraperitoneum: Unremarkable. No free fluid or air.    Vascular: Unremarkable. Musculoskeletal:  Small fat-containing umbilical hernia. Degenerative  changes of thoracolumbar spine. No acute or aggressive osseous lesion. IMPRESSION: Colonic diverticula without evidence of acute diverticulitis. RIGHT UPPER QUADRANT ABDOMINAL ULTRASOUND 5/3/2022    FINDINGS:   Pancreas: Not visualized, obscured by bowel gas. Intrahepatic IVC: Normal.   Main  Portal Vein: Patent with normal directional flow. Liver: Liver contour is normal. Liver appears diffusely increased in echogenicity consistent with hepatic steatosis. There is fatty sparing around the gallbladder fossa. Gallbladder: Gallbladder  is normal in size. No evidence of gallbladder wall thickening. No gallstones are seen. There are several nonmobile echogenic mural foci in the proximal gallbladder body/neck, largest measuring 4 mm, without shadowing, likely small cholesterol polyps. Bile ducts: No evidence of biliary ductal dilation. The common bile duct measures 3 mm in diameter. Right kidney: Normal.   Left Upper Quadrant: Limited images of the left upper quadrant are unremarkable. Spleen measures 12.1 cm in length. Free fluid: Trace simple free fluid in the right upper quadrant and left lower quadrant. IMPRESSION:    1. No acute findings to explain patient's pain. 2. Probable hepatic steatosis. 3. Small echogenic mural foci in the gallbladder which are nonmobile, likely representing cholesterol polyps, largest measuring 4 mm. There are no specific ultrasound  follow up recommendations for polyps of this small size. 4. Small volume simple ascites in the right upper quadrant and left lower quadrant, nonspecific.         CT ABDOMEN - PELVIS WITH CONTRAST 5/10/22   FINDINGS:   Liver: Normal    Portal Vein: Normal   Gallbladder - Biliary Tree: Normal   Pancreas: Normal   Spleen: Normal   Retroperitoneum:   Adrenals: Normal   Kidneys:  Normal    Aorto - Cava:  Calcification of the abdominal aorta without aneurysm formation. Lymphatics:  Normal   GI - Mesentery - Peritoneum: Multiple dilated mid small bowel loops without a focal transition point. The appendix is normal. Small to  moderate amount of free fluid in the pelvis and right flank. Nonspecific stranding of the omentum in the left upper quadrant. Small fatty umbilical hernia. Pelvis: Normal   Lower Chest: Normal   Soft tissues - MSK: Normal    IMPRESSION:   1. Partial small bowel obstruction. 2. Small to moderate amount of free fluid in the abdomen and pelvis. 3. Nonspecific stranding of the omentum primarily  in the left upper quadrant. Follow-up CT is recommended to differentiate passive congestion from omental a static disease. ABDOMEN SERIES 5/11/22   FINDINGS:   Chest: Heart size within normal limits. Lungs are clear. No pleural effusion or pneumothorax. Bowel: Multiple dilated small bowel loops with air-fluid levels on the upright view. Contrast distends small bowel loops in the left lateral abdomen and lower abdomen. Contrast in the right hemiabdomen appears to be within proximal colon. Scattered colonic  gas. Peritoneum / Retroperitoneum: No pneumoperitoneum. Soft tissues / Bones: No acute osseous findings. The contrast in urinary bladder. Lines / Support Apparatus: None. IMPRESSION: Redemonstrated of multiple dilated small bowel loops with enteric contrast appearing to extend into the proximal colon, suggesting only partial small bowel obstruction. Continued radiographic follow-up is advised. CT OF THE ABDOMEN AND PELVIS 5/12/22   FINDINGS:   LOWER CHEST: Normal.   HEPATOBILIARY: No evidence of focal liver mass. No calcified gallstones. PANCREAS: Normal.   SPLEEN: Normal.    ADRENAL GLANDS: Normal.    KIDNEYS/BLADDER: Kidneys and bladder are unremarkable. No hydronephrosis or urinary tract calculi. BOWEL: Dilated fluid-filled loops of small bowel are present throughout the abdomen. No definite transition point. Oral contrast noted in the colon. No definite evidence of bowel mass but there is extensive peritoneal nodularity and trace ascites highly  concerning for peritoneal metastatic disease. LYMPH NODES: No enlarged retroperitoneal or pelvic lymph nodes. Peritoneal nodularity again noted most likely metastatic disease. VASCULATURE: Unremarkable. PELVIC ORGANS: Prostate gland and rectum are unremarkable. Small amount of free pelvic fluid is noted. MUSCULOSKELETAL: Degenerative spine changes are noted. Mild sclerosis involving the S1 vertebral body is present on image 74 of series 602. IMPRESSION   1. Extensive peritoneal nodularity and mild ascites consistent with peritoneal metastatic disease. Primary lesion not definitely identified. 2. Dilated fluid-filled loops of small bowel possibly related to gastroenteritis. No definite obstruction. No obvious bowel mass. 3. Sclerosis S1 vertebral body. Consider follow-up bone scan to assess for bone metastases. LIMITED ABDOMINAL ULTRASOUND 5/13/22   FINDINGS: A single limited gray scale image was submitted of an undisclosed location in the abdomen. Images demonstrate a small amount of  ascites as seen on comparison CT. IMPRESSION   1. Small volume ascites. CT CHEST WITH CONTRAST 5/13/22   FINDINGS:   Mediastinum and visualized thyroid: Normal.   Heart: Normal.    Large Vessels: Normal.    Pleura: Normal.    Lungs: No lung mass clearly discerned. Mild scarring or atelectasis in the right lung base. No suspicious lung nodule. No consolidation or zulma pulmonary edema. Airways: Normal.    Lymph nodes: Normal.    Bones/Soft tissues: Normal.    Visualized abdomen: Partially imaged omental nodules. Perihepatic ascites noted. IMPRESSION: No evidence of primary malignancy or metastasis within the chest       ABDOMINAL ULTRASOUND 5/17/22   FINDINGS:    LIVER: 17.3 cm. Slight increase in echogenicity without focal mass.    BILE DUCTS: No intrahepatic bile duct dilatation. CBD diameter = 8 mm. GALLBLADDER: It is unremarkable in appearance without stones or sludge. Echogenic polyp measures 0.5 cm. No gallbladder wall thickening. Mild ascites. PANCREAS: Normal.   SPLEEN: Borderline enlarged at 12.2 cm. RIGHT KIDNEY: 13.3 cm. No mass or hydronephrosis. LEFT KIDNEY: 14.3 cm. No mass or hydronephrosis. ABDOMINAL AORTA AND IVC: Normal in size. ASCITES: Mild   IMPRESSION: Possible mild fatty infiltration liver. No focal mass. Gallbladder polyp but no stones or gallbladder wall thickening. Mild ascites. Mildly prominent common bile duct. Follow-up hepatobiliary scan could be performed to assess for common bile duct obstruction. 04/02/2021 (COVID-19, [facility] Brooms, Primary or Immunocompromised Series, MRNA, PF, 100mcg/0.5mL)   04/30/2021 (COVID-19, [facility] Brooms, Primary or Immunocompromised Series, MRNA, PF, 100mcg/0.5mL)   11/16/2021 (COVID-19, Moderna Booster, PF, 0.25mL Dose)      5/18/22 heme/onc consultation   5/20/22 Dr Ruddy Murphy consult - sent to ED for admission/workup   5/23/22 omental bx - IR   5/27/22 Dr Ruddy Murphy - diagnostic lap, omental mass and mesentery mass excision, G-tube placement for venting  6/1/22 painful G-tube - tract recanalized and new G-tube placed   6/12/22 C1 FOLFIRINOX completed - dose adjusted   6/14/22 d/c   6/24/22 FU sx - G tube maint instructions/staples removed - RTC PRN   6/24/22 FU after hospitalization - discussed PC/plan for tx  7/8/22 FU - mainly concerned about PICC line without blood return, decline cathflo, seeing José Miguel Pham in [facility] on Monday. Will increase hydration at home. HH for TPN.    7/18/22 Follow up after hospitalization. He will complete course of Levaquin as prescribed for infection around G-tube. Would like to proceed with cycle 3 FOLFIRINOX with increased dosing. 8/4/22 Cycle 4 FOLFIRINOX - continue dose escalation.  He will complete course of Levaquin/Diflucan as prescribed for infection around G-tube, site looks good today. 8/17/22 C5 FOLFIRINOX - wishes to pw full dose accepting risks; wishes for G tube to be removed - will need to time with labs; plan to drop dose in opioids - PC seeing pt today. RD seeing pt. Plan for restaging in ~Oct.    9/1/22 FU FOLFIRINOX C5 full dose now; imaging in Oct   9/15/22 FU FOLFIRINOX C6-doing well, weight up 11#  9/30/22 FU FOLFIRINOX-C8 defer 1 week due to neutropenia, ANC 0.5  10/13/22 FU FOLFIRINOX 8; CT CAP reviewed and no POD. 10/28/22 Stat ultrasound RUE and then proceed with cycle 9 FOLFIRINOX. Doppler - Persistent DVT, nonocclusive, within the right axillary and basilic veins. The previously seen nonocclusive thrombus within the right innominate and subclavian veins are no longer seen. No abnormality of the right brachial vein demonstrated. Started on Xarelto. 11/11/22 FU - C10 FOLFIRINOX. Doing well. Wt stable. RUE swelling better. No new issues/concerns. 11/25/22 FU and C11 FOLFIRINOX, doing very well   12/9/22 FU and C12 FOLFIRINOX. Doing well. Weight stable.   12/23/22 FU and C13 FOLFIRINOX, doing well      1/20/23 FU and C14 - will stop oxali due to cold/neuropathy - p/w FOLFIRI for now; CT reviewed; cervical spine imaging   2/3/23 FU and C15 FOLFIRI        Family History   Problem Relation Age of Onset    No Known Problems Brother     Cancer Sister         breast    Hypertension Mother     Heart Disease Mother     Diabetes Father     Osteoarthritis Father     Alcohol Abuse Father     Hypertension Father     Diabetes Mother       Social History     Socioeconomic History    Marital status:      Spouse name: None    Number of children: None    Years of education: None    Highest education level: None   Tobacco Use    Smoking status: Former     Packs/day: 1.00     Types: Cigarettes    Smokeless tobacco: Never   Substance and Sexual Activity    Alcohol use: No    Drug use: No        Review of Systems   Constitutional:  Negative for appetite change, diaphoresis, fatigue, fever and unexpected weight change. HENT:   Negative for sore throat. Eyes:  Negative for eye problems and icterus. Respiratory:  Negative for cough, hemoptysis and shortness of breath. Cardiovascular:  Negative for chest pain, leg swelling and palpitations. Gastrointestinal:  Positive for constipation (controlled). Negative for abdominal distention, abdominal pain, blood in stool, diarrhea, nausea and vomiting. Endocrine: Negative for hot flashes. Genitourinary:  Negative for dysuria. Musculoskeletal:  Negative for arthralgias, back pain and gait problem. Skin:  Negative for itching, rash and wound. Neurological:  Negative for dizziness, extremity weakness, gait problem, headaches, light-headedness, numbness, seizures and speech difficulty. Hematological:  Negative for adenopathy. Does not bruise/bleed easily. Psychiatric/Behavioral:  Positive for depression (better). Negative for decreased concentration and sleep disturbance. The patient is nervous/anxious (better).        No Known Allergies  Past Medical History:   Diagnosis Date    Bilateral carpal tunnel syndrome 12/9/2020    Chronic right shoulder pain 11/30/2020    Colon polyps 3/11/2013    Diverticulitis 3/11/2013    HTN (hypertension) 3/11/2013    Hyperlipidemia 3/11/2013    Paresthesia and pain of both upper extremities 11/30/2020    PUD (peptic ulcer disease) 3/11/2013    History of     Right elbow pain 12/9/2020    Wheezing 3/11/2013     Past Surgical History:   Procedure Laterality Date    COLONOSCOPY  2/25/09    colonoscopy per Dr. Darrel Hutton with need for repeat every 3 years    COLONOSCOPY  02/25/2022    Dr. Cosme Campos; polyps of the ascending, transverse, descending, and sigmoid colons; internal hemorrhoid; diverticulosis    LAPAROSCOPY N/A 5/27/2022    LAPAROSCOPY DIAGNOSTIC , OPEN EXPLORATORY LAPAROTOMY, MASS EXCISION, G-TUBE PLACEMENT [facility] 6[facility]    PORT INSERTION performed by Sp Johnston MD at [facility]  October 2004    partial colon resection per Dr. Warren Pen  02/25/2022    Dr. Gus Ogden; hiatal hernia gastric polyps     Current Outpatient Medications   Medication Sig Dispense Refill    escitalopram (LEXAPRO) 10 MG tablet Take 1 tablet by mouth daily 30 tablet 5    potassium chloride (KLOR-CON M) 20 MEQ extended release tablet Take 1 tablet by mouth daily for 28 days 14 days 30 tablet 1    rivaroxaban (XARELTO) 20 MG TABS tablet Take 1 tablet by mouth daily (with breakfast) 30 tablet 3    dicyclomine (BENTYL) 20 MG tablet Take one tab orally twice daily 180 tablet 0    magnesium oxide (MAG-OX) 400 MG tablet Take 1 tablet by mouth in the morning, at noon, and at bedtime 90 tablet 2    busPIRone (BUSPAR) 7.5 MG tablet Take 1 tablet by mouth daily 15 tablet 0    pantoprazole (PROTONIX) 40 MG tablet Take 1 tablet by mouth daily 30 tablet 1    Multiple Vitamins-Minerals (MULTIVITAMIN MEN 50+ PO) Take by mouth      promethazine (PHENERGAN) 12.5 MG tablet Take 1 tablet by mouth every 6 hours as needed for Nausea 90 tablet 0    rosuvastatin (CRESTOR) 10 MG tablet Take 10 mg by mouth      rivaroxaban 15 & 20 MG Starter Pack Take as directed on package. (Patient not taking: No sig reported) 1 each 0    oxyCODONE (ROXICODONE INTENSOL) 100 MG/5ML concentrated solution Take 10 mg by mouth every 4 hours as needed for Pain.  1 mls under tongue (Patient not taking: No sig reported)      OLANZapine zydis (ZYPREXA) 5 MG disintegrating tablet Take 1 tablet by mouth nightly Do not take with promethazine (Patient not taking: No sig reported) 30 tablet 3    ondansetron (ZOFRAN-ODT) 8 MG TBDP disintegrating tablet Take 1 tablet by mouth every 8 hours (Patient not taking: No sig reported) 90 tablet 0    naloxone 4 MG/0.1ML LIQD nasal spray 1 spray by Nasal route as needed for Opioid Reversal (Patient not taking: Reported on 2/3/2023)      polyethylene glycol (GLYCOLAX) 17 GM/SCOOP powder Take 17 g by mouth daily (Patient not taking: No sig reported)      umeclidinium-vilanterol (ANORO ELLIPTA) 62.5-25 MCG/INH AEPB inhaler Inhale 1 puff into the lungs daily  (Patient not taking: No sig reported)       No current facility-administered medications for this visit. Facility-Administered Medications Ordered in Other Visits   Medication Dose Route Frequency Provider Last Rate Last Admin    sodium chloride flush 0.9 % injection 10 mL  10 mL IntraVENous PRN Herminio Driver MD   10 mL at 02/03/23 0848       No flowsheet data found. OBJECTIVE:  /79 (Site: Right Upper Arm, Position: Standing, Cuff Size: Medium Adult)   Pulse 79   Temp 98.2 °F (36.8 °C) (Oral)   Resp 16   Ht 5' 10\" (1.778 m)   Wt 225 lb 14.4 oz (102.5 kg)   SpO2 (!) 6%   BMI 32.41 kg/m²       ECOG PERFORMANCE STATUS - 1- Restricted in physically strenuous activity but ambulatory and able to carry out work of a light or sedentary nature such as light house work, office work. Pain - Pain Score:   0 - No pain (fatigue-0)0 - No pain/10. None/Minimal pain - not affecting QOL     Fatigue - No flowsheet data found. Distress - No flowsheet data found. Physical Exam  Vitals reviewed. Exam conducted with a chaperone present. Constitutional:       General: He is not in acute distress. Appearance: Normal appearance. He is normal weight. He is not ill-appearing or toxic-appearing. HENT:      Head: Normocephalic and atraumatic. Nose: Nose normal. No congestion. Mouth/Throat:      Mouth: Mucous membranes are moist.   Eyes:      General: No scleral icterus. Conjunctiva/sclera: Conjunctivae normal.   Cardiovascular:      Rate and Rhythm: Normal rate and regular rhythm. Heart sounds: No murmur heard. Pulmonary:      Effort: Pulmonary effort is normal. No respiratory distress.       Breath sounds: Normal breath sounds. No wheezing, rhonchi or rales. Abdominal:      General: Bowel sounds are normal. There is no distension. Palpations: Abdomen is soft. There is no mass. Tenderness: There is no abdominal tenderness. There is no guarding or rebound. Musculoskeletal:         General: No swelling. Normal range of motion. Cervical back: Normal range of motion. No rigidity. Right lower leg: No edema. Left lower leg: No edema. Skin:     General: Skin is warm and dry. Coloration: Skin is not jaundiced or pale. Findings: No bruising or rash. Neurological:      General: No focal deficit present. Mental Status: He is alert and oriented to person, place, and time. Motor: No weakness. Coordination: Coordination normal.      Gait: Gait normal.   Psychiatric:         Behavior: Behavior normal.         Thought Content:  Thought content normal.        Labs:  Recent Results (from the past 168 hour(s))   CBC With Auto Differential    Collection Time: 02/03/23  8:48 AM   Result Value Ref Range    WBC 5.2 4.3 - 11.1 K/uL    RBC 3.46 (L) 4.23 - 5.6 M/uL    Hemoglobin 10.4 (L) 13.6 - 17.2 g/dL    Hematocrit 32.3 (L) 41.1 - 50.3 %    MCV 93.4 82.0 - 102.0 FL    MCH 30.1 26.1 - 32.9 PG    MCHC 32.2 31.4 - 35.0 g/dL    RDW 14.8 (H) 11.9 - 14.6 %    Platelets 861 183 - 475 K/uL    MPV 10.3 9.4 - 12.3 FL    nRBC 0.00 0.0 - 0.2 K/uL    Differential Type AUTOMATED      Seg Neutrophils 58 43 - 78 %    Lymphocytes 32 13 - 44 %    Monocytes 5 4.0 - 12.0 %    Eosinophils % 3 0.5 - 7.8 %    Basophils 1 0.0 - 2.0 %    Immature Granulocytes 0 0.0 - 5.0 %    Segs Absolute 3.0 1.7 - 8.2 K/UL    Absolute Lymph # 1.7 0.5 - 4.6 K/UL    Absolute Mono # 0.3 0.1 - 1.3 K/UL    Absolute Eos # 0.2 0.0 - 0.8 K/UL    Basophils Absolute 0.0 0.0 - 0.2 K/UL    Absolute Immature Granulocyte 0.0 0.0 - 0.5 K/UL   Comprehensive metabolic panel    Collection Time: 02/03/23  8:48 AM   Result Value Ref Range Sodium 143 133 - 143 mmol/L    Potassium 3.4 (L) 3.5 - 5.1 mmol/L    Chloride 111 (H) 101 - 110 mmol/L    CO2 28 21 - 32 mmol/L    Anion Gap 4 2 - 11 mmol/L    Glucose 164 (H) 65 - 100 mg/dL    BUN 7 (L) 8 - 23 MG/DL    Creatinine 0.80 0.8 - 1.5 MG/DL    Est, Glom Filt Rate >60 >60 ml/min/1.73m2    Calcium 8.5 8.3 - 10.4 MG/DL    Total Bilirubin 0.3 0.2 - 1.1 MG/DL    ALT 22 12 - 65 U/L    AST 15 15 - 37 U/L    Alk Phosphatase 92 50 - 136 U/L    Total Protein 6.5 6.3 - 8.2 g/dL    Albumin 3.1 (L) 3.2 - 4.6 g/dL    Globulin 3.4 2.8 - 4.5 g/dL    Albumin/Globulin Ratio 0.9 0.4 - 1.6         Imaging: reviewed     PATHOLOGY:             6/2022         ASSESSMENT:     Diagnosis Orders   1. Malignant neoplasm of stomach, unspecified location ([facility] [address] [facility]  CBC With Auto Differential    Comprehensive metabolic panel      2. Hypokalemia  potassium chloride (KLOR-CON M) 20 MEQ extended release tablet      3. Abdominal carcinomatosis (HCC)  CBC With Auto Differential    Comprehensive metabolic panel      4. Peritoneal metastases ([facility] [address]  CBC With Auto Differential    Comprehensive metabolic panel      5. Fatigue due to treatment            [facility] adenocarcinoma. 1. Metastatic adenocarcinoma   - s/p omental and mesenteric mass bx - c/w upper GI adenocarcinoma    - hx of diverticular dz - s/p previous bowel resection by dr Nadia Gottlieb at [address] [place] [address] -    - We did discuss that his disease is not curable and he VU but wife stated that he believes he can beat this. - here today for follow up and C15 FOLFIRI (Oxaliplatin removed with C14). - neuropathy - He can start B complex for some neuropathy. - bilateral down arms numbness/tingling - p/w cervical spine imaging-negative. - appetite - Eating well.    - hx RUE DVT, on Xarelto   - pain - denies and off of prescribed medications    - hypokalemia - resolved  - hypomagnesemia - on mag oxide 400 mg TID   - Previously, He wishes to continue on the current regimen; he previously expressed that in the future, he may wish to proceed with unconventional scheduling of maybe every 3 weeks. He does understand inherent risks with alternate schedule and how this would affect progression of disease. - sclerotic lesion on imaging at S1 - bone scan recommended, has not been completed   - constipation - Miralax as needed. - Continue good oral nutrition and hydration.   - Encouraged frequent activity throughout the day and rest as needed to combat fatigue.   - Call with any fevers, uncontrolled side effects from treatment or any other worrisome/concerning symptoms. - asked for PCP referral in [facility] per protocol or sooner if needed    [40min - chart review, review of results and discussion of options, next steps, coordination of care and charting ]      MDM      Lab studies and imaging studies were personally reviewed. Pertinent old records were reviewed. Historical:    - here with his wife for consultation. During today's visit, we discussed his current workup. We looked at the images together demonstrating some of the findings concerning for peritoneal nodularity/peritoneal disease. Discussed anatomy of the findings  utilizing images to help pt understand what we are looking at and suspecting. He and wife were appreciative of the time spent to review these. Discussed need for visual dx/tissue pathology to determine plan - recommend ex lap - refer to Dr Vic Saul - personally  reviewed case with Dr Vic Saul who will expedite the workup and will see pt Fri. US with mild biliary duct dilation - HIDA/ERCP reviewed by PCP   + BM/flatus; He did not like how IV morphine made him feel in the hospital.  He tried tramadol but found no relief and has been taking acetaminophen at home with minimal relief. Discussed different options and he settled on trying  Percocet - he will contact the office to inform us if this is working for him.   We discussed SE - not to drive while on the med. Bowel regimen reviewed. He is tired of tests, frustrated. Feelings validated and stressed importance of workup to determine why he has pain. Wt loss from 240 --> 209 noted and expressed concern to pt about this. - completed course of Levaquin/Diflucan for klebsiella pneumoniae and citrobacter freundii both of which were sensitive to Levaquin found around G-tube site. Wishes for tube removal - reviewed risks of this during chemotherapy - I would like for him to have labs day before scheduled procedure to make sure his counts are adequate per above; case reviewed with Dr Leslie Perez by me via tel today   - here cycle 8 FOLFIRINOX - labs reviewed-defer 1 week due to neutropenia. - nutrition - G-tube out. He is being weaned off TPN (3x week now) as his oral intake has greatly improved, weight up    - pain - Fentanyl 12mcg with prn oxycodone 10 mg, plans to stop patch on Nader 10/2 PC following  - Plan for surveillance reviewed. Labs discussed. - nausea - resolved. Has Zyprexa QHS (not taking currently) and Zofran PRN. - wt loss/FTT - secondary to disease and tx - on TPN and eating more    - depression/anxiety - better affect, on lexapro 20mg daily, PC adding buspar prn  - here for follow-up prior to cycle 13 FOLFIRINOX. Labs reviewed and adequate. - CT October 2022 previously with no evidence of progressive disease. Due for scan ~ January. All questions were asked and answered to the best of my ability. The patient verbalized understanding and agrees with the plan above.           Lacey Alvarez, APRN - [facility]  [id number] [place] [address]  Office : [phone]  Fax : [phone]

## 2023-02-03 NOTE — PROGRESS NOTES
Arrived to the formerly [facility]. Folfiri infusions completed. Patient tolerated well. Any issues or concerns during appointment: no.  Patient aware of next infusion appointment on 2/5/2023 (date) at [phone] [id number] (time). Patient aware of next lab and [facility] office visit on 2/17/2023 (date) at 0830 (time). Patient instructed to call provider with temperature of 100.4 or greater or nausea/vomiting/ diarrhea or pain not controlled by medications  Discharged ambulatory.

## 2023-02-03 NOTE — PATIENT INSTRUCTIONS
Patient Instructions from Today's Visit    Reason for Visit:  Prechemo visit    Diagnosis Information:  https://www."[employer]/. net/about-us/asco-answers-patient-education-materials/qman-xkxrpnx-nfgp-sheets    Plan:  -Cycle 15 5FU, Irinotecan, Leucovorin  -Start Potassium 20meq daily until next visit    Follow Up:  As scheduled    Recent Lab Results:  Hospital Outpatient Visit on 02/03/2023   Component Date Value Ref Range Status    WBC 02/03/2023 5.2  4.3 - 11.1 K/uL Final    RBC 02/03/2023 3.46 (A)  4.23 - 5.6 M/uL Final    Hemoglobin 02/03/2023 10.4 (A)  13.6 - 17.2 g/dL Final    Hematocrit 02/03/2023 32.3 (A)  41.1 - 50.3 % Final    MCV 02/03/2023 93.4  82.0 - 102.0 FL Final    MCH 02/03/2023 30.1  26.1 - 32.9 PG Final    MCHC 02/03/2023 32.2  31.4 - 35.0 g/dL Final    RDW 02/03/2023 14.8 (A)  11.9 - 14.6 % Final    Platelets 06/41/4633 150  150 - 450 K/uL Final    MPV 02/03/2023 10.3  9.4 - 12.3 FL Final    nRBC 02/03/2023 0.00  0.0 - 0.2 K/uL Final    **Note: Absolute NRBC parameter is now reported with Hemogram**    Differential Type 02/03/2023 AUTOMATED    Final    Seg Neutrophils 02/03/2023 58  43 - 78 % Final    Lymphocytes 02/03/2023 32  13 - 44 % Final    Monocytes 02/03/2023 5  4.0 - 12.0 % Final    Eosinophils % 02/03/2023 3  0.5 - 7.8 % Final    Basophils 02/03/2023 1  0.0 - 2.0 % Final    Immature Granulocytes 02/03/2023 0  0.0 - 5.0 % Final    Segs Absolute 02/03/2023 3.0  1.7 - 8.2 K/UL Final    Absolute Lymph # 02/03/2023 1.7  0.5 - 4.6 K/UL Final    Absolute Mono # 02/03/2023 0.3  0.1 - 1.3 K/UL Final    Absolute Eos # 02/03/2023 0.2  0.0 - 0.8 K/UL Final    Basophils Absolute 02/03/2023 0.0  0.0 - 0.2 K/UL Final    Absolute Immature Granulocyte 02/03/2023 0.0  0.0 - 0.5 K/UL Final    Sodium 02/03/2023 143  133 - 143 mmol/L Final    Potassium 02/03/2023 3.4 (A)  3.5 - 5.1 mmol/L Final    Chloride 02/03/2023 111 (A)  101 - 110 mmol/L Final    CO2 02/03/2023 28  21 - 32 mmol/L Final    Anion Gap 02/03/2023 4  2 - 11 mmol/L Final    Glucose 02/03/2023 164 (A)  65 - 100 mg/dL Final    BUN 02/03/2023 7 (A)  8 - 23 MG/DL Final    Creatinine 02/03/2023 0.80  0.8 - 1.5 MG/DL Final    EstSteven Filt Rate 02/03/2023 >60  >60 ml/min/1.73m2 Final    Comment:      Pediatric calculator link: https://www.kidney.org/professionals/kdoqi/gfr_calculatorped       These results are not intended for use in patients <18 years of age.       eGFR results are calculated without a race factor using  the 2021 CKD-EPI equation. Careful clinical correlation is recommended, particularly when comparing to results calculated using previous equations.  The CKD-EPI equation is less accurate in patients with extremes of muscle mass, extra-renal metabolism of creatinine, excessive creatine ingestion, or following therapy that affects renal tubular secretion.      Calcium 02/03/2023 8.5  8.3 - 10.4 MG/DL Final    Total Bilirubin 02/03/2023 0.3  0.2 - 1.1 MG/DL Final    ALT 02/03/2023 22  12 - 65 U/L Final    AST 02/03/2023 15  15 - 37 U/L Final    Alk Phosphatase 02/03/2023 92  50 - 136 U/L Final    Total Protein 02/03/2023 6.5  6.3 - 8.2 g/dL Final    Albumin 02/03/2023 3.1 (A)  3.2 - 4.6 g/dL Final    Globulin 02/03/2023 3.4  2.8 - 4.5 g/dL Final    Albumin/Globulin Ratio 02/03/2023 0.9  0.4 - 1.6   Final        Treatment Summary has been discussed and given to patient: n/a        -------------------------------------------------------------------------------------------------------------------  Please call our office at [phone] if you have any  of the following symptoms:   Fever of 100.5 or greater  Chills  Shortness of breath  Swelling or pain in one leg    After office hours an answering service is available and will contact a provider for emergencies or if you are experiencing any of the above symptoms.    Patient does express an interest in My Chart.  My Chart log in information explained on the after visit summary printout at  the check-out desk.     Annelise Early, RN  Nurse Navigator  [address] [facility] [address] [phone]

## 2023-02-05 ENCOUNTER — HOSPITAL ENCOUNTER (OUTPATIENT)
Dept: INFUSION THERAPY | Age: 63
Discharge: HOME | End: 2023-02-05
Payer: COMMERCIAL

## 2023-02-05 VITALS
RESPIRATION RATE: 16 BRPM | HEART RATE: 75 BPM | TEMPERATURE: 97.8 F | OXYGEN SATURATION: 96 % | SYSTOLIC BLOOD PRESSURE: 109 MMHG | DIASTOLIC BLOOD PRESSURE: 69 MMHG

## 2023-02-05 DIAGNOSIS — C76.2 ABDOMINAL CARCINOMATOSIS (HCC): Primary | ICD-10-CM

## 2023-02-05 DIAGNOSIS — C78.6 PERITONEAL METASTASES (HCC): ICD-10-CM

## 2023-02-05 DIAGNOSIS — C16.9 MALIGNANT NEOPLASM OF STOMACH, UNSPECIFIED LOCATION (HCC): ICD-10-CM

## 2023-02-05 PROCEDURE — 96523 IRRIG DRUG DELIVERY DEVICE: CPT

## 2023-02-05 PROCEDURE — 2580000003 HC RX 258: Performed by: NURSE PRACTITIONER

## 2023-02-05 RX ORDER — SODIUM CHLORIDE 0.9 % (FLUSH) 0.9 %
5-40 SYRINGE (ML) INJECTION PRN
Status: DISCONTINUED | OUTPATIENT
Start: 2023-02-05 | End: 2023-02-06 | Stop reason: HOSPADM

## 2023-02-05 RX ADMIN — SODIUM CHLORIDE, PRESERVATIVE FREE 10 ML: 5 INJECTION INTRAVENOUS at 11:35

## 2023-02-05 NOTE — PROGRESS NOTES
Arrived to the [facility]. Pump DC completed. Provided education on pump DC    Patient instructed to report any side affects to ordering provider. Patient tolerated well. Any issues or concerns during appointment: no.  Patient aware of next infusion appointment on 2/17/23 (date) at 0830 (time). Discharged ambulatory.

## 2023-02-06 ENCOUNTER — CLINICAL DOCUMENTATION (OUTPATIENT)
Dept: ONCOLOGY | Age: 63
End: 2023-02-06

## 2023-02-06 RX ORDER — PANTOPRAZOLE SODIUM 40 MG/1
TABLET, DELAYED RELEASE ORAL
Qty: 30 TABLET | Refills: 1 | OUTPATIENT
Start: 2023-02-06

## 2023-02-06 NOTE — PROGRESS NOTES
Refill request received from pharmacy for protonix.  Call to patient/wife in regards to this request. Jean Paul Lucas states that at this time they do not need a refill on this medication

## 2023-02-07 DIAGNOSIS — C76.2 ABDOMINAL CARCINOMATOSIS (HCC): Primary | ICD-10-CM

## 2023-02-10 ENCOUNTER — OFFICE VISIT (OUTPATIENT)
Dept: INTERNAL MEDICINE CLINIC | Facility: CLINIC | Age: 63
End: 2023-02-10
Payer: COMMERCIAL

## 2023-02-10 VITALS
HEART RATE: 78 BPM | DIASTOLIC BLOOD PRESSURE: 64 MMHG | BODY MASS INDEX: 32.21 KG/M2 | HEIGHT: 70 IN | SYSTOLIC BLOOD PRESSURE: 128 MMHG | WEIGHT: 225 LBS | OXYGEN SATURATION: 95 %

## 2023-02-10 DIAGNOSIS — I10 ESSENTIAL HYPERTENSION: ICD-10-CM

## 2023-02-10 DIAGNOSIS — E83.42 HYPOMAGNESEMIA: ICD-10-CM

## 2023-02-10 DIAGNOSIS — J41.1 MUCOPURULENT CHRONIC BRONCHITIS (HCC): ICD-10-CM

## 2023-02-10 DIAGNOSIS — E78.5 HYPERLIPIDEMIA, UNSPECIFIED HYPERLIPIDEMIA TYPE: ICD-10-CM

## 2023-02-10 DIAGNOSIS — C78.6 PERITONEAL METASTASES (HCC): ICD-10-CM

## 2023-02-10 DIAGNOSIS — G62.9 PERIPHERAL POLYNEUROPATHY: Primary | ICD-10-CM

## 2023-02-10 DIAGNOSIS — F32.A DEPRESSION, UNSPECIFIED DEPRESSION TYPE: ICD-10-CM

## 2023-02-10 DIAGNOSIS — I82.621 ACUTE DEEP VEIN THROMBOSIS (DVT) OF RIGHT UPPER EXTREMITY, UNSPECIFIED VEIN (HCC): ICD-10-CM

## 2023-02-10 PROBLEM — Z87.19 HX SBO: Status: ACTIVE | Noted: 2022-05-20

## 2023-02-10 PROCEDURE — 3078F DIAST BP <80 MM HG: CPT | Performed by: STUDENT IN AN ORGANIZED HEALTH CARE EDUCATION/TRAINING PROGRAM

## 2023-02-10 PROCEDURE — 3074F SYST BP LT 130 MM HG: CPT | Performed by: STUDENT IN AN ORGANIZED HEALTH CARE EDUCATION/TRAINING PROGRAM

## 2023-02-10 PROCEDURE — 99204 OFFICE O/P NEW MOD 45 MIN: CPT | Performed by: STUDENT IN AN ORGANIZED HEALTH CARE EDUCATION/TRAINING PROGRAM

## 2023-02-10 RX ORDER — ASCORBIC ACID 500 MG
500 TABLET ORAL DAILY
COMMUNITY

## 2023-02-10 RX ORDER — ONDANSETRON 8 MG/1
8 TABLET, ORALLY DISINTEGRATING ORAL EVERY 8 HOURS PRN
Qty: 90 TABLET | Refills: 0
Start: 2023-02-10

## 2023-02-10 RX ORDER — POLYETHYLENE GLYCOL 3350 17 G/17G
17 POWDER, FOR SOLUTION ORAL DAILY PRN
Qty: 1 EACH | Refills: 0
Start: 2023-02-10

## 2023-02-10 RX ORDER — MAGNESIUM OXIDE 400 MG/1
400 TABLET ORAL 3 TIMES DAILY
Qty: 90 TABLET | Refills: 2 | Status: SHIPPED | OUTPATIENT
Start: 2023-02-10

## 2023-02-10 SDOH — ECONOMIC STABILITY: FOOD INSECURITY: WITHIN THE PAST 12 MONTHS, THE FOOD YOU BOUGHT JUST DIDN'T LAST AND YOU DIDN'T HAVE MONEY TO GET MORE.: NEVER TRUE

## 2023-02-10 SDOH — ECONOMIC STABILITY: INCOME INSECURITY: HOW HARD IS IT FOR YOU TO PAY FOR THE VERY BASICS LIKE FOOD, HOUSING, MEDICAL CARE, AND HEATING?: NOT HARD AT ALL

## 2023-02-10 SDOH — ECONOMIC STABILITY: HOUSING INSECURITY
IN THE LAST 12 MONTHS, WAS THERE A TIME WHEN YOU DID NOT HAVE A STEADY PLACE TO SLEEP OR SLEPT IN A SHELTER (INCLUDING NOW)?: NO

## 2023-02-10 SDOH — ECONOMIC STABILITY: FOOD INSECURITY: WITHIN THE PAST 12 MONTHS, YOU WORRIED THAT YOUR FOOD WOULD RUN OUT BEFORE YOU GOT MONEY TO BUY MORE.: NEVER TRUE

## 2023-02-10 ASSESSMENT — PATIENT HEALTH QUESTIONNAIRE - PHQ9
10. IF YOU CHECKED OFF ANY PROBLEMS, HOW DIFFICULT HAVE THESE PROBLEMS MADE IT FOR YOU TO DO YOUR WORK, TAKE CARE OF THINGS AT HOME, OR GET ALONG WITH OTHER PEOPLE: 1
7. TROUBLE CONCENTRATING ON THINGS, SUCH AS READING THE NEWSPAPER OR WATCHING TELEVISION: 0
SUM OF ALL RESPONSES TO PHQ QUESTIONS 1-9: 3
8. MOVING OR SPEAKING SO SLOWLY THAT OTHER PEOPLE COULD HAVE NOTICED. OR THE OPPOSITE, BEING SO FIGETY OR RESTLESS THAT YOU HAVE BEEN MOVING AROUND A LOT MORE THAN USUAL: 0
9. THOUGHTS THAT YOU WOULD BE BETTER OFF DEAD, OR OF HURTING YOURSELF: 0
2. FEELING DOWN, DEPRESSED OR HOPELESS: 1
SUM OF ALL RESPONSES TO PHQ QUESTIONS 1-9: 3
SUM OF ALL RESPONSES TO PHQ QUESTIONS 1-9: 3
3. TROUBLE FALLING OR STAYING ASLEEP: 0
SUM OF ALL RESPONSES TO PHQ9 QUESTIONS 1 & 2: 2
4. FEELING TIRED OR HAVING LITTLE ENERGY: 1
1. LITTLE INTEREST OR PLEASURE IN DOING THINGS: 1
SUM OF ALL RESPONSES TO PHQ QUESTIONS 1-9: 3
6. FEELING BAD ABOUT YOURSELF - OR THAT YOU ARE A FAILURE OR HAVE LET YOURSELF OR YOUR FAMILY DOWN: 0
5. POOR APPETITE OR OVEREATING: 0

## 2023-02-10 ASSESSMENT — ENCOUNTER SYMPTOMS
DIARRHEA: 0
VOMITING: 0
NAUSEA: 0
SHORTNESS OF BREATH: 0
ABDOMINAL PAIN: 0
CONSTIPATION: 0

## 2023-02-10 NOTE — PROGRESS NOTES
SUBJECTIVE:   Nelli Young is a 65 y.o. male seen for a visit regarding   Chief Complaint   Patient presents with    Establish Care    Numbness     Fingers and feet at least a month        HPI: Here with his wife, who takes excellent notes. He was diagnosed with metastatic adenocarcinoma of upper GI primary metastatic disease to the peritoneum 5/2022, CT showed extensive peritoneal nodularity, concerning for metastatic disease, with consequential SBO. He initially presented with 6 months of abdominal pain, 30 pound weight loss, GI work-up revealed hiatal hernia, gastric polyps, benign colon polyps, diverticulosis and hemorrhoids. he was started on FOLFIRINOX, underwent laparotomy 5/27/2022 with venting G-tube. Later developed sepsis with source felt to be from G-tube, treated with vancomycin and cefepime discharged on Levaquin, culture grew scant Citrobacter freundii, Klebsiella pneumoniae, alpha strep, Candida albicans. He was on TPN. RUE DVT: Noted to have right upper extremity swelling, ultrasound on 10/28/2022 showed right axillary and basilic DVT, he was started on Xarelto. Neuropathy of hands and feet: Started around time of chemo. Taking B complex. Depression, anxiety: buspar takes in the AM on chemo days only. Lexapro may have helped symptoms, currently he denies depression or anxiety. Symptoms were worse with initial diagnosis of cancer and with his hospitalizations. Currently not having abdominal pain, nausea, vomiting. He does not like to take so many medications. He is on bentyl, wife and patient asking if he can stop taking this. Having bowel movements, he denies constipation or diarrhea. When he bends head forward he feels electric sensation on the feet. MRI c-spine was normal.    Hypokalemia, hypomagnesemia: on potassium and magnesium    Hypertension, hyperlipidemia: Blood pressure controlled off antihypertensives. On Crestor.     Past Medical History, Past Surgical History, Family history, Social History, and Medications were all reviewed with the patient today and updated as necessary.        Patient Active Problem List   Diagnosis    Right cervical radiculopathy    Right elbow pain    Hyperlipidemia    Bilateral carpal tunnel syndrome    Essential hypertension    Gastroesophageal reflux disease    PUD (peptic ulcer disease)    Chronic right shoulder pain    Paresthesia and pain of both upper extremities    History of colon polyps    Chronic bronchitis (HCC)    Peritoneal metastases (HCC)    Hx SBO    Partial small bowel obstruction ([address]    Debility    Encounter for palliative care    Itching    Fatigue    Abdominal carcinomatosis ([address]    Goals of care, counseling/discussion    Pain, abdominal, LUQ    Nausea    Anxiety about dying    Depression    Carcinomatosis ([address]    Cancer associated pain    PICC (peripherally inserted central catheter) flush    Metastatic adenocarcinoma (HCC)    Gastric cancer (HCC)    Malfunction of peripheral inserted central catheter (HCC)    Hypokalemia    Leukopenia    Anemia    High risk medication use    Neck pain    Numbness of upper extremity    Acute deep vein thrombosis (DVT) of right upper extremity, unspecified vein ([address]     Past Surgical History:   Procedure Laterality Date    COLONOSCOPY  2/25/09    colonoscopy per Dr. Wil Francisco with need for repeat every 3 years    COLONOSCOPY  02/25/2022    Dr. Bijan Avitia; polyps of the ascending, transverse, descending, and sigmoid colons; internal hemorrhoid; diverticulosis    LAPAROSCOPY N/A 5/27/2022    LAPAROSCOPY DIAGNOSTIC , OPEN EXPLORATORY LAPAROTOMY, MASS EXCISION, G-TUBE PLACEMENT performed by Hang Jerome MD at [facility] MAIN OR    PORT SURGERY N/A 6/3/2022    PORT INSERTION performed by Hang Jerome MD at [address] [place] [address]  October 2004    partial colon resection per Dr. Nick Munoz  02/25/2022    Dr. Bijan Avitia; hiatal hernia gastric polyps Family History   Problem Relation Age of Onset    No Known Problems Brother     Cancer Sister         breast    Hypertension Mother     Heart Disease Mother     Diabetes Father     Osteoarthritis Father     Alcohol Abuse Father     Hypertension Father     Diabetes Mother      Social History     Socioeconomic History    Marital status:      Spouse name: Not on file    Number of children: Not on file    Years of education: Not on file    Highest education level: Not on file   Occupational History    Not on file   Tobacco Use    Smoking status: Former     Packs/day: 1.00     Types: Cigarettes    Smokeless tobacco: Never   Vaping Use    Vaping Use: Never used   Substance and Sexual Activity    Alcohol use: No    Drug use: No    Sexual activity: Yes     Partners: Female   Other Topics Concern    Not on file   Social History Narrative    Not on file     Social Determinants of Health     Financial Resource Strain: Low Risk     Difficulty of Paying Living Expenses: Not hard at all   Food Insecurity: No Food Insecurity    Worried About Running Out of Food in the Last Year: Never true    [address] in the Last Year: Never true   Transportation Needs: Unknown    Lack of Transportation (Medical): Not on file    Lack of Transportation (Non-Medical):  No   Physical Activity: Not on file   Stress: Not on file   Social Connections: Not on file   Intimate Partner Violence: Not on file   Housing Stability: Unknown    Unable to Pay for Housing in the Last Year: Not on file    Number of Places Lived in the Last Year: Not on file    Unstable Housing in the Last Year: No     Current Outpatient Medications   Medication Sig Dispense Refill    Docusate Calcium (STOOL SOFTENER PO) Take by mouth      B Complex-C (SUPER B COMPLEX PO) Take by mouth      vitamin C (ASCORBIC ACID) 500 MG tablet Take 500 mg by mouth daily      zinc 50 MG CAPS Take by mouth      ondansetron (ZOFRAN-ODT) 8 MG TBDP disintegrating tablet Take 1 tablet by mouth every 8 hours as needed for Nausea or Vomiting 90 tablet 0    polyethylene glycol (GLYCOLAX) 17 GM/SCOOP powder Take 17 g by mouth daily as needed (constipation) 1 each 0    escitalopram (LEXAPRO) 10 MG tablet Take 1 tablet by mouth daily 30 tablet 5    potassium chloride (KLOR-CON M) 20 MEQ extended release tablet Take 1 tablet by mouth daily for 28 days 14 days 30 tablet 1    busPIRone (BUSPAR) 7.5 MG tablet Take 1 tablet by mouth daily 90 tablet 0    rivaroxaban (XARELTO) 20 MG TABS tablet Take 1 tablet by mouth daily (with breakfast) 30 tablet 3    magnesium oxide (MAG-OX) 400 MG tablet Take 1 tablet by mouth in the morning, at noon, and at bedtime 90 tablet 2    pantoprazole (PROTONIX) 40 MG tablet Take 1 tablet by mouth daily 30 tablet 1    Multiple Vitamins-Minerals (MULTIVITAMIN MEN 50+ PO) Take by mouth      promethazine (PHENERGAN) 12.5 MG tablet Take 1 tablet by mouth every 6 hours as needed for Nausea 90 tablet 0    rosuvastatin (CRESTOR) 10 MG tablet Take 10 mg by mouth      oxyCODONE (ROXICODONE INTENSOL) 100 MG/5ML concentrated solution Take 10 mg by mouth every 4 hours as needed for Pain. 1 mls under tongue (Patient not taking: No sig reported)      naloxone 4 MG/0.1ML LIQD nasal spray 1 spray by Nasal route as needed for Opioid Reversal (Patient not taking: No sig reported)       No current facility-administered medications for this visit. Allergies as of 02/10/2023    (No Known Allergies)         Review of Systems   Constitutional:  Negative for chills and fever. Respiratory:  Negative for shortness of breath. Gastrointestinal:  Negative for abdominal pain, constipation, diarrhea, nausea and vomiting.        OBJECTIVE:    Vitals:    02/10/23 0922   BP: 128/64   Site: Left Upper Arm   Position: Sitting   Cuff Size: Large Adult   Pulse: 78   SpO2: 95%   Weight: 225 lb (102.1 kg)   Height: 5' 10\" (1.778 m)        Physical Exam  Constitutional:       General: He is not in acute distress. Appearance: Normal appearance. HENT:      Head: Normocephalic and atraumatic. Eyes:      Extraocular Movements: Extraocular movements intact. Cardiovascular:      Rate and Rhythm: Normal rate and regular rhythm. Heart sounds: Normal heart sounds. Pulmonary:      Breath sounds: Normal breath sounds. Abdominal:      General: There is no distension. Palpations: Abdomen is soft. Tenderness: There is no abdominal tenderness. There is no guarding. Musculoskeletal:         General: No deformity. Skin:     General: Skin is warm and dry. Neurological:      Mental Status: He is alert. Mental status is at baseline. Psychiatric:         Mood and Affect: Mood normal.          Medical problems and test results were reviewed with the patient today.      Lab Results   Component Value Date    WBC 5.2 02/03/2023    HGB 10.4 (L) 02/03/2023    HCT 32.3 (L) 02/03/2023    MCV 93.4 02/03/2023     02/03/2023      Lab Results   Component Value Date/Time     02/03/2023 08:48 AM    K 3.4 02/03/2023 08:48 AM     02/03/2023 08:48 AM    CO2 28 02/03/2023 08:48 AM    BUN 7 02/03/2023 08:48 AM    CREATININE 0.80 02/03/2023 08:48 AM    GLUCOSE 164 02/03/2023 08:48 AM    CALCIUM 8.5 02/03/2023 08:48 AM       Lab Results   Component Value Date     02/03/2023    K 3.4 (L) 02/03/2023     (H) 02/03/2023    CO2 28 02/03/2023    BUN 7 (L) 02/03/2023    CREATININE 0.80 02/03/2023    GLUCOSE 164 (H) 02/03/2023    CALCIUM 8.5 02/03/2023    PROT 6.5 02/03/2023    LABALBU 3.1 (L) 02/03/2023    BILITOT 0.3 02/03/2023    ALKPHOS 92 02/03/2023    AST 15 02/03/2023    ALT 22 02/03/2023    LABGLOM >60 02/03/2023    GFRAA >60 09/30/2022    AGRATIO 1.1 (L) 05/20/2022    GLOB 3.4 02/03/2023     Lab Results   Component Value Date    TSH 1.220 12/29/2020      Lab Results   Component Value Date    CHOL 138 04/18/2022    CHOL 223 (H) 01/03/2022    CHOL 205 (H) 12/29/2020     Lab Results Component Value Date    TRIG 116 07/12/2022    TRIG 83 07/11/2022    TRIG 132 06/13/2022     Lab Results   Component Value Date    HDL 26 (L) 04/18/2022    HDL 28 (L) 01/03/2022    HDL 29 (L) 12/29/2020     Lab Results   Component Value Date    LDLCALC 88 04/18/2022    LDLCALC 160 (H) 01/03/2022    LDLCALC 147 (H) 12/29/2020     Lab Results   Component Value Date    LABVLDL 25 12/11/2019    VLDL 24 04/18/2022    VLDL 35 01/03/2022    VLDL 29 12/29/2020     No results found for: CHOLHDLRATIO   Hemoglobin A1C   Date Value Ref Range Status   04/18/2022 6.0 (H) 4.8 - 5.6 % Final     Comment:              Prediabetes: 5.7 - 6.4           Diabetes: >6.4           Glycemic control for adults with diabetes: <7.0          ASSESSMENT and PLAN     1. Peripheral polyneuropathy  2. Depression, unspecified depression type  3. Hyperlipidemia, unspecified hyperlipidemia type  -     Lipid Panel; Future  4. Essential hypertension  -     TSH with Reflex; Future  5. Acute deep vein thrombosis (DVT) of right upper extremity, unspecified vein (HCC)  6. Mucopurulent chronic bronchitis (HCC)     Depression, anxiety symptoms controlled, will taper off Lexapro, take half a pill daily for 1 week and then stop, carefully monitor for worsening anxiety or depression and let us know if this occurs. Stop Bentyl and monitor for new or worsening GI symptoms. As needed MiraLAX for constipation. For neuropathy, try menthol topical ointment or Bengay. May consider trying Cymbalta in the future. Continues on Xarelto for DVT    Return in about 3 weeks (around 3/3/2023).      Dai Byrd MD

## 2023-02-16 ASSESSMENT — ENCOUNTER SYMPTOMS
SHORTNESS OF BREATH: 0
COUGH: 0
VOMITING: 0
SCLERAL ICTERUS: 0
NAUSEA: 0
SORE THROAT: 0
BACK PAIN: 0
EYE PROBLEMS: 0
ABDOMINAL DISTENTION: 0
ABDOMINAL PAIN: 0
CONSTIPATION: 1
DIARRHEA: 0
HEMOPTYSIS: 0
BLOOD IN STOOL: 0

## 2023-02-16 NOTE — PROGRESS NOTES
[facility]: Established patient - follow up     Chief Complaint   Patient presents with    Follow-up        Reason for Referral: Abdominal pain; peritoneal metastatic disease   Referring Provider: Yany Mosher NP   Primary Care Provider: Jazlyn Duran MD   Family History of Cancer/Hematologic Disorders: Family history is significant for sister with breast cancer. Presenting Symptoms: Progressively worsening, persistent abdominal pain x several months    History of Present Illness:  Mr. Matthew Carrasco  is a 62 y.o. male who presents today for FU regarding adenocarcinoma with peritoneal metastatic disease. The past medical history is significant for tobacco use (recent pack per day smoker x 30+ years - now down to 1/3PPD), PUD, paresthesia/pain of bilateral upper extremities, HLD, HTN, diverticulitis,  colon polyps, hiatal hernia, chronic right shoulder pain, bilateral carpal tunnel syndrome, and partial colon resection. He presented to the [facility] ED on 5/12/22 with a complaint of persistent abdominal pain for several months that was becoming progressively  worse. EGD and colonoscopy on 2/25/22 revealed findings of hiatal hernia, gastric polyps, several benign colon polyps, diverticulosis, and internal hemorrhoids. CT of the abdomen on 3/8/22 showed no acute findings. He presented to [facility] ER x 2 for  evaluation (5/3/22 and 5/10/22). RUQ abdominal ultrasound was completed on 5/3/22 in the ED at [facility] demonstrating no acute findings to explain patient's pain; probable hepatic  steatosis; small echogenic mural foci in the gallbladder which are nonmobile, likely representing cholesterol polyps, largest measuring 4 mm; and small volume simple ascites in the right upper quadrant and left lower quadrant, nonspecific.   CT AP with  contrast at [facility] ED 5/10/22 showed a partial small bowel obstruction; small to moderate amount of free fluid in the abdomen and pelvis; and nonspecific stranding of the omentum primarily in the left upper quadrant. Radiologist noted that follow-up  CT was recommended to differentiate passive congestion from omental disease. On 5/11/22, abdominal series again showed multiple dilated small bowel loops with enteric contrast appearing to extend into the proximal colon, suggesting partial small  bowel obstruction. CT AP in the [facility] ED on 5/12/22 identified extensive peritoneal nodularity and mild ascites consistent with peritoneal  metastatic disease with primary lesion not definitely identified; dilated fluid-filled loops of small bowel possibly related to gastroenteritis with no definite obstruction and no obvious bowel mass; and sclerosis of S1 vertebral body. Radiologist recommended consideration of follow-up bone scan to assess for bone metastases. Patient was admitted to  the Hospitalist service on 5/12/22, and IR consulted for possible paracentesis however very small volume was inaccessible. CT Chest obtained on 5/13/22 showed No evidence of primary malignancy or metastasis within the chest.  Follow-up hepatobiliary  scan was suggested to assess for common bile duct obstruction. Oncology was consulted but did not see patient inhouse as pt was dischared. Upon discharge on 5/14/22, patient was instructed to follow up with [facility]. During consultation, we discussed his workup. We looked at the images together demonstrating some of the findings concerning for peritoneal nodularity/peritoneal disease. Discussed anatomy of the findings utilizing images to help pt understand what we are looking at and suspecting. He and wife were appreciative of the time spent to review these. We also addressed pt's pain. We also reviewed images of sclerosing lesion - bone scan recommended. He also was recommended to undergo HIDA scan after recent US per PCP. He is tired of tests, frustrated.   Feelings validated and stressed importance of workup to determine why he has pain.  Wt loss from 240 --> 209 noted and expressed concern to pt about this. Of note, prior akbar resection with Dr Kathy Lora for diverticulitis per pt. Sent note to dr Kamini Pan re pt's case to expedite workup with his agreement. He was hospitalized for abdominal pain and sepsis. He was empirically placed on vancomycin and cefepime. CT scanning disclosed no intra-abdominal fluid collection or abscess but did suggest that his tumor burden was somewhat smaller. He had some purulent drainage from around his G-tube. This was cultured and grew klebsiella pneumoniae and citrobacter freundii both of which were sensitive to Levaquin which he was discharged home on for 10 days. He returns today with wife for follow up and C16 FOLFIRI (Oxaliplatin removed with C14 due to neuropathy). He has been okay overall since last seen. His neuropathy is painful at times, taking B complex and will add Alpha lipoic acid. Pt wishes to hold off on a prescription for neuropathy currently. Hopefully neuropathy will improve soon now that we have omitted Oxaliplatin. He denies any GI or bowel complaints. His weight is stable. He is pleased that he can drink milkshakes/cold items now. He denies any shortness of breath. He has some slight pain in the right rib area, feels he twisted and has a cramp in that area - will monitor this, has been present two days currently. Using a heating pad with some improvement. He denies any fevers or other infectious symptoms. Looking forward to boating/fishing when the weather gets warmer. Chronological Events:   EGD REPORT 2/25/22                      COLONOSCOPY REPORT 2/25/22                 CT ABDOMEN PELVIS W CONTRAST 3/8/2022   FINDINGS:   LOWER CHEST: Unremarkable. ABDOMEN AND PELVIS:   Liver: Unremarkable. Biliary system: Unremarkable. Pancreas: Unremarkable. Spleen: Unremarkable. Adrenals: Unremarkable. Genitourinary: Unremarkable.    Reproductive organs: Unremarkable. Gastrointestinal tract: Colonic diverticulosis without evidence of diverticulitis. There is no evidence of bowel obstruction or inflammation. The appendix is normal   Peritoneum/Extraperitoneum: Unremarkable. No free fluid or air. Vascular: Unremarkable. Musculoskeletal:  Small fat-containing umbilical hernia. Degenerative  changes of thoracolumbar spine. No acute or aggressive osseous lesion. IMPRESSION: Colonic diverticula without evidence of acute diverticulitis. RIGHT UPPER QUADRANT ABDOMINAL ULTRASOUND 5/3/2022    FINDINGS:   Pancreas: Not visualized, obscured by bowel gas. Intrahepatic IVC: Normal.   Main  Portal Vein: Patent with normal directional flow. Liver: Liver contour is normal. Liver appears diffusely increased in echogenicity consistent with hepatic steatosis. There is fatty sparing around the gallbladder fossa. Gallbladder: Gallbladder  is normal in size. No evidence of gallbladder wall thickening. No gallstones are seen. There are several nonmobile echogenic mural foci in the proximal gallbladder body/neck, largest measuring 4 mm, without shadowing, likely small cholesterol polyps. Bile ducts: No evidence of biliary ductal dilation. The common bile duct measures 3 mm in diameter. Right kidney: Normal.   Left Upper Quadrant: Limited images of the left upper quadrant are unremarkable. Spleen measures 12.1 cm in length. Free fluid: Trace simple free fluid in the right upper quadrant and left lower quadrant. IMPRESSION:    1. No acute findings to explain patient's pain. 2. Probable hepatic steatosis. 3. Small echogenic mural foci in the gallbladder which are nonmobile, likely representing cholesterol polyps, largest measuring 4 mm. There are no specific ultrasound  follow up recommendations for polyps of this small size. 4. Small volume simple ascites in the right upper quadrant and left lower quadrant, nonspecific.         CT ABDOMEN - PELVIS WITH CONTRAST 5/10/22   FINDINGS:   Liver: Normal    Portal Vein: Normal   Gallbladder - Biliary Tree: Normal   Pancreas: Normal   Spleen: Normal   Retroperitoneum:   Adrenals: Normal   Kidneys:  Normal    Aorto - Cava:  Calcification of the abdominal aorta without aneurysm formation.   Lymphatics:  Normal   GI - Mesentery - Peritoneum: Multiple dilated mid small bowel loops without a focal transition point. The appendix is normal. Small to  moderate amount of free fluid in the pelvis and right flank. Nonspecific stranding of the omentum in the left upper quadrant. Small fatty umbilical hernia.   Pelvis: Normal   Lower Chest: Normal   Soft tissues - MSK: Normal    IMPRESSION:   1. Partial small bowel obstruction.   2. Small to moderate amount of free fluid in the abdomen and pelvis.   3. Nonspecific stranding of the omentum primarily  in the left upper quadrant. Follow-up CT is recommended to differentiate passive congestion from omental a static disease.         ABDOMEN SERIES 5/11/22   FINDINGS:   Chest: Heart size within normal limits. Lungs are clear. No pleural effusion or pneumothorax.    Bowel: Multiple dilated small bowel loops with air-fluid levels on the upright view. Contrast distends small bowel loops in the left lateral abdomen and lower abdomen. Contrast in the right hemiabdomen appears to be within proximal colon. Scattered colonic  gas.   Peritoneum / Retroperitoneum: No pneumoperitoneum.   Soft tissues / Bones: No acute osseous findings. The contrast in urinary bladder.   Lines / Support Apparatus: None.    IMPRESSION: Redemonstrated of multiple dilated small bowel loops with enteric contrast appearing to extend into the proximal colon, suggesting only partial small bowel obstruction.  Continued radiographic follow-up is advised.        CT OF THE ABDOMEN AND PELVIS 5/12/22   FINDINGS:   LOWER CHEST: Normal.   HEPATOBILIARY: No evidence of focal liver mass. No calcified gallstones.    PANCREAS: Normal.    SPLEEN: Normal.    ADRENAL GLANDS: Normal.    KIDNEYS/BLADDER: Kidneys and bladder are unremarkable. No hydronephrosis or urinary tract calculi. BOWEL: Dilated fluid-filled loops of small bowel are present throughout the abdomen. No definite transition point. Oral contrast noted in the colon. No definite evidence of bowel mass but there is extensive peritoneal nodularity and trace ascites highly  concerning for peritoneal metastatic disease. LYMPH NODES: No enlarged retroperitoneal or pelvic lymph nodes. Peritoneal nodularity again noted most likely metastatic disease. VASCULATURE: Unremarkable. PELVIC ORGANS: Prostate gland and rectum are unremarkable. Small amount of free pelvic fluid is noted. MUSCULOSKELETAL: Degenerative spine changes are noted. Mild sclerosis involving the S1 vertebral body is present on image 74 of series 602. IMPRESSION   1. Extensive peritoneal nodularity and mild ascites consistent with peritoneal metastatic disease. Primary lesion not definitely identified. 2. Dilated fluid-filled loops of small bowel possibly related to gastroenteritis. No definite obstruction. No obvious bowel mass. 3. Sclerosis S1 vertebral body. Consider follow-up bone scan to assess for bone metastases. LIMITED ABDOMINAL ULTRASOUND 5/13/22   FINDINGS: A single limited gray scale image was submitted of an undisclosed location in the abdomen. Images demonstrate a small amount of  ascites as seen on comparison CT. IMPRESSION   1. Small volume ascites. CT CHEST WITH CONTRAST 5/13/22   FINDINGS:   Mediastinum and visualized thyroid: Normal.   Heart: Normal.    Large Vessels: Normal.    Pleura: Normal.    Lungs: No lung mass clearly discerned. Mild scarring or atelectasis in the right lung base. No suspicious lung nodule. No consolidation or zulma pulmonary edema.     Airways: Normal.    Lymph nodes: Normal.    Bones/Soft tissues: Normal.    Visualized abdomen: Partially imaged omental nodules. Perihepatic ascites noted. IMPRESSION: No evidence of primary malignancy or metastasis within the chest       ABDOMINAL ULTRASOUND 5/17/22   FINDINGS:    LIVER: 17.3 cm. Slight increase in echogenicity without focal mass. BILE DUCTS: No intrahepatic bile duct dilatation. CBD diameter = 8 mm. GALLBLADDER: It is unremarkable in appearance without stones or sludge. Echogenic polyp measures 0.5 cm. No gallbladder wall thickening. Mild ascites. PANCREAS: Normal.   SPLEEN: Borderline enlarged at 12.2 cm. RIGHT KIDNEY: 13.3 cm. No mass or hydronephrosis. LEFT KIDNEY: 14.3 cm. No mass or hydronephrosis. ABDOMINAL AORTA AND IVC: Normal in size. ASCITES: Mild   IMPRESSION: Possible mild fatty infiltration liver. No focal mass. Gallbladder polyp but no stones or gallbladder wall thickening. Mild ascites. Mildly prominent common bile duct. Follow-up hepatobiliary scan could be performed to assess for common bile duct obstruction. 04/02/2021 (COVID-19, Benavides Batch, Primary or Immunocompromised Series, MRNA, PF, 100mcg/0.5mL)   04/30/2021 (COVID-19, Benavides Batch, Primary or Immunocompromised Series, MRNA, PF, 100mcg/0.5mL)   11/16/2021 (COVID-19, Moderna Booster, PF, 0.25mL Dose)      5/18/22 heme/onc consultation   5/20/22 Dr Florence Wood consult - sent to ED for admission/workup   5/23/22 omental bx - IR   5/27/22 Dr Florence Wood - diagnostic lap, omental mass and mesentery mass excision, G-tube placement for venting  6/1/22 painful G-tube - tract recanalized and new G-tube placed   6/12/22 C1 FOLFIRINOX completed - dose adjusted   6/14/22 d/c   6/24/22 FU sx - G tube maint instructions/staples removed - RTC PRN   6/24/22 FU after hospitalization - discussed PC/plan for tx  7/8/22 FU - mainly concerned about PICC line without blood return, decline cathflo, seeing José Miguel Pham in [facility] on Monday. Will increase hydration at home. HH for TPN.    7/18/22 Follow up after hospitalization.  He will complete course of Levaquin as prescribed for infection around G-tube. Would like to proceed with cycle 3 FOLFIRINOX with increased dosing. 8/4/22 Cycle 4 FOLFIRINOX - continue dose escalation. He will complete course of Levaquin/Diflucan as prescribed for infection around G-tube, site looks good today. 8/17/22 C5 FOLFIRINOX - wishes to pw full dose accepting risks; wishes for G tube to be removed - will need to time with labs; plan to drop dose in opioids - PC seeing pt today. RD seeing pt. Plan for restaging in ~Oct.    9/1/22 FU FOLFIRINOX C5 full dose now; imaging in Oct   9/15/22 FU FOLFIRINOX C6-doing well, weight up 11#  9/30/22 FU FOLFIRINOX-C8 defer 1 week due to neutropenia, ANC 0.5  10/13/22 FU FOLFIRINOX 8; CT CAP reviewed and no POD. 10/28/22 Stat ultrasound RUE and then proceed with cycle 9 FOLFIRINOX. Doppler - Persistent DVT, nonocclusive, within the right axillary and basilic veins. The previously seen nonocclusive thrombus within the right innominate and subclavian veins are no longer seen. No abnormality of the right brachial vein demonstrated. Started on Xarelto. 11/11/22 FU - C10 FOLFIRINOX. Doing well. Wt stable. RUE swelling better. No new issues/concerns. 11/25/22 FU and C11 FOLFIRINOX, doing very well   12/9/22 FU and C12 FOLFIRINOX. Doing well. Weight stable. 12/23/22 FU and C13 FOLFIRINOX, doing well      1/20/23 FU and C14 - will stop oxali due to cold/neuropathy - p/w FOLFIRI for now; CT reviewed; cervical spine imaging   2/3/23 FU and C15 FOLFIRI      2/17/23 FU and C16 FOLFIRI. Doing okay aside from neuropathy. Labs reviewed.       Family History   Problem Relation Age of Onset    No Known Problems Brother     Cancer Sister         breast    Hypertension Mother     Heart Disease Mother     Diabetes Father     Osteoarthritis Father     Alcohol Abuse Father     Hypertension Father     Diabetes Mother       Social History     Socioeconomic History    Marital status:      Spouse name: None    Number of children: None    Years of education: None    Highest education level: None   Tobacco Use    Smoking status: Former     Packs/day: 1.00     Types: Cigarettes    Smokeless tobacco: Never   Vaping Use    Vaping Use: Never used   Substance and Sexual Activity    Alcohol use: No    Drug use: No    Sexual activity: Yes     Partners: Female     Social Determinants of Health     Financial Resource Strain: Low Risk     Difficulty of Paying Living Expenses: Not hard at all   Food Insecurity: No Food Insecurity    Worried About [address] in the Last Year: Never true    [address] [employer] in the Last Year: Never true   Transportation Needs: Unknown    Lack of Transportation (Non-Medical): No   Housing Stability: Unknown    Unstable Housing in the Last Year: No        Review of Systems   Constitutional:  Negative for appetite change, diaphoresis, fatigue, fever and unexpected weight change. HENT:   Negative for sore throat. Eyes:  Negative for eye problems and icterus. Respiratory:  Negative for cough, hemoptysis and shortness of breath. Cardiovascular:  Negative for chest pain, leg swelling and palpitations. Gastrointestinal:  Positive for constipation (controlled). Negative for abdominal distention, abdominal pain, blood in stool, diarrhea, nausea and vomiting. Endocrine: Negative for hot flashes. Genitourinary:  Negative for dysuria. Musculoskeletal:  Negative for arthralgias, back pain and gait problem. Skin:  Negative for itching, rash and wound. Neurological:  Negative for dizziness, extremity weakness, gait problem, headaches, light-headedness, numbness, seizures and speech difficulty. Hematological:  Negative for adenopathy. Does not bruise/bleed easily. Psychiatric/Behavioral:  Positive for depression (better). Negative for decreased concentration and sleep disturbance. The patient is nervous/anxious (better).        No Known Allergies  Past Medical History: Diagnosis Date    Bilateral carpal tunnel syndrome 12/9/2020    Cancer ([address]     Chronic right shoulder pain 11/30/2020    Colon polyps 3/11/2013    Diverticulitis 3/11/2013    HTN (hypertension) 3/11/2013    Hyperlipidemia 3/11/2013    Paresthesia and pain of both upper extremities 11/30/2020    PUD (peptic ulcer disease) 3/11/2013    History of     Right elbow pain 12/9/2020    Wheezing 3/11/2013     Past Surgical History:   Procedure Laterality Date    COLONOSCOPY  2/25/09    colonoscopy per Dr. Mckayla Solano with need for repeat every 3 years    COLONOSCOPY  02/25/2022    Dr. Derrick Baez; polyps of the ascending, transverse, descending, and sigmoid colons; internal hemorrhoid; diverticulosis    LAPAROSCOPY N/A 5/27/2022    LAPAROSCOPY DIAGNOSTIC , OPEN EXPLORATORY LAPAROTOMY, MASS EXCISION, G-TUBE PLACEMENT performed by Stephanie Jones MD at [address] [facility] N/A 6/3/2022    PORT INSERTION performed by Stephanie Jones MD at [address] [facility]  October 2004    partial colon resection per Dr. Samuel Joya  02/25/2022    Dr. Derrick Baez; hiatal hernia gastric polyps     Current Outpatient Medications   Medication Sig Dispense Refill    Docusate Calcium (STOOL SOFTENER PO) Take by mouth      B Complex-C (SUPER B COMPLEX PO) Take by mouth      polyethylene glycol (GLYCOLAX) 17 GM/SCOOP powder Take 17 g by mouth daily as needed (constipation) 1 each 0    magnesium oxide (MAG-OX) 400 MG tablet Take 1 tablet by mouth in the morning, at noon, and at bedtime 90 tablet 2    escitalopram (LEXAPRO) 10 MG tablet Take 1 tablet by mouth daily (Patient taking differently: Take 5 mg by mouth daily Will stop on 2/19) 30 tablet 5    potassium chloride (KLOR-CON M) 20 MEQ extended release tablet Take 1 tablet by mouth daily for 28 days 14 days 30 tablet 1    busPIRone (BUSPAR) 7.5 MG tablet Take 1 tablet by mouth daily 90 tablet 0    rivaroxaban (XARELTO) 20 MG TABS tablet Take 1 tablet by mouth daily (with breakfast) 30 tablet 3    pantoprazole (PROTONIX) 40 MG tablet Take 1 tablet by mouth daily 30 tablet 1    Multiple Vitamins-Minerals (MULTIVITAMIN MEN 50+ PO) Take by mouth      promethazine (PHENERGAN) 12.5 MG tablet Take 1 tablet by mouth every 6 hours as needed for Nausea 90 tablet 0    rosuvastatin (CRESTOR) 10 MG tablet Take 10 mg by mouth      vitamin C (ASCORBIC ACID) 500 MG tablet Take 500 mg by mouth daily (Patient not taking: Reported on 2/17/2023)      zinc 50 MG CAPS Take by mouth (Patient not taking: Reported on 2/17/2023)      ondansetron (ZOFRAN-ODT) 8 MG TBDP disintegrating tablet Take 1 tablet by mouth every 8 hours as needed for Nausea or Vomiting (Patient not taking: Reported on 2/17/2023) 90 tablet 0    oxyCODONE (ROXICODONE INTENSOL) 100 MG/5ML concentrated solution Take 10 mg by mouth every 4 hours as needed for Pain. 1 mls under tongue (Patient not taking: No sig reported)      naloxone 4 MG/0.1ML LIQD nasal spray 1 spray by Nasal route as needed for Opioid Reversal (Patient not taking: No sig reported)       No current facility-administered medications for this visit. Facility-Administered Medications Ordered in Other Visits   Medication Dose Route Frequency Provider Last Rate Last Admin    sodium chloride flush 0.9 % injection 10 mL  10 mL IntraVENous PRN Alba Torres MD   10 mL at 02/17/23 0853       No flowsheet data found. OBJECTIVE:  /79 (Site: Left Upper Arm, Position: Standing, Cuff Size: Medium Adult)   Pulse 76   Temp 97.9 °F (36.6 °C) (Oral)   Resp 16   Ht 5' 10\" (1.778 m)   Wt 225 lb (102.1 kg)   SpO2 98%   BMI 32.28 kg/m²       ECOG PERFORMANCE STATUS - 1- Restricted in physically strenuous activity but ambulatory and able to carry out work of a light or sedentary nature such as light house work, office work. Pain - Pain Score:   7 (fatigue-5)7/10.  None/Minimal pain - not affecting QOL     Fatigue - No flowsheet data found.  Distress - No flowsheet data found. Physical Exam  Vitals reviewed. Exam conducted with a chaperone present. Constitutional:       General: He is not in acute distress. Appearance: Normal appearance. He is normal weight. He is not ill-appearing or toxic-appearing. HENT:      Head: Normocephalic and atraumatic. Nose: Nose normal. No congestion. Mouth/Throat:      Mouth: Mucous membranes are moist.   Eyes:      General: No scleral icterus. Conjunctiva/sclera: Conjunctivae normal.   Cardiovascular:      Rate and Rhythm: Normal rate and regular rhythm. Heart sounds: No murmur heard. Pulmonary:      Effort: Pulmonary effort is normal. No respiratory distress. Breath sounds: Normal breath sounds. No wheezing, rhonchi or rales. Abdominal:      General: Bowel sounds are normal. There is no distension. Palpations: Abdomen is soft. There is no mass. Tenderness: There is no abdominal tenderness. There is no guarding or rebound. Musculoskeletal:         General: No swelling. Normal range of motion. Cervical back: Normal range of motion. No rigidity. Right lower leg: No edema. Left lower leg: No edema. Skin:     General: Skin is warm and dry. Coloration: Skin is not jaundiced or pale. Findings: No bruising or rash. Neurological:      General: No focal deficit present. Mental Status: He is alert and oriented to person, place, and time. Motor: No weakness. Coordination: Coordination normal.      Gait: Gait normal.   Psychiatric:         Behavior: Behavior normal.         Thought Content:  Thought content normal.        Labs:  Recent Results (from the past 168 hour(s))   CBC with Auto Differential    Collection Time: 02/17/23  8:45 AM   Result Value Ref Range    WBC 4.2 (L) 4.3 - 11.1 K/uL    RBC 3.41 (L) 4.23 - 5.6 M/uL    Hemoglobin 10.2 (L) 13.6 - 17.2 g/dL    Hematocrit 32.4 (L) 41.1 - 50.3 %    MCV 95.0 82.0 - 102.0 FL MCH 29.9 26.1 - 32.9 PG    MCHC 31.5 31.4 - 35.0 g/dL    RDW 15.0 (H) 11.9 - 14.6 %    Platelets 418 505 - 539 K/uL    MPV 9.9 9.4 - 12.3 FL    nRBC 0.00 0.0 - 0.2 K/uL    Differential Type AUTOMATED      Seg Neutrophils 51 43 - 78 %    Lymphocytes 38 13 - 44 %    Monocytes 7 4.0 - 12.0 %    Eosinophils % 2 0.5 - 7.8 %    Basophils 1 0.0 - 2.0 %    Immature Granulocytes 0 0.0 - 5.0 %    Segs Absolute 2.2 1.7 - 8.2 K/UL    Absolute Lymph # 1.6 0.5 - 4.6 K/UL    Absolute Mono # 0.3 0.1 - 1.3 K/UL    Absolute Eos # 0.1 0.0 - 0.8 K/UL    Basophils Absolute 0.1 0.0 - 0.2 K/UL    Absolute Immature Granulocyte 0.0 0.0 - 0.5 K/UL   Comprehensive Metabolic Panel    Collection Time: 02/17/23  8:45 AM   Result Value Ref Range    Sodium 145 (H) 133 - 143 mmol/L    Potassium 3.6 3.5 - 5.1 mmol/L    Chloride 114 (H) 101 - 110 mmol/L    CO2 27 21 - 32 mmol/L    Anion Gap 4 2 - 11 mmol/L    Glucose 152 (H) 65 - 100 mg/dL    BUN 9 8 - 23 MG/DL    Creatinine 0.90 0.8 - 1.5 MG/DL    Est, Glom Filt Rate >60 >60 ml/min/1.73m2    Calcium 8.4 8.3 - 10.4 MG/DL    Total Bilirubin 0.4 0.2 - 1.1 MG/DL    ALT 20 12 - 65 U/L    AST 14 (L) 15 - 37 U/L    Alk Phosphatase 111 50 - 136 U/L    Total Protein 6.5 6.3 - 8.2 g/dL    Albumin 3.2 3.2 - 4.6 g/dL    Globulin 3.3 2.8 - 4.5 g/dL    Albumin/Globulin Ratio 1.0 0.4 - 1.6     Magnesium    Collection Time: 02/17/23  8:45 AM   Result Value Ref Range    Magnesium 2.1 1.8 - 2.4 mg/dL   CEA    Collection Time: 02/17/23  8:45 AM   Result Value Ref Range    CEA 1.3 0.0 - 3.0 ng/mL         Imaging: reviewed     PATHOLOGY:             6/2022         ASSESSMENT:     Diagnosis Orders   1. Malignant neoplasm of stomach, unspecified location ([facility] [address]  CBC with Auto Differential    Comprehensive Metabolic Panel    Magnesium      2. Peritoneal metastases [facility])            . Nilton Juneau is here for FU of metastatic adenocarcinoma.       1. Metastatic adenocarcinoma   - s/p omental and mesenteric mass bx - c/w upper GI adenocarcinoma    - hx of diverticular dz - s/p previous bowel resection by dr Gabriele Kang at [facility] -    - We did discuss that his disease is not curable and he VU but wife stated that he believes he can beat this. - here today for follow up and C16 FOLFIRI (Oxaliplatin removed with C14). - neuropathy - B complex; add Alpha Lipoic acid, pt does not wish to have a prescription for this currently. Able to perform ADLs. - bilateral down arms numbness/tingling - p/w cervical spine imaging-negative. - appetite - Eating well. - hx RUE DVT, on Xarelto   - pain - denies and off of prescribed medications    - hypokalemia - resolved  - hypomagnesemia - on mag oxide 400 mg TID   - Previously, He wishes to continue on the current regimen; he previously expressed that in the future, he may wish to proceed with unconventional scheduling of maybe every 3 weeks. He does understand inherent risks with alternate schedule and how this would affect progression of disease. - sclerotic lesion on imaging at S[facility] - bone scan recommended, has not been completed   - constipation - Miralax as needed. - Continue good oral nutrition and hydration.   - Encouraged frequent activity throughout the day and rest as needed to combat fatigue.   - Call with any fevers, uncontrolled side effects from treatment or any other worrisome/concerning symptoms. - asked for PCP referral in [facility] per protocol or sooner if needed    [40min - chart review, review of results and discussion of options, next steps, coordination of care and charting ]      MDM      Lab studies and imaging studies were personally reviewed. Pertinent old records were reviewed. Historical:    - here with his wife for consultation. During today's visit, we discussed his current workup. We looked at the images together demonstrating some of the findings concerning for peritoneal nodularity/peritoneal disease.   Discussed anatomy of the findings utilizing images to help pt understand what we are looking at and suspecting. He and wife were appreciative of the time spent to review these. Discussed need for visual dx/tissue pathology to determine plan - recommend ex lap - refer to Dr Florence Wood - personally  reviewed case with Dr Florence Wood who will expedite the workup and will see pt Fri. US with mild biliary duct dilation - HIDA/ERCP reviewed by PCP   + BM/flatus; He did not like how IV morphine made him feel in the hospital.  He tried tramadol but found no relief and has been taking acetaminophen at home with minimal relief. Discussed different options and he settled on trying  Percocet - he will contact the office to inform us if this is working for him. We discussed SE - not to drive while on the med. Bowel regimen reviewed. He is tired of tests, frustrated. Feelings validated and stressed importance of workup to determine why he has pain. Wt loss from 240 --> 209 noted and expressed concern to pt about this. - completed course of Levaquin/Diflucan for klebsiella pneumoniae and citrobacter freundii both of which were sensitive to Levaquin found around G-tube site. Wishes for tube removal - reviewed risks of this during chemotherapy - I would like for him to have labs day before scheduled procedure to make sure his counts are adequate per above; case reviewed with Dr Reji Barakat by me via tel today   - here cycle 8 FOLFIRINOX - labs reviewed-defer 1 week due to neutropenia. - nutrition - G-tube out. He is being weaned off TPN (3x week now) as his oral intake has greatly improved, weight up    - pain - Fentanyl 12mcg with prn oxycodone 10 mg, plans to stop patch on Nader 10/2 PC following  - Plan for surveillance reviewed. Labs discussed. - nausea - resolved. Has Zyprexa QHS (not taking currently) and Zofran PRN.    - wt loss/FTT - secondary to disease and tx - on TPN and eating more    - depression/anxiety - better affect, on lexapro 20mg daily, PC adding buspar prn  - here for follow-up prior to cycle 13 FOLFIRINOX. Labs reviewed and adequate. - CT October 2022 previously with no evidence of progressive disease. Due for scan ~ January. All questions were asked and answered to the best of my ability. The patient verbalized understanding and agrees with the plan above.           LUCY Dillon - NP  [facility]  [address] [place] [address], [address] [place] [address]  Office : [phone]  Fax : [phone]

## 2023-02-17 ENCOUNTER — CLINICAL DOCUMENTATION (OUTPATIENT)
Dept: CASE MANAGEMENT | Age: 63
End: 2023-02-17

## 2023-02-17 ENCOUNTER — HOSPITAL ENCOUNTER (OUTPATIENT)
Dept: INFUSION THERAPY | Age: 63
Discharge: HOME | End: 2023-02-17
Payer: COMMERCIAL

## 2023-02-17 ENCOUNTER — CLINICAL DOCUMENTATION (OUTPATIENT)
Dept: ONCOLOGY | Age: 63
End: 2023-02-17

## 2023-02-17 ENCOUNTER — OFFICE VISIT (OUTPATIENT)
Dept: ONCOLOGY | Age: 63
End: 2023-02-17
Payer: COMMERCIAL

## 2023-02-17 VITALS
HEIGHT: 70 IN | OXYGEN SATURATION: 98 % | TEMPERATURE: 97.9 F | BODY MASS INDEX: 32.21 KG/M2 | RESPIRATION RATE: 16 BRPM | DIASTOLIC BLOOD PRESSURE: 79 MMHG | WEIGHT: 225 LBS | SYSTOLIC BLOOD PRESSURE: 118 MMHG | HEART RATE: 76 BPM

## 2023-02-17 DIAGNOSIS — C78.6 PERITONEAL METASTASES (HCC): ICD-10-CM

## 2023-02-17 DIAGNOSIS — C16.9 MALIGNANT NEOPLASM OF STOMACH, UNSPECIFIED LOCATION (HCC): Primary | ICD-10-CM

## 2023-02-17 DIAGNOSIS — C76.2 ABDOMINAL CARCINOMATOSIS (HCC): ICD-10-CM

## 2023-02-17 DIAGNOSIS — C76.2 ABDOMINAL CARCINOMATOSIS (HCC): Primary | ICD-10-CM

## 2023-02-17 DIAGNOSIS — I10 ESSENTIAL HYPERTENSION: ICD-10-CM

## 2023-02-17 DIAGNOSIS — E78.5 HYPERLIPIDEMIA, UNSPECIFIED HYPERLIPIDEMIA TYPE: ICD-10-CM

## 2023-02-17 LAB
ALBUMIN SERPL-MCNC: 3.2 G/DL (ref 3.2–4.6)
ALBUMIN/GLOB SERPL: 1 (ref 0.4–1.6)
ALP SERPL-CCNC: 111 U/L (ref 50–136)
ALT SERPL-CCNC: 20 U/L (ref 12–65)
ANION GAP SERPL CALC-SCNC: 4 MMOL/L (ref 2–11)
AST SERPL-CCNC: 14 U/L (ref 15–37)
BASOPHILS # BLD: 0.1 K/UL (ref 0–0.2)
BASOPHILS NFR BLD: 1 % (ref 0–2)
BILIRUB SERPL-MCNC: 0.4 MG/DL (ref 0.2–1.1)
BUN SERPL-MCNC: 9 MG/DL (ref 8–23)
CALCIUM SERPL-MCNC: 8.4 MG/DL (ref 8.3–10.4)
CEA SERPL-MCNC: 1.3 NG/ML (ref 0–3)
CHLORIDE SERPL-SCNC: 114 MMOL/L (ref 101–110)
CHOLEST SERPL-MCNC: 102 MG/DL
CO2 SERPL-SCNC: 27 MMOL/L (ref 21–32)
CREAT SERPL-MCNC: 0.9 MG/DL (ref 0.8–1.5)
DIFFERENTIAL METHOD BLD: ABNORMAL
EOSINOPHIL # BLD: 0.1 K/UL (ref 0–0.8)
EOSINOPHIL NFR BLD: 2 % (ref 0.5–7.8)
ERYTHROCYTE [DISTWIDTH] IN BLOOD BY AUTOMATED COUNT: 15 % (ref 11.9–14.6)
GLOBULIN SER CALC-MCNC: 3.3 G/DL (ref 2.8–4.5)
GLUCOSE SERPL-MCNC: 152 MG/DL (ref 65–100)
HCT VFR BLD AUTO: 32.4 % (ref 41.1–50.3)
HDLC SERPL-MCNC: 32 MG/DL (ref 40–60)
HDLC SERPL: 3.2
HGB BLD-MCNC: 10.2 G/DL (ref 13.6–17.2)
IMM GRANULOCYTES # BLD AUTO: 0 K/UL (ref 0–0.5)
IMM GRANULOCYTES NFR BLD AUTO: 0 % (ref 0–5)
LDLC SERPL CALC-MCNC: 47 MG/DL
LYMPHOCYTES # BLD: 1.6 K/UL (ref 0.5–4.6)
LYMPHOCYTES NFR BLD: 38 % (ref 13–44)
MAGNESIUM SERPL-MCNC: 2.1 MG/DL (ref 1.8–2.4)
MCH RBC QN AUTO: 29.9 PG (ref 26.1–32.9)
MCHC RBC AUTO-ENTMCNC: 31.5 G/DL (ref 31.4–35)
MCV RBC AUTO: 95 FL (ref 82–102)
MONOCYTES # BLD: 0.3 K/UL (ref 0.1–1.3)
MONOCYTES NFR BLD: 7 % (ref 4–12)
NEUTS SEG # BLD: 2.2 K/UL (ref 1.7–8.2)
NEUTS SEG NFR BLD: 51 % (ref 43–78)
NRBC # BLD: 0 K/UL (ref 0–0.2)
PLATELET # BLD AUTO: 159 K/UL (ref 150–450)
PMV BLD AUTO: 9.9 FL (ref 9.4–12.3)
POTASSIUM SERPL-SCNC: 3.6 MMOL/L (ref 3.5–5.1)
PROT SERPL-MCNC: 6.5 G/DL (ref 6.3–8.2)
RBC # BLD AUTO: 3.41 M/UL (ref 4.23–5.6)
SODIUM SERPL-SCNC: 145 MMOL/L (ref 133–143)
TRIGL SERPL-MCNC: 115 MG/DL (ref 35–150)
TSH W FREE THYROID IF ABNORMAL: 0.86 UIU/ML (ref 0.36–3.74)
VLDLC SERPL CALC-MCNC: 23 MG/DL (ref 6–23)
WBC # BLD AUTO: 4.2 K/UL (ref 4.3–11.1)

## 2023-02-17 PROCEDURE — 82378 CARCINOEMBRYONIC ANTIGEN: CPT

## 2023-02-17 PROCEDURE — 96367 TX/PROPH/DG ADDL SEQ IV INF: CPT

## 2023-02-17 PROCEDURE — 6360000002 HC RX W HCPCS: Performed by: NURSE PRACTITIONER

## 2023-02-17 PROCEDURE — 96368 THER/DIAG CONCURRENT INF: CPT

## 2023-02-17 PROCEDURE — 85025 COMPLETE CBC W/AUTO DIFF WBC: CPT

## 2023-02-17 PROCEDURE — 36591 DRAW BLOOD OFF VENOUS DEVICE: CPT

## 2023-02-17 PROCEDURE — 3078F DIAST BP <80 MM HG: CPT | Performed by: NURSE PRACTITIONER

## 2023-02-17 PROCEDURE — 99214 OFFICE O/P EST MOD 30 MIN: CPT | Performed by: NURSE PRACTITIONER

## 2023-02-17 PROCEDURE — 96413 CHEMO IV INFUSION 1 HR: CPT

## 2023-02-17 PROCEDURE — 80053 COMPREHEN METABOLIC PANEL: CPT

## 2023-02-17 PROCEDURE — 96372 THER/PROPH/DIAG INJ SC/IM: CPT

## 2023-02-17 PROCEDURE — 2580000003 HC RX 258: Performed by: NURSE PRACTITIONER

## 2023-02-17 PROCEDURE — G0498 CHEMO EXTEND IV INFUS W/PUMP: HCPCS

## 2023-02-17 PROCEDURE — 96415 CHEMO IV INFUSION ADDL HR: CPT

## 2023-02-17 PROCEDURE — 3074F SYST BP LT 130 MM HG: CPT | Performed by: NURSE PRACTITIONER

## 2023-02-17 PROCEDURE — 96411 CHEMO IV PUSH ADDL DRUG: CPT

## 2023-02-17 PROCEDURE — 96375 TX/PRO/DX INJ NEW DRUG ADDON: CPT

## 2023-02-17 PROCEDURE — 83735 ASSAY OF MAGNESIUM: CPT

## 2023-02-17 PROCEDURE — 2580000003 HC RX 258: Performed by: INTERNAL MEDICINE

## 2023-02-17 RX ORDER — ATROPINE SULFATE 0.4 MG/ML
0.4 AMPUL (ML) INJECTION
Status: CANCELLED | OUTPATIENT
Start: 2023-02-17

## 2023-02-17 RX ORDER — FLUOROURACIL 50 MG/ML
400 INJECTION, SOLUTION INTRAVENOUS ONCE
Status: CANCELLED | OUTPATIENT
Start: 2023-02-17 | End: 2023-02-17

## 2023-02-17 RX ORDER — SODIUM CHLORIDE 0.9 % (FLUSH) 0.9 %
5-40 SYRINGE (ML) INJECTION PRN
Status: DISCONTINUED | OUTPATIENT
Start: 2023-02-17 | End: 2023-02-18 | Stop reason: HOSPADM

## 2023-02-17 RX ORDER — SODIUM CHLORIDE 9 MG/ML
INJECTION, SOLUTION INTRAVENOUS CONTINUOUS
Status: CANCELLED | OUTPATIENT
Start: 2023-02-17

## 2023-02-17 RX ORDER — ATROPINE SULFATE 0.4 MG/ML
0.4 INJECTION, SOLUTION INTRAVENOUS ONCE
Status: COMPLETED | OUTPATIENT
Start: 2023-02-17 | End: 2023-02-17

## 2023-02-17 RX ORDER — FAMOTIDINE 10 MG/ML
20 INJECTION, SOLUTION INTRAVENOUS
Status: CANCELLED | OUTPATIENT
Start: 2023-02-17

## 2023-02-17 RX ORDER — ONDANSETRON 2 MG/ML
8 INJECTION INTRAMUSCULAR; INTRAVENOUS
Status: CANCELLED | OUTPATIENT
Start: 2023-02-17

## 2023-02-17 RX ORDER — SODIUM CHLORIDE 9 MG/ML
5-250 INJECTION, SOLUTION INTRAVENOUS PRN
Status: CANCELLED | OUTPATIENT
Start: 2023-02-17

## 2023-02-17 RX ORDER — SODIUM CHLORIDE 9 MG/ML
5-40 INJECTION INTRAVENOUS PRN
OUTPATIENT
Start: 2023-02-19

## 2023-02-17 RX ORDER — EPINEPHRINE 1 MG/ML
0.3 INJECTION, SOLUTION, CONCENTRATE INTRAVENOUS PRN
Status: CANCELLED | OUTPATIENT
Start: 2023-02-17

## 2023-02-17 RX ORDER — SODIUM CHLORIDE 0.9 % (FLUSH) 0.9 %
5-40 SYRINGE (ML) INJECTION PRN
Status: CANCELLED | OUTPATIENT
Start: 2023-02-17

## 2023-02-17 RX ORDER — FLUOROURACIL 50 MG/ML
400 INJECTION, SOLUTION INTRAVENOUS ONCE
Status: COMPLETED | OUTPATIENT
Start: 2023-02-17 | End: 2023-02-17

## 2023-02-17 RX ORDER — DEXTROSE MONOHYDRATE 50 MG/ML
5-250 INJECTION, SOLUTION INTRAVENOUS PRN
Status: DISCONTINUED | OUTPATIENT
Start: 2023-02-17 | End: 2023-02-18 | Stop reason: HOSPADM

## 2023-02-17 RX ORDER — ONDANSETRON 2 MG/ML
8 INJECTION INTRAMUSCULAR; INTRAVENOUS ONCE
Status: CANCELLED | OUTPATIENT
Start: 2023-02-17 | End: 2023-02-17

## 2023-02-17 RX ORDER — SODIUM CHLORIDE 9 MG/ML
5-250 INJECTION, SOLUTION INTRAVENOUS PRN
OUTPATIENT
Start: 2023-02-19

## 2023-02-17 RX ORDER — ALBUTEROL SULFATE 90 UG/1
4 AEROSOL, METERED RESPIRATORY (INHALATION) PRN
Status: CANCELLED | OUTPATIENT
Start: 2023-02-17

## 2023-02-17 RX ORDER — DIPHENHYDRAMINE HYDROCHLORIDE 50 MG/ML
50 INJECTION INTRAMUSCULAR; INTRAVENOUS
Status: CANCELLED | OUTPATIENT
Start: 2023-02-17

## 2023-02-17 RX ORDER — DEXTROSE MONOHYDRATE 50 MG/ML
5-250 INJECTION, SOLUTION INTRAVENOUS PRN
Status: CANCELLED | OUTPATIENT
Start: 2023-02-17

## 2023-02-17 RX ORDER — ONDANSETRON 2 MG/ML
8 INJECTION INTRAMUSCULAR; INTRAVENOUS ONCE
Status: COMPLETED | OUTPATIENT
Start: 2023-02-17 | End: 2023-02-17

## 2023-02-17 RX ORDER — SODIUM CHLORIDE 0.9 % (FLUSH) 0.9 %
5-40 SYRINGE (ML) INJECTION PRN
Status: CANCELLED | OUTPATIENT
Start: 2023-02-19

## 2023-02-17 RX ORDER — MEPERIDINE HYDROCHLORIDE 50 MG/ML
12.5 INJECTION INTRAMUSCULAR; INTRAVENOUS; SUBCUTANEOUS PRN
Status: CANCELLED | OUTPATIENT
Start: 2023-02-17

## 2023-02-17 RX ORDER — ACETAMINOPHEN 325 MG/1
650 TABLET ORAL
Status: CANCELLED | OUTPATIENT
Start: 2023-02-17

## 2023-02-17 RX ORDER — SODIUM CHLORIDE 9 MG/ML
5-40 INJECTION INTRAVENOUS PRN
Status: CANCELLED | OUTPATIENT
Start: 2023-02-17

## 2023-02-17 RX ORDER — ATROPINE SULFATE 0.4 MG/ML
0.4 AMPUL (ML) INJECTION ONCE
Status: CANCELLED | OUTPATIENT
Start: 2023-02-17 | End: 2023-02-17

## 2023-02-17 RX ORDER — HEPARIN SODIUM (PORCINE) LOCK FLUSH IV SOLN 100 UNIT/ML 100 UNIT/ML
500 SOLUTION INTRAVENOUS PRN
Status: CANCELLED | OUTPATIENT
Start: 2023-02-19

## 2023-02-17 RX ORDER — HEPARIN SODIUM (PORCINE) LOCK FLUSH IV SOLN 100 UNIT/ML 100 UNIT/ML
500 SOLUTION INTRAVENOUS PRN
Status: CANCELLED | OUTPATIENT
Start: 2023-02-17

## 2023-02-17 RX ORDER — SODIUM CHLORIDE 0.9 % (FLUSH) 0.9 %
10 SYRINGE (ML) INJECTION PRN
Status: DISCONTINUED | OUTPATIENT
Start: 2023-02-17 | End: 2023-02-18 | Stop reason: HOSPADM

## 2023-02-17 RX ADMIN — ATROPINE SULFATE 0.4 MG: 0.4 INJECTION, SOLUTION INTRAVENOUS at 11:24

## 2023-02-17 RX ADMIN — FLUOROURACIL 900 MG: 50 INJECTION, SOLUTION INTRAVENOUS at 13:15

## 2023-02-17 RX ADMIN — LEUCOVORIN CALCIUM 900 MG: 350 INJECTION, POWDER, LYOPHILIZED, FOR SOLUTION INTRAMUSCULAR; INTRAVENOUS at 11:25

## 2023-02-17 RX ADMIN — DEXAMETHASONE SODIUM PHOSPHATE 12 MG: 4 INJECTION, SOLUTION INTRAMUSCULAR; INTRAVENOUS at 10:24

## 2023-02-17 RX ADMIN — ONDANSETRON 8 MG: 2 INJECTION INTRAMUSCULAR; INTRAVENOUS at 10:20

## 2023-02-17 RX ADMIN — SODIUM CHLORIDE, PRESERVATIVE FREE 10 ML: 5 INJECTION INTRAVENOUS at 10:00

## 2023-02-17 RX ADMIN — DEXTROSE MONOHYDRATE 100 ML/HR: 50 INJECTION, SOLUTION INTRAVENOUS at 10:00

## 2023-02-17 RX ADMIN — FLUOROURACIL 5375 MG: 50 INJECTION, SOLUTION INTRAVENOUS at 13:20

## 2023-02-17 RX ADMIN — IRINOTECAN HYDROCHLORIDE 340 MG: 20 INJECTION, SOLUTION INTRAVENOUS at 11:27

## 2023-02-17 RX ADMIN — SODIUM CHLORIDE, PRESERVATIVE FREE 10 ML: 5 INJECTION INTRAVENOUS at 08:53

## 2023-02-17 RX ADMIN — FOSAPREPITANT DIMEGLUMINE 150 MG: 150 INJECTION, POWDER, LYOPHILIZED, FOR SOLUTION INTRAVENOUS at 10:52

## 2023-02-17 ASSESSMENT — PATIENT HEALTH QUESTIONNAIRE - PHQ9
2. FEELING DOWN, DEPRESSED OR HOPELESS: 1
SUM OF ALL RESPONSES TO PHQ QUESTIONS 1-9: 1
SUM OF ALL RESPONSES TO PHQ QUESTIONS 1-9: 1
SUM OF ALL RESPONSES TO PHQ9 QUESTIONS 1 & 2: 1
SUM OF ALL RESPONSES TO PHQ QUESTIONS 1-9: 1
SUM OF ALL RESPONSES TO PHQ QUESTIONS 1-9: 1
1. LITTLE INTEREST OR PLEASURE IN DOING THINGS: 0

## 2023-02-17 NOTE — PATIENT INSTRUCTIONS
Patient Instructions from Today's Visit    Reason for Visit:  Pre chemo cycle 16    Plan:  Proceed to infusion  Over the counter alpha lipoic acid for neuropathy     Follow Up:  2 weeks    Recent Lab Results:  Hospital Outpatient Visit on 02/17/2023   Component Date Value Ref Range Status    WBC 02/17/2023 4.2 (A)  4.3 - 11.1 K/uL Final    RBC 02/17/2023 3.41 (A)  4.23 - 5.6 M/uL Final    Hemoglobin 02/17/2023 10.2 (A)  13.6 - 17.2 g/dL Final    Hematocrit 02/17/2023 32.4 (A)  41.1 - 50.3 % Final    MCV 02/17/2023 95.0  82.0 - 102.0 FL Final    MCH 02/17/2023 29.9  26.1 - 32.9 PG Final    MCHC 02/17/2023 31.5  31.4 - 35.0 g/dL Final    RDW 02/17/2023 15.0 (A)  11.9 - 14.6 % Final    Platelets 65/33/5578 159  150 - 450 K/uL Final    MPV 02/17/2023 9.9  9.4 - 12.3 FL Final    nRBC 02/17/2023 0.00  0.0 - 0.2 K/uL Final    **Note: Absolute NRBC parameter is now reported with Hemogram**    Differential Type 02/17/2023 AUTOMATED    Final    Seg Neutrophils 02/17/2023 51  43 - 78 % Final    Lymphocytes 02/17/2023 38  13 - 44 % Final    Monocytes 02/17/2023 7  4.0 - 12.0 % Final    Eosinophils % 02/17/2023 2  0.5 - 7.8 % Final    Basophils 02/17/2023 1  0.0 - 2.0 % Final    Immature Granulocytes 02/17/2023 0  0.0 - 5.0 % Final    Segs Absolute 02/17/2023 2.2  1.7 - 8.2 K/UL Final    Absolute Lymph # 02/17/2023 1.6  0.5 - 4.6 K/UL Final    Absolute Mono # 02/17/2023 0.3  0.1 - 1.3 K/UL Final    Absolute Eos # 02/17/2023 0.1  0.0 - 0.8 K/UL Final    Basophils Absolute 02/17/2023 0.1  0.0 - 0.2 K/UL Final    Absolute Immature Granulocyte 02/17/2023 0.0  0.0 - 0.5 K/UL Final         Treatment Summary has been discussed and given to patient: no        -------------------------------------------------------------------------------------------------------------------  Please call our office at [phone] if you have any  of the following symptoms:   Fever of 100.5 or greater  Chills  Shortness of breath  Swelling or pain in one leg    After office hours an answering service is available and will contact a provider for emergencies or if you are experiencing any of the above symptoms. Patient did express an interest in My Chart. My Chart log in information explained on the after visit summary printout at the [facility] desk.     SARANYA Iyer, RN  Nurse Navigator  [address] [place] [id number] [phone]

## 2023-02-17 NOTE — PROGRESS NOTES
Name: Amy Delgado  : 1960  MRN: [id number]  Date of Service: 2023  Type of Service: Behavioral Health  Length of Service: 30 minutes      Subjective: Seen patient with his loving wife, Teri Leyva. Patient denied psychosocial needs at this time. Objective:  Appearance  Hygiene: clean and  grooming  Dress: clean,  neat, climate appropriate   Speech  General:  clarity  Rate: normal  Latency: none  Volume: normal  Behavior  General: relaxed  Eye Contact: appropriate  Cooperativeness  Cooperative and friendly. Thinking  Thought Processes   logical and goal directed (self-care)  Thought Content  Future oriented  Mood  Good\"  Affect  Type:  congruent  Range: full range  Appropriateness to content and congruence with stated mood  Insight/Judgment  Adequate    Assessment/Plans:   Will continue to meet with and be available to patient as as needed        PORTER Zhao

## 2023-02-17 NOTE — PROGRESS NOTES
2/17/23 saw pt today with Rena Ravi NP for pre chemo cycle 16 FOLFIRI. He is tolerating chemo well. PO intake is good. Neuropathy is stable. Denies any other issues. Proceed to infusion. Follow up in 2 weeks. Encouraged to call with any concerns. Navigation will continue to follow.

## 2023-02-17 NOTE — PROGRESS NOTES
Arrived to the [facility]. Folfiri completed. Patient tolerated well. Any issues or concerns during appointment: none. Patient aware of next infusion appointment on 2/19 at 2:30 pm.   Patient instructed to call provider with temperature of 100.4 or greater or nausea/vomiting/ diarrhea or pain not controlled by medications  Discharged ambulatory to home with 5fu pump attached, all connections intact.

## 2023-02-19 ENCOUNTER — HOSPITAL ENCOUNTER (OUTPATIENT)
Dept: INFUSION THERAPY | Age: 63
Discharge: HOME | End: 2023-02-19
Payer: COMMERCIAL

## 2023-02-19 VITALS
TEMPERATURE: 97.6 F | OXYGEN SATURATION: 97 % | HEART RATE: 81 BPM | RESPIRATION RATE: 16 BRPM | DIASTOLIC BLOOD PRESSURE: 81 MMHG | SYSTOLIC BLOOD PRESSURE: 163 MMHG

## 2023-02-19 DIAGNOSIS — C78.6 PERITONEAL METASTASES (HCC): ICD-10-CM

## 2023-02-19 DIAGNOSIS — C76.2 ABDOMINAL CARCINOMATOSIS (HCC): Primary | ICD-10-CM

## 2023-02-19 PROCEDURE — 96523 IRRIG DRUG DELIVERY DEVICE: CPT

## 2023-02-19 PROCEDURE — 2580000003 HC RX 258: Performed by: NURSE PRACTITIONER

## 2023-02-19 RX ORDER — SODIUM CHLORIDE 0.9 % (FLUSH) 0.9 %
5-40 SYRINGE (ML) INJECTION PRN
Status: DISCONTINUED | OUTPATIENT
Start: 2023-02-19 | End: 2023-02-20 | Stop reason: HOSPADM

## 2023-02-19 RX ORDER — HEPARIN SODIUM (PORCINE) LOCK FLUSH IV SOLN 100 UNIT/ML 100 UNIT/ML
500 SOLUTION INTRAVENOUS PRN
Status: DISCONTINUED | OUTPATIENT
Start: 2023-02-19 | End: 2023-02-20 | Stop reason: HOSPADM

## 2023-02-19 RX ADMIN — SODIUM CHLORIDE, PRESERVATIVE FREE 10 ML: 5 INJECTION INTRAVENOUS at 11:29

## 2023-02-19 NOTE — PROGRESS NOTES
Arrived to the [facility]. DC pump completed. Provided education on DC pump    Patient instructed to report any side affects to ordering provider. Patient tolerated well. Any issues or concerns during appointment: no.  Patient aware of next infusion appointment on 3/3/23 (date) at 5 (time). Discharged ambulatory.

## 2023-02-27 DIAGNOSIS — E87.6 HYPOKALEMIA: ICD-10-CM

## 2023-03-03 ENCOUNTER — OFFICE VISIT (OUTPATIENT)
Dept: INTERNAL MEDICINE CLINIC | Facility: CLINIC | Age: 63
End: 2023-03-03
Payer: COMMERCIAL

## 2023-03-03 ENCOUNTER — HOSPITAL ENCOUNTER (OUTPATIENT)
Dept: INFUSION THERAPY | Age: 63
Discharge: HOME | End: 2023-03-03
Payer: COMMERCIAL

## 2023-03-03 ENCOUNTER — OFFICE VISIT (OUTPATIENT)
Dept: ONCOLOGY | Age: 63
End: 2023-03-03
Payer: COMMERCIAL

## 2023-03-03 VITALS
HEART RATE: 78 BPM | SYSTOLIC BLOOD PRESSURE: 106 MMHG | TEMPERATURE: 98.2 F | OXYGEN SATURATION: 97 % | WEIGHT: 222 LBS | DIASTOLIC BLOOD PRESSURE: 65 MMHG | HEIGHT: 70 IN | RESPIRATION RATE: 16 BRPM | BODY MASS INDEX: 31.78 KG/M2

## 2023-03-03 VITALS
WEIGHT: 221 LBS | SYSTOLIC BLOOD PRESSURE: 124 MMHG | OXYGEN SATURATION: 95 % | BODY MASS INDEX: 31.64 KG/M2 | HEART RATE: 86 BPM | DIASTOLIC BLOOD PRESSURE: 62 MMHG | HEIGHT: 70 IN

## 2023-03-03 DIAGNOSIS — E87.6 HYPOKALEMIA: ICD-10-CM

## 2023-03-03 DIAGNOSIS — E78.5 HYPERLIPIDEMIA, UNSPECIFIED HYPERLIPIDEMIA TYPE: ICD-10-CM

## 2023-03-03 DIAGNOSIS — G62.0 NEUROPATHY DUE TO CHEMOTHERAPEUTIC DRUG (HCC): ICD-10-CM

## 2023-03-03 DIAGNOSIS — T45.1X5A NEUROPATHY DUE TO CHEMOTHERAPEUTIC DRUG (HCC): ICD-10-CM

## 2023-03-03 DIAGNOSIS — R73.9 HYPERGLYCEMIA: ICD-10-CM

## 2023-03-03 DIAGNOSIS — I82.621 ACUTE DEEP VEIN THROMBOSIS (DVT) OF RIGHT UPPER EXTREMITY, UNSPECIFIED VEIN (HCC): ICD-10-CM

## 2023-03-03 DIAGNOSIS — R53.83 FATIGUE DUE TO TREATMENT: ICD-10-CM

## 2023-03-03 DIAGNOSIS — C16.9 MALIGNANT NEOPLASM OF STOMACH, UNSPECIFIED LOCATION (HCC): ICD-10-CM

## 2023-03-03 DIAGNOSIS — C78.6 PERITONEAL METASTASES (HCC): ICD-10-CM

## 2023-03-03 DIAGNOSIS — G62.9 NEUROPATHY: Primary | ICD-10-CM

## 2023-03-03 DIAGNOSIS — C79.9 METASTATIC ADENOCARCINOMA (HCC): ICD-10-CM

## 2023-03-03 DIAGNOSIS — G62.9 PERIPHERAL POLYNEUROPATHY: ICD-10-CM

## 2023-03-03 DIAGNOSIS — C76.2 ABDOMINAL CARCINOMATOSIS (HCC): Primary | ICD-10-CM

## 2023-03-03 DIAGNOSIS — K21.9 GASTROESOPHAGEAL REFLUX DISEASE WITHOUT ESOPHAGITIS: ICD-10-CM

## 2023-03-03 LAB
ALBUMIN SERPL-MCNC: 3 G/DL (ref 3.2–4.6)
ALBUMIN/GLOB SERPL: 0.9 (ref 0.4–1.6)
ALP SERPL-CCNC: 96 U/L (ref 50–136)
ALT SERPL-CCNC: 20 U/L (ref 12–65)
ANION GAP SERPL CALC-SCNC: 3 MMOL/L (ref 2–11)
AST SERPL-CCNC: 14 U/L (ref 15–37)
BASOPHILS # BLD: 0 K/UL (ref 0–0.2)
BASOPHILS NFR BLD: 1 % (ref 0–2)
BILIRUB SERPL-MCNC: 0.3 MG/DL (ref 0.2–1.1)
BUN SERPL-MCNC: 8 MG/DL (ref 8–23)
CALCIUM SERPL-MCNC: 8.2 MG/DL (ref 8.3–10.4)
CHLORIDE SERPL-SCNC: 111 MMOL/L (ref 101–110)
CO2 SERPL-SCNC: 27 MMOL/L (ref 21–32)
CREAT SERPL-MCNC: 0.8 MG/DL (ref 0.8–1.5)
DIFFERENTIAL METHOD BLD: ABNORMAL
EOSINOPHIL # BLD: 0.1 K/UL (ref 0–0.8)
EOSINOPHIL NFR BLD: 2 % (ref 0.5–7.8)
ERYTHROCYTE [DISTWIDTH] IN BLOOD BY AUTOMATED COUNT: 14.2 % (ref 11.9–14.6)
EST. AVERAGE GLUCOSE BLD GHB EST-MCNC: 114 MG/DL
GLOBULIN SER CALC-MCNC: 3.2 G/DL (ref 2.8–4.5)
GLUCOSE SERPL-MCNC: 157 MG/DL (ref 65–100)
HBA1C MFR BLD: 5.6 % (ref 4.8–5.6)
HCT VFR BLD AUTO: 30.2 % (ref 41.1–50.3)
HGB BLD-MCNC: 9.6 G/DL (ref 13.6–17.2)
IMM GRANULOCYTES # BLD AUTO: 0 K/UL (ref 0–0.5)
IMM GRANULOCYTES NFR BLD AUTO: 0 % (ref 0–5)
LYMPHOCYTES # BLD: 1.4 K/UL (ref 0.5–4.6)
LYMPHOCYTES NFR BLD: 33 % (ref 13–44)
MAGNESIUM SERPL-MCNC: 1.8 MG/DL (ref 1.8–2.4)
MCH RBC QN AUTO: 29.6 PG (ref 26.1–32.9)
MCHC RBC AUTO-ENTMCNC: 31.8 G/DL (ref 31.4–35)
MCV RBC AUTO: 93.2 FL (ref 82–102)
MONOCYTES # BLD: 0.3 K/UL (ref 0.1–1.3)
MONOCYTES NFR BLD: 8 % (ref 4–12)
NEUTS SEG # BLD: 2.5 K/UL (ref 1.7–8.2)
NEUTS SEG NFR BLD: 56 % (ref 43–78)
NRBC # BLD: 0 K/UL (ref 0–0.2)
PLATELET # BLD AUTO: 178 K/UL (ref 150–450)
PLATELET COMMENT: ADEQUATE
PMV BLD AUTO: 10.1 FL (ref 9.4–12.3)
POTASSIUM SERPL-SCNC: 3.1 MMOL/L (ref 3.5–5.1)
PROT SERPL-MCNC: 6.2 G/DL (ref 6.3–8.2)
RBC # BLD AUTO: 3.24 M/UL (ref 4.23–5.6)
RBC MORPH BLD: ABNORMAL
RBC MORPH BLD: ABNORMAL
SODIUM SERPL-SCNC: 141 MMOL/L (ref 133–143)
WBC # BLD AUTO: 4.3 K/UL (ref 4.3–11.1)
WBC MORPH BLD: ABNORMAL

## 2023-03-03 PROCEDURE — 85025 COMPLETE CBC W/AUTO DIFF WBC: CPT

## 2023-03-03 PROCEDURE — 83735 ASSAY OF MAGNESIUM: CPT

## 2023-03-03 PROCEDURE — 6360000002 HC RX W HCPCS: Performed by: NURSE PRACTITIONER

## 2023-03-03 PROCEDURE — 96368 THER/DIAG CONCURRENT INF: CPT

## 2023-03-03 PROCEDURE — 3078F DIAST BP <80 MM HG: CPT | Performed by: STUDENT IN AN ORGANIZED HEALTH CARE EDUCATION/TRAINING PROGRAM

## 2023-03-03 PROCEDURE — 99214 OFFICE O/P EST MOD 30 MIN: CPT | Performed by: NURSE PRACTITIONER

## 2023-03-03 PROCEDURE — 96372 THER/PROPH/DIAG INJ SC/IM: CPT

## 2023-03-03 PROCEDURE — 80053 COMPREHEN METABOLIC PANEL: CPT

## 2023-03-03 PROCEDURE — 3074F SYST BP LT 130 MM HG: CPT | Performed by: NURSE PRACTITIONER

## 2023-03-03 PROCEDURE — 96411 CHEMO IV PUSH ADDL DRUG: CPT

## 2023-03-03 PROCEDURE — 36591 DRAW BLOOD OFF VENOUS DEVICE: CPT

## 2023-03-03 PROCEDURE — 3074F SYST BP LT 130 MM HG: CPT | Performed by: STUDENT IN AN ORGANIZED HEALTH CARE EDUCATION/TRAINING PROGRAM

## 2023-03-03 PROCEDURE — 96367 TX/PROPH/DG ADDL SEQ IV INF: CPT

## 2023-03-03 PROCEDURE — 6370000000 HC RX 637 (ALT 250 FOR IP): Performed by: NURSE PRACTITIONER

## 2023-03-03 PROCEDURE — 96375 TX/PRO/DX INJ NEW DRUG ADDON: CPT

## 2023-03-03 PROCEDURE — 99214 OFFICE O/P EST MOD 30 MIN: CPT | Performed by: STUDENT IN AN ORGANIZED HEALTH CARE EDUCATION/TRAINING PROGRAM

## 2023-03-03 PROCEDURE — 2580000003 HC RX 258: Performed by: NURSE PRACTITIONER

## 2023-03-03 PROCEDURE — 3078F DIAST BP <80 MM HG: CPT | Performed by: NURSE PRACTITIONER

## 2023-03-03 PROCEDURE — G0498 CHEMO EXTEND IV INFUS W/PUMP: HCPCS

## 2023-03-03 PROCEDURE — 2580000003 HC RX 258: Performed by: INTERNAL MEDICINE

## 2023-03-03 PROCEDURE — 96413 CHEMO IV INFUSION 1 HR: CPT

## 2023-03-03 PROCEDURE — 96415 CHEMO IV INFUSION ADDL HR: CPT

## 2023-03-03 RX ORDER — ATROPINE SULFATE 0.4 MG/ML
0.4 INJECTION, SOLUTION INTRAVENOUS ONCE
Status: CANCELLED | OUTPATIENT
Start: 2023-03-03 | End: 2023-03-03

## 2023-03-03 RX ORDER — ROSUVASTATIN CALCIUM 10 MG/1
10 TABLET, COATED ORAL DAILY
Qty: 90 TABLET | Refills: 1 | Status: SHIPPED | OUTPATIENT
Start: 2023-03-03

## 2023-03-03 RX ORDER — MEPERIDINE HYDROCHLORIDE 50 MG/ML
12.5 INJECTION INTRAMUSCULAR; INTRAVENOUS; SUBCUTANEOUS PRN
Status: CANCELLED | OUTPATIENT
Start: 2023-03-03

## 2023-03-03 RX ORDER — SODIUM CHLORIDE 9 MG/ML
5-250 INJECTION, SOLUTION INTRAVENOUS PRN
Status: CANCELLED | OUTPATIENT
Start: 2023-03-05

## 2023-03-03 RX ORDER — VENLAFAXINE HYDROCHLORIDE 37.5 MG/1
37.5 CAPSULE, EXTENDED RELEASE ORAL DAILY
Qty: 30 CAPSULE | Refills: 2 | Status: SHIPPED | OUTPATIENT
Start: 2023-03-03 | End: 2023-03-03

## 2023-03-03 RX ORDER — SODIUM CHLORIDE 9 MG/ML
INJECTION, SOLUTION INTRAVENOUS CONTINUOUS
Status: CANCELLED | OUTPATIENT
Start: 2023-03-03

## 2023-03-03 RX ORDER — ACETAMINOPHEN 325 MG/1
650 TABLET ORAL
Status: CANCELLED | OUTPATIENT
Start: 2023-03-03

## 2023-03-03 RX ORDER — SODIUM CHLORIDE 9 MG/ML
5-40 INJECTION INTRAVENOUS PRN
Status: CANCELLED | OUTPATIENT
Start: 2023-03-05

## 2023-03-03 RX ORDER — DIPHENHYDRAMINE HYDROCHLORIDE 50 MG/ML
50 INJECTION INTRAMUSCULAR; INTRAVENOUS
Status: CANCELLED | OUTPATIENT
Start: 2023-03-03

## 2023-03-03 RX ORDER — FLUOROURACIL 50 MG/ML
400 INJECTION, SOLUTION INTRAVENOUS ONCE
Status: CANCELLED | OUTPATIENT
Start: 2023-03-03 | End: 2023-03-03

## 2023-03-03 RX ORDER — ALBUTEROL SULFATE 90 UG/1
4 AEROSOL, METERED RESPIRATORY (INHALATION) PRN
Status: CANCELLED | OUTPATIENT
Start: 2023-03-03

## 2023-03-03 RX ORDER — POTASSIUM CHLORIDE 20 MEQ/1
40 TABLET, EXTENDED RELEASE ORAL ONCE
Status: CANCELLED
Start: 2023-03-03 | End: 2023-03-03

## 2023-03-03 RX ORDER — HEPARIN SODIUM (PORCINE) LOCK FLUSH IV SOLN 100 UNIT/ML 100 UNIT/ML
500 SOLUTION INTRAVENOUS PRN
Status: CANCELLED | OUTPATIENT
Start: 2023-03-03

## 2023-03-03 RX ORDER — SODIUM CHLORIDE 9 MG/ML
5-40 INJECTION INTRAVENOUS PRN
Status: CANCELLED | OUTPATIENT
Start: 2023-03-03

## 2023-03-03 RX ORDER — SODIUM CHLORIDE 9 MG/ML
5-250 INJECTION, SOLUTION INTRAVENOUS PRN
Status: CANCELLED | OUTPATIENT
Start: 2023-03-03

## 2023-03-03 RX ORDER — SODIUM CHLORIDE 0.9 % (FLUSH) 0.9 %
5-40 SYRINGE (ML) INJECTION PRN
Status: CANCELLED | OUTPATIENT
Start: 2023-03-05

## 2023-03-03 RX ORDER — FAMOTIDINE 10 MG/ML
20 INJECTION, SOLUTION INTRAVENOUS
Status: CANCELLED | OUTPATIENT
Start: 2023-03-03

## 2023-03-03 RX ORDER — HEPARIN SODIUM (PORCINE) LOCK FLUSH IV SOLN 100 UNIT/ML 100 UNIT/ML
500 SOLUTION INTRAVENOUS PRN
Status: CANCELLED | OUTPATIENT
Start: 2023-03-05

## 2023-03-03 RX ORDER — POTASSIUM CHLORIDE 750 MG/1
40 TABLET, EXTENDED RELEASE ORAL ONCE
Status: COMPLETED | OUTPATIENT
Start: 2023-03-03 | End: 2023-03-03

## 2023-03-03 RX ORDER — FLUOROURACIL 50 MG/ML
400 INJECTION, SOLUTION INTRAVENOUS ONCE
Status: COMPLETED | OUTPATIENT
Start: 2023-03-03 | End: 2023-03-03

## 2023-03-03 RX ORDER — EPINEPHRINE 1 MG/ML
0.3 INJECTION, SOLUTION, CONCENTRATE INTRAVENOUS PRN
Status: CANCELLED | OUTPATIENT
Start: 2023-03-03

## 2023-03-03 RX ORDER — SODIUM CHLORIDE 0.9 % (FLUSH) 0.9 %
5-40 SYRINGE (ML) INJECTION PRN
Status: DISCONTINUED | OUTPATIENT
Start: 2023-03-03 | End: 2023-03-04 | Stop reason: HOSPADM

## 2023-03-03 RX ORDER — DEXTROSE MONOHYDRATE 50 MG/ML
5-250 INJECTION, SOLUTION INTRAVENOUS PRN
Status: CANCELLED | OUTPATIENT
Start: 2023-03-03

## 2023-03-03 RX ORDER — PANTOPRAZOLE SODIUM 40 MG/1
40 TABLET, DELAYED RELEASE ORAL DAILY
Qty: 90 TABLET | Refills: 1 | Status: SHIPPED | OUTPATIENT
Start: 2023-03-03

## 2023-03-03 RX ORDER — ATROPINE SULFATE 0.4 MG/ML
0.4 INJECTION, SOLUTION INTRAVENOUS ONCE
Status: COMPLETED | OUTPATIENT
Start: 2023-03-03 | End: 2023-03-03

## 2023-03-03 RX ORDER — SODIUM CHLORIDE 0.9 % (FLUSH) 0.9 %
10 SYRINGE (ML) INJECTION PRN
Status: DISCONTINUED | OUTPATIENT
Start: 2023-03-03 | End: 2023-03-04 | Stop reason: HOSPADM

## 2023-03-03 RX ORDER — DEXTROSE MONOHYDRATE 50 MG/ML
5-250 INJECTION, SOLUTION INTRAVENOUS PRN
Status: DISCONTINUED | OUTPATIENT
Start: 2023-03-03 | End: 2023-03-04 | Stop reason: HOSPADM

## 2023-03-03 RX ORDER — ATROPINE SULFATE 0.4 MG/ML
0.4 AMPUL (ML) INJECTION
Status: CANCELLED | OUTPATIENT
Start: 2023-03-03

## 2023-03-03 RX ORDER — SODIUM CHLORIDE 0.9 % (FLUSH) 0.9 %
5-40 SYRINGE (ML) INJECTION PRN
Status: CANCELLED | OUTPATIENT
Start: 2023-03-03

## 2023-03-03 RX ORDER — ONDANSETRON 2 MG/ML
8 INJECTION INTRAMUSCULAR; INTRAVENOUS ONCE
Status: CANCELLED | OUTPATIENT
Start: 2023-03-03 | End: 2023-03-03

## 2023-03-03 RX ORDER — ONDANSETRON 2 MG/ML
8 INJECTION INTRAMUSCULAR; INTRAVENOUS ONCE
Status: COMPLETED | OUTPATIENT
Start: 2023-03-03 | End: 2023-03-03

## 2023-03-03 RX ORDER — ONDANSETRON 2 MG/ML
8 INJECTION INTRAMUSCULAR; INTRAVENOUS
Status: CANCELLED | OUTPATIENT
Start: 2023-03-03

## 2023-03-03 RX ADMIN — POTASSIUM CHLORIDE 40 MEQ: 750 TABLET, EXTENDED RELEASE ORAL at 10:51

## 2023-03-03 RX ADMIN — FOSAPREPITANT DIMEGLUMINE 150 MG: 150 INJECTION, POWDER, LYOPHILIZED, FOR SOLUTION INTRAVENOUS at 11:10

## 2023-03-03 RX ADMIN — DEXAMETHASONE SODIUM PHOSPHATE 12 MG: 4 INJECTION, SOLUTION INTRAMUSCULAR; INTRAVENOUS at 10:53

## 2023-03-03 RX ADMIN — IRINOTECAN HYDROCHLORIDE 340 MG: 20 INJECTION, SOLUTION INTRAVENOUS at 11:47

## 2023-03-03 RX ADMIN — SODIUM CHLORIDE, PRESERVATIVE FREE 10 ML: 5 INJECTION INTRAVENOUS at 09:33

## 2023-03-03 RX ADMIN — DEXTROSE MONOHYDRATE 50 ML/HR: 50 INJECTION, SOLUTION INTRAVENOUS at 10:29

## 2023-03-03 RX ADMIN — LEUCOVORIN CALCIUM 900 MG: 350 INJECTION, POWDER, LYOPHILIZED, FOR SOLUTION INTRAMUSCULAR; INTRAVENOUS at 11:47

## 2023-03-03 RX ADMIN — FLUOROURACIL 900 MG: 50 INJECTION, SOLUTION INTRAVENOUS at 13:24

## 2023-03-03 RX ADMIN — ONDANSETRON 8 MG: 2 INJECTION INTRAMUSCULAR; INTRAVENOUS at 10:51

## 2023-03-03 RX ADMIN — ATROPINE SULFATE 0.4 MG: 0.4 INJECTION, SOLUTION INTRAVENOUS at 11:23

## 2023-03-03 RX ADMIN — SODIUM CHLORIDE, PRESERVATIVE FREE 10 ML: 5 INJECTION INTRAVENOUS at 13:30

## 2023-03-03 RX ADMIN — FLUOROURACIL 5375 MG: 50 INJECTION, SOLUTION INTRAVENOUS at 13:32

## 2023-03-03 ASSESSMENT — ENCOUNTER SYMPTOMS
SCLERAL ICTERUS: 0
BLOOD IN STOOL: 0
SHORTNESS OF BREATH: 0
NAUSEA: 0
COUGH: 0
ABDOMINAL PAIN: 0
NAUSEA: 0
CONSTIPATION: 1
SORE THROAT: 0
BACK PAIN: 0
VOMITING: 0
DIARRHEA: 0
SHORTNESS OF BREATH: 0
HEMOPTYSIS: 0
ABDOMINAL PAIN: 0
VOMITING: 0
EYE PROBLEMS: 0
ABDOMINAL DISTENTION: 0

## 2023-03-03 ASSESSMENT — PATIENT HEALTH QUESTIONNAIRE - PHQ9
SUM OF ALL RESPONSES TO PHQ QUESTIONS 1-9: 0
SUM OF ALL RESPONSES TO PHQ9 QUESTIONS 1 & 2: 0
2. FEELING DOWN, DEPRESSED OR HOPELESS: 0
SUM OF ALL RESPONSES TO PHQ QUESTIONS 1-9: 0
SUM OF ALL RESPONSES TO PHQ QUESTIONS 1-9: 0
7. TROUBLE CONCENTRATING ON THINGS, SUCH AS READING THE NEWSPAPER OR WATCHING TELEVISION: 0
2. FEELING DOWN, DEPRESSED OR HOPELESS: 0
SUM OF ALL RESPONSES TO PHQ QUESTIONS 1-9: 0
SUM OF ALL RESPONSES TO PHQ QUESTIONS 1-9: 0
5. POOR APPETITE OR OVEREATING: 0
4. FEELING TIRED OR HAVING LITTLE ENERGY: 0
8. MOVING OR SPEAKING SO SLOWLY THAT OTHER PEOPLE COULD HAVE NOTICED. OR THE OPPOSITE, BEING SO FIGETY OR RESTLESS THAT YOU HAVE BEEN MOVING AROUND A LOT MORE THAN USUAL: 0
SUM OF ALL RESPONSES TO PHQ QUESTIONS 1-9: 0
1. LITTLE INTEREST OR PLEASURE IN DOING THINGS: 0
SUM OF ALL RESPONSES TO PHQ QUESTIONS 1-9: 0
SUM OF ALL RESPONSES TO PHQ QUESTIONS 1-9: 0
6. FEELING BAD ABOUT YOURSELF - OR THAT YOU ARE A FAILURE OR HAVE LET YOURSELF OR YOUR FAMILY DOWN: 0
10. IF YOU CHECKED OFF ANY PROBLEMS, HOW DIFFICULT HAVE THESE PROBLEMS MADE IT FOR YOU TO DO YOUR WORK, TAKE CARE OF THINGS AT HOME, OR GET ALONG WITH OTHER PEOPLE: 0
3. TROUBLE FALLING OR STAYING ASLEEP: 0
9. THOUGHTS THAT YOU WOULD BE BETTER OFF DEAD, OR OF HURTING YOURSELF: 0

## 2023-03-03 NOTE — PATIENT INSTRUCTIONS
Patient Instructions from Today's Visit    Reason for Visit:  Prechemo    Plan:  Proceed as planned.      Follow Up:  Office visits as scheduled    Recent Lab Results:  Hospital Outpatient Visit on 03/03/2023   Component Date Value Ref Range Status    WBC 03/03/2023 4.3  4.3 - 11.1 K/uL Final    Comment: RESULTS CHECKED X 2  PERIPHERAL REVIEW TO FOLLOW      RBC 03/03/2023 3.24 (A)  4.23 - 5.6 M/uL Final    Hemoglobin 03/03/2023 9.6 (A)  13.6 - 17.2 g/dL Final    Hematocrit 03/03/2023 30.2 (A)  41.1 - 50.3 % Final    MCV 03/03/2023 93.2  82.0 - 102.0 FL Final    MCH 03/03/2023 29.6  26.1 - 32.9 PG Final    MCHC 03/03/2023 31.8  31.4 - 35.0 g/dL Final    RDW 03/03/2023 14.2  11.9 - 14.6 % Final    Platelets 85/58/9291 178  150 - 450 K/uL Final    MPV 03/03/2023 10.1  9.4 - 12.3 FL Final    nRBC 03/03/2023 0.00  0.0 - 0.2 K/uL Final    **Note: Absolute NRBC parameter is now reported with Hemogram**    Seg Neutrophils 03/03/2023 56  43 - 78 % Final    Lymphocytes 03/03/2023 33  13 - 44 % Final    Monocytes 03/03/2023 8  4.0 - 12.0 % Final    Eosinophils % 03/03/2023 2  0.5 - 7.8 % Final    Basophils 03/03/2023 1  0.0 - 2.0 % Final    Immature Granulocytes 03/03/2023 0  0.0 - 5.0 % Final    Segs Absolute 03/03/2023 2.5  1.7 - 8.2 K/UL Final    Absolute Lymph # 03/03/2023 1.4  0.5 - 4.6 K/UL Final    Absolute Mono # 03/03/2023 0.3  0.1 - 1.3 K/UL Final    Absolute Eos # 03/03/2023 0.1  0.0 - 0.8 K/UL Final    Basophils Absolute 03/03/2023 0.0  0.0 - 0.2 K/UL Final    Absolute Immature Granulocyte 03/03/2023 0.0  0.0 - 0.5 K/UL Final    RBC Comment 03/03/2023     Final                    Value:SLIGHT  ANISOCYTOSIS + POIKILOCYTOSIS      RBC Comment 03/03/2023     Final                    Value:OCCASIONAL  OVALOCYTES      WBC Comment 03/03/2023 Result Confirmed By Smear    Final    Comment: OCCASIONAL  ATYPICAL LYMPHOCYTES PRESENT      Platelet Comment 70/12/7907 ADEQUATE    Final    Differential Type 03/03/2023 AUTOMATED  Final       Treatment Summary has been discussed and given to patient: n/a    -------------------------------------------------------------------------------------------------------------------  Please call our office at [phone] if you have any  of the following symptoms:   Fever of 100.5 or greater  Chills  Shortness of breath  Swelling or pain in one leg    After office hours an answering service is available and will contact a provider for emergencies or if you are experiencing any of the above symptoms.    Patient does express an interest in My Chart.  My Chart log in information explained on the after visit summary printout at the check-out desk.    Vasile Julio RN

## 2023-03-03 NOTE — PROGRESS NOTES
[facility]: Established patient - follow up     Chief Complaint   Patient presents with    Follow-up        Reason for Referral: Abdominal pain; peritoneal metastatic disease   Referring Provider: Margarita Dove NP   Primary Care Provider: Lala Alexandre MD   Family History of Cancer/Hematologic Disorders: Family history is significant for sister with breast cancer. Presenting Symptoms: Progressively worsening, persistent abdominal pain x several months    History of Present Illness:  Mr. MARIA [facility]  is a 62 y.o. male who presents today for FU regarding adenocarcinoma with peritoneal metastatic disease. The past medical history is significant for tobacco use (recent pack per day smoker x 30+ years - now down to 1/3PPD), PUD, paresthesia/pain of bilateral upper extremities, HLD, HTN, diverticulitis,  colon polyps, hiatal hernia, chronic right shoulder pain, bilateral carpal tunnel syndrome, and partial colon resection. He presented to the [facility] ED on 5/12/22 with a complaint of persistent abdominal pain for several months that was becoming progressively  worse. EGD and colonoscopy on 2/25/22 revealed findings of hiatal hernia, gastric polyps, several benign colon polyps, diverticulosis, and internal hemorrhoids. CT of the abdomen on 3/8/22 showed no acute findings. He presented to [facility] ER x 2 for  evaluation (5/3/22 and 5/10/22). RUQ abdominal ultrasound was completed on 5/3/22 in the ED at [facility] demonstrating no acute findings to explain patient's pain; probable hepatic  steatosis; small echogenic mural foci in the gallbladder which are nonmobile, likely representing cholesterol polyps, largest measuring 4 mm; and small volume simple ascites in the right upper quadrant and left lower quadrant, nonspecific.   CT AP with  contrast at [facility] ED 5/10/22 showed a partial small bowel obstruction; small to moderate amount of free fluid in the abdomen and pelvis; and nonspecific stranding of the omentum primarily in the left upper quadrant. Radiologist noted that follow-up  CT was recommended to differentiate passive congestion from omental disease. On 5/11/22, abdominal series again showed multiple dilated small bowel loops with enteric contrast appearing to extend into the proximal colon, suggesting partial small  bowel obstruction. CT AP in the [facility] ED on 5/12/22 identified extensive peritoneal nodularity and mild ascites consistent with peritoneal  metastatic disease with primary lesion not definitely identified; dilated fluid-filled loops of small bowel possibly related to gastroenteritis with no definite obstruction and no obvious bowel mass; and sclerosis of S1 vertebral body. Radiologist recommended consideration of follow-up bone scan to assess for bone metastases. Patient was admitted to  the Hospitalist service on 5/12/22, and IR consulted for possible paracentesis however very small volume was inaccessible. CT Chest obtained on 5/13/22 showed No evidence of primary malignancy or metastasis within the chest.  Follow-up hepatobiliary  scan was suggested to assess for common bile duct obstruction. Oncology was consulted but did not see patient inhouse as pt was dischared. Upon discharge on 5/14/22, patient was instructed to follow up with [facility]. During consultation, we discussed his workup. We looked at the images together demonstrating some of the findings concerning for peritoneal nodularity/peritoneal disease. Discussed anatomy of the findings utilizing images to help pt understand what we are looking at and suspecting. He and wife were appreciative of the time spent to review these. We also addressed pt's pain. We also reviewed images of sclerosing lesion - bone scan recommended. He also was recommended to undergo HIDA scan after recent US per PCP. He is tired of tests, frustrated.   Feelings validated and stressed importance of workup to determine why he has pain.  Wt loss from 240 --> 209 noted and expressed concern to pt about this. Of note, prior akbar resection with Dr Maxey Skiff for diverticulitis per pt. Sent note to dr Meredith Reyes re pt's case to expedite workup with his agreement. He was hospitalized for abdominal pain and sepsis. He was empirically placed on vancomycin and cefepime. CT scanning disclosed no intra-abdominal fluid collection or abscess but did suggest that his tumor burden was somewhat smaller. He had some purulent drainage from around his G-tube. This was cultured and grew klebsiella pneumoniae and citrobacter freundii both of which were sensitive to Levaquin which he was discharged home on for 10 days. He returns today with wife for follow up and C17 FOLFIRI (Oxaliplatin removed with C14 due to neuropathy). He continues to do well overall. Neuropathy ongoing - PCP switched his lexapro to effexor to see if this would be helpful. He is on B complex and had a hard time finding Alpha lipoic acid - discussed where to obtain this. He denies any GI or bowel complaints. His weight is stable. He denies any shortness of breath. He denies any fevers or other infectious symptoms. Chronological Events:   EGD REPORT 2/25/22                      COLONOSCOPY REPORT 2/25/22                 CT ABDOMEN PELVIS W CONTRAST 3/8/2022   FINDINGS:   LOWER CHEST: Unremarkable. ABDOMEN AND PELVIS:   Liver: Unremarkable. Biliary system: Unremarkable. Pancreas: Unremarkable. Spleen: Unremarkable. Adrenals: Unremarkable. Genitourinary: Unremarkable. Reproductive organs: Unremarkable. Gastrointestinal tract: Colonic diverticulosis without evidence of diverticulitis. There is no evidence of bowel obstruction or inflammation. The appendix is normal   Peritoneum/Extraperitoneum: Unremarkable. No free fluid or air. Vascular: Unremarkable. Musculoskeletal:  Small fat-containing umbilical hernia.  Degenerative  changes of thoracolumbar spine. No acute or aggressive osseous lesion. IMPRESSION: Colonic diverticula without evidence of acute diverticulitis. RIGHT UPPER QUADRANT ABDOMINAL ULTRASOUND 5/3/2022    FINDINGS:   Pancreas: Not visualized, obscured by bowel gas. Intrahepatic IVC: Normal.   Main  Portal Vein: Patent with normal directional flow. Liver: Liver contour is normal. Liver appears diffusely increased in echogenicity consistent with hepatic steatosis. There is fatty sparing around the gallbladder fossa. Gallbladder: Gallbladder  is normal in size. No evidence of gallbladder wall thickening. No gallstones are seen. There are several nonmobile echogenic mural foci in the proximal gallbladder body/neck, largest measuring 4 mm, without shadowing, likely small cholesterol polyps. Bile ducts: No evidence of biliary ductal dilation. The common bile duct measures 3 mm in diameter. Right kidney: Normal.   Left Upper Quadrant: Limited images of the left upper quadrant are unremarkable. Spleen measures 12.1 cm in length. Free fluid: Trace simple free fluid in the right upper quadrant and left lower quadrant. IMPRESSION:    1. No acute findings to explain patient's pain. 2. Probable hepatic steatosis. 3. Small echogenic mural foci in the gallbladder which are nonmobile, likely representing cholesterol polyps, largest measuring 4 mm. There are no specific ultrasound  follow up recommendations for polyps of this small size. 4. Small volume simple ascites in the right upper quadrant and left lower quadrant, nonspecific. CT ABDOMEN - PELVIS WITH CONTRAST 5/10/22   FINDINGS:   Liver: Normal    Portal Vein: Normal   Gallbladder - Biliary Tree: Normal   Pancreas: Normal   Spleen: Normal   Retroperitoneum:   Adrenals: Normal   Kidneys:  Normal    Aorto - Cava:  Calcification of the abdominal aorta without aneurysm formation.    Lymphatics:  Normal   GI - Mesentery - Peritoneum: Multiple dilated mid small bowel loops without a focal transition point. The appendix is normal. Small to  moderate amount of free fluid in the pelvis and right flank. Nonspecific stranding of the omentum in the left upper quadrant. Small fatty umbilical hernia. Pelvis: Normal   Lower Chest: Normal   Soft tissues - MSK: Normal    IMPRESSION:   1. Partial small bowel obstruction. 2. Small to moderate amount of free fluid in the abdomen and pelvis. 3. Nonspecific stranding of the omentum primarily  in the left upper quadrant. Follow-up CT is recommended to differentiate passive congestion from omental a static disease. ABDOMEN SERIES 5/11/22   FINDINGS:   Chest: Heart size within normal limits. Lungs are clear. No pleural effusion or pneumothorax. Bowel: Multiple dilated small bowel loops with air-fluid levels on the upright view. Contrast distends small bowel loops in the left lateral abdomen and lower abdomen. Contrast in the right hemiabdomen appears to be within proximal colon. Scattered colonic  gas. Peritoneum / Retroperitoneum: No pneumoperitoneum. Soft tissues / Bones: No acute osseous findings. The contrast in urinary bladder. Lines / Support Apparatus: None. IMPRESSION: Redemonstrated of multiple dilated small bowel loops with enteric contrast appearing to extend into the proximal colon, suggesting only partial small bowel obstruction. Continued radiographic follow-up is advised. CT OF THE ABDOMEN AND PELVIS 5/12/22   FINDINGS:   LOWER CHEST: Normal.   HEPATOBILIARY: No evidence of focal liver mass. No calcified gallstones. PANCREAS: Normal.   SPLEEN: Normal.    ADRENAL GLANDS: Normal.    KIDNEYS/BLADDER: Kidneys and bladder are unremarkable. No hydronephrosis or urinary tract calculi. BOWEL: Dilated fluid-filled loops of small bowel are present throughout the abdomen. No definite transition point. Oral contrast noted in the colon.  No definite evidence of bowel mass but there is extensive peritoneal nodularity and trace ascites highly  concerning for peritoneal metastatic disease.   LYMPH NODES: No enlarged retroperitoneal or pelvic lymph nodes. Peritoneal nodularity again noted most likely metastatic disease.    VASCULATURE: Unremarkable.    PELVIC ORGANS: Prostate gland and rectum are unremarkable. Small amount of free pelvic fluid is noted.    MUSCULOSKELETAL: Degenerative spine changes are noted. Mild sclerosis involving the S1 vertebral body is present on image 74 of series 602.    IMPRESSION   1. Extensive peritoneal nodularity and mild ascites consistent with peritoneal metastatic disease. Primary lesion not definitely identified.   2. Dilated fluid-filled loops of small bowel possibly related to gastroenteritis. No definite obstruction. No obvious bowel mass.   3. Sclerosis S1 vertebral body. Consider follow-up bone scan to assess for bone metastases.       LIMITED ABDOMINAL ULTRASOUND 5/13/22   FINDINGS: A single limited gray scale image was submitted of an undisclosed location in the abdomen. Images demonstrate a small amount of  ascites as seen on comparison CT.    IMPRESSION   1. Small volume ascites.       CT CHEST WITH CONTRAST 5/13/22   FINDINGS:   Mediastinum and visualized thyroid: Normal.   Heart: Normal.    Large Vessels: Normal.    Pleura: Normal.    Lungs: No lung mass clearly discerned. Mild scarring or atelectasis in the right lung base. No suspicious lung nodule. No consolidation or zulma pulmonary edema.    Airways: Normal.    Lymph nodes: Normal.    Bones/Soft tissues: Normal.    Visualized abdomen: Partially imaged omental nodules. Perihepatic ascites noted.    IMPRESSION: No evidence of primary malignancy or metastasis within the chest       ABDOMINAL ULTRASOUND 5/17/22   FINDINGS:    LIVER: 17.3 cm.  Slight increase in echogenicity without focal mass.   BILE DUCTS: No intrahepatic bile duct dilatation.  CBD diameter = 8 mm.   GALLBLADDER: It is unremarkable in appearance without  stones or sludge. Echogenic polyp measures 0.5 cm. No gallbladder wall thickening. Mild ascites. PANCREAS: Normal.   SPLEEN: Borderline enlarged at 12.2 cm. RIGHT KIDNEY: 13.3 cm. No mass or hydronephrosis. LEFT KIDNEY: 14.3 cm. No mass or hydronephrosis. ABDOMINAL AORTA AND IVC: Normal in size. ASCITES: Mild   IMPRESSION: Possible mild fatty infiltration liver. No focal mass. Gallbladder polyp but no stones or gallbladder wall thickening. Mild ascites. Mildly prominent common bile duct. Follow-up hepatobiliary scan could be performed to assess for common bile duct obstruction. 04/02/2021 (COVID-19, Lollie Mulligan, Primary or Immunocompromised Series, MRNA, PF, 100mcg/0.5mL)   04/30/2021 (COVID-19, Lollie Mulligan, Primary or Immunocompromised Series, MRNA, PF, 100mcg/0.5mL)   11/16/2021 (COVID-19, Moderna Booster, PF, 0.25mL Dose)      5/18/22 heme/onc consultation   5/20/22 Dr Romulo Lee consult - sent to ED for admission/workup   5/23/22 omental bx - IR   5/27/22 Dr Romulo Lee - diagnostic lap, omental mass and mesentery mass excision, G-tube placement for venting  6/1/22 painful G-tube - tract recanalized and new G-tube placed   6/12/22 C1 FOLFIRINOX completed - dose adjusted   6/14/22 d/c   6/24/22 FU sx - G tube maint instructions/staples removed - RTC PRN   6/24/22 FU after hospitalization - discussed PC/plan for tx  7/8/22 FU - mainly concerned about PICC line without blood return, decline cathflo, seeing José Miguel Pham in [facility] on Monday. Will increase hydration at home.  for TPN.    7/18/22 Follow up after hospitalization. He will complete course of Levaquin as prescribed for infection around G-tube. Would like to proceed with cycle 3 FOLFIRINOX with increased dosing. 8/4/22 Cycle 4 FOLFIRINOX - continue dose escalation. He will complete course of Levaquin/Diflucan as prescribed for infection around G-tube, site looks good today.    8/17/22 C5 FOLFIRINOX - wishes to pw full dose accepting risks; wishes for G tube to be removed - will need to time with labs; plan to drop dose in opioids - PC seeing pt today. RD seeing pt. Plan for restaging in ~Oct.    9/1/22 FU FOLFIRINOX C5 full dose now; imaging in Oct   9/15/22 FU FOLFIRINOX C6-doing well, weight up 11#  9/30/22 FU FOLFIRINOX-C8 defer 1 week due to neutropenia, ANC 0.5  10/13/22 FU FOLFIRINOX 8; CT CAP reviewed and no POD. 10/28/22 Stat ultrasound RUE and then proceed with cycle 9 FOLFIRINOX. Doppler - Persistent DVT, nonocclusive, within the right axillary and basilic veins. The previously seen nonocclusive thrombus within the right innominate and subclavian veins are no longer seen. No abnormality of the right brachial vein demonstrated. Started on Xarelto. 11/11/22 FU - C10 FOLFIRINOX. Doing well. Wt stable. RUE swelling better. No new issues/concerns. 11/25/22 FU and C11 FOLFIRINOX, doing very well   12/9/22 FU and C12 FOLFIRINOX. Doing well. Weight stable. 12/23/22 FU and C13 FOLFIRINOX, doing well      1/20/23 FU and C14 - will stop oxali due to cold/neuropathy - p/w FOLFIRI for now; CT reviewed; cervical spine imaging   2/3/23 FU and C15 FOLFIRI      2/17/23 FU and C16 FOLFIRI. Doing okay aside from neuropathy. Labs reviewed. 3/3/23 FU for C17 FOLFIRI.      Family History   Problem Relation Age of Onset    No Known Problems Brother     Cancer Sister         breast    Hypertension Mother     Heart Disease Mother     Diabetes Father     Osteoarthritis Father     Alcohol Abuse Father     Hypertension Father     Diabetes Mother       Social History     Socioeconomic History    Marital status:      Spouse name: None    Number of children: None    Years of education: None    Highest education level: None   Tobacco Use    Smoking status: Former     Packs/day: 1.00     Types: Cigarettes    Smokeless tobacco: Never   Vaping Use    Vaping Use: Never used   Substance and Sexual Activity    Alcohol use: No    Drug use: No    Sexual activity: Yes     Partners: Female     Social Determinants of Health     Financial Resource Strain: Low Risk     Difficulty of Paying Living Expenses: Not hard at all   Food Insecurity: No Food Insecurity    Worried About [employer] in the Last Year: Never true    Ran Out of Food in the Last Year: Never true   Transportation Needs: Unknown    Lack of Transportation (Non-Medical): No   Housing Stability: Unknown    Unstable Housing in the Last Year: No        Review of Systems   Constitutional:  Negative for appetite change, diaphoresis, fatigue, fever and unexpected weight change. HENT:   Negative for sore throat. Eyes:  Negative for eye problems and icterus. Respiratory:  Negative for cough, hemoptysis and shortness of breath. Cardiovascular:  Negative for chest pain, leg swelling and palpitations. Gastrointestinal:  Positive for constipation (controlled). Negative for abdominal distention, abdominal pain, blood in stool, diarrhea, nausea and vomiting. Endocrine: Negative for hot flashes. Genitourinary:  Negative for dysuria. Musculoskeletal:  Negative for arthralgias, back pain and gait problem. Skin:  Negative for itching, rash and wound. Neurological:  Positive for numbness. Negative for dizziness, extremity weakness, gait problem, headaches, light-headedness, seizures and speech difficulty. Hematological:  Negative for adenopathy. Does not bruise/bleed easily. Psychiatric/Behavioral:  Positive for depression (better). Negative for decreased concentration and sleep disturbance. The patient is nervous/anxious (better).        No Known Allergies  Past Medical History:   Diagnosis Date    Bilateral carpal tunnel syndrome 12/9/2020    Cancer [facility])     Chronic right shoulder pain 11/30/2020    Colon polyps 3/11/2013    Diverticulitis 3/11/2013    HTN (hypertension) 3/11/2013    Hyperlipidemia 3/11/2013    Paresthesia and pain of both upper extremities 11/30/2020    PUD (peptic ulcer disease) 3/11/2013    History of     Right elbow pain 12/9/2020    Wheezing 3/11/2013     Past Surgical History:   Procedure Laterality Date    COLONOSCOPY  2/25/09    colonoscopy per Dr. Trang Tsang with need for repeat every 3 years    COLONOSCOPY  02/25/2022    Dr. Wicho Lawson; polyps of the ascending, transverse, descending, and sigmoid colons; internal hemorrhoid; diverticulosis    LAPAROSCOPY N/A 5/27/2022    LAPAROSCOPY DIAGNOSTIC , OPEN EXPLORATORY LAPAROTOMY, MASS EXCISION, G-TUBE PLACEMENT performed by Miguelina Bobo MD at [address] [facility] N/A 6/3/2022    PORT INSERTION performed by Miguelina Bobo MD at [address] [facility]  October 2004    partial colon resection per Dr. Clif Castro  02/25/2022    Dr. Wicho Lawson; hiatal hernia gastric polyps     Current Outpatient Medications   Medication Sig Dispense Refill    pantoprazole (PROTONIX) 40 MG tablet Take 1 tablet by mouth daily 90 tablet 1    rosuvastatin (CRESTOR) 10 MG tablet Take 1 tablet by mouth daily 90 tablet 1    Docusate Calcium (STOOL SOFTENER PO) Take by mouth      B Complex-C (SUPER B COMPLEX PO) Take by mouth      magnesium oxide (MAG-OX) 400 MG tablet Take 1 tablet by mouth in the morning, at noon, and at bedtime 90 tablet 2    escitalopram (LEXAPRO) 10 MG tablet Take 1 tablet by mouth daily 30 tablet 5    potassium chloride (KLOR-CON M) 20 MEQ extended release tablet Take 1 tablet by mouth daily for 28 days 14 days 30 tablet 1    busPIRone (BUSPAR) 7.5 MG tablet Take 1 tablet by mouth daily 90 tablet 0    rivaroxaban (XARELTO) 20 MG TABS tablet Take 1 tablet by mouth daily (with breakfast) 30 tablet 3    Multiple Vitamins-Minerals (MULTIVITAMIN MEN 50+ PO) Take by mouth      promethazine (PHENERGAN) 12.5 MG tablet Take 1 tablet by mouth every 6 hours as needed for Nausea 90 tablet 0    ondansetron (ZOFRAN-ODT) 8 MG TBDP disintegrating tablet Take 1 tablet by mouth every 8 hours as needed for Nausea or Vomiting (Patient not taking: No sig reported) 90 tablet 0    polyethylene glycol (GLYCOLAX) 17 GM/SCOOP powder Take 17 g by mouth daily as needed (constipation) (Patient not taking: No sig reported) 1 each 0    oxyCODONE (ROXICODONE INTENSOL) 100 MG/5ML concentrated solution Take 10 mg by mouth every 4 hours as needed for Pain. 1 mls under tongue (Patient not taking: No sig reported)      naloxone 4 MG/0.1ML LIQD nasal spray 1 spray by Nasal route as needed for Opioid Reversal (Patient not taking: No sig reported)       No current facility-administered medications for this visit.      Facility-Administered Medications Ordered in Other Visits   Medication Dose Route Frequency Provider Last Rate Last Admin    sodium chloride flush 0.9 % injection 10 mL  10 mL IntraVENous PRN Kong Morales MD   10 mL at 03/03/23 0933    dextrose 5 % solution  5-250 mL/hr IntraVENous PRN Kendy Torrez NP-C 50 mL/hr at 03/03/23 1029 50 mL/hr at 03/03/23 1029    fosaprepitant (EMEND) 150 mg in sodium chloride 0.9 % 150 mL IVPB  150 mg IntraVENous Once Kendy Torrez NP-C 450 mL/hr at 03/03/23 1110 150 mg at 03/03/23 1110    atropine injection 0.4 mg  0.4 mg SubCUTAneous Once Kendy Torrez NP-IVON        irinotecan (CAMPTOSAR) 340 mg in dextrose 5 % 250 mL chemo IVPB  150 mg/m2 (Treatment Plan Recorded) IntraVENous Once Kendy Torrez NP-C        leucovorin calcium (WELLCOVORIN) 900 mg in dextrose 5 % 250 mL IVPB  400 mg/m2 (Treatment Plan Recorded) IntraVENous Once Kendy Torrez NP-C        fluorouracil (ADRUCIL) chemo syringe 900 mg  400 mg/m2 (Treatment Plan Recorded) IntraVENous Once Kendy Torrez NP-IOVN        fluorouracil (ADRUCIL) 5,375 mg in sodium chloride 0.9 % 230 mL chemo elastomeric infusion  2,400 mg/m2 (Treatment Plan Recorded) IntraVENous Over 46 hours Kendy Torrez NP-C        sodium chloride flush 0.9 % injection 5-40 mL  5-40 mL IntraVENous PRN Roxana Xie NP-C           No flowsheet data found. OBJECTIVE:  /65 (Site: Left Upper Arm, Position: Standing)   Pulse 78   Temp 98.2 °F (36.8 °C)   Resp 16   Ht 5' 10\" (1.778 m)   Wt 222 lb (100.7 kg)   SpO2 97%   BMI 31.85 kg/m²       ECOG PERFORMANCE STATUS - 1- Restricted in physically strenuous activity but ambulatory and able to carry out work of a light or sedentary nature such as light house work, office work. Pain - Pain Score:   0 - No pain (Fatigue- 4)0 - No pain/10. None/Minimal pain - not affecting QOL     Fatigue - No flowsheet data found. Distress - No flowsheet data found. Physical Exam  Vitals reviewed. Exam conducted with a chaperone present. Constitutional:       General: He is not in acute distress. Appearance: Normal appearance. He is normal weight. He is not ill-appearing or toxic-appearing. HENT:      Head: Normocephalic and atraumatic. Nose: Nose normal. No congestion. Mouth/Throat:      Mouth: Mucous membranes are moist.   Eyes:      General: No scleral icterus. Conjunctiva/sclera: Conjunctivae normal.   Cardiovascular:      Rate and Rhythm: Normal rate and regular rhythm. Heart sounds: No murmur heard. Pulmonary:      Effort: Pulmonary effort is normal. No respiratory distress. Breath sounds: Normal breath sounds. No wheezing, rhonchi or rales. Abdominal:      General: Bowel sounds are normal. There is no distension. Palpations: Abdomen is soft. There is no mass. Tenderness: There is no abdominal tenderness. There is no guarding or rebound. Musculoskeletal:         General: No swelling. Normal range of motion. Cervical back: Normal range of motion. No rigidity. Right lower leg: No edema. Left lower leg: No edema. Skin:     General: Skin is warm and dry. Coloration: Skin is not jaundiced or pale. Findings: No bruising or rash.    Neurological:      General: No focal deficit present. Mental Status: He is alert and oriented to person, place, and time. Motor: No weakness. Coordination: Coordination normal.      Gait: Gait normal.   Psychiatric:         Behavior: Behavior normal.         Thought Content:  Thought content normal.        Labs:  Recent Results (from the past 168 hour(s))   CBC with Auto Differential    Collection Time: 03/03/23  9:20 AM   Result Value Ref Range    WBC 4.3 4.3 - 11.1 K/uL    RBC 3.24 (L) 4.23 - 5.6 M/uL    Hemoglobin 9.6 (L) 13.6 - 17.2 g/dL    Hematocrit 30.2 (L) 41.1 - 50.3 %    MCV 93.2 82.0 - 102.0 FL    MCH 29.6 26.1 - 32.9 PG    MCHC 31.8 31.4 - 35.0 g/dL    RDW 14.2 11.9 - 14.6 %    Platelets 471 966 - 381 K/uL    MPV 10.1 9.4 - 12.3 FL    nRBC 0.00 0.0 - 0.2 K/uL    Seg Neutrophils 56 43 - 78 %    Lymphocytes 33 13 - 44 %    Monocytes 8 4.0 - 12.0 %    Eosinophils % 2 0.5 - 7.8 %    Basophils 1 0.0 - 2.0 %    Immature Granulocytes 0 0.0 - 5.0 %    Segs Absolute 2.5 1.7 - 8.2 K/UL    Absolute Lymph # 1.4 0.5 - 4.6 K/UL    Absolute Mono # 0.3 0.1 - 1.3 K/UL    Absolute Eos # 0.1 0.0 - 0.8 K/UL    Basophils Absolute 0.0 0.0 - 0.2 K/UL    Absolute Immature Granulocyte 0.0 0.0 - 0.5 K/UL    RBC Comment SLIGHT  ANISOCYTOSIS + POIKILOCYTOSIS        RBC Comment OCCASIONAL  OVALOCYTES        WBC Comment Result Confirmed By Smear      Platelet Comment ADEQUATE      Differential Type AUTOMATED     Comprehensive Metabolic Panel    Collection Time: 03/03/23  9:20 AM   Result Value Ref Range    Sodium 141 133 - 143 mmol/L    Potassium 3.1 (L) 3.5 - 5.1 mmol/L    Chloride 111 (H) 101 - 110 mmol/L    CO2 27 21 - 32 mmol/L    Anion Gap 3 2 - 11 mmol/L    Glucose 157 (H) 65 - 100 mg/dL    BUN 8 8 - 23 MG/DL    Creatinine 0.80 0.8 - 1.5 MG/DL    Est, Glom Filt Rate >60 >60 ml/min/1.73m2    Calcium 8.2 (L) 8.3 - 10.4 MG/DL    Total Bilirubin 0.3 0.2 - 1.1 MG/DL    ALT 20 12 - 65 U/L    AST 14 (L) 15 - 37 U/L    Alk Phosphatase 96 50 - 136 U/L Total Protein 6.2 (L) 6.3 - 8.2 g/dL    Albumin 3.0 (L) 3.2 - 4.6 g/dL    Globulin 3.2 2.8 - 4.5 g/dL    Albumin/Globulin Ratio 0.9 0.4 - 1.6     Magnesium    Collection Time: 03/03/23  9:20 AM   Result Value Ref Range    Magnesium 1.8 1.8 - 2.4 mg/dL         Imaging: reviewed     PATHOLOGY:             6/2022         ASSESSMENT:     Diagnosis Orders   1. Abdominal carcinomatosis (HCC)  CBC With Auto Differential    Comprehensive metabolic panel    DISCONTINUED: dextrose 5 % solution    DISCONTINUED: fosaprepitant (EMEND) 150 mg in sodium chloride 0.9 % 150 mL IVPB    DISCONTINUED: dexamethasone (DECADRON) 12 mg in sodium chloride 0.9 % 50 mL IVPB    DISCONTINUED: ondansetron (ZOFRAN) injection 8 mg    DISCONTINUED: atropine injection 0.4 mg    DISCONTINUED: irinotecan (CAMPTOSAR) 340 mg in dextrose 5 % 250 mL chemo IVPB    DISCONTINUED: leucovorin calcium (WELLCOVORIN) 900 mg in dextrose 5 % 250 mL IVPB    DISCONTINUED: fluorouracil (ADRUCIL) chemo syringe 900 mg    DISCONTINUED: fluorouracil (ADRUCIL) 5,375 mg in sodium chloride 0.9 % 230 mL chemo elastomeric infusion    DISCONTINUED: sodium chloride flush 0.9 % injection 5-40 mL    DISCONTINUED: potassium chloride (KLOR-CON M) extended release tablet 40 mEq      2.  Peritoneal metastases ([address]  CBC With Auto Differential    Comprehensive metabolic panel    DISCONTINUED: dextrose 5 % solution    DISCONTINUED: fosaprepitant (EMEND) 150 mg in sodium chloride 0.9 % 150 mL IVPB    DISCONTINUED: dexamethasone (DECADRON) 12 mg in sodium chloride 0.9 % 50 mL IVPB    DISCONTINUED: ondansetron (ZOFRAN) injection 8 mg    DISCONTINUED: atropine injection 0.4 mg    DISCONTINUED: irinotecan (CAMPTOSAR) 340 mg in dextrose 5 % 250 mL chemo IVPB    DISCONTINUED: leucovorin calcium (WELLCOVORIN) 900 mg in dextrose 5 % 250 mL IVPB    DISCONTINUED: fluorouracil (ADRUCIL) chemo syringe 900 mg    DISCONTINUED: fluorouracil (ADRUCIL) 5,375 mg in sodium chloride 0.9 % 230 mL chemo elastomeric infusion    DISCONTINUED: sodium chloride flush 0.9 % injection 5-40 mL    DISCONTINUED: potassium chloride (KLOR-CON M) extended release tablet 40 mEq      3. Fatigue due to treatment        4. Hypokalemia  DISCONTINUED: potassium chloride (KLOR-CON M) extended release tablet 40 mEq      5. Neuropathy due to chemotherapeutic drug [facility])            Mr. Nora Pratt is here for FU of metastatic adenocarcinoma. 1. Metastatic adenocarcinoma   - s/p omental and mesenteric mass bx - c/w upper GI adenocarcinoma    - hx of diverticular dz - s/p previous bowel resection by dr Yoly Burgos at [address] [place] [address] -    - We did discuss that his disease is not curable and he VU but wife stated that he believes he can beat this. - here today for follow up and C17 FOLFIRI (Oxaliplatin removed with C14). Labs reviewed and acceptable. - neuropathy - B complex; add Alpha Lipoic acid. Going to switch lexapro to effexor to see if this helps his neuropathy per PCP. - bilateral down arms numbness/tingling - p/w cervical spine imaging-negative. - appetite - Eating well. - hx RUE DVT, on Xarelto   - pain - denies and off of prescribed medications    - hypokalemia - give 40 meq now PO, continue oral supplement at home  - hypomagnesemia - on mag oxide 400 mg TID   - Previously, He wishes to continue on the current regimen; he previously expressed that in the future, he may wish to proceed with unconventional scheduling of maybe every 3 weeks. He does understand inherent risks with alternate schedule and how this would affect progression of disease. - sclerotic lesion on imaging at S1 - bone scan recommended, has not been completed   - constipation - Miralax as needed. - Continue good oral nutrition and hydration.   - Encouraged frequent activity throughout the day and rest as needed to combat fatigue.   - Call with any fevers, uncontrolled side effects from treatment or any other worrisome/concerning symptoms.    - asked for PCP referral in [facility] per protocol or sooner if needed          MDM      Lab studies and imaging studies were personally reviewed. Pertinent old records were reviewed. Historical:    - here with his wife for consultation. During today's visit, we discussed his current workup. We looked at the images together demonstrating some of the findings concerning for peritoneal nodularity/peritoneal disease. Discussed anatomy of the findings  utilizing images to help pt understand what we are looking at and suspecting. He and wife were appreciative of the time spent to review these. Discussed need for visual dx/tissue pathology to determine plan - recommend ex lap - refer to Dr Elissa James - personally  reviewed case with Dr Elissa Jmaes who will expedite the workup and will see pt Fri. US with mild biliary duct dilation - HIDA/ERCP reviewed by PCP   + BM/flatus; He did not like how IV morphine made him feel in the hospital.  He tried tramadol but found no relief and has been taking acetaminophen at home with minimal relief. Discussed different options and he settled on trying  Percocet - he will contact the office to inform us if this is working for him. We discussed SE - not to drive while on the med. Bowel regimen reviewed. He is tired of tests, frustrated. Feelings validated and stressed importance of workup to determine why he has pain. Wt loss from 240 --> 209 noted and expressed concern to pt about this. - completed course of Levaquin/Diflucan for klebsiella pneumoniae and citrobacter freundii both of which were sensitive to Levaquin found around G-tube site. Wishes for tube removal - reviewed risks of this during chemotherapy - I would like for him to have labs day before scheduled procedure to make sure his counts are adequate per above; case reviewed with Dr Meera Lyn by me via tel today   - here cycle 8 FOLFIRINOX - labs reviewed-defer 1 week due to neutropenia. - nutrition - G-tube out.   He is being weaned off TPN (3x week now) as his oral intake has greatly improved, weight up    - pain - Fentanyl 12mcg with prn oxycodone 10 mg, plans to stop patch on Nader 10/2 PC following  - Plan for surveillance reviewed. Labs discussed. - nausea - resolved. Has Zyprexa QHS (not taking currently) and Zofran PRN. - wt loss/FTT - secondary to disease and tx - on TPN and eating more    - depression/anxiety - better affect, on lexapro 20mg daily, PC adding buspar prn  - here for follow-up prior to cycle 13 FOLFIRINOX. Labs reviewed and adequate. - CT October 2022 previously with no evidence of progressive disease. Due for scan ~ January. All questions were asked and answered to the best of my ability. The patient verbalized understanding and agrees with the plan above.           LUCIANA Rudolph  [facility]  [address] [place] [address]  Office : [phone]  Fax : [phone]

## 2023-03-03 NOTE — PROGRESS NOTES
Pt arrived ambulatory. Premeds, Folfiri completed without complications. Pt discharged with pump infusing. Pt aware of next appt 3/5 at 1430.   Patient instructed to call provider with temperature of 100.4 or greater or nausea/vomiting/ diarrhea or pain not controlled by medications

## 2023-03-03 NOTE — PROGRESS NOTES
FOLLOW UP VISIT    Subjective:    Olesya Garza (: 1960) is a 65 y.o., male,   Chief Complaint   Patient presents with    Results    Cholesterol Problem    Depression       HPI:    Depression, anxiety, mood: Went off lexapro for a week, however got temperamental and angry, so went back on this. Anxiety doing ok. On chemo days he is more quiet. Denies depression. Neuropathy: persistent of the hands and feet since chemo started, platinum agent has been d/c'ed, bengay didn't help, taking B complex. Having issues finding alpha lipoic acid. Continues to have electric-like sensation in the feet with flexion of the head. Labs reviewed. Magnesium, lipid panel, TSH are WNL. Remains on magnesium for hypomagnesemia. 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 He initially presented with 6 months of abdominal pain, 30 pound weight loss, GI work-up revealed hiatal hernia, gastric polyps, benign colon polyps, diverticulosis and hemorrhoids. he was started on FOLFIRINOX, underwent laparotomy 2022 with venting G-tube. Later developed sepsis with source felt to be from G-tube, treated with vancomycin and cefepime discharged on Levaquin, culture grew scant Citrobacter freundii, Klebsiella pneumoniae, alpha strep, Candida albicans. He was on TPN. -RUE DVT: Noted to have right upper extremity swelling, ultrasound on 10/28/2022 showed right axillary and basilic DVT, he was started on Xarelto. -Hypertension, hyperlipidemia: Blood pressure controlled off antihypertensives. On Crestor.     The following portions of the patient's history were reviewed and updated as appropriate:      Patient Active Problem List   Diagnosis    Right cervical radiculopathy    Right elbow pain    Hyperlipidemia    Bilateral carpal tunnel syndrome Essential hypertension    Gastroesophageal reflux disease    PUD (peptic ulcer disease)    Chronic right shoulder pain    Paresthesia and pain of both upper extremities    History of colon polyps    Chronic bronchitis (HCC)    Peritoneal metastases (HCC)    Hx SBO    Partial small bowel obstruction ([address]    Debility    Encounter for palliative care    Itching    Fatigue    Abdominal carcinomatosis ([address]    Goals of care, counseling/discussion    Pain, abdominal, LUQ    Nausea    Anxiety about dying    Depression    Carcinomatosis ([address]    Cancer associated pain    PICC (peripherally inserted central catheter) flush    Metastatic adenocarcinoma (HCC)    Gastric cancer (HCC)    Malfunction of peripheral inserted central catheter (HCC)    Hypokalemia    Leukopenia    Anemia    High risk medication use    Neck pain    Numbness of upper extremity    Acute deep vein thrombosis (DVT) of right upper extremity, unspecified vein ([address]     Past Surgical History:   Procedure Laterality Date    COLONOSCOPY  2/25/09    colonoscopy per Dr. Idalia Benjamin with need for repeat every 3 years    COLONOSCOPY  02/25/2022    Dr. Celestine Redd; polyps of the ascending, transverse, descending, and sigmoid colons; internal hemorrhoid; diverticulosis    LAPAROSCOPY N/A 5/27/2022    LAPAROSCOPY DIAGNOSTIC , OPEN EXPLORATORY LAPAROTOMY, MASS EXCISION, G-TUBE PLACEMENT performed by Nidia Maher MD at [facility] MAIN OR    PORT SURGERY N/A 6/3/2022    PORT INSERTION performed by Nidia Maher MD at [address] [place] [address]  October 2004    partial colon resection per Dr. Smith Larger  02/25/2022    Dr. Celestine Redd; hiatal hernia gastric polyps     Family History   Problem Relation Age of Onset    No Known Problems Brother     Cancer Sister         breast    Hypertension Mother     Heart Disease Mother     Diabetes Father     Osteoarthritis Father     Alcohol Abuse Father     Hypertension Father     Diabetes Mother      Social History     Socioeconomic History    Marital status:      Spouse name: Not on file    Number of children: Not on file    Years of education: Not on file    Highest education level: Not on file   Occupational History    Not on file   Tobacco Use    Smoking status: Former     Packs/day: 1.00     Types: Cigarettes    Smokeless tobacco: Never   Vaping Use    Vaping Use: Never used   Substance and Sexual Activity    Alcohol use: No    Drug use: No    Sexual activity: Yes     Partners: Female   Other Topics Concern    Not on file   Social History Narrative    Not on file     Social Determinants of Health     Financial Resource Strain: Low Risk     Difficulty of Paying Living Expenses: Not hard at all   Food Insecurity: No Food Insecurity    Worried About [address] [employer] in the Last Year: Never true    [employer] in the Last Year: Never true   Transportation Needs: Unknown    Lack of Transportation (Medical): Not on file    Lack of Transportation (Non-Medical):  No   Physical Activity: Not on file   Stress: Not on file   Social Connections: Not on file   Intimate Partner Violence: Not on file   Housing Stability: Unknown    Unable to Pay for Housing in the Last Year: Not on file    Number of Places Lived in the Last Year: Not on file    Unstable Housing in the Last Year: No     Current Outpatient Medications   Medication Sig Dispense Refill    pantoprazole (PROTONIX) 40 MG tablet Take 1 tablet by mouth daily 90 tablet 1    rosuvastatin (CRESTOR) 10 MG tablet Take 1 tablet by mouth daily 90 tablet 1    venlafaxine (EFFEXOR XR) 37.5 MG extended release capsule Take 1 capsule by mouth daily 30 capsule 2    Docusate Calcium (STOOL SOFTENER PO) Take by mouth      B Complex-C (SUPER B COMPLEX PO) Take by mouth      magnesium oxide (MAG-OX) 400 MG tablet Take 1 tablet by mouth in the morning, at noon, and at bedtime 90 tablet 2    escitalopram (LEXAPRO) 10 MG tablet Take 1 tablet by mouth daily 30 tablet 5    potassium chloride (KLOR-CON M) 20 MEQ extended release tablet Take 1 tablet by mouth daily for 28 days 14 days 30 tablet 1    busPIRone (BUSPAR) 7.5 MG tablet Take 1 tablet by mouth daily 90 tablet 0    rivaroxaban (XARELTO) 20 MG TABS tablet Take 1 tablet by mouth daily (with breakfast) 30 tablet 3    Multiple Vitamins-Minerals (MULTIVITAMIN MEN 50+ PO) Take by mouth      promethazine (PHENERGAN) 12.5 MG tablet Take 1 tablet by mouth every 6 hours as needed for Nausea 90 tablet 0    ondansetron (ZOFRAN-ODT) 8 MG TBDP disintegrating tablet Take 1 tablet by mouth every 8 hours as needed for Nausea or Vomiting (Patient not taking: No sig reported) 90 tablet 0    polyethylene glycol (GLYCOLAX) 17 GM/SCOOP powder Take 17 g by mouth daily as needed (constipation) (Patient not taking: Reported on 3/3/2023) 1 each 0    oxyCODONE (ROXICODONE INTENSOL) 100 MG/5ML concentrated solution Take 10 mg by mouth every 4 hours as needed for Pain. 1 mls under tongue (Patient not taking: No sig reported)      naloxone 4 MG/0.1ML LIQD nasal spray 1 spray by Nasal route as needed for Opioid Reversal (Patient not taking: No sig reported)       No current facility-administered medications for this visit. Facility-Administered Medications Ordered in Other Visits   Medication Dose Route Frequency Provider Last Rate Last Admin    sodium chloride flush 0.9 % injection 10 mL  10 mL IntraVENous PRN Taiwo Ortiz MD   10 mL at 03/03/23 0933     Allergies as of 03/03/2023    (No Known Allergies)       Review of Systems   Constitutional:  Negative for chills and fever. Respiratory:  Negative for shortness of breath. Cardiovascular:  Negative for chest pain. Gastrointestinal:  Negative for abdominal pain, nausea and vomiting.      Objective:    Vitals:    03/03/23 0807   BP: 124/62   Site: Left Upper Arm   Position: Sitting   Cuff Size: Large Adult   Pulse: 86   SpO2: 95%   Weight: 221 lb (100.2 kg)   Height: 5' 10\" (1.778 m)        Physical Exam  Constitutional:       General: He is not in acute distress. Appearance: Normal appearance. HENT:      Head: Normocephalic and atraumatic. Eyes:      Extraocular Movements: Extraocular movements intact. Conjunctiva/sclera: Conjunctivae normal.   Cardiovascular:      Rate and Rhythm: Normal rate and regular rhythm. Heart sounds: No murmur heard. Pulmonary:      Effort: Pulmonary effort is normal.      Breath sounds: Normal breath sounds. No wheezing, rhonchi or rales. Skin:     General: Skin is warm and dry. Neurological:      Mental Status: He is alert. Mental status is at baseline.    Psychiatric:         Mood and Affect: Mood normal.         Behavior: Behavior normal.       Lab Results   Component Value Date    WBC 4.2 (L) 02/17/2023    HGB 10.2 (L) 02/17/2023    HCT 32.4 (L) 02/17/2023    MCV 95.0 02/17/2023     02/17/2023      Lab Results   Component Value Date/Time     02/17/2023 08:45 AM    K 3.6 02/17/2023 08:45 AM     02/17/2023 08:45 AM    CO2 27 02/17/2023 08:45 AM    BUN 9 02/17/2023 08:45 AM    CREATININE 0.90 02/17/2023 08:45 AM    GLUCOSE 152 02/17/2023 08:45 AM    CALCIUM 8.4 02/17/2023 08:45 AM       Lab Results   Component Value Date     (H) 02/17/2023    K 3.6 02/17/2023     (H) 02/17/2023    CO2 27 02/17/2023    BUN 9 02/17/2023    CREATININE 0.90 02/17/2023    GLUCOSE 152 (H) 02/17/2023    CALCIUM 8.4 02/17/2023    PROT 6.5 02/17/2023    LABALBU 3.2 02/17/2023    BILITOT 0.4 02/17/2023    ALKPHOS 111 02/17/2023    AST 14 (L) 02/17/2023    ALT 20 02/17/2023    LABGLOM >60 02/17/2023    GFRAA >60 09/30/2022    AGRATIO 1.1 (L) 05/20/2022    GLOB 3.3 02/17/2023     Lab Results   Component Value Date    TSH 1.220 12/29/2020      Lab Results   Component Value Date    CHOL 102 02/17/2023    CHOL 138 04/18/2022    CHOL 223 (H) 01/03/2022     Lab Results   Component Value Date    TRIG 115 02/17/2023    TRIG 116 07/12/2022    TRIG 83 07/11/2022     Lab Results   Component Value Date    HDL 32 (L) 02/17/2023    HDL 26 (L) 04/18/2022    HDL 28 (L) 01/03/2022     Lab Results   Component Value Date    LDLCALC 47 02/17/2023    LDLCALC 88 04/18/2022    LDLCALC 160 (H) 01/03/2022     Lab Results   Component Value Date    LABVLDL 23 02/17/2023    LABVLDL 25 12/11/2019    VLDL 24 04/18/2022    VLDL 35 01/03/2022    VLDL 29 12/29/2020     Lab Results   Component Value Date    CHOLHDLRATIO 3.2 02/17/2023      Hemoglobin A1C   Date Value Ref Range Status   04/18/2022 6.0 (H) 4.8 - 5.6 % Final     Comment:              Prediabetes: 5.7 - 6.4           Diabetes: >6.4           Glycemic control for adults with diabetes: <7.0          Assessent & Plan    1. Neuropathy  -     [facility] [place] (NCS/Nerve Conduction)  2. Hyperlipidemia, unspecified hyperlipidemia type  -     rosuvastatin (CRESTOR) 10 MG tablet; Take 1 tablet by mouth daily, Disp-90 tablet, R-1Normal  3. Gastroesophageal reflux disease without esophagitis  -     pantoprazole (PROTONIX) 40 MG tablet; Take 1 tablet by mouth daily, Disp-90 tablet, R-1Normal  4. Hyperglycemia  -     Hemoglobin A1C; Future  5. Peripheral polyneuropathy  6. Metastatic adenocarcinoma (HCC)  7. Peritoneal metastases (HCC)  8. Acute deep vein thrombosis (DVT) of right upper extremity, unspecified vein (HCC)     Will switch from lexapro to effexor, 5 mg lexapro daily for 1 week then start effexor with hopes this may help with nerve pain, can titrate up as tolerated, advised to let us know if issues or side effects  Refer to neurology for the electric sensation with forward flexion of the head - lhermitte sign? MRI c-spine was normal.  Continue to follow with oncology with chemo    The patient and/or patient representative voiced understanding and agreement with the current diagnoses, recommendations, and possible side effects.    Return in about 4 weeks (around 3/31/2023) for routine  follow up, may be virtual.     Eveline Troncoso MD

## 2023-03-05 ENCOUNTER — HOSPITAL ENCOUNTER (OUTPATIENT)
Dept: INFUSION THERAPY | Age: 63
Discharge: HOME | End: 2023-03-05
Payer: COMMERCIAL

## 2023-03-05 VITALS
TEMPERATURE: 97 F | DIASTOLIC BLOOD PRESSURE: 65 MMHG | SYSTOLIC BLOOD PRESSURE: 105 MMHG | HEART RATE: 70 BPM | RESPIRATION RATE: 18 BRPM

## 2023-03-05 DIAGNOSIS — C76.2 ABDOMINAL CARCINOMATOSIS (HCC): Primary | ICD-10-CM

## 2023-03-05 DIAGNOSIS — C78.6 PERITONEAL METASTASES (HCC): ICD-10-CM

## 2023-03-05 PROCEDURE — 2580000003 HC RX 258: Performed by: NURSE PRACTITIONER

## 2023-03-05 PROCEDURE — 96523 IRRIG DRUG DELIVERY DEVICE: CPT

## 2023-03-05 RX ORDER — SODIUM CHLORIDE 0.9 % (FLUSH) 0.9 %
5-40 SYRINGE (ML) INJECTION PRN
Status: DISCONTINUED | OUTPATIENT
Start: 2023-03-05 | End: 2023-03-06 | Stop reason: HOSPADM

## 2023-03-05 RX ADMIN — SODIUM CHLORIDE, PRESERVATIVE FREE 10 ML: 5 INJECTION INTRAVENOUS at 12:45

## 2023-03-05 NOTE — PROGRESS NOTES
Arrived to the [facility]. Chemotherapy pump completed and disconnected. Patient tolerated well. Any issues or concerns during appointment: none. Patient aware of next infusion appointment on 3-17-23 (date) at 56 (time). Patient instructed to call provider with temperature of 100.4 or greater or nausea/vomiting/ diarrhea or pain not controlled by medications  Discharged via ambulatory.

## 2023-03-11 DIAGNOSIS — C16.9 MALIGNANT NEOPLASM OF STOMACH, UNSPECIFIED LOCATION (HCC): ICD-10-CM

## 2023-03-11 DIAGNOSIS — I82.621 ACUTE DEEP VEIN THROMBOSIS (DVT) OF RIGHT UPPER EXTREMITY, UNSPECIFIED VEIN (HCC): ICD-10-CM

## 2023-03-17 ENCOUNTER — OFFICE VISIT (OUTPATIENT)
Dept: ONCOLOGY | Age: 63
End: 2023-03-17
Payer: COMMERCIAL

## 2023-03-17 ENCOUNTER — HOSPITAL ENCOUNTER (OUTPATIENT)
Dept: INFUSION THERAPY | Age: 63
Discharge: HOME | End: 2023-03-17
Payer: COMMERCIAL

## 2023-03-17 VITALS
SYSTOLIC BLOOD PRESSURE: 107 MMHG | HEIGHT: 70 IN | TEMPERATURE: 98 F | HEART RATE: 91 BPM | RESPIRATION RATE: 16 BRPM | WEIGHT: 223.8 LBS | OXYGEN SATURATION: 95 % | DIASTOLIC BLOOD PRESSURE: 71 MMHG | BODY MASS INDEX: 32.04 KG/M2

## 2023-03-17 DIAGNOSIS — C78.6 PERITONEAL METASTASES: ICD-10-CM

## 2023-03-17 DIAGNOSIS — C76.2 ABDOMINAL CARCINOMATOSIS (HCC): ICD-10-CM

## 2023-03-17 DIAGNOSIS — G62.0 NEUROPATHY DUE TO CHEMOTHERAPEUTIC DRUG (HCC): ICD-10-CM

## 2023-03-17 DIAGNOSIS — C78.6 PERITONEAL METASTASES (HCC): ICD-10-CM

## 2023-03-17 DIAGNOSIS — I82.621 ACUTE DEEP VEIN THROMBOSIS (DVT) OF RIGHT UPPER EXTREMITY, UNSPECIFIED VEIN (HCC): ICD-10-CM

## 2023-03-17 DIAGNOSIS — T45.1X5A NEUROPATHY DUE TO CHEMOTHERAPEUTIC DRUG (HCC): ICD-10-CM

## 2023-03-17 DIAGNOSIS — C76.2 ABDOMINAL CARCINOMATOSIS (HCC): Primary | ICD-10-CM

## 2023-03-17 DIAGNOSIS — C16.9 MALIGNANT NEOPLASM OF STOMACH, UNSPECIFIED LOCATION (HCC): Primary | ICD-10-CM

## 2023-03-17 LAB
ALBUMIN SERPL-MCNC: 3.2 G/DL (ref 3.2–4.6)
ALBUMIN/GLOB SERPL: 1 (ref 0.4–1.6)
ALP SERPL-CCNC: 127 U/L (ref 50–136)
ALT SERPL-CCNC: 19 U/L (ref 12–65)
ANION GAP SERPL CALC-SCNC: 5 MMOL/L (ref 2–11)
AST SERPL-CCNC: 14 U/L (ref 15–37)
BASOPHILS # BLD: 0 K/UL (ref 0–0.2)
BASOPHILS NFR BLD: 1 % (ref 0–2)
BILIRUB SERPL-MCNC: 0.3 MG/DL (ref 0.2–1.1)
BUN SERPL-MCNC: 7 MG/DL (ref 8–23)
CALCIUM SERPL-MCNC: 8.9 MG/DL (ref 8.3–10.4)
CHLORIDE SERPL-SCNC: 113 MMOL/L (ref 101–110)
CO2 SERPL-SCNC: 26 MMOL/L (ref 21–32)
CREAT SERPL-MCNC: 0.9 MG/DL (ref 0.8–1.5)
DIFFERENTIAL METHOD BLD: ABNORMAL
EOSINOPHIL # BLD: 0.1 K/UL (ref 0–0.8)
EOSINOPHIL NFR BLD: 2 % (ref 0.5–7.8)
ERYTHROCYTE [DISTWIDTH] IN BLOOD BY AUTOMATED COUNT: 14.6 % (ref 11.9–14.6)
GLOBULIN SER CALC-MCNC: 3.3 G/DL (ref 2.8–4.5)
GLUCOSE SERPL-MCNC: 212 MG/DL (ref 65–100)
HCT VFR BLD AUTO: 34.8 % (ref 41.1–50.3)
HGB BLD-MCNC: 10.7 G/DL (ref 13.6–17.2)
IMM GRANULOCYTES # BLD AUTO: 0 K/UL (ref 0–0.5)
IMM GRANULOCYTES NFR BLD AUTO: 0 % (ref 0–5)
LYMPHOCYTES # BLD: 1.9 K/UL (ref 0.5–4.6)
LYMPHOCYTES NFR BLD: 41 % (ref 13–44)
MCH RBC QN AUTO: 29.1 PG (ref 26.1–32.9)
MCHC RBC AUTO-ENTMCNC: 30.7 G/DL (ref 31.4–35)
MCV RBC AUTO: 94.6 FL (ref 82–102)
MONOCYTES # BLD: 0.2 K/UL (ref 0.1–1.3)
MONOCYTES NFR BLD: 5 % (ref 4–12)
NEUTS SEG # BLD: 2.3 K/UL (ref 1.7–8.2)
NEUTS SEG NFR BLD: 51 % (ref 43–78)
NRBC # BLD: 0 K/UL (ref 0–0.2)
PLATELET # BLD AUTO: 191 K/UL (ref 150–450)
PMV BLD AUTO: 9.3 FL (ref 9.4–12.3)
POTASSIUM SERPL-SCNC: 3.7 MMOL/L (ref 3.5–5.1)
PROT SERPL-MCNC: 6.5 G/DL (ref 6.3–8.2)
RBC # BLD AUTO: 3.68 M/UL (ref 4.23–5.6)
SODIUM SERPL-SCNC: 144 MMOL/L (ref 133–143)
WBC # BLD AUTO: 4.6 K/UL (ref 4.3–11.1)

## 2023-03-17 PROCEDURE — G0498 CHEMO EXTEND IV INFUS W/PUMP: HCPCS

## 2023-03-17 PROCEDURE — 96372 THER/PROPH/DIAG INJ SC/IM: CPT

## 2023-03-17 PROCEDURE — 80053 COMPREHEN METABOLIC PANEL: CPT

## 2023-03-17 PROCEDURE — 2580000003 HC RX 258: Performed by: NURSE PRACTITIONER

## 2023-03-17 PROCEDURE — 96368 THER/DIAG CONCURRENT INF: CPT

## 2023-03-17 PROCEDURE — 36591 DRAW BLOOD OFF VENOUS DEVICE: CPT

## 2023-03-17 PROCEDURE — 99214 OFFICE O/P EST MOD 30 MIN: CPT | Performed by: NURSE PRACTITIONER

## 2023-03-17 PROCEDURE — 6360000002 HC RX W HCPCS: Performed by: NURSE PRACTITIONER

## 2023-03-17 PROCEDURE — 96413 CHEMO IV INFUSION 1 HR: CPT

## 2023-03-17 PROCEDURE — 96411 CHEMO IV PUSH ADDL DRUG: CPT

## 2023-03-17 PROCEDURE — 85025 COMPLETE CBC W/AUTO DIFF WBC: CPT

## 2023-03-17 PROCEDURE — 3078F DIAST BP <80 MM HG: CPT | Performed by: NURSE PRACTITIONER

## 2023-03-17 PROCEDURE — 3074F SYST BP LT 130 MM HG: CPT | Performed by: NURSE PRACTITIONER

## 2023-03-17 PROCEDURE — 2580000003 HC RX 258: Performed by: INTERNAL MEDICINE

## 2023-03-17 PROCEDURE — 96367 TX/PROPH/DG ADDL SEQ IV INF: CPT

## 2023-03-17 PROCEDURE — 96375 TX/PRO/DX INJ NEW DRUG ADDON: CPT

## 2023-03-17 RX ORDER — ALBUTEROL SULFATE 90 UG/1
4 AEROSOL, METERED RESPIRATORY (INHALATION) PRN
Status: CANCELLED | OUTPATIENT
Start: 2023-03-17

## 2023-03-17 RX ORDER — FAMOTIDINE 10 MG/ML
20 INJECTION, SOLUTION INTRAVENOUS
Status: CANCELLED | OUTPATIENT
Start: 2023-03-17

## 2023-03-17 RX ORDER — SODIUM CHLORIDE 9 MG/ML
INJECTION, SOLUTION INTRAVENOUS CONTINUOUS
Status: DISCONTINUED | OUTPATIENT
Start: 2023-03-17 | End: 2023-03-18 | Stop reason: HOSPADM

## 2023-03-17 RX ORDER — ATROPINE SULFATE 0.4 MG/ML
0.4 AMPUL (ML) INJECTION ONCE
Status: CANCELLED | OUTPATIENT
Start: 2023-03-17 | End: 2023-03-17

## 2023-03-17 RX ORDER — ATROPINE SULFATE 0.4 MG/ML
0.4 INJECTION, SOLUTION INTRAVENOUS
Status: DISCONTINUED | OUTPATIENT
Start: 2023-03-17 | End: 2023-03-18 | Stop reason: HOSPADM

## 2023-03-17 RX ORDER — VENLAFAXINE 37.5 MG/1
37.5 TABLET ORAL DAILY
COMMUNITY

## 2023-03-17 RX ORDER — FLUOROURACIL 50 MG/ML
400 INJECTION, SOLUTION INTRAVENOUS ONCE
Status: CANCELLED | OUTPATIENT
Start: 2023-03-17 | End: 2023-03-17

## 2023-03-17 RX ORDER — SODIUM CHLORIDE 9 MG/ML
5-250 INJECTION, SOLUTION INTRAVENOUS PRN
Status: CANCELLED | OUTPATIENT
Start: 2023-03-17

## 2023-03-17 RX ORDER — HEPARIN SODIUM (PORCINE) LOCK FLUSH IV SOLN 100 UNIT/ML 100 UNIT/ML
500 SOLUTION INTRAVENOUS PRN
Status: CANCELLED | OUTPATIENT
Start: 2023-03-19

## 2023-03-17 RX ORDER — ATROPINE SULFATE 0.4 MG/ML
0.4 INJECTION, SOLUTION INTRAVENOUS ONCE
Status: COMPLETED | OUTPATIENT
Start: 2023-03-17 | End: 2023-03-17

## 2023-03-17 RX ORDER — EPINEPHRINE 1 MG/ML
0.3 INJECTION, SOLUTION, CONCENTRATE INTRAVENOUS PRN
Status: DISCONTINUED | OUTPATIENT
Start: 2023-03-17 | End: 2023-03-18 | Stop reason: HOSPADM

## 2023-03-17 RX ORDER — EPINEPHRINE 1 MG/ML
0.3 INJECTION, SOLUTION, CONCENTRATE INTRAVENOUS PRN
Status: CANCELLED | OUTPATIENT
Start: 2023-03-17

## 2023-03-17 RX ORDER — SODIUM CHLORIDE 9 MG/ML
5-40 INJECTION INTRAVENOUS PRN
Status: CANCELLED | OUTPATIENT
Start: 2023-03-17

## 2023-03-17 RX ORDER — ONDANSETRON 2 MG/ML
8 INJECTION INTRAMUSCULAR; INTRAVENOUS
Status: CANCELLED | OUTPATIENT
Start: 2023-03-17

## 2023-03-17 RX ORDER — SODIUM CHLORIDE 0.9 % (FLUSH) 0.9 %
5-40 SYRINGE (ML) INJECTION PRN
Status: CANCELLED | OUTPATIENT
Start: 2023-03-19

## 2023-03-17 RX ORDER — DIPHENHYDRAMINE HYDROCHLORIDE 50 MG/ML
50 INJECTION INTRAMUSCULAR; INTRAVENOUS
Status: DISCONTINUED | OUTPATIENT
Start: 2023-03-17 | End: 2023-03-18 | Stop reason: HOSPADM

## 2023-03-17 RX ORDER — ACETAMINOPHEN 325 MG/1
650 TABLET ORAL
Status: DISCONTINUED | OUTPATIENT
Start: 2023-03-17 | End: 2023-03-18 | Stop reason: HOSPADM

## 2023-03-17 RX ORDER — MEPERIDINE HYDROCHLORIDE 50 MG/ML
12.5 INJECTION INTRAMUSCULAR; INTRAVENOUS; SUBCUTANEOUS PRN
Status: CANCELLED | OUTPATIENT
Start: 2023-03-17

## 2023-03-17 RX ORDER — FLUOROURACIL 50 MG/ML
400 INJECTION, SOLUTION INTRAVENOUS ONCE
Status: COMPLETED | OUTPATIENT
Start: 2023-03-17 | End: 2023-03-17

## 2023-03-17 RX ORDER — ONDANSETRON 2 MG/ML
8 INJECTION INTRAMUSCULAR; INTRAVENOUS ONCE
Status: COMPLETED | OUTPATIENT
Start: 2023-03-17 | End: 2023-03-17

## 2023-03-17 RX ORDER — ACETAMINOPHEN 325 MG/1
650 TABLET ORAL
Status: CANCELLED | OUTPATIENT
Start: 2023-03-17

## 2023-03-17 RX ORDER — DEXTROSE MONOHYDRATE 50 MG/ML
5-250 INJECTION, SOLUTION INTRAVENOUS PRN
Status: CANCELLED | OUTPATIENT
Start: 2023-03-17

## 2023-03-17 RX ORDER — ATROPINE SULFATE 0.4 MG/ML
0.4 AMPUL (ML) INJECTION
Status: CANCELLED | OUTPATIENT
Start: 2023-03-17

## 2023-03-17 RX ORDER — DEXTROSE MONOHYDRATE 50 MG/ML
5-250 INJECTION, SOLUTION INTRAVENOUS PRN
Status: DISCONTINUED | OUTPATIENT
Start: 2023-03-17 | End: 2023-03-18 | Stop reason: HOSPADM

## 2023-03-17 RX ORDER — ALBUTEROL SULFATE 90 UG/1
4 AEROSOL, METERED RESPIRATORY (INHALATION) PRN
Status: DISCONTINUED | OUTPATIENT
Start: 2023-03-17 | End: 2023-03-18 | Stop reason: HOSPADM

## 2023-03-17 RX ORDER — SODIUM CHLORIDE 0.9 % (FLUSH) 0.9 %
5-40 SYRINGE (ML) INJECTION PRN
Status: DISCONTINUED | OUTPATIENT
Start: 2023-03-17 | End: 2023-03-18 | Stop reason: HOSPADM

## 2023-03-17 RX ORDER — SODIUM CHLORIDE 9 MG/ML
5-250 INJECTION, SOLUTION INTRAVENOUS PRN
Status: CANCELLED | OUTPATIENT
Start: 2023-03-19

## 2023-03-17 RX ORDER — HEPARIN SODIUM (PORCINE) LOCK FLUSH IV SOLN 100 UNIT/ML 100 UNIT/ML
500 SOLUTION INTRAVENOUS PRN
Status: CANCELLED | OUTPATIENT
Start: 2023-03-17

## 2023-03-17 RX ORDER — MEPERIDINE HYDROCHLORIDE 25 MG/ML
12.5 INJECTION INTRAMUSCULAR; INTRAVENOUS; SUBCUTANEOUS PRN
Status: DISCONTINUED | OUTPATIENT
Start: 2023-03-17 | End: 2023-03-18 | Stop reason: HOSPADM

## 2023-03-17 RX ORDER — SODIUM CHLORIDE 9 MG/ML
5-40 INJECTION INTRAVENOUS PRN
Status: CANCELLED | OUTPATIENT
Start: 2023-03-19

## 2023-03-17 RX ORDER — ATROPINE SULFATE 0.4 MG/ML
0.4 AMPUL (ML) INJECTION
Status: DISCONTINUED | OUTPATIENT
Start: 2023-03-17 | End: 2023-03-17

## 2023-03-17 RX ORDER — SODIUM CHLORIDE 0.9 % (FLUSH) 0.9 %
5-40 SYRINGE (ML) INJECTION PRN
Status: CANCELLED | OUTPATIENT
Start: 2023-03-17

## 2023-03-17 RX ORDER — SODIUM CHLORIDE 0.9 % (FLUSH) 0.9 %
10 SYRINGE (ML) INJECTION PRN
Status: DISCONTINUED | OUTPATIENT
Start: 2023-03-17 | End: 2023-03-18 | Stop reason: HOSPADM

## 2023-03-17 RX ORDER — SODIUM CHLORIDE 9 MG/ML
INJECTION, SOLUTION INTRAVENOUS CONTINUOUS
Status: CANCELLED | OUTPATIENT
Start: 2023-03-17

## 2023-03-17 RX ORDER — ONDANSETRON 2 MG/ML
8 INJECTION INTRAMUSCULAR; INTRAVENOUS
Status: DISCONTINUED | OUTPATIENT
Start: 2023-03-17 | End: 2023-03-18 | Stop reason: HOSPADM

## 2023-03-17 RX ORDER — DIPHENHYDRAMINE HYDROCHLORIDE 50 MG/ML
50 INJECTION INTRAMUSCULAR; INTRAVENOUS
Status: CANCELLED | OUTPATIENT
Start: 2023-03-17

## 2023-03-17 RX ORDER — ONDANSETRON 2 MG/ML
8 INJECTION INTRAMUSCULAR; INTRAVENOUS ONCE
Status: CANCELLED | OUTPATIENT
Start: 2023-03-17 | End: 2023-03-17

## 2023-03-17 RX ADMIN — LEUCOVORIN CALCIUM 900 MG: 350 INJECTION, POWDER, LYOPHILIZED, FOR SOLUTION INTRAMUSCULAR; INTRAVENOUS at 11:19

## 2023-03-17 RX ADMIN — DEXTROSE MONOHYDRATE 50 ML/HR: 50 INJECTION, SOLUTION INTRAVENOUS at 10:30

## 2023-03-17 RX ADMIN — FLUOROURACIL 900 MG: 50 INJECTION, SOLUTION INTRAVENOUS at 12:55

## 2023-03-17 RX ADMIN — SODIUM CHLORIDE, PRESERVATIVE FREE 10 ML: 5 INJECTION INTRAVENOUS at 08:37

## 2023-03-17 RX ADMIN — ONDANSETRON 8 MG: 2 INJECTION INTRAMUSCULAR; INTRAVENOUS at 10:22

## 2023-03-17 RX ADMIN — ATROPINE SULFATE 0.4 MG: 0.4 INJECTION, SOLUTION INTRAVENOUS at 10:41

## 2023-03-17 RX ADMIN — IRINOTECAN HYDROCHLORIDE 340 MG: 20 INJECTION, SOLUTION INTRAVENOUS at 11:19

## 2023-03-17 RX ADMIN — FOSAPREPITANT DIMEGLUMINE 150 MG: 150 INJECTION, POWDER, LYOPHILIZED, FOR SOLUTION INTRAVENOUS at 10:45

## 2023-03-17 RX ADMIN — FLUOROURACIL 5375 MG: 50 INJECTION, SOLUTION INTRAVENOUS at 13:00

## 2023-03-17 RX ADMIN — DEXAMETHASONE SODIUM PHOSPHATE 12 MG: 4 INJECTION, SOLUTION INTRAMUSCULAR; INTRAVENOUS at 10:25

## 2023-03-17 ASSESSMENT — ENCOUNTER SYMPTOMS
SORE THROAT: 0
CONSTIPATION: 1
ABDOMINAL PAIN: 0
BACK PAIN: 0
HEMOPTYSIS: 0
EYE PROBLEMS: 0
SCLERAL ICTERUS: 0
SHORTNESS OF BREATH: 0
NAUSEA: 0
ABDOMINAL DISTENTION: 0
COUGH: 0
DIARRHEA: 0
VOMITING: 0
BLOOD IN STOOL: 0

## 2023-03-17 ASSESSMENT — PATIENT HEALTH QUESTIONNAIRE - PHQ9
SUM OF ALL RESPONSES TO PHQ QUESTIONS 1-9: 0
SUM OF ALL RESPONSES TO PHQ9 QUESTIONS 1 & 2: 0
SUM OF ALL RESPONSES TO PHQ QUESTIONS 1-9: 0
1. LITTLE INTEREST OR PLEASURE IN DOING THINGS: 0
SUM OF ALL RESPONSES TO PHQ QUESTIONS 1-9: 0
2. FEELING DOWN, DEPRESSED OR HOPELESS: 0
SUM OF ALL RESPONSES TO PHQ QUESTIONS 1-9: 0

## 2023-03-17 NOTE — PATIENT INSTRUCTIONS
Patient Instructions from Today's Visit    Reason for Visit:  Prechemo visit    Diagnosis Information:  [email or web address] [email or web address]-us/[email or web address]-answers-patient-education-[email or web address]-[email or web address]-[email or web address]    Plan:  -Cycle 18 Folfiri today    Follow Up:  -as scheduled    Recent Lab Results:  Hospital Outpatient Visit on 03/17/2023   Component Date Value Ref Range Status    WBC 03/17/2023 4.6  4.3 - 11.1 K/uL Final    RBC 03/17/2023 3.68 (A)  4.23 - 5.6 M/uL Final    Hemoglobin 03/17/2023 10.7 (A)  13.6 - 17.2 g/dL Final    Hematocrit 03/17/2023 34.8 (A)  41.1 - 50.3 % Final    MCV 03/17/2023 94.6  82.0 - 102.0 FL Final    MCH 03/17/2023 29.1  26.1 - 32.9 PG Final    MCHC 03/17/2023 30.7 (A)  31.4 - 35.0 g/dL Final    RDW 03/17/2023 14.6  11.9 - 14.6 % Final    Platelets 65/29/5227 191  150 - 450 K/uL Final    MPV 03/17/2023 9.3 (A)  9.4 - 12.3 FL Final    nRBC 03/17/2023 0.00  0.0 - 0.2 K/uL Final    **Note: Absolute NRBC parameter is now reported with Hemogram**    Differential Type 03/17/2023 AUTOMATED    Final    Seg Neutrophils 03/17/2023 51  43 - 78 % Final    Lymphocytes 03/17/2023 41  13 - 44 % Final    Monocytes 03/17/2023 5  4.0 - 12.0 % Final    Eosinophils % 03/17/2023 2  0.5 - 7.8 % Final    Basophils 03/17/2023 1  0.0 - 2.0 % Final    Immature Granulocytes 03/17/2023 0  0.0 - 5.0 % Final    Segs Absolute 03/17/2023 2.3  1.7 - 8.2 K/UL Final    Absolute Lymph # 03/17/2023 1.9  0.5 - 4.6 K/UL Final    Absolute Mono # 03/17/2023 0.2  0.1 - 1.3 K/UL Final    Absolute Eos # 03/17/2023 0.1  0.0 - 0.8 K/UL Final    Basophils Absolute 03/17/2023 0.0  0.0 - 0.2 K/UL Final    Absolute Immature Granulocyte 03/17/2023 0.0  0.0 - 0.5 K/UL Final        Treatment Summary has been discussed and given to patient: n/a        -------------------------------------------------------------------------------------------------------------------  Please call our office at [phone] if you have any  of the following symptoms: Fever of 100.5 or greater  Chills  Shortness of breath  Swelling or pain in one leg    After office hours an answering service is available and will contact a provider for emergencies or if you are experiencing any of the above symptoms. Patient does express an interest in My Chart. My Chart log in information explained on the after visit summary printout at the [facility] [address] desk.     Cate Shannon RN  Nurse Navigator  [address] [place] [id number] [phone]

## 2023-03-17 NOTE — PROGRESS NOTES
[facility] Hematology and Oncology: Established patient - follow up     Chief Complaint   Patient presents with    Follow-up        Reason for Referral: Abdominal pain; peritoneal metastatic disease   Referring Provider: Harinder Hutson NP   Primary Care Provider: Nura López MD   Family History of Cancer/Hematologic Disorders: Family history is significant for sister with breast cancer. Presenting Symptoms: Progressively worsening, persistent abdominal pain x several months    History of Present Illness:  Mr. Nilton Raya  is a 62 y.o. male who presents today for FU regarding adenocarcinoma with peritoneal metastatic disease. The past medical history is significant for tobacco use (recent pack per day smoker x 30+ years - now down to 1/3PPD), PUD, paresthesia/pain of bilateral upper extremities, HLD, HTN, diverticulitis,  colon polyps, hiatal hernia, chronic right shoulder pain, bilateral carpal tunnel syndrome, and partial colon resection. He presented to the [facility] ED on 5/12/22 with a complaint of persistent abdominal pain for several months that was becoming progressively  worse. EGD and colonoscopy on 2/25/22 revealed findings of hiatal hernia, gastric polyps, several benign colon polyps, diverticulosis, and internal hemorrhoids. CT of the abdomen on 3/8/22 showed no acute findings. He presented to [facility] ER x 2 for  evaluation (5/3/22 and 5/10/22). RUQ abdominal ultrasound was completed on 5/3/22 in the ED at [facility] demonstrating no acute findings to explain patient's pain; probable hepatic  steatosis; small echogenic mural foci in the gallbladder which are nonmobile, likely representing cholesterol polyps, largest measuring 4 mm; and small volume simple ascites in the right upper quadrant and left lower quadrant, nonspecific.   CT AP with  contrast at [facility] ED 5/10/22 showed a partial small bowel obstruction; small to moderate amount of free fluid in the abdomen and pelvis; and nonspecific stranding of the omentum primarily in the left upper quadrant. Radiologist noted that follow-up  CT was recommended to differentiate passive congestion from omental disease. On 5/11/22, abdominal series again showed multiple dilated small bowel loops with enteric contrast appearing to extend into the proximal colon, suggesting partial small  bowel obstruction. CT AP in the [facility] ED on 5/12/22 identified extensive peritoneal nodularity and mild ascites consistent with peritoneal  metastatic disease with primary lesion not definitely identified; dilated fluid-filled loops of small bowel possibly related to gastroenteritis with no definite obstruction and no obvious bowel mass; and sclerosis of S1 vertebral body. Radiologist recommended consideration of follow-up bone scan to assess for bone metastases. Patient was admitted to  the Hospitalist service on 5/12/22, and IR consulted for possible paracentesis however very small volume was inaccessible. CT Chest obtained on 5/13/22 showed No evidence of primary malignancy or metastasis within the chest.  Follow-up hepatobiliary  scan was suggested to assess for common bile duct obstruction. Oncology was consulted but did not see patient inhouse as pt was dischared. Upon discharge on 5/14/22, patient was instructed to follow up with [facility]. During consultation, we discussed his workup. We looked at the images together demonstrating some of the findings concerning for peritoneal nodularity/peritoneal disease. Discussed anatomy of the findings utilizing images to help pt understand what we are looking at and suspecting. He and wife were appreciative of the time spent to review these. We also addressed pt's pain. We also reviewed images of sclerosing lesion - bone scan recommended. He also was recommended to undergo HIDA scan after recent US per PCP. He is tired of tests, frustrated.   Feelings validated and stressed importance of workup to determine why he has pain.  Wt loss from 240 --> 209 noted and expressed concern to pt about this. Of note, prior akbar resection with Dr Ashley Nichols for diverticulitis per pt. Sent note to dr Farnsworth  re pt's case to expedite workup with his agreement. He was hospitalized for abdominal pain and sepsis. He was empirically placed on vancomycin and cefepime. CT scanning disclosed no intra-abdominal fluid collection or abscess but did suggest that his tumor burden was somewhat smaller. He had some purulent drainage from around his G-tube. This was cultured and grew klebsiella pneumoniae and citrobacter freundii both of which were sensitive to Levaquin which he was discharged home on for 10 days. He returns today with wife for follow up and C18 FOLFIRI (Oxaliplatin removed with C14 due to neuropathy). He continues to do well overall. Neuropathy ongoing - her recently started Effexor for neuropathy as well as alpha lipoic acid. No relief yet. His bowels are moving appropriately - did experience a hemorrhoid that bled a bit. Now improved on Preparation H and warm baths. He denies any shortness of breath. He denies any fevers or other infectious symptoms. Chronological Events:   EGD REPORT 2/25/22                      COLONOSCOPY REPORT 2/25/22                 CT ABDOMEN PELVIS W CONTRAST 3/8/2022   FINDINGS:   LOWER CHEST: Unremarkable. ABDOMEN AND PELVIS:   Liver: Unremarkable. Biliary system: Unremarkable. Pancreas: Unremarkable. Spleen: Unremarkable. Adrenals: Unremarkable. Genitourinary: Unremarkable. Reproductive organs: Unremarkable. Gastrointestinal tract: Colonic diverticulosis without evidence of diverticulitis. There is no evidence of bowel obstruction or inflammation. The appendix is normal   Peritoneum/Extraperitoneum: Unremarkable. No free fluid or air. Vascular: Unremarkable. Musculoskeletal:  Small fat-containing umbilical hernia. Degenerative  changes of thoracolumbar spine.  No acute or aggressive osseous lesion. IMPRESSION: Colonic diverticula without evidence of acute diverticulitis. RIGHT UPPER QUADRANT ABDOMINAL ULTRASOUND 5/3/2022    FINDINGS:   Pancreas: Not visualized, obscured by bowel gas. Intrahepatic IVC: Normal.   Main  Portal Vein: Patent with normal directional flow. Liver: Liver contour is normal. Liver appears diffusely increased in echogenicity consistent with hepatic steatosis. There is fatty sparing around the gallbladder fossa. Gallbladder: Gallbladder  is normal in size. No evidence of gallbladder wall thickening. No gallstones are seen. There are several nonmobile echogenic mural foci in the proximal gallbladder body/neck, largest measuring 4 mm, without shadowing, likely small cholesterol polyps. Bile ducts: No evidence of biliary ductal dilation. The common bile duct measures 3 mm in diameter. Right kidney: Normal.   Left Upper Quadrant: Limited images of the left upper quadrant are unremarkable. Spleen measures 12.1 cm in length. Free fluid: Trace simple free fluid in the right upper quadrant and left lower quadrant. IMPRESSION:    1. No acute findings to explain patient's pain. 2. Probable hepatic steatosis. 3. Small echogenic mural foci in the gallbladder which are nonmobile, likely representing cholesterol polyps, largest measuring 4 mm. There are no specific ultrasound  follow up recommendations for polyps of this small size. 4. Small volume simple ascites in the right upper quadrant and left lower quadrant, nonspecific. CT ABDOMEN - PELVIS WITH CONTRAST 5/10/22   FINDINGS:   Liver: Normal    Portal Vein: Normal   Gallbladder - Biliary Tree: Normal   Pancreas: Normal   Spleen: Normal   Retroperitoneum:   Adrenals: Normal   Kidneys:  Normal    Aorto - Cava:  Calcification of the abdominal aorta without aneurysm formation.    Lymphatics:  Normal   GI - Mesentery - Peritoneum: Multiple dilated mid small bowel loops without a focal transition point. The appendix is normal. Small to  moderate amount of free fluid in the pelvis and right flank. Nonspecific stranding of the omentum in the left upper quadrant. Small fatty umbilical hernia. Pelvis: Normal   Lower Chest: Normal   Soft tissues - MSK: Normal    IMPRESSION:   1. Partial small bowel obstruction. 2. Small to moderate amount of free fluid in the abdomen and pelvis. 3. Nonspecific stranding of the omentum primarily  in the left upper quadrant. Follow-up CT is recommended to differentiate passive congestion from omental a static disease. ABDOMEN SERIES 5/11/22   FINDINGS:   Chest: Heart size within normal limits. Lungs are clear. No pleural effusion or pneumothorax. Bowel: Multiple dilated small bowel loops with air-fluid levels on the upright view. Contrast distends small bowel loops in the left lateral abdomen and lower abdomen. Contrast in the right hemiabdomen appears to be within proximal colon. Scattered colonic  gas. Peritoneum / Retroperitoneum: No pneumoperitoneum. Soft tissues / Bones: No acute osseous findings. The contrast in urinary bladder. Lines / Support Apparatus: None. IMPRESSION: Redemonstrated of multiple dilated small bowel loops with enteric contrast appearing to extend into the proximal colon, suggesting only partial small bowel obstruction. Continued radiographic follow-up is advised. CT OF THE ABDOMEN AND PELVIS 5/12/22   FINDINGS:   LOWER CHEST: Normal.   HEPATOBILIARY: No evidence of focal liver mass. No calcified gallstones. PANCREAS: Normal.   SPLEEN: Normal.    ADRENAL GLANDS: Normal.    KIDNEYS/BLADDER: Kidneys and bladder are unremarkable. No hydronephrosis or urinary tract calculi. BOWEL: Dilated fluid-filled loops of small bowel are present throughout the abdomen. No definite transition point. Oral contrast noted in the colon.  No definite evidence of bowel mass but there is extensive peritoneal nodularity and trace ascites highly  concerning for peritoneal metastatic disease. LYMPH NODES: No enlarged retroperitoneal or pelvic lymph nodes. Peritoneal nodularity again noted most likely metastatic disease. VASCULATURE: Unremarkable. PELVIC ORGANS: Prostate gland and rectum are unremarkable. Small amount of free pelvic fluid is noted. MUSCULOSKELETAL: Degenerative spine changes are noted. Mild sclerosis involving the S1 vertebral body is present on image 74 of series 602. IMPRESSION   1. Extensive peritoneal nodularity and mild ascites consistent with peritoneal metastatic disease. Primary lesion not definitely identified. 2. Dilated fluid-filled loops of small bowel possibly related to gastroenteritis. No definite obstruction. No obvious bowel mass. 3. Sclerosis S1 vertebral body. Consider follow-up bone scan to assess for bone metastases. LIMITED ABDOMINAL ULTRASOUND 5/13/22   FINDINGS: A single limited gray scale image was submitted of an undisclosed location in the abdomen. Images demonstrate a small amount of  ascites as seen on comparison CT. IMPRESSION   1. Small volume ascites. CT CHEST WITH CONTRAST 5/13/22   FINDINGS:   Mediastinum and visualized thyroid: Normal.   Heart: Normal.    Large Vessels: Normal.    Pleura: Normal.    Lungs: No lung mass clearly discerned. Mild scarring or atelectasis in the right lung base. No suspicious lung nodule. No consolidation or zulma pulmonary edema. Airways: Normal.    Lymph nodes: Normal.    Bones/Soft tissues: Normal.    Visualized abdomen: Partially imaged omental nodules. Perihepatic ascites noted. IMPRESSION: No evidence of primary malignancy or metastasis within the chest       ABDOMINAL ULTRASOUND 5/17/22   FINDINGS:    LIVER: 17.3 cm. Slight increase in echogenicity without focal mass. BILE DUCTS: No intrahepatic bile duct dilatation. CBD diameter = 8 mm. GALLBLADDER: It is unremarkable in appearance without stones or sludge. Echogenic polyp measures 0.5 cm. No gallbladder wall thickening. Mild ascites. PANCREAS: Normal.   SPLEEN: Borderline enlarged at 12.2 cm. RIGHT KIDNEY: 13.3 cm. No mass or hydronephrosis. LEFT KIDNEY: 14.3 cm. No mass or hydronephrosis. ABDOMINAL AORTA AND IVC: Normal in size. ASCITES: Mild   IMPRESSION: Possible mild fatty infiltration liver. No focal mass. Gallbladder polyp but no stones or gallbladder wall thickening. Mild ascites. Mildly prominent common bile duct. Follow-up hepatobiliary scan could be performed to assess for common bile duct obstruction. 04/02/2021 (COVID-19, Ples Gavin, Primary or Immunocompromised Series, MRNA, PF, 100mcg/0.5mL)   04/30/2021 (COVID-19, Ples Caroline, Primary or Immunocompromised Series, MRNA, PF, 100mcg/0.5mL)   11/16/2021 (COVID-19, Moderna Booster, PF, 0.25mL Dose)      5/18/22 heme/onc consultation   5/20/22 Dr Mohsen Brown consult - sent to ED for admission/workup   5/23/22 omental bx - IR   5/27/22 Dr Mohsen Brown - diagnostic lap, omental mass and mesentery mass excision, G-tube placement for venting  6/1/22 painful G-tube - tract recanalized and new G-tube placed   6/12/22 C1 FOLFIRINOX completed - dose adjusted   6/14/22 d/c   6/24/22 FU sx - G tube maint instructions/staples removed - RTC PRN   6/24/22 FU after hospitalization - discussed PC/plan for tx  7/8/22 FU - mainly concerned about PICC line without blood return, decline cathflo, seeing José Miguel Pham in [facility] on Monday. Will increase hydration at home. HH for TPN.    7/18/22 Follow up after hospitalization. He will complete course of Levaquin as prescribed for infection around G-tube. Would like to proceed with cycle 3 FOLFIRINOX with increased dosing. 8/4/22 Cycle 4 FOLFIRINOX - continue dose escalation. He will complete course of Levaquin/Diflucan as prescribed for infection around G-tube, site looks good today.    8/17/22 C5 FOLFIRINOX - wishes to pw full dose accepting risks; wishes for G tube to be removed - will need to time with labs; plan to drop dose in opioids - PC seeing pt today. RD seeing pt. Plan for restaging in ~Oct.    9/1/22 FU FOLFIRINOX C5 full dose now; imaging in Oct   9/15/22 FU FOLFIRINOX C6-doing well, weight up 11#  9/30/22 FU FOLFIRINOX-C8 defer 1 week due to neutropenia, ANC 0.5  10/13/22 FU FOLFIRINOX 8; CT CAP reviewed and no POD. 10/28/22 Stat ultrasound RUE and then proceed with cycle 9 FOLFIRINOX. Doppler - Persistent DVT, nonocclusive, within the right axillary and basilic veins. The previously seen nonocclusive thrombus within the right innominate and subclavian veins are no longer seen. No abnormality of the right brachial vein demonstrated. Started on Xarelto. 11/11/22 FU - C10 FOLFIRINOX. Doing well. Wt stable. RUE swelling better. No new issues/concerns. 11/25/22 FU and C11 FOLFIRINOX, doing very well   12/9/22 FU and C12 FOLFIRINOX. Doing well. Weight stable. 12/23/22 FU and C13 FOLFIRINOX, doing well      1/20/23 FU and C14 - will stop oxali due to cold/neuropathy - p/w FOLFIRI for now; CT reviewed; cervical spine imaging   2/3/23 FU and C15 FOLFIRI      2/17/23 FU and C16 FOLFIRI. Doing okay aside from neuropathy. Labs reviewed. 3/3/23 FU for C17 FOLFIRI. 3/17/23 FU for C18 FOLFIRI. Family History   Problem Relation Age of Onset    No Known Problems Brother     Cancer Sister         breast    Hypertension Mother     Heart Disease Mother     Diabetes Father     Osteoarthritis Father     Alcohol Abuse Father     Hypertension Father     Diabetes Mother       Social History     Socioeconomic History    Marital status:      Spouse name: None    Number of children: None    Years of education: None    Highest education level: None   Tobacco Use    Smoking status: Former     Packs/day: 1.00     Types: Cigarettes    Smokeless tobacco: Never   Vaping Use    Vaping Use: Never used   Substance and Sexual Activity    Alcohol use: No    Drug use:  No Sexual activity: Yes     Partners: Female     Social Determinants of Health     Financial Resource Strain: Low Risk     Difficulty of Paying Living Expenses: Not hard at all   Food Insecurity: No Food Insecurity    Worried About [address] in the Last Year: Never true    Ran Out of Food in the Last Year: Never true   Transportation Needs: Unknown    Lack of Transportation (Non-Medical): No   Housing Stability: Unknown    Unstable Housing in the Last Year: No        Review of Systems   Constitutional:  Negative for appetite change, diaphoresis, fatigue, fever and unexpected weight change. HENT:   Negative for sore throat. Eyes:  Negative for eye problems and icterus. Respiratory:  Negative for cough, hemoptysis and shortness of breath. Cardiovascular:  Negative for chest pain, leg swelling and palpitations. Gastrointestinal:  Positive for constipation (controlled). Negative for abdominal distention, abdominal pain, blood in stool, diarrhea, nausea and vomiting. Endocrine: Negative for hot flashes. Genitourinary:  Negative for dysuria. Musculoskeletal:  Negative for arthralgias, back pain and gait problem. Skin:  Negative for itching, rash and wound. Neurological:  Positive for numbness. Negative for dizziness, extremity weakness, gait problem, headaches, light-headedness, seizures and speech difficulty. Hematological:  Negative for adenopathy. Does not bruise/bleed easily. Psychiatric/Behavioral:  Positive for depression (better). Negative for decreased concentration and sleep disturbance. The patient is nervous/anxious (better).        No Known Allergies  Past Medical History:   Diagnosis Date    Bilateral carpal tunnel syndrome 12/9/2020    Cancer [facility])     Chronic right shoulder pain 11/30/2020    Colon polyps 3/11/2013    Diverticulitis 3/11/2013    HTN (hypertension) 3/11/2013    Hyperlipidemia 3/11/2013    Paresthesia and pain of both upper extremities 11/30/2020    PUD (peptic ulcer disease) 3/11/2013    History of     Right elbow pain 12/9/2020    Wheezing 3/11/2013     Past Surgical History:   Procedure Laterality Date    COLONOSCOPY  2/25/09    colonoscopy per Dr. Chasity Boles with need for repeat every 3 years    COLONOSCOPY  02/25/2022    Dr. Aleah Castaneda; polyps of the ascending, transverse, descending, and sigmoid colons; internal hemorrhoid; diverticulosis    LAPAROSCOPY N/A 5/27/2022    LAPAROSCOPY DIAGNOSTIC , OPEN EXPLORATORY LAPAROTOMY, MASS EXCISION, G-TUBE PLACEMENT performed by Alberto Dodson MD at [address] [facility] N/A 6/3/2022    PORT INSERTION performed by Alberto Dodson MD at [facility]  October 2004    partial colon resection per Dr. Elsi Levin  02/25/2022    Dr. Aleah Castaneda; hiatal hernia gastric polyps     Current Outpatient Medications   Medication Sig Dispense Refill    venlafaxine (EFFEXOR) 37.5 MG tablet Take 37.5 mg by mouth daily      rivaroxaban (XARELTO) 20 MG TABS tablet Take 1 tablet by mouth daily (with breakfast) 30 tablet 3    pantoprazole (PROTONIX) 40 MG tablet Take 1 tablet by mouth daily 90 tablet 1    rosuvastatin (CRESTOR) 10 MG tablet Take 1 tablet by mouth daily 90 tablet 1    Docusate Calcium (STOOL SOFTENER PO) Take by mouth      B Complex-C (SUPER B COMPLEX PO) Take by mouth      magnesium oxide (MAG-OX) 400 MG tablet Take 1 tablet by mouth in the morning, at noon, and at bedtime 90 tablet 2    potassium chloride (KLOR-CON M) 20 MEQ extended release tablet Take 1 tablet by mouth daily for 28 days 14 days 30 tablet 1    busPIRone (BUSPAR) 7.5 MG tablet Take 1 tablet by mouth daily 90 tablet 0    Multiple Vitamins-Minerals (MULTIVITAMIN MEN 50+ PO) Take by mouth      promethazine (PHENERGAN) 12.5 MG tablet Take 1 tablet by mouth every 6 hours as needed for Nausea 90 tablet 0    ondansetron (ZOFRAN-ODT) 8 MG TBDP disintegrating tablet Take 1 tablet by mouth every 8 hours as needed for Nausea or Vomiting (Patient not taking: No sig reported) 90 tablet 0    polyethylene glycol (GLYCOLAX) 17 GM/SCOOP powder Take 17 g by mouth daily as needed (constipation) (Patient not taking: No sig reported) 1 each 0    oxyCODONE (ROXICODONE INTENSOL) 100 MG/5ML concentrated solution Take 10 mg by mouth every 4 hours as needed for Pain. 1 mls under tongue (Patient not taking: No sig reported)      naloxone 4 MG/0.1ML LIQD nasal spray 1 spray by Nasal route as needed for Opioid Reversal (Patient not taking: No sig reported)       No current facility-administered medications for this visit.      Facility-Administered Medications Ordered in Other Visits   Medication Dose Route Frequency Provider Last Rate Last Admin    sodium chloride flush 0.9 % injection 10 mL  10 mL IntraVENous PRN Denice Downs MD   10 mL at 03/17/23 0837    dextrose 5 % solution  5-250 mL/hr IntraVENous PRN Kandee Bumpers, NP-C        fosaprepitant (EMEND) 150 mg in sodium chloride 0.9 % 150 mL IVPB  150 mg IntraVENous Once Kandee Bumpers, NP-C 450 mL/hr at 03/17/23 1045 150 mg at 03/17/23 1045    atropine injection 0.4 mg  0.4 mg SubCUTAneous Once Kandee Bumpers, NP-C        irinotecan (CAMPTOSAR) 340 mg in dextrose 5 % 250 mL chemo IVPB  150 mg/m2 (Treatment Plan Recorded) IntraVENous Once Kandee Bumpers, NP-C        leucovorin calcium (WELLCOVORIN) 900 mg in dextrose 5 % 250 mL IVPB  400 mg/m2 (Treatment Plan Recorded) IntraVENous Once Elder Josephmpers, NP-C        fluorouracil (ADRUCIL) chemo syringe 900 mg  400 mg/m2 (Treatment Plan Recorded) IntraVENous Once Jahdee Opalmpers, NP-C        fluorouracil (ADRUCIL) 5,375 mg in sodium chloride 0.9 % 230 mL chemo elastomeric infusion  2,400 mg/m2 (Treatment Plan Recorded) IntraVENous Over 46 hours Jahdee Opalmpers, NP-C        sodium chloride flush 0.9 % injection 5-40 mL  5-40 mL IntraVENous PRN Madie Wright Mata, NP-C        0.9 % sodium chloride infusion   IntraVENous Continuous Serina Gile, NP-C        diphenhydrAMINE (BENADRYL) injection 50 mg  50 mg IntraVENous Once PRN [place] Gile, NP-C        famotidine (PEPCID) 20 mg in sodium chloride (PF) 0.9 % 10 mL injection  20 mg IntraVENous Once PRN Serina Gile, NP-C        hydrocortisone sodium succinate PF (SOLU-CORTEF) injection 100 mg  100 mg IntraVENous Once PRN Serina Gile, NP-C        acetaminophen (TYLENOL) tablet 650 mg  650 mg Oral Once PRN Serina Gile, NP-C        meperidine (DEMEROL) injection 12.5 mg  12.5 mg IntraVENous PRN Serina Gile, NP-C        ondansetron [facility]) injection 8 mg  8 mg IntraVENous Once PRN [place] Gile, NP-C        EPINEPHrine PF 1 MG/ML injection (Anaphylaxis) 0.3 mg  0.3 mg IntraMUSCular PRN Serina Gile, NP-C        albuterol sulfate HFA (PROVENTIL;VENTOLIN;PROAIR) 108 (90 Base) MCG/ACT inhaler 4 puff  4 puff Inhalation PRN Latisha August, NP-C        atropine injection 0.4 mg  0.4 mg SubCUTAneous Q2H PRN Maida Kitchen MD           No flowsheet data found. OBJECTIVE:  /71 Comment: standing  Pulse 91   Temp 98 °F (36.7 °C) (Oral)   Resp 16   Ht 5' 10\" (1.778 m)   Wt 223 lb 12.8 oz (101.5 kg)   SpO2 95%   BMI 32.11 kg/m²       ECOG PERFORMANCE STATUS - 1- Restricted in physically strenuous activity but ambulatory and able to carry out work of a light or sedentary nature such as light house work, office work. Pain - Pain Score:   0 - No pain (fatigue 0)0 - No pain/10. None/Minimal pain - not affecting QOL     Fatigue - No flowsheet data found. Distress - No flowsheet data found. Physical Exam  Vitals reviewed. Exam conducted with a chaperone present. Constitutional:       General: He is not in acute distress. Appearance: Normal appearance. He is normal weight.  He is not ill-appearing or toxic-appearing. HENT:      Head: Normocephalic and atraumatic. Nose: Nose normal. No congestion. Mouth/Throat:      Mouth: Mucous membranes are moist.   Eyes:      General: No scleral icterus. Conjunctiva/sclera: Conjunctivae normal.   Cardiovascular:      Rate and Rhythm: Normal rate and regular rhythm. Heart sounds: No murmur heard. Pulmonary:      Effort: Pulmonary effort is normal. No respiratory distress. Breath sounds: Normal breath sounds. No wheezing, rhonchi or rales. Abdominal:      General: Bowel sounds are normal. There is no distension. Palpations: Abdomen is soft. There is no mass. Tenderness: There is no abdominal tenderness. There is no guarding or rebound. Musculoskeletal:         General: No swelling. Normal range of motion. Cervical back: Normal range of motion. No rigidity. Right lower leg: No edema. Left lower leg: No edema. Skin:     General: Skin is warm and dry. Coloration: Skin is not jaundiced or pale. Findings: No bruising or rash. Neurological:      General: No focal deficit present. Mental Status: He is alert and oriented to person, place, and time. Motor: No weakness. Coordination: Coordination normal.      Gait: Gait normal.   Psychiatric:         Behavior: Behavior normal.         Thought Content:  Thought content normal.        Labs:  Recent Results (from the past 168 hour(s))   CBC With Auto Differential    Collection Time: 03/17/23  8:29 AM   Result Value Ref Range    WBC 4.6 4.3 - 11.1 K/uL    RBC 3.68 (L) 4.23 - 5.6 M/uL    Hemoglobin 10.7 (L) 13.6 - 17.2 g/dL    Hematocrit 34.8 (L) 41.1 - 50.3 %    MCV 94.6 82.0 - 102.0 FL    MCH 29.1 26.1 - 32.9 PG    MCHC 30.7 (L) 31.4 - 35.0 g/dL    RDW 14.6 11.9 - 14.6 %    Platelets 983 105 - 162 K/uL    MPV 9.3 (L) 9.4 - 12.3 FL    nRBC 0.00 0.0 - 0.2 K/uL    Differential Type AUTOMATED      Seg Neutrophils 51 43 - 78 %    Lymphocytes 41 13 - 44 % Monocytes 5 4.0 - 12.0 %    Eosinophils % 2 0.5 - 7.8 %    Basophils 1 0.0 - 2.0 %    Immature Granulocytes 0 0.0 - 5.0 %    Segs Absolute 2.3 1.7 - 8.2 K/UL    Absolute Lymph # 1.9 0.5 - 4.6 K/UL    Absolute Mono # 0.2 0.1 - 1.3 K/UL    Absolute Eos # 0.1 0.0 - 0.8 K/UL    Basophils Absolute 0.0 0.0 - 0.2 K/UL    Absolute Immature Granulocyte 0.0 0.0 - 0.5 K/UL   Comprehensive metabolic panel    Collection Time: 03/17/23  8:29 AM   Result Value Ref Range    Sodium 144 (H) 133 - 143 mmol/L    Potassium 3.7 3.5 - 5.1 mmol/L    Chloride 113 (H) 101 - 110 mmol/L    CO2 26 21 - 32 mmol/L    Anion Gap 5 2 - 11 mmol/L    Glucose 212 (H) 65 - 100 mg/dL    BUN 7 (L) 8 - 23 MG/DL    Creatinine 0.90 0.8 - 1.5 MG/DL    Est, Glom Filt Rate >60 >60 ml/min/1.73m2    Calcium 8.9 8.3 - 10.4 MG/DL    Total Bilirubin 0.3 0.2 - 1.1 MG/DL    ALT 19 12 - 65 U/L    AST 14 (L) 15 - 37 U/L    Alk Phosphatase 127 50 - 136 U/L    Total Protein 6.5 6.3 - 8.2 g/dL    Albumin 3.2 3.2 - 4.6 g/dL    Globulin 3.3 2.8 - 4.5 g/dL    Albumin/Globulin Ratio 1.0 0.4 - 1.6           Imaging: reviewed     PATHOLOGY:             6/2022         ASSESSMENT:     Diagnosis Orders   1. Malignant neoplasm of stomach, unspecified location (HCC)  rivaroxaban (XARELTO) 20 MG TABS tablet      2.  Peritoneal metastases ([facility] [address]  CBC With Auto Differential    Comprehensive metabolic panel    DISCONTINUED: dextrose 5 % solution    DISCONTINUED: fosaprepitant (EMEND) 150 mg in sodium chloride 0.9 % 150 mL IVPB    DISCONTINUED: dexamethasone (DECADRON) 12 mg in sodium chloride 0.9 % 50 mL IVPB    DISCONTINUED: ondansetron (ZOFRAN) injection 8 mg    DISCONTINUED: atropine injection 0.4 mg    DISCONTINUED: atropine injection 0.4 mg    DISCONTINUED: irinotecan (CAMPTOSAR) 340 mg in dextrose 5 % 250 mL chemo IVPB    DISCONTINUED: leucovorin calcium (WELLCOVORIN) 900 mg in dextrose 5 % 250 mL IVPB    DISCONTINUED: fluorouracil (ADRUCIL) chemo syringe 900 mg    DISCONTINUED: fluorouracil (ADRUCIL) 5,375 mg in sodium chloride 0.9 % 230 mL chemo elastomeric infusion    DISCONTINUED: sodium chloride flush 0.9 % injection 5-40 mL    DISCONTINUED: 0.9 % sodium chloride infusion    DISCONTINUED: diphenhydrAMINE (BENADRYL) injection 50 mg    DISCONTINUED: famotidine (PEPCID) injection 20 mg    DISCONTINUED: hydrocortisone sodium succinate PF (SOLU-CORTEF) injection 100 mg    DISCONTINUED: acetaminophen (TYLENOL) tablet 650 mg    DISCONTINUED: meperidine (DEMEROL) injection 12.5 mg    DISCONTINUED: ondansetron (ZOFRAN) injection 8 mg    DISCONTINUED: EPINEPHrine PF 1 MG/ML injection (Anaphylaxis) 0.3 mg    DISCONTINUED: albuterol sulfate HFA (PROVENTIL;VENTOLIN;PROAIR) 108 (90 Base) MCG/ACT inhaler 4 puff      3. Acute deep vein thrombosis (DVT) of right upper extremity, unspecified vein (HCC)  rivaroxaban (XARELTO) 20 MG TABS tablet      4. Neuropathy due to chemotherapeutic drug [facility])            Mr. Donna Chamorro is here for FU of metastatic adenocarcinoma. 1. Metastatic adenocarcinoma   - s/p omental and mesenteric mass bx - c/w upper GI adenocarcinoma    - hx of diverticular dz - s/p previous bowel resection by dr Kaylee Dunn at [address] [place] [address] -    - We did discuss that his disease is not curable and he VU but wife stated that he believes he can beat this. - here today for follow up and C18 FOLFIRI (Oxaliplatin removed with C14). Labs reviewed and acceptable. - neuropathy - B complex; add Alpha Lipoic acid. Just started effexor ~ a week ago to see if this helps his neuropathy per PCP. - bilateral down arms numbness/tingling - p/w cervical spine imaging-negative. - appetite - Eating well. - hx RUE DVT, on Xarelto. Refill provided. - pain - denies and off of prescribed medications    - hypokalemia - 20 meq BI  - hemorrhoids - Preparation H, warm baths.    - hypomagnesemia - on mag oxide 400 mg TID   - Previously, He wishes to continue on the current regimen; he previously expressed that in the future, he may wish to proceed with unconventional scheduling of maybe every 3 weeks. He does understand inherent risks with alternate schedule and how this would affect progression of disease. - sclerotic lesion on imaging at S1 - bone scan recommended, has not been completed   - constipation - Miralax as needed. - Continue good oral nutrition and hydration.   - Encouraged frequent activity throughout the day and rest as needed to combat fatigue.   - Call with any fevers, uncontrolled side effects from treatment or any other worrisome/concerning symptoms. - asked for PCP referral in [facility] per protocol or sooner if needed          MDM      Lab studies and imaging studies were personally reviewed. Pertinent old records were reviewed. Historical:    - here with his wife for consultation. During today's visit, we discussed his current workup. We looked at the images together demonstrating some of the findings concerning for peritoneal nodularity/peritoneal disease. Discussed anatomy of the findings  utilizing images to help pt understand what we are looking at and suspecting. He and wife were appreciative of the time spent to review these. Discussed need for visual dx/tissue pathology to determine plan - recommend ex lap - refer to Dr Dunia Rocha - personally  reviewed case with Dr Dunia Rocha who will expedite the workup and will see pt Fri. US with mild biliary duct dilation - HIDA/ERCP reviewed by PCP   + BM/flatus; He did not like how IV morphine made him feel in the hospital.  He tried tramadol but found no relief and has been taking acetaminophen at home with minimal relief. Discussed different options and he settled on trying  Percocet - he will contact the office to inform us if this is working for him. We discussed SE - not to drive while on the med. Bowel regimen reviewed. He is tired of tests, frustrated.   Feelings validated and stressed importance of workup to determine why he has pain.  Wt loss from 240 --> 209 noted and expressed concern to pt about this. - completed course of Levaquin/Diflucan for klebsiella pneumoniae and citrobacter freundii both of which were sensitive to Levaquin found around G-tube site. Wishes for tube removal - reviewed risks of this during chemotherapy - I would like for him to have labs day before scheduled procedure to make sure his counts are adequate per above; case reviewed with Dr Nico Barnard by me via tel today   - here cycle 8 FOLFIRINOX - labs reviewed-defer 1 week due to neutropenia. - nutrition - G-tube out. He is being weaned off TPN (3x week now) as his oral intake has greatly improved, weight up    - pain - Fentanyl 12mcg with prn oxycodone 10 mg, plans to stop patch on Nader 10/2 PC following  - Plan for surveillance reviewed. Labs discussed. - nausea - resolved. Has Zyprexa QHS (not taking currently) and Zofran PRN. - wt loss/FTT - secondary to disease and tx - on TPN and eating more    - depression/anxiety - better affect, on lexapro 20mg daily, PC adding buspar prn  - here for follow-up prior to cycle 13 FOLFIRINOX. Labs reviewed and adequate. - CT October 2022 previously with no evidence of progressive disease. Due for scan ~ January. All questions were asked and answered to the best of my ability. The patient verbalized understanding and agrees with the plan above.           LUCIANA Farrell  [facility]  [id number] [place] [address]  Office : [phone]  Fax : [phone]

## 2023-03-17 NOTE — PROGRESS NOTES
Arrived to the [facility]. Folfiri completed. Fluorouracil pump connected to pt. Patient tolerated well. Any issues or concerns during appointment: well. Patient aware of next infusion appointment on 3-19-23 (date) at 36 (time). Patient instructed to call provider with temperature of 100.4 or greater or nausea/vomiting/ diarrhea or pain not controlled by medications  Discharged via ambulatory.

## 2023-03-19 ENCOUNTER — HOSPITAL ENCOUNTER (OUTPATIENT)
Dept: INFUSION THERAPY | Age: 63
Discharge: HOME | End: 2023-03-19
Payer: COMMERCIAL

## 2023-03-19 VITALS
RESPIRATION RATE: 17 BRPM | OXYGEN SATURATION: 97 % | TEMPERATURE: 97.9 F | HEART RATE: 86 BPM | DIASTOLIC BLOOD PRESSURE: 63 MMHG | SYSTOLIC BLOOD PRESSURE: 115 MMHG

## 2023-03-19 DIAGNOSIS — C78.6 PERITONEAL METASTASES: ICD-10-CM

## 2023-03-19 DIAGNOSIS — C76.2 ABDOMINAL CARCINOMATOSIS (HCC): Primary | ICD-10-CM

## 2023-03-19 PROCEDURE — 2580000003 HC RX 258: Performed by: NURSE PRACTITIONER

## 2023-03-19 PROCEDURE — 99211 OFF/OP EST MAY X REQ PHY/QHP: CPT

## 2023-03-19 RX ORDER — SODIUM CHLORIDE 0.9 % (FLUSH) 0.9 %
5-40 SYRINGE (ML) INJECTION PRN
Status: DISCONTINUED | OUTPATIENT
Start: 2023-03-19 | End: 2023-03-20 | Stop reason: HOSPADM

## 2023-03-19 NOTE — PROGRESS NOTES
Arrived to the [facility]. Pump accidentally de-accessed pr patient prior to arrival. Chemo was complete when port was de-accessed per patient  Provided education on Pump d/c, patient to be careful with port at home with next infusion. Patient instructed to report any side affects to ordering provider. Patient tolerated well. Any issues or concerns during appointment: none. Patient aware of next infusion appointment on 3/31 (date) at 0900 (time). Discharged ambulatory, no distress noted. Patient instructed to call provider with temperature of 100.4 or greater or nausea/vomiting/ diarrhea or pain not controlled by medications  .

## 2023-03-20 DIAGNOSIS — C78.6 PERITONEAL METASTASES: Primary | ICD-10-CM

## 2023-03-28 ASSESSMENT — ENCOUNTER SYMPTOMS
SORE THROAT: 0
SCLERAL ICTERUS: 0
EYE PROBLEMS: 0
BLOOD IN STOOL: 0
VOMITING: 0
COUGH: 0
DIARRHEA: 0
BACK PAIN: 0
NAUSEA: 0
CONSTIPATION: 1
ABDOMINAL DISTENTION: 0
SHORTNESS OF BREATH: 0
HEMOPTYSIS: 0

## 2023-03-28 NOTE — PROGRESS NOTES
History of     Right elbow pain 12/9/2020    Wheezing 3/11/2013     Past Surgical History:   Procedure Laterality Date    COLONOSCOPY  2/25/09    colonoscopy per Dr. Gabrielle Lin with need for repeat every 3 years    COLONOSCOPY  02/25/2022    Dr. Natalie Dennis; polyps of the ascending, transverse, descending, and sigmoid colons; internal hemorrhoid; diverticulosis    LAPAROSCOPY N/A 5/27/2022    LAPAROSCOPY DIAGNOSTIC , OPEN EXPLORATORY LAPAROTOMY, MASS EXCISION, G-TUBE PLACEMENT performed by Maranda Ya MD at [address] [facility] N/A 6/3/2022    PORT INSERTION performed by Maranda Ya MD at [address] [facility]  October 2004    partial colon resection per Dr. Ross Alexander  02/25/2022    Dr. Natalie Dennis; hiatal hernia gastric polyps     Current Outpatient Medications   Medication Sig Dispense Refill    Alpha-Lipoic Acid 600 MG TABS Take by mouth      venlafaxine (EFFEXOR) 37.5 MG tablet Take 37.5 mg by mouth daily      rivaroxaban (XARELTO) 20 MG TABS tablet Take 1 tablet by mouth daily (with breakfast) 30 tablet 3    pantoprazole (PROTONIX) 40 MG tablet Take 1 tablet by mouth daily 90 tablet 1    rosuvastatin (CRESTOR) 10 MG tablet Take 1 tablet by mouth daily 90 tablet 1    Docusate Calcium (STOOL SOFTENER PO) Take by mouth      B Complex-C (SUPER B COMPLEX PO) Take by mouth      ondansetron (ZOFRAN-ODT) 8 MG TBDP disintegrating tablet Take 1 tablet by mouth every 8 hours as needed for Nausea or Vomiting 90 tablet 0    polyethylene glycol (GLYCOLAX) 17 GM/SCOOP powder Take 17 g by mouth daily as needed (constipation) 1 each 0    magnesium oxide (MAG-OX) 400 MG tablet Take 1 tablet by mouth in the morning, at noon, and at bedtime 90 tablet 2    potassium chloride (KLOR-CON M) 20 MEQ extended release tablet Take 1 tablet by mouth daily for 28 days 14 days 30 tablet 1    busPIRone (BUSPAR) 7.5 MG tablet Take 1 tablet by mouth daily 90 tablet 0    Multiple

## 2023-03-29 ENCOUNTER — OFFICE VISIT (OUTPATIENT)
Dept: INTERNAL MEDICINE CLINIC | Facility: CLINIC | Age: 63
End: 2023-03-29
Payer: COMMERCIAL

## 2023-03-29 VITALS
SYSTOLIC BLOOD PRESSURE: 122 MMHG | WEIGHT: 227 LBS | OXYGEN SATURATION: 96 % | BODY MASS INDEX: 32.57 KG/M2 | DIASTOLIC BLOOD PRESSURE: 70 MMHG | HEART RATE: 88 BPM

## 2023-03-29 DIAGNOSIS — R10.9 ABDOMINAL PAIN, UNSPECIFIED ABDOMINAL LOCATION: Primary | ICD-10-CM

## 2023-03-29 PROCEDURE — 99213 OFFICE O/P EST LOW 20 MIN: CPT | Performed by: INTERNAL MEDICINE

## 2023-03-29 PROCEDURE — 3078F DIAST BP <80 MM HG: CPT | Performed by: INTERNAL MEDICINE

## 2023-03-29 PROCEDURE — 3074F SYST BP LT 130 MM HG: CPT | Performed by: INTERNAL MEDICINE

## 2023-03-29 ASSESSMENT — ENCOUNTER SYMPTOMS
SHORTNESS OF BREATH: 0
ABDOMINAL PAIN: 0
COUGH: 0

## 2023-03-29 ASSESSMENT — PATIENT HEALTH QUESTIONNAIRE - PHQ9
7. TROUBLE CONCENTRATING ON THINGS, SUCH AS READING THE NEWSPAPER OR WATCHING TELEVISION: 0
8. MOVING OR SPEAKING SO SLOWLY THAT OTHER PEOPLE COULD HAVE NOTICED. OR THE OPPOSITE, BEING SO FIGETY OR RESTLESS THAT YOU HAVE BEEN MOVING AROUND A LOT MORE THAN USUAL: 0
4. FEELING TIRED OR HAVING LITTLE ENERGY: 0
SUM OF ALL RESPONSES TO PHQ9 QUESTIONS 1 & 2: 0
1. LITTLE INTEREST OR PLEASURE IN DOING THINGS: 0
SUM OF ALL RESPONSES TO PHQ QUESTIONS 1-9: 0
9. THOUGHTS THAT YOU WOULD BE BETTER OFF DEAD, OR OF HURTING YOURSELF: 0
2. FEELING DOWN, DEPRESSED OR HOPELESS: 0
10. IF YOU CHECKED OFF ANY PROBLEMS, HOW DIFFICULT HAVE THESE PROBLEMS MADE IT FOR YOU TO DO YOUR WORK, TAKE CARE OF THINGS AT HOME, OR GET ALONG WITH OTHER PEOPLE: 0
5. POOR APPETITE OR OVEREATING: 0
3. TROUBLE FALLING OR STAYING ASLEEP: 0
6. FEELING BAD ABOUT YOURSELF - OR THAT YOU ARE A FAILURE OR HAVE LET YOURSELF OR YOUR FAMILY DOWN: 0
SUM OF ALL RESPONSES TO PHQ QUESTIONS 1-9: 0

## 2023-03-29 NOTE — PROGRESS NOTES
3.2 - 4.6 g/dL    Globulin 3.3 2.8 - 4.5 g/dL    Albumin/Globulin Ratio 1.0 0.4 - 1.6           ASSESSMENT and Kobe Martin was seen today for other. Diagnoses and all orders for this visit:    Abdominal pain, unspecified abdominal location    Advised patient to reach out to oncology for a f/u CT scan ordered in April - patient will call oncology    No follow-ups on file. It took more than 20 min to care for this pt today  Over 50% of today's office visit was spent in face to face time in counseling   (may include anyor all of the following: reviewing test results, prognosis, importance of compliance, education about disease process, benefits of medications, instructions for management of acute flare-ups, and follow up plans).

## 2023-03-31 ENCOUNTER — HOSPITAL ENCOUNTER (OUTPATIENT)
Dept: INFUSION THERAPY | Age: 63
Discharge: HOME | End: 2023-03-31
Payer: COMMERCIAL

## 2023-03-31 ENCOUNTER — OFFICE VISIT (OUTPATIENT)
Dept: ONCOLOGY | Age: 63
End: 2023-03-31
Payer: COMMERCIAL

## 2023-03-31 VITALS
TEMPERATURE: 97.9 F | WEIGHT: 227.2 LBS | OXYGEN SATURATION: 97 % | SYSTOLIC BLOOD PRESSURE: 116 MMHG | RESPIRATION RATE: 16 BRPM | HEART RATE: 79 BPM | DIASTOLIC BLOOD PRESSURE: 81 MMHG | BODY MASS INDEX: 32.53 KG/M2 | HEIGHT: 70 IN

## 2023-03-31 DIAGNOSIS — C78.6 PERITONEAL METASTASES: ICD-10-CM

## 2023-03-31 DIAGNOSIS — C16.9 MALIGNANT NEOPLASM OF STOMACH, UNSPECIFIED LOCATION (HCC): ICD-10-CM

## 2023-03-31 DIAGNOSIS — C78.6 PERITONEAL METASTASES: Primary | ICD-10-CM

## 2023-03-31 DIAGNOSIS — C76.2 ABDOMINAL CARCINOMATOSIS (HCC): Primary | ICD-10-CM

## 2023-03-31 DIAGNOSIS — G62.9 NEUROPATHY: ICD-10-CM

## 2023-03-31 DIAGNOSIS — T85.848A PAIN AROUND PERCUTANEOUS ENDOSCOPIC GASTROSTOMY (PEG) TUBE SITE, INITIAL ENCOUNTER: ICD-10-CM

## 2023-03-31 DIAGNOSIS — C76.2 ABDOMINAL CARCINOMATOSIS (HCC): ICD-10-CM

## 2023-03-31 LAB
ALBUMIN SERPL-MCNC: 3.2 G/DL (ref 3.2–4.6)
ALBUMIN/GLOB SERPL: 1 (ref 0.4–1.6)
ALP SERPL-CCNC: 120 U/L (ref 50–136)
ALT SERPL-CCNC: 21 U/L (ref 12–65)
ANION GAP SERPL CALC-SCNC: 5 MMOL/L (ref 2–11)
AST SERPL-CCNC: 13 U/L (ref 15–37)
BASOPHILS # BLD: 0 K/UL (ref 0–0.2)
BASOPHILS NFR BLD: 1 % (ref 0–2)
BILIRUB SERPL-MCNC: 0.3 MG/DL (ref 0.2–1.1)
BUN SERPL-MCNC: 11 MG/DL (ref 8–23)
CALCIUM SERPL-MCNC: 8.3 MG/DL (ref 8.3–10.4)
CEA SERPL-MCNC: 1.6 NG/ML (ref 0–3)
CHLORIDE SERPL-SCNC: 113 MMOL/L (ref 101–110)
CO2 SERPL-SCNC: 25 MMOL/L (ref 21–32)
CREAT SERPL-MCNC: 0.9 MG/DL (ref 0.8–1.5)
DIFFERENTIAL METHOD BLD: ABNORMAL
EOSINOPHIL # BLD: 0.1 K/UL (ref 0–0.8)
EOSINOPHIL NFR BLD: 2 % (ref 0.5–7.8)
ERYTHROCYTE [DISTWIDTH] IN BLOOD BY AUTOMATED COUNT: 14.8 % (ref 11.9–14.6)
GLOBULIN SER CALC-MCNC: 3.2 G/DL (ref 2.8–4.5)
GLUCOSE SERPL-MCNC: 181 MG/DL (ref 65–100)
HCT VFR BLD AUTO: 34 % (ref 41.1–50.3)
HGB BLD-MCNC: 10.7 G/DL (ref 13.6–17.2)
IMM GRANULOCYTES # BLD AUTO: 0 K/UL (ref 0–0.5)
IMM GRANULOCYTES NFR BLD AUTO: 0 % (ref 0–5)
LYMPHOCYTES # BLD: 1.7 K/UL (ref 0.5–4.6)
LYMPHOCYTES NFR BLD: 36 % (ref 13–44)
MAGNESIUM SERPL-MCNC: 1.8 MG/DL (ref 1.8–2.4)
MCH RBC QN AUTO: 29.6 PG (ref 26.1–32.9)
MCHC RBC AUTO-ENTMCNC: 31.5 G/DL (ref 31.4–35)
MCV RBC AUTO: 93.9 FL (ref 82–102)
MONOCYTES # BLD: 0.3 K/UL (ref 0.1–1.3)
MONOCYTES NFR BLD: 6 % (ref 4–12)
NEUTS SEG # BLD: 2.6 K/UL (ref 1.7–8.2)
NEUTS SEG NFR BLD: 55 % (ref 43–78)
NRBC # BLD: 0 K/UL (ref 0–0.2)
PLATELET # BLD AUTO: 163 K/UL (ref 150–450)
PMV BLD AUTO: 9.8 FL (ref 9.4–12.3)
POTASSIUM SERPL-SCNC: 3.4 MMOL/L (ref 3.5–5.1)
PROT SERPL-MCNC: 6.4 G/DL (ref 6.3–8.2)
RBC # BLD AUTO: 3.62 M/UL (ref 4.23–5.6)
SODIUM SERPL-SCNC: 143 MMOL/L (ref 133–143)
WBC # BLD AUTO: 4.7 K/UL (ref 4.3–11.1)

## 2023-03-31 PROCEDURE — G0498 CHEMO EXTEND IV INFUS W/PUMP: HCPCS

## 2023-03-31 PROCEDURE — 96367 TX/PROPH/DG ADDL SEQ IV INF: CPT

## 2023-03-31 PROCEDURE — 96368 THER/DIAG CONCURRENT INF: CPT

## 2023-03-31 PROCEDURE — 2580000003 HC RX 258: Performed by: INTERNAL MEDICINE

## 2023-03-31 PROCEDURE — 3078F DIAST BP <80 MM HG: CPT | Performed by: INTERNAL MEDICINE

## 2023-03-31 PROCEDURE — 3074F SYST BP LT 130 MM HG: CPT | Performed by: INTERNAL MEDICINE

## 2023-03-31 PROCEDURE — 6360000002 HC RX W HCPCS: Performed by: INTERNAL MEDICINE

## 2023-03-31 PROCEDURE — 96413 CHEMO IV INFUSION 1 HR: CPT

## 2023-03-31 PROCEDURE — 36591 DRAW BLOOD OFF VENOUS DEVICE: CPT

## 2023-03-31 PROCEDURE — 96411 CHEMO IV PUSH ADDL DRUG: CPT

## 2023-03-31 PROCEDURE — 96415 CHEMO IV INFUSION ADDL HR: CPT

## 2023-03-31 PROCEDURE — 85025 COMPLETE CBC W/AUTO DIFF WBC: CPT

## 2023-03-31 PROCEDURE — 96372 THER/PROPH/DIAG INJ SC/IM: CPT

## 2023-03-31 PROCEDURE — 82378 CARCINOEMBRYONIC ANTIGEN: CPT

## 2023-03-31 PROCEDURE — 80053 COMPREHEN METABOLIC PANEL: CPT

## 2023-03-31 PROCEDURE — 99214 OFFICE O/P EST MOD 30 MIN: CPT | Performed by: INTERNAL MEDICINE

## 2023-03-31 PROCEDURE — 83735 ASSAY OF MAGNESIUM: CPT

## 2023-03-31 PROCEDURE — 96375 TX/PRO/DX INJ NEW DRUG ADDON: CPT

## 2023-03-31 RX ORDER — ONDANSETRON 2 MG/ML
8 INJECTION INTRAMUSCULAR; INTRAVENOUS ONCE
Status: COMPLETED | OUTPATIENT
Start: 2023-03-31 | End: 2023-03-31

## 2023-03-31 RX ORDER — SODIUM CHLORIDE 0.9 % (FLUSH) 0.9 %
5-40 SYRINGE (ML) INJECTION PRN
Status: DISCONTINUED | OUTPATIENT
Start: 2023-03-31 | End: 2023-04-01 | Stop reason: HOSPADM

## 2023-03-31 RX ORDER — SODIUM CHLORIDE 9 MG/ML
5-40 INJECTION INTRAVENOUS PRN
Status: CANCELLED | OUTPATIENT
Start: 2023-03-31

## 2023-03-31 RX ORDER — SODIUM CHLORIDE 9 MG/ML
5-250 INJECTION, SOLUTION INTRAVENOUS PRN
OUTPATIENT
Start: 2023-04-02

## 2023-03-31 RX ORDER — SODIUM CHLORIDE 0.9 % (FLUSH) 0.9 %
5-40 SYRINGE (ML) INJECTION PRN
Status: CANCELLED | OUTPATIENT
Start: 2023-04-02

## 2023-03-31 RX ORDER — DEXTROSE MONOHYDRATE 50 MG/ML
5-250 INJECTION, SOLUTION INTRAVENOUS PRN
Status: CANCELLED | OUTPATIENT
Start: 2023-03-31

## 2023-03-31 RX ORDER — SODIUM CHLORIDE 9 MG/ML
5-40 INJECTION INTRAVENOUS PRN
OUTPATIENT
Start: 2023-04-02

## 2023-03-31 RX ORDER — SODIUM CHLORIDE 9 MG/ML
5-250 INJECTION, SOLUTION INTRAVENOUS PRN
Status: CANCELLED | OUTPATIENT
Start: 2023-03-31

## 2023-03-31 RX ORDER — ONDANSETRON 2 MG/ML
8 INJECTION INTRAMUSCULAR; INTRAVENOUS ONCE
Status: CANCELLED | OUTPATIENT
Start: 2023-03-31 | End: 2023-03-31

## 2023-03-31 RX ORDER — ONDANSETRON 2 MG/ML
8 INJECTION INTRAMUSCULAR; INTRAVENOUS
Status: CANCELLED | OUTPATIENT
Start: 2023-03-31

## 2023-03-31 RX ORDER — MEPERIDINE HYDROCHLORIDE 50 MG/ML
12.5 INJECTION INTRAMUSCULAR; INTRAVENOUS; SUBCUTANEOUS PRN
Status: CANCELLED | OUTPATIENT
Start: 2023-03-31

## 2023-03-31 RX ORDER — FLUOROURACIL 50 MG/ML
400 INJECTION, SOLUTION INTRAVENOUS ONCE
Status: CANCELLED | OUTPATIENT
Start: 2023-03-31 | End: 2023-03-31

## 2023-03-31 RX ORDER — EPINEPHRINE 1 MG/ML
0.3 INJECTION, SOLUTION, CONCENTRATE INTRAVENOUS PRN
Status: CANCELLED | OUTPATIENT
Start: 2023-03-31

## 2023-03-31 RX ORDER — ATROPINE SULFATE 0.4 MG/ML
0.4 AMPUL (ML) INJECTION
Status: CANCELLED | OUTPATIENT
Start: 2023-03-31

## 2023-03-31 RX ORDER — DIPHENHYDRAMINE HYDROCHLORIDE 50 MG/ML
50 INJECTION INTRAMUSCULAR; INTRAVENOUS
Status: CANCELLED | OUTPATIENT
Start: 2023-03-31

## 2023-03-31 RX ORDER — ACETAMINOPHEN 325 MG/1
650 TABLET ORAL
Status: CANCELLED | OUTPATIENT
Start: 2023-03-31

## 2023-03-31 RX ORDER — ALBUTEROL SULFATE 90 UG/1
4 AEROSOL, METERED RESPIRATORY (INHALATION) PRN
Status: CANCELLED | OUTPATIENT
Start: 2023-03-31

## 2023-03-31 RX ORDER — ATROPINE SULFATE 0.4 MG/ML
0.4 INJECTION, SOLUTION INTRAVENOUS ONCE
Status: COMPLETED | OUTPATIENT
Start: 2023-03-31 | End: 2023-03-31

## 2023-03-31 RX ORDER — FAMOTIDINE 10 MG/ML
20 INJECTION, SOLUTION INTRAVENOUS
Status: CANCELLED | OUTPATIENT
Start: 2023-03-31

## 2023-03-31 RX ORDER — HEPARIN SODIUM (PORCINE) LOCK FLUSH IV SOLN 100 UNIT/ML 100 UNIT/ML
500 SOLUTION INTRAVENOUS PRN
OUTPATIENT
Start: 2023-04-02

## 2023-03-31 RX ORDER — TRAMADOL HYDROCHLORIDE 50 MG/1
25 TABLET ORAL EVERY 12 HOURS PRN
Qty: 28 TABLET | Refills: 3 | Status: SHIPPED | OUTPATIENT
Start: 2023-03-31 | End: 2023-04-30

## 2023-03-31 RX ORDER — ATROPINE SULFATE 0.4 MG/ML
0.4 AMPUL (ML) INJECTION ONCE
Status: CANCELLED | OUTPATIENT
Start: 2023-03-31 | End: 2023-03-31

## 2023-03-31 RX ORDER — DULOXETIN HYDROCHLORIDE 30 MG/1
30 CAPSULE, DELAYED RELEASE ORAL DAILY
Qty: 30 CAPSULE | Refills: 3 | Status: SHIPPED | OUTPATIENT
Start: 2023-03-31 | End: 2023-04-30

## 2023-03-31 RX ORDER — SODIUM CHLORIDE 0.9 % (FLUSH) 0.9 %
5-40 SYRINGE (ML) INJECTION PRN
Status: CANCELLED | OUTPATIENT
Start: 2023-03-31

## 2023-03-31 RX ORDER — FLUOROURACIL 50 MG/ML
400 INJECTION, SOLUTION INTRAVENOUS ONCE
Status: COMPLETED | OUTPATIENT
Start: 2023-03-31 | End: 2023-03-31

## 2023-03-31 RX ORDER — SODIUM CHLORIDE 9 MG/ML
INJECTION, SOLUTION INTRAVENOUS CONTINUOUS
Status: CANCELLED | OUTPATIENT
Start: 2023-03-31

## 2023-03-31 RX ORDER — DEXTROSE MONOHYDRATE 50 MG/ML
5-250 INJECTION, SOLUTION INTRAVENOUS PRN
Status: DISCONTINUED | OUTPATIENT
Start: 2023-03-31 | End: 2023-04-01 | Stop reason: HOSPADM

## 2023-03-31 RX ORDER — HEPARIN SODIUM (PORCINE) LOCK FLUSH IV SOLN 100 UNIT/ML 100 UNIT/ML
500 SOLUTION INTRAVENOUS PRN
Status: CANCELLED | OUTPATIENT
Start: 2023-03-31

## 2023-03-31 RX ADMIN — IRINOTECAN HYDROCHLORIDE 340 MG: 20 INJECTION, SOLUTION INTRAVENOUS at 10:23

## 2023-03-31 RX ADMIN — DEXAMETHASONE SODIUM PHOSPHATE 12 MG: 4 INJECTION, SOLUTION INTRAMUSCULAR; INTRAVENOUS at 09:34

## 2023-03-31 RX ADMIN — FOSAPREPITANT DIMEGLUMINE 150 MG: 150 INJECTION, POWDER, LYOPHILIZED, FOR SOLUTION INTRAVENOUS at 09:53

## 2023-03-31 RX ADMIN — FLUOROURACIL 900 MG: 50 INJECTION, SOLUTION INTRAVENOUS at 12:02

## 2023-03-31 RX ADMIN — FLUOROURACIL 5375 MG: 50 INJECTION, SOLUTION INTRAVENOUS at 12:10

## 2023-03-31 RX ADMIN — LEUCOVORIN CALCIUM 900 MG: 350 INJECTION, POWDER, LYOPHILIZED, FOR SOLUTION INTRAMUSCULAR; INTRAVENOUS at 10:23

## 2023-03-31 RX ADMIN — ATROPINE SULFATE 0.4 MG: 0.4 INJECTION, SOLUTION INTRAVENOUS at 10:20

## 2023-03-31 RX ADMIN — SODIUM CHLORIDE, PRESERVATIVE FREE 10 ML: 5 INJECTION INTRAVENOUS at 09:00

## 2023-03-31 RX ADMIN — SODIUM CHLORIDE, PRESERVATIVE FREE 10 ML: 5 INJECTION INTRAVENOUS at 08:06

## 2023-03-31 RX ADMIN — DEXTROSE MONOHYDRATE 100 ML/HR: 50 INJECTION, SOLUTION INTRAVENOUS at 09:00

## 2023-03-31 RX ADMIN — ONDANSETRON 8 MG: 2 INJECTION INTRAMUSCULAR; INTRAVENOUS at 09:31

## 2023-03-31 ASSESSMENT — PATIENT HEALTH QUESTIONNAIRE - PHQ9
SUM OF ALL RESPONSES TO PHQ QUESTIONS 1-9: 0
SUM OF ALL RESPONSES TO PHQ9 QUESTIONS 1 & 2: 0
SUM OF ALL RESPONSES TO PHQ QUESTIONS 1-9: 0
SUM OF ALL RESPONSES TO PHQ QUESTIONS 1-9: 0
2. FEELING DOWN, DEPRESSED OR HOPELESS: 0
SUM OF ALL RESPONSES TO PHQ QUESTIONS 1-9: 0
1. LITTLE INTEREST OR PLEASURE IN DOING THINGS: 0

## 2023-03-31 ASSESSMENT — ENCOUNTER SYMPTOMS: ABDOMINAL PAIN: 1

## 2023-03-31 NOTE — PATIENT INSTRUCTIONS
n/a        -------------------------------------------------------------------------------------------------------------------  Please call our office at [phone] if you have any  of the following symptoms:   Fever of 100.5 or greater  Chills  Shortness of breath  Swelling or pain in one leg    After office hours an answering service is available and will contact a provider for emergencies or if you are experiencing any of the above symptoms. Patient does express an interest in My Chart. My Chart log in information explained on the after visit summary printout at the . [address] desk.     Sophia Sharma RN  Nurse Navigator  [address] [facility] [address] [phone]

## 2023-03-31 NOTE — PROGRESS NOTES
Arrived to the [facility]. FOLFIRI completed. Patient tolerated without difficulty. Any issues or concerns during appointment: None. Patient aware of next infusion appointment on 04/02 (date) at [phone] (time). Patient instructed to call provider with temperature of 100.4 or greater or nausea/vomiting/ diarrhea or pain not controlled by medications  Discharged ambulatory.

## 2023-04-02 ENCOUNTER — HOSPITAL ENCOUNTER (OUTPATIENT)
Dept: INFUSION THERAPY | Age: 63
Discharge: HOME | End: 2023-04-02
Payer: COMMERCIAL

## 2023-04-02 VITALS
HEART RATE: 76 BPM | DIASTOLIC BLOOD PRESSURE: 70 MMHG | OXYGEN SATURATION: 97 % | TEMPERATURE: 98 F | SYSTOLIC BLOOD PRESSURE: 114 MMHG

## 2023-04-02 DIAGNOSIS — C78.6 MALIGNANT NEOPLASM METASTATIC TO PERITONEUM (HCC): ICD-10-CM

## 2023-04-02 DIAGNOSIS — C76.2 ABDOMINAL CARCINOMATOSIS (HCC): Primary | ICD-10-CM

## 2023-04-02 PROCEDURE — 96523 IRRIG DRUG DELIVERY DEVICE: CPT

## 2023-04-02 PROCEDURE — 2580000003 HC RX 258: Performed by: INTERNAL MEDICINE

## 2023-04-02 RX ORDER — SODIUM CHLORIDE 0.9 % (FLUSH) 0.9 %
5-40 SYRINGE (ML) INJECTION PRN
Status: DISCONTINUED | OUTPATIENT
Start: 2023-04-02 | End: 2023-04-03 | Stop reason: HOSPADM

## 2023-04-02 RX ADMIN — SODIUM CHLORIDE, PRESERVATIVE FREE 10 ML: 5 INJECTION INTRAVENOUS at 11:03

## 2023-04-02 NOTE — PROGRESS NOTES
Patient arrived ambulatory to infusion center. Pump d/c completed. VS stable. Port flushed and deaccessed. Discharged ambulatory. Patient aware of next infusion appt on 4/14.

## 2023-04-03 ENCOUNTER — TELEMEDICINE (OUTPATIENT)
Dept: INTERNAL MEDICINE CLINIC | Facility: CLINIC | Age: 63
End: 2023-04-03
Payer: COMMERCIAL

## 2023-04-03 DIAGNOSIS — E87.6 HYPOKALEMIA: ICD-10-CM

## 2023-04-03 DIAGNOSIS — K64.9 HEMORRHOIDS, UNSPECIFIED HEMORRHOID TYPE: Primary | ICD-10-CM

## 2023-04-03 PROCEDURE — 99422 OL DIG E/M SVC 11-20 MIN: CPT | Performed by: STUDENT IN AN ORGANIZED HEALTH CARE EDUCATION/TRAINING PROGRAM

## 2023-04-03 RX ORDER — HYDROCORTISONE 25 MG/G
CREAM TOPICAL
Qty: 1 EACH | Refills: 0 | Status: SHIPPED | OUTPATIENT
Start: 2023-04-03

## 2023-04-03 RX ORDER — POTASSIUM CHLORIDE 20 MEQ/1
20 TABLET, EXTENDED RELEASE ORAL DAILY
Qty: 30 TABLET | Refills: 1 | Status: SHIPPED | OUTPATIENT
Start: 2023-04-03 | End: 2023-05-01

## 2023-04-03 NOTE — PROGRESS NOTES
education: Not on file    Highest education level: Not on file   Occupational History    Not on file   Tobacco Use    Smoking status: Former     Packs/day: 1.00     Types: Cigarettes    Smokeless tobacco: Never   Vaping Use    Vaping Use: Never used   Substance and Sexual Activity    Alcohol use: No    Drug use: No    Sexual activity: Yes     Partners: Female   Other Topics Concern    Not on file   Social History Narrative    Not on file     Social Determinants of Health     Financial Resource Strain: Low Risk     Difficulty of Paying Living Expenses: Not hard at all   Food Insecurity: No Food Insecurity    Worried About [address] in the Last Year: Never true    [address] in the Last Year: Never true   Transportation Needs: Unknown    Lack of Transportation (Medical): Not on file    Lack of Transportation (Non-Medical): No   Physical Activity: Not on file   Stress: Not on file   Social Connections: Not on file   Intimate Partner Violence: Not on file   Housing Stability: Unknown    Unable to Pay for Housing in the Last Year: Not on file    Number of Places Lived in the Last Year: Not on file    Unstable Housing in the Last Year: No     Current Outpatient Medications   Medication Sig Dispense Refill    potassium chloride (KLOR-CON M) 20 MEQ extended release tablet Take 1 tablet by mouth daily for 28 days 14 days 30 tablet 1    hydrocortisone (ANUSOL-HC) 2.5 % CREA rectal cream Apply topically as needed two times per day for hemorrhoids up to 7 days 1 each 0    Alpha-Lipoic Acid 600 MG TABS Take by mouth      DULoxetine (CYMBALTA) 30 MG extended release capsule Take 1 capsule by mouth daily 30 capsule 3    traMADol (ULTRAM) 50 MG tablet Take 0.5 tablets by mouth every 12 hours as needed for Pain for up to 30 days. Intended supply: 7 days.  Take lowest dose possible to manage pain Max Daily Amount: 50 mg 28 tablet 3    rivaroxaban (XARELTO) 20 MG TABS tablet Take 1 tablet by mouth daily (with breakfast)

## 2023-04-04 ASSESSMENT — ENCOUNTER SYMPTOMS
SHORTNESS OF BREATH: 0
VOMITING: 0
NAUSEA: 0
ABDOMINAL PAIN: 0

## 2023-04-14 ENCOUNTER — HOSPITAL ENCOUNTER (OUTPATIENT)
Dept: INFUSION THERAPY | Age: 63
Discharge: HOME | End: 2023-04-14
Payer: COMMERCIAL

## 2023-04-14 DIAGNOSIS — C76.2 ABDOMINAL CARCINOMATOSIS (HCC): ICD-10-CM

## 2023-04-14 DIAGNOSIS — C76.2 ABDOMINAL CARCINOMATOSIS (HCC): Primary | ICD-10-CM

## 2023-04-14 DIAGNOSIS — C78.6 MALIGNANT NEOPLASM METASTATIC TO PERITONEUM (HCC): ICD-10-CM

## 2023-04-14 LAB
ALBUMIN SERPL-MCNC: 3.3 G/DL (ref 3.2–4.6)
ALBUMIN/GLOB SERPL: 1 (ref 0.4–1.6)
ALP SERPL-CCNC: 122 U/L (ref 50–136)
ALT SERPL-CCNC: 29 U/L (ref 12–65)
ANION GAP SERPL CALC-SCNC: 5 MMOL/L (ref 2–11)
AST SERPL-CCNC: 20 U/L (ref 15–37)
BASOPHILS # BLD: 0.1 K/UL (ref 0–0.2)
BASOPHILS NFR BLD: 1 % (ref 0–2)
BILIRUB SERPL-MCNC: 0.3 MG/DL (ref 0.2–1.1)
BUN SERPL-MCNC: 11 MG/DL (ref 8–23)
CALCIUM SERPL-MCNC: 8.8 MG/DL (ref 8.3–10.4)
CHLORIDE SERPL-SCNC: 115 MMOL/L (ref 101–110)
CO2 SERPL-SCNC: 24 MMOL/L (ref 21–32)
CREAT SERPL-MCNC: 1 MG/DL (ref 0.8–1.5)
DIFFERENTIAL METHOD BLD: ABNORMAL
EOSINOPHIL # BLD: 0.1 K/UL (ref 0–0.8)
EOSINOPHIL NFR BLD: 2 % (ref 0.5–7.8)
ERYTHROCYTE [DISTWIDTH] IN BLOOD BY AUTOMATED COUNT: 14.9 % (ref 11.9–14.6)
GLOBULIN SER CALC-MCNC: 3.2 G/DL (ref 2.8–4.5)
GLUCOSE SERPL-MCNC: 155 MG/DL (ref 65–100)
HCT VFR BLD AUTO: 34.9 % (ref 41.1–50.3)
HGB BLD-MCNC: 11 G/DL (ref 13.6–17.2)
IMM GRANULOCYTES # BLD AUTO: 0 K/UL (ref 0–0.5)
IMM GRANULOCYTES NFR BLD AUTO: 0 % (ref 0–5)
LYMPHOCYTES # BLD: 1.7 K/UL (ref 0.5–4.6)
LYMPHOCYTES NFR BLD: 37 % (ref 13–44)
MCH RBC QN AUTO: 29.6 PG (ref 26.1–32.9)
MCHC RBC AUTO-ENTMCNC: 31.5 G/DL (ref 31.4–35)
MCV RBC AUTO: 94.1 FL (ref 82–102)
MONOCYTES # BLD: 0.3 K/UL (ref 0.1–1.3)
MONOCYTES NFR BLD: 6 % (ref 4–12)
NEUTS SEG # BLD: 2.4 K/UL (ref 1.7–8.2)
NEUTS SEG NFR BLD: 54 % (ref 43–78)
NRBC # BLD: 0 K/UL (ref 0–0.2)
PLATELET # BLD AUTO: 162 K/UL (ref 150–450)
PMV BLD AUTO: 9.9 FL (ref 9.4–12.3)
POTASSIUM SERPL-SCNC: 3.8 MMOL/L (ref 3.5–5.1)
PROT SERPL-MCNC: 6.5 G/DL (ref 6.3–8.2)
RBC # BLD AUTO: 3.71 M/UL (ref 4.23–5.6)
SODIUM SERPL-SCNC: 144 MMOL/L (ref 133–143)
WBC # BLD AUTO: 4.5 K/UL (ref 4.3–11.1)

## 2023-04-14 PROCEDURE — 85025 COMPLETE CBC W/AUTO DIFF WBC: CPT

## 2023-04-14 PROCEDURE — 96375 TX/PRO/DX INJ NEW DRUG ADDON: CPT

## 2023-04-14 PROCEDURE — 6360000002 HC RX W HCPCS: Performed by: NURSE PRACTITIONER

## 2023-04-14 PROCEDURE — 96372 THER/PROPH/DIAG INJ SC/IM: CPT

## 2023-04-14 PROCEDURE — G0498 CHEMO EXTEND IV INFUS W/PUMP: HCPCS

## 2023-04-14 PROCEDURE — 96411 CHEMO IV PUSH ADDL DRUG: CPT

## 2023-04-14 PROCEDURE — 2580000003 HC RX 258: Performed by: NURSE PRACTITIONER

## 2023-04-14 PROCEDURE — 2580000003 HC RX 258: Performed by: INTERNAL MEDICINE

## 2023-04-14 PROCEDURE — 96368 THER/DIAG CONCURRENT INF: CPT

## 2023-04-14 PROCEDURE — 80053 COMPREHEN METABOLIC PANEL: CPT

## 2023-04-14 PROCEDURE — 96413 CHEMO IV INFUSION 1 HR: CPT

## 2023-04-14 PROCEDURE — 36591 DRAW BLOOD OFF VENOUS DEVICE: CPT

## 2023-04-14 PROCEDURE — 96367 TX/PROPH/DG ADDL SEQ IV INF: CPT

## 2023-04-14 RX ORDER — ATROPINE SULFATE 0.4 MG/ML
0.4 INJECTION, SOLUTION INTRAVENOUS ONCE
Status: COMPLETED | OUTPATIENT
Start: 2023-04-14 | End: 2023-04-14

## 2023-04-14 RX ORDER — SODIUM CHLORIDE 0.9 % (FLUSH) 0.9 %
5-40 SYRINGE (ML) INJECTION PRN
Status: DISCONTINUED | OUTPATIENT
Start: 2023-04-14 | End: 2023-04-15 | Stop reason: HOSPADM

## 2023-04-14 RX ORDER — ONDANSETRON 2 MG/ML
8 INJECTION INTRAMUSCULAR; INTRAVENOUS ONCE
Status: COMPLETED | OUTPATIENT
Start: 2023-04-14 | End: 2023-04-14

## 2023-04-14 RX ORDER — ATROPINE SULFATE 0.4 MG/ML
0.4 INJECTION, SOLUTION INTRAVENOUS
Status: DISCONTINUED | OUTPATIENT
Start: 2023-04-14 | End: 2023-04-15 | Stop reason: HOSPADM

## 2023-04-14 RX ORDER — DEXTROSE MONOHYDRATE 50 MG/ML
5-250 INJECTION, SOLUTION INTRAVENOUS PRN
Status: DISCONTINUED | OUTPATIENT
Start: 2023-04-14 | End: 2023-04-15 | Stop reason: HOSPADM

## 2023-04-14 RX ORDER — FLUOROURACIL 50 MG/ML
400 INJECTION, SOLUTION INTRAVENOUS ONCE
Status: COMPLETED | OUTPATIENT
Start: 2023-04-14 | End: 2023-04-14

## 2023-04-14 RX ADMIN — DEXTROSE MONOHYDRATE 50 ML/HR: 50 INJECTION, SOLUTION INTRAVENOUS at 09:56

## 2023-04-14 RX ADMIN — LEUCOVORIN CALCIUM 900 MG: 350 INJECTION, POWDER, LYOPHILIZED, FOR SOLUTION INTRAMUSCULAR; INTRAVENOUS at 10:55

## 2023-04-14 RX ADMIN — DEXAMETHASONE SODIUM PHOSPHATE 12 MG: 4 INJECTION, SOLUTION INTRAMUSCULAR; INTRAVENOUS at 10:03

## 2023-04-14 RX ADMIN — FOSAPREPITANT DIMEGLUMINE 150 MG: 150 INJECTION, POWDER, LYOPHILIZED, FOR SOLUTION INTRAVENOUS at 10:20

## 2023-04-14 RX ADMIN — SODIUM CHLORIDE, PRESERVATIVE FREE 10 ML: 5 INJECTION INTRAVENOUS at 08:50

## 2023-04-14 RX ADMIN — FLUOROURACIL 5375 MG: 50 INJECTION, SOLUTION INTRAVENOUS at 12:40

## 2023-04-14 RX ADMIN — ATROPINE SULFATE 0.4 MG: 0.4 INJECTION, SOLUTION INTRAVENOUS at 10:00

## 2023-04-14 RX ADMIN — ONDANSETRON 8 MG: 2 INJECTION INTRAMUSCULAR; INTRAVENOUS at 10:00

## 2023-04-14 RX ADMIN — IRINOTECAN HYDROCHLORIDE 340 MG: 20 INJECTION, SOLUTION INTRAVENOUS at 10:55

## 2023-04-14 RX ADMIN — FLUOROURACIL 900 MG: 50 INJECTION, SOLUTION INTRAVENOUS at 12:32

## 2023-04-14 NOTE — PROGRESS NOTES
Pt arrived to infusion center. Folfiri completed. Fluorouracil pump connected to pt. Patient tolerated well. Any issues or concerns during appointment: well. Patient aware of next infusion appointment on 4-16-23(date) at 1430 (time). Patient instructed to call provider with temperature of 100.4 or greater or nausea/vomiting/ diarrhea or pain not controlled by medications  Discharged via ambulatory.

## 2023-04-14 NOTE — PROGRESS NOTES
Patient arrived to port lab for port access and lab draw   [address] accessed and labs drawn per protocol   Port remains accessed  Patient discharged ambulatory from port lab

## 2023-04-16 ENCOUNTER — HOSPITAL ENCOUNTER (OUTPATIENT)
Dept: INFUSION THERAPY | Age: 63
Discharge: HOME | End: 2023-04-16
Payer: COMMERCIAL

## 2023-04-16 DIAGNOSIS — C76.2 ABDOMINAL CARCINOMATOSIS (HCC): Primary | ICD-10-CM

## 2023-04-16 DIAGNOSIS — C78.6 MALIGNANT NEOPLASM METASTATIC TO PERITONEUM (HCC): ICD-10-CM

## 2023-04-16 PROCEDURE — 96523 IRRIG DRUG DELIVERY DEVICE: CPT

## 2023-04-16 PROCEDURE — 2580000003 HC RX 258: Performed by: NURSE PRACTITIONER

## 2023-04-16 RX ORDER — SODIUM CHLORIDE 0.9 % (FLUSH) 0.9 %
5-40 SYRINGE (ML) INJECTION PRN
Status: DISCONTINUED | OUTPATIENT
Start: 2023-04-16 | End: 2023-04-17 | Stop reason: HOSPADM

## 2023-04-16 RX ADMIN — SODIUM CHLORIDE, PRESERVATIVE FREE 10 ML: 5 INJECTION INTRAVENOUS at 12:04

## 2023-04-25 DIAGNOSIS — E87.6 HYPOKALEMIA: ICD-10-CM

## 2023-04-25 RX ORDER — POTASSIUM CHLORIDE 1500 MG/1
TABLET, EXTENDED RELEASE ORAL
Qty: 30 TABLET | Refills: 2 | Status: SHIPPED | OUTPATIENT
Start: 2023-04-25

## 2023-04-28 ENCOUNTER — HOSPITAL ENCOUNTER (OUTPATIENT)
Dept: INFUSION THERAPY | Age: 63
Discharge: HOME | End: 2023-04-28
Payer: COMMERCIAL

## 2023-04-28 ENCOUNTER — OFFICE VISIT (OUTPATIENT)
Dept: ONCOLOGY | Age: 63
End: 2023-04-28
Payer: COMMERCIAL

## 2023-04-28 VITALS
HEIGHT: 70 IN | HEART RATE: 86 BPM | SYSTOLIC BLOOD PRESSURE: 112 MMHG | BODY MASS INDEX: 32.4 KG/M2 | DIASTOLIC BLOOD PRESSURE: 69 MMHG | TEMPERATURE: 98.5 F | WEIGHT: 226.3 LBS | RESPIRATION RATE: 14 BRPM | OXYGEN SATURATION: 96 %

## 2023-04-28 DIAGNOSIS — C76.2 ABDOMINAL CARCINOMATOSIS (HCC): Primary | ICD-10-CM

## 2023-04-28 DIAGNOSIS — C76.2 ABDOMINAL CARCINOMATOSIS (HCC): ICD-10-CM

## 2023-04-28 DIAGNOSIS — G62.0 NEUROPATHY DUE TO CHEMOTHERAPEUTIC DRUG (HCC): ICD-10-CM

## 2023-04-28 DIAGNOSIS — C16.9 MALIGNANT NEOPLASM OF STOMACH, UNSPECIFIED LOCATION (HCC): Primary | ICD-10-CM

## 2023-04-28 DIAGNOSIS — C78.6 MALIGNANT NEOPLASM METASTATIC TO PERITONEUM (HCC): ICD-10-CM

## 2023-04-28 DIAGNOSIS — R14.1 GAS PAIN: ICD-10-CM

## 2023-04-28 DIAGNOSIS — T45.1X5A NEUROPATHY DUE TO CHEMOTHERAPEUTIC DRUG (HCC): ICD-10-CM

## 2023-04-28 DIAGNOSIS — R53.83 FATIGUE DUE TO TREATMENT: ICD-10-CM

## 2023-04-28 LAB
ALBUMIN SERPL-MCNC: 3.4 G/DL (ref 3.2–4.6)
ALBUMIN/GLOB SERPL: 1.1 (ref 0.4–1.6)
ALP SERPL-CCNC: 123 U/L (ref 50–136)
ALT SERPL-CCNC: 31 U/L (ref 12–65)
ANION GAP SERPL CALC-SCNC: 7 MMOL/L (ref 2–11)
AST SERPL-CCNC: 21 U/L (ref 15–37)
BASOPHILS # BLD: 0.1 K/UL (ref 0–0.2)
BASOPHILS NFR BLD: 1 % (ref 0–2)
BILIRUB SERPL-MCNC: 0.3 MG/DL (ref 0.2–1.1)
BUN SERPL-MCNC: 12 MG/DL (ref 8–23)
CALCIUM SERPL-MCNC: 8.8 MG/DL (ref 8.3–10.4)
CHLORIDE SERPL-SCNC: 110 MMOL/L (ref 101–110)
CO2 SERPL-SCNC: 25 MMOL/L (ref 21–32)
CREAT SERPL-MCNC: 1 MG/DL (ref 0.8–1.5)
DIFFERENTIAL METHOD BLD: ABNORMAL
EOSINOPHIL # BLD: 0.1 K/UL (ref 0–0.8)
EOSINOPHIL NFR BLD: 2 % (ref 0.5–7.8)
ERYTHROCYTE [DISTWIDTH] IN BLOOD BY AUTOMATED COUNT: 14.9 % (ref 11.9–14.6)
GLOBULIN SER CALC-MCNC: 3.2 G/DL (ref 2.8–4.5)
GLUCOSE SERPL-MCNC: 149 MG/DL (ref 65–100)
HCT VFR BLD AUTO: 35.6 % (ref 41.1–50.3)
HGB BLD-MCNC: 11.3 G/DL (ref 13.6–17.2)
IMM GRANULOCYTES # BLD AUTO: 0 K/UL (ref 0–0.5)
IMM GRANULOCYTES NFR BLD AUTO: 0 % (ref 0–5)
LYMPHOCYTES # BLD: 1.8 K/UL (ref 0.5–4.6)
LYMPHOCYTES NFR BLD: 32 % (ref 13–44)
MAGNESIUM SERPL-MCNC: 2.1 MG/DL (ref 1.8–2.4)
MCH RBC QN AUTO: 29.2 PG (ref 26.1–32.9)
MCHC RBC AUTO-ENTMCNC: 31.7 G/DL (ref 31.4–35)
MCV RBC AUTO: 92 FL (ref 82–102)
MONOCYTES # BLD: 0.4 K/UL (ref 0.1–1.3)
MONOCYTES NFR BLD: 7 % (ref 4–12)
NEUTS SEG # BLD: 3.2 K/UL (ref 1.7–8.2)
NEUTS SEG NFR BLD: 58 % (ref 43–78)
NRBC # BLD: 0 K/UL (ref 0–0.2)
PLATELET # BLD AUTO: 174 K/UL (ref 150–450)
PMV BLD AUTO: 10.2 FL (ref 9.4–12.3)
POTASSIUM SERPL-SCNC: 3.8 MMOL/L (ref 3.5–5.1)
PROT SERPL-MCNC: 6.6 G/DL (ref 6.3–8.2)
RBC # BLD AUTO: 3.87 M/UL (ref 4.23–5.6)
SODIUM SERPL-SCNC: 142 MMOL/L (ref 133–143)
WBC # BLD AUTO: 5.5 K/UL (ref 4.3–11.1)

## 2023-04-28 PROCEDURE — 96367 TX/PROPH/DG ADDL SEQ IV INF: CPT

## 2023-04-28 PROCEDURE — G0498 CHEMO EXTEND IV INFUS W/PUMP: HCPCS

## 2023-04-28 PROCEDURE — 96368 THER/DIAG CONCURRENT INF: CPT

## 2023-04-28 PROCEDURE — 96411 CHEMO IV PUSH ADDL DRUG: CPT

## 2023-04-28 PROCEDURE — 3074F SYST BP LT 130 MM HG: CPT | Performed by: NURSE PRACTITIONER

## 2023-04-28 PROCEDURE — 99214 OFFICE O/P EST MOD 30 MIN: CPT | Performed by: NURSE PRACTITIONER

## 2023-04-28 PROCEDURE — 96375 TX/PRO/DX INJ NEW DRUG ADDON: CPT

## 2023-04-28 PROCEDURE — 96372 THER/PROPH/DIAG INJ SC/IM: CPT

## 2023-04-28 PROCEDURE — 3078F DIAST BP <80 MM HG: CPT | Performed by: NURSE PRACTITIONER

## 2023-04-28 PROCEDURE — 36591 DRAW BLOOD OFF VENOUS DEVICE: CPT

## 2023-04-28 PROCEDURE — 6360000002 HC RX W HCPCS: Performed by: NURSE PRACTITIONER

## 2023-04-28 PROCEDURE — 96413 CHEMO IV INFUSION 1 HR: CPT

## 2023-04-28 PROCEDURE — 80053 COMPREHEN METABOLIC PANEL: CPT

## 2023-04-28 PROCEDURE — 83735 ASSAY OF MAGNESIUM: CPT

## 2023-04-28 PROCEDURE — 85025 COMPLETE CBC W/AUTO DIFF WBC: CPT

## 2023-04-28 PROCEDURE — 2580000003 HC RX 258: Performed by: INTERNAL MEDICINE

## 2023-04-28 PROCEDURE — 2580000003 HC RX 258: Performed by: NURSE PRACTITIONER

## 2023-04-28 RX ORDER — SODIUM CHLORIDE 9 MG/ML
5-250 INJECTION, SOLUTION INTRAVENOUS PRN
OUTPATIENT
Start: 2023-04-30

## 2023-04-28 RX ORDER — SODIUM CHLORIDE 0.9 % (FLUSH) 0.9 %
5-40 SYRINGE (ML) INJECTION PRN
Status: CANCELLED | OUTPATIENT
Start: 2023-04-30

## 2023-04-28 RX ORDER — DEXTROSE MONOHYDRATE 50 MG/ML
5-250 INJECTION, SOLUTION INTRAVENOUS PRN
Status: CANCELLED | OUTPATIENT
Start: 2023-04-28

## 2023-04-28 RX ORDER — HEPARIN SODIUM (PORCINE) LOCK FLUSH IV SOLN 100 UNIT/ML 100 UNIT/ML
500 SOLUTION INTRAVENOUS PRN
Status: CANCELLED | OUTPATIENT
Start: 2023-04-28

## 2023-04-28 RX ORDER — SODIUM CHLORIDE 0.9 % (FLUSH) 0.9 %
5-40 SYRINGE (ML) INJECTION PRN
Status: CANCELLED | OUTPATIENT
Start: 2023-04-28

## 2023-04-28 RX ORDER — SODIUM CHLORIDE 9 MG/ML
INJECTION, SOLUTION INTRAVENOUS CONTINUOUS
Status: CANCELLED | OUTPATIENT
Start: 2023-04-28

## 2023-04-28 RX ORDER — SODIUM CHLORIDE 9 MG/ML
5-250 INJECTION, SOLUTION INTRAVENOUS PRN
Status: CANCELLED | OUTPATIENT
Start: 2023-04-28

## 2023-04-28 RX ORDER — FLUOROURACIL 50 MG/ML
400 INJECTION, SOLUTION INTRAVENOUS ONCE
Status: CANCELLED | OUTPATIENT
Start: 2023-04-28 | End: 2023-04-28

## 2023-04-28 RX ORDER — ONDANSETRON 2 MG/ML
8 INJECTION INTRAMUSCULAR; INTRAVENOUS ONCE
Status: COMPLETED | OUTPATIENT
Start: 2023-04-28 | End: 2023-04-28

## 2023-04-28 RX ORDER — OMEPRAZOLE 20 MG/1
20 CAPSULE, DELAYED RELEASE ORAL DAILY
COMMUNITY

## 2023-04-28 RX ORDER — MEPERIDINE HYDROCHLORIDE 50 MG/ML
12.5 INJECTION INTRAMUSCULAR; INTRAVENOUS; SUBCUTANEOUS PRN
Status: CANCELLED | OUTPATIENT
Start: 2023-04-28

## 2023-04-28 RX ORDER — ACETAMINOPHEN 325 MG/1
650 TABLET ORAL
Status: CANCELLED | OUTPATIENT
Start: 2023-04-28

## 2023-04-28 RX ORDER — ATROPINE SULFATE 0.4 MG/ML
0.4 INJECTION, SOLUTION INTRAVENOUS ONCE
Status: COMPLETED | OUTPATIENT
Start: 2023-04-28 | End: 2023-04-28

## 2023-04-28 RX ORDER — DIPHENHYDRAMINE HYDROCHLORIDE 50 MG/ML
50 INJECTION INTRAMUSCULAR; INTRAVENOUS
Status: DISCONTINUED | OUTPATIENT
Start: 2023-04-28 | End: 2023-04-29 | Stop reason: HOSPADM

## 2023-04-28 RX ORDER — ATROPINE SULFATE 0.4 MG/ML
0.4 AMPUL (ML) INJECTION
Status: CANCELLED | OUTPATIENT
Start: 2023-04-28

## 2023-04-28 RX ORDER — HEPARIN SODIUM (PORCINE) LOCK FLUSH IV SOLN 100 UNIT/ML 100 UNIT/ML
500 SOLUTION INTRAVENOUS PRN
OUTPATIENT
Start: 2023-04-30

## 2023-04-28 RX ORDER — ONDANSETRON 2 MG/ML
8 INJECTION INTRAMUSCULAR; INTRAVENOUS
Status: CANCELLED | OUTPATIENT
Start: 2023-04-28

## 2023-04-28 RX ORDER — ATROPINE SULFATE 0.4 MG/ML
0.4 INJECTION, SOLUTION INTRAVENOUS
Status: ACTIVE | OUTPATIENT
Start: 2023-04-28 | End: 2023-04-28

## 2023-04-28 RX ORDER — FAMOTIDINE 10 MG/ML
20 INJECTION, SOLUTION INTRAVENOUS
Status: CANCELLED | OUTPATIENT
Start: 2023-04-28

## 2023-04-28 RX ORDER — ALBUTEROL SULFATE 90 UG/1
4 AEROSOL, METERED RESPIRATORY (INHALATION) PRN
Status: CANCELLED | OUTPATIENT
Start: 2023-04-28

## 2023-04-28 RX ORDER — ONDANSETRON 2 MG/ML
8 INJECTION INTRAMUSCULAR; INTRAVENOUS ONCE
Status: CANCELLED | OUTPATIENT
Start: 2023-04-28 | End: 2023-04-28

## 2023-04-28 RX ORDER — EPINEPHRINE 1 MG/ML
0.3 INJECTION, SOLUTION, CONCENTRATE INTRAVENOUS PRN
Status: CANCELLED | OUTPATIENT
Start: 2023-04-28

## 2023-04-28 RX ORDER — DEXTROSE MONOHYDRATE 50 MG/ML
5-250 INJECTION, SOLUTION INTRAVENOUS PRN
Status: DISCONTINUED | OUTPATIENT
Start: 2023-04-28 | End: 2023-04-29 | Stop reason: HOSPADM

## 2023-04-28 RX ORDER — DIPHENHYDRAMINE HYDROCHLORIDE 50 MG/ML
50 INJECTION INTRAMUSCULAR; INTRAVENOUS
Status: CANCELLED | OUTPATIENT
Start: 2023-04-28

## 2023-04-28 RX ORDER — ATROPINE SULFATE 0.4 MG/ML
0.4 AMPUL (ML) INJECTION ONCE
Status: CANCELLED | OUTPATIENT
Start: 2023-04-28 | End: 2023-04-28

## 2023-04-28 RX ORDER — SODIUM CHLORIDE 0.9 % (FLUSH) 0.9 %
5-40 SYRINGE (ML) INJECTION PRN
Status: DISCONTINUED | OUTPATIENT
Start: 2023-04-28 | End: 2023-04-29 | Stop reason: HOSPADM

## 2023-04-28 RX ORDER — SODIUM CHLORIDE 0.9 % (FLUSH) 0.9 %
10 SYRINGE (ML) INJECTION PRN
Status: DISCONTINUED | OUTPATIENT
Start: 2023-04-28 | End: 2023-04-29 | Stop reason: HOSPADM

## 2023-04-28 RX ORDER — SODIUM CHLORIDE 0.9 % (FLUSH) 0.9 %
5-40 SYRINGE (ML) INJECTION PRN
OUTPATIENT
Start: 2023-04-30

## 2023-04-28 RX ORDER — FLUOROURACIL 50 MG/ML
400 INJECTION, SOLUTION INTRAVENOUS ONCE
Status: COMPLETED | OUTPATIENT
Start: 2023-04-28 | End: 2023-04-28

## 2023-04-28 RX ADMIN — SODIUM CHLORIDE, PRESERVATIVE FREE 10 ML: 5 INJECTION INTRAVENOUS at 08:36

## 2023-04-28 RX ADMIN — FLUOROURACIL 5375 MG: 50 INJECTION, SOLUTION INTRAVENOUS at 12:54

## 2023-04-28 RX ADMIN — FLUOROURACIL 900 MG: 50 INJECTION, SOLUTION INTRAVENOUS at 12:50

## 2023-04-28 RX ADMIN — FOSAPREPITANT 150 MG: 150 INJECTION, POWDER, LYOPHILIZED, FOR SOLUTION INTRAVENOUS at 10:40

## 2023-04-28 RX ADMIN — DEXTROSE MONOHYDRATE 25 ML/HR: 50 INJECTION, SOLUTION INTRAVENOUS at 10:20

## 2023-04-28 RX ADMIN — LEUCOVORIN CALCIUM 900 MG: 350 INJECTION, POWDER, LYOPHILIZED, FOR SOLUTION INTRAMUSCULAR; INTRAVENOUS at 11:15

## 2023-04-28 RX ADMIN — SODIUM CHLORIDE, PRESERVATIVE FREE 10 ML: 5 INJECTION INTRAVENOUS at 12:50

## 2023-04-28 RX ADMIN — ONDANSETRON 8 MG: 2 INJECTION INTRAMUSCULAR; INTRAVENOUS at 10:21

## 2023-04-28 RX ADMIN — DEXAMETHASONE SODIUM PHOSPHATE 12 MG: 4 INJECTION, SOLUTION INTRAMUSCULAR; INTRAVENOUS at 10:25

## 2023-04-28 RX ADMIN — ATROPINE SULFATE 0.4 MG: 0.4 INJECTION, SOLUTION INTRAVENOUS at 10:23

## 2023-04-28 RX ADMIN — IRINOTECAN HYDROCHLORIDE 340 MG: 20 INJECTION, SOLUTION INTRAVENOUS at 11:15

## 2023-04-28 ASSESSMENT — ENCOUNTER SYMPTOMS
COUGH: 0
SHORTNESS OF BREATH: 0
BACK PAIN: 0
SORE THROAT: 0
SCLERAL ICTERUS: 0
NAUSEA: 0
HEMOPTYSIS: 0
VOMITING: 0
EYE PROBLEMS: 0
ABDOMINAL DISTENTION: 0
BLOOD IN STOOL: 0
CONSTIPATION: 1

## 2023-04-28 ASSESSMENT — PATIENT HEALTH QUESTIONNAIRE - PHQ9
2. FEELING DOWN, DEPRESSED OR HOPELESS: 0
SUM OF ALL RESPONSES TO PHQ QUESTIONS 1-9: 0

## 2023-04-28 NOTE — PATIENT INSTRUCTIONS
Patient Instructions from Today's Visit    Reason for Visit:  Prechemo visit    Diagnosis Information:  [email or web address] [email or web address]-us/[email or web address]-answers-patient-education-[email or web address]-meeofej-[email or web address]-sheets    Plan:  -Cycle 21 Folfiri today  -Follow up MRI on Tuesday    Follow Up:  As scheduled    Recent Lab Results:  Hospital Outpatient Visit on 04/28/2023   Component Date Value Ref Range Status    WBC 04/28/2023 5.5  4.3 - 11.1 K/uL Final    RBC 04/28/2023 3.87 (L)  4.23 - 5.6 M/uL Final    Hemoglobin 04/28/2023 11.3 (L)  13.6 - 17.2 g/dL Final    Hematocrit 04/28/2023 35.6 (L)  41.1 - 50.3 % Final    MCV 04/28/2023 92.0  82.0 - 102.0 FL Final    MCH 04/28/2023 29.2  26.1 - 32.9 PG Final    MCHC 04/28/2023 31.7  31.4 - 35.0 g/dL Final    RDW 04/28/2023 14.9 (H)  11.9 - 14.6 % Final    Platelets 33/92/0592 174  150 - 450 K/uL Final    MPV 04/28/2023 10.2  9.4 - 12.3 FL Final    nRBC 04/28/2023 0.00  0.0 - 0.2 K/uL Final    **Note: Absolute NRBC parameter is now reported with Hemogram**    Seg Neutrophils 04/28/2023 58  43 - 78 % Final    Lymphocytes 04/28/2023 32  13 - 44 % Final    Monocytes 04/28/2023 7  4.0 - 12.0 % Final    Eosinophils % 04/28/2023 2  0.5 - 7.8 % Final    Basophils 04/28/2023 1  0.0 - 2.0 % Final    Immature Granulocytes 04/28/2023 0  0.0 - 5.0 % Final    Segs Absolute 04/28/2023 3.2  1.7 - 8.2 K/UL Final    Absolute Lymph # 04/28/2023 1.8  0.5 - 4.6 K/UL Final    Absolute Mono # 04/28/2023 0.4  0.1 - 1.3 K/UL Final    Absolute Eos # 04/28/2023 0.1  0.0 - 0.8 K/UL Final    Basophils Absolute 04/28/2023 0.1  0.0 - 0.2 K/UL Final    Absolute Immature Granulocyte 04/28/2023 0.0  0.0 - 0.5 K/UL Final    Differential Type 04/28/2023 AUTOMATED    Final    Sodium 04/28/2023 142  133 - 143 mmol/L Final    Potassium 04/28/2023 3.8  3.5 - 5.1 mmol/L Final    Chloride 04/28/2023 110  101 - 110 mmol/L Final    CO2 04/28/2023 25  21 - 32 mmol/L Final    Anion Gap 04/28/2023 7  2 - 11 mmol/L Final    Glucose

## 2023-04-28 NOTE — PROGRESS NOTES
Arrived to the [facility]. Folfiri completed. Patient tolerated well. Pump connections checked by Christo Srivastava RN   Any issues or concerns during appointment: none. Patient aware of next infusion appointment on 4/30/23 (date) at 0 (time).   Patient instructed to call provider with temperature of 100.4 or greater or nausea/vomiting/ diarrhea or pain not controlled by medications  Discharged ambulatory

## 2023-04-28 NOTE — PROGRESS NOTES
2. Neuropathy due to chemotherapeutic drug ([facility] [address]        3. Fatigue due to treatment        4. Gas pain            Mr. Marcie Blanca is here for FU of metastatic adenocarcinoma. 1. Metastatic adenocarcinoma   - s/p omental and mesenteric mass bx - c/w upper GI adenocarcinoma    - hx of diverticular dz - s/p previous bowel resection by dr Shelby De La Vega at [address] [facility] -    - We did discuss that his disease is not curable and he VU but wife stated that he believes he can beat this. - here for FU. Upon exam and labs - he will pw tx   - gas despite GasX, will try BeanO prior to meals  - CT restaging new indeterminate liver lesion - suggested dedicated MRI which will be completed next week. - Neuropathy is ongoing - continue duloxetine 60mg. - He is eating well. - hx RUE DVT, on Xarelto. - hypokalemia - 20 meq BI  - hemorrhoids - Preparation H, warm baths. - hypomagnesemia - on mag oxide 400 mg TID   - Previously, He wishes to continue on the current regimen; he previously expressed that in the future, he may wish to proceed with unconventional scheduling of maybe every 3 weeks. He does understand inherent risks with alternate schedule and how this would affect progression of disease. - sclerotic lesion on imaging at S1 - bone scan recommended, has not been completed   - Continue good oral nutrition and hydration.   - Encouraged frequent activity throughout the day and rest as needed to combat fatigue.   - Call with any fevers, uncontrolled side effects from treatment or any other worrisome/concerning symptoms. RTC per protocol or sooner if needed        MDM      Lab studies and imaging studies were personally reviewed. Pertinent old records were reviewed. Historical:    - here with his wife for consultation. During today's visit, we discussed his current workup. We looked at the images together demonstrating some of the findings concerning for peritoneal nodularity/peritoneal disease.

## 2023-04-30 ENCOUNTER — HOSPITAL ENCOUNTER (OUTPATIENT)
Dept: INFUSION THERAPY | Age: 63
Discharge: HOME | End: 2023-04-30
Payer: COMMERCIAL

## 2023-04-30 VITALS
DIASTOLIC BLOOD PRESSURE: 59 MMHG | SYSTOLIC BLOOD PRESSURE: 122 MMHG | HEART RATE: 79 BPM | TEMPERATURE: 98.2 F | RESPIRATION RATE: 16 BRPM

## 2023-04-30 DIAGNOSIS — C78.6 MALIGNANT NEOPLASM METASTATIC TO PERITONEUM (HCC): ICD-10-CM

## 2023-04-30 DIAGNOSIS — C76.2 ABDOMINAL CARCINOMATOSIS (HCC): Primary | ICD-10-CM

## 2023-04-30 PROCEDURE — 96523 IRRIG DRUG DELIVERY DEVICE: CPT

## 2023-04-30 PROCEDURE — 2580000003 HC RX 258: Performed by: INTERNAL MEDICINE

## 2023-04-30 RX ORDER — SODIUM CHLORIDE 0.9 % (FLUSH) 0.9 %
5-40 SYRINGE (ML) INJECTION 2 TIMES DAILY
Status: DISCONTINUED | OUTPATIENT
Start: 2023-04-30 | End: 2023-05-01 | Stop reason: HOSPADM

## 2023-04-30 RX ORDER — SODIUM CHLORIDE 0.9 % (FLUSH) 0.9 %
5-40 SYRINGE (ML) INJECTION PRN
Status: DISCONTINUED | OUTPATIENT
Start: 2023-04-30 | End: 2023-05-01 | Stop reason: HOSPADM

## 2023-04-30 RX ADMIN — SODIUM CHLORIDE, PRESERVATIVE FREE 10 ML: 5 INJECTION INTRAVENOUS at 12:10

## 2023-05-02 ENCOUNTER — HOSPITAL ENCOUNTER (OUTPATIENT)
Dept: MRI IMAGING | Age: 63
Discharge: HOME | End: 2023-05-05
Payer: COMMERCIAL

## 2023-05-02 DIAGNOSIS — C79.9 METASTATIC ADENOCARCINOMA (HCC): ICD-10-CM

## 2023-05-02 DIAGNOSIS — K76.9 LIVER LESION: ICD-10-CM

## 2023-05-02 DIAGNOSIS — C76.2 ABDOMINAL CARCINOMATOSIS (HCC): ICD-10-CM

## 2023-05-02 PROCEDURE — 2580000003 HC RX 258: Performed by: NURSE PRACTITIONER

## 2023-05-02 PROCEDURE — A9579 GAD-BASE MR CONTRAST NOS,1ML: HCPCS | Performed by: NURSE PRACTITIONER

## 2023-05-02 PROCEDURE — 6360000004 HC RX CONTRAST MEDICATION: Performed by: NURSE PRACTITIONER

## 2023-05-02 PROCEDURE — 74183 MRI ABD W/O CNTR FLWD CNTR: CPT

## 2023-05-02 RX ORDER — SODIUM CHLORIDE 0.9 % (FLUSH) 0.9 %
30 SYRINGE (ML) INJECTION AS NEEDED
Status: DISCONTINUED | OUTPATIENT
Start: 2023-05-02 | End: 2023-05-06 | Stop reason: HOSPADM

## 2023-05-02 RX ADMIN — GADOTERIDOL 20 ML: 279.3 INJECTION, SOLUTION INTRAVENOUS at 08:27

## 2023-05-02 RX ADMIN — SODIUM CHLORIDE, PRESERVATIVE FREE 30 ML: 5 INJECTION INTRAVENOUS at 08:26

## 2023-05-09 DIAGNOSIS — C78.6 MALIGNANT NEOPLASM METASTATIC TO PERITONEUM (HCC): Primary | ICD-10-CM

## 2023-05-09 DIAGNOSIS — Z79.899 HIGH RISK MEDICATION USE: ICD-10-CM

## 2023-05-09 ASSESSMENT — ENCOUNTER SYMPTOMS
HEMOPTYSIS: 0
EYE PROBLEMS: 0
SHORTNESS OF BREATH: 0
ABDOMINAL DISTENTION: 0
BLOOD IN STOOL: 0
NAUSEA: 0
SCLERAL ICTERUS: 0
SORE THROAT: 0
VOMITING: 0
BACK PAIN: 0
COUGH: 0
CONSTIPATION: 1

## 2023-05-12 ENCOUNTER — HOSPITAL ENCOUNTER (OUTPATIENT)
Dept: INFUSION THERAPY | Age: 63
Discharge: HOME | End: 2023-05-12
Payer: COMMERCIAL

## 2023-05-12 ENCOUNTER — OFFICE VISIT (OUTPATIENT)
Dept: ONCOLOGY | Age: 63
End: 2023-05-12
Payer: COMMERCIAL

## 2023-05-12 VITALS
OXYGEN SATURATION: 94 % | TEMPERATURE: 98.3 F | HEART RATE: 97 BPM | BODY MASS INDEX: 32.71 KG/M2 | RESPIRATION RATE: 16 BRPM | SYSTOLIC BLOOD PRESSURE: 99 MMHG | HEIGHT: 70 IN | DIASTOLIC BLOOD PRESSURE: 68 MMHG | WEIGHT: 228.5 LBS

## 2023-05-12 DIAGNOSIS — R14.3 FLATULENCE: ICD-10-CM

## 2023-05-12 DIAGNOSIS — C78.6 MALIGNANT NEOPLASM METASTATIC TO PERITONEUM (HCC): Primary | ICD-10-CM

## 2023-05-12 DIAGNOSIS — C78.6 MALIGNANT NEOPLASM METASTATIC TO PERITONEUM (HCC): ICD-10-CM

## 2023-05-12 DIAGNOSIS — Z79.899 HIGH RISK MEDICATION USE: ICD-10-CM

## 2023-05-12 DIAGNOSIS — C76.2 ABDOMINAL CARCINOMATOSIS (HCC): Primary | ICD-10-CM

## 2023-05-12 DIAGNOSIS — G62.9 NEUROPATHY: ICD-10-CM

## 2023-05-12 DIAGNOSIS — C76.2 ABDOMINAL CARCINOMATOSIS (HCC): ICD-10-CM

## 2023-05-12 LAB
ALBUMIN SERPL-MCNC: 3.3 G/DL (ref 3.2–4.6)
ALBUMIN/GLOB SERPL: 1 (ref 0.4–1.6)
ALP SERPL-CCNC: 128 U/L (ref 50–136)
ALT SERPL-CCNC: 26 U/L (ref 12–65)
ANION GAP SERPL CALC-SCNC: 5 MMOL/L (ref 2–11)
AST SERPL-CCNC: 19 U/L (ref 15–37)
BASOPHILS # BLD: 0.1 K/UL (ref 0–0.2)
BASOPHILS NFR BLD: 1 % (ref 0–2)
BILIRUB SERPL-MCNC: 0.4 MG/DL (ref 0.2–1.1)
BUN SERPL-MCNC: 13 MG/DL (ref 8–23)
CALCIUM SERPL-MCNC: 8.9 MG/DL (ref 8.3–10.4)
CHLORIDE SERPL-SCNC: 113 MMOL/L (ref 101–110)
CO2 SERPL-SCNC: 25 MMOL/L (ref 21–32)
CREAT SERPL-MCNC: 1.2 MG/DL (ref 0.8–1.5)
DIFFERENTIAL METHOD BLD: ABNORMAL
EOSINOPHIL # BLD: 0.1 K/UL (ref 0–0.8)
EOSINOPHIL NFR BLD: 2 % (ref 0.5–7.8)
ERYTHROCYTE [DISTWIDTH] IN BLOOD BY AUTOMATED COUNT: 14.9 % (ref 11.9–14.6)
GLOBULIN SER CALC-MCNC: 3.4 G/DL (ref 2.8–4.5)
GLUCOSE SERPL-MCNC: 143 MG/DL (ref 65–100)
HCT VFR BLD AUTO: 34.8 % (ref 41.1–50.3)
HGB BLD-MCNC: 11.1 G/DL (ref 13.6–17.2)
IMM GRANULOCYTES # BLD AUTO: 0 K/UL (ref 0–0.5)
IMM GRANULOCYTES NFR BLD AUTO: 0 % (ref 0–5)
LYMPHOCYTES # BLD: 2.3 K/UL (ref 0.5–4.6)
LYMPHOCYTES NFR BLD: 40 % (ref 13–44)
MAGNESIUM SERPL-MCNC: 2.2 MG/DL (ref 1.8–2.4)
MCH RBC QN AUTO: 29.4 PG (ref 26.1–32.9)
MCHC RBC AUTO-ENTMCNC: 31.9 G/DL (ref 31.4–35)
MCV RBC AUTO: 92.3 FL (ref 82–102)
MONOCYTES # BLD: 0.5 K/UL (ref 0.1–1.3)
MONOCYTES NFR BLD: 8 % (ref 4–12)
NEUTS SEG # BLD: 2.9 K/UL (ref 1.7–8.2)
NEUTS SEG NFR BLD: 49 % (ref 43–78)
NRBC # BLD: 0 K/UL (ref 0–0.2)
PLATELET # BLD AUTO: 186 K/UL (ref 150–450)
PMV BLD AUTO: 10 FL (ref 9.4–12.3)
POTASSIUM SERPL-SCNC: 3.8 MMOL/L (ref 3.5–5.1)
PROT SERPL-MCNC: 6.7 G/DL (ref 6.3–8.2)
RBC # BLD AUTO: 3.77 M/UL (ref 4.23–5.6)
SODIUM SERPL-SCNC: 143 MMOL/L (ref 133–143)
WBC # BLD AUTO: 5.9 K/UL (ref 4.3–11.1)

## 2023-05-12 PROCEDURE — 2580000003 HC RX 258: Performed by: INTERNAL MEDICINE

## 2023-05-12 PROCEDURE — 3074F SYST BP LT 130 MM HG: CPT | Performed by: INTERNAL MEDICINE

## 2023-05-12 PROCEDURE — 96411 CHEMO IV PUSH ADDL DRUG: CPT

## 2023-05-12 PROCEDURE — 96413 CHEMO IV INFUSION 1 HR: CPT

## 2023-05-12 PROCEDURE — 80053 COMPREHEN METABOLIC PANEL: CPT

## 2023-05-12 PROCEDURE — 83735 ASSAY OF MAGNESIUM: CPT

## 2023-05-12 PROCEDURE — 36591 DRAW BLOOD OFF VENOUS DEVICE: CPT

## 2023-05-12 PROCEDURE — G0498 CHEMO EXTEND IV INFUS W/PUMP: HCPCS

## 2023-05-12 PROCEDURE — 96372 THER/PROPH/DIAG INJ SC/IM: CPT

## 2023-05-12 PROCEDURE — 6360000002 HC RX W HCPCS: Performed by: INTERNAL MEDICINE

## 2023-05-12 PROCEDURE — 85025 COMPLETE CBC W/AUTO DIFF WBC: CPT

## 2023-05-12 PROCEDURE — 96415 CHEMO IV INFUSION ADDL HR: CPT

## 2023-05-12 PROCEDURE — 96367 TX/PROPH/DG ADDL SEQ IV INF: CPT

## 2023-05-12 PROCEDURE — 96368 THER/DIAG CONCURRENT INF: CPT

## 2023-05-12 PROCEDURE — 99214 OFFICE O/P EST MOD 30 MIN: CPT | Performed by: INTERNAL MEDICINE

## 2023-05-12 PROCEDURE — 96375 TX/PRO/DX INJ NEW DRUG ADDON: CPT

## 2023-05-12 PROCEDURE — 3078F DIAST BP <80 MM HG: CPT | Performed by: INTERNAL MEDICINE

## 2023-05-12 RX ORDER — SODIUM CHLORIDE 9 MG/ML
5-250 INJECTION, SOLUTION INTRAVENOUS PRN
Status: CANCELLED | OUTPATIENT
Start: 2023-05-12

## 2023-05-12 RX ORDER — ACETAMINOPHEN 325 MG/1
650 TABLET ORAL
Status: CANCELLED | OUTPATIENT
Start: 2023-05-12

## 2023-05-12 RX ORDER — SODIUM CHLORIDE 9 MG/ML
INJECTION, SOLUTION INTRAVENOUS CONTINUOUS
Status: CANCELLED | OUTPATIENT
Start: 2023-05-12

## 2023-05-12 RX ORDER — SODIUM CHLORIDE 0.9 % (FLUSH) 0.9 %
5-40 SYRINGE (ML) INJECTION PRN
Status: DISCONTINUED | OUTPATIENT
Start: 2023-05-12 | End: 2023-05-13 | Stop reason: HOSPADM

## 2023-05-12 RX ORDER — MEPERIDINE HYDROCHLORIDE 25 MG/ML
12.5 INJECTION INTRAMUSCULAR; INTRAVENOUS; SUBCUTANEOUS PRN
Status: DISCONTINUED | OUTPATIENT
Start: 2023-05-12 | End: 2023-05-13 | Stop reason: HOSPADM

## 2023-05-12 RX ORDER — HEPARIN SODIUM (PORCINE) LOCK FLUSH IV SOLN 100 UNIT/ML 100 UNIT/ML
500 SOLUTION INTRAVENOUS PRN
OUTPATIENT
Start: 2023-05-14

## 2023-05-12 RX ORDER — SODIUM CHLORIDE 0.9 % (FLUSH) 0.9 %
5-40 SYRINGE (ML) INJECTION PRN
Status: CANCELLED | OUTPATIENT
Start: 2023-05-14

## 2023-05-12 RX ORDER — SODIUM CHLORIDE 0.9 % (FLUSH) 0.9 %
5-40 SYRINGE (ML) INJECTION PRN
Status: CANCELLED | OUTPATIENT
Start: 2023-05-12

## 2023-05-12 RX ORDER — EPINEPHRINE 1 MG/ML
0.3 INJECTION, SOLUTION, CONCENTRATE INTRAVENOUS PRN
Status: DISCONTINUED | OUTPATIENT
Start: 2023-05-12 | End: 2023-05-13 | Stop reason: HOSPADM

## 2023-05-12 RX ORDER — HEPARIN SODIUM (PORCINE) LOCK FLUSH IV SOLN 100 UNIT/ML 100 UNIT/ML
500 SOLUTION INTRAVENOUS PRN
Status: CANCELLED | OUTPATIENT
Start: 2023-05-12

## 2023-05-12 RX ORDER — ALBUTEROL SULFATE 90 UG/1
4 AEROSOL, METERED RESPIRATORY (INHALATION) PRN
Status: CANCELLED | OUTPATIENT
Start: 2023-05-12

## 2023-05-12 RX ORDER — SODIUM CHLORIDE 9 MG/ML
INJECTION, SOLUTION INTRAVENOUS CONTINUOUS
Status: DISCONTINUED | OUTPATIENT
Start: 2023-05-12 | End: 2023-05-13 | Stop reason: HOSPADM

## 2023-05-12 RX ORDER — SODIUM CHLORIDE 9 MG/ML
5-250 INJECTION, SOLUTION INTRAVENOUS PRN
OUTPATIENT
Start: 2023-05-14

## 2023-05-12 RX ORDER — FLUOROURACIL 50 MG/ML
400 INJECTION, SOLUTION INTRAVENOUS ONCE
Status: COMPLETED | OUTPATIENT
Start: 2023-05-12 | End: 2023-05-12

## 2023-05-12 RX ORDER — ALBUTEROL SULFATE 90 UG/1
4 AEROSOL, METERED RESPIRATORY (INHALATION) PRN
Status: DISCONTINUED | OUTPATIENT
Start: 2023-05-12 | End: 2023-05-13 | Stop reason: HOSPADM

## 2023-05-12 RX ORDER — MEPERIDINE HYDROCHLORIDE 50 MG/ML
12.5 INJECTION INTRAMUSCULAR; INTRAVENOUS; SUBCUTANEOUS PRN
Status: CANCELLED | OUTPATIENT
Start: 2023-05-12

## 2023-05-12 RX ORDER — ATROPINE SULFATE 0.4 MG/ML
0.4 INJECTION, SOLUTION INTRAVENOUS ONCE
Status: COMPLETED | OUTPATIENT
Start: 2023-05-12 | End: 2023-05-12

## 2023-05-12 RX ORDER — ATROPINE SULFATE 0.4 MG/ML
0.4 AMPUL (ML) INJECTION
Status: CANCELLED | OUTPATIENT
Start: 2023-05-12

## 2023-05-12 RX ORDER — ONDANSETRON 2 MG/ML
8 INJECTION INTRAMUSCULAR; INTRAVENOUS
Status: CANCELLED | OUTPATIENT
Start: 2023-05-12

## 2023-05-12 RX ORDER — DIPHENHYDRAMINE HYDROCHLORIDE 50 MG/ML
50 INJECTION INTRAMUSCULAR; INTRAVENOUS
Status: CANCELLED | OUTPATIENT
Start: 2023-05-12

## 2023-05-12 RX ORDER — ONDANSETRON 2 MG/ML
8 INJECTION INTRAMUSCULAR; INTRAVENOUS ONCE
Status: CANCELLED | OUTPATIENT
Start: 2023-05-12 | End: 2023-05-12

## 2023-05-12 RX ORDER — ONDANSETRON 2 MG/ML
8 INJECTION INTRAMUSCULAR; INTRAVENOUS ONCE
Status: COMPLETED | OUTPATIENT
Start: 2023-05-12 | End: 2023-05-12

## 2023-05-12 RX ORDER — FLUOROURACIL 50 MG/ML
400 INJECTION, SOLUTION INTRAVENOUS ONCE
Status: CANCELLED | OUTPATIENT
Start: 2023-05-12 | End: 2023-05-12

## 2023-05-12 RX ORDER — DIPHENHYDRAMINE HYDROCHLORIDE 50 MG/ML
50 INJECTION INTRAMUSCULAR; INTRAVENOUS
Status: DISCONTINUED | OUTPATIENT
Start: 2023-05-12 | End: 2023-05-13 | Stop reason: HOSPADM

## 2023-05-12 RX ORDER — EPINEPHRINE 1 MG/ML
0.3 INJECTION, SOLUTION, CONCENTRATE INTRAVENOUS PRN
Status: CANCELLED | OUTPATIENT
Start: 2023-05-12

## 2023-05-12 RX ORDER — ONDANSETRON 2 MG/ML
8 INJECTION INTRAMUSCULAR; INTRAVENOUS
Status: DISCONTINUED | OUTPATIENT
Start: 2023-05-12 | End: 2023-05-13 | Stop reason: HOSPADM

## 2023-05-12 RX ORDER — HYOSCYAMINE SULFATE 0.125 MG
125 TABLET ORAL EVERY 8 HOURS PRN
Qty: 180 TABLET | Refills: 3 | Status: SHIPPED | OUTPATIENT
Start: 2023-05-12

## 2023-05-12 RX ORDER — FAMOTIDINE 10 MG/ML
20 INJECTION, SOLUTION INTRAVENOUS
Status: CANCELLED | OUTPATIENT
Start: 2023-05-12

## 2023-05-12 RX ORDER — DEXTROSE MONOHYDRATE 50 MG/ML
5-250 INJECTION, SOLUTION INTRAVENOUS PRN
Status: DISCONTINUED | OUTPATIENT
Start: 2023-05-12 | End: 2023-05-13 | Stop reason: HOSPADM

## 2023-05-12 RX ORDER — ACETAMINOPHEN 325 MG/1
650 TABLET ORAL
Status: DISCONTINUED | OUTPATIENT
Start: 2023-05-12 | End: 2023-05-13 | Stop reason: HOSPADM

## 2023-05-12 RX ORDER — DEXTROSE MONOHYDRATE 50 MG/ML
5-250 INJECTION, SOLUTION INTRAVENOUS PRN
Status: CANCELLED | OUTPATIENT
Start: 2023-05-12

## 2023-05-12 RX ORDER — ATROPINE SULFATE 0.4 MG/ML
0.4 AMPUL (ML) INJECTION ONCE
Status: CANCELLED | OUTPATIENT
Start: 2023-05-12 | End: 2023-05-12

## 2023-05-12 RX ORDER — SODIUM CHLORIDE 0.9 % (FLUSH) 0.9 %
5-40 SYRINGE (ML) INJECTION PRN
OUTPATIENT
Start: 2023-05-14

## 2023-05-12 RX ORDER — GABAPENTIN 100 MG/1
100 CAPSULE ORAL 2 TIMES DAILY PRN
Qty: 180 CAPSULE | Refills: 1 | Status: SHIPPED | OUTPATIENT
Start: 2023-05-12 | End: 2023-11-08

## 2023-05-12 RX ADMIN — SODIUM CHLORIDE, PRESERVATIVE FREE 10 ML: 5 INJECTION INTRAVENOUS at 09:17

## 2023-05-12 RX ADMIN — ONDANSETRON 8 MG: 2 INJECTION INTRAMUSCULAR; INTRAVENOUS at 09:26

## 2023-05-12 RX ADMIN — DEXAMETHASONE SODIUM PHOSPHATE 12 MG: 4 INJECTION, SOLUTION INTRAMUSCULAR; INTRAVENOUS at 09:36

## 2023-05-12 RX ADMIN — DEXTROSE MONOHYDRATE 25 ML/HR: 50 INJECTION, SOLUTION INTRAVENOUS at 09:17

## 2023-05-12 RX ADMIN — FLUOROURACIL 900 MG: 50 INJECTION, SOLUTION INTRAVENOUS at 12:18

## 2023-05-12 RX ADMIN — FOSAPREPITANT DIMEGLUMINE 150 MG: 150 INJECTION, POWDER, LYOPHILIZED, FOR SOLUTION INTRAVENOUS at 09:57

## 2023-05-12 RX ADMIN — FLUOROURACIL 5375 MG: 50 INJECTION, SOLUTION INTRAVENOUS at 12:25

## 2023-05-12 RX ADMIN — SODIUM CHLORIDE, PRESERVATIVE FREE 20 ML: 5 INJECTION INTRAVENOUS at 07:31

## 2023-05-12 RX ADMIN — LEUCOVORIN CALCIUM 900 MG: 350 INJECTION, POWDER, LYOPHILIZED, FOR SOLUTION INTRAMUSCULAR; INTRAVENOUS at 10:35

## 2023-05-12 RX ADMIN — ATROPINE SULFATE 0.4 MG: 0.4 INJECTION, SOLUTION INTRAVENOUS at 09:35

## 2023-05-12 RX ADMIN — IRINOTECAN HYDROCHLORIDE 340 MG: 20 INJECTION, SOLUTION INTRAVENOUS at 10:35

## 2023-05-12 NOTE — PROGRESS NOTES
Arrived to the [facility]. Folfiri completed. Patient tolerated without difficulty. Any issues or concerns during appointment: None. Patient aware of next infusion appointment on 05/14/2023 (date) at [phone] (time). Patient instructed to call provider with temperature of 100.4 or greater or nausea/vomiting/ diarrhea or pain not controlled by medications  Discharged ambulatory to home with clamps x 2 unclamped and PhaSeal intact with blue protective casing in place.

## 2023-05-14 ENCOUNTER — HOSPITAL ENCOUNTER (OUTPATIENT)
Dept: INFUSION THERAPY | Age: 63
Discharge: HOME | End: 2023-05-14
Payer: COMMERCIAL

## 2023-05-14 VITALS
OXYGEN SATURATION: 93 % | DIASTOLIC BLOOD PRESSURE: 84 MMHG | SYSTOLIC BLOOD PRESSURE: 123 MMHG | TEMPERATURE: 98.8 F | HEART RATE: 85 BPM | RESPIRATION RATE: 17 BRPM

## 2023-05-14 DIAGNOSIS — C78.6 MALIGNANT NEOPLASM METASTATIC TO PERITONEUM (HCC): ICD-10-CM

## 2023-05-14 DIAGNOSIS — C76.2 ABDOMINAL CARCINOMATOSIS (HCC): Primary | ICD-10-CM

## 2023-05-14 PROCEDURE — 2580000003 HC RX 258: Performed by: INTERNAL MEDICINE

## 2023-05-14 PROCEDURE — 96523 IRRIG DRUG DELIVERY DEVICE: CPT

## 2023-05-14 RX ORDER — SODIUM CHLORIDE 0.9 % (FLUSH) 0.9 %
5-40 SYRINGE (ML) INJECTION PRN
Status: DISCONTINUED | OUTPATIENT
Start: 2023-05-14 | End: 2023-05-15 | Stop reason: HOSPADM

## 2023-05-14 RX ADMIN — SODIUM CHLORIDE, PRESERVATIVE FREE 10 ML: 5 INJECTION INTRAVENOUS at 11:54

## 2023-05-16 PROBLEM — T85.848A PAIN AROUND PEG TUBE SITE: Status: RESOLVED | Noted: 2023-03-31 | Resolved: 2023-05-16

## 2023-05-16 PROBLEM — G62.9 NEUROPATHY: Status: ACTIVE | Noted: 2023-05-16

## 2023-05-16 PROBLEM — R14.3 FLATULENCE: Status: ACTIVE | Noted: 2023-05-16

## 2023-05-26 ENCOUNTER — OFFICE VISIT (OUTPATIENT)
Dept: ONCOLOGY | Age: 63
End: 2023-05-26
Payer: COMMERCIAL

## 2023-05-26 ENCOUNTER — HOSPITAL ENCOUNTER (OUTPATIENT)
Dept: INFUSION THERAPY | Age: 63
Discharge: HOME | End: 2023-05-26
Payer: COMMERCIAL

## 2023-05-26 VITALS
HEIGHT: 70 IN | BODY MASS INDEX: 33.33 KG/M2 | HEART RATE: 83 BPM | SYSTOLIC BLOOD PRESSURE: 131 MMHG | TEMPERATURE: 98.4 F | WEIGHT: 232.8 LBS | OXYGEN SATURATION: 94 % | DIASTOLIC BLOOD PRESSURE: 73 MMHG | RESPIRATION RATE: 14 BRPM

## 2023-05-26 DIAGNOSIS — C78.6 MALIGNANT NEOPLASM METASTATIC TO PERITONEUM (HCC): ICD-10-CM

## 2023-05-26 DIAGNOSIS — C76.2 ABDOMINAL CARCINOMATOSIS (HCC): ICD-10-CM

## 2023-05-26 DIAGNOSIS — G62.0 NEUROPATHY DUE TO CHEMOTHERAPEUTIC DRUG (HCC): ICD-10-CM

## 2023-05-26 DIAGNOSIS — T45.1X5A NEUROPATHY DUE TO CHEMOTHERAPEUTIC DRUG (HCC): ICD-10-CM

## 2023-05-26 DIAGNOSIS — C76.2 ABDOMINAL CARCINOMATOSIS (HCC): Primary | ICD-10-CM

## 2023-05-26 DIAGNOSIS — C78.6 MALIGNANT NEOPLASM METASTATIC TO PERITONEUM (HCC): Primary | ICD-10-CM

## 2023-05-26 LAB
ALBUMIN SERPL-MCNC: 3 G/DL (ref 3.2–4.6)
ALBUMIN/GLOB SERPL: 0.9 (ref 0.4–1.6)
ALP SERPL-CCNC: 114 U/L (ref 50–136)
ALT SERPL-CCNC: 33 U/L (ref 12–65)
ANION GAP SERPL CALC-SCNC: 5 MMOL/L (ref 2–11)
AST SERPL-CCNC: 17 U/L (ref 15–37)
BASOPHILS # BLD: 0 K/UL (ref 0–0.2)
BASOPHILS NFR BLD: 1 % (ref 0–2)
BILIRUB SERPL-MCNC: 0.4 MG/DL (ref 0.2–1.1)
BUN SERPL-MCNC: 11 MG/DL (ref 8–23)
CALCIUM SERPL-MCNC: 8.7 MG/DL (ref 8.3–10.4)
CHLORIDE SERPL-SCNC: 114 MMOL/L (ref 101–110)
CO2 SERPL-SCNC: 29 MMOL/L (ref 21–32)
CREAT SERPL-MCNC: 1 MG/DL (ref 0.8–1.5)
DIFFERENTIAL METHOD BLD: ABNORMAL
EOSINOPHIL # BLD: 0.1 K/UL (ref 0–0.8)
EOSINOPHIL NFR BLD: 2 % (ref 0.5–7.8)
ERYTHROCYTE [DISTWIDTH] IN BLOOD BY AUTOMATED COUNT: 15.1 % (ref 11.9–14.6)
GLOBULIN SER CALC-MCNC: 3.3 G/DL (ref 2.8–4.5)
GLUCOSE SERPL-MCNC: 163 MG/DL (ref 65–100)
HCT VFR BLD AUTO: 32.2 % (ref 41.1–50.3)
HGB BLD-MCNC: 10 G/DL (ref 13.6–17.2)
IMM GRANULOCYTES # BLD AUTO: 0 K/UL (ref 0–0.5)
IMM GRANULOCYTES NFR BLD AUTO: 0 % (ref 0–5)
LYMPHOCYTES # BLD: 1.8 K/UL (ref 0.5–4.6)
LYMPHOCYTES NFR BLD: 33 % (ref 13–44)
MCH RBC QN AUTO: 28.9 PG (ref 26.1–32.9)
MCHC RBC AUTO-ENTMCNC: 31.1 G/DL (ref 31.4–35)
MCV RBC AUTO: 93.1 FL (ref 82–102)
MONOCYTES # BLD: 0.4 K/UL (ref 0.1–1.3)
MONOCYTES NFR BLD: 7 % (ref 4–12)
NEUTS SEG # BLD: 3.2 K/UL (ref 1.7–8.2)
NEUTS SEG NFR BLD: 57 % (ref 43–78)
NRBC # BLD: 0 K/UL (ref 0–0.2)
PLATELET # BLD AUTO: 154 K/UL (ref 150–450)
PMV BLD AUTO: 9.9 FL (ref 9.4–12.3)
POTASSIUM SERPL-SCNC: 3.5 MMOL/L (ref 3.5–5.1)
PROT SERPL-MCNC: 6.3 G/DL (ref 6.3–8.2)
RBC # BLD AUTO: 3.46 M/UL (ref 4.23–5.6)
SODIUM SERPL-SCNC: 148 MMOL/L (ref 133–143)
WBC # BLD AUTO: 5.6 K/UL (ref 4.3–11.1)

## 2023-05-26 PROCEDURE — 96375 TX/PRO/DX INJ NEW DRUG ADDON: CPT

## 2023-05-26 PROCEDURE — 96368 THER/DIAG CONCURRENT INF: CPT

## 2023-05-26 PROCEDURE — 2580000003 HC RX 258: Performed by: NURSE PRACTITIONER

## 2023-05-26 PROCEDURE — 2580000003 HC RX 258: Performed by: INTERNAL MEDICINE

## 2023-05-26 PROCEDURE — 85025 COMPLETE CBC W/AUTO DIFF WBC: CPT

## 2023-05-26 PROCEDURE — 96411 CHEMO IV PUSH ADDL DRUG: CPT

## 2023-05-26 PROCEDURE — 96372 THER/PROPH/DIAG INJ SC/IM: CPT

## 2023-05-26 PROCEDURE — 96415 CHEMO IV INFUSION ADDL HR: CPT

## 2023-05-26 PROCEDURE — 99214 OFFICE O/P EST MOD 30 MIN: CPT | Performed by: NURSE PRACTITIONER

## 2023-05-26 PROCEDURE — G0498 CHEMO EXTEND IV INFUS W/PUMP: HCPCS

## 2023-05-26 PROCEDURE — 96367 TX/PROPH/DG ADDL SEQ IV INF: CPT

## 2023-05-26 PROCEDURE — 36591 DRAW BLOOD OFF VENOUS DEVICE: CPT

## 2023-05-26 PROCEDURE — 6360000002 HC RX W HCPCS: Performed by: NURSE PRACTITIONER

## 2023-05-26 PROCEDURE — 3078F DIAST BP <80 MM HG: CPT | Performed by: NURSE PRACTITIONER

## 2023-05-26 PROCEDURE — 96413 CHEMO IV INFUSION 1 HR: CPT

## 2023-05-26 PROCEDURE — 80053 COMPREHEN METABOLIC PANEL: CPT

## 2023-05-26 PROCEDURE — 3075F SYST BP GE 130 - 139MM HG: CPT | Performed by: NURSE PRACTITIONER

## 2023-05-26 RX ORDER — SODIUM CHLORIDE 0.9 % (FLUSH) 0.9 %
5-40 SYRINGE (ML) INJECTION PRN
Status: CANCELLED | OUTPATIENT
Start: 2023-05-28

## 2023-05-26 RX ORDER — GABAPENTIN 300 MG/1
300 CAPSULE ORAL 2 TIMES DAILY
Qty: 60 CAPSULE | Refills: 0 | Status: SHIPPED | OUTPATIENT
Start: 2023-05-26 | End: 2023-06-25

## 2023-05-26 RX ORDER — MEPERIDINE HYDROCHLORIDE 25 MG/ML
12.5 INJECTION INTRAMUSCULAR; INTRAVENOUS; SUBCUTANEOUS PRN
Status: DISCONTINUED | OUTPATIENT
Start: 2023-05-26 | End: 2023-05-27 | Stop reason: HOSPADM

## 2023-05-26 RX ORDER — SODIUM CHLORIDE 9 MG/ML
5-250 INJECTION, SOLUTION INTRAVENOUS PRN
Status: CANCELLED | OUTPATIENT
Start: 2023-05-26

## 2023-05-26 RX ORDER — FLUOROURACIL 50 MG/ML
400 INJECTION, SOLUTION INTRAVENOUS ONCE
Status: COMPLETED | OUTPATIENT
Start: 2023-05-26 | End: 2023-05-26

## 2023-05-26 RX ORDER — EPINEPHRINE 1 MG/ML
0.3 INJECTION, SOLUTION, CONCENTRATE INTRAVENOUS PRN
Status: DISCONTINUED | OUTPATIENT
Start: 2023-05-26 | End: 2023-05-27 | Stop reason: HOSPADM

## 2023-05-26 RX ORDER — ATROPINE SULFATE 0.4 MG/ML
0.4 INJECTION, SOLUTION INTRAVENOUS ONCE
Status: COMPLETED | OUTPATIENT
Start: 2023-05-26 | End: 2023-05-26

## 2023-05-26 RX ORDER — SODIUM CHLORIDE 9 MG/ML
5-250 INJECTION, SOLUTION INTRAVENOUS PRN
Status: DISCONTINUED | OUTPATIENT
Start: 2023-05-26 | End: 2023-05-27 | Stop reason: HOSPADM

## 2023-05-26 RX ORDER — SODIUM CHLORIDE 0.9 % (FLUSH) 0.9 %
5-40 SYRINGE (ML) INJECTION PRN
Status: DISCONTINUED | OUTPATIENT
Start: 2023-05-26 | End: 2023-05-27 | Stop reason: HOSPADM

## 2023-05-26 RX ORDER — ALBUTEROL SULFATE 90 UG/1
4 AEROSOL, METERED RESPIRATORY (INHALATION) PRN
Status: CANCELLED | OUTPATIENT
Start: 2023-05-26

## 2023-05-26 RX ORDER — ATROPINE SULFATE 0.4 MG/ML
0.4 AMPUL (ML) INJECTION
Status: CANCELLED | OUTPATIENT
Start: 2023-05-26

## 2023-05-26 RX ORDER — FAMOTIDINE 10 MG/ML
20 INJECTION, SOLUTION INTRAVENOUS
Status: CANCELLED | OUTPATIENT
Start: 2023-05-26

## 2023-05-26 RX ORDER — SODIUM CHLORIDE 0.9 % (FLUSH) 0.9 %
5-40 SYRINGE (ML) INJECTION PRN
Status: CANCELLED | OUTPATIENT
Start: 2023-05-26

## 2023-05-26 RX ORDER — MEPERIDINE HYDROCHLORIDE 50 MG/ML
12.5 INJECTION INTRAMUSCULAR; INTRAVENOUS; SUBCUTANEOUS PRN
Status: CANCELLED | OUTPATIENT
Start: 2023-05-26

## 2023-05-26 RX ORDER — DIPHENHYDRAMINE HYDROCHLORIDE 50 MG/ML
50 INJECTION INTRAMUSCULAR; INTRAVENOUS
Status: DISCONTINUED | OUTPATIENT
Start: 2023-05-26 | End: 2023-05-27 | Stop reason: HOSPADM

## 2023-05-26 RX ORDER — DEXTROSE MONOHYDRATE 50 MG/ML
5-250 INJECTION, SOLUTION INTRAVENOUS PRN
Status: CANCELLED | OUTPATIENT
Start: 2023-05-26

## 2023-05-26 RX ORDER — ACETAMINOPHEN 325 MG/1
650 TABLET ORAL
Status: DISCONTINUED | OUTPATIENT
Start: 2023-05-26 | End: 2023-05-27 | Stop reason: HOSPADM

## 2023-05-26 RX ORDER — SODIUM CHLORIDE 9 MG/ML
INJECTION, SOLUTION INTRAVENOUS CONTINUOUS
Status: DISCONTINUED | OUTPATIENT
Start: 2023-05-26 | End: 2023-05-27 | Stop reason: HOSPADM

## 2023-05-26 RX ORDER — SODIUM CHLORIDE 9 MG/ML
5-250 INJECTION, SOLUTION INTRAVENOUS PRN
Status: CANCELLED | OUTPATIENT
Start: 2023-05-28

## 2023-05-26 RX ORDER — EPINEPHRINE 1 MG/ML
0.3 INJECTION, SOLUTION, CONCENTRATE INTRAVENOUS PRN
Status: CANCELLED | OUTPATIENT
Start: 2023-05-26

## 2023-05-26 RX ORDER — HEPARIN SODIUM (PORCINE) LOCK FLUSH IV SOLN 100 UNIT/ML 100 UNIT/ML
500 SOLUTION INTRAVENOUS PRN
Status: DISCONTINUED | OUTPATIENT
Start: 2023-05-26 | End: 2023-05-27 | Stop reason: HOSPADM

## 2023-05-26 RX ORDER — ONDANSETRON 2 MG/ML
8 INJECTION INTRAMUSCULAR; INTRAVENOUS
Status: DISCONTINUED | OUTPATIENT
Start: 2023-05-26 | End: 2023-05-27 | Stop reason: HOSPADM

## 2023-05-26 RX ORDER — HEPARIN SODIUM (PORCINE) LOCK FLUSH IV SOLN 100 UNIT/ML 100 UNIT/ML
500 SOLUTION INTRAVENOUS PRN
Status: CANCELLED | OUTPATIENT
Start: 2023-05-26

## 2023-05-26 RX ORDER — ACETAMINOPHEN 325 MG/1
650 TABLET ORAL
Status: CANCELLED | OUTPATIENT
Start: 2023-05-26

## 2023-05-26 RX ORDER — DEXTROSE MONOHYDRATE 50 MG/ML
5-250 INJECTION, SOLUTION INTRAVENOUS PRN
Status: DISCONTINUED | OUTPATIENT
Start: 2023-05-26 | End: 2023-05-27 | Stop reason: HOSPADM

## 2023-05-26 RX ORDER — SODIUM CHLORIDE 0.9 % (FLUSH) 0.9 %
5-40 SYRINGE (ML) INJECTION ONCE
Status: COMPLETED | OUTPATIENT
Start: 2023-05-26 | End: 2023-05-26

## 2023-05-26 RX ORDER — ALBUTEROL SULFATE 90 UG/1
4 AEROSOL, METERED RESPIRATORY (INHALATION) PRN
Status: DISCONTINUED | OUTPATIENT
Start: 2023-05-26 | End: 2023-05-27 | Stop reason: HOSPADM

## 2023-05-26 RX ORDER — ONDANSETRON 2 MG/ML
8 INJECTION INTRAMUSCULAR; INTRAVENOUS ONCE
Status: CANCELLED | OUTPATIENT
Start: 2023-05-26 | End: 2023-05-26

## 2023-05-26 RX ORDER — SODIUM CHLORIDE 9 MG/ML
INJECTION, SOLUTION INTRAVENOUS CONTINUOUS
Status: CANCELLED | OUTPATIENT
Start: 2023-05-26

## 2023-05-26 RX ORDER — ONDANSETRON 2 MG/ML
8 INJECTION INTRAMUSCULAR; INTRAVENOUS ONCE
Status: COMPLETED | OUTPATIENT
Start: 2023-05-26 | End: 2023-05-26

## 2023-05-26 RX ORDER — ONDANSETRON 2 MG/ML
8 INJECTION INTRAMUSCULAR; INTRAVENOUS
Status: CANCELLED | OUTPATIENT
Start: 2023-05-26

## 2023-05-26 RX ORDER — FLUOROURACIL 50 MG/ML
400 INJECTION, SOLUTION INTRAVENOUS ONCE
Status: CANCELLED | OUTPATIENT
Start: 2023-05-26 | End: 2023-05-26

## 2023-05-26 RX ORDER — DIPHENHYDRAMINE HYDROCHLORIDE 50 MG/ML
50 INJECTION INTRAMUSCULAR; INTRAVENOUS
Status: CANCELLED | OUTPATIENT
Start: 2023-05-26

## 2023-05-26 RX ORDER — ATROPINE SULFATE 0.4 MG/ML
0.4 AMPUL (ML) INJECTION ONCE
Status: CANCELLED | OUTPATIENT
Start: 2023-05-26 | End: 2023-05-26

## 2023-05-26 RX ORDER — HEPARIN SODIUM (PORCINE) LOCK FLUSH IV SOLN 100 UNIT/ML 100 UNIT/ML
500 SOLUTION INTRAVENOUS PRN
Status: CANCELLED | OUTPATIENT
Start: 2023-05-28

## 2023-05-26 RX ADMIN — SODIUM CHLORIDE, PRESERVATIVE FREE 10 ML: 5 INJECTION INTRAVENOUS at 09:15

## 2023-05-26 RX ADMIN — LEUCOVORIN CALCIUM 900 MG: 500 INJECTION, POWDER, LYOPHILIZED, FOR SOLUTION INTRAMUSCULAR; INTRAVENOUS at 10:53

## 2023-05-26 RX ADMIN — FOSAPREPITANT 150 MG: 150 INJECTION, POWDER, LYOPHILIZED, FOR SOLUTION INTRAVENOUS at 10:15

## 2023-05-26 RX ADMIN — FLUOROURACIL 5375 MG: 50 INJECTION, SOLUTION INTRAVENOUS at 12:35

## 2023-05-26 RX ADMIN — ATROPINE SULFATE 0.4 MG: 0.4 INJECTION, SOLUTION INTRAVENOUS at 10:20

## 2023-05-26 RX ADMIN — SODIUM CHLORIDE, PRESERVATIVE FREE 10 ML: 5 INJECTION INTRAVENOUS at 08:29

## 2023-05-26 RX ADMIN — DEXAMETHASONE SODIUM PHOSPHATE 12 MG: 4 INJECTION, SOLUTION INTRAMUSCULAR; INTRAVENOUS at 09:54

## 2023-05-26 RX ADMIN — IRINOTECAN HYDROCHLORIDE 340 MG: 20 INJECTION, SOLUTION INTRAVENOUS at 10:53

## 2023-05-26 RX ADMIN — ONDANSETRON 8 MG: 2 INJECTION INTRAMUSCULAR; INTRAVENOUS at 09:52

## 2023-05-26 RX ADMIN — FLUOROURACIL 900 MG: 50 INJECTION, SOLUTION INTRAVENOUS at 12:30

## 2023-05-26 RX ADMIN — DEXTROSE MONOHYDRATE 100 ML/HR: 50 INJECTION, SOLUTION INTRAVENOUS at 09:15

## 2023-05-26 ASSESSMENT — PATIENT HEALTH QUESTIONNAIRE - PHQ9
SUM OF ALL RESPONSES TO PHQ QUESTIONS 1-9: 0
SUM OF ALL RESPONSES TO PHQ9 QUESTIONS 1 & 2: 0
SUM OF ALL RESPONSES TO PHQ QUESTIONS 1-9: 0
SUM OF ALL RESPONSES TO PHQ QUESTIONS 1-9: 0
1. LITTLE INTEREST OR PLEASURE IN DOING THINGS: 0
2. FEELING DOWN, DEPRESSED OR HOPELESS: 0
SUM OF ALL RESPONSES TO PHQ QUESTIONS 1-9: 0

## 2023-05-26 ASSESSMENT — ENCOUNTER SYMPTOMS
CONSTIPATION: 1
BACK PAIN: 0
SHORTNESS OF BREATH: 0
VOMITING: 0
NAUSEA: 0
BLOOD IN STOOL: 1
SCLERAL ICTERUS: 0
ABDOMINAL DISTENTION: 0
SORE THROAT: 0
EYE PROBLEMS: 0
COUGH: 0
HEMOPTYSIS: 0

## 2023-05-26 NOTE — PROGRESS NOTES
Arrived to the [facility]. FOLFIRI completed. Patient tolerated without difficulty. Any issues or concerns during appointment: without difficulty. Patient aware of next infusion appointment on 05/28 (date) at [phone] (time). Patient instructed to call provider with temperature of 100.4 or greater or nausea/vomiting/ diarrhea or pain not controlled by medications  Discharged ambulatory.

## 2023-05-26 NOTE — PROGRESS NOTES
[facility] Hematology and Oncology: Established patient - follow up     Chief Complaint   Patient presents with    Follow-up      Reason for Referral: Abdominal pain; peritoneal metastatic disease   Referring Provider: Jordin Moses NP   Primary Care Provider: Carmel Correa MD   Family History of Cancer/Hematologic Disorders: Family history is significant for sister with breast cancer. Presenting Symptoms: Progressively worsening, persistent abdominal pain x several months    History of Present Illness:  Mr. Sara Davidson  is a 62 y.o. male who presents today for FU regarding adenocarcinoma with peritoneal metastatic disease. The past medical history is significant for tobacco use (recent pack per day smoker x 30+ years - now down to 1/3PPD), PUD, paresthesia/pain of bilateral upper extremities, HLD, HTN, diverticulitis,  colon polyps, hiatal hernia, chronic right shoulder pain, bilateral carpal tunnel syndrome, and partial colon resection. He presented to the [facility] ED on 5/12/22 with a complaint of persistent abdominal pain for several months that was becoming progressively  worse. EGD and colonoscopy on 2/25/22 revealed findings of hiatal hernia, gastric polyps, several benign colon polyps, diverticulosis, and internal hemorrhoids. CT of the abdomen on 3/8/22 showed no acute findings. He presented to [facility] ER x 2 for  evaluation (5/3/22 and 5/10/22). RUQ abdominal ultrasound was completed on 5/3/22 in the ED at [facility] demonstrating no acute findings to explain patient's pain; probable hepatic  steatosis; small echogenic mural foci in the gallbladder which are nonmobile, likely representing cholesterol polyps, largest measuring 4 mm; and small volume simple ascites in the right upper quadrant and left lower quadrant, nonspecific.   CT AP with  contrast at [facility] ED 5/10/22 showed a partial small bowel obstruction; small to moderate amount of free fluid in the abdomen and pelvis; and nonspecific stranding

## 2023-05-26 NOTE — PROGRESS NOTES
Patient arrived to port lab for port access and lab draw. Port accessed and labs drawn per protocol. Port flushed & remains accessed. Patient discharged from port lab via ambulatory.

## 2023-05-28 ENCOUNTER — HOSPITAL ENCOUNTER (OUTPATIENT)
Dept: INFUSION THERAPY | Age: 63
Discharge: HOME | End: 2023-05-28
Payer: COMMERCIAL

## 2023-05-28 VITALS
SYSTOLIC BLOOD PRESSURE: 120 MMHG | DIASTOLIC BLOOD PRESSURE: 78 MMHG | TEMPERATURE: 97.6 F | OXYGEN SATURATION: 96 % | RESPIRATION RATE: 16 BRPM | HEART RATE: 84 BPM

## 2023-05-28 DIAGNOSIS — C76.2 ABDOMINAL CARCINOMATOSIS (HCC): Primary | ICD-10-CM

## 2023-05-28 DIAGNOSIS — C78.6 MALIGNANT NEOPLASM METASTATIC TO PERITONEUM (HCC): ICD-10-CM

## 2023-05-28 PROCEDURE — 2580000003 HC RX 258: Performed by: NURSE PRACTITIONER

## 2023-05-28 PROCEDURE — 96523 IRRIG DRUG DELIVERY DEVICE: CPT

## 2023-05-28 RX ORDER — SODIUM CHLORIDE 0.9 % (FLUSH) 0.9 %
5-40 SYRINGE (ML) INJECTION PRN
Status: DISCONTINUED | OUTPATIENT
Start: 2023-05-28 | End: 2023-05-29 | Stop reason: HOSPADM

## 2023-05-28 RX ADMIN — SODIUM CHLORIDE, PRESERVATIVE FREE 10 ML: 5 INJECTION INTRAVENOUS at 11:20

## 2023-06-01 ENCOUNTER — TELEPHONE (OUTPATIENT)
Dept: INTERNAL MEDICINE CLINIC | Facility: CLINIC | Age: 63
End: 2023-06-01

## 2023-06-01 DIAGNOSIS — Z86.718 HX OF DEEP VENOUS THROMBOSIS: Primary | ICD-10-CM

## 2023-06-01 RX ORDER — RIVAROXABAN 10 MG/1
10 TABLET, FILM COATED ORAL
Qty: 30 TABLET | Refills: 3 | Status: SHIPPED | OUTPATIENT
Start: 2023-06-01

## 2023-06-01 NOTE — TELEPHONE ENCOUNTER
Pt notified of Dr Latonya Cee instructions and states he has been  doing all these things. He will discuss with Dr Danae Dawson in the morning.

## 2023-06-01 NOTE — TELEPHONE ENCOUNTER
Pt was called to verify his appt for tomorrow and while on the phone pt said he has been in a great deal of pain with his hemorrhoids. Pt said he also has begun to have blood in his stool because of them. Pt would like to know is there anything he can do until tomorrow to get some relief.

## 2023-06-01 NOTE — PROGRESS NOTES
Pt called and complained of bleeding hemorrhoids. He questioned if he could stop blood thinner Xarelto. Spoke with Charla Herring and she stated may half his current dose to 10mg daily.  Pt will see PCP tomorrow concerning hemorrhoids

## 2023-06-02 ENCOUNTER — OFFICE VISIT (OUTPATIENT)
Dept: INTERNAL MEDICINE CLINIC | Facility: CLINIC | Age: 63
End: 2023-06-02
Payer: COMMERCIAL

## 2023-06-02 ENCOUNTER — HOSPITAL ENCOUNTER (OUTPATIENT)
Dept: GENERAL RADIOLOGY | Age: 63
Discharge: HOME | End: 2023-06-05

## 2023-06-02 VITALS
OXYGEN SATURATION: 96 % | WEIGHT: 227 LBS | HEIGHT: 70 IN | BODY MASS INDEX: 32.5 KG/M2 | DIASTOLIC BLOOD PRESSURE: 64 MMHG | SYSTOLIC BLOOD PRESSURE: 120 MMHG | HEART RATE: 78 BPM

## 2023-06-02 DIAGNOSIS — K59.00 CONSTIPATION, UNSPECIFIED CONSTIPATION TYPE: ICD-10-CM

## 2023-06-02 DIAGNOSIS — R19.7 DIARRHEA, UNSPECIFIED TYPE: ICD-10-CM

## 2023-06-02 DIAGNOSIS — R14.2 BELCHING: ICD-10-CM

## 2023-06-02 DIAGNOSIS — K64.9 HEMORRHOIDS, UNSPECIFIED HEMORRHOID TYPE: ICD-10-CM

## 2023-06-02 DIAGNOSIS — K64.8 INTERNAL HEMORRHOID: Primary | ICD-10-CM

## 2023-06-02 PROCEDURE — 3074F SYST BP LT 130 MM HG: CPT | Performed by: STUDENT IN AN ORGANIZED HEALTH CARE EDUCATION/TRAINING PROGRAM

## 2023-06-02 PROCEDURE — 99213 OFFICE O/P EST LOW 20 MIN: CPT | Performed by: STUDENT IN AN ORGANIZED HEALTH CARE EDUCATION/TRAINING PROGRAM

## 2023-06-02 PROCEDURE — 3078F DIAST BP <80 MM HG: CPT | Performed by: STUDENT IN AN ORGANIZED HEALTH CARE EDUCATION/TRAINING PROGRAM

## 2023-06-02 RX ORDER — HYDROCORTISONE ACETATE 25 MG/1
25 SUPPOSITORY RECTAL EVERY 12 HOURS
Qty: 14 SUPPOSITORY | Refills: 0 | Status: SHIPPED | OUTPATIENT
Start: 2023-06-02

## 2023-06-02 ASSESSMENT — PATIENT HEALTH QUESTIONNAIRE - PHQ9
4. FEELING TIRED OR HAVING LITTLE ENERGY: 3
SUM OF ALL RESPONSES TO PHQ9 QUESTIONS 1 & 2: 0
SUM OF ALL RESPONSES TO PHQ QUESTIONS 1-9: 6
SUM OF ALL RESPONSES TO PHQ QUESTIONS 1-9: 6
6. FEELING BAD ABOUT YOURSELF - OR THAT YOU ARE A FAILURE OR HAVE LET YOURSELF OR YOUR FAMILY DOWN: 0
SUM OF ALL RESPONSES TO PHQ QUESTIONS 1-9: 6
5. POOR APPETITE OR OVEREATING: 0
10. IF YOU CHECKED OFF ANY PROBLEMS, HOW DIFFICULT HAVE THESE PROBLEMS MADE IT FOR YOU TO DO YOUR WORK, TAKE CARE OF THINGS AT HOME, OR GET ALONG WITH OTHER PEOPLE: 0
7. TROUBLE CONCENTRATING ON THINGS, SUCH AS READING THE NEWSPAPER OR WATCHING TELEVISION: 0
1. LITTLE INTEREST OR PLEASURE IN DOING THINGS: 0
9. THOUGHTS THAT YOU WOULD BE BETTER OFF DEAD, OR OF HURTING YOURSELF: 0
8. MOVING OR SPEAKING SO SLOWLY THAT OTHER PEOPLE COULD HAVE NOTICED. OR THE OPPOSITE, BEING SO FIGETY OR RESTLESS THAT YOU HAVE BEEN MOVING AROUND A LOT MORE THAN USUAL: 0
3. TROUBLE FALLING OR STAYING ASLEEP: 3
2. FEELING DOWN, DEPRESSED OR HOPELESS: 0
SUM OF ALL RESPONSES TO PHQ QUESTIONS 1-9: 6

## 2023-06-02 ASSESSMENT — ENCOUNTER SYMPTOMS: SHORTNESS OF BREATH: 0

## 2023-06-02 NOTE — PROGRESS NOTES
FOLLOW UP VISIT    Subjective:    Jazmín Portillo (: 1960) is a 65 y.o., male,   Chief Complaint   Patient presents with    Hemorrhoids     Hemorrhoids have been Bleeding for about a week    Gas    Other     Burping started yesterday    Diarrhea     Started this morning       HPI:    Hemorrhoids: ongoing for a year. Tried external anusol cream did not help. Notices blood in water and toilet paper, no blood clots or blood in stool. Daily rectal pain, tried preparation H, soaking in bath not helping anymore. Has been taking MiraLAX, making sure not constipated. Levsin not helping. No abdominal pain, nausea, vomiting. Diarrhea: woke up this AM with loose stool not watery, previously 2 Bms per day. Belching and passing gas: deep and bad smell, when he sits up it triggers the belching. Equate gas relief 125 mg, beano not helping.     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 ([address]    Hx SBO    Partial small bowel obstruction ([address]    Encounter for palliative care    Itching    Fatigue    Abdominal carcinomatosis ([address]    Goals of care, counseling/discussion    Pain, abdominal, LUQ    Nausea    Anxiety about dying    Depression    Carcinomatosis ([address]    Cancer associated pain    PICC (peripherally inserted central catheter) flush    Metastatic adenocarcinoma (HCC)    Gastric cancer ([address]    Malfunction of peripheral inserted central catheter ([address]    Hypokalemia    Leukopenia    Anemia    High risk medication use    Neck pain    Numbness

## 2023-06-08 DIAGNOSIS — C76.2 ABDOMINAL CARCINOMATOSIS (HCC): Primary | ICD-10-CM

## 2023-06-09 ENCOUNTER — HOSPITAL ENCOUNTER (OUTPATIENT)
Dept: INFUSION THERAPY | Age: 63
Discharge: HOME | End: 2023-06-09
Payer: COMMERCIAL

## 2023-06-09 ENCOUNTER — OFFICE VISIT (OUTPATIENT)
Dept: ONCOLOGY | Age: 63
End: 2023-06-09
Payer: COMMERCIAL

## 2023-06-09 VITALS
RESPIRATION RATE: 18 BRPM | TEMPERATURE: 97.9 F | WEIGHT: 236.8 LBS | OXYGEN SATURATION: 95 % | SYSTOLIC BLOOD PRESSURE: 115 MMHG | HEIGHT: 70 IN | HEART RATE: 87 BPM | DIASTOLIC BLOOD PRESSURE: 70 MMHG | BODY MASS INDEX: 33.9 KG/M2

## 2023-06-09 DIAGNOSIS — C76.2 ABDOMINAL CARCINOMATOSIS (HCC): ICD-10-CM

## 2023-06-09 DIAGNOSIS — C76.2 ABDOMINAL CARCINOMATOSIS (HCC): Primary | ICD-10-CM

## 2023-06-09 DIAGNOSIS — C78.6 MALIGNANT NEOPLASM METASTATIC TO PERITONEUM (HCC): ICD-10-CM

## 2023-06-09 DIAGNOSIS — C78.6 MALIGNANT NEOPLASM METASTATIC TO PERITONEUM (HCC): Primary | ICD-10-CM

## 2023-06-09 LAB
ALBUMIN SERPL-MCNC: 3.1 G/DL (ref 3.2–4.6)
ALBUMIN/GLOB SERPL: 1 (ref 0.4–1.6)
ALP SERPL-CCNC: 127 U/L (ref 50–136)
ALT SERPL-CCNC: 31 U/L (ref 12–65)
ANION GAP SERPL CALC-SCNC: 5 MMOL/L (ref 2–11)
AST SERPL-CCNC: 17 U/L (ref 15–37)
BASOPHILS # BLD: 0 K/UL (ref 0–0.2)
BASOPHILS NFR BLD: 1 % (ref 0–2)
BILIRUB SERPL-MCNC: 0.3 MG/DL (ref 0.2–1.1)
BUN SERPL-MCNC: 9 MG/DL (ref 8–23)
CALCIUM SERPL-MCNC: 8.2 MG/DL (ref 8.3–10.4)
CHLORIDE SERPL-SCNC: 114 MMOL/L (ref 101–110)
CO2 SERPL-SCNC: 26 MMOL/L (ref 21–32)
CREAT SERPL-MCNC: 1 MG/DL (ref 0.8–1.5)
DIFFERENTIAL METHOD BLD: ABNORMAL
EOSINOPHIL # BLD: 0.1 K/UL (ref 0–0.8)
EOSINOPHIL NFR BLD: 2 % (ref 0.5–7.8)
ERYTHROCYTE [DISTWIDTH] IN BLOOD BY AUTOMATED COUNT: 15.5 % (ref 11.9–14.6)
GLOBULIN SER CALC-MCNC: 3.1 G/DL (ref 2.8–4.5)
GLUCOSE SERPL-MCNC: 188 MG/DL (ref 65–100)
HCT VFR BLD AUTO: 32.3 % (ref 41.1–50.3)
HGB BLD-MCNC: 10.2 G/DL (ref 13.6–17.2)
IMM GRANULOCYTES # BLD AUTO: 0 K/UL (ref 0–0.5)
IMM GRANULOCYTES NFR BLD AUTO: 0 % (ref 0–5)
LYMPHOCYTES # BLD: 1.6 K/UL (ref 0.5–4.6)
LYMPHOCYTES NFR BLD: 38 % (ref 13–44)
MAGNESIUM SERPL-MCNC: 1.7 MG/DL (ref 1.8–2.4)
MCH RBC QN AUTO: 29.3 PG (ref 26.1–32.9)
MCHC RBC AUTO-ENTMCNC: 31.6 G/DL (ref 31.4–35)
MCV RBC AUTO: 92.8 FL (ref 82–102)
MONOCYTES # BLD: 0.3 K/UL (ref 0.1–1.3)
MONOCYTES NFR BLD: 6 % (ref 4–12)
NEUTS SEG # BLD: 2.2 K/UL (ref 1.7–8.2)
NEUTS SEG NFR BLD: 53 % (ref 43–78)
NRBC # BLD: 0 K/UL (ref 0–0.2)
PLATELET # BLD AUTO: 167 K/UL (ref 150–450)
PMV BLD AUTO: 9.9 FL (ref 9.4–12.3)
POTASSIUM SERPL-SCNC: 3.4 MMOL/L (ref 3.5–5.1)
PROT SERPL-MCNC: 6.2 G/DL (ref 6.3–8.2)
RBC # BLD AUTO: 3.48 M/UL (ref 4.23–5.6)
SODIUM SERPL-SCNC: 145 MMOL/L (ref 133–143)
WBC # BLD AUTO: 4.2 K/UL (ref 4.3–11.1)

## 2023-06-09 PROCEDURE — 3074F SYST BP LT 130 MM HG: CPT | Performed by: NURSE PRACTITIONER

## 2023-06-09 PROCEDURE — 2580000003 HC RX 258: Performed by: NURSE PRACTITIONER

## 2023-06-09 PROCEDURE — 96375 TX/PRO/DX INJ NEW DRUG ADDON: CPT

## 2023-06-09 PROCEDURE — 3078F DIAST BP <80 MM HG: CPT | Performed by: NURSE PRACTITIONER

## 2023-06-09 PROCEDURE — 96372 THER/PROPH/DIAG INJ SC/IM: CPT

## 2023-06-09 PROCEDURE — G0498 CHEMO EXTEND IV INFUS W/PUMP: HCPCS

## 2023-06-09 PROCEDURE — 85025 COMPLETE CBC W/AUTO DIFF WBC: CPT

## 2023-06-09 PROCEDURE — 83735 ASSAY OF MAGNESIUM: CPT

## 2023-06-09 PROCEDURE — 6360000002 HC RX W HCPCS: Performed by: NURSE PRACTITIONER

## 2023-06-09 PROCEDURE — 96368 THER/DIAG CONCURRENT INF: CPT

## 2023-06-09 PROCEDURE — 80053 COMPREHEN METABOLIC PANEL: CPT

## 2023-06-09 PROCEDURE — 99214 OFFICE O/P EST MOD 30 MIN: CPT | Performed by: NURSE PRACTITIONER

## 2023-06-09 PROCEDURE — 2580000003 HC RX 258: Performed by: INTERNAL MEDICINE

## 2023-06-09 PROCEDURE — 96411 CHEMO IV PUSH ADDL DRUG: CPT

## 2023-06-09 PROCEDURE — 96413 CHEMO IV INFUSION 1 HR: CPT

## 2023-06-09 PROCEDURE — 96367 TX/PROPH/DG ADDL SEQ IV INF: CPT

## 2023-06-09 PROCEDURE — 36591 DRAW BLOOD OFF VENOUS DEVICE: CPT

## 2023-06-09 RX ORDER — DEXTROSE MONOHYDRATE 50 MG/ML
5-250 INJECTION, SOLUTION INTRAVENOUS PRN
Status: DISCONTINUED | OUTPATIENT
Start: 2023-06-09 | End: 2023-06-10 | Stop reason: HOSPADM

## 2023-06-09 RX ORDER — MEPERIDINE HYDROCHLORIDE 25 MG/ML
12.5 INJECTION INTRAMUSCULAR; INTRAVENOUS; SUBCUTANEOUS PRN
Status: DISCONTINUED | OUTPATIENT
Start: 2023-06-09 | End: 2023-06-10 | Stop reason: HOSPADM

## 2023-06-09 RX ORDER — SODIUM CHLORIDE 9 MG/ML
5-250 INJECTION, SOLUTION INTRAVENOUS PRN
Status: CANCELLED | OUTPATIENT
Start: 2023-06-09

## 2023-06-09 RX ORDER — FLUOROURACIL 50 MG/ML
400 INJECTION, SOLUTION INTRAVENOUS ONCE
Status: CANCELLED | OUTPATIENT
Start: 2023-06-09 | End: 2023-06-09

## 2023-06-09 RX ORDER — HEPARIN SODIUM (PORCINE) LOCK FLUSH IV SOLN 100 UNIT/ML 100 UNIT/ML
500 SOLUTION INTRAVENOUS PRN
Status: CANCELLED | OUTPATIENT
Start: 2023-06-11

## 2023-06-09 RX ORDER — SODIUM CHLORIDE 0.9 % (FLUSH) 0.9 %
5-40 SYRINGE (ML) INJECTION PRN
Status: CANCELLED | OUTPATIENT
Start: 2023-06-09

## 2023-06-09 RX ORDER — SODIUM CHLORIDE 0.9 % (FLUSH) 0.9 %
5-40 SYRINGE (ML) INJECTION PRN
Status: CANCELLED | OUTPATIENT
Start: 2023-06-11

## 2023-06-09 RX ORDER — ATROPINE SULFATE 0.4 MG/ML
0.4 INJECTION, SOLUTION INTRAVENOUS ONCE
Status: COMPLETED | OUTPATIENT
Start: 2023-06-09 | End: 2023-06-09

## 2023-06-09 RX ORDER — SODIUM CHLORIDE 0.9 % (FLUSH) 0.9 %
5-40 SYRINGE (ML) INJECTION PRN
Status: DISCONTINUED | OUTPATIENT
Start: 2023-06-09 | End: 2023-06-10 | Stop reason: HOSPADM

## 2023-06-09 RX ORDER — DIPHENHYDRAMINE HYDROCHLORIDE 50 MG/ML
50 INJECTION INTRAMUSCULAR; INTRAVENOUS
Status: DISCONTINUED | OUTPATIENT
Start: 2023-06-09 | End: 2023-06-10 | Stop reason: HOSPADM

## 2023-06-09 RX ORDER — ATROPINE SULFATE 0.4 MG/ML
0.4 INJECTION, SOLUTION INTRAMUSCULAR; INTRAVENOUS; SUBCUTANEOUS ONCE
Status: CANCELLED | OUTPATIENT
Start: 2023-06-09 | End: 2023-06-09

## 2023-06-09 RX ORDER — ATROPINE SULFATE 0.4 MG/ML
0.4 INJECTION, SOLUTION INTRAMUSCULAR; INTRAVENOUS; SUBCUTANEOUS
Status: CANCELLED | OUTPATIENT
Start: 2023-06-09

## 2023-06-09 RX ORDER — FLUOROURACIL 50 MG/ML
400 INJECTION, SOLUTION INTRAVENOUS ONCE
Status: COMPLETED | OUTPATIENT
Start: 2023-06-09 | End: 2023-06-09

## 2023-06-09 RX ORDER — FAMOTIDINE 10 MG/ML
20 INJECTION, SOLUTION INTRAVENOUS
Status: CANCELLED | OUTPATIENT
Start: 2023-06-09

## 2023-06-09 RX ORDER — SODIUM CHLORIDE 9 MG/ML
5-250 INJECTION, SOLUTION INTRAVENOUS PRN
Status: CANCELLED | OUTPATIENT
Start: 2023-06-11

## 2023-06-09 RX ORDER — ONDANSETRON 2 MG/ML
8 INJECTION INTRAMUSCULAR; INTRAVENOUS ONCE
Status: CANCELLED | OUTPATIENT
Start: 2023-06-09 | End: 2023-06-09

## 2023-06-09 RX ORDER — ACETAMINOPHEN 325 MG/1
650 TABLET ORAL
Status: CANCELLED | OUTPATIENT
Start: 2023-06-09

## 2023-06-09 RX ORDER — DIPHENHYDRAMINE HYDROCHLORIDE 50 MG/ML
50 INJECTION INTRAMUSCULAR; INTRAVENOUS
Status: CANCELLED | OUTPATIENT
Start: 2023-06-09

## 2023-06-09 RX ORDER — DEXTROSE MONOHYDRATE 50 MG/ML
5-250 INJECTION, SOLUTION INTRAVENOUS PRN
Status: CANCELLED | OUTPATIENT
Start: 2023-06-09

## 2023-06-09 RX ORDER — ONDANSETRON 2 MG/ML
8 INJECTION INTRAMUSCULAR; INTRAVENOUS
Status: CANCELLED | OUTPATIENT
Start: 2023-06-09

## 2023-06-09 RX ORDER — SODIUM CHLORIDE 9 MG/ML
INJECTION, SOLUTION INTRAVENOUS CONTINUOUS
Status: CANCELLED | OUTPATIENT
Start: 2023-06-09

## 2023-06-09 RX ORDER — HEPARIN SODIUM (PORCINE) LOCK FLUSH IV SOLN 100 UNIT/ML 100 UNIT/ML
500 SOLUTION INTRAVENOUS PRN
Status: CANCELLED | OUTPATIENT
Start: 2023-06-09

## 2023-06-09 RX ORDER — ALBUTEROL SULFATE 90 UG/1
4 AEROSOL, METERED RESPIRATORY (INHALATION) PRN
Status: CANCELLED | OUTPATIENT
Start: 2023-06-09

## 2023-06-09 RX ORDER — SODIUM CHLORIDE 0.9 % (FLUSH) 0.9 %
10 SYRINGE (ML) INJECTION PRN
Status: DISCONTINUED | OUTPATIENT
Start: 2023-06-09 | End: 2023-06-10 | Stop reason: HOSPADM

## 2023-06-09 RX ORDER — EPINEPHRINE 1 MG/ML
0.3 INJECTION, SOLUTION, CONCENTRATE INTRAVENOUS PRN
Status: CANCELLED | OUTPATIENT
Start: 2023-06-09

## 2023-06-09 RX ORDER — MEPERIDINE HYDROCHLORIDE 50 MG/ML
12.5 INJECTION INTRAMUSCULAR; INTRAVENOUS; SUBCUTANEOUS PRN
Status: CANCELLED | OUTPATIENT
Start: 2023-06-09

## 2023-06-09 RX ORDER — ONDANSETRON 2 MG/ML
8 INJECTION INTRAMUSCULAR; INTRAVENOUS ONCE
Status: COMPLETED | OUTPATIENT
Start: 2023-06-09 | End: 2023-06-09

## 2023-06-09 RX ADMIN — FOSAPREPITANT 150 MG: 150 INJECTION, POWDER, LYOPHILIZED, FOR SOLUTION INTRAVENOUS at 09:59

## 2023-06-09 RX ADMIN — SODIUM CHLORIDE, PRESERVATIVE FREE 10 ML: 5 INJECTION INTRAVENOUS at 09:14

## 2023-06-09 RX ADMIN — FLUOROURACIL 5375 MG: 50 INJECTION, SOLUTION INTRAVENOUS at 12:22

## 2023-06-09 RX ADMIN — SODIUM CHLORIDE, PRESERVATIVE FREE 10 ML: 5 INJECTION INTRAVENOUS at 08:10

## 2023-06-09 RX ADMIN — DEXAMETHASONE SODIUM PHOSPHATE 12 MG: 4 INJECTION, SOLUTION INTRAMUSCULAR; INTRAVENOUS at 09:44

## 2023-06-09 RX ADMIN — ATROPINE SULFATE 0.4 MG: 0.4 INJECTION, SOLUTION INTRAVENOUS at 09:46

## 2023-06-09 RX ADMIN — LEUCOVORIN CALCIUM 900 MG: 500 INJECTION, POWDER, LYOPHILIZED, FOR SOLUTION INTRAMUSCULAR; INTRAVENOUS at 10:33

## 2023-06-09 RX ADMIN — DEXTROSE MONOHYDRATE 25 ML/HR: 50 INJECTION, SOLUTION INTRAVENOUS at 09:14

## 2023-06-09 RX ADMIN — IRINOTECAN HYDROCHLORIDE 340 MG: 20 INJECTION, SOLUTION INTRAVENOUS at 10:33

## 2023-06-09 RX ADMIN — ONDANSETRON 8 MG: 2 INJECTION INTRAMUSCULAR; INTRAVENOUS at 09:43

## 2023-06-09 RX ADMIN — FLUOROURACIL 900 MG: 50 INJECTION, SOLUTION INTRAVENOUS at 12:18

## 2023-06-09 ASSESSMENT — ENCOUNTER SYMPTOMS
BACK PAIN: 0
SCLERAL ICTERUS: 0
CONSTIPATION: 0
ABDOMINAL DISTENTION: 0
BLOOD IN STOOL: 0
HEMOPTYSIS: 0
EYE PROBLEMS: 0
NAUSEA: 0
SHORTNESS OF BREATH: 0
COUGH: 0
SORE THROAT: 0
VOMITING: 0

## 2023-06-09 ASSESSMENT — PATIENT HEALTH QUESTIONNAIRE - PHQ9
SUM OF ALL RESPONSES TO PHQ QUESTIONS 1-9: 0
SUM OF ALL RESPONSES TO PHQ9 QUESTIONS 1 & 2: 0
SUM OF ALL RESPONSES TO PHQ QUESTIONS 1-9: 0
SUM OF ALL RESPONSES TO PHQ QUESTIONS 1-9: 0
1. LITTLE INTEREST OR PLEASURE IN DOING THINGS: 0
SUM OF ALL RESPONSES TO PHQ QUESTIONS 1-9: 0
2. FEELING DOWN, DEPRESSED OR HOPELESS: 0

## 2023-06-09 NOTE — PROGRESS NOTES
Arrived to the [facility]. FOLFIRI completed. Patient tolerated well. Any issues or concerns during appointment: none. Patient aware of next infusion appointment on 6/11 (date) at 2:30 PM (time). Patient instructed to call provider with temperature of 100.4 or greater or nausea/vomiting/ diarrhea or pain not controlled by medications  Discharged ambulatory.

## 2023-06-09 NOTE — PROGRESS NOTES
speech difficulty. Hematological:  Negative for adenopathy. Does not bruise/bleed easily. Psychiatric/Behavioral:  Positive for depression (better). Negative for decreased concentration and sleep disturbance. The patient is nervous/anxious (better). No Known Allergies  Past Medical History:   Diagnosis Date    Bilateral carpal tunnel syndrome 12/9/2020    Cancer [facility]     Chronic right shoulder pain 11/30/2020    Colon polyps 3/11/2013    Diverticulitis 3/11/2013    GERD (gastroesophageal reflux disease) 2021    HTN (hypertension) 3/11/2013    Hyperlipidemia 3/11/2013    Paresthesia and pain of both upper extremities 11/30/2020    PUD (peptic ulcer disease) 3/11/2013    History of     Right elbow pain 12/9/2020    Wheezing 3/11/2013     Past Surgical History:   Procedure Laterality Date    COLONOSCOPY  2/25/09    colonoscopy per Dr. Zuleyma Gaffney with need for repeat every 3 years    COLONOSCOPY  02/25/2022    Dr. Homa Weston; polyps of the ascending, transverse, descending, and sigmoid colons; internal hemorrhoid; diverticulosis    LAPAROSCOPY N/A 5/27/2022    LAPAROSCOPY DIAGNOSTIC , OPEN EXPLORATORY LAPAROTOMY, MASS EXCISION, G-TUBE PLACEMENT performed by Lee Ann Joe MD at [address] [place] [address] N/A 6/3/2022    PORT INSERTION performed by Lee Ann Joe MD at [facility]  October 2004    partial colon resection per Dr. Sai Gallego  02/25/2022    Dr. Homa Weston; hiatal hernia gastric polyps     Current Outpatient Medications   Medication Sig Dispense Refill    gabapentin (NEURONTIN) 300 MG capsule Take 1 capsule by mouth 2 times daily for 30 days.  Intended supply: 90 days 60 capsule 0    hyoscyamine (LEVSIN) 125 MCG tablet Take 1 tablet by mouth every 8 hours as needed (Use as needed every 8 hours for gas relief.) 180 tablet 3    omeprazole (PRILOSEC) 20 MG delayed release capsule Take 1 capsule by mouth daily      Simethicone (GAS RELIEF

## 2023-06-20 DIAGNOSIS — Z79.899 HIGH RISK MEDICATION USE: ICD-10-CM

## 2023-06-20 DIAGNOSIS — C76.2 ABDOMINAL CARCINOMATOSIS (HCC): Primary | ICD-10-CM

## 2023-06-20 ASSESSMENT — ENCOUNTER SYMPTOMS
BLOOD IN STOOL: 0
VOMITING: 0
SCLERAL ICTERUS: 0
EYE PROBLEMS: 0
COUGH: 0
CONSTIPATION: 0
NAUSEA: 0
BACK PAIN: 0
SORE THROAT: 0
SHORTNESS OF BREATH: 0
HEMOPTYSIS: 0
ABDOMINAL DISTENTION: 0

## 2023-06-23 ENCOUNTER — OFFICE VISIT (OUTPATIENT)
Dept: ONCOLOGY | Age: 63
End: 2023-06-23
Payer: COMMERCIAL

## 2023-06-23 ENCOUNTER — HOSPITAL ENCOUNTER (OUTPATIENT)
Dept: INFUSION THERAPY | Age: 63
Discharge: HOME | End: 2023-06-23
Payer: COMMERCIAL

## 2023-06-23 VITALS
BODY MASS INDEX: 33.76 KG/M2 | HEIGHT: 70 IN | RESPIRATION RATE: 16 BRPM | DIASTOLIC BLOOD PRESSURE: 78 MMHG | TEMPERATURE: 98.4 F | OXYGEN SATURATION: 96 % | SYSTOLIC BLOOD PRESSURE: 105 MMHG | WEIGHT: 235.8 LBS | HEART RATE: 83 BPM

## 2023-06-23 DIAGNOSIS — Z79.899 HIGH RISK MEDICATION USE: ICD-10-CM

## 2023-06-23 DIAGNOSIS — D64.9 ANEMIA, UNSPECIFIED TYPE: ICD-10-CM

## 2023-06-23 DIAGNOSIS — C76.2 ABDOMINAL CARCINOMATOSIS (HCC): ICD-10-CM

## 2023-06-23 DIAGNOSIS — E83.42 HYPOMAGNESEMIA: ICD-10-CM

## 2023-06-23 DIAGNOSIS — C76.2 ABDOMINAL CARCINOMATOSIS (HCC): Primary | ICD-10-CM

## 2023-06-23 DIAGNOSIS — I82.721 CHRONIC DEEP VEIN THROMBOSIS (DVT) OF RIGHT UPPER EXTREMITY, UNSPECIFIED VEIN (HCC): ICD-10-CM

## 2023-06-23 DIAGNOSIS — K13.79 MOUTH SORES: ICD-10-CM

## 2023-06-23 DIAGNOSIS — C78.6 MALIGNANT NEOPLASM METASTATIC TO PERITONEUM (HCC): ICD-10-CM

## 2023-06-23 DIAGNOSIS — C78.6 MALIGNANT NEOPLASM METASTATIC TO PERITONEUM (HCC): Primary | ICD-10-CM

## 2023-06-23 LAB
ALBUMIN SERPL-MCNC: 3.2 G/DL (ref 3.2–4.6)
ALBUMIN/GLOB SERPL: 1 (ref 0.4–1.6)
ALP SERPL-CCNC: 130 U/L (ref 50–136)
ALT SERPL-CCNC: 33 U/L (ref 12–65)
ANION GAP SERPL CALC-SCNC: 5 MMOL/L (ref 2–11)
AST SERPL-CCNC: 18 U/L (ref 15–37)
BASOPHILS # BLD: 0.1 K/UL (ref 0–0.2)
BASOPHILS NFR BLD: 1 % (ref 0–2)
BILIRUB SERPL-MCNC: 0.3 MG/DL (ref 0.2–1.1)
BUN SERPL-MCNC: 10 MG/DL (ref 8–23)
CALCIUM SERPL-MCNC: 8.4 MG/DL (ref 8.3–10.4)
CHLORIDE SERPL-SCNC: 112 MMOL/L (ref 101–110)
CO2 SERPL-SCNC: 26 MMOL/L (ref 21–32)
CREAT SERPL-MCNC: 1 MG/DL (ref 0.8–1.5)
DIFFERENTIAL METHOD BLD: ABNORMAL
EOSINOPHIL # BLD: 0.1 K/UL (ref 0–0.8)
EOSINOPHIL NFR BLD: 2 % (ref 0.5–7.8)
ERYTHROCYTE [DISTWIDTH] IN BLOOD BY AUTOMATED COUNT: 14.9 % (ref 11.9–14.6)
FERRITIN SERPL-MCNC: 459 NG/ML (ref 8–388)
GLOBULIN SER CALC-MCNC: 3.3 G/DL (ref 2.8–4.5)
GLUCOSE SERPL-MCNC: 176 MG/DL (ref 65–100)
HCT VFR BLD AUTO: 33.8 % (ref 41.1–50.3)
HGB BLD-MCNC: 10.6 G/DL (ref 13.6–17.2)
IMM GRANULOCYTES # BLD AUTO: 0 K/UL (ref 0–0.5)
IMM GRANULOCYTES NFR BLD AUTO: 0 % (ref 0–5)
IRON SATN MFR SERPL: 17 %
IRON SERPL-MCNC: 41 UG/DL (ref 35–150)
LYMPHOCYTES # BLD: 1.8 K/UL (ref 0.5–4.6)
LYMPHOCYTES NFR BLD: 35 % (ref 13–44)
MAGNESIUM SERPL-MCNC: 1.9 MG/DL (ref 1.8–2.4)
MCH RBC QN AUTO: 29 PG (ref 26.1–32.9)
MCHC RBC AUTO-ENTMCNC: 31.4 G/DL (ref 31.4–35)
MCV RBC AUTO: 92.3 FL (ref 82–102)
MONOCYTES # BLD: 0.3 K/UL (ref 0.1–1.3)
MONOCYTES NFR BLD: 6 % (ref 4–12)
NEUTS SEG # BLD: 2.9 K/UL (ref 1.7–8.2)
NEUTS SEG NFR BLD: 56 % (ref 43–78)
NRBC # BLD: 0 K/UL (ref 0–0.2)
PLATELET # BLD AUTO: 185 K/UL (ref 150–450)
PMV BLD AUTO: 10.4 FL (ref 9.4–12.3)
POTASSIUM SERPL-SCNC: 3.6 MMOL/L (ref 3.5–5.1)
PROT SERPL-MCNC: 6.5 G/DL (ref 6.3–8.2)
RBC # BLD AUTO: 3.66 M/UL (ref 4.23–5.6)
SODIUM SERPL-SCNC: 143 MMOL/L (ref 133–143)
TIBC SERPL-MCNC: 238 UG/DL (ref 250–450)
WBC # BLD AUTO: 5.2 K/UL (ref 4.3–11.1)

## 2023-06-23 PROCEDURE — 96368 THER/DIAG CONCURRENT INF: CPT

## 2023-06-23 PROCEDURE — 36591 DRAW BLOOD OFF VENOUS DEVICE: CPT

## 2023-06-23 PROCEDURE — 96415 CHEMO IV INFUSION ADDL HR: CPT

## 2023-06-23 PROCEDURE — 2580000003 HC RX 258: Performed by: INTERNAL MEDICINE

## 2023-06-23 PROCEDURE — 96367 TX/PROPH/DG ADDL SEQ IV INF: CPT

## 2023-06-23 PROCEDURE — 83735 ASSAY OF MAGNESIUM: CPT

## 2023-06-23 PROCEDURE — 3074F SYST BP LT 130 MM HG: CPT | Performed by: INTERNAL MEDICINE

## 2023-06-23 PROCEDURE — 80053 COMPREHEN METABOLIC PANEL: CPT

## 2023-06-23 PROCEDURE — 96375 TX/PRO/DX INJ NEW DRUG ADDON: CPT

## 2023-06-23 PROCEDURE — 96413 CHEMO IV INFUSION 1 HR: CPT

## 2023-06-23 PROCEDURE — 99215 OFFICE O/P EST HI 40 MIN: CPT | Performed by: INTERNAL MEDICINE

## 2023-06-23 PROCEDURE — 83540 ASSAY OF IRON: CPT

## 2023-06-23 PROCEDURE — G0498 CHEMO EXTEND IV INFUS W/PUMP: HCPCS

## 2023-06-23 PROCEDURE — 96411 CHEMO IV PUSH ADDL DRUG: CPT

## 2023-06-23 PROCEDURE — 82728 ASSAY OF FERRITIN: CPT

## 2023-06-23 PROCEDURE — 83550 IRON BINDING TEST: CPT

## 2023-06-23 PROCEDURE — 3078F DIAST BP <80 MM HG: CPT | Performed by: INTERNAL MEDICINE

## 2023-06-23 PROCEDURE — 96372 THER/PROPH/DIAG INJ SC/IM: CPT

## 2023-06-23 PROCEDURE — 85025 COMPLETE CBC W/AUTO DIFF WBC: CPT

## 2023-06-23 PROCEDURE — 6360000002 HC RX W HCPCS: Performed by: INTERNAL MEDICINE

## 2023-06-23 RX ORDER — SODIUM CHLORIDE 0.9 % (FLUSH) 0.9 %
5-40 SYRINGE (ML) INJECTION PRN
OUTPATIENT
Start: 2023-06-25

## 2023-06-23 RX ORDER — SODIUM CHLORIDE 9 MG/ML
INJECTION, SOLUTION INTRAVENOUS CONTINUOUS
Status: CANCELLED | OUTPATIENT
Start: 2023-06-23

## 2023-06-23 RX ORDER — SODIUM CHLORIDE 0.9 % (FLUSH) 0.9 %
5-40 SYRINGE (ML) INJECTION PRN
Status: DISCONTINUED | OUTPATIENT
Start: 2023-06-23 | End: 2023-06-24 | Stop reason: HOSPADM

## 2023-06-23 RX ORDER — ACETAMINOPHEN 325 MG/1
650 TABLET ORAL
Status: CANCELLED | OUTPATIENT
Start: 2023-06-23

## 2023-06-23 RX ORDER — SODIUM CHLORIDE 9 MG/ML
INJECTION, SOLUTION INTRAVENOUS CONTINUOUS
Status: DISCONTINUED | OUTPATIENT
Start: 2023-06-23 | End: 2023-06-24 | Stop reason: HOSPADM

## 2023-06-23 RX ORDER — ALBUTEROL SULFATE 90 UG/1
4 AEROSOL, METERED RESPIRATORY (INHALATION) PRN
Status: DISCONTINUED | OUTPATIENT
Start: 2023-06-23 | End: 2023-06-24 | Stop reason: HOSPADM

## 2023-06-23 RX ORDER — HEPARIN SODIUM (PORCINE) LOCK FLUSH IV SOLN 100 UNIT/ML 100 UNIT/ML
500 SOLUTION INTRAVENOUS PRN
Status: CANCELLED | OUTPATIENT
Start: 2023-06-23

## 2023-06-23 RX ORDER — SODIUM CHLORIDE 9 MG/ML
5-250 INJECTION, SOLUTION INTRAVENOUS PRN
Status: CANCELLED | OUTPATIENT
Start: 2023-06-23

## 2023-06-23 RX ORDER — ATROPINE SULFATE 0.4 MG/ML
0.4 INJECTION, SOLUTION INTRAMUSCULAR; INTRAVENOUS; SUBCUTANEOUS ONCE
Status: CANCELLED | OUTPATIENT
Start: 2023-06-23 | End: 2023-06-23

## 2023-06-23 RX ORDER — EPINEPHRINE 1 MG/ML
0.3 INJECTION, SOLUTION, CONCENTRATE INTRAVENOUS PRN
Status: DISCONTINUED | OUTPATIENT
Start: 2023-06-23 | End: 2023-06-24 | Stop reason: HOSPADM

## 2023-06-23 RX ORDER — ATROPINE SULFATE 0.4 MG/ML
0.4 INJECTION, SOLUTION INTRAVENOUS ONCE
Status: COMPLETED | OUTPATIENT
Start: 2023-06-23 | End: 2023-06-23

## 2023-06-23 RX ORDER — FAMOTIDINE 10 MG/ML
20 INJECTION, SOLUTION INTRAVENOUS
Status: CANCELLED | OUTPATIENT
Start: 2023-06-23

## 2023-06-23 RX ORDER — EPINEPHRINE 1 MG/ML
0.3 INJECTION, SOLUTION, CONCENTRATE INTRAVENOUS PRN
Status: CANCELLED | OUTPATIENT
Start: 2023-06-23

## 2023-06-23 RX ORDER — MAGNESIUM SULFATE IN WATER 40 MG/ML
2000 INJECTION, SOLUTION INTRAVENOUS ONCE
Status: COMPLETED | OUTPATIENT
Start: 2023-06-23 | End: 2023-06-23

## 2023-06-23 RX ORDER — ALBUTEROL SULFATE 90 UG/1
4 AEROSOL, METERED RESPIRATORY (INHALATION) PRN
Status: CANCELLED | OUTPATIENT
Start: 2023-06-23

## 2023-06-23 RX ORDER — ONDANSETRON 2 MG/ML
8 INJECTION INTRAMUSCULAR; INTRAVENOUS
Status: DISCONTINUED | OUTPATIENT
Start: 2023-06-23 | End: 2023-06-24 | Stop reason: HOSPADM

## 2023-06-23 RX ORDER — ATROPINE SULFATE 0.4 MG/ML
0.4 INJECTION, SOLUTION INTRAMUSCULAR; INTRAVENOUS; SUBCUTANEOUS
Status: CANCELLED | OUTPATIENT
Start: 2023-06-23

## 2023-06-23 RX ORDER — SODIUM CHLORIDE 0.9 % (FLUSH) 0.9 %
5-40 SYRINGE (ML) INJECTION PRN
Status: CANCELLED | OUTPATIENT
Start: 2023-06-23

## 2023-06-23 RX ORDER — DEXTROSE MONOHYDRATE 50 MG/ML
5-250 INJECTION, SOLUTION INTRAVENOUS PRN
Status: CANCELLED | OUTPATIENT
Start: 2023-06-23

## 2023-06-23 RX ORDER — HEPARIN SODIUM (PORCINE) LOCK FLUSH IV SOLN 100 UNIT/ML 100 UNIT/ML
500 SOLUTION INTRAVENOUS PRN
OUTPATIENT
Start: 2023-06-25

## 2023-06-23 RX ORDER — MEPERIDINE HYDROCHLORIDE 25 MG/ML
12.5 INJECTION INTRAMUSCULAR; INTRAVENOUS; SUBCUTANEOUS PRN
Status: DISCONTINUED | OUTPATIENT
Start: 2023-06-23 | End: 2023-06-24 | Stop reason: HOSPADM

## 2023-06-23 RX ORDER — ACETAMINOPHEN 325 MG/1
650 TABLET ORAL
Status: DISCONTINUED | OUTPATIENT
Start: 2023-06-23 | End: 2023-06-24 | Stop reason: HOSPADM

## 2023-06-23 RX ORDER — DEXTROSE MONOHYDRATE 50 MG/ML
5-250 INJECTION, SOLUTION INTRAVENOUS PRN
Status: DISCONTINUED | OUTPATIENT
Start: 2023-06-23 | End: 2023-06-24 | Stop reason: HOSPADM

## 2023-06-23 RX ORDER — MEPERIDINE HYDROCHLORIDE 50 MG/ML
12.5 INJECTION INTRAMUSCULAR; INTRAVENOUS; SUBCUTANEOUS PRN
Status: CANCELLED | OUTPATIENT
Start: 2023-06-23

## 2023-06-23 RX ORDER — ONDANSETRON 2 MG/ML
8 INJECTION INTRAMUSCULAR; INTRAVENOUS ONCE
Status: COMPLETED | OUTPATIENT
Start: 2023-06-23 | End: 2023-06-23

## 2023-06-23 RX ORDER — DIPHENHYDRAMINE HYDROCHLORIDE 50 MG/ML
50 INJECTION INTRAMUSCULAR; INTRAVENOUS
Status: DISCONTINUED | OUTPATIENT
Start: 2023-06-23 | End: 2023-06-24 | Stop reason: HOSPADM

## 2023-06-23 RX ORDER — SODIUM CHLORIDE 0.9 % (FLUSH) 0.9 %
5-40 SYRINGE (ML) INJECTION PRN
Status: CANCELLED | OUTPATIENT
Start: 2023-06-25

## 2023-06-23 RX ORDER — ONDANSETRON 2 MG/ML
8 INJECTION INTRAMUSCULAR; INTRAVENOUS ONCE
Status: CANCELLED | OUTPATIENT
Start: 2023-06-23 | End: 2023-06-23

## 2023-06-23 RX ORDER — DIPHENHYDRAMINE HYDROCHLORIDE 50 MG/ML
50 INJECTION INTRAMUSCULAR; INTRAVENOUS
Status: CANCELLED | OUTPATIENT
Start: 2023-06-23

## 2023-06-23 RX ORDER — FLUOROURACIL 50 MG/ML
400 INJECTION, SOLUTION INTRAVENOUS ONCE
Status: CANCELLED | OUTPATIENT
Start: 2023-06-23 | End: 2023-06-23

## 2023-06-23 RX ORDER — SODIUM CHLORIDE 0.9 % (FLUSH) 0.9 %
10 SYRINGE (ML) INJECTION PRN
Status: DISCONTINUED | OUTPATIENT
Start: 2023-06-23 | End: 2023-06-24 | Stop reason: HOSPADM

## 2023-06-23 RX ORDER — SODIUM CHLORIDE 9 MG/ML
5-250 INJECTION, SOLUTION INTRAVENOUS PRN
OUTPATIENT
Start: 2023-06-25

## 2023-06-23 RX ORDER — FLUOROURACIL 50 MG/ML
400 INJECTION, SOLUTION INTRAVENOUS ONCE
Status: COMPLETED | OUTPATIENT
Start: 2023-06-23 | End: 2023-06-23

## 2023-06-23 RX ORDER — ONDANSETRON 2 MG/ML
8 INJECTION INTRAMUSCULAR; INTRAVENOUS
Status: CANCELLED | OUTPATIENT
Start: 2023-06-23

## 2023-06-23 RX ADMIN — SODIUM CHLORIDE, PRESERVATIVE FREE 10 ML: 5 INJECTION INTRAVENOUS at 07:54

## 2023-06-23 RX ADMIN — IRINOTECAN HYDROCHLORIDE 340 MG: 20 INJECTION, SOLUTION INTRAVENOUS at 10:59

## 2023-06-23 RX ADMIN — SODIUM CHLORIDE, PRESERVATIVE FREE 10 ML: 5 INJECTION INTRAVENOUS at 12:49

## 2023-06-23 RX ADMIN — ATROPINE SULFATE 0.4 MG: 0.4 INJECTION, SOLUTION INTRAVENOUS at 10:46

## 2023-06-23 RX ADMIN — MAGNESIUM SULFATE HEPTAHYDRATE 2000 MG: 40 INJECTION, SOLUTION INTRAVENOUS at 10:51

## 2023-06-23 RX ADMIN — FLUOROURACIL 5375 MG: 50 INJECTION, SOLUTION INTRAVENOUS at 12:50

## 2023-06-23 RX ADMIN — FOSAPREPITANT 150 MG: 150 INJECTION, POWDER, LYOPHILIZED, FOR SOLUTION INTRAVENOUS at 10:23

## 2023-06-23 RX ADMIN — FLUOROURACIL 900 MG: 50 INJECTION, SOLUTION INTRAVENOUS at 12:45

## 2023-06-23 RX ADMIN — ONDANSETRON 8 MG: 2 INJECTION INTRAMUSCULAR; INTRAVENOUS at 10:06

## 2023-06-23 RX ADMIN — DEXTROSE MONOHYDRATE 50 ML/HR: 50 INJECTION, SOLUTION INTRAVENOUS at 09:44

## 2023-06-23 RX ADMIN — LEUCOVORIN CALCIUM 900 MG: 350 INJECTION, POWDER, LYOPHILIZED, FOR SOLUTION INTRAMUSCULAR; INTRAVENOUS at 10:59

## 2023-06-23 RX ADMIN — DEXAMETHASONE SODIUM PHOSPHATE 12 MG: 4 INJECTION, SOLUTION INTRAMUSCULAR; INTRAVENOUS at 10:07

## 2023-06-23 ASSESSMENT — PATIENT HEALTH QUESTIONNAIRE - PHQ9
SUM OF ALL RESPONSES TO PHQ9 QUESTIONS 1 & 2: 0
SUM OF ALL RESPONSES TO PHQ QUESTIONS 1-9: 0
1. LITTLE INTEREST OR PLEASURE IN DOING THINGS: 0
SUM OF ALL RESPONSES TO PHQ QUESTIONS 1-9: 0
2. FEELING DOWN, DEPRESSED OR HOPELESS: 0
SUM OF ALL RESPONSES TO PHQ QUESTIONS 1-9: 0
SUM OF ALL RESPONSES TO PHQ QUESTIONS 1-9: 0

## 2023-06-23 NOTE — PROGRESS NOTES
Pt arrived ambulatory. Premeds, Mag bolus, FOLFIRI completed without complications. Pt discharged ambulatory with 5FU pump infusing. No distress noted. Pt aware of appt 6/25 at 1200.   Patient instructed to call provider with temperature of 100.4 or greater or nausea/vomiting/ diarrhea or pain not controlled by medications

## 2023-06-25 ENCOUNTER — HOSPITAL ENCOUNTER (OUTPATIENT)
Dept: INFUSION THERAPY | Age: 63
Discharge: HOME | End: 2023-06-25
Payer: COMMERCIAL

## 2023-06-25 VITALS
OXYGEN SATURATION: 96 % | HEART RATE: 80 BPM | RESPIRATION RATE: 16 BRPM | DIASTOLIC BLOOD PRESSURE: 81 MMHG | TEMPERATURE: 98.2 F | SYSTOLIC BLOOD PRESSURE: 139 MMHG

## 2023-06-25 DIAGNOSIS — C76.2 ABDOMINAL CARCINOMATOSIS (HCC): Primary | ICD-10-CM

## 2023-06-25 DIAGNOSIS — E83.42 HYPOMAGNESEMIA: ICD-10-CM

## 2023-06-25 DIAGNOSIS — C78.6 MALIGNANT NEOPLASM METASTATIC TO PERITONEUM (HCC): ICD-10-CM

## 2023-06-25 PROCEDURE — 96523 IRRIG DRUG DELIVERY DEVICE: CPT

## 2023-06-25 PROCEDURE — 2580000003 HC RX 258: Performed by: INTERNAL MEDICINE

## 2023-06-25 RX ORDER — SODIUM CHLORIDE 0.9 % (FLUSH) 0.9 %
5-40 SYRINGE (ML) INJECTION PRN
Status: DISCONTINUED | OUTPATIENT
Start: 2023-06-25 | End: 2023-06-26 | Stop reason: HOSPADM

## 2023-06-25 RX ADMIN — SODIUM CHLORIDE, PRESERVATIVE FREE 10 ML: 5 INJECTION INTRAVENOUS at 11:50

## 2023-06-28 DIAGNOSIS — E83.42 HYPOMAGNESEMIA: ICD-10-CM

## 2023-06-28 PROBLEM — K13.79 MOUTH SORES: Status: ACTIVE | Noted: 2023-06-28

## 2023-07-03 ENCOUNTER — OFFICE VISIT (OUTPATIENT)
Dept: INTERNAL MEDICINE CLINIC | Facility: CLINIC | Age: 63
End: 2023-07-03
Payer: COMMERCIAL

## 2023-07-03 VITALS
WEIGHT: 235 LBS | HEIGHT: 70 IN | DIASTOLIC BLOOD PRESSURE: 76 MMHG | BODY MASS INDEX: 33.64 KG/M2 | TEMPERATURE: 94 F | OXYGEN SATURATION: 96 % | SYSTOLIC BLOOD PRESSURE: 110 MMHG

## 2023-07-03 DIAGNOSIS — F41.9 ANXIETY: ICD-10-CM

## 2023-07-03 DIAGNOSIS — E87.6 HYPOKALEMIA: ICD-10-CM

## 2023-07-03 DIAGNOSIS — E78.5 HYPERLIPIDEMIA, UNSPECIFIED HYPERLIPIDEMIA TYPE: ICD-10-CM

## 2023-07-03 DIAGNOSIS — F43.21 SITUATIONAL DEPRESSION: ICD-10-CM

## 2023-07-03 DIAGNOSIS — C76.2 ABDOMINAL CARCINOMATOSIS (HCC): ICD-10-CM

## 2023-07-03 DIAGNOSIS — K60.2 ANAL FISSURE: Primary | ICD-10-CM

## 2023-07-03 PROCEDURE — 3078F DIAST BP <80 MM HG: CPT | Performed by: STUDENT IN AN ORGANIZED HEALTH CARE EDUCATION/TRAINING PROGRAM

## 2023-07-03 PROCEDURE — 3074F SYST BP LT 130 MM HG: CPT | Performed by: STUDENT IN AN ORGANIZED HEALTH CARE EDUCATION/TRAINING PROGRAM

## 2023-07-03 PROCEDURE — 99213 OFFICE O/P EST LOW 20 MIN: CPT | Performed by: STUDENT IN AN ORGANIZED HEALTH CARE EDUCATION/TRAINING PROGRAM

## 2023-07-03 RX ORDER — ALPHA-D-GALACTOSIDASE
2 TABLET ORAL
COMMUNITY

## 2023-07-03 RX ORDER — FERROUS SULFATE 325(65) MG
325 TABLET ORAL
COMMUNITY

## 2023-07-03 RX ORDER — POTASSIUM CHLORIDE 20 MEQ/1
20 TABLET, EXTENDED RELEASE ORAL DAILY
Qty: 30 TABLET | Refills: 0
Start: 2023-07-03

## 2023-07-03 RX ORDER — GABAPENTIN 400 MG/1
400 CAPSULE ORAL 3 TIMES DAILY PRN
Qty: 90 CAPSULE | Refills: 2 | Status: SHIPPED | OUTPATIENT
Start: 2023-07-03 | End: 2023-10-01

## 2023-07-03 RX ORDER — PANTOPRAZOLE SODIUM 40 MG/1
40 TABLET, DELAYED RELEASE ORAL
Qty: 90 TABLET | Refills: 1 | Status: SHIPPED | OUTPATIENT
Start: 2023-07-03

## 2023-07-03 RX ORDER — ROSUVASTATIN CALCIUM 10 MG/1
10 TABLET, COATED ORAL DAILY
Qty: 90 TABLET | Refills: 3 | Status: SHIPPED | OUTPATIENT
Start: 2023-07-03

## 2023-07-03 ASSESSMENT — ENCOUNTER SYMPTOMS: SHORTNESS OF BREATH: 0

## 2023-07-03 NOTE — PROGRESS NOTES
tablets by mouth 3 times daily (with meals)      rosuvastatin (CRESTOR) 10 MG tablet Take 1 tablet by mouth daily 90 tablet 3    gabapentin (NEURONTIN) 400 MG capsule Take 1 capsule by mouth 3 times daily as needed (pain) for up to 90 days. 90 capsule 2    potassium chloride (KLOR-CON M20) 20 MEQ extended release tablet Take 1 tablet by mouth daily 30 tablet 0    pantoprazole (PROTONIX) 40 MG tablet Take 1 tablet by mouth every morning (before breakfast) 90 tablet 1    Magic Mouthwash (MIRACLE MOUTHWASH) Swish and spit 5 mLs 4 times daily as needed for Irritation (Use 5 ml 4 times daily as needed for mouth irritation/mouth sores.) 240 mL 0    rivaroxaban (XARELTO) 10 MG TABS tablet Take 1 tablet by mouth daily (with breakfast) 30 tablet 3    hyoscyamine (LEVSIN) 125 MCG tablet Take 1 tablet by mouth every 8 hours as needed (Use as needed every 8 hours for gas relief.) 180 tablet 3    Simethicone (GAS RELIEF 125 MAX ST PO) Take 1 caplet by mouth 2 times daily      DULoxetine (CYMBALTA) 60 MG extended release capsule Take 1 capsule by mouth daily 90 capsule 1    Docusate Calcium (STOOL SOFTENER PO) Take 1 capsule by mouth in the morning and at bedtime      B Complex-C (SUPER B COMPLEX PO) Take 1 tablet by mouth daily      magnesium oxide (MAG-OX) 400 MG tablet Take 1 tablet by mouth in the morning, at noon, and at bedtime 90 tablet 2    busPIRone (BUSPAR) 7.5 MG tablet Take 1 tablet by mouth daily (Patient taking differently: Take 1 tablet by mouth daily Only on Chemo days) 90 tablet 0    Multiple Vitamins-Minerals (MULTIVITAMIN MEN 50+ PO) Take 1 tablet by mouth daily       No current facility-administered medications for this visit. Allergies as of 07/03/2023    (No Known Allergies)       Review of Systems   Constitutional:  Negative for chills and fever. Respiratory:  Negative for shortness of breath. Cardiovascular:  Negative for chest pain.      Objective:    Vitals:    07/03/23 0832   BP: 110/76   Site:

## 2023-07-06 DIAGNOSIS — E83.42 HYPOMAGNESEMIA: Primary | ICD-10-CM

## 2023-07-06 DIAGNOSIS — C78.6 MALIGNANT NEOPLASM METASTATIC TO PERITONEUM (HCC): ICD-10-CM

## 2023-07-07 ENCOUNTER — OFFICE VISIT (OUTPATIENT)
Dept: ONCOLOGY | Age: 63
End: 2023-07-07
Payer: COMMERCIAL

## 2023-07-07 ENCOUNTER — HOSPITAL ENCOUNTER (OUTPATIENT)
Dept: INFUSION THERAPY | Age: 63
Discharge: HOME | End: 2023-07-07
Payer: COMMERCIAL

## 2023-07-07 VITALS
DIASTOLIC BLOOD PRESSURE: 76 MMHG | SYSTOLIC BLOOD PRESSURE: 103 MMHG | TEMPERATURE: 98.7 F | RESPIRATION RATE: 12 BRPM | HEART RATE: 91 BPM | BODY MASS INDEX: 33.77 KG/M2 | HEIGHT: 70 IN | WEIGHT: 235.9 LBS | OXYGEN SATURATION: 96 %

## 2023-07-07 DIAGNOSIS — C76.2 ABDOMINAL CARCINOMATOSIS (HCC): Primary | ICD-10-CM

## 2023-07-07 DIAGNOSIS — C78.6 MALIGNANT NEOPLASM METASTATIC TO PERITONEUM (HCC): ICD-10-CM

## 2023-07-07 DIAGNOSIS — G62.0 NEUROPATHY DUE TO CHEMOTHERAPEUTIC DRUG (HCC): ICD-10-CM

## 2023-07-07 DIAGNOSIS — E83.42 HYPOMAGNESEMIA: ICD-10-CM

## 2023-07-07 DIAGNOSIS — T45.1X5A NEUROPATHY DUE TO CHEMOTHERAPEUTIC DRUG (HCC): ICD-10-CM

## 2023-07-07 LAB
ALBUMIN SERPL-MCNC: 3.3 G/DL (ref 3.2–4.6)
ALBUMIN/GLOB SERPL: 1.1 (ref 0.4–1.6)
ALP SERPL-CCNC: 125 U/L (ref 50–136)
ALT SERPL-CCNC: 37 U/L (ref 12–65)
ANION GAP SERPL CALC-SCNC: 6 MMOL/L (ref 2–11)
AST SERPL-CCNC: 22 U/L (ref 15–37)
BASOPHILS # BLD: 0.1 K/UL (ref 0–0.2)
BASOPHILS NFR BLD: 1 % (ref 0–2)
BILIRUB SERPL-MCNC: 0.4 MG/DL (ref 0.2–1.1)
BUN SERPL-MCNC: 9 MG/DL (ref 8–23)
CALCIUM SERPL-MCNC: 8.6 MG/DL (ref 8.3–10.4)
CHLORIDE SERPL-SCNC: 113 MMOL/L (ref 101–110)
CO2 SERPL-SCNC: 24 MMOL/L (ref 21–32)
CREAT SERPL-MCNC: 0.9 MG/DL (ref 0.8–1.5)
DIFFERENTIAL METHOD BLD: ABNORMAL
EOSINOPHIL # BLD: 0.1 K/UL (ref 0–0.8)
EOSINOPHIL NFR BLD: 2 % (ref 0.5–7.8)
ERYTHROCYTE [DISTWIDTH] IN BLOOD BY AUTOMATED COUNT: 15.1 % (ref 11.9–14.6)
FERRITIN SERPL-MCNC: 405 NG/ML (ref 8–388)
GLOBULIN SER CALC-MCNC: 3.1 G/DL (ref 2.8–4.5)
GLUCOSE SERPL-MCNC: 188 MG/DL (ref 65–100)
HCT VFR BLD AUTO: 33.4 % (ref 41.1–50.3)
HGB BLD-MCNC: 10.5 G/DL (ref 13.6–17.2)
IMM GRANULOCYTES # BLD AUTO: 0 K/UL (ref 0–0.5)
IMM GRANULOCYTES NFR BLD AUTO: 0 % (ref 0–5)
IRON SATN MFR SERPL: 19 %
IRON SERPL-MCNC: 46 UG/DL (ref 35–150)
LYMPHOCYTES # BLD: 2 K/UL (ref 0.5–4.6)
LYMPHOCYTES NFR BLD: 37 % (ref 13–44)
MAGNESIUM SERPL-MCNC: 1.9 MG/DL (ref 1.8–2.4)
MCH RBC QN AUTO: 29.2 PG (ref 26.1–32.9)
MCHC RBC AUTO-ENTMCNC: 31.4 G/DL (ref 31.4–35)
MCV RBC AUTO: 93 FL (ref 82–102)
MONOCYTES # BLD: 0.4 K/UL (ref 0.1–1.3)
MONOCYTES NFR BLD: 7 % (ref 4–12)
NEUTS SEG # BLD: 2.8 K/UL (ref 1.7–8.2)
NEUTS SEG NFR BLD: 53 % (ref 43–78)
NRBC # BLD: 0 K/UL (ref 0–0.2)
PLATELET # BLD AUTO: 174 K/UL (ref 150–450)
PMV BLD AUTO: 10.8 FL (ref 9.4–12.3)
POTASSIUM SERPL-SCNC: 3.5 MMOL/L (ref 3.5–5.1)
PROT SERPL-MCNC: 6.4 G/DL (ref 6.3–8.2)
RBC # BLD AUTO: 3.59 M/UL (ref 4.23–5.6)
SODIUM SERPL-SCNC: 143 MMOL/L (ref 133–143)
TIBC SERPL-MCNC: 244 UG/DL (ref 250–450)
WBC # BLD AUTO: 5.4 K/UL (ref 4.3–11.1)

## 2023-07-07 PROCEDURE — 3074F SYST BP LT 130 MM HG: CPT | Performed by: NURSE PRACTITIONER

## 2023-07-07 PROCEDURE — 96415 CHEMO IV INFUSION ADDL HR: CPT

## 2023-07-07 PROCEDURE — 6360000002 HC RX W HCPCS: Performed by: NURSE PRACTITIONER

## 2023-07-07 PROCEDURE — 82728 ASSAY OF FERRITIN: CPT

## 2023-07-07 PROCEDURE — 99214 OFFICE O/P EST MOD 30 MIN: CPT | Performed by: NURSE PRACTITIONER

## 2023-07-07 PROCEDURE — 36591 DRAW BLOOD OFF VENOUS DEVICE: CPT

## 2023-07-07 PROCEDURE — 83550 IRON BINDING TEST: CPT

## 2023-07-07 PROCEDURE — 2580000003 HC RX 258: Performed by: NURSE PRACTITIONER

## 2023-07-07 PROCEDURE — 96411 CHEMO IV PUSH ADDL DRUG: CPT

## 2023-07-07 PROCEDURE — 85025 COMPLETE CBC W/AUTO DIFF WBC: CPT

## 2023-07-07 PROCEDURE — 80053 COMPREHEN METABOLIC PANEL: CPT

## 2023-07-07 PROCEDURE — G0498 CHEMO EXTEND IV INFUS W/PUMP: HCPCS

## 2023-07-07 PROCEDURE — 83540 ASSAY OF IRON: CPT

## 2023-07-07 PROCEDURE — 96367 TX/PROPH/DG ADDL SEQ IV INF: CPT

## 2023-07-07 PROCEDURE — 96368 THER/DIAG CONCURRENT INF: CPT

## 2023-07-07 PROCEDURE — 96375 TX/PRO/DX INJ NEW DRUG ADDON: CPT

## 2023-07-07 PROCEDURE — 3078F DIAST BP <80 MM HG: CPT | Performed by: NURSE PRACTITIONER

## 2023-07-07 PROCEDURE — 2580000003 HC RX 258: Performed by: INTERNAL MEDICINE

## 2023-07-07 PROCEDURE — 96372 THER/PROPH/DIAG INJ SC/IM: CPT

## 2023-07-07 PROCEDURE — 83735 ASSAY OF MAGNESIUM: CPT

## 2023-07-07 PROCEDURE — 96413 CHEMO IV INFUSION 1 HR: CPT

## 2023-07-07 RX ORDER — FLUOROURACIL 50 MG/ML
400 INJECTION, SOLUTION INTRAVENOUS ONCE
Status: CANCELLED | OUTPATIENT
Start: 2023-07-07 | End: 2023-07-07

## 2023-07-07 RX ORDER — FLUOROURACIL 50 MG/ML
400 INJECTION, SOLUTION INTRAVENOUS ONCE
Status: COMPLETED | OUTPATIENT
Start: 2023-07-07 | End: 2023-07-07

## 2023-07-07 RX ORDER — ONDANSETRON 2 MG/ML
8 INJECTION INTRAMUSCULAR; INTRAVENOUS ONCE
Status: CANCELLED | OUTPATIENT
Start: 2023-07-07 | End: 2023-07-07

## 2023-07-07 RX ORDER — EPINEPHRINE 1 MG/ML
0.3 INJECTION, SOLUTION, CONCENTRATE INTRAVENOUS PRN
Status: CANCELLED | OUTPATIENT
Start: 2023-07-07

## 2023-07-07 RX ORDER — ALBUTEROL SULFATE 90 UG/1
4 AEROSOL, METERED RESPIRATORY (INHALATION) PRN
Status: DISCONTINUED | OUTPATIENT
Start: 2023-07-07 | End: 2023-07-08 | Stop reason: HOSPADM

## 2023-07-07 RX ORDER — FAMOTIDINE 10 MG/ML
20 INJECTION, SOLUTION INTRAVENOUS
Status: CANCELLED | OUTPATIENT
Start: 2023-07-07

## 2023-07-07 RX ORDER — HEPARIN SODIUM (PORCINE) LOCK FLUSH IV SOLN 100 UNIT/ML 100 UNIT/ML
500 SOLUTION INTRAVENOUS PRN
Status: CANCELLED | OUTPATIENT
Start: 2023-07-07

## 2023-07-07 RX ORDER — EPINEPHRINE 1 MG/ML
0.3 INJECTION, SOLUTION, CONCENTRATE INTRAVENOUS PRN
Status: DISCONTINUED | OUTPATIENT
Start: 2023-07-07 | End: 2023-07-08 | Stop reason: HOSPADM

## 2023-07-07 RX ORDER — SODIUM CHLORIDE 0.9 % (FLUSH) 0.9 %
5-40 SYRINGE (ML) INJECTION PRN
Status: CANCELLED | OUTPATIENT
Start: 2023-07-07

## 2023-07-07 RX ORDER — SODIUM CHLORIDE 0.9 % (FLUSH) 0.9 %
5-40 SYRINGE (ML) INJECTION PRN
OUTPATIENT
Start: 2023-07-09

## 2023-07-07 RX ORDER — SODIUM CHLORIDE 9 MG/ML
5-250 INJECTION, SOLUTION INTRAVENOUS PRN
Status: CANCELLED | OUTPATIENT
Start: 2023-07-07

## 2023-07-07 RX ORDER — ONDANSETRON 2 MG/ML
8 INJECTION INTRAMUSCULAR; INTRAVENOUS ONCE
Status: COMPLETED | OUTPATIENT
Start: 2023-07-07 | End: 2023-07-07

## 2023-07-07 RX ORDER — MEPERIDINE HYDROCHLORIDE 50 MG/ML
12.5 INJECTION INTRAMUSCULAR; INTRAVENOUS; SUBCUTANEOUS PRN
Status: CANCELLED | OUTPATIENT
Start: 2023-07-07

## 2023-07-07 RX ORDER — SODIUM CHLORIDE 9 MG/ML
5-250 INJECTION, SOLUTION INTRAVENOUS PRN
OUTPATIENT
Start: 2023-07-09

## 2023-07-07 RX ORDER — HEPARIN SODIUM (PORCINE) LOCK FLUSH IV SOLN 100 UNIT/ML 100 UNIT/ML
500 SOLUTION INTRAVENOUS PRN
OUTPATIENT
Start: 2023-07-09

## 2023-07-07 RX ORDER — ONDANSETRON 2 MG/ML
8 INJECTION INTRAMUSCULAR; INTRAVENOUS
Status: CANCELLED | OUTPATIENT
Start: 2023-07-07

## 2023-07-07 RX ORDER — ATROPINE SULFATE 0.4 MG/ML
0.4 INJECTION, SOLUTION INTRAVENOUS ONCE
Status: COMPLETED | OUTPATIENT
Start: 2023-07-07 | End: 2023-07-07

## 2023-07-07 RX ORDER — ACETAMINOPHEN 325 MG/1
650 TABLET ORAL
Status: CANCELLED | OUTPATIENT
Start: 2023-07-07

## 2023-07-07 RX ORDER — SODIUM CHLORIDE 0.9 % (FLUSH) 0.9 %
5-40 SYRINGE (ML) INJECTION PRN
Status: DISCONTINUED | OUTPATIENT
Start: 2023-07-07 | End: 2023-07-08 | Stop reason: HOSPADM

## 2023-07-07 RX ORDER — ALBUTEROL SULFATE 90 UG/1
4 AEROSOL, METERED RESPIRATORY (INHALATION) PRN
Status: CANCELLED | OUTPATIENT
Start: 2023-07-07

## 2023-07-07 RX ORDER — SODIUM CHLORIDE 0.9 % (FLUSH) 0.9 %
10 SYRINGE (ML) INJECTION PRN
Status: DISCONTINUED | OUTPATIENT
Start: 2023-07-07 | End: 2023-07-07 | Stop reason: HOSPADM

## 2023-07-07 RX ORDER — MEPERIDINE HYDROCHLORIDE 25 MG/ML
12.5 INJECTION INTRAMUSCULAR; INTRAVENOUS; SUBCUTANEOUS PRN
Status: DISCONTINUED | OUTPATIENT
Start: 2023-07-07 | End: 2023-07-08 | Stop reason: HOSPADM

## 2023-07-07 RX ORDER — ACETAMINOPHEN 325 MG/1
650 TABLET ORAL
Status: DISCONTINUED | OUTPATIENT
Start: 2023-07-07 | End: 2023-07-08 | Stop reason: HOSPADM

## 2023-07-07 RX ORDER — ATROPINE SULFATE 0.4 MG/ML
0.4 INJECTION, SOLUTION INTRAMUSCULAR; INTRAVENOUS; SUBCUTANEOUS
Status: CANCELLED | OUTPATIENT
Start: 2023-07-07

## 2023-07-07 RX ORDER — SODIUM CHLORIDE 9 MG/ML
INJECTION, SOLUTION INTRAVENOUS CONTINUOUS
Status: CANCELLED | OUTPATIENT
Start: 2023-07-07

## 2023-07-07 RX ORDER — DIPHENHYDRAMINE HYDROCHLORIDE 50 MG/ML
50 INJECTION INTRAMUSCULAR; INTRAVENOUS
Status: CANCELLED | OUTPATIENT
Start: 2023-07-07

## 2023-07-07 RX ORDER — DEXTROSE MONOHYDRATE 50 MG/ML
5-250 INJECTION, SOLUTION INTRAVENOUS PRN
Status: CANCELLED | OUTPATIENT
Start: 2023-07-07

## 2023-07-07 RX ORDER — ATROPINE SULFATE 0.4 MG/ML
0.4 INJECTION, SOLUTION INTRAMUSCULAR; INTRAVENOUS; SUBCUTANEOUS ONCE
Status: CANCELLED | OUTPATIENT
Start: 2023-07-07 | End: 2023-07-07

## 2023-07-07 RX ORDER — DIPHENHYDRAMINE HYDROCHLORIDE 50 MG/ML
50 INJECTION INTRAMUSCULAR; INTRAVENOUS
Status: DISCONTINUED | OUTPATIENT
Start: 2023-07-07 | End: 2023-07-08 | Stop reason: HOSPADM

## 2023-07-07 RX ORDER — ONDANSETRON 2 MG/ML
8 INJECTION INTRAMUSCULAR; INTRAVENOUS
Status: DISCONTINUED | OUTPATIENT
Start: 2023-07-07 | End: 2023-07-08 | Stop reason: HOSPADM

## 2023-07-07 RX ORDER — DEXTROSE MONOHYDRATE 50 MG/ML
5-250 INJECTION, SOLUTION INTRAVENOUS PRN
Status: DISCONTINUED | OUTPATIENT
Start: 2023-07-07 | End: 2023-07-08 | Stop reason: HOSPADM

## 2023-07-07 RX ADMIN — FLUOROURACIL 5375 MG: 50 INJECTION, SOLUTION INTRAVENOUS at 11:38

## 2023-07-07 RX ADMIN — ATROPINE SULFATE 0.4 MG: 0.4 INJECTION, SOLUTION INTRAVENOUS at 09:37

## 2023-07-07 RX ADMIN — FLUOROURACIL 900 MG: 50 INJECTION, SOLUTION INTRAVENOUS at 11:33

## 2023-07-07 RX ADMIN — FOSAPREPITANT 150 MG: 150 INJECTION, POWDER, LYOPHILIZED, FOR SOLUTION INTRAVENOUS at 09:24

## 2023-07-07 RX ADMIN — IRINOTECAN HYDROCHLORIDE 340 MG: 20 INJECTION, SOLUTION INTRAVENOUS at 09:56

## 2023-07-07 RX ADMIN — SODIUM CHLORIDE, PRESERVATIVE FREE 10 ML: 5 INJECTION INTRAVENOUS at 07:21

## 2023-07-07 RX ADMIN — DEXAMETHASONE SODIUM PHOSPHATE 12 MG: 4 INJECTION, SOLUTION INTRAMUSCULAR; INTRAVENOUS at 09:08

## 2023-07-07 RX ADMIN — ONDANSETRON 8 MG: 2 INJECTION INTRAMUSCULAR; INTRAVENOUS at 09:06

## 2023-07-07 RX ADMIN — SODIUM CHLORIDE, PRESERVATIVE FREE 10 ML: 5 INJECTION INTRAVENOUS at 09:13

## 2023-07-07 RX ADMIN — DEXTROSE MONOHYDRATE 125 ML/HR: 50 INJECTION, SOLUTION INTRAVENOUS at 10:01

## 2023-07-07 RX ADMIN — LEUCOVORIN CALCIUM 900 MG: 350 INJECTION, POWDER, LYOPHILIZED, FOR SOLUTION INTRAMUSCULAR; INTRAVENOUS at 09:55

## 2023-07-07 ASSESSMENT — ENCOUNTER SYMPTOMS
COUGH: 0
ABDOMINAL DISTENTION: 0
BLOOD IN STOOL: 0
SORE THROAT: 0
NAUSEA: 0
BACK PAIN: 0
HEMOPTYSIS: 0
CONSTIPATION: 0
EYE PROBLEMS: 0
SCLERAL ICTERUS: 0
VOMITING: 0
SHORTNESS OF BREATH: 0

## 2023-07-07 ASSESSMENT — PATIENT HEALTH QUESTIONNAIRE - PHQ9
SUM OF ALL RESPONSES TO PHQ QUESTIONS 1-9: 0
SUM OF ALL RESPONSES TO PHQ9 QUESTIONS 1 & 2: 0
1. LITTLE INTEREST OR PLEASURE IN DOING THINGS: 0
SUM OF ALL RESPONSES TO PHQ QUESTIONS 1-9: 0
SUM OF ALL RESPONSES TO PHQ QUESTIONS 1-9: 0
2. FEELING DOWN, DEPRESSED OR HOPELESS: 0
SUM OF ALL RESPONSES TO PHQ QUESTIONS 1-9: 0

## 2023-07-07 NOTE — PROGRESS NOTES
Patient arrived to [address] [facility] for Folfirinox. Assessment completed. No needs voiced at this time. Patient tolerated infusion well and is aware of next appointment on 7/9/2023 @1330. Patient discharged ambulatory with spouse.

## 2023-07-07 NOTE — PROGRESS NOTES
[facility] Hematology and Oncology: Established patient - follow up     Chief Complaint   Patient presents with    Follow-up      Reason for Referral: Abdominal pain; peritoneal metastatic disease   Referring Provider: Mohsen Medel NP   Primary Care Provider: Stephani Eisenberg MD   Family History of Cancer/Hematologic Disorders: Family history is significant for sister with breast cancer. Presenting Symptoms: Progressively worsening, persistent abdominal pain x several months    History of Present Illness:  Mr. Roxann Lagunas  is a 62 y.o. male who presents today for FU regarding adenocarcinoma with peritoneal metastatic disease. The past medical history is significant for tobacco use (recent pack per day smoker x 30+ years - now down to 1/3PPD), PUD, paresthesia/pain of bilateral upper extremities, HLD, HTN, diverticulitis,  colon polyps, hiatal hernia, chronic right shoulder pain, bilateral carpal tunnel syndrome, and partial colon resection. He presented to the [facility] ED on 5/12/22 with a complaint of persistent abdominal pain for several months that was becoming progressively  worse. EGD and colonoscopy on 2/25/22 revealed findings of hiatal hernia, gastric polyps, several benign colon polyps, diverticulosis, and internal hemorrhoids. CT of the abdomen on 3/8/22 showed no acute findings. He presented to [facility] ER x 2 for  evaluation (5/3/22 and 5/10/22). RUQ abdominal ultrasound was completed on 5/3/22 in the ED at [facility] demonstrating no acute findings to explain patient's pain; probable hepatic  steatosis; small echogenic mural foci in the gallbladder which are nonmobile, likely representing cholesterol polyps, largest measuring 4 mm; and small volume simple ascites in the right upper quadrant and left lower quadrant, nonspecific.   CT AP with  contrast at [facility] ED 5/10/22 showed a partial small bowel obstruction; small to moderate amount of free fluid in the abdomen and pelvis; and nonspecific stranding

## 2023-07-07 NOTE — PROGRESS NOTES
Patient arrived to port lab for port access and lab draw   [address] accessed and labs drawn per protocol   *Port remains accessed   Patient discharged from port lab ambulatory

## 2023-07-09 ENCOUNTER — HOSPITAL ENCOUNTER (OUTPATIENT)
Dept: INFUSION THERAPY | Age: 63
Discharge: HOME | End: 2023-07-09
Payer: COMMERCIAL

## 2023-07-09 VITALS
TEMPERATURE: 97.8 F | RESPIRATION RATE: 16 BRPM | SYSTOLIC BLOOD PRESSURE: 121 MMHG | HEART RATE: 88 BPM | DIASTOLIC BLOOD PRESSURE: 72 MMHG

## 2023-07-09 PROCEDURE — 96523 IRRIG DRUG DELIVERY DEVICE: CPT

## 2023-07-09 PROCEDURE — 2580000003 HC RX 258: Performed by: INTERNAL MEDICINE

## 2023-07-09 RX ORDER — SODIUM CHLORIDE 0.9 % (FLUSH) 0.9 %
5-40 SYRINGE (ML) INJECTION PRN
Status: DISCONTINUED | OUTPATIENT
Start: 2023-07-09 | End: 2023-07-10 | Stop reason: HOSPADM

## 2023-07-09 RX ADMIN — SODIUM CHLORIDE, PRESERVATIVE FREE 10 ML: 5 INJECTION INTRAVENOUS at 11:50

## 2023-07-11 ENCOUNTER — HOSPITAL ENCOUNTER (OUTPATIENT)
Dept: CT IMAGING | Age: 63
Discharge: HOME | End: 2023-07-14
Attending: INTERNAL MEDICINE
Payer: COMMERCIAL

## 2023-07-11 DIAGNOSIS — C78.6 MALIGNANT NEOPLASM METASTATIC TO PERITONEUM (HCC): ICD-10-CM

## 2023-07-11 PROCEDURE — 2580000003 HC RX 258: Performed by: INTERNAL MEDICINE

## 2023-07-11 PROCEDURE — 71260 CT THORAX DX C+: CPT

## 2023-07-11 PROCEDURE — 6360000004 HC RX CONTRAST MEDICATION: Performed by: INTERNAL MEDICINE

## 2023-07-11 RX ORDER — 0.9 % SODIUM CHLORIDE 0.9 %
100 INTRAVENOUS SOLUTION INTRAVENOUS
Status: COMPLETED | OUTPATIENT
Start: 2023-07-11 | End: 2023-07-11

## 2023-07-11 RX ORDER — SODIUM CHLORIDE 0.9 % (FLUSH) 0.9 %
10 SYRINGE (ML) INJECTION
Status: COMPLETED | OUTPATIENT
Start: 2023-07-11 | End: 2023-07-11

## 2023-07-11 RX ADMIN — SODIUM CHLORIDE, PRESERVATIVE FREE 10 ML: 5 INJECTION INTRAVENOUS at 11:11

## 2023-07-11 RX ADMIN — DIATRIZOATE MEGLUMINE AND DIATRIZOATE SODIUM 15 ML: 660; 100 LIQUID ORAL; RECTAL at 11:10

## 2023-07-11 RX ADMIN — SODIUM CHLORIDE 100 ML: 9 INJECTION, SOLUTION INTRAVENOUS at 11:11

## 2023-07-11 RX ADMIN — IOPAMIDOL 100 ML: 755 INJECTION, SOLUTION INTRAVENOUS at 11:10

## 2023-07-20 DIAGNOSIS — C76.2 ABDOMINAL CARCINOMATOSIS (HCC): Primary | ICD-10-CM

## 2023-07-20 DIAGNOSIS — E83.42 HYPOMAGNESEMIA: ICD-10-CM

## 2023-07-20 DIAGNOSIS — E87.6 HYPOKALEMIA: ICD-10-CM

## 2023-07-20 DIAGNOSIS — C78.6 MALIGNANT NEOPLASM METASTATIC TO PERITONEUM (HCC): ICD-10-CM

## 2023-07-20 RX ORDER — POTASSIUM CHLORIDE 1500 MG/1
TABLET, EXTENDED RELEASE ORAL
Qty: 90 TABLET | Refills: 0 | Status: SHIPPED | OUTPATIENT
Start: 2023-07-20

## 2023-07-21 ENCOUNTER — HOSPITAL ENCOUNTER (OUTPATIENT)
Dept: INFUSION THERAPY | Age: 63
Discharge: HOME | End: 2023-07-21
Payer: COMMERCIAL

## 2023-07-21 ENCOUNTER — OFFICE VISIT (OUTPATIENT)
Dept: ONCOLOGY | Age: 63
End: 2023-07-21
Payer: COMMERCIAL

## 2023-07-21 VITALS
DIASTOLIC BLOOD PRESSURE: 79 MMHG | BODY MASS INDEX: 34.36 KG/M2 | HEIGHT: 70 IN | HEART RATE: 83 BPM | TEMPERATURE: 97.7 F | SYSTOLIC BLOOD PRESSURE: 118 MMHG | OXYGEN SATURATION: 98 % | WEIGHT: 240 LBS | RESPIRATION RATE: 16 BRPM

## 2023-07-21 DIAGNOSIS — C76.2 ABDOMINAL CARCINOMATOSIS (HCC): ICD-10-CM

## 2023-07-21 DIAGNOSIS — E83.42 HYPOMAGNESEMIA: ICD-10-CM

## 2023-07-21 DIAGNOSIS — T45.1X5A NEUROPATHY DUE TO CHEMOTHERAPEUTIC DRUG (HCC): ICD-10-CM

## 2023-07-21 DIAGNOSIS — C78.6 MALIGNANT NEOPLASM METASTATIC TO PERITONEUM (HCC): ICD-10-CM

## 2023-07-21 DIAGNOSIS — C78.6 MALIGNANT NEOPLASM METASTATIC TO PERITONEUM (HCC): Primary | ICD-10-CM

## 2023-07-21 DIAGNOSIS — C76.2 ABDOMINAL CARCINOMATOSIS (HCC): Primary | ICD-10-CM

## 2023-07-21 DIAGNOSIS — G62.0 NEUROPATHY DUE TO CHEMOTHERAPEUTIC DRUG (HCC): ICD-10-CM

## 2023-07-21 LAB
ALBUMIN SERPL-MCNC: 3.2 G/DL (ref 3.2–4.6)
ALBUMIN/GLOB SERPL: 1 (ref 0.4–1.6)
ALP SERPL-CCNC: 135 U/L (ref 50–136)
ALT SERPL-CCNC: 40 U/L (ref 12–65)
ANION GAP SERPL CALC-SCNC: 6 MMOL/L (ref 2–11)
AST SERPL-CCNC: 22 U/L (ref 15–37)
BASOPHILS # BLD: 0 K/UL (ref 0–0.2)
BASOPHILS NFR BLD: 1 % (ref 0–2)
BILIRUB SERPL-MCNC: 0.3 MG/DL (ref 0.2–1.1)
BUN SERPL-MCNC: 11 MG/DL (ref 8–23)
CALCIUM SERPL-MCNC: 8.6 MG/DL (ref 8.3–10.4)
CHLORIDE SERPL-SCNC: 114 MMOL/L (ref 101–110)
CO2 SERPL-SCNC: 26 MMOL/L (ref 21–32)
CREAT SERPL-MCNC: 0.9 MG/DL (ref 0.8–1.5)
DIFFERENTIAL METHOD BLD: ABNORMAL
EOSINOPHIL # BLD: 0.1 K/UL (ref 0–0.8)
EOSINOPHIL NFR BLD: 3 % (ref 0.5–7.8)
ERYTHROCYTE [DISTWIDTH] IN BLOOD BY AUTOMATED COUNT: 15.4 % (ref 11.9–14.6)
FERRITIN SERPL-MCNC: 505 NG/ML (ref 8–388)
GLOBULIN SER CALC-MCNC: 3.1 G/DL (ref 2.8–4.5)
GLUCOSE SERPL-MCNC: 147 MG/DL (ref 65–100)
HCT VFR BLD AUTO: 34.3 % (ref 41.1–50.3)
HGB BLD-MCNC: 10.7 G/DL (ref 13.6–17.2)
IMM GRANULOCYTES # BLD AUTO: 0 K/UL (ref 0–0.5)
IMM GRANULOCYTES NFR BLD AUTO: 0 % (ref 0–5)
IRON SATN MFR SERPL: 25 %
IRON SERPL-MCNC: 62 UG/DL (ref 35–150)
LYMPHOCYTES # BLD: 1.7 K/UL (ref 0.5–4.6)
LYMPHOCYTES NFR BLD: 38 % (ref 13–44)
MAGNESIUM SERPL-MCNC: 1.8 MG/DL (ref 1.8–2.4)
MCH RBC QN AUTO: 29 PG (ref 26.1–32.9)
MCHC RBC AUTO-ENTMCNC: 31.2 G/DL (ref 31.4–35)
MCV RBC AUTO: 93 FL (ref 82–102)
MONOCYTES # BLD: 0.3 K/UL (ref 0.1–1.3)
MONOCYTES NFR BLD: 7 % (ref 4–12)
NEUTS SEG # BLD: 2.3 K/UL (ref 1.7–8.2)
NEUTS SEG NFR BLD: 51 % (ref 43–78)
NRBC # BLD: 0 K/UL (ref 0–0.2)
PLATELET # BLD AUTO: 172 K/UL (ref 150–450)
PMV BLD AUTO: 10.8 FL (ref 9.4–12.3)
POTASSIUM SERPL-SCNC: 3.8 MMOL/L (ref 3.5–5.1)
PROT SERPL-MCNC: 6.3 G/DL (ref 6.3–8.2)
RBC # BLD AUTO: 3.69 M/UL (ref 4.23–5.6)
SODIUM SERPL-SCNC: 146 MMOL/L (ref 133–143)
TIBC SERPL-MCNC: 246 UG/DL (ref 250–450)
WBC # BLD AUTO: 4.5 K/UL (ref 4.3–11.1)

## 2023-07-21 PROCEDURE — 99214 OFFICE O/P EST MOD 30 MIN: CPT | Performed by: NURSE PRACTITIONER

## 2023-07-21 PROCEDURE — 36591 DRAW BLOOD OFF VENOUS DEVICE: CPT

## 2023-07-21 PROCEDURE — 96375 TX/PRO/DX INJ NEW DRUG ADDON: CPT

## 2023-07-21 PROCEDURE — 96367 TX/PROPH/DG ADDL SEQ IV INF: CPT

## 2023-07-21 PROCEDURE — 96372 THER/PROPH/DIAG INJ SC/IM: CPT

## 2023-07-21 PROCEDURE — 3074F SYST BP LT 130 MM HG: CPT | Performed by: NURSE PRACTITIONER

## 2023-07-21 PROCEDURE — 85025 COMPLETE CBC W/AUTO DIFF WBC: CPT

## 2023-07-21 PROCEDURE — 83735 ASSAY OF MAGNESIUM: CPT

## 2023-07-21 PROCEDURE — 96413 CHEMO IV INFUSION 1 HR: CPT

## 2023-07-21 PROCEDURE — 2580000003 HC RX 258: Performed by: NURSE PRACTITIONER

## 2023-07-21 PROCEDURE — 83540 ASSAY OF IRON: CPT

## 2023-07-21 PROCEDURE — 82728 ASSAY OF FERRITIN: CPT

## 2023-07-21 PROCEDURE — 83550 IRON BINDING TEST: CPT

## 2023-07-21 PROCEDURE — 80053 COMPREHEN METABOLIC PANEL: CPT

## 2023-07-21 PROCEDURE — 96368 THER/DIAG CONCURRENT INF: CPT

## 2023-07-21 PROCEDURE — 2580000003 HC RX 258: Performed by: INTERNAL MEDICINE

## 2023-07-21 PROCEDURE — 6360000002 HC RX W HCPCS: Performed by: NURSE PRACTITIONER

## 2023-07-21 PROCEDURE — 96411 CHEMO IV PUSH ADDL DRUG: CPT

## 2023-07-21 PROCEDURE — G0498 CHEMO EXTEND IV INFUS W/PUMP: HCPCS

## 2023-07-21 PROCEDURE — 3078F DIAST BP <80 MM HG: CPT | Performed by: NURSE PRACTITIONER

## 2023-07-21 RX ORDER — SODIUM CHLORIDE 0.9 % (FLUSH) 0.9 %
5-40 SYRINGE (ML) INJECTION PRN
Status: CANCELLED | OUTPATIENT
Start: 2023-07-21

## 2023-07-21 RX ORDER — HEPARIN SODIUM (PORCINE) LOCK FLUSH IV SOLN 100 UNIT/ML 100 UNIT/ML
500 SOLUTION INTRAVENOUS PRN
Status: CANCELLED | OUTPATIENT
Start: 2023-07-23

## 2023-07-21 RX ORDER — SODIUM CHLORIDE 0.9 % (FLUSH) 0.9 %
5-40 SYRINGE (ML) INJECTION PRN
Status: DISCONTINUED | OUTPATIENT
Start: 2023-07-21 | End: 2023-07-22 | Stop reason: HOSPADM

## 2023-07-21 RX ORDER — SODIUM CHLORIDE 0.9 % (FLUSH) 0.9 %
5-40 SYRINGE (ML) INJECTION PRN
Status: CANCELLED | OUTPATIENT
Start: 2023-07-23

## 2023-07-21 RX ORDER — DIPHENHYDRAMINE HYDROCHLORIDE 50 MG/ML
50 INJECTION INTRAMUSCULAR; INTRAVENOUS
Status: CANCELLED | OUTPATIENT
Start: 2023-07-21

## 2023-07-21 RX ORDER — FLUOROURACIL 50 MG/ML
400 INJECTION, SOLUTION INTRAVENOUS ONCE
Status: COMPLETED | OUTPATIENT
Start: 2023-07-21 | End: 2023-07-21

## 2023-07-21 RX ORDER — ALBUTEROL SULFATE 90 UG/1
4 AEROSOL, METERED RESPIRATORY (INHALATION) PRN
Status: CANCELLED | OUTPATIENT
Start: 2023-07-21

## 2023-07-21 RX ORDER — FAMOTIDINE 10 MG/ML
20 INJECTION, SOLUTION INTRAVENOUS
Status: CANCELLED | OUTPATIENT
Start: 2023-07-21

## 2023-07-21 RX ORDER — HEPARIN SODIUM (PORCINE) LOCK FLUSH IV SOLN 100 UNIT/ML 100 UNIT/ML
500 SOLUTION INTRAVENOUS PRN
Status: CANCELLED | OUTPATIENT
Start: 2023-07-21

## 2023-07-21 RX ORDER — DIPHENHYDRAMINE HYDROCHLORIDE 50 MG/ML
50 INJECTION INTRAMUSCULAR; INTRAVENOUS
Status: DISCONTINUED | OUTPATIENT
Start: 2023-07-21 | End: 2023-07-22 | Stop reason: HOSPADM

## 2023-07-21 RX ORDER — ATROPINE SULFATE 0.4 MG/ML
0.4 INJECTION, SOLUTION INTRAMUSCULAR; INTRAVENOUS; SUBCUTANEOUS
Status: CANCELLED | OUTPATIENT
Start: 2023-07-21

## 2023-07-21 RX ORDER — ALBUTEROL SULFATE 90 UG/1
4 AEROSOL, METERED RESPIRATORY (INHALATION) PRN
Status: DISCONTINUED | OUTPATIENT
Start: 2023-07-21 | End: 2023-07-22 | Stop reason: HOSPADM

## 2023-07-21 RX ORDER — SODIUM CHLORIDE 9 MG/ML
5-250 INJECTION, SOLUTION INTRAVENOUS PRN
Status: CANCELLED | OUTPATIENT
Start: 2023-07-21

## 2023-07-21 RX ORDER — ONDANSETRON 2 MG/ML
8 INJECTION INTRAMUSCULAR; INTRAVENOUS ONCE
Status: COMPLETED | OUTPATIENT
Start: 2023-07-21 | End: 2023-07-21

## 2023-07-21 RX ORDER — ATROPINE SULFATE 0.4 MG/ML
0.4 INJECTION, SOLUTION INTRAVENOUS ONCE
Status: COMPLETED | OUTPATIENT
Start: 2023-07-21 | End: 2023-07-21

## 2023-07-21 RX ORDER — MEPERIDINE HYDROCHLORIDE 50 MG/ML
12.5 INJECTION INTRAMUSCULAR; INTRAVENOUS; SUBCUTANEOUS PRN
Status: CANCELLED | OUTPATIENT
Start: 2023-07-21

## 2023-07-21 RX ORDER — ACETAMINOPHEN 325 MG/1
650 TABLET ORAL
Status: DISCONTINUED | OUTPATIENT
Start: 2023-07-21 | End: 2023-07-22 | Stop reason: HOSPADM

## 2023-07-21 RX ORDER — ACETAMINOPHEN 325 MG/1
650 TABLET ORAL
Status: CANCELLED | OUTPATIENT
Start: 2023-07-21

## 2023-07-21 RX ORDER — ONDANSETRON 2 MG/ML
8 INJECTION INTRAMUSCULAR; INTRAVENOUS ONCE
Status: CANCELLED | OUTPATIENT
Start: 2023-07-21 | End: 2023-07-21

## 2023-07-21 RX ORDER — FLUOROURACIL 50 MG/ML
400 INJECTION, SOLUTION INTRAVENOUS ONCE
Status: CANCELLED | OUTPATIENT
Start: 2023-07-21 | End: 2023-07-21

## 2023-07-21 RX ORDER — DEXTROSE MONOHYDRATE 50 MG/ML
5-250 INJECTION, SOLUTION INTRAVENOUS PRN
Status: DISCONTINUED | OUTPATIENT
Start: 2023-07-21 | End: 2023-07-22 | Stop reason: HOSPADM

## 2023-07-21 RX ORDER — SODIUM CHLORIDE 9 MG/ML
5-250 INJECTION, SOLUTION INTRAVENOUS PRN
Status: CANCELLED | OUTPATIENT
Start: 2023-07-23

## 2023-07-21 RX ORDER — SODIUM CHLORIDE 9 MG/ML
INJECTION, SOLUTION INTRAVENOUS CONTINUOUS
Status: CANCELLED | OUTPATIENT
Start: 2023-07-21

## 2023-07-21 RX ORDER — EPINEPHRINE 1 MG/ML
0.3 INJECTION, SOLUTION, CONCENTRATE INTRAVENOUS PRN
Status: CANCELLED | OUTPATIENT
Start: 2023-07-21

## 2023-07-21 RX ORDER — ONDANSETRON 2 MG/ML
8 INJECTION INTRAMUSCULAR; INTRAVENOUS
Status: DISCONTINUED | OUTPATIENT
Start: 2023-07-21 | End: 2023-07-22 | Stop reason: HOSPADM

## 2023-07-21 RX ORDER — MEPERIDINE HYDROCHLORIDE 25 MG/ML
12.5 INJECTION INTRAMUSCULAR; INTRAVENOUS; SUBCUTANEOUS PRN
Status: DISCONTINUED | OUTPATIENT
Start: 2023-07-21 | End: 2023-07-22 | Stop reason: HOSPADM

## 2023-07-21 RX ORDER — DEXTROSE MONOHYDRATE 50 MG/ML
5-250 INJECTION, SOLUTION INTRAVENOUS PRN
Status: CANCELLED | OUTPATIENT
Start: 2023-07-21

## 2023-07-21 RX ORDER — ATROPINE SULFATE 0.4 MG/ML
0.4 INJECTION, SOLUTION INTRAMUSCULAR; INTRAVENOUS; SUBCUTANEOUS ONCE
Status: CANCELLED | OUTPATIENT
Start: 2023-07-21 | End: 2023-07-21

## 2023-07-21 RX ORDER — ONDANSETRON 2 MG/ML
8 INJECTION INTRAMUSCULAR; INTRAVENOUS
Status: CANCELLED | OUTPATIENT
Start: 2023-07-21

## 2023-07-21 RX ORDER — EPINEPHRINE 1 MG/ML
0.3 INJECTION, SOLUTION, CONCENTRATE INTRAVENOUS PRN
Status: DISCONTINUED | OUTPATIENT
Start: 2023-07-21 | End: 2023-07-22 | Stop reason: HOSPADM

## 2023-07-21 RX ADMIN — ATROPINE SULFATE 0.4 MG: 0.4 INJECTION, SOLUTION INTRAVENOUS at 11:35

## 2023-07-21 RX ADMIN — SODIUM CHLORIDE, PRESERVATIVE FREE 10 ML: 5 INJECTION INTRAVENOUS at 11:00

## 2023-07-21 RX ADMIN — IRINOTECAN HYDROCHLORIDE 340 MG: 20 INJECTION, SOLUTION INTRAVENOUS at 12:04

## 2023-07-21 RX ADMIN — DEXTROSE MONOHYDRATE 5 ML/HR: 50 INJECTION, SOLUTION INTRAVENOUS at 11:01

## 2023-07-21 RX ADMIN — FLUOROURACIL 900 MG: 50 INJECTION, SOLUTION INTRAVENOUS at 13:56

## 2023-07-21 RX ADMIN — FOSAPREPITANT 150 MG: 150 INJECTION, POWDER, LYOPHILIZED, FOR SOLUTION INTRAVENOUS at 11:36

## 2023-07-21 RX ADMIN — ONDANSETRON 8 MG: 2 INJECTION INTRAMUSCULAR; INTRAVENOUS at 11:10

## 2023-07-21 RX ADMIN — LEUCOVORIN CALCIUM 900 MG: 500 INJECTION, POWDER, LYOPHILIZED, FOR SOLUTION INTRAMUSCULAR; INTRAVENOUS at 12:03

## 2023-07-21 RX ADMIN — SODIUM CHLORIDE, PRESERVATIVE FREE 10 ML: 5 INJECTION INTRAVENOUS at 09:20

## 2023-07-21 RX ADMIN — DEXAMETHASONE SODIUM PHOSPHATE 12 MG: 4 INJECTION, SOLUTION INTRAMUSCULAR; INTRAVENOUS at 11:13

## 2023-07-21 RX ADMIN — FLUOROURACIL 5375 MG: 50 INJECTION, SOLUTION INTRAVENOUS at 14:00

## 2023-07-21 ASSESSMENT — ENCOUNTER SYMPTOMS
EYE PROBLEMS: 0
SHORTNESS OF BREATH: 0
VOMITING: 0
BLOOD IN STOOL: 0
SCLERAL ICTERUS: 0
HEMOPTYSIS: 0
BACK PAIN: 0
CONSTIPATION: 0
NAUSEA: 0
ABDOMINAL DISTENTION: 0
SORE THROAT: 0
COUGH: 0

## 2023-07-21 NOTE — PROGRESS NOTES
Arrived to the [address] [place] [address]. Irinotecan, leucovorin and adrucil completed. Patient tolerated well. Patient discharged home with adrucil elastomeric infusion. All connections verified. Any issues or concerns during appointment: none. Patient aware of next infusion appointment on 7/23/2023 (date) at 1330 (time). Patient aware of next lab and [facility] office visit on 8/4/2023 (date) at 0730 (time). Patient instructed to call provider with temperature of 100.4 or greater or nausea/vomiting/ diarrhea or pain not controlled by medications  Discharged ambulatory.

## 2023-07-21 NOTE — PROGRESS NOTES
[facility] Hematology and Oncology: Established patient - follow up     Chief Complaint   Patient presents with    Follow-up      Reason for Referral: Abdominal pain; peritoneal metastatic disease   Referring Provider: Rosa Smalls NP   Primary Care Provider: Ingris Vuong MD   Family History of Cancer/Hematologic Disorders: Family history is significant for sister with breast cancer. Presenting Symptoms: Progressively worsening, persistent abdominal pain x several months    History of Present Illness:  Mr. Katharina Muñiz  is a 62 y.o. male who presents today for FU regarding adenocarcinoma with peritoneal metastatic disease. The past medical history is significant for tobacco use (recent pack per day smoker x 30+ years - now down to 1/3PPD), PUD, paresthesia/pain of bilateral upper extremities, HLD, HTN, diverticulitis,  colon polyps, hiatal hernia, chronic right shoulder pain, bilateral carpal tunnel syndrome, and partial colon resection. He presented to the [facility] ED on 5/12/22 with a complaint of persistent abdominal pain for several months that was becoming progressively  worse. EGD and colonoscopy on 2/25/22 revealed findings of hiatal hernia, gastric polyps, several benign colon polyps, diverticulosis, and internal hemorrhoids. CT of the abdomen on 3/8/22 showed no acute findings. He presented to [facility] ER x 2 for  evaluation (5/3/22 and 5/10/22). RUQ abdominal ultrasound was completed on 5/3/22 in the ED at [facility] demonstrating no acute findings to explain patient's pain; probable hepatic  steatosis; small echogenic mural foci in the gallbladder which are nonmobile, likely representing cholesterol polyps, largest measuring 4 mm; and small volume simple ascites in the right upper quadrant and left lower quadrant, nonspecific.   CT AP with  contrast at [facility] ED 5/10/22 showed a partial small bowel obstruction; small to moderate amount of free fluid in the abdomen and pelvis; and nonspecific stranding

## 2023-07-21 NOTE — PROGRESS NOTES
Patient arrived to port lab for port access and lab draw   [address] accessed and labs drawn per protocol   Port remains accessed. Patient discharged from port lab ambulatory.

## 2023-07-23 ENCOUNTER — HOSPITAL ENCOUNTER (OUTPATIENT)
Dept: INFUSION THERAPY | Age: 63
Discharge: HOME | End: 2023-07-23
Payer: COMMERCIAL

## 2023-07-23 VITALS
RESPIRATION RATE: 15 BRPM | DIASTOLIC BLOOD PRESSURE: 93 MMHG | SYSTOLIC BLOOD PRESSURE: 148 MMHG | HEART RATE: 84 BPM | TEMPERATURE: 98.1 F

## 2023-07-23 DIAGNOSIS — C78.6 MALIGNANT NEOPLASM METASTATIC TO PERITONEUM (HCC): ICD-10-CM

## 2023-07-23 DIAGNOSIS — C76.2 ABDOMINAL CARCINOMATOSIS (HCC): Primary | ICD-10-CM

## 2023-07-23 PROCEDURE — 96523 IRRIG DRUG DELIVERY DEVICE: CPT

## 2023-07-23 PROCEDURE — 2580000003 HC RX 258: Performed by: NURSE PRACTITIONER

## 2023-07-23 RX ORDER — SODIUM CHLORIDE 0.9 % (FLUSH) 0.9 %
5-40 SYRINGE (ML) INJECTION PRN
Status: DISCONTINUED | OUTPATIENT
Start: 2023-07-23 | End: 2023-07-24 | Stop reason: HOSPADM

## 2023-07-23 RX ADMIN — SODIUM CHLORIDE, PRESERVATIVE FREE 10 ML: 5 INJECTION INTRAVENOUS at 12:01

## 2023-07-23 NOTE — PROGRESS NOTES
Arrived to the [address] [place] [address]. Pump d/c completed. Provided education on side effects    Patient instructed to report any side affects to ordering provider. Patient tolerated well. Any issues or concerns during appointment: no  Patient aware of next infusion appointment on 8/4/2023 (date) at 0830 (time). Discharged ambulatory.

## 2023-07-23 NOTE — PROGRESS NOTES
Patient arrived to [facility] for DC pump removal. Port flushed and de-accessed. Patient tolerated well and is aware of next appointment on 8/4/2023 @5276. Patient discharged ambulatory.

## 2023-07-27 ENCOUNTER — HOSPITAL ENCOUNTER (OUTPATIENT)
Dept: ULTRASOUND IMAGING | Age: 63
Discharge: HOME | End: 2023-07-30
Attending: INTERNAL MEDICINE

## 2023-07-27 DIAGNOSIS — I82.721 CHRONIC DEEP VEIN THROMBOSIS (DVT) OF RIGHT UPPER EXTREMITY, UNSPECIFIED VEIN (HCC): ICD-10-CM

## 2023-08-01 DIAGNOSIS — C78.6 MALIGNANT NEOPLASM METASTATIC TO PERITONEUM (HCC): Primary | ICD-10-CM

## 2023-08-01 DIAGNOSIS — Z79.899 HIGH RISK MEDICATION USE: ICD-10-CM

## 2023-08-01 ASSESSMENT — ENCOUNTER SYMPTOMS
SORE THROAT: 0
HEMOPTYSIS: 0
EYE PROBLEMS: 0
COUGH: 0
BLOOD IN STOOL: 0
CONSTIPATION: 0
ABDOMINAL DISTENTION: 0
VOMITING: 0
SCLERAL ICTERUS: 0
BACK PAIN: 0
SHORTNESS OF BREATH: 0

## 2023-08-01 NOTE — PROGRESS NOTES
of the omentum primarily in the left upper quadrant. Radiologist noted that follow-up  CT was recommended to differentiate passive congestion from omental disease. On 5/11/22, abdominal series again showed multiple dilated small bowel loops with enteric contrast appearing to extend into the proximal colon, suggesting partial small  bowel obstruction. CT AP in the [facility] ED on 5/12/22 identified extensive peritoneal nodularity and mild ascites consistent with peritoneal  metastatic disease with primary lesion not definitely identified; dilated fluid-filled loops of small bowel possibly related to gastroenteritis with no definite obstruction and no obvious bowel mass; and sclerosis of S1 vertebral body. Radiologist recommended consideration of follow-up bone scan to assess for bone metastases. Patient was admitted to  the Hospitalist service on 5/12/22, and IR consulted for possible paracentesis however very small volume was inaccessible. CT Chest obtained on 5/13/22 showed No evidence of primary malignancy or metastasis within the chest.  Follow-up hepatobiliary  scan was suggested to assess for common bile duct obstruction. Oncology was consulted but did not see patient inhouse as pt was dischared. Upon discharge on 5/14/22, patient was instructed to follow up with [facility]. During consultation, we discussed his workup. We looked at the images together demonstrating some of the findings concerning for peritoneal nodularity/peritoneal disease. Discussed anatomy of the findings utilizing images to help pt understand what we are looking at and suspecting. He and wife were appreciative of the time spent to review these. We also addressed pt's pain. We also reviewed images of sclerosing lesion - bone scan recommended. He also was recommended to undergo HIDA scan after recent US per PCP. He is tired of tests, frustrated. Feelings validated and stressed importance of workup to determine why he has pain.   Wt

## 2023-08-04 ENCOUNTER — HOSPITAL ENCOUNTER (OUTPATIENT)
Dept: INFUSION THERAPY | Age: 63
Discharge: HOME | End: 2023-08-04
Payer: COMMERCIAL

## 2023-08-04 ENCOUNTER — OFFICE VISIT (OUTPATIENT)
Dept: ONCOLOGY | Age: 63
End: 2023-08-04
Payer: COMMERCIAL

## 2023-08-04 VITALS
WEIGHT: 226 LBS | HEIGHT: 70 IN | RESPIRATION RATE: 16 BRPM | BODY MASS INDEX: 32.35 KG/M2 | TEMPERATURE: 98.8 F | HEART RATE: 93 BPM | OXYGEN SATURATION: 97 % | SYSTOLIC BLOOD PRESSURE: 127 MMHG | DIASTOLIC BLOOD PRESSURE: 77 MMHG

## 2023-08-04 DIAGNOSIS — Z45.2 PICC (PERIPHERALLY INSERTED CENTRAL CATHETER) FLUSH: ICD-10-CM

## 2023-08-04 DIAGNOSIS — E83.42 HYPOMAGNESEMIA: ICD-10-CM

## 2023-08-04 DIAGNOSIS — E87.6 HYPOKALEMIA: Primary | ICD-10-CM

## 2023-08-04 DIAGNOSIS — R19.7 DIARRHEA, UNSPECIFIED TYPE: ICD-10-CM

## 2023-08-04 DIAGNOSIS — E86.0 DEHYDRATION: ICD-10-CM

## 2023-08-04 DIAGNOSIS — E87.6 HYPOKALEMIA: ICD-10-CM

## 2023-08-04 DIAGNOSIS — C78.6 MALIGNANT NEOPLASM METASTATIC TO PERITONEUM (HCC): ICD-10-CM

## 2023-08-04 DIAGNOSIS — Z79.899 HIGH RISK MEDICATION USE: ICD-10-CM

## 2023-08-04 DIAGNOSIS — C76.2 ABDOMINAL CARCINOMATOSIS (HCC): ICD-10-CM

## 2023-08-04 DIAGNOSIS — C16.9 MALIGNANT NEOPLASM OF STOMACH, UNSPECIFIED LOCATION (HCC): Primary | ICD-10-CM

## 2023-08-04 LAB
ALBUMIN SERPL-MCNC: 3.5 G/DL (ref 3.2–4.6)
ALBUMIN/GLOB SERPL: 1.1 (ref 0.4–1.6)
ALP SERPL-CCNC: 124 U/L (ref 50–136)
ALT SERPL-CCNC: 44 U/L (ref 12–65)
ANION GAP SERPL CALC-SCNC: 6 MMOL/L (ref 2–11)
AST SERPL-CCNC: 20 U/L (ref 15–37)
BASOPHILS # BLD: 0.1 K/UL (ref 0–0.2)
BASOPHILS NFR BLD: 1 % (ref 0–2)
BILIRUB SERPL-MCNC: 0.8 MG/DL (ref 0.2–1.1)
BUN SERPL-MCNC: 13 MG/DL (ref 8–23)
CALCIUM SERPL-MCNC: 8.2 MG/DL (ref 8.3–10.4)
CHLORIDE SERPL-SCNC: 114 MMOL/L (ref 101–110)
CO2 SERPL-SCNC: 21 MMOL/L (ref 21–32)
CREAT SERPL-MCNC: 1.2 MG/DL (ref 0.8–1.5)
DIFFERENTIAL METHOD BLD: ABNORMAL
EOSINOPHIL # BLD: 0.1 K/UL (ref 0–0.8)
EOSINOPHIL NFR BLD: 2 % (ref 0.5–7.8)
ERYTHROCYTE [DISTWIDTH] IN BLOOD BY AUTOMATED COUNT: 14.7 % (ref 11.9–14.6)
GLOBULIN SER CALC-MCNC: 3.3 G/DL (ref 2.8–4.5)
GLUCOSE SERPL-MCNC: 134 MG/DL (ref 65–100)
HCT VFR BLD AUTO: 36 % (ref 41.1–50.3)
HGB BLD-MCNC: 11.6 G/DL (ref 13.6–17.2)
IMM GRANULOCYTES # BLD AUTO: 0 K/UL (ref 0–0.5)
IMM GRANULOCYTES NFR BLD AUTO: 0 % (ref 0–5)
LYMPHOCYTES # BLD: 2.1 K/UL (ref 0.5–4.6)
LYMPHOCYTES NFR BLD: 31 % (ref 13–44)
MAGNESIUM SERPL-MCNC: 1.5 MG/DL (ref 1.8–2.4)
MCH RBC QN AUTO: 29.1 PG (ref 26.1–32.9)
MCHC RBC AUTO-ENTMCNC: 32.2 G/DL (ref 31.4–35)
MCV RBC AUTO: 90.2 FL (ref 82–102)
MONOCYTES # BLD: 0.8 K/UL (ref 0.1–1.3)
MONOCYTES NFR BLD: 12 % (ref 4–12)
NEUTS SEG # BLD: 3.7 K/UL (ref 1.7–8.2)
NEUTS SEG NFR BLD: 54 % (ref 43–78)
NRBC # BLD: 0 K/UL (ref 0–0.2)
PLATELET # BLD AUTO: 190 K/UL (ref 150–450)
PMV BLD AUTO: 10.3 FL (ref 9.4–12.3)
POTASSIUM SERPL-SCNC: 2.7 MMOL/L (ref 3.5–5.1)
PROT SERPL-MCNC: 6.8 G/DL (ref 6.3–8.2)
RBC # BLD AUTO: 3.99 M/UL (ref 4.23–5.6)
SODIUM SERPL-SCNC: 141 MMOL/L (ref 133–143)
WBC # BLD AUTO: 6.7 K/UL (ref 4.3–11.1)

## 2023-08-04 PROCEDURE — 99215 OFFICE O/P EST HI 40 MIN: CPT | Performed by: INTERNAL MEDICINE

## 2023-08-04 PROCEDURE — 2580000003 HC RX 258: Performed by: INTERNAL MEDICINE

## 2023-08-04 PROCEDURE — 83735 ASSAY OF MAGNESIUM: CPT

## 2023-08-04 PROCEDURE — 80053 COMPREHEN METABOLIC PANEL: CPT

## 2023-08-04 PROCEDURE — 85025 COMPLETE CBC W/AUTO DIFF WBC: CPT

## 2023-08-04 PROCEDURE — 96365 THER/PROPH/DIAG IV INF INIT: CPT

## 2023-08-04 PROCEDURE — 96368 THER/DIAG CONCURRENT INF: CPT

## 2023-08-04 PROCEDURE — 6360000002 HC RX W HCPCS: Performed by: INTERNAL MEDICINE

## 2023-08-04 PROCEDURE — 3078F DIAST BP <80 MM HG: CPT | Performed by: INTERNAL MEDICINE

## 2023-08-04 PROCEDURE — 96367 TX/PROPH/DG ADDL SEQ IV INF: CPT

## 2023-08-04 PROCEDURE — 3074F SYST BP LT 130 MM HG: CPT | Performed by: INTERNAL MEDICINE

## 2023-08-04 PROCEDURE — 96366 THER/PROPH/DIAG IV INF ADDON: CPT

## 2023-08-04 PROCEDURE — 6370000000 HC RX 637 (ALT 250 FOR IP): Performed by: INTERNAL MEDICINE

## 2023-08-04 PROCEDURE — 36591 DRAW BLOOD OFF VENOUS DEVICE: CPT

## 2023-08-04 RX ORDER — POTASSIUM CHLORIDE 7.45 MG/ML
10 INJECTION INTRAVENOUS
Status: COMPLETED | OUTPATIENT
Start: 2023-08-04 | End: 2023-08-04

## 2023-08-04 RX ORDER — POTASSIUM CHLORIDE 29.8 MG/ML
20 INJECTION INTRAVENOUS ONCE
Status: CANCELLED | OUTPATIENT
Start: 2023-08-04 | End: 2023-08-04

## 2023-08-04 RX ORDER — 0.9 % SODIUM CHLORIDE 0.9 %
1000 INTRAVENOUS SOLUTION INTRAVENOUS ONCE
Status: CANCELLED
Start: 2023-08-04 | End: 2023-08-04

## 2023-08-04 RX ORDER — EPINEPHRINE 1 MG/ML
0.3 INJECTION, SOLUTION, CONCENTRATE INTRAVENOUS PRN
Status: CANCELLED | OUTPATIENT
Start: 2023-08-04

## 2023-08-04 RX ORDER — CALCIUM GLUCONATE 20 MG/ML
1000 INJECTION, SOLUTION INTRAVENOUS ONCE
Status: CANCELLED
Start: 2023-08-04 | End: 2023-08-04

## 2023-08-04 RX ORDER — DIPHENHYDRAMINE HYDROCHLORIDE 50 MG/ML
50 INJECTION INTRAMUSCULAR; INTRAVENOUS
Status: CANCELLED | OUTPATIENT
Start: 2023-08-04

## 2023-08-04 RX ORDER — SODIUM CHLORIDE 0.9 % (FLUSH) 0.9 %
5-40 SYRINGE (ML) INJECTION PRN
Status: CANCELLED | OUTPATIENT
Start: 2023-08-04

## 2023-08-04 RX ORDER — SODIUM CHLORIDE 9 MG/ML
5-250 INJECTION, SOLUTION INTRAVENOUS PRN
Status: CANCELLED | OUTPATIENT
Start: 2023-08-04

## 2023-08-04 RX ORDER — POTASSIUM CHLORIDE 750 MG/1
20 TABLET, EXTENDED RELEASE ORAL ONCE
Status: CANCELLED | OUTPATIENT
Start: 2023-08-04 | End: 2023-08-04

## 2023-08-04 RX ORDER — ALBUTEROL SULFATE 90 UG/1
4 AEROSOL, METERED RESPIRATORY (INHALATION) PRN
Status: CANCELLED | OUTPATIENT
Start: 2023-08-04

## 2023-08-04 RX ORDER — SODIUM CHLORIDE 9 MG/ML
INJECTION, SOLUTION INTRAVENOUS CONTINUOUS
Status: CANCELLED | OUTPATIENT
Start: 2023-08-04

## 2023-08-04 RX ORDER — DIPHENHYDRAMINE HYDROCHLORIDE 50 MG/ML
50 INJECTION INTRAMUSCULAR; INTRAVENOUS
Status: DISCONTINUED | OUTPATIENT
Start: 2023-08-04 | End: 2023-08-05 | Stop reason: HOSPADM

## 2023-08-04 RX ORDER — POTASSIUM CHLORIDE 750 MG/1
20 TABLET, EXTENDED RELEASE ORAL ONCE
Status: COMPLETED | OUTPATIENT
Start: 2023-08-04 | End: 2023-08-04

## 2023-08-04 RX ORDER — FAMOTIDINE 10 MG/ML
20 INJECTION, SOLUTION INTRAVENOUS
Status: CANCELLED | OUTPATIENT
Start: 2023-08-04

## 2023-08-04 RX ORDER — ONDANSETRON 2 MG/ML
8 INJECTION INTRAMUSCULAR; INTRAVENOUS
Status: CANCELLED | OUTPATIENT
Start: 2023-08-04

## 2023-08-04 RX ORDER — ACETAMINOPHEN 325 MG/1
650 TABLET ORAL
Status: CANCELLED | OUTPATIENT
Start: 2023-08-04

## 2023-08-04 RX ORDER — SODIUM CHLORIDE 0.9 % (FLUSH) 0.9 %
10 SYRINGE (ML) INJECTION PRN
Status: DISCONTINUED | OUTPATIENT
Start: 2023-08-04 | End: 2023-08-04 | Stop reason: HOSPADM

## 2023-08-04 RX ORDER — SODIUM CHLORIDE 0.9 % (FLUSH) 0.9 %
5-40 SYRINGE (ML) INJECTION PRN
Status: DISCONTINUED | OUTPATIENT
Start: 2023-08-04 | End: 2023-08-05 | Stop reason: HOSPADM

## 2023-08-04 RX ORDER — HEPARIN SODIUM (PORCINE) LOCK FLUSH IV SOLN 100 UNIT/ML 100 UNIT/ML
500 SOLUTION INTRAVENOUS PRN
Status: CANCELLED | OUTPATIENT
Start: 2023-08-04

## 2023-08-04 RX ORDER — HEPARIN 100 UNIT/ML
500 SYRINGE INTRAVENOUS PRN
Status: CANCELLED | OUTPATIENT
Start: 2023-08-04

## 2023-08-04 RX ORDER — 0.9 % SODIUM CHLORIDE 0.9 %
1000 INTRAVENOUS SOLUTION INTRAVENOUS ONCE
Status: COMPLETED | OUTPATIENT
Start: 2023-08-04 | End: 2023-08-04

## 2023-08-04 RX ADMIN — SODIUM CHLORIDE, PRESERVATIVE FREE 10 ML: 5 INJECTION INTRAVENOUS at 12:00

## 2023-08-04 RX ADMIN — CALCIUM GLUCONATE 1000 MG: 98 INJECTION, SOLUTION INTRAVENOUS at 11:00

## 2023-08-04 RX ADMIN — POTASSIUM CHLORIDE 10 MEQ: 7.46 INJECTION, SOLUTION INTRAVENOUS at 09:00

## 2023-08-04 RX ADMIN — POTASSIUM CHLORIDE 10 MEQ: 7.46 INJECTION, SOLUTION INTRAVENOUS at 10:00

## 2023-08-04 RX ADMIN — SODIUM CHLORIDE, PRESERVATIVE FREE 10 ML: 5 INJECTION INTRAVENOUS at 07:33

## 2023-08-04 RX ADMIN — POTASSIUM CHLORIDE 20 MEQ: 750 TABLET, EXTENDED RELEASE ORAL at 08:59

## 2023-08-04 RX ADMIN — MAGNESIUM SULFATE HEPTAHYDRATE 3000 MG: 500 INJECTION, SOLUTION INTRAMUSCULAR; INTRAVENOUS at 09:17

## 2023-08-04 RX ADMIN — SODIUM CHLORIDE, PRESERVATIVE FREE 10 ML: 5 INJECTION INTRAVENOUS at 09:00

## 2023-08-04 RX ADMIN — SODIUM CHLORIDE 1000 ML: 9 INJECTION, SOLUTION INTRAVENOUS at 09:00

## 2023-08-04 ASSESSMENT — PATIENT HEALTH QUESTIONNAIRE - PHQ9
SUM OF ALL RESPONSES TO PHQ QUESTIONS 1-9: 0
1. LITTLE INTEREST OR PLEASURE IN DOING THINGS: 0
SUM OF ALL RESPONSES TO PHQ QUESTIONS 1-9: 0
2. FEELING DOWN, DEPRESSED OR HOPELESS: 0
SUM OF ALL RESPONSES TO PHQ9 QUESTIONS 1 & 2: 0
SUM OF ALL RESPONSES TO PHQ QUESTIONS 1-9: 0
SUM OF ALL RESPONSES TO PHQ QUESTIONS 1-9: 0

## 2023-08-04 NOTE — PROGRESS NOTES
Arrived to the [address] [place] [address]. 1 liter NS bolus, Calcium gluconate, IV Mg, and IV/PO Potassium completed. Patient tolerated well. Any issues or concerns during appointment:  Stool samples ordered, however patient unable to go to the bathroom while here in infusion. Stool sample kit with instructions given to patient. Patient verbalized understanding. Patient aware of next infusion appointment on 8/18 (date) at 9:00 AM (time). Patient instructed to call provider with temperature of 100.4 or greater or nausea/vomiting/ diarrhea or pain not controlled by medications  Discharged ambulatory.

## 2023-08-04 NOTE — PATIENT INSTRUCTIONS
the above symptoms. Patient did express an interest in My Chart. My Chart log in information explained on the after visit summary printout at the [address] desk.     Lacy Ignacio RN

## 2023-08-06 ENCOUNTER — HOSPITAL ENCOUNTER (OUTPATIENT)
Dept: INFUSION THERAPY | Age: 63
Discharge: HOME | End: 2023-08-06
Payer: COMMERCIAL

## 2023-08-06 VITALS
SYSTOLIC BLOOD PRESSURE: 126 MMHG | OXYGEN SATURATION: 96 % | RESPIRATION RATE: 18 BRPM | HEART RATE: 78 BPM | DIASTOLIC BLOOD PRESSURE: 78 MMHG | TEMPERATURE: 97.6 F

## 2023-08-06 DIAGNOSIS — E86.0 DEHYDRATION: ICD-10-CM

## 2023-08-06 DIAGNOSIS — E87.6 HYPOKALEMIA: Primary | ICD-10-CM

## 2023-08-06 DIAGNOSIS — Z45.2 PICC (PERIPHERALLY INSERTED CENTRAL CATHETER) FLUSH: ICD-10-CM

## 2023-08-06 DIAGNOSIS — E83.42 HYPOMAGNESEMIA: ICD-10-CM

## 2023-08-06 PROCEDURE — 87209 SMEAR COMPLEX STAIN: CPT

## 2023-08-06 PROCEDURE — 87329 GIARDIA AG IA: CPT

## 2023-08-06 PROCEDURE — 2580000003 HC RX 258: Performed by: INTERNAL MEDICINE

## 2023-08-06 PROCEDURE — 96360 HYDRATION IV INFUSION INIT: CPT

## 2023-08-06 PROCEDURE — 89055 LEUKOCYTE ASSESSMENT FECAL: CPT

## 2023-08-06 PROCEDURE — 87507 IADNA-DNA/RNA PROBE TQ 12-25: CPT

## 2023-08-06 PROCEDURE — 87177 OVA AND PARASITES SMEARS: CPT

## 2023-08-06 PROCEDURE — 87324 CLOSTRIDIUM AG IA: CPT

## 2023-08-06 PROCEDURE — 87449 NOS EACH ORGANISM AG IA: CPT

## 2023-08-06 PROCEDURE — 2580000003 HC RX 258: Performed by: NURSE PRACTITIONER

## 2023-08-06 RX ORDER — SODIUM CHLORIDE 9 MG/ML
5-250 INJECTION, SOLUTION INTRAVENOUS PRN
OUTPATIENT
Start: 2023-08-06

## 2023-08-06 RX ORDER — HEPARIN 100 UNIT/ML
500 SYRINGE INTRAVENOUS PRN
OUTPATIENT
Start: 2023-08-06

## 2023-08-06 RX ORDER — EPINEPHRINE 1 MG/ML
0.3 INJECTION, SOLUTION, CONCENTRATE INTRAVENOUS PRN
OUTPATIENT
Start: 2023-08-06

## 2023-08-06 RX ORDER — ALBUTEROL SULFATE 90 UG/1
4 AEROSOL, METERED RESPIRATORY (INHALATION) PRN
OUTPATIENT
Start: 2023-08-06

## 2023-08-06 RX ORDER — SODIUM CHLORIDE 9 MG/ML
INJECTION, SOLUTION INTRAVENOUS ONCE
Status: CANCELLED
Start: 2023-08-06 | End: 2023-08-06

## 2023-08-06 RX ORDER — DIPHENHYDRAMINE HYDROCHLORIDE 50 MG/ML
50 INJECTION INTRAMUSCULAR; INTRAVENOUS
OUTPATIENT
Start: 2023-08-06

## 2023-08-06 RX ORDER — SODIUM CHLORIDE 9 MG/ML
INJECTION, SOLUTION INTRAVENOUS ONCE
Start: 2023-08-06 | End: 2023-08-06

## 2023-08-06 RX ORDER — SODIUM CHLORIDE 0.9 % (FLUSH) 0.9 %
5-40 SYRINGE (ML) INJECTION PRN
Status: DISCONTINUED | OUTPATIENT
Start: 2023-08-06 | End: 2023-08-07 | Stop reason: HOSPADM

## 2023-08-06 RX ORDER — SODIUM CHLORIDE 9 MG/ML
INJECTION, SOLUTION INTRAVENOUS ONCE
Status: COMPLETED | OUTPATIENT
Start: 2023-08-06 | End: 2023-08-06

## 2023-08-06 RX ORDER — ACETAMINOPHEN 325 MG/1
650 TABLET ORAL
OUTPATIENT
Start: 2023-08-06

## 2023-08-06 RX ORDER — SODIUM CHLORIDE 0.9 % (FLUSH) 0.9 %
5-40 SYRINGE (ML) INJECTION PRN
OUTPATIENT
Start: 2023-08-06

## 2023-08-06 RX ORDER — ONDANSETRON 2 MG/ML
8 INJECTION INTRAMUSCULAR; INTRAVENOUS
OUTPATIENT
Start: 2023-08-06

## 2023-08-06 RX ORDER — SODIUM CHLORIDE 9 MG/ML
INJECTION, SOLUTION INTRAVENOUS CONTINUOUS
OUTPATIENT
Start: 2023-08-06

## 2023-08-06 RX ADMIN — SODIUM CHLORIDE: 9 INJECTION, SOLUTION INTRAVENOUS at 10:33

## 2023-08-06 RX ADMIN — SODIUM CHLORIDE, PRESERVATIVE FREE 10 ML: 5 INJECTION INTRAVENOUS at 10:33

## 2023-08-06 NOTE — PROGRESS NOTES
Arrived to the [facility]. 1L NS bolus completed, stool sample given to lab for GI panel; in process. Patient tolerated well. Any issues or concerns during appointment: none. Patient aware of next infusion appointment on 8/18/23 (date) at 6 AM (time). Patient instructed to call provider with temperature of 100.4 or greater or nausea/vomiting/diarrhea or pain not controlled by medications  Discharged ambulatory with wife.

## 2023-08-07 LAB
C DIFF GDH STL QL: NORMAL
C DIFF TOX A+B STL QL IA: NORMAL
C DIFF TOXIN INTERPRETATION: NORMAL
CLINICAL CONSIDERATION: NORMAL
REFLEX: NORMAL

## 2023-08-08 LAB
ADV 40+41 DNA STL QL NAA+NON-PROBE: NOT DETECTED
ASTRO TYP 1-8 RNA STL QL NAA+NON-PROBE: NOT DETECTED
C CAYETANENSIS DNA STL QL NAA+NON-PROBE: NOT DETECTED
C COLI+JEJ+UPSA DNA STL QL NAA+NON-PROBE: NOT DETECTED
C DIF TOX TCDA+TCDB STL QL NAA+NON-PROBE: NOT DETECTED
CRYPTOSP DNA STL QL NAA+NON-PROBE: NOT DETECTED
E COLI O157 DNA STL QL NAA+NON-PROBE: NORMAL
E HISTOLYT DNA STL QL NAA+NON-PROBE: NOT DETECTED
EAEC PAA PLAS AGGR+AATA ST NAA+NON-PRB: NOT DETECTED
EC STX1+STX2 GENES STL QL NAA+NON-PROBE: NOT DETECTED
EPEC EAE GENE STL QL NAA+NON-PROBE: NOT DETECTED
ETEC LTA+ST1A+ST1B TOX ST NAA+NON-PROBE: NOT DETECTED
G LAMBLIA AG STL QL IA: NEGATIVE
G LAMBLIA DNA STL QL NAA+NON-PROBE: NOT DETECTED
NOROVIRUS GI+II RNA STL QL NAA+NON-PROBE: NOT DETECTED
P SHIGELLOIDES DNA STL QL NAA+NON-PROBE: NOT DETECTED
RVA RNA STL QL NAA+NON-PROBE: NOT DETECTED
S ENT+BONG DNA STL QL NAA+NON-PROBE: NOT DETECTED
SAPO I+II+IV+V RNA STL QL NAA+NON-PROBE: NOT DETECTED
SHIGELLA SP+EIEC IPAH ST NAA+NON-PROBE: NOT DETECTED
SPECIMEN SOURCE: NORMAL
SPECIMEN SOURCE: NORMAL
V CHOL+PARA+VUL DNA STL QL NAA+NON-PROBE: NOT DETECTED
V CHOLERAE DNA STL QL NAA+NON-PROBE: NOT DETECTED
Y ENTEROCOL DNA STL QL NAA+NON-PROBE: NOT DETECTED

## 2023-08-09 LAB
O+P SPEC MICRO: NORMAL
O+P STL CONC: NORMAL
SPECIMEN SOURCE: NORMAL

## 2023-08-10 PROBLEM — R19.7 DIARRHEA: Status: ACTIVE | Noted: 2023-08-10

## 2023-08-10 ASSESSMENT — ENCOUNTER SYMPTOMS
DIARRHEA: 1
NAUSEA: 1

## 2023-08-15 LAB
SPECIMEN SOURCE: NORMAL
WBC RESULT, STOOL: NORMAL
WHITE BLOOD CELLS (WBC), STOOL: NORMAL

## 2023-08-17 DIAGNOSIS — C16.9 MALIGNANT NEOPLASM OF STOMACH, UNSPECIFIED LOCATION (HCC): ICD-10-CM

## 2023-08-17 DIAGNOSIS — Z79.899 HIGH RISK MEDICATION USE: Primary | ICD-10-CM

## 2023-08-17 ASSESSMENT — ENCOUNTER SYMPTOMS
BLOOD IN STOOL: 0
DIARRHEA: 1
VOMITING: 0
NAUSEA: 1
COUGH: 0
SHORTNESS OF BREATH: 0
SORE THROAT: 0
SCLERAL ICTERUS: 0
CONSTIPATION: 0
HEMOPTYSIS: 0
ABDOMINAL DISTENTION: 0
EYE PROBLEMS: 0
BACK PAIN: 0

## 2023-08-18 ENCOUNTER — HOSPITAL ENCOUNTER (OUTPATIENT)
Dept: INFUSION THERAPY | Age: 63
Discharge: HOME | End: 2023-08-18
Payer: COMMERCIAL

## 2023-08-18 ENCOUNTER — OFFICE VISIT (OUTPATIENT)
Dept: ONCOLOGY | Age: 63
End: 2023-08-18
Payer: COMMERCIAL

## 2023-08-18 VITALS
WEIGHT: 245.2 LBS | SYSTOLIC BLOOD PRESSURE: 124 MMHG | OXYGEN SATURATION: 96 % | BODY MASS INDEX: 35.1 KG/M2 | HEART RATE: 73 BPM | DIASTOLIC BLOOD PRESSURE: 78 MMHG | RESPIRATION RATE: 18 BRPM | HEIGHT: 70 IN | TEMPERATURE: 98.2 F

## 2023-08-18 DIAGNOSIS — Z79.899 HIGH RISK MEDICATION USE: ICD-10-CM

## 2023-08-18 DIAGNOSIS — C76.2 ABDOMINAL CARCINOMATOSIS (HCC): Primary | ICD-10-CM

## 2023-08-18 DIAGNOSIS — I82.721 CHRONIC DEEP VEIN THROMBOSIS (DVT) OF RIGHT UPPER EXTREMITY, UNSPECIFIED VEIN (HCC): ICD-10-CM

## 2023-08-18 DIAGNOSIS — C78.6 MALIGNANT NEOPLASM METASTATIC TO PERITONEUM (HCC): ICD-10-CM

## 2023-08-18 DIAGNOSIS — C16.9 MALIGNANT NEOPLASM OF STOMACH, UNSPECIFIED LOCATION (HCC): Primary | ICD-10-CM

## 2023-08-18 DIAGNOSIS — C16.9 MALIGNANT NEOPLASM OF STOMACH, UNSPECIFIED LOCATION (HCC): ICD-10-CM

## 2023-08-18 DIAGNOSIS — C76.2 ABDOMINAL CARCINOMATOSIS (HCC): ICD-10-CM

## 2023-08-18 LAB
ALBUMIN SERPL-MCNC: 2.9 G/DL (ref 3.2–4.6)
ALBUMIN/GLOB SERPL: 0.9 (ref 0.4–1.6)
ALP SERPL-CCNC: 109 U/L (ref 50–136)
ALT SERPL-CCNC: 26 U/L (ref 12–65)
ANION GAP SERPL CALC-SCNC: 4 MMOL/L (ref 2–11)
AST SERPL-CCNC: 18 U/L (ref 15–37)
BASOPHILS # BLD: 0.1 K/UL (ref 0–0.2)
BASOPHILS NFR BLD: 1 % (ref 0–2)
BILIRUB SERPL-MCNC: 0.3 MG/DL (ref 0.2–1.1)
BUN SERPL-MCNC: 11 MG/DL (ref 8–23)
CALCIUM SERPL-MCNC: 8 MG/DL (ref 8.3–10.4)
CEA SERPL-MCNC: 1.5 NG/ML (ref 0–3)
CHLORIDE SERPL-SCNC: 110 MMOL/L (ref 101–110)
CO2 SERPL-SCNC: 28 MMOL/L (ref 21–32)
CREAT SERPL-MCNC: 0.9 MG/DL (ref 0.8–1.5)
DIFFERENTIAL METHOD BLD: ABNORMAL
EOSINOPHIL # BLD: 0.1 K/UL (ref 0–0.8)
EOSINOPHIL NFR BLD: 1 % (ref 0.5–7.8)
ERYTHROCYTE [DISTWIDTH] IN BLOOD BY AUTOMATED COUNT: 15.3 % (ref 11.9–14.6)
GLOBULIN SER CALC-MCNC: 3.2 G/DL (ref 2.8–4.5)
GLUCOSE SERPL-MCNC: 238 MG/DL (ref 65–100)
HCT VFR BLD AUTO: 33.5 % (ref 41.1–50.3)
HGB BLD-MCNC: 10.4 G/DL (ref 13.6–17.2)
IMM GRANULOCYTES # BLD AUTO: 0 K/UL (ref 0–0.5)
IMM GRANULOCYTES NFR BLD AUTO: 0 % (ref 0–5)
LYMPHOCYTES # BLD: 1.8 K/UL (ref 0.5–4.6)
LYMPHOCYTES NFR BLD: 29 % (ref 13–44)
MAGNESIUM SERPL-MCNC: 2 MG/DL (ref 1.8–2.4)
MCH RBC QN AUTO: 28.9 PG (ref 26.1–32.9)
MCHC RBC AUTO-ENTMCNC: 31 G/DL (ref 31.4–35)
MCV RBC AUTO: 93.1 FL (ref 82–102)
MONOCYTES # BLD: 0.4 K/UL (ref 0.1–1.3)
MONOCYTES NFR BLD: 6 % (ref 4–12)
NEUTS SEG # BLD: 3.9 K/UL (ref 1.7–8.2)
NEUTS SEG NFR BLD: 62 % (ref 43–78)
NRBC # BLD: 0 K/UL (ref 0–0.2)
PLATELET # BLD AUTO: 196 K/UL (ref 150–450)
PMV BLD AUTO: 11 FL (ref 9.4–12.3)
POTASSIUM SERPL-SCNC: 3.4 MMOL/L (ref 3.5–5.1)
PROT SERPL-MCNC: 6.1 G/DL (ref 6.3–8.2)
RBC # BLD AUTO: 3.6 M/UL (ref 4.23–5.6)
SODIUM SERPL-SCNC: 142 MMOL/L (ref 133–143)
WBC # BLD AUTO: 6.2 K/UL (ref 4.3–11.1)

## 2023-08-18 PROCEDURE — 3078F DIAST BP <80 MM HG: CPT | Performed by: NURSE PRACTITIONER

## 2023-08-18 PROCEDURE — 3074F SYST BP LT 130 MM HG: CPT | Performed by: NURSE PRACTITIONER

## 2023-08-18 PROCEDURE — 2580000003 HC RX 258: Performed by: NURSE PRACTITIONER

## 2023-08-18 PROCEDURE — 80053 COMPREHEN METABOLIC PANEL: CPT

## 2023-08-18 PROCEDURE — 96372 THER/PROPH/DIAG INJ SC/IM: CPT

## 2023-08-18 PROCEDURE — 82378 CARCINOEMBRYONIC ANTIGEN: CPT

## 2023-08-18 PROCEDURE — 36591 DRAW BLOOD OFF VENOUS DEVICE: CPT

## 2023-08-18 PROCEDURE — 96367 TX/PROPH/DG ADDL SEQ IV INF: CPT

## 2023-08-18 PROCEDURE — 96375 TX/PRO/DX INJ NEW DRUG ADDON: CPT

## 2023-08-18 PROCEDURE — 83735 ASSAY OF MAGNESIUM: CPT

## 2023-08-18 PROCEDURE — 96411 CHEMO IV PUSH ADDL DRUG: CPT

## 2023-08-18 PROCEDURE — 2580000003 HC RX 258: Performed by: INTERNAL MEDICINE

## 2023-08-18 PROCEDURE — 85025 COMPLETE CBC W/AUTO DIFF WBC: CPT

## 2023-08-18 PROCEDURE — 96415 CHEMO IV INFUSION ADDL HR: CPT

## 2023-08-18 PROCEDURE — 99214 OFFICE O/P EST MOD 30 MIN: CPT | Performed by: NURSE PRACTITIONER

## 2023-08-18 PROCEDURE — 96368 THER/DIAG CONCURRENT INF: CPT

## 2023-08-18 PROCEDURE — G0498 CHEMO EXTEND IV INFUS W/PUMP: HCPCS

## 2023-08-18 PROCEDURE — 6360000002 HC RX W HCPCS: Performed by: NURSE PRACTITIONER

## 2023-08-18 PROCEDURE — 96413 CHEMO IV INFUSION 1 HR: CPT

## 2023-08-18 RX ORDER — ATROPINE SULFATE 0.4 MG/ML
0.4 INJECTION, SOLUTION INTRAMUSCULAR; INTRAVENOUS; SUBCUTANEOUS
Status: CANCELLED | OUTPATIENT
Start: 2023-08-18

## 2023-08-18 RX ORDER — MEPERIDINE HYDROCHLORIDE 25 MG/ML
12.5 INJECTION INTRAMUSCULAR; INTRAVENOUS; SUBCUTANEOUS PRN
Status: DISCONTINUED | OUTPATIENT
Start: 2023-08-18 | End: 2023-08-19 | Stop reason: HOSPADM

## 2023-08-18 RX ORDER — SODIUM CHLORIDE 9 MG/ML
5-250 INJECTION, SOLUTION INTRAVENOUS PRN
Status: CANCELLED | OUTPATIENT
Start: 2023-08-18

## 2023-08-18 RX ORDER — ONDANSETRON 2 MG/ML
8 INJECTION INTRAMUSCULAR; INTRAVENOUS ONCE
Status: CANCELLED | OUTPATIENT
Start: 2023-08-18 | End: 2023-08-18

## 2023-08-18 RX ORDER — ONDANSETRON 2 MG/ML
8 INJECTION INTRAMUSCULAR; INTRAVENOUS ONCE
Status: COMPLETED | OUTPATIENT
Start: 2023-08-18 | End: 2023-08-18

## 2023-08-18 RX ORDER — FLUOROURACIL 50 MG/ML
400 INJECTION, SOLUTION INTRAVENOUS ONCE
Status: COMPLETED | OUTPATIENT
Start: 2023-08-18 | End: 2023-08-18

## 2023-08-18 RX ORDER — HEPARIN SODIUM (PORCINE) LOCK FLUSH IV SOLN 100 UNIT/ML 100 UNIT/ML
500 SOLUTION INTRAVENOUS PRN
Status: CANCELLED | OUTPATIENT
Start: 2023-08-20

## 2023-08-18 RX ORDER — HEPARIN SODIUM (PORCINE) LOCK FLUSH IV SOLN 100 UNIT/ML 100 UNIT/ML
500 SOLUTION INTRAVENOUS PRN
Status: CANCELLED | OUTPATIENT
Start: 2023-08-18

## 2023-08-18 RX ORDER — SODIUM CHLORIDE 9 MG/ML
INJECTION, SOLUTION INTRAVENOUS CONTINUOUS
Status: CANCELLED | OUTPATIENT
Start: 2023-08-18

## 2023-08-18 RX ORDER — EPINEPHRINE 1 MG/ML
0.3 INJECTION, SOLUTION, CONCENTRATE INTRAVENOUS PRN
Status: CANCELLED | OUTPATIENT
Start: 2023-08-18

## 2023-08-18 RX ORDER — ACETAMINOPHEN 325 MG/1
650 TABLET ORAL
Status: CANCELLED | OUTPATIENT
Start: 2023-08-18

## 2023-08-18 RX ORDER — SODIUM CHLORIDE 0.9 % (FLUSH) 0.9 %
5-40 SYRINGE (ML) INJECTION PRN
Status: CANCELLED | OUTPATIENT
Start: 2023-08-18

## 2023-08-18 RX ORDER — FAMOTIDINE 10 MG/ML
20 INJECTION, SOLUTION INTRAVENOUS
Status: CANCELLED | OUTPATIENT
Start: 2023-08-18

## 2023-08-18 RX ORDER — DIPHENHYDRAMINE HYDROCHLORIDE 50 MG/ML
50 INJECTION INTRAMUSCULAR; INTRAVENOUS
Status: CANCELLED | OUTPATIENT
Start: 2023-08-18

## 2023-08-18 RX ORDER — DIPHENHYDRAMINE HYDROCHLORIDE 50 MG/ML
50 INJECTION INTRAMUSCULAR; INTRAVENOUS
Status: DISCONTINUED | OUTPATIENT
Start: 2023-08-18 | End: 2023-08-19 | Stop reason: HOSPADM

## 2023-08-18 RX ORDER — ATROPINE SULFATE 0.4 MG/ML
0.4 INJECTION, SOLUTION INTRAVENOUS ONCE
Status: COMPLETED | OUTPATIENT
Start: 2023-08-18 | End: 2023-08-18

## 2023-08-18 RX ORDER — ATROPINE SULFATE 0.4 MG/ML
0.4 INJECTION, SOLUTION INTRAMUSCULAR; INTRAVENOUS; SUBCUTANEOUS ONCE
Status: CANCELLED | OUTPATIENT
Start: 2023-08-18 | End: 2023-08-18

## 2023-08-18 RX ORDER — ALBUTEROL SULFATE 90 UG/1
4 AEROSOL, METERED RESPIRATORY (INHALATION) PRN
Status: DISCONTINUED | OUTPATIENT
Start: 2023-08-18 | End: 2023-08-19 | Stop reason: HOSPADM

## 2023-08-18 RX ORDER — ONDANSETRON 2 MG/ML
8 INJECTION INTRAMUSCULAR; INTRAVENOUS
Status: DISCONTINUED | OUTPATIENT
Start: 2023-08-18 | End: 2023-08-19 | Stop reason: HOSPADM

## 2023-08-18 RX ORDER — ACETAMINOPHEN 325 MG/1
650 TABLET ORAL
Status: DISCONTINUED | OUTPATIENT
Start: 2023-08-18 | End: 2023-08-19 | Stop reason: HOSPADM

## 2023-08-18 RX ORDER — SODIUM CHLORIDE 0.9 % (FLUSH) 0.9 %
5-40 SYRINGE (ML) INJECTION PRN
Status: CANCELLED | OUTPATIENT
Start: 2023-08-20

## 2023-08-18 RX ORDER — FLUOROURACIL 50 MG/ML
400 INJECTION, SOLUTION INTRAVENOUS ONCE
Status: CANCELLED | OUTPATIENT
Start: 2023-08-18 | End: 2023-08-18

## 2023-08-18 RX ORDER — SODIUM CHLORIDE 9 MG/ML
5-250 INJECTION, SOLUTION INTRAVENOUS PRN
Status: CANCELLED | OUTPATIENT
Start: 2023-08-20

## 2023-08-18 RX ORDER — SODIUM CHLORIDE 9 MG/ML
INJECTION, SOLUTION INTRAVENOUS CONTINUOUS
Status: DISCONTINUED | OUTPATIENT
Start: 2023-08-18 | End: 2023-08-19 | Stop reason: HOSPADM

## 2023-08-18 RX ORDER — DEXTROSE MONOHYDRATE 50 MG/ML
5-250 INJECTION, SOLUTION INTRAVENOUS PRN
Status: CANCELLED | OUTPATIENT
Start: 2023-08-18

## 2023-08-18 RX ORDER — MEPERIDINE HYDROCHLORIDE 50 MG/ML
12.5 INJECTION INTRAMUSCULAR; INTRAVENOUS; SUBCUTANEOUS PRN
Status: CANCELLED | OUTPATIENT
Start: 2023-08-18

## 2023-08-18 RX ORDER — ALBUTEROL SULFATE 90 UG/1
4 AEROSOL, METERED RESPIRATORY (INHALATION) PRN
Status: CANCELLED | OUTPATIENT
Start: 2023-08-18

## 2023-08-18 RX ORDER — DEXTROSE MONOHYDRATE 50 MG/ML
5-250 INJECTION, SOLUTION INTRAVENOUS PRN
Status: DISCONTINUED | OUTPATIENT
Start: 2023-08-18 | End: 2023-08-19 | Stop reason: HOSPADM

## 2023-08-18 RX ORDER — EPINEPHRINE 1 MG/ML
0.3 INJECTION, SOLUTION, CONCENTRATE INTRAVENOUS PRN
Status: DISCONTINUED | OUTPATIENT
Start: 2023-08-18 | End: 2023-08-19 | Stop reason: HOSPADM

## 2023-08-18 RX ORDER — SODIUM CHLORIDE 0.9 % (FLUSH) 0.9 %
5-40 SYRINGE (ML) INJECTION PRN
Status: DISCONTINUED | OUTPATIENT
Start: 2023-08-18 | End: 2023-08-19 | Stop reason: HOSPADM

## 2023-08-18 RX ORDER — ONDANSETRON 2 MG/ML
8 INJECTION INTRAMUSCULAR; INTRAVENOUS
Status: CANCELLED | OUTPATIENT
Start: 2023-08-18

## 2023-08-18 RX ADMIN — ATROPINE SULFATE 0.4 MG: 0.4 INJECTION, SOLUTION INTRAVENOUS at 09:30

## 2023-08-18 RX ADMIN — IRINOTECAN HYDROCHLORIDE 340 MG: 20 INJECTION, SOLUTION INTRAVENOUS at 10:38

## 2023-08-18 RX ADMIN — DEXTROSE MONOHYDRATE 150 ML/HR: 50 INJECTION, SOLUTION INTRAVENOUS at 09:15

## 2023-08-18 RX ADMIN — DEXAMETHASONE SODIUM PHOSPHATE 12 MG: 4 INJECTION INTRA-ARTICULAR; INTRALESIONAL; INTRAMUSCULAR; INTRAVENOUS; SOFT TISSUE at 09:28

## 2023-08-18 RX ADMIN — FOSAPREPITANT 150 MG: 150 INJECTION, POWDER, LYOPHILIZED, FOR SOLUTION INTRAVENOUS at 09:45

## 2023-08-18 RX ADMIN — ONDANSETRON 8 MG: 2 INJECTION INTRAMUSCULAR; INTRAVENOUS at 09:26

## 2023-08-18 RX ADMIN — FLUOROURACIL 900 MG: 50 INJECTION, SOLUTION INTRAVENOUS at 12:10

## 2023-08-18 RX ADMIN — FLUOROURACIL 5375 MG: 50 INJECTION, SOLUTION INTRAVENOUS at 12:15

## 2023-08-18 RX ADMIN — SODIUM CHLORIDE, PRESERVATIVE FREE 10 ML: 5 INJECTION INTRAVENOUS at 09:15

## 2023-08-18 RX ADMIN — LEUCOVORIN CALCIUM 900 MG: 350 INJECTION, POWDER, LYOPHILIZED, FOR SOLUTION INTRAMUSCULAR; INTRAVENOUS at 10:38

## 2023-08-18 RX ADMIN — SODIUM CHLORIDE, PRESERVATIVE FREE 10 ML: 5 INJECTION INTRAVENOUS at 08:01

## 2023-08-18 ASSESSMENT — ENCOUNTER SYMPTOMS
EYE PROBLEMS: 0
BLOOD IN STOOL: 0
SORE THROAT: 0
COUGH: 0
SCLERAL ICTERUS: 0
CONSTIPATION: 0
ABDOMINAL DISTENTION: 0
BACK PAIN: 0
VOMITING: 0
SHORTNESS OF BREATH: 0
NAUSEA: 0
HEMOPTYSIS: 0
DIARRHEA: 0

## 2023-08-18 ASSESSMENT — PATIENT HEALTH QUESTIONNAIRE - PHQ9
SUM OF ALL RESPONSES TO PHQ QUESTIONS 1-9: 0
1. LITTLE INTEREST OR PLEASURE IN DOING THINGS: 0
SUM OF ALL RESPONSES TO PHQ QUESTIONS 1-9: 0
SUM OF ALL RESPONSES TO PHQ QUESTIONS 1-9: 0
2. FEELING DOWN, DEPRESSED OR HOPELESS: 0
SUM OF ALL RESPONSES TO PHQ9 QUESTIONS 1 & 2: 0
SUM OF ALL RESPONSES TO PHQ QUESTIONS 1-9: 0

## 2023-08-18 NOTE — PROGRESS NOTES
spent to review these. Discussed need for visual dx/tissue pathology to determine plan - recommend ex lap - refer to Dr Francisco Grant - personally  reviewed case with Dr Francisco Grant who will expedite the workup and will see pt Fri. US with mild biliary duct dilation - HIDA/ERCP reviewed by PCP   + BM/flatus; He did not like how IV morphine made him feel in the hospital.  He tried tramadol but found no relief and has been taking acetaminophen at home with minimal relief. Discussed different options and he settled on trying  Percocet - he will contact the office to inform us if this is working for him. We discussed SE - not to drive while on the med. Bowel regimen reviewed. He is tired of tests, frustrated. Feelings validated and stressed importance of workup to determine why he has pain. Wt loss from 240 --> 209 noted and expressed concern to pt about this. - completed course of Levaquin/Diflucan for klebsiella pneumoniae and citrobacter freundii both of which were sensitive to Levaquin found around G-tube site. Wishes for tube removal - reviewed risks of this during chemotherapy - I would like for him to have labs day before scheduled procedure to make sure his counts are adequate per above; case reviewed with Dr Eddye Nyhan by me via tel today   - here cycle 8 FOLFIRINOX - labs reviewed-defer 1 week due to neutropenia. - nutrition - G-tube out. He is being weaned off TPN (3x week now) as his oral intake has greatly improved, weight up    - pain - Fentanyl 12mcg with prn oxycodone 10 mg, plans to stop patch on Nader 10/2 PC following  - Plan for surveillance reviewed. Labs discussed. - nausea - resolved. Has Zyprexa QHS (not taking currently) and Zofran PRN. - wt loss/FTT - secondary to disease and tx - on TPN and eating more    - depression/anxiety - better affect, on lexapro 20mg daily, PC adding buspar prn  - here for follow-up prior to cycle 13 FOLFIRINOX. Labs reviewed and adequate.     - CT October 2022
Previously, discussed sunscreen as he has not been using that and spends quite a bit of time in the sun. Risks of worsening burn while on chemotherapy reviewed with patient and wife. She said that she will encourage him to use sunblock when they are at the beach and in general.    - tinea corporis - We also discussed risks of tinea which the patient has experienced in the past and how to mitigate the risk. All questions were asked and answered to the best of my ability. The patient verbalized understanding and agrees with the plan above.           Ewelina Caraballo [facility] Hematology and Oncology  [address]  Office : [phone]  Fax : [phone]

## 2023-08-18 NOTE — PROGRESS NOTES
Arrived to the [address] [facility]. [id number] Folfiri  completed. Patient tolerated well. Any issues or concerns during appointment: none. Patient aware of next infusion appointment on 8/20/23 (date) at 1330 (time). Patient aware of next lab and [facility] office visit on 9/1/23 (date) at 0730 (time). Patient instructed to call provider with temperature of 100.4 or greater or nausea/vomiting/ diarrhea or pain not controlled by medications  Discharged home ambulatory.

## 2023-08-20 ENCOUNTER — HOSPITAL ENCOUNTER (OUTPATIENT)
Dept: INFUSION THERAPY | Age: 63
Discharge: HOME | End: 2023-08-20
Payer: COMMERCIAL

## 2023-08-20 VITALS
SYSTOLIC BLOOD PRESSURE: 145 MMHG | RESPIRATION RATE: 18 BRPM | TEMPERATURE: 98.1 F | OXYGEN SATURATION: 96 % | HEART RATE: 76 BPM | DIASTOLIC BLOOD PRESSURE: 87 MMHG

## 2023-08-20 DIAGNOSIS — C78.6 MALIGNANT NEOPLASM METASTATIC TO PERITONEUM (HCC): ICD-10-CM

## 2023-08-20 DIAGNOSIS — C76.2 ABDOMINAL CARCINOMATOSIS (HCC): Primary | ICD-10-CM

## 2023-08-20 PROCEDURE — 96523 IRRIG DRUG DELIVERY DEVICE: CPT

## 2023-08-20 PROCEDURE — 2580000003 HC RX 258: Performed by: NURSE PRACTITIONER

## 2023-08-20 RX ORDER — SODIUM CHLORIDE 0.9 % (FLUSH) 0.9 %
5-40 SYRINGE (ML) INJECTION PRN
Status: DISCONTINUED | OUTPATIENT
Start: 2023-08-20 | End: 2023-08-21 | Stop reason: HOSPADM

## 2023-08-20 RX ADMIN — SODIUM CHLORIDE, PRESERVATIVE FREE 10 ML: 5 INJECTION INTRAVENOUS at 13:16

## 2023-08-20 NOTE — PROGRESS NOTES
Patient ambualtory to infusion. Pump d/c completed. Port deaccessed. Discharged ambulatory. Patient aware of next infusion appt on 9/1/23.

## 2023-08-24 DIAGNOSIS — C16.9 MALIGNANT NEOPLASM OF STOMACH, UNSPECIFIED LOCATION (HCC): Primary | ICD-10-CM

## 2023-08-31 DIAGNOSIS — C16.9 MALIGNANT NEOPLASM OF STOMACH, UNSPECIFIED LOCATION (HCC): Primary | ICD-10-CM

## 2023-09-01 ENCOUNTER — HOSPITAL ENCOUNTER (OUTPATIENT)
Dept: LAB | Age: 63
Discharge: HOME | End: 2023-09-01
Payer: COMMERCIAL

## 2023-09-01 ENCOUNTER — HOSPITAL ENCOUNTER (OUTPATIENT)
Dept: INFUSION THERAPY | Age: 63
Discharge: HOME | End: 2023-09-01
Payer: COMMERCIAL

## 2023-09-01 ENCOUNTER — OFFICE VISIT (OUTPATIENT)
Dept: ONCOLOGY | Age: 63
End: 2023-09-01
Payer: COMMERCIAL

## 2023-09-01 VITALS
TEMPERATURE: 98.1 F | HEART RATE: 73 BPM | RESPIRATION RATE: 16 BRPM | HEIGHT: 70 IN | SYSTOLIC BLOOD PRESSURE: 121 MMHG | OXYGEN SATURATION: 95 % | DIASTOLIC BLOOD PRESSURE: 79 MMHG | BODY MASS INDEX: 34.72 KG/M2 | WEIGHT: 242.5 LBS

## 2023-09-01 DIAGNOSIS — C76.2 ABDOMINAL CARCINOMATOSIS (HCC): ICD-10-CM

## 2023-09-01 DIAGNOSIS — C78.6 MALIGNANT NEOPLASM METASTATIC TO PERITONEUM (HCC): Primary | ICD-10-CM

## 2023-09-01 DIAGNOSIS — I82.721 CHRONIC DEEP VEIN THROMBOSIS (DVT) OF RIGHT UPPER EXTREMITY, UNSPECIFIED VEIN (HCC): ICD-10-CM

## 2023-09-01 DIAGNOSIS — C16.9 MALIGNANT NEOPLASM OF STOMACH, UNSPECIFIED LOCATION (HCC): ICD-10-CM

## 2023-09-01 DIAGNOSIS — T45.1X5A NEUROPATHY DUE TO CHEMOTHERAPEUTIC DRUG (HCC): ICD-10-CM

## 2023-09-01 DIAGNOSIS — C78.6 MALIGNANT NEOPLASM METASTATIC TO PERITONEUM (HCC): ICD-10-CM

## 2023-09-01 DIAGNOSIS — C76.2 ABDOMINAL CARCINOMATOSIS (HCC): Primary | ICD-10-CM

## 2023-09-01 DIAGNOSIS — G62.0 NEUROPATHY DUE TO CHEMOTHERAPEUTIC DRUG (HCC): ICD-10-CM

## 2023-09-01 LAB
ALBUMIN SERPL-MCNC: 3.1 G/DL (ref 3.2–4.6)
ALBUMIN/GLOB SERPL: 1 (ref 0.4–1.6)
ALP SERPL-CCNC: 139 U/L (ref 50–136)
ALT SERPL-CCNC: 30 U/L (ref 12–65)
ANION GAP SERPL CALC-SCNC: 3 MMOL/L (ref 2–11)
AST SERPL-CCNC: 20 U/L (ref 15–37)
BASOPHILS # BLD: 0.1 K/UL (ref 0–0.2)
BASOPHILS NFR BLD: 1 % (ref 0–2)
BILIRUB SERPL-MCNC: 0.3 MG/DL (ref 0.2–1.1)
BUN SERPL-MCNC: 10 MG/DL (ref 8–23)
CALCIUM SERPL-MCNC: 8.2 MG/DL (ref 8.3–10.4)
CEA SERPL-MCNC: 1.9 NG/ML (ref 0–3)
CHLORIDE SERPL-SCNC: 111 MMOL/L (ref 101–110)
CO2 SERPL-SCNC: 28 MMOL/L (ref 21–32)
CREAT SERPL-MCNC: 1 MG/DL (ref 0.8–1.5)
DIFFERENTIAL METHOD BLD: ABNORMAL
EOSINOPHIL # BLD: 0.2 K/UL (ref 0–0.8)
EOSINOPHIL NFR BLD: 4 % (ref 0.5–7.8)
ERYTHROCYTE [DISTWIDTH] IN BLOOD BY AUTOMATED COUNT: 14.9 % (ref 11.9–14.6)
GLOBULIN SER CALC-MCNC: 3.1 G/DL (ref 2.8–4.5)
GLUCOSE SERPL-MCNC: 225 MG/DL (ref 65–100)
HCT VFR BLD AUTO: 35.4 % (ref 41.1–50.3)
HGB BLD-MCNC: 11 G/DL (ref 13.6–17.2)
IMM GRANULOCYTES # BLD AUTO: 0 K/UL (ref 0–0.5)
IMM GRANULOCYTES NFR BLD AUTO: 0 % (ref 0–5)
LYMPHOCYTES # BLD: 1.9 K/UL (ref 0.5–4.6)
LYMPHOCYTES NFR BLD: 33 % (ref 13–44)
MAGNESIUM SERPL-MCNC: 1.8 MG/DL (ref 1.8–2.4)
MCH RBC QN AUTO: 28.6 PG (ref 26.1–32.9)
MCHC RBC AUTO-ENTMCNC: 31.1 G/DL (ref 31.4–35)
MCV RBC AUTO: 91.9 FL (ref 82–102)
MONOCYTES # BLD: 0.4 K/UL (ref 0.1–1.3)
MONOCYTES NFR BLD: 7 % (ref 4–12)
NEUTS SEG # BLD: 3.2 K/UL (ref 1.7–8.2)
NEUTS SEG NFR BLD: 55 % (ref 43–78)
NRBC # BLD: 0 K/UL (ref 0–0.2)
PLATELET # BLD AUTO: 189 K/UL (ref 150–450)
PMV BLD AUTO: 10.6 FL (ref 9.4–12.3)
POTASSIUM SERPL-SCNC: 3.4 MMOL/L (ref 3.5–5.1)
PROT SERPL-MCNC: 6.2 G/DL (ref 6.3–8.2)
RBC # BLD AUTO: 3.85 M/UL (ref 4.23–5.6)
SODIUM SERPL-SCNC: 142 MMOL/L (ref 133–143)
WBC # BLD AUTO: 5.9 K/UL (ref 4.3–11.1)

## 2023-09-01 PROCEDURE — 6360000002 HC RX W HCPCS: Performed by: NURSE PRACTITIONER

## 2023-09-01 PROCEDURE — 83735 ASSAY OF MAGNESIUM: CPT

## 2023-09-01 PROCEDURE — 96415 CHEMO IV INFUSION ADDL HR: CPT

## 2023-09-01 PROCEDURE — 96367 TX/PROPH/DG ADDL SEQ IV INF: CPT

## 2023-09-01 PROCEDURE — 2580000003 HC RX 258: Performed by: NURSE PRACTITIONER

## 2023-09-01 PROCEDURE — 82378 CARCINOEMBRYONIC ANTIGEN: CPT

## 2023-09-01 PROCEDURE — 96411 CHEMO IV PUSH ADDL DRUG: CPT

## 2023-09-01 PROCEDURE — 85025 COMPLETE CBC W/AUTO DIFF WBC: CPT

## 2023-09-01 PROCEDURE — 96375 TX/PRO/DX INJ NEW DRUG ADDON: CPT

## 2023-09-01 PROCEDURE — 2580000003 HC RX 258: Performed by: INTERNAL MEDICINE

## 2023-09-01 PROCEDURE — 80053 COMPREHEN METABOLIC PANEL: CPT

## 2023-09-01 PROCEDURE — 99214 OFFICE O/P EST MOD 30 MIN: CPT | Performed by: NURSE PRACTITIONER

## 2023-09-01 PROCEDURE — 96413 CHEMO IV INFUSION 1 HR: CPT

## 2023-09-01 PROCEDURE — G0498 CHEMO EXTEND IV INFUS W/PUMP: HCPCS

## 2023-09-01 PROCEDURE — 3078F DIAST BP <80 MM HG: CPT | Performed by: NURSE PRACTITIONER

## 2023-09-01 PROCEDURE — 96372 THER/PROPH/DIAG INJ SC/IM: CPT

## 2023-09-01 PROCEDURE — 36591 DRAW BLOOD OFF VENOUS DEVICE: CPT

## 2023-09-01 PROCEDURE — 3074F SYST BP LT 130 MM HG: CPT | Performed by: NURSE PRACTITIONER

## 2023-09-01 PROCEDURE — 96368 THER/DIAG CONCURRENT INF: CPT

## 2023-09-01 RX ORDER — ATROPINE SULFATE 0.4 MG/ML
0.4 INJECTION, SOLUTION INTRAVENOUS ONCE
Status: COMPLETED | OUTPATIENT
Start: 2023-09-01 | End: 2023-09-01

## 2023-09-01 RX ORDER — HEPARIN SODIUM (PORCINE) LOCK FLUSH IV SOLN 100 UNIT/ML 100 UNIT/ML
500 SOLUTION INTRAVENOUS PRN
Status: CANCELLED | OUTPATIENT
Start: 2023-09-03

## 2023-09-01 RX ORDER — SODIUM CHLORIDE 0.9 % (FLUSH) 0.9 %
5-40 SYRINGE (ML) INJECTION PRN
Status: CANCELLED | OUTPATIENT
Start: 2023-09-01

## 2023-09-01 RX ORDER — ONDANSETRON 2 MG/ML
8 INJECTION INTRAMUSCULAR; INTRAVENOUS ONCE
Status: COMPLETED | OUTPATIENT
Start: 2023-09-01 | End: 2023-09-01

## 2023-09-01 RX ORDER — DIPHENHYDRAMINE HYDROCHLORIDE 50 MG/ML
50 INJECTION INTRAMUSCULAR; INTRAVENOUS
Status: DISCONTINUED | OUTPATIENT
Start: 2023-09-01 | End: 2023-09-02 | Stop reason: HOSPADM

## 2023-09-01 RX ORDER — ALBUTEROL SULFATE 90 UG/1
4 AEROSOL, METERED RESPIRATORY (INHALATION) PRN
Status: DISCONTINUED | OUTPATIENT
Start: 2023-09-01 | End: 2023-09-02 | Stop reason: HOSPADM

## 2023-09-01 RX ORDER — SODIUM CHLORIDE 9 MG/ML
5-250 INJECTION, SOLUTION INTRAVENOUS PRN
Status: CANCELLED | OUTPATIENT
Start: 2023-09-03

## 2023-09-01 RX ORDER — EPINEPHRINE 1 MG/ML
0.3 INJECTION, SOLUTION, CONCENTRATE INTRAVENOUS PRN
Status: CANCELLED | OUTPATIENT
Start: 2023-09-01

## 2023-09-01 RX ORDER — MEPERIDINE HYDROCHLORIDE 25 MG/ML
12.5 INJECTION INTRAMUSCULAR; INTRAVENOUS; SUBCUTANEOUS PRN
Status: DISCONTINUED | OUTPATIENT
Start: 2023-09-01 | End: 2023-09-02 | Stop reason: HOSPADM

## 2023-09-01 RX ORDER — ONDANSETRON 2 MG/ML
8 INJECTION INTRAMUSCULAR; INTRAVENOUS
Status: CANCELLED | OUTPATIENT
Start: 2023-09-01

## 2023-09-01 RX ORDER — DEXTROSE MONOHYDRATE 50 MG/ML
5-250 INJECTION, SOLUTION INTRAVENOUS PRN
Status: DISCONTINUED | OUTPATIENT
Start: 2023-09-01 | End: 2023-09-02 | Stop reason: HOSPADM

## 2023-09-01 RX ORDER — DEXTROSE MONOHYDRATE 50 MG/ML
5-250 INJECTION, SOLUTION INTRAVENOUS PRN
Status: CANCELLED | OUTPATIENT
Start: 2023-09-01

## 2023-09-01 RX ORDER — SODIUM CHLORIDE 0.9 % (FLUSH) 0.9 %
5-40 SYRINGE (ML) INJECTION PRN
Status: CANCELLED | OUTPATIENT
Start: 2023-09-03

## 2023-09-01 RX ORDER — SODIUM CHLORIDE 9 MG/ML
5-250 INJECTION, SOLUTION INTRAVENOUS PRN
Status: CANCELLED | OUTPATIENT
Start: 2023-09-01

## 2023-09-01 RX ORDER — FAMOTIDINE 10 MG/ML
20 INJECTION, SOLUTION INTRAVENOUS
Status: CANCELLED | OUTPATIENT
Start: 2023-09-01

## 2023-09-01 RX ORDER — ATROPINE SULFATE 0.4 MG/ML
0.4 INJECTION, SOLUTION INTRAMUSCULAR; INTRAVENOUS; SUBCUTANEOUS
Status: CANCELLED | OUTPATIENT
Start: 2023-09-01

## 2023-09-01 RX ORDER — ACETAMINOPHEN 325 MG/1
650 TABLET ORAL
Status: DISCONTINUED | OUTPATIENT
Start: 2023-09-01 | End: 2023-09-02 | Stop reason: HOSPADM

## 2023-09-01 RX ORDER — SODIUM CHLORIDE 0.9 % (FLUSH) 0.9 %
5-40 SYRINGE (ML) INJECTION PRN
Status: DISCONTINUED | OUTPATIENT
Start: 2023-09-01 | End: 2023-09-02 | Stop reason: HOSPADM

## 2023-09-01 RX ORDER — HEPARIN SODIUM (PORCINE) LOCK FLUSH IV SOLN 100 UNIT/ML 100 UNIT/ML
500 SOLUTION INTRAVENOUS PRN
Status: CANCELLED | OUTPATIENT
Start: 2023-09-01

## 2023-09-01 RX ORDER — ALBUTEROL SULFATE 90 UG/1
4 AEROSOL, METERED RESPIRATORY (INHALATION) PRN
Status: CANCELLED | OUTPATIENT
Start: 2023-09-01

## 2023-09-01 RX ORDER — ACETAMINOPHEN 325 MG/1
650 TABLET ORAL
Status: CANCELLED | OUTPATIENT
Start: 2023-09-01

## 2023-09-01 RX ORDER — ATROPINE SULFATE 0.4 MG/ML
0.4 INJECTION, SOLUTION INTRAMUSCULAR; INTRAVENOUS; SUBCUTANEOUS ONCE
Status: CANCELLED | OUTPATIENT
Start: 2023-09-01 | End: 2023-09-01

## 2023-09-01 RX ORDER — SODIUM CHLORIDE 9 MG/ML
INJECTION, SOLUTION INTRAVENOUS CONTINUOUS
Status: CANCELLED | OUTPATIENT
Start: 2023-09-01

## 2023-09-01 RX ORDER — FLUOROURACIL 50 MG/ML
400 INJECTION, SOLUTION INTRAVENOUS ONCE
Status: COMPLETED | OUTPATIENT
Start: 2023-09-01 | End: 2023-09-01

## 2023-09-01 RX ORDER — EPINEPHRINE 1 MG/ML
0.3 INJECTION, SOLUTION, CONCENTRATE INTRAVENOUS PRN
Status: DISCONTINUED | OUTPATIENT
Start: 2023-09-01 | End: 2023-09-02 | Stop reason: HOSPADM

## 2023-09-01 RX ORDER — ONDANSETRON 2 MG/ML
8 INJECTION INTRAMUSCULAR; INTRAVENOUS ONCE
Status: CANCELLED | OUTPATIENT
Start: 2023-09-01 | End: 2023-09-01

## 2023-09-01 RX ORDER — FLUOROURACIL 50 MG/ML
400 INJECTION, SOLUTION INTRAVENOUS ONCE
Status: CANCELLED | OUTPATIENT
Start: 2023-09-01 | End: 2023-09-01

## 2023-09-01 RX ORDER — ONDANSETRON 2 MG/ML
8 INJECTION INTRAMUSCULAR; INTRAVENOUS
Status: DISCONTINUED | OUTPATIENT
Start: 2023-09-01 | End: 2023-09-02 | Stop reason: HOSPADM

## 2023-09-01 RX ORDER — MEPERIDINE HYDROCHLORIDE 50 MG/ML
12.5 INJECTION INTRAMUSCULAR; INTRAVENOUS; SUBCUTANEOUS PRN
Status: CANCELLED | OUTPATIENT
Start: 2023-09-01

## 2023-09-01 RX ORDER — DIPHENHYDRAMINE HYDROCHLORIDE 50 MG/ML
50 INJECTION INTRAMUSCULAR; INTRAVENOUS
Status: CANCELLED | OUTPATIENT
Start: 2023-09-01

## 2023-09-01 RX ORDER — SODIUM CHLORIDE 0.9 % (FLUSH) 0.9 %
5-40 SYRINGE (ML) INJECTION PRN
Status: DISCONTINUED | OUTPATIENT
Start: 2023-09-01 | End: 2023-09-05 | Stop reason: HOSPADM

## 2023-09-01 RX ADMIN — FLUOROURACIL 5375 MG: 50 INJECTION, SOLUTION INTRAVENOUS at 12:25

## 2023-09-01 RX ADMIN — DEXTROSE MONOHYDRATE 25 ML/HR: 50 INJECTION, SOLUTION INTRAVENOUS at 09:00

## 2023-09-01 RX ADMIN — SODIUM CHLORIDE, PRESERVATIVE FREE 10 ML: 5 INJECTION INTRAVENOUS at 07:30

## 2023-09-01 RX ADMIN — LEUCOVORIN CALCIUM 900 MG: 500 INJECTION, POWDER, LYOPHILIZED, FOR SOLUTION INTRAMUSCULAR; INTRAVENOUS at 10:33

## 2023-09-01 RX ADMIN — ATROPINE SULFATE 0.4 MG: 0.4 INJECTION, SOLUTION INTRAVENOUS at 09:30

## 2023-09-01 RX ADMIN — IRINOTECAN HYDROCHLORIDE 340 MG: 20 INJECTION, SOLUTION INTRAVENOUS at 10:33

## 2023-09-01 RX ADMIN — FLUOROURACIL 900 MG: 50 INJECTION, SOLUTION INTRAVENOUS at 12:20

## 2023-09-01 RX ADMIN — SODIUM CHLORIDE, PRESERVATIVE FREE 10 ML: 5 INJECTION INTRAVENOUS at 09:00

## 2023-09-01 RX ADMIN — DEXAMETHASONE SODIUM PHOSPHATE 12 MG: 4 INJECTION INTRA-ARTICULAR; INTRALESIONAL; INTRAMUSCULAR; INTRAVENOUS; SOFT TISSUE at 09:25

## 2023-09-01 RX ADMIN — FOSAPREPITANT 150 MG: 150 INJECTION, POWDER, LYOPHILIZED, FOR SOLUTION INTRAVENOUS at 09:45

## 2023-09-01 RX ADMIN — ONDANSETRON 8 MG: 2 INJECTION INTRAMUSCULAR; INTRAVENOUS at 09:23

## 2023-09-01 ASSESSMENT — ENCOUNTER SYMPTOMS
SCLERAL ICTERUS: 0
SORE THROAT: 0
HEMOPTYSIS: 0
ABDOMINAL DISTENTION: 0
EYE PROBLEMS: 0
BLOOD IN STOOL: 0
NAUSEA: 0
CONSTIPATION: 0
COUGH: 0
VOMITING: 0
DIARRHEA: 0
SHORTNESS OF BREATH: 0
BACK PAIN: 0

## 2023-09-01 ASSESSMENT — PATIENT HEALTH QUESTIONNAIRE - PHQ9: DEPRESSION UNABLE TO ASSESS: PT REFUSES

## 2023-09-01 NOTE — PROGRESS NOTES
small bowel obstruction. Continued radiographic follow-up is advised. CT OF THE ABDOMEN AND PELVIS 5/12/22   FINDINGS:   LOWER CHEST: Normal.   HEPATOBILIARY: No evidence of focal liver mass. No calcified gallstones. PANCREAS: Normal.   SPLEEN: Normal.    ADRENAL GLANDS: Normal.    KIDNEYS/BLADDER: Kidneys and bladder are unremarkable. No hydronephrosis or urinary tract calculi. BOWEL: Dilated fluid-filled loops of small bowel are present throughout the abdomen. No definite transition point. Oral contrast noted in the colon. No definite evidence of bowel mass but there is extensive peritoneal nodularity and trace ascites highly  concerning for peritoneal metastatic disease. LYMPH NODES: No enlarged retroperitoneal or pelvic lymph nodes. Peritoneal nodularity again noted most likely metastatic disease. VASCULATURE: Unremarkable. PELVIC ORGANS: Prostate gland and rectum are unremarkable. Small amount of free pelvic fluid is noted. MUSCULOSKELETAL: Degenerative spine changes are noted. Mild sclerosis involving the S1 vertebral body is present on image 74 of series 602. IMPRESSION   1. Extensive peritoneal nodularity and mild ascites consistent with peritoneal metastatic disease. Primary lesion not definitely identified. 2. Dilated fluid-filled loops of small bowel possibly related to gastroenteritis. No definite obstruction. No obvious bowel mass. 3. Sclerosis S1 vertebral body. Consider follow-up bone scan to assess for bone metastases. LIMITED ABDOMINAL ULTRASOUND 5/13/22   FINDINGS: A single limited gray scale image was submitted of an undisclosed location in the abdomen. Images demonstrate a small amount of  ascites as seen on comparison CT. IMPRESSION   1. Small volume ascites. CT CHEST WITH CONTRAST 5/13/22   FINDINGS:   Mediastinum and visualized thyroid: Normal.   Heart: Normal.    Large Vessels: Normal.    Pleura: Normal.    Lungs: No lung mass clearly discerned.

## 2023-09-01 NOTE — PROGRESS NOTES
Arrived to the [address] [place] [address]. FOLFIRI completed. Adrucil pump attached, connections checked with 2nd chemo-certified nurse. Patient tolerated well. Any issues or concerns during appointment: none. Patient aware of next infusion appointment on 9/3/23 (date) at 1:30 PM (time). Patient instructed to call provider with temperature of 100.4 or greater or nausea/vomiting/diarrhea or pain not controlled by medications  Discharged ambulatory with wife.

## 2023-09-03 ENCOUNTER — HOSPITAL ENCOUNTER (OUTPATIENT)
Dept: INFUSION THERAPY | Age: 63
Discharge: HOME | End: 2023-09-03
Payer: COMMERCIAL

## 2023-09-03 VITALS
RESPIRATION RATE: 18 BRPM | TEMPERATURE: 98.1 F | DIASTOLIC BLOOD PRESSURE: 90 MMHG | OXYGEN SATURATION: 95 % | HEART RATE: 82 BPM | SYSTOLIC BLOOD PRESSURE: 156 MMHG

## 2023-09-03 DIAGNOSIS — C76.2 ABDOMINAL CARCINOMATOSIS (HCC): Primary | ICD-10-CM

## 2023-09-03 DIAGNOSIS — C78.6 MALIGNANT NEOPLASM METASTATIC TO PERITONEUM (HCC): ICD-10-CM

## 2023-09-03 PROCEDURE — 2580000003 HC RX 258: Performed by: NURSE PRACTITIONER

## 2023-09-03 PROCEDURE — 96523 IRRIG DRUG DELIVERY DEVICE: CPT

## 2023-09-03 RX ORDER — SODIUM CHLORIDE 0.9 % (FLUSH) 0.9 %
5-40 SYRINGE (ML) INJECTION PRN
Status: DISCONTINUED | OUTPATIENT
Start: 2023-09-03 | End: 2023-09-04 | Stop reason: HOSPADM

## 2023-09-03 RX ADMIN — SODIUM CHLORIDE, PRESERVATIVE FREE 10 ML: 5 INJECTION INTRAVENOUS at 11:54

## 2023-09-03 NOTE — PROGRESS NOTES
Arrived to the [address] [place] [address]. Pump d/c completed. Provided education on pump d/c    Patient instructed to report any side affects to ordering provider. Patient tolerated well. Any issues or concerns during appointment: none. Patient aware of next infusion appointment on 9/15 (date) at 12 (time). Discharged ambulatory, no distress noted. Patient instructed to call provider with temperature of 100.4 or greater or nausea/vomiting/ diarrhea or pain not controlled by medications  .

## 2023-09-05 ENCOUNTER — OFFICE VISIT (OUTPATIENT)
Dept: INTERNAL MEDICINE CLINIC | Facility: CLINIC | Age: 63
End: 2023-09-05
Payer: COMMERCIAL

## 2023-09-05 VITALS
OXYGEN SATURATION: 94 % | HEIGHT: 70 IN | SYSTOLIC BLOOD PRESSURE: 128 MMHG | HEART RATE: 91 BPM | BODY MASS INDEX: 34.07 KG/M2 | WEIGHT: 238 LBS | DIASTOLIC BLOOD PRESSURE: 64 MMHG

## 2023-09-05 DIAGNOSIS — G62.9 NEUROPATHY: ICD-10-CM

## 2023-09-05 DIAGNOSIS — I82.621 ACUTE DEEP VEIN THROMBOSIS (DVT) OF RIGHT UPPER EXTREMITY, UNSPECIFIED VEIN (HCC): ICD-10-CM

## 2023-09-05 DIAGNOSIS — C80.0 CARCINOMATOSIS (HCC): Primary | ICD-10-CM

## 2023-09-05 DIAGNOSIS — I10 ESSENTIAL HYPERTENSION: ICD-10-CM

## 2023-09-05 DIAGNOSIS — E87.6 HYPOKALEMIA: ICD-10-CM

## 2023-09-05 DIAGNOSIS — C79.9 METASTATIC ADENOCARCINOMA (HCC): ICD-10-CM

## 2023-09-05 DIAGNOSIS — C76.2 ABDOMINAL CARCINOMATOSIS (HCC): ICD-10-CM

## 2023-09-05 PROCEDURE — 3074F SYST BP LT 130 MM HG: CPT | Performed by: STUDENT IN AN ORGANIZED HEALTH CARE EDUCATION/TRAINING PROGRAM

## 2023-09-05 PROCEDURE — 3078F DIAST BP <80 MM HG: CPT | Performed by: STUDENT IN AN ORGANIZED HEALTH CARE EDUCATION/TRAINING PROGRAM

## 2023-09-05 PROCEDURE — 99214 OFFICE O/P EST MOD 30 MIN: CPT | Performed by: STUDENT IN AN ORGANIZED HEALTH CARE EDUCATION/TRAINING PROGRAM

## 2023-09-05 RX ORDER — PANTOPRAZOLE SODIUM 40 MG/1
40 TABLET, DELAYED RELEASE ORAL
Qty: 90 TABLET | Refills: 3 | Status: SHIPPED | OUTPATIENT
Start: 2023-09-05

## 2023-09-05 RX ORDER — DULOXETIN HYDROCHLORIDE 60 MG/1
60 CAPSULE, DELAYED RELEASE ORAL DAILY
Qty: 90 CAPSULE | Refills: 3 | Status: SHIPPED | OUTPATIENT
Start: 2023-09-05

## 2023-09-05 RX ORDER — OMEPRAZOLE 20 MG/1
20 CAPSULE, DELAYED RELEASE ORAL DAILY
COMMUNITY

## 2023-09-05 RX ORDER — GABAPENTIN 400 MG/1
400 CAPSULE ORAL 3 TIMES DAILY PRN
Qty: 180 CAPSULE | Refills: 2 | Status: SHIPPED | OUTPATIENT
Start: 2023-09-05 | End: 2024-03-03

## 2023-09-05 ASSESSMENT — PATIENT HEALTH QUESTIONNAIRE - PHQ9
SUM OF ALL RESPONSES TO PHQ QUESTIONS 1-9: 1
5. POOR APPETITE OR OVEREATING: 0
SUM OF ALL RESPONSES TO PHQ QUESTIONS 1-9: 1
7. TROUBLE CONCENTRATING ON THINGS, SUCH AS READING THE NEWSPAPER OR WATCHING TELEVISION: 0
10. IF YOU CHECKED OFF ANY PROBLEMS, HOW DIFFICULT HAVE THESE PROBLEMS MADE IT FOR YOU TO DO YOUR WORK, TAKE CARE OF THINGS AT HOME, OR GET ALONG WITH OTHER PEOPLE: 1
SUM OF ALL RESPONSES TO PHQ9 QUESTIONS 1 & 2: 1
SUM OF ALL RESPONSES TO PHQ QUESTIONS 1-9: 1
4. FEELING TIRED OR HAVING LITTLE ENERGY: 0
1. LITTLE INTEREST OR PLEASURE IN DOING THINGS: 1
3. TROUBLE FALLING OR STAYING ASLEEP: 0
SUM OF ALL RESPONSES TO PHQ QUESTIONS 1-9: 1
9. THOUGHTS THAT YOU WOULD BE BETTER OFF DEAD, OR OF HURTING YOURSELF: 0
2. FEELING DOWN, DEPRESSED OR HOPELESS: 0
6. FEELING BAD ABOUT YOURSELF - OR THAT YOU ARE A FAILURE OR HAVE LET YOURSELF OR YOUR FAMILY DOWN: 0
8. MOVING OR SPEAKING SO SLOWLY THAT OTHER PEOPLE COULD HAVE NOTICED. OR THE OPPOSITE, BEING SO FIGETY OR RESTLESS THAT YOU HAVE BEEN MOVING AROUND A LOT MORE THAN USUAL: 0

## 2023-09-05 NOTE — PROGRESS NOTES
PO) Take 1 tablet by mouth daily      Magic Mouthwash (MIRACLE MOUTHWASH) Swish and spit 5 mLs 4 times daily as needed for Irritation (Use 5 ml 4 times daily as needed for mouth irritation/mouth sores.) (Patient not taking: Reported on 9/1/2023) 240 mL 0     No current facility-administered medications for this visit. Allergies as of 09/05/2023    (No Known Allergies)       Review of Systems    Objective:    Vitals:    09/05/23 0832   BP: 128/64   Site: Left Upper Arm   Position: Sitting   Cuff Size: Large Adult   Pulse: 91   SpO2: 94%   Weight: 238 lb (108 kg)   Height: 5' 10\" (1.778 m)        Physical Exam  Constitutional:       General: He is not in acute distress. Appearance: Normal appearance. HENT:      Head: Normocephalic and atraumatic. Eyes:      Extraocular Movements: Extraocular movements intact. Conjunctiva/sclera: Conjunctivae normal.   Cardiovascular:      Rate and Rhythm: Normal rate and regular rhythm. Heart sounds: No murmur heard. Pulmonary:      Effort: Pulmonary effort is normal.      Breath sounds: Normal breath sounds. No wheezing, rhonchi or rales. Skin:     General: Skin is warm and dry. Neurological:      Mental Status: He is alert. Mental status is at baseline.    Psychiatric:         Mood and Affect: Mood normal.         Behavior: Behavior normal.       Lab Results   Component Value Date    WBC 5.9 09/01/2023    HGB 11.0 (L) 09/01/2023    HCT 35.4 (L) 09/01/2023    MCV 91.9 09/01/2023     09/01/2023      Lab Results   Component Value Date/Time     09/01/2023 07:20 AM    K 3.4 09/01/2023 07:20 AM     09/01/2023 07:20 AM    CO2 28 09/01/2023 07:20 AM    BUN 10 09/01/2023 07:20 AM    CREATININE 1.00 09/01/2023 07:20 AM    GLUCOSE 225 09/01/2023 07:20 AM    CALCIUM 8.2 09/01/2023 07:20 AM       Lab Results   Component Value Date     09/01/2023    K 3.4 (L) 09/01/2023     (H) 09/01/2023    CO2 28 09/01/2023    BUN 10 09/01/2023

## 2023-09-14 DIAGNOSIS — C76.2 ABDOMINAL CARCINOMATOSIS (HCC): Primary | ICD-10-CM

## 2023-09-14 DIAGNOSIS — C16.9 MALIGNANT NEOPLASM OF STOMACH, UNSPECIFIED LOCATION (HCC): ICD-10-CM

## 2023-09-14 DIAGNOSIS — C78.6 MALIGNANT NEOPLASM METASTATIC TO PERITONEUM (HCC): ICD-10-CM

## 2023-09-15 ENCOUNTER — OFFICE VISIT (OUTPATIENT)
Dept: ONCOLOGY | Age: 63
End: 2023-09-15
Payer: COMMERCIAL

## 2023-09-15 ENCOUNTER — HOSPITAL ENCOUNTER (OUTPATIENT)
Dept: INFUSION THERAPY | Age: 63
Discharge: HOME | End: 2023-09-15
Payer: COMMERCIAL

## 2023-09-15 VITALS
HEART RATE: 81 BPM | DIASTOLIC BLOOD PRESSURE: 83 MMHG | BODY MASS INDEX: 34.44 KG/M2 | RESPIRATION RATE: 16 BRPM | TEMPERATURE: 98.1 F | SYSTOLIC BLOOD PRESSURE: 139 MMHG | OXYGEN SATURATION: 97 % | WEIGHT: 240 LBS

## 2023-09-15 VITALS
WEIGHT: 238 LBS | HEART RATE: 91 BPM | SYSTOLIC BLOOD PRESSURE: 128 MMHG | DIASTOLIC BLOOD PRESSURE: 64 MMHG | BODY MASS INDEX: 34.15 KG/M2 | RESPIRATION RATE: 16 BRPM | OXYGEN SATURATION: 94 %

## 2023-09-15 DIAGNOSIS — C76.2 ABDOMINAL CARCINOMATOSIS (HCC): ICD-10-CM

## 2023-09-15 DIAGNOSIS — E87.6 HYPOKALEMIA: ICD-10-CM

## 2023-09-15 DIAGNOSIS — C76.2 ABDOMINAL CARCINOMATOSIS (HCC): Primary | ICD-10-CM

## 2023-09-15 DIAGNOSIS — Z79.899 HIGH RISK MEDICATION USE: ICD-10-CM

## 2023-09-15 DIAGNOSIS — C78.6 MALIGNANT NEOPLASM METASTATIC TO PERITONEUM (HCC): ICD-10-CM

## 2023-09-15 DIAGNOSIS — C16.9 MALIGNANT NEOPLASM OF STOMACH, UNSPECIFIED LOCATION (HCC): Primary | ICD-10-CM

## 2023-09-15 DIAGNOSIS — E83.42 HYPOMAGNESEMIA: ICD-10-CM

## 2023-09-15 DIAGNOSIS — T45.1X5A NEUROPATHY DUE TO CHEMOTHERAPEUTIC DRUG (HCC): ICD-10-CM

## 2023-09-15 DIAGNOSIS — C16.9 MALIGNANT NEOPLASM OF STOMACH, UNSPECIFIED LOCATION (HCC): ICD-10-CM

## 2023-09-15 DIAGNOSIS — G62.0 NEUROPATHY DUE TO CHEMOTHERAPEUTIC DRUG (HCC): ICD-10-CM

## 2023-09-15 LAB
ALBUMIN SERPL-MCNC: 3.2 G/DL (ref 3.2–4.6)
ALBUMIN/GLOB SERPL: 1.1 (ref 0.4–1.6)
ALP SERPL-CCNC: 141 U/L (ref 50–136)
ALT SERPL-CCNC: 30 U/L (ref 12–65)
ANION GAP SERPL CALC-SCNC: 5 MMOL/L (ref 2–11)
AST SERPL-CCNC: 19 U/L (ref 15–37)
BASOPHILS # BLD: 0 K/UL (ref 0–0.2)
BASOPHILS NFR BLD: 1 % (ref 0–2)
BILIRUB SERPL-MCNC: 0.3 MG/DL (ref 0.2–1.1)
BUN SERPL-MCNC: 12 MG/DL (ref 8–23)
CALCIUM SERPL-MCNC: 8 MG/DL (ref 8.3–10.4)
CEA SERPL-MCNC: 2.2 NG/ML (ref 0–3)
CHLORIDE SERPL-SCNC: 113 MMOL/L (ref 101–110)
CO2 SERPL-SCNC: 25 MMOL/L (ref 21–32)
CREAT SERPL-MCNC: 1 MG/DL (ref 0.8–1.5)
DIFFERENTIAL METHOD BLD: ABNORMAL
EOSINOPHIL # BLD: 0.2 K/UL (ref 0–0.8)
EOSINOPHIL NFR BLD: 3 % (ref 0.5–7.8)
ERYTHROCYTE [DISTWIDTH] IN BLOOD BY AUTOMATED COUNT: 14.4 % (ref 11.9–14.6)
GLOBULIN SER CALC-MCNC: 2.8 G/DL (ref 2.8–4.5)
GLUCOSE SERPL-MCNC: 183 MG/DL (ref 65–100)
HCT VFR BLD AUTO: 34 % (ref 41.1–50.3)
HGB BLD-MCNC: 10.8 G/DL (ref 13.6–17.2)
IMM GRANULOCYTES # BLD AUTO: 0 K/UL (ref 0–0.5)
IMM GRANULOCYTES NFR BLD AUTO: 0 % (ref 0–5)
LYMPHOCYTES # BLD: 2 K/UL (ref 0.5–4.6)
LYMPHOCYTES NFR BLD: 38 % (ref 13–44)
MCH RBC QN AUTO: 29 PG (ref 26.1–32.9)
MCHC RBC AUTO-ENTMCNC: 31.8 G/DL (ref 31.4–35)
MCV RBC AUTO: 91.2 FL (ref 82–102)
MONOCYTES # BLD: 0.4 K/UL (ref 0.1–1.3)
MONOCYTES NFR BLD: 8 % (ref 4–12)
NEUTS SEG # BLD: 2.6 K/UL (ref 1.7–8.2)
NEUTS SEG NFR BLD: 50 % (ref 43–78)
NRBC # BLD: 0 K/UL (ref 0–0.2)
PLATELET # BLD AUTO: 161 K/UL (ref 150–450)
PMV BLD AUTO: 9.7 FL (ref 9.4–12.3)
POTASSIUM SERPL-SCNC: 3.3 MMOL/L (ref 3.5–5.1)
PROT SERPL-MCNC: 6 G/DL (ref 6.3–8.2)
RBC # BLD AUTO: 3.73 M/UL (ref 4.23–5.6)
SODIUM SERPL-SCNC: 143 MMOL/L (ref 133–143)
WBC # BLD AUTO: 5.2 K/UL (ref 4.3–11.1)

## 2023-09-15 PROCEDURE — 96375 TX/PRO/DX INJ NEW DRUG ADDON: CPT

## 2023-09-15 PROCEDURE — 82378 CARCINOEMBRYONIC ANTIGEN: CPT

## 2023-09-15 PROCEDURE — 85025 COMPLETE CBC W/AUTO DIFF WBC: CPT

## 2023-09-15 PROCEDURE — 6370000000 HC RX 637 (ALT 250 FOR IP): Performed by: NURSE PRACTITIONER

## 2023-09-15 PROCEDURE — 3074F SYST BP LT 130 MM HG: CPT | Performed by: NURSE PRACTITIONER

## 2023-09-15 PROCEDURE — 96413 CHEMO IV INFUSION 1 HR: CPT

## 2023-09-15 PROCEDURE — 2580000003 HC RX 258: Performed by: NURSE PRACTITIONER

## 2023-09-15 PROCEDURE — 99214 OFFICE O/P EST MOD 30 MIN: CPT | Performed by: NURSE PRACTITIONER

## 2023-09-15 PROCEDURE — 3078F DIAST BP <80 MM HG: CPT | Performed by: NURSE PRACTITIONER

## 2023-09-15 PROCEDURE — 96372 THER/PROPH/DIAG INJ SC/IM: CPT

## 2023-09-15 PROCEDURE — 96411 CHEMO IV PUSH ADDL DRUG: CPT

## 2023-09-15 PROCEDURE — 96367 TX/PROPH/DG ADDL SEQ IV INF: CPT

## 2023-09-15 PROCEDURE — 6360000002 HC RX W HCPCS: Performed by: NURSE PRACTITIONER

## 2023-09-15 PROCEDURE — G0498 CHEMO EXTEND IV INFUS W/PUMP: HCPCS

## 2023-09-15 PROCEDURE — 80053 COMPREHEN METABOLIC PANEL: CPT

## 2023-09-15 PROCEDURE — 96368 THER/DIAG CONCURRENT INF: CPT

## 2023-09-15 PROCEDURE — 96415 CHEMO IV INFUSION ADDL HR: CPT

## 2023-09-15 RX ORDER — SODIUM CHLORIDE 0.9 % (FLUSH) 0.9 %
5-40 SYRINGE (ML) INJECTION PRN
Status: DISCONTINUED | OUTPATIENT
Start: 2023-09-15 | End: 2023-09-16 | Stop reason: HOSPADM

## 2023-09-15 RX ORDER — ACETAMINOPHEN 325 MG/1
650 TABLET ORAL
Status: DISCONTINUED | OUTPATIENT
Start: 2023-09-15 | End: 2023-09-16 | Stop reason: HOSPADM

## 2023-09-15 RX ORDER — FAMOTIDINE 10 MG/ML
20 INJECTION, SOLUTION INTRAVENOUS
Status: CANCELLED | OUTPATIENT
Start: 2023-09-15

## 2023-09-15 RX ORDER — POTASSIUM CHLORIDE 750 MG/1
40 TABLET, EXTENDED RELEASE ORAL ONCE
Status: COMPLETED | OUTPATIENT
Start: 2023-09-15 | End: 2023-09-15

## 2023-09-15 RX ORDER — SODIUM CHLORIDE 0.9 % (FLUSH) 0.9 %
5-40 SYRINGE (ML) INJECTION PRN
OUTPATIENT
Start: 2023-09-17

## 2023-09-15 RX ORDER — EPINEPHRINE 1 MG/ML
0.3 INJECTION, SOLUTION, CONCENTRATE INTRAVENOUS PRN
Status: DISCONTINUED | OUTPATIENT
Start: 2023-09-15 | End: 2023-09-16 | Stop reason: HOSPADM

## 2023-09-15 RX ORDER — ATROPINE SULFATE 0.4 MG/ML
0.4 INJECTION, SOLUTION INTRAMUSCULAR; INTRAVENOUS; SUBCUTANEOUS ONCE
Status: CANCELLED | OUTPATIENT
Start: 2023-09-15 | End: 2023-09-15

## 2023-09-15 RX ORDER — ONDANSETRON 2 MG/ML
8 INJECTION INTRAMUSCULAR; INTRAVENOUS
Status: CANCELLED | OUTPATIENT
Start: 2023-09-15

## 2023-09-15 RX ORDER — FLUOROURACIL 50 MG/ML
400 INJECTION, SOLUTION INTRAVENOUS ONCE
Status: COMPLETED | OUTPATIENT
Start: 2023-09-15 | End: 2023-09-15

## 2023-09-15 RX ORDER — ATROPINE SULFATE 0.4 MG/ML
0.4 INJECTION, SOLUTION INTRAVENOUS ONCE
Status: COMPLETED | OUTPATIENT
Start: 2023-09-15 | End: 2023-09-15

## 2023-09-15 RX ORDER — FLUOROURACIL 50 MG/ML
400 INJECTION, SOLUTION INTRAVENOUS ONCE
Status: CANCELLED | OUTPATIENT
Start: 2023-09-15 | End: 2023-09-15

## 2023-09-15 RX ORDER — ONDANSETRON 2 MG/ML
8 INJECTION INTRAMUSCULAR; INTRAVENOUS ONCE
Status: CANCELLED | OUTPATIENT
Start: 2023-09-15 | End: 2023-09-15

## 2023-09-15 RX ORDER — EPINEPHRINE 1 MG/ML
0.3 INJECTION, SOLUTION, CONCENTRATE INTRAVENOUS PRN
Status: CANCELLED | OUTPATIENT
Start: 2023-09-15

## 2023-09-15 RX ORDER — HEPARIN SODIUM (PORCINE) LOCK FLUSH IV SOLN 100 UNIT/ML 100 UNIT/ML
500 SOLUTION INTRAVENOUS PRN
OUTPATIENT
Start: 2023-09-17

## 2023-09-15 RX ORDER — DIPHENHYDRAMINE HYDROCHLORIDE 50 MG/ML
50 INJECTION INTRAMUSCULAR; INTRAVENOUS
Status: CANCELLED | OUTPATIENT
Start: 2023-09-15

## 2023-09-15 RX ORDER — ONDANSETRON 2 MG/ML
8 INJECTION INTRAMUSCULAR; INTRAVENOUS ONCE
Status: COMPLETED | OUTPATIENT
Start: 2023-09-15 | End: 2023-09-15

## 2023-09-15 RX ORDER — SODIUM CHLORIDE 9 MG/ML
5-250 INJECTION, SOLUTION INTRAVENOUS PRN
Status: CANCELLED | OUTPATIENT
Start: 2023-09-15

## 2023-09-15 RX ORDER — DEXTROSE MONOHYDRATE 50 MG/ML
5-250 INJECTION, SOLUTION INTRAVENOUS PRN
Status: CANCELLED | OUTPATIENT
Start: 2023-09-15

## 2023-09-15 RX ORDER — ACETAMINOPHEN 325 MG/1
650 TABLET ORAL
Status: CANCELLED | OUTPATIENT
Start: 2023-09-15

## 2023-09-15 RX ORDER — DEXTROSE MONOHYDRATE 50 MG/ML
5-250 INJECTION, SOLUTION INTRAVENOUS PRN
Status: DISCONTINUED | OUTPATIENT
Start: 2023-09-15 | End: 2023-09-16 | Stop reason: HOSPADM

## 2023-09-15 RX ORDER — ONDANSETRON 2 MG/ML
8 INJECTION INTRAMUSCULAR; INTRAVENOUS
Status: DISCONTINUED | OUTPATIENT
Start: 2023-09-15 | End: 2023-09-16 | Stop reason: HOSPADM

## 2023-09-15 RX ORDER — ATROPINE SULFATE 0.4 MG/ML
0.4 INJECTION, SOLUTION INTRAMUSCULAR; INTRAVENOUS; SUBCUTANEOUS
Status: CANCELLED | OUTPATIENT
Start: 2023-09-15

## 2023-09-15 RX ORDER — ALBUTEROL SULFATE 90 UG/1
4 AEROSOL, METERED RESPIRATORY (INHALATION) PRN
Status: DISCONTINUED | OUTPATIENT
Start: 2023-09-15 | End: 2023-09-16 | Stop reason: HOSPADM

## 2023-09-15 RX ORDER — SODIUM CHLORIDE 0.9 % (FLUSH) 0.9 %
5-40 SYRINGE (ML) INJECTION PRN
Status: CANCELLED | OUTPATIENT
Start: 2023-09-15

## 2023-09-15 RX ORDER — DIPHENHYDRAMINE HYDROCHLORIDE 50 MG/ML
50 INJECTION INTRAMUSCULAR; INTRAVENOUS
Status: DISCONTINUED | OUTPATIENT
Start: 2023-09-15 | End: 2023-09-16 | Stop reason: HOSPADM

## 2023-09-15 RX ORDER — HEPARIN SODIUM (PORCINE) LOCK FLUSH IV SOLN 100 UNIT/ML 100 UNIT/ML
500 SOLUTION INTRAVENOUS PRN
Status: CANCELLED | OUTPATIENT
Start: 2023-09-15

## 2023-09-15 RX ORDER — POTASSIUM CHLORIDE 20 MEQ/1
40 TABLET, EXTENDED RELEASE ORAL ONCE
Status: CANCELLED
Start: 2023-09-15

## 2023-09-15 RX ORDER — MEPERIDINE HYDROCHLORIDE 50 MG/ML
12.5 INJECTION INTRAMUSCULAR; INTRAVENOUS; SUBCUTANEOUS PRN
Status: CANCELLED | OUTPATIENT
Start: 2023-09-15

## 2023-09-15 RX ORDER — ALBUTEROL SULFATE 90 UG/1
4 AEROSOL, METERED RESPIRATORY (INHALATION) PRN
Status: CANCELLED | OUTPATIENT
Start: 2023-09-15

## 2023-09-15 RX ORDER — MEPERIDINE HYDROCHLORIDE 25 MG/ML
12.5 INJECTION INTRAMUSCULAR; INTRAVENOUS; SUBCUTANEOUS PRN
Status: DISCONTINUED | OUTPATIENT
Start: 2023-09-15 | End: 2023-09-16 | Stop reason: HOSPADM

## 2023-09-15 RX ORDER — SODIUM CHLORIDE 9 MG/ML
5-250 INJECTION, SOLUTION INTRAVENOUS PRN
OUTPATIENT
Start: 2023-09-17

## 2023-09-15 RX ORDER — SODIUM CHLORIDE 9 MG/ML
INJECTION, SOLUTION INTRAVENOUS CONTINUOUS
Status: CANCELLED | OUTPATIENT
Start: 2023-09-15

## 2023-09-15 RX ADMIN — DEXTROSE MONOHYDRATE 25 ML/HR: 50 INJECTION, SOLUTION INTRAVENOUS at 08:36

## 2023-09-15 RX ADMIN — DEXAMETHASONE SODIUM PHOSPHATE 12 MG: 4 INJECTION INTRA-ARTICULAR; INTRALESIONAL; INTRAMUSCULAR; INTRAVENOUS; SOFT TISSUE at 08:44

## 2023-09-15 RX ADMIN — IRINOTECAN HYDROCHLORIDE 340 MG: 20 INJECTION, SOLUTION INTRAVENOUS at 09:39

## 2023-09-15 RX ADMIN — POTASSIUM CHLORIDE 40 MEQ: 750 TABLET, EXTENDED RELEASE ORAL at 08:38

## 2023-09-15 RX ADMIN — LEUCOVORIN CALCIUM 900 MG: 500 INJECTION, POWDER, LYOPHILIZED, FOR SOLUTION INTRAMUSCULAR; INTRAVENOUS at 09:39

## 2023-09-15 RX ADMIN — FOSAPREPITANT 150 MG: 150 INJECTION, POWDER, LYOPHILIZED, FOR SOLUTION INTRAVENOUS at 09:02

## 2023-09-15 RX ADMIN — ONDANSETRON 8 MG: 2 INJECTION INTRAMUSCULAR; INTRAVENOUS at 08:38

## 2023-09-15 RX ADMIN — FLUOROURACIL 5375 MG: 50 INJECTION, SOLUTION INTRAVENOUS at 11:20

## 2023-09-15 RX ADMIN — SODIUM CHLORIDE, PRESERVATIVE FREE 10 ML: 5 INJECTION INTRAVENOUS at 11:11

## 2023-09-15 RX ADMIN — ATROPINE SULFATE 0.4 MG: 0.4 INJECTION, SOLUTION INTRAVENOUS at 09:35

## 2023-09-15 RX ADMIN — FLUOROURACIL 900 MG: 50 INJECTION, SOLUTION INTRAVENOUS at 11:15

## 2023-09-15 NOTE — PROGRESS NOTES
Arrived to the [facility]. Labs, OV and FOLFIRI completed. Patient tolerated without problems. Any issues or concerns during appointment: no.  Patient aware of next infusion appointment on 9/18/23 (date) at 5   Patient instructed to call provider with temperature of 100.4 or greater or nausea/vomiting/ diarrhea or pain not controlled by medications  Discharged ambulatory with spouse.

## 2023-09-15 NOTE — PROGRESS NOTES
osseous findings. The contrast in urinary bladder. Lines / Support Apparatus: None. IMPRESSION: Redemonstrated of multiple dilated small bowel loops with enteric contrast appearing to extend into the proximal colon, suggesting only partial small bowel obstruction. Continued radiographic follow-up is advised. CT OF THE ABDOMEN AND PELVIS 5/12/22   FINDINGS:   LOWER CHEST: Normal.   HEPATOBILIARY: No evidence of focal liver mass. No calcified gallstones. PANCREAS: Normal.   SPLEEN: Normal.    ADRENAL GLANDS: Normal.    KIDNEYS/BLADDER: Kidneys and bladder are unremarkable. No hydronephrosis or urinary tract calculi. BOWEL: Dilated fluid-filled loops of small bowel are present throughout the abdomen. No definite transition point. Oral contrast noted in the colon. No definite evidence of bowel mass but there is extensive peritoneal nodularity and trace ascites highly  concerning for peritoneal metastatic disease. LYMPH NODES: No enlarged retroperitoneal or pelvic lymph nodes. Peritoneal nodularity again noted most likely metastatic disease. VASCULATURE: Unremarkable. PELVIC ORGANS: Prostate gland and rectum are unremarkable. Small amount of free pelvic fluid is noted. MUSCULOSKELETAL: Degenerative spine changes are noted. Mild sclerosis involving the S1 vertebral body is present on image 74 of series 602. IMPRESSION   1. Extensive peritoneal nodularity and mild ascites consistent with peritoneal metastatic disease. Primary lesion not definitely identified. 2. Dilated fluid-filled loops of small bowel possibly related to gastroenteritis. No definite obstruction. No obvious bowel mass. 3. Sclerosis S1 vertebral body. Consider follow-up bone scan to assess for bone metastases. LIMITED ABDOMINAL ULTRASOUND 5/13/22   FINDINGS: A single limited gray scale image was submitted of an undisclosed location in the abdomen.  Images demonstrate a small amount of  ascites as seen on comparison

## 2023-09-18 ENCOUNTER — HOSPITAL ENCOUNTER (OUTPATIENT)
Dept: INFUSION THERAPY | Age: 63
Discharge: HOME | End: 2023-09-18
Payer: COMMERCIAL

## 2023-09-18 ENCOUNTER — HOSPITAL ENCOUNTER (OUTPATIENT)
Dept: INFUSION THERAPY | Age: 63
End: 2023-09-18

## 2023-09-18 VITALS
HEART RATE: 84 BPM | OXYGEN SATURATION: 96 % | DIASTOLIC BLOOD PRESSURE: 89 MMHG | TEMPERATURE: 97.6 F | SYSTOLIC BLOOD PRESSURE: 136 MMHG | RESPIRATION RATE: 16 BRPM

## 2023-09-18 DIAGNOSIS — Z95.828 PORT-A-CATH IN PLACE: Primary | ICD-10-CM

## 2023-09-18 PROCEDURE — 99211 OFF/OP EST MAY X REQ PHY/QHP: CPT

## 2023-09-18 NOTE — PROGRESS NOTES
Pt arrived to infusion for pump d/c. Pump still very inflated. All clamps were open. Pharmacy inspected pump and weighed pump with 100g noted to be out of pump. Attempted to flush port and was met with resistance. Needle of port noted to be slightly pulled out but still in skin. Notified Dr. Jonah Vieira who stated pt will just get next chemo next time and will order IR evaluation of port. IR will contact pt for appt. Informed pt of same. Pt verbalized understanding.
1

## 2023-09-25 ENCOUNTER — HOSPITAL ENCOUNTER (OUTPATIENT)
Dept: INTERVENTIONAL RADIOLOGY/VASCULAR | Age: 63
Discharge: HOME | End: 2023-09-28
Attending: INTERNAL MEDICINE
Payer: COMMERCIAL

## 2023-09-25 DIAGNOSIS — Z95.828 PORT-A-CATH IN PLACE: ICD-10-CM

## 2023-09-25 PROCEDURE — 6360000004 HC RX CONTRAST MEDICATION: Performed by: PHYSICIAN ASSISTANT

## 2023-09-25 PROCEDURE — 6360000002 HC RX W HCPCS: Performed by: PHYSICIAN ASSISTANT

## 2023-09-25 PROCEDURE — 36598 INJ W/FLUOR EVAL CV DEVICE: CPT

## 2023-09-25 RX ORDER — HEPARIN 100 UNIT/ML
SYRINGE INTRAVENOUS PRN
Status: COMPLETED | OUTPATIENT
Start: 2023-09-25 | End: 2023-09-25

## 2023-09-25 RX ADMIN — HEPARIN 500 UNITS: 100 SYRINGE at 13:35

## 2023-09-25 RX ADMIN — IOHEXOL 11 ML: 300 INJECTION, SOLUTION INTRAVENOUS at 13:27

## 2023-09-25 NOTE — OR NURSING
Prep complete. Patient ready for procedure. Patients port accessed using sterile procedure by this RN. Blood return observed.

## 2023-09-27 DIAGNOSIS — C16.9 MALIGNANT NEOPLASM OF STOMACH, UNSPECIFIED LOCATION (HCC): Primary | ICD-10-CM

## 2023-09-27 DIAGNOSIS — C78.6 MALIGNANT NEOPLASM METASTATIC TO PERITONEUM (HCC): ICD-10-CM

## 2023-09-28 ASSESSMENT — ENCOUNTER SYMPTOMS
CONSTIPATION: 0
VOMITING: 0
NAUSEA: 0
SCLERAL ICTERUS: 0
SORE THROAT: 0
COUGH: 0
EYE PROBLEMS: 0
BLOOD IN STOOL: 0
ABDOMINAL DISTENTION: 0
HEMOPTYSIS: 0
SHORTNESS OF BREATH: 0
BACK PAIN: 0
DIARRHEA: 0

## 2023-09-28 NOTE — PROGRESS NOTES
duloxetine 60mg.     -  here for follow-up with his wife and for next FOLFIRI. In the interim, CT CAP checked and no evidence of recurrent disease, stable fatty liver. - anemia - Hb stable at 10.7. Iron labs adequate today 7/21/2023.      - Previously, discussed sunscreen as he has not been using that and spends quite a bit of time in the sun. Risks of worsening burn while on chemotherapy reviewed with patient and wife. She said that she will encourage him to use sunblock when they are at the beach and in general.    - tinea corporis - We also discussed risks of tinea which the patient has experienced in the past and how to mitigate the risk. All questions were asked and answered to the best of my ability. The patient verbalized understanding and agrees with the plan above.         LUCY Becker NP  [address] [place] [address] Hematology and Oncology  [address] [place] [address]  Office : [phone]  Fax : [phone]

## 2023-09-29 ENCOUNTER — OFFICE VISIT (OUTPATIENT)
Dept: ONCOLOGY | Age: 63
End: 2023-09-29
Payer: COMMERCIAL

## 2023-09-29 ENCOUNTER — HOSPITAL ENCOUNTER (OUTPATIENT)
Dept: LAB | Age: 63
End: 2023-09-29
Payer: COMMERCIAL

## 2023-09-29 ENCOUNTER — HOSPITAL ENCOUNTER (OUTPATIENT)
Dept: INFUSION THERAPY | Age: 63
Discharge: HOME | End: 2023-09-29
Payer: COMMERCIAL

## 2023-09-29 VITALS
HEART RATE: 80 BPM | HEIGHT: 70 IN | BODY MASS INDEX: 34.65 KG/M2 | DIASTOLIC BLOOD PRESSURE: 77 MMHG | OXYGEN SATURATION: 93 % | WEIGHT: 242 LBS | TEMPERATURE: 97.3 F | SYSTOLIC BLOOD PRESSURE: 115 MMHG

## 2023-09-29 DIAGNOSIS — C76.2 ABDOMINAL CARCINOMATOSIS (HCC): ICD-10-CM

## 2023-09-29 DIAGNOSIS — C76.2 ABDOMINAL CARCINOMATOSIS (HCC): Primary | ICD-10-CM

## 2023-09-29 DIAGNOSIS — G62.0 NEUROPATHY DUE TO CHEMOTHERAPEUTIC DRUG (HCC): ICD-10-CM

## 2023-09-29 DIAGNOSIS — Z79.899 HIGH RISK MEDICATION USE: ICD-10-CM

## 2023-09-29 DIAGNOSIS — C16.9 MALIGNANT NEOPLASM OF STOMACH, UNSPECIFIED LOCATION (HCC): ICD-10-CM

## 2023-09-29 DIAGNOSIS — C78.6 MALIGNANT NEOPLASM METASTATIC TO PERITONEUM (HCC): ICD-10-CM

## 2023-09-29 DIAGNOSIS — E83.42 HYPOMAGNESEMIA: ICD-10-CM

## 2023-09-29 DIAGNOSIS — T45.1X5A NEUROPATHY DUE TO CHEMOTHERAPEUTIC DRUG (HCC): ICD-10-CM

## 2023-09-29 DIAGNOSIS — C16.9 MALIGNANT NEOPLASM OF STOMACH, UNSPECIFIED LOCATION (HCC): Primary | ICD-10-CM

## 2023-09-29 LAB
ALBUMIN SERPL-MCNC: 3.3 G/DL (ref 3.2–4.6)
ALBUMIN/GLOB SERPL: 1 (ref 0.4–1.6)
ALP SERPL-CCNC: 168 U/L (ref 50–136)
ALT SERPL-CCNC: 31 U/L (ref 12–65)
ANION GAP SERPL CALC-SCNC: 7 MMOL/L (ref 2–11)
AST SERPL-CCNC: 22 U/L (ref 15–37)
BASOPHILS # BLD: 0.1 K/UL (ref 0–0.2)
BASOPHILS NFR BLD: 1 % (ref 0–2)
BILIRUB SERPL-MCNC: 0.3 MG/DL (ref 0.2–1.1)
BUN SERPL-MCNC: 6 MG/DL (ref 8–23)
CALCIUM SERPL-MCNC: 8.4 MG/DL (ref 8.3–10.4)
CEA SERPL-MCNC: 1.7 NG/ML (ref 0–3)
CHLORIDE SERPL-SCNC: 109 MMOL/L (ref 101–110)
CO2 SERPL-SCNC: 28 MMOL/L (ref 21–32)
CREAT SERPL-MCNC: 1.1 MG/DL (ref 0.8–1.5)
DIFFERENTIAL METHOD BLD: ABNORMAL
EOSINOPHIL # BLD: 0.1 K/UL (ref 0–0.8)
EOSINOPHIL NFR BLD: 2 % (ref 0.5–7.8)
ERYTHROCYTE [DISTWIDTH] IN BLOOD BY AUTOMATED COUNT: 14.3 % (ref 11.9–14.6)
GLOBULIN SER CALC-MCNC: 3.2 G/DL (ref 2.8–4.5)
GLUCOSE SERPL-MCNC: 275 MG/DL (ref 65–100)
HCT VFR BLD AUTO: 36.5 % (ref 41.1–50.3)
HGB BLD-MCNC: 11.8 G/DL (ref 13.6–17.2)
IMM GRANULOCYTES # BLD AUTO: 0 K/UL (ref 0–0.5)
IMM GRANULOCYTES NFR BLD AUTO: 0 % (ref 0–5)
LYMPHOCYTES # BLD: 2 K/UL (ref 0.5–4.6)
LYMPHOCYTES NFR BLD: 36 % (ref 13–44)
MAGNESIUM SERPL-MCNC: 1.9 MG/DL (ref 1.8–2.4)
MCH RBC QN AUTO: 29 PG (ref 26.1–32.9)
MCHC RBC AUTO-ENTMCNC: 32.3 G/DL (ref 31.4–35)
MCV RBC AUTO: 89.7 FL (ref 82–102)
MONOCYTES # BLD: 0.4 K/UL (ref 0.1–1.3)
MONOCYTES NFR BLD: 7 % (ref 4–12)
NEUTS SEG # BLD: 3 K/UL (ref 1.7–8.2)
NEUTS SEG NFR BLD: 54 % (ref 43–78)
NRBC # BLD: 0 K/UL (ref 0–0.2)
PLATELET # BLD AUTO: 168 K/UL (ref 150–450)
PMV BLD AUTO: 10 FL (ref 9.4–12.3)
POTASSIUM SERPL-SCNC: 3.6 MMOL/L (ref 3.5–5.1)
PROT SERPL-MCNC: 6.5 G/DL (ref 6.3–8.2)
RBC # BLD AUTO: 4.07 M/UL (ref 4.23–5.6)
SODIUM SERPL-SCNC: 144 MMOL/L (ref 133–143)
WBC # BLD AUTO: 5.6 K/UL (ref 4.3–11.1)

## 2023-09-29 PROCEDURE — 2580000003 HC RX 258: Performed by: INTERNAL MEDICINE

## 2023-09-29 PROCEDURE — 3078F DIAST BP <80 MM HG: CPT | Performed by: NURSE PRACTITIONER

## 2023-09-29 PROCEDURE — 96375 TX/PRO/DX INJ NEW DRUG ADDON: CPT

## 2023-09-29 PROCEDURE — 85025 COMPLETE CBC W/AUTO DIFF WBC: CPT

## 2023-09-29 PROCEDURE — 99214 OFFICE O/P EST MOD 30 MIN: CPT | Performed by: NURSE PRACTITIONER

## 2023-09-29 PROCEDURE — 80053 COMPREHEN METABOLIC PANEL: CPT

## 2023-09-29 PROCEDURE — 96367 TX/PROPH/DG ADDL SEQ IV INF: CPT

## 2023-09-29 PROCEDURE — 2580000003 HC RX 258: Performed by: NURSE PRACTITIONER

## 2023-09-29 PROCEDURE — 6360000002 HC RX W HCPCS: Performed by: NURSE PRACTITIONER

## 2023-09-29 PROCEDURE — 3074F SYST BP LT 130 MM HG: CPT | Performed by: NURSE PRACTITIONER

## 2023-09-29 PROCEDURE — 82378 CARCINOEMBRYONIC ANTIGEN: CPT

## 2023-09-29 PROCEDURE — 96368 THER/DIAG CONCURRENT INF: CPT

## 2023-09-29 PROCEDURE — 96372 THER/PROPH/DIAG INJ SC/IM: CPT

## 2023-09-29 PROCEDURE — 36591 DRAW BLOOD OFF VENOUS DEVICE: CPT

## 2023-09-29 PROCEDURE — 96413 CHEMO IV INFUSION 1 HR: CPT

## 2023-09-29 PROCEDURE — 96411 CHEMO IV PUSH ADDL DRUG: CPT

## 2023-09-29 PROCEDURE — G0498 CHEMO EXTEND IV INFUS W/PUMP: HCPCS

## 2023-09-29 PROCEDURE — 83735 ASSAY OF MAGNESIUM: CPT

## 2023-09-29 RX ORDER — SODIUM CHLORIDE 0.9 % (FLUSH) 0.9 %
5-40 SYRINGE (ML) INJECTION PRN
Status: CANCELLED | OUTPATIENT
Start: 2023-09-29

## 2023-09-29 RX ORDER — ONDANSETRON 2 MG/ML
8 INJECTION INTRAMUSCULAR; INTRAVENOUS
Status: DISCONTINUED | OUTPATIENT
Start: 2023-09-29 | End: 2023-09-30 | Stop reason: HOSPADM

## 2023-09-29 RX ORDER — DIPHENHYDRAMINE HYDROCHLORIDE 50 MG/ML
50 INJECTION INTRAMUSCULAR; INTRAVENOUS
Status: CANCELLED | OUTPATIENT
Start: 2023-09-29

## 2023-09-29 RX ORDER — ALBUTEROL SULFATE 90 UG/1
4 AEROSOL, METERED RESPIRATORY (INHALATION) PRN
Status: DISCONTINUED | OUTPATIENT
Start: 2023-09-29 | End: 2023-09-30 | Stop reason: HOSPADM

## 2023-09-29 RX ORDER — ACETAMINOPHEN 325 MG/1
650 TABLET ORAL
Status: CANCELLED | OUTPATIENT
Start: 2023-09-29

## 2023-09-29 RX ORDER — DIPHENHYDRAMINE HYDROCHLORIDE 50 MG/ML
50 INJECTION INTRAMUSCULAR; INTRAVENOUS
Status: DISCONTINUED | OUTPATIENT
Start: 2023-09-29 | End: 2023-09-30 | Stop reason: HOSPADM

## 2023-09-29 RX ORDER — FLUOROURACIL 50 MG/ML
400 INJECTION, SOLUTION INTRAVENOUS ONCE
Status: CANCELLED | OUTPATIENT
Start: 2023-09-29 | End: 2023-09-29

## 2023-09-29 RX ORDER — ACETAMINOPHEN 325 MG/1
650 TABLET ORAL
Status: DISCONTINUED | OUTPATIENT
Start: 2023-09-29 | End: 2023-09-30 | Stop reason: HOSPADM

## 2023-09-29 RX ORDER — DEXTROSE MONOHYDRATE 50 MG/ML
5-250 INJECTION, SOLUTION INTRAVENOUS PRN
Status: CANCELLED | OUTPATIENT
Start: 2023-09-29

## 2023-09-29 RX ORDER — SODIUM CHLORIDE 0.9 % (FLUSH) 0.9 %
5-40 SYRINGE (ML) INJECTION PRN
Status: DISCONTINUED | OUTPATIENT
Start: 2023-09-29 | End: 2023-09-30 | Stop reason: HOSPADM

## 2023-09-29 RX ORDER — ONDANSETRON 2 MG/ML
8 INJECTION INTRAMUSCULAR; INTRAVENOUS
Status: CANCELLED | OUTPATIENT
Start: 2023-09-29

## 2023-09-29 RX ORDER — HEPARIN SODIUM (PORCINE) LOCK FLUSH IV SOLN 100 UNIT/ML 100 UNIT/ML
500 SOLUTION INTRAVENOUS PRN
Status: CANCELLED | OUTPATIENT
Start: 2023-09-29

## 2023-09-29 RX ORDER — SODIUM CHLORIDE 0.9 % (FLUSH) 0.9 %
5-40 SYRINGE (ML) INJECTION PRN
Status: ACTIVE | OUTPATIENT
Start: 2023-09-29 | End: 2023-09-29

## 2023-09-29 RX ORDER — DEXTROSE MONOHYDRATE 50 MG/ML
5-250 INJECTION, SOLUTION INTRAVENOUS PRN
Status: DISCONTINUED | OUTPATIENT
Start: 2023-09-29 | End: 2023-09-30 | Stop reason: HOSPADM

## 2023-09-29 RX ORDER — SODIUM CHLORIDE 9 MG/ML
INJECTION, SOLUTION INTRAVENOUS CONTINUOUS
Status: CANCELLED | OUTPATIENT
Start: 2023-09-29

## 2023-09-29 RX ORDER — ONDANSETRON 2 MG/ML
8 INJECTION INTRAMUSCULAR; INTRAVENOUS ONCE
Status: COMPLETED | OUTPATIENT
Start: 2023-09-29 | End: 2023-09-29

## 2023-09-29 RX ORDER — EPINEPHRINE 1 MG/ML
0.3 INJECTION, SOLUTION, CONCENTRATE INTRAVENOUS PRN
Status: CANCELLED | OUTPATIENT
Start: 2023-09-29

## 2023-09-29 RX ORDER — SODIUM CHLORIDE 9 MG/ML
5-250 INJECTION, SOLUTION INTRAVENOUS PRN
Status: CANCELLED | OUTPATIENT
Start: 2023-09-29

## 2023-09-29 RX ORDER — EPINEPHRINE 1 MG/ML
0.3 INJECTION, SOLUTION, CONCENTRATE INTRAVENOUS PRN
Status: DISCONTINUED | OUTPATIENT
Start: 2023-09-29 | End: 2023-09-30 | Stop reason: HOSPADM

## 2023-09-29 RX ORDER — ONDANSETRON 2 MG/ML
8 INJECTION INTRAMUSCULAR; INTRAVENOUS ONCE
Status: CANCELLED | OUTPATIENT
Start: 2023-09-29 | End: 2023-09-29

## 2023-09-29 RX ORDER — SODIUM CHLORIDE 0.9 % (FLUSH) 0.9 %
5-40 SYRINGE (ML) INJECTION PRN
OUTPATIENT
Start: 2023-10-01

## 2023-09-29 RX ORDER — MEPERIDINE HYDROCHLORIDE 50 MG/ML
12.5 INJECTION INTRAMUSCULAR; INTRAVENOUS; SUBCUTANEOUS PRN
Status: CANCELLED | OUTPATIENT
Start: 2023-09-29

## 2023-09-29 RX ORDER — ATROPINE SULFATE 0.4 MG/ML
0.4 INJECTION, SOLUTION INTRAMUSCULAR; INTRAVENOUS; SUBCUTANEOUS ONCE
Status: CANCELLED | OUTPATIENT
Start: 2023-09-29 | End: 2023-09-29

## 2023-09-29 RX ORDER — ALBUTEROL SULFATE 90 UG/1
4 AEROSOL, METERED RESPIRATORY (INHALATION) PRN
Status: CANCELLED | OUTPATIENT
Start: 2023-09-29

## 2023-09-29 RX ORDER — HEPARIN SODIUM (PORCINE) LOCK FLUSH IV SOLN 100 UNIT/ML 100 UNIT/ML
500 SOLUTION INTRAVENOUS PRN
OUTPATIENT
Start: 2023-10-01

## 2023-09-29 RX ORDER — FLUOROURACIL 50 MG/ML
400 INJECTION, SOLUTION INTRAVENOUS ONCE
Status: COMPLETED | OUTPATIENT
Start: 2023-09-29 | End: 2023-09-29

## 2023-09-29 RX ORDER — ATROPINE SULFATE 0.4 MG/ML
0.4 INJECTION, SOLUTION INTRAMUSCULAR; INTRAVENOUS; SUBCUTANEOUS
Status: CANCELLED | OUTPATIENT
Start: 2023-09-29

## 2023-09-29 RX ORDER — SODIUM CHLORIDE 9 MG/ML
5-250 INJECTION, SOLUTION INTRAVENOUS PRN
OUTPATIENT
Start: 2023-10-01

## 2023-09-29 RX ORDER — MEPERIDINE HYDROCHLORIDE 25 MG/ML
12.5 INJECTION INTRAMUSCULAR; INTRAVENOUS; SUBCUTANEOUS PRN
Status: DISCONTINUED | OUTPATIENT
Start: 2023-09-29 | End: 2023-09-30 | Stop reason: HOSPADM

## 2023-09-29 RX ORDER — ATROPINE SULFATE 0.4 MG/ML
0.4 INJECTION, SOLUTION INTRAVENOUS ONCE
Status: COMPLETED | OUTPATIENT
Start: 2023-09-29 | End: 2023-09-29

## 2023-09-29 RX ORDER — FAMOTIDINE 10 MG/ML
20 INJECTION, SOLUTION INTRAVENOUS
Status: CANCELLED | OUTPATIENT
Start: 2023-09-29

## 2023-09-29 RX ADMIN — FLUOROURACIL 5375 MG: 50 INJECTION, SOLUTION INTRAVENOUS at 12:30

## 2023-09-29 RX ADMIN — FLUOROURACIL 900 MG: 50 INJECTION, SOLUTION INTRAVENOUS at 12:24

## 2023-09-29 RX ADMIN — IRINOTECAN HYDROCHLORIDE 340 MG: 20 INJECTION, SOLUTION INTRAVENOUS at 10:45

## 2023-09-29 RX ADMIN — SODIUM CHLORIDE, PRESERVATIVE FREE 10 ML: 5 INJECTION INTRAVENOUS at 08:09

## 2023-09-29 RX ADMIN — DEXAMETHASONE SODIUM PHOSPHATE 12 MG: 4 INJECTION INTRA-ARTICULAR; INTRALESIONAL; INTRAMUSCULAR; INTRAVENOUS; SOFT TISSUE at 09:50

## 2023-09-29 RX ADMIN — FOSAPREPITANT 150 MG: 150 INJECTION, POWDER, LYOPHILIZED, FOR SOLUTION INTRAVENOUS at 10:10

## 2023-09-29 RX ADMIN — LEUCOVORIN CALCIUM 900 MG: 500 INJECTION, POWDER, LYOPHILIZED, FOR SOLUTION INTRAMUSCULAR; INTRAVENOUS at 10:45

## 2023-09-29 RX ADMIN — ONDANSETRON 8 MG: 2 INJECTION INTRAMUSCULAR; INTRAVENOUS at 09:48

## 2023-09-29 RX ADMIN — DEXTROSE MONOHYDRATE 25 ML/HR: 50 INJECTION, SOLUTION INTRAVENOUS at 09:42

## 2023-09-29 RX ADMIN — ATROPINE SULFATE 0.4 MG: 0.4 INJECTION, SOLUTION INTRAVENOUS at 09:50

## 2023-09-29 RX ADMIN — SODIUM CHLORIDE, PRESERVATIVE FREE 10 ML: 5 INJECTION INTRAVENOUS at 09:36

## 2023-09-29 NOTE — PROGRESS NOTES
's visit with Mr. Federico Ureña, preferred name Demar Alvarez, and his wife Jo Churchill. The couple is known to me from patient's lengthy hospital stay in 2022 and expressed gratitude for my care then. I empathically listened to the couple share recent/past events and I offered spiritual/emotional support including prayer for healing. The couple expressed gratitude for the visit.      [address], 200 [facility]  Board Certified

## 2023-09-29 NOTE — PROGRESS NOTES
Patient arrived ambulatory to infusion. Folfiri completed. Patient tolerated tx without difficulty. Discharged ambulatory with elastomeric pump. Tubing unclamped. Patient aware of appt on 10/2 at 3:30 for pump dc.

## 2023-09-29 NOTE — PROGRESS NOTES
Patient arrived to port lab for port access and lab draw   [address] accessed and labs drawn per protocol   *Port remains accessed.  Patient discharged from port lab ambulatory*

## 2023-10-02 ENCOUNTER — HOSPITAL ENCOUNTER (OUTPATIENT)
Dept: INFUSION THERAPY | Age: 63
Discharge: HOME | End: 2023-10-02
Payer: COMMERCIAL

## 2023-10-02 VITALS
HEART RATE: 85 BPM | TEMPERATURE: 98.3 F | SYSTOLIC BLOOD PRESSURE: 124 MMHG | DIASTOLIC BLOOD PRESSURE: 78 MMHG | RESPIRATION RATE: 19 BRPM | OXYGEN SATURATION: 98 %

## 2023-10-02 PROCEDURE — 96523 IRRIG DRUG DELIVERY DEVICE: CPT

## 2023-10-02 PROCEDURE — 2580000003 HC RX 258: Performed by: INTERNAL MEDICINE

## 2023-10-02 RX ORDER — SODIUM CHLORIDE 0.9 % (FLUSH) 0.9 %
5-40 SYRINGE (ML) INJECTION 2 TIMES DAILY
Status: DISCONTINUED | OUTPATIENT
Start: 2023-10-02 | End: 2023-10-03 | Stop reason: HOSPADM

## 2023-10-02 RX ADMIN — SODIUM CHLORIDE, PRESERVATIVE FREE 10 ML: 5 INJECTION INTRAVENOUS at 15:15

## 2023-10-02 NOTE — PROGRESS NOTES
Arrived to the [facility]. Adrucil pump disconnects and port flush completed. Patient tolerated well. Any issues or concerns during appointment: none. Patient aware of next infusion appointment on 10/13/2023 (date) at 1330 (time). Patient aware of next lab and [facility] office visit on 10/13/2023 (date) at 1300 (time). Patient instructed to call provider with temperature of 100.4 or greater or nausea/vomiting/ diarrhea or pain not controlled by medications  Discharged ambualtory.

## 2023-10-04 DIAGNOSIS — Z79.899 HIGH RISK MEDICATION USE: ICD-10-CM

## 2023-10-04 DIAGNOSIS — C16.9 MALIGNANT NEOPLASM OF STOMACH, UNSPECIFIED LOCATION (HCC): Primary | ICD-10-CM

## 2023-10-11 ENCOUNTER — HOSPITAL ENCOUNTER (OUTPATIENT)
Dept: CT IMAGING | Age: 63
Discharge: HOME | End: 2023-10-14
Attending: INTERNAL MEDICINE
Payer: COMMERCIAL

## 2023-10-11 DIAGNOSIS — C16.9 MALIGNANT NEOPLASM OF STOMACH, UNSPECIFIED LOCATION (HCC): ICD-10-CM

## 2023-10-11 PROCEDURE — 71260 CT THORAX DX C+: CPT

## 2023-10-11 PROCEDURE — 6360000004 HC RX CONTRAST MEDICATION: Performed by: INTERNAL MEDICINE

## 2023-10-11 RX ADMIN — IOPAMIDOL 100 ML: 755 INJECTION, SOLUTION INTRAVENOUS at 09:13

## 2023-10-11 RX ADMIN — DIATRIZOATE MEGLUMINE AND DIATRIZOATE SODIUM 15 ML: 660; 100 LIQUID ORAL; RECTAL at 09:13

## 2023-10-12 DIAGNOSIS — E87.6 HYPOKALEMIA: ICD-10-CM

## 2023-10-12 RX ORDER — POTASSIUM CHLORIDE 1500 MG/1
20 TABLET, EXTENDED RELEASE ORAL DAILY
Qty: 90 TABLET | Refills: 3 | Status: SHIPPED | OUTPATIENT
Start: 2023-10-12

## 2023-10-13 ENCOUNTER — OFFICE VISIT (OUTPATIENT)
Dept: ONCOLOGY | Age: 63
End: 2023-10-13
Payer: COMMERCIAL

## 2023-10-13 ENCOUNTER — HOSPITAL ENCOUNTER (OUTPATIENT)
Dept: INFUSION THERAPY | Age: 63
Discharge: HOME | End: 2023-10-13
Payer: COMMERCIAL

## 2023-10-13 ENCOUNTER — HOSPITAL ENCOUNTER (OUTPATIENT)
Dept: LAB | Age: 63
End: 2023-10-13
Payer: COMMERCIAL

## 2023-10-13 VITALS
DIASTOLIC BLOOD PRESSURE: 73 MMHG | RESPIRATION RATE: 18 BRPM | HEART RATE: 81 BPM | TEMPERATURE: 97.8 F | SYSTOLIC BLOOD PRESSURE: 118 MMHG | BODY MASS INDEX: 34.89 KG/M2 | WEIGHT: 243.7 LBS | OXYGEN SATURATION: 95 % | HEIGHT: 70 IN

## 2023-10-13 DIAGNOSIS — G62.9 NEUROPATHY: ICD-10-CM

## 2023-10-13 DIAGNOSIS — D64.9 ANEMIA, UNSPECIFIED TYPE: ICD-10-CM

## 2023-10-13 DIAGNOSIS — E87.6 HYPOKALEMIA: ICD-10-CM

## 2023-10-13 DIAGNOSIS — C16.9 MALIGNANT NEOPLASM OF STOMACH, UNSPECIFIED LOCATION (HCC): ICD-10-CM

## 2023-10-13 DIAGNOSIS — Z79.899 HIGH RISK MEDICATION USE: ICD-10-CM

## 2023-10-13 DIAGNOSIS — E83.42 HYPOMAGNESEMIA: ICD-10-CM

## 2023-10-13 DIAGNOSIS — C79.9 METASTATIC ADENOCARCINOMA (HCC): Primary | ICD-10-CM

## 2023-10-13 DIAGNOSIS — C76.2 ABDOMINAL CARCINOMATOSIS (HCC): ICD-10-CM

## 2023-10-13 DIAGNOSIS — C78.6 MALIGNANT NEOPLASM METASTATIC TO PERITONEUM (HCC): ICD-10-CM

## 2023-10-13 DIAGNOSIS — C76.2 ABDOMINAL CARCINOMATOSIS (HCC): Primary | ICD-10-CM

## 2023-10-13 LAB
ALBUMIN SERPL-MCNC: 3.1 G/DL (ref 3.2–4.6)
ALBUMIN/GLOB SERPL: 1 (ref 0.4–1.6)
ALP SERPL-CCNC: 159 U/L (ref 50–136)
ALT SERPL-CCNC: 33 U/L (ref 12–65)
ANION GAP SERPL CALC-SCNC: 5 MMOL/L (ref 2–11)
AST SERPL-CCNC: 23 U/L (ref 15–37)
BASOPHILS # BLD: 0.1 K/UL (ref 0–0.2)
BASOPHILS NFR BLD: 1 % (ref 0–2)
BILIRUB SERPL-MCNC: 0.3 MG/DL (ref 0.2–1.1)
BUN SERPL-MCNC: 8 MG/DL (ref 8–23)
CALCIUM SERPL-MCNC: 8.2 MG/DL (ref 8.3–10.4)
CHLORIDE SERPL-SCNC: 111 MMOL/L (ref 101–110)
CO2 SERPL-SCNC: 28 MMOL/L (ref 21–32)
CREAT SERPL-MCNC: 0.9 MG/DL (ref 0.8–1.5)
DIFFERENTIAL METHOD BLD: ABNORMAL
EOSINOPHIL # BLD: 0.2 K/UL (ref 0–0.8)
EOSINOPHIL NFR BLD: 3 % (ref 0.5–7.8)
ERYTHROCYTE [DISTWIDTH] IN BLOOD BY AUTOMATED COUNT: 14.6 % (ref 11.9–14.6)
FERRITIN SERPL-MCNC: 374 NG/ML (ref 8–388)
GLOBULIN SER CALC-MCNC: 3.1 G/DL (ref 2.8–4.5)
GLUCOSE SERPL-MCNC: 191 MG/DL (ref 65–100)
HCT VFR BLD AUTO: 34.6 % (ref 41.1–50.3)
HGB BLD-MCNC: 10.9 G/DL (ref 13.6–17.2)
IMM GRANULOCYTES # BLD AUTO: 0 K/UL (ref 0–0.5)
IMM GRANULOCYTES NFR BLD AUTO: 0 % (ref 0–5)
IRON SATN MFR SERPL: 17 %
IRON SERPL-MCNC: 39 UG/DL (ref 35–150)
LYMPHOCYTES # BLD: 1.7 K/UL (ref 0.5–4.6)
LYMPHOCYTES NFR BLD: 38 % (ref 13–44)
MAGNESIUM SERPL-MCNC: 1.7 MG/DL (ref 1.8–2.4)
MCH RBC QN AUTO: 28.4 PG (ref 26.1–32.9)
MCHC RBC AUTO-ENTMCNC: 31.5 G/DL (ref 31.4–35)
MCV RBC AUTO: 90.1 FL (ref 82–102)
MONOCYTES # BLD: 0.3 K/UL (ref 0.1–1.3)
MONOCYTES NFR BLD: 6 % (ref 4–12)
NEUTS SEG # BLD: 2.3 K/UL (ref 1.7–8.2)
NEUTS SEG NFR BLD: 52 % (ref 43–78)
NRBC # BLD: 0 K/UL (ref 0–0.2)
PLATELET # BLD AUTO: 151 K/UL (ref 150–450)
PMV BLD AUTO: 10 FL (ref 9.4–12.3)
POTASSIUM SERPL-SCNC: 3.4 MMOL/L (ref 3.5–5.1)
PROT SERPL-MCNC: 6.2 G/DL (ref 6.3–8.2)
RBC # BLD AUTO: 3.84 M/UL (ref 4.23–5.6)
SODIUM SERPL-SCNC: 144 MMOL/L (ref 133–143)
TIBC SERPL-MCNC: 234 UG/DL (ref 250–450)
VIT B12 SERPL-MCNC: 171 PG/ML (ref 193–986)
WBC # BLD AUTO: 4.4 K/UL (ref 4.3–11.1)

## 2023-10-13 PROCEDURE — 83540 ASSAY OF IRON: CPT

## 2023-10-13 PROCEDURE — 82607 VITAMIN B-12: CPT

## 2023-10-13 PROCEDURE — G0498 CHEMO EXTEND IV INFUS W/PUMP: HCPCS

## 2023-10-13 PROCEDURE — 96372 THER/PROPH/DIAG INJ SC/IM: CPT

## 2023-10-13 PROCEDURE — 80053 COMPREHEN METABOLIC PANEL: CPT

## 2023-10-13 PROCEDURE — 96411 CHEMO IV PUSH ADDL DRUG: CPT

## 2023-10-13 PROCEDURE — 6360000002 HC RX W HCPCS: Performed by: INTERNAL MEDICINE

## 2023-10-13 PROCEDURE — 2580000003 HC RX 258: Performed by: INTERNAL MEDICINE

## 2023-10-13 PROCEDURE — 82728 ASSAY OF FERRITIN: CPT

## 2023-10-13 PROCEDURE — 83550 IRON BINDING TEST: CPT

## 2023-10-13 PROCEDURE — 96375 TX/PRO/DX INJ NEW DRUG ADDON: CPT

## 2023-10-13 PROCEDURE — 3074F SYST BP LT 130 MM HG: CPT | Performed by: INTERNAL MEDICINE

## 2023-10-13 PROCEDURE — 99215 OFFICE O/P EST HI 40 MIN: CPT | Performed by: INTERNAL MEDICINE

## 2023-10-13 PROCEDURE — 96368 THER/DIAG CONCURRENT INF: CPT

## 2023-10-13 PROCEDURE — 83735 ASSAY OF MAGNESIUM: CPT

## 2023-10-13 PROCEDURE — 96367 TX/PROPH/DG ADDL SEQ IV INF: CPT

## 2023-10-13 PROCEDURE — 96413 CHEMO IV INFUSION 1 HR: CPT

## 2023-10-13 PROCEDURE — 3078F DIAST BP <80 MM HG: CPT | Performed by: INTERNAL MEDICINE

## 2023-10-13 PROCEDURE — 85025 COMPLETE CBC W/AUTO DIFF WBC: CPT

## 2023-10-13 PROCEDURE — 36591 DRAW BLOOD OFF VENOUS DEVICE: CPT

## 2023-10-13 RX ORDER — ATROPINE SULFATE 0.4 MG/ML
0.4 INJECTION, SOLUTION INTRAMUSCULAR; INTRAVENOUS; SUBCUTANEOUS ONCE
Status: CANCELLED | OUTPATIENT
Start: 2023-10-13 | End: 2023-10-13

## 2023-10-13 RX ORDER — ONDANSETRON 2 MG/ML
8 INJECTION INTRAMUSCULAR; INTRAVENOUS ONCE
Status: CANCELLED | OUTPATIENT
Start: 2023-10-13 | End: 2023-10-13

## 2023-10-13 RX ORDER — HEPARIN SODIUM (PORCINE) LOCK FLUSH IV SOLN 100 UNIT/ML 100 UNIT/ML
500 SOLUTION INTRAVENOUS PRN
Status: CANCELLED | OUTPATIENT
Start: 2023-10-15

## 2023-10-13 RX ORDER — ONDANSETRON 2 MG/ML
8 INJECTION INTRAMUSCULAR; INTRAVENOUS
Status: DISCONTINUED | OUTPATIENT
Start: 2023-10-13 | End: 2023-10-14 | Stop reason: HOSPADM

## 2023-10-13 RX ORDER — DIPHENHYDRAMINE HYDROCHLORIDE 50 MG/ML
50 INJECTION INTRAMUSCULAR; INTRAVENOUS
Status: CANCELLED | OUTPATIENT
Start: 2023-10-13

## 2023-10-13 RX ORDER — SODIUM CHLORIDE 0.9 % (FLUSH) 0.9 %
5-40 SYRINGE (ML) INJECTION PRN
Status: CANCELLED | OUTPATIENT
Start: 2023-10-13

## 2023-10-13 RX ORDER — FLUOROURACIL 50 MG/ML
400 INJECTION, SOLUTION INTRAVENOUS ONCE
Status: COMPLETED | OUTPATIENT
Start: 2023-10-13 | End: 2023-10-13

## 2023-10-13 RX ORDER — ONDANSETRON 2 MG/ML
8 INJECTION INTRAMUSCULAR; INTRAVENOUS ONCE
Status: COMPLETED | OUTPATIENT
Start: 2023-10-13 | End: 2023-10-13

## 2023-10-13 RX ORDER — DEXTROSE MONOHYDRATE 50 MG/ML
5-250 INJECTION, SOLUTION INTRAVENOUS PRN
Status: DISCONTINUED | OUTPATIENT
Start: 2023-10-13 | End: 2023-10-14 | Stop reason: HOSPADM

## 2023-10-13 RX ORDER — FLUOROURACIL 50 MG/ML
400 INJECTION, SOLUTION INTRAVENOUS ONCE
Status: CANCELLED | OUTPATIENT
Start: 2023-10-13 | End: 2023-10-13

## 2023-10-13 RX ORDER — SODIUM CHLORIDE 0.9 % (FLUSH) 0.9 %
5-40 SYRINGE (ML) INJECTION PRN
Status: DISCONTINUED | OUTPATIENT
Start: 2023-10-13 | End: 2023-10-17 | Stop reason: HOSPADM

## 2023-10-13 RX ORDER — SODIUM CHLORIDE 9 MG/ML
INJECTION, SOLUTION INTRAVENOUS CONTINUOUS
Status: DISCONTINUED | OUTPATIENT
Start: 2023-10-13 | End: 2023-10-14 | Stop reason: HOSPADM

## 2023-10-13 RX ORDER — ATROPINE SULFATE 0.4 MG/ML
0.4 INJECTION, SOLUTION INTRAVENOUS ONCE
Status: COMPLETED | OUTPATIENT
Start: 2023-10-13 | End: 2023-10-13

## 2023-10-13 RX ORDER — ACETAMINOPHEN 325 MG/1
650 TABLET ORAL
Status: CANCELLED | OUTPATIENT
Start: 2023-10-13

## 2023-10-13 RX ORDER — HEPARIN SODIUM (PORCINE) LOCK FLUSH IV SOLN 100 UNIT/ML 100 UNIT/ML
500 SOLUTION INTRAVENOUS PRN
Status: CANCELLED | OUTPATIENT
Start: 2023-10-13

## 2023-10-13 RX ORDER — SODIUM CHLORIDE 9 MG/ML
5-250 INJECTION, SOLUTION INTRAVENOUS PRN
Status: CANCELLED | OUTPATIENT
Start: 2023-10-13

## 2023-10-13 RX ORDER — MEPERIDINE HYDROCHLORIDE 25 MG/ML
12.5 INJECTION INTRAMUSCULAR; INTRAVENOUS; SUBCUTANEOUS PRN
Status: DISCONTINUED | OUTPATIENT
Start: 2023-10-13 | End: 2023-10-14 | Stop reason: HOSPADM

## 2023-10-13 RX ORDER — ACETAMINOPHEN 325 MG/1
650 TABLET ORAL
Status: DISCONTINUED | OUTPATIENT
Start: 2023-10-13 | End: 2023-10-14 | Stop reason: HOSPADM

## 2023-10-13 RX ORDER — SODIUM CHLORIDE 9 MG/ML
INJECTION, SOLUTION INTRAVENOUS CONTINUOUS
Status: CANCELLED | OUTPATIENT
Start: 2023-10-13

## 2023-10-13 RX ORDER — EPINEPHRINE 1 MG/ML
0.3 INJECTION, SOLUTION, CONCENTRATE INTRAVENOUS PRN
Status: DISCONTINUED | OUTPATIENT
Start: 2023-10-13 | End: 2023-10-14 | Stop reason: HOSPADM

## 2023-10-13 RX ORDER — HEPARIN 100 UNIT/ML
500 SYRINGE INTRAVENOUS PRN
Status: DISCONTINUED | OUTPATIENT
Start: 2023-10-13 | End: 2023-10-14 | Stop reason: HOSPADM

## 2023-10-13 RX ORDER — MEPERIDINE HYDROCHLORIDE 50 MG/ML
12.5 INJECTION INTRAMUSCULAR; INTRAVENOUS; SUBCUTANEOUS PRN
Status: CANCELLED | OUTPATIENT
Start: 2023-10-13

## 2023-10-13 RX ORDER — SODIUM CHLORIDE 0.9 % (FLUSH) 0.9 %
5-40 SYRINGE (ML) INJECTION PRN
Status: DISCONTINUED | OUTPATIENT
Start: 2023-10-13 | End: 2023-10-14 | Stop reason: HOSPADM

## 2023-10-13 RX ORDER — EPINEPHRINE 1 MG/ML
0.3 INJECTION, SOLUTION, CONCENTRATE INTRAVENOUS PRN
Status: CANCELLED | OUTPATIENT
Start: 2023-10-13

## 2023-10-13 RX ORDER — ALBUTEROL SULFATE 90 UG/1
4 AEROSOL, METERED RESPIRATORY (INHALATION) PRN
Status: DISCONTINUED | OUTPATIENT
Start: 2023-10-13 | End: 2023-10-14 | Stop reason: HOSPADM

## 2023-10-13 RX ORDER — SODIUM CHLORIDE 0.9 % (FLUSH) 0.9 %
5-40 SYRINGE (ML) INJECTION PRN
Status: CANCELLED | OUTPATIENT
Start: 2023-10-15

## 2023-10-13 RX ORDER — DEXTROSE MONOHYDRATE 50 MG/ML
5-250 INJECTION, SOLUTION INTRAVENOUS PRN
Status: CANCELLED | OUTPATIENT
Start: 2023-10-13

## 2023-10-13 RX ORDER — ONDANSETRON 2 MG/ML
8 INJECTION INTRAMUSCULAR; INTRAVENOUS
Status: CANCELLED | OUTPATIENT
Start: 2023-10-13

## 2023-10-13 RX ORDER — MAGNESIUM SULFATE IN WATER 40 MG/ML
2000 INJECTION, SOLUTION INTRAVENOUS ONCE
Status: COMPLETED | OUTPATIENT
Start: 2023-10-13 | End: 2023-10-13

## 2023-10-13 RX ORDER — ATROPINE SULFATE 0.4 MG/ML
0.4 INJECTION, SOLUTION INTRAMUSCULAR; INTRAVENOUS; SUBCUTANEOUS
Status: CANCELLED | OUTPATIENT
Start: 2023-10-13

## 2023-10-13 RX ORDER — FAMOTIDINE 10 MG/ML
20 INJECTION, SOLUTION INTRAVENOUS
Status: CANCELLED | OUTPATIENT
Start: 2023-10-13

## 2023-10-13 RX ORDER — MAGNESIUM OXIDE 400 MG/1
400 TABLET ORAL 3 TIMES DAILY
Qty: 90 TABLET | Refills: 2 | Status: SHIPPED | OUTPATIENT
Start: 2023-10-13

## 2023-10-13 RX ORDER — ALBUTEROL SULFATE 90 UG/1
4 AEROSOL, METERED RESPIRATORY (INHALATION) PRN
Status: CANCELLED | OUTPATIENT
Start: 2023-10-13

## 2023-10-13 RX ORDER — SODIUM CHLORIDE 9 MG/ML
5-250 INJECTION, SOLUTION INTRAVENOUS PRN
Status: CANCELLED | OUTPATIENT
Start: 2023-10-15

## 2023-10-13 RX ORDER — DIPHENHYDRAMINE HYDROCHLORIDE 50 MG/ML
50 INJECTION INTRAMUSCULAR; INTRAVENOUS
Status: DISCONTINUED | OUTPATIENT
Start: 2023-10-13 | End: 2023-10-14 | Stop reason: HOSPADM

## 2023-10-13 RX ORDER — SODIUM CHLORIDE 9 MG/ML
5-250 INJECTION, SOLUTION INTRAVENOUS PRN
Status: DISCONTINUED | OUTPATIENT
Start: 2023-10-13 | End: 2023-10-14 | Stop reason: HOSPADM

## 2023-10-13 RX ADMIN — IRINOTECAN HYDROCHLORIDE 340 MG: 20 INJECTION, SOLUTION INTRAVENOUS at 15:43

## 2023-10-13 RX ADMIN — ATROPINE SULFATE 0.4 MG: 0.4 INJECTION, SOLUTION INTRAVENOUS at 14:48

## 2023-10-13 RX ADMIN — SODIUM CHLORIDE, PRESERVATIVE FREE 10 ML: 5 INJECTION INTRAVENOUS at 12:49

## 2023-10-13 RX ADMIN — FLUOROURACIL 5375 MG: 50 INJECTION, SOLUTION INTRAVENOUS at 17:20

## 2023-10-13 RX ADMIN — DEXAMETHASONE SODIUM PHOSPHATE 12 MG: 4 INJECTION INTRA-ARTICULAR; INTRALESIONAL; INTRAMUSCULAR; INTRAVENOUS; SOFT TISSUE at 14:48

## 2023-10-13 RX ADMIN — ONDANSETRON 8 MG: 2 INJECTION INTRAMUSCULAR; INTRAVENOUS at 14:45

## 2023-10-13 RX ADMIN — FLUOROURACIL 900 MG: 50 INJECTION, SOLUTION INTRAVENOUS at 17:15

## 2023-10-13 RX ADMIN — SODIUM CHLORIDE, PRESERVATIVE FREE 10 ML: 5 INJECTION INTRAVENOUS at 14:29

## 2023-10-13 RX ADMIN — FOSAPREPITANT 150 MG: 150 INJECTION, POWDER, LYOPHILIZED, FOR SOLUTION INTRAVENOUS at 15:08

## 2023-10-13 RX ADMIN — DEXTROSE MONOHYDRATE 100 ML/HR: 50 INJECTION, SOLUTION INTRAVENOUS at 14:30

## 2023-10-13 RX ADMIN — LEUCOVORIN CALCIUM 900 MG: 350 INJECTION, POWDER, LYOPHILIZED, FOR SOLUTION INTRAMUSCULAR; INTRAVENOUS at 15:41

## 2023-10-13 RX ADMIN — MAGNESIUM SULFATE HEPTAHYDRATE 2000 MG: 40 INJECTION, SOLUTION INTRAVENOUS at 15:36

## 2023-10-13 ASSESSMENT — ENCOUNTER SYMPTOMS
ABDOMINAL DISTENTION: 0
COUGH: 0
SORE THROAT: 0
HEMOPTYSIS: 0
BLOOD IN STOOL: 0
SCLERAL ICTERUS: 0
CONSTIPATION: 0
BACK PAIN: 0
NAUSEA: 0
SHORTNESS OF BREATH: 0
VOMITING: 0
EYE PROBLEMS: 0
DIARRHEA: 0

## 2023-10-13 ASSESSMENT — PATIENT HEALTH QUESTIONNAIRE - PHQ9: DEPRESSION UNABLE TO ASSESS: PT REFUSES

## 2023-10-13 NOTE — PROGRESS NOTES
Patient to port lab for port access and lab draw. Port accessed using 20g 0.75\" kim needle without difficulty. Labs drawn and port flushed. Port remains accessed. Patient tolerated well. Discharged ambulatory.

## 2023-10-13 NOTE — PROGRESS NOTES
Arrived to the [address] [place] [address]. FOLFIRI completed. Patient tolerated without difficulty. Any issues or concerns during appointment: None. Patient aware of next infusion appointment on 10/16 (date) at [phone] (time). Patient instructed to call provider with temperature of 100.4 or greater or nausea/vomiting/ diarrhea or pain not controlled by medications  Discharged ambulatory.

## 2023-10-13 NOTE — PROGRESS NOTES
tinea corporis - We also discussed risks of tinea which the patient has experienced in the past and how to mitigate the risk. All questions were asked and answered to the best of my ability. The patient verbalized understanding and agrees with the plan above.         Demarco Johnson MD  [address] [place] [address] Hematology and Oncology  [address]  Office : [phone]  Fax : [phone]
No

## 2023-10-13 NOTE — PATIENT INSTRUCTIONS
mmol/L Final    Anion Gap 10/13/2023 5  2 - 11 mmol/L Final    Glucose 10/13/2023 191 (H)  65 - 100 mg/dL Final    BUN 10/13/2023 8  8 - 23 MG/DL Final    Creatinine 10/13/2023 0.90  0.8 - 1.5 MG/DL Final    Est, Glom Filt Rate 10/13/2023 >60  >60 ml/min/1.73m2 Final    Comment:    Pediatric calculator link: Miguel.at. org/professionals/kdoqi/gfr_calculatorped     These results are not intended for use in patients <25years of age. eGFR results are calculated without a race factor using  the 2021 CKD-EPI equation. Careful clinical correlation is recommended, particularly when comparing to results calculated using previous equations. The CKD-EPI equation is less accurate in patients with extremes of muscle mass, extra-renal metabolism of creatinine, excessive creatine ingestion, or following therapy that affects renal tubular secretion. Calcium 10/13/2023 8.2 (L)  8.3 - 10.4 MG/DL Final    Total Bilirubin 10/13/2023 0.3  0.2 - 1.1 MG/DL Final    ALT 10/13/2023 33  12 - 65 U/L Final    AST 10/13/2023 23  15 - 37 U/L Final    Alk Phosphatase 10/13/2023 159 (H)  50 - 136 U/L Final    Total Protein 10/13/2023 6.2 (L)  6.3 - 8.2 g/dL Final    Albumin 10/13/2023 3.1 (L)  3.2 - 4.6 g/dL Final    Globulin 10/13/2023 3.1  2.8 - 4.5 g/dL Final    Albumin/Globulin Ratio 10/13/2023 1.0  0.4 - 1.6   Final    Magnesium 10/13/2023 1.7 (L)  1.8 - 2.4 mg/dL Final         Treatment Summary has been discussed and given to patient: n/a        -------------------------------------------------------------------------------------------------------------------  Please call our office at [phone] if you have any  of the following symptoms:   Fever of 100.5 or greater  Chills  Shortness of breath  Swelling or pain in one leg    After office hours an answering service is available and will contact a provider for emergencies or if you are experiencing any of the above symptoms.     Patient did express an interest in My

## 2023-10-16 ENCOUNTER — HOSPITAL ENCOUNTER (OUTPATIENT)
Dept: INFUSION THERAPY | Age: 63
Discharge: HOME | End: 2023-10-16
Payer: COMMERCIAL

## 2023-10-16 VITALS
HEART RATE: 90 BPM | SYSTOLIC BLOOD PRESSURE: 139 MMHG | RESPIRATION RATE: 18 BRPM | DIASTOLIC BLOOD PRESSURE: 88 MMHG | OXYGEN SATURATION: 95 % | TEMPERATURE: 97.8 F

## 2023-10-16 DIAGNOSIS — E83.42 HYPOMAGNESEMIA: ICD-10-CM

## 2023-10-16 DIAGNOSIS — C78.6 MALIGNANT NEOPLASM METASTATIC TO PERITONEUM (HCC): ICD-10-CM

## 2023-10-16 DIAGNOSIS — C76.2 ABDOMINAL CARCINOMATOSIS (HCC): Primary | ICD-10-CM

## 2023-10-16 PROCEDURE — 2580000003 HC RX 258: Performed by: INTERNAL MEDICINE

## 2023-10-16 PROCEDURE — 96523 IRRIG DRUG DELIVERY DEVICE: CPT

## 2023-10-16 RX ORDER — SODIUM CHLORIDE 0.9 % (FLUSH) 0.9 %
5-40 SYRINGE (ML) INJECTION PRN
Status: DISCONTINUED | OUTPATIENT
Start: 2023-10-16 | End: 2023-10-17 | Stop reason: HOSPADM

## 2023-10-16 RX ADMIN — SODIUM CHLORIDE, PRESERVATIVE FREE 10 ML: 5 INJECTION INTRAVENOUS at 14:31

## 2023-10-16 NOTE — PROGRESS NOTES
Pt arrived to infusion center. 5FU pump disconnected. Pt tolerated well, d/c ambulatory and aware of next appts on 10/26.

## 2023-10-21 PROBLEM — R10.9 ABDOMINAL PAIN: Status: RESOLVED | Noted: 2023-03-29 | Resolved: 2023-10-21

## 2023-10-21 PROBLEM — R14.2 BELCHING: Status: RESOLVED | Noted: 2023-06-02 | Resolved: 2023-10-21

## 2023-10-25 DIAGNOSIS — C79.9 METASTATIC ADENOCARCINOMA (HCC): Primary | ICD-10-CM

## 2023-10-26 ENCOUNTER — HOSPITAL ENCOUNTER (OUTPATIENT)
Dept: LAB | Age: 63
Discharge: HOME | End: 2023-10-26
Payer: COMMERCIAL

## 2023-10-26 ENCOUNTER — OFFICE VISIT (OUTPATIENT)
Dept: ONCOLOGY | Age: 63
End: 2023-10-26
Payer: COMMERCIAL

## 2023-10-26 VITALS
RESPIRATION RATE: 16 BRPM | HEART RATE: 83 BPM | BODY MASS INDEX: 34.46 KG/M2 | TEMPERATURE: 97.9 F | DIASTOLIC BLOOD PRESSURE: 84 MMHG | WEIGHT: 240.7 LBS | HEIGHT: 70 IN | SYSTOLIC BLOOD PRESSURE: 117 MMHG | OXYGEN SATURATION: 95 %

## 2023-10-26 DIAGNOSIS — I82.721 CHRONIC DEEP VEIN THROMBOSIS (DVT) OF RIGHT UPPER EXTREMITY, UNSPECIFIED VEIN (HCC): ICD-10-CM

## 2023-10-26 DIAGNOSIS — Z79.899 HIGH RISK MEDICATION USE: ICD-10-CM

## 2023-10-26 DIAGNOSIS — C79.9 METASTATIC ADENOCARCINOMA (HCC): ICD-10-CM

## 2023-10-26 DIAGNOSIS — D64.9 ANEMIA, UNSPECIFIED TYPE: ICD-10-CM

## 2023-10-26 DIAGNOSIS — C76.2 ABDOMINAL CARCINOMATOSIS (HCC): ICD-10-CM

## 2023-10-26 DIAGNOSIS — C79.9 METASTATIC ADENOCARCINOMA (HCC): Primary | ICD-10-CM

## 2023-10-26 DIAGNOSIS — T45.1X5A NEUROPATHY DUE TO CHEMOTHERAPEUTIC DRUG (HCC): ICD-10-CM

## 2023-10-26 DIAGNOSIS — G62.0 NEUROPATHY DUE TO CHEMOTHERAPEUTIC DRUG (HCC): ICD-10-CM

## 2023-10-26 LAB
ALBUMIN SERPL-MCNC: 3.5 G/DL (ref 3.2–4.6)
ALBUMIN/GLOB SERPL: 1.1 (ref 0.4–1.6)
ALP SERPL-CCNC: 179 U/L (ref 50–136)
ALT SERPL-CCNC: 39 U/L (ref 12–65)
ANION GAP SERPL CALC-SCNC: 5 MMOL/L (ref 2–11)
AST SERPL-CCNC: 31 U/L (ref 15–37)
BASOPHILS # BLD: 0.1 K/UL (ref 0–0.2)
BASOPHILS NFR BLD: 2 % (ref 0–2)
BILIRUB SERPL-MCNC: 0.3 MG/DL (ref 0.2–1.1)
BUN SERPL-MCNC: 10 MG/DL (ref 8–23)
CALCIUM SERPL-MCNC: 8.7 MG/DL (ref 8.3–10.4)
CEA SERPL-MCNC: 2.6 NG/ML (ref 0–3)
CHLORIDE SERPL-SCNC: 109 MMOL/L (ref 101–110)
CO2 SERPL-SCNC: 26 MMOL/L (ref 21–32)
CREAT SERPL-MCNC: 1.1 MG/DL (ref 0.8–1.5)
DIFFERENTIAL METHOD BLD: ABNORMAL
EOSINOPHIL # BLD: 0.1 K/UL (ref 0–0.8)
EOSINOPHIL NFR BLD: 3 % (ref 0.5–7.8)
ERYTHROCYTE [DISTWIDTH] IN BLOOD BY AUTOMATED COUNT: 14.6 % (ref 11.9–14.6)
GLOBULIN SER CALC-MCNC: 3.2 G/DL (ref 2.8–4.5)
GLUCOSE SERPL-MCNC: 246 MG/DL (ref 65–100)
HCT VFR BLD AUTO: 38.3 % (ref 41.1–50.3)
HGB BLD-MCNC: 12.4 G/DL (ref 13.6–17.2)
IMM GRANULOCYTES # BLD AUTO: 0 K/UL (ref 0–0.5)
IMM GRANULOCYTES NFR BLD AUTO: 0 % (ref 0–5)
LYMPHOCYTES # BLD: 1.9 K/UL (ref 0.5–4.6)
LYMPHOCYTES NFR BLD: 39 % (ref 13–44)
MAGNESIUM SERPL-MCNC: 2 MG/DL (ref 1.8–2.4)
MCH RBC QN AUTO: 28.8 PG (ref 26.1–32.9)
MCHC RBC AUTO-ENTMCNC: 32.4 G/DL (ref 31.4–35)
MCV RBC AUTO: 88.9 FL (ref 82–102)
MONOCYTES # BLD: 0.3 K/UL (ref 0.1–1.3)
MONOCYTES NFR BLD: 7 % (ref 4–12)
NEUTS SEG # BLD: 2.4 K/UL (ref 1.7–8.2)
NEUTS SEG NFR BLD: 49 % (ref 43–78)
NRBC # BLD: 0 K/UL (ref 0–0.2)
PLATELET # BLD AUTO: 166 K/UL (ref 150–450)
PMV BLD AUTO: 10.5 FL (ref 9.4–12.3)
POTASSIUM SERPL-SCNC: 3.8 MMOL/L (ref 3.5–5.1)
PROT SERPL-MCNC: 6.7 G/DL (ref 6.3–8.2)
RBC # BLD AUTO: 4.31 M/UL (ref 4.23–5.6)
SODIUM SERPL-SCNC: 140 MMOL/L (ref 133–143)
WBC # BLD AUTO: 4.9 K/UL (ref 4.3–11.1)

## 2023-10-26 PROCEDURE — 3079F DIAST BP 80-89 MM HG: CPT

## 2023-10-26 PROCEDURE — 99214 OFFICE O/P EST MOD 30 MIN: CPT

## 2023-10-26 PROCEDURE — 83735 ASSAY OF MAGNESIUM: CPT

## 2023-10-26 PROCEDURE — 85025 COMPLETE CBC W/AUTO DIFF WBC: CPT

## 2023-10-26 PROCEDURE — 36415 COLL VENOUS BLD VENIPUNCTURE: CPT

## 2023-10-26 PROCEDURE — 82378 CARCINOEMBRYONIC ANTIGEN: CPT

## 2023-10-26 PROCEDURE — 3074F SYST BP LT 130 MM HG: CPT

## 2023-10-26 PROCEDURE — 80053 COMPREHEN METABOLIC PANEL: CPT

## 2023-10-26 ASSESSMENT — ENCOUNTER SYMPTOMS
COUGH: 0
BACK PAIN: 0
BLOOD IN STOOL: 0
ABDOMINAL DISTENTION: 0
SCLERAL ICTERUS: 0
CONSTIPATION: 0
NAUSEA: 0
HEMOPTYSIS: 0
EYE PROBLEMS: 0
SHORTNESS OF BREATH: 0
SORE THROAT: 0
VOMITING: 0
DIARRHEA: 0

## 2023-10-26 NOTE — PROGRESS NOTES
[facility]  [address] [place] [address]  Office : [phone]  Fax : [phone]

## 2023-10-27 ENCOUNTER — HOSPITAL ENCOUNTER (OUTPATIENT)
Dept: INFUSION THERAPY | Age: 63
Discharge: HOME | End: 2023-10-27
Payer: COMMERCIAL

## 2023-10-27 VITALS
BODY MASS INDEX: 34.64 KG/M2 | RESPIRATION RATE: 16 BRPM | OXYGEN SATURATION: 93 % | TEMPERATURE: 98.4 F | SYSTOLIC BLOOD PRESSURE: 128 MMHG | WEIGHT: 241.4 LBS | DIASTOLIC BLOOD PRESSURE: 80 MMHG | HEART RATE: 87 BPM

## 2023-10-27 DIAGNOSIS — C76.2 ABDOMINAL CARCINOMATOSIS (HCC): Primary | ICD-10-CM

## 2023-10-27 DIAGNOSIS — C78.6 MALIGNANT NEOPLASM METASTATIC TO PERITONEUM (HCC): ICD-10-CM

## 2023-10-27 DIAGNOSIS — E83.42 HYPOMAGNESEMIA: ICD-10-CM

## 2023-10-27 PROCEDURE — 2580000003 HC RX 258: Performed by: NURSE PRACTITIONER

## 2023-10-27 PROCEDURE — 96367 TX/PROPH/DG ADDL SEQ IV INF: CPT

## 2023-10-27 PROCEDURE — 96375 TX/PRO/DX INJ NEW DRUG ADDON: CPT

## 2023-10-27 PROCEDURE — G0498 CHEMO EXTEND IV INFUS W/PUMP: HCPCS

## 2023-10-27 PROCEDURE — 96372 THER/PROPH/DIAG INJ SC/IM: CPT

## 2023-10-27 PROCEDURE — 96413 CHEMO IV INFUSION 1 HR: CPT

## 2023-10-27 PROCEDURE — 96368 THER/DIAG CONCURRENT INF: CPT

## 2023-10-27 PROCEDURE — 6360000002 HC RX W HCPCS: Performed by: NURSE PRACTITIONER

## 2023-10-27 PROCEDURE — 96411 CHEMO IV PUSH ADDL DRUG: CPT

## 2023-10-27 RX ORDER — DEXTROSE MONOHYDRATE 50 MG/ML
5-250 INJECTION, SOLUTION INTRAVENOUS PRN
Status: DISCONTINUED | OUTPATIENT
Start: 2023-10-27 | End: 2023-10-28 | Stop reason: HOSPADM

## 2023-10-27 RX ORDER — ONDANSETRON 2 MG/ML
8 INJECTION INTRAMUSCULAR; INTRAVENOUS ONCE
Status: COMPLETED | OUTPATIENT
Start: 2023-10-27 | End: 2023-10-27

## 2023-10-27 RX ORDER — SODIUM CHLORIDE 9 MG/ML
INJECTION, SOLUTION INTRAVENOUS CONTINUOUS
Status: CANCELLED | OUTPATIENT
Start: 2023-10-27

## 2023-10-27 RX ORDER — EPINEPHRINE 1 MG/ML
0.3 INJECTION, SOLUTION, CONCENTRATE INTRAVENOUS PRN
Status: CANCELLED | OUTPATIENT
Start: 2023-10-27

## 2023-10-27 RX ORDER — HEPARIN 100 UNIT/ML
500 SYRINGE INTRAVENOUS PRN
Status: DISCONTINUED | OUTPATIENT
Start: 2023-10-27 | End: 2023-10-28 | Stop reason: HOSPADM

## 2023-10-27 RX ORDER — SODIUM CHLORIDE 0.9 % (FLUSH) 0.9 %
5-40 SYRINGE (ML) INJECTION PRN
Status: CANCELLED | OUTPATIENT
Start: 2023-10-30

## 2023-10-27 RX ORDER — SODIUM CHLORIDE 9 MG/ML
5-250 INJECTION, SOLUTION INTRAVENOUS PRN
Status: CANCELLED | OUTPATIENT
Start: 2023-10-30

## 2023-10-27 RX ORDER — ATROPINE SULFATE 0.4 MG/ML
0.4 INJECTION, SOLUTION INTRAVENOUS ONCE
Status: COMPLETED | OUTPATIENT
Start: 2023-10-27 | End: 2023-10-27

## 2023-10-27 RX ORDER — DIPHENHYDRAMINE HYDROCHLORIDE 50 MG/ML
50 INJECTION INTRAMUSCULAR; INTRAVENOUS
Status: CANCELLED | OUTPATIENT
Start: 2023-10-27

## 2023-10-27 RX ORDER — SODIUM CHLORIDE 0.9 % (FLUSH) 0.9 %
5-40 SYRINGE (ML) INJECTION PRN
Status: DISCONTINUED | OUTPATIENT
Start: 2023-10-27 | End: 2023-10-28 | Stop reason: HOSPADM

## 2023-10-27 RX ORDER — SODIUM CHLORIDE 9 MG/ML
5-250 INJECTION, SOLUTION INTRAVENOUS PRN
Status: DISCONTINUED | OUTPATIENT
Start: 2023-10-27 | End: 2023-10-28 | Stop reason: HOSPADM

## 2023-10-27 RX ORDER — MEPERIDINE HYDROCHLORIDE 25 MG/ML
12.5 INJECTION INTRAMUSCULAR; INTRAVENOUS; SUBCUTANEOUS PRN
Status: CANCELLED | OUTPATIENT
Start: 2023-10-27

## 2023-10-27 RX ORDER — FLUOROURACIL 50 MG/ML
400 INJECTION, SOLUTION INTRAVENOUS ONCE
Status: COMPLETED | OUTPATIENT
Start: 2023-10-27 | End: 2023-10-27

## 2023-10-27 RX ORDER — HEPARIN 100 UNIT/ML
500 SYRINGE INTRAVENOUS PRN
Status: CANCELLED | OUTPATIENT
Start: 2023-10-30

## 2023-10-27 RX ORDER — ACETAMINOPHEN 325 MG/1
650 TABLET ORAL
Status: CANCELLED | OUTPATIENT
Start: 2023-10-27

## 2023-10-27 RX ORDER — ALBUTEROL SULFATE 90 UG/1
4 AEROSOL, METERED RESPIRATORY (INHALATION) PRN
Status: CANCELLED | OUTPATIENT
Start: 2023-10-27

## 2023-10-27 RX ORDER — ONDANSETRON 2 MG/ML
8 INJECTION INTRAMUSCULAR; INTRAVENOUS
Status: CANCELLED | OUTPATIENT
Start: 2023-10-27

## 2023-10-27 RX ADMIN — SODIUM CHLORIDE, PRESERVATIVE FREE 10 ML: 5 INJECTION INTRAVENOUS at 11:45

## 2023-10-27 RX ADMIN — ONDANSETRON 8 MG: 2 INJECTION INTRAMUSCULAR; INTRAVENOUS at 09:16

## 2023-10-27 RX ADMIN — ATROPINE SULFATE 0.4 MG: 0.4 INJECTION, SOLUTION INTRAVENOUS at 09:53

## 2023-10-27 RX ADMIN — FOSAPREPITANT 150 MG: 150 INJECTION, POWDER, LYOPHILIZED, FOR SOLUTION INTRAVENOUS at 09:43

## 2023-10-27 RX ADMIN — DEXAMETHASONE SODIUM PHOSPHATE 12 MG: 4 INJECTION INTRA-ARTICULAR; INTRALESIONAL; INTRAMUSCULAR; INTRAVENOUS; SOFT TISSUE at 09:20

## 2023-10-27 RX ADMIN — DEXTROSE MONOHYDRATE 50 ML/HR: 50 INJECTION, SOLUTION INTRAVENOUS at 08:58

## 2023-10-27 RX ADMIN — FLUOROURACIL 5375 MG: 50 INJECTION, SOLUTION INTRAVENOUS at 12:06

## 2023-10-27 RX ADMIN — IRINOTECAN HYDROCHLORIDE 340 MG: 20 INJECTION, SOLUTION INTRAVENOUS at 10:19

## 2023-10-27 RX ADMIN — LEUCOVORIN CALCIUM 900 MG: 350 INJECTION, POWDER, LYOPHILIZED, FOR SOLUTION INTRAMUSCULAR; INTRAVENOUS at 10:24

## 2023-10-27 RX ADMIN — FLUOROURACIL 900 MG: 50 INJECTION, SOLUTION INTRAVENOUS at 11:59

## 2023-10-27 NOTE — PROGRESS NOTES
Arrived to the infusion center. Folfirii completed without signs of adverse reaction. No new issues or concerns voiced during the visit. Aware of his next appointment 10/30 at 0745 to have pump dc/d and port deaccessed. Discharged ambulatory in satisfactory condition.

## 2023-10-27 NOTE — PROGRESS NOTES
's follow-up visit to convey care and concern for Delon Blood and his wife Steph Azul, [address]  Board Certified

## 2023-10-30 ENCOUNTER — HOSPITAL ENCOUNTER (OUTPATIENT)
Dept: INFUSION THERAPY | Age: 63
Discharge: HOME | End: 2023-10-30
Payer: COMMERCIAL

## 2023-10-30 VITALS
DIASTOLIC BLOOD PRESSURE: 74 MMHG | RESPIRATION RATE: 18 BRPM | SYSTOLIC BLOOD PRESSURE: 109 MMHG | TEMPERATURE: 98.6 F | HEART RATE: 82 BPM

## 2023-10-30 DIAGNOSIS — C76.2 ABDOMINAL CARCINOMATOSIS (HCC): Primary | ICD-10-CM

## 2023-10-30 DIAGNOSIS — C78.6 MALIGNANT NEOPLASM METASTATIC TO PERITONEUM (HCC): ICD-10-CM

## 2023-10-30 DIAGNOSIS — E83.42 HYPOMAGNESEMIA: ICD-10-CM

## 2023-10-30 PROCEDURE — 96523 IRRIG DRUG DELIVERY DEVICE: CPT

## 2023-10-30 PROCEDURE — 2580000003 HC RX 258: Performed by: NURSE PRACTITIONER

## 2023-10-30 RX ORDER — SODIUM CHLORIDE 0.9 % (FLUSH) 0.9 %
5-40 SYRINGE (ML) INJECTION PRN
Status: DISCONTINUED | OUTPATIENT
Start: 2023-10-30 | End: 2023-10-31 | Stop reason: HOSPADM

## 2023-10-30 RX ORDER — HEPARIN 100 UNIT/ML
500 SYRINGE INTRAVENOUS PRN
Status: DISCONTINUED | OUTPATIENT
Start: 2023-10-30 | End: 2023-10-31 | Stop reason: HOSPADM

## 2023-10-30 RX ORDER — SODIUM CHLORIDE 9 MG/ML
5-250 INJECTION, SOLUTION INTRAVENOUS PRN
Status: DISCONTINUED | OUTPATIENT
Start: 2023-10-30 | End: 2023-10-31 | Stop reason: HOSPADM

## 2023-10-30 RX ADMIN — SODIUM CHLORIDE, PRESERVATIVE FREE 10 ML: 5 INJECTION INTRAVENOUS at 08:00

## 2023-10-30 ASSESSMENT — PAIN SCALES - GENERAL: PAINLEVEL_OUTOF10: 0

## 2023-10-30 NOTE — PROGRESS NOTES
Arrived to the [address] [place] [address]. [facility]. Fluorouracil pump removed after completed. Patient tolerated without difficulty. Any issues or concerns during appointment: c/o feeling \"more fatigued this weekend than normal\" pt refused offer for IV Fluids, encouraged to keep hydrated. Patient aware of next infusion appointment on 11/10 (date) at 46 (time). Patient instructed to call provider with temperature of 100.4 or greater or nausea/vomiting/ diarrhea or pain not controlled by medications  Discharged ambulatory.

## 2023-11-02 ENCOUNTER — CLINICAL DOCUMENTATION (OUTPATIENT)
Dept: ONCOLOGY | Age: 63
End: 2023-11-02

## 2023-11-03 NOTE — PROGRESS NOTES
Prior authorization request for Xarelto received via fax indicating that a PA request has been initiated through CoverMeds under [address] [place] [address]. Patient demographics and clinical information submitted through the portal and sent to [employer]. Message returned stating that the drug is covered under the current benefit plan and no further PA activity is needed.

## 2023-11-07 RX ORDER — MAGNESIUM OXIDE 400 MG/1
400 TABLET ORAL 3 TIMES DAILY
Qty: 90 TABLET | Refills: 2 | OUTPATIENT
Start: 2023-11-07

## 2023-11-07 RX ORDER — GABAPENTIN 300 MG/1
CAPSULE ORAL
Qty: 60 CAPSULE | Refills: 0 | OUTPATIENT
Start: 2023-11-07

## 2023-11-09 DIAGNOSIS — C79.9 METASTATIC ADENOCARCINOMA (HCC): Primary | ICD-10-CM

## 2023-11-09 DIAGNOSIS — E87.6 HYPOKALEMIA: ICD-10-CM

## 2023-11-09 DIAGNOSIS — D64.9 ANEMIA, UNSPECIFIED TYPE: ICD-10-CM

## 2023-11-10 ENCOUNTER — HOSPITAL ENCOUNTER (OUTPATIENT)
Dept: INFUSION THERAPY | Age: 63
Discharge: HOME | End: 2023-11-10
Payer: COMMERCIAL

## 2023-11-10 ENCOUNTER — HOSPITAL ENCOUNTER (OUTPATIENT)
Dept: LAB | Age: 63
Discharge: HOME | End: 2023-11-10
Payer: COMMERCIAL

## 2023-11-10 ENCOUNTER — OFFICE VISIT (OUTPATIENT)
Dept: ONCOLOGY | Age: 63
End: 2023-11-10
Payer: COMMERCIAL

## 2023-11-10 VITALS
OXYGEN SATURATION: 96 % | DIASTOLIC BLOOD PRESSURE: 78 MMHG | WEIGHT: 241 LBS | SYSTOLIC BLOOD PRESSURE: 113 MMHG | TEMPERATURE: 97.6 F | BODY MASS INDEX: 34.58 KG/M2 | RESPIRATION RATE: 16 BRPM | HEART RATE: 98 BPM

## 2023-11-10 DIAGNOSIS — C78.6 MALIGNANT NEOPLASM METASTATIC TO PERITONEUM (HCC): ICD-10-CM

## 2023-11-10 DIAGNOSIS — D64.9 ANEMIA, UNSPECIFIED TYPE: ICD-10-CM

## 2023-11-10 DIAGNOSIS — Z79.899 HIGH RISK MEDICATION USE: ICD-10-CM

## 2023-11-10 DIAGNOSIS — C16.9 MALIGNANT NEOPLASM OF STOMACH, UNSPECIFIED LOCATION (HCC): Primary | ICD-10-CM

## 2023-11-10 DIAGNOSIS — T45.1X5A NEUROPATHY DUE TO CHEMOTHERAPEUTIC DRUG (HCC): ICD-10-CM

## 2023-11-10 DIAGNOSIS — E83.42 HYPOMAGNESEMIA: ICD-10-CM

## 2023-11-10 DIAGNOSIS — R53.83 FATIGUE DUE TO TREATMENT: ICD-10-CM

## 2023-11-10 DIAGNOSIS — C79.9 METASTATIC ADENOCARCINOMA (HCC): ICD-10-CM

## 2023-11-10 DIAGNOSIS — C76.2 ABDOMINAL CARCINOMATOSIS (HCC): ICD-10-CM

## 2023-11-10 DIAGNOSIS — E87.6 HYPOKALEMIA: ICD-10-CM

## 2023-11-10 DIAGNOSIS — G62.0 NEUROPATHY DUE TO CHEMOTHERAPEUTIC DRUG (HCC): ICD-10-CM

## 2023-11-10 DIAGNOSIS — C76.2 ABDOMINAL CARCINOMATOSIS (HCC): Primary | ICD-10-CM

## 2023-11-10 LAB
ALBUMIN SERPL-MCNC: 3.2 G/DL (ref 3.2–4.6)
ALBUMIN/GLOB SERPL: 1 (ref 0.4–1.6)
ALP SERPL-CCNC: 158 U/L (ref 50–136)
ALT SERPL-CCNC: 41 U/L (ref 12–65)
ANION GAP SERPL CALC-SCNC: 5 MMOL/L (ref 2–11)
AST SERPL-CCNC: 36 U/L (ref 15–37)
BASOPHILS # BLD: 0.1 K/UL (ref 0–0.2)
BASOPHILS NFR BLD: 1 % (ref 0–2)
BILIRUB SERPL-MCNC: 0.4 MG/DL (ref 0.2–1.1)
BUN SERPL-MCNC: 9 MG/DL (ref 8–23)
CALCIUM SERPL-MCNC: 8.2 MG/DL (ref 8.3–10.4)
CEA SERPL-MCNC: 2.9 NG/ML (ref 0–3)
CHLORIDE SERPL-SCNC: 110 MMOL/L (ref 101–110)
CO2 SERPL-SCNC: 28 MMOL/L (ref 21–32)
CREAT SERPL-MCNC: 1 MG/DL (ref 0.8–1.5)
DIFFERENTIAL METHOD BLD: ABNORMAL
EOSINOPHIL # BLD: 0.1 K/UL (ref 0–0.8)
EOSINOPHIL NFR BLD: 3 % (ref 0.5–7.8)
ERYTHROCYTE [DISTWIDTH] IN BLOOD BY AUTOMATED COUNT: 15.4 % (ref 11.9–14.6)
FERRITIN SERPL-MCNC: 464 NG/ML (ref 8–388)
GLOBULIN SER CALC-MCNC: 3.2 G/DL (ref 2.8–4.5)
GLUCOSE SERPL-MCNC: 248 MG/DL (ref 65–100)
HCT VFR BLD AUTO: 36.5 % (ref 41.1–50.3)
HGB BLD-MCNC: 11.5 G/DL (ref 13.6–17.2)
IMM GRANULOCYTES # BLD AUTO: 0 K/UL (ref 0–0.5)
IMM GRANULOCYTES NFR BLD AUTO: 0 % (ref 0–5)
IRON SATN MFR SERPL: 22 %
IRON SERPL-MCNC: 54 UG/DL (ref 35–150)
LYMPHOCYTES # BLD: 1.7 K/UL (ref 0.5–4.6)
LYMPHOCYTES NFR BLD: 34 % (ref 13–44)
MAGNESIUM SERPL-MCNC: 1.7 MG/DL (ref 1.8–2.4)
MCH RBC QN AUTO: 28 PG (ref 26.1–32.9)
MCHC RBC AUTO-ENTMCNC: 31.5 G/DL (ref 31.4–35)
MCV RBC AUTO: 89 FL (ref 82–102)
MONOCYTES # BLD: 0.3 K/UL (ref 0.1–1.3)
MONOCYTES NFR BLD: 6 % (ref 4–12)
NEUTS SEG # BLD: 2.8 K/UL (ref 1.7–8.2)
NEUTS SEG NFR BLD: 55 % (ref 43–78)
NRBC # BLD: 0 K/UL (ref 0–0.2)
PLATELET # BLD AUTO: 161 K/UL (ref 150–450)
PMV BLD AUTO: 9.9 FL (ref 9.4–12.3)
POTASSIUM SERPL-SCNC: 3.2 MMOL/L (ref 3.5–5.1)
PROT SERPL-MCNC: 6.4 G/DL (ref 6.3–8.2)
RBC # BLD AUTO: 4.1 M/UL (ref 4.23–5.6)
SODIUM SERPL-SCNC: 143 MMOL/L (ref 133–143)
TIBC SERPL-MCNC: 246 UG/DL (ref 250–450)
WBC # BLD AUTO: 5 K/UL (ref 4.3–11.1)

## 2023-11-10 PROCEDURE — 82728 ASSAY OF FERRITIN: CPT

## 2023-11-10 PROCEDURE — 96367 TX/PROPH/DG ADDL SEQ IV INF: CPT

## 2023-11-10 PROCEDURE — 80053 COMPREHEN METABOLIC PANEL: CPT

## 2023-11-10 PROCEDURE — 96368 THER/DIAG CONCURRENT INF: CPT

## 2023-11-10 PROCEDURE — 96375 TX/PRO/DX INJ NEW DRUG ADDON: CPT

## 2023-11-10 PROCEDURE — 83735 ASSAY OF MAGNESIUM: CPT

## 2023-11-10 PROCEDURE — 2580000003 HC RX 258: Performed by: INTERNAL MEDICINE

## 2023-11-10 PROCEDURE — 99214 OFFICE O/P EST MOD 30 MIN: CPT | Performed by: NURSE PRACTITIONER

## 2023-11-10 PROCEDURE — 6360000002 HC RX W HCPCS: Performed by: NURSE PRACTITIONER

## 2023-11-10 PROCEDURE — 3074F SYST BP LT 130 MM HG: CPT | Performed by: NURSE PRACTITIONER

## 2023-11-10 PROCEDURE — 96411 CHEMO IV PUSH ADDL DRUG: CPT

## 2023-11-10 PROCEDURE — 2580000003 HC RX 258: Performed by: NURSE PRACTITIONER

## 2023-11-10 PROCEDURE — 96372 THER/PROPH/DIAG INJ SC/IM: CPT

## 2023-11-10 PROCEDURE — 3078F DIAST BP <80 MM HG: CPT | Performed by: NURSE PRACTITIONER

## 2023-11-10 PROCEDURE — 83540 ASSAY OF IRON: CPT

## 2023-11-10 PROCEDURE — 96413 CHEMO IV INFUSION 1 HR: CPT

## 2023-11-10 PROCEDURE — 85025 COMPLETE CBC W/AUTO DIFF WBC: CPT

## 2023-11-10 PROCEDURE — 96415 CHEMO IV INFUSION ADDL HR: CPT

## 2023-11-10 PROCEDURE — G0498 CHEMO EXTEND IV INFUS W/PUMP: HCPCS

## 2023-11-10 PROCEDURE — 82378 CARCINOEMBRYONIC ANTIGEN: CPT

## 2023-11-10 PROCEDURE — 83550 IRON BINDING TEST: CPT

## 2023-11-10 RX ORDER — ONDANSETRON 2 MG/ML
8 INJECTION INTRAMUSCULAR; INTRAVENOUS
Status: DISCONTINUED | OUTPATIENT
Start: 2023-11-10 | End: 2023-11-11 | Stop reason: HOSPADM

## 2023-11-10 RX ORDER — ONDANSETRON 2 MG/ML
8 INJECTION INTRAMUSCULAR; INTRAVENOUS ONCE
Status: CANCELLED | OUTPATIENT
Start: 2023-11-10 | End: 2023-11-10

## 2023-11-10 RX ORDER — MEPERIDINE HYDROCHLORIDE 50 MG/ML
12.5 INJECTION INTRAMUSCULAR; INTRAVENOUS; SUBCUTANEOUS PRN
Status: CANCELLED | OUTPATIENT
Start: 2023-11-10

## 2023-11-10 RX ORDER — FLUOROURACIL 50 MG/ML
400 INJECTION, SOLUTION INTRAVENOUS ONCE
Status: COMPLETED | OUTPATIENT
Start: 2023-11-10 | End: 2023-11-10

## 2023-11-10 RX ORDER — SODIUM CHLORIDE 0.9 % (FLUSH) 0.9 %
5-40 SYRINGE (ML) INJECTION PRN
Status: ACTIVE | OUTPATIENT
Start: 2023-11-10

## 2023-11-10 RX ORDER — DIPHENHYDRAMINE HYDROCHLORIDE 50 MG/ML
50 INJECTION INTRAMUSCULAR; INTRAVENOUS
Status: CANCELLED | OUTPATIENT
Start: 2023-11-10

## 2023-11-10 RX ORDER — EPINEPHRINE 1 MG/ML
0.3 INJECTION, SOLUTION, CONCENTRATE INTRAVENOUS PRN
Status: CANCELLED | OUTPATIENT
Start: 2023-11-10

## 2023-11-10 RX ORDER — FAMOTIDINE 10 MG/ML
20 INJECTION, SOLUTION INTRAVENOUS
Status: CANCELLED | OUTPATIENT
Start: 2023-11-10

## 2023-11-10 RX ORDER — SODIUM CHLORIDE 0.9 % (FLUSH) 0.9 %
5-40 SYRINGE (ML) INJECTION PRN
Status: CANCELLED | OUTPATIENT
Start: 2023-11-10

## 2023-11-10 RX ORDER — SODIUM CHLORIDE 0.9 % (FLUSH) 0.9 %
5-40 SYRINGE (ML) INJECTION PRN
Status: DISCONTINUED | OUTPATIENT
Start: 2023-11-10 | End: 2023-11-11 | Stop reason: HOSPADM

## 2023-11-10 RX ORDER — SODIUM CHLORIDE 0.9 % (FLUSH) 0.9 %
5-40 SYRINGE (ML) INJECTION PRN
OUTPATIENT
Start: 2023-11-12

## 2023-11-10 RX ORDER — ONDANSETRON 2 MG/ML
8 INJECTION INTRAMUSCULAR; INTRAVENOUS
Status: CANCELLED | OUTPATIENT
Start: 2023-11-10

## 2023-11-10 RX ORDER — ONDANSETRON 2 MG/ML
8 INJECTION INTRAMUSCULAR; INTRAVENOUS ONCE
Status: COMPLETED | OUTPATIENT
Start: 2023-11-10 | End: 2023-11-10

## 2023-11-10 RX ORDER — ACETAMINOPHEN 325 MG/1
650 TABLET ORAL
Status: CANCELLED | OUTPATIENT
Start: 2023-11-10

## 2023-11-10 RX ORDER — ATROPINE SULFATE 0.4 MG/ML
0.4 INJECTION, SOLUTION INTRAVENOUS ONCE
Status: COMPLETED | OUTPATIENT
Start: 2023-11-10 | End: 2023-11-10

## 2023-11-10 RX ORDER — DEXTROSE MONOHYDRATE 50 MG/ML
5-250 INJECTION, SOLUTION INTRAVENOUS PRN
Status: CANCELLED | OUTPATIENT
Start: 2023-11-10

## 2023-11-10 RX ORDER — DEXTROSE MONOHYDRATE 50 MG/ML
5-250 INJECTION, SOLUTION INTRAVENOUS PRN
Status: DISCONTINUED | OUTPATIENT
Start: 2023-11-10 | End: 2023-11-11 | Stop reason: HOSPADM

## 2023-11-10 RX ORDER — HEPARIN SODIUM (PORCINE) LOCK FLUSH IV SOLN 100 UNIT/ML 100 UNIT/ML
500 SOLUTION INTRAVENOUS PRN
OUTPATIENT
Start: 2023-11-12

## 2023-11-10 RX ORDER — SODIUM CHLORIDE 9 MG/ML
INJECTION, SOLUTION INTRAVENOUS CONTINUOUS
Status: CANCELLED | OUTPATIENT
Start: 2023-11-10

## 2023-11-10 RX ORDER — HEPARIN SODIUM (PORCINE) LOCK FLUSH IV SOLN 100 UNIT/ML 100 UNIT/ML
500 SOLUTION INTRAVENOUS PRN
Status: CANCELLED | OUTPATIENT
Start: 2023-11-10

## 2023-11-10 RX ORDER — POTASSIUM CHLORIDE 1500 MG/1
TABLET, EXTENDED RELEASE ORAL
Qty: 30 TABLET | Refills: 1 | OUTPATIENT
Start: 2023-11-10

## 2023-11-10 RX ORDER — ALBUTEROL SULFATE 90 UG/1
4 AEROSOL, METERED RESPIRATORY (INHALATION) PRN
Status: CANCELLED | OUTPATIENT
Start: 2023-11-10

## 2023-11-10 RX ORDER — SODIUM CHLORIDE 9 MG/ML
5-250 INJECTION, SOLUTION INTRAVENOUS PRN
Status: CANCELLED | OUTPATIENT
Start: 2023-11-10

## 2023-11-10 RX ORDER — FLUOROURACIL 50 MG/ML
400 INJECTION, SOLUTION INTRAVENOUS ONCE
Status: CANCELLED | OUTPATIENT
Start: 2023-11-10 | End: 2023-11-10

## 2023-11-10 RX ORDER — SODIUM CHLORIDE 9 MG/ML
5-250 INJECTION, SOLUTION INTRAVENOUS PRN
OUTPATIENT
Start: 2023-11-12

## 2023-11-10 RX ORDER — DIPHENHYDRAMINE HYDROCHLORIDE 50 MG/ML
50 INJECTION INTRAMUSCULAR; INTRAVENOUS
Status: DISCONTINUED | OUTPATIENT
Start: 2023-11-10 | End: 2023-11-11 | Stop reason: HOSPADM

## 2023-11-10 RX ADMIN — ONDANSETRON 8 MG: 2 INJECTION INTRAMUSCULAR; INTRAVENOUS at 09:40

## 2023-11-10 RX ADMIN — IRINOTECAN HYDROCHLORIDE 340 MG: 20 INJECTION, SOLUTION INTRAVENOUS at 10:40

## 2023-11-10 RX ADMIN — FLUOROURACIL 5375 MG: 50 INJECTION, SOLUTION INTRAVENOUS at 12:32

## 2023-11-10 RX ADMIN — DEXTROSE MONOHYDRATE 200 ML/HR: 50 INJECTION, SOLUTION INTRAVENOUS at 09:26

## 2023-11-10 RX ADMIN — ATROPINE SULFATE 0.4 MG: 0.4 INJECTION, SOLUTION INTRAVENOUS at 10:37

## 2023-11-10 RX ADMIN — FOSAPREPITANT 150 MG: 150 INJECTION, POWDER, LYOPHILIZED, FOR SOLUTION INTRAVENOUS at 10:09

## 2023-11-10 RX ADMIN — FLUOROURACIL 900 MG: 50 INJECTION, SOLUTION INTRAVENOUS at 12:28

## 2023-11-10 RX ADMIN — LEUCOVORIN CALCIUM 900 MG: 350 INJECTION, POWDER, LYOPHILIZED, FOR SOLUTION INTRAMUSCULAR; INTRAVENOUS at 10:39

## 2023-11-10 RX ADMIN — DEXAMETHASONE SODIUM PHOSPHATE 12 MG: 4 INJECTION INTRA-ARTICULAR; INTRALESIONAL; INTRAMUSCULAR; INTRAVENOUS; SOFT TISSUE at 09:45

## 2023-11-10 RX ADMIN — SODIUM CHLORIDE, PRESERVATIVE FREE 10 ML: 5 INJECTION INTRAVENOUS at 09:25

## 2023-11-10 RX ADMIN — SODIUM CHLORIDE, PRESERVATIVE FREE 10 ML: 5 INJECTION INTRAVENOUS at 08:06

## 2023-11-10 ASSESSMENT — ENCOUNTER SYMPTOMS
BACK PAIN: 0
SHORTNESS OF BREATH: 0
CONSTIPATION: 0
ABDOMINAL DISTENTION: 0
HEMOPTYSIS: 0
SCLERAL ICTERUS: 0
EYE PROBLEMS: 0
VOMITING: 0
COUGH: 0
BLOOD IN STOOL: 0
DIARRHEA: 0
SORE THROAT: 0
NAUSEA: 0

## 2023-11-10 ASSESSMENT — PATIENT HEALTH QUESTIONNAIRE - PHQ9
1. LITTLE INTEREST OR PLEASURE IN DOING THINGS: 0
SUM OF ALL RESPONSES TO PHQ QUESTIONS 1-9: 0
SUM OF ALL RESPONSES TO PHQ9 QUESTIONS 1 & 2: 0
SUM OF ALL RESPONSES TO PHQ QUESTIONS 1-9: 0
2. FEELING DOWN, DEPRESSED OR HOPELESS: 0

## 2023-11-10 NOTE — PROGRESS NOTES
Arrived to the [address] [place] [address]. Folfiri completed. Patient tolerated well. Any issues or concerns during appointment: potassium and mag both mildly low today. NINFA Moreno notified and patent instructed to double up on his magnesium and potassium supplements today and tomorrow to correct levels. No other issues today. Patient aware of next infusion appointment on 11/13 at 9:00 am.   Patient instructed to call provider with temperature of 100.4 or greater or nausea/vomiting/ diarrhea or pain not controlled by medications  Discharged ambulatory to home.

## 2023-11-10 NOTE — PROGRESS NOTES
liver.     - anemia - Hb stable at 10.7. Iron labs adequate today 7/21/2023.      - Previously, discussed sunscreen as he has not been using that and spends quite a bit of time in the sun. Risks of worsening burn while on chemotherapy reviewed with patient and wife. She said that she will encourage him to use sunblock when they are at the beach and in general.    - tinea corporis - We also discussed risks of tinea which the patient has experienced in the past and how to mitigate the risk. All questions were asked and answered to the best of my ability. The patient verbalized understanding and agrees with the plan above.         LUCIANA Zuñiga  [facility] Hematology and Oncology  300 [facility], [address] [place] [address]  Office : [phone]  Fax : [phone]

## 2023-11-13 ENCOUNTER — HOSPITAL ENCOUNTER (OUTPATIENT)
Dept: INFUSION THERAPY | Age: 63
End: 2023-11-13

## 2023-11-13 ENCOUNTER — HOSPITAL ENCOUNTER (OUTPATIENT)
Dept: INFUSION THERAPY | Age: 63
Discharge: HOME | End: 2023-11-13
Payer: COMMERCIAL

## 2023-11-13 VITALS
SYSTOLIC BLOOD PRESSURE: 148 MMHG | HEART RATE: 84 BPM | TEMPERATURE: 98 F | DIASTOLIC BLOOD PRESSURE: 82 MMHG | OXYGEN SATURATION: 98 % | RESPIRATION RATE: 18 BRPM

## 2023-11-13 PROCEDURE — 96523 IRRIG DRUG DELIVERY DEVICE: CPT

## 2023-11-13 PROCEDURE — 2580000003 HC RX 258: Performed by: INTERNAL MEDICINE

## 2023-11-13 RX ORDER — SODIUM CHLORIDE 0.9 % (FLUSH) 0.9 %
10 SYRINGE (ML) INJECTION PRN
Status: DISCONTINUED | OUTPATIENT
Start: 2023-11-13 | End: 2023-11-14 | Stop reason: HOSPADM

## 2023-11-13 RX ORDER — PROMETHAZINE HYDROCHLORIDE 12.5 MG/1
12.5 TABLET ORAL EVERY 6 HOURS PRN
Qty: 90 TABLET | Refills: 0 | OUTPATIENT
Start: 2023-11-13

## 2023-11-13 RX ORDER — PANTOPRAZOLE SODIUM 40 MG/1
TABLET, DELAYED RELEASE ORAL
Qty: 180 TABLET | OUTPATIENT
Start: 2023-11-13

## 2023-11-13 RX ORDER — ESCITALOPRAM OXALATE 10 MG/1
TABLET ORAL
Qty: 30 TABLET | Refills: 5 | OUTPATIENT
Start: 2023-11-13

## 2023-11-13 RX ADMIN — SODIUM CHLORIDE, PRESERVATIVE FREE 10 ML: 5 INJECTION INTRAVENOUS at 09:11

## 2023-11-13 NOTE — PROGRESS NOTES
Arrived to the [address] [place] [address]. Continuous 5FU completed; elastomeric pump removed. Patient tolerated well. Any issues or concerns during appointment: none. Patient instructed to call provider with temperature of 100.4 or greater or nausea/vomiting/ diarrhea or pain not controlled by medications. Discharged ambulatory.

## 2023-11-16 RX ORDER — MIDODRINE HYDROCHLORIDE 5 MG/1
TABLET ORAL
Qty: 180 TABLET | Refills: 1 | OUTPATIENT
Start: 2023-11-16

## 2023-11-17 RX ORDER — BUSPIRONE HYDROCHLORIDE 10 MG/1
TABLET ORAL
Qty: 90 TABLET | Refills: 0 | OUTPATIENT
Start: 2023-11-17

## 2023-11-17 RX ORDER — ESCITALOPRAM OXALATE 10 MG/1
TABLET ORAL
Qty: 30 TABLET | Refills: 0 | OUTPATIENT
Start: 2023-11-17

## 2023-11-21 DIAGNOSIS — C76.2 ABDOMINAL CARCINOMATOSIS (HCC): ICD-10-CM

## 2023-11-21 DIAGNOSIS — C16.9 MALIGNANT NEOPLASM OF STOMACH, UNSPECIFIED LOCATION (HCC): Primary | ICD-10-CM

## 2023-11-22 RX ORDER — RIVAROXABAN 20 MG/1
TABLET, FILM COATED ORAL
Qty: 30 TABLET | Refills: 3 | OUTPATIENT
Start: 2023-11-22

## 2023-11-24 ENCOUNTER — HOSPITAL ENCOUNTER (OUTPATIENT)
Dept: INFUSION THERAPY | Age: 63
Discharge: HOME | End: 2023-11-24
Payer: COMMERCIAL

## 2023-11-24 ENCOUNTER — HOSPITAL ENCOUNTER (OUTPATIENT)
Dept: LAB | Age: 63
Discharge: HOME | End: 2023-11-24
Payer: COMMERCIAL

## 2023-11-24 ENCOUNTER — OFFICE VISIT (OUTPATIENT)
Dept: ONCOLOGY | Age: 63
End: 2023-11-24
Payer: COMMERCIAL

## 2023-11-24 VITALS
BODY MASS INDEX: 34.07 KG/M2 | WEIGHT: 238 LBS | SYSTOLIC BLOOD PRESSURE: 111 MMHG | RESPIRATION RATE: 16 BRPM | HEART RATE: 88 BPM | OXYGEN SATURATION: 96 % | DIASTOLIC BLOOD PRESSURE: 75 MMHG | HEIGHT: 70 IN | TEMPERATURE: 97.7 F

## 2023-11-24 DIAGNOSIS — G62.0 NEUROPATHY DUE TO CHEMOTHERAPEUTIC DRUG (HCC): ICD-10-CM

## 2023-11-24 DIAGNOSIS — E83.42 HYPOMAGNESEMIA: ICD-10-CM

## 2023-11-24 DIAGNOSIS — C76.2 ABDOMINAL CARCINOMATOSIS (HCC): ICD-10-CM

## 2023-11-24 DIAGNOSIS — Z79.899 HIGH RISK MEDICATION USE: ICD-10-CM

## 2023-11-24 DIAGNOSIS — C16.9 MALIGNANT NEOPLASM OF STOMACH, UNSPECIFIED LOCATION (HCC): ICD-10-CM

## 2023-11-24 DIAGNOSIS — I82.721 CHRONIC DEEP VEIN THROMBOSIS (DVT) OF RIGHT UPPER EXTREMITY, UNSPECIFIED VEIN (HCC): ICD-10-CM

## 2023-11-24 DIAGNOSIS — R53.83 FATIGUE DUE TO TREATMENT: ICD-10-CM

## 2023-11-24 DIAGNOSIS — C76.2 ABDOMINAL CARCINOMATOSIS (HCC): Primary | ICD-10-CM

## 2023-11-24 DIAGNOSIS — T45.1X5A NEUROPATHY DUE TO CHEMOTHERAPEUTIC DRUG (HCC): ICD-10-CM

## 2023-11-24 DIAGNOSIS — C16.9 MALIGNANT NEOPLASM OF STOMACH, UNSPECIFIED LOCATION (HCC): Primary | ICD-10-CM

## 2023-11-24 DIAGNOSIS — C78.6 MALIGNANT NEOPLASM METASTATIC TO PERITONEUM (HCC): ICD-10-CM

## 2023-11-24 LAB
ALBUMIN SERPL-MCNC: 3.3 G/DL (ref 3.2–4.6)
ALBUMIN/GLOB SERPL: 1 (ref 0.4–1.6)
ALP SERPL-CCNC: 166 U/L (ref 50–136)
ALT SERPL-CCNC: 50 U/L (ref 12–65)
ANION GAP SERPL CALC-SCNC: 4 MMOL/L (ref 2–11)
AST SERPL-CCNC: 38 U/L (ref 15–37)
BASOPHILS # BLD: 0.1 K/UL (ref 0–0.2)
BASOPHILS NFR BLD: 1 % (ref 0–2)
BILIRUB SERPL-MCNC: 0.4 MG/DL (ref 0.2–1.1)
BUN SERPL-MCNC: 12 MG/DL (ref 8–23)
CALCIUM SERPL-MCNC: 8.5 MG/DL (ref 8.3–10.4)
CEA SERPL-MCNC: 2.6 NG/ML (ref 0–3)
CHLORIDE SERPL-SCNC: 109 MMOL/L (ref 101–110)
CO2 SERPL-SCNC: 27 MMOL/L (ref 21–32)
CREAT SERPL-MCNC: 1.2 MG/DL (ref 0.8–1.5)
DIFFERENTIAL METHOD BLD: ABNORMAL
EOSINOPHIL # BLD: 0.2 K/UL (ref 0–0.8)
EOSINOPHIL NFR BLD: 3 % (ref 0.5–7.8)
ERYTHROCYTE [DISTWIDTH] IN BLOOD BY AUTOMATED COUNT: 15.7 % (ref 11.9–14.6)
GLOBULIN SER CALC-MCNC: 3.2 G/DL (ref 2.8–4.5)
GLUCOSE SERPL-MCNC: 222 MG/DL (ref 65–100)
HCT VFR BLD AUTO: 36.3 % (ref 41.1–50.3)
HGB BLD-MCNC: 11.5 G/DL (ref 13.6–17.2)
IMM GRANULOCYTES # BLD AUTO: 0 K/UL (ref 0–0.5)
IMM GRANULOCYTES NFR BLD AUTO: 0 % (ref 0–5)
LYMPHOCYTES # BLD: 1.8 K/UL (ref 0.5–4.6)
LYMPHOCYTES NFR BLD: 32 % (ref 13–44)
MAGNESIUM SERPL-MCNC: 1.8 MG/DL (ref 1.8–2.4)
MCH RBC QN AUTO: 28.5 PG (ref 26.1–32.9)
MCHC RBC AUTO-ENTMCNC: 31.7 G/DL (ref 31.4–35)
MCV RBC AUTO: 90.1 FL (ref 82–102)
MONOCYTES # BLD: 0.3 K/UL (ref 0.1–1.3)
MONOCYTES NFR BLD: 6 % (ref 4–12)
NEUTS SEG # BLD: 3.3 K/UL (ref 1.7–8.2)
NEUTS SEG NFR BLD: 58 % (ref 43–78)
NRBC # BLD: 0 K/UL (ref 0–0.2)
PLATELET # BLD AUTO: 170 K/UL (ref 150–450)
PMV BLD AUTO: 10.7 FL (ref 9.4–12.3)
POTASSIUM SERPL-SCNC: 3.9 MMOL/L (ref 3.5–5.1)
PROT SERPL-MCNC: 6.5 G/DL (ref 6.3–8.2)
RBC # BLD AUTO: 4.03 M/UL (ref 4.23–5.6)
SODIUM SERPL-SCNC: 140 MMOL/L (ref 133–143)
WBC # BLD AUTO: 5.7 K/UL (ref 4.3–11.1)

## 2023-11-24 PROCEDURE — 96411 CHEMO IV PUSH ADDL DRUG: CPT

## 2023-11-24 PROCEDURE — 96367 TX/PROPH/DG ADDL SEQ IV INF: CPT

## 2023-11-24 PROCEDURE — 85025 COMPLETE CBC W/AUTO DIFF WBC: CPT

## 2023-11-24 PROCEDURE — 96372 THER/PROPH/DIAG INJ SC/IM: CPT

## 2023-11-24 PROCEDURE — 6360000002 HC RX W HCPCS: Performed by: NURSE PRACTITIONER

## 2023-11-24 PROCEDURE — 2580000003 HC RX 258: Performed by: INTERNAL MEDICINE

## 2023-11-24 PROCEDURE — 83735 ASSAY OF MAGNESIUM: CPT

## 2023-11-24 PROCEDURE — 3074F SYST BP LT 130 MM HG: CPT | Performed by: NURSE PRACTITIONER

## 2023-11-24 PROCEDURE — 82378 CARCINOEMBRYONIC ANTIGEN: CPT

## 2023-11-24 PROCEDURE — 96368 THER/DIAG CONCURRENT INF: CPT

## 2023-11-24 PROCEDURE — 80053 COMPREHEN METABOLIC PANEL: CPT

## 2023-11-24 PROCEDURE — 96375 TX/PRO/DX INJ NEW DRUG ADDON: CPT

## 2023-11-24 PROCEDURE — G0498 CHEMO EXTEND IV INFUS W/PUMP: HCPCS

## 2023-11-24 PROCEDURE — 99214 OFFICE O/P EST MOD 30 MIN: CPT | Performed by: NURSE PRACTITIONER

## 2023-11-24 PROCEDURE — 2580000003 HC RX 258: Performed by: NURSE PRACTITIONER

## 2023-11-24 PROCEDURE — 96413 CHEMO IV INFUSION 1 HR: CPT

## 2023-11-24 PROCEDURE — 3078F DIAST BP <80 MM HG: CPT | Performed by: NURSE PRACTITIONER

## 2023-11-24 RX ORDER — SODIUM CHLORIDE 0.9 % (FLUSH) 0.9 %
5-40 SYRINGE (ML) INJECTION PRN
OUTPATIENT
Start: 2023-11-26

## 2023-11-24 RX ORDER — ATROPINE SULFATE 0.4 MG/ML
0.4 INJECTION, SOLUTION INTRAVENOUS ONCE
Status: COMPLETED | OUTPATIENT
Start: 2023-11-24 | End: 2023-11-24

## 2023-11-24 RX ORDER — SODIUM CHLORIDE 9 MG/ML
5-250 INJECTION, SOLUTION INTRAVENOUS PRN
Status: CANCELLED | OUTPATIENT
Start: 2023-11-24

## 2023-11-24 RX ORDER — SODIUM CHLORIDE 0.9 % (FLUSH) 0.9 %
10 SYRINGE (ML) INJECTION PRN
Status: DISCONTINUED | OUTPATIENT
Start: 2023-11-24 | End: 2023-11-28 | Stop reason: HOSPADM

## 2023-11-24 RX ORDER — ACETAMINOPHEN 325 MG/1
650 TABLET ORAL
Status: CANCELLED | OUTPATIENT
Start: 2023-11-24

## 2023-11-24 RX ORDER — MEPERIDINE HYDROCHLORIDE 50 MG/ML
12.5 INJECTION INTRAMUSCULAR; INTRAVENOUS; SUBCUTANEOUS PRN
Status: CANCELLED | OUTPATIENT
Start: 2023-11-24

## 2023-11-24 RX ORDER — DEXTROSE MONOHYDRATE 50 MG/ML
5-250 INJECTION, SOLUTION INTRAVENOUS PRN
Status: CANCELLED | OUTPATIENT
Start: 2023-11-24

## 2023-11-24 RX ORDER — FLUOROURACIL 50 MG/ML
400 INJECTION, SOLUTION INTRAVENOUS ONCE
Status: COMPLETED | OUTPATIENT
Start: 2023-11-24 | End: 2023-11-24

## 2023-11-24 RX ORDER — SODIUM CHLORIDE 0.9 % (FLUSH) 0.9 %
5-40 SYRINGE (ML) INJECTION PRN
Status: CANCELLED | OUTPATIENT
Start: 2023-11-24

## 2023-11-24 RX ORDER — ONDANSETRON 2 MG/ML
8 INJECTION INTRAMUSCULAR; INTRAVENOUS ONCE
Status: COMPLETED | OUTPATIENT
Start: 2023-11-24 | End: 2023-11-24

## 2023-11-24 RX ORDER — HEPARIN SODIUM (PORCINE) LOCK FLUSH IV SOLN 100 UNIT/ML 100 UNIT/ML
500 SOLUTION INTRAVENOUS PRN
Status: CANCELLED | OUTPATIENT
Start: 2023-11-24

## 2023-11-24 RX ORDER — ALBUTEROL SULFATE 90 UG/1
4 AEROSOL, METERED RESPIRATORY (INHALATION) PRN
Status: CANCELLED | OUTPATIENT
Start: 2023-11-24

## 2023-11-24 RX ORDER — HEPARIN SODIUM (PORCINE) LOCK FLUSH IV SOLN 100 UNIT/ML 100 UNIT/ML
500 SOLUTION INTRAVENOUS PRN
OUTPATIENT
Start: 2023-11-26

## 2023-11-24 RX ORDER — FLUOROURACIL 50 MG/ML
400 INJECTION, SOLUTION INTRAVENOUS ONCE
Status: CANCELLED | OUTPATIENT
Start: 2023-11-24 | End: 2023-11-24

## 2023-11-24 RX ORDER — SODIUM CHLORIDE 9 MG/ML
5-250 INJECTION, SOLUTION INTRAVENOUS PRN
OUTPATIENT
Start: 2023-11-26

## 2023-11-24 RX ORDER — ONDANSETRON 2 MG/ML
8 INJECTION INTRAMUSCULAR; INTRAVENOUS
Status: CANCELLED | OUTPATIENT
Start: 2023-11-24

## 2023-11-24 RX ORDER — DIPHENHYDRAMINE HYDROCHLORIDE 50 MG/ML
50 INJECTION INTRAMUSCULAR; INTRAVENOUS
Status: CANCELLED | OUTPATIENT
Start: 2023-11-24

## 2023-11-24 RX ORDER — DEXTROSE MONOHYDRATE 50 MG/ML
5-250 INJECTION, SOLUTION INTRAVENOUS PRN
Status: DISCONTINUED | OUTPATIENT
Start: 2023-11-24 | End: 2023-11-25 | Stop reason: HOSPADM

## 2023-11-24 RX ORDER — ONDANSETRON 2 MG/ML
8 INJECTION INTRAMUSCULAR; INTRAVENOUS ONCE
Status: CANCELLED | OUTPATIENT
Start: 2023-11-24 | End: 2023-11-24

## 2023-11-24 RX ORDER — SODIUM CHLORIDE 9 MG/ML
INJECTION, SOLUTION INTRAVENOUS CONTINUOUS
Status: CANCELLED | OUTPATIENT
Start: 2023-11-24

## 2023-11-24 RX ORDER — FAMOTIDINE 10 MG/ML
20 INJECTION, SOLUTION INTRAVENOUS
Status: CANCELLED | OUTPATIENT
Start: 2023-11-24

## 2023-11-24 RX ORDER — EPINEPHRINE 1 MG/ML
0.3 INJECTION, SOLUTION, CONCENTRATE INTRAVENOUS PRN
Status: CANCELLED | OUTPATIENT
Start: 2023-11-24

## 2023-11-24 RX ADMIN — IRINOTECAN HYDROCHLORIDE 340 MG: 20 INJECTION, SOLUTION INTRAVENOUS at 13:06

## 2023-11-24 RX ADMIN — FLUOROURACIL 900 MG: 50 INJECTION, SOLUTION INTRAVENOUS at 14:42

## 2023-11-24 RX ADMIN — SODIUM CHLORIDE, PRESERVATIVE FREE 10 ML: 5 INJECTION INTRAVENOUS at 10:09

## 2023-11-24 RX ADMIN — ATROPINE SULFATE 0.4 MG: 0.4 INJECTION, SOLUTION INTRAVENOUS at 12:17

## 2023-11-24 RX ADMIN — ONDANSETRON 8 MG: 2 INJECTION INTRAMUSCULAR; INTRAVENOUS at 12:11

## 2023-11-24 RX ADMIN — DEXTROSE MONOHYDRATE 100 ML/HR: 50 INJECTION, SOLUTION INTRAVENOUS at 11:55

## 2023-11-24 RX ADMIN — FLUOROURACIL 5375 MG: 50 INJECTION, SOLUTION INTRAVENOUS at 14:49

## 2023-11-24 RX ADMIN — DEXAMETHASONE SODIUM PHOSPHATE 12 MG: 4 INJECTION INTRA-ARTICULAR; INTRALESIONAL; INTRAMUSCULAR; INTRAVENOUS; SOFT TISSUE at 12:15

## 2023-11-24 RX ADMIN — LEUCOVORIN CALCIUM 900 MG: 200 INJECTION, POWDER, LYOPHILIZED, FOR SUSPENSION INTRAMUSCULAR; INTRAVENOUS at 13:08

## 2023-11-24 RX ADMIN — FOSAPREPITANT 150 MG: 150 INJECTION, POWDER, LYOPHILIZED, FOR SOLUTION INTRAVENOUS at 12:34

## 2023-11-24 ASSESSMENT — ENCOUNTER SYMPTOMS
HEMOPTYSIS: 0
BACK PAIN: 0
VOMITING: 0
SORE THROAT: 0
EYE PROBLEMS: 0
NAUSEA: 0
ABDOMINAL DISTENTION: 0
CONSTIPATION: 0
COUGH: 0
BLOOD IN STOOL: 0
SHORTNESS OF BREATH: 0
DIARRHEA: 0
SCLERAL ICTERUS: 0

## 2023-11-24 ASSESSMENT — PATIENT HEALTH QUESTIONNAIRE - PHQ9
SUM OF ALL RESPONSES TO PHQ QUESTIONS 1-9: 0
1. LITTLE INTEREST OR PLEASURE IN DOING THINGS: 0
SUM OF ALL RESPONSES TO PHQ QUESTIONS 1-9: 0
2. FEELING DOWN, DEPRESSED OR HOPELESS: 0
SUM OF ALL RESPONSES TO PHQ9 QUESTIONS 1 & 2: 0

## 2023-11-24 NOTE — PROGRESS NOTES
Patient arrived to port lab for port access and lab draw   Hope Sweeney accessed and labs drawn per protocol   *Port remains accessed.  Patient discharged from port lab ambulatory*

## 2023-11-24 NOTE — PROGRESS NOTES
Arrived to the [address] [place] [address]. Folfiri completed connections checked with Agustin Escamilla RN. Patient tolerated well. Any issues or concerns during appointment: none. Patient aware of next infusion appointment on 11/27  Patient instructed to call provider with temperature of 100.4 or greater or nausea/vomiting/ diarrhea or pain not controlled by medications  Discharged ambulatory.

## 2023-11-24 NOTE — PROGRESS NOTES
loss from 240 --> 209 noted and expressed concern to pt about this. Of note, prior akbar resection with Dr Zainab Craig for diverticulitis per pt. Sent note to dr Hakan Arguello re pt's case to expedite workup with his agreement. He was hospitalized for abdominal pain and sepsis. He was empirically placed on vancomycin and cefepime. CT scanning disclosed no intra-abdominal fluid collection or abscess but did suggest that his tumor burden was somewhat smaller. He had some purulent drainage from around his G-tube. This was cultured and grew klebsiella pneumoniae and citrobacter freundii both of which were sensitive to Levaquin which he was discharged home on for 10 days. FU and next FOLFIRI. Overall, she has been well since last seen. He met with his PCP and Dr Catrachita Thomas for rectal bleeding/internal hemorrhoid, with KUB with moderate stool. After stopping Xarelto 20mg bleeding has stopped. He is using a rectal cream and was instructed to take both stool softener and miralax daily-hasn't started this yet. His gas pain and foul odor belching has improved. He was instructed to decrease Xarelto to 10 mg but hasn't done this yet. Denied fevers or infectious symptoms. Today, patient is here for follow-up with his wife for consideration of his next cycle of FOLFIRI. He has been well since last seen. He denies having any new issues or complaints to report. It took him a little longer than usual to recover but now back to baseline. He has had no nausea, vomiting, or mucositis. He takes a stool softener daily to prevent constipation. He continues to have neuropathy which is stable in his feet and takes gabapentin about once a day. He denies having any new pain. Appetite is great. He has had no bleeding, no fevers or infectious symptoms. Refill provided for Xarelto for this chronic DVT RUE. Labs reviewed and okay to proceed with next treatment. Follow up in two weeks or sooner if needed.   Call with any fevers,

## 2023-11-27 ENCOUNTER — HOSPITAL ENCOUNTER (OUTPATIENT)
Dept: INFUSION THERAPY | Age: 63
Discharge: HOME | End: 2023-11-27
Payer: COMMERCIAL

## 2023-11-27 VITALS
DIASTOLIC BLOOD PRESSURE: 68 MMHG | SYSTOLIC BLOOD PRESSURE: 135 MMHG | HEART RATE: 96 BPM | TEMPERATURE: 98.1 F | OXYGEN SATURATION: 98 % | RESPIRATION RATE: 18 BRPM

## 2023-11-27 DIAGNOSIS — E86.0 DEHYDRATION: ICD-10-CM

## 2023-11-27 DIAGNOSIS — Z45.2 PICC (PERIPHERALLY INSERTED CENTRAL CATHETER) FLUSH: ICD-10-CM

## 2023-11-27 DIAGNOSIS — E87.6 HYPOKALEMIA: Primary | ICD-10-CM

## 2023-11-27 DIAGNOSIS — E83.42 HYPOMAGNESEMIA: ICD-10-CM

## 2023-11-27 PROCEDURE — 96523 IRRIG DRUG DELIVERY DEVICE: CPT

## 2023-11-27 PROCEDURE — 2580000003 HC RX 258: Performed by: INTERNAL MEDICINE

## 2023-11-27 RX ORDER — HEPARIN 100 UNIT/ML
500 SYRINGE INTRAVENOUS PRN
OUTPATIENT
Start: 2023-11-27

## 2023-11-27 RX ORDER — SODIUM CHLORIDE 9 MG/ML
5-250 INJECTION, SOLUTION INTRAVENOUS PRN
OUTPATIENT
Start: 2023-11-27

## 2023-11-27 RX ORDER — SODIUM CHLORIDE 9 MG/ML
INJECTION, SOLUTION INTRAVENOUS ONCE
Status: DISCONTINUED | OUTPATIENT
Start: 2023-11-27 | End: 2023-11-28 | Stop reason: HOSPADM

## 2023-11-27 RX ORDER — ACETAMINOPHEN 325 MG/1
650 TABLET ORAL
OUTPATIENT
Start: 2023-11-27

## 2023-11-27 RX ORDER — ONDANSETRON 2 MG/ML
8 INJECTION INTRAMUSCULAR; INTRAVENOUS
OUTPATIENT
Start: 2023-11-27

## 2023-11-27 RX ORDER — EPINEPHRINE 1 MG/ML
0.3 INJECTION, SOLUTION, CONCENTRATE INTRAVENOUS PRN
OUTPATIENT
Start: 2023-11-27

## 2023-11-27 RX ORDER — ALBUTEROL SULFATE 90 UG/1
4 AEROSOL, METERED RESPIRATORY (INHALATION) PRN
OUTPATIENT
Start: 2023-11-27

## 2023-11-27 RX ORDER — SODIUM CHLORIDE 9 MG/ML
INJECTION, SOLUTION INTRAVENOUS CONTINUOUS
OUTPATIENT
Start: 2023-11-27

## 2023-11-27 RX ORDER — SODIUM CHLORIDE 9 MG/ML
INJECTION, SOLUTION INTRAVENOUS ONCE
Start: 2023-11-27 | End: 2023-11-27

## 2023-11-27 RX ORDER — SODIUM CHLORIDE 9 MG/ML
INJECTION, SOLUTION INTRAVENOUS CONTINUOUS
Status: DISCONTINUED | OUTPATIENT
Start: 2023-11-27 | End: 2023-11-28 | Stop reason: HOSPADM

## 2023-11-27 RX ORDER — SODIUM CHLORIDE 9 MG/ML
5-250 INJECTION, SOLUTION INTRAVENOUS PRN
Status: DISCONTINUED | OUTPATIENT
Start: 2023-11-27 | End: 2023-11-28 | Stop reason: HOSPADM

## 2023-11-27 RX ORDER — EPINEPHRINE 1 MG/ML
0.3 INJECTION, SOLUTION, CONCENTRATE INTRAVENOUS PRN
Status: DISCONTINUED | OUTPATIENT
Start: 2023-11-27 | End: 2023-11-28 | Stop reason: HOSPADM

## 2023-11-27 RX ORDER — DIPHENHYDRAMINE HYDROCHLORIDE 50 MG/ML
50 INJECTION INTRAMUSCULAR; INTRAVENOUS
OUTPATIENT
Start: 2023-11-27

## 2023-11-27 RX ORDER — HEPARIN 100 UNIT/ML
500 SYRINGE INTRAVENOUS PRN
Status: DISCONTINUED | OUTPATIENT
Start: 2023-11-27 | End: 2023-11-28 | Stop reason: HOSPADM

## 2023-11-27 RX ORDER — SODIUM CHLORIDE 0.9 % (FLUSH) 0.9 %
5-40 SYRINGE (ML) INJECTION PRN
Status: DISCONTINUED | OUTPATIENT
Start: 2023-11-27 | End: 2023-11-28 | Stop reason: HOSPADM

## 2023-11-27 RX ORDER — SODIUM CHLORIDE 0.9 % (FLUSH) 0.9 %
5-40 SYRINGE (ML) INJECTION PRN
OUTPATIENT
Start: 2023-11-27

## 2023-11-27 RX ORDER — ALBUTEROL SULFATE 90 UG/1
4 AEROSOL, METERED RESPIRATORY (INHALATION) PRN
Status: DISCONTINUED | OUTPATIENT
Start: 2023-11-27 | End: 2023-11-28 | Stop reason: HOSPADM

## 2023-11-27 RX ORDER — DIPHENHYDRAMINE HYDROCHLORIDE 50 MG/ML
50 INJECTION INTRAMUSCULAR; INTRAVENOUS
Status: DISCONTINUED | OUTPATIENT
Start: 2023-11-27 | End: 2023-11-28 | Stop reason: HOSPADM

## 2023-11-27 RX ORDER — ONDANSETRON 2 MG/ML
8 INJECTION INTRAMUSCULAR; INTRAVENOUS
Status: DISCONTINUED | OUTPATIENT
Start: 2023-11-27 | End: 2023-11-28 | Stop reason: HOSPADM

## 2023-11-27 RX ORDER — ACETAMINOPHEN 325 MG/1
650 TABLET ORAL
Status: DISCONTINUED | OUTPATIENT
Start: 2023-11-27 | End: 2023-11-28 | Stop reason: HOSPADM

## 2023-11-27 RX ADMIN — SODIUM CHLORIDE, PRESERVATIVE FREE 20 ML: 5 INJECTION INTRAVENOUS at 07:55

## 2023-11-27 ASSESSMENT — PAIN SCALES - GENERAL: PAINLEVEL_OUTOF10: 0

## 2023-11-27 ASSESSMENT — PAIN SCALES - WONG BAKER: WONGBAKER_NUMERICALRESPONSE: 0

## 2023-11-27 NOTE — PROGRESS NOTES
Arrived to the [address] [place] [address]. Fluorouracil pump removed after completed. Patient tolerated without difficulty. Any issues or concerns during appointment: c/o feeling \"more fatigued this weekend,\" pt refused offer for IV Fluids, encouraged to keep hydrated. Patient aware of next infusion appointment on 12/08(date) at [phone] (time). Patient instructed to call provider with temperature of 100.4 or greater or nausea/vomiting/ diarrhea or pain not controlled by medications  Discharged ambulatory.

## 2023-12-07 DIAGNOSIS — C16.9 MALIGNANT NEOPLASM OF STOMACH, UNSPECIFIED LOCATION (HCC): Primary | ICD-10-CM

## 2023-12-07 ASSESSMENT — ENCOUNTER SYMPTOMS
NAUSEA: 0
SCLERAL ICTERUS: 0
BLOOD IN STOOL: 0
VOMITING: 0
BACK PAIN: 0
SORE THROAT: 0
ABDOMINAL DISTENTION: 0
HEMOPTYSIS: 0
DIARRHEA: 0
EYE PROBLEMS: 0
SHORTNESS OF BREATH: 0
COUGH: 0
CONSTIPATION: 0

## 2023-12-07 NOTE — PROGRESS NOTES
[employer] Hematology and Oncology: Established patient - follow up     Chief Complaint   Patient presents with    Follow-up      Reason for Referral: Abdominal pain; peritoneal metastatic disease   Referring Provider: Jenni Tam NP   Primary Care Provider: Jagruti Mcdonnell MD   Family History of Cancer/Hematologic Disorders: Family history is significant for sister with breast cancer. Presenting Symptoms: Progressively worsening, persistent abdominal pain x several months    History of Present Illness:  Mr. Ermelinda Arvizu  is a 62 y.o. male who presents today for FU regarding adenocarcinoma with peritoneal metastatic disease. The past medical history is significant for tobacco use (recent pack per day smoker x 30+ years - now down to 1/3PPD), PUD, paresthesia/pain of bilateral upper extremities, HLD, HTN, diverticulitis,  colon polyps, hiatal hernia, chronic right shoulder pain, bilateral carpal tunnel syndrome, and partial colon resection. He presented to the [facility] ED on 5/12/22 with a complaint of persistent abdominal pain for several months that was becoming progressively  worse. EGD and colonoscopy on 2/25/22 revealed findings of hiatal hernia, gastric polyps, several benign colon polyps, diverticulosis, and internal hemorrhoids. CT of the abdomen on 3/8/22 showed no acute findings. He presented to [facility] ER x 2 for  evaluation (5/3/22 and 5/10/22). RUQ abdominal ultrasound was completed on 5/3/22 in the ED at [facility] demonstrating no acute findings to explain patient's pain; probable hepatic  steatosis; small echogenic mural foci in the gallbladder which are nonmobile, likely representing cholesterol polyps, largest measuring 4 mm; and small volume simple ascites in the right upper quadrant and left lower quadrant, nonspecific.   CT AP with  contrast at [facility] ED 5/10/22 showed a partial small bowel obstruction; small to moderate amount of free fluid in the abdomen and pelvis; and nonspecific stranding

## 2023-12-08 ENCOUNTER — OFFICE VISIT (OUTPATIENT)
Dept: ONCOLOGY | Age: 63
End: 2023-12-08
Payer: COMMERCIAL

## 2023-12-08 ENCOUNTER — HOSPITAL ENCOUNTER (OUTPATIENT)
Dept: INFUSION THERAPY | Age: 63
Discharge: HOME | End: 2023-12-08
Payer: COMMERCIAL

## 2023-12-08 ENCOUNTER — HOSPITAL ENCOUNTER (OUTPATIENT)
Dept: LAB | Age: 63
Discharge: HOME | End: 2023-12-08
Payer: COMMERCIAL

## 2023-12-08 VITALS
OXYGEN SATURATION: 97 % | DIASTOLIC BLOOD PRESSURE: 82 MMHG | HEIGHT: 70 IN | TEMPERATURE: 97.9 F | SYSTOLIC BLOOD PRESSURE: 118 MMHG | BODY MASS INDEX: 34.22 KG/M2 | RESPIRATION RATE: 16 BRPM | WEIGHT: 239 LBS | HEART RATE: 82 BPM

## 2023-12-08 DIAGNOSIS — E87.6 HYPOKALEMIA: Primary | ICD-10-CM

## 2023-12-08 DIAGNOSIS — T45.1X5A NEUROPATHY DUE TO CHEMOTHERAPEUTIC DRUG (HCC): ICD-10-CM

## 2023-12-08 DIAGNOSIS — G62.0 NEUROPATHY DUE TO CHEMOTHERAPEUTIC DRUG (HCC): ICD-10-CM

## 2023-12-08 DIAGNOSIS — E83.42 HYPOMAGNESEMIA: ICD-10-CM

## 2023-12-08 DIAGNOSIS — C76.2 ABDOMINAL CARCINOMATOSIS (HCC): ICD-10-CM

## 2023-12-08 DIAGNOSIS — C16.9 MALIGNANT NEOPLASM OF STOMACH, UNSPECIFIED LOCATION (HCC): ICD-10-CM

## 2023-12-08 DIAGNOSIS — C78.6 MALIGNANT NEOPLASM METASTATIC TO PERITONEUM (HCC): ICD-10-CM

## 2023-12-08 DIAGNOSIS — C16.9 MALIGNANT NEOPLASM OF STOMACH, UNSPECIFIED LOCATION (HCC): Primary | ICD-10-CM

## 2023-12-08 DIAGNOSIS — Z79.899 HIGH RISK MEDICATION USE: ICD-10-CM

## 2023-12-08 LAB
ALBUMIN SERPL-MCNC: 3.3 G/DL (ref 3.2–4.6)
ALBUMIN/GLOB SERPL: 1 (ref 0.4–1.6)
ALP SERPL-CCNC: 177 U/L (ref 50–136)
ALT SERPL-CCNC: 49 U/L (ref 12–65)
ANION GAP SERPL CALC-SCNC: 5 MMOL/L (ref 2–11)
AST SERPL-CCNC: 36 U/L (ref 15–37)
BASOPHILS # BLD: 0.1 K/UL (ref 0–0.2)
BASOPHILS NFR BLD: 1 % (ref 0–2)
BILIRUB SERPL-MCNC: 0.3 MG/DL (ref 0.2–1.1)
BUN SERPL-MCNC: 10 MG/DL (ref 8–23)
CALCIUM SERPL-MCNC: 8.3 MG/DL (ref 8.3–10.4)
CEA SERPL-MCNC: 3.2 NG/ML (ref 0–3)
CHLORIDE SERPL-SCNC: 109 MMOL/L (ref 103–113)
CO2 SERPL-SCNC: 27 MMOL/L (ref 21–32)
CREAT SERPL-MCNC: 1 MG/DL (ref 0.8–1.5)
DIFFERENTIAL METHOD BLD: ABNORMAL
EOSINOPHIL # BLD: 0.1 K/UL (ref 0–0.8)
EOSINOPHIL NFR BLD: 2 % (ref 0.5–7.8)
ERYTHROCYTE [DISTWIDTH] IN BLOOD BY AUTOMATED COUNT: 15.6 % (ref 11.9–14.6)
GLOBULIN SER CALC-MCNC: 3.2 G/DL (ref 2.8–4.5)
GLUCOSE SERPL-MCNC: 265 MG/DL (ref 65–100)
HCT VFR BLD AUTO: 36.1 % (ref 41.1–50.3)
HGB BLD-MCNC: 11.5 G/DL (ref 13.6–17.2)
IMM GRANULOCYTES # BLD AUTO: 0 K/UL (ref 0–0.5)
IMM GRANULOCYTES NFR BLD AUTO: 0 % (ref 0–5)
LYMPHOCYTES # BLD: 1.5 K/UL (ref 0.5–4.6)
LYMPHOCYTES NFR BLD: 29 % (ref 13–44)
MAGNESIUM SERPL-MCNC: 1.7 MG/DL (ref 1.8–2.4)
MCH RBC QN AUTO: 28.8 PG (ref 26.1–32.9)
MCHC RBC AUTO-ENTMCNC: 31.9 G/DL (ref 31.4–35)
MCV RBC AUTO: 90.5 FL (ref 82–102)
MONOCYTES # BLD: 0.3 K/UL (ref 0.1–1.3)
MONOCYTES NFR BLD: 6 % (ref 4–12)
NEUTS SEG # BLD: 3.3 K/UL (ref 1.7–8.2)
NEUTS SEG NFR BLD: 62 % (ref 43–78)
NRBC # BLD: 0 K/UL (ref 0–0.2)
PLATELET # BLD AUTO: 163 K/UL (ref 150–450)
PMV BLD AUTO: 9.9 FL (ref 9.4–12.3)
POTASSIUM SERPL-SCNC: 3.4 MMOL/L (ref 3.5–5.1)
PROT SERPL-MCNC: 6.5 G/DL (ref 6.3–8.2)
RBC # BLD AUTO: 3.99 M/UL (ref 4.23–5.6)
SODIUM SERPL-SCNC: 141 MMOL/L (ref 136–146)
WBC # BLD AUTO: 5.4 K/UL (ref 4.3–11.1)

## 2023-12-08 PROCEDURE — 96411 CHEMO IV PUSH ADDL DRUG: CPT

## 2023-12-08 PROCEDURE — 6360000002 HC RX W HCPCS: Performed by: NURSE PRACTITIONER

## 2023-12-08 PROCEDURE — 96372 THER/PROPH/DIAG INJ SC/IM: CPT

## 2023-12-08 PROCEDURE — 96415 CHEMO IV INFUSION ADDL HR: CPT

## 2023-12-08 PROCEDURE — 96375 TX/PRO/DX INJ NEW DRUG ADDON: CPT

## 2023-12-08 PROCEDURE — 85025 COMPLETE CBC W/AUTO DIFF WBC: CPT

## 2023-12-08 PROCEDURE — 2580000003 HC RX 258: Performed by: NURSE PRACTITIONER

## 2023-12-08 PROCEDURE — 2580000003 HC RX 258: Performed by: INTERNAL MEDICINE

## 2023-12-08 PROCEDURE — 96413 CHEMO IV INFUSION 1 HR: CPT

## 2023-12-08 PROCEDURE — 99214 OFFICE O/P EST MOD 30 MIN: CPT | Performed by: NURSE PRACTITIONER

## 2023-12-08 PROCEDURE — 83735 ASSAY OF MAGNESIUM: CPT

## 2023-12-08 PROCEDURE — 6370000000 HC RX 637 (ALT 250 FOR IP): Performed by: NURSE PRACTITIONER

## 2023-12-08 PROCEDURE — 80053 COMPREHEN METABOLIC PANEL: CPT

## 2023-12-08 PROCEDURE — 96367 TX/PROPH/DG ADDL SEQ IV INF: CPT

## 2023-12-08 PROCEDURE — 3079F DIAST BP 80-89 MM HG: CPT | Performed by: NURSE PRACTITIONER

## 2023-12-08 PROCEDURE — 96368 THER/DIAG CONCURRENT INF: CPT

## 2023-12-08 PROCEDURE — 82378 CARCINOEMBRYONIC ANTIGEN: CPT

## 2023-12-08 PROCEDURE — G0498 CHEMO EXTEND IV INFUS W/PUMP: HCPCS

## 2023-12-08 PROCEDURE — 3074F SYST BP LT 130 MM HG: CPT | Performed by: NURSE PRACTITIONER

## 2023-12-08 RX ORDER — HEPARIN SODIUM (PORCINE) LOCK FLUSH IV SOLN 100 UNIT/ML 100 UNIT/ML
500 SOLUTION INTRAVENOUS PRN
OUTPATIENT
Start: 2023-12-10

## 2023-12-08 RX ORDER — EPINEPHRINE 1 MG/ML
0.3 INJECTION, SOLUTION, CONCENTRATE INTRAVENOUS PRN
Status: CANCELLED | OUTPATIENT
Start: 2023-12-08

## 2023-12-08 RX ORDER — ACETAMINOPHEN 325 MG/1
650 TABLET ORAL
Status: DISCONTINUED | OUTPATIENT
Start: 2023-12-08 | End: 2023-12-09 | Stop reason: HOSPADM

## 2023-12-08 RX ORDER — DEXTROSE MONOHYDRATE 50 MG/ML
5-250 INJECTION, SOLUTION INTRAVENOUS PRN
Status: CANCELLED | OUTPATIENT
Start: 2023-12-08

## 2023-12-08 RX ORDER — MAGNESIUM SULFATE IN WATER 40 MG/ML
2000 INJECTION, SOLUTION INTRAVENOUS ONCE
Status: COMPLETED | OUTPATIENT
Start: 2023-12-08 | End: 2023-12-08

## 2023-12-08 RX ORDER — ONDANSETRON 2 MG/ML
8 INJECTION INTRAMUSCULAR; INTRAVENOUS
Status: DISCONTINUED | OUTPATIENT
Start: 2023-12-08 | End: 2023-12-09 | Stop reason: HOSPADM

## 2023-12-08 RX ORDER — ACETAMINOPHEN 325 MG/1
650 TABLET ORAL
Status: CANCELLED | OUTPATIENT
Start: 2023-12-08

## 2023-12-08 RX ORDER — FLUOROURACIL 50 MG/ML
400 INJECTION, SOLUTION INTRAVENOUS ONCE
Status: CANCELLED | OUTPATIENT
Start: 2023-12-08 | End: 2023-12-08

## 2023-12-08 RX ORDER — SODIUM CHLORIDE 9 MG/ML
5-250 INJECTION, SOLUTION INTRAVENOUS PRN
Status: CANCELLED | OUTPATIENT
Start: 2023-12-08

## 2023-12-08 RX ORDER — SODIUM CHLORIDE 0.9 % (FLUSH) 0.9 %
5-40 SYRINGE (ML) INJECTION PRN
OUTPATIENT
Start: 2023-12-10

## 2023-12-08 RX ORDER — SODIUM CHLORIDE 9 MG/ML
5-250 INJECTION, SOLUTION INTRAVENOUS PRN
OUTPATIENT
Start: 2023-12-10

## 2023-12-08 RX ORDER — SODIUM CHLORIDE 0.9 % (FLUSH) 0.9 %
5-40 SYRINGE (ML) INJECTION PRN
Status: CANCELLED | OUTPATIENT
Start: 2023-12-08

## 2023-12-08 RX ORDER — POTASSIUM CHLORIDE 20 MEQ/1
20 TABLET, EXTENDED RELEASE ORAL ONCE
Status: CANCELLED
Start: 2023-12-08

## 2023-12-08 RX ORDER — ATROPINE SULFATE 0.4 MG/ML
0.4 INJECTION, SOLUTION INTRAVENOUS ONCE
Status: COMPLETED | OUTPATIENT
Start: 2023-12-08 | End: 2023-12-08

## 2023-12-08 RX ORDER — ALBUTEROL SULFATE 90 UG/1
4 AEROSOL, METERED RESPIRATORY (INHALATION) PRN
Status: CANCELLED | OUTPATIENT
Start: 2023-12-08

## 2023-12-08 RX ORDER — EPINEPHRINE 1 MG/ML
0.3 INJECTION, SOLUTION, CONCENTRATE INTRAVENOUS PRN
Status: DISCONTINUED | OUTPATIENT
Start: 2023-12-08 | End: 2023-12-09 | Stop reason: HOSPADM

## 2023-12-08 RX ORDER — SODIUM CHLORIDE 0.9 % (FLUSH) 0.9 %
5-40 SYRINGE (ML) INJECTION PRN
Status: DISCONTINUED | OUTPATIENT
Start: 2023-12-08 | End: 2023-12-09 | Stop reason: HOSPADM

## 2023-12-08 RX ORDER — ONDANSETRON 2 MG/ML
8 INJECTION INTRAMUSCULAR; INTRAVENOUS ONCE
Status: COMPLETED | OUTPATIENT
Start: 2023-12-08 | End: 2023-12-08

## 2023-12-08 RX ORDER — MEPERIDINE HYDROCHLORIDE 25 MG/ML
12.5 INJECTION INTRAMUSCULAR; INTRAVENOUS; SUBCUTANEOUS PRN
Status: DISCONTINUED | OUTPATIENT
Start: 2023-12-08 | End: 2023-12-09 | Stop reason: HOSPADM

## 2023-12-08 RX ORDER — ONDANSETRON 2 MG/ML
8 INJECTION INTRAMUSCULAR; INTRAVENOUS
Status: CANCELLED | OUTPATIENT
Start: 2023-12-08

## 2023-12-08 RX ORDER — DEXTROSE MONOHYDRATE 50 MG/ML
5-250 INJECTION, SOLUTION INTRAVENOUS PRN
Status: DISCONTINUED | OUTPATIENT
Start: 2023-12-08 | End: 2023-12-09 | Stop reason: HOSPADM

## 2023-12-08 RX ORDER — DIPHENHYDRAMINE HYDROCHLORIDE 50 MG/ML
50 INJECTION INTRAMUSCULAR; INTRAVENOUS
Status: CANCELLED | OUTPATIENT
Start: 2023-12-08

## 2023-12-08 RX ORDER — ALBUTEROL SULFATE 90 UG/1
4 AEROSOL, METERED RESPIRATORY (INHALATION) PRN
Status: DISCONTINUED | OUTPATIENT
Start: 2023-12-08 | End: 2023-12-09 | Stop reason: HOSPADM

## 2023-12-08 RX ORDER — DIPHENHYDRAMINE HYDROCHLORIDE 50 MG/ML
50 INJECTION INTRAMUSCULAR; INTRAVENOUS
Status: DISCONTINUED | OUTPATIENT
Start: 2023-12-08 | End: 2023-12-09 | Stop reason: HOSPADM

## 2023-12-08 RX ORDER — ONDANSETRON 2 MG/ML
8 INJECTION INTRAMUSCULAR; INTRAVENOUS ONCE
Status: CANCELLED | OUTPATIENT
Start: 2023-12-08 | End: 2023-12-08

## 2023-12-08 RX ORDER — MEPERIDINE HYDROCHLORIDE 50 MG/ML
12.5 INJECTION INTRAMUSCULAR; INTRAVENOUS; SUBCUTANEOUS PRN
Status: CANCELLED | OUTPATIENT
Start: 2023-12-08

## 2023-12-08 RX ORDER — POTASSIUM CHLORIDE 750 MG/1
20 TABLET, EXTENDED RELEASE ORAL ONCE
Status: COMPLETED | OUTPATIENT
Start: 2023-12-08 | End: 2023-12-08

## 2023-12-08 RX ORDER — FLUOROURACIL 50 MG/ML
400 INJECTION, SOLUTION INTRAVENOUS ONCE
Status: COMPLETED | OUTPATIENT
Start: 2023-12-08 | End: 2023-12-08

## 2023-12-08 RX ORDER — FAMOTIDINE 10 MG/ML
20 INJECTION, SOLUTION INTRAVENOUS
Status: CANCELLED | OUTPATIENT
Start: 2023-12-08

## 2023-12-08 RX ORDER — HEPARIN SODIUM (PORCINE) LOCK FLUSH IV SOLN 100 UNIT/ML 100 UNIT/ML
500 SOLUTION INTRAVENOUS PRN
Status: CANCELLED | OUTPATIENT
Start: 2023-12-08

## 2023-12-08 RX ORDER — ATROPINE SULFATE 0.4 MG/ML
0.4 INJECTION, SOLUTION INTRAVENOUS
Status: DISCONTINUED | OUTPATIENT
Start: 2023-12-08 | End: 2023-12-09 | Stop reason: HOSPADM

## 2023-12-08 RX ORDER — SODIUM CHLORIDE 9 MG/ML
INJECTION, SOLUTION INTRAVENOUS CONTINUOUS
Status: CANCELLED | OUTPATIENT
Start: 2023-12-08

## 2023-12-08 RX ORDER — SODIUM CHLORIDE 0.9 % (FLUSH) 0.9 %
10 SYRINGE (ML) INJECTION PRN
Status: ACTIVE | OUTPATIENT
Start: 2023-12-08

## 2023-12-08 RX ADMIN — MAGNESIUM SULFATE HEPTAHYDRATE 2000 MG: 40 INJECTION, SOLUTION INTRAVENOUS at 11:21

## 2023-12-08 RX ADMIN — FLUOROURACIL 900 MG: 50 INJECTION, SOLUTION INTRAVENOUS at 15:27

## 2023-12-08 RX ADMIN — FLUOROURACIL 5375 MG: 50 INJECTION, SOLUTION INTRAVENOUS at 15:32

## 2023-12-08 RX ADMIN — SODIUM CHLORIDE, PRESERVATIVE FREE 10 ML: 5 INJECTION INTRAVENOUS at 11:18

## 2023-12-08 RX ADMIN — LEUCOVORIN CALCIUM 900 MG: 200 INJECTION, POWDER, LYOPHILIZED, FOR SUSPENSION INTRAMUSCULAR; INTRAVENOUS at 13:38

## 2023-12-08 RX ADMIN — ONDANSETRON 8 MG: 2 INJECTION INTRAMUSCULAR; INTRAVENOUS at 11:26

## 2023-12-08 RX ADMIN — ATROPINE SULFATE 0.4 MG: 0.4 INJECTION, SOLUTION INTRAVENOUS at 11:32

## 2023-12-08 RX ADMIN — POTASSIUM CHLORIDE 20 MEQ: 750 TABLET, EXTENDED RELEASE ORAL at 11:18

## 2023-12-08 RX ADMIN — DEXTROSE MONOHYDRATE 50 ML/HR: 50 INJECTION, SOLUTION INTRAVENOUS at 11:19

## 2023-12-08 RX ADMIN — FOSAPREPITANT 150 MG: 150 INJECTION, POWDER, LYOPHILIZED, FOR SOLUTION INTRAVENOUS at 11:50

## 2023-12-08 RX ADMIN — SODIUM CHLORIDE, PRESERVATIVE FREE 10 ML: 5 INJECTION INTRAVENOUS at 09:00

## 2023-12-08 RX ADMIN — IRINOTECAN HYDROCHLORIDE 340 MG: 20 INJECTION, SOLUTION INTRAVENOUS at 13:35

## 2023-12-08 RX ADMIN — DEXAMETHASONE SODIUM PHOSPHATE 12 MG: 4 INJECTION INTRA-ARTICULAR; INTRALESIONAL; INTRAMUSCULAR; INTRAVENOUS; SOFT TISSUE at 11:28

## 2023-12-08 NOTE — PROGRESS NOTES
Arrived to the [facility]. Labs and orders reviewed. Potassium 20meq Po given. Magnesium 2g IV, and Folfiri infusion completed. Pump connected and connections checked. Patient tolerated well. Any issues or concerns during appointment: none. Patient aware of next infusion appointment on 12/11/2023 (date) at [address] (time). Patient aware of next lab and [facility] office visit on 12/22/2023 (date) at 20[facility] (time). Patient instructed to call provider with temperature of 100.4 or greater or nausea/vomiting/ diarrhea or pain not controlled by medications  Discharged ambulatory.

## 2023-12-11 ENCOUNTER — HOSPITAL ENCOUNTER (OUTPATIENT)
Dept: INFUSION THERAPY | Age: 63
Discharge: HOME | End: 2023-12-11
Payer: COMMERCIAL

## 2023-12-11 VITALS
TEMPERATURE: 97.9 F | SYSTOLIC BLOOD PRESSURE: 126 MMHG | DIASTOLIC BLOOD PRESSURE: 77 MMHG | HEART RATE: 95 BPM | RESPIRATION RATE: 18 BRPM

## 2023-12-11 PROCEDURE — 2580000003 HC RX 258: Performed by: INTERNAL MEDICINE

## 2023-12-11 PROCEDURE — 96523 IRRIG DRUG DELIVERY DEVICE: CPT

## 2023-12-11 RX ORDER — SODIUM CHLORIDE 0.9 % (FLUSH) 0.9 %
5-40 SYRINGE (ML) INJECTION 2 TIMES DAILY
Status: DISCONTINUED | OUTPATIENT
Start: 2023-12-11 | End: 2023-12-12 | Stop reason: HOSPADM

## 2023-12-11 RX ADMIN — SODIUM CHLORIDE, PRESERVATIVE FREE 10 ML: 5 INJECTION INTRAVENOUS at 07:35

## 2023-12-11 NOTE — PROGRESS NOTES
Arrived to the [address] [place]. DC CI 5fu pump completed. Patient tolerated well. Any issues or concerns during appointment: no.  Patient aware of next infusion appointment on 12/22 at 1000  Patient instructed to call provider with temperature of 100.4 or greater or nausea/vomiting/ diarrhea or pain not controlled by medications  Discharged to home ambulatory.

## 2023-12-22 ENCOUNTER — HOSPITAL ENCOUNTER (OUTPATIENT)
Dept: LAB | Age: 63
Discharge: HOME | End: 2023-12-25
Payer: COMMERCIAL

## 2023-12-22 ENCOUNTER — HOSPITAL ENCOUNTER (OUTPATIENT)
Dept: INFUSION THERAPY | Age: 63
Discharge: HOME | End: 2023-12-22
Payer: COMMERCIAL

## 2023-12-22 DIAGNOSIS — Z79.899 HIGH RISK MEDICATION USE: ICD-10-CM

## 2023-12-22 DIAGNOSIS — E83.42 HYPOMAGNESEMIA: ICD-10-CM

## 2023-12-22 DIAGNOSIS — C76.2 ABDOMINAL CARCINOMATOSIS (HCC): ICD-10-CM

## 2023-12-22 DIAGNOSIS — C78.6 MALIGNANT NEOPLASM METASTATIC TO PERITONEUM (HCC): Primary | ICD-10-CM

## 2023-12-22 DIAGNOSIS — C79.9 METASTATIC ADENOCARCINOMA (HCC): ICD-10-CM

## 2023-12-22 PROBLEM — I82.621 ACUTE DEEP VEIN THROMBOSIS (DVT) OF RIGHT UPPER EXTREMITY, UNSPECIFIED VEIN (HCC): Status: RESOLVED | Noted: 2023-02-10 | Resolved: 2023-12-22

## 2023-12-22 PROBLEM — I82.509 CHRONIC DEEP VEIN THROMBOSIS (DVT) (HCC): Status: ACTIVE | Noted: 2023-12-22

## 2023-12-22 LAB
ALBUMIN SERPL-MCNC: 3.2 G/DL (ref 3.2–4.6)
ALBUMIN/GLOB SERPL: 1 (ref 0.4–1.6)
ALP SERPL-CCNC: 135 U/L (ref 50–136)
ALT SERPL-CCNC: 34 U/L (ref 12–65)
ANION GAP SERPL CALC-SCNC: 5 MMOL/L (ref 2–11)
AST SERPL-CCNC: 28 U/L (ref 15–37)
BASOPHILS # BLD: 0 K/UL (ref 0–0.2)
BASOPHILS NFR BLD: 1 % (ref 0–2)
BILIRUB SERPL-MCNC: 0.5 MG/DL (ref 0.2–1.1)
BUN SERPL-MCNC: 8 MG/DL (ref 8–23)
CALCIUM SERPL-MCNC: 8.2 MG/DL (ref 8.3–10.4)
CEA SERPL-MCNC: 2.6 NG/ML (ref 0–3)
CHLORIDE SERPL-SCNC: 111 MMOL/L (ref 103–113)
CO2 SERPL-SCNC: 26 MMOL/L (ref 21–32)
CREAT SERPL-MCNC: 1 MG/DL (ref 0.8–1.5)
DIFFERENTIAL METHOD BLD: ABNORMAL
EOSINOPHIL # BLD: 0.1 K/UL (ref 0–0.8)
EOSINOPHIL NFR BLD: 2 % (ref 0.5–7.8)
ERYTHROCYTE [DISTWIDTH] IN BLOOD BY AUTOMATED COUNT: 16 % (ref 11.9–14.6)
FERRITIN SERPL-MCNC: 420 NG/ML (ref 8–388)
GLOBULIN SER CALC-MCNC: 3.1 G/DL (ref 2.8–4.5)
GLUCOSE SERPL-MCNC: 208 MG/DL (ref 65–100)
HCT VFR BLD AUTO: 34.2 % (ref 41.1–50.3)
HGB BLD-MCNC: 10.9 G/DL (ref 13.6–17.2)
IMM GRANULOCYTES # BLD AUTO: 0 K/UL (ref 0–0.5)
IMM GRANULOCYTES NFR BLD AUTO: 0 % (ref 0–5)
IRON SATN MFR SERPL: 24 %
IRON SERPL-MCNC: 57 UG/DL (ref 35–150)
LYMPHOCYTES # BLD: 1.8 K/UL (ref 0.5–4.6)
LYMPHOCYTES NFR BLD: 36 % (ref 13–44)
MAGNESIUM SERPL-MCNC: 1.7 MG/DL (ref 1.8–2.4)
MCH RBC QN AUTO: 29.4 PG (ref 26.1–32.9)
MCHC RBC AUTO-ENTMCNC: 31.9 G/DL (ref 31.4–35)
MCV RBC AUTO: 92.2 FL (ref 82–102)
MONOCYTES # BLD: 0.4 K/UL (ref 0.1–1.3)
MONOCYTES NFR BLD: 8 % (ref 4–12)
NEUTS SEG # BLD: 2.6 K/UL (ref 1.7–8.2)
NEUTS SEG NFR BLD: 53 % (ref 43–78)
NRBC # BLD: 0 K/UL (ref 0–0.2)
PLATELET # BLD AUTO: 164 K/UL (ref 150–450)
PMV BLD AUTO: 9.3 FL (ref 9.4–12.3)
POTASSIUM SERPL-SCNC: 3.1 MMOL/L (ref 3.5–5.1)
PROT SERPL-MCNC: 6.3 G/DL (ref 6.3–8.2)
RBC # BLD AUTO: 3.71 M/UL (ref 4.23–5.6)
SODIUM SERPL-SCNC: 142 MMOL/L (ref 136–146)
TIBC SERPL-MCNC: 241 UG/DL (ref 250–450)
WBC # BLD AUTO: 5 K/UL (ref 4.3–11.1)

## 2023-12-22 PROCEDURE — 6370000000 HC RX 637 (ALT 250 FOR IP): Performed by: INTERNAL MEDICINE

## 2023-12-22 PROCEDURE — 96368 THER/DIAG CONCURRENT INF: CPT

## 2023-12-22 PROCEDURE — 83540 ASSAY OF IRON: CPT

## 2023-12-22 PROCEDURE — 80053 COMPREHEN METABOLIC PANEL: CPT

## 2023-12-22 PROCEDURE — 83735 ASSAY OF MAGNESIUM: CPT

## 2023-12-22 PROCEDURE — 2580000003 HC RX 258: Performed by: INTERNAL MEDICINE

## 2023-12-22 PROCEDURE — 83550 IRON BINDING TEST: CPT

## 2023-12-22 PROCEDURE — 85025 COMPLETE CBC W/AUTO DIFF WBC: CPT

## 2023-12-22 PROCEDURE — 82728 ASSAY OF FERRITIN: CPT

## 2023-12-22 PROCEDURE — 96372 THER/PROPH/DIAG INJ SC/IM: CPT

## 2023-12-22 PROCEDURE — 96367 TX/PROPH/DG ADDL SEQ IV INF: CPT

## 2023-12-22 PROCEDURE — 96375 TX/PRO/DX INJ NEW DRUG ADDON: CPT

## 2023-12-22 PROCEDURE — 96411 CHEMO IV PUSH ADDL DRUG: CPT

## 2023-12-22 PROCEDURE — 96413 CHEMO IV INFUSION 1 HR: CPT

## 2023-12-22 PROCEDURE — 6360000002 HC RX W HCPCS: Performed by: INTERNAL MEDICINE

## 2023-12-22 PROCEDURE — 82378 CARCINOEMBRYONIC ANTIGEN: CPT

## 2023-12-22 PROCEDURE — 96415 CHEMO IV INFUSION ADDL HR: CPT

## 2023-12-22 RX ORDER — ATROPINE SULFATE 0.4 MG/ML
0.4 INJECTION, SOLUTION INTRAVENOUS
Status: DISCONTINUED | OUTPATIENT
Start: 2023-12-22 | End: 2023-12-23 | Stop reason: HOSPADM

## 2023-12-22 RX ORDER — ATROPINE SULFATE 0.4 MG/ML
0.4 INJECTION, SOLUTION INTRAVENOUS ONCE
Status: COMPLETED | OUTPATIENT
Start: 2023-12-22 | End: 2023-12-22

## 2023-12-22 RX ORDER — EPINEPHRINE 1 MG/ML
0.3 INJECTION, SOLUTION, CONCENTRATE INTRAVENOUS PRN
Status: DISCONTINUED | OUTPATIENT
Start: 2023-12-22 | End: 2023-12-23 | Stop reason: HOSPADM

## 2023-12-22 RX ORDER — ALBUTEROL SULFATE 90 UG/1
4 AEROSOL, METERED RESPIRATORY (INHALATION) PRN
Status: DISCONTINUED | OUTPATIENT
Start: 2023-12-22 | End: 2023-12-23 | Stop reason: HOSPADM

## 2023-12-22 RX ORDER — MAGNESIUM SULFATE IN WATER 40 MG/ML
2000 INJECTION, SOLUTION INTRAVENOUS ONCE
Status: COMPLETED | OUTPATIENT
Start: 2023-12-22 | End: 2023-12-22

## 2023-12-22 RX ORDER — ONDANSETRON 2 MG/ML
8 INJECTION INTRAMUSCULAR; INTRAVENOUS ONCE
Status: COMPLETED | OUTPATIENT
Start: 2023-12-22 | End: 2023-12-22

## 2023-12-22 RX ORDER — MEPERIDINE HYDROCHLORIDE 25 MG/ML
12.5 INJECTION INTRAMUSCULAR; INTRAVENOUS; SUBCUTANEOUS PRN
Status: DISCONTINUED | OUTPATIENT
Start: 2023-12-22 | End: 2023-12-23 | Stop reason: HOSPADM

## 2023-12-22 RX ORDER — POTASSIUM CHLORIDE 750 MG/1
40 TABLET, EXTENDED RELEASE ORAL ONCE
Status: COMPLETED | OUTPATIENT
Start: 2023-12-22 | End: 2023-12-22

## 2023-12-22 RX ORDER — ACETAMINOPHEN 325 MG/1
650 TABLET ORAL
Status: DISCONTINUED | OUTPATIENT
Start: 2023-12-22 | End: 2023-12-23 | Stop reason: HOSPADM

## 2023-12-22 RX ORDER — SODIUM CHLORIDE 0.9 % (FLUSH) 0.9 %
5-40 SYRINGE (ML) INJECTION PRN
Status: DISCONTINUED | OUTPATIENT
Start: 2023-12-22 | End: 2023-12-23 | Stop reason: HOSPADM

## 2023-12-22 RX ORDER — SODIUM CHLORIDE 0.9 % (FLUSH) 0.9 %
5-40 SYRINGE (ML) INJECTION 2 TIMES DAILY
Status: DISCONTINUED | OUTPATIENT
Start: 2023-12-22 | End: 2023-12-26 | Stop reason: HOSPADM

## 2023-12-22 RX ORDER — ONDANSETRON 2 MG/ML
8 INJECTION INTRAMUSCULAR; INTRAVENOUS
Status: DISCONTINUED | OUTPATIENT
Start: 2023-12-22 | End: 2023-12-23 | Stop reason: HOSPADM

## 2023-12-22 RX ORDER — DIPHENHYDRAMINE HYDROCHLORIDE 50 MG/ML
50 INJECTION INTRAMUSCULAR; INTRAVENOUS
Status: DISCONTINUED | OUTPATIENT
Start: 2023-12-22 | End: 2023-12-23 | Stop reason: HOSPADM

## 2023-12-22 RX ORDER — DEXTROSE MONOHYDRATE 50 MG/ML
5-250 INJECTION, SOLUTION INTRAVENOUS PRN
Status: DISCONTINUED | OUTPATIENT
Start: 2023-12-22 | End: 2023-12-23 | Stop reason: HOSPADM

## 2023-12-22 RX ORDER — FLUOROURACIL 50 MG/ML
400 INJECTION, SOLUTION INTRAVENOUS ONCE
Status: COMPLETED | OUTPATIENT
Start: 2023-12-22 | End: 2023-12-22

## 2023-12-22 RX ADMIN — SODIUM CHLORIDE, PRESERVATIVE FREE 10 ML: 5 INJECTION INTRAVENOUS at 09:20

## 2023-12-22 RX ADMIN — DEXTROSE MONOHYDRATE 50 ML/HR: 50 INJECTION, SOLUTION INTRAVENOUS at 09:20

## 2023-12-22 RX ADMIN — IRINOTECAN HYDROCHLORIDE 340 MG: 20 INJECTION, SOLUTION INTRAVENOUS at 11:43

## 2023-12-22 RX ADMIN — SODIUM CHLORIDE, PRESERVATIVE FREE 10 ML: 5 INJECTION INTRAVENOUS at 07:40

## 2023-12-22 RX ADMIN — FOSAPREPITANT 150 MG: 150 INJECTION, POWDER, LYOPHILIZED, FOR SOLUTION INTRAVENOUS at 10:00

## 2023-12-22 RX ADMIN — DEXAMETHASONE SODIUM PHOSPHATE 12 MG: 4 INJECTION INTRA-ARTICULAR; INTRALESIONAL; INTRAMUSCULAR; INTRAVENOUS; SOFT TISSUE at 09:40

## 2023-12-22 RX ADMIN — ONDANSETRON 8 MG: 2 INJECTION INTRAMUSCULAR; INTRAVENOUS at 09:34

## 2023-12-22 RX ADMIN — FLUOROURACIL 900 MG: 50 INJECTION, SOLUTION INTRAVENOUS at 13:21

## 2023-12-22 RX ADMIN — POTASSIUM CHLORIDE 40 MEQ: 750 TABLET, EXTENDED RELEASE ORAL at 09:31

## 2023-12-22 RX ADMIN — ATROPINE SULFATE 0.4 MG: 0.4 INJECTION, SOLUTION INTRAVENOUS at 11:24

## 2023-12-22 RX ADMIN — LEUCOVORIN CALCIUM 900 MG: 200 INJECTION, POWDER, LYOPHILIZED, FOR SUSPENSION INTRAMUSCULAR; INTRAVENOUS at 11:40

## 2023-12-22 RX ADMIN — MAGNESIUM SULFATE HEPTAHYDRATE 2000 MG: 40 INJECTION, SOLUTION INTRAVENOUS at 10:28

## 2023-12-22 NOTE — PROGRESS NOTES
Arrived to the [facility]. Labs and orders reviewed. Mag2g, irinotecan, and leucovorin infusions completed 5FUpush completed. Patient did not have pump today (okay'ed by MD) Patient tolerated well. Any issues or concerns during appointment: none. Patient aware of next infusion appointment on 1/5/2024 (date) at [phone] (time). Patient aware of next lab and [facility] office visit on 1/5/2024 (date) at 0830 (time). Patient instructed to call provider with temperature of 100.4 or greater or nausea/vomiting/ diarrhea or pain not controlled by medications  Discharged ambulatory with his spouse.

## 2023-12-22 NOTE — PROGRESS NOTES
Patient arrived to port lab for port access and lab draw   Caro Benjamin accessed and labs drawn per protocol   *Port remains accessed. Patient discharged from port lab ambulatory.

## 2023-12-26 ENCOUNTER — HOSPITAL ENCOUNTER (OUTPATIENT)
Dept: INFUSION THERAPY | Age: 63
End: 2023-12-26

## 2024-01-01 NOTE — PROGRESS NOTES
Received voicemail from patient's wife Yamilka Cronin, requesting a financial assistance application be mailed to them. I returned her call and let her know I would mail them one today.
66

## 2024-01-02 ENCOUNTER — OFFICE VISIT (OUTPATIENT)
Dept: INTERNAL MEDICINE CLINIC | Facility: CLINIC | Age: 64
End: 2024-01-02
Payer: COMMERCIAL

## 2024-01-02 VITALS
OXYGEN SATURATION: 94 % | WEIGHT: 242 LBS | HEIGHT: 70 IN | HEART RATE: 91 BPM | DIASTOLIC BLOOD PRESSURE: 68 MMHG | BODY MASS INDEX: 34.65 KG/M2 | SYSTOLIC BLOOD PRESSURE: 122 MMHG

## 2024-01-02 DIAGNOSIS — T14.8XXA ABRASION OF SKIN: Primary | ICD-10-CM

## 2024-01-02 DIAGNOSIS — E78.5 HYPERLIPIDEMIA, UNSPECIFIED HYPERLIPIDEMIA TYPE: ICD-10-CM

## 2024-01-02 DIAGNOSIS — D64.9 ANEMIA, UNSPECIFIED TYPE: ICD-10-CM

## 2024-01-02 DIAGNOSIS — I82.509 CHRONIC DEEP VEIN THROMBOSIS (DVT) OF LOWER EXTREMITY, UNSPECIFIED LATERALITY, UNSPECIFIED VEIN (HCC): ICD-10-CM

## 2024-01-02 DIAGNOSIS — F41.9 ANXIETY: ICD-10-CM

## 2024-01-02 DIAGNOSIS — C79.9 METASTATIC ADENOCARCINOMA (HCC): ICD-10-CM

## 2024-01-02 DIAGNOSIS — C76.2 ABDOMINAL CARCINOMATOSIS (HCC): ICD-10-CM

## 2024-01-02 DIAGNOSIS — G62.9 NEUROPATHY: ICD-10-CM

## 2024-01-02 DIAGNOSIS — T45.1X5A NEUROPATHY DUE TO CHEMOTHERAPEUTIC DRUG (HCC): ICD-10-CM

## 2024-01-02 DIAGNOSIS — G62.0 NEUROPATHY DUE TO CHEMOTHERAPEUTIC DRUG (HCC): ICD-10-CM

## 2024-01-02 DIAGNOSIS — K21.9 GASTROESOPHAGEAL REFLUX DISEASE, UNSPECIFIED WHETHER ESOPHAGITIS PRESENT: ICD-10-CM

## 2024-01-02 PROBLEM — K56.600 PARTIAL SMALL BOWEL OBSTRUCTION (HCC): Status: RESOLVED | Noted: 2022-05-20 | Resolved: 2024-01-02

## 2024-01-02 PROCEDURE — 3074F SYST BP LT 130 MM HG: CPT | Performed by: STUDENT IN AN ORGANIZED HEALTH CARE EDUCATION/TRAINING PROGRAM

## 2024-01-02 PROCEDURE — 99214 OFFICE O/P EST MOD 30 MIN: CPT | Performed by: STUDENT IN AN ORGANIZED HEALTH CARE EDUCATION/TRAINING PROGRAM

## 2024-01-02 PROCEDURE — 3078F DIAST BP <80 MM HG: CPT | Performed by: STUDENT IN AN ORGANIZED HEALTH CARE EDUCATION/TRAINING PROGRAM

## 2024-01-02 ASSESSMENT — PATIENT HEALTH QUESTIONNAIRE - PHQ9
9. THOUGHTS THAT YOU WOULD BE BETTER OFF DEAD, OR OF HURTING YOURSELF: 0
4. FEELING TIRED OR HAVING LITTLE ENERGY: 0
8. MOVING OR SPEAKING SO SLOWLY THAT OTHER PEOPLE COULD HAVE NOTICED. OR THE OPPOSITE, BEING SO FIGETY OR RESTLESS THAT YOU HAVE BEEN MOVING AROUND A LOT MORE THAN USUAL: 0
5. POOR APPETITE OR OVEREATING: 0
SUM OF ALL RESPONSES TO PHQ QUESTIONS 1-9: 2
1. LITTLE INTEREST OR PLEASURE IN DOING THINGS: 1
6. FEELING BAD ABOUT YOURSELF - OR THAT YOU ARE A FAILURE OR HAVE LET YOURSELF OR YOUR FAMILY DOWN: 0
2. FEELING DOWN, DEPRESSED OR HOPELESS: 1
3. TROUBLE FALLING OR STAYING ASLEEP: 0
7. TROUBLE CONCENTRATING ON THINGS, SUCH AS READING THE NEWSPAPER OR WATCHING TELEVISION: 0
SUM OF ALL RESPONSES TO PHQ QUESTIONS 1-9: 2
10. IF YOU CHECKED OFF ANY PROBLEMS, HOW DIFFICULT HAVE THESE PROBLEMS MADE IT FOR YOU TO DO YOUR WORK, TAKE CARE OF THINGS AT HOME, OR GET ALONG WITH OTHER PEOPLE: 0
SUM OF ALL RESPONSES TO PHQ QUESTIONS 1-9: 2
SUM OF ALL RESPONSES TO PHQ9 QUESTIONS 1 & 2: 2
SUM OF ALL RESPONSES TO PHQ QUESTIONS 1-9: 2

## 2024-01-02 ASSESSMENT — ENCOUNTER SYMPTOMS: SHORTNESS OF BREATH: 0

## 2024-01-02 NOTE — PROGRESS NOTES
FOLLOW UP VISIT    Subjective:    Denny Guevara (: 1960) is a 63 y.o., male,   Chief Complaint   Patient presents with    Follow-up       HPI:  Remains on chemo for metastatic adenocarcinoma of GI origin. He is in good spirits and feels well.    Alternates between constipation and diarrhea, is on stool softener. He doesn't like taking so many medications.    Abrasion on dorsum R hand, cut by metal    The following portions of the patient's history were reviewed and updated as appropriate:      Patient Active Problem List    Diagnosis Date Noted    Neck pain 2023    Numbness of upper extremity 2023    High risk medication use 10/14/2022    Metastatic adenocarcinoma (HCC) 07/10/2022    Gastric cancer (HCC) 07/10/2022    Malfunction of peripheral inserted central catheter (HCC) 07/10/2022    Hypokalemia 07/10/2022    Leukopenia 07/10/2022    Anemia 07/10/2022    PICC (peripherally inserted central catheter) flush 2022    Cancer associated pain     Carcinomatosis (HCC) 2022    Anxiety     Depression     Nausea     Pain, abdominal, LUQ     Abdominal carcinomatosis (HCC)     Goals of care, counseling/discussion     Encounter for palliative care     Itching     Fatigue     Chronic deep vein thrombosis (DVT) (HCC) 2023    Diarrhea 08/10/2023    Dehydration 2023    Anal fissure 2023    Mouth sores 2023    Hypomagnesemia 2023    Internal hemorrhoid 2023    Constipation 2023    Neuropathy 2023    Flatulence 2023    Hx SBO 2022    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

## 2024-01-04 DIAGNOSIS — C79.9 METASTATIC ADENOCARCINOMA (HCC): ICD-10-CM

## 2024-01-04 DIAGNOSIS — C16.9 MALIGNANT NEOPLASM OF STOMACH, UNSPECIFIED LOCATION (HCC): Primary | ICD-10-CM

## 2024-01-04 ASSESSMENT — ENCOUNTER SYMPTOMS
NAUSEA: 0
ABDOMINAL DISTENTION: 0
EYE PROBLEMS: 0
CONSTIPATION: 0
HEMOPTYSIS: 0
BLOOD IN STOOL: 0
COUGH: 0
SHORTNESS OF BREATH: 0
BACK PAIN: 0
SORE THROAT: 0
VOMITING: 0
DIARRHEA: 0
SCLERAL ICTERUS: 0

## 2024-01-04 NOTE — PROGRESS NOTES
allergies - will try allergy med first.  If no resolution- can refer to ENT.  Pt declined need for sinus CT at this time.  No sig drainage noted.  Seldom cough.  No fevers.  No HA, no seizure.     - here with his wife for consultation.  During today's visit, we discussed his current workup.  We looked at the images together demonstrating some of the findings concerning for peritoneal nodularity/peritoneal disease.  Discussed anatomy of the findings  utilizing images to help pt understand what we are looking at and suspecting.  He and wife were appreciative of the time spent to review these.  Discussed need for visual dx/tissue pathology to determine plan - recommend ex lap - refer to Dr Doyle - personally  reviewed case with Dr Doyle who will expedite the workup and will see pt Fri.  US with mild biliary duct dilation - HIDA/ERCP reviewed by PCP   + BM/flatus; He did not like how IV morphine made him feel in the hospital.  He tried tramadol but found no relief and has been taking acetaminophen at home with minimal relief.  Discussed different options and he settled on trying  Percocet - he will contact the office to inform us if this is working for him.  We discussed SE - not to drive while on the med.  Bowel regimen reviewed.   He is tired of tests, frustrated.  Feelings validated and stressed importance of workup to determine why he has pain.  Wt loss from 240 --> 209 noted and expressed concern to pt about this.    - completed course of Levaquin/Diflucan for klebsiella pneumoniae and citrobacter freundii both of which were sensitive to Levaquin found around G-tube site.  Wishes for tube removal - reviewed risks of this during chemotherapy - I would like for him to have labs day before scheduled procedure to make sure his counts are adequate per above; case reviewed with Dr Roy by me via tel today   - here cycle 8 FOLFIRINOX - labs reviewed-defer 1 week due to neutropenia.   - nutrition - G-tube out.  He is being

## 2024-01-05 ENCOUNTER — OFFICE VISIT (OUTPATIENT)
Dept: ONCOLOGY | Age: 64
End: 2024-01-05
Payer: COMMERCIAL

## 2024-01-05 ENCOUNTER — HOSPITAL ENCOUNTER (OUTPATIENT)
Dept: INFUSION THERAPY | Age: 64
Discharge: HOME | End: 2024-01-05
Payer: COMMERCIAL

## 2024-01-05 ENCOUNTER — HOSPITAL ENCOUNTER (OUTPATIENT)
Dept: LAB | Age: 64
Discharge: HOME | End: 2024-01-05
Payer: COMMERCIAL

## 2024-01-05 VITALS
SYSTOLIC BLOOD PRESSURE: 108 MMHG | WEIGHT: 241.9 LBS | HEART RATE: 80 BPM | TEMPERATURE: 97.7 F | RESPIRATION RATE: 16 BRPM | DIASTOLIC BLOOD PRESSURE: 73 MMHG | OXYGEN SATURATION: 97 % | BODY MASS INDEX: 34.63 KG/M2 | HEIGHT: 70 IN

## 2024-01-05 DIAGNOSIS — E83.42 HYPOMAGNESEMIA: ICD-10-CM

## 2024-01-05 DIAGNOSIS — C16.9 MALIGNANT NEOPLASM OF STOMACH, UNSPECIFIED LOCATION (HCC): ICD-10-CM

## 2024-01-05 DIAGNOSIS — C78.6 MALIGNANT NEOPLASM METASTATIC TO PERITONEUM (HCC): Primary | ICD-10-CM

## 2024-01-05 DIAGNOSIS — C16.9 MALIGNANT NEOPLASM OF STOMACH, UNSPECIFIED LOCATION (HCC): Primary | ICD-10-CM

## 2024-01-05 DIAGNOSIS — C79.9 METASTATIC ADENOCARCINOMA (HCC): ICD-10-CM

## 2024-01-05 DIAGNOSIS — Z79.899 HIGH RISK MEDICATION USE: ICD-10-CM

## 2024-01-05 DIAGNOSIS — G62.9 NEUROPATHY: ICD-10-CM

## 2024-01-05 DIAGNOSIS — C76.2 ABDOMINAL CARCINOMATOSIS (HCC): ICD-10-CM

## 2024-01-05 LAB
ALBUMIN SERPL-MCNC: 3.2 G/DL (ref 3.2–4.6)
ALBUMIN/GLOB SERPL: 1.1 (ref 0.4–1.6)
ALP SERPL-CCNC: 161 U/L (ref 50–136)
ALT SERPL-CCNC: 47 U/L (ref 12–65)
ANION GAP SERPL CALC-SCNC: 4 MMOL/L (ref 2–11)
AST SERPL-CCNC: 30 U/L (ref 15–37)
BASOPHILS # BLD: 0.1 K/UL (ref 0–0.2)
BASOPHILS NFR BLD: 1 % (ref 0–2)
BILIRUB SERPL-MCNC: 0.4 MG/DL (ref 0.2–1.1)
BUN SERPL-MCNC: 11 MG/DL (ref 8–23)
CALCIUM SERPL-MCNC: 8.2 MG/DL (ref 8.3–10.4)
CEA SERPL-MCNC: 2.3 NG/ML (ref 0–3)
CHLORIDE SERPL-SCNC: 108 MMOL/L (ref 103–113)
CO2 SERPL-SCNC: 26 MMOL/L (ref 21–32)
CREAT SERPL-MCNC: 1 MG/DL (ref 0.8–1.5)
DIFFERENTIAL METHOD BLD: ABNORMAL
EOSINOPHIL # BLD: 0.1 K/UL (ref 0–0.8)
EOSINOPHIL NFR BLD: 2 % (ref 0.5–7.8)
ERYTHROCYTE [DISTWIDTH] IN BLOOD BY AUTOMATED COUNT: 15.6 % (ref 11.9–14.6)
GLOBULIN SER CALC-MCNC: 3 G/DL (ref 2.8–4.5)
GLUCOSE SERPL-MCNC: 288 MG/DL (ref 65–100)
HCT VFR BLD AUTO: 36.2 % (ref 41.1–50.3)
HGB BLD-MCNC: 11.6 G/DL (ref 13.6–17.2)
IMM GRANULOCYTES # BLD AUTO: 0 K/UL (ref 0–0.5)
IMM GRANULOCYTES NFR BLD AUTO: 0 % (ref 0–5)
LYMPHOCYTES # BLD: 1.6 K/UL (ref 0.5–4.6)
LYMPHOCYTES NFR BLD: 27 % (ref 13–44)
MAGNESIUM SERPL-MCNC: 1.8 MG/DL (ref 1.8–2.4)
MCH RBC QN AUTO: 29.3 PG (ref 26.1–32.9)
MCHC RBC AUTO-ENTMCNC: 32 G/DL (ref 31.4–35)
MCV RBC AUTO: 91.4 FL (ref 82–102)
MONOCYTES # BLD: 0.4 K/UL (ref 0.1–1.3)
MONOCYTES NFR BLD: 7 % (ref 4–12)
NEUTS SEG # BLD: 3.7 K/UL (ref 1.7–8.2)
NEUTS SEG NFR BLD: 63 % (ref 43–78)
NRBC # BLD: 0 K/UL (ref 0–0.2)
PLATELET # BLD AUTO: 151 K/UL (ref 150–450)
PMV BLD AUTO: 10.5 FL (ref 9.4–12.3)
POTASSIUM SERPL-SCNC: 3.6 MMOL/L (ref 3.5–5.1)
PROT SERPL-MCNC: 6.2 G/DL (ref 6.3–8.2)
RBC # BLD AUTO: 3.96 M/UL (ref 4.23–5.6)
SODIUM SERPL-SCNC: 138 MMOL/L (ref 136–146)
WBC # BLD AUTO: 5.9 K/UL (ref 4.3–11.1)

## 2024-01-05 PROCEDURE — 85025 COMPLETE CBC W/AUTO DIFF WBC: CPT

## 2024-01-05 PROCEDURE — 2580000003 HC RX 258: Performed by: NURSE PRACTITIONER

## 2024-01-05 PROCEDURE — 99214 OFFICE O/P EST MOD 30 MIN: CPT | Performed by: NURSE PRACTITIONER

## 2024-01-05 PROCEDURE — 96413 CHEMO IV INFUSION 1 HR: CPT

## 2024-01-05 PROCEDURE — 96368 THER/DIAG CONCURRENT INF: CPT

## 2024-01-05 PROCEDURE — 3074F SYST BP LT 130 MM HG: CPT | Performed by: NURSE PRACTITIONER

## 2024-01-05 PROCEDURE — 2580000003 HC RX 258: Performed by: INTERNAL MEDICINE

## 2024-01-05 PROCEDURE — 80053 COMPREHEN METABOLIC PANEL: CPT

## 2024-01-05 PROCEDURE — 6360000002 HC RX W HCPCS: Performed by: NURSE PRACTITIONER

## 2024-01-05 PROCEDURE — 3078F DIAST BP <80 MM HG: CPT | Performed by: NURSE PRACTITIONER

## 2024-01-05 PROCEDURE — 96372 THER/PROPH/DIAG INJ SC/IM: CPT

## 2024-01-05 PROCEDURE — G0498 CHEMO EXTEND IV INFUS W/PUMP: HCPCS

## 2024-01-05 PROCEDURE — 83735 ASSAY OF MAGNESIUM: CPT

## 2024-01-05 PROCEDURE — 96375 TX/PRO/DX INJ NEW DRUG ADDON: CPT

## 2024-01-05 PROCEDURE — 82378 CARCINOEMBRYONIC ANTIGEN: CPT

## 2024-01-05 PROCEDURE — 96367 TX/PROPH/DG ADDL SEQ IV INF: CPT

## 2024-01-05 PROCEDURE — 96411 CHEMO IV PUSH ADDL DRUG: CPT

## 2024-01-05 RX ORDER — SODIUM CHLORIDE 9 MG/ML
5-250 INJECTION, SOLUTION INTRAVENOUS PRN
OUTPATIENT
Start: 2024-01-07

## 2024-01-05 RX ORDER — SODIUM CHLORIDE 9 MG/ML
INJECTION, SOLUTION INTRAVENOUS CONTINUOUS
Status: CANCELLED | OUTPATIENT
Start: 2024-01-05

## 2024-01-05 RX ORDER — SODIUM CHLORIDE 0.9 % (FLUSH) 0.9 %
5-40 SYRINGE (ML) INJECTION PRN
Status: CANCELLED | OUTPATIENT
Start: 2024-01-05

## 2024-01-05 RX ORDER — EPINEPHRINE 1 MG/ML
0.3 INJECTION, SOLUTION, CONCENTRATE INTRAVENOUS PRN
Status: CANCELLED | OUTPATIENT
Start: 2024-01-05

## 2024-01-05 RX ORDER — ATROPINE SULFATE 0.4 MG/ML
0.4 INJECTION, SOLUTION INTRAVENOUS ONCE
Status: COMPLETED | OUTPATIENT
Start: 2024-01-05 | End: 2024-01-05

## 2024-01-05 RX ORDER — DIPHENHYDRAMINE HYDROCHLORIDE 50 MG/ML
50 INJECTION INTRAMUSCULAR; INTRAVENOUS
Status: DISCONTINUED | OUTPATIENT
Start: 2024-01-05 | End: 2024-01-06 | Stop reason: HOSPADM

## 2024-01-05 RX ORDER — ONDANSETRON 2 MG/ML
8 INJECTION INTRAMUSCULAR; INTRAVENOUS ONCE
Status: CANCELLED | OUTPATIENT
Start: 2024-01-05 | End: 2024-01-05

## 2024-01-05 RX ORDER — ALBUTEROL SULFATE 90 UG/1
4 AEROSOL, METERED RESPIRATORY (INHALATION) PRN
Status: DISCONTINUED | OUTPATIENT
Start: 2024-01-05 | End: 2024-01-06 | Stop reason: HOSPADM

## 2024-01-05 RX ORDER — ACETAMINOPHEN 325 MG/1
650 TABLET ORAL
Status: DISCONTINUED | OUTPATIENT
Start: 2024-01-05 | End: 2024-01-06 | Stop reason: HOSPADM

## 2024-01-05 RX ORDER — ALBUTEROL SULFATE 90 UG/1
4 AEROSOL, METERED RESPIRATORY (INHALATION) PRN
Status: CANCELLED | OUTPATIENT
Start: 2024-01-05

## 2024-01-05 RX ORDER — SODIUM CHLORIDE 0.9 % (FLUSH) 0.9 %
5-40 SYRINGE (ML) INJECTION PRN
OUTPATIENT
Start: 2024-01-07

## 2024-01-05 RX ORDER — ATROPINE SULFATE 0.4 MG/ML
0.4 INJECTION, SOLUTION INTRAMUSCULAR; INTRAVENOUS; SUBCUTANEOUS
Status: CANCELLED | OUTPATIENT
Start: 2024-01-05

## 2024-01-05 RX ORDER — ONDANSETRON 2 MG/ML
8 INJECTION INTRAMUSCULAR; INTRAVENOUS ONCE
Status: COMPLETED | OUTPATIENT
Start: 2024-01-05 | End: 2024-01-05

## 2024-01-05 RX ORDER — EPINEPHRINE 1 MG/ML
0.3 INJECTION, SOLUTION, CONCENTRATE INTRAVENOUS PRN
Status: DISCONTINUED | OUTPATIENT
Start: 2024-01-05 | End: 2024-01-06 | Stop reason: HOSPADM

## 2024-01-05 RX ORDER — SODIUM CHLORIDE 9 MG/ML
5-250 INJECTION, SOLUTION INTRAVENOUS PRN
Status: CANCELLED | OUTPATIENT
Start: 2024-01-05

## 2024-01-05 RX ORDER — ONDANSETRON 2 MG/ML
8 INJECTION INTRAMUSCULAR; INTRAVENOUS
Status: CANCELLED | OUTPATIENT
Start: 2024-01-05

## 2024-01-05 RX ORDER — HEPARIN 100 UNIT/ML
500 SYRINGE INTRAVENOUS PRN
Status: CANCELLED | OUTPATIENT
Start: 2024-01-05

## 2024-01-05 RX ORDER — SODIUM CHLORIDE 0.9 % (FLUSH) 0.9 %
10 SYRINGE (ML) INJECTION PRN
Status: ACTIVE | OUTPATIENT
Start: 2024-01-05

## 2024-01-05 RX ORDER — MEPERIDINE HYDROCHLORIDE 25 MG/ML
12.5 INJECTION INTRAMUSCULAR; INTRAVENOUS; SUBCUTANEOUS PRN
Status: CANCELLED | OUTPATIENT
Start: 2024-01-05

## 2024-01-05 RX ORDER — DEXTROSE MONOHYDRATE 50 MG/ML
5-250 INJECTION, SOLUTION INTRAVENOUS PRN
Status: CANCELLED | OUTPATIENT
Start: 2024-01-05

## 2024-01-05 RX ORDER — FLUOROURACIL 50 MG/ML
400 INJECTION, SOLUTION INTRAVENOUS ONCE
Status: CANCELLED | OUTPATIENT
Start: 2024-01-05 | End: 2024-01-05

## 2024-01-05 RX ORDER — ACETAMINOPHEN 325 MG/1
650 TABLET ORAL
Status: CANCELLED | OUTPATIENT
Start: 2024-01-05

## 2024-01-05 RX ORDER — MEPERIDINE HYDROCHLORIDE 25 MG/ML
12.5 INJECTION INTRAMUSCULAR; INTRAVENOUS; SUBCUTANEOUS PRN
Status: DISCONTINUED | OUTPATIENT
Start: 2024-01-05 | End: 2024-01-06 | Stop reason: HOSPADM

## 2024-01-05 RX ORDER — HEPARIN 100 UNIT/ML
500 SYRINGE INTRAVENOUS PRN
OUTPATIENT
Start: 2024-01-07

## 2024-01-05 RX ORDER — ATROPINE SULFATE 0.4 MG/ML
0.4 INJECTION, SOLUTION INTRAMUSCULAR; INTRAVENOUS; SUBCUTANEOUS ONCE
Status: CANCELLED | OUTPATIENT
Start: 2024-01-05 | End: 2024-01-05

## 2024-01-05 RX ORDER — ONDANSETRON 2 MG/ML
8 INJECTION INTRAMUSCULAR; INTRAVENOUS
Status: DISCONTINUED | OUTPATIENT
Start: 2024-01-05 | End: 2024-01-06 | Stop reason: HOSPADM

## 2024-01-05 RX ORDER — DIPHENHYDRAMINE HYDROCHLORIDE 50 MG/ML
50 INJECTION INTRAMUSCULAR; INTRAVENOUS
Status: CANCELLED | OUTPATIENT
Start: 2024-01-05

## 2024-01-05 RX ORDER — SODIUM CHLORIDE 0.9 % (FLUSH) 0.9 %
5-40 SYRINGE (ML) INJECTION PRN
Status: DISCONTINUED | OUTPATIENT
Start: 2024-01-05 | End: 2024-01-06 | Stop reason: HOSPADM

## 2024-01-05 RX ORDER — FLUOROURACIL 50 MG/ML
400 INJECTION, SOLUTION INTRAVENOUS ONCE
Status: COMPLETED | OUTPATIENT
Start: 2024-01-05 | End: 2024-01-05

## 2024-01-05 RX ORDER — DEXTROSE MONOHYDRATE 50 MG/ML
5-250 INJECTION, SOLUTION INTRAVENOUS PRN
Status: DISCONTINUED | OUTPATIENT
Start: 2024-01-05 | End: 2024-01-06 | Stop reason: HOSPADM

## 2024-01-05 RX ADMIN — FLUOROURACIL 5375 MG: 50 INJECTION, SOLUTION INTRAVENOUS at 13:25

## 2024-01-05 RX ADMIN — DEXTROSE MONOHYDRATE 100 ML/HR: 50 INJECTION, SOLUTION INTRAVENOUS at 10:13

## 2024-01-05 RX ADMIN — SODIUM CHLORIDE, PRESERVATIVE FREE 10 ML: 5 INJECTION INTRAVENOUS at 08:40

## 2024-01-05 RX ADMIN — FLUOROURACIL 900 MG: 50 INJECTION, SOLUTION INTRAVENOUS at 13:20

## 2024-01-05 RX ADMIN — ONDANSETRON 8 MG: 2 INJECTION INTRAMUSCULAR; INTRAVENOUS at 10:38

## 2024-01-05 RX ADMIN — FOSAPREPITANT 150 MG: 150 INJECTION, POWDER, LYOPHILIZED, FOR SOLUTION INTRAVENOUS at 11:02

## 2024-01-05 RX ADMIN — IRINOTECAN HYDROCHLORIDE 340 MG: 20 INJECTION, SOLUTION INTRAVENOUS at 11:37

## 2024-01-05 RX ADMIN — DEXAMETHASONE SODIUM PHOSPHATE 12 MG: 4 INJECTION INTRA-ARTICULAR; INTRALESIONAL; INTRAMUSCULAR; INTRAVENOUS; SOFT TISSUE at 10:39

## 2024-01-05 RX ADMIN — LEUCOVORIN CALCIUM 900 MG: 200 INJECTION, POWDER, LYOPHILIZED, FOR SUSPENSION INTRAMUSCULAR; INTRAVENOUS at 11:35

## 2024-01-05 RX ADMIN — ATROPINE SULFATE 0.4 MG: 0.4 INJECTION, SOLUTION INTRAVENOUS at 11:03

## 2024-01-05 RX ADMIN — SODIUM CHLORIDE, PRESERVATIVE FREE 10 ML: 5 INJECTION INTRAVENOUS at 10:13

## 2024-01-05 NOTE — PROGRESS NOTES
Patient arrived to port lab for port access and lab draw   Port accessed and labs drawn per protocol   Port remains accessed  Patient discharged from port lab ambulatory

## 2024-01-05 NOTE — PROGRESS NOTES
Patient arrived to infusion. FOLFIRI completed. Patient tolerated well. Discharged ambulatory with elastomeric pump. Tubing unclamped. Patient aware of pump d/c appt on 1/8.

## 2024-01-08 ENCOUNTER — HOSPITAL ENCOUNTER (OUTPATIENT)
Dept: CT IMAGING | Age: 64
Discharge: HOME | End: 2024-01-11
Payer: COMMERCIAL

## 2024-01-08 ENCOUNTER — HOSPITAL ENCOUNTER (OUTPATIENT)
Dept: INFUSION THERAPY | Age: 64
Discharge: HOME | End: 2024-01-08
Payer: COMMERCIAL

## 2024-01-08 VITALS
DIASTOLIC BLOOD PRESSURE: 83 MMHG | RESPIRATION RATE: 17 BRPM | HEART RATE: 84 BPM | SYSTOLIC BLOOD PRESSURE: 144 MMHG | TEMPERATURE: 97.8 F

## 2024-01-08 DIAGNOSIS — C16.9 MALIGNANT NEOPLASM OF STOMACH, UNSPECIFIED LOCATION (HCC): ICD-10-CM

## 2024-01-08 PROCEDURE — 74177 CT ABD & PELVIS W/CONTRAST: CPT

## 2024-01-08 PROCEDURE — 6360000004 HC RX CONTRAST MEDICATION: Performed by: NURSE PRACTITIONER

## 2024-01-08 PROCEDURE — 2580000003 HC RX 258: Performed by: INTERNAL MEDICINE

## 2024-01-08 PROCEDURE — 96523 IRRIG DRUG DELIVERY DEVICE: CPT

## 2024-01-08 RX ORDER — SODIUM CHLORIDE 0.9 % (FLUSH) 0.9 %
5-40 SYRINGE (ML) INJECTION PRN
Status: DISCONTINUED | OUTPATIENT
Start: 2024-01-08 | End: 2024-01-09 | Stop reason: HOSPADM

## 2024-01-08 RX ADMIN — DIATRIZOATE MEGLUMINE AND DIATRIZOATE SODIUM 15 ML: 660; 100 LIQUID ORAL; RECTAL at 11:10

## 2024-01-08 RX ADMIN — IOPAMIDOL 100 ML: 755 INJECTION, SOLUTION INTRAVENOUS at 11:10

## 2024-01-08 RX ADMIN — SODIUM CHLORIDE, PRESERVATIVE FREE 10 ML: 5 INJECTION INTRAVENOUS at 07:58

## 2024-01-08 NOTE — PROGRESS NOTES
Patient arrived ambulatory to infusion. Pump d/c completed. VS stable. Pump removed and port flushed. Alcohol cap applied. Patient is having CT scan today with contrast and requests to use port for CT. Instructed patient to come back to cancer center today to have port needle removed after CT if CT is unable to remove port needle. Patient verbalized understanding. Discharged ambulatory. Patient aware of next infusion appt on 1/19.

## 2024-01-17 ASSESSMENT — ENCOUNTER SYMPTOMS
COUGH: 0
BACK PAIN: 0
DIARRHEA: 0
HEMOPTYSIS: 0
EYE PROBLEMS: 0
CONSTIPATION: 0
NAUSEA: 0
SHORTNESS OF BREATH: 0
SCLERAL ICTERUS: 0
VOMITING: 0
SORE THROAT: 0
BLOOD IN STOOL: 0
ABDOMINAL DISTENTION: 0

## 2024-01-17 NOTE — PROGRESS NOTES
[facility] Hematology and Oncology: Established patient - follow up     Chief Complaint   Patient presents with    Follow-up     Reason for Referral: Abdominal pain; peritoneal metastatic disease  Referring Provider: Mo Dalton NP  Primary Care Provider: Oscar Nelson MD  Family History of Cancer/Hematologic Disorders: Family history is significant for sister with breast cancer.   Presenting Symptoms: Progressively worsening, persistent abdominal pain x several months    History of Present Illness:  Mr. Guevara  is a 63 y.o. male who presents today for FU regarding adenocarcinoma with peritoneal metastatic disease.  The past medical history is significant for tobacco use (recent pack per day smoker x 30+ years - now down to 1/3PPD), PUD, paresthesia/pain of bilateral upper extremities, HLD, HTN, diverticulitis,  colon polyps, hiatal hernia, chronic right shoulder pain, bilateral carpal tunnel syndrome, and partial colon resection.  He presented to the [facility] ED on 5/12/22 with a complaint of persistent abdominal pain for several months that was becoming progressively  worse.  EGD and colonoscopy on 2/25/22 revealed findings of hiatal hernia, gastric polyps, several benign colon polyps, diverticulosis, and internal hemorrhoids.  CT of the abdomen on 3/8/22 showed no acute findings.  He presented to [facility] ER x 2 for  evaluation (5/3/22 and 5/10/22).  RUQ abdominal ultrasound was completed on 5/3/22 in the ED at [facility] demonstrating no acute findings to explain patient's pain; probable hepatic  steatosis; small echogenic mural foci in the gallbladder which are nonmobile, likely representing cholesterol polyps, largest measuring 4 mm; and small volume simple ascites in the right upper quadrant and left lower quadrant, nonspecific.  CT AP with  contrast at [facility] ED 5/10/22 showed a partial small bowel obstruction; small to moderate amount of free fluid in the abdomen and pelvis; and nonspecific stranding of

## 2024-01-18 RX ORDER — SODIUM CHLORIDE 0.9 % (FLUSH) 0.9 %
5-40 SYRINGE (ML) INJECTION PRN
OUTPATIENT
Start: 2024-01-19

## 2024-01-19 ENCOUNTER — HOSPITAL ENCOUNTER (OUTPATIENT)
Dept: LAB | Age: 64
End: 2024-01-19
Payer: COMMERCIAL

## 2024-01-19 ENCOUNTER — OFFICE VISIT (OUTPATIENT)
Dept: ONCOLOGY | Age: 64
End: 2024-01-19
Payer: COMMERCIAL

## 2024-01-19 ENCOUNTER — HOSPITAL ENCOUNTER (OUTPATIENT)
Dept: INFUSION THERAPY | Age: 64
Discharge: HOME | End: 2024-01-19
Payer: COMMERCIAL

## 2024-01-19 VITALS
BODY MASS INDEX: 34.65 KG/M2 | HEART RATE: 89 BPM | HEIGHT: 70 IN | SYSTOLIC BLOOD PRESSURE: 114 MMHG | TEMPERATURE: 97.9 F | OXYGEN SATURATION: 95 % | RESPIRATION RATE: 16 BRPM | WEIGHT: 242 LBS | DIASTOLIC BLOOD PRESSURE: 81 MMHG

## 2024-01-19 DIAGNOSIS — C76.2 ABDOMINAL CARCINOMATOSIS (HCC): ICD-10-CM

## 2024-01-19 DIAGNOSIS — R73.9 HYPERGLYCEMIA: ICD-10-CM

## 2024-01-19 DIAGNOSIS — E83.42 HYPOMAGNESEMIA: ICD-10-CM

## 2024-01-19 DIAGNOSIS — Z79.899 HIGH RISK MEDICATION USE: ICD-10-CM

## 2024-01-19 DIAGNOSIS — C78.6 MALIGNANT NEOPLASM METASTATIC TO PERITONEUM (HCC): Primary | ICD-10-CM

## 2024-01-19 DIAGNOSIS — C78.6 MALIGNANT NEOPLASM METASTATIC TO PERITONEUM (HCC): ICD-10-CM

## 2024-01-19 LAB
ALBUMIN SERPL-MCNC: 3.3 G/DL (ref 3.2–4.6)
ALBUMIN/GLOB SERPL: 1.1 (ref 0.4–1.6)
ALP SERPL-CCNC: 164 U/L (ref 50–136)
ALT SERPL-CCNC: 33 U/L (ref 12–65)
ANION GAP SERPL CALC-SCNC: 4 MMOL/L (ref 2–11)
AST SERPL-CCNC: 24 U/L (ref 15–37)
BASOPHILS # BLD: 0.1 K/UL (ref 0–0.2)
BASOPHILS NFR BLD: 1 % (ref 0–2)
BILIRUB SERPL-MCNC: 0.3 MG/DL (ref 0.2–1.1)
BUN SERPL-MCNC: 12 MG/DL (ref 8–23)
CALCIUM SERPL-MCNC: 7.9 MG/DL (ref 8.3–10.4)
CHLORIDE SERPL-SCNC: 109 MMOL/L (ref 103–113)
CO2 SERPL-SCNC: 26 MMOL/L (ref 21–32)
CREAT SERPL-MCNC: 1 MG/DL (ref 0.8–1.5)
DIFFERENTIAL METHOD BLD: ABNORMAL
EOSINOPHIL # BLD: 0.2 K/UL (ref 0–0.8)
EOSINOPHIL NFR BLD: 4 % (ref 0.5–7.8)
ERYTHROCYTE [DISTWIDTH] IN BLOOD BY AUTOMATED COUNT: 14.6 % (ref 11.9–14.6)
GLOBULIN SER CALC-MCNC: 2.9 G/DL (ref 2.8–4.5)
GLUCOSE SERPL-MCNC: 313 MG/DL (ref 65–100)
HCT VFR BLD AUTO: 35.9 % (ref 41.1–50.3)
HGB BLD-MCNC: 11.7 G/DL (ref 13.6–17.2)
IMM GRANULOCYTES # BLD AUTO: 0 K/UL (ref 0–0.5)
IMM GRANULOCYTES NFR BLD AUTO: 0 % (ref 0–5)
LYMPHOCYTES # BLD: 1.8 K/UL (ref 0.5–4.6)
LYMPHOCYTES NFR BLD: 34 % (ref 13–44)
MAGNESIUM SERPL-MCNC: 1.7 MG/DL (ref 1.8–2.4)
MCH RBC QN AUTO: 29.7 PG (ref 26.1–32.9)
MCHC RBC AUTO-ENTMCNC: 32.6 G/DL (ref 31.4–35)
MCV RBC AUTO: 91.1 FL (ref 82–102)
MONOCYTES # BLD: 0.3 K/UL (ref 0.1–1.3)
MONOCYTES NFR BLD: 6 % (ref 4–12)
NEUTS SEG # BLD: 2.9 K/UL (ref 1.7–8.2)
NEUTS SEG NFR BLD: 55 % (ref 43–78)
NRBC # BLD: 0 K/UL (ref 0–0.2)
PLATELET # BLD AUTO: 173 K/UL (ref 150–450)
PMV BLD AUTO: 10.8 FL (ref 9.4–12.3)
POTASSIUM SERPL-SCNC: 3.6 MMOL/L (ref 3.5–5.1)
PROT SERPL-MCNC: 6.2 G/DL (ref 6.3–8.2)
RBC # BLD AUTO: 3.94 M/UL (ref 4.23–5.6)
SODIUM SERPL-SCNC: 139 MMOL/L (ref 136–146)
WBC # BLD AUTO: 5.3 K/UL (ref 4.3–11.1)

## 2024-01-19 PROCEDURE — 99214 OFFICE O/P EST MOD 30 MIN: CPT | Performed by: INTERNAL MEDICINE

## 2024-01-19 PROCEDURE — G0498 CHEMO EXTEND IV INFUS W/PUMP: HCPCS

## 2024-01-19 PROCEDURE — 3079F DIAST BP 80-89 MM HG: CPT | Performed by: INTERNAL MEDICINE

## 2024-01-19 PROCEDURE — 96375 TX/PRO/DX INJ NEW DRUG ADDON: CPT

## 2024-01-19 PROCEDURE — 2580000003 HC RX 258: Performed by: INTERNAL MEDICINE

## 2024-01-19 PROCEDURE — 96372 THER/PROPH/DIAG INJ SC/IM: CPT

## 2024-01-19 PROCEDURE — 83735 ASSAY OF MAGNESIUM: CPT

## 2024-01-19 PROCEDURE — 96411 CHEMO IV PUSH ADDL DRUG: CPT

## 2024-01-19 PROCEDURE — 96367 TX/PROPH/DG ADDL SEQ IV INF: CPT

## 2024-01-19 PROCEDURE — 96368 THER/DIAG CONCURRENT INF: CPT

## 2024-01-19 PROCEDURE — 85025 COMPLETE CBC W/AUTO DIFF WBC: CPT

## 2024-01-19 PROCEDURE — 80053 COMPREHEN METABOLIC PANEL: CPT

## 2024-01-19 PROCEDURE — 96413 CHEMO IV INFUSION 1 HR: CPT

## 2024-01-19 PROCEDURE — 6360000002 HC RX W HCPCS: Performed by: INTERNAL MEDICINE

## 2024-01-19 PROCEDURE — 6370000000 HC RX 637 (ALT 250 FOR IP): Performed by: INTERNAL MEDICINE

## 2024-01-19 PROCEDURE — 3074F SYST BP LT 130 MM HG: CPT | Performed by: INTERNAL MEDICINE

## 2024-01-19 RX ORDER — SODIUM CHLORIDE 0.9 % (FLUSH) 0.9 %
5-40 SYRINGE (ML) INJECTION PRN
Status: CANCELLED | OUTPATIENT
Start: 2024-01-19

## 2024-01-19 RX ORDER — SODIUM CHLORIDE 9 MG/ML
INJECTION, SOLUTION INTRAVENOUS CONTINUOUS
Status: CANCELLED | OUTPATIENT
Start: 2024-01-19

## 2024-01-19 RX ORDER — FLUOROURACIL 50 MG/ML
400 INJECTION, SOLUTION INTRAVENOUS ONCE
Status: COMPLETED | OUTPATIENT
Start: 2024-01-19 | End: 2024-01-19

## 2024-01-19 RX ORDER — ATROPINE SULFATE 0.4 MG/ML
0.4 INJECTION, SOLUTION INTRAVENOUS ONCE
Status: COMPLETED | OUTPATIENT
Start: 2024-01-19 | End: 2024-01-19

## 2024-01-19 RX ORDER — EPINEPHRINE 1 MG/ML
0.3 INJECTION, SOLUTION, CONCENTRATE INTRAVENOUS PRN
Status: CANCELLED | OUTPATIENT
Start: 2024-01-19

## 2024-01-19 RX ORDER — ACETAMINOPHEN 325 MG/1
650 TABLET ORAL
Status: DISCONTINUED | OUTPATIENT
Start: 2024-01-19 | End: 2024-01-20 | Stop reason: HOSPADM

## 2024-01-19 RX ORDER — HEPARIN 100 UNIT/ML
500 SYRINGE INTRAVENOUS PRN
OUTPATIENT
Start: 2024-01-22

## 2024-01-19 RX ORDER — EPINEPHRINE 1 MG/ML
0.3 INJECTION, SOLUTION, CONCENTRATE INTRAVENOUS PRN
Status: DISCONTINUED | OUTPATIENT
Start: 2024-01-19 | End: 2024-01-20 | Stop reason: HOSPADM

## 2024-01-19 RX ORDER — MEPERIDINE HYDROCHLORIDE 25 MG/ML
12.5 INJECTION INTRAMUSCULAR; INTRAVENOUS; SUBCUTANEOUS PRN
Status: DISCONTINUED | OUTPATIENT
Start: 2024-01-19 | End: 2024-01-20 | Stop reason: HOSPADM

## 2024-01-19 RX ORDER — ALBUTEROL SULFATE 90 UG/1
4 AEROSOL, METERED RESPIRATORY (INHALATION) PRN
Status: CANCELLED | OUTPATIENT
Start: 2024-01-19

## 2024-01-19 RX ORDER — DEXTROSE MONOHYDRATE 50 MG/ML
5-250 INJECTION, SOLUTION INTRAVENOUS PRN
Status: DISCONTINUED | OUTPATIENT
Start: 2024-01-19 | End: 2024-01-20 | Stop reason: HOSPADM

## 2024-01-19 RX ORDER — SODIUM CHLORIDE 0.9 % (FLUSH) 0.9 %
5-40 SYRINGE (ML) INJECTION PRN
Status: DISCONTINUED | OUTPATIENT
Start: 2024-01-19 | End: 2024-01-23 | Stop reason: HOSPADM

## 2024-01-19 RX ORDER — DIPHENHYDRAMINE HYDROCHLORIDE 50 MG/ML
50 INJECTION INTRAMUSCULAR; INTRAVENOUS
Status: CANCELLED | OUTPATIENT
Start: 2024-01-19

## 2024-01-19 RX ORDER — ATROPINE SULFATE 0.4 MG/ML
0.4 INJECTION, SOLUTION INTRAMUSCULAR; INTRAVENOUS; SUBCUTANEOUS ONCE
Status: CANCELLED | OUTPATIENT
Start: 2024-01-19 | End: 2024-01-19

## 2024-01-19 RX ORDER — ACETAMINOPHEN 325 MG/1
650 TABLET ORAL
Status: CANCELLED | OUTPATIENT
Start: 2024-01-19

## 2024-01-19 RX ORDER — SODIUM CHLORIDE 9 MG/ML
5-250 INJECTION, SOLUTION INTRAVENOUS PRN
OUTPATIENT
Start: 2024-01-22

## 2024-01-19 RX ORDER — HEPARIN 100 UNIT/ML
500 SYRINGE INTRAVENOUS PRN
Status: CANCELLED | OUTPATIENT
Start: 2024-01-19

## 2024-01-19 RX ORDER — SODIUM CHLORIDE 0.9 % (FLUSH) 0.9 %
5-40 SYRINGE (ML) INJECTION PRN
Status: DISCONTINUED | OUTPATIENT
Start: 2024-01-19 | End: 2024-01-20 | Stop reason: HOSPADM

## 2024-01-19 RX ORDER — FLUOROURACIL 50 MG/ML
400 INJECTION, SOLUTION INTRAVENOUS ONCE
Status: CANCELLED | OUTPATIENT
Start: 2024-01-19 | End: 2024-01-19

## 2024-01-19 RX ORDER — DEXTROSE MONOHYDRATE 50 MG/ML
5-250 INJECTION, SOLUTION INTRAVENOUS PRN
Status: CANCELLED | OUTPATIENT
Start: 2024-01-19

## 2024-01-19 RX ORDER — ATROPINE SULFATE 0.4 MG/ML
0.4 INJECTION, SOLUTION INTRAMUSCULAR; INTRAVENOUS; SUBCUTANEOUS
Status: CANCELLED | OUTPATIENT
Start: 2024-01-19

## 2024-01-19 RX ORDER — ONDANSETRON 2 MG/ML
8 INJECTION INTRAMUSCULAR; INTRAVENOUS
Status: DISCONTINUED | OUTPATIENT
Start: 2024-01-19 | End: 2024-01-20 | Stop reason: HOSPADM

## 2024-01-19 RX ORDER — ONDANSETRON 2 MG/ML
8 INJECTION INTRAMUSCULAR; INTRAVENOUS ONCE
Status: CANCELLED | OUTPATIENT
Start: 2024-01-19 | End: 2024-01-19

## 2024-01-19 RX ORDER — ONDANSETRON 2 MG/ML
8 INJECTION INTRAMUSCULAR; INTRAVENOUS
Status: CANCELLED | OUTPATIENT
Start: 2024-01-19

## 2024-01-19 RX ORDER — MAGNESIUM SULFATE IN WATER 40 MG/ML
2000 INJECTION, SOLUTION INTRAVENOUS ONCE
Status: COMPLETED | OUTPATIENT
Start: 2024-01-19 | End: 2024-01-19

## 2024-01-19 RX ORDER — ALBUTEROL SULFATE 90 UG/1
4 AEROSOL, METERED RESPIRATORY (INHALATION) PRN
Status: DISCONTINUED | OUTPATIENT
Start: 2024-01-19 | End: 2024-01-20 | Stop reason: HOSPADM

## 2024-01-19 RX ORDER — SODIUM CHLORIDE 0.9 % (FLUSH) 0.9 %
5-40 SYRINGE (ML) INJECTION PRN
OUTPATIENT
Start: 2024-01-22

## 2024-01-19 RX ORDER — DIPHENHYDRAMINE HYDROCHLORIDE 50 MG/ML
50 INJECTION INTRAMUSCULAR; INTRAVENOUS
Status: DISCONTINUED | OUTPATIENT
Start: 2024-01-19 | End: 2024-01-20 | Stop reason: HOSPADM

## 2024-01-19 RX ORDER — SODIUM CHLORIDE 9 MG/ML
5-250 INJECTION, SOLUTION INTRAVENOUS PRN
Status: CANCELLED | OUTPATIENT
Start: 2024-01-19

## 2024-01-19 RX ORDER — MAGNESIUM SULFATE IN WATER 40 MG/ML
2000 INJECTION, SOLUTION INTRAVENOUS ONCE
Status: CANCELLED
Start: 2024-01-19

## 2024-01-19 RX ORDER — ONDANSETRON 2 MG/ML
8 INJECTION INTRAMUSCULAR; INTRAVENOUS ONCE
Status: COMPLETED | OUTPATIENT
Start: 2024-01-19 | End: 2024-01-19

## 2024-01-19 RX ORDER — MEPERIDINE HYDROCHLORIDE 25 MG/ML
12.5 INJECTION INTRAMUSCULAR; INTRAVENOUS; SUBCUTANEOUS PRN
Status: CANCELLED | OUTPATIENT
Start: 2024-01-19

## 2024-01-19 RX ADMIN — ATROPINE SULFATE 0.4 MG: 0.4 INJECTION, SOLUTION INTRAVENOUS at 10:19

## 2024-01-19 RX ADMIN — SODIUM CHLORIDE, PRESERVATIVE FREE 10 ML: 5 INJECTION INTRAVENOUS at 08:53

## 2024-01-19 RX ADMIN — IRINOTECAN HYDROCHLORIDE 340 MG: 20 INJECTION, SOLUTION INTRAVENOUS at 11:24

## 2024-01-19 RX ADMIN — DEXTROSE MONOHYDRATE 50 ML/HR: 50 INJECTION, SOLUTION INTRAVENOUS at 08:53

## 2024-01-19 RX ADMIN — MAGNESIUM SULFATE HEPTAHYDRATE 2000 MG: 40 INJECTION, SOLUTION INTRAVENOUS at 09:20

## 2024-01-19 RX ADMIN — ONDANSETRON 8 MG: 2 INJECTION INTRAMUSCULAR; INTRAVENOUS at 10:18

## 2024-01-19 RX ADMIN — INSULIN HUMAN 5 UNITS: 100 INJECTION, SOLUTION PARENTERAL at 09:23

## 2024-01-19 RX ADMIN — LEUCOVORIN CALCIUM 900 MG: 350 INJECTION, POWDER, LYOPHILIZED, FOR SUSPENSION INTRAMUSCULAR; INTRAVENOUS at 11:21

## 2024-01-19 RX ADMIN — FLUOROURACIL 900 MG: 50 INJECTION, SOLUTION INTRAVENOUS at 13:00

## 2024-01-19 RX ADMIN — DEXAMETHASONE SODIUM PHOSPHATE 12 MG: 4 INJECTION INTRA-ARTICULAR; INTRALESIONAL; INTRAMUSCULAR; INTRAVENOUS; SOFT TISSUE at 10:47

## 2024-01-19 RX ADMIN — SODIUM CHLORIDE, PRESERVATIVE FREE 10 ML: 5 INJECTION INTRAVENOUS at 07:15

## 2024-01-19 RX ADMIN — FLUOROURACIL 5375 MG: 50 INJECTION, SOLUTION INTRAVENOUS at 13:05

## 2024-01-19 RX ADMIN — FOSAPREPITANT 150 MG: 150 INJECTION, POWDER, LYOPHILIZED, FOR SOLUTION INTRAVENOUS at 10:22

## 2024-01-19 ASSESSMENT — PATIENT HEALTH QUESTIONNAIRE - PHQ9
SUM OF ALL RESPONSES TO PHQ QUESTIONS 1-9: 0
SUM OF ALL RESPONSES TO PHQ9 QUESTIONS 1 & 2: 0
1. LITTLE INTEREST OR PLEASURE IN DOING THINGS: 0
2. FEELING DOWN, DEPRESSED OR HOPELESS: 0
SUM OF ALL RESPONSES TO PHQ QUESTIONS 1-9: 0

## 2024-01-19 NOTE — PATIENT INSTRUCTIONS
Patient Instructions from Today's Visit    Reason for Visit:  Follow Up    Diagnosis Information:  https://www.cancer.net/about-us/asco-answers-patient-education-materials/ybka-tmfgapl-gbwc-sheets    Plan:  Labs reviewed.  Symptoms reviewed.  CT scan reviewed.  We will switch treatments to every 3 weeks, if you would like.  We will give you IV magnesium and some insulin today in infusion.    Follow Up:  In 3 weeks for next treatment.    Recent Lab Results:  Hospital Outpatient Visit on 01/19/2024   Component Date Value Ref Range Status    Magnesium 01/19/2024 1.7 (L)  1.8 - 2.4 mg/dL Final    Sodium 01/19/2024 139  136 - 146 mmol/L Final    Potassium 01/19/2024 3.6  3.5 - 5.1 mmol/L Final    Chloride 01/19/2024 109  103 - 113 mmol/L Final    CO2 01/19/2024 26  21 - 32 mmol/L Final    Anion Gap 01/19/2024 4  2 - 11 mmol/L Final    Glucose 01/19/2024 313 (H)  65 - 100 mg/dL Final    BUN 01/19/2024 12  8 - 23 MG/DL Final    Creatinine 01/19/2024 1.00  0.8 - 1.5 MG/DL Final    Est, Glom Filt Rate 01/19/2024 >60  >60 ml/min/1.73m2 Final    Comment:    Pediatric calculator link: https://www.kidney.org/professionals/kdoqi/gfr_calculatorped     These results are not intended for use in patients <18 years of age.     eGFR results are calculated without a race factor using  the 2021 CKD-EPI equation. Careful clinical correlation is recommended, particularly when comparing to results calculated using previous equations.  The CKD-EPI equation is less accurate in patients with extremes of muscle mass, extra-renal metabolism of creatinine, excessive creatine ingestion, or following therapy that affects renal tubular secretion.      Calcium 01/19/2024 7.9 (L)  8.3 - 10.4 MG/DL Final    Total Bilirubin 01/19/2024 0.3  0.2 - 1.1 MG/DL Final    ALT 01/19/2024 33  12 - 65 U/L Final    AST 01/19/2024 24  15 - 37 U/L Final    Alk Phosphatase 01/19/2024 164 (H)  50 - 136 U/L Final    Total Protein 01/19/2024 6.2 (L)  6.3 - 8.2 g/dL

## 2024-01-19 NOTE — PROGRESS NOTES
Arrived to the Infusion Center.  C39D1 FOLFIRI+2g Mg+5U insulin completed. Patient tolerated well.   Any issues or concerns during appointment: none.  Patient aware of next infusion appointment on 1/22/24 (date) at 0800 (time).  Patient aware of next lab and [facility] office visit on 2/8/24 (date) at 0845 (time).  Patient instructed to call provider with temperature of 100.4 or greater or nausea/vomiting/ diarrhea or pain not controlled by medications  Discharged home ambulatory with pump. Tubing pulled through clamps with clamps in closed position. Checked by second RN.

## 2024-01-22 ENCOUNTER — HOSPITAL ENCOUNTER (OUTPATIENT)
Dept: INFUSION THERAPY | Age: 64
Discharge: HOME | End: 2024-01-22
Payer: COMMERCIAL

## 2024-01-22 VITALS
OXYGEN SATURATION: 95 % | RESPIRATION RATE: 16 BRPM | TEMPERATURE: 98.1 F | DIASTOLIC BLOOD PRESSURE: 79 MMHG | SYSTOLIC BLOOD PRESSURE: 135 MMHG | HEART RATE: 99 BPM

## 2024-01-22 DIAGNOSIS — C78.6 MALIGNANT NEOPLASM METASTATIC TO PERITONEUM (HCC): Primary | ICD-10-CM

## 2024-01-22 DIAGNOSIS — R73.9 HYPERGLYCEMIA: ICD-10-CM

## 2024-01-22 DIAGNOSIS — C76.2 ABDOMINAL CARCINOMATOSIS (HCC): ICD-10-CM

## 2024-01-22 DIAGNOSIS — E83.42 HYPOMAGNESEMIA: ICD-10-CM

## 2024-01-22 PROCEDURE — 2580000003 HC RX 258: Performed by: INTERNAL MEDICINE

## 2024-01-22 PROCEDURE — 96523 IRRIG DRUG DELIVERY DEVICE: CPT

## 2024-01-22 RX ORDER — SODIUM CHLORIDE 0.9 % (FLUSH) 0.9 %
5-40 SYRINGE (ML) INJECTION PRN
Status: DISCONTINUED | OUTPATIENT
Start: 2024-01-22 | End: 2024-01-23 | Stop reason: HOSPADM

## 2024-01-22 RX ADMIN — SODIUM CHLORIDE, PRESERVATIVE FREE 10 ML: 5 INJECTION INTRAVENOUS at 08:14

## 2024-01-22 NOTE — PROGRESS NOTES
///Arrived to the Infusion Center.  Pump D/ C completed.   Patient instructed to report any side affects to ordering provider.  Patient tolerated well.   Any issues or concerns during appointment: none.  Patient aware of next infusion appointment on 02/09/2024 (date) at 0800 (time).  Discharged ambulatory.

## 2024-02-06 DIAGNOSIS — E83.42 HYPOMAGNESEMIA: Primary | ICD-10-CM

## 2024-02-06 DIAGNOSIS — C78.6 MALIGNANT NEOPLASM METASTATIC TO PERITONEUM (HCC): ICD-10-CM

## 2024-02-06 DIAGNOSIS — C76.2 ABDOMINAL CARCINOMATOSIS (HCC): ICD-10-CM

## 2024-02-06 DIAGNOSIS — R73.9 HYPERGLYCEMIA: ICD-10-CM

## 2024-02-08 ENCOUNTER — HOSPITAL ENCOUNTER (OUTPATIENT)
Dept: LAB | Age: 64
Discharge: HOME | End: 2024-02-08
Payer: COMMERCIAL

## 2024-02-08 ENCOUNTER — OFFICE VISIT (OUTPATIENT)
Dept: ONCOLOGY | Age: 64
End: 2024-02-08
Payer: COMMERCIAL

## 2024-02-08 VITALS
HEIGHT: 70 IN | RESPIRATION RATE: 18 BRPM | TEMPERATURE: 98 F | DIASTOLIC BLOOD PRESSURE: 78 MMHG | WEIGHT: 237.1 LBS | SYSTOLIC BLOOD PRESSURE: 115 MMHG | BODY MASS INDEX: 33.94 KG/M2 | OXYGEN SATURATION: 95 % | HEART RATE: 92 BPM

## 2024-02-08 DIAGNOSIS — C76.2 ABDOMINAL CARCINOMATOSIS (HCC): ICD-10-CM

## 2024-02-08 DIAGNOSIS — E83.42 HYPOMAGNESEMIA: ICD-10-CM

## 2024-02-08 DIAGNOSIS — R73.9 HYPERGLYCEMIA: ICD-10-CM

## 2024-02-08 DIAGNOSIS — C78.6 MALIGNANT NEOPLASM METASTATIC TO PERITONEUM (HCC): ICD-10-CM

## 2024-02-08 DIAGNOSIS — C16.9 MALIGNANT NEOPLASM OF STOMACH, UNSPECIFIED LOCATION (HCC): Primary | ICD-10-CM

## 2024-02-08 LAB
ALBUMIN SERPL-MCNC: 3.4 G/DL (ref 3.2–4.6)
ALBUMIN/GLOB SERPL: 1.1 (ref 0.4–1.6)
ALP SERPL-CCNC: 181 U/L (ref 50–136)
ALT SERPL-CCNC: 31 U/L (ref 12–65)
ANION GAP SERPL CALC-SCNC: 7 MMOL/L (ref 2–11)
AST SERPL-CCNC: 23 U/L (ref 15–37)
BASOPHILS # BLD: 0.1 K/UL (ref 0–0.2)
BASOPHILS NFR BLD: 1 % (ref 0–2)
BILIRUB SERPL-MCNC: 0.4 MG/DL (ref 0.2–1.1)
BUN SERPL-MCNC: 12 MG/DL (ref 8–23)
CALCIUM SERPL-MCNC: 8.9 MG/DL (ref 8.3–10.4)
CEA SERPL-MCNC: 2.7 NG/ML (ref 0–3)
CHLORIDE SERPL-SCNC: 106 MMOL/L (ref 103–113)
CO2 SERPL-SCNC: 26 MMOL/L (ref 21–32)
CREAT SERPL-MCNC: 1.2 MG/DL (ref 0.8–1.5)
DIFFERENTIAL METHOD BLD: ABNORMAL
EOSINOPHIL # BLD: 0.2 K/UL (ref 0–0.8)
EOSINOPHIL NFR BLD: 4 % (ref 0.5–7.8)
ERYTHROCYTE [DISTWIDTH] IN BLOOD BY AUTOMATED COUNT: 14.6 % (ref 11.9–14.6)
GLOBULIN SER CALC-MCNC: 3 G/DL (ref 2.8–4.5)
GLUCOSE SERPL-MCNC: 368 MG/DL (ref 65–100)
HCT VFR BLD AUTO: 39 % (ref 41.1–50.3)
HGB BLD-MCNC: 12.6 G/DL (ref 13.6–17.2)
IMM GRANULOCYTES # BLD AUTO: 0 K/UL (ref 0–0.5)
IMM GRANULOCYTES NFR BLD AUTO: 0 % (ref 0–5)
LYMPHOCYTES # BLD: 1.7 K/UL (ref 0.5–4.6)
LYMPHOCYTES NFR BLD: 46 % (ref 13–44)
MAGNESIUM SERPL-MCNC: 1.9 MG/DL (ref 1.8–2.4)
MCH RBC QN AUTO: 29.8 PG (ref 26.1–32.9)
MCHC RBC AUTO-ENTMCNC: 32.3 G/DL (ref 31.4–35)
MCV RBC AUTO: 92.2 FL (ref 82–102)
MONOCYTES # BLD: 0.4 K/UL (ref 0.1–1.3)
MONOCYTES NFR BLD: 11 % (ref 4–12)
NEUTS SEG # BLD: 1.5 K/UL (ref 1.7–8.2)
NEUTS SEG NFR BLD: 38 % (ref 43–78)
NRBC # BLD: 0 K/UL (ref 0–0.2)
PLATELET # BLD AUTO: 160 K/UL (ref 150–450)
PMV BLD AUTO: 10.8 FL (ref 9.4–12.3)
POTASSIUM SERPL-SCNC: 3.9 MMOL/L (ref 3.5–5.1)
PROT SERPL-MCNC: 6.4 G/DL (ref 6.3–8.2)
RBC # BLD AUTO: 4.23 M/UL (ref 4.23–5.6)
SODIUM SERPL-SCNC: 139 MMOL/L (ref 136–146)
WBC # BLD AUTO: 3.8 K/UL (ref 4.3–11.1)

## 2024-02-08 PROCEDURE — 2580000003 HC RX 258: Performed by: INTERNAL MEDICINE

## 2024-02-08 PROCEDURE — 82378 CARCINOEMBRYONIC ANTIGEN: CPT

## 2024-02-08 PROCEDURE — 3078F DIAST BP <80 MM HG: CPT | Performed by: NURSE PRACTITIONER

## 2024-02-08 PROCEDURE — 3074F SYST BP LT 130 MM HG: CPT | Performed by: NURSE PRACTITIONER

## 2024-02-08 PROCEDURE — 36591 DRAW BLOOD OFF VENOUS DEVICE: CPT

## 2024-02-08 PROCEDURE — 83735 ASSAY OF MAGNESIUM: CPT

## 2024-02-08 PROCEDURE — 80053 COMPREHEN METABOLIC PANEL: CPT

## 2024-02-08 PROCEDURE — 99214 OFFICE O/P EST MOD 30 MIN: CPT | Performed by: NURSE PRACTITIONER

## 2024-02-08 PROCEDURE — 85025 COMPLETE CBC W/AUTO DIFF WBC: CPT

## 2024-02-08 RX ORDER — HEPARIN 100 UNIT/ML
500 SYRINGE INTRAVENOUS PRN
OUTPATIENT
Start: 2024-02-11

## 2024-02-08 RX ORDER — SODIUM CHLORIDE 9 MG/ML
5-250 INJECTION, SOLUTION INTRAVENOUS PRN
OUTPATIENT
Start: 2024-02-11

## 2024-02-08 RX ORDER — SODIUM CHLORIDE 0.9 % (FLUSH) 0.9 %
5-40 SYRINGE (ML) INJECTION PRN
OUTPATIENT
Start: 2024-02-11

## 2024-02-08 RX ORDER — SODIUM CHLORIDE 0.9 % (FLUSH) 0.9 %
5-40 SYRINGE (ML) INJECTION PRN
Status: ACTIVE | OUTPATIENT
Start: 2024-02-08 | End: 2024-02-08

## 2024-02-08 RX ORDER — ATROPINE SULFATE 0.4 MG/ML
0.4 INJECTION, SOLUTION INTRAMUSCULAR; INTRAVENOUS; SUBCUTANEOUS
OUTPATIENT
Start: 2024-02-09

## 2024-02-08 RX ORDER — SODIUM CHLORIDE 0.9 % (FLUSH) 0.9 %
5-40 SYRINGE (ML) INJECTION PRN
OUTPATIENT
Start: 2024-02-09

## 2024-02-08 RX ORDER — ATROPINE SULFATE 0.4 MG/ML
0.4 INJECTION, SOLUTION INTRAMUSCULAR; INTRAVENOUS; SUBCUTANEOUS ONCE
Status: CANCELLED | OUTPATIENT
Start: 2024-02-09 | End: 2024-02-09

## 2024-02-08 RX ORDER — HEPARIN 100 UNIT/ML
500 SYRINGE INTRAVENOUS PRN
OUTPATIENT
Start: 2024-02-09

## 2024-02-08 RX ORDER — DEXTROSE MONOHYDRATE 50 MG/ML
5-250 INJECTION, SOLUTION INTRAVENOUS PRN
Status: CANCELLED | OUTPATIENT
Start: 2024-02-09

## 2024-02-08 RX ORDER — ACETAMINOPHEN 325 MG/1
650 TABLET ORAL
Status: CANCELLED | OUTPATIENT
Start: 2024-02-09

## 2024-02-08 RX ORDER — SODIUM CHLORIDE 0.9 % (FLUSH) 0.9 %
5-40 SYRINGE (ML) INJECTION PRN
Status: CANCELLED | OUTPATIENT
Start: 2024-02-09

## 2024-02-08 RX ORDER — FLUOROURACIL 50 MG/ML
400 INJECTION, SOLUTION INTRAVENOUS ONCE
Status: CANCELLED | OUTPATIENT
Start: 2024-02-09 | End: 2024-02-09

## 2024-02-08 RX ORDER — EPINEPHRINE 1 MG/ML
0.3 INJECTION, SOLUTION, CONCENTRATE INTRAVENOUS PRN
Status: CANCELLED | OUTPATIENT
Start: 2024-02-09

## 2024-02-08 RX ORDER — SODIUM CHLORIDE 9 MG/ML
5-250 INJECTION, SOLUTION INTRAVENOUS PRN
OUTPATIENT
Start: 2024-02-09

## 2024-02-08 RX ORDER — MEPERIDINE HYDROCHLORIDE 25 MG/ML
12.5 INJECTION INTRAMUSCULAR; INTRAVENOUS; SUBCUTANEOUS PRN
Status: CANCELLED | OUTPATIENT
Start: 2024-02-09

## 2024-02-08 RX ORDER — DIPHENHYDRAMINE HYDROCHLORIDE 50 MG/ML
50 INJECTION INTRAMUSCULAR; INTRAVENOUS
Status: CANCELLED | OUTPATIENT
Start: 2024-02-09

## 2024-02-08 RX ORDER — SODIUM CHLORIDE 9 MG/ML
INJECTION, SOLUTION INTRAVENOUS CONTINUOUS
OUTPATIENT
Start: 2024-02-09

## 2024-02-08 RX ORDER — ALBUTEROL SULFATE 90 UG/1
4 AEROSOL, METERED RESPIRATORY (INHALATION) PRN
Status: CANCELLED | OUTPATIENT
Start: 2024-02-09

## 2024-02-08 RX ORDER — ONDANSETRON 2 MG/ML
8 INJECTION INTRAMUSCULAR; INTRAVENOUS ONCE
Status: CANCELLED | OUTPATIENT
Start: 2024-02-09 | End: 2024-02-09

## 2024-02-08 RX ORDER — ONDANSETRON 2 MG/ML
8 INJECTION INTRAMUSCULAR; INTRAVENOUS
Status: CANCELLED | OUTPATIENT
Start: 2024-02-09

## 2024-02-08 RX ADMIN — SODIUM CHLORIDE, PRESERVATIVE FREE 10 ML: 5 INJECTION INTRAVENOUS at 08:50

## 2024-02-08 ASSESSMENT — ENCOUNTER SYMPTOMS
CONSTIPATION: 0
NAUSEA: 0
HEMOPTYSIS: 0
SCLERAL ICTERUS: 0
ABDOMINAL DISTENTION: 0
DIARRHEA: 0
BACK PAIN: 0
COUGH: 0
EYE PROBLEMS: 0
SORE THROAT: 0
VOMITING: 0
SHORTNESS OF BREATH: 0
BLOOD IN STOOL: 0

## 2024-02-08 NOTE — PROGRESS NOTES
[facility] Hematology and Oncology: Established patient - follow up     Chief Complaint   Patient presents with    Follow-up     Reason for Referral: Abdominal pain; peritoneal metastatic disease  Referring Provider: Mo Dalton NP  Primary Care Provider: Oscar Nelson MD  Family History of Cancer/Hematologic Disorders: Family history is significant for sister with breast cancer.   Presenting Symptoms: Progressively worsening, persistent abdominal pain x several months    History of Present Illness:  Mr. Guevara  is a 63 y.o. male who presents today for FU regarding adenocarcinoma with peritoneal metastatic disease.  The past medical history is significant for tobacco use (recent pack per day smoker x 30+ years - now down to 1/3PPD), PUD, paresthesia/pain of bilateral upper extremities, HLD, HTN, diverticulitis,  colon polyps, hiatal hernia, chronic right shoulder pain, bilateral carpal tunnel syndrome, and partial colon resection.  He presented to the [facility] ED on 5/12/22 with a complaint of persistent abdominal pain for several months that was becoming progressively  worse.  EGD and colonoscopy on 2/25/22 revealed findings of hiatal hernia, gastric polyps, several benign colon polyps, diverticulosis, and internal hemorrhoids.  CT of the abdomen on 3/8/22 showed no acute findings.  He presented to [facility] ER x 2 for  evaluation (5/3/22 and 5/10/22).  RUQ abdominal ultrasound was completed on 5/3/22 in the ED at [facility] demonstrating no acute findings to explain patient's pain; probable hepatic  steatosis; small echogenic mural foci in the gallbladder which are nonmobile, likely representing cholesterol polyps, largest measuring 4 mm; and small volume simple ascites in the right upper quadrant and left lower quadrant, nonspecific.  CT AP with  contrast at [facility] ED 5/10/22 showed a partial small bowel obstruction; small to moderate amount of free fluid in the abdomen and pelvis; and nonspecific stranding of

## 2024-02-09 ENCOUNTER — HOSPITAL ENCOUNTER (OUTPATIENT)
Dept: INFUSION THERAPY | Age: 64
Setting detail: INFUSION SERIES
Discharge: HOME | End: 2024-02-09
Payer: COMMERCIAL

## 2024-02-09 VITALS
OXYGEN SATURATION: 95 % | TEMPERATURE: 97.7 F | SYSTOLIC BLOOD PRESSURE: 129 MMHG | DIASTOLIC BLOOD PRESSURE: 85 MMHG | HEART RATE: 90 BPM | WEIGHT: 238 LBS | RESPIRATION RATE: 16 BRPM | BODY MASS INDEX: 34.15 KG/M2

## 2024-02-09 DIAGNOSIS — C78.6 MALIGNANT NEOPLASM METASTATIC TO PERITONEUM (HCC): ICD-10-CM

## 2024-02-09 DIAGNOSIS — C76.2 ABDOMINAL CARCINOMATOSIS (HCC): Primary | ICD-10-CM

## 2024-02-09 PROCEDURE — 96413 CHEMO IV INFUSION 1 HR: CPT

## 2024-02-09 PROCEDURE — 96368 THER/DIAG CONCURRENT INF: CPT

## 2024-02-09 PROCEDURE — 96372 THER/PROPH/DIAG INJ SC/IM: CPT

## 2024-02-09 PROCEDURE — 6360000002 HC RX W HCPCS: Performed by: NURSE PRACTITIONER

## 2024-02-09 PROCEDURE — 96411 CHEMO IV PUSH ADDL DRUG: CPT

## 2024-02-09 PROCEDURE — 96375 TX/PRO/DX INJ NEW DRUG ADDON: CPT

## 2024-02-09 PROCEDURE — 96367 TX/PROPH/DG ADDL SEQ IV INF: CPT

## 2024-02-09 PROCEDURE — G0498 CHEMO EXTEND IV INFUS W/PUMP: HCPCS

## 2024-02-09 PROCEDURE — 2580000003 HC RX 258: Performed by: NURSE PRACTITIONER

## 2024-02-09 RX ORDER — SODIUM CHLORIDE 0.9 % (FLUSH) 0.9 %
5-40 SYRINGE (ML) INJECTION PRN
Status: DISCONTINUED | OUTPATIENT
Start: 2024-02-09 | End: 2024-02-10 | Stop reason: HOSPADM

## 2024-02-09 RX ORDER — ATROPINE SULFATE 0.4 MG/ML
0.4 INJECTION, SOLUTION INTRAVENOUS ONCE
Status: COMPLETED | OUTPATIENT
Start: 2024-02-09 | End: 2024-02-09

## 2024-02-09 RX ORDER — ACETAMINOPHEN 325 MG/1
650 TABLET ORAL
Status: DISCONTINUED | OUTPATIENT
Start: 2024-02-09 | End: 2024-02-10 | Stop reason: HOSPADM

## 2024-02-09 RX ORDER — ALBUTEROL SULFATE 90 UG/1
4 AEROSOL, METERED RESPIRATORY (INHALATION) PRN
Status: DISCONTINUED | OUTPATIENT
Start: 2024-02-09 | End: 2024-02-10 | Stop reason: HOSPADM

## 2024-02-09 RX ORDER — FLUOROURACIL 50 MG/ML
400 INJECTION, SOLUTION INTRAVENOUS ONCE
Status: COMPLETED | OUTPATIENT
Start: 2024-02-09 | End: 2024-02-09

## 2024-02-09 RX ORDER — DEXTROSE MONOHYDRATE 50 MG/ML
5-250 INJECTION, SOLUTION INTRAVENOUS PRN
Status: DISCONTINUED | OUTPATIENT
Start: 2024-02-09 | End: 2024-02-10 | Stop reason: HOSPADM

## 2024-02-09 RX ORDER — DIPHENHYDRAMINE HYDROCHLORIDE 50 MG/ML
50 INJECTION INTRAMUSCULAR; INTRAVENOUS
Status: DISCONTINUED | OUTPATIENT
Start: 2024-02-09 | End: 2024-02-10 | Stop reason: HOSPADM

## 2024-02-09 RX ORDER — ONDANSETRON 2 MG/ML
8 INJECTION INTRAMUSCULAR; INTRAVENOUS ONCE
Status: COMPLETED | OUTPATIENT
Start: 2024-02-09 | End: 2024-02-09

## 2024-02-09 RX ORDER — EPINEPHRINE 1 MG/ML
0.3 INJECTION, SOLUTION, CONCENTRATE INTRAVENOUS PRN
Status: DISCONTINUED | OUTPATIENT
Start: 2024-02-09 | End: 2024-02-10 | Stop reason: HOSPADM

## 2024-02-09 RX ORDER — ONDANSETRON 2 MG/ML
8 INJECTION INTRAMUSCULAR; INTRAVENOUS
Status: DISCONTINUED | OUTPATIENT
Start: 2024-02-09 | End: 2024-02-10 | Stop reason: HOSPADM

## 2024-02-09 RX ORDER — MEPERIDINE HYDROCHLORIDE 25 MG/ML
12.5 INJECTION INTRAMUSCULAR; INTRAVENOUS; SUBCUTANEOUS PRN
Status: DISCONTINUED | OUTPATIENT
Start: 2024-02-09 | End: 2024-02-10 | Stop reason: HOSPADM

## 2024-02-09 RX ADMIN — SODIUM CHLORIDE, PRESERVATIVE FREE 10 ML: 5 INJECTION INTRAVENOUS at 08:29

## 2024-02-09 RX ADMIN — DEXAMETHASONE SODIUM PHOSPHATE 12 MG: 4 INJECTION INTRA-ARTICULAR; INTRALESIONAL; INTRAMUSCULAR; INTRAVENOUS; SOFT TISSUE at 08:40

## 2024-02-09 RX ADMIN — ATROPINE SULFATE 0.4 MG: 0.4 INJECTION, SOLUTION INTRAVENOUS at 09:00

## 2024-02-09 RX ADMIN — FLUOROURACIL 900 MG: 50 INJECTION, SOLUTION INTRAVENOUS at 11:10

## 2024-02-09 RX ADMIN — DEXTROSE MONOHYDRATE 40 ML/HR: 50 INJECTION, SOLUTION INTRAVENOUS at 08:30

## 2024-02-09 RX ADMIN — LEUCOVORIN CALCIUM 900 MG: 200 INJECTION, POWDER, LYOPHILIZED, FOR SUSPENSION INTRAMUSCULAR; INTRAVENOUS at 09:30

## 2024-02-09 RX ADMIN — SODIUM CHLORIDE 150 MG: 900 INJECTION, SOLUTION INTRAVENOUS at 09:02

## 2024-02-09 RX ADMIN — ONDANSETRON 8 MG: 2 INJECTION INTRAMUSCULAR; INTRAVENOUS at 08:37

## 2024-02-09 RX ADMIN — IRINOTECAN HYDROCHLORIDE 340 MG: 20 INJECTION, SOLUTION INTRAVENOUS at 09:31

## 2024-02-09 RX ADMIN — FLUOROURACIL 5375 MG: 50 INJECTION, SOLUTION INTRAVENOUS at 11:15

## 2024-02-09 NOTE — PROGRESS NOTES
Arrived to the Infusion Center.  FOLFIRI completed. Patient tolerated well. Patient discharged with adrucil pump infusing. Connections verified by Sharmila Wade RN.  Patient aware of next infusion appointment on 2/12/2024 (date) at 0800 (time).  Patient aware of next lab and [facility] office visit on 3/1/2024 (date) at 0830 (time).  Patient instructed to call provider with temperature of 100.4 or greater or nausea/vomiting/ diarrhea or pain not controlled by medications  Discharged ambulatory.

## 2024-02-12 ENCOUNTER — HOSPITAL ENCOUNTER (OUTPATIENT)
Dept: INFUSION THERAPY | Age: 64
Discharge: HOME | End: 2024-02-12
Payer: COMMERCIAL

## 2024-02-12 VITALS
DIASTOLIC BLOOD PRESSURE: 73 MMHG | HEART RATE: 94 BPM | TEMPERATURE: 98 F | OXYGEN SATURATION: 94 % | SYSTOLIC BLOOD PRESSURE: 116 MMHG | RESPIRATION RATE: 16 BRPM

## 2024-02-12 DIAGNOSIS — C76.2 ABDOMINAL CARCINOMATOSIS (HCC): Primary | ICD-10-CM

## 2024-02-12 DIAGNOSIS — C78.6 MALIGNANT NEOPLASM METASTATIC TO PERITONEUM (HCC): ICD-10-CM

## 2024-02-12 PROCEDURE — 2580000003 HC RX 258: Performed by: NURSE PRACTITIONER

## 2024-02-12 PROCEDURE — 96523 IRRIG DRUG DELIVERY DEVICE: CPT

## 2024-02-12 RX ORDER — SODIUM CHLORIDE 0.9 % (FLUSH) 0.9 %
5-40 SYRINGE (ML) INJECTION PRN
Status: DISCONTINUED | OUTPATIENT
Start: 2024-02-12 | End: 2024-02-13 | Stop reason: HOSPADM

## 2024-02-12 RX ADMIN — SODIUM CHLORIDE, PRESERVATIVE FREE 10 ML: 5 INJECTION INTRAVENOUS at 08:00

## 2024-02-12 NOTE — PROGRESS NOTES
Arrived to the Infusion Center.  Adrucil pump completed. Unable to complete on MAR because pump was placed on 2/9/24  Provided education on oral hydration    Patient instructed to report any side affects to ordering provider.  Patient tolerated without problems.   Any issues or concerns during appointment: no.  Patient aware of next infusion appointment on 3/1/24 (date) at 1030 (time).  Discharged ambulatory.

## 2024-03-01 ENCOUNTER — PATIENT MESSAGE (OUTPATIENT)
Dept: INTERNAL MEDICINE CLINIC | Facility: CLINIC | Age: 64
End: 2024-03-01

## 2024-03-01 ENCOUNTER — HOSPITAL ENCOUNTER (OUTPATIENT)
Dept: INFUSION THERAPY | Age: 64
Discharge: HOME | End: 2024-03-01
Payer: COMMERCIAL

## 2024-03-01 ENCOUNTER — OFFICE VISIT (OUTPATIENT)
Dept: ONCOLOGY | Age: 64
End: 2024-03-01
Payer: COMMERCIAL

## 2024-03-01 ENCOUNTER — CLINICAL DOCUMENTATION (OUTPATIENT)
Dept: ONCOLOGY | Age: 64
End: 2024-03-01

## 2024-03-01 ENCOUNTER — HOSPITAL ENCOUNTER (OUTPATIENT)
Dept: LAB | Age: 64
End: 2024-03-01
Payer: COMMERCIAL

## 2024-03-01 VITALS
TEMPERATURE: 97.8 F | HEART RATE: 85 BPM | RESPIRATION RATE: 16 BRPM | DIASTOLIC BLOOD PRESSURE: 81 MMHG | HEIGHT: 70 IN | SYSTOLIC BLOOD PRESSURE: 118 MMHG | BODY MASS INDEX: 33.83 KG/M2 | WEIGHT: 236.3 LBS | OXYGEN SATURATION: 94 %

## 2024-03-01 DIAGNOSIS — R73.9 HYPERGLYCEMIA: ICD-10-CM

## 2024-03-01 DIAGNOSIS — C76.2 ABDOMINAL CARCINOMATOSIS (HCC): Primary | ICD-10-CM

## 2024-03-01 DIAGNOSIS — C76.2 ABDOMINAL CARCINOMATOSIS (HCC): ICD-10-CM

## 2024-03-01 DIAGNOSIS — C78.6 MALIGNANT NEOPLASM METASTATIC TO PERITONEUM (HCC): ICD-10-CM

## 2024-03-01 DIAGNOSIS — T45.1X5A NEUROPATHY DUE TO CHEMOTHERAPEUTIC DRUG (HCC): ICD-10-CM

## 2024-03-01 DIAGNOSIS — R73.9 HYPERGLYCEMIA: Primary | ICD-10-CM

## 2024-03-01 DIAGNOSIS — C16.9 MALIGNANT NEOPLASM OF STOMACH, UNSPECIFIED LOCATION (HCC): Primary | ICD-10-CM

## 2024-03-01 DIAGNOSIS — G62.0 NEUROPATHY DUE TO CHEMOTHERAPEUTIC DRUG (HCC): ICD-10-CM

## 2024-03-01 DIAGNOSIS — E83.42 HYPOMAGNESEMIA: ICD-10-CM

## 2024-03-01 LAB
ALBUMIN SERPL-MCNC: 3.4 G/DL (ref 3.2–4.6)
ALBUMIN/GLOB SERPL: 1 (ref 0.4–1.6)
ALP SERPL-CCNC: 197 U/L (ref 50–136)
ALT SERPL-CCNC: 33 U/L (ref 12–65)
ANION GAP SERPL CALC-SCNC: 5 MMOL/L (ref 2–11)
AST SERPL-CCNC: 25 U/L (ref 15–37)
BASOPHILS # BLD: 0.1 K/UL (ref 0–0.2)
BASOPHILS NFR BLD: 2 % (ref 0–2)
BILIRUB SERPL-MCNC: 0.4 MG/DL (ref 0.2–1.1)
BUN SERPL-MCNC: 8 MG/DL (ref 8–23)
CALCIUM SERPL-MCNC: 8.7 MG/DL (ref 8.3–10.4)
CEA SERPL-MCNC: 3 NG/ML (ref 0–3)
CHLORIDE SERPL-SCNC: 106 MMOL/L (ref 103–113)
CO2 SERPL-SCNC: 26 MMOL/L (ref 21–32)
CREAT SERPL-MCNC: 1 MG/DL (ref 0.8–1.5)
DIFFERENTIAL METHOD BLD: ABNORMAL
EOSINOPHIL # BLD: 0.3 K/UL (ref 0–0.8)
EOSINOPHIL NFR BLD: 7 % (ref 0.5–7.8)
ERYTHROCYTE [DISTWIDTH] IN BLOOD BY AUTOMATED COUNT: 13.9 % (ref 11.9–14.6)
GLOBULIN SER CALC-MCNC: 3.3 G/DL (ref 2.8–4.5)
GLUCOSE SERPL-MCNC: 397 MG/DL (ref 65–100)
GLUCOSE SERPL-MCNC: 465 MG/DL (ref 65–100)
HCT VFR BLD AUTO: 39.2 % (ref 41.1–50.3)
HGB BLD-MCNC: 12.6 G/DL (ref 13.6–17.2)
IMM GRANULOCYTES # BLD AUTO: 0 K/UL (ref 0–0.5)
IMM GRANULOCYTES NFR BLD AUTO: 0 % (ref 0–5)
LYMPHOCYTES # BLD: 1.6 K/UL (ref 0.5–4.6)
LYMPHOCYTES NFR BLD: 39 % (ref 13–44)
MAGNESIUM SERPL-MCNC: 1.8 MG/DL (ref 1.8–2.4)
MCH RBC QN AUTO: 29.4 PG (ref 26.1–32.9)
MCHC RBC AUTO-ENTMCNC: 32.1 G/DL (ref 31.4–35)
MCV RBC AUTO: 91.4 FL (ref 82–102)
MONOCYTES # BLD: 0.4 K/UL (ref 0.1–1.3)
MONOCYTES NFR BLD: 9 % (ref 4–12)
NEUTS SEG # BLD: 1.8 K/UL (ref 1.7–8.2)
NEUTS SEG NFR BLD: 43 % (ref 43–78)
NRBC # BLD: 0 K/UL (ref 0–0.2)
PLATELET # BLD AUTO: 183 K/UL (ref 150–450)
PMV BLD AUTO: 11 FL (ref 9.4–12.3)
POTASSIUM SERPL-SCNC: 3.9 MMOL/L (ref 3.5–5.1)
PROT SERPL-MCNC: 6.7 G/DL (ref 6.3–8.2)
RBC # BLD AUTO: 4.29 M/UL (ref 4.23–5.6)
SODIUM SERPL-SCNC: 137 MMOL/L (ref 136–146)
WBC # BLD AUTO: 4.1 K/UL (ref 4.3–11.1)

## 2024-03-01 PROCEDURE — 83735 ASSAY OF MAGNESIUM: CPT

## 2024-03-01 PROCEDURE — 6360000002 HC RX W HCPCS: Performed by: NURSE PRACTITIONER

## 2024-03-01 PROCEDURE — 96368 THER/DIAG CONCURRENT INF: CPT

## 2024-03-01 PROCEDURE — 82378 CARCINOEMBRYONIC ANTIGEN: CPT

## 2024-03-01 PROCEDURE — 96367 TX/PROPH/DG ADDL SEQ IV INF: CPT

## 2024-03-01 PROCEDURE — 85025 COMPLETE CBC W/AUTO DIFF WBC: CPT

## 2024-03-01 PROCEDURE — G0498 CHEMO EXTEND IV INFUS W/PUMP: HCPCS

## 2024-03-01 PROCEDURE — 82947 ASSAY GLUCOSE BLOOD QUANT: CPT

## 2024-03-01 PROCEDURE — 96413 CHEMO IV INFUSION 1 HR: CPT

## 2024-03-01 PROCEDURE — 6370000000 HC RX 637 (ALT 250 FOR IP): Performed by: NURSE PRACTITIONER

## 2024-03-01 PROCEDURE — 96372 THER/PROPH/DIAG INJ SC/IM: CPT

## 2024-03-01 PROCEDURE — 99214 OFFICE O/P EST MOD 30 MIN: CPT | Performed by: NURSE PRACTITIONER

## 2024-03-01 PROCEDURE — 3079F DIAST BP 80-89 MM HG: CPT | Performed by: NURSE PRACTITIONER

## 2024-03-01 PROCEDURE — 2580000003 HC RX 258: Performed by: NURSE PRACTITIONER

## 2024-03-01 PROCEDURE — 80053 COMPREHEN METABOLIC PANEL: CPT

## 2024-03-01 PROCEDURE — 96375 TX/PRO/DX INJ NEW DRUG ADDON: CPT

## 2024-03-01 PROCEDURE — 3074F SYST BP LT 130 MM HG: CPT | Performed by: NURSE PRACTITIONER

## 2024-03-01 PROCEDURE — 96411 CHEMO IV PUSH ADDL DRUG: CPT

## 2024-03-01 PROCEDURE — 96361 HYDRATE IV INFUSION ADD-ON: CPT

## 2024-03-01 PROCEDURE — 2580000003 HC RX 258: Performed by: INTERNAL MEDICINE

## 2024-03-01 PROCEDURE — 96415 CHEMO IV INFUSION ADDL HR: CPT

## 2024-03-01 RX ORDER — SODIUM CHLORIDE 9 MG/ML
5-250 INJECTION, SOLUTION INTRAVENOUS PRN
Status: CANCELLED | OUTPATIENT
Start: 2024-03-01

## 2024-03-01 RX ORDER — ACETAMINOPHEN 325 MG/1
650 TABLET ORAL
Status: DISCONTINUED | OUTPATIENT
Start: 2024-03-01 | End: 2024-03-02 | Stop reason: HOSPADM

## 2024-03-01 RX ORDER — ONDANSETRON 2 MG/ML
8 INJECTION INTRAMUSCULAR; INTRAVENOUS
Status: CANCELLED | OUTPATIENT
Start: 2024-03-01

## 2024-03-01 RX ORDER — HEPARIN 100 UNIT/ML
500 SYRINGE INTRAVENOUS PRN
Status: CANCELLED | OUTPATIENT
Start: 2024-03-01

## 2024-03-01 RX ORDER — DEXTROSE MONOHYDRATE 50 MG/ML
5-250 INJECTION, SOLUTION INTRAVENOUS PRN
Status: CANCELLED | OUTPATIENT
Start: 2024-03-01

## 2024-03-01 RX ORDER — HEPARIN 100 UNIT/ML
500 SYRINGE INTRAVENOUS PRN
OUTPATIENT
Start: 2024-03-03

## 2024-03-01 RX ORDER — SODIUM CHLORIDE 0.9 % (FLUSH) 0.9 %
5-40 SYRINGE (ML) INJECTION PRN
Status: DISCONTINUED | OUTPATIENT
Start: 2024-03-01 | End: 2024-03-05 | Stop reason: HOSPADM

## 2024-03-01 RX ORDER — ALBUTEROL SULFATE 90 UG/1
4 AEROSOL, METERED RESPIRATORY (INHALATION) PRN
Status: DISCONTINUED | OUTPATIENT
Start: 2024-03-01 | End: 2024-03-02 | Stop reason: HOSPADM

## 2024-03-01 RX ORDER — MEPERIDINE HYDROCHLORIDE 25 MG/ML
12.5 INJECTION INTRAMUSCULAR; INTRAVENOUS; SUBCUTANEOUS PRN
Status: CANCELLED | OUTPATIENT
Start: 2024-03-01

## 2024-03-01 RX ORDER — ONDANSETRON 2 MG/ML
8 INJECTION INTRAMUSCULAR; INTRAVENOUS ONCE
Status: COMPLETED | OUTPATIENT
Start: 2024-03-01 | End: 2024-03-01

## 2024-03-01 RX ORDER — ATROPINE SULFATE 0.4 MG/ML
0.4 INJECTION, SOLUTION INTRAMUSCULAR; INTRAVENOUS; SUBCUTANEOUS
Status: CANCELLED | OUTPATIENT
Start: 2024-03-01

## 2024-03-01 RX ORDER — SODIUM CHLORIDE 0.9 % (FLUSH) 0.9 %
5-40 SYRINGE (ML) INJECTION PRN
OUTPATIENT
Start: 2024-03-03

## 2024-03-01 RX ORDER — SODIUM CHLORIDE 0.9 % (FLUSH) 0.9 %
5-40 SYRINGE (ML) INJECTION PRN
Status: CANCELLED | OUTPATIENT
Start: 2024-03-01

## 2024-03-01 RX ORDER — ATROPINE SULFATE 0.4 MG/ML
0.4 INJECTION, SOLUTION INTRAVENOUS ONCE
Status: COMPLETED | OUTPATIENT
Start: 2024-03-01 | End: 2024-03-01

## 2024-03-01 RX ORDER — ALBUTEROL SULFATE 90 UG/1
4 AEROSOL, METERED RESPIRATORY (INHALATION) PRN
Status: CANCELLED | OUTPATIENT
Start: 2024-03-01

## 2024-03-01 RX ORDER — SODIUM CHLORIDE 9 MG/ML
INJECTION, SOLUTION INTRAVENOUS CONTINUOUS
Status: CANCELLED | OUTPATIENT
Start: 2024-03-01

## 2024-03-01 RX ORDER — EPINEPHRINE 1 MG/ML
0.3 INJECTION, SOLUTION, CONCENTRATE INTRAVENOUS PRN
Status: DISCONTINUED | OUTPATIENT
Start: 2024-03-01 | End: 2024-03-02 | Stop reason: HOSPADM

## 2024-03-01 RX ORDER — FLUOROURACIL 50 MG/ML
400 INJECTION, SOLUTION INTRAVENOUS ONCE
Status: CANCELLED | OUTPATIENT
Start: 2024-03-01 | End: 2024-03-01

## 2024-03-01 RX ORDER — FLUOROURACIL 50 MG/ML
400 INJECTION, SOLUTION INTRAVENOUS ONCE
Status: COMPLETED | OUTPATIENT
Start: 2024-03-01 | End: 2024-03-01

## 2024-03-01 RX ORDER — 0.9 % SODIUM CHLORIDE 0.9 %
500 INTRAVENOUS SOLUTION INTRAVENOUS ONCE
Status: CANCELLED
Start: 2024-03-01

## 2024-03-01 RX ORDER — ONDANSETRON 2 MG/ML
8 INJECTION INTRAMUSCULAR; INTRAVENOUS ONCE
Status: CANCELLED | OUTPATIENT
Start: 2024-03-01 | End: 2024-03-01

## 2024-03-01 RX ORDER — DIPHENHYDRAMINE HYDROCHLORIDE 50 MG/ML
50 INJECTION INTRAMUSCULAR; INTRAVENOUS
Status: CANCELLED | OUTPATIENT
Start: 2024-03-01

## 2024-03-01 RX ORDER — SODIUM CHLORIDE 9 MG/ML
INJECTION, SOLUTION INTRAVENOUS CONTINUOUS
Status: DISCONTINUED | OUTPATIENT
Start: 2024-03-01 | End: 2024-03-02 | Stop reason: HOSPADM

## 2024-03-01 RX ORDER — SODIUM CHLORIDE 0.9 % (FLUSH) 0.9 %
5-40 SYRINGE (ML) INJECTION PRN
Status: DISCONTINUED | OUTPATIENT
Start: 2024-03-01 | End: 2024-03-02 | Stop reason: HOSPADM

## 2024-03-01 RX ORDER — 0.9 % SODIUM CHLORIDE 0.9 %
500 INTRAVENOUS SOLUTION INTRAVENOUS ONCE
Status: COMPLETED | OUTPATIENT
Start: 2024-03-01 | End: 2024-03-01

## 2024-03-01 RX ORDER — ONDANSETRON 2 MG/ML
8 INJECTION INTRAMUSCULAR; INTRAVENOUS
Status: DISCONTINUED | OUTPATIENT
Start: 2024-03-01 | End: 2024-03-02 | Stop reason: HOSPADM

## 2024-03-01 RX ORDER — DEXTROSE MONOHYDRATE 50 MG/ML
5-250 INJECTION, SOLUTION INTRAVENOUS PRN
Status: DISCONTINUED | OUTPATIENT
Start: 2024-03-01 | End: 2024-03-02 | Stop reason: HOSPADM

## 2024-03-01 RX ORDER — ATROPINE SULFATE 0.4 MG/ML
0.4 INJECTION, SOLUTION INTRAMUSCULAR; INTRAVENOUS; SUBCUTANEOUS ONCE
Status: CANCELLED | OUTPATIENT
Start: 2024-03-01 | End: 2024-03-01

## 2024-03-01 RX ORDER — EPINEPHRINE 1 MG/ML
0.3 INJECTION, SOLUTION, CONCENTRATE INTRAVENOUS PRN
Status: CANCELLED | OUTPATIENT
Start: 2024-03-01

## 2024-03-01 RX ORDER — SODIUM CHLORIDE 9 MG/ML
5-250 INJECTION, SOLUTION INTRAVENOUS PRN
OUTPATIENT
Start: 2024-03-03

## 2024-03-01 RX ORDER — ACETAMINOPHEN 325 MG/1
650 TABLET ORAL
Status: CANCELLED | OUTPATIENT
Start: 2024-03-01

## 2024-03-01 RX ORDER — DIPHENHYDRAMINE HYDROCHLORIDE 50 MG/ML
50 INJECTION INTRAMUSCULAR; INTRAVENOUS
Status: DISCONTINUED | OUTPATIENT
Start: 2024-03-01 | End: 2024-03-02 | Stop reason: HOSPADM

## 2024-03-01 RX ORDER — MEPERIDINE HYDROCHLORIDE 25 MG/ML
12.5 INJECTION INTRAMUSCULAR; INTRAVENOUS; SUBCUTANEOUS PRN
Status: DISCONTINUED | OUTPATIENT
Start: 2024-03-01 | End: 2024-03-02 | Stop reason: HOSPADM

## 2024-03-01 RX ADMIN — FLUOROURACIL 900 MG: 50 INJECTION, SOLUTION INTRAVENOUS at 13:49

## 2024-03-01 RX ADMIN — DEXAMETHASONE SODIUM PHOSPHATE 12 MG: 4 INJECTION, SOLUTION INTRAMUSCULAR; INTRAVENOUS at 11:23

## 2024-03-01 RX ADMIN — DEXTROSE MONOHYDRATE 20 ML/HR: 50 INJECTION, SOLUTION INTRAVENOUS at 12:09

## 2024-03-01 RX ADMIN — FLUOROURACIL 5375 MG: 50 INJECTION, SOLUTION INTRAVENOUS at 13:55

## 2024-03-01 RX ADMIN — IRINOTECAN HYDROCHLORIDE 340 MG: 20 INJECTION, SOLUTION INTRAVENOUS at 12:14

## 2024-03-01 RX ADMIN — SODIUM CHLORIDE, PRESERVATIVE FREE 10 ML: 5 INJECTION INTRAVENOUS at 08:25

## 2024-03-01 RX ADMIN — SODIUM CHLORIDE, PRESERVATIVE FREE 10 ML: 5 INJECTION INTRAVENOUS at 13:54

## 2024-03-01 RX ADMIN — INSULIN HUMAN 10 UNITS: 100 INJECTION, SOLUTION PARENTERAL at 10:39

## 2024-03-01 RX ADMIN — ATROPINE SULFATE 0.4 MG: 0.4 INJECTION, SOLUTION INTRAVENOUS at 11:46

## 2024-03-01 RX ADMIN — ONDANSETRON 8 MG: 2 INJECTION INTRAMUSCULAR; INTRAVENOUS at 11:19

## 2024-03-01 RX ADMIN — SODIUM CHLORIDE 150 MG: 900 INJECTION, SOLUTION INTRAVENOUS at 11:45

## 2024-03-01 RX ADMIN — SODIUM CHLORIDE 500 ML: 9 INJECTION, SOLUTION INTRAVENOUS at 10:19

## 2024-03-01 RX ADMIN — LEUCOVORIN CALCIUM 900 MG: 200 INJECTION, POWDER, LYOPHILIZED, FOR SUSPENSION INTRAMUSCULAR; INTRAVENOUS at 12:12

## 2024-03-01 ASSESSMENT — ENCOUNTER SYMPTOMS
BLOOD IN STOOL: 0
SHORTNESS OF BREATH: 0
SORE THROAT: 0
CONSTIPATION: 0
SCLERAL ICTERUS: 0
NAUSEA: 0
VOMITING: 0
BACK PAIN: 0
DIARRHEA: 0
ABDOMINAL DISTENTION: 0
EYE PROBLEMS: 0
COUGH: 0
HEMOPTYSIS: 0

## 2024-03-01 NOTE — TELEPHONE ENCOUNTER
Antonio Valentine, LPN 3/1/2024 12:56 PM EST      ----- Message -----  From: Denny Guevara  Sent: 3/1/2024 10:46 AM EST  To: *  Subject: Appt needed but question first     Hi,    My sugar level has been high for a while. It was 465 today. The [facility] center told me to see you about my sugar. If you can’t use the blood work from the cancer center then will you schedule more blood work before we meet. I’d like to have the blood work in so I can ask questions if I end up being a diabetic or need to take extra medications or insulin.      Thank you,  Denny Guevara

## 2024-03-01 NOTE — PROGRESS NOTES
Patient to port lab for port access and lab draw. Port accessed using 20g 0.75\" kim needle without difficulty. Labs drawn from port and port flushed. Port remains accessed. Patient tolerated well. Discharged ambulatory.

## 2024-03-01 NOTE — TELEPHONE ENCOUNTER
It does look like his blood sugar has been consistently elevated on past several readings. He needs A1C and a home monitor and an office visit next week. Antonio suggested he get monitor and check readings over weekend and will discuss with Dr Vrenon on Monday regarding follow up

## 2024-03-01 NOTE — TELEPHONE ENCOUNTER
I spoke with pt and spouse. She said that he started chemo tx the first of 2022 and that his blood sugar has been being elevated since June 2023 in the 300's. Now he has had 2 times that he has been high and he had to have Insulin before the treatments. Today was 465 and they ran insulin through his IV line. She said that he does not have DM. They are asking for an appointment and labs with Dr Vernon. She is out of the office this week and does not have any openings at this time for next week. Please advise.

## 2024-03-01 NOTE — TELEPHONE ENCOUNTER
Please call pt and get more information and set up appt with . HE will need A1c and CMP before he see Dr. Vernon

## 2024-03-01 NOTE — PROGRESS NOTES
Critical lab of glucose= received from laboratory, read back and verified. Reported to KIRSTEN Cuellar NP.

## 2024-03-01 NOTE — PROGRESS NOTES
2    pantoprazole (PROTONIX) 40 MG tablet Take 1 tablet by mouth every morning (before breakfast) 90 tablet 3    ferrous sulfate (IRON 325) 325 (65 Fe) MG tablet Take 1 tablet by mouth three times a week      rosuvastatin (CRESTOR) 10 MG tablet Take 1 tablet by mouth daily 90 tablet 3    Magic Mouthwash (MIRACLE MOUTHWASH) Swish and spit 5 mLs 4 times daily as needed for Irritation (Use 5 ml 4 times daily as needed for mouth irritation/mouth sores.) 240 mL 0    Docusate Calcium (STOOL SOFTENER PO) Take 1 capsule by mouth in the morning and at bedtime      B Complex-C (SUPER B COMPLEX PO) Take 1 tablet by mouth daily      busPIRone (BUSPAR) 7.5 MG tablet Take 1 tablet by mouth daily (Patient taking differently: Take 1 tablet by mouth daily Only on chemo days) 90 tablet 0    Multiple Vitamins-Minerals (MULTIVITAMIN MEN 50+ PO) Take 1 tablet by mouth daily      omeprazole (PRILOSEC) 20 MG delayed release capsule Take 1 capsule by mouth daily (Patient not taking: Reported on 1/5/2024)       No current facility-administered medications for this visit.     Facility-Administered Medications Ordered in Other Visits   Medication Dose Route Frequency Provider Last Rate Last Admin    sodium chloride flush 0.9 % injection 5-40 mL  5-40 mL IntraVENous PRN Barbara Bennett MD   10 mL at 03/01/24 0825     OBJECTIVE:  /81   Pulse 85   Temp 97.8 °F (36.6 °C) (Oral)   Resp 16   Ht 1.778 m (5' 10\")   Wt 107.2 kg (236 lb 4.8 oz)   SpO2 94%   BMI 33.91 kg/m²     ECOG PERFORMANCE STATUS - 1- Restricted in physically strenuous activity but ambulatory and able to carry out work of a light or sedentary nature such as light house work, office work.   Pain - Pain Score:   0 - No pain (fatigue 0)0 - No pain/10. None/Minimal pain - not affecting QOL     Physical Exam  Vitals reviewed.   Constitutional:       General: He is not in acute distress.     Appearance: He is normal weight. He is not toxic-appearing.   HENT:      Head:

## 2024-03-01 NOTE — PROGRESS NOTES
Arrived to the Infusion Center.  Assessment complete, labs reviewed. Folfiri completed. Patient tolerated without problems.. Flurouracil  elastomeric pump connected to infuse over 46 hours at 5ml/hr.  Clamps unclamped x 2 and verified with Katherine rn  Pump placed at 1355  Any issues or concerns during appointment: Glucose 465 on arrival with 10 units of regular insulin and 500 cc NS blous given Glucose rechecked at 397 Tiffanie NP made aware no new orders received  Patient instructed to call Dr Bennett  with temperature of 100.4 or greater or nausea/vomiting/ diarrhea or pain not controlled by medications  Instructed to call provider with any side effects or other concerns  Patient aware of next infusion appointment on 3/4/24(date) at 8 AM (time).  Discharged ambulatory with family

## 2024-03-04 ENCOUNTER — OFFICE VISIT (OUTPATIENT)
Dept: INTERNAL MEDICINE CLINIC | Facility: CLINIC | Age: 64
End: 2024-03-04
Payer: COMMERCIAL

## 2024-03-04 ENCOUNTER — FOLLOWUP TELEPHONE ENCOUNTER (OUTPATIENT)
Dept: DIABETES SERVICES | Age: 64
End: 2024-03-04

## 2024-03-04 ENCOUNTER — PATIENT MESSAGE (OUTPATIENT)
Dept: INTERNAL MEDICINE CLINIC | Facility: CLINIC | Age: 64
End: 2024-03-04

## 2024-03-04 ENCOUNTER — HOSPITAL ENCOUNTER (OUTPATIENT)
Dept: INFUSION THERAPY | Age: 64
Discharge: HOME | End: 2024-03-04
Payer: COMMERCIAL

## 2024-03-04 VITALS
SYSTOLIC BLOOD PRESSURE: 122 MMHG | DIASTOLIC BLOOD PRESSURE: 78 MMHG | HEART RATE: 92 BPM | OXYGEN SATURATION: 96 % | TEMPERATURE: 98 F | RESPIRATION RATE: 18 BRPM

## 2024-03-04 DIAGNOSIS — E11.65 TYPE 2 DIABETES MELLITUS WITH HYPERGLYCEMIA, UNSPECIFIED WHETHER LONG TERM INSULIN USE (HCC): Primary | ICD-10-CM

## 2024-03-04 DIAGNOSIS — J41.1 MUCOPURULENT CHRONIC BRONCHITIS (HCC): ICD-10-CM

## 2024-03-04 PROCEDURE — 99214 OFFICE O/P EST MOD 30 MIN: CPT | Performed by: STUDENT IN AN ORGANIZED HEALTH CARE EDUCATION/TRAINING PROGRAM

## 2024-03-04 PROCEDURE — 96523 IRRIG DRUG DELIVERY DEVICE: CPT

## 2024-03-04 PROCEDURE — 2580000003 HC RX 258: Performed by: INTERNAL MEDICINE

## 2024-03-04 RX ORDER — BLOOD-GLUCOSE METER
1 KIT MISCELLANEOUS DAILY
Qty: 1 KIT | Refills: 0 | Status: SHIPPED | OUTPATIENT
Start: 2024-03-04

## 2024-03-04 RX ORDER — INSULIN GLARGINE 100 [IU]/ML
5 INJECTION, SOLUTION SUBCUTANEOUS NIGHTLY
Qty: 3 ADJUSTABLE DOSE PRE-FILLED PEN SYRINGE | Refills: 2 | Status: SHIPPED | OUTPATIENT
Start: 2024-03-04 | End: 2024-03-04

## 2024-03-04 RX ORDER — SODIUM CHLORIDE 0.9 % (FLUSH) 0.9 %
10 SYRINGE (ML) INJECTION PRN
Status: DISCONTINUED | OUTPATIENT
Start: 2024-03-04 | End: 2024-03-05 | Stop reason: HOSPADM

## 2024-03-04 RX ORDER — INSULIN GLARGINE 100 [IU]/ML
INJECTION, SOLUTION SUBCUTANEOUS
Qty: 3 ADJUSTABLE DOSE PRE-FILLED PEN SYRINGE | Refills: 2 | Status: SHIPPED | OUTPATIENT
Start: 2024-03-04

## 2024-03-04 RX ADMIN — SODIUM CHLORIDE, PRESERVATIVE FREE 10 ML: 5 INJECTION INTRAVENOUS at 08:07

## 2024-03-04 ASSESSMENT — PATIENT HEALTH QUESTIONNAIRE - PHQ9
2. FEELING DOWN, DEPRESSED OR HOPELESS: 0
9. THOUGHTS THAT YOU WOULD BE BETTER OFF DEAD, OR OF HURTING YOURSELF: 0
3. TROUBLE FALLING OR STAYING ASLEEP: 0
SUM OF ALL RESPONSES TO PHQ9 QUESTIONS 1 & 2: 0
7. TROUBLE CONCENTRATING ON THINGS, SUCH AS READING THE NEWSPAPER OR WATCHING TELEVISION: 0
SUM OF ALL RESPONSES TO PHQ QUESTIONS 1-9: 3
SUM OF ALL RESPONSES TO PHQ QUESTIONS 1-9: 3
6. FEELING BAD ABOUT YOURSELF - OR THAT YOU ARE A FAILURE OR HAVE LET YOURSELF OR YOUR FAMILY DOWN: 0
5. POOR APPETITE OR OVEREATING: 0
4. FEELING TIRED OR HAVING LITTLE ENERGY: 3
SUM OF ALL RESPONSES TO PHQ QUESTIONS 1-9: 3
8. MOVING OR SPEAKING SO SLOWLY THAT OTHER PEOPLE COULD HAVE NOTICED. OR THE OPPOSITE, BEING SO FIGETY OR RESTLESS THAT YOU HAVE BEEN MOVING AROUND A LOT MORE THAN USUAL: 0
SUM OF ALL RESPONSES TO PHQ QUESTIONS 1-9: 3
10. IF YOU CHECKED OFF ANY PROBLEMS, HOW DIFFICULT HAVE THESE PROBLEMS MADE IT FOR YOU TO DO YOUR WORK, TAKE CARE OF THINGS AT HOME, OR GET ALONG WITH OTHER PEOPLE: 0
1. LITTLE INTEREST OR PLEASURE IN DOING THINGS: 0

## 2024-03-04 NOTE — PROGRESS NOTES
Arrived to the Infusion Center.  Continuous chemo; elastomeric pump removed. Patient tolerated well.   Any issues or concerns during appointment: none.  Patient instructed to call provider with temperature of 100.4 or greater or nausea/vomiting/ diarrhea or pain not controlled by medications.  Discharged ambulatory.

## 2024-03-04 NOTE — PROGRESS NOTES
History   Problem Relation Age of Onset    No Known Problems Brother     Cancer Sister         breast    Hypertension Mother     Heart Disease Mother     Diabetes Mother     Diabetes Father     Osteoarthritis Father     Alcohol Abuse Father     Hypertension Father      Social History     Tobacco Use   Smoking Status Former    Current packs/day: 0.00    Average packs/day: 1 pack/day for 45.0 years (45.0 ttl pk-yrs)    Types: Cigarettes    Start date: 1977    Quit date: 2022    Years since quittin.7    Passive exposure: Past   Smokeless Tobacco Never      Social History     Substance and Sexual Activity   Alcohol Use No      Social History     Substance and Sexual Activity   Drug Use No      Allergies as of 2024    (No Known Allergies)       Review of Systems    Objective:    There were no vitals filed for this visit.     Physical Exam  Constitutional:       General: He is not in acute distress.     Appearance: Normal appearance.   HENT:      Head: Normocephalic and atraumatic.   Eyes:      Extraocular Movements: Extraocular movements intact.      Conjunctiva/sclera: Conjunctivae normal.   Cardiovascular:      Rate and Rhythm: Normal rate and regular rhythm.      Heart sounds: No murmur heard.  Pulmonary:      Effort: Pulmonary effort is normal.      Breath sounds: Normal breath sounds. No wheezing, rhonchi or rales.   Skin:     General: Skin is warm and dry.   Neurological:      Mental Status: He is alert. Mental status is at baseline.   Psychiatric:         Mood and Affect: Mood normal.         Behavior: Behavior normal.         Assessent & Plan    1. Type 2 diabetes mellitus with hyperglycemia, unspecified whether long term insulin use (HCC)  -     Hemoglobin A1C; Future  -     Urinalysis; Future  -     Microalbumin / Creatinine Urine Ratio; Future  -     glucose monitoring kit; DAILY Starting Mon 3/4/2024, Disp-1 kit, R-0, Normal  -     insulin glargine (SEMGLEE) 100 UNIT/ML injection pen;

## 2024-03-08 ENCOUNTER — TELEPHONE (OUTPATIENT)
Dept: INTERNAL MEDICINE CLINIC | Facility: CLINIC | Age: 64
End: 2024-03-08

## 2024-03-11 ENCOUNTER — FOLLOWUP TELEPHONE ENCOUNTER (OUTPATIENT)
Dept: DIABETES SERVICES | Age: 64
End: 2024-03-11

## 2024-03-11 NOTE — PATIENT INSTRUCTIONS
----- Message from Kylee Lara sent at 3/11/2024 10:06 AM CDT -----  Contact: Mom [phone]  a phone call.  Who left a message:  Mom   Do they know what this is regarding:  Mom is calling to receive an appt for the pt for today for a sore throat/cough and/congested. Please call back to advise.  Would they like a phone call back or a response via [facility]:  call back     Spoke to mom to inform we have no available appointments today. Mom said ok thank you good bye.     Patient Instructions from Today's Visit    Reason for Visit:  Prechemo visit    Diagnosis Information:  https://www.[employer]/. net/about-us/asco-answers-patient-education-materials/zipx-yjkfetk-tesd-sheets      Plan:  -Ct prior to next infusion  -Hold chemo today due to low ANC    Follow Up: In 2 weeks    Recent Lab Results:  Hospital Outpatient Visit on 09/30/2022   Component Date Value Ref Range Status    WBC 09/30/2022 2.8 (A)  4.3 - 11.1 K/uL Final    Comment: RESULTS CHECKED X 2  PERIPHERAL REVIEW TO FOLLOW      RBC 09/30/2022 3.65 (A)  4.23 - 5.6 M/uL Final    Hemoglobin 09/30/2022 10.3 (A)  13.6 - 17.2 g/dL Final    Hematocrit 09/30/2022 33.1  % Final    MCV 09/30/2022 90.7  79.6 - 97.8 FL Final    MCH 09/30/2022 28.2  26.1 - 32.9 PG Final    MCHC 09/30/2022 31.1 (A)  31.4 - 35.0 g/dL Final    RDW 09/30/2022 15.7 (A)  11.9 - 14.6 % Final    Platelets 64/80/4313 200  150 - 450 K/uL Final    MPV 09/30/2022 10.7  9.4 - 12.3 FL Final    nRBC 09/30/2022 0.00  0.0 - 0.2 K/uL Final    **Note: Absolute NRBC parameter is now reported with Hemogram**    Differential Type 09/30/2022 PENDING   Incomplete    Sodium 09/30/2022 PENDING  mmol/L Incomplete    Potassium 09/30/2022 PENDING  mmol/L Incomplete    Chloride 09/30/2022 PENDING  mmol/L Incomplete    CO2 09/30/2022 PENDING  mmol/L Incomplete    Anion Gap 09/30/2022 PENDING  mmol/L Incomplete    Glucose 09/30/2022 135 (A)  65 - 100 mg/dL Final    BUN 09/30/2022 14  8 - 23 MG/DL Final    Creatinine 09/30/2022 0.70 (A)  0.8 - 1.5 MG/DL Final    GFR  09/30/2022 >60  >60 ml/min/1.73m2 Final    GFR Non- 09/30/2022 >60  >60 ml/min/1.73m2 Final    Comment:      Estimated GFR is calculated using the Modification of Diet in Renal Disease (MDRD) Study equation, reported for both  Americans (GFRAA) and non- Americans (GFRNA), and normalized to 1.73m2 body surface area. The physician must decide which value applies to the patient.   The MDRD study equation should only be used in individuals age 25 or older. It has not been validated for the following: pregnant women, patients with serious comorbid conditions,or on certain medications, or persons with extremes of body size, muscle mass, or nutritional status. Calcium 09/30/2022 8.6  8.3 - 10.4 MG/DL Final    Total Bilirubin 09/30/2022 0.2  0.2 - 1.1 MG/DL Final    ALT 09/30/2022 94 (A)  12 - 65 U/L Final    AST 09/30/2022 30  15 - 37 U/L Final    Alk Phosphatase 09/30/2022 135  50 - 136 U/L Final    Total Protein 09/30/2022 6.3  6.3 - 8.2 g/dL Final    Albumin 09/30/2022 2.9 (A)  3.2 - 4.6 g/dL Final    Globulin 09/30/2022 3.4  2.3 - 3.5 g/dL Final    Albumin/Globulin Ratio 09/30/2022 0.9 (A)  1.2 - 3.5   Final    Magnesium 09/30/2022 2.2  1.8 - 2.4 mg/dL Final        Treatment Summary has been discussed and given to patient: n/a        -------------------------------------------------------------------------------------------------------------------  Please call our office at [phone] if you have any  of the following symptoms:   Fever of 100.5 or greater  Chills  Shortness of breath  Swelling or pain in one leg    After office hours an answering service is available and will contact a provider for emergencies or if you are experiencing any of the above symptoms. Patient does express an interest in My Chart. My Chart log in information explained on the after visit summary printout at the [facility] desk.     Sol Johnson RN  Nurse Navigator  [address] [place] [id number]  [phone]

## 2024-03-18 PROBLEM — E11.9 TYPE 2 DIABETES MELLITUS (HCC): Status: ACTIVE | Noted: 2024-03-18

## 2024-03-18 PROBLEM — J30.2 SEASONAL ALLERGIES: Status: ACTIVE | Noted: 2024-03-18

## 2024-03-19 ENCOUNTER — TELEPHONE (OUTPATIENT)
Dept: INTERNAL MEDICINE CLINIC | Facility: CLINIC | Age: 64
End: 2024-03-19

## 2024-03-19 NOTE — TELEPHONE ENCOUNTER
LVM for pt to callback and schedule f/u after VV from 3/18    Checkout notes: Return in about 4 weeks (around 4/15/2024) for routine follow up, may be virtual.

## 2024-03-22 ENCOUNTER — HOSPITAL ENCOUNTER (OUTPATIENT)
Dept: INFUSION THERAPY | Age: 64
Discharge: HOME | End: 2024-03-22
Payer: COMMERCIAL

## 2024-03-22 ENCOUNTER — HOSPITAL ENCOUNTER (OUTPATIENT)
Dept: LAB | Age: 64
Discharge: HOME | End: 2024-03-22
Payer: COMMERCIAL

## 2024-03-22 ENCOUNTER — OFFICE VISIT (OUTPATIENT)
Dept: ONCOLOGY | Age: 64
End: 2024-03-22
Payer: COMMERCIAL

## 2024-03-22 VITALS
OXYGEN SATURATION: 95 % | HEART RATE: 87 BPM | SYSTOLIC BLOOD PRESSURE: 127 MMHG | DIASTOLIC BLOOD PRESSURE: 69 MMHG | BODY MASS INDEX: 33.5 KG/M2 | RESPIRATION RATE: 22 BRPM | HEIGHT: 70 IN | TEMPERATURE: 98 F | WEIGHT: 234 LBS

## 2024-03-22 DIAGNOSIS — G62.0 NEUROPATHY DUE TO CHEMOTHERAPEUTIC DRUG (HCC): ICD-10-CM

## 2024-03-22 DIAGNOSIS — C78.6 MALIGNANT NEOPLASM METASTATIC TO PERITONEUM (HCC): ICD-10-CM

## 2024-03-22 DIAGNOSIS — C76.2 ABDOMINAL CARCINOMATOSIS (HCC): ICD-10-CM

## 2024-03-22 DIAGNOSIS — C16.9 MALIGNANT NEOPLASM OF STOMACH, UNSPECIFIED LOCATION (HCC): Primary | ICD-10-CM

## 2024-03-22 DIAGNOSIS — R53.83 FATIGUE DUE TO TREATMENT: ICD-10-CM

## 2024-03-22 DIAGNOSIS — E11.65 TYPE 2 DIABETES MELLITUS WITH HYPERGLYCEMIA, UNSPECIFIED WHETHER LONG TERM INSULIN USE (HCC): ICD-10-CM

## 2024-03-22 DIAGNOSIS — T45.1X5A NEUROPATHY DUE TO CHEMOTHERAPEUTIC DRUG (HCC): ICD-10-CM

## 2024-03-22 DIAGNOSIS — I82.721 CHRONIC DEEP VEIN THROMBOSIS (DVT) OF RIGHT UPPER EXTREMITY, UNSPECIFIED VEIN (HCC): ICD-10-CM

## 2024-03-22 DIAGNOSIS — D70.1 CHEMOTHERAPY INDUCED NEUTROPENIA (HCC): ICD-10-CM

## 2024-03-22 DIAGNOSIS — R73.9 HYPERGLYCEMIA: ICD-10-CM

## 2024-03-22 DIAGNOSIS — C76.2 ABDOMINAL CARCINOMATOSIS (HCC): Primary | ICD-10-CM

## 2024-03-22 DIAGNOSIS — E83.42 HYPOMAGNESEMIA: ICD-10-CM

## 2024-03-22 DIAGNOSIS — T45.1X5A CHEMOTHERAPY INDUCED NEUTROPENIA (HCC): ICD-10-CM

## 2024-03-22 LAB
ALBUMIN SERPL-MCNC: 3.4 G/DL (ref 3.2–4.6)
ALBUMIN/GLOB SERPL: 1.1 (ref 0.4–1.6)
ALP SERPL-CCNC: 156 U/L (ref 50–136)
ALT SERPL-CCNC: 27 U/L (ref 12–65)
ANION GAP SERPL CALC-SCNC: 4 MMOL/L (ref 2–11)
APPEARANCE UR: CLEAR
APPEARANCE UR: CLEAR
AST SERPL-CCNC: 24 U/L (ref 15–37)
BASOPHILS # BLD: 0.1 K/UL (ref 0–0.2)
BASOPHILS NFR BLD: 2 % (ref 0–2)
BILIRUB SERPL-MCNC: 0.4 MG/DL (ref 0.2–1.1)
BILIRUB UR QL: NEGATIVE
BILIRUB UR QL: NEGATIVE
BUN SERPL-MCNC: 10 MG/DL (ref 8–23)
CALCIUM SERPL-MCNC: 8.7 MG/DL (ref 8.3–10.4)
CEA SERPL-MCNC: 2.1 NG/ML (ref 0–3)
CHLORIDE SERPL-SCNC: 109 MMOL/L (ref 103–113)
CO2 SERPL-SCNC: 26 MMOL/L (ref 21–32)
COLOR UR: YELLOW
COLOR UR: YELLOW
CREAT SERPL-MCNC: 0.9 MG/DL (ref 0.8–1.5)
CREAT UR-MCNC: 78 MG/DL
DIFFERENTIAL METHOD BLD: ABNORMAL
EOSINOPHIL # BLD: 0.2 K/UL (ref 0–0.8)
EOSINOPHIL NFR BLD: 5 % (ref 0.5–7.8)
ERYTHROCYTE [DISTWIDTH] IN BLOOD BY AUTOMATED COUNT: 13.7 % (ref 11.9–14.6)
EST. AVERAGE GLUCOSE BLD GHB EST-MCNC: 235 MG/DL
GLOBULIN SER CALC-MCNC: 3.2 G/DL (ref 2.8–4.5)
GLUCOSE SERPL-MCNC: 246 MG/DL (ref 65–100)
GLUCOSE UR STRIP.AUTO-MCNC: 250 MG/DL
GLUCOSE UR STRIP.AUTO-MCNC: 250 MG/DL
HBA1C MFR BLD: 9.8 % (ref 4.8–5.6)
HCT VFR BLD AUTO: 40.2 % (ref 41.1–50.3)
HGB BLD-MCNC: 13.2 G/DL (ref 13.6–17.2)
HGB UR QL STRIP: NEGATIVE
HGB UR QL STRIP: NEGATIVE
IMM GRANULOCYTES # BLD AUTO: 0 K/UL (ref 0–0.5)
IMM GRANULOCYTES NFR BLD AUTO: 0 % (ref 0–5)
KETONES UR QL STRIP.AUTO: NEGATIVE MG/DL
KETONES UR QL STRIP.AUTO: NEGATIVE MG/DL
LEUKOCYTE ESTERASE UR QL STRIP.AUTO: NEGATIVE
LEUKOCYTE ESTERASE UR QL STRIP.AUTO: NEGATIVE
LYMPHOCYTES # BLD: 1.6 K/UL (ref 0.5–4.6)
LYMPHOCYTES NFR BLD: 50 % (ref 13–44)
MAGNESIUM SERPL-MCNC: 2.1 MG/DL (ref 1.8–2.4)
MCH RBC QN AUTO: 29.3 PG (ref 26.1–32.9)
MCHC RBC AUTO-ENTMCNC: 32.8 G/DL (ref 31.4–35)
MCV RBC AUTO: 89.3 FL (ref 82–102)
MICROALBUMIN UR-MCNC: 0.6 MG/DL
MICROALBUMIN/CREAT UR-RTO: 8 MG/G (ref 0–30)
MONOCYTES # BLD: 0.4 K/UL (ref 0.1–1.3)
MONOCYTES NFR BLD: 12 % (ref 4–12)
NEUTS SEG # BLD: 1 K/UL (ref 1.7–8.2)
NEUTS SEG NFR BLD: 31 % (ref 43–78)
NITRITE UR QL STRIP.AUTO: NEGATIVE
NITRITE UR QL STRIP.AUTO: NEGATIVE
NRBC # BLD: 0 K/UL (ref 0–0.2)
PH UR STRIP: 5.5 (ref 5–9)
PH UR STRIP: 5.5 (ref 5–9)
PLATELET # BLD AUTO: 206 K/UL (ref 150–450)
PMV BLD AUTO: 10.9 FL (ref 9.4–12.3)
POTASSIUM SERPL-SCNC: 3.9 MMOL/L (ref 3.5–5.1)
PROT SERPL-MCNC: 6.6 G/DL (ref 6.3–8.2)
PROT UR STRIP-MCNC: NEGATIVE MG/DL
PROT UR STRIP-MCNC: NEGATIVE MG/DL
RBC # BLD AUTO: 4.5 M/UL (ref 4.23–5.6)
SODIUM SERPL-SCNC: 139 MMOL/L (ref 136–146)
SP GR UR REFRACTOMETRY: 1.01 (ref 1–1.02)
SP GR UR REFRACTOMETRY: 1.01 (ref 1–1.02)
UROBILINOGEN UR QL STRIP.AUTO: 0.2 EU/DL
UROBILINOGEN UR QL STRIP.AUTO: 0.2 EU/DL
WBC # BLD AUTO: 3.1 K/UL (ref 4.3–11.1)

## 2024-03-22 PROCEDURE — 2580000003 HC RX 258: Performed by: NURSE PRACTITIONER

## 2024-03-22 PROCEDURE — 3078F DIAST BP <80 MM HG: CPT | Performed by: NURSE PRACTITIONER

## 2024-03-22 PROCEDURE — 99214 OFFICE O/P EST MOD 30 MIN: CPT | Performed by: NURSE PRACTITIONER

## 2024-03-22 PROCEDURE — 96411 CHEMO IV PUSH ADDL DRUG: CPT

## 2024-03-22 PROCEDURE — 96367 TX/PROPH/DG ADDL SEQ IV INF: CPT

## 2024-03-22 PROCEDURE — 82043 UR ALBUMIN QUANTITATIVE: CPT

## 2024-03-22 PROCEDURE — G0498 CHEMO EXTEND IV INFUS W/PUMP: HCPCS

## 2024-03-22 PROCEDURE — 83036 HEMOGLOBIN GLYCOSYLATED A1C: CPT

## 2024-03-22 PROCEDURE — 96368 THER/DIAG CONCURRENT INF: CPT

## 2024-03-22 PROCEDURE — 96413 CHEMO IV INFUSION 1 HR: CPT

## 2024-03-22 PROCEDURE — 6360000002 HC RX W HCPCS: Performed by: NURSE PRACTITIONER

## 2024-03-22 PROCEDURE — 3074F SYST BP LT 130 MM HG: CPT | Performed by: NURSE PRACTITIONER

## 2024-03-22 PROCEDURE — 81003 URINALYSIS AUTO W/O SCOPE: CPT

## 2024-03-22 PROCEDURE — 80053 COMPREHEN METABOLIC PANEL: CPT

## 2024-03-22 PROCEDURE — 82378 CARCINOEMBRYONIC ANTIGEN: CPT

## 2024-03-22 PROCEDURE — 2580000003 HC RX 258: Performed by: INTERNAL MEDICINE

## 2024-03-22 PROCEDURE — 83735 ASSAY OF MAGNESIUM: CPT

## 2024-03-22 PROCEDURE — 82570 ASSAY OF URINE CREATININE: CPT

## 2024-03-22 PROCEDURE — 85025 COMPLETE CBC W/AUTO DIFF WBC: CPT

## 2024-03-22 PROCEDURE — 96375 TX/PRO/DX INJ NEW DRUG ADDON: CPT

## 2024-03-22 PROCEDURE — 96372 THER/PROPH/DIAG INJ SC/IM: CPT

## 2024-03-22 PROCEDURE — 6360000002 HC RX W HCPCS: Performed by: INTERNAL MEDICINE

## 2024-03-22 RX ORDER — ONDANSETRON 2 MG/ML
8 INJECTION INTRAMUSCULAR; INTRAVENOUS ONCE
Status: CANCELLED | OUTPATIENT
Start: 2024-03-22 | End: 2024-03-22

## 2024-03-22 RX ORDER — SODIUM CHLORIDE 9 MG/ML
5-250 INJECTION, SOLUTION INTRAVENOUS PRN
OUTPATIENT
Start: 2024-03-24

## 2024-03-22 RX ORDER — SODIUM CHLORIDE 0.9 % (FLUSH) 0.9 %
5-40 SYRINGE (ML) INJECTION PRN
OUTPATIENT
Start: 2024-03-22

## 2024-03-22 RX ORDER — HEPARIN 100 UNIT/ML
500 SYRINGE INTRAVENOUS PRN
OUTPATIENT
Start: 2024-03-22

## 2024-03-22 RX ORDER — DEXTROSE MONOHYDRATE 50 MG/ML
5-250 INJECTION, SOLUTION INTRAVENOUS PRN
Status: DISCONTINUED | OUTPATIENT
Start: 2024-03-22 | End: 2024-03-23 | Stop reason: HOSPADM

## 2024-03-22 RX ORDER — ACETAMINOPHEN 325 MG/1
650 TABLET ORAL
OUTPATIENT
Start: 2024-03-22

## 2024-03-22 RX ORDER — DIPHENHYDRAMINE HYDROCHLORIDE 50 MG/ML
50 INJECTION INTRAMUSCULAR; INTRAVENOUS
Status: CANCELLED | OUTPATIENT
Start: 2024-03-22

## 2024-03-22 RX ORDER — SODIUM CHLORIDE 0.9 % (FLUSH) 0.9 %
5-40 SYRINGE (ML) INJECTION PRN
Status: DISCONTINUED | OUTPATIENT
Start: 2024-03-22 | End: 2024-03-23 | Stop reason: HOSPADM

## 2024-03-22 RX ORDER — EPINEPHRINE 1 MG/ML
0.3 INJECTION, SOLUTION, CONCENTRATE INTRAVENOUS PRN
OUTPATIENT
Start: 2024-03-22

## 2024-03-22 RX ORDER — ATROPINE SULFATE 0.4 MG/ML
0.4 INJECTION, SOLUTION INTRAMUSCULAR; INTRAVENOUS; SUBCUTANEOUS ONCE
Status: CANCELLED | OUTPATIENT
Start: 2024-03-22 | End: 2024-03-22

## 2024-03-22 RX ORDER — HEPARIN 100 UNIT/ML
500 SYRINGE INTRAVENOUS PRN
OUTPATIENT
Start: 2024-03-24

## 2024-03-22 RX ORDER — FLUOROURACIL 50 MG/ML
400 INJECTION, SOLUTION INTRAVENOUS ONCE
Status: COMPLETED | OUTPATIENT
Start: 2024-03-22 | End: 2024-03-22

## 2024-03-22 RX ORDER — SODIUM CHLORIDE 9 MG/ML
5-250 INJECTION, SOLUTION INTRAVENOUS PRN
OUTPATIENT
Start: 2024-03-22

## 2024-03-22 RX ORDER — ATROPINE SULFATE 0.4 MG/ML
0.4 INJECTION, SOLUTION INTRAMUSCULAR; INTRAVENOUS; SUBCUTANEOUS
OUTPATIENT
Start: 2024-03-22

## 2024-03-22 RX ORDER — SODIUM CHLORIDE 0.9 % (FLUSH) 0.9 %
5-40 SYRINGE (ML) INJECTION PRN
OUTPATIENT
Start: 2024-03-24

## 2024-03-22 RX ORDER — ONDANSETRON 2 MG/ML
8 INJECTION INTRAMUSCULAR; INTRAVENOUS ONCE
Status: COMPLETED | OUTPATIENT
Start: 2024-03-22 | End: 2024-03-22

## 2024-03-22 RX ORDER — INSULIN GLARGINE-YFGN 100 [IU]/ML
INJECTION, SOLUTION SUBCUTANEOUS
COMMUNITY
Start: 2024-03-05

## 2024-03-22 RX ORDER — ONDANSETRON 2 MG/ML
8 INJECTION INTRAMUSCULAR; INTRAVENOUS
OUTPATIENT
Start: 2024-03-22

## 2024-03-22 RX ORDER — DEXTROSE MONOHYDRATE 50 MG/ML
5-250 INJECTION, SOLUTION INTRAVENOUS PRN
Status: CANCELLED | OUTPATIENT
Start: 2024-03-22

## 2024-03-22 RX ORDER — ALBUTEROL SULFATE 90 UG/1
4 AEROSOL, METERED RESPIRATORY (INHALATION) PRN
OUTPATIENT
Start: 2024-03-22

## 2024-03-22 RX ORDER — SODIUM CHLORIDE 9 MG/ML
INJECTION, SOLUTION INTRAVENOUS CONTINUOUS
OUTPATIENT
Start: 2024-03-22

## 2024-03-22 RX ORDER — DIPHENHYDRAMINE HYDROCHLORIDE 50 MG/ML
50 INJECTION INTRAMUSCULAR; INTRAVENOUS
Status: DISCONTINUED | OUTPATIENT
Start: 2024-03-22 | End: 2024-03-23 | Stop reason: HOSPADM

## 2024-03-22 RX ORDER — SODIUM CHLORIDE 0.9 % (FLUSH) 0.9 %
5-40 SYRINGE (ML) INJECTION PRN
Status: DISCONTINUED | OUTPATIENT
Start: 2024-03-22 | End: 2024-03-26 | Stop reason: HOSPADM

## 2024-03-22 RX ORDER — SODIUM CHLORIDE 0.9 % (FLUSH) 0.9 %
5-40 SYRINGE (ML) INJECTION PRN
Status: CANCELLED | OUTPATIENT
Start: 2024-03-22

## 2024-03-22 RX ORDER — ATROPINE SULFATE 0.4 MG/ML
0.4 INJECTION, SOLUTION INTRAVENOUS ONCE
Status: COMPLETED | OUTPATIENT
Start: 2024-03-22 | End: 2024-03-22

## 2024-03-22 RX ORDER — FLUOROURACIL 50 MG/ML
400 INJECTION, SOLUTION INTRAVENOUS ONCE
Status: CANCELLED | OUTPATIENT
Start: 2024-03-22 | End: 2024-03-22

## 2024-03-22 RX ORDER — MEPERIDINE HYDROCHLORIDE 25 MG/ML
12.5 INJECTION INTRAMUSCULAR; INTRAVENOUS; SUBCUTANEOUS PRN
OUTPATIENT
Start: 2024-03-22

## 2024-03-22 RX ORDER — HEPARIN 100 UNIT/ML
300 SYRINGE INTRAVENOUS PRN
Status: DISCONTINUED | OUTPATIENT
Start: 2024-03-22 | End: 2024-03-26 | Stop reason: HOSPADM

## 2024-03-22 RX ADMIN — FLUOROURACIL 900 MG: 50 INJECTION, SOLUTION INTRAVENOUS at 12:35

## 2024-03-22 RX ADMIN — IRINOTECAN HYDROCHLORIDE 340 MG: 20 INJECTION, SOLUTION INTRAVENOUS at 10:58

## 2024-03-22 RX ADMIN — HEPARIN 300 UNITS: 100 SYRINGE at 08:30

## 2024-03-22 RX ADMIN — SODIUM CHLORIDE 150 MG: 900 INJECTION, SOLUTION INTRAVENOUS at 10:24

## 2024-03-22 RX ADMIN — ATROPINE SULFATE 0.4 MG: 0.4 INJECTION, SOLUTION INTRAVENOUS at 10:11

## 2024-03-22 RX ADMIN — DEXAMETHASONE SODIUM PHOSPHATE 12 MG: 4 INJECTION INTRA-ARTICULAR; INTRALESIONAL; INTRAMUSCULAR; INTRAVENOUS; SOFT TISSUE at 10:10

## 2024-03-22 RX ADMIN — SODIUM CHLORIDE, PRESERVATIVE FREE 10 ML: 5 INJECTION INTRAVENOUS at 09:43

## 2024-03-22 RX ADMIN — LEUCOVORIN CALCIUM 900 MG: 200 INJECTION, POWDER, LYOPHILIZED, FOR SUSPENSION INTRAMUSCULAR; INTRAVENOUS at 10:56

## 2024-03-22 RX ADMIN — FLUOROURACIL 5375 MG: 50 INJECTION, SOLUTION INTRAVENOUS at 12:39

## 2024-03-22 RX ADMIN — DEXTROSE MONOHYDRATE 25 ML/HR: 50 INJECTION, SOLUTION INTRAVENOUS at 09:43

## 2024-03-22 RX ADMIN — ONDANSETRON 8 MG: 2 INJECTION INTRAMUSCULAR; INTRAVENOUS at 10:08

## 2024-03-22 RX ADMIN — SODIUM CHLORIDE, PRESERVATIVE FREE 10 ML: 5 INJECTION INTRAVENOUS at 08:30

## 2024-03-22 ASSESSMENT — PATIENT HEALTH QUESTIONNAIRE - PHQ9
SUM OF ALL RESPONSES TO PHQ9 QUESTIONS 1 & 2: 0
2. FEELING DOWN, DEPRESSED OR HOPELESS: NOT AT ALL
SUM OF ALL RESPONSES TO PHQ QUESTIONS 1-9: 0
3. TROUBLE FALLING OR STAYING ASLEEP: NOT AT ALL
1. LITTLE INTEREST OR PLEASURE IN DOING THINGS: NOT AT ALL
SUM OF ALL RESPONSES TO PHQ QUESTIONS 1-9: 0

## 2024-03-22 ASSESSMENT — ENCOUNTER SYMPTOMS
HEMOPTYSIS: 0
SORE THROAT: 0
BACK PAIN: 0
COUGH: 1
NAUSEA: 0
SCLERAL ICTERUS: 0
CONSTIPATION: 0
DIARRHEA: 0
ABDOMINAL DISTENTION: 0
VOMITING: 0
EYE PROBLEMS: 0
BLOOD IN STOOL: 0

## 2024-03-22 NOTE — PROGRESS NOTES
Arrived to the Infusion Center.  FOLFIRI completed. Patient tolerated well.   Any issues or concerns during appointment: none.  Patient aware of next infusion appointment on 3/25 (date) at 8:00 AM (time).  Patient instructed to call provider with temperature of 100.4 or greater or nausea/vomiting/ diarrhea or pain not controlled by medications  Discharged ambulatory with Adrucil CADD pump infusing.  Both clamps unclampled by this RN.

## 2024-03-22 NOTE — PROGRESS NOTES
[facility] Hematology and Oncology: Established patient - follow up     Chief Complaint   Patient presents with    Follow-up     Reason for Referral: Abdominal pain; peritoneal metastatic disease  Referring Provider: Mo Dalton NP  Primary Care Provider: Oscar Nelson MD  Family History of Cancer/Hematologic Disorders: Family history is significant for sister with breast cancer.   Presenting Symptoms: Progressively worsening, persistent abdominal pain x several months    History of Present Illness:  Mr. Guevara  is a 63 y.o. male who presents today for FU regarding adenocarcinoma with peritoneal metastatic disease.  The past medical history is significant for tobacco use (recent pack per day smoker x 30+ years - now down to 1/3PPD), PUD, paresthesia/pain of bilateral upper extremities, HLD, HTN, diverticulitis,  colon polyps, hiatal hernia, chronic right shoulder pain, bilateral carpal tunnel syndrome, and partial colon resection.  He presented to the [facility] ED on 5/12/22 with a complaint of persistent abdominal pain for several months that was becoming progressively  worse.  EGD and colonoscopy on 2/25/22 revealed findings of hiatal hernia, gastric polyps, several benign colon polyps, diverticulosis, and internal hemorrhoids.  CT of the abdomen on 3/8/22 showed no acute findings.  He presented to [facility] ER x 2 for  evaluation (5/3/22 and 5/10/22).  RUQ abdominal ultrasound was completed on 5/3/22 in the ED at [facility] demonstrating no acute findings to explain patient's pain; probable hepatic  steatosis; small echogenic mural foci in the gallbladder which are nonmobile, likely representing cholesterol polyps, largest measuring 4 mm; and small volume simple ascites in the right upper quadrant and left lower quadrant, nonspecific.  CT AP with  contrast at [facility] ED 5/10/22 showed a partial small bowel obstruction; small to moderate amount of free fluid in the abdomen and pelvis; and nonspecific stranding of

## 2024-03-22 NOTE — PROGRESS NOTES
Patient to port lab for port access and lab draw. Port accessed using 20g 0.75\" kim needle without difficulty. No blood return noted. Labs drawn peripherally. Port flushed with Heparin. Port remains accessed. Patient tolerated well. Discharged ambulatory.

## 2024-03-24 DIAGNOSIS — E83.42 HYPOMAGNESEMIA: ICD-10-CM

## 2024-03-25 ENCOUNTER — HOSPITAL ENCOUNTER (OUTPATIENT)
Dept: INFUSION THERAPY | Age: 64
Setting detail: INFUSION SERIES
Discharge: HOME | End: 2024-03-25
Payer: COMMERCIAL

## 2024-03-25 VITALS
RESPIRATION RATE: 16 BRPM | DIASTOLIC BLOOD PRESSURE: 77 MMHG | HEART RATE: 86 BPM | SYSTOLIC BLOOD PRESSURE: 134 MMHG | TEMPERATURE: 97.9 F

## 2024-03-25 DIAGNOSIS — C76.2 ABDOMINAL CARCINOMATOSIS (HCC): Primary | ICD-10-CM

## 2024-03-25 DIAGNOSIS — C78.6 MALIGNANT NEOPLASM METASTATIC TO PERITONEUM (HCC): ICD-10-CM

## 2024-03-25 PROCEDURE — 6360000002 HC RX W HCPCS: Performed by: NURSE PRACTITIONER

## 2024-03-25 PROCEDURE — 2580000003 HC RX 258: Performed by: NURSE PRACTITIONER

## 2024-03-25 PROCEDURE — 96523 IRRIG DRUG DELIVERY DEVICE: CPT

## 2024-03-25 RX ORDER — SODIUM CHLORIDE 0.9 % (FLUSH) 0.9 %
5-40 SYRINGE (ML) INJECTION PRN
Status: DISCONTINUED | OUTPATIENT
Start: 2024-03-25 | End: 2024-03-26 | Stop reason: HOSPADM

## 2024-03-25 RX ORDER — HEPARIN 100 UNIT/ML
500 SYRINGE INTRAVENOUS PRN
Status: DISCONTINUED | OUTPATIENT
Start: 2024-03-25 | End: 2024-03-26 | Stop reason: HOSPADM

## 2024-03-25 RX ADMIN — SODIUM CHLORIDE, PRESERVATIVE FREE 10 ML: 5 INJECTION INTRAVENOUS at 08:11

## 2024-03-25 RX ADMIN — HEPARIN 500 UNITS: 100 SYRINGE at 08:13

## 2024-03-25 NOTE — PROGRESS NOTES
Arrived to the Infusion Center. Pump D/ C and flush completed.  Patient instructed to report any side affects to ordering provider.  Patient tolerated well.   Any issues or concerns during appointment: Port giving sluggish blood return. Packed with heparin.  Patient aware of next infusion appointment on 04/12/2024 (date) at 1030 (time).  Discharged ambulatory.

## 2024-03-27 RX ORDER — MAGNESIUM OXIDE 400 MG/1
400 TABLET ORAL 3 TIMES DAILY
Qty: 90 TABLET | Refills: 2 | OUTPATIENT
Start: 2024-03-27

## 2024-04-11 DIAGNOSIS — C16.9 MALIGNANT NEOPLASM OF STOMACH, UNSPECIFIED LOCATION (HCC): Primary | ICD-10-CM

## 2024-04-12 ENCOUNTER — HOSPITAL ENCOUNTER (OUTPATIENT)
Dept: INFUSION THERAPY | Age: 64
Setting detail: INFUSION SERIES
Discharge: HOME | End: 2024-04-12
Payer: COMMERCIAL

## 2024-04-12 ENCOUNTER — OFFICE VISIT (OUTPATIENT)
Dept: ONCOLOGY | Age: 64
End: 2024-04-12
Payer: COMMERCIAL

## 2024-04-12 ENCOUNTER — HOSPITAL ENCOUNTER (OUTPATIENT)
Dept: LAB | Age: 64
End: 2024-04-12
Payer: COMMERCIAL

## 2024-04-12 VITALS
HEART RATE: 73 BPM | SYSTOLIC BLOOD PRESSURE: 128 MMHG | OXYGEN SATURATION: 95 % | RESPIRATION RATE: 16 BRPM | TEMPERATURE: 97.6 F | WEIGHT: 237 LBS | HEIGHT: 70 IN | DIASTOLIC BLOOD PRESSURE: 75 MMHG | BODY MASS INDEX: 33.93 KG/M2

## 2024-04-12 DIAGNOSIS — E83.42 HYPOMAGNESEMIA: ICD-10-CM

## 2024-04-12 DIAGNOSIS — C76.2 ABDOMINAL CARCINOMATOSIS (HCC): ICD-10-CM

## 2024-04-12 DIAGNOSIS — R73.9 HYPERGLYCEMIA: ICD-10-CM

## 2024-04-12 DIAGNOSIS — C16.9 MALIGNANT NEOPLASM OF STOMACH, UNSPECIFIED LOCATION (HCC): ICD-10-CM

## 2024-04-12 DIAGNOSIS — C78.6 MALIGNANT NEOPLASM METASTATIC TO PERITONEUM (HCC): Primary | ICD-10-CM

## 2024-04-12 DIAGNOSIS — R53.83 FATIGUE DUE TO TREATMENT: ICD-10-CM

## 2024-04-12 DIAGNOSIS — C16.9 MALIGNANT NEOPLASM OF STOMACH, UNSPECIFIED LOCATION (HCC): Primary | ICD-10-CM

## 2024-04-12 DIAGNOSIS — I82.721 CHRONIC DEEP VEIN THROMBOSIS (DVT) OF RIGHT UPPER EXTREMITY, UNSPECIFIED VEIN (HCC): ICD-10-CM

## 2024-04-12 DIAGNOSIS — C78.6 MALIGNANT NEOPLASM METASTATIC TO PERITONEUM (HCC): ICD-10-CM

## 2024-04-12 DIAGNOSIS — Z79.899 HIGH RISK MEDICATION USE: ICD-10-CM

## 2024-04-12 LAB
ALBUMIN SERPL-MCNC: 3.3 G/DL (ref 3.2–4.6)
ALBUMIN/GLOB SERPL: 1.3 (ref 0.4–1.6)
ALP SERPL-CCNC: 140 U/L (ref 50–136)
ALT SERPL-CCNC: 26 U/L (ref 12–65)
ANION GAP SERPL CALC-SCNC: 5 MMOL/L (ref 2–11)
AST SERPL-CCNC: 19 U/L (ref 15–37)
BASOPHILS # BLD: 0 K/UL (ref 0–0.2)
BASOPHILS NFR BLD: 1 % (ref 0–2)
BILIRUB SERPL-MCNC: 0.4 MG/DL (ref 0.2–1.1)
BUN SERPL-MCNC: 7 MG/DL (ref 8–23)
CALCIUM SERPL-MCNC: 8.2 MG/DL (ref 8.3–10.4)
CEA SERPL-MCNC: 1.8 NG/ML (ref 0–3)
CHLORIDE SERPL-SCNC: 109 MMOL/L (ref 103–113)
CO2 SERPL-SCNC: 26 MMOL/L (ref 21–32)
CREAT SERPL-MCNC: 0.8 MG/DL (ref 0.8–1.5)
DIFFERENTIAL METHOD BLD: ABNORMAL
EOSINOPHIL # BLD: 0.2 K/UL (ref 0–0.8)
EOSINOPHIL NFR BLD: 7 % (ref 0.5–7.8)
ERYTHROCYTE [DISTWIDTH] IN BLOOD BY AUTOMATED COUNT: 13.5 % (ref 11.9–14.6)
GLOBULIN SER CALC-MCNC: 2.6 G/DL (ref 2.8–4.5)
GLUCOSE SERPL-MCNC: 233 MG/DL (ref 65–100)
HCT VFR BLD AUTO: 37.1 % (ref 41.1–50.3)
HGB BLD-MCNC: 12 G/DL (ref 13.6–17.2)
IMM GRANULOCYTES # BLD AUTO: 0 K/UL (ref 0–0.5)
IMM GRANULOCYTES NFR BLD AUTO: 0 % (ref 0–5)
LYMPHOCYTES # BLD: 1.5 K/UL (ref 0.5–4.6)
LYMPHOCYTES NFR BLD: 45 % (ref 13–44)
MAGNESIUM SERPL-MCNC: 1.8 MG/DL (ref 1.8–2.4)
MCH RBC QN AUTO: 29.2 PG (ref 26.1–32.9)
MCHC RBC AUTO-ENTMCNC: 32.3 G/DL (ref 31.4–35)
MCV RBC AUTO: 90.3 FL (ref 82–102)
MONOCYTES # BLD: 0.4 K/UL (ref 0.1–1.3)
MONOCYTES NFR BLD: 11 % (ref 4–12)
NEUTS SEG # BLD: 1.2 K/UL (ref 1.7–8.2)
NEUTS SEG NFR BLD: 36 % (ref 43–78)
NRBC # BLD: 0.02 K/UL (ref 0–0.2)
PLATELET # BLD AUTO: 196 K/UL (ref 150–450)
PMV BLD AUTO: 10.5 FL (ref 9.4–12.3)
POTASSIUM SERPL-SCNC: 3.5 MMOL/L (ref 3.5–5.1)
PROT SERPL-MCNC: 5.9 G/DL (ref 6.3–8.2)
RBC # BLD AUTO: 4.11 M/UL (ref 4.23–5.6)
SODIUM SERPL-SCNC: 140 MMOL/L (ref 136–146)
WBC # BLD AUTO: 3.4 K/UL (ref 4.3–11.1)

## 2024-04-12 PROCEDURE — 2580000003 HC RX 258

## 2024-04-12 PROCEDURE — 6360000002 HC RX W HCPCS

## 2024-04-12 PROCEDURE — 96372 THER/PROPH/DIAG INJ SC/IM: CPT

## 2024-04-12 PROCEDURE — 2580000003 HC RX 258: Performed by: INTERNAL MEDICINE

## 2024-04-12 PROCEDURE — 80053 COMPREHEN METABOLIC PANEL: CPT

## 2024-04-12 PROCEDURE — G0498 CHEMO EXTEND IV INFUS W/PUMP: HCPCS

## 2024-04-12 PROCEDURE — 96411 CHEMO IV PUSH ADDL DRUG: CPT

## 2024-04-12 PROCEDURE — 96375 TX/PRO/DX INJ NEW DRUG ADDON: CPT

## 2024-04-12 PROCEDURE — 85025 COMPLETE CBC W/AUTO DIFF WBC: CPT

## 2024-04-12 PROCEDURE — 3074F SYST BP LT 130 MM HG: CPT

## 2024-04-12 PROCEDURE — 96368 THER/DIAG CONCURRENT INF: CPT

## 2024-04-12 PROCEDURE — 82378 CARCINOEMBRYONIC ANTIGEN: CPT

## 2024-04-12 PROCEDURE — 83735 ASSAY OF MAGNESIUM: CPT

## 2024-04-12 PROCEDURE — 36593 DECLOT VASCULAR DEVICE: CPT

## 2024-04-12 PROCEDURE — 96413 CHEMO IV INFUSION 1 HR: CPT

## 2024-04-12 PROCEDURE — 3078F DIAST BP <80 MM HG: CPT

## 2024-04-12 PROCEDURE — 99214 OFFICE O/P EST MOD 30 MIN: CPT

## 2024-04-12 PROCEDURE — 96367 TX/PROPH/DG ADDL SEQ IV INF: CPT

## 2024-04-12 RX ORDER — SODIUM CHLORIDE 9 MG/ML
INJECTION, SOLUTION INTRAVENOUS CONTINUOUS
Status: DISCONTINUED | OUTPATIENT
Start: 2024-04-12 | End: 2024-04-13 | Stop reason: HOSPADM

## 2024-04-12 RX ORDER — ACETAMINOPHEN 325 MG/1
650 TABLET ORAL
Status: DISCONTINUED | OUTPATIENT
Start: 2024-04-12 | End: 2024-04-13 | Stop reason: HOSPADM

## 2024-04-12 RX ORDER — ATROPINE SULFATE 0.4 MG/ML
0.4 INJECTION, SOLUTION INTRAMUSCULAR; INTRAVENOUS; SUBCUTANEOUS
Status: CANCELLED | OUTPATIENT
Start: 2024-04-12

## 2024-04-12 RX ORDER — MEPERIDINE HYDROCHLORIDE 25 MG/ML
12.5 INJECTION INTRAMUSCULAR; INTRAVENOUS; SUBCUTANEOUS PRN
Status: DISCONTINUED | OUTPATIENT
Start: 2024-04-12 | End: 2024-04-13 | Stop reason: HOSPADM

## 2024-04-12 RX ORDER — ALBUTEROL SULFATE 90 UG/1
4 AEROSOL, METERED RESPIRATORY (INHALATION) PRN
Status: DISCONTINUED | OUTPATIENT
Start: 2024-04-12 | End: 2024-04-13 | Stop reason: HOSPADM

## 2024-04-12 RX ORDER — MEPERIDINE HYDROCHLORIDE 25 MG/ML
12.5 INJECTION INTRAMUSCULAR; INTRAVENOUS; SUBCUTANEOUS PRN
Status: CANCELLED | OUTPATIENT
Start: 2024-04-12

## 2024-04-12 RX ORDER — SODIUM CHLORIDE 9 MG/ML
INJECTION, SOLUTION INTRAVENOUS CONTINUOUS
Status: CANCELLED | OUTPATIENT
Start: 2024-04-12

## 2024-04-12 RX ORDER — DEXTROSE MONOHYDRATE 50 MG/ML
5-250 INJECTION, SOLUTION INTRAVENOUS PRN
Status: DISCONTINUED | OUTPATIENT
Start: 2024-04-12 | End: 2024-04-13 | Stop reason: HOSPADM

## 2024-04-12 RX ORDER — ONDANSETRON 2 MG/ML
8 INJECTION INTRAMUSCULAR; INTRAVENOUS ONCE
Status: COMPLETED | OUTPATIENT
Start: 2024-04-12 | End: 2024-04-12

## 2024-04-12 RX ORDER — ONDANSETRON 2 MG/ML
8 INJECTION INTRAMUSCULAR; INTRAVENOUS ONCE
Status: CANCELLED | OUTPATIENT
Start: 2024-04-12 | End: 2024-04-12

## 2024-04-12 RX ORDER — FLUOROURACIL 50 MG/ML
400 INJECTION, SOLUTION INTRAVENOUS ONCE
Status: COMPLETED | OUTPATIENT
Start: 2024-04-12 | End: 2024-04-12

## 2024-04-12 RX ORDER — EPINEPHRINE 1 MG/ML
0.3 INJECTION, SOLUTION, CONCENTRATE INTRAVENOUS PRN
Status: DISCONTINUED | OUTPATIENT
Start: 2024-04-12 | End: 2024-04-13 | Stop reason: HOSPADM

## 2024-04-12 RX ORDER — ONDANSETRON 2 MG/ML
8 INJECTION INTRAMUSCULAR; INTRAVENOUS
Status: CANCELLED | OUTPATIENT
Start: 2024-04-12

## 2024-04-12 RX ORDER — SODIUM CHLORIDE 0.9 % (FLUSH) 0.9 %
5-40 SYRINGE (ML) INJECTION PRN
Status: CANCELLED | OUTPATIENT
Start: 2024-04-12

## 2024-04-12 RX ORDER — SODIUM CHLORIDE 0.9 % (FLUSH) 0.9 %
5-40 SYRINGE (ML) INJECTION PRN
OUTPATIENT
Start: 2024-04-14

## 2024-04-12 RX ORDER — EPINEPHRINE 1 MG/ML
0.3 INJECTION, SOLUTION, CONCENTRATE INTRAVENOUS PRN
Status: CANCELLED | OUTPATIENT
Start: 2024-04-12

## 2024-04-12 RX ORDER — ALBUTEROL SULFATE 90 UG/1
4 AEROSOL, METERED RESPIRATORY (INHALATION) PRN
Status: CANCELLED | OUTPATIENT
Start: 2024-04-12

## 2024-04-12 RX ORDER — HEPARIN 100 UNIT/ML
500 SYRINGE INTRAVENOUS PRN
OUTPATIENT
Start: 2024-04-14

## 2024-04-12 RX ORDER — DIPHENHYDRAMINE HYDROCHLORIDE 50 MG/ML
50 INJECTION INTRAMUSCULAR; INTRAVENOUS
Status: DISCONTINUED | OUTPATIENT
Start: 2024-04-12 | End: 2024-04-13 | Stop reason: HOSPADM

## 2024-04-12 RX ORDER — ATROPINE SULFATE 0.4 MG/ML
0.4 INJECTION, SOLUTION INTRAMUSCULAR; INTRAVENOUS; SUBCUTANEOUS ONCE
Status: CANCELLED | OUTPATIENT
Start: 2024-04-12 | End: 2024-04-12

## 2024-04-12 RX ORDER — SODIUM CHLORIDE 9 MG/ML
5-250 INJECTION, SOLUTION INTRAVENOUS PRN
OUTPATIENT
Start: 2024-04-14

## 2024-04-12 RX ORDER — DIPHENHYDRAMINE HYDROCHLORIDE 50 MG/ML
50 INJECTION INTRAMUSCULAR; INTRAVENOUS
Status: CANCELLED | OUTPATIENT
Start: 2024-04-12

## 2024-04-12 RX ORDER — HEPARIN 100 UNIT/ML
500 SYRINGE INTRAVENOUS PRN
Status: CANCELLED | OUTPATIENT
Start: 2024-04-12

## 2024-04-12 RX ORDER — ONDANSETRON 2 MG/ML
8 INJECTION INTRAMUSCULAR; INTRAVENOUS
Status: DISCONTINUED | OUTPATIENT
Start: 2024-04-12 | End: 2024-04-13 | Stop reason: HOSPADM

## 2024-04-12 RX ORDER — SODIUM CHLORIDE 0.9 % (FLUSH) 0.9 %
5-40 SYRINGE (ML) INJECTION PRN
Status: DISCONTINUED | OUTPATIENT
Start: 2024-04-12 | End: 2024-04-16 | Stop reason: HOSPADM

## 2024-04-12 RX ORDER — FLUOROURACIL 50 MG/ML
400 INJECTION, SOLUTION INTRAVENOUS ONCE
Status: CANCELLED | OUTPATIENT
Start: 2024-04-12 | End: 2024-04-12

## 2024-04-12 RX ORDER — SODIUM CHLORIDE 9 MG/ML
5-250 INJECTION, SOLUTION INTRAVENOUS PRN
Status: CANCELLED | OUTPATIENT
Start: 2024-04-12

## 2024-04-12 RX ORDER — ACETAMINOPHEN 325 MG/1
650 TABLET ORAL
Status: CANCELLED | OUTPATIENT
Start: 2024-04-12

## 2024-04-12 RX ORDER — ATROPINE SULFATE 0.4 MG/ML
0.4 INJECTION, SOLUTION INTRAVENOUS
Status: DISCONTINUED | OUTPATIENT
Start: 2024-04-12 | End: 2024-04-13 | Stop reason: HOSPADM

## 2024-04-12 RX ORDER — DEXTROSE MONOHYDRATE 50 MG/ML
5-250 INJECTION, SOLUTION INTRAVENOUS PRN
Status: CANCELLED | OUTPATIENT
Start: 2024-04-12

## 2024-04-12 RX ORDER — SODIUM CHLORIDE 0.9 % (FLUSH) 0.9 %
5-40 SYRINGE (ML) INJECTION PRN
Status: DISCONTINUED | OUTPATIENT
Start: 2024-04-12 | End: 2024-04-13 | Stop reason: HOSPADM

## 2024-04-12 RX ORDER — ATROPINE SULFATE 0.4 MG/ML
0.4 INJECTION, SOLUTION INTRAVENOUS ONCE
Status: COMPLETED | OUTPATIENT
Start: 2024-04-12 | End: 2024-04-12

## 2024-04-12 RX ADMIN — FLUOROURACIL 900 MG: 50 INJECTION, SOLUTION INTRAVENOUS at 14:14

## 2024-04-12 RX ADMIN — ATROPINE SULFATE 0.4 MG: 0.4 INJECTION, SOLUTION INTRAVENOUS at 12:13

## 2024-04-12 RX ADMIN — SODIUM CHLORIDE, PRESERVATIVE FREE 10 ML: 5 INJECTION INTRAVENOUS at 10:26

## 2024-04-12 RX ADMIN — DEXAMETHASONE SODIUM PHOSPHATE 12 MG: 4 INJECTION, SOLUTION INTRAMUSCULAR; INTRAVENOUS at 11:53

## 2024-04-12 RX ADMIN — SODIUM CHLORIDE, PRESERVATIVE FREE 10 ML: 5 INJECTION INTRAVENOUS at 08:45

## 2024-04-12 RX ADMIN — ONDANSETRON 8 MG: 2 INJECTION INTRAMUSCULAR; INTRAVENOUS at 11:51

## 2024-04-12 RX ADMIN — DEXTROSE MONOHYDRATE 50 ML/HR: 50 INJECTION, SOLUTION INTRAVENOUS at 11:34

## 2024-04-12 RX ADMIN — IRINOTECAN HYDROCHLORIDE 340 MG: 20 INJECTION, SOLUTION INTRAVENOUS at 12:42

## 2024-04-12 RX ADMIN — SODIUM CHLORIDE 150 MG: 900 INJECTION, SOLUTION INTRAVENOUS at 12:13

## 2024-04-12 RX ADMIN — LEUCOVORIN CALCIUM 900 MG: 200 INJECTION, POWDER, LYOPHILIZED, FOR SUSPENSION INTRAMUSCULAR; INTRAVENOUS at 12:40

## 2024-04-12 RX ADMIN — FLUOROURACIL 5375 MG: 50 INJECTION, SOLUTION INTRAVENOUS at 14:25

## 2024-04-12 RX ADMIN — WATER 2 MG: 1 INJECTION INTRAMUSCULAR; INTRAVENOUS; SUBCUTANEOUS at 10:43

## 2024-04-12 ASSESSMENT — PATIENT HEALTH QUESTIONNAIRE - PHQ9
SUM OF ALL RESPONSES TO PHQ9 QUESTIONS 1 & 2: 0
SUM OF ALL RESPONSES TO PHQ QUESTIONS 1-9: 0
2. FEELING DOWN, DEPRESSED OR HOPELESS: NOT AT ALL
SUM OF ALL RESPONSES TO PHQ QUESTIONS 1-9: 0
1. LITTLE INTEREST OR PLEASURE IN DOING THINGS: NOT AT ALL

## 2024-04-12 NOTE — PROGRESS NOTES
Patient to port lab for port access and lab draw. Port accessed using 20g 0.75\" kim needle without difficulty. Labs drawn from peripheral draw. Port  flushed, patient tastes and smells saline but sluggish, minimal blood return. Port remains accessed. Patient tolerated well. Discharged ambulatory.

## 2024-04-12 NOTE — PROGRESS NOTES
Arrived to the Infusion Center. FOLFIRI completed. Patient tolerated well.   Any issues or concerns during appointment: upon arrival to infusion center, no blood return was noted from port. Cathflo instilled into port and blood return noted after 45 minute indwell time.  Patient aware of next infusion appointment on 4/15/2024 at 8am.  Discharged ambulatory.

## 2024-04-15 ENCOUNTER — HOSPITAL ENCOUNTER (OUTPATIENT)
Dept: INFUSION THERAPY | Age: 64
Setting detail: INFUSION SERIES
Discharge: HOME | End: 2024-04-15
Payer: COMMERCIAL

## 2024-04-15 VITALS
SYSTOLIC BLOOD PRESSURE: 111 MMHG | HEART RATE: 98 BPM | RESPIRATION RATE: 18 BRPM | OXYGEN SATURATION: 98 % | DIASTOLIC BLOOD PRESSURE: 69 MMHG | TEMPERATURE: 97.7 F

## 2024-04-15 DIAGNOSIS — C78.6 MALIGNANT NEOPLASM METASTATIC TO PERITONEUM (HCC): Primary | ICD-10-CM

## 2024-04-15 DIAGNOSIS — R73.9 HYPERGLYCEMIA: ICD-10-CM

## 2024-04-15 DIAGNOSIS — E83.42 HYPOMAGNESEMIA: ICD-10-CM

## 2024-04-15 DIAGNOSIS — C76.2 ABDOMINAL CARCINOMATOSIS (HCC): ICD-10-CM

## 2024-04-15 PROCEDURE — 2580000003 HC RX 258

## 2024-04-15 PROCEDURE — 96523 IRRIG DRUG DELIVERY DEVICE: CPT

## 2024-04-15 RX ORDER — HEPARIN 100 UNIT/ML
500 SYRINGE INTRAVENOUS PRN
Status: DISCONTINUED | OUTPATIENT
Start: 2024-04-15 | End: 2024-04-16 | Stop reason: HOSPADM

## 2024-04-15 RX ORDER — SODIUM CHLORIDE 0.9 % (FLUSH) 0.9 %
5-40 SYRINGE (ML) INJECTION PRN
Status: DISCONTINUED | OUTPATIENT
Start: 2024-04-15 | End: 2024-04-16 | Stop reason: HOSPADM

## 2024-04-15 RX ORDER — SODIUM CHLORIDE 9 MG/ML
5-250 INJECTION, SOLUTION INTRAVENOUS PRN
Status: DISCONTINUED | OUTPATIENT
Start: 2024-04-15 | End: 2024-04-16 | Stop reason: HOSPADM

## 2024-04-15 RX ADMIN — SODIUM CHLORIDE, PRESERVATIVE FREE 20 ML: 5 INJECTION INTRAVENOUS at 08:05

## 2024-04-15 ASSESSMENT — PAIN SCALES - GENERAL: PAINLEVEL_OUTOF10: 0

## 2024-04-15 NOTE — PROGRESS NOTES
Arrived to the Infusion Center.  Fluorouracil pump removed after completed. Patient tolerated without difficulty.   Any issues or concerns during appointment: c/o feeling \"more fatigued this weekend,\" pt refused offer for IV Fluids, encouraged to keep hydrated.  Patient aware of next infusion appointment on 05/03(date) at 1030 (time).  Patient instructed to call provider with temperature of 100.4 or greater or nausea/vomiting/ diarrhea or pain not controlled by medications  Discharged ambulatory.

## 2024-04-18 ENCOUNTER — TELEPHONE (OUTPATIENT)
Dept: DIABETES SERVICES | Age: 64
End: 2024-04-18

## 2024-04-18 NOTE — TELEPHONE ENCOUNTER
Wife called Lashae and said to cancel the class appointment for tomorrow and he will call back to reschedule. This was a Basic class.

## 2024-04-23 ENCOUNTER — HOSPITAL ENCOUNTER (OUTPATIENT)
Dept: CT IMAGING | Age: 64
Discharge: HOME | End: 2024-04-26
Attending: INTERNAL MEDICINE
Payer: COMMERCIAL

## 2024-04-23 DIAGNOSIS — C78.6 MALIGNANT NEOPLASM METASTATIC TO PERITONEUM (HCC): ICD-10-CM

## 2024-04-23 DIAGNOSIS — C76.2 ABDOMINAL CARCINOMATOSIS (HCC): ICD-10-CM

## 2024-04-23 PROCEDURE — 6360000004 HC RX CONTRAST MEDICATION: Performed by: INTERNAL MEDICINE

## 2024-04-23 PROCEDURE — 74177 CT ABD & PELVIS W/CONTRAST: CPT

## 2024-04-23 RX ADMIN — IOPAMIDOL 100 ML: 755 INJECTION, SOLUTION INTRAVENOUS at 09:40

## 2024-04-23 RX ADMIN — DIATRIZOATE MEGLUMINE AND DIATRIZOATE SODIUM 15 ML: 660; 100 LIQUID ORAL; RECTAL at 09:40

## 2024-04-24 NOTE — PROGRESS NOTES
[facility] Hematology and Oncology: Established patient - follow up     Chief Complaint   Patient presents with    Follow-up     Reason for Referral: Abdominal pain; peritoneal metastatic disease  Referring Provider: Mo Dalton NP  Primary Care Provider: Oscar Nelson MD  Family History of Cancer/Hematologic Disorders: Family history is significant for sister with breast cancer.   Presenting Symptoms: Progressively worsening, persistent abdominal pain x several months    History of Present Illness:  Mr. Guevara  is a 63 y.o. male who presents today for FU regarding adenocarcinoma with peritoneal metastatic disease.  The past medical history is significant for tobacco use (recent pack per day smoker x 30+ years - now down to 1/3PPD), PUD, paresthesia/pain of bilateral upper extremities, HLD, HTN, diverticulitis,  colon polyps, hiatal hernia, chronic right shoulder pain, bilateral carpal tunnel syndrome, and partial colon resection.  He presented to the [facility] ED on 5/12/22 with a complaint of persistent abdominal pain for several months that was becoming progressively  worse.  EGD and colonoscopy on 2/25/22 revealed findings of hiatal hernia, gastric polyps, several benign colon polyps, diverticulosis, and internal hemorrhoids.  CT of the abdomen on 3/8/22 showed no acute findings.  He presented to [facility] ER x 2 for  evaluation (5/3/22 and 5/10/22).  RUQ abdominal ultrasound was completed on 5/3/22 in the ED at [facility] demonstrating no acute findings to explain patient's pain; probable hepatic  steatosis; small echogenic mural foci in the gallbladder which are nonmobile, likely representing cholesterol polyps, largest measuring 4 mm; and small volume simple ascites in the right upper quadrant and left lower quadrant, nonspecific.  CT AP with  contrast at [facility] ED 5/10/22 showed a partial small bowel obstruction; small to moderate amount of free fluid in the abdomen and pelvis; and nonspecific stranding of

## 2024-04-28 DIAGNOSIS — I82.721 CHRONIC DEEP VEIN THROMBOSIS (DVT) OF RIGHT UPPER EXTREMITY, UNSPECIFIED VEIN (HCC): ICD-10-CM

## 2024-05-01 ENCOUNTER — OFFICE VISIT (OUTPATIENT)
Dept: ONCOLOGY | Age: 64
End: 2024-05-01
Payer: COMMERCIAL

## 2024-05-01 ENCOUNTER — HOSPITAL ENCOUNTER (OUTPATIENT)
Dept: LAB | Age: 64
Discharge: HOME | End: 2024-05-04
Payer: COMMERCIAL

## 2024-05-01 VITALS
OXYGEN SATURATION: 95 % | DIASTOLIC BLOOD PRESSURE: 77 MMHG | SYSTOLIC BLOOD PRESSURE: 115 MMHG | WEIGHT: 236 LBS | HEIGHT: 70 IN | HEART RATE: 77 BPM | TEMPERATURE: 98.1 F | BODY MASS INDEX: 33.79 KG/M2 | RESPIRATION RATE: 16 BRPM

## 2024-05-01 DIAGNOSIS — C78.6 MALIGNANT NEOPLASM METASTATIC TO PERITONEUM (HCC): ICD-10-CM

## 2024-05-01 DIAGNOSIS — Z79.899 HIGH RISK MEDICATION USE: ICD-10-CM

## 2024-05-01 DIAGNOSIS — E83.42 HYPOMAGNESEMIA: ICD-10-CM

## 2024-05-01 DIAGNOSIS — R73.9 HYPERGLYCEMIA: ICD-10-CM

## 2024-05-01 DIAGNOSIS — T82.598D MALFUNCTION OF PERIPHERAL INSERTED CENTRAL CATHETER, SUBSEQUENT ENCOUNTER: ICD-10-CM

## 2024-05-01 DIAGNOSIS — C76.2 ABDOMINAL CARCINOMATOSIS (HCC): ICD-10-CM

## 2024-05-01 DIAGNOSIS — C79.9 METASTATIC ADENOCARCINOMA (HCC): Primary | ICD-10-CM

## 2024-05-01 DIAGNOSIS — Z95.828 PORT-A-CATH IN PLACE: ICD-10-CM

## 2024-05-01 DIAGNOSIS — Z95.828 PORT-A-CATH IN PLACE: Primary | ICD-10-CM

## 2024-05-01 PROBLEM — M25.521 RIGHT ELBOW PAIN: Status: RESOLVED | Noted: 2020-12-09 | Resolved: 2024-05-01

## 2024-05-01 LAB
ALBUMIN SERPL-MCNC: 3.3 G/DL (ref 3.2–4.6)
ALBUMIN/GLOB SERPL: 1.1 (ref 1–1.9)
ALP SERPL-CCNC: 138 U/L (ref 40–129)
ALT SERPL-CCNC: 19 U/L (ref 12–65)
ANION GAP SERPL CALC-SCNC: 12 MMOL/L (ref 9–18)
AST SERPL-CCNC: 27 U/L (ref 15–37)
BASOPHILS # BLD: 0.1 K/UL (ref 0–0.2)
BASOPHILS NFR BLD: 2 % (ref 0–2)
BILIRUB SERPL-MCNC: 0.3 MG/DL (ref 0–1.2)
BUN SERPL-MCNC: 9 MG/DL (ref 8–23)
CALCIUM SERPL-MCNC: 8.3 MG/DL (ref 8.8–10.2)
CEA SERPL-MCNC: 2.1 NG/ML (ref 0–3.8)
CHLORIDE SERPL-SCNC: 106 MMOL/L (ref 98–107)
CO2 SERPL-SCNC: 25 MMOL/L (ref 20–28)
CREAT SERPL-MCNC: 0.81 MG/DL (ref 0.8–1.3)
DIFFERENTIAL METHOD BLD: ABNORMAL
EOSINOPHIL # BLD: 0.2 K/UL (ref 0–0.8)
EOSINOPHIL NFR BLD: 6 % (ref 0.5–7.8)
ERYTHROCYTE [DISTWIDTH] IN BLOOD BY AUTOMATED COUNT: 14.2 % (ref 11.9–14.6)
GLOBULIN SER CALC-MCNC: 3 G/DL (ref 2.3–3.5)
GLUCOSE SERPL-MCNC: 169 MG/DL (ref 70–99)
HCT VFR BLD AUTO: 38.2 % (ref 41.1–50.3)
HGB BLD-MCNC: 12.3 G/DL (ref 13.6–17.2)
IMM GRANULOCYTES # BLD AUTO: 0 K/UL (ref 0–0.5)
IMM GRANULOCYTES NFR BLD AUTO: 0 % (ref 0–5)
LYMPHOCYTES # BLD: 1.8 K/UL (ref 0.5–4.6)
LYMPHOCYTES NFR BLD: 46 % (ref 13–44)
MAGNESIUM SERPL-MCNC: 1.8 MG/DL (ref 1.8–2.4)
MCH RBC QN AUTO: 28.8 PG (ref 26.1–32.9)
MCHC RBC AUTO-ENTMCNC: 32.2 G/DL (ref 31.4–35)
MCV RBC AUTO: 89.5 FL (ref 82–102)
MONOCYTES # BLD: 0.5 K/UL (ref 0.1–1.3)
MONOCYTES NFR BLD: 12 % (ref 4–12)
NEUTS SEG # BLD: 1.3 K/UL (ref 1.7–8.2)
NEUTS SEG NFR BLD: 34 % (ref 43–78)
NRBC # BLD: 0 K/UL (ref 0–0.2)
PLATELET # BLD AUTO: 157 K/UL (ref 150–450)
PMV BLD AUTO: 10.5 FL (ref 9.4–12.3)
POTASSIUM SERPL-SCNC: 3.6 MMOL/L (ref 3.5–5.1)
PROT SERPL-MCNC: 6.2 G/DL (ref 6.3–8.2)
RBC # BLD AUTO: 4.27 M/UL (ref 4.23–5.6)
SODIUM SERPL-SCNC: 143 MMOL/L (ref 136–145)
WBC # BLD AUTO: 3.9 K/UL (ref 4.3–11.1)

## 2024-05-01 PROCEDURE — 85025 COMPLETE CBC W/AUTO DIFF WBC: CPT

## 2024-05-01 PROCEDURE — 2580000003 HC RX 258: Performed by: INTERNAL MEDICINE

## 2024-05-01 PROCEDURE — 3074F SYST BP LT 130 MM HG: CPT | Performed by: INTERNAL MEDICINE

## 2024-05-01 PROCEDURE — 80053 COMPREHEN METABOLIC PANEL: CPT

## 2024-05-01 PROCEDURE — 96523 IRRIG DRUG DELIVERY DEVICE: CPT

## 2024-05-01 PROCEDURE — 82378 CARCINOEMBRYONIC ANTIGEN: CPT

## 2024-05-01 PROCEDURE — 99215 OFFICE O/P EST HI 40 MIN: CPT | Performed by: INTERNAL MEDICINE

## 2024-05-01 PROCEDURE — 83735 ASSAY OF MAGNESIUM: CPT

## 2024-05-01 PROCEDURE — 6360000002 HC RX W HCPCS: Performed by: INTERNAL MEDICINE

## 2024-05-01 PROCEDURE — 3078F DIAST BP <80 MM HG: CPT | Performed by: INTERNAL MEDICINE

## 2024-05-01 RX ORDER — SODIUM CHLORIDE 0.9 % (FLUSH) 0.9 %
5-40 SYRINGE (ML) INJECTION PRN
OUTPATIENT
Start: 2024-05-05

## 2024-05-01 RX ORDER — HEPARIN 100 UNIT/ML
300 SYRINGE INTRAVENOUS PRN
Status: DISCONTINUED | OUTPATIENT
Start: 2024-05-01 | End: 2024-05-05 | Stop reason: HOSPADM

## 2024-05-01 RX ORDER — SODIUM CHLORIDE 0.9 % (FLUSH) 0.9 %
5-40 SYRINGE (ML) INJECTION PRN
Status: CANCELLED | OUTPATIENT
Start: 2024-05-03

## 2024-05-01 RX ORDER — EPINEPHRINE 1 MG/ML
0.3 INJECTION, SOLUTION, CONCENTRATE INTRAVENOUS PRN
Status: CANCELLED | OUTPATIENT
Start: 2024-05-03

## 2024-05-01 RX ORDER — ATROPINE SULFATE 0.4 MG/ML
0.4 INJECTION, SOLUTION INTRAMUSCULAR; INTRAVENOUS; SUBCUTANEOUS ONCE
Status: CANCELLED | OUTPATIENT
Start: 2024-05-03 | End: 2024-05-03

## 2024-05-01 RX ORDER — SODIUM CHLORIDE 9 MG/ML
INJECTION, SOLUTION INTRAVENOUS CONTINUOUS
Status: CANCELLED | OUTPATIENT
Start: 2024-05-03

## 2024-05-01 RX ORDER — SODIUM CHLORIDE 0.9 % (FLUSH) 0.9 %
5-40 SYRINGE (ML) INJECTION PRN
Status: DISCONTINUED | OUTPATIENT
Start: 2024-05-01 | End: 2024-05-05 | Stop reason: HOSPADM

## 2024-05-01 RX ORDER — FLUOROURACIL 50 MG/ML
400 INJECTION, SOLUTION INTRAVENOUS ONCE
Status: CANCELLED | OUTPATIENT
Start: 2024-05-03 | End: 2024-05-03

## 2024-05-01 RX ORDER — ONDANSETRON 2 MG/ML
8 INJECTION INTRAMUSCULAR; INTRAVENOUS ONCE
Status: CANCELLED | OUTPATIENT
Start: 2024-05-03 | End: 2024-05-03

## 2024-05-01 RX ORDER — ONDANSETRON 2 MG/ML
8 INJECTION INTRAMUSCULAR; INTRAVENOUS
Status: CANCELLED | OUTPATIENT
Start: 2024-05-03

## 2024-05-01 RX ORDER — HEPARIN 100 UNIT/ML
500 SYRINGE INTRAVENOUS PRN
OUTPATIENT
Start: 2024-05-05

## 2024-05-01 RX ORDER — RIVAROXABAN 20 MG/1
20 TABLET, FILM COATED ORAL
Qty: 30 TABLET | Refills: 3 | OUTPATIENT
Start: 2024-05-01

## 2024-05-01 RX ORDER — ACETAMINOPHEN 325 MG/1
650 TABLET ORAL
Status: CANCELLED | OUTPATIENT
Start: 2024-05-03

## 2024-05-01 RX ORDER — MEPERIDINE HYDROCHLORIDE 25 MG/ML
12.5 INJECTION INTRAMUSCULAR; INTRAVENOUS; SUBCUTANEOUS PRN
Status: CANCELLED | OUTPATIENT
Start: 2024-05-03

## 2024-05-01 RX ORDER — DEXTROSE MONOHYDRATE 50 MG/ML
5-250 INJECTION, SOLUTION INTRAVENOUS PRN
Status: CANCELLED | OUTPATIENT
Start: 2024-05-03

## 2024-05-01 RX ORDER — ATROPINE SULFATE 0.4 MG/ML
0.4 INJECTION, SOLUTION INTRAMUSCULAR; INTRAVENOUS; SUBCUTANEOUS
Status: CANCELLED | OUTPATIENT
Start: 2024-05-03

## 2024-05-01 RX ORDER — DIPHENHYDRAMINE HYDROCHLORIDE 50 MG/ML
50 INJECTION INTRAMUSCULAR; INTRAVENOUS
Status: CANCELLED | OUTPATIENT
Start: 2024-05-03

## 2024-05-01 RX ORDER — SODIUM CHLORIDE 9 MG/ML
5-250 INJECTION, SOLUTION INTRAVENOUS PRN
Status: CANCELLED | OUTPATIENT
Start: 2024-05-03

## 2024-05-01 RX ORDER — HEPARIN 100 UNIT/ML
500 SYRINGE INTRAVENOUS PRN
Status: CANCELLED | OUTPATIENT
Start: 2024-05-03

## 2024-05-01 RX ORDER — SODIUM CHLORIDE 9 MG/ML
5-250 INJECTION, SOLUTION INTRAVENOUS PRN
OUTPATIENT
Start: 2024-05-05

## 2024-05-01 RX ORDER — ALBUTEROL SULFATE 90 UG/1
4 AEROSOL, METERED RESPIRATORY (INHALATION) PRN
Status: CANCELLED | OUTPATIENT
Start: 2024-05-03

## 2024-05-01 RX ADMIN — SODIUM CHLORIDE, PRESERVATIVE FREE 10 ML: 5 INJECTION INTRAVENOUS at 07:30

## 2024-05-01 RX ADMIN — HEPARIN 300 UNITS: 100 SYRINGE at 07:30

## 2024-05-01 NOTE — PATIENT INSTRUCTIONS
Patient Information from Today's Visit    Labs reviewed.  Scan reviewed.  We will place referral to Interventional Radiology to evaluate your port.  Next scan will be in about 3 months.    Follow Up Instructions: Back Friday for treatment.    Current Labs:   Hospital Outpatient Visit on 05/01/2024   Component Date Value Ref Range Status    Magnesium 05/01/2024 1.8  1.8 - 2.4 mg/dL Final    Sodium 05/01/2024 143  136 - 145 mmol/L Final    Potassium 05/01/2024 3.6  3.5 - 5.1 mmol/L Final    Chloride 05/01/2024 106  98 - 107 mmol/L Final    CO2 05/01/2024 25  20 - 28 mmol/L Final    Anion Gap 05/01/2024 12  9 - 18 mmol/L Final    Glucose 05/01/2024 169 (H)  70 - 99 mg/dL Final    Comment: <70 mg/dL Consistent with, but not fully diagnostic of hypoglycemia.  100 - 125 mg/dL Impaired fasting glucose/consistent with pre-diabetes mellitus.  > 126 mg/dl Fasting glucose consistent with overt diabetes mellitus      BUN 05/01/2024 9  8 - 23 MG/DL Final    Creatinine 05/01/2024 0.81  0.80 - 1.30 MG/DL Final    Est, Glom Filt Rate 05/01/2024 >90  >60 ml/min/1.73m2 Final    Comment:    Pediatric calculator link: https://www.kidney.org/professionals/kdoqi/gfr_calculatorped     These results are not intended for use in patients <18 years of age.     eGFR results are calculated without a race factor using  the 2021 CKD-EPI equation. Careful clinical correlation is recommended, particularly when comparing to results calculated using previous equations.  The CKD-EPI equation is less accurate in patients with extremes of muscle mass, extra-renal metabolism of creatinine, excessive creatine ingestion, or following therapy that affects renal tubular secretion.      Calcium 05/01/2024 8.3 (L)  8.8 - 10.2 MG/DL Final    Total Bilirubin 05/01/2024 0.3  0.0 - 1.2 MG/DL Final    ALT 05/01/2024 19  12 - 65 U/L Final    AST 05/01/2024 27  15 - 37 U/L Final    Alk Phosphatase 05/01/2024 138 (H)  40 - 129 U/L Final    Total Protein 05/01/2024

## 2024-05-01 NOTE — PROGRESS NOTES
Patient to port lab for port access and lab draw. Port accessed using 20g 0.75\" kim needle without difficulty. No blood return noted. Port flushed without difficulty and locked with Heparin. Labs drawn peripherally. Port de-accessed, dressing applied. Patient tolerated well. Discharged ambulatory.

## 2024-05-02 ENCOUNTER — HOSPITAL ENCOUNTER (OUTPATIENT)
Dept: INTERVENTIONAL RADIOLOGY/VASCULAR | Age: 64
End: 2024-05-02
Attending: INTERNAL MEDICINE
Payer: COMMERCIAL

## 2024-05-02 VITALS
HEART RATE: 75 BPM | DIASTOLIC BLOOD PRESSURE: 81 MMHG | RESPIRATION RATE: 16 BRPM | BODY MASS INDEX: 33.79 KG/M2 | WEIGHT: 236 LBS | TEMPERATURE: 98 F | HEIGHT: 70 IN | SYSTOLIC BLOOD PRESSURE: 145 MMHG | OXYGEN SATURATION: 93 %

## 2024-05-02 DIAGNOSIS — Z95.828 PORT-A-CATH IN PLACE: ICD-10-CM

## 2024-05-02 DIAGNOSIS — I82.721 CHRONIC DEEP VEIN THROMBOSIS (DVT) OF RIGHT UPPER EXTREMITY, UNSPECIFIED VEIN (HCC): ICD-10-CM

## 2024-05-02 PROCEDURE — 75902 REMOVE CVA LUMEN OBSTRUCT: CPT | Performed by: RADIOLOGY

## 2024-05-02 PROCEDURE — 76937 US GUIDE VASCULAR ACCESS: CPT | Performed by: RADIOLOGY

## 2024-05-02 PROCEDURE — C1773 RET DEV, INSERTABLE: HCPCS

## 2024-05-02 PROCEDURE — 6360000004 HC RX CONTRAST MEDICATION: Performed by: RADIOLOGY

## 2024-05-02 PROCEDURE — 36576 REPAIR TUNNELED CV CATH: CPT | Performed by: RADIOLOGY

## 2024-05-02 PROCEDURE — 2500000003 HC RX 250 WO HCPCS: Performed by: RADIOLOGY

## 2024-05-02 PROCEDURE — 36010 PLACE CATHETER IN VEIN: CPT | Performed by: RADIOLOGY

## 2024-05-02 PROCEDURE — 36595 MECH REMOV TUNNELED CV CATH: CPT

## 2024-05-02 RX ORDER — LIDOCAINE HYDROCHLORIDE 20 MG/ML
INJECTION, SOLUTION INFILTRATION; PERINEURAL PRN
Status: COMPLETED | OUTPATIENT
Start: 2024-05-02 | End: 2024-05-02

## 2024-05-02 RX ADMIN — LIDOCAINE HYDROCHLORIDE 10 ML: 20 INJECTION, SOLUTION INFILTRATION; PERINEURAL at 14:41

## 2024-05-02 RX ADMIN — IOHEXOL 20 ML: 300 INJECTION, SOLUTION INTRAVENOUS at 14:49

## 2024-05-02 ASSESSMENT — PAIN - FUNCTIONAL ASSESSMENT: PAIN_FUNCTIONAL_ASSESSMENT: NONE - DENIES PAIN

## 2024-05-02 NOTE — DISCHARGE INSTRUCTIONS
If you have any questions about your procedure, please call the Interventional Radiology department at [phone].   After business hours (5pm) and weekends, call the answering service at [phone] and ask for the Radiologist on call to be paged.     Si tiene Preguntas acerca del procedimiento, por favor llame al departamento de Radiología Intervencional al [phone].   Después de horas de oficina (5 pm) y los fines de semana, llamar al servicio de llamadas al [phone] y pregunte por el Radiologo de joelle.      Interventional Radiology General Nurse Discharge    After general anesthesia or intravenous sedation, for 24 hours or while taking prescription Narcotics:  Limit your activities  Do not drive and operate hazardous machinery  Do not make important personal or business decisions  Do  not drink alcoholic beverages  If you have not urinated within 8 hours after discharge, please contact your surgeon on call.    * Please give a list of your current medications to your Primary Care Provider.  * Please update this list whenever your medications are discontinued, doses are     changed, or new medications (including over-the-counter products) are added.  * Please carry medication information at all times in case of emergency situations.    These are general instructions for a healthy lifestyle:    No smoking/ No tobacco products/ Avoid exposure to second hand smoke  Surgeon General's Warning:  Quitting smoking now greatly reduces serious risk to your health.    Obesity, smoking, and sedentary lifestyle greatly increases your risk for illness  A healthy diet, regular physical exercise & weight monitoring are important for maintaining a healthy lifestyle    You may be retaining fluid if you have a history of heart failure or if you experience any of the following symptoms:  Weight gain of 3 pounds or more overnight or 5 pounds in a week, increased swelling in our hands or feet or shortness of breath  while lying flat in bed.  Please call your doctor as soon as you notice any of these symptoms; do not wait until your next office visit.    Recognize signs and symptoms of STROKE:  F-face looks uneven    A-arms unable to move or move unevenly    S-speech slurred or non-existent    T-time-call 911 as soon as signs and symptoms begin-DO NOT go       Back to bed or wait to see if you get better-TIME IS BRAIN.

## 2024-05-02 NOTE — OR NURSING
TRANSFER - OUT REPORT:           Verbal report given to SARANYA Russell on Denny Guevara  being transferred to IR Recovery for routine post-op      Report consisted of patient’s Situation, Background, Assessment and Recommendations(SBAR).          Information from the following report(s) SBAR, Procedure Summary, and MAR was reviewed with the receiving nurse.       Opportunity for questions and clarification was provided.      Pt tolerated procedure well.     Other: skin glue clean, dry, intact, and nontender    VITALS:  /76   Pulse 84   Temp 98 °F (36.7 °C) (Infrared)   Resp 18   Ht 1.778 m (5' 10\")   Wt 107 kg (236 lb)   SpO2 94%   BMI 33.86 kg/m²

## 2024-05-02 NOTE — OR NURSING
Recovery period without difficulty. Pt alert and oriented and denies pain. Dressing is clean, dry, and intact. Reviewed discharge instructions with patient, verbalized understanding.

## 2024-05-03 ENCOUNTER — HOSPITAL ENCOUNTER (OUTPATIENT)
Dept: INFUSION THERAPY | Age: 64
Setting detail: INFUSION SERIES
Discharge: HOME | End: 2024-05-03
Payer: COMMERCIAL

## 2024-05-03 VITALS
TEMPERATURE: 97.8 F | HEART RATE: 82 BPM | SYSTOLIC BLOOD PRESSURE: 142 MMHG | BODY MASS INDEX: 34.47 KG/M2 | WEIGHT: 240.2 LBS | DIASTOLIC BLOOD PRESSURE: 90 MMHG | OXYGEN SATURATION: 96 % | RESPIRATION RATE: 18 BRPM

## 2024-05-03 DIAGNOSIS — E83.42 HYPOMAGNESEMIA: ICD-10-CM

## 2024-05-03 DIAGNOSIS — C76.2 ABDOMINAL CARCINOMATOSIS (HCC): ICD-10-CM

## 2024-05-03 DIAGNOSIS — R73.9 HYPERGLYCEMIA: ICD-10-CM

## 2024-05-03 DIAGNOSIS — C78.6 MALIGNANT NEOPLASM METASTATIC TO PERITONEUM (HCC): Primary | ICD-10-CM

## 2024-05-03 PROCEDURE — 96375 TX/PRO/DX INJ NEW DRUG ADDON: CPT

## 2024-05-03 PROCEDURE — 96411 CHEMO IV PUSH ADDL DRUG: CPT

## 2024-05-03 PROCEDURE — 2580000003 HC RX 258: Performed by: INTERNAL MEDICINE

## 2024-05-03 PROCEDURE — 96372 THER/PROPH/DIAG INJ SC/IM: CPT

## 2024-05-03 PROCEDURE — G0498 CHEMO EXTEND IV INFUS W/PUMP: HCPCS

## 2024-05-03 PROCEDURE — 96367 TX/PROPH/DG ADDL SEQ IV INF: CPT

## 2024-05-03 PROCEDURE — 96368 THER/DIAG CONCURRENT INF: CPT

## 2024-05-03 PROCEDURE — 96413 CHEMO IV INFUSION 1 HR: CPT

## 2024-05-03 PROCEDURE — 6360000002 HC RX W HCPCS: Performed by: INTERNAL MEDICINE

## 2024-05-03 PROCEDURE — 96415 CHEMO IV INFUSION ADDL HR: CPT

## 2024-05-03 RX ORDER — ACETAMINOPHEN 325 MG/1
650 TABLET ORAL
Status: DISCONTINUED | OUTPATIENT
Start: 2024-05-03 | End: 2024-05-04 | Stop reason: HOSPADM

## 2024-05-03 RX ORDER — ATROPINE SULFATE 0.4 MG/ML
0.4 INJECTION, SOLUTION INTRAVENOUS ONCE
Status: COMPLETED | OUTPATIENT
Start: 2024-05-03 | End: 2024-05-03

## 2024-05-03 RX ORDER — FLUOROURACIL 50 MG/ML
400 INJECTION, SOLUTION INTRAVENOUS ONCE
Status: COMPLETED | OUTPATIENT
Start: 2024-05-03 | End: 2024-05-03

## 2024-05-03 RX ORDER — SODIUM CHLORIDE 9 MG/ML
INJECTION, SOLUTION INTRAVENOUS CONTINUOUS
Status: DISCONTINUED | OUTPATIENT
Start: 2024-05-03 | End: 2024-05-04 | Stop reason: HOSPADM

## 2024-05-03 RX ORDER — ONDANSETRON 2 MG/ML
8 INJECTION INTRAMUSCULAR; INTRAVENOUS
Status: DISCONTINUED | OUTPATIENT
Start: 2024-05-03 | End: 2024-05-04 | Stop reason: HOSPADM

## 2024-05-03 RX ORDER — DEXTROSE MONOHYDRATE 50 MG/ML
5-250 INJECTION, SOLUTION INTRAVENOUS PRN
Status: DISCONTINUED | OUTPATIENT
Start: 2024-05-03 | End: 2024-05-04 | Stop reason: HOSPADM

## 2024-05-03 RX ORDER — SODIUM CHLORIDE 0.9 % (FLUSH) 0.9 %
5-40 SYRINGE (ML) INJECTION PRN
Status: DISCONTINUED | OUTPATIENT
Start: 2024-05-03 | End: 2024-05-04 | Stop reason: HOSPADM

## 2024-05-03 RX ORDER — ONDANSETRON 2 MG/ML
8 INJECTION INTRAMUSCULAR; INTRAVENOUS ONCE
Status: COMPLETED | OUTPATIENT
Start: 2024-05-03 | End: 2024-05-03

## 2024-05-03 RX ORDER — SODIUM CHLORIDE 9 MG/ML
5-250 INJECTION, SOLUTION INTRAVENOUS PRN
Status: DISCONTINUED | OUTPATIENT
Start: 2024-05-03 | End: 2024-05-04 | Stop reason: HOSPADM

## 2024-05-03 RX ORDER — DIPHENHYDRAMINE HYDROCHLORIDE 50 MG/ML
50 INJECTION INTRAMUSCULAR; INTRAVENOUS
Status: DISCONTINUED | OUTPATIENT
Start: 2024-05-03 | End: 2024-05-04 | Stop reason: HOSPADM

## 2024-05-03 RX ORDER — MEPERIDINE HYDROCHLORIDE 25 MG/ML
12.5 INJECTION INTRAMUSCULAR; INTRAVENOUS; SUBCUTANEOUS PRN
Status: DISCONTINUED | OUTPATIENT
Start: 2024-05-03 | End: 2024-05-04 | Stop reason: HOSPADM

## 2024-05-03 RX ORDER — EPINEPHRINE 1 MG/ML
0.3 INJECTION, SOLUTION, CONCENTRATE INTRAVENOUS PRN
Status: DISCONTINUED | OUTPATIENT
Start: 2024-05-03 | End: 2024-05-04 | Stop reason: HOSPADM

## 2024-05-03 RX ORDER — ALBUTEROL SULFATE 90 UG/1
4 AEROSOL, METERED RESPIRATORY (INHALATION) PRN
Status: DISCONTINUED | OUTPATIENT
Start: 2024-05-03 | End: 2024-05-04 | Stop reason: HOSPADM

## 2024-05-03 RX ADMIN — IRINOTECAN HYDROCHLORIDE 340 MG: 20 INJECTION, SOLUTION INTRAVENOUS at 11:50

## 2024-05-03 RX ADMIN — DEXAMETHASONE SODIUM PHOSPHATE 12 MG: 4 INJECTION INTRA-ARTICULAR; INTRALESIONAL; INTRAMUSCULAR; INTRAVENOUS; SOFT TISSUE at 11:02

## 2024-05-03 RX ADMIN — SODIUM CHLORIDE 150 MG: 9 INJECTION, SOLUTION INTRAVENOUS at 11:18

## 2024-05-03 RX ADMIN — FLUOROURACIL 5375 MG: 50 INJECTION, SOLUTION INTRAVENOUS at 13:34

## 2024-05-03 RX ADMIN — FLUOROURACIL 900 MG: 50 INJECTION, SOLUTION INTRAVENOUS at 13:29

## 2024-05-03 RX ADMIN — ONDANSETRON 8 MG: 2 INJECTION INTRAMUSCULAR; INTRAVENOUS at 10:57

## 2024-05-03 RX ADMIN — LEUCOVORIN CALCIUM 900 MG: 200 INJECTION, POWDER, LYOPHILIZED, FOR SUSPENSION INTRAMUSCULAR; INTRAVENOUS at 11:48

## 2024-05-03 RX ADMIN — SODIUM CHLORIDE, PRESERVATIVE FREE 10 ML: 5 INJECTION INTRAVENOUS at 13:33

## 2024-05-03 RX ADMIN — SODIUM CHLORIDE 200 ML/HR: 9 INJECTION, SOLUTION INTRAVENOUS at 10:56

## 2024-05-03 RX ADMIN — ATROPINE SULFATE 0.4 MG: 0.4 INJECTION, SOLUTION INTRAVENOUS at 11:19

## 2024-05-03 NOTE — PROGRESS NOTES
Pt arrived ambulatory.  Premeds, Folfiri completed without complications.  Pt aware of next appt 5/6 at 0800.  Discharged ambulatory with 5FU pump infusing.  No distress noted.  Patient instructed to call provider with temperature of 100.4 or greater or nausea/vomiting/ diarrhea or pain not controlled by medications

## 2024-05-06 ENCOUNTER — HOSPITAL ENCOUNTER (OUTPATIENT)
Dept: INFUSION THERAPY | Age: 64
Setting detail: INFUSION SERIES
Discharge: HOME | End: 2024-05-06
Payer: COMMERCIAL

## 2024-05-06 VITALS
HEART RATE: 74 BPM | DIASTOLIC BLOOD PRESSURE: 81 MMHG | TEMPERATURE: 98.1 F | RESPIRATION RATE: 18 BRPM | SYSTOLIC BLOOD PRESSURE: 126 MMHG | OXYGEN SATURATION: 95 %

## 2024-05-06 PROCEDURE — 96523 IRRIG DRUG DELIVERY DEVICE: CPT

## 2024-05-06 PROCEDURE — 2580000003 HC RX 258: Performed by: INTERNAL MEDICINE

## 2024-05-06 RX ORDER — SODIUM CHLORIDE 0.9 % (FLUSH) 0.9 %
5-40 SYRINGE (ML) INJECTION PRN
Status: DISCONTINUED | OUTPATIENT
Start: 2024-05-06 | End: 2024-05-07 | Stop reason: HOSPADM

## 2024-05-06 RX ADMIN — SODIUM CHLORIDE, PRESERVATIVE FREE 10 ML: 5 INJECTION INTRAVENOUS at 08:10

## 2024-05-06 NOTE — PROGRESS NOTES
Arrived to the Infusion Center.  Adrucil pump disconnected, port flushed and de-accessed.   Patient instructed to report any side effects to ordering provider.  Patient tolerated well.   Any issues or concerns during appointment: none.  Patient aware of next infusion appointment on 5/24/24 (date) at 11 AM (time).  Discharged ambulatory.

## 2024-05-12 ENCOUNTER — PATIENT MESSAGE (OUTPATIENT)
Dept: INTERNAL MEDICINE CLINIC | Facility: CLINIC | Age: 64
End: 2024-05-12

## 2024-05-13 RX ORDER — GLUCOSAMINE HCL/CHONDROITIN SU 500-400 MG
CAPSULE ORAL
Qty: 100 STRIP | Refills: 5 | Status: SHIPPED | OUTPATIENT
Start: 2024-05-13

## 2024-05-13 NOTE — TELEPHONE ENCOUNTER
From: Denny Guevara  To: Dr. Aisha Vernon  Sent: 5/12/2024 10:13 PM EDT  Subject: Testing Strips    Hello,    I am in need of OneTouch Verio Testing Strips. I am completely out and unable to test my blood sugar. [facility] has told me the strips would be cheaper if you would send in a prescription for them. Would you please do so ASAP to the [facility] on [facility]?    Thank you!

## 2024-05-14 DIAGNOSIS — C79.9 METASTATIC ADENOCARCINOMA (HCC): Primary | ICD-10-CM

## 2024-05-24 ENCOUNTER — HOSPITAL ENCOUNTER (OUTPATIENT)
Dept: INFUSION THERAPY | Age: 64
Setting detail: INFUSION SERIES
Discharge: HOME | End: 2024-05-24
Payer: COMMERCIAL

## 2024-05-24 ENCOUNTER — HOSPITAL ENCOUNTER (OUTPATIENT)
Dept: LAB | Age: 64
Discharge: HOME | End: 2024-05-24
Payer: COMMERCIAL

## 2024-05-24 ENCOUNTER — OFFICE VISIT (OUTPATIENT)
Dept: ONCOLOGY | Age: 64
End: 2024-05-24
Payer: COMMERCIAL

## 2024-05-24 VITALS
WEIGHT: 242 LBS | SYSTOLIC BLOOD PRESSURE: 130 MMHG | RESPIRATION RATE: 16 BRPM | DIASTOLIC BLOOD PRESSURE: 79 MMHG | TEMPERATURE: 97.7 F | HEIGHT: 70 IN | HEART RATE: 76 BPM | BODY MASS INDEX: 34.65 KG/M2 | OXYGEN SATURATION: 97 %

## 2024-05-24 DIAGNOSIS — C78.6 MALIGNANT NEOPLASM METASTATIC TO PERITONEUM (HCC): Primary | ICD-10-CM

## 2024-05-24 DIAGNOSIS — R73.9 HYPERGLYCEMIA: ICD-10-CM

## 2024-05-24 DIAGNOSIS — Z79.899 HIGH RISK MEDICATION USE: ICD-10-CM

## 2024-05-24 DIAGNOSIS — E83.42 HYPOMAGNESEMIA: ICD-10-CM

## 2024-05-24 DIAGNOSIS — C79.9 METASTATIC ADENOCARCINOMA (HCC): ICD-10-CM

## 2024-05-24 DIAGNOSIS — C76.2 ABDOMINAL CARCINOMATOSIS (HCC): ICD-10-CM

## 2024-05-24 DIAGNOSIS — I82.721 CHRONIC DEEP VEIN THROMBOSIS (DVT) OF RIGHT UPPER EXTREMITY, UNSPECIFIED VEIN (HCC): ICD-10-CM

## 2024-05-24 LAB
ALBUMIN SERPL-MCNC: 3.4 G/DL (ref 3.2–4.6)
ALBUMIN/GLOB SERPL: 1.4 (ref 1–1.9)
ALP SERPL-CCNC: 141 U/L (ref 40–129)
ALT SERPL-CCNC: 15 U/L (ref 12–65)
ANION GAP SERPL CALC-SCNC: 10 MMOL/L (ref 9–18)
AST SERPL-CCNC: 20 U/L (ref 15–37)
BASOPHILS # BLD: 0.1 K/UL (ref 0–0.2)
BASOPHILS NFR BLD: 2 % (ref 0–2)
BILIRUB SERPL-MCNC: 0.2 MG/DL (ref 0–1.2)
BUN SERPL-MCNC: 6 MG/DL (ref 8–23)
CALCIUM SERPL-MCNC: 8.6 MG/DL (ref 8.8–10.2)
CEA SERPL-MCNC: 2.4 NG/ML (ref 0–3.8)
CHLORIDE SERPL-SCNC: 107 MMOL/L (ref 98–107)
CO2 SERPL-SCNC: 25 MMOL/L (ref 20–28)
CREAT SERPL-MCNC: 0.83 MG/DL (ref 0.8–1.3)
DIFFERENTIAL METHOD BLD: ABNORMAL
EOSINOPHIL # BLD: 0.2 K/UL (ref 0–0.8)
EOSINOPHIL NFR BLD: 5 % (ref 0.5–7.8)
ERYTHROCYTE [DISTWIDTH] IN BLOOD BY AUTOMATED COUNT: 14.1 % (ref 11.9–14.6)
GLOBULIN SER CALC-MCNC: 2.4 G/DL (ref 2.3–3.5)
GLUCOSE SERPL-MCNC: 235 MG/DL (ref 70–99)
HCT VFR BLD AUTO: 36.5 % (ref 41.1–50.3)
HGB BLD-MCNC: 11.7 G/DL (ref 13.6–17.2)
IMM GRANULOCYTES # BLD AUTO: 0 K/UL (ref 0–0.5)
IMM GRANULOCYTES NFR BLD AUTO: 1 % (ref 0–5)
LYMPHOCYTES # BLD: 1.3 K/UL (ref 0.5–4.6)
LYMPHOCYTES NFR BLD: 41 % (ref 13–44)
MAGNESIUM SERPL-MCNC: 2 MG/DL (ref 1.8–2.4)
MCH RBC QN AUTO: 28.7 PG (ref 26.1–32.9)
MCHC RBC AUTO-ENTMCNC: 32.1 G/DL (ref 31.4–35)
MCV RBC AUTO: 89.5 FL (ref 82–102)
MONOCYTES # BLD: 0.4 K/UL (ref 0.1–1.3)
MONOCYTES NFR BLD: 12 % (ref 4–12)
NEUTS SEG # BLD: 1.3 K/UL (ref 1.7–8.2)
NEUTS SEG NFR BLD: 39 % (ref 43–78)
NRBC # BLD: 0 K/UL (ref 0–0.2)
PLATELET # BLD AUTO: 164 K/UL (ref 150–450)
PLATELET COMMENT: ADEQUATE
PMV BLD AUTO: 10.4 FL (ref 9.4–12.3)
POTASSIUM SERPL-SCNC: 3.6 MMOL/L (ref 3.5–5.1)
PROT SERPL-MCNC: 5.7 G/DL (ref 6.3–8.2)
RBC # BLD AUTO: 4.08 M/UL (ref 4.23–5.6)
RBC MORPH BLD: ABNORMAL
SODIUM SERPL-SCNC: 142 MMOL/L (ref 136–145)
WBC # BLD AUTO: 3.3 K/UL (ref 4.3–11.1)
WBC MORPH BLD: ABNORMAL

## 2024-05-24 PROCEDURE — 96372 THER/PROPH/DIAG INJ SC/IM: CPT

## 2024-05-24 PROCEDURE — 96415 CHEMO IV INFUSION ADDL HR: CPT

## 2024-05-24 PROCEDURE — 83735 ASSAY OF MAGNESIUM: CPT

## 2024-05-24 PROCEDURE — 3075F SYST BP GE 130 - 139MM HG: CPT

## 2024-05-24 PROCEDURE — 85025 COMPLETE CBC W/AUTO DIFF WBC: CPT

## 2024-05-24 PROCEDURE — 99214 OFFICE O/P EST MOD 30 MIN: CPT

## 2024-05-24 PROCEDURE — G0498 CHEMO EXTEND IV INFUS W/PUMP: HCPCS

## 2024-05-24 PROCEDURE — 3078F DIAST BP <80 MM HG: CPT

## 2024-05-24 PROCEDURE — 96413 CHEMO IV INFUSION 1 HR: CPT

## 2024-05-24 PROCEDURE — 96375 TX/PRO/DX INJ NEW DRUG ADDON: CPT

## 2024-05-24 PROCEDURE — 6360000002 HC RX W HCPCS

## 2024-05-24 PROCEDURE — 82378 CARCINOEMBRYONIC ANTIGEN: CPT

## 2024-05-24 PROCEDURE — 6360000002 HC RX W HCPCS: Performed by: INTERNAL MEDICINE

## 2024-05-24 PROCEDURE — 36593 DECLOT VASCULAR DEVICE: CPT

## 2024-05-24 PROCEDURE — 96411 CHEMO IV PUSH ADDL DRUG: CPT

## 2024-05-24 PROCEDURE — 96368 THER/DIAG CONCURRENT INF: CPT

## 2024-05-24 PROCEDURE — 96367 TX/PROPH/DG ADDL SEQ IV INF: CPT

## 2024-05-24 PROCEDURE — 2580000003 HC RX 258

## 2024-05-24 PROCEDURE — 80053 COMPREHEN METABOLIC PANEL: CPT

## 2024-05-24 PROCEDURE — 2580000003 HC RX 258: Performed by: INTERNAL MEDICINE

## 2024-05-24 RX ORDER — ACETAMINOPHEN 325 MG/1
650 TABLET ORAL
Status: CANCELLED | OUTPATIENT
Start: 2024-05-24

## 2024-05-24 RX ORDER — SODIUM CHLORIDE 9 MG/ML
INJECTION, SOLUTION INTRAVENOUS CONTINUOUS
Status: DISCONTINUED | OUTPATIENT
Start: 2024-05-24 | End: 2024-05-25 | Stop reason: HOSPADM

## 2024-05-24 RX ORDER — SODIUM CHLORIDE 9 MG/ML
5-250 INJECTION, SOLUTION INTRAVENOUS PRN
Status: CANCELLED | OUTPATIENT
Start: 2024-05-24

## 2024-05-24 RX ORDER — SODIUM CHLORIDE 0.9 % (FLUSH) 0.9 %
5-40 SYRINGE (ML) INJECTION PRN
Status: CANCELLED | OUTPATIENT
Start: 2024-05-24

## 2024-05-24 RX ORDER — ACETAMINOPHEN 325 MG/1
650 TABLET ORAL
Status: DISCONTINUED | OUTPATIENT
Start: 2024-05-24 | End: 2024-05-25 | Stop reason: HOSPADM

## 2024-05-24 RX ORDER — MEPERIDINE HYDROCHLORIDE 25 MG/ML
12.5 INJECTION INTRAMUSCULAR; INTRAVENOUS; SUBCUTANEOUS PRN
Status: CANCELLED | OUTPATIENT
Start: 2024-05-24

## 2024-05-24 RX ORDER — ATROPINE SULFATE 0.4 MG/ML
0.4 INJECTION, SOLUTION INTRAVENOUS ONCE
Status: COMPLETED | OUTPATIENT
Start: 2024-05-24 | End: 2024-05-24

## 2024-05-24 RX ORDER — ONDANSETRON 2 MG/ML
8 INJECTION INTRAMUSCULAR; INTRAVENOUS
Status: DISCONTINUED | OUTPATIENT
Start: 2024-05-24 | End: 2024-05-25 | Stop reason: HOSPADM

## 2024-05-24 RX ORDER — SODIUM CHLORIDE 0.9 % (FLUSH) 0.9 %
5-40 SYRINGE (ML) INJECTION PRN
Status: DISCONTINUED | OUTPATIENT
Start: 2024-05-24 | End: 2024-05-25 | Stop reason: HOSPADM

## 2024-05-24 RX ORDER — FLUOROURACIL 50 MG/ML
400 INJECTION, SOLUTION INTRAVENOUS ONCE
Status: CANCELLED | OUTPATIENT
Start: 2024-05-24 | End: 2024-05-24

## 2024-05-24 RX ORDER — ATROPINE SULFATE 0.4 MG/ML
0.4 INJECTION, SOLUTION INTRAMUSCULAR; INTRAVENOUS; SUBCUTANEOUS ONCE
Status: CANCELLED | OUTPATIENT
Start: 2024-05-24 | End: 2024-05-24

## 2024-05-24 RX ORDER — ALBUTEROL SULFATE 90 UG/1
4 AEROSOL, METERED RESPIRATORY (INHALATION) PRN
Status: CANCELLED | OUTPATIENT
Start: 2024-05-24

## 2024-05-24 RX ORDER — SODIUM CHLORIDE 0.9 % (FLUSH) 0.9 %
5-40 SYRINGE (ML) INJECTION PRN
Status: DISCONTINUED | OUTPATIENT
Start: 2024-05-24 | End: 2024-05-28 | Stop reason: HOSPADM

## 2024-05-24 RX ORDER — ONDANSETRON 2 MG/ML
8 INJECTION INTRAMUSCULAR; INTRAVENOUS ONCE
Status: COMPLETED | OUTPATIENT
Start: 2024-05-24 | End: 2024-05-24

## 2024-05-24 RX ORDER — EPINEPHRINE 1 MG/ML
0.3 INJECTION, SOLUTION, CONCENTRATE INTRAVENOUS PRN
Status: CANCELLED | OUTPATIENT
Start: 2024-05-24

## 2024-05-24 RX ORDER — DEXTROSE MONOHYDRATE 50 MG/ML
5-250 INJECTION, SOLUTION INTRAVENOUS PRN
Status: DISCONTINUED | OUTPATIENT
Start: 2024-05-24 | End: 2024-05-25 | Stop reason: HOSPADM

## 2024-05-24 RX ORDER — HEPARIN 100 UNIT/ML
500 SYRINGE INTRAVENOUS PRN
Status: CANCELLED | OUTPATIENT
Start: 2024-05-24

## 2024-05-24 RX ORDER — DEXTROSE MONOHYDRATE 50 MG/ML
5-250 INJECTION, SOLUTION INTRAVENOUS PRN
Status: CANCELLED | OUTPATIENT
Start: 2024-05-24

## 2024-05-24 RX ORDER — DIPHENHYDRAMINE HYDROCHLORIDE 50 MG/ML
50 INJECTION INTRAMUSCULAR; INTRAVENOUS
Status: DISCONTINUED | OUTPATIENT
Start: 2024-05-24 | End: 2024-05-25 | Stop reason: HOSPADM

## 2024-05-24 RX ORDER — EPINEPHRINE 1 MG/ML
0.3 INJECTION, SOLUTION, CONCENTRATE INTRAVENOUS PRN
Status: DISCONTINUED | OUTPATIENT
Start: 2024-05-24 | End: 2024-05-25 | Stop reason: HOSPADM

## 2024-05-24 RX ORDER — MEPERIDINE HYDROCHLORIDE 25 MG/ML
12.5 INJECTION INTRAMUSCULAR; INTRAVENOUS; SUBCUTANEOUS PRN
Status: DISCONTINUED | OUTPATIENT
Start: 2024-05-24 | End: 2024-05-25 | Stop reason: HOSPADM

## 2024-05-24 RX ORDER — ALBUTEROL SULFATE 90 UG/1
4 AEROSOL, METERED RESPIRATORY (INHALATION) PRN
Status: DISCONTINUED | OUTPATIENT
Start: 2024-05-24 | End: 2024-05-25 | Stop reason: HOSPADM

## 2024-05-24 RX ORDER — FLUOROURACIL 50 MG/ML
400 INJECTION, SOLUTION INTRAVENOUS ONCE
Status: COMPLETED | OUTPATIENT
Start: 2024-05-24 | End: 2024-05-24

## 2024-05-24 RX ORDER — SODIUM CHLORIDE 9 MG/ML
INJECTION, SOLUTION INTRAVENOUS CONTINUOUS
Status: CANCELLED | OUTPATIENT
Start: 2024-05-24

## 2024-05-24 RX ORDER — ONDANSETRON 2 MG/ML
8 INJECTION INTRAMUSCULAR; INTRAVENOUS ONCE
Status: CANCELLED | OUTPATIENT
Start: 2024-05-24 | End: 2024-05-24

## 2024-05-24 RX ORDER — ONDANSETRON 2 MG/ML
8 INJECTION INTRAMUSCULAR; INTRAVENOUS
Status: CANCELLED | OUTPATIENT
Start: 2024-05-24

## 2024-05-24 RX ORDER — ATROPINE SULFATE 0.4 MG/ML
0.4 INJECTION, SOLUTION INTRAMUSCULAR; INTRAVENOUS; SUBCUTANEOUS
Status: CANCELLED | OUTPATIENT
Start: 2024-05-24

## 2024-05-24 RX ORDER — DIPHENHYDRAMINE HYDROCHLORIDE 50 MG/ML
50 INJECTION INTRAMUSCULAR; INTRAVENOUS
Status: CANCELLED | OUTPATIENT
Start: 2024-05-24

## 2024-05-24 RX ADMIN — FLUOROURACIL 900 MG: 50 INJECTION, SOLUTION INTRAVENOUS at 14:34

## 2024-05-24 RX ADMIN — DEXAMETHASONE SODIUM PHOSPHATE 12 MG: 4 INJECTION INTRA-ARTICULAR; INTRALESIONAL; INTRAMUSCULAR; INTRAVENOUS; SOFT TISSUE at 11:51

## 2024-05-24 RX ADMIN — SODIUM CHLORIDE, PRESERVATIVE FREE 10 ML: 5 INJECTION INTRAVENOUS at 11:42

## 2024-05-24 RX ADMIN — DEXTROSE MONOHYDRATE 100 ML/HR: 50 INJECTION, SOLUTION INTRAVENOUS at 11:48

## 2024-05-24 RX ADMIN — FLUOROURACIL 5375 MG: 50 INJECTION, SOLUTION INTRAVENOUS at 14:38

## 2024-05-24 RX ADMIN — LEUCOVORIN CALCIUM 900 MG: 200 INJECTION, POWDER, LYOPHILIZED, FOR SUSPENSION INTRAMUSCULAR; INTRAVENOUS at 12:47

## 2024-05-24 RX ADMIN — ONDANSETRON 8 MG: 2 INJECTION INTRAMUSCULAR; INTRAVENOUS at 11:46

## 2024-05-24 RX ADMIN — IRINOTECAN HYDROCHLORIDE 340 MG: 20 INJECTION, SOLUTION INTRAVENOUS at 12:51

## 2024-05-24 RX ADMIN — WATER 2 MG: 1 INJECTION INTRAMUSCULAR; INTRAVENOUS; SUBCUTANEOUS at 10:30

## 2024-05-24 RX ADMIN — SODIUM CHLORIDE, PRESERVATIVE FREE 10 ML: 5 INJECTION INTRAVENOUS at 09:17

## 2024-05-24 RX ADMIN — ATROPINE SULFATE 0.4 MG: 0.4 INJECTION, SOLUTION INTRAVENOUS at 12:14

## 2024-05-24 RX ADMIN — SODIUM CHLORIDE 150 MG: 9 INJECTION, SOLUTION INTRAVENOUS at 12:13

## 2024-05-24 ASSESSMENT — PATIENT HEALTH QUESTIONNAIRE - PHQ9
2. FEELING DOWN, DEPRESSED OR HOPELESS: NOT AT ALL
SUM OF ALL RESPONSES TO PHQ QUESTIONS 1-9: 0
SUM OF ALL RESPONSES TO PHQ QUESTIONS 1-9: 0
1. LITTLE INTEREST OR PLEASURE IN DOING THINGS: NOT AT ALL
SUM OF ALL RESPONSES TO PHQ QUESTIONS 1-9: 0
SUM OF ALL RESPONSES TO PHQ9 QUESTIONS 1 & 2: 0
SUM OF ALL RESPONSES TO PHQ QUESTIONS 1-9: 0

## 2024-05-24 NOTE — PROGRESS NOTES
Arrived to the Infusion Center. Cathflo instilled. Blood return noted after 30 minutes. Folfiri infusoin completed. Pump placed at 1438. Patient tolerated well.   Any issues or concerns during appointment: none.  Patient aware of next infusion appointment on 05/27/2024 (date) at 0800 (time).  Discharged ambulatory.

## 2024-05-25 NOTE — PROGRESS NOTES
olfactory sensation - He reports having intermittent aroma in his nose - described it as a wild flower - not sure if he has allergies - will try allergy med first.  If no resolution- can refer to ENT.  Pt declined need for sinus CT at this time.  No sig drainage noted.  Seldom cough.  No fevers.  No HA, no seizure.     - here with his wife for consultation.  During today's visit, we discussed his current workup.  We looked at the images together demonstrating some of the findings concerning for peritoneal nodularity/peritoneal disease.  Discussed anatomy of the findings  utilizing images to help pt understand what we are looking at and suspecting.  He and wife were appreciative of the time spent to review these.  Discussed need for visual dx/tissue pathology to determine plan - recommend ex lap - refer to Dr Doyle - personally  reviewed case with Dr Doyle who will expedite the workup and will see pt Fri.  US with mild biliary duct dilation - HIDA/ERCP reviewed by PCP   + BM/flatus; He did not like how IV morphine made him feel in the hospital.  He tried tramadol but found no relief and has been taking acetaminophen at home with minimal relief.  Discussed different options and he settled on trying  Percocet - he will contact the office to inform us if this is working for him.  We discussed SE - not to drive while on the med.  Bowel regimen reviewed.   He is tired of tests, frustrated.  Feelings validated and stressed importance of workup to determine why he has pain.  Wt loss from 240 --> 209 noted and expressed concern to pt about this.    - completed course of Levaquin/Diflucan for klebsiella pneumoniae and citrobacter freundii both of which were sensitive to Levaquin found around G-tube site.  Wishes for tube removal - reviewed risks of this during chemotherapy - I would like for him to have labs day before scheduled procedure to make sure his counts are adequate per above; case reviewed with Dr Roy by me via tel

## 2024-05-27 ENCOUNTER — HOSPITAL ENCOUNTER (OUTPATIENT)
Dept: INFUSION THERAPY | Age: 64
Setting detail: INFUSION SERIES
Discharge: HOME | End: 2024-05-27
Payer: COMMERCIAL

## 2024-05-27 VITALS
DIASTOLIC BLOOD PRESSURE: 69 MMHG | TEMPERATURE: 98.1 F | HEART RATE: 77 BPM | RESPIRATION RATE: 16 BRPM | SYSTOLIC BLOOD PRESSURE: 111 MMHG

## 2024-05-27 PROCEDURE — 2580000003 HC RX 258: Performed by: INTERNAL MEDICINE

## 2024-05-27 PROCEDURE — 96523 IRRIG DRUG DELIVERY DEVICE: CPT

## 2024-05-27 RX ORDER — SODIUM CHLORIDE 0.9 % (FLUSH) 0.9 %
5-40 SYRINGE (ML) INJECTION PRN
Status: DISCONTINUED | OUTPATIENT
Start: 2024-05-27 | End: 2024-05-28 | Stop reason: HOSPADM

## 2024-05-27 RX ADMIN — SODIUM CHLORIDE, PRESERVATIVE FREE 10 ML: 5 INJECTION INTRAVENOUS at 07:56

## 2024-05-27 NOTE — PROGRESS NOTES
Arrived to the Infusion Center.  D/c chemo pump completed.   Provided education on hydration.    Patient instructed to report any side affects to ordering provider.  Patient tolerated well.   Any issues or concerns during appointment: none.  Patient aware of next infusion appointment on 6/14 (date).  Discharged ambulatory.

## 2024-06-11 NOTE — PROGRESS NOTES
Verbal order for CBC, CMP, Magnesium on treatment days with CEA every other tx cycle received from TYSON: Barbara Bennett, Medical Oncologist with verbal read back to confirm. Order signed and routed to provider for co signature.

## 2024-06-14 ENCOUNTER — HOSPITAL ENCOUNTER (OUTPATIENT)
Dept: INFUSION THERAPY | Age: 64
Setting detail: INFUSION SERIES
Discharge: HOME | End: 2024-06-14
Payer: COMMERCIAL

## 2024-06-14 ENCOUNTER — HOSPITAL ENCOUNTER (OUTPATIENT)
Dept: LAB | Age: 64
Discharge: HOME | End: 2024-06-14
Payer: COMMERCIAL

## 2024-06-14 ENCOUNTER — OFFICE VISIT (OUTPATIENT)
Dept: ONCOLOGY | Age: 64
End: 2024-06-14
Payer: COMMERCIAL

## 2024-06-14 VITALS
SYSTOLIC BLOOD PRESSURE: 119 MMHG | HEART RATE: 76 BPM | TEMPERATURE: 97.7 F | RESPIRATION RATE: 16 BRPM | WEIGHT: 238 LBS | HEIGHT: 70 IN | OXYGEN SATURATION: 98 % | BODY MASS INDEX: 34.07 KG/M2 | DIASTOLIC BLOOD PRESSURE: 70 MMHG

## 2024-06-14 DIAGNOSIS — R73.9 HYPERGLYCEMIA: ICD-10-CM

## 2024-06-14 DIAGNOSIS — E83.42 HYPOMAGNESEMIA: ICD-10-CM

## 2024-06-14 DIAGNOSIS — C76.2 ABDOMINAL CARCINOMATOSIS (HCC): ICD-10-CM

## 2024-06-14 DIAGNOSIS — C78.6 MALIGNANT NEOPLASM METASTATIC TO PERITONEUM (HCC): Primary | ICD-10-CM

## 2024-06-14 DIAGNOSIS — C78.6 MALIGNANT NEOPLASM METASTATIC TO PERITONEUM (HCC): ICD-10-CM

## 2024-06-14 DIAGNOSIS — G62.9 NEUROPATHY: ICD-10-CM

## 2024-06-14 LAB
ALBUMIN SERPL-MCNC: 3.3 G/DL (ref 3.2–4.6)
ALBUMIN/GLOB SERPL: 1.3 (ref 1–1.9)
ALP SERPL-CCNC: 128 U/L (ref 40–129)
ALT SERPL-CCNC: 16 U/L (ref 12–65)
ANION GAP SERPL CALC-SCNC: 10 MMOL/L (ref 9–18)
AST SERPL-CCNC: 23 U/L (ref 15–37)
BASOPHILS # BLD: 0.1 K/UL (ref 0–0.2)
BASOPHILS NFR BLD: 1 % (ref 0–2)
BILIRUB SERPL-MCNC: 0.3 MG/DL (ref 0–1.2)
BUN SERPL-MCNC: 8 MG/DL (ref 8–23)
CALCIUM SERPL-MCNC: 8.3 MG/DL (ref 8.8–10.2)
CHLORIDE SERPL-SCNC: 107 MMOL/L (ref 98–107)
CO2 SERPL-SCNC: 24 MMOL/L (ref 20–28)
CREAT SERPL-MCNC: 0.88 MG/DL (ref 0.8–1.3)
DIFFERENTIAL METHOD BLD: ABNORMAL
EOSINOPHIL # BLD: 0.2 K/UL (ref 0–0.8)
EOSINOPHIL NFR BLD: 6 % (ref 0.5–7.8)
ERYTHROCYTE [DISTWIDTH] IN BLOOD BY AUTOMATED COUNT: 14.1 % (ref 11.9–14.6)
GLOBULIN SER CALC-MCNC: 2.5 G/DL (ref 2.3–3.5)
GLUCOSE SERPL-MCNC: 226 MG/DL (ref 70–99)
HCT VFR BLD AUTO: 36.1 % (ref 41.1–50.3)
HGB BLD-MCNC: 11.6 G/DL (ref 13.6–17.2)
IMM GRANULOCYTES # BLD AUTO: 0 K/UL (ref 0–0.5)
IMM GRANULOCYTES NFR BLD AUTO: 0 % (ref 0–5)
LYMPHOCYTES # BLD: 1.7 K/UL (ref 0.5–4.6)
LYMPHOCYTES NFR BLD: 47 % (ref 13–44)
MAGNESIUM SERPL-MCNC: 2 MG/DL (ref 1.8–2.4)
MCH RBC QN AUTO: 29.1 PG (ref 26.1–32.9)
MCHC RBC AUTO-ENTMCNC: 32.1 G/DL (ref 31.4–35)
MCV RBC AUTO: 90.5 FL (ref 82–102)
MONOCYTES # BLD: 0.4 K/UL (ref 0.1–1.3)
MONOCYTES NFR BLD: 11 % (ref 4–12)
NEUTS SEG # BLD: 1.3 K/UL (ref 1.7–8.2)
NEUTS SEG NFR BLD: 35 % (ref 43–78)
NRBC # BLD: 0 K/UL (ref 0–0.2)
PLATELET # BLD AUTO: 175 K/UL (ref 150–450)
PMV BLD AUTO: 10.4 FL (ref 9.4–12.3)
POTASSIUM SERPL-SCNC: 4 MMOL/L (ref 3.5–5.1)
PROT SERPL-MCNC: 5.8 G/DL (ref 6.3–8.2)
RBC # BLD AUTO: 3.99 M/UL (ref 4.23–5.6)
SODIUM SERPL-SCNC: 141 MMOL/L (ref 136–145)
WBC # BLD AUTO: 3.6 K/UL (ref 4.3–11.1)

## 2024-06-14 PROCEDURE — G0498 CHEMO EXTEND IV INFUS W/PUMP: HCPCS

## 2024-06-14 PROCEDURE — 96372 THER/PROPH/DIAG INJ SC/IM: CPT

## 2024-06-14 PROCEDURE — 96375 TX/PRO/DX INJ NEW DRUG ADDON: CPT

## 2024-06-14 PROCEDURE — 99214 OFFICE O/P EST MOD 30 MIN: CPT | Performed by: INTERNAL MEDICINE

## 2024-06-14 PROCEDURE — 2580000003 HC RX 258: Performed by: INTERNAL MEDICINE

## 2024-06-14 PROCEDURE — 3078F DIAST BP <80 MM HG: CPT | Performed by: INTERNAL MEDICINE

## 2024-06-14 PROCEDURE — 96411 CHEMO IV PUSH ADDL DRUG: CPT

## 2024-06-14 PROCEDURE — 85025 COMPLETE CBC W/AUTO DIFF WBC: CPT

## 2024-06-14 PROCEDURE — 83735 ASSAY OF MAGNESIUM: CPT

## 2024-06-14 PROCEDURE — 80053 COMPREHEN METABOLIC PANEL: CPT

## 2024-06-14 PROCEDURE — 6360000002 HC RX W HCPCS: Performed by: INTERNAL MEDICINE

## 2024-06-14 PROCEDURE — 3074F SYST BP LT 130 MM HG: CPT | Performed by: INTERNAL MEDICINE

## 2024-06-14 PROCEDURE — 96415 CHEMO IV INFUSION ADDL HR: CPT

## 2024-06-14 PROCEDURE — 96368 THER/DIAG CONCURRENT INF: CPT

## 2024-06-14 PROCEDURE — 96413 CHEMO IV INFUSION 1 HR: CPT

## 2024-06-14 PROCEDURE — 96367 TX/PROPH/DG ADDL SEQ IV INF: CPT

## 2024-06-14 RX ORDER — SODIUM CHLORIDE 9 MG/ML
5-250 INJECTION, SOLUTION INTRAVENOUS PRN
Status: CANCELLED | OUTPATIENT
Start: 2024-06-14

## 2024-06-14 RX ORDER — MEPERIDINE HYDROCHLORIDE 25 MG/ML
12.5 INJECTION INTRAMUSCULAR; INTRAVENOUS; SUBCUTANEOUS PRN
Status: DISCONTINUED | OUTPATIENT
Start: 2024-06-14 | End: 2024-06-15 | Stop reason: HOSPADM

## 2024-06-14 RX ORDER — MAGNESIUM OXIDE 400 MG/1
400 TABLET ORAL 3 TIMES DAILY
Qty: 90 TABLET | Refills: 2 | Status: SHIPPED | OUTPATIENT
Start: 2024-06-14

## 2024-06-14 RX ORDER — SODIUM CHLORIDE 9 MG/ML
INJECTION, SOLUTION INTRAVENOUS CONTINUOUS
Status: CANCELLED | OUTPATIENT
Start: 2024-06-14

## 2024-06-14 RX ORDER — MEPERIDINE HYDROCHLORIDE 25 MG/ML
12.5 INJECTION INTRAMUSCULAR; INTRAVENOUS; SUBCUTANEOUS PRN
Status: CANCELLED | OUTPATIENT
Start: 2024-06-14

## 2024-06-14 RX ORDER — DEXTROSE MONOHYDRATE 50 MG/ML
5-250 INJECTION, SOLUTION INTRAVENOUS PRN
Status: DISCONTINUED | OUTPATIENT
Start: 2024-06-14 | End: 2024-06-15 | Stop reason: HOSPADM

## 2024-06-14 RX ORDER — DEXTROSE MONOHYDRATE 50 MG/ML
5-250 INJECTION, SOLUTION INTRAVENOUS PRN
Status: CANCELLED | OUTPATIENT
Start: 2024-06-14

## 2024-06-14 RX ORDER — SODIUM CHLORIDE 0.9 % (FLUSH) 0.9 %
5-40 SYRINGE (ML) INJECTION PRN
Status: ACTIVE | OUTPATIENT
Start: 2024-06-14

## 2024-06-14 RX ORDER — ONDANSETRON 2 MG/ML
8 INJECTION INTRAMUSCULAR; INTRAVENOUS
Status: CANCELLED | OUTPATIENT
Start: 2024-06-14

## 2024-06-14 RX ORDER — ACETAMINOPHEN 325 MG/1
650 TABLET ORAL
Status: DISCONTINUED | OUTPATIENT
Start: 2024-06-14 | End: 2024-06-15 | Stop reason: HOSPADM

## 2024-06-14 RX ORDER — ATROPINE SULFATE 0.4 MG/ML
0.4 INJECTION, SOLUTION INTRAMUSCULAR; INTRAVENOUS; SUBCUTANEOUS
Status: CANCELLED | OUTPATIENT
Start: 2024-06-14

## 2024-06-14 RX ORDER — FLUOROURACIL 50 MG/ML
400 INJECTION, SOLUTION INTRAVENOUS ONCE
Status: CANCELLED | OUTPATIENT
Start: 2024-06-14 | End: 2024-06-14

## 2024-06-14 RX ORDER — DIPHENHYDRAMINE HYDROCHLORIDE 50 MG/ML
50 INJECTION INTRAMUSCULAR; INTRAVENOUS
Status: DISCONTINUED | OUTPATIENT
Start: 2024-06-14 | End: 2024-06-15 | Stop reason: HOSPADM

## 2024-06-14 RX ORDER — EPINEPHRINE 1 MG/ML
0.3 INJECTION, SOLUTION, CONCENTRATE INTRAVENOUS PRN
Status: CANCELLED | OUTPATIENT
Start: 2024-06-14

## 2024-06-14 RX ORDER — FLUOROURACIL 50 MG/ML
400 INJECTION, SOLUTION INTRAVENOUS ONCE
Status: COMPLETED | OUTPATIENT
Start: 2024-06-14 | End: 2024-06-14

## 2024-06-14 RX ORDER — ALBUTEROL SULFATE 90 UG/1
4 AEROSOL, METERED RESPIRATORY (INHALATION) PRN
Status: CANCELLED | OUTPATIENT
Start: 2024-06-14

## 2024-06-14 RX ORDER — EPINEPHRINE 1 MG/ML
0.3 INJECTION, SOLUTION, CONCENTRATE INTRAVENOUS PRN
Status: DISCONTINUED | OUTPATIENT
Start: 2024-06-14 | End: 2024-06-15 | Stop reason: HOSPADM

## 2024-06-14 RX ORDER — ONDANSETRON 2 MG/ML
8 INJECTION INTRAMUSCULAR; INTRAVENOUS ONCE
Status: COMPLETED | OUTPATIENT
Start: 2024-06-14 | End: 2024-06-14

## 2024-06-14 RX ORDER — SODIUM CHLORIDE 0.9 % (FLUSH) 0.9 %
5-40 SYRINGE (ML) INJECTION PRN
Status: CANCELLED | OUTPATIENT
Start: 2024-06-14

## 2024-06-14 RX ORDER — SODIUM CHLORIDE 0.9 % (FLUSH) 0.9 %
5-40 SYRINGE (ML) INJECTION PRN
Status: DISCONTINUED | OUTPATIENT
Start: 2024-06-14 | End: 2024-06-15 | Stop reason: HOSPADM

## 2024-06-14 RX ORDER — HEPARIN 100 UNIT/ML
500 SYRINGE INTRAVENOUS PRN
Status: CANCELLED | OUTPATIENT
Start: 2024-06-14

## 2024-06-14 RX ORDER — GABAPENTIN 400 MG/1
400 CAPSULE ORAL 3 TIMES DAILY PRN
Qty: 180 CAPSULE | Refills: 2 | Status: SHIPPED | OUTPATIENT
Start: 2024-06-14 | End: 2024-12-11

## 2024-06-14 RX ORDER — ATROPINE SULFATE 0.4 MG/ML
0.4 INJECTION, SOLUTION INTRAVENOUS ONCE
Status: COMPLETED | OUTPATIENT
Start: 2024-06-14 | End: 2024-06-14

## 2024-06-14 RX ORDER — ONDANSETRON 2 MG/ML
8 INJECTION INTRAMUSCULAR; INTRAVENOUS ONCE
Status: CANCELLED | OUTPATIENT
Start: 2024-06-14 | End: 2024-06-14

## 2024-06-14 RX ORDER — ONDANSETRON 2 MG/ML
8 INJECTION INTRAMUSCULAR; INTRAVENOUS
Status: DISCONTINUED | OUTPATIENT
Start: 2024-06-14 | End: 2024-06-15 | Stop reason: HOSPADM

## 2024-06-14 RX ORDER — ALBUTEROL SULFATE 90 UG/1
4 AEROSOL, METERED RESPIRATORY (INHALATION) PRN
Status: DISCONTINUED | OUTPATIENT
Start: 2024-06-14 | End: 2024-06-15 | Stop reason: HOSPADM

## 2024-06-14 RX ORDER — ACETAMINOPHEN 325 MG/1
650 TABLET ORAL
Status: CANCELLED | OUTPATIENT
Start: 2024-06-14

## 2024-06-14 RX ORDER — DIPHENHYDRAMINE HYDROCHLORIDE 50 MG/ML
50 INJECTION INTRAMUSCULAR; INTRAVENOUS
Status: CANCELLED | OUTPATIENT
Start: 2024-06-14

## 2024-06-14 RX ORDER — ATROPINE SULFATE 0.4 MG/ML
0.4 INJECTION, SOLUTION INTRAMUSCULAR; INTRAVENOUS; SUBCUTANEOUS ONCE
Status: CANCELLED | OUTPATIENT
Start: 2024-06-14 | End: 2024-06-14

## 2024-06-14 RX ADMIN — IRINOTECAN HYDROCHLORIDE 340 MG: 20 INJECTION, SOLUTION INTRAVENOUS at 10:58

## 2024-06-14 RX ADMIN — ONDANSETRON 8 MG: 2 INJECTION INTRAMUSCULAR; INTRAVENOUS at 10:05

## 2024-06-14 RX ADMIN — SODIUM CHLORIDE, PRESERVATIVE FREE 10 ML: 5 INJECTION INTRAVENOUS at 07:22

## 2024-06-14 RX ADMIN — SODIUM CHLORIDE 150 MG: 9 INJECTION, SOLUTION INTRAVENOUS at 10:25

## 2024-06-14 RX ADMIN — LEUCOVORIN CALCIUM 900 MG: 200 INJECTION, POWDER, LYOPHILIZED, FOR SUSPENSION INTRAMUSCULAR; INTRAVENOUS at 10:57

## 2024-06-14 RX ADMIN — FLUOROURACIL 5375 MG: 50 INJECTION, SOLUTION INTRAVENOUS at 12:41

## 2024-06-14 RX ADMIN — FLUOROURACIL 900 MG: 50 INJECTION, SOLUTION INTRAVENOUS at 12:36

## 2024-06-14 RX ADMIN — DEXAMETHASONE SODIUM PHOSPHATE 12 MG: 4 INJECTION INTRA-ARTICULAR; INTRALESIONAL; INTRAMUSCULAR; INTRAVENOUS; SOFT TISSUE at 10:09

## 2024-06-14 RX ADMIN — ATROPINE SULFATE 0.4 MG: 0.4 INJECTION, SOLUTION INTRAVENOUS at 10:08

## 2024-06-14 RX ADMIN — SODIUM CHLORIDE, PRESERVATIVE FREE 10 ML: 5 INJECTION INTRAVENOUS at 10:00

## 2024-06-14 RX ADMIN — DEXTROSE MONOHYDRATE 100 ML/HR: 50 INJECTION, SOLUTION INTRAVENOUS at 10:06

## 2024-06-14 ASSESSMENT — PATIENT HEALTH QUESTIONNAIRE - PHQ9
SUM OF ALL RESPONSES TO PHQ QUESTIONS 1-9: 0
SUM OF ALL RESPONSES TO PHQ QUESTIONS 1-9: 0
1. LITTLE INTEREST OR PLEASURE IN DOING THINGS: NOT AT ALL
SUM OF ALL RESPONSES TO PHQ QUESTIONS 1-9: 0
SUM OF ALL RESPONSES TO PHQ9 QUESTIONS 1 & 2: 0
2. FEELING DOWN, DEPRESSED OR HOPELESS: NOT AT ALL
SUM OF ALL RESPONSES TO PHQ QUESTIONS 1-9: 0

## 2024-06-14 NOTE — PATIENT INSTRUCTIONS
Patient Information from Today's Visit    The members of your Oncology [facility] are listed below:    Physician Provider: Barbara Bennett Medical Oncologist  Advanced Practice Clinician: Arlette Murrell NP  Registered Nurse: Thea SMITH RN  Navigator: Bertha CASTELLON RN  Medical Assistant: Lizbet MENCHACA MA  : Edwige COOPER   Supportive Care Services: Jacqueline COLLINS LMSW    Diagnosis: Abdominal Carcinomatosis      Follow Up Instructions: As scheduled.    Labs reviewed.  Symptoms reviewed.  Continue Xarelto.  Recommend Voltaren gel and how to use this.  Recommend increasing calcium intake.  Next scan due end of July 2024.  You can call to schedule this at [phone].    Treatment Summary has been discussed and given to patient:N/A      Current Labs:   Hospital Outpatient Visit on 06/14/2024   Component Date Value Ref Range Status    Magnesium 06/14/2024 2.0  1.8 - 2.4 mg/dL Final    Sodium 06/14/2024 141  136 - 145 mmol/L Final    Potassium 06/14/2024 4.0  3.5 - 5.1 mmol/L Final    Chloride 06/14/2024 107  98 - 107 mmol/L Final    CO2 06/14/2024 24  20 - 28 mmol/L Final    Anion Gap 06/14/2024 10  9 - 18 mmol/L Final    Glucose 06/14/2024 226 (H)  70 - 99 mg/dL Final    Comment: <70 mg/dL Consistent with, but not fully diagnostic of hypoglycemia.  100 - 125 mg/dL Impaired fasting glucose/consistent with pre-diabetes mellitus.  > 126 mg/dl Fasting glucose consistent with overt diabetes mellitus      BUN 06/14/2024 8  8 - 23 MG/DL Final    Creatinine 06/14/2024 0.88  0.80 - 1.30 MG/DL Final    Est, Glom Filt Rate 06/14/2024 >90  >60 ml/min/1.73m2 Final    Comment:    Pediatric calculator link: https://www.kidney.org/professionals/kdoqi/gfr_calculatorped     These results are not intended for use in patients <18 years of age.     eGFR results are calculated without a race factor using  the 2021 CKD-EPI equation. Careful clinical correlation is recommended, particularly when comparing to results calculated using

## 2024-06-14 NOTE — PROGRESS NOTES
[facility] Hematology and Oncology: Established patient - follow up     Chief Complaint   Patient presents with    Follow-up     Reason for Referral: Abdominal pain; peritoneal metastatic disease  Referring Provider: Mo Dalton NP  Primary Care Provider: Oscar Nelson MD  Family History of Cancer/Hematologic Disorders: Family history is significant for sister with breast cancer.   Presenting Symptoms: Progressively worsening, persistent abdominal pain x several months    History of Present Illness:  Mr. Guevara is a 63 y.o. male who presents today for FU regarding adenocarcinoma with peritoneal metastatic disease.  The past medical history is significant for tobacco use (recent pack per day smoker x 30+ years - now down to 1/3PPD), PUD, paresthesia/pain of bilateral upper extremities, HLD, HTN, diverticulitis,  colon polyps, hiatal hernia, chronic right shoulder pain, bilateral carpal tunnel syndrome, and partial colon resection.  He presented to the [facility] ED on 5/12/22 with a complaint of persistent abdominal pain for several months that was becoming progressively  worse.  EGD and colonoscopy on 2/25/22 revealed findings of hiatal hernia, gastric polyps, several benign colon polyps, diverticulosis, and internal hemorrhoids.  CT of the abdomen on 3/8/22 showed no acute findings.  He presented to [facility] ER x 2 for  evaluation (5/3/22 and 5/10/22).  RUQ abdominal ultrasound was completed on 5/3/22 in the ED at [facility] demonstrating no acute findings to explain patient's pain; probable hepatic  steatosis; small echogenic mural foci in the gallbladder which are nonmobile, likely representing cholesterol polyps, largest measuring 4 mm; and small volume simple ascites in the right upper quadrant and left lower quadrant, nonspecific.  CT AP with  contrast at [facility] ED 5/10/22 showed a partial small bowel obstruction; small to moderate amount of free fluid in the abdomen and pelvis; and nonspecific stranding of

## 2024-06-14 NOTE — PROGRESS NOTES
Arrived to the Infusion Center.  FOLFIRI chemo regimen completed. Patient tolerated well.   Any issues or concerns during appointment: none.  Patient aware of next infusion appointment on 6/17/24 (date) at 1500 (time).  Patient aware of next lab and [facility] office visit on 7/5/24 (date) at 0715 (time).  Patient instructed to call provider with temperature of 100.4 or greater or nausea/vomiting/ diarrhea or pain not controlled by medications  Discharged home ambulatory with chemo pump connected. Tubing pulled through with clamps in clamped  position. All connections tightened and taped up. Connections and clamps checked by 2nd RN.

## 2024-06-17 ENCOUNTER — HOSPITAL ENCOUNTER (OUTPATIENT)
Dept: INFUSION THERAPY | Age: 64
Setting detail: INFUSION SERIES
Discharge: HOME | End: 2024-06-17
Payer: COMMERCIAL

## 2024-06-17 ENCOUNTER — APPOINTMENT (OUTPATIENT)
Dept: INFUSION THERAPY | Age: 64
End: 2024-06-17
Payer: COMMERCIAL

## 2024-06-17 VITALS
DIASTOLIC BLOOD PRESSURE: 74 MMHG | TEMPERATURE: 97.6 F | OXYGEN SATURATION: 96 % | RESPIRATION RATE: 16 BRPM | SYSTOLIC BLOOD PRESSURE: 113 MMHG | HEART RATE: 74 BPM

## 2024-06-17 DIAGNOSIS — C76.2 ABDOMINAL CARCINOMATOSIS (HCC): ICD-10-CM

## 2024-06-17 DIAGNOSIS — R73.9 HYPERGLYCEMIA: ICD-10-CM

## 2024-06-17 DIAGNOSIS — E78.5 HYPERLIPIDEMIA, UNSPECIFIED HYPERLIPIDEMIA TYPE: ICD-10-CM

## 2024-06-17 DIAGNOSIS — C78.6 MALIGNANT NEOPLASM METASTATIC TO PERITONEUM (HCC): Primary | ICD-10-CM

## 2024-06-17 DIAGNOSIS — E83.42 HYPOMAGNESEMIA: ICD-10-CM

## 2024-06-17 PROCEDURE — 96523 IRRIG DRUG DELIVERY DEVICE: CPT

## 2024-06-17 PROCEDURE — 2580000003 HC RX 258: Performed by: INTERNAL MEDICINE

## 2024-06-17 RX ORDER — SODIUM CHLORIDE 0.9 % (FLUSH) 0.9 %
5-40 SYRINGE (ML) INJECTION PRN
Status: DISCONTINUED | OUTPATIENT
Start: 2024-06-17 | End: 2024-06-18 | Stop reason: HOSPADM

## 2024-06-17 RX ADMIN — SODIUM CHLORIDE, PRESERVATIVE FREE 10 ML: 5 INJECTION INTRAVENOUS at 09:56

## 2024-06-17 NOTE — PROGRESS NOTES
Arrived to the Infusion Center. Adrucil pump empty and disconnected. Discarded into chemogator. Black bag torn and discarded as well.   Patient instructed to report any side affects to ordering provider.  Patient tolerated well.   Any issues or concerns during appointment: none.  Patient aware of next infusion appointment on 7/5/24 (date) at 0915 (time).  Discharged amb.

## 2024-06-20 RX ORDER — ROSUVASTATIN CALCIUM 10 MG/1
10 TABLET, COATED ORAL DAILY
Qty: 90 TABLET | Refills: 4 | Status: SHIPPED | OUTPATIENT
Start: 2024-06-20

## 2024-06-24 ENCOUNTER — TELEPHONE (OUTPATIENT)
Dept: DIABETES SERVICES | Age: 64
End: 2024-06-24

## 2024-06-24 ENCOUNTER — TELEPHONE (OUTPATIENT)
Dept: ONCOLOGY | Age: 64
End: 2024-06-24

## 2024-06-24 NOTE — TELEPHONE ENCOUNTER
Subjective    Chief Complaint: Fever  Details of Complaint: Patient reports fever >102F on Saturday night.  This am @ 101.7F.  Now down to 100.7.  Patient also with diarrhea x3 days.  Reports 2-3 episodes of diarrhea per day.  Denies taking any antidiarrheal medication.  Confirmed pushing fluids.  Denies cough or congestion.  Denies symptoms of UTI.    Objective    Date of last Office Visit: 24   Date of last labs: 24   Date of last imagin24  Date of last treatment, if applicable:24      Plan/Intervention    Proposed Plan: Per wife, Dr. Bennett previously instructed patient to present to ER for fever.  Reinforced to both patient and wife.  Explained he's either at or nearing his mel.  Reviewed this could be an oncologic emergency.  Patient and wife requests to send to NP to confirm ER is necessary.  Informed Nps will be notified and will call back with plan of care.  Patient verbalized understanding of plan: YES/NO: Yes  Patient voiced intended compliance with plan: Verbalized/ Refused: Verbalized intended compliance

## 2024-06-24 NOTE — TELEPHONE ENCOUNTER
Chart reviewed by NP.  Returned call to patient and wife.  Reinforced need to be evaluated in ER and possible admission for neutropenic fever.  Patient refuses.  States his fever has \"broke\" and is now 98F without Tylenol of Motrin.  Continues to have diarrhea.  Instructed to use OTC Imodium NTE 8/24hr.  Instructed to push fluids.  Recommended Gatorade/Powerade for electrolyte replacements.  Informed will notify team with update.

## 2024-06-24 NOTE — TELEPHONE ENCOUNTER
Type 2, Called back about DM education.  He said not right now. He is taking shots every night for his treatment and has a lot going on.  He will call back if he decides to attend and schedule.    Repair Type: Referred to plastics for closure

## 2024-06-24 NOTE — TELEPHONE ENCOUNTER
Physician provider: Dr. Bennett  Reason for today's call (Please detail here patients chief complaint): fever  Last office visit:06/14/2024  Preferred pharmacy (If refill request): n/a  Calls to office within the last 48 hours?:No    Patient notified that their information will be routed to the [facility] clinical triage team for review. Patient is advised that they will receive a phone call from the triage department. If symptom related and symptoms worsen before receiving a call back, the patient has been advised to proceed to the nearest ED.    Pts spouse called stating that the pt has been running a fever all weekend. She states that it has gotten up to be 102 and that this morning it was 101.7.

## 2024-06-26 NOTE — PROGRESS NOTES
if this helps his neuropathy per PCP.     - bilateral down arms numbness/tingling - p/w cervical spine imaging-negative.    - CT restaging new indeterminate liver lesion - suggested dedicated MRI which will be completed next week. - Neuropathy is ongoing - continue duloxetine 60mg.     -  here for follow-up with his wife and for next FOLFIRI.  In the interim, CT CAP checked and no evidence of recurrent disease, stable fatty liver.     - anemia - Hb stable at 10.7.  Iron labs adequate today 7/21/2023.      - Previously, discussed sunscreen as he has not been using that and spends quite a bit of time in the sun.  Risks of worsening burn while on chemotherapy reviewed with patient and wife.  She said that she will encourage him to use sunblock when they are at the beach and in general.    - tinea corporis - We also discussed risks of tinea which the patient has experienced in the past and how to mitigate the risk.    - Today, patient is here for follow-up with his wife for consideration of his next cycle of FOLFIRI.  Labs reviewed and adequate for tx.  Fatigue/taste changes lasting a bit longer but managing and still has good QOL.  K/Mag replaced in infusion.  CEA up slightly to 3.2 from 2.6.  Would like to discuss omitting pump with Dr. Bennett next visit for [place] weekend.      - He is here today with his daughter for follow up and cycle 42 of FOLFIRI (Q3W).  He continues to do very well with every 3 week schedule.  There are no GI or bowel complaints.  Appetite is good and weight is stable.  Since last seen he met with PCP regarding uncontrolled DM, now on HS insulin and making small diet changes.  His energy level remains good and he stays after after the week of treatment.  He notes mild cough and PND, attributes to allergies.  Denies any pain or bleeding.  Neuropathy in feet is stable.  Denies any fevers or infectious symptoms.  Labs reviewed, ANC 1.0 glucose 246-much improved, proceed with treatment as

## 2024-07-05 ENCOUNTER — HOSPITAL ENCOUNTER (OUTPATIENT)
Dept: LAB | Age: 64
Discharge: HOME | End: 2024-07-05
Payer: COMMERCIAL

## 2024-07-05 ENCOUNTER — HOSPITAL ENCOUNTER (OUTPATIENT)
Dept: INFUSION THERAPY | Age: 64
Setting detail: INFUSION SERIES
Discharge: HOME | End: 2024-07-05
Payer: COMMERCIAL

## 2024-07-05 ENCOUNTER — OFFICE VISIT (OUTPATIENT)
Dept: ONCOLOGY | Age: 64
End: 2024-07-05
Payer: COMMERCIAL

## 2024-07-05 VITALS
RESPIRATION RATE: 18 BRPM | TEMPERATURE: 98 F | HEART RATE: 81 BPM | OXYGEN SATURATION: 99 % | SYSTOLIC BLOOD PRESSURE: 111 MMHG | DIASTOLIC BLOOD PRESSURE: 71 MMHG | WEIGHT: 239.8 LBS | HEIGHT: 70 IN | BODY MASS INDEX: 34.33 KG/M2

## 2024-07-05 DIAGNOSIS — C78.6 MALIGNANT NEOPLASM METASTATIC TO PERITONEUM (HCC): ICD-10-CM

## 2024-07-05 DIAGNOSIS — E83.42 HYPOMAGNESEMIA: ICD-10-CM

## 2024-07-05 DIAGNOSIS — C78.6 MALIGNANT NEOPLASM METASTATIC TO PERITONEUM (HCC): Primary | ICD-10-CM

## 2024-07-05 DIAGNOSIS — C76.2 ABDOMINAL CARCINOMATOSIS (HCC): ICD-10-CM

## 2024-07-05 DIAGNOSIS — R73.9 HYPERGLYCEMIA: ICD-10-CM

## 2024-07-05 DIAGNOSIS — Z51.5 ENCOUNTER FOR PALLIATIVE CARE: ICD-10-CM

## 2024-07-05 DIAGNOSIS — R45.89 ANXIETY ABOUT HEALTH: ICD-10-CM

## 2024-07-05 LAB
ALBUMIN SERPL-MCNC: 3.5 G/DL (ref 3.2–4.6)
ALBUMIN/GLOB SERPL: 1.3 (ref 1–1.9)
ALP SERPL-CCNC: 139 U/L (ref 40–129)
ALT SERPL-CCNC: 22 U/L (ref 12–65)
ANION GAP SERPL CALC-SCNC: 10 MMOL/L (ref 9–18)
AST SERPL-CCNC: 24 U/L (ref 15–37)
BASOPHILS # BLD: 0.1 K/UL (ref 0–0.2)
BASOPHILS NFR BLD: 1 % (ref 0–2)
BILIRUB SERPL-MCNC: 0.4 MG/DL (ref 0–1.2)
BUN SERPL-MCNC: 11 MG/DL (ref 8–23)
CALCIUM SERPL-MCNC: 8.8 MG/DL (ref 8.8–10.2)
CEA SERPL-MCNC: 3 NG/ML (ref 0–3.8)
CHLORIDE SERPL-SCNC: 106 MMOL/L (ref 98–107)
CO2 SERPL-SCNC: 26 MMOL/L (ref 20–28)
CREAT SERPL-MCNC: 0.83 MG/DL (ref 0.8–1.3)
DIFFERENTIAL METHOD BLD: ABNORMAL
EOSINOPHIL # BLD: 0.2 K/UL (ref 0–0.8)
EOSINOPHIL NFR BLD: 3 % (ref 0.5–7.8)
ERYTHROCYTE [DISTWIDTH] IN BLOOD BY AUTOMATED COUNT: 14 % (ref 11.9–14.6)
GLOBULIN SER CALC-MCNC: 2.7 G/DL (ref 2.3–3.5)
GLUCOSE SERPL-MCNC: 158 MG/DL (ref 70–99)
HCT VFR BLD AUTO: 39.2 % (ref 41.1–50.3)
HGB BLD-MCNC: 12.6 G/DL (ref 13.6–17.2)
IMM GRANULOCYTES # BLD AUTO: 0 K/UL (ref 0–0.5)
IMM GRANULOCYTES NFR BLD AUTO: 0 % (ref 0–5)
LYMPHOCYTES # BLD: 2.1 K/UL (ref 0.5–4.6)
LYMPHOCYTES NFR BLD: 38 % (ref 13–44)
MAGNESIUM SERPL-MCNC: 1.9 MG/DL (ref 1.8–2.4)
MCH RBC QN AUTO: 28.6 PG (ref 26.1–32.9)
MCHC RBC AUTO-ENTMCNC: 32.1 G/DL (ref 31.4–35)
MCV RBC AUTO: 89.1 FL (ref 82–102)
MONOCYTES # BLD: 0.6 K/UL (ref 0.1–1.3)
MONOCYTES NFR BLD: 11 % (ref 4–12)
NEUTS SEG # BLD: 2.6 K/UL (ref 1.7–8.2)
NEUTS SEG NFR BLD: 47 % (ref 43–78)
NRBC # BLD: 0 K/UL (ref 0–0.2)
PLATELET # BLD AUTO: 239 K/UL (ref 150–450)
PMV BLD AUTO: 10.1 FL (ref 9.4–12.3)
POTASSIUM SERPL-SCNC: 3.9 MMOL/L (ref 3.5–5.1)
PROT SERPL-MCNC: 6.2 G/DL (ref 6.3–8.2)
RBC # BLD AUTO: 4.4 M/UL (ref 4.23–5.6)
SODIUM SERPL-SCNC: 142 MMOL/L (ref 136–145)
WBC # BLD AUTO: 5.7 K/UL (ref 4.3–11.1)

## 2024-07-05 PROCEDURE — 2580000003 HC RX 258: Performed by: INTERNAL MEDICINE

## 2024-07-05 PROCEDURE — 96372 THER/PROPH/DIAG INJ SC/IM: CPT

## 2024-07-05 PROCEDURE — 96367 TX/PROPH/DG ADDL SEQ IV INF: CPT

## 2024-07-05 PROCEDURE — 3078F DIAST BP <80 MM HG: CPT | Performed by: INTERNAL MEDICINE

## 2024-07-05 PROCEDURE — 83735 ASSAY OF MAGNESIUM: CPT

## 2024-07-05 PROCEDURE — 96368 THER/DIAG CONCURRENT INF: CPT

## 2024-07-05 PROCEDURE — 82378 CARCINOEMBRYONIC ANTIGEN: CPT

## 2024-07-05 PROCEDURE — 96375 TX/PRO/DX INJ NEW DRUG ADDON: CPT

## 2024-07-05 PROCEDURE — 99214 OFFICE O/P EST MOD 30 MIN: CPT | Performed by: INTERNAL MEDICINE

## 2024-07-05 PROCEDURE — 85025 COMPLETE CBC W/AUTO DIFF WBC: CPT

## 2024-07-05 PROCEDURE — 80053 COMPREHEN METABOLIC PANEL: CPT

## 2024-07-05 PROCEDURE — G0498 CHEMO EXTEND IV INFUS W/PUMP: HCPCS

## 2024-07-05 PROCEDURE — 96413 CHEMO IV INFUSION 1 HR: CPT

## 2024-07-05 PROCEDURE — 96411 CHEMO IV PUSH ADDL DRUG: CPT

## 2024-07-05 PROCEDURE — 3074F SYST BP LT 130 MM HG: CPT | Performed by: INTERNAL MEDICINE

## 2024-07-05 PROCEDURE — 6360000002 HC RX W HCPCS: Performed by: INTERNAL MEDICINE

## 2024-07-05 RX ORDER — ACETAMINOPHEN 325 MG/1
650 TABLET ORAL
Status: CANCELLED | OUTPATIENT
Start: 2024-07-05

## 2024-07-05 RX ORDER — ONDANSETRON 2 MG/ML
8 INJECTION INTRAMUSCULAR; INTRAVENOUS ONCE
Status: CANCELLED | OUTPATIENT
Start: 2024-07-05 | End: 2024-07-05

## 2024-07-05 RX ORDER — ALBUTEROL SULFATE 90 UG/1
4 AEROSOL, METERED RESPIRATORY (INHALATION) PRN
Status: DISCONTINUED | OUTPATIENT
Start: 2024-07-05 | End: 2024-07-06 | Stop reason: HOSPADM

## 2024-07-05 RX ORDER — DEXTROSE MONOHYDRATE 50 MG/ML
5-250 INJECTION, SOLUTION INTRAVENOUS PRN
Status: DISCONTINUED | OUTPATIENT
Start: 2024-07-05 | End: 2024-07-06 | Stop reason: HOSPADM

## 2024-07-05 RX ORDER — MEPERIDINE HYDROCHLORIDE 50 MG/ML
12.5 INJECTION INTRAMUSCULAR; INTRAVENOUS; SUBCUTANEOUS PRN
Status: CANCELLED | OUTPATIENT
Start: 2024-07-05

## 2024-07-05 RX ORDER — EPINEPHRINE 1 MG/ML
0.3 INJECTION, SOLUTION, CONCENTRATE INTRAVENOUS PRN
Status: CANCELLED | OUTPATIENT
Start: 2024-07-05

## 2024-07-05 RX ORDER — SODIUM CHLORIDE 0.9 % (FLUSH) 0.9 %
5-40 SYRINGE (ML) INJECTION PRN
Status: CANCELLED | OUTPATIENT
Start: 2024-07-07

## 2024-07-05 RX ORDER — SODIUM CHLORIDE 9 MG/ML
INJECTION, SOLUTION INTRAVENOUS CONTINUOUS
Status: DISCONTINUED | OUTPATIENT
Start: 2024-07-05 | End: 2024-07-06 | Stop reason: HOSPADM

## 2024-07-05 RX ORDER — SODIUM CHLORIDE 9 MG/ML
INJECTION, SOLUTION INTRAVENOUS CONTINUOUS
Status: CANCELLED | OUTPATIENT
Start: 2024-07-05

## 2024-07-05 RX ORDER — SODIUM CHLORIDE 0.9 % (FLUSH) 0.9 %
5-40 SYRINGE (ML) INJECTION PRN
Status: CANCELLED | OUTPATIENT
Start: 2024-07-05

## 2024-07-05 RX ORDER — ALBUTEROL SULFATE 90 UG/1
4 AEROSOL, METERED RESPIRATORY (INHALATION) PRN
Status: CANCELLED | OUTPATIENT
Start: 2024-07-05

## 2024-07-05 RX ORDER — FLUOROURACIL 50 MG/ML
400 INJECTION, SOLUTION INTRAVENOUS ONCE
Status: CANCELLED | OUTPATIENT
Start: 2024-07-05 | End: 2024-07-05

## 2024-07-05 RX ORDER — SODIUM CHLORIDE 0.9 % (FLUSH) 0.9 %
5-40 SYRINGE (ML) INJECTION PRN
Status: DISCONTINUED | OUTPATIENT
Start: 2024-07-05 | End: 2024-07-06 | Stop reason: HOSPADM

## 2024-07-05 RX ORDER — FAMOTIDINE 10 MG/ML
20 INJECTION, SOLUTION INTRAVENOUS
Status: CANCELLED | OUTPATIENT
Start: 2024-07-05

## 2024-07-05 RX ORDER — ONDANSETRON 2 MG/ML
8 INJECTION INTRAMUSCULAR; INTRAVENOUS
Status: DISCONTINUED | OUTPATIENT
Start: 2024-07-05 | End: 2024-07-06 | Stop reason: HOSPADM

## 2024-07-05 RX ORDER — MEPERIDINE HYDROCHLORIDE 25 MG/ML
12.5 INJECTION INTRAMUSCULAR; INTRAVENOUS; SUBCUTANEOUS PRN
Status: DISCONTINUED | OUTPATIENT
Start: 2024-07-05 | End: 2024-07-06 | Stop reason: HOSPADM

## 2024-07-05 RX ORDER — FLUOROURACIL 50 MG/ML
400 INJECTION, SOLUTION INTRAVENOUS ONCE
Status: COMPLETED | OUTPATIENT
Start: 2024-07-05 | End: 2024-07-05

## 2024-07-05 RX ORDER — ONDANSETRON 2 MG/ML
8 INJECTION INTRAMUSCULAR; INTRAVENOUS
Status: CANCELLED | OUTPATIENT
Start: 2024-07-05

## 2024-07-05 RX ORDER — HEPARIN SODIUM (PORCINE) LOCK FLUSH IV SOLN 100 UNIT/ML 100 UNIT/ML
500 SOLUTION INTRAVENOUS PRN
Status: CANCELLED | OUTPATIENT
Start: 2024-07-05

## 2024-07-05 RX ORDER — SODIUM CHLORIDE 9 MG/ML
5-250 INJECTION, SOLUTION INTRAVENOUS PRN
Status: CANCELLED | OUTPATIENT
Start: 2024-07-05

## 2024-07-05 RX ORDER — DEXTROSE MONOHYDRATE 50 MG/ML
5-250 INJECTION, SOLUTION INTRAVENOUS PRN
Status: CANCELLED | OUTPATIENT
Start: 2024-07-05

## 2024-07-05 RX ORDER — DIPHENHYDRAMINE HYDROCHLORIDE 50 MG/ML
50 INJECTION INTRAMUSCULAR; INTRAVENOUS
Status: CANCELLED | OUTPATIENT
Start: 2024-07-05

## 2024-07-05 RX ORDER — ACETAMINOPHEN 325 MG/1
650 TABLET ORAL
Status: DISCONTINUED | OUTPATIENT
Start: 2024-07-05 | End: 2024-07-06 | Stop reason: HOSPADM

## 2024-07-05 RX ORDER — ATROPINE SULFATE 0.4 MG/ML
0.4 INJECTION, SOLUTION INTRAVENOUS ONCE
Status: COMPLETED | OUTPATIENT
Start: 2024-07-05 | End: 2024-07-05

## 2024-07-05 RX ORDER — SODIUM CHLORIDE 9 MG/ML
5-250 INJECTION, SOLUTION INTRAVENOUS PRN
Status: CANCELLED | OUTPATIENT
Start: 2024-07-07

## 2024-07-05 RX ORDER — ONDANSETRON 2 MG/ML
8 INJECTION INTRAMUSCULAR; INTRAVENOUS ONCE
Status: COMPLETED | OUTPATIENT
Start: 2024-07-05 | End: 2024-07-05

## 2024-07-05 RX ORDER — BUSPIRONE HYDROCHLORIDE 7.5 MG/1
7.5 TABLET ORAL DAILY
Qty: 90 TABLET | Refills: 3 | Status: SHIPPED | OUTPATIENT
Start: 2024-07-05

## 2024-07-05 RX ORDER — HEPARIN SODIUM (PORCINE) LOCK FLUSH IV SOLN 100 UNIT/ML 100 UNIT/ML
500 SOLUTION INTRAVENOUS PRN
Status: CANCELLED | OUTPATIENT
Start: 2024-07-07

## 2024-07-05 RX ORDER — EPINEPHRINE 1 MG/ML
0.3 INJECTION, SOLUTION, CONCENTRATE INTRAVENOUS PRN
Status: DISCONTINUED | OUTPATIENT
Start: 2024-07-05 | End: 2024-07-06 | Stop reason: HOSPADM

## 2024-07-05 RX ORDER — DIPHENHYDRAMINE HYDROCHLORIDE 50 MG/ML
50 INJECTION INTRAMUSCULAR; INTRAVENOUS
Status: DISCONTINUED | OUTPATIENT
Start: 2024-07-05 | End: 2024-07-06 | Stop reason: HOSPADM

## 2024-07-05 RX ORDER — SODIUM CHLORIDE 0.9 % (FLUSH) 0.9 %
5-40 SYRINGE (ML) INJECTION PRN
Status: ACTIVE | OUTPATIENT
Start: 2024-07-05 | End: 2024-07-05

## 2024-07-05 RX ADMIN — IRINOTECAN HYDROCHLORIDE 340 MG: 20 INJECTION, SOLUTION INTRAVENOUS at 10:40

## 2024-07-05 RX ADMIN — SODIUM CHLORIDE, PRESERVATIVE FREE 10 ML: 5 INJECTION INTRAVENOUS at 09:21

## 2024-07-05 RX ADMIN — SODIUM CHLORIDE, PRESERVATIVE FREE 10 ML: 5 INJECTION INTRAVENOUS at 07:15

## 2024-07-05 RX ADMIN — FLUOROURACIL 5375 MG: 50 INJECTION, SOLUTION INTRAVENOUS at 12:24

## 2024-07-05 RX ADMIN — ONDANSETRON 8 MG: 2 INJECTION INTRAMUSCULAR; INTRAVENOUS at 09:43

## 2024-07-05 RX ADMIN — ATROPINE SULFATE 0.4 MG: 0.4 INJECTION, SOLUTION INTRAVENOUS at 10:08

## 2024-07-05 RX ADMIN — SODIUM CHLORIDE 150 MG: 9 INJECTION, SOLUTION INTRAVENOUS at 10:08

## 2024-07-05 RX ADMIN — LEUCOVORIN CALCIUM 900 MG: 200 INJECTION, POWDER, LYOPHILIZED, FOR SUSPENSION INTRAMUSCULAR; INTRAVENOUS at 10:39

## 2024-07-05 RX ADMIN — DEXTROSE MONOHYDRATE 100 ML/HR: 50 INJECTION, SOLUTION INTRAVENOUS at 09:21

## 2024-07-05 RX ADMIN — SODIUM CHLORIDE, PRESERVATIVE FREE 10 ML: 5 INJECTION INTRAVENOUS at 12:23

## 2024-07-05 RX ADMIN — FLUOROURACIL 900 MG: 50 INJECTION, SOLUTION INTRAVENOUS at 12:19

## 2024-07-05 RX ADMIN — DEXAMETHASONE SODIUM PHOSPHATE 12 MG: 4 INJECTION INTRA-ARTICULAR; INTRALESIONAL; INTRAMUSCULAR; INTRAVENOUS; SOFT TISSUE at 09:47

## 2024-07-05 NOTE — PATIENT INSTRUCTIONS
0.0 - 2.0 %    Immature Granulocytes % 0 0.0 - 5.0 %    Neutrophils Absolute 2.6 1.7 - 8.2 K/UL    Lymphocytes Absolute 2.1 0.5 - 4.6 K/UL    Monocytes Absolute 0.6 0.1 - 1.3 K/UL    Eosinophils Absolute 0.2 0.0 - 0.8 K/UL    Basophils Absolute 0.1 0.0 - 0.2 K/UL    Immature Granulocytes Absolute 0.0 0.0 - 0.5 K/UL    Differential Type AUTOMATED     CEA   Result Value Ref Range    CEA 3.0 0.0 - 3.8 ng/mL              Please refer to After Visit Summary or [employer] for upcoming appointment information. Please call our office for rescheduling needs at least 24 hours before your scheduled appointment time.If you have any questions regarding your upcoming schedule please reach out to your care team through [employer] or call [phone].     Please notify your assigned Nurse Navigator of any unplanned hospital admissions or Emergency Room visits within 24 hours of discharge.    -------------------------------------------------------------------------------------------------------------------  Please call our office at [phone] if you have any  of the following symptoms:   Fever of 100.5 or greater  Chills  Shortness of breath  Swelling or pain in one leg    After office hours an answering service is available and will contact a provider for emergencies or if you are experiencing any of the above symptoms.        Jailene Osman, RN, BSN

## 2024-07-05 NOTE — PROGRESS NOTES
Arrived to the Infusion Center.  Assessment complete, labs reviewed. Folfiri completed. Patient tolerated without problems.. Flurouracil  elastomeric pump connected to infuse over 46 hours at 5ml/hr.  Clamps unclamped x 2 and verified with Caron RN  Pump placed at 1224  Any issues or concerns during appointment: None  Patient instructed to call Dr Bennett  with temperature of 100.4 or greater or nausea/vomiting/ diarrhea or pain not controlled by medications  Instructed to call provider with any side effects or other concerns  Patient aware of next infusion appointment on 7/8/24(date) at 8 15 AM (time).  Discharged ambulatory with family

## 2024-07-08 ENCOUNTER — HOSPITAL ENCOUNTER (OUTPATIENT)
Dept: INFUSION THERAPY | Age: 64
Setting detail: INFUSION SERIES
End: 2024-07-08
Payer: COMMERCIAL

## 2024-07-08 ENCOUNTER — HOSPITAL ENCOUNTER (OUTPATIENT)
Dept: INFUSION THERAPY | Age: 64
Setting detail: INFUSION SERIES
Discharge: HOME | End: 2024-07-08
Payer: COMMERCIAL

## 2024-07-08 VITALS — HEART RATE: 80 BPM | DIASTOLIC BLOOD PRESSURE: 68 MMHG | TEMPERATURE: 98 F | SYSTOLIC BLOOD PRESSURE: 111 MMHG

## 2024-07-08 DIAGNOSIS — C76.2 ABDOMINAL CARCINOMATOSIS (HCC): ICD-10-CM

## 2024-07-08 DIAGNOSIS — C78.6 MALIGNANT NEOPLASM METASTATIC TO PERITONEUM (HCC): Primary | ICD-10-CM

## 2024-07-08 DIAGNOSIS — R73.9 HYPERGLYCEMIA: ICD-10-CM

## 2024-07-08 DIAGNOSIS — E83.42 HYPOMAGNESEMIA: ICD-10-CM

## 2024-07-08 PROCEDURE — 2580000003 HC RX 258: Performed by: INTERNAL MEDICINE

## 2024-07-08 PROCEDURE — 96523 IRRIG DRUG DELIVERY DEVICE: CPT

## 2024-07-08 RX ORDER — SODIUM CHLORIDE 0.9 % (FLUSH) 0.9 %
5-40 SYRINGE (ML) INJECTION PRN
Status: DISCONTINUED | OUTPATIENT
Start: 2024-07-08 | End: 2024-07-09 | Stop reason: HOSPADM

## 2024-07-08 RX ADMIN — SODIUM CHLORIDE, PRESERVATIVE FREE 10 ML: 5 INJECTION INTRAVENOUS at 08:20

## 2024-07-08 NOTE — PROGRESS NOTES
Arrived to the Infusion Center.  Pump D/ C completed.   Patient instructed to report any side affects to ordering provider.  Patient tolerated well.   Any issues or concerns during appointment: none.  Patient aware of next infusion appointment on 07/26/2024 (date) at 1030 (time).  Discharged ambulatory.

## 2024-07-09 ENCOUNTER — OFFICE VISIT (OUTPATIENT)
Dept: INTERNAL MEDICINE CLINIC | Facility: CLINIC | Age: 64
End: 2024-07-09
Payer: COMMERCIAL

## 2024-07-09 VITALS
SYSTOLIC BLOOD PRESSURE: 110 MMHG | OXYGEN SATURATION: 95 % | RESPIRATION RATE: 16 BRPM | HEIGHT: 70 IN | WEIGHT: 237 LBS | HEART RATE: 82 BPM | DIASTOLIC BLOOD PRESSURE: 78 MMHG | BODY MASS INDEX: 33.93 KG/M2

## 2024-07-09 DIAGNOSIS — E11.65 TYPE 2 DIABETES MELLITUS WITH HYPERGLYCEMIA, UNSPECIFIED WHETHER LONG TERM INSULIN USE (HCC): ICD-10-CM

## 2024-07-09 DIAGNOSIS — G62.9 NEUROPATHY: ICD-10-CM

## 2024-07-09 DIAGNOSIS — E87.6 HYPOKALEMIA: ICD-10-CM

## 2024-07-09 DIAGNOSIS — Z00.00 PHYSICAL EXAM: Primary | ICD-10-CM

## 2024-07-09 LAB
CHOLEST SERPL-MCNC: 122 MG/DL (ref 0–200)
EST. AVERAGE GLUCOSE BLD GHB EST-MCNC: 167 MG/DL
HBA1C MFR BLD: 7.5 % (ref 0–5.6)
HDLC SERPL-MCNC: 40 MG/DL (ref 40–60)
HDLC SERPL: 3.1 (ref 0–5)
LDLC SERPL CALC-MCNC: 48 MG/DL (ref 0–100)
TRIGL SERPL-MCNC: 170 MG/DL (ref 0–150)
VLDLC SERPL CALC-MCNC: 34 MG/DL (ref 6–23)

## 2024-07-09 PROCEDURE — 99396 PREV VISIT EST AGE 40-64: CPT | Performed by: STUDENT IN AN ORGANIZED HEALTH CARE EDUCATION/TRAINING PROGRAM

## 2024-07-09 PROCEDURE — 3074F SYST BP LT 130 MM HG: CPT | Performed by: STUDENT IN AN ORGANIZED HEALTH CARE EDUCATION/TRAINING PROGRAM

## 2024-07-09 PROCEDURE — 3078F DIAST BP <80 MM HG: CPT | Performed by: STUDENT IN AN ORGANIZED HEALTH CARE EDUCATION/TRAINING PROGRAM

## 2024-07-09 RX ORDER — POTASSIUM CHLORIDE 20 MEQ/1
20 TABLET, EXTENDED RELEASE ORAL DAILY
Qty: 90 TABLET | Refills: 3 | Status: SHIPPED | OUTPATIENT
Start: 2024-07-09

## 2024-07-09 RX ORDER — INSULIN GLARGINE 100 [IU]/ML
15 INJECTION, SOLUTION SUBCUTANEOUS NIGHTLY
Qty: 5 ADJUSTABLE DOSE PRE-FILLED PEN SYRINGE | Refills: 5 | Status: SHIPPED | OUTPATIENT
Start: 2024-07-09

## 2024-07-09 RX ORDER — DULOXETIN HYDROCHLORIDE 60 MG/1
60 CAPSULE, DELAYED RELEASE ORAL DAILY
Qty: 90 CAPSULE | Refills: 3 | Status: SHIPPED | OUTPATIENT
Start: 2024-07-09

## 2024-07-12 PROBLEM — R45.89 ANXIETY ABOUT HEALTH: Status: ACTIVE | Noted: 2024-07-12

## 2024-07-17 ENCOUNTER — APPOINTMENT (OUTPATIENT)
Dept: GENERAL RADIOLOGY | Age: 64
End: 2024-07-17
Payer: COMMERCIAL

## 2024-07-17 ENCOUNTER — HOSPITAL ENCOUNTER (INPATIENT)
Age: 64
LOS: 2 days | Discharge: HOME | End: 2024-07-19
Attending: EMERGENCY MEDICINE | Admitting: INTERNAL MEDICINE
Payer: COMMERCIAL

## 2024-07-17 ENCOUNTER — APPOINTMENT (OUTPATIENT)
Dept: CT IMAGING | Age: 64
End: 2024-07-17
Payer: COMMERCIAL

## 2024-07-17 DIAGNOSIS — K56.609 SMALL BOWEL OBSTRUCTION (HCC): Primary | ICD-10-CM

## 2024-07-17 PROBLEM — R10.31 RIGHT LOWER QUADRANT ABDOMINAL PAIN: Status: ACTIVE | Noted: 2024-07-17

## 2024-07-17 PROBLEM — E87.20 LACTIC ACIDOSIS: Status: ACTIVE | Noted: 2024-07-17

## 2024-07-17 LAB
ALBUMIN SERPL-MCNC: 3.9 G/DL (ref 3.2–4.6)
ALBUMIN/GLOB SERPL: 1.5 (ref 1–1.9)
ALP SERPL-CCNC: 128 U/L (ref 40–129)
ALT SERPL-CCNC: 30 U/L (ref 12–65)
ANION GAP SERPL CALC-SCNC: 12 MMOL/L (ref 9–18)
APPEARANCE UR: ABNORMAL
AST SERPL-CCNC: 26 U/L (ref 15–37)
BACTERIA URNS QL MICRO: NEGATIVE /HPF
BASOPHILS # BLD: 0.1 K/UL (ref 0–0.2)
BASOPHILS NFR BLD: 1 % (ref 0–2)
BILIRUB SERPL-MCNC: 0.6 MG/DL (ref 0–1.2)
BILIRUB UR QL: NEGATIVE
BUN SERPL-MCNC: 7 MG/DL (ref 8–23)
CALCIUM SERPL-MCNC: 9.2 MG/DL (ref 8.8–10.2)
CHLORIDE SERPL-SCNC: 105 MMOL/L (ref 98–107)
CO2 SERPL-SCNC: 24 MMOL/L (ref 20–28)
COLOR UR: ABNORMAL
CREAT SERPL-MCNC: 0.92 MG/DL (ref 0.8–1.3)
DIFFERENTIAL METHOD BLD: NORMAL
EOSINOPHIL # BLD: 0.1 K/UL (ref 0–0.8)
EOSINOPHIL NFR BLD: 1 % (ref 0.5–7.8)
EPI CELLS #/AREA URNS HPF: ABNORMAL /HPF
ERYTHROCYTE [DISTWIDTH] IN BLOOD BY AUTOMATED COUNT: 13.9 % (ref 11.9–14.6)
GLOBULIN SER CALC-MCNC: 2.6 G/DL (ref 2.3–3.5)
GLUCOSE BLD STRIP.AUTO-MCNC: 152 MG/DL (ref 65–100)
GLUCOSE SERPL-MCNC: 202 MG/DL (ref 70–99)
GLUCOSE UR STRIP.AUTO-MCNC: 500 MG/DL
HCT VFR BLD AUTO: 44.3 % (ref 41.1–50.3)
HGB BLD-MCNC: 14.3 G/DL (ref 13.6–17.2)
HGB UR QL STRIP: NEGATIVE
HYALINE CASTS URNS QL MICRO: ABNORMAL /LPF
IMM GRANULOCYTES # BLD AUTO: 0 K/UL (ref 0–0.5)
IMM GRANULOCYTES NFR BLD AUTO: 0 % (ref 0–5)
KETONES UR QL STRIP.AUTO: NEGATIVE MG/DL
LACTATE SERPL-SCNC: 1.8 MMOL/L (ref 0.5–2)
LACTATE SERPL-SCNC: 2.2 MMOL/L (ref 0.5–2)
LEUKOCYTE ESTERASE UR QL STRIP.AUTO: NEGATIVE
LIPASE SERPL-CCNC: 14 U/L (ref 13–60)
LYMPHOCYTES # BLD: 1.6 K/UL (ref 0.5–4.6)
LYMPHOCYTES NFR BLD: 17 % (ref 13–44)
MCH RBC QN AUTO: 28.7 PG (ref 26.1–32.9)
MCHC RBC AUTO-ENTMCNC: 32.3 G/DL (ref 31.4–35)
MCV RBC AUTO: 88.8 FL (ref 82–102)
MONOCYTES # BLD: 0.4 K/UL (ref 0.1–1.3)
MONOCYTES NFR BLD: 4 % (ref 4–12)
MUCOUS THREADS URNS QL MICRO: 0 /LPF
NEUTS SEG # BLD: 7.5 K/UL (ref 1.7–8.2)
NEUTS SEG NFR BLD: 77 % (ref 43–78)
NITRITE UR QL STRIP.AUTO: NEGATIVE
NRBC # BLD: 0 K/UL (ref 0–0.2)
PH UR STRIP: 5.5 (ref 5–9)
PLATELET # BLD AUTO: 173 K/UL (ref 150–450)
PMV BLD AUTO: 10.4 FL (ref 9.4–12.3)
POTASSIUM SERPL-SCNC: 3.8 MMOL/L (ref 3.5–5.1)
PROT SERPL-MCNC: 6.6 G/DL (ref 6.3–8.2)
PROT UR STRIP-MCNC: ABNORMAL MG/DL
RBC # BLD AUTO: 4.99 M/UL (ref 4.23–5.6)
RBC #/AREA URNS HPF: ABNORMAL /HPF
SERVICE CMNT-IMP: ABNORMAL
SODIUM SERPL-SCNC: 141 MMOL/L (ref 136–145)
SP GR UR REFRACTOMETRY: 1.02 (ref 1–1.02)
URINE CULTURE IF INDICATED: ABNORMAL
UROBILINOGEN UR QL STRIP.AUTO: 1 EU/DL (ref 0.2–1)
WBC # BLD AUTO: 9.8 K/UL (ref 4.3–11.1)
WBC URNS QL MICRO: ABNORMAL /HPF

## 2024-07-17 PROCEDURE — 0D9670Z DRAINAGE OF STOMACH WITH DRAINAGE DEVICE, VIA NATURAL OR ARTIFICIAL OPENING: ICD-10-PCS | Performed by: RADIOLOGY

## 2024-07-17 PROCEDURE — 6360000002 HC RX W HCPCS: Performed by: INTERNAL MEDICINE

## 2024-07-17 PROCEDURE — 85025 COMPLETE CBC W/AUTO DIFF WBC: CPT

## 2024-07-17 PROCEDURE — 80053 COMPREHEN METABOLIC PANEL: CPT

## 2024-07-17 PROCEDURE — 74018 RADEX ABDOMEN 1 VIEW: CPT

## 2024-07-17 PROCEDURE — 1100000000 HC RM PRIVATE

## 2024-07-17 PROCEDURE — 96374 THER/PROPH/DIAG INJ IV PUSH: CPT

## 2024-07-17 PROCEDURE — 6360000002 HC RX W HCPCS: Performed by: PHYSICIAN ASSISTANT

## 2024-07-17 PROCEDURE — 6360000004 HC RX CONTRAST MEDICATION: Performed by: PHYSICIAN ASSISTANT

## 2024-07-17 PROCEDURE — 83605 ASSAY OF LACTIC ACID: CPT

## 2024-07-17 PROCEDURE — 96375 TX/PRO/DX INJ NEW DRUG ADDON: CPT

## 2024-07-17 PROCEDURE — 74177 CT ABD & PELVIS W/CONTRAST: CPT

## 2024-07-17 PROCEDURE — 82962 GLUCOSE BLOOD TEST: CPT

## 2024-07-17 PROCEDURE — 99285 EMERGENCY DEPT VISIT HI MDM: CPT

## 2024-07-17 PROCEDURE — 83690 ASSAY OF LIPASE: CPT

## 2024-07-17 PROCEDURE — 2500000003 HC RX 250 WO HCPCS: Performed by: NURSE PRACTITIONER

## 2024-07-17 PROCEDURE — 2500000003 HC RX 250 WO HCPCS: Performed by: INTERNAL MEDICINE

## 2024-07-17 PROCEDURE — 2580000003 HC RX 258: Performed by: INTERNAL MEDICINE

## 2024-07-17 PROCEDURE — 81001 URINALYSIS AUTO W/SCOPE: CPT

## 2024-07-17 PROCEDURE — 96376 TX/PRO/DX INJ SAME DRUG ADON: CPT

## 2024-07-17 PROCEDURE — 6370000000 HC RX 637 (ALT 250 FOR IP): Performed by: INTERNAL MEDICINE

## 2024-07-17 RX ORDER — ONDANSETRON 2 MG/ML
4 INJECTION INTRAMUSCULAR; INTRAVENOUS
Status: COMPLETED | OUTPATIENT
Start: 2024-07-17 | End: 2024-07-17

## 2024-07-17 RX ORDER — INSULIN LISPRO 100 [IU]/ML
0-4 INJECTION, SOLUTION INTRAVENOUS; SUBCUTANEOUS NIGHTLY
Status: DISCONTINUED | OUTPATIENT
Start: 2024-07-17 | End: 2024-07-19 | Stop reason: HOSPADM

## 2024-07-17 RX ORDER — BUSPIRONE HYDROCHLORIDE 5 MG/1
7.5 TABLET ORAL DAILY
Status: DISCONTINUED | OUTPATIENT
Start: 2024-07-17 | End: 2024-07-19 | Stop reason: HOSPADM

## 2024-07-17 RX ORDER — MORPHINE SULFATE 4 MG/ML
4 INJECTION, SOLUTION INTRAMUSCULAR; INTRAVENOUS ONCE
Status: COMPLETED | OUTPATIENT
Start: 2024-07-17 | End: 2024-07-17

## 2024-07-17 RX ORDER — MORPHINE SULFATE 2 MG/ML
2 INJECTION, SOLUTION INTRAMUSCULAR; INTRAVENOUS
Status: DISCONTINUED | OUTPATIENT
Start: 2024-07-17 | End: 2024-07-19 | Stop reason: HOSPADM

## 2024-07-17 RX ORDER — POTASSIUM CHLORIDE 20 MEQ/1
40 TABLET, EXTENDED RELEASE ORAL PRN
Status: DISCONTINUED | OUTPATIENT
Start: 2024-07-17 | End: 2024-07-19 | Stop reason: HOSPADM

## 2024-07-17 RX ORDER — SODIUM CHLORIDE 0.9 % (FLUSH) 0.9 %
5-40 SYRINGE (ML) INJECTION EVERY 12 HOURS SCHEDULED
Status: DISCONTINUED | OUTPATIENT
Start: 2024-07-17 | End: 2024-07-19 | Stop reason: HOSPADM

## 2024-07-17 RX ORDER — SODIUM CHLORIDE 0.9 % (FLUSH) 0.9 %
5-40 SYRINGE (ML) INJECTION PRN
Status: DISCONTINUED | OUTPATIENT
Start: 2024-07-17 | End: 2024-07-19 | Stop reason: HOSPADM

## 2024-07-17 RX ORDER — GABAPENTIN 400 MG/1
400 CAPSULE ORAL 3 TIMES DAILY PRN
Status: DISCONTINUED | OUTPATIENT
Start: 2024-07-17 | End: 2024-07-19 | Stop reason: HOSPADM

## 2024-07-17 RX ORDER — SODIUM CHLORIDE 9 MG/ML
INJECTION, SOLUTION INTRAVENOUS PRN
Status: DISCONTINUED | OUTPATIENT
Start: 2024-07-17 | End: 2024-07-19 | Stop reason: HOSPADM

## 2024-07-17 RX ORDER — ALBUTEROL SULFATE 2.5 MG/3ML
2.5 SOLUTION RESPIRATORY (INHALATION) EVERY 4 HOURS PRN
Status: DISCONTINUED | OUTPATIENT
Start: 2024-07-17 | End: 2024-07-19 | Stop reason: HOSPADM

## 2024-07-17 RX ORDER — DEXTROSE MONOHYDRATE 100 MG/ML
INJECTION, SOLUTION INTRAVENOUS CONTINUOUS PRN
Status: DISCONTINUED | OUTPATIENT
Start: 2024-07-17 | End: 2024-07-19 | Stop reason: HOSPADM

## 2024-07-17 RX ORDER — ONDANSETRON 4 MG/1
4 TABLET, ORALLY DISINTEGRATING ORAL EVERY 8 HOURS PRN
Status: DISCONTINUED | OUTPATIENT
Start: 2024-07-17 | End: 2024-07-19 | Stop reason: HOSPADM

## 2024-07-17 RX ORDER — MAGNESIUM SULFATE IN WATER 40 MG/ML
2000 INJECTION, SOLUTION INTRAVENOUS PRN
Status: DISCONTINUED | OUTPATIENT
Start: 2024-07-17 | End: 2024-07-18

## 2024-07-17 RX ORDER — INSULIN LISPRO 100 [IU]/ML
0-4 INJECTION, SOLUTION INTRAVENOUS; SUBCUTANEOUS
Status: DISCONTINUED | OUTPATIENT
Start: 2024-07-18 | End: 2024-07-19 | Stop reason: HOSPADM

## 2024-07-17 RX ORDER — DULOXETIN HYDROCHLORIDE 60 MG/1
60 CAPSULE, DELAYED RELEASE ORAL DAILY
Status: DISCONTINUED | OUTPATIENT
Start: 2024-07-17 | End: 2024-07-19 | Stop reason: HOSPADM

## 2024-07-17 RX ORDER — ONDANSETRON 2 MG/ML
4 INJECTION INTRAMUSCULAR; INTRAVENOUS EVERY 6 HOURS PRN
Status: DISCONTINUED | OUTPATIENT
Start: 2024-07-17 | End: 2024-07-19 | Stop reason: HOSPADM

## 2024-07-17 RX ORDER — ENOXAPARIN SODIUM 150 MG/ML
1 INJECTION SUBCUTANEOUS 2 TIMES DAILY
Status: DISCONTINUED | OUTPATIENT
Start: 2024-07-17 | End: 2024-07-19 | Stop reason: HOSPADM

## 2024-07-17 RX ORDER — IBUPROFEN 600 MG/1
1 TABLET ORAL PRN
Status: DISCONTINUED | OUTPATIENT
Start: 2024-07-17 | End: 2024-07-19 | Stop reason: HOSPADM

## 2024-07-17 RX ORDER — POTASSIUM CHLORIDE 7.45 MG/ML
10 INJECTION INTRAVENOUS PRN
Status: DISCONTINUED | OUTPATIENT
Start: 2024-07-17 | End: 2024-07-19 | Stop reason: HOSPADM

## 2024-07-17 RX ORDER — ONDANSETRON 2 MG/ML
4 INJECTION INTRAMUSCULAR; INTRAVENOUS EVERY 6 HOURS PRN
Status: DISCONTINUED | OUTPATIENT
Start: 2024-07-17 | End: 2024-07-17

## 2024-07-17 RX ORDER — DEXTROSE MONOHYDRATE, SODIUM CHLORIDE, AND POTASSIUM CHLORIDE 50; 1.49; 4.5 G/1000ML; G/1000ML; G/1000ML
INJECTION, SOLUTION INTRAVENOUS CONTINUOUS
Status: DISCONTINUED | OUTPATIENT
Start: 2024-07-17 | End: 2024-07-18

## 2024-07-17 RX ORDER — KETOROLAC TROMETHAMINE 15 MG/ML
15 INJECTION, SOLUTION INTRAMUSCULAR; INTRAVENOUS EVERY 6 HOURS PRN
Status: DISCONTINUED | OUTPATIENT
Start: 2024-07-17 | End: 2024-07-19 | Stop reason: HOSPADM

## 2024-07-17 RX ORDER — ROSUVASTATIN CALCIUM 10 MG/1
10 TABLET, COATED ORAL DAILY
Status: DISCONTINUED | OUTPATIENT
Start: 2024-07-17 | End: 2024-07-19 | Stop reason: HOSPADM

## 2024-07-17 RX ORDER — INSULIN GLARGINE 100 [IU]/ML
5 INJECTION, SOLUTION SUBCUTANEOUS NIGHTLY
Status: DISCONTINUED | OUTPATIENT
Start: 2024-07-17 | End: 2024-07-19 | Stop reason: HOSPADM

## 2024-07-17 RX ADMIN — ONDANSETRON 4 MG: 2 INJECTION INTRAMUSCULAR; INTRAVENOUS at 08:59

## 2024-07-17 RX ADMIN — MORPHINE SULFATE 4 MG: 4 INJECTION INTRAVENOUS at 08:58

## 2024-07-17 RX ADMIN — SODIUM CHLORIDE, PRESERVATIVE FREE 10 ML: 5 INJECTION INTRAVENOUS at 19:35

## 2024-07-17 RX ADMIN — ONDANSETRON 4 MG: 2 INJECTION INTRAMUSCULAR; INTRAVENOUS at 19:35

## 2024-07-17 RX ADMIN — MORPHINE SULFATE 4 MG: 4 INJECTION INTRAVENOUS at 13:19

## 2024-07-17 RX ADMIN — INSULIN GLARGINE 5 UNITS: 100 INJECTION, SOLUTION SUBCUTANEOUS at 20:02

## 2024-07-17 RX ADMIN — MORPHINE SULFATE 2 MG: 2 INJECTION, SOLUTION INTRAMUSCULAR; INTRAVENOUS at 19:34

## 2024-07-17 RX ADMIN — ONDANSETRON 4 MG: 2 INJECTION INTRAMUSCULAR; INTRAVENOUS at 13:17

## 2024-07-17 RX ADMIN — IOPAMIDOL 100 ML: 755 INJECTION, SOLUTION INTRAVENOUS at 10:56

## 2024-07-17 RX ADMIN — DEXTROSE MONOHYDRATE, SODIUM CHLORIDE, AND POTASSIUM CHLORIDE: 50; 4.5; 1.49 INJECTION, SOLUTION INTRAVENOUS at 18:46

## 2024-07-17 ASSESSMENT — PAIN SCALES - GENERAL
PAINLEVEL_OUTOF10: 7
PAINLEVEL_OUTOF10: 3
PAINLEVEL_OUTOF10: 8

## 2024-07-17 ASSESSMENT — PAIN - FUNCTIONAL ASSESSMENT
PAIN_FUNCTIONAL_ASSESSMENT: 0-10
PAIN_FUNCTIONAL_ASSESSMENT: PREVENTS OR INTERFERES SOME ACTIVE ACTIVITIES AND ADLS

## 2024-07-17 ASSESSMENT — PAIN DESCRIPTION - ORIENTATION
ORIENTATION: MID
ORIENTATION: MID

## 2024-07-17 ASSESSMENT — ENCOUNTER SYMPTOMS
ABDOMINAL PAIN: 1
NAUSEA: 1
VOMITING: 1

## 2024-07-17 ASSESSMENT — LIFESTYLE VARIABLES
HOW MANY STANDARD DRINKS CONTAINING ALCOHOL DO YOU HAVE ON A TYPICAL DAY: PATIENT DOES NOT DRINK
HOW OFTEN DO YOU HAVE A DRINK CONTAINING ALCOHOL: NEVER

## 2024-07-17 ASSESSMENT — PAIN DESCRIPTION - FREQUENCY: FREQUENCY: CONTINUOUS

## 2024-07-17 ASSESSMENT — PAIN DESCRIPTION - DESCRIPTORS
DESCRIPTORS: ACHING;SORE
DESCRIPTORS: ACHING

## 2024-07-17 ASSESSMENT — PAIN DESCRIPTION - ONSET: ONSET: ON-GOING

## 2024-07-17 ASSESSMENT — PAIN DESCRIPTION - LOCATION: LOCATION: ABDOMEN

## 2024-07-17 ASSESSMENT — PAIN DESCRIPTION - PAIN TYPE: TYPE: ACUTE PAIN

## 2024-07-17 NOTE — H&P
Hospitalist History and Physical   Admit Date:  2024  8:33 AM   Name:  Denny Guevara   Age:  63 y.o.  Sex:  male  :  1960   MRN:  [id number]   Room:  ER/    Presenting/Chief Complaint: Abdominal Pain     Reason(s) for Admission: Small bowel obstruction (HCC) [K56.609]     History of Present Illness:   Denny Guevara is a 63 y.o. male with metastatic gastric/omental adenocarcinoma, chronic DVT, DM2, PUD, HTN, and anxiety who presented to the ER on  with 14 hours of worsening midline and right lower abdomen pain and distention.  He states that his lower abdomen started hurting on the evening of  and he had 1 episode of vomiting.  He went to bed with his abdomen slightly distended and warm.  Around 3 AM he woke up out of sleep with excruciating pain and told his wife he needed to come to the ER.  In the ER, CT scan revealed distal small bowel obstruction so an NG tube was placed with copious amounts of liquid out and the patient felt improved.  Currently denies nausea.  States that pain is improved.  Denies shortness of breath/cough.  Denies chest pain.  Denies fevers.    Assessment & Plan:     Principal Problem:    SBO (small bowel obstruction) (HCC)    Right lower quadrant abdominal pain  CTAP with high-grade distal small bowel obstruction  NG tube placed in ER with copious amounts of liquid upon suction  Keep NGT to 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  Start Humalog SSI      Chronic deep vein thrombosis (DVT) (HCC)  Hold Xarelto 20 mg daily while n.p.o.  Start weight-based Lovenox while inpatient      PT/OT evals ordered?  Not ordered; patient not expected to need rehab  Diet: Diet NPO Exceptions are: Sips of Water with Meds, Ice Chips  VTE prophylaxis: Lovenox  Code status: Prior      Non-peripheral Lines and Tubes (if present):             Hospital Problems:  Principal Problem:    SBO (small bowel obstruction) (HCC)  Active Problems:    Right lower quadrant abdominal pain    Lactic acidosis    Essential hypertension    PUD (peptic ulcer disease)    Chronic bronchitis (HCC)    Abdominal carcinomatosis (HCC)    Anxiety    Gastric cancer (HCC)    Type 2 diabetes mellitus    Hyperlipidemia    Gastroesophageal reflux disease    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 *        Objective:   Patient Vitals for the past 24 hrs:   Temp Pulse Resp BP SpO2   07/17/24 1351 -- -- -- -- 90 %   07/17/24 1341 -- 75 18 133/79 90 %   07/17/24 1145 -- 76 17 134/76 96 %   07/17/24 1045 -- 75 17 138/86 96 %   07/17/24 0945 -- 71 18 136/84 96 %   07/17/24 0900 -- 71 18 121/87 94 %   07/17/24 0845 -- 77 17 (!) 144/85 97 %   07/17/24 0756 97.9 °F (36.6 °C) 88 18 (!) 130/93 94 %       Oxygen Therapy  SpO2: 90 %  Pulse via Oximetry: 72 beats per minute    Estimated body mass index is 33.19 kg/m² as calculated from the following:    Height as of this encounter: 1.803 m (5' 11\").    Weight as of this encounter: 108 kg (238 lb).  No intake or output data in the 24 hours ending 07/17/24 1653      Physical Exam:  General:    Well nourished.    Head:  Normocephalic,

## 2024-07-17 NOTE — ED NOTES
Patient walked into ER with right sided umbilical pain 10/10, chills, nausea and vomiting, and diarrhea. He has hx of  gastric cancer stage 4 currently going through chemo treatment x2 years.  He states his last chemo treatment was on 07/05/2024.He has symptoms of nausea, vomiting that started last night and diarrhea since last week. He has hx of umbilical hernia. He denies and chest pain or sob.     Nicolle Parson, ADRIANA  07/17/24 0805

## 2024-07-17 NOTE — ED TRIAGE NOTES
Pt c/o abd pain surrounding umbilical region. Hx gastric cancer and currently undergoing chemo. Last episode emesis last night. Reports diarrhea started last week. Denies  CP or SOB

## 2024-07-17 NOTE — CONSULTS
H&P/Consult Note/Progress Note/Office Note:   Denny Guevara  MRN: [id number]  :1960  Age:63 y.o.    HPI: Denny Guevara is a 63 y.o. male who we are asked by ED to see for SBO. The patient has a PMHx of abdominal carcinomatosis with peritoneal metastasis s/p partial colon resection and gastric tube ( Dr Doyle). He presented with a one day history of nausea and vomiting after eating dinner last night. The week prior he had episodes of diarrhea that returned over the weekend. Patient thought diarrhea was related to his Chemotherapy. Last Chemotherapy 24. No associated fever or chills. He was admitted on 2024 for SBO confirmed on CT A/P on admission that has been reviewed on PACS.     Since admission the patient has had 1L in NG output.   His abdomen is predominantly sore on Right upper and lower quadrants.    Vital signs: 97.9 F, RR 18, HR 88, 130/93  Labs: Na 141, Cr 0.92, LA 2.2-->1.8. LFTs unremarkable, CBC unremarkable.     Abd Surg Hx: Ex lap partial colon resection   Colonoscopy:   Anticoagulation: XARELTO (last dose ) takes for hx RUE DVT  Last BM: 24. Today no flatus or BM          Past Medical History:   Diagnosis Date    Bilateral carpal tunnel syndrome 2020    Cancer (HCC)     Chronic right shoulder pain 2020    Colon polyps 3/11/2013    Diverticulitis 3/11/2013    GERD (gastroesophageal reflux disease)     HTN (hypertension) 3/11/2013    Hyperlipidemia 3/11/2013    Paresthesia and pain of both upper extremities 2020    Partial small bowel obstruction (HCC) 2022    PUD (peptic ulcer disease) 3/11/2013    History of     Right elbow pain 2020    Wheezing 3/11/2013     Past Surgical History:   Procedure Laterality Date    COLONOSCOPY  09    colonoscopy per Dr. Dale with need for repeat every 3 years    COLONOSCOPY  2022    Dr. Baker; polyps of the ascending, transverse, descending, and sigmoid colons; internal

## 2024-07-17 NOTE — ED NOTES
TRANSFER - OUT REPORT:    Verbal report given to RN on Denny Guevara  being transferred to [facility] for routine progression of patient care       Report consisted of patient's Situation, Background, Assessment and   Recommendations(SBAR).     Information from the following report(s) Nurse Handoff Report was reviewed with the receiving nurse.    Javier Fall Assessment:    Presents to emergency department  because of falls (Syncope, seizure, or loss of consciousness): No  Age > 70: No  Altered Mental Status, Intoxication with alcohol or substance confusion (Disorientation, impaired judgment, poor safety awaremess, or inability to follow instructions): No  Impaired Mobility: Ambulates or transfers with assistive devices or assistance; Unable to ambulate or transer.: No  Nursing Judgement: No          Lines:   Peripheral IV 07/17/24 Left Antecubital (Active)        Opportunity for questions and clarification was provided.      Patient transported with:  Ayah Vale RN  07/17/24 6646

## 2024-07-17 NOTE — ED PROVIDER NOTES
BUN 7 (*)     All other components within normal limits   LACTATE, SEPSIS - Abnormal; Notable for the following components:    Lactic Acid, Sepsis 2.2 (*)     All other components within normal limits   URINALYSIS WITH REFLEX TO CULTURE - Abnormal; Notable for the following components:    Protein, UA TRACE (*)     All other components within normal limits   CBC WITH AUTO DIFFERENTIAL   LIPASE   LACTIC ACID   POCT GLUCOSE     Medications   morphine sulfate (PF) injection 4 mg (4 mg IntraVENous Given 7/17/24 0858)   ondansetron (ZOFRAN) injection 4 mg (4 mg IntraVENous Given 7/17/24 0859)   iopamidol (ISOVUE-370) 76 % injection 100 mL (100 mLs IntraVENous Given 7/17/24 1056)     CT ABDOMEN PELVIS W IV CONTRAST Additional Contrast? None   Final Result      High-grade, distal incomplete small bowel obstruction.      Remainder of findings as above.      Follow-up is advised.         If there are any questions about this report, I can be reached on [employer]   or at [phone]         Electronically signed by ONEYDA LARA        Voice dictation software was used during the making of this note. This software is not perfect and grammatical and other typographical errors may be present. This note has not been completely proofread for errors.     Abiodun Ayala PA-C  07/17/24 9423

## 2024-07-18 ENCOUNTER — APPOINTMENT (OUTPATIENT)
Dept: GENERAL RADIOLOGY | Age: 64
End: 2024-07-18
Payer: COMMERCIAL

## 2024-07-18 LAB
ANION GAP SERPL CALC-SCNC: 10 MMOL/L (ref 9–18)
BASOPHILS # BLD: 0.1 K/UL (ref 0–0.2)
BASOPHILS NFR BLD: 1 % (ref 0–2)
BUN SERPL-MCNC: 9 MG/DL (ref 8–23)
CALCIUM SERPL-MCNC: 8.3 MG/DL (ref 8.8–10.2)
CHLORIDE SERPL-SCNC: 103 MMOL/L (ref 98–107)
CO2 SERPL-SCNC: 27 MMOL/L (ref 20–28)
CREAT SERPL-MCNC: 0.84 MG/DL (ref 0.8–1.3)
DIFFERENTIAL METHOD BLD: ABNORMAL
EOSINOPHIL # BLD: 0.2 K/UL (ref 0–0.8)
EOSINOPHIL NFR BLD: 2 % (ref 0.5–7.8)
ERYTHROCYTE [DISTWIDTH] IN BLOOD BY AUTOMATED COUNT: 13.9 % (ref 11.9–14.6)
GLUCOSE BLD STRIP.AUTO-MCNC: 115 MG/DL (ref 65–100)
GLUCOSE BLD STRIP.AUTO-MCNC: 125 MG/DL (ref 65–100)
GLUCOSE BLD STRIP.AUTO-MCNC: 140 MG/DL (ref 65–100)
GLUCOSE BLD STRIP.AUTO-MCNC: 174 MG/DL (ref 65–100)
GLUCOSE SERPL-MCNC: 166 MG/DL (ref 70–99)
HCT VFR BLD AUTO: 39.4 % (ref 41.1–50.3)
HGB BLD-MCNC: 12.3 G/DL (ref 13.6–17.2)
IMM GRANULOCYTES # BLD AUTO: 0 K/UL (ref 0–0.5)
IMM GRANULOCYTES NFR BLD AUTO: 0 % (ref 0–5)
LYMPHOCYTES # BLD: 2.1 K/UL (ref 0.5–4.6)
LYMPHOCYTES NFR BLD: 29 % (ref 13–44)
MAGNESIUM SERPL-MCNC: 1.7 MG/DL (ref 1.8–2.4)
MCH RBC QN AUTO: 28.3 PG (ref 26.1–32.9)
MCHC RBC AUTO-ENTMCNC: 31.2 G/DL (ref 31.4–35)
MCV RBC AUTO: 90.8 FL (ref 82–102)
MONOCYTES # BLD: 0.4 K/UL (ref 0.1–1.3)
MONOCYTES NFR BLD: 6 % (ref 4–12)
NEUTS SEG # BLD: 4.5 K/UL (ref 1.7–8.2)
NEUTS SEG NFR BLD: 63 % (ref 43–78)
NRBC # BLD: 0 K/UL (ref 0–0.2)
PHOSPHATE SERPL-MCNC: 3.1 MG/DL (ref 2.5–4.5)
PLATELET # BLD AUTO: 135 K/UL (ref 150–450)
PMV BLD AUTO: 10 FL (ref 9.4–12.3)
POTASSIUM SERPL-SCNC: 3.6 MMOL/L (ref 3.5–5.1)
RBC # BLD AUTO: 4.34 M/UL (ref 4.23–5.6)
SERVICE CMNT-IMP: ABNORMAL
SODIUM SERPL-SCNC: 140 MMOL/L (ref 136–145)
WBC # BLD AUTO: 7.2 K/UL (ref 4.3–11.1)

## 2024-07-18 PROCEDURE — 85025 COMPLETE CBC W/AUTO DIFF WBC: CPT

## 2024-07-18 PROCEDURE — 97530 THERAPEUTIC ACTIVITIES: CPT

## 2024-07-18 PROCEDURE — 74250 X-RAY XM SM INT 1CNTRST STD: CPT

## 2024-07-18 PROCEDURE — 2580000003 HC RX 258: Performed by: INTERNAL MEDICINE

## 2024-07-18 PROCEDURE — 80048 BASIC METABOLIC PNL TOTAL CA: CPT

## 2024-07-18 PROCEDURE — 84100 ASSAY OF PHOSPHORUS: CPT

## 2024-07-18 PROCEDURE — 6360000002 HC RX W HCPCS: Performed by: INTERNAL MEDICINE

## 2024-07-18 PROCEDURE — 83735 ASSAY OF MAGNESIUM: CPT

## 2024-07-18 PROCEDURE — 99222 1ST HOSP IP/OBS MODERATE 55: CPT | Performed by: INTERNAL MEDICINE

## 2024-07-18 PROCEDURE — 82962 GLUCOSE BLOOD TEST: CPT

## 2024-07-18 PROCEDURE — 36415 COLL VENOUS BLD VENIPUNCTURE: CPT

## 2024-07-18 PROCEDURE — 6370000000 HC RX 637 (ALT 250 FOR IP): Performed by: INTERNAL MEDICINE

## 2024-07-18 PROCEDURE — 97535 SELF CARE MNGMENT TRAINING: CPT

## 2024-07-18 PROCEDURE — 6360000002 HC RX W HCPCS: Performed by: STUDENT IN AN ORGANIZED HEALTH CARE EDUCATION/TRAINING PROGRAM

## 2024-07-18 PROCEDURE — 1100000000 HC RM PRIVATE

## 2024-07-18 PROCEDURE — APPSS45 APP SPLIT SHARED TIME 31-45 MINUTES: Performed by: NURSE PRACTITIONER

## 2024-07-18 PROCEDURE — 6360000004 HC RX CONTRAST MEDICATION: Performed by: NURSE PRACTITIONER

## 2024-07-18 PROCEDURE — 97165 OT EVAL LOW COMPLEX 30 MIN: CPT

## 2024-07-18 PROCEDURE — 2500000003 HC RX 250 WO HCPCS: Performed by: INTERNAL MEDICINE

## 2024-07-18 RX ORDER — MAGNESIUM SULFATE IN WATER 40 MG/ML
2000 INJECTION, SOLUTION INTRAVENOUS ONCE
Status: COMPLETED | OUTPATIENT
Start: 2024-07-18 | End: 2024-07-18

## 2024-07-18 RX ADMIN — MAGNESIUM SULFATE HEPTAHYDRATE 2000 MG: 40 INJECTION, SOLUTION INTRAVENOUS at 08:04

## 2024-07-18 RX ADMIN — SODIUM CHLORIDE, PRESERVATIVE FREE 10 ML: 5 INJECTION INTRAVENOUS at 21:29

## 2024-07-18 RX ADMIN — INSULIN GLARGINE 5 UNITS: 100 INJECTION, SOLUTION SUBCUTANEOUS at 21:28

## 2024-07-18 RX ADMIN — DEXTROSE MONOHYDRATE, SODIUM CHLORIDE, AND POTASSIUM CHLORIDE: 50; 4.5; 1.49 INJECTION, SOLUTION INTRAVENOUS at 03:23

## 2024-07-18 RX ADMIN — KETOROLAC TROMETHAMINE 15 MG: 15 INJECTION, SOLUTION INTRAMUSCULAR; INTRAVENOUS at 21:28

## 2024-07-18 RX ADMIN — DIATRIZOATE MEGLUMINE AND DIATRIZOATE SODIUM 180 ML: 660; 100 LIQUID ORAL; RECTAL at 10:12

## 2024-07-18 ASSESSMENT — PAIN SCALES - GENERAL
PAINLEVEL_OUTOF10: 5
PAINLEVEL_OUTOF10: 3

## 2024-07-18 ASSESSMENT — PAIN DESCRIPTION - PAIN TYPE: TYPE: ACUTE PAIN

## 2024-07-18 ASSESSMENT — PAIN DESCRIPTION - ORIENTATION: ORIENTATION: LOWER

## 2024-07-18 ASSESSMENT — PAIN DESCRIPTION - ONSET: ONSET: ON-GOING

## 2024-07-18 ASSESSMENT — PAIN DESCRIPTION - DESCRIPTORS: DESCRIPTORS: SORE

## 2024-07-18 ASSESSMENT — PAIN DESCRIPTION - LOCATION: LOCATION: ABDOMEN

## 2024-07-18 ASSESSMENT — PAIN DESCRIPTION - FREQUENCY: FREQUENCY: CONTINUOUS

## 2024-07-18 ASSESSMENT — PAIN - FUNCTIONAL ASSESSMENT: PAIN_FUNCTIONAL_ASSESSMENT: ACTIVITIES ARE NOT PREVENTED

## 2024-07-18 NOTE — PLAN OF CARE
Problem: Pain  Goal: Verbalizes/displays adequate comfort level or baseline comfort level  7/18/2024 0844 by Edwige Ellis, RN  Outcome: Progressing  7/17/2024 1947 by Katherine Onofre RN  Outcome: Progressing     Problem: Safety - Adult  Goal: Free from fall injury  7/18/2024 0844 by Edwige Ellis, RN  Outcome: Progressing  7/17/2024 1947 by Katherine Onofre RN  Outcome: Progressing

## 2024-07-18 NOTE — PROGRESS NOTES
Hospitalist Progress Note   Admit Date:  2024  8:33 AM   Name:  Denny Guevara   Age:  63 y.o.  Sex:  male  :  1960   MRN:  [id number]   Room:  [facility]/    Presenting/Chief Complaint: Abdominal Pain     Reason(s) for Admission: Small bowel obstruction (HCC) [K56.609]     Hospital Course:   Denny Guevara is a 63 y.o. male with metastatic gastric/omental adenocarcinoma, chronic DVT, DM2, PUD, HTN, and anxiety who presented to the ER on  with 14 hours of worsening midline and right lower abdomen pain and distention with nausea and vomiting.  In the ER, CT scan revealed distal small bowel obstruction so an NG tube was placed with copious amounts of liquid out and the patient felt improved.  Patient was admitted for further management.  Surgery consulted.  Patient kept n.p.o.    Subjective & 24hr Events:   Seen and examined at bedside this morning.  No acute events overnight.  Patient having bowel movements and has noted improvement in her symptoms.  NG tube removed and started on clear liquid diets.  Continue to monitor hopefully discharge in 24 hours.      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  Start Humalog SSI        Exam:     General:    Well nourished.    Head:  Normocephalic, atraumatic  Eyes:  Sclerae appear normal.  Pupils equally round.  ENT:  Nares appear normal.  Moist oral mucosa  Neck:  No restricted ROM.  Trachea midline   CV:   RRR.  No m/r/g.  No jugular venous distension.  Lungs:   CTAB.  No wheezing, rhonchi, or rales.  Symmetric expansion.  Abdomen:   Soft, nontender, nondistended.  Extremities: No cyanosis or clubbing.  No edema  Skin:     No rashes.  Normal coloration.   Warm and dry.    Neuro:  CN II-XII grossly intact.    Psych:  Normal mood and affect.      I have personally reviewed labs and tests:  Recent Labs:  Recent Results (from the past 48 hour(s))   CBC with Diff    Collection Time: 07/17/24  8:39 AM   Result Value Ref Range    WBC 9.8 4.3 - 11.1 K/uL    RBC 4.99 4.23 - 5.6 M/uL    Hemoglobin 14.3 13.6 - 17.2 g/dL    Hematocrit 44.3 41.1 - 50.3 %    MCV 88.8 82 - 102 FL    MCH 28.7 26.1 - 32.9 PG    MCHC 32.3 31.4 - 35.0 g/dL    RDW 13.9 11.9 - 14.6 %    Platelets 173 150 - 450 K/uL    MPV 10.4 9.4 - 12.3 FL    nRBC 0.00 0.0 - 0.2 K/uL    Differential Type AUTOMATED      Neutrophils % 77 43 - 78 %    Lymphocytes % 17 13 - 44 %    Monocytes % 4 4.0 - 12.0 %    Eosinophils % 1 0.5 - 7.8 %    Basophils % 1 0.0 - 2.0 %    Immature Granulocytes % 0 0.0 - 5.0 %    Neutrophils Absolute 7.5 1.7 - 8.2 K/UL    Lymphocytes Absolute 1.6 0.5 - 4.6 K/UL    Monocytes Absolute 0.4 0.1 - 1.3 K/UL    Eosinophils Absolute 0.1 0.0 - 0.8 K/UL    Basophils Absolute 0.1 0.0 - 0.2 K/UL    Immature Granulocytes Absolute 0.0 0.0 - 0.5 K/UL   CMP    Collection Time: 07/17/24  8:39 AM   Result Value Ref Range    Sodium 141 136 - 145 mmol/L    Potassium 3.8 3.5 - 5.1 mmol/L    Chloride 105 98 - 107 mmol/L    CO2 24 20 - 28 mmol/L    Anion Gap 12 9 - 18 mmol/L    Glucose 202 (H) 70 - 99 mg/dL    BUN 7 (L) 8 - 23 MG/DL    Creatinine 0.92 0.80 - 1.30 MG/DL    Est, Glom Filt Rate >90 >60 ml/min/1.73m2    Calcium 9.2 8.8 - 10.2 MG/DL

## 2024-07-18 NOTE — THERAPY EVALUATION
PHYSICAL THERAPY Initial Assessment and Discharge  (Link to Caseload Tracking:    Acknowledge Orders  Time In/Out  PT Charge Capture  Rehab Caseload Tracker    Denny Guevara is a 63 y.o. male   PRIMARY DIAGNOSIS: SBO (small bowel obstruction) (HCC)  Small bowel obstruction (HCC) [K56.609]     Reason for Referral: Difficulty in walking, Not elsewhere classified (R26.2)  Inpatient: Payor: [facility] / Plan: [employer] [employer] / Product Type: *No Product type* /     ASSESSMENT:     REHAB RECOMMENDATIONS:   Recommendation to date pending progress:  Setting:  No further skilled physical therapy after discharge from hospital    Equipment:    None     ASSESSMENT:  Mr. Guevara is a 63 year old M who presents to hospital with SBO, had been experiencing nausea and vomiting and pain; PMH includes stage IV gastric cancer, last chemo was 7/5/24. This date pt performs mobility including bed mobility, sitting balance activities, sit <> stand transfers, standing balance activities, and ambulation in room and hallway with good balance control, no assistive device . Pt presents as functioning at his baseline, no acute PT intervention is indicated at this time. Will discharge from PT caseload. Please re-consult if any new needs arise. Thank you.     [facility] AM-PAC™ “6 Clicks” Basic Mobility Inpatient Short Form  AM-PAC Basic Mobility - Inpatient   How much help is needed turning from your back to your side while in a flat bed without using bedrails?: None  How much help is needed moving from lying on your back to sitting on the side of a flat bed without using bedrails?: None  How much help is needed moving to and from a bed to a chair?: None  How much help is needed standing up from a chair using your arms?: None  How much help is needed walking in hospital room?: None  How much help is needed climbing 3-5 steps with a railing?: None  AM-PAC Inpatient Mobility Raw Score : 24  AM-PAC Inpatient T-Scale Score :  Tested    GAIT: I Mod I S SBA CGA Min Mod Max Total  NT x2 Comments:   Level of Assistance [x] [] [] [] [] [] [] [] [] [] []    Distance 500 feet    DME None    Gait Quality Path deviations  and Trunk sway increased    Weightbearing Status      Stairs      I=Independent, Mod I=Modified Independent, S=Supervision, SBA=Standby Assistance, CGA=Contact Guard Assistance,   Min=Minimal Assistance, Mod=Moderate Assistance, Max=Maximal Assistance, Total=Total Assistance, NT=Not Tested    PLAN:   FREQUENCY AND DURATION:  (eval & dc) for duration of hospital stay or until stated goals are met, whichever comes first.    THERAPY PROGNOSIS: Excellent    PROBLEM LIST:   (Skilled intervention is medically necessary to address:)  None INTERVENTIONS PLANNED:   (Benefits and precautions of physical therapy have been discussed with the patient.)  Education       TREATMENT:   EVALUATION: LOW COMPLEXITY: (Untimed Charge)  The initial evaluation charge encompasses clinical chart review, objective assessment, interpretation of assessment, and skilled monitoring of the patient's response to treatment in order to develop a plan of care.     TREATMENT:   No tx provided or billed this date; evaluation & DC    TREATMENT GRID:  N/A    AFTER TREATMENT PRECAUTIONS: Bed/Chair Locked, Call light within reach, Chair, Needs within reach, RN notified, and Visitors at bedside    INTERDISCIPLINARY COLLABORATION:  RN/ PCT and PT/ PTA    EDUCATION: Education Given To: Patient;Family  Education Provided: Role of Therapy;Plan of Care  Education Outcome: Verbalized understanding    TIME IN/OUT:  Time In: 0915  Time Out: 0924  Minutes: 9    Karina Pedersen, PT

## 2024-07-18 NOTE — CONSULTS
[facility] Hematology & Oncology        Inpatient Hematology / Oncology Consult    Reason for Consult:  Small bowel obstruction (HCC) [K56.609]  Referring Physician:  Gianluca Aguayo MD    History of Present Illness:  Mr. Guevara is a 63 y.o. male admitted on 7/17/2024. The encounter diagnosis was Small bowel obstruction (HCC).    Mr Guevara has PMH of DVT on Xarelto, DM, PUD, and metastatic gastric cancer followed by Dr Bennett. He was diagnosed in 2022 after imaging for abdominal pain showed partial bowel obstruction and extensive peritoneal nodularity and ascites worrisome for metastatic disease. He is s/p lap omental mass and mesentery mass excision with venting G-tube placement by Dr Doyle, findings consistent with peritoneal carcinomatosis and unable to perform bypass. Biopsy showed adenocarcinoma of upper GI origin although no primary lesion identified. He was started on FOLFIRINOX and now continues on FOLFIRI after oxaliplatin removed due to neuropathy. He completed C47 on 7/5/24. G-tube removed at some point due to what sounds like infectious complications. Last CT CAP in 4/2024 without evidence of disease, CEA (which has never been elevated 3.0).    Mr Guevara presented on day of admission with abdominal pain, vomiting. He reports this occurred after he recently had diarrhea related to chemotherapy and stopped taking his daily laxative (as he is prone to constipation). LA up to 2.2 - down to 1.8 with IVF. CT showed gastric and SB distention, asynchronous air-fluid levels consistent with high grade distal incomplete SBO. NG tube placed and surgery consulted and hoping to manage conservatively. He is feeling better after NGT placement, and per documentation there was a good deal of contents removed. SB follow-through study has been ordered. Oncology consulted for recommendations.     Review of Systems:  Constitutional Denies fever, chills, weight loss, appetite changes, fatigue, night sweats.   HEENT Denies

## 2024-07-18 NOTE — PROGRESS NOTES
4 Eyes Skin Assessment     NAME:  Denny Guevara  YOB: 1960  MEDICAL RECORD NUMBER:  [id number]    The patient is being assessed for  Admission    I agree that at least one RN has performed a thorough Head to Toe Skin Assessment on the patient. ALL assessment sites listed below have been assessed.      Areas assessed by both nurses:    Head, Face, Ears, Shoulders, Back, Chest, Arms, Elbows, Hands, Sacrum. Buttock, Coccyx, Ischium, Legs. Feet and Heels, and Under Medical Devices         Does the Patient have a Wound? No noted wound(s)       Jose Alejandro Prevention initiated by RN: No  Wound Care Orders initiated by RN: No    Pressure Injury (Stage 3,4, Unstageable, DTI, NWPT, and Complex wounds) if present, place Wound referral order by RN under : No    New Ostomies, if present place, Ostomy referral order under : No     Nurse 1 eSignature: Electronically signed by NADINE CALERO RN on 7/17/24 at 10:30 PM EDT    **SHARE this note so that the co-signing nurse can place an eSignature**    Nurse 2 eSignature: Electronically signed by Katherine Onofre RN on 7/17/24 at 10:31 PM EDT

## 2024-07-18 NOTE — PROGRESS NOTES
General Surgery   Brief Note  SBFT 7/18/24     before contrast shows NG tube tip in stomach. No dilated small bowel. Air  and stool in nondistended colon. No abnormal calcifications.     After contrast administration, the right colon was opacified at 1 hour,  relatively rapid transit time. No mechanical small bowel obstruction. No  abnormal displacement or separation of small bowel loops. No extravasation.     IMPRESSION:  No mechanical small bowel obstruction.    Plan: ADAT  NG removed  IVF DC  Hospitalist notified  Recommend Miralax daily to prevent constipation.   Gen Surg Signing off

## 2024-07-18 NOTE — PROGRESS NOTES
re-consult if there are any changes in their functional mobility status.      [facility] AM-PAC™ “6 Clicks” Daily Activity Inpatient Short Form:    AM-PAC Daily Activity - Inpatient   How much help is needed for putting on and taking off regular lower body clothing?: None  How much help is needed for bathing (which includes washing, rinsing, drying)?: None  How much help is needed for toileting (which includes using toilet, bedpan, or urinal)?: None  How much help is needed for putting on and taking off regular upper body clothing?: None  How much help is needed for taking care of personal grooming?: None  How much help for eating meals?: None  AM-PAC Inpatient Daily Activity Raw Score: 24  AM-PAC Inpatient ADL T-Scale Score : 57.54  ADL Inpatient CMS 0-100% Score: 0  ADL Inpatient CMS G-Code Modifier : CH    SUBJECTIVE:     Mr. Guevara states, \"I am very independent. 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)    OBJECTIVE:     LINES / DRAINS / AIRWAY: IV and Nasogastric Tube    RESTRICTIONS/PRECAUTIONS:       PAIN: VITALS / O2:   Pre Treatment:   Pain Assessment: None - Denies Pain      Post Treatment: no change        Vitals          Oxygen            GROSS EVALUATION: INTACT IMPAIRED   (See Comments)   UE AROM [x] []   UE PROM [x] []   Strength [x]       Posture / Balance [x]     Sensation [x]     Coordination [x]       Tone [x]       Edema [x]    Activity Tolerance [x]       Hand Dominance R [] L []      COGNITION/  PERCEPTION: INTACT IMPAIRED    (See Comments)   Orientation [x]     Vision [x]     Hearing [x]     Cognition  [x]     Perception [x]       MOBILITY: I Mod I S SBA CGA Min Mod Max Total  NT x2 Comments:   Bed Mobility    Rolling [] [] [] [] [] [] [] [] [] [x] []    Supine to Sit [] [x] [] [] [] [] [] [] [] [] []    Scooting [] [x] [] [] [] [] [] [] [] [] []    Sit to Supine [] [] [] [] [] [] [] [] [] [x] [] Left sitting in the bed   Transfers    Sit to Stand [] [x] [] [] [] [] [] [] [] [] []    Bed to Chair [] [x] [] [] [] [] [] [] [] [] [] No AD   Stand to Sit [] [x] [] [] [] [] [] [] [] [] []    Tub/Shower [] [] [] [] [] [] [] [] [] [x] []     Toilet [] [x] [] [] [] [] [] [] [] [] []      [] [] [] [] [] [] [] [] [] [] []    I=Independent, Mod I=Modified Independent, S=Supervision/Setup, SBA=Standby Assistance, CGA=Contact Guard Assistance, Min=Minimal Assistance, Mod=Moderate Assistance, Max=Maximal Assistance, Total=Total Assistance, NT=Not Tested    ACTIVITIES OF DAILY LIVING: I Mod I S SBA CGA Min Mod Max Total NT Comments   BASIC ADLs:              Upper Body Bathing  [] [] [] [] [] [] [] [] [] [x]     Lower Body Bathing [] [] [] [] [] [] [] [] [] [x]     Toileting [] [x] [] [] [] [] [] [] [] []    Upper Body Dressing [] [] [] [] [] [] [] [] [] [x]    Lower Body Dressing [] [x] [] [] [] [] [] [] [] [] Socks   Feeding [] [] [] [] [] [] [] [] [] [x]    Grooming [] [x] [] [] [] [] [] [] [] [] Washed hands standing at the sink   Personal Device Care [] [] [] [] [] [] [] [] [] [x]    Functional Mobility [] [x] [] [] [] [] [] [] [] [] No AD   I=Independent, Mod I=Modified Independent, S=Supervision/Setup, SBA=Standby Assistance, CGA=Contact Guard Assistance, Min=Minimal Assistance, Mod=Moderate Assistance, Max=Maximal Assistance, Total=Total Assistance, NT=Not Tested    PLAN:   FREQUENCY/DURATION   OT Plan of Care:  (hodan chaney, d/c) for duration of hospital stay or until stated goals are met, whichever comes first.    PROBLEM LIST:   (Skilled

## 2024-07-18 NOTE — CARE COORDINATION
Pt chart reviewed for discharge planning. CM met with pt and wife at bedside, verified demographic information/ health insurance. Pt has prescription coverage. Pt lives his wife in a one story home. He is a current  and works part time. He is independent with ADLs. He has a glucometer but denies having any other DME. He has used [facility] in the past and would use them again if needed. PCP was confirmed, last seen last week. PT/OT recommending no further skilled therapy needed at discharge.    LOS 1  MURTAZA: Home with no anticipated needs.    CM will follow pt plan of care and assist with supportive care referrals pending pt clinical progress.  Please consult case management if specific needs arise.     07/18/24 3209   Service Assessment   Patient Orientation Alert and Oriented   Cognition Alert   History Provided By Patient   Primary Caregiver Self   Support Systems Spouse/Significant Other;Children;Family Members   Patient's Healthcare Decision Maker is: Legal Next of Kin   PCP Verified by CM Yes   Last Visit to PCP Within last 3 months   Prior Functional Level Independent in ADLs/IADLs   Current Functional Level Independent in ADLs/IADLs   Can patient return to prior living arrangement Yes   Ability to make needs known: Good   Family able to assist with home care needs: Yes   Social/Functional History   Ambulation Assistance Independent   Transfer Assistance Independent   Active  Yes   Mode of Transportation Car   Occupation Part time employment   Discharge Planning   Living Arrangements Spouse/Significant Other   Services At/After Discharge   Transition of Care Consult (CM Consult) Discharge Planning   Services At/After Discharge None    Resource Information Provided? No   Mode of Transport at Discharge Self   Confirm Follow Up Transport Family   Condition of Participation: Discharge Planning   The Plan for Transition of Care is related to the following treatment goals: Plan is to discharge home  with family   The Patient and/or Patient Representative was provided with a Choice of Provider? Patient   Freedom of Choice list was provided with basic dialogue that supports the patient's individualized plan of care/goals, treatment preferences, and shares the quality data associated with the providers?  Yes

## 2024-07-18 NOTE — PROGRESS NOTES
H&P/Consult Note/Progress Note/Office Note:   Denny Guevara  MRN: [id number]  :1960  Age:63 y.o.    HPI: Denny Guevara is a 63 y.o. male who we are asked by ED to see for SBO. The patient has a PMHx of abdominal carcinomatosis with peritoneal metastasis s/p partial colon resection and gastric tube ( Dr Doyle). He presented with a one day history of nausea and vomiting after eating dinner last night. The week prior he had episodes of diarrhea that returned over the weekend. Patient thought diarrhea was related to his Chemotherapy. Last Chemotherapy 24. No associated fever or chills. He was admitted on 2024 for SBO confirmed on CT A/P on admission that has been reviewed on PACS.     Since admission the patient has had 1L in NG output.   His abdomen is predominantly sore on Right upper and lower quadrants.    Vital signs: 97.9 F, RR 18, HR 88, 130/93  Labs: Na 141, Cr 0.92, LA 2.2-->1.8. LFTs unremarkable, CBC unremarkable.     Abd Surg Hx: Ex lap partial colon resection   Colonoscopy:   Anticoagulation: XARELTO (last dose ) takes for hx RUE DVT  Last BM: 24. Today no flatus or BM    24: Alert, NAD. Feels abdomen \"grumbling\" after admin of contrast. -BM -Flatus. VSS/AF.  Mag 1.7      Past Medical History:   Diagnosis Date    Bilateral carpal tunnel syndrome 2020    Cancer (HCC)     Chronic right shoulder pain 2020    Colon polyps 3/11/2013    Diverticulitis 3/11/2013    GERD (gastroesophageal reflux disease)     HTN (hypertension) 3/11/2013    Hyperlipidemia 3/11/2013    Paresthesia and pain of both upper extremities 2020    Partial small bowel obstruction (HCC) 2022    PUD (peptic ulcer disease) 3/11/2013    History of     Right elbow pain 2020    Wheezing 3/11/2013     Past Surgical History:   Procedure Laterality Date    COLONOSCOPY  09    colonoscopy per Dr. Dale with need for repeat every 3 years    COLONOSCOPY

## 2024-07-19 VITALS
RESPIRATION RATE: 20 BRPM | HEIGHT: 71 IN | TEMPERATURE: 98.2 F | SYSTOLIC BLOOD PRESSURE: 105 MMHG | BODY MASS INDEX: 32.6 KG/M2 | OXYGEN SATURATION: 91 % | WEIGHT: 232.9 LBS | HEART RATE: 72 BPM | DIASTOLIC BLOOD PRESSURE: 70 MMHG

## 2024-07-19 LAB
ANION GAP SERPL CALC-SCNC: 10 MMOL/L (ref 9–18)
BASOPHILS # BLD: 0.1 K/UL (ref 0–0.2)
BASOPHILS NFR BLD: 1 % (ref 0–2)
BUN SERPL-MCNC: 8 MG/DL (ref 8–23)
CALCIUM SERPL-MCNC: 8.3 MG/DL (ref 8.8–10.2)
CHLORIDE SERPL-SCNC: 105 MMOL/L (ref 98–107)
CO2 SERPL-SCNC: 25 MMOL/L (ref 20–28)
CREAT SERPL-MCNC: 0.86 MG/DL (ref 0.8–1.3)
DIFFERENTIAL METHOD BLD: ABNORMAL
EOSINOPHIL # BLD: 0.3 K/UL (ref 0–0.8)
EOSINOPHIL NFR BLD: 6 % (ref 0.5–7.8)
ERYTHROCYTE [DISTWIDTH] IN BLOOD BY AUTOMATED COUNT: 14 % (ref 11.9–14.6)
GLUCOSE BLD STRIP.AUTO-MCNC: 122 MG/DL (ref 65–100)
GLUCOSE SERPL-MCNC: 124 MG/DL (ref 70–99)
HCT VFR BLD AUTO: 36.6 % (ref 41.1–50.3)
HGB BLD-MCNC: 11.6 G/DL (ref 13.6–17.2)
IMM GRANULOCYTES # BLD AUTO: 0 K/UL (ref 0–0.5)
IMM GRANULOCYTES NFR BLD AUTO: 0 % (ref 0–5)
LYMPHOCYTES # BLD: 1.9 K/UL (ref 0.5–4.6)
LYMPHOCYTES NFR BLD: 38 % (ref 13–44)
MAGNESIUM SERPL-MCNC: 2 MG/DL (ref 1.8–2.4)
MCH RBC QN AUTO: 28.7 PG (ref 26.1–32.9)
MCHC RBC AUTO-ENTMCNC: 31.7 G/DL (ref 31.4–35)
MCV RBC AUTO: 90.6 FL (ref 82–102)
MONOCYTES # BLD: 0.3 K/UL (ref 0.1–1.3)
MONOCYTES NFR BLD: 6 % (ref 4–12)
NEUTS SEG # BLD: 2.5 K/UL (ref 1.7–8.2)
NEUTS SEG NFR BLD: 49 % (ref 43–78)
NRBC # BLD: 0 K/UL (ref 0–0.2)
PHOSPHATE SERPL-MCNC: 3.5 MG/DL (ref 2.5–4.5)
PLATELET # BLD AUTO: 135 K/UL (ref 150–450)
PMV BLD AUTO: 11 FL (ref 9.4–12.3)
POTASSIUM SERPL-SCNC: 3.6 MMOL/L (ref 3.5–5.1)
RBC # BLD AUTO: 4.04 M/UL (ref 4.23–5.6)
SERVICE CMNT-IMP: ABNORMAL
SODIUM SERPL-SCNC: 140 MMOL/L (ref 136–145)
WBC # BLD AUTO: 5.2 K/UL (ref 4.3–11.1)

## 2024-07-19 PROCEDURE — 2580000003 HC RX 258: Performed by: INTERNAL MEDICINE

## 2024-07-19 PROCEDURE — 36415 COLL VENOUS BLD VENIPUNCTURE: CPT

## 2024-07-19 PROCEDURE — 80048 BASIC METABOLIC PNL TOTAL CA: CPT

## 2024-07-19 PROCEDURE — 84100 ASSAY OF PHOSPHORUS: CPT

## 2024-07-19 PROCEDURE — 83735 ASSAY OF MAGNESIUM: CPT

## 2024-07-19 PROCEDURE — 85025 COMPLETE CBC W/AUTO DIFF WBC: CPT

## 2024-07-19 PROCEDURE — 82962 GLUCOSE BLOOD TEST: CPT

## 2024-07-19 PROCEDURE — 6360000002 HC RX W HCPCS: Performed by: INTERNAL MEDICINE

## 2024-07-19 RX ADMIN — ENOXAPARIN SODIUM 105 MG: 150 INJECTION SUBCUTANEOUS at 08:45

## 2024-07-19 RX ADMIN — SODIUM CHLORIDE, PRESERVATIVE FREE 10 ML: 5 INJECTION INTRAVENOUS at 08:50

## 2024-07-19 NOTE — CARE COORDINATION
Pt is for discharge home today with family and no needs/supportive care orders recieved for MSW at this time.  Pt was seen by PT/OT with no needs for continued therapy.  Pt will resume close follow [facility] care[facility].      07/19/24 5418   Service Assessment   Patient's Healthcare Decision Maker is: Legal Next of Kin   Social/Functional History   Ambulation Assistance Independent   Transfer Assistance Independent   Active  Yes   Mode of Transportation Car   Occupation Part time employment   Services At/After Discharge   Transition of Care Consult (CM Consult) Discharge Planning   Services At/After Discharge None    Resource Information Provided? No   Mode of Transport at Discharge Other (see comment)  (family)   Confirm Follow Up Transport Family   Condition of Participation: Discharge Planning   The Plan for Transition of Care is related to the following treatment goals: Plan is to discharge home with family and resume care at [facility].   The Patient and/Or Patient Representative agree with the Discharge Plan? Yes

## 2024-07-19 NOTE — DISCHARGE SUMMARY
Hospitalist Discharge Summary   Admit Date:  2024  8:33 AM   DC Note date: 2024  Name:  Denny Guevara   Age:  63 y.o.  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 *      Hospital Course:  Denny Guevara is a 63 y.o. male with metastatic gastric/omental adenocarcinoma, chronic DVT, DM2, PUD, HTN, and anxiety who presented to the ER on  with 14 hours of worsening midline and right lower abdomen pain and distention with nausea and vomiting.  In the ER, CT scan revealed distal small bowel obstruction so an NG tube was placed with copious amounts of liquid out and the patient felt improved.  Patient was admitted for further management.  Surgery consulted.  Patient kept n.p.o. He was noted to have bowel movements with resolution of symptoms noted. Repeat imaging was negative for bowel obstruction. Oncology consulted and recommended outpatient management. All questions answered and addressed with patient and his wife at bedside. If symptoms worsen or recur, advised to return to the ED. Encouraged to follow up with pcp and oncology upon discharge.     Disposition: Home  Diet: ADULT DIET; Regular  Code Status: Full Code    Follow Ups:  Follow-up Information       Follow  Time: 07/18/24  4:33 PM   Result Value Ref Range    POC Glucose 115 (H) 65 - 100 mg/dL    Performed by: Stephanie    POCT Glucose    Collection Time: 07/18/24  8:52 PM   Result Value Ref Range    POC Glucose 125 (H) 65 - 100 mg/dL    Performed by: Korin    CBC with Auto Differential    Collection Time: 07/19/24  2:59 AM   Result Value Ref Range    WBC 5.2 4.3 - 11.1 K/uL    RBC 4.04 (L) 4.23 - 5.6 M/uL    Hemoglobin 11.6 (L) 13.6 - 17.2 g/dL    Hematocrit 36.6 (L) 41.1 - 50.3 %    MCV 90.6 82 - 102 FL    MCH 28.7 26.1 - 32.9 PG    MCHC 31.7 31.4 - 35.0 g/dL    RDW 14.0 11.9 - 14.6 %    Platelets 135 (L) 150 - 450 K/uL    MPV 11.0 9.4 - 12.3 FL    nRBC 0.00 0.0 - 0.2 K/uL    Differential Type AUTOMATED      Neutrophils % 49 43 - 78 %    Lymphocytes % 38 13 - 44 %    Monocytes % 6 4.0 - 12.0 %    Eosinophils % 6 0.5 - 7.8 %    Basophils % 1 0.0 - 2.0 %    Immature Granulocytes % 0 0.0 - 5.0 %    Neutrophils Absolute 2.5 1.7 - 8.2 K/UL    Lymphocytes Absolute 1.9 0.5 - 4.6 K/UL    Monocytes Absolute 0.3 0.1 - 1.3 K/UL    Eosinophils Absolute 0.3 0.0 - 0.8 K/UL    Basophils Absolute 0.1 0.0 - 0.2 K/UL    Immature Granulocytes Absolute 0.0 0.0 - 0.5 K/UL   Basic Metabolic Panel    Collection Time: 07/19/24  2:59 AM   Result Value Ref Range    Sodium 140 136 - 145 mmol/L    Potassium 3.6 3.5 - 5.1 mmol/L    Chloride 105 98 - 107 mmol/L    CO2 25 20 - 28 mmol/L    Anion Gap 10 9 - 18 mmol/L    Glucose 124 (H) 70 - 99 mg/dL    BUN 8 8 - 23 MG/DL    Creatinine 0.86 0.80 - 1.30 MG/DL    Est, Glom Filt Rate >90 >60 ml/min/1.73m2    Calcium 8.3 (L) 8.8 - 10.2 MG/DL   Magnesium    Collection Time: 07/19/24  2:59 AM   Result Value Ref Range    Magnesium 2.0 1.8 - 2.4 mg/dL   Phosphorus    Collection Time: 07/19/24  2:59 AM   Result Value Ref Range    Phosphorus 3.5 2.5 - 4.5 MG/DL   POCT Glucose    Collection Time: 07/19/24  7:29 AM   Result Value Ref Range    POC Glucose 122 (H) 65 - 100 mg/dL    Performed by:

## 2024-07-22 ENCOUNTER — CARE COORDINATION (OUTPATIENT)
Dept: CARE COORDINATION | Facility: CLINIC | Age: 64
End: 2024-07-22

## 2024-07-22 DIAGNOSIS — C78.6 MALIGNANT NEOPLASM METASTATIC TO PERITONEUM (HCC): Primary | ICD-10-CM

## 2024-07-22 NOTE — CARE COORDINATION
Attempted to reach patient for transitions of care follow up. Unable to reach patient.    Outreach Attempts:   HIPAA compliant voicemail left for patient.     Patient: Denny Guevara    Patient : 1960   MRN: [id number]    Reason for Admission: Small bowel obstruction   Discharge Date: 24  RURS: Readmission Risk Score: 13    Last Discharge Facility       Date Complaint Diagnosis Description Type Department Provider    24 Abdominal Pain Small bowel obstruction (HCC) ED to Hosp-Admission (Discharged) (ADMITTED) [id number] Gianluca Aguayo MD; Joshua...            Was this an external facility discharge? No    Follow Up Appointment:   Patient has hospital follow up appointment scheduled within 7 days of discharge.    Future Appointments         Provider Specialty Dept Phone    2024 8:30 AM PORT Lab [phone]    2024 9:30 AM Arlette Cruz APRN - CNP Oncology [phone]    2024 10:30 AM [facility] Infusion Therapy [phone]    2024 3:30 PM [facility] Infusion Therapy [phone]    2024 10:30 AM [facility] Radiology [phone]    2024 7:30 AM PORT Lab [phone]    2024 8:30 AM Barbara Bennett MD Oncology [phone]    2024 10:00 AM [facility] Infusion Therapy [phone]    2024 3:30 PM [facility] Infusion Therapy [phone]    2024 9:15 AM PORT Lab [phone]    2024 10:00 AM Arlette Cruz APRN - CNP Oncology [phone]    2024 10:30 AM [facility] Infusion Therapy [phone]    2024 11:15 AM [facility] Infusion Therapy [phone]    2024 9:00 AM PORT Lab [phone]    2024 10:00 AM Arlette Cruz APRN - CNP Oncology [phone]    2024 10:30 AM [facility] Infusion Therapy [phone]    2024 11:15 AM [facility] Infusion Therapy [phone]    10/18/2024 8:30 AM PORT Lab [phone]    10/18/2024 9:30 AM Barbara Bennett MD Oncology [phone]    10/18/2024 10:30 AM

## 2024-07-23 ENCOUNTER — CARE COORDINATION (OUTPATIENT)
Dept: CARE COORDINATION | Facility: CLINIC | Age: 64
End: 2024-07-23

## 2024-07-23 DIAGNOSIS — K56.609 SMALL BOWEL OBSTRUCTION (HCC): Primary | ICD-10-CM

## 2024-07-23 NOTE — CARE COORDINATION
Patient Current Location:  Home: [address] [place], [place] [id number]    Care Transition Nurse contacted the patient by telephone to perform post hospital discharge assessment, verified name and  as identifiers. Provided introduction to self, and explanation of the Care Transition Nurse role.     Patient: Denny Guevara    Patient : 1960   MRN: [id number]    Reason for Admission: Small bowel obstruction   Discharge Date: 24  RURS: Readmission Risk Score: 13      Last Discharge Facility       Date Complaint Diagnosis Description Type Department Provider    24 Abdominal Pain Small bowel obstruction (HCC) ED to Hosp-Admission (Discharged) (ADMITTED) [id number] Gianluca Aguayo MD; Joshua...            Was this an external facility discharge? No    Additional needs identified to be addressed with provider   No needs identified             Method of communication with provider: none.    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 Voices that he does not feel well when he gets his infusions. He understands that will happen. States they have started to be scheduled a little further out so he feels better with that.  Continues to have imaging for follow up. Discussed SBO and he states that he has had this before he

## 2024-07-26 ENCOUNTER — HOSPITAL ENCOUNTER (OUTPATIENT)
Dept: INFUSION THERAPY | Age: 64
Setting detail: INFUSION SERIES
Discharge: HOME | End: 2024-07-26
Payer: COMMERCIAL

## 2024-07-26 ENCOUNTER — OFFICE VISIT (OUTPATIENT)
Dept: ONCOLOGY | Age: 64
End: 2024-07-26
Payer: COMMERCIAL

## 2024-07-26 ENCOUNTER — HOSPITAL ENCOUNTER (OUTPATIENT)
Dept: LAB | Age: 64
End: 2024-07-26
Payer: COMMERCIAL

## 2024-07-26 VITALS
HEIGHT: 70 IN | TEMPERATURE: 97.8 F | OXYGEN SATURATION: 95 % | WEIGHT: 242.4 LBS | SYSTOLIC BLOOD PRESSURE: 136 MMHG | BODY MASS INDEX: 34.7 KG/M2 | RESPIRATION RATE: 16 BRPM | DIASTOLIC BLOOD PRESSURE: 78 MMHG | HEART RATE: 78 BPM

## 2024-07-26 DIAGNOSIS — R73.9 HYPERGLYCEMIA: ICD-10-CM

## 2024-07-26 DIAGNOSIS — C76.2 ABDOMINAL CARCINOMATOSIS (HCC): ICD-10-CM

## 2024-07-26 DIAGNOSIS — C78.6 MALIGNANT NEOPLASM METASTATIC TO PERITONEUM (HCC): Primary | ICD-10-CM

## 2024-07-26 DIAGNOSIS — C79.9 METASTATIC ADENOCARCINOMA (HCC): ICD-10-CM

## 2024-07-26 DIAGNOSIS — E83.42 HYPOMAGNESEMIA: ICD-10-CM

## 2024-07-26 DIAGNOSIS — Z79.899 HIGH RISK MEDICATION USE: ICD-10-CM

## 2024-07-26 DIAGNOSIS — C78.6 MALIGNANT NEOPLASM METASTATIC TO PERITONEUM (HCC): ICD-10-CM

## 2024-07-26 DIAGNOSIS — D64.9 ANEMIA, UNSPECIFIED TYPE: ICD-10-CM

## 2024-07-26 LAB
25(OH)D3 SERPL-MCNC: 13.7 NG/ML (ref 30–100)
ALBUMIN SERPL-MCNC: 3.4 G/DL (ref 3.2–4.6)
ALBUMIN/GLOB SERPL: 1.3 (ref 1–1.9)
ALP SERPL-CCNC: 117 U/L (ref 40–129)
ALT SERPL-CCNC: 25 U/L (ref 12–65)
ANION GAP SERPL CALC-SCNC: 10 MMOL/L (ref 9–18)
AST SERPL-CCNC: 24 U/L (ref 15–37)
BASOPHILS # BLD: 0.1 K/UL (ref 0–0.2)
BASOPHILS NFR BLD: 1 % (ref 0–2)
BILIRUB SERPL-MCNC: 0.2 MG/DL (ref 0–1.2)
BUN SERPL-MCNC: 6 MG/DL (ref 8–23)
CALCIUM SERPL-MCNC: 8.7 MG/DL (ref 8.8–10.2)
CHLORIDE SERPL-SCNC: 105 MMOL/L (ref 98–107)
CO2 SERPL-SCNC: 26 MMOL/L (ref 20–28)
CREAT SERPL-MCNC: 0.84 MG/DL (ref 0.8–1.3)
DIFFERENTIAL METHOD BLD: ABNORMAL
EOSINOPHIL # BLD: 0.2 K/UL (ref 0–0.8)
EOSINOPHIL NFR BLD: 7 % (ref 0.5–7.8)
ERYTHROCYTE [DISTWIDTH] IN BLOOD BY AUTOMATED COUNT: 13.9 % (ref 11.9–14.6)
FERRITIN SERPL-MCNC: 264 NG/ML (ref 8–388)
FOLATE SERPL-MCNC: >40 NG/ML (ref 3.1–17.5)
GLOBULIN SER CALC-MCNC: 2.7 G/DL (ref 2.3–3.5)
GLUCOSE SERPL-MCNC: 221 MG/DL (ref 70–99)
HCT VFR BLD AUTO: 37.6 % (ref 41.1–50.3)
HGB BLD-MCNC: 12.2 G/DL (ref 13.6–17.2)
IMM GRANULOCYTES # BLD AUTO: 0 K/UL (ref 0–0.5)
IMM GRANULOCYTES NFR BLD AUTO: 1 % (ref 0–5)
IRON SATN MFR SERPL: 17 % (ref 20–50)
IRON SERPL-MCNC: 41 UG/DL (ref 35–100)
LYMPHOCYTES # BLD: 1.8 K/UL (ref 0.5–4.6)
LYMPHOCYTES NFR BLD: 49 % (ref 13–44)
MAGNESIUM SERPL-MCNC: 2.1 MG/DL (ref 1.8–2.4)
MCH RBC QN AUTO: 29.1 PG (ref 26.1–32.9)
MCHC RBC AUTO-ENTMCNC: 32.4 G/DL (ref 31.4–35)
MCV RBC AUTO: 89.7 FL (ref 82–102)
MONOCYTES # BLD: 0.4 K/UL (ref 0.1–1.3)
MONOCYTES NFR BLD: 11 % (ref 4–12)
NEUTS SEG # BLD: 1.1 K/UL (ref 1.7–8.2)
NEUTS SEG NFR BLD: 31 % (ref 43–78)
NRBC # BLD: 0 K/UL (ref 0–0.2)
PLATELET # BLD AUTO: 211 K/UL (ref 150–450)
PMV BLD AUTO: 10.4 FL (ref 9.4–12.3)
POTASSIUM SERPL-SCNC: 3.6 MMOL/L (ref 3.5–5.1)
PROT SERPL-MCNC: 6 G/DL (ref 6.3–8.2)
RBC # BLD AUTO: 4.19 M/UL (ref 4.23–5.6)
SODIUM SERPL-SCNC: 141 MMOL/L (ref 136–145)
TIBC SERPL-MCNC: 238 UG/DL (ref 240–450)
UIBC SERPL-MCNC: 197 UG/DL (ref 112–347)
VIT B12 SERPL-MCNC: 263 PG/ML (ref 193–986)
WBC # BLD AUTO: 3.6 K/UL (ref 4.3–11.1)

## 2024-07-26 PROCEDURE — 82728 ASSAY OF FERRITIN: CPT

## 2024-07-26 PROCEDURE — 83540 ASSAY OF IRON: CPT

## 2024-07-26 PROCEDURE — 82746 ASSAY OF FOLIC ACID SERUM: CPT

## 2024-07-26 PROCEDURE — 96372 THER/PROPH/DIAG INJ SC/IM: CPT

## 2024-07-26 PROCEDURE — 96411 CHEMO IV PUSH ADDL DRUG: CPT

## 2024-07-26 PROCEDURE — 96413 CHEMO IV INFUSION 1 HR: CPT

## 2024-07-26 PROCEDURE — 96367 TX/PROPH/DG ADDL SEQ IV INF: CPT

## 2024-07-26 PROCEDURE — 96416 CHEMO PROLONG INFUSE W/PUMP: CPT

## 2024-07-26 PROCEDURE — 3078F DIAST BP <80 MM HG: CPT

## 2024-07-26 PROCEDURE — 83735 ASSAY OF MAGNESIUM: CPT

## 2024-07-26 PROCEDURE — G0498 CHEMO EXTEND IV INFUS W/PUMP: HCPCS

## 2024-07-26 PROCEDURE — 2580000003 HC RX 258

## 2024-07-26 PROCEDURE — 99214 OFFICE O/P EST MOD 30 MIN: CPT

## 2024-07-26 PROCEDURE — 6360000002 HC RX W HCPCS

## 2024-07-26 PROCEDURE — 82306 VITAMIN D 25 HYDROXY: CPT

## 2024-07-26 PROCEDURE — 83550 IRON BINDING TEST: CPT

## 2024-07-26 PROCEDURE — 2580000003 HC RX 258: Performed by: INTERNAL MEDICINE

## 2024-07-26 PROCEDURE — 3075F SYST BP GE 130 - 139MM HG: CPT

## 2024-07-26 PROCEDURE — 80053 COMPREHEN METABOLIC PANEL: CPT

## 2024-07-26 PROCEDURE — 96368 THER/DIAG CONCURRENT INF: CPT

## 2024-07-26 PROCEDURE — 82607 VITAMIN B-12: CPT

## 2024-07-26 PROCEDURE — 85025 COMPLETE CBC W/AUTO DIFF WBC: CPT

## 2024-07-26 PROCEDURE — 96375 TX/PRO/DX INJ NEW DRUG ADDON: CPT

## 2024-07-26 RX ORDER — SODIUM CHLORIDE 0.9 % (FLUSH) 0.9 %
5-40 SYRINGE (ML) INJECTION PRN
Status: DISCONTINUED | OUTPATIENT
Start: 2024-07-26 | End: 2024-07-27 | Stop reason: HOSPADM

## 2024-07-26 RX ORDER — ONDANSETRON 2 MG/ML
8 INJECTION INTRAMUSCULAR; INTRAVENOUS ONCE
Status: CANCELLED | OUTPATIENT
Start: 2024-07-26 | End: 2024-07-26

## 2024-07-26 RX ORDER — ACETAMINOPHEN 325 MG/1
650 TABLET ORAL
Status: CANCELLED | OUTPATIENT
Start: 2024-07-26

## 2024-07-26 RX ORDER — ACETAMINOPHEN 325 MG/1
650 TABLET ORAL
Status: DISCONTINUED | OUTPATIENT
Start: 2024-07-26 | End: 2024-07-27 | Stop reason: HOSPADM

## 2024-07-26 RX ORDER — DIPHENHYDRAMINE HYDROCHLORIDE 50 MG/ML
50 INJECTION INTRAMUSCULAR; INTRAVENOUS
Status: DISCONTINUED | OUTPATIENT
Start: 2024-07-26 | End: 2024-07-27 | Stop reason: HOSPADM

## 2024-07-26 RX ORDER — ONDANSETRON 2 MG/ML
8 INJECTION INTRAMUSCULAR; INTRAVENOUS
Status: CANCELLED | OUTPATIENT
Start: 2024-07-26

## 2024-07-26 RX ORDER — FAMOTIDINE 10 MG/ML
20 INJECTION, SOLUTION INTRAVENOUS
Status: CANCELLED | OUTPATIENT
Start: 2024-07-26

## 2024-07-26 RX ORDER — MEPERIDINE HYDROCHLORIDE 50 MG/ML
12.5 INJECTION INTRAMUSCULAR; INTRAVENOUS; SUBCUTANEOUS PRN
Status: CANCELLED | OUTPATIENT
Start: 2024-07-26

## 2024-07-26 RX ORDER — ONDANSETRON 2 MG/ML
8 INJECTION INTRAMUSCULAR; INTRAVENOUS ONCE
Status: COMPLETED | OUTPATIENT
Start: 2024-07-26 | End: 2024-07-26

## 2024-07-26 RX ORDER — EPINEPHRINE 1 MG/ML
0.3 INJECTION, SOLUTION, CONCENTRATE INTRAVENOUS PRN
Status: CANCELLED | OUTPATIENT
Start: 2024-07-26

## 2024-07-26 RX ORDER — FLUOROURACIL 50 MG/ML
400 INJECTION, SOLUTION INTRAVENOUS ONCE
Status: CANCELLED | OUTPATIENT
Start: 2024-07-26 | End: 2024-07-26

## 2024-07-26 RX ORDER — DIPHENHYDRAMINE HYDROCHLORIDE 50 MG/ML
50 INJECTION INTRAMUSCULAR; INTRAVENOUS
Status: CANCELLED | OUTPATIENT
Start: 2024-07-26

## 2024-07-26 RX ORDER — MEPERIDINE HYDROCHLORIDE 25 MG/ML
12.5 INJECTION INTRAMUSCULAR; INTRAVENOUS; SUBCUTANEOUS PRN
Status: DISCONTINUED | OUTPATIENT
Start: 2024-07-26 | End: 2024-07-27 | Stop reason: HOSPADM

## 2024-07-26 RX ORDER — SODIUM CHLORIDE 0.9 % (FLUSH) 0.9 %
5-40 SYRINGE (ML) INJECTION PRN
Status: CANCELLED | OUTPATIENT
Start: 2024-07-26

## 2024-07-26 RX ORDER — ONDANSETRON 2 MG/ML
8 INJECTION INTRAMUSCULAR; INTRAVENOUS
Status: DISCONTINUED | OUTPATIENT
Start: 2024-07-26 | End: 2024-07-27 | Stop reason: HOSPADM

## 2024-07-26 RX ORDER — ALBUTEROL SULFATE 90 UG/1
4 AEROSOL, METERED RESPIRATORY (INHALATION) PRN
Status: DISCONTINUED | OUTPATIENT
Start: 2024-07-26 | End: 2024-07-27 | Stop reason: HOSPADM

## 2024-07-26 RX ORDER — DEXTROSE MONOHYDRATE 50 MG/ML
5-250 INJECTION, SOLUTION INTRAVENOUS PRN
Status: DISCONTINUED | OUTPATIENT
Start: 2024-07-26 | End: 2024-07-27 | Stop reason: HOSPADM

## 2024-07-26 RX ORDER — EPINEPHRINE 1 MG/ML
0.3 INJECTION, SOLUTION, CONCENTRATE INTRAVENOUS PRN
Status: DISCONTINUED | OUTPATIENT
Start: 2024-07-26 | End: 2024-07-27 | Stop reason: HOSPADM

## 2024-07-26 RX ORDER — DEXTROSE MONOHYDRATE 50 MG/ML
5-250 INJECTION, SOLUTION INTRAVENOUS PRN
Status: CANCELLED | OUTPATIENT
Start: 2024-07-26

## 2024-07-26 RX ORDER — HEPARIN SODIUM (PORCINE) LOCK FLUSH IV SOLN 100 UNIT/ML 100 UNIT/ML
500 SOLUTION INTRAVENOUS PRN
Status: CANCELLED | OUTPATIENT
Start: 2024-07-26

## 2024-07-26 RX ORDER — ALBUTEROL SULFATE 90 UG/1
4 AEROSOL, METERED RESPIRATORY (INHALATION) PRN
Status: CANCELLED | OUTPATIENT
Start: 2024-07-26

## 2024-07-26 RX ORDER — SODIUM CHLORIDE 0.9 % (FLUSH) 0.9 %
5-40 SYRINGE (ML) INJECTION PRN
Status: DISCONTINUED | OUTPATIENT
Start: 2024-07-26 | End: 2024-07-30 | Stop reason: HOSPADM

## 2024-07-26 RX ORDER — SODIUM CHLORIDE 9 MG/ML
5-250 INJECTION, SOLUTION INTRAVENOUS PRN
Status: CANCELLED | OUTPATIENT
Start: 2024-07-26

## 2024-07-26 RX ORDER — ERGOCALCIFEROL 1.25 MG/1
50000 CAPSULE ORAL WEEKLY
Qty: 4 CAPSULE | Refills: 0 | Status: SHIPPED | OUTPATIENT
Start: 2024-07-26

## 2024-07-26 RX ORDER — FLUOROURACIL 50 MG/ML
400 INJECTION, SOLUTION INTRAVENOUS ONCE
Status: COMPLETED | OUTPATIENT
Start: 2024-07-26 | End: 2024-07-26

## 2024-07-26 RX ORDER — SODIUM CHLORIDE 9 MG/ML
INJECTION, SOLUTION INTRAVENOUS CONTINUOUS
Status: CANCELLED | OUTPATIENT
Start: 2024-07-26

## 2024-07-26 RX ORDER — ATROPINE SULFATE 0.4 MG/ML
0.4 INJECTION, SOLUTION INTRAVENOUS ONCE
Status: COMPLETED | OUTPATIENT
Start: 2024-07-26 | End: 2024-07-26

## 2024-07-26 RX ADMIN — FLUOROURACIL 5375 MG: 50 INJECTION, SOLUTION INTRAVENOUS at 13:31

## 2024-07-26 RX ADMIN — LEUCOVORIN CALCIUM 900 MG: 350 INJECTION, POWDER, LYOPHILIZED, FOR SUSPENSION INTRAMUSCULAR; INTRAVENOUS at 11:49

## 2024-07-26 RX ADMIN — IRINOTECAN HYDROCHLORIDE 340 MG: 20 INJECTION, SOLUTION INTRAVENOUS at 11:51

## 2024-07-26 RX ADMIN — DEXAMETHASONE SODIUM PHOSPHATE 12 MG: 4 INJECTION INTRA-ARTICULAR; INTRALESIONAL; INTRAMUSCULAR; INTRAVENOUS; SOFT TISSUE at 10:53

## 2024-07-26 RX ADMIN — SODIUM CHLORIDE 150 MG: 9 INJECTION, SOLUTION INTRAVENOUS at 11:14

## 2024-07-26 RX ADMIN — DEXTROSE MONOHYDRATE 50 ML/HR: 50 INJECTION, SOLUTION INTRAVENOUS at 10:25

## 2024-07-26 RX ADMIN — ONDANSETRON 8 MG: 2 INJECTION INTRAMUSCULAR; INTRAVENOUS at 10:50

## 2024-07-26 RX ADMIN — SODIUM CHLORIDE, PRESERVATIVE FREE 10 ML: 5 INJECTION INTRAVENOUS at 10:19

## 2024-07-26 RX ADMIN — FLUOROURACIL 900 MG: 50 INJECTION, SOLUTION INTRAVENOUS at 13:25

## 2024-07-26 RX ADMIN — SODIUM CHLORIDE, PRESERVATIVE FREE 10 ML: 5 INJECTION INTRAVENOUS at 08:20

## 2024-07-26 RX ADMIN — ATROPINE SULFATE 0.4 MG: 0.4 INJECTION, SOLUTION INTRAVENOUS at 11:22

## 2024-07-26 ASSESSMENT — PATIENT HEALTH QUESTIONNAIRE - PHQ9
SUM OF ALL RESPONSES TO PHQ QUESTIONS 1-9: 0
2. FEELING DOWN, DEPRESSED OR HOPELESS: NOT AT ALL
1. LITTLE INTEREST OR PLEASURE IN DOING THINGS: NOT AT ALL
SUM OF ALL RESPONSES TO PHQ9 QUESTIONS 1 & 2: 0
SUM OF ALL RESPONSES TO PHQ QUESTIONS 1-9: 0

## 2024-07-26 NOTE — PROGRESS NOTES
Patient arrived to infusion. Folfiri completed. Patient tolerated well. Discharged ambulatory with elastomeric pump. Tubing unclamped and secured. Patient aware of pump d/c appt on Monday. He stated he would come at 0900.

## 2024-07-29 ENCOUNTER — HOSPITAL ENCOUNTER (OUTPATIENT)
Dept: INFUSION THERAPY | Age: 64
Setting detail: INFUSION SERIES
Discharge: HOME | End: 2024-07-29
Payer: COMMERCIAL

## 2024-07-29 VITALS
RESPIRATION RATE: 16 BRPM | OXYGEN SATURATION: 93 % | TEMPERATURE: 99 F | DIASTOLIC BLOOD PRESSURE: 66 MMHG | SYSTOLIC BLOOD PRESSURE: 94 MMHG | HEART RATE: 86 BPM

## 2024-07-29 PROCEDURE — 2580000003 HC RX 258: Performed by: INTERNAL MEDICINE

## 2024-07-29 PROCEDURE — 96523 IRRIG DRUG DELIVERY DEVICE: CPT

## 2024-07-29 RX ORDER — SODIUM CHLORIDE 0.9 % (FLUSH) 0.9 %
10 SYRINGE (ML) INJECTION PRN
Status: DISCONTINUED | OUTPATIENT
Start: 2024-07-29 | End: 2024-07-30 | Stop reason: HOSPADM

## 2024-07-29 RX ADMIN — SODIUM CHLORIDE, PRESERVATIVE FREE 10 ML: 5 INJECTION INTRAVENOUS at 09:01

## 2024-07-29 NOTE — PROGRESS NOTES
Patient arrived to Infusion Center for DC pump. Assessment completed.  No needs voiced at this time. Chemo completed, port flushed, and port needle removed. Patient tolerated well and is aware of next appointment on 8/16/24 @0730.  Patient discharged ambulatory.

## 2024-07-30 ENCOUNTER — CARE COORDINATION (OUTPATIENT)
Dept: CARE COORDINATION | Facility: CLINIC | Age: 64
End: 2024-07-30

## 2024-07-30 NOTE — CARE COORDINATION
Care Transitions Note    Follow Up Call     Attempted to reach patient for transitions of care follow up.  Unable to reach patient.      Outreach Attempts:   HIPAA compliant voicemail left for patient.     Follow Up Appointment:   Future Appointments         Provider Specialty Dept Phone    8/1/2024 10:30 AM [facility] Radiology [phone]    8/16/2024 7:30 AM PORT Lab [phone]    8/16/2024 8:30 AM Barbara Bennett MD Oncology [phone]    8/16/2024 10:00 AM [facility] Infusion Therapy [phone]    8/19/2024 3:30 PM [facility] Infusion Therapy [phone]    9/6/2024 9:15 AM PORT Lab [phone]    9/6/2024 10:00 AM Arlette Cruz APRN - CNP Oncology [phone]    9/6/2024 10:30 AM [facility] Infusion Therapy [phone]    9/9/2024 11:15 AM [facility] Infusion Therapy [phone]    9/27/2024 9:00 AM PORT Lab [phone]    9/27/2024 10:00 AM Arlette Cruz APRN - CNP Oncology [phone]    9/27/2024 10:30 AM [facility] Infusion Therapy [phone]    9/30/2024 11:15 AM [facility] Infusion Therapy [phone]    10/18/2024 8:30 AM PORT Lab [phone]    10/18/2024 9:30 AM Barbara Bennett MD Oncology [phone]    10/18/2024 10:30 AM [facility] Infusion Therapy [phone]    10/21/2024 2:45 PM [facility] Infusion Therapy [phone]    11/12/2024 12:00 PM Aisha Vernon MD Internal Medicine [phone]            Plan for follow-up call in 6-10 days based on severity of symptoms and risk factors. Plan for next call: self management-diet,hydration, exercise, follow up appointments.     Chitra Ho LPN

## 2024-07-30 NOTE — CARE COORDINATION
[phone]    10/21/2024 2:45 PM [facility] Infusion Therapy [phone]    11/12/2024 12:00 PM Aisha Vernon MD Internal Medicine [phone]            LPN Care Coordinator provided contact information.  Plan for follow-up call in 6-10 days based on severity of symptoms and risk factors.  Plan for next call: self management-diet, hydration, exercise, follow up appointments.       Chitra Ho LPN

## 2024-08-01 ENCOUNTER — HOSPITAL ENCOUNTER (OUTPATIENT)
Dept: CT IMAGING | Age: 64
Discharge: HOME | End: 2024-08-01
Attending: INTERNAL MEDICINE
Payer: COMMERCIAL

## 2024-08-01 DIAGNOSIS — C78.6 MALIGNANT NEOPLASM METASTATIC TO PERITONEUM (HCC): ICD-10-CM

## 2024-08-01 DIAGNOSIS — C76.2 ABDOMINAL CARCINOMATOSIS (HCC): ICD-10-CM

## 2024-08-01 PROCEDURE — 6360000004 HC RX CONTRAST MEDICATION: Performed by: INTERNAL MEDICINE

## 2024-08-01 PROCEDURE — 74177 CT ABD & PELVIS W/CONTRAST: CPT

## 2024-08-01 RX ADMIN — IOPAMIDOL 100 ML: 755 INJECTION, SOLUTION INTRAVENOUS at 10:25

## 2024-08-01 RX ADMIN — DIATRIZOATE MEGLUMINE AND DIATRIZOATE SODIUM 15 ML: 660; 100 LIQUID ORAL; RECTAL at 10:25

## 2024-08-06 ASSESSMENT — ENCOUNTER SYMPTOMS
CONSTIPATION: 0
VOMITING: 0
BLOOD IN STOOL: 0
SCLERAL ICTERUS: 0
HEMOPTYSIS: 0
ABDOMINAL DISTENTION: 0
SHORTNESS OF BREATH: 0
EYE PROBLEMS: 0
DIARRHEA: 0
COUGH: 0
SORE THROAT: 0
NAUSEA: 0
BACK PAIN: 0

## 2024-08-07 NOTE — PROGRESS NOTES
symptoms.  Labs reviewed and adequate for treatment today.  He will be due for restaging imaging at end of July/ CT CAP.  CEA end of May was at 2.4.  - hemorrhoids - Preparation H, warm baths.   - irritability/mood change - Biggest concern at this time is easy irritability and new onset of some anger issues.  According to the wife he has been having some straining in relationship with his children and also her.  She feels he has a strong personality at baseline but lately he has been more irritable than usually.  He admits to not taking the BuSpar regularly.  He wishes to restart it and a new prescription will be sent.  We will reevaluate.  Side effects of BuSpar reviewed.  No neurological symptoms noted.  May consider brain MR if symptoms of irritability don't improve.      All questions were asked and answered to the best of my ability.  The patient verbalized understanding and agrees with the plan above.      LUCY Woods - CNP  [facility] On[facility]  [address] [place] [address]  Office : [phone]  Fax : [phone]   [>50% of Time Spent on Counseling for ____] : Greater than 50% of the encounter time was spent on counseling for [unfilled] [Time Spent: ___ minutes] : I have spent [unfilled] minutes of face to face time with the patient

## 2024-08-08 ENCOUNTER — CARE COORDINATION (OUTPATIENT)
Dept: CARE COORDINATION | Facility: CLINIC | Age: 64
End: 2024-08-08

## 2024-08-08 NOTE — CARE COORDINATION
Care Transitions Note    Follow Up Call     Patient Current Location:  Home: [address] [place] [id number]    N Care Coordinator contacted the patient by telephone. Verified name and  as identifiers.    Additional needs identified to be addressed with provider   No needs identified                 Method of communication with provider: none.    Plan of care updates since last contact:  Education: Chitra Ho LPN  [phone]  All scheduled appointments.        Advance Care Planning:   Does patient have an Advance Directive: reviewed during previous call, see note. .    Medication Review:  No changes since last call.     Remote Patient Monitoring:  Offered patient enrollment in the Remote Patient Monitoring (RPM) program for in-home monitoring: Patient is not eligible for RPM program because: na .    Assessments:  No changes since last call    Follow Up Appointment:   Reviewed upcoming appointment(s).  Future Appointments         Provider Specialty Dept Phone    2024 7:30 AM PORT Lab [phone]    2024 8:30 AM Barbara Bennett MD Oncology [phone]    2024 10:00 AM [facility]FUS[facility] Infusion Therapy [phone]    2024 3:30 PM [facility] Infusion Therapy [phone]    2024 9:15 AM PORT Lab [phone]    2024 10:00 AM Arlette Cruz APRN - CNP Oncology [phone]    2024 10:30 AM [facility] Infusion Therapy [phone]    2024 11:15 AM [facility] Infusion Therapy [phone]    2024 9:00 AM PORT Lab [phone]    2024 10:00 AM Arlette Cruz APRN - CNP Oncology [phone]    2024 10:30 AM [facility] Infusion Therapy [phone]    2024 11:15 AM [facility] Infusion Therapy [phone]    10/18/2024 8:30 AM PORT Lab [phone]    10/18/2024 9:30 AM Barbara Bennett MD Oncology [phone]    10/18/2024 10:30 AM [facility] Infusion Therapy [phone]    10/21/2024 2:45 PM IN[facility]USION Infusion Therapy [phone]    2024 12:00 PM Janeen

## 2024-08-13 NOTE — PROGRESS NOTES
2/25/2020  IMaribel MD, saw and evaluated the patient  I have discussed the patient with the resident/non-physician practitioner and agree with the resident's/non-physician practitioner's findings, Plan of Care, and MDM as documented in the resident's/non-physician practitioner's note, except where noted  All available labs and Radiology studies were reviewed  I was present for key portions of any procedure(s) performed by the resident/non-physician practitioner and I was immediately available to provide assistance  At this point I agree with the current assessment done in the Emergency Department  I have conducted an independent evaluation of this patient a history and physical is as follows:    ED Course     67-year-old male presenting to the emergency department for evaluation of left low back pain  Patient states that he was getting out of his recliner to approximately 03:00 o'clock in the morning, slipped on a throw rug, causing himself to lurch back  Patient had immediate right-sided back pain with radiation down the right leg  The patient states that that was transient and has subsequently resolved, however he is having more moderate left-sided back pain that is radiating down into his left buttocks  There is no bowel or bladder incontinence  The patient has a history of 4 previous back surgeries  No reported saddle anesthesia  There is no fever or IV drug use  Ten systems reviewed negative except as noted  On examination abdomen is nondistended, soft and nontender  Patient has diffuse tenderness to palpation in the lumbar spine with some mild muscular spasm in the left lumbar paraspinals  Motor is 5/5 bilateral lower extremities  The patient is able to ambulate  Reflexes are 1+ bilateral patella  Sensation intact through the saddle distribution      Assessment and plan:  67-year-old male presenting with worsening low back pain that appears to be musculoskeletal and likely [facility] Hematology and Oncology: Established patient - follow up     Chief Complaint   Patient presents with    Follow-up     Reason for Referral: Abdominal pain; peritoneal metastatic disease  Referring Provider: Mo Dalton NP  Primary Care Provider: Oscar Nelson MD  Family History of Cancer/Hematologic Disorders: Family history is significant for sister with breast cancer.   Presenting Symptoms: Progressively worsening, persistent abdominal pain x several months    History of Present Illness:  Mr. Guevara is a 63 y.o. male who presents today for FU regarding adenocarcinoma with peritoneal metastatic disease.  The past medical history is significant for tobacco use (recent pack per day smoker x 30+ years - now down to 1/3PPD), PUD, paresthesia/pain of bilateral upper extremities, HLD, HTN, diverticulitis,  colon polyps, hiatal hernia, chronic right shoulder pain, bilateral carpal tunnel syndrome, and partial colon resection.  He presented to the [facility] ED on 5/12/22 with a complaint of persistent abdominal pain for several months that was becoming progressively  worse.  EGD and colonoscopy on 2/25/22 revealed findings of hiatal hernia, gastric polyps, several benign colon polyps, diverticulosis, and internal hemorrhoids.  CT of the abdomen on 3/8/22 showed no acute findings.  He presented to [facility] ER x 2 for  evaluation (5/3/22 and 5/10/22).  RUQ abdominal ultrasound was completed on 5/3/22 in the ED at [facility] demonstrating no acute findings to explain patient's pain; probable hepatic  steatosis; small echogenic mural foci in the gallbladder which are nonmobile, likely representing cholesterol polyps, largest measuring 4 mm; and small volume simple ascites in the right upper quadrant and left lower quadrant, nonspecific.  CT AP with  contrast at [facility] ED 5/10/22 showed a partial small bowel obstruction; small to moderate amount of free fluid in the abdomen and pelvis; and nonspecific stranding of  from muscular spasm      Critical Care Time  Procedures

## 2024-08-15 ENCOUNTER — CARE COORDINATION (OUTPATIENT)
Dept: CARE COORDINATION | Facility: CLINIC | Age: 64
End: 2024-08-15

## 2024-08-15 RX ORDER — SODIUM CHLORIDE 0.9 % (FLUSH) 0.9 %
5-40 SYRINGE (ML) INJECTION PRN
OUTPATIENT
Start: 2024-08-16

## 2024-08-15 NOTE — CARE COORDINATION
Care Transitions Note    Follow Up Call     Patient Current Location:  Home: [address] [place] [id number]    N Care Coordinator contacted the patient by telephone. Verified name and  as identifiers.    Additional needs identified to be addressed with provider   No needs identified                 Method of communication with provider: none.    Plan of care updates since last contact:  Education: Chitra Ho LPN [phone].  All scheduled appointments.        Advance Care Planning:   Does patient have an Advance Directive: reviewed during previous call, see note. .    Medication Review:  No changes since last call.     Remote Patient Monitoring:  Offered patient enrollment in the Remote Patient Monitoring (RPM) program for in-home monitoring: Patient is not eligible for RPM program because: na .    Assessments:  No changes since last call    Follow Up Appointment:   Reviewed upcoming appointment(s).  Future Appointments         Provider Specialty Dept Phone    2024  7:30 AM PORT Lab [phone]    2024 8:30 AM Barbara Bennett MD Oncology [phone]    2024 10:00 AM INF[facility]ION Infusion Therapy [phone]    2024  3:30 PM IN[facility] Infusion Therapy [phone]    2024 9:15 AM PORT Lab [phone]    2024 10:00 AM Arlette Cruz APRN - CNP Oncology [phone]    2024 10:30 AM INF[facility]ION Infusion Therapy [phone]    2024 11:15 AM IN[facility]ION Infusion Therapy [phone]    2024 9:00 AM PORT Lab [phone]    2024 10:00 AM Arlette Cruz APRN - CNP Oncology [phone]    2024 10:30 AM INF[facility]ION Infusion Therapy [phone]    2024 11:15 AM IN[facility]ION Infusion Therapy [phone]    10/18/2024 8:30 AM PORT Lab [phone]    10/18/2024 9:30 AM Barbara Bennett MD Oncology [phone]    10/18/2024 10:30 AM INFUSION Infusion Therapy [phone]    10/21/2024 2:45 PM INFUSION Infusion Therapy [phone]    2024 12:00 PM Janeen  independent

## 2024-08-16 ENCOUNTER — HOSPITAL ENCOUNTER (OUTPATIENT)
Dept: INFUSION THERAPY | Age: 64
Setting detail: INFUSION SERIES
Discharge: HOME | End: 2024-08-16

## 2024-08-16 ENCOUNTER — HOSPITAL ENCOUNTER (OUTPATIENT)
Dept: LAB | Age: 64
Discharge: HOME | End: 2024-08-16
Payer: COMMERCIAL

## 2024-08-16 ENCOUNTER — OFFICE VISIT (OUTPATIENT)
Dept: ONCOLOGY | Age: 64
End: 2024-08-16
Payer: COMMERCIAL

## 2024-08-16 VITALS
TEMPERATURE: 98.6 F | OXYGEN SATURATION: 96 % | HEART RATE: 90 BPM | BODY MASS INDEX: 34.79 KG/M2 | WEIGHT: 243 LBS | HEIGHT: 70 IN | DIASTOLIC BLOOD PRESSURE: 76 MMHG | SYSTOLIC BLOOD PRESSURE: 110 MMHG | RESPIRATION RATE: 17 BRPM

## 2024-08-16 DIAGNOSIS — R73.9 HYPERGLYCEMIA: ICD-10-CM

## 2024-08-16 DIAGNOSIS — C76.2 ABDOMINAL CARCINOMATOSIS (HCC): ICD-10-CM

## 2024-08-16 DIAGNOSIS — C78.6 MALIGNANT NEOPLASM METASTATIC TO PERITONEUM (HCC): ICD-10-CM

## 2024-08-16 DIAGNOSIS — E83.42 HYPOMAGNESEMIA: Primary | ICD-10-CM

## 2024-08-16 DIAGNOSIS — E83.42 HYPOMAGNESEMIA: ICD-10-CM

## 2024-08-16 LAB
ALBUMIN SERPL-MCNC: 3.3 G/DL (ref 3.2–4.6)
ALBUMIN/GLOB SERPL: 1.3 (ref 1–1.9)
ALP SERPL-CCNC: 116 U/L (ref 40–129)
ALT SERPL-CCNC: 22 U/L (ref 12–65)
ANION GAP SERPL CALC-SCNC: 12 MMOL/L (ref 9–18)
AST SERPL-CCNC: 26 U/L (ref 15–37)
BASOPHILS # BLD: 0.1 K/UL (ref 0–0.2)
BASOPHILS NFR BLD: 1 % (ref 0–2)
BILIRUB SERPL-MCNC: 0.4 MG/DL (ref 0–1.2)
BUN SERPL-MCNC: 12 MG/DL (ref 8–23)
CALCIUM SERPL-MCNC: 8.8 MG/DL (ref 8.8–10.2)
CEA SERPL-MCNC: 2.8 NG/ML (ref 0–3.8)
CHLORIDE SERPL-SCNC: 105 MMOL/L (ref 98–107)
CO2 SERPL-SCNC: 24 MMOL/L (ref 20–28)
CREAT SERPL-MCNC: 0.98 MG/DL (ref 0.8–1.3)
DIFFERENTIAL METHOD BLD: ABNORMAL
EOSINOPHIL # BLD: 0.3 K/UL (ref 0–0.8)
EOSINOPHIL NFR BLD: 7 % (ref 0.5–7.8)
ERYTHROCYTE [DISTWIDTH] IN BLOOD BY AUTOMATED COUNT: 14.2 % (ref 11.9–14.6)
GLOBULIN SER CALC-MCNC: 2.5 G/DL (ref 2.3–3.5)
GLUCOSE SERPL-MCNC: 193 MG/DL (ref 70–99)
HCT VFR BLD AUTO: 37.2 % (ref 41.1–50.3)
HGB BLD-MCNC: 11.8 G/DL (ref 13.6–17.2)
IMM GRANULOCYTES # BLD AUTO: 0 K/UL (ref 0–0.5)
IMM GRANULOCYTES NFR BLD AUTO: 0 % (ref 0–5)
LYMPHOCYTES # BLD: 1.9 K/UL (ref 0.5–4.6)
LYMPHOCYTES NFR BLD: 57 % (ref 13–44)
MAGNESIUM SERPL-MCNC: 1.8 MG/DL (ref 1.8–2.4)
MCH RBC QN AUTO: 28.9 PG (ref 26.1–32.9)
MCHC RBC AUTO-ENTMCNC: 31.7 G/DL (ref 31.4–35)
MCV RBC AUTO: 91.2 FL (ref 82–102)
MONOCYTES # BLD: 0.4 K/UL (ref 0.1–1.3)
MONOCYTES NFR BLD: 11 % (ref 4–12)
NEUTS SEG # BLD: 0.8 K/UL (ref 1.7–8.2)
NEUTS SEG NFR BLD: 24 % (ref 43–78)
NRBC # BLD: 0 K/UL (ref 0–0.2)
PLATELET # BLD AUTO: 160 K/UL (ref 150–450)
PMV BLD AUTO: 10.2 FL (ref 9.4–12.3)
POTASSIUM SERPL-SCNC: 3.8 MMOL/L (ref 3.5–5.1)
PROT SERPL-MCNC: 5.9 G/DL (ref 6.3–8.2)
RBC # BLD AUTO: 4.08 M/UL (ref 4.23–5.6)
SODIUM SERPL-SCNC: 141 MMOL/L (ref 136–145)
WBC # BLD AUTO: 3.5 K/UL (ref 4.3–11.1)

## 2024-08-16 PROCEDURE — 2580000003 HC RX 258: Performed by: INTERNAL MEDICINE

## 2024-08-16 PROCEDURE — 80053 COMPREHEN METABOLIC PANEL: CPT

## 2024-08-16 PROCEDURE — 3078F DIAST BP <80 MM HG: CPT | Performed by: INTERNAL MEDICINE

## 2024-08-16 PROCEDURE — 36591 DRAW BLOOD OFF VENOUS DEVICE: CPT

## 2024-08-16 PROCEDURE — 99215 OFFICE O/P EST HI 40 MIN: CPT | Performed by: INTERNAL MEDICINE

## 2024-08-16 PROCEDURE — 85025 COMPLETE CBC W/AUTO DIFF WBC: CPT

## 2024-08-16 PROCEDURE — 3074F SYST BP LT 130 MM HG: CPT | Performed by: INTERNAL MEDICINE

## 2024-08-16 PROCEDURE — 82378 CARCINOEMBRYONIC ANTIGEN: CPT

## 2024-08-16 PROCEDURE — 83735 ASSAY OF MAGNESIUM: CPT

## 2024-08-16 RX ORDER — SODIUM CHLORIDE 0.9 % (FLUSH) 0.9 %
5-40 SYRINGE (ML) INJECTION PRN
OUTPATIENT
Start: 2024-08-25

## 2024-08-16 RX ORDER — SODIUM CHLORIDE 9 MG/ML
INJECTION, SOLUTION INTRAVENOUS CONTINUOUS
Status: CANCELLED | OUTPATIENT
Start: 2024-08-23

## 2024-08-16 RX ORDER — ONDANSETRON 2 MG/ML
8 INJECTION INTRAMUSCULAR; INTRAVENOUS ONCE
Status: CANCELLED | OUTPATIENT
Start: 2024-08-23 | End: 2024-08-16

## 2024-08-16 RX ORDER — ONDANSETRON 2 MG/ML
8 INJECTION INTRAMUSCULAR; INTRAVENOUS
Status: CANCELLED | OUTPATIENT
Start: 2024-08-23

## 2024-08-16 RX ORDER — DEXTROSE MONOHYDRATE 50 MG/ML
5-250 INJECTION, SOLUTION INTRAVENOUS PRN
Status: CANCELLED | OUTPATIENT
Start: 2024-08-23

## 2024-08-16 RX ORDER — HEPARIN SODIUM (PORCINE) LOCK FLUSH IV SOLN 100 UNIT/ML 100 UNIT/ML
500 SOLUTION INTRAVENOUS PRN
Status: CANCELLED | OUTPATIENT
Start: 2024-08-23

## 2024-08-16 RX ORDER — DIPHENHYDRAMINE HYDROCHLORIDE 50 MG/ML
50 INJECTION INTRAMUSCULAR; INTRAVENOUS
Status: CANCELLED | OUTPATIENT
Start: 2024-08-23

## 2024-08-16 RX ORDER — SODIUM CHLORIDE 9 MG/ML
5-250 INJECTION, SOLUTION INTRAVENOUS PRN
OUTPATIENT
Start: 2024-08-25

## 2024-08-16 RX ORDER — MEPERIDINE HYDROCHLORIDE 50 MG/ML
12.5 INJECTION INTRAMUSCULAR; INTRAVENOUS; SUBCUTANEOUS PRN
Status: CANCELLED | OUTPATIENT
Start: 2024-08-23

## 2024-08-16 RX ORDER — FAMOTIDINE 10 MG/ML
20 INJECTION, SOLUTION INTRAVENOUS
Status: CANCELLED | OUTPATIENT
Start: 2024-08-23

## 2024-08-16 RX ORDER — SODIUM CHLORIDE 9 MG/ML
5-250 INJECTION, SOLUTION INTRAVENOUS PRN
Status: CANCELLED | OUTPATIENT
Start: 2024-08-23

## 2024-08-16 RX ORDER — EPINEPHRINE 1 MG/ML
0.3 INJECTION, SOLUTION, CONCENTRATE INTRAVENOUS PRN
Status: CANCELLED | OUTPATIENT
Start: 2024-08-23

## 2024-08-16 RX ORDER — SODIUM CHLORIDE 0.9 % (FLUSH) 0.9 %
5-40 SYRINGE (ML) INJECTION PRN
Status: CANCELLED | OUTPATIENT
Start: 2024-08-23

## 2024-08-16 RX ORDER — SODIUM CHLORIDE 0.9 % (FLUSH) 0.9 %
5-40 SYRINGE (ML) INJECTION PRN
Status: DISCONTINUED | OUTPATIENT
Start: 2024-08-16 | End: 2024-08-20 | Stop reason: HOSPADM

## 2024-08-16 RX ORDER — ACETAMINOPHEN 325 MG/1
650 TABLET ORAL
Status: CANCELLED | OUTPATIENT
Start: 2024-08-23

## 2024-08-16 RX ORDER — ALBUTEROL SULFATE 90 UG/1
4 AEROSOL, METERED RESPIRATORY (INHALATION) PRN
Status: CANCELLED | OUTPATIENT
Start: 2024-08-23

## 2024-08-16 RX ORDER — HEPARIN SODIUM (PORCINE) LOCK FLUSH IV SOLN 100 UNIT/ML 100 UNIT/ML
500 SOLUTION INTRAVENOUS PRN
OUTPATIENT
Start: 2024-08-25

## 2024-08-16 RX ORDER — FLUOROURACIL 50 MG/ML
400 INJECTION, SOLUTION INTRAVENOUS ONCE
Status: CANCELLED | OUTPATIENT
Start: 2024-08-23 | End: 2024-08-16

## 2024-08-16 RX ADMIN — SODIUM CHLORIDE, PRESERVATIVE FREE 10 ML: 5 INJECTION INTRAVENOUS at 09:54

## 2024-08-16 RX ADMIN — SODIUM CHLORIDE, PRESERVATIVE FREE 10 ML: 5 INJECTION INTRAVENOUS at 07:40

## 2024-08-16 ASSESSMENT — PATIENT HEALTH QUESTIONNAIRE - PHQ9
1. LITTLE INTEREST OR PLEASURE IN DOING THINGS: NOT AT ALL
SUM OF ALL RESPONSES TO PHQ9 QUESTIONS 1 & 2: 0
SUM OF ALL RESPONSES TO PHQ QUESTIONS 1-9: 0
2. FEELING DOWN, DEPRESSED OR HOPELESS: NOT AT ALL
SUM OF ALL RESPONSES TO PHQ QUESTIONS 1-9: 0

## 2024-08-16 NOTE — PATIENT INSTRUCTIONS
Patient Information from Today's Visit    The members of your Oncology Medical Home are listed below:    Physician Provider: Barbara Bennett, Medical Oncologist  Advanced Practice Clinician: Arlette Murrell NP  Registered Nurse: Thea SMITH RN  Navigator: Bertha CASTELLON RN  Medical Assistant: Lizbet MENCHACA MA  : Edwige COOPER   Supportive Care Services: Jacqueline COLLINS LMSW    Diagnosis: Abdominal carcinomatosis      Follow Up Instructions: in 4 weeks.    Labs reviewed.  Symptoms reviewed.  Next CT scan of chest, abdomen, and pelvis due in 3 months.  You can call to schedule this at [phone].  We will set up you up for infusions once every 4 weeks per your request.  Let us know next month if you'd like to continue with that or go back to once every 3 weeks.    Treatment Summary has been discussed and given to patient:N/A      Current Labs:   Hospital Outpatient Visit on 08/16/2024   Component Date Value Ref Range Status    CEA 08/16/2024 2.8  0.0 - 3.8 ng/mL Final    Comment: Nonsmoker: <3.9 ng/mL  Smoker: <5.6 ng/mL  Roche ECLIA methodology  Patient's results of tumor marker testing may not be comparable to labs using different manufacturers/methods.      WBC 08/16/2024 3.5 (L)  4.3 - 11.1 K/uL Final    RBC 08/16/2024 4.08 (L)  4.23 - 5.6 M/uL Final    Hemoglobin 08/16/2024 11.8 (L)  13.6 - 17.2 g/dL Final    Hematocrit 08/16/2024 37.2 (L)  41.1 - 50.3 % Final    MCV 08/16/2024 91.2  82.0 - 102.0 FL Final    MCH 08/16/2024 28.9  26.1 - 32.9 PG Final    MCHC 08/16/2024 31.7  31.4 - 35.0 g/dL Final    RDW 08/16/2024 14.2  11.9 - 14.6 % Final    Platelets 08/16/2024 160  150 - 450 K/uL Final    MPV 08/16/2024 10.2  9.4 - 12.3 FL Final    nRBC 08/16/2024 0.00  0.0 - 0.2 K/uL Final    **Note: Absolute NRBC parameter is now reported with Hemogram**    Neutrophils % 08/16/2024 24 (L)  43 - 78 % Final    Lymphocytes % 08/16/2024 57 (H)  13 - 44 % Final    Monocytes % 08/16/2024 11  4.0 - 12.0 % Final    Eosinophils %

## 2024-08-22 ENCOUNTER — CARE COORDINATION (OUTPATIENT)
Dept: CARE COORDINATION | Facility: CLINIC | Age: 64
End: 2024-08-22

## 2024-08-22 NOTE — CARE COORDINATION
Care Transitions Note    Final Call     Patient Current Location:  Home: [address] [place] [id number]    [facility] Care Coordinator contacted the patient by telephone. Verified name and  as identifiers.  /  Patient graduated from the Care Transitions program on 2024.  /Patient/family verbalizes confidence in the ability to self-manage at this time..      Advance Care Planning:   Does patient have an Advance Directive: reviewed during previous call, see note. .    Handoff:   Patient was not referred to the ACM team due to patient declined services.      Assessments:  No changes since last call    Upcoming Appointments:    Future Appointments         Provider Specialty Dept Phone    2024  8:45 AM BMT Infusion Therapy [phone]    2024 3:45 PM INFUSION Infusion Therapy [phone]    2024 9:00 AM PORT Lab [phone]    2024 10:00 AM Arlette Cruz APRN - CNP Oncology [phone]    2024 11:00 AM INFUSION Infusion Therapy [phone]    2024 11:15 AM INFUSION Infusion Therapy [phone]    10/18/2024 9:00 AM PORT Lab [phone]    10/18/2024 10:00 AM Rachel Cuellar APRN - CNP Oncology [phone]    10/18/2024 11:00 AM INFUSION Infusion Therapy [phone]    10/21/2024 9:15 AM INFUSION Infusion Therapy [phone]    2024 12:00 PM Aisha Vernon MD Internal Medicine [phone]    11/15/2024 9:30 AM PORT Lab [phone]    11/15/2024 10:30 AM Arlette Cruz APRN - CNP Oncology [phone]    11/15/2024 10:30 AM INFUSION Infusion Therapy [phone]    2024 10:00 AM INFUSION Infusion Therapy [phone]    2024 8:45 AM PORT Lab [phone]    2024 9:45 AM Barbara Bennett MD Oncology [phone]    2024 10:30 AM INFUSION Infusion Therapy [phone]    2024 9:15 AM INFUSION Infusion Therapy [phone]    1/10/2025 8:30 AM PORT Lab [phone]    1/10/2025 9:30 AM Arlette Cruz APRN - CNP Oncology

## 2024-08-23 ENCOUNTER — HOSPITAL ENCOUNTER (OUTPATIENT)
Dept: INFUSION THERAPY | Age: 64
Setting detail: INFUSION SERIES
Discharge: HOME | End: 2024-08-23
Payer: COMMERCIAL

## 2024-08-23 VITALS
TEMPERATURE: 97.5 F | SYSTOLIC BLOOD PRESSURE: 132 MMHG | HEART RATE: 75 BPM | WEIGHT: 239.2 LBS | DIASTOLIC BLOOD PRESSURE: 77 MMHG | OXYGEN SATURATION: 98 % | RESPIRATION RATE: 16 BRPM | BODY MASS INDEX: 34.32 KG/M2

## 2024-08-23 DIAGNOSIS — C76.2 ABDOMINAL CARCINOMATOSIS (HCC): ICD-10-CM

## 2024-08-23 DIAGNOSIS — E83.42 HYPOMAGNESEMIA: ICD-10-CM

## 2024-08-23 DIAGNOSIS — R73.9 HYPERGLYCEMIA: ICD-10-CM

## 2024-08-23 DIAGNOSIS — C78.6 MALIGNANT NEOPLASM METASTATIC TO PERITONEUM (HCC): Primary | ICD-10-CM

## 2024-08-23 LAB
ALBUMIN SERPL-MCNC: 3.6 G/DL (ref 3.2–4.6)
ALBUMIN/GLOB SERPL: 1.4 (ref 1–1.9)
ALP SERPL-CCNC: 123 U/L (ref 40–129)
ALT SERPL-CCNC: 29 U/L (ref 12–65)
ANION GAP SERPL CALC-SCNC: 10 MMOL/L (ref 9–18)
AST SERPL-CCNC: 30 U/L (ref 15–37)
BASOPHILS # BLD: 0.1 K/UL (ref 0–0.2)
BASOPHILS NFR BLD: 2 % (ref 0–2)
BILIRUB SERPL-MCNC: 0.4 MG/DL (ref 0–1.2)
BUN SERPL-MCNC: 11 MG/DL (ref 8–23)
CALCIUM SERPL-MCNC: 9 MG/DL (ref 8.8–10.2)
CEA SERPL-MCNC: 2.7 NG/ML (ref 0–3.8)
CHLORIDE SERPL-SCNC: 104 MMOL/L (ref 98–107)
CO2 SERPL-SCNC: 25 MMOL/L (ref 20–28)
CREAT SERPL-MCNC: 0.94 MG/DL (ref 0.8–1.3)
DIFFERENTIAL METHOD BLD: ABNORMAL
EOSINOPHIL # BLD: 0.2 K/UL (ref 0–0.8)
EOSINOPHIL NFR BLD: 3 % (ref 0.5–7.8)
ERYTHROCYTE [DISTWIDTH] IN BLOOD BY AUTOMATED COUNT: 13.7 % (ref 11.9–14.6)
GLOBULIN SER CALC-MCNC: 2.6 G/DL (ref 2.3–3.5)
GLUCOSE SERPL-MCNC: 268 MG/DL (ref 70–99)
HCT VFR BLD AUTO: 39.9 % (ref 41.1–50.3)
HGB BLD-MCNC: 12.9 G/DL (ref 13.6–17.2)
IMM GRANULOCYTES # BLD AUTO: 0 K/UL (ref 0–0.5)
IMM GRANULOCYTES NFR BLD AUTO: 0 % (ref 0–5)
LYMPHOCYTES # BLD: 1.8 K/UL (ref 0.5–4.6)
LYMPHOCYTES NFR BLD: 32 % (ref 13–44)
MAGNESIUM SERPL-MCNC: 2 MG/DL (ref 1.8–2.4)
MCH RBC QN AUTO: 28.8 PG (ref 26.1–32.9)
MCHC RBC AUTO-ENTMCNC: 32.3 G/DL (ref 31.4–35)
MCV RBC AUTO: 89.1 FL (ref 82–102)
MONOCYTES # BLD: 0.4 K/UL (ref 0.1–1.3)
MONOCYTES NFR BLD: 7 % (ref 4–12)
NEUTS SEG # BLD: 3.1 K/UL (ref 1.7–8.2)
NEUTS SEG NFR BLD: 56 % (ref 43–78)
NRBC # BLD: 0 K/UL (ref 0–0.2)
PLATELET # BLD AUTO: 210 K/UL (ref 150–450)
PMV BLD AUTO: 10.5 FL (ref 9.4–12.3)
POTASSIUM SERPL-SCNC: 4.3 MMOL/L (ref 3.5–5.1)
PROT SERPL-MCNC: 6.3 G/DL (ref 6.3–8.2)
RBC # BLD AUTO: 4.48 M/UL (ref 4.23–5.6)
SODIUM SERPL-SCNC: 139 MMOL/L (ref 136–145)
WBC # BLD AUTO: 5.6 K/UL (ref 4.3–11.1)

## 2024-08-23 PROCEDURE — 83735 ASSAY OF MAGNESIUM: CPT

## 2024-08-23 PROCEDURE — 85025 COMPLETE CBC W/AUTO DIFF WBC: CPT

## 2024-08-23 PROCEDURE — 96413 CHEMO IV INFUSION 1 HR: CPT

## 2024-08-23 PROCEDURE — 82378 CARCINOEMBRYONIC ANTIGEN: CPT

## 2024-08-23 PROCEDURE — 96368 THER/DIAG CONCURRENT INF: CPT

## 2024-08-23 PROCEDURE — 2580000003 HC RX 258: Performed by: INTERNAL MEDICINE

## 2024-08-23 PROCEDURE — 80053 COMPREHEN METABOLIC PANEL: CPT

## 2024-08-23 PROCEDURE — G0498 CHEMO EXTEND IV INFUS W/PUMP: HCPCS

## 2024-08-23 PROCEDURE — 96375 TX/PRO/DX INJ NEW DRUG ADDON: CPT

## 2024-08-23 PROCEDURE — 96411 CHEMO IV PUSH ADDL DRUG: CPT

## 2024-08-23 PROCEDURE — 6360000002 HC RX W HCPCS: Performed by: INTERNAL MEDICINE

## 2024-08-23 PROCEDURE — 96367 TX/PROPH/DG ADDL SEQ IV INF: CPT

## 2024-08-23 PROCEDURE — 96372 THER/PROPH/DIAG INJ SC/IM: CPT

## 2024-08-23 RX ORDER — FLUOROURACIL 50 MG/ML
400 INJECTION, SOLUTION INTRAVENOUS ONCE
Status: COMPLETED | OUTPATIENT
Start: 2024-08-23 | End: 2024-08-23

## 2024-08-23 RX ORDER — DEXTROSE MONOHYDRATE 50 MG/ML
5-250 INJECTION, SOLUTION INTRAVENOUS PRN
Status: DISCONTINUED | OUTPATIENT
Start: 2024-08-23 | End: 2024-08-24 | Stop reason: HOSPADM

## 2024-08-23 RX ORDER — ONDANSETRON 2 MG/ML
8 INJECTION INTRAMUSCULAR; INTRAVENOUS ONCE
Status: COMPLETED | OUTPATIENT
Start: 2024-08-23 | End: 2024-08-23

## 2024-08-23 RX ORDER — ATROPINE SULFATE 0.4 MG/ML
0.4 INJECTION, SOLUTION INTRAVENOUS ONCE
Status: COMPLETED | OUTPATIENT
Start: 2024-08-23 | End: 2024-08-23

## 2024-08-23 RX ORDER — SODIUM CHLORIDE 0.9 % (FLUSH) 0.9 %
5-40 SYRINGE (ML) INJECTION PRN
Status: DISCONTINUED | OUTPATIENT
Start: 2024-08-23 | End: 2024-08-24 | Stop reason: HOSPADM

## 2024-08-23 RX ADMIN — DEXAMETHASONE SODIUM PHOSPHATE 12 MG: 4 INJECTION INTRA-ARTICULAR; INTRALESIONAL; INTRAMUSCULAR; INTRAVENOUS; SOFT TISSUE at 10:24

## 2024-08-23 RX ADMIN — FLUOROURACIL 900 MG: 50 INJECTION, SOLUTION INTRAVENOUS at 12:47

## 2024-08-23 RX ADMIN — SODIUM CHLORIDE 150 MG: 9 INJECTION, SOLUTION INTRAVENOUS at 10:42

## 2024-08-23 RX ADMIN — ATROPINE SULFATE 0.4 MG: 0.4 INJECTION, SOLUTION INTRAVENOUS at 10:48

## 2024-08-23 RX ADMIN — FLUOROURACIL 5375 MG: 50 INJECTION, SOLUTION INTRAVENOUS at 12:55

## 2024-08-23 RX ADMIN — SODIUM CHLORIDE, PRESERVATIVE FREE 10 ML: 5 INJECTION INTRAVENOUS at 10:17

## 2024-08-23 RX ADMIN — IRINOTECAN HYDROCHLORIDE 340 MG: 20 INJECTION, SOLUTION INTRAVENOUS at 11:12

## 2024-08-23 RX ADMIN — LEUCOVORIN CALCIUM 900 MG: 350 INJECTION, POWDER, LYOPHILIZED, FOR SUSPENSION INTRAMUSCULAR; INTRAVENOUS at 11:09

## 2024-08-23 RX ADMIN — ONDANSETRON 8 MG: 2 INJECTION INTRAMUSCULAR; INTRAVENOUS at 10:21

## 2024-08-23 RX ADMIN — DEXTROSE MONOHYDRATE 100 ML/HR: 50 INJECTION, SOLUTION INTRAVENOUS at 10:18

## 2024-08-23 NOTE — PROGRESS NOTES
Pt arrived ambulatory, labs draw and reviewed, chemo completed, 5FU pump connected an unclamped, pt discharged home ambulatory, aware of appt on Monday at 1130

## 2024-08-26 ENCOUNTER — HOSPITAL ENCOUNTER (OUTPATIENT)
Dept: INFUSION THERAPY | Age: 64
Setting detail: INFUSION SERIES
Discharge: HOME | End: 2024-08-26
Payer: COMMERCIAL

## 2024-08-26 VITALS
TEMPERATURE: 97.7 F | HEART RATE: 83 BPM | SYSTOLIC BLOOD PRESSURE: 134 MMHG | RESPIRATION RATE: 16 BRPM | OXYGEN SATURATION: 95 % | DIASTOLIC BLOOD PRESSURE: 83 MMHG

## 2024-08-26 PROCEDURE — 2580000003 HC RX 258: Performed by: INTERNAL MEDICINE

## 2024-08-26 PROCEDURE — 96523 IRRIG DRUG DELIVERY DEVICE: CPT

## 2024-08-26 RX ORDER — SODIUM CHLORIDE 0.9 % (FLUSH) 0.9 %
5-40 SYRINGE (ML) INJECTION PRN
Status: DISCONTINUED | OUTPATIENT
Start: 2024-08-26 | End: 2024-08-27 | Stop reason: HOSPADM

## 2024-08-26 RX ADMIN — SODIUM CHLORIDE, PRESERVATIVE FREE 10 ML: 5 INJECTION INTRAVENOUS at 11:34

## 2024-08-26 NOTE — PROGRESS NOTES
Pump d/c completed. Port flushed and deaccessed. Discharged ambulatory. Patient aware of next infusion scheduled on 9/20.

## 2024-09-03 DIAGNOSIS — E87.6 HYPOKALEMIA: ICD-10-CM

## 2024-09-06 ENCOUNTER — APPOINTMENT (OUTPATIENT)
Dept: INFUSION THERAPY | Age: 64
End: 2024-09-06
Payer: COMMERCIAL

## 2024-09-06 NOTE — TELEPHONE ENCOUNTER
Pt states that his wife takes care of his medications and he will discuss with her and let us know.

## 2024-09-09 ENCOUNTER — APPOINTMENT (OUTPATIENT)
Dept: INFUSION THERAPY | Age: 64
End: 2024-09-09
Payer: COMMERCIAL

## 2024-09-09 RX ORDER — POTASSIUM CHLORIDE 1500 MG/1
20 TABLET, EXTENDED RELEASE ORAL DAILY
Qty: 90 TABLET | Refills: 3 | OUTPATIENT
Start: 2024-09-09

## 2024-09-09 NOTE — TELEPHONE ENCOUNTER
Pt wife said that he does take this medication, but does not need it sent in at this time as he has plenty.

## 2024-09-13 DIAGNOSIS — C76.2 ABDOMINAL CARCINOMATOSIS (HCC): Primary | ICD-10-CM

## 2024-09-17 NOTE — TELEPHONE ENCOUNTER
Patient called upset because he received dismissal letter. Requested a call from Dr. Louellen Dance. Apixaban/Eliquis

## 2024-09-20 ENCOUNTER — OFFICE VISIT (OUTPATIENT)
Dept: ONCOLOGY | Age: 64
End: 2024-09-20
Payer: COMMERCIAL

## 2024-09-20 ENCOUNTER — HOSPITAL ENCOUNTER (OUTPATIENT)
Dept: INFUSION THERAPY | Age: 64
Setting detail: INFUSION SERIES
Discharge: HOME | End: 2024-09-20
Payer: COMMERCIAL

## 2024-09-20 ENCOUNTER — HOSPITAL ENCOUNTER (OUTPATIENT)
Dept: LAB | Age: 64
Discharge: HOME | End: 2024-09-23
Payer: COMMERCIAL

## 2024-09-20 VITALS
OXYGEN SATURATION: 94 % | HEIGHT: 70 IN | BODY MASS INDEX: 34.5 KG/M2 | SYSTOLIC BLOOD PRESSURE: 102 MMHG | WEIGHT: 241 LBS | RESPIRATION RATE: 19 BRPM | HEART RATE: 89 BPM | TEMPERATURE: 97.2 F | DIASTOLIC BLOOD PRESSURE: 78 MMHG

## 2024-09-20 DIAGNOSIS — C76.2 ABDOMINAL CARCINOMATOSIS (HCC): ICD-10-CM

## 2024-09-20 DIAGNOSIS — C78.6 MALIGNANT NEOPLASM METASTATIC TO PERITONEUM (HCC): Primary | ICD-10-CM

## 2024-09-20 DIAGNOSIS — E83.42 HYPOMAGNESEMIA: ICD-10-CM

## 2024-09-20 DIAGNOSIS — I82.721 CHRONIC DEEP VEIN THROMBOSIS (DVT) OF RIGHT UPPER EXTREMITY, UNSPECIFIED VEIN (HCC): ICD-10-CM

## 2024-09-20 DIAGNOSIS — R73.9 HYPERGLYCEMIA: ICD-10-CM

## 2024-09-20 DIAGNOSIS — Z79.899 HIGH RISK MEDICATION USE: ICD-10-CM

## 2024-09-20 DIAGNOSIS — E55.9 VITAMIN D DEFICIENCY: ICD-10-CM

## 2024-09-20 DIAGNOSIS — C79.9 METASTATIC ADENOCARCINOMA (HCC): ICD-10-CM

## 2024-09-20 DIAGNOSIS — Z51.5 ENCOUNTER FOR PALLIATIVE CARE: ICD-10-CM

## 2024-09-20 LAB
ALBUMIN SERPL-MCNC: 3.7 G/DL (ref 3.2–4.6)
ALBUMIN/GLOB SERPL: 1.4 (ref 1–1.9)
ALP SERPL-CCNC: 140 U/L (ref 40–129)
ALT SERPL-CCNC: 34 U/L (ref 12–65)
ANION GAP SERPL CALC-SCNC: 11 MMOL/L (ref 9–18)
AST SERPL-CCNC: 31 U/L (ref 15–37)
BASOPHILS # BLD: 0.1 K/UL (ref 0–0.2)
BASOPHILS NFR BLD: 1 % (ref 0–2)
BILIRUB SERPL-MCNC: 0.3 MG/DL (ref 0–1.2)
BUN SERPL-MCNC: 13 MG/DL (ref 8–23)
CALCIUM SERPL-MCNC: 9.1 MG/DL (ref 8.8–10.2)
CHLORIDE SERPL-SCNC: 101 MMOL/L (ref 98–107)
CO2 SERPL-SCNC: 24 MMOL/L (ref 20–28)
CREAT SERPL-MCNC: 1 MG/DL (ref 0.8–1.3)
DIFFERENTIAL METHOD BLD: ABNORMAL
EOSINOPHIL # BLD: 0.2 K/UL (ref 0–0.8)
EOSINOPHIL NFR BLD: 3 % (ref 0.5–7.8)
ERYTHROCYTE [DISTWIDTH] IN BLOOD BY AUTOMATED COUNT: 13.5 % (ref 11.9–14.6)
GLOBULIN SER CALC-MCNC: 2.7 G/DL (ref 2.3–3.5)
GLUCOSE SERPL-MCNC: 288 MG/DL (ref 70–99)
GLUCOSE SERPL-MCNC: 325 MG/DL (ref 70–99)
HCT VFR BLD AUTO: 41.9 % (ref 41.1–50.3)
HGB BLD-MCNC: 13.4 G/DL (ref 13.6–17.2)
IMM GRANULOCYTES # BLD AUTO: 0 K/UL (ref 0–0.5)
IMM GRANULOCYTES NFR BLD AUTO: 0 % (ref 0–5)
LYMPHOCYTES # BLD: 2.3 K/UL (ref 0.5–4.6)
LYMPHOCYTES NFR BLD: 34 % (ref 13–44)
MAGNESIUM SERPL-MCNC: 1.9 MG/DL (ref 1.8–2.4)
MCH RBC QN AUTO: 28.6 PG (ref 26.1–32.9)
MCHC RBC AUTO-ENTMCNC: 32 G/DL (ref 31.4–35)
MCV RBC AUTO: 89.5 FL (ref 82–102)
MONOCYTES # BLD: 0.5 K/UL (ref 0.1–1.3)
MONOCYTES NFR BLD: 7 % (ref 4–12)
NEUTS SEG # BLD: 3.7 K/UL (ref 1.7–8.2)
NEUTS SEG NFR BLD: 55 % (ref 43–78)
NRBC # BLD: 0 K/UL (ref 0–0.2)
PLATELET # BLD AUTO: 198 K/UL (ref 150–450)
PMV BLD AUTO: 10 FL (ref 9.4–12.3)
POTASSIUM SERPL-SCNC: 4.4 MMOL/L (ref 3.5–5.1)
PROT SERPL-MCNC: 6.4 G/DL (ref 6.3–8.2)
RBC # BLD AUTO: 4.68 M/UL (ref 4.23–5.6)
SODIUM SERPL-SCNC: 136 MMOL/L (ref 136–145)
WBC # BLD AUTO: 6.8 K/UL (ref 4.3–11.1)

## 2024-09-20 PROCEDURE — 6360000002 HC RX W HCPCS

## 2024-09-20 PROCEDURE — 2580000003 HC RX 258

## 2024-09-20 PROCEDURE — 85025 COMPLETE CBC W/AUTO DIFF WBC: CPT

## 2024-09-20 PROCEDURE — 6370000000 HC RX 637 (ALT 250 FOR IP)

## 2024-09-20 PROCEDURE — 82947 ASSAY GLUCOSE BLOOD QUANT: CPT

## 2024-09-20 PROCEDURE — G0498 CHEMO EXTEND IV INFUS W/PUMP: HCPCS

## 2024-09-20 PROCEDURE — 96416 CHEMO PROLONG INFUSE W/PUMP: CPT

## 2024-09-20 PROCEDURE — 96368 THER/DIAG CONCURRENT INF: CPT

## 2024-09-20 PROCEDURE — 3078F DIAST BP <80 MM HG: CPT

## 2024-09-20 PROCEDURE — 99214 OFFICE O/P EST MOD 30 MIN: CPT

## 2024-09-20 PROCEDURE — 3074F SYST BP LT 130 MM HG: CPT

## 2024-09-20 PROCEDURE — 96411 CHEMO IV PUSH ADDL DRUG: CPT

## 2024-09-20 PROCEDURE — 96375 TX/PRO/DX INJ NEW DRUG ADDON: CPT

## 2024-09-20 PROCEDURE — 96413 CHEMO IV INFUSION 1 HR: CPT

## 2024-09-20 PROCEDURE — 2580000003 HC RX 258: Performed by: INTERNAL MEDICINE

## 2024-09-20 PROCEDURE — 83735 ASSAY OF MAGNESIUM: CPT

## 2024-09-20 PROCEDURE — 96367 TX/PROPH/DG ADDL SEQ IV INF: CPT

## 2024-09-20 PROCEDURE — 80053 COMPREHEN METABOLIC PANEL: CPT

## 2024-09-20 PROCEDURE — 96372 THER/PROPH/DIAG INJ SC/IM: CPT

## 2024-09-20 RX ORDER — DEXTROSE MONOHYDRATE 50 MG/ML
5-250 INJECTION, SOLUTION INTRAVENOUS PRN
Status: DISCONTINUED | OUTPATIENT
Start: 2024-09-20 | End: 2024-09-21 | Stop reason: HOSPADM

## 2024-09-20 RX ORDER — DEXTROSE MONOHYDRATE 50 MG/ML
5-250 INJECTION, SOLUTION INTRAVENOUS PRN
Status: CANCELLED | OUTPATIENT
Start: 2024-09-20

## 2024-09-20 RX ORDER — ACETAMINOPHEN 325 MG/1
650 TABLET ORAL
Status: CANCELLED | OUTPATIENT
Start: 2024-09-20

## 2024-09-20 RX ORDER — HEPARIN SODIUM (PORCINE) LOCK FLUSH IV SOLN 100 UNIT/ML 100 UNIT/ML
500 SOLUTION INTRAVENOUS PRN
Status: CANCELLED | OUTPATIENT
Start: 2024-09-20

## 2024-09-20 RX ORDER — ALBUTEROL SULFATE 90 UG/1
4 INHALANT RESPIRATORY (INHALATION) PRN
Status: DISCONTINUED | OUTPATIENT
Start: 2024-09-20 | End: 2024-09-21 | Stop reason: HOSPADM

## 2024-09-20 RX ORDER — SODIUM CHLORIDE 0.9 % (FLUSH) 0.9 %
5-40 SYRINGE (ML) INJECTION PRN
OUTPATIENT
Start: 2024-09-22

## 2024-09-20 RX ORDER — FAMOTIDINE 10 MG/ML
20 INJECTION, SOLUTION INTRAVENOUS
Status: CANCELLED | OUTPATIENT
Start: 2024-09-20

## 2024-09-20 RX ORDER — SODIUM CHLORIDE 9 MG/ML
5-250 INJECTION, SOLUTION INTRAVENOUS PRN
Status: CANCELLED | OUTPATIENT
Start: 2024-09-20

## 2024-09-20 RX ORDER — SODIUM CHLORIDE 9 MG/ML
INJECTION, SOLUTION INTRAVENOUS CONTINUOUS
Status: CANCELLED | OUTPATIENT
Start: 2024-09-20

## 2024-09-20 RX ORDER — SODIUM CHLORIDE 0.9 % (FLUSH) 0.9 %
5-40 SYRINGE (ML) INJECTION PRN
Status: CANCELLED | OUTPATIENT
Start: 2024-09-20

## 2024-09-20 RX ORDER — SODIUM CHLORIDE 0.9 % (FLUSH) 0.9 %
5-40 SYRINGE (ML) INJECTION PRN
Status: DISCONTINUED | OUTPATIENT
Start: 2024-09-20 | End: 2024-09-21 | Stop reason: HOSPADM

## 2024-09-20 RX ORDER — MEPERIDINE HYDROCHLORIDE 25 MG/ML
12.5 INJECTION INTRAMUSCULAR; INTRAVENOUS; SUBCUTANEOUS PRN
Status: DISCONTINUED | OUTPATIENT
Start: 2024-09-20 | End: 2024-09-21 | Stop reason: HOSPADM

## 2024-09-20 RX ORDER — HEPARIN SODIUM (PORCINE) LOCK FLUSH IV SOLN 100 UNIT/ML 100 UNIT/ML
500 SOLUTION INTRAVENOUS PRN
OUTPATIENT
Start: 2024-09-22

## 2024-09-20 RX ORDER — DIPHENHYDRAMINE HYDROCHLORIDE 50 MG/ML
50 INJECTION INTRAMUSCULAR; INTRAVENOUS
Status: CANCELLED | OUTPATIENT
Start: 2024-09-20

## 2024-09-20 RX ORDER — EPINEPHRINE 1 MG/ML
0.3 INJECTION, SOLUTION, CONCENTRATE INTRAVENOUS PRN
Status: DISCONTINUED | OUTPATIENT
Start: 2024-09-20 | End: 2024-09-21 | Stop reason: HOSPADM

## 2024-09-20 RX ORDER — ATROPINE SULFATE 0.4 MG/ML
0.4 INJECTION, SOLUTION INTRAVENOUS ONCE
Status: COMPLETED | OUTPATIENT
Start: 2024-09-20 | End: 2024-09-20

## 2024-09-20 RX ORDER — SODIUM CHLORIDE 9 MG/ML
5-250 INJECTION, SOLUTION INTRAVENOUS PRN
OUTPATIENT
Start: 2024-09-22

## 2024-09-20 RX ORDER — MEPERIDINE HYDROCHLORIDE 50 MG/ML
12.5 INJECTION INTRAMUSCULAR; INTRAVENOUS; SUBCUTANEOUS PRN
Status: CANCELLED | OUTPATIENT
Start: 2024-09-20

## 2024-09-20 RX ORDER — ALBUTEROL SULFATE 90 UG/1
4 INHALANT RESPIRATORY (INHALATION) PRN
Status: CANCELLED | OUTPATIENT
Start: 2024-09-20

## 2024-09-20 RX ORDER — ONDANSETRON 2 MG/ML
8 INJECTION INTRAMUSCULAR; INTRAVENOUS
Status: CANCELLED | OUTPATIENT
Start: 2024-09-20

## 2024-09-20 RX ORDER — ONDANSETRON 2 MG/ML
8 INJECTION INTRAMUSCULAR; INTRAVENOUS
Status: DISCONTINUED | OUTPATIENT
Start: 2024-09-20 | End: 2024-09-21 | Stop reason: HOSPADM

## 2024-09-20 RX ORDER — ONDANSETRON 2 MG/ML
8 INJECTION INTRAMUSCULAR; INTRAVENOUS ONCE
Status: CANCELLED | OUTPATIENT
Start: 2024-09-20 | End: 2024-09-20

## 2024-09-20 RX ORDER — DIPHENHYDRAMINE HYDROCHLORIDE 50 MG/ML
50 INJECTION INTRAMUSCULAR; INTRAVENOUS
Status: DISCONTINUED | OUTPATIENT
Start: 2024-09-20 | End: 2024-09-21 | Stop reason: HOSPADM

## 2024-09-20 RX ORDER — EPINEPHRINE 1 MG/ML
0.3 INJECTION, SOLUTION, CONCENTRATE INTRAVENOUS PRN
Status: CANCELLED | OUTPATIENT
Start: 2024-09-20

## 2024-09-20 RX ORDER — ACETAMINOPHEN 325 MG/1
650 TABLET ORAL
Status: DISCONTINUED | OUTPATIENT
Start: 2024-09-20 | End: 2024-09-21 | Stop reason: HOSPADM

## 2024-09-20 RX ORDER — SODIUM CHLORIDE 0.9 % (FLUSH) 0.9 %
5-40 SYRINGE (ML) INJECTION PRN
Status: DISCONTINUED | OUTPATIENT
Start: 2024-09-20 | End: 2024-09-24 | Stop reason: HOSPADM

## 2024-09-20 RX ORDER — FLUOROURACIL 50 MG/ML
400 INJECTION, SOLUTION INTRAVENOUS ONCE
Status: CANCELLED | OUTPATIENT
Start: 2024-09-20 | End: 2024-09-20

## 2024-09-20 RX ORDER — FLUOROURACIL 50 MG/ML
400 INJECTION, SOLUTION INTRAVENOUS ONCE
Status: COMPLETED | OUTPATIENT
Start: 2024-09-20 | End: 2024-09-20

## 2024-09-20 RX ORDER — ONDANSETRON 2 MG/ML
8 INJECTION INTRAMUSCULAR; INTRAVENOUS ONCE
Status: COMPLETED | OUTPATIENT
Start: 2024-09-20 | End: 2024-09-20

## 2024-09-20 RX ADMIN — INSULIN HUMAN 5 UNITS: 100 INJECTION, SOLUTION PARENTERAL at 13:45

## 2024-09-20 RX ADMIN — SODIUM CHLORIDE 150 MG: 9 INJECTION, SOLUTION INTRAVENOUS at 12:15

## 2024-09-20 RX ADMIN — FLUOROURACIL 5375 MG: 50 INJECTION, SOLUTION INTRAVENOUS at 14:46

## 2024-09-20 RX ADMIN — ATROPINE SULFATE 0.4 MG: 0.4 INJECTION, SOLUTION INTRAVENOUS at 12:53

## 2024-09-20 RX ADMIN — SODIUM CHLORIDE, PRESERVATIVE FREE 10 ML: 5 INJECTION INTRAVENOUS at 09:00

## 2024-09-20 RX ADMIN — DEXTROSE MONOHYDRATE 20 ML/HR: 50 INJECTION, SOLUTION INTRAVENOUS at 11:11

## 2024-09-20 RX ADMIN — LEUCOVORIN CALCIUM 900 MG: 200 INJECTION, POWDER, LYOPHILIZED, FOR SUSPENSION INTRAMUSCULAR; INTRAVENOUS at 12:58

## 2024-09-20 RX ADMIN — FLUOROURACIL 900 MG: 50 INJECTION, SOLUTION INTRAVENOUS at 14:40

## 2024-09-20 RX ADMIN — SODIUM CHLORIDE, PRESERVATIVE FREE 10 ML: 5 INJECTION INTRAVENOUS at 11:12

## 2024-09-20 RX ADMIN — ONDANSETRON 8 MG: 2 INJECTION INTRAMUSCULAR; INTRAVENOUS at 11:50

## 2024-09-20 RX ADMIN — IRINOTECAN HYDROCHLORIDE 340 MG: 20 INJECTION, SOLUTION INTRAVENOUS at 13:02

## 2024-09-20 RX ADMIN — DEXAMETHASONE SODIUM PHOSPHATE 12 MG: 4 INJECTION INTRA-ARTICULAR; INTRALESIONAL; INTRAMUSCULAR; INTRAVENOUS; SOFT TISSUE at 11:56

## 2024-09-20 ASSESSMENT — PATIENT HEALTH QUESTIONNAIRE - PHQ9
SUM OF ALL RESPONSES TO PHQ9 QUESTIONS 1 & 2: 0
SUM OF ALL RESPONSES TO PHQ QUESTIONS 1-9: 0
2. FEELING DOWN, DEPRESSED OR HOPELESS: NOT AT ALL
SUM OF ALL RESPONSES TO PHQ QUESTIONS 1-9: 0
1. LITTLE INTEREST OR PLEASURE IN DOING THINGS: NOT AT ALL
SUM OF ALL RESPONSES TO PHQ QUESTIONS 1-9: 0
SUM OF ALL RESPONSES TO PHQ QUESTIONS 1-9: 0

## 2024-09-20 ASSESSMENT — PAIN SCALES - GENERAL: PAINLEVEL_OUTOF10: 0

## 2024-09-23 ENCOUNTER — HOSPITAL ENCOUNTER (OUTPATIENT)
Dept: INFUSION THERAPY | Age: 64
Setting detail: INFUSION SERIES
Discharge: HOME | End: 2024-09-23
Payer: COMMERCIAL

## 2024-09-23 VITALS
TEMPERATURE: 98.5 F | HEART RATE: 67 BPM | RESPIRATION RATE: 17 BRPM | DIASTOLIC BLOOD PRESSURE: 81 MMHG | OXYGEN SATURATION: 95 % | SYSTOLIC BLOOD PRESSURE: 133 MMHG

## 2024-09-23 DIAGNOSIS — D50.8 OTHER IRON DEFICIENCY ANEMIA: Primary | ICD-10-CM

## 2024-09-23 PROCEDURE — 96365 THER/PROPH/DIAG IV INF INIT: CPT

## 2024-09-23 PROCEDURE — 6360000002 HC RX W HCPCS

## 2024-09-23 PROCEDURE — 2580000003 HC RX 258

## 2024-09-23 RX ORDER — ONDANSETRON 2 MG/ML
8 INJECTION INTRAMUSCULAR; INTRAVENOUS
Status: DISCONTINUED | OUTPATIENT
Start: 2024-09-23 | End: 2024-09-24 | Stop reason: HOSPADM

## 2024-09-23 RX ORDER — EPINEPHRINE 1 MG/ML
0.3 INJECTION, SOLUTION, CONCENTRATE INTRAVENOUS PRN
Status: DISCONTINUED | OUTPATIENT
Start: 2024-09-23 | End: 2024-09-24 | Stop reason: HOSPADM

## 2024-09-23 RX ORDER — DIPHENHYDRAMINE HYDROCHLORIDE 50 MG/ML
50 INJECTION INTRAMUSCULAR; INTRAVENOUS
Status: DISCONTINUED | OUTPATIENT
Start: 2024-09-23 | End: 2024-09-24 | Stop reason: HOSPADM

## 2024-09-23 RX ORDER — ACETAMINOPHEN 325 MG/1
650 TABLET ORAL
OUTPATIENT
Start: 2024-09-30

## 2024-09-23 RX ORDER — EPINEPHRINE 1 MG/ML
0.3 INJECTION, SOLUTION, CONCENTRATE INTRAVENOUS PRN
OUTPATIENT
Start: 2024-09-30

## 2024-09-23 RX ORDER — ALBUTEROL SULFATE 90 UG/1
4 INHALANT RESPIRATORY (INHALATION) PRN
Status: DISCONTINUED | OUTPATIENT
Start: 2024-09-23 | End: 2024-09-24 | Stop reason: HOSPADM

## 2024-09-23 RX ORDER — ACETAMINOPHEN 325 MG/1
650 TABLET ORAL
Status: DISCONTINUED | OUTPATIENT
Start: 2024-09-23 | End: 2024-09-24 | Stop reason: HOSPADM

## 2024-09-23 RX ORDER — ONDANSETRON 2 MG/ML
8 INJECTION INTRAMUSCULAR; INTRAVENOUS
OUTPATIENT
Start: 2024-09-30

## 2024-09-23 RX ORDER — SODIUM CHLORIDE 9 MG/ML
5-250 INJECTION, SOLUTION INTRAVENOUS PRN
OUTPATIENT
Start: 2024-09-30

## 2024-09-23 RX ORDER — ALBUTEROL SULFATE 90 UG/1
4 INHALANT RESPIRATORY (INHALATION) PRN
OUTPATIENT
Start: 2024-09-30

## 2024-09-23 RX ORDER — HEPARIN 100 UNIT/ML
500 SYRINGE INTRAVENOUS PRN
OUTPATIENT
Start: 2024-09-30

## 2024-09-23 RX ORDER — SODIUM CHLORIDE 9 MG/ML
INJECTION, SOLUTION INTRAVENOUS CONTINUOUS
OUTPATIENT
Start: 2024-09-30

## 2024-09-23 RX ORDER — SODIUM CHLORIDE 9 MG/ML
INJECTION, SOLUTION INTRAVENOUS CONTINUOUS
Status: DISCONTINUED | OUTPATIENT
Start: 2024-09-23 | End: 2024-09-24 | Stop reason: HOSPADM

## 2024-09-23 RX ORDER — DIPHENHYDRAMINE HYDROCHLORIDE 50 MG/ML
50 INJECTION INTRAMUSCULAR; INTRAVENOUS
OUTPATIENT
Start: 2024-09-30

## 2024-09-23 RX ORDER — SODIUM CHLORIDE 9 MG/ML
5-250 INJECTION, SOLUTION INTRAVENOUS PRN
Status: DISCONTINUED | OUTPATIENT
Start: 2024-09-23 | End: 2024-09-24 | Stop reason: HOSPADM

## 2024-09-23 RX ORDER — SODIUM CHLORIDE 0.9 % (FLUSH) 0.9 %
5-40 SYRINGE (ML) INJECTION PRN
Status: DISCONTINUED | OUTPATIENT
Start: 2024-09-23 | End: 2024-09-24 | Stop reason: HOSPADM

## 2024-09-23 RX ORDER — SODIUM CHLORIDE 0.9 % (FLUSH) 0.9 %
5-40 SYRINGE (ML) INJECTION PRN
OUTPATIENT
Start: 2024-09-30

## 2024-09-23 RX ADMIN — FERRIC CARBOXYMALTOSE INJECTION 750 MG: 50 INJECTION, SOLUTION INTRAVENOUS at 11:45

## 2024-09-23 RX ADMIN — SODIUM CHLORIDE, PRESERVATIVE FREE 10 ML: 5 INJECTION INTRAVENOUS at 11:15

## 2024-09-23 RX ADMIN — SODIUM CHLORIDE 100 ML/HR: 9 INJECTION, SOLUTION INTRAVENOUS at 11:16

## 2024-09-23 NOTE — PROGRESS NOTES
Arrived to the Infusion Center. Adrucil pump disconnected, port flushed. Injectafer infusion completed. Patient tolerated well.   Any issues or concerns during appointment: none. Patient observed for 30 mins post-infusion, no adverse reactions noted.   Patient aware of next infusion appointment on 10/18/2024 (date) at 1100 (time).  Patient aware of next lab and [facility] office visit on 10/18/2024 (date) at 1000 (time).  Patient instructed to call provider with temperature of 100.4 or greater or nausea/vomiting/ diarrhea or pain not controlled by medications  Discharged ambulatory.

## 2024-09-25 RX ORDER — PANTOPRAZOLE SODIUM 40 MG/1
40 TABLET, DELAYED RELEASE ORAL
Qty: 90 TABLET | Refills: 3 | Status: SHIPPED | OUTPATIENT
Start: 2024-09-25

## 2024-09-27 ENCOUNTER — APPOINTMENT (OUTPATIENT)
Dept: INFUSION THERAPY | Age: 64
End: 2024-09-27
Payer: COMMERCIAL

## 2024-09-30 ENCOUNTER — APPOINTMENT (OUTPATIENT)
Dept: INFUSION THERAPY | Age: 64
End: 2024-09-30
Payer: COMMERCIAL

## 2024-10-02 ASSESSMENT — ENCOUNTER SYMPTOMS
COUGH: 0
SORE THROAT: 0
VOMITING: 0
EYE PROBLEMS: 0
CONSTIPATION: 0
SCLERAL ICTERUS: 0
SHORTNESS OF BREATH: 0
DIARRHEA: 0
BACK PAIN: 0
HEMOPTYSIS: 0
BLOOD IN STOOL: 0
ABDOMINAL DISTENTION: 0
NAUSEA: 0

## 2024-10-02 NOTE — PROGRESS NOTES
He will receive Insulin in infusion today. His CT CAP will be in November.     - vit D defic - 50,000IU Vitamin D weekly for deficiency then can transition to daily   - RUE chronic DVT, on Xarelto 20 mg and monitoring, RUE swelling up and down depending on activity level; no CP or SOB. Will repeat US for some increased edema.  Addendum: Stable RUE chronic DVT.  Pt admitted to not taking Xarelto as Rx'ed - will start to take it regularly.  No bleeding to report except for easy bruising and bleeding with any trauma.  We reviewed the importance of blood thinners with his history.    - elevated BG - seeing Dr. Vernon   - hypocalcemia - calcium supplement.    - Neuropathy managed with amanda BID PRN    - flatulance and gas pains - continue Levsin as GasX and beano not working.    - CT previously with liver lesion; rec MR confirmed fatty infiltration; restaging in April w [facility]  - hypokalemia - c/w repletion PRN   - hypomagnesemia - on mag oxide, replete IV as needed   - sclerotic lesion on imaging at S[facility] - bone scan previously recommended, has not been completed   - Continue good oral nutrition and hydration.   - Call with any fevers, uncontrolled side effects from treatment or any other worrisome/concerning symptoms.   - mouth sores - MMW PRN     RTC per protocol or sooner if needed      MDM    Lab studies and imaging studies were personally reviewed.  Pertinent old records were reviewed.     Historical:   - He is here today for follow up and next FOLFIRI.  Labs reviewed and adequate to proceed with next FOLFIRI.  He's here with his wife; - he previously expressed that in the future, he may wish to proceed with unconventional scheduling of maybe every 3 weeks.  He does understand inherent risks with alternate schedule and how this would affect progression of disease.    - CT scan results reviewed.  Call to Dr. Alamo regarding possible typo stating that there are 90 lesions to clarify per report.  Next CT in Jan   - Labs

## 2024-10-15 DIAGNOSIS — Z79.899 HIGH RISK MEDICATION USE: ICD-10-CM

## 2024-10-15 DIAGNOSIS — E55.9 VITAMIN D DEFICIENCY: ICD-10-CM

## 2024-10-15 DIAGNOSIS — T45.1X5A CHEMOTHERAPY INDUCED NEUTROPENIA (HCC): ICD-10-CM

## 2024-10-15 DIAGNOSIS — I82.721 CHRONIC DEEP VEIN THROMBOSIS (DVT) OF RIGHT UPPER EXTREMITY, UNSPECIFIED VEIN (HCC): ICD-10-CM

## 2024-10-15 DIAGNOSIS — R14.3 FLATULENCE: ICD-10-CM

## 2024-10-15 DIAGNOSIS — R73.9 HYPERGLYCEMIA: ICD-10-CM

## 2024-10-15 DIAGNOSIS — E83.42 HYPOMAGNESEMIA: ICD-10-CM

## 2024-10-15 DIAGNOSIS — I82.621 ACUTE DEEP VEIN THROMBOSIS (DVT) OF RIGHT UPPER EXTREMITY, UNSPECIFIED VEIN (HCC): ICD-10-CM

## 2024-10-15 DIAGNOSIS — K13.79 MOUTH SORES: ICD-10-CM

## 2024-10-15 DIAGNOSIS — R53.83 FATIGUE DUE TO TREATMENT: ICD-10-CM

## 2024-10-15 DIAGNOSIS — Z45.2 PICC (PERIPHERALLY INSERTED CENTRAL CATHETER) FLUSH: ICD-10-CM

## 2024-10-15 DIAGNOSIS — T45.1X5A NEUROPATHY DUE TO CHEMOTHERAPEUTIC DRUG (HCC): ICD-10-CM

## 2024-10-15 DIAGNOSIS — M54.2 NECK PAIN: ICD-10-CM

## 2024-10-15 DIAGNOSIS — D64.9 ANEMIA, UNSPECIFIED TYPE: ICD-10-CM

## 2024-10-15 DIAGNOSIS — E87.6 HYPOKALEMIA: ICD-10-CM

## 2024-10-15 DIAGNOSIS — Z95.828 PORT-A-CATH IN PLACE: ICD-10-CM

## 2024-10-15 DIAGNOSIS — R45.89 ANXIETY ABOUT HEALTH: ICD-10-CM

## 2024-10-15 DIAGNOSIS — G62.9 NEUROPATHY: ICD-10-CM

## 2024-10-15 DIAGNOSIS — E86.0 DEHYDRATION: ICD-10-CM

## 2024-10-15 DIAGNOSIS — D70.1 CHEMOTHERAPY INDUCED NEUTROPENIA (HCC): ICD-10-CM

## 2024-10-15 DIAGNOSIS — K56.609 SMALL BOWEL OBSTRUCTION (HCC): ICD-10-CM

## 2024-10-15 DIAGNOSIS — C79.9 METASTATIC ADENOCARCINOMA (HCC): ICD-10-CM

## 2024-10-15 DIAGNOSIS — I82.509 CHRONIC DEEP VEIN THROMBOSIS (DVT) OF LOWER EXTREMITY, UNSPECIFIED LATERALITY, UNSPECIFIED VEIN (HCC): ICD-10-CM

## 2024-10-15 DIAGNOSIS — Z51.5 ENCOUNTER FOR PALLIATIVE CARE: ICD-10-CM

## 2024-10-15 DIAGNOSIS — Z76.89 REFERRED BY PRIMARY CARE PHYSICIAN: ICD-10-CM

## 2024-10-15 DIAGNOSIS — R14.1 GAS PAIN: ICD-10-CM

## 2024-10-15 DIAGNOSIS — C76.2 ABDOMINAL CARCINOMATOSIS (HCC): ICD-10-CM

## 2024-10-15 DIAGNOSIS — C16.9 MALIGNANT NEOPLASM OF STOMACH, UNSPECIFIED LOCATION (HCC): ICD-10-CM

## 2024-10-15 DIAGNOSIS — T82.598D MALFUNCTION OF PERIPHERAL INSERTED CENTRAL CATHETER, SUBSEQUENT ENCOUNTER: ICD-10-CM

## 2024-10-15 DIAGNOSIS — G62.0 NEUROPATHY DUE TO CHEMOTHERAPEUTIC DRUG (HCC): ICD-10-CM

## 2024-10-15 DIAGNOSIS — R19.7 DIARRHEA, UNSPECIFIED TYPE: ICD-10-CM

## 2024-10-15 DIAGNOSIS — C78.6 MALIGNANT NEOPLASM METASTATIC TO PERITONEUM (HCC): Primary | ICD-10-CM

## 2024-10-15 DIAGNOSIS — T85.848A PAIN AROUND PERCUTANEOUS ENDOSCOPIC GASTROSTOMY (PEG) TUBE SITE, INITIAL ENCOUNTER: ICD-10-CM

## 2024-10-16 ENCOUNTER — TELEMEDICINE (OUTPATIENT)
Dept: INTERNAL MEDICINE CLINIC | Facility: CLINIC | Age: 64
End: 2024-10-16
Payer: COMMERCIAL

## 2024-10-16 DIAGNOSIS — E11.65 TYPE 2 DIABETES MELLITUS WITH HYPERGLYCEMIA, UNSPECIFIED WHETHER LONG TERM INSULIN USE (HCC): ICD-10-CM

## 2024-10-16 PROCEDURE — 3051F HG A1C>EQUAL 7.0%<8.0%: CPT | Performed by: STUDENT IN AN ORGANIZED HEALTH CARE EDUCATION/TRAINING PROGRAM

## 2024-10-16 PROCEDURE — 99214 OFFICE O/P EST MOD 30 MIN: CPT | Performed by: STUDENT IN AN ORGANIZED HEALTH CARE EDUCATION/TRAINING PROGRAM

## 2024-10-16 RX ORDER — INSULIN GLARGINE 100 [IU]/ML
INJECTION, SOLUTION SUBCUTANEOUS
Qty: 5 ADJUSTABLE DOSE PRE-FILLED PEN SYRINGE | Refills: 5
Start: 2024-10-16

## 2024-10-16 ASSESSMENT — PATIENT HEALTH QUESTIONNAIRE - PHQ9
7. TROUBLE CONCENTRATING ON THINGS, SUCH AS READING THE NEWSPAPER OR WATCHING TELEVISION: NOT AT ALL
6. FEELING BAD ABOUT YOURSELF - OR THAT YOU ARE A FAILURE OR HAVE LET YOURSELF OR YOUR FAMILY DOWN: SEVERAL DAYS
SUM OF ALL RESPONSES TO PHQ QUESTIONS 1-9: 3
5. POOR APPETITE OR OVEREATING: NOT AT ALL
SUM OF ALL RESPONSES TO PHQ QUESTIONS 1-9: 3
4. FEELING TIRED OR HAVING LITTLE ENERGY: NOT AT ALL
8. MOVING OR SPEAKING SO SLOWLY THAT OTHER PEOPLE COULD HAVE NOTICED. OR THE OPPOSITE, BEING SO FIGETY OR RESTLESS THAT YOU HAVE BEEN MOVING AROUND A LOT MORE THAN USUAL: SEVERAL DAYS
SUM OF ALL RESPONSES TO PHQ QUESTIONS 1-9: 3
SUM OF ALL RESPONSES TO PHQ QUESTIONS 1-9: 3
2. FEELING DOWN, DEPRESSED OR HOPELESS: NOT AT ALL
3. TROUBLE FALLING OR STAYING ASLEEP: SEVERAL DAYS
9. THOUGHTS THAT YOU WOULD BE BETTER OFF DEAD, OR OF HURTING YOURSELF: NOT AT ALL
10. IF YOU CHECKED OFF ANY PROBLEMS, HOW DIFFICULT HAVE THESE PROBLEMS MADE IT FOR YOU TO DO YOUR WORK, TAKE CARE OF THINGS AT HOME, OR GET ALONG WITH OTHER PEOPLE: SOMEWHAT DIFFICULT

## 2024-10-16 NOTE — PROGRESS NOTES
Denny Guevara (:  1960) is seen via virtual visit today for evaluation of the following:   Chief Complaint   Patient presents with    Diabetes     Elevated blood sugars   .      HPI:    Has been having hyperglycemia.  Last night increased to glargine to 20 U and was still 197 this AM.  He remains on chemotherapy for peritoneal metastatic adenocarcinoma.    The following portions of the patient's history were reviewed and updated as appropriate:      Current Outpatient Medications   Medication Sig Dispense Refill    insulin glargine (SEMGLEE) 100 UNIT/ML injection pen Inject 20 units nightly, if blood glucose over 130 tomorrow then increase to 25 units nightly. after this, for fasting glucose over 130 every 3 days increase by 3 units up to 45 units nightly 5 Adjustable Dose Pre-filled Pen Syringe 5    pantoprazole (PROTONIX) 40 MG tablet TAKE 1 TABLET BY MOUTH EVERY DAY BEFORE BREAKFAST 90 tablet 3    rivaroxaban (XARELTO) 20 MG TABS tablet Take 1 tablet by mouth daily (with breakfast) 30 tablet 3    DULoxetine (CYMBALTA) 60 MG extended release capsule Take 1 capsule by mouth daily 90 capsule 3    busPIRone (BUSPAR) 7.5 MG tablet Take 1 tablet by mouth daily 90 tablet 3    rosuvastatin (CRESTOR) 10 MG tablet TAKE 1 TABLET BY MOUTH EVERY DAY 90 tablet 4    blood glucose monitor strips OneTouch Verio Testing Strips check blood sugar 3-4 times daily as directed. DX: E11.9 100 strip 5    Blood Glucose Monitoring Suppl (ONETOUCH VERIO) w/Device KIT Check blood glucose daily and as needed 1 kit 0    Insulin Pen Needle 32G X 4 MM MISC 1 each by Does not apply route daily Use daily with insulin pen as directed. DX: E 11.65 100 each 5    glucose monitoring kit 1 kit by Does not apply route daily 1 kit 0    Docusate Calcium (STOOL SOFTENER PO) Take 1 capsule by mouth in the morning and at bedtime      B Complex-C (SUPER B COMPLEX PO) Take 1 tablet by mouth daily      vitamin D (ERGOCALCIFEROL) 1.25 MG (54693 UT)

## 2024-10-17 ENCOUNTER — TELEPHONE (OUTPATIENT)
Dept: INTERNAL MEDICINE CLINIC | Facility: CLINIC | Age: 64
End: 2024-10-17

## 2024-10-18 ENCOUNTER — HOSPITAL ENCOUNTER (OUTPATIENT)
Dept: LAB | Age: 64
Discharge: HOME | End: 2024-10-18
Payer: COMMERCIAL

## 2024-10-18 ENCOUNTER — OFFICE VISIT (OUTPATIENT)
Dept: ONCOLOGY | Age: 64
End: 2024-10-18
Payer: COMMERCIAL

## 2024-10-18 ENCOUNTER — HOSPITAL ENCOUNTER (OUTPATIENT)
Dept: INFUSION THERAPY | Age: 64
Setting detail: INFUSION SERIES
Discharge: HOME | End: 2024-10-18
Payer: COMMERCIAL

## 2024-10-18 VITALS
HEART RATE: 80 BPM | DIASTOLIC BLOOD PRESSURE: 81 MMHG | HEIGHT: 70 IN | RESPIRATION RATE: 16 BRPM | BODY MASS INDEX: 34.07 KG/M2 | SYSTOLIC BLOOD PRESSURE: 129 MMHG | OXYGEN SATURATION: 99 % | WEIGHT: 238 LBS | TEMPERATURE: 98.1 F

## 2024-10-18 DIAGNOSIS — E87.6 HYPOKALEMIA: ICD-10-CM

## 2024-10-18 DIAGNOSIS — R14.1 GAS PAIN: ICD-10-CM

## 2024-10-18 DIAGNOSIS — C16.9 MALIGNANT NEOPLASM OF STOMACH, UNSPECIFIED LOCATION (HCC): ICD-10-CM

## 2024-10-18 DIAGNOSIS — R73.9 HYPERGLYCEMIA: ICD-10-CM

## 2024-10-18 DIAGNOSIS — T82.598D MALFUNCTION OF PERIPHERAL INSERTED CENTRAL CATHETER, SUBSEQUENT ENCOUNTER: ICD-10-CM

## 2024-10-18 DIAGNOSIS — C79.9 METASTATIC ADENOCARCINOMA (HCC): ICD-10-CM

## 2024-10-18 DIAGNOSIS — Z76.89 REFERRED BY PRIMARY CARE PHYSICIAN: ICD-10-CM

## 2024-10-18 DIAGNOSIS — E86.0 DEHYDRATION: ICD-10-CM

## 2024-10-18 DIAGNOSIS — E83.42 HYPOMAGNESEMIA: ICD-10-CM

## 2024-10-18 DIAGNOSIS — E55.9 VITAMIN D DEFICIENCY: ICD-10-CM

## 2024-10-18 DIAGNOSIS — Z51.5 ENCOUNTER FOR PALLIATIVE CARE: ICD-10-CM

## 2024-10-18 DIAGNOSIS — R53.83 FATIGUE DUE TO TREATMENT: ICD-10-CM

## 2024-10-18 DIAGNOSIS — C76.2 ABDOMINAL CARCINOMATOSIS (HCC): Primary | ICD-10-CM

## 2024-10-18 DIAGNOSIS — C78.6 MALIGNANT NEOPLASM METASTATIC TO PERITONEUM (HCC): ICD-10-CM

## 2024-10-18 DIAGNOSIS — R45.89 ANXIETY ABOUT HEALTH: ICD-10-CM

## 2024-10-18 DIAGNOSIS — T45.1X5A NEUROPATHY DUE TO CHEMOTHERAPEUTIC DRUG (HCC): ICD-10-CM

## 2024-10-18 DIAGNOSIS — C76.2 ABDOMINAL CARCINOMATOSIS (HCC): ICD-10-CM

## 2024-10-18 DIAGNOSIS — G62.0 NEUROPATHY DUE TO CHEMOTHERAPEUTIC DRUG (HCC): ICD-10-CM

## 2024-10-18 DIAGNOSIS — K56.609 SMALL BOWEL OBSTRUCTION (HCC): ICD-10-CM

## 2024-10-18 DIAGNOSIS — T45.1X5A CHEMOTHERAPY INDUCED NEUTROPENIA (HCC): ICD-10-CM

## 2024-10-18 DIAGNOSIS — Z95.828 PORT-A-CATH IN PLACE: ICD-10-CM

## 2024-10-18 DIAGNOSIS — Z79.899 HIGH RISK MEDICATION USE: ICD-10-CM

## 2024-10-18 DIAGNOSIS — D64.9 ANEMIA, UNSPECIFIED TYPE: ICD-10-CM

## 2024-10-18 DIAGNOSIS — R14.3 FLATULENCE: ICD-10-CM

## 2024-10-18 DIAGNOSIS — G62.9 NEUROPATHY: ICD-10-CM

## 2024-10-18 DIAGNOSIS — I82.509 CHRONIC DEEP VEIN THROMBOSIS (DVT) OF LOWER EXTREMITY, UNSPECIFIED LATERALITY, UNSPECIFIED VEIN (HCC): ICD-10-CM

## 2024-10-18 DIAGNOSIS — K13.79 MOUTH SORES: ICD-10-CM

## 2024-10-18 DIAGNOSIS — R19.7 DIARRHEA, UNSPECIFIED TYPE: ICD-10-CM

## 2024-10-18 DIAGNOSIS — I82.621 ACUTE DEEP VEIN THROMBOSIS (DVT) OF RIGHT UPPER EXTREMITY, UNSPECIFIED VEIN (HCC): ICD-10-CM

## 2024-10-18 DIAGNOSIS — I82.721 CHRONIC DEEP VEIN THROMBOSIS (DVT) OF RIGHT UPPER EXTREMITY, UNSPECIFIED VEIN (HCC): ICD-10-CM

## 2024-10-18 DIAGNOSIS — M54.2 NECK PAIN: ICD-10-CM

## 2024-10-18 DIAGNOSIS — Z45.2 PICC (PERIPHERALLY INSERTED CENTRAL CATHETER) FLUSH: ICD-10-CM

## 2024-10-18 DIAGNOSIS — D70.1 CHEMOTHERAPY INDUCED NEUTROPENIA (HCC): ICD-10-CM

## 2024-10-18 DIAGNOSIS — T85.848A PAIN AROUND PERCUTANEOUS ENDOSCOPIC GASTROSTOMY (PEG) TUBE SITE, INITIAL ENCOUNTER: ICD-10-CM

## 2024-10-18 LAB
25(OH)D3 SERPL-MCNC: 22.9 NG/ML (ref 30–100)
ALBUMIN SERPL-MCNC: 3.8 G/DL (ref 3.2–4.6)
ALBUMIN/GLOB SERPL: 1.2 (ref 1–1.9)
ALP SERPL-CCNC: 141 U/L (ref 40–129)
ALT SERPL-CCNC: 31 U/L (ref 8–55)
ANION GAP SERPL CALC-SCNC: 10 MMOL/L (ref 9–18)
AST SERPL-CCNC: 34 U/L (ref 15–37)
BASOPHILS # BLD: 0.1 K/UL (ref 0–0.2)
BASOPHILS NFR BLD: 1 % (ref 0–2)
BILIRUB SERPL-MCNC: 0.4 MG/DL (ref 0–1.2)
BUN SERPL-MCNC: 11 MG/DL (ref 8–23)
CALCIUM SERPL-MCNC: 8.9 MG/DL (ref 8.8–10.2)
CEA SERPL-MCNC: 2.5 NG/ML (ref 0–3.8)
CHLORIDE SERPL-SCNC: 104 MMOL/L (ref 98–107)
CO2 SERPL-SCNC: 25 MMOL/L (ref 20–28)
CREAT SERPL-MCNC: 0.82 MG/DL (ref 0.8–1.3)
DIFFERENTIAL METHOD BLD: NORMAL
EOSINOPHIL # BLD: 0.2 K/UL (ref 0–0.8)
EOSINOPHIL NFR BLD: 3 % (ref 0.5–7.8)
ERYTHROCYTE [DISTWIDTH] IN BLOOD BY AUTOMATED COUNT: 13.9 % (ref 11.9–14.6)
GLOBULIN SER CALC-MCNC: 3.2 G/DL (ref 2.3–3.5)
GLUCOSE SERPL-MCNC: 215 MG/DL (ref 70–99)
HCT VFR BLD AUTO: 43.7 % (ref 41.1–50.3)
HGB BLD-MCNC: 14.1 G/DL (ref 13.6–17.2)
IMM GRANULOCYTES # BLD AUTO: 0 K/UL (ref 0–0.5)
IMM GRANULOCYTES NFR BLD AUTO: 0 % (ref 0–5)
LYMPHOCYTES # BLD: 2 K/UL (ref 0.5–4.6)
LYMPHOCYTES NFR BLD: 33 % (ref 13–44)
MAGNESIUM SERPL-MCNC: 2.1 MG/DL (ref 1.8–2.4)
MCH RBC QN AUTO: 29 PG (ref 26.1–32.9)
MCHC RBC AUTO-ENTMCNC: 32.3 G/DL (ref 31.4–35)
MCV RBC AUTO: 89.7 FL (ref 82–102)
MONOCYTES # BLD: 0.5 K/UL (ref 0.1–1.3)
MONOCYTES NFR BLD: 8 % (ref 4–12)
NEUTS SEG # BLD: 3.4 K/UL (ref 1.7–8.2)
NEUTS SEG NFR BLD: 55 % (ref 43–78)
NRBC # BLD: 0 K/UL (ref 0–0.2)
PLATELET # BLD AUTO: 209 K/UL (ref 150–450)
PMV BLD AUTO: 10 FL (ref 9.4–12.3)
POTASSIUM SERPL-SCNC: 4.2 MMOL/L (ref 3.5–5.1)
PROT SERPL-MCNC: 7 G/DL (ref 6.3–8.2)
RBC # BLD AUTO: 4.87 M/UL (ref 4.23–5.6)
SODIUM SERPL-SCNC: 139 MMOL/L (ref 136–145)
WBC # BLD AUTO: 6 K/UL (ref 4.3–11.1)

## 2024-10-18 PROCEDURE — 96372 THER/PROPH/DIAG INJ SC/IM: CPT

## 2024-10-18 PROCEDURE — 82306 VITAMIN D 25 HYDROXY: CPT

## 2024-10-18 PROCEDURE — 96413 CHEMO IV INFUSION 1 HR: CPT

## 2024-10-18 PROCEDURE — 96375 TX/PRO/DX INJ NEW DRUG ADDON: CPT

## 2024-10-18 PROCEDURE — 2580000003 HC RX 258: Performed by: NURSE PRACTITIONER

## 2024-10-18 PROCEDURE — 2580000003 HC RX 258: Performed by: INTERNAL MEDICINE

## 2024-10-18 PROCEDURE — 3079F DIAST BP 80-89 MM HG: CPT | Performed by: NURSE PRACTITIONER

## 2024-10-18 PROCEDURE — 85025 COMPLETE CBC W/AUTO DIFF WBC: CPT

## 2024-10-18 PROCEDURE — 96367 TX/PROPH/DG ADDL SEQ IV INF: CPT

## 2024-10-18 PROCEDURE — 3074F SYST BP LT 130 MM HG: CPT | Performed by: NURSE PRACTITIONER

## 2024-10-18 PROCEDURE — 83735 ASSAY OF MAGNESIUM: CPT

## 2024-10-18 PROCEDURE — 96411 CHEMO IV PUSH ADDL DRUG: CPT

## 2024-10-18 PROCEDURE — 99214 OFFICE O/P EST MOD 30 MIN: CPT | Performed by: NURSE PRACTITIONER

## 2024-10-18 PROCEDURE — G0498 CHEMO EXTEND IV INFUS W/PUMP: HCPCS

## 2024-10-18 PROCEDURE — 80053 COMPREHEN METABOLIC PANEL: CPT

## 2024-10-18 PROCEDURE — 82378 CARCINOEMBRYONIC ANTIGEN: CPT

## 2024-10-18 PROCEDURE — 96368 THER/DIAG CONCURRENT INF: CPT

## 2024-10-18 PROCEDURE — 6360000002 HC RX W HCPCS: Performed by: NURSE PRACTITIONER

## 2024-10-18 RX ORDER — ACETAMINOPHEN 325 MG/1
650 TABLET ORAL
Status: DISCONTINUED | OUTPATIENT
Start: 2024-10-18 | End: 2024-10-19 | Stop reason: HOSPADM

## 2024-10-18 RX ORDER — ONDANSETRON 2 MG/ML
8 INJECTION INTRAMUSCULAR; INTRAVENOUS ONCE
Status: CANCELLED | OUTPATIENT
Start: 2024-10-18 | End: 2024-10-18

## 2024-10-18 RX ORDER — SODIUM CHLORIDE 0.9 % (FLUSH) 0.9 %
5-40 SYRINGE (ML) INJECTION PRN
OUTPATIENT
Start: 2024-10-20

## 2024-10-18 RX ORDER — ONDANSETRON 2 MG/ML
8 INJECTION INTRAMUSCULAR; INTRAVENOUS ONCE
Status: COMPLETED | OUTPATIENT
Start: 2024-10-18 | End: 2024-10-18

## 2024-10-18 RX ORDER — SODIUM CHLORIDE 9 MG/ML
INJECTION, SOLUTION INTRAVENOUS CONTINUOUS
Status: CANCELLED | OUTPATIENT
Start: 2024-10-18

## 2024-10-18 RX ORDER — SODIUM CHLORIDE 9 MG/ML
5-250 INJECTION, SOLUTION INTRAVENOUS PRN
OUTPATIENT
Start: 2024-10-20

## 2024-10-18 RX ORDER — ALBUTEROL SULFATE 90 UG/1
4 INHALANT RESPIRATORY (INHALATION) PRN
Status: DISCONTINUED | OUTPATIENT
Start: 2024-10-18 | End: 2024-10-19 | Stop reason: HOSPADM

## 2024-10-18 RX ORDER — SODIUM CHLORIDE 0.9 % (FLUSH) 0.9 %
5-40 SYRINGE (ML) INJECTION PRN
Status: DISCONTINUED | OUTPATIENT
Start: 2024-10-18 | End: 2024-10-19 | Stop reason: HOSPADM

## 2024-10-18 RX ORDER — DIPHENHYDRAMINE HYDROCHLORIDE 50 MG/ML
50 INJECTION INTRAMUSCULAR; INTRAVENOUS
Status: CANCELLED | OUTPATIENT
Start: 2024-10-18

## 2024-10-18 RX ORDER — ERGOCALCIFEROL 1.25 MG/1
50000 CAPSULE, LIQUID FILLED ORAL WEEKLY
Qty: 12 CAPSULE | Refills: 0 | Status: SHIPPED | OUTPATIENT
Start: 2024-10-18

## 2024-10-18 RX ORDER — FLUOROURACIL 50 MG/ML
400 INJECTION, SOLUTION INTRAVENOUS ONCE
Status: CANCELLED | OUTPATIENT
Start: 2024-10-18 | End: 2024-10-18

## 2024-10-18 RX ORDER — EPINEPHRINE 1 MG/ML
0.3 INJECTION, SOLUTION, CONCENTRATE INTRAVENOUS PRN
Status: DISCONTINUED | OUTPATIENT
Start: 2024-10-18 | End: 2024-10-19 | Stop reason: HOSPADM

## 2024-10-18 RX ORDER — DEXAMETHASONE SODIUM PHOSPHATE 10 MG/ML
10 INJECTION INTRAMUSCULAR; INTRAVENOUS ONCE
Status: COMPLETED | OUTPATIENT
Start: 2024-10-18 | End: 2024-10-18

## 2024-10-18 RX ORDER — FLUOROURACIL 50 MG/ML
400 INJECTION, SOLUTION INTRAVENOUS ONCE
Status: COMPLETED | OUTPATIENT
Start: 2024-10-18 | End: 2024-10-18

## 2024-10-18 RX ORDER — SODIUM CHLORIDE 9 MG/ML
5-250 INJECTION, SOLUTION INTRAVENOUS PRN
Status: CANCELLED | OUTPATIENT
Start: 2024-10-18

## 2024-10-18 RX ORDER — HEPARIN SODIUM (PORCINE) LOCK FLUSH IV SOLN 100 UNIT/ML 100 UNIT/ML
500 SOLUTION INTRAVENOUS PRN
Status: CANCELLED | OUTPATIENT
Start: 2024-10-18

## 2024-10-18 RX ORDER — EPINEPHRINE 1 MG/ML
0.3 INJECTION, SOLUTION, CONCENTRATE INTRAVENOUS PRN
Status: CANCELLED | OUTPATIENT
Start: 2024-10-18

## 2024-10-18 RX ORDER — DEXTROSE MONOHYDRATE 50 MG/ML
5-250 INJECTION, SOLUTION INTRAVENOUS PRN
Status: CANCELLED | OUTPATIENT
Start: 2024-10-18

## 2024-10-18 RX ORDER — FAMOTIDINE 10 MG/ML
20 INJECTION, SOLUTION INTRAVENOUS
Status: CANCELLED | OUTPATIENT
Start: 2024-10-18

## 2024-10-18 RX ORDER — MEPERIDINE HYDROCHLORIDE 25 MG/ML
12.5 INJECTION INTRAMUSCULAR; INTRAVENOUS; SUBCUTANEOUS PRN
Status: DISCONTINUED | OUTPATIENT
Start: 2024-10-18 | End: 2024-10-19 | Stop reason: HOSPADM

## 2024-10-18 RX ORDER — DEXTROSE MONOHYDRATE 50 MG/ML
5-250 INJECTION, SOLUTION INTRAVENOUS PRN
Status: DISCONTINUED | OUTPATIENT
Start: 2024-10-18 | End: 2024-10-19 | Stop reason: HOSPADM

## 2024-10-18 RX ORDER — ATROPINE SULFATE 0.4 MG/ML
0.4 INJECTION, SOLUTION INTRAVENOUS
Status: DISCONTINUED | OUTPATIENT
Start: 2024-10-18 | End: 2024-10-19 | Stop reason: HOSPADM

## 2024-10-18 RX ORDER — HEPARIN SODIUM (PORCINE) LOCK FLUSH IV SOLN 100 UNIT/ML 100 UNIT/ML
500 SOLUTION INTRAVENOUS PRN
OUTPATIENT
Start: 2024-10-20

## 2024-10-18 RX ORDER — ATROPINE SULFATE 0.4 MG/ML
0.4 INJECTION, SOLUTION INTRAVENOUS ONCE
Status: COMPLETED | OUTPATIENT
Start: 2024-10-18 | End: 2024-10-18

## 2024-10-18 RX ORDER — SODIUM CHLORIDE 9 MG/ML
INJECTION, SOLUTION INTRAVENOUS CONTINUOUS
Status: DISCONTINUED | OUTPATIENT
Start: 2024-10-18 | End: 2024-10-19 | Stop reason: HOSPADM

## 2024-10-18 RX ORDER — ONDANSETRON 2 MG/ML
8 INJECTION INTRAMUSCULAR; INTRAVENOUS
Status: DISCONTINUED | OUTPATIENT
Start: 2024-10-18 | End: 2024-10-19 | Stop reason: HOSPADM

## 2024-10-18 RX ORDER — ACETAMINOPHEN 325 MG/1
650 TABLET ORAL
Status: CANCELLED | OUTPATIENT
Start: 2024-10-18

## 2024-10-18 RX ORDER — SODIUM CHLORIDE 0.9 % (FLUSH) 0.9 %
5-40 SYRINGE (ML) INJECTION PRN
Status: CANCELLED | OUTPATIENT
Start: 2024-10-18

## 2024-10-18 RX ORDER — MEPERIDINE HYDROCHLORIDE 50 MG/ML
12.5 INJECTION INTRAMUSCULAR; INTRAVENOUS; SUBCUTANEOUS PRN
Status: CANCELLED | OUTPATIENT
Start: 2024-10-18

## 2024-10-18 RX ORDER — ONDANSETRON 2 MG/ML
8 INJECTION INTRAMUSCULAR; INTRAVENOUS
Status: CANCELLED | OUTPATIENT
Start: 2024-10-18

## 2024-10-18 RX ORDER — ALBUTEROL SULFATE 90 UG/1
4 INHALANT RESPIRATORY (INHALATION) PRN
Status: CANCELLED | OUTPATIENT
Start: 2024-10-18

## 2024-10-18 RX ORDER — DIPHENHYDRAMINE HYDROCHLORIDE 50 MG/ML
50 INJECTION INTRAMUSCULAR; INTRAVENOUS
Status: DISCONTINUED | OUTPATIENT
Start: 2024-10-18 | End: 2024-10-19 | Stop reason: HOSPADM

## 2024-10-18 RX ADMIN — FLUOROURACIL 5375 MG: 50 INJECTION, SOLUTION INTRAVENOUS at 13:35

## 2024-10-18 RX ADMIN — FLUOROURACIL 900 MG: 50 INJECTION, SOLUTION INTRAVENOUS at 13:28

## 2024-10-18 RX ADMIN — IRINOTECAN HYDROCHLORIDE 340 MG: 20 INJECTION, SOLUTION INTRAVENOUS at 11:57

## 2024-10-18 RX ADMIN — ATROPINE SULFATE 0.4 MG: 0.4 INJECTION, SOLUTION INTRAVENOUS at 11:08

## 2024-10-18 RX ADMIN — SODIUM CHLORIDE, PRESERVATIVE FREE 20 ML: 5 INJECTION INTRAVENOUS at 09:00

## 2024-10-18 RX ADMIN — DEXAMETHASONE SODIUM PHOSPHATE 10 MG: 10 INJECTION INTRAMUSCULAR; INTRAVENOUS at 11:07

## 2024-10-18 RX ADMIN — ONDANSETRON 8 MG: 2 INJECTION INTRAMUSCULAR; INTRAVENOUS at 11:06

## 2024-10-18 RX ADMIN — LEUCOVORIN CALCIUM 900 MG: 350 INJECTION, POWDER, LYOPHILIZED, FOR SUSPENSION INTRAMUSCULAR; INTRAVENOUS at 11:57

## 2024-10-18 RX ADMIN — SODIUM CHLORIDE 150 MG: 9 INJECTION, SOLUTION INTRAVENOUS at 11:10

## 2024-10-18 RX ADMIN — SODIUM CHLORIDE, PRESERVATIVE FREE 10 ML: 5 INJECTION INTRAVENOUS at 10:30

## 2024-10-18 ASSESSMENT — ENCOUNTER SYMPTOMS
BACK PAIN: 0
SHORTNESS OF BREATH: 0
SORE THROAT: 0
NAUSEA: 0
WHEEZING: 1
ABDOMINAL DISTENTION: 0
VOMITING: 0
COUGH: 0
DIARRHEA: 0
EYE PROBLEMS: 1
BLOOD IN STOOL: 0
HEMOPTYSIS: 0
SCLERAL ICTERUS: 0
CONSTIPATION: 0

## 2024-10-18 ASSESSMENT — PATIENT HEALTH QUESTIONNAIRE - PHQ9
SUM OF ALL RESPONSES TO PHQ QUESTIONS 1-9: 0
SUM OF ALL RESPONSES TO PHQ QUESTIONS 1-9: 0
SUM OF ALL RESPONSES TO PHQ9 QUESTIONS 1 & 2: 0
SUM OF ALL RESPONSES TO PHQ QUESTIONS 1-9: 0
2. FEELING DOWN, DEPRESSED OR HOPELESS: NOT AT ALL
1. LITTLE INTEREST OR PLEASURE IN DOING THINGS: NOT AT ALL
SUM OF ALL RESPONSES TO PHQ QUESTIONS 1-9: 0

## 2024-10-18 NOTE — PROGRESS NOTES
[facility] Hematology and Oncology: Established patient - follow up     Chief Complaint   Patient presents with    Follow-up     Reason for Referral: Abdominal pain; peritoneal metastatic disease  Referring Provider: Mo Dalton NP  Primary Care Provider: Oscar Nelson MD  Family History of Cancer/Hematologic Disorders: Family history is significant for sister with breast cancer.   Presenting Symptoms: Progressively worsening, persistent abdominal pain x several months    History of Present Illness:  Mr. Guevara is a 64 y.o. male who presents today for FU regarding adenocarcinoma with peritoneal metastatic disease.  The past medical history is significant for tobacco use (recent pack per day smoker x 30+ years - now down to 1/3PPD), PUD, paresthesia/pain of bilateral upper extremities, HLD, HTN, diverticulitis,  colon polyps, hiatal hernia, chronic right shoulder pain, bilateral carpal tunnel syndrome, and partial colon resection.  He presented to the [facility] ED on 5/12/22 with a complaint of persistent abdominal pain for several months that was becoming progressively  worse.  EGD and colonoscopy on 2/25/22 revealed findings of hiatal hernia, gastric polyps, several benign colon polyps, diverticulosis, and internal hemorrhoids.  CT of the abdomen on 3/8/22 showed no acute findings.  He presented to [facility] ER x 2 for  evaluation (5/3/22 and 5/10/22).  RUQ abdominal ultrasound was completed on 5/3/22 in the ED at [facility] demonstrating no acute findings to explain patient's pain; probable hepatic  steatosis; small echogenic mural foci in the gallbladder which are nonmobile, likely representing cholesterol polyps, largest measuring 4 mm; and small volume simple ascites in the right upper quadrant and left lower quadrant, nonspecific.  CT AP with  contrast at [facility] ED 5/10/22 showed a partial small bowel obstruction; small to moderate amount of free fluid in the abdomen and pelvis; and nonspecific stranding of

## 2024-10-18 NOTE — PROGRESS NOTES
Arrived to the Infusion Center. FOLFIRI completed. Patient tolerated well. 5fu elastomeric pump connected to patient via port. All tubing unclamped.   Any issues or concerns during appointment: none.  Patient aware of next infusion appointment on 10/21/2024 at 9:15am.  Discharged ambulatory.

## 2024-10-18 NOTE — PROGRESS NOTES
Patient to port lab for port access and lab draw. Port accessed using 20g 0.75\" kmi needle without difficulty. Labs drawn from port and port flushed. Port remains accessed. Patient tolerated well. Discharged ambulatory.

## 2024-10-21 ENCOUNTER — APPOINTMENT (OUTPATIENT)
Dept: INFUSION THERAPY | Age: 64
End: 2024-10-21
Payer: COMMERCIAL

## 2024-10-21 ENCOUNTER — HOSPITAL ENCOUNTER (OUTPATIENT)
Dept: INFUSION THERAPY | Age: 64
Setting detail: INFUSION SERIES
Discharge: HOME | End: 2024-10-21
Payer: COMMERCIAL

## 2024-10-21 VITALS
TEMPERATURE: 97.7 F | OXYGEN SATURATION: 94 % | HEART RATE: 85 BPM | DIASTOLIC BLOOD PRESSURE: 77 MMHG | RESPIRATION RATE: 16 BRPM | SYSTOLIC BLOOD PRESSURE: 128 MMHG

## 2024-10-21 DIAGNOSIS — C78.6 MALIGNANT NEOPLASM METASTATIC TO PERITONEUM (HCC): ICD-10-CM

## 2024-10-21 DIAGNOSIS — C76.2 ABDOMINAL CARCINOMATOSIS (HCC): Primary | ICD-10-CM

## 2024-10-21 PROCEDURE — 96523 IRRIG DRUG DELIVERY DEVICE: CPT

## 2024-10-21 PROCEDURE — 2580000003 HC RX 258: Performed by: NURSE PRACTITIONER

## 2024-10-21 RX ADMIN — SODIUM CHLORIDE, PRESERVATIVE FREE 10 ML: 5 INJECTION INTRAVENOUS at 09:29

## 2024-10-21 NOTE — PROGRESS NOTES
Arrived to the Infusion Center.  Pump d/c completed. Patient tolerated well.   Any issues or concerns during appointment: none.  Patient aware of next infusion appointment on 11/15/24 (date) at 1030 (time).  Patient aware of next lab and [facility] office visit on 11/15/24 (date) at 0930 (time).  Patient instructed to call provider with temperature of 100.4 or greater or nausea/vomiting/ diarrhea or pain not controlled by medications  Discharged ambulatory.

## 2024-10-23 RX ORDER — SODIUM CHLORIDE 0.9 % (FLUSH) 0.9 %
5-40 SYRINGE (ML) INJECTION PRN
Status: ACTIVE | OUTPATIENT
Start: 2024-10-23 | End: 2024-10-24

## 2024-11-12 ENCOUNTER — OFFICE VISIT (OUTPATIENT)
Dept: INTERNAL MEDICINE CLINIC | Facility: CLINIC | Age: 64
End: 2024-11-12
Payer: COMMERCIAL

## 2024-11-12 VITALS
DIASTOLIC BLOOD PRESSURE: 82 MMHG | BODY MASS INDEX: 34.87 KG/M2 | HEART RATE: 79 BPM | OXYGEN SATURATION: 95 % | WEIGHT: 243 LBS | SYSTOLIC BLOOD PRESSURE: 124 MMHG

## 2024-11-12 DIAGNOSIS — E11.65 TYPE 2 DIABETES MELLITUS WITH HYPERGLYCEMIA, UNSPECIFIED WHETHER LONG TERM INSULIN USE (HCC): ICD-10-CM

## 2024-11-12 LAB
CHOLEST SERPL-MCNC: 146 MG/DL (ref 0–200)
EST. AVERAGE GLUCOSE BLD GHB EST-MCNC: 187 MG/DL
HBA1C MFR BLD: 8.1 % (ref 0–5.6)
HDLC SERPL-MCNC: 31 MG/DL (ref 40–60)
HDLC SERPL: 4.8 (ref 0–5)
LDLC SERPL CALC-MCNC: 79 MG/DL (ref 0–100)
TRIGL SERPL-MCNC: 184 MG/DL (ref 0–150)
VLDLC SERPL CALC-MCNC: 37 MG/DL (ref 6–23)

## 2024-11-12 PROCEDURE — 1036F TOBACCO NON-USER: CPT | Performed by: STUDENT IN AN ORGANIZED HEALTH CARE EDUCATION/TRAINING PROGRAM

## 2024-11-12 PROCEDURE — 3051F HG A1C>EQUAL 7.0%<8.0%: CPT | Performed by: STUDENT IN AN ORGANIZED HEALTH CARE EDUCATION/TRAINING PROGRAM

## 2024-11-12 PROCEDURE — 3074F SYST BP LT 130 MM HG: CPT | Performed by: STUDENT IN AN ORGANIZED HEALTH CARE EDUCATION/TRAINING PROGRAM

## 2024-11-12 PROCEDURE — G8417 CALC BMI ABV UP PARAM F/U: HCPCS | Performed by: STUDENT IN AN ORGANIZED HEALTH CARE EDUCATION/TRAINING PROGRAM

## 2024-11-12 PROCEDURE — G8427 DOCREV CUR MEDS BY ELIG CLIN: HCPCS | Performed by: STUDENT IN AN ORGANIZED HEALTH CARE EDUCATION/TRAINING PROGRAM

## 2024-11-12 PROCEDURE — G8484 FLU IMMUNIZE NO ADMIN: HCPCS | Performed by: STUDENT IN AN ORGANIZED HEALTH CARE EDUCATION/TRAINING PROGRAM

## 2024-11-12 PROCEDURE — 2022F DILAT RTA XM EVC RTNOPTHY: CPT | Performed by: STUDENT IN AN ORGANIZED HEALTH CARE EDUCATION/TRAINING PROGRAM

## 2024-11-12 PROCEDURE — 99214 OFFICE O/P EST MOD 30 MIN: CPT | Performed by: STUDENT IN AN ORGANIZED HEALTH CARE EDUCATION/TRAINING PROGRAM

## 2024-11-12 PROCEDURE — 3017F COLORECTAL CA SCREEN DOC REV: CPT | Performed by: STUDENT IN AN ORGANIZED HEALTH CARE EDUCATION/TRAINING PROGRAM

## 2024-11-12 PROCEDURE — 3079F DIAST BP 80-89 MM HG: CPT | Performed by: STUDENT IN AN ORGANIZED HEALTH CARE EDUCATION/TRAINING PROGRAM

## 2024-11-12 RX ORDER — ACYCLOVIR 400 MG/1
1 TABLET ORAL CONTINUOUS
Qty: 1 EACH | Refills: 0 | Status: SHIPPED | OUTPATIENT
Start: 2024-11-12

## 2024-11-12 RX ORDER — INSULIN GLARGINE 100 [IU]/ML
INJECTION, SOLUTION SUBCUTANEOUS
Qty: 10 ADJUSTABLE DOSE PRE-FILLED PEN SYRINGE | Refills: 5 | Status: SHIPPED | OUTPATIENT
Start: 2024-11-12

## 2024-11-12 ASSESSMENT — PATIENT HEALTH QUESTIONNAIRE - PHQ9
SUM OF ALL RESPONSES TO PHQ QUESTIONS 1-9: 0
SUM OF ALL RESPONSES TO PHQ QUESTIONS 1-9: 1
10. IF YOU CHECKED OFF ANY PROBLEMS, HOW DIFFICULT HAVE THESE PROBLEMS MADE IT FOR YOU TO DO YOUR WORK, TAKE CARE OF THINGS AT HOME, OR GET ALONG WITH OTHER PEOPLE: NOT DIFFICULT AT ALL
SUM OF ALL RESPONSES TO PHQ QUESTIONS 1-9: 0
SUM OF ALL RESPONSES TO PHQ QUESTIONS 1-9: 0
SUM OF ALL RESPONSES TO PHQ QUESTIONS 1-9: 1
SUM OF ALL RESPONSES TO PHQ QUESTIONS 1-9: 0
4. FEELING TIRED OR HAVING LITTLE ENERGY: NOT AT ALL
7. TROUBLE CONCENTRATING ON THINGS, SUCH AS READING THE NEWSPAPER OR WATCHING TELEVISION: NOT AT ALL
SUM OF ALL RESPONSES TO PHQ9 QUESTIONS 1 & 2: 0
3. TROUBLE FALLING OR STAYING ASLEEP: SEVERAL DAYS
SUM OF ALL RESPONSES TO PHQ QUESTIONS 1-9: 1
8. MOVING OR SPEAKING SO SLOWLY THAT OTHER PEOPLE COULD HAVE NOTICED. OR THE OPPOSITE, BEING SO FIGETY OR RESTLESS THAT YOU HAVE BEEN MOVING AROUND A LOT MORE THAN USUAL: NOT AT ALL
SUM OF ALL RESPONSES TO PHQ QUESTIONS 1-9: 1
9. THOUGHTS THAT YOU WOULD BE BETTER OFF DEAD, OR OF HURTING YOURSELF: NOT AT ALL
5. POOR APPETITE OR OVEREATING: NOT AT ALL
1. LITTLE INTEREST OR PLEASURE IN DOING THINGS: NOT AT ALL
2. FEELING DOWN, DEPRESSED OR HOPELESS: NOT AT ALL
2. FEELING DOWN, DEPRESSED OR HOPELESS: NOT AT ALL
SUM OF ALL RESPONSES TO PHQ9 QUESTIONS 1 & 2: 0
1. LITTLE INTEREST OR PLEASURE IN DOING THINGS: NOT AT ALL

## 2024-11-12 NOTE — PROGRESS NOTES
11/30/2020    Paresthesia and pain of both upper extremities 11/30/2020    Chronic bronchitis (HCC) 01/20/2020    Gastroesophageal reflux disease 12/11/2019    History of colon polyps 12/11/2019    Hyperlipidemia 03/11/2013    Essential hypertension 03/11/2013    PUD (peptic ulcer disease) 03/11/2013      Past Medical History:   Diagnosis Date    Bilateral carpal tunnel syndrome 12/9/2020    Cancer (HCC)     Chronic right shoulder pain 11/30/2020    Colon polyps 3/11/2013    Diverticulitis 3/11/2013    GERD (gastroesophageal reflux disease) 2021    HTN (hypertension) 3/11/2013    Hyperlipidemia 3/11/2013    Paresthesia and pain of both upper extremities 11/30/2020    Partial small bowel obstruction (HCC) 05/20/2022    PUD (peptic ulcer disease) 3/11/2013    History of     Right elbow pain 12/9/2020    Wheezing 3/11/2013      Past Surgical History:   Procedure Laterality Date    COLONOSCOPY  2/25/09    colonoscopy per Dr. Dale with need for repeat every 3 years    COLONOSCOPY  02/25/2022    Dr. Baker; polyps of the ascending, transverse, descending, and sigmoid colons; internal hemorrhoid; diverticulosis    LAPAROSCOPY N/A 5/27/2022    LAPAROSCOPY DIAGNOSTIC , OPEN EXPLORATORY LAPAROTOMY, MASS EXCISION, G-TUBE PLACEMENT performed by Missy Doyle MD at [facility] MAIN OR    PORT SURGERY N/A 6/3/2022    PORT INSERTION performed by Missy Doyle MD at [facility] OPC    TN UNLISTED PROCEDURE ABDOMEN PERITONEUM & OMENTUM  October 2004    partial colon resection per Dr. Correa    UPPER GASTROINTESTINAL ENDOSCOPY  02/25/2022    Dr. Bkaer; hiatal hernia gastric polyps     Family History   Problem Relation Age of Onset    No Known Problems Brother     Cancer Sister         breast    Hypertension Mother     Heart Disease Mother     Diabetes Mother     Diabetes Father     Osteoarthritis Father     Alcohol Abuse Father     Hypertension Father      Social History     Tobacco Use   Smoking Status Former    Current packs/day: 0.00    Average

## 2024-11-15 ENCOUNTER — HOSPITAL ENCOUNTER (OUTPATIENT)
Dept: LAB | Age: 64
Discharge: HOME | End: 2024-11-15
Payer: COMMERCIAL

## 2024-11-15 ENCOUNTER — HOSPITAL ENCOUNTER (OUTPATIENT)
Dept: INFUSION THERAPY | Age: 64
Setting detail: INFUSION SERIES
Discharge: HOME | End: 2024-11-15
Payer: COMMERCIAL

## 2024-11-15 ENCOUNTER — OFFICE VISIT (OUTPATIENT)
Dept: ONCOLOGY | Age: 64
End: 2024-11-15
Payer: COMMERCIAL

## 2024-11-15 VITALS
TEMPERATURE: 97.8 F | SYSTOLIC BLOOD PRESSURE: 121 MMHG | HEART RATE: 62 BPM | OXYGEN SATURATION: 94 % | HEIGHT: 70 IN | RESPIRATION RATE: 16 BRPM | DIASTOLIC BLOOD PRESSURE: 70 MMHG | WEIGHT: 243.8 LBS | BODY MASS INDEX: 34.9 KG/M2

## 2024-11-15 DIAGNOSIS — C78.6 MALIGNANT NEOPLASM METASTATIC TO PERITONEUM (HCC): ICD-10-CM

## 2024-11-15 DIAGNOSIS — C76.2 ABDOMINAL CARCINOMATOSIS (HCC): Primary | ICD-10-CM

## 2024-11-15 DIAGNOSIS — C76.2 ABDOMINAL CARCINOMATOSIS (HCC): ICD-10-CM

## 2024-11-15 DIAGNOSIS — Z51.11 ENCOUNTER FOR ANTINEOPLASTIC CHEMOTHERAPY: ICD-10-CM

## 2024-11-15 DIAGNOSIS — D68.59 HYPERCOAGULABLE STATE (HCC): ICD-10-CM

## 2024-11-15 DIAGNOSIS — E83.42 HYPOMAGNESEMIA: ICD-10-CM

## 2024-11-15 LAB
ALBUMIN SERPL-MCNC: 3.7 G/DL (ref 3.2–4.6)
ALBUMIN/GLOB SERPL: 1.3 (ref 1–1.9)
ALP SERPL-CCNC: 126 U/L (ref 40–129)
ALT SERPL-CCNC: 28 U/L (ref 8–55)
ANION GAP SERPL CALC-SCNC: 10 MMOL/L (ref 7–16)
AST SERPL-CCNC: 37 U/L (ref 15–37)
BASOPHILS # BLD: 0.1 K/UL (ref 0–0.2)
BASOPHILS NFR BLD: 1 % (ref 0–2)
BILIRUB SERPL-MCNC: 0.4 MG/DL (ref 0–1.2)
BUN SERPL-MCNC: 7 MG/DL (ref 8–23)
CALCIUM SERPL-MCNC: 8.9 MG/DL (ref 8.8–10.2)
CEA SERPL-MCNC: 2.2 NG/ML (ref 0–3.8)
CHLORIDE SERPL-SCNC: 106 MMOL/L (ref 98–107)
CO2 SERPL-SCNC: 24 MMOL/L (ref 20–29)
CREAT SERPL-MCNC: 0.82 MG/DL (ref 0.8–1.3)
DIFFERENTIAL METHOD BLD: NORMAL
EOSINOPHIL # BLD: 0.1 K/UL (ref 0–0.8)
EOSINOPHIL NFR BLD: 2 % (ref 0.5–7.8)
ERYTHROCYTE [DISTWIDTH] IN BLOOD BY AUTOMATED COUNT: 13.7 % (ref 11.9–14.6)
GLOBULIN SER CALC-MCNC: 2.9 G/DL (ref 2.3–3.5)
GLUCOSE SERPL-MCNC: 163 MG/DL (ref 70–99)
HCT VFR BLD AUTO: 43.2 % (ref 41.1–50.3)
HGB BLD-MCNC: 13.9 G/DL (ref 13.6–17.2)
IMM GRANULOCYTES # BLD AUTO: 0 K/UL (ref 0–0.5)
IMM GRANULOCYTES NFR BLD AUTO: 0 % (ref 0–5)
LYMPHOCYTES # BLD: 2.1 K/UL (ref 0.5–4.6)
LYMPHOCYTES NFR BLD: 33 % (ref 13–44)
MAGNESIUM SERPL-MCNC: 1.9 MG/DL (ref 1.8–2.4)
MCH RBC QN AUTO: 29 PG (ref 26.1–32.9)
MCHC RBC AUTO-ENTMCNC: 32.2 G/DL (ref 31.4–35)
MCV RBC AUTO: 90.2 FL (ref 82–102)
MONOCYTES # BLD: 0.5 K/UL (ref 0.1–1.3)
MONOCYTES NFR BLD: 8 % (ref 4–12)
NEUTS SEG # BLD: 3.6 K/UL (ref 1.7–8.2)
NEUTS SEG NFR BLD: 56 % (ref 43–78)
NRBC # BLD: 0 K/UL (ref 0–0.2)
PLATELET # BLD AUTO: 188 K/UL (ref 150–450)
PMV BLD AUTO: 10.1 FL (ref 9.4–12.3)
POTASSIUM SERPL-SCNC: 4 MMOL/L (ref 3.5–5.1)
PROT SERPL-MCNC: 6.6 G/DL (ref 6.3–8.2)
RBC # BLD AUTO: 4.79 M/UL (ref 4.23–5.6)
SODIUM SERPL-SCNC: 140 MMOL/L (ref 136–145)
WBC # BLD AUTO: 6.4 K/UL (ref 4.3–11.1)

## 2024-11-15 PROCEDURE — 85025 COMPLETE CBC W/AUTO DIFF WBC: CPT

## 2024-11-15 PROCEDURE — 99214 OFFICE O/P EST MOD 30 MIN: CPT | Performed by: NURSE PRACTITIONER

## 2024-11-15 PROCEDURE — 96375 TX/PRO/DX INJ NEW DRUG ADDON: CPT

## 2024-11-15 PROCEDURE — 2580000003 HC RX 258: Performed by: INTERNAL MEDICINE

## 2024-11-15 PROCEDURE — 96367 TX/PROPH/DG ADDL SEQ IV INF: CPT

## 2024-11-15 PROCEDURE — 96411 CHEMO IV PUSH ADDL DRUG: CPT

## 2024-11-15 PROCEDURE — 6360000002 HC RX W HCPCS: Performed by: NURSE PRACTITIONER

## 2024-11-15 PROCEDURE — 96415 CHEMO IV INFUSION ADDL HR: CPT

## 2024-11-15 PROCEDURE — 2580000003 HC RX 258: Performed by: NURSE PRACTITIONER

## 2024-11-15 PROCEDURE — 80053 COMPREHEN METABOLIC PANEL: CPT

## 2024-11-15 PROCEDURE — 3017F COLORECTAL CA SCREEN DOC REV: CPT | Performed by: NURSE PRACTITIONER

## 2024-11-15 PROCEDURE — 3078F DIAST BP <80 MM HG: CPT | Performed by: NURSE PRACTITIONER

## 2024-11-15 PROCEDURE — G8484 FLU IMMUNIZE NO ADMIN: HCPCS | Performed by: NURSE PRACTITIONER

## 2024-11-15 PROCEDURE — G8417 CALC BMI ABV UP PARAM F/U: HCPCS | Performed by: NURSE PRACTITIONER

## 2024-11-15 PROCEDURE — 82378 CARCINOEMBRYONIC ANTIGEN: CPT

## 2024-11-15 PROCEDURE — G0498 CHEMO EXTEND IV INFUS W/PUMP: HCPCS

## 2024-11-15 PROCEDURE — 96413 CHEMO IV INFUSION 1 HR: CPT

## 2024-11-15 PROCEDURE — 1036F TOBACCO NON-USER: CPT | Performed by: NURSE PRACTITIONER

## 2024-11-15 PROCEDURE — 3074F SYST BP LT 130 MM HG: CPT | Performed by: NURSE PRACTITIONER

## 2024-11-15 PROCEDURE — G8427 DOCREV CUR MEDS BY ELIG CLIN: HCPCS | Performed by: NURSE PRACTITIONER

## 2024-11-15 PROCEDURE — 83735 ASSAY OF MAGNESIUM: CPT

## 2024-11-15 PROCEDURE — 96416 CHEMO PROLONG INFUSE W/PUMP: CPT

## 2024-11-15 PROCEDURE — 96368 THER/DIAG CONCURRENT INF: CPT

## 2024-11-15 PROCEDURE — 96372 THER/PROPH/DIAG INJ SC/IM: CPT

## 2024-11-15 RX ORDER — SODIUM CHLORIDE 0.9 % (FLUSH) 0.9 %
5-40 SYRINGE (ML) INJECTION PRN
Status: DISCONTINUED | OUTPATIENT
Start: 2024-11-15 | End: 2024-11-16 | Stop reason: HOSPADM

## 2024-11-15 RX ORDER — SODIUM CHLORIDE 9 MG/ML
INJECTION, SOLUTION INTRAVENOUS CONTINUOUS
Status: CANCELLED | OUTPATIENT
Start: 2024-11-15

## 2024-11-15 RX ORDER — FLUOROURACIL 50 MG/ML
400 INJECTION, SOLUTION INTRAVENOUS ONCE
Status: COMPLETED | OUTPATIENT
Start: 2024-11-15 | End: 2024-11-15

## 2024-11-15 RX ORDER — ACETAMINOPHEN 325 MG/1
650 TABLET ORAL
Status: CANCELLED | OUTPATIENT
Start: 2024-11-15

## 2024-11-15 RX ORDER — ACETAMINOPHEN 325 MG/1
650 TABLET ORAL
Status: DISCONTINUED | OUTPATIENT
Start: 2024-11-15 | End: 2024-11-16 | Stop reason: HOSPADM

## 2024-11-15 RX ORDER — SODIUM CHLORIDE 9 MG/ML
5-250 INJECTION, SOLUTION INTRAVENOUS PRN
OUTPATIENT
Start: 2024-11-17

## 2024-11-15 RX ORDER — ONDANSETRON 2 MG/ML
8 INJECTION INTRAMUSCULAR; INTRAVENOUS ONCE
Status: COMPLETED | OUTPATIENT
Start: 2024-11-15 | End: 2024-11-15

## 2024-11-15 RX ORDER — EPINEPHRINE 1 MG/ML
0.3 INJECTION, SOLUTION, CONCENTRATE INTRAVENOUS PRN
Status: DISCONTINUED | OUTPATIENT
Start: 2024-11-15 | End: 2024-11-16 | Stop reason: HOSPADM

## 2024-11-15 RX ORDER — DEXAMETHASONE SODIUM PHOSPHATE 10 MG/ML
10 INJECTION INTRAMUSCULAR; INTRAVENOUS ONCE
Status: COMPLETED | OUTPATIENT
Start: 2024-11-15 | End: 2024-11-15

## 2024-11-15 RX ORDER — SODIUM CHLORIDE 0.9 % (FLUSH) 0.9 %
5-40 SYRINGE (ML) INJECTION PRN
OUTPATIENT
Start: 2024-11-17

## 2024-11-15 RX ORDER — DIPHENHYDRAMINE HYDROCHLORIDE 50 MG/ML
50 INJECTION INTRAMUSCULAR; INTRAVENOUS
Status: CANCELLED | OUTPATIENT
Start: 2024-11-15

## 2024-11-15 RX ORDER — SODIUM CHLORIDE 9 MG/ML
5-250 INJECTION, SOLUTION INTRAVENOUS PRN
Status: CANCELLED | OUTPATIENT
Start: 2024-11-15

## 2024-11-15 RX ORDER — ONDANSETRON 2 MG/ML
8 INJECTION INTRAMUSCULAR; INTRAVENOUS
Status: CANCELLED | OUTPATIENT
Start: 2024-11-15

## 2024-11-15 RX ORDER — DEXTROSE MONOHYDRATE 50 MG/ML
5-250 INJECTION, SOLUTION INTRAVENOUS PRN
Status: DISCONTINUED | OUTPATIENT
Start: 2024-11-15 | End: 2024-11-16 | Stop reason: HOSPADM

## 2024-11-15 RX ORDER — DEXTROSE MONOHYDRATE 50 MG/ML
5-250 INJECTION, SOLUTION INTRAVENOUS PRN
Status: CANCELLED | OUTPATIENT
Start: 2024-11-15

## 2024-11-15 RX ORDER — ONDANSETRON 2 MG/ML
8 INJECTION INTRAMUSCULAR; INTRAVENOUS ONCE
Status: CANCELLED | OUTPATIENT
Start: 2024-11-15 | End: 2024-11-15

## 2024-11-15 RX ORDER — HYDROCORTISONE SODIUM SUCCINATE 100 MG/2ML
100 INJECTION INTRAMUSCULAR; INTRAVENOUS
Status: DISCONTINUED | OUTPATIENT
Start: 2024-11-15 | End: 2024-11-16 | Stop reason: HOSPADM

## 2024-11-15 RX ORDER — MEPERIDINE HYDROCHLORIDE 50 MG/ML
12.5 INJECTION INTRAMUSCULAR; INTRAVENOUS; SUBCUTANEOUS PRN
Status: CANCELLED | OUTPATIENT
Start: 2024-11-15

## 2024-11-15 RX ORDER — HEPARIN 100 UNIT/ML
500 SYRINGE INTRAVENOUS PRN
Status: DISCONTINUED | OUTPATIENT
Start: 2024-11-15 | End: 2024-11-16 | Stop reason: HOSPADM

## 2024-11-15 RX ORDER — FLUOROURACIL 50 MG/ML
400 INJECTION, SOLUTION INTRAVENOUS ONCE
Status: CANCELLED | OUTPATIENT
Start: 2024-11-15 | End: 2024-11-15

## 2024-11-15 RX ORDER — HEPARIN SODIUM (PORCINE) LOCK FLUSH IV SOLN 100 UNIT/ML 100 UNIT/ML
500 SOLUTION INTRAVENOUS PRN
Status: CANCELLED | OUTPATIENT
Start: 2024-11-15

## 2024-11-15 RX ORDER — SODIUM CHLORIDE 0.9 % (FLUSH) 0.9 %
5-40 SYRINGE (ML) INJECTION PRN
Status: CANCELLED | OUTPATIENT
Start: 2024-11-15

## 2024-11-15 RX ORDER — HEPARIN SODIUM (PORCINE) LOCK FLUSH IV SOLN 100 UNIT/ML 100 UNIT/ML
500 SOLUTION INTRAVENOUS PRN
OUTPATIENT
Start: 2024-11-17

## 2024-11-15 RX ORDER — SODIUM CHLORIDE 9 MG/ML
INJECTION, SOLUTION INTRAVENOUS CONTINUOUS
Status: DISCONTINUED | OUTPATIENT
Start: 2024-11-15 | End: 2024-11-16 | Stop reason: HOSPADM

## 2024-11-15 RX ORDER — DIPHENHYDRAMINE HYDROCHLORIDE 50 MG/ML
50 INJECTION INTRAMUSCULAR; INTRAVENOUS
Status: DISCONTINUED | OUTPATIENT
Start: 2024-11-15 | End: 2024-11-16 | Stop reason: HOSPADM

## 2024-11-15 RX ORDER — MEPERIDINE HYDROCHLORIDE 25 MG/ML
12.5 INJECTION INTRAMUSCULAR; INTRAVENOUS; SUBCUTANEOUS PRN
Status: DISCONTINUED | OUTPATIENT
Start: 2024-11-15 | End: 2024-11-16 | Stop reason: HOSPADM

## 2024-11-15 RX ORDER — ATROPINE SULFATE 0.4 MG/ML
0.4 INJECTION, SOLUTION INTRAVENOUS ONCE
Status: DISCONTINUED | OUTPATIENT
Start: 2024-11-15 | End: 2024-11-16 | Stop reason: HOSPADM

## 2024-11-15 RX ORDER — ATROPINE SULFATE 0.4 MG/ML
0.4 INJECTION, SOLUTION INTRAVENOUS
Status: DISCONTINUED | OUTPATIENT
Start: 2024-11-15 | End: 2024-11-16 | Stop reason: HOSPADM

## 2024-11-15 RX ORDER — ALBUTEROL SULFATE 90 UG/1
4 INHALANT RESPIRATORY (INHALATION) PRN
Status: CANCELLED | OUTPATIENT
Start: 2024-11-15

## 2024-11-15 RX ORDER — ALBUTEROL SULFATE 90 UG/1
4 INHALANT RESPIRATORY (INHALATION) PRN
Status: DISCONTINUED | OUTPATIENT
Start: 2024-11-15 | End: 2024-11-16 | Stop reason: HOSPADM

## 2024-11-15 RX ORDER — SODIUM CHLORIDE 9 MG/ML
5-250 INJECTION, SOLUTION INTRAVENOUS PRN
Status: DISCONTINUED | OUTPATIENT
Start: 2024-11-15 | End: 2024-11-16 | Stop reason: HOSPADM

## 2024-11-15 RX ORDER — ONDANSETRON 2 MG/ML
8 INJECTION INTRAMUSCULAR; INTRAVENOUS
Status: DISCONTINUED | OUTPATIENT
Start: 2024-11-15 | End: 2024-11-16 | Stop reason: HOSPADM

## 2024-11-15 RX ORDER — FAMOTIDINE 10 MG/ML
20 INJECTION, SOLUTION INTRAVENOUS
Status: CANCELLED | OUTPATIENT
Start: 2024-11-15

## 2024-11-15 RX ORDER — EPINEPHRINE 1 MG/ML
0.3 INJECTION, SOLUTION, CONCENTRATE INTRAVENOUS PRN
Status: CANCELLED | OUTPATIENT
Start: 2024-11-15

## 2024-11-15 RX ADMIN — SODIUM CHLORIDE, PRESERVATIVE FREE 10 ML: 5 INJECTION INTRAVENOUS at 11:40

## 2024-11-15 RX ADMIN — FLUOROURACIL 900 MG: 50 INJECTION, SOLUTION INTRAVENOUS at 13:58

## 2024-11-15 RX ADMIN — DEXAMETHASONE SODIUM PHOSPHATE 10 MG: 10 INJECTION INTRAMUSCULAR; INTRAVENOUS at 11:44

## 2024-11-15 RX ADMIN — ATROPINE SULFATE 0.4 MG: 0.4 INJECTION, SOLUTION INTRAVENOUS at 12:12

## 2024-11-15 RX ADMIN — LEUCOVORIN CALCIUM 900 MG: 350 INJECTION, POWDER, LYOPHILIZED, FOR SUSPENSION INTRAMUSCULAR; INTRAVENOUS at 12:19

## 2024-11-15 RX ADMIN — IRINOTECAN HYDROCHLORIDE 340 MG: 20 INJECTION, SOLUTION INTRAVENOUS at 12:22

## 2024-11-15 RX ADMIN — SODIUM CHLORIDE, PRESERVATIVE FREE 30 ML: 5 INJECTION INTRAVENOUS at 13:56

## 2024-11-15 RX ADMIN — SODIUM CHLORIDE, PRESERVATIVE FREE 10 ML: 5 INJECTION INTRAVENOUS at 11:10

## 2024-11-15 RX ADMIN — FLUOROURACIL 5375 MG: 50 INJECTION, SOLUTION INTRAVENOUS at 14:05

## 2024-11-15 RX ADMIN — ONDANSETRON 8 MG: 2 INJECTION INTRAMUSCULAR; INTRAVENOUS at 11:42

## 2024-11-15 RX ADMIN — SODIUM CHLORIDE, PRESERVATIVE FREE 20 ML: 5 INJECTION INTRAVENOUS at 09:30

## 2024-11-15 RX ADMIN — SODIUM CHLORIDE 150 MG: 9 INJECTION, SOLUTION INTRAVENOUS at 11:46

## 2024-11-15 RX ADMIN — SODIUM CHLORIDE, PRESERVATIVE FREE 20 ML: 5 INJECTION INTRAVENOUS at 12:10

## 2024-11-15 ASSESSMENT — ENCOUNTER SYMPTOMS
HEMOPTYSIS: 0
SCLERAL ICTERUS: 0
COUGH: 0
DIARRHEA: 0
BACK PAIN: 0
CONSTIPATION: 0
SORE THROAT: 0
NAUSEA: 0
BLOOD IN STOOL: 0
SHORTNESS OF BREATH: 0
VOMITING: 0
ABDOMINAL DISTENTION: 0

## 2024-11-15 ASSESSMENT — PATIENT HEALTH QUESTIONNAIRE - PHQ9
SUM OF ALL RESPONSES TO PHQ QUESTIONS 1-9: 0
SUM OF ALL RESPONSES TO PHQ QUESTIONS 1-9: 0
SUM OF ALL RESPONSES TO PHQ9 QUESTIONS 1 & 2: 0
SUM OF ALL RESPONSES TO PHQ QUESTIONS 1-9: 0
1. LITTLE INTEREST OR PLEASURE IN DOING THINGS: NOT AT ALL
SUM OF ALL RESPONSES TO PHQ QUESTIONS 1-9: 0
2. FEELING DOWN, DEPRESSED OR HOPELESS: NOT AT ALL

## 2024-11-15 ASSESSMENT — PAIN SCALES - GENERAL: PAINLEVEL_OUTOF10: 0

## 2024-11-15 NOTE — PROGRESS NOTES
Arrived to the Infusion Center.  FOLFIRI completed. Patient tolerated without difficulty.   Any issues or concerns during appointment: None.  Patient aware of next infusion appointment on 11/18 (date) at 1000 (time).  Patient instructed to call provider with temperature of 100.4 or greater or nausea/vomiting/ diarrhea or pain not controlled by medications  Discharged ambulatory.

## 2024-11-15 NOTE — PROGRESS NOTES
Labs reviewed and mild drop in hemoglobin noted.  We will add on iron and B12.  Electrolytes: 3.4 for potassium and magnesium 1.7.  His PCP has refilled his potassium and we shall give him some IV magnesium and refill his oral magnesium as well.  He wishes to continue with chemotherapy.  He stays active w outside activities.  He does have increased bleeding time due to being on anticoagulation.  This could certainly be contributing to iron deficiency anemia if he has it.    - abnormal olfactory sensation - He reports having intermittent aroma in his nose - described it as a wild flower - not sure if he has allergies - will try allergy med first.  If no resolution- can refer to ENT.  Pt declined need for sinus CT at this time.  No sig drainage noted.  Seldom cough.  No fevers.  No HA, no seizure.     - here with his wife for consultation.  During today's visit, we discussed his current workup.  We looked at the images together demonstrating some of the findings concerning for peritoneal nodularity/peritoneal disease.  Discussed anatomy of the findings  utilizing images to help pt understand what we are looking at and suspecting.  He and wife were appreciative of the time spent to review these.  Discussed need for visual dx/tissue pathology to determine plan - recommend ex lap - refer to Dr Doyle - personally  reviewed case with Dr Doyle who will expedite the workup and will see pt Fri.  US with mild biliary duct dilation - HIDA/ERCP reviewed by PCP   + BM/flatus; He did not like how IV morphine made him feel in the hospital.  He tried tramadol but found no relief and has been taking acetaminophen at home with minimal relief.  Discussed different options and he settled on trying  Percocet - he will contact the office to inform us if this is working for him.  We discussed SE - not to drive while on the med.  Bowel regimen reviewed.   He is tired of tests, frustrated.  Feelings validated and stressed importance of workup to

## 2024-11-18 ENCOUNTER — HOSPITAL ENCOUNTER (OUTPATIENT)
Dept: INFUSION THERAPY | Age: 64
Setting detail: INFUSION SERIES
Discharge: HOME | End: 2024-11-18
Payer: COMMERCIAL

## 2024-11-18 VITALS
DIASTOLIC BLOOD PRESSURE: 79 MMHG | HEART RATE: 79 BPM | TEMPERATURE: 97.7 F | OXYGEN SATURATION: 93 % | RESPIRATION RATE: 16 BRPM | SYSTOLIC BLOOD PRESSURE: 129 MMHG

## 2024-11-18 PROCEDURE — 2580000003 HC RX 258: Performed by: NURSE PRACTITIONER

## 2024-11-18 PROCEDURE — 96523 IRRIG DRUG DELIVERY DEVICE: CPT

## 2024-11-18 PROCEDURE — 2580000003 HC RX 258: Performed by: INTERNAL MEDICINE

## 2024-11-18 RX ORDER — SODIUM CHLORIDE 0.9 % (FLUSH) 0.9 %
5-40 SYRINGE (ML) INJECTION PRN
Status: ACTIVE | OUTPATIENT
Start: 2024-11-18 | End: 2024-11-19

## 2024-11-18 RX ADMIN — SODIUM CHLORIDE, PRESERVATIVE FREE 10 ML: 5 INJECTION INTRAVENOUS at 10:27

## 2024-11-18 RX ADMIN — Medication 10 ML: at 10:30

## 2024-11-18 NOTE — PROGRESS NOTES
Arrived to the Infusion Center.  Pump DC  completed.   Patient instructed to report any side affects to ordering provider.  Patient tolerated well.   Any issues or concerns during appointment: no.  Patient aware of next infusion appointment on 12/13/2024 at 10:30 AM.  Discharged ambulatory.

## 2024-11-21 RX ORDER — SODIUM CHLORIDE 0.9 % (FLUSH) 0.9 %
5-40 SYRINGE (ML) INJECTION PRN
Status: ACTIVE | OUTPATIENT
Start: 2024-11-18

## 2024-12-02 ENCOUNTER — HOSPITAL ENCOUNTER (OUTPATIENT)
Dept: CT IMAGING | Age: 64
Discharge: HOME | End: 2024-12-05
Attending: INTERNAL MEDICINE
Payer: COMMERCIAL

## 2024-12-02 DIAGNOSIS — C78.6 MALIGNANT NEOPLASM METASTATIC TO PERITONEUM (HCC): ICD-10-CM

## 2024-12-02 DIAGNOSIS — C76.2 ABDOMINAL CARCINOMATOSIS (HCC): ICD-10-CM

## 2024-12-02 PROCEDURE — 6360000004 HC RX CONTRAST MEDICATION: Performed by: INTERNAL MEDICINE

## 2024-12-02 PROCEDURE — 71260 CT THORAX DX C+: CPT

## 2024-12-02 RX ORDER — DIATRIZOATE MEGLUMINE AND DIATRIZOATE SODIUM 660; 100 MG/ML; MG/ML
15 SOLUTION ORAL; RECTAL
Status: DISCONTINUED | OUTPATIENT
Start: 2024-12-02 | End: 2024-12-06 | Stop reason: HOSPADM

## 2024-12-02 RX ORDER — IOPAMIDOL 755 MG/ML
100 INJECTION, SOLUTION INTRAVASCULAR
Status: COMPLETED | OUTPATIENT
Start: 2024-12-02 | End: 2024-12-02

## 2024-12-02 RX ADMIN — DIATRIZOATE MEGLUMINE AND DIATRIZOATE SODIUM 15 ML: 660; 100 LIQUID ORAL; RECTAL at 10:37

## 2024-12-02 RX ADMIN — IOPAMIDOL 100 ML: 755 INJECTION, SOLUTION INTRAVENOUS at 10:37

## 2024-12-10 ASSESSMENT — ENCOUNTER SYMPTOMS
VOMITING: 0
SCLERAL ICTERUS: 0
BACK PAIN: 0
DIARRHEA: 0
HEMOPTYSIS: 0
CONSTIPATION: 0
SHORTNESS OF BREATH: 0
BLOOD IN STOOL: 0
ABDOMINAL DISTENTION: 0
COUGH: 0
SORE THROAT: 0
NAUSEA: 0

## 2024-12-10 NOTE — PROGRESS NOTES
from 240 --> 209 noted and expressed concern to pt about this.  Of note, prior akbar resection with Dr Correa for diverticulitis per pt.  Sent note to dr Doyle re pt's case to expedite workup with his agreement.    He was hospitalized for abdominal pain and sepsis.  He was empirically placed on vancomycin and cefepime.  CT scanning disclosed no intra-abdominal fluid collection or abscess but did suggest that his tumor burden was somewhat smaller.  He had some purulent drainage from around his G-tube.  This was cultured and grew klebsiella pneumoniae and citrobacter freundii both of which were sensitive to Levaquin which he was discharged home on for 10 days.   FU and next FOLFIRI.  Overall, she has been well since last seen.  He met with his PCP and Dr Schulz for rectal bleeding/internal hemorrhoid, with KUB with moderate stool.  After stopping Xarelto 20mg bleeding has stopped.  He is using a rectal cream and was instructed to take both stool softener and miralax daily-hasn't started this yet.  His gas pain and foul odor belching has improved.  He was instructed to decrease Xarelto to 10 mg but hasn't done this yet.  Denied fevers or infectious symptoms.     -with his daughter for follow up and cycle 42 of FOLFIRI (Q3W).  He continues to do very well with every 3 week schedule.  There are no GI or bowel complaints.  Appetite is good and weight is stable.  Since last seen he met with PCP regarding uncontrolled DM, now on HS insulin and making small diet changes.  His energy level remains good and he stays after after the week of treatment.  He notes mild cough and PND, attributes to allergies.  Denies any pain or bleeding.  Neuropathy in feet is stable.  Denies any fevers or infectious symptoms.  Labs reviewed, ANC 1.0 glucose 246-much improved, proceed with treatment as planned.    - FU and next FOLFIRI. He continues to do well. He denies any issues. No SOB or new edema. R arm continues to have edema and we will

## 2024-12-13 ENCOUNTER — OFFICE VISIT (OUTPATIENT)
Dept: ONCOLOGY | Age: 64
End: 2024-12-13

## 2024-12-13 ENCOUNTER — HOSPITAL ENCOUNTER (OUTPATIENT)
Dept: LAB | Age: 64
Discharge: HOME | End: 2024-12-13
Payer: COMMERCIAL

## 2024-12-13 ENCOUNTER — HOSPITAL ENCOUNTER (OUTPATIENT)
Dept: INFUSION THERAPY | Age: 64
Setting detail: INFUSION SERIES
Discharge: HOME | End: 2024-12-13
Payer: COMMERCIAL

## 2024-12-13 VITALS
BODY MASS INDEX: 34.36 KG/M2 | RESPIRATION RATE: 16 BRPM | WEIGHT: 240 LBS | OXYGEN SATURATION: 92 % | TEMPERATURE: 97.8 F | DIASTOLIC BLOOD PRESSURE: 82 MMHG | HEART RATE: 80 BPM | SYSTOLIC BLOOD PRESSURE: 118 MMHG | HEIGHT: 70 IN

## 2024-12-13 DIAGNOSIS — C76.2 ABDOMINAL CARCINOMATOSIS (HCC): Primary | ICD-10-CM

## 2024-12-13 DIAGNOSIS — E83.42 HYPOMAGNESEMIA: ICD-10-CM

## 2024-12-13 DIAGNOSIS — I82.509 CHRONIC DEEP VEIN THROMBOSIS (DVT) OF LOWER EXTREMITY, UNSPECIFIED LATERALITY, UNSPECIFIED VEIN (HCC): ICD-10-CM

## 2024-12-13 DIAGNOSIS — Z95.828 PORT-A-CATH IN PLACE: ICD-10-CM

## 2024-12-13 DIAGNOSIS — C76.2 ABDOMINAL CARCINOMATOSIS (HCC): ICD-10-CM

## 2024-12-13 DIAGNOSIS — C78.6 MALIGNANT NEOPLASM METASTATIC TO PERITONEUM (HCC): ICD-10-CM

## 2024-12-13 DIAGNOSIS — C78.6 MALIGNANT NEOPLASM METASTATIC TO PERITONEUM (HCC): Primary | ICD-10-CM

## 2024-12-13 PROBLEM — R19.7 DIARRHEA: Status: RESOLVED | Noted: 2023-08-10 | Resolved: 2024-12-13

## 2024-12-13 PROBLEM — K13.79 MOUTH SORES: Status: RESOLVED | Noted: 2023-06-28 | Resolved: 2024-12-13

## 2024-12-13 PROBLEM — K56.609 SMALL BOWEL OBSTRUCTION (HCC): Status: RESOLVED | Noted: 2022-05-20 | Resolved: 2024-12-13

## 2024-12-13 LAB
ALBUMIN SERPL-MCNC: 3.8 G/DL (ref 3.2–4.6)
ALBUMIN/GLOB SERPL: 1.2 (ref 1–1.9)
ALP SERPL-CCNC: 123 U/L (ref 40–129)
ALT SERPL-CCNC: 29 U/L (ref 8–55)
ANION GAP SERPL CALC-SCNC: 8 MMOL/L (ref 7–16)
AST SERPL-CCNC: 34 U/L (ref 15–37)
BASOPHILS # BLD: 0.1 K/UL (ref 0–0.2)
BASOPHILS NFR BLD: 1 % (ref 0–2)
BILIRUB SERPL-MCNC: 0.4 MG/DL (ref 0–1.2)
BUN SERPL-MCNC: 12 MG/DL (ref 8–23)
CALCIUM SERPL-MCNC: 9.1 MG/DL (ref 8.8–10.2)
CEA SERPL-MCNC: 2.1 NG/ML (ref 0–3.8)
CHLORIDE SERPL-SCNC: 103 MMOL/L (ref 98–107)
CO2 SERPL-SCNC: 26 MMOL/L (ref 20–29)
CREAT SERPL-MCNC: 0.93 MG/DL (ref 0.8–1.3)
DIFFERENTIAL METHOD BLD: NORMAL
EOSINOPHIL # BLD: 0.1 K/UL (ref 0–0.8)
EOSINOPHIL NFR BLD: 2 % (ref 0.5–7.8)
ERYTHROCYTE [DISTWIDTH] IN BLOOD BY AUTOMATED COUNT: 13.2 % (ref 11.9–14.6)
GLOBULIN SER CALC-MCNC: 3.2 G/DL (ref 2.3–3.5)
GLUCOSE SERPL-MCNC: 218 MG/DL (ref 70–99)
HCT VFR BLD AUTO: 43.1 % (ref 41.1–50.3)
HGB BLD-MCNC: 14.1 G/DL (ref 13.6–17.2)
IMM GRANULOCYTES # BLD AUTO: 0 K/UL (ref 0–0.5)
IMM GRANULOCYTES NFR BLD AUTO: 0 % (ref 0–5)
LYMPHOCYTES # BLD: 2.2 K/UL (ref 0.5–4.6)
LYMPHOCYTES NFR BLD: 30 % (ref 13–44)
MAGNESIUM SERPL-MCNC: 1.8 MG/DL (ref 1.8–2.4)
MCH RBC QN AUTO: 29.1 PG (ref 26.1–32.9)
MCHC RBC AUTO-ENTMCNC: 32.7 G/DL (ref 31.4–35)
MCV RBC AUTO: 89 FL (ref 82–102)
MONOCYTES # BLD: 0.6 K/UL (ref 0.1–1.3)
MONOCYTES NFR BLD: 8 % (ref 4–12)
NEUTS SEG # BLD: 4.4 K/UL (ref 1.7–8.2)
NEUTS SEG NFR BLD: 59 % (ref 43–78)
NRBC # BLD: 0 K/UL (ref 0–0.2)
PLATELET # BLD AUTO: 195 K/UL (ref 150–450)
PMV BLD AUTO: 10 FL (ref 9.4–12.3)
POTASSIUM SERPL-SCNC: 4.5 MMOL/L (ref 3.5–5.1)
PROT SERPL-MCNC: 7 G/DL (ref 6.3–8.2)
RBC # BLD AUTO: 4.84 M/UL (ref 4.23–5.6)
SODIUM SERPL-SCNC: 137 MMOL/L (ref 136–145)
WBC # BLD AUTO: 7.4 K/UL (ref 4.3–11.1)

## 2024-12-13 PROCEDURE — 80053 COMPREHEN METABOLIC PANEL: CPT

## 2024-12-13 PROCEDURE — 96413 CHEMO IV INFUSION 1 HR: CPT

## 2024-12-13 PROCEDURE — 96368 THER/DIAG CONCURRENT INF: CPT

## 2024-12-13 PROCEDURE — 6360000002 HC RX W HCPCS: Performed by: INTERNAL MEDICINE

## 2024-12-13 PROCEDURE — 2580000003 HC RX 258: Performed by: INTERNAL MEDICINE

## 2024-12-13 PROCEDURE — 96375 TX/PRO/DX INJ NEW DRUG ADDON: CPT

## 2024-12-13 PROCEDURE — 96372 THER/PROPH/DIAG INJ SC/IM: CPT

## 2024-12-13 PROCEDURE — 82378 CARCINOEMBRYONIC ANTIGEN: CPT

## 2024-12-13 PROCEDURE — 85025 COMPLETE CBC W/AUTO DIFF WBC: CPT

## 2024-12-13 PROCEDURE — 96411 CHEMO IV PUSH ADDL DRUG: CPT

## 2024-12-13 PROCEDURE — G0498 CHEMO EXTEND IV INFUS W/PUMP: HCPCS

## 2024-12-13 PROCEDURE — 83735 ASSAY OF MAGNESIUM: CPT

## 2024-12-13 PROCEDURE — 96367 TX/PROPH/DG ADDL SEQ IV INF: CPT

## 2024-12-13 RX ORDER — ATROPINE SULFATE 0.4 MG/ML
0.4 INJECTION, SOLUTION INTRAVENOUS ONCE
Status: COMPLETED | OUTPATIENT
Start: 2024-12-13 | End: 2024-12-13

## 2024-12-13 RX ORDER — ONDANSETRON 2 MG/ML
8 INJECTION INTRAMUSCULAR; INTRAVENOUS ONCE
Status: COMPLETED | OUTPATIENT
Start: 2024-12-13 | End: 2024-12-13

## 2024-12-13 RX ORDER — SODIUM CHLORIDE 9 MG/ML
5-250 INJECTION, SOLUTION INTRAVENOUS PRN
OUTPATIENT
Start: 2024-12-15

## 2024-12-13 RX ORDER — DEXTROSE MONOHYDRATE 50 MG/ML
5-250 INJECTION, SOLUTION INTRAVENOUS PRN
Status: CANCELLED | OUTPATIENT
Start: 2024-12-13

## 2024-12-13 RX ORDER — SODIUM CHLORIDE 9 MG/ML
5-250 INJECTION, SOLUTION INTRAVENOUS PRN
Status: CANCELLED | OUTPATIENT
Start: 2024-12-13

## 2024-12-13 RX ORDER — ONDANSETRON 2 MG/ML
8 INJECTION INTRAMUSCULAR; INTRAVENOUS ONCE
Status: CANCELLED | OUTPATIENT
Start: 2024-12-13 | End: 2024-12-13

## 2024-12-13 RX ORDER — SODIUM CHLORIDE 0.9 % (FLUSH) 0.9 %
5-40 SYRINGE (ML) INJECTION PRN
Status: CANCELLED | OUTPATIENT
Start: 2024-12-13

## 2024-12-13 RX ORDER — DIPHENHYDRAMINE HYDROCHLORIDE 50 MG/ML
50 INJECTION INTRAMUSCULAR; INTRAVENOUS
Status: DISCONTINUED | OUTPATIENT
Start: 2024-12-13 | End: 2024-12-14 | Stop reason: HOSPADM

## 2024-12-13 RX ORDER — FLUOROURACIL 50 MG/ML
400 INJECTION, SOLUTION INTRAVENOUS ONCE
Status: CANCELLED | OUTPATIENT
Start: 2024-12-13 | End: 2024-12-13

## 2024-12-13 RX ORDER — SODIUM CHLORIDE 0.9 % (FLUSH) 0.9 %
5-40 SYRINGE (ML) INJECTION PRN
OUTPATIENT
Start: 2024-12-15

## 2024-12-13 RX ORDER — DEXTROSE MONOHYDRATE 50 MG/ML
5-250 INJECTION, SOLUTION INTRAVENOUS PRN
Status: DISCONTINUED | OUTPATIENT
Start: 2024-12-13 | End: 2024-12-14 | Stop reason: HOSPADM

## 2024-12-13 RX ORDER — SODIUM CHLORIDE 9 MG/ML
INJECTION, SOLUTION INTRAVENOUS CONTINUOUS
Status: CANCELLED | OUTPATIENT
Start: 2024-12-13

## 2024-12-13 RX ORDER — ALBUTEROL SULFATE 90 UG/1
4 INHALANT RESPIRATORY (INHALATION) PRN
Status: CANCELLED | OUTPATIENT
Start: 2024-12-13

## 2024-12-13 RX ORDER — SODIUM CHLORIDE 0.9 % (FLUSH) 0.9 %
5-40 SYRINGE (ML) INJECTION PRN
Status: DISCONTINUED | OUTPATIENT
Start: 2024-12-13 | End: 2024-12-14 | Stop reason: HOSPADM

## 2024-12-13 RX ORDER — ACETAMINOPHEN 325 MG/1
650 TABLET ORAL
Status: CANCELLED | OUTPATIENT
Start: 2024-12-13

## 2024-12-13 RX ORDER — FLUOROURACIL 50 MG/ML
400 INJECTION, SOLUTION INTRAVENOUS ONCE
Status: COMPLETED | OUTPATIENT
Start: 2024-12-13 | End: 2024-12-13

## 2024-12-13 RX ORDER — ONDANSETRON 2 MG/ML
8 INJECTION INTRAMUSCULAR; INTRAVENOUS
Status: CANCELLED | OUTPATIENT
Start: 2024-12-13

## 2024-12-13 RX ORDER — HEPARIN SODIUM (PORCINE) LOCK FLUSH IV SOLN 100 UNIT/ML 100 UNIT/ML
500 SOLUTION INTRAVENOUS PRN
OUTPATIENT
Start: 2024-12-15

## 2024-12-13 RX ORDER — HYDROCORTISONE SODIUM SUCCINATE 100 MG/2ML
100 INJECTION INTRAMUSCULAR; INTRAVENOUS
Status: DISCONTINUED | OUTPATIENT
Start: 2024-12-13 | End: 2024-12-14 | Stop reason: HOSPADM

## 2024-12-13 RX ORDER — HEPARIN SODIUM (PORCINE) LOCK FLUSH IV SOLN 100 UNIT/ML 100 UNIT/ML
500 SOLUTION INTRAVENOUS PRN
Status: CANCELLED | OUTPATIENT
Start: 2024-12-13

## 2024-12-13 RX ORDER — MEPERIDINE HYDROCHLORIDE 50 MG/ML
12.5 INJECTION INTRAMUSCULAR; INTRAVENOUS; SUBCUTANEOUS PRN
Status: CANCELLED | OUTPATIENT
Start: 2024-12-13

## 2024-12-13 RX ORDER — DEXAMETHASONE SODIUM PHOSPHATE 10 MG/ML
10 INJECTION INTRAMUSCULAR; INTRAVENOUS ONCE
Status: COMPLETED | OUTPATIENT
Start: 2024-12-13 | End: 2024-12-13

## 2024-12-13 RX ORDER — HYDROCORTISONE SODIUM SUCCINATE 100 MG/2ML
100 INJECTION INTRAMUSCULAR; INTRAVENOUS
Status: CANCELLED | OUTPATIENT
Start: 2024-12-13

## 2024-12-13 RX ORDER — DIPHENHYDRAMINE HYDROCHLORIDE 50 MG/ML
50 INJECTION INTRAMUSCULAR; INTRAVENOUS
Status: CANCELLED | OUTPATIENT
Start: 2024-12-13

## 2024-12-13 RX ORDER — EPINEPHRINE 1 MG/ML
0.3 INJECTION, SOLUTION, CONCENTRATE INTRAVENOUS PRN
Status: CANCELLED | OUTPATIENT
Start: 2024-12-13

## 2024-12-13 RX ORDER — FAMOTIDINE 10 MG/ML
20 INJECTION, SOLUTION INTRAVENOUS
Status: CANCELLED | OUTPATIENT
Start: 2024-12-13

## 2024-12-13 RX ADMIN — SODIUM CHLORIDE, PRESERVATIVE FREE 10 ML: 5 INJECTION INTRAVENOUS at 10:45

## 2024-12-13 RX ADMIN — SODIUM CHLORIDE, PRESERVATIVE FREE 10 ML: 5 INJECTION INTRAVENOUS at 09:12

## 2024-12-13 RX ADMIN — SODIUM CHLORIDE 150 MG: 9 INJECTION, SOLUTION INTRAVENOUS at 11:08

## 2024-12-13 RX ADMIN — LEUCOVORIN CALCIUM 900 MG: 200 INJECTION, POWDER, LYOPHILIZED, FOR SUSPENSION INTRAMUSCULAR; INTRAVENOUS at 11:30

## 2024-12-13 RX ADMIN — SODIUM CHLORIDE, PRESERVATIVE FREE 10 ML: 5 INJECTION INTRAVENOUS at 13:08

## 2024-12-13 RX ADMIN — DEXTROSE MONOHYDRATE 100 ML/HR: 50 INJECTION, SOLUTION INTRAVENOUS at 10:46

## 2024-12-13 RX ADMIN — ATROPINE SULFATE 0.4 MG: 0.4 INJECTION, SOLUTION INTRAVENOUS at 10:49

## 2024-12-13 RX ADMIN — ONDANSETRON 8 MG: 2 INJECTION INTRAMUSCULAR; INTRAVENOUS at 10:47

## 2024-12-13 RX ADMIN — DEXAMETHASONE SODIUM PHOSPHATE 10 MG: 10 INJECTION INTRAMUSCULAR; INTRAVENOUS at 10:48

## 2024-12-13 RX ADMIN — FLUOROURACIL 900 MG: 50 INJECTION, SOLUTION INTRAVENOUS at 13:03

## 2024-12-13 RX ADMIN — IRINOTECAN HYDROCHLORIDE 340 MG: 20 INJECTION, SOLUTION INTRAVENOUS at 11:32

## 2024-12-13 RX ADMIN — FLUOROURACIL 5400 MG: 50 INJECTION, SOLUTION INTRAVENOUS at 13:09

## 2024-12-13 ASSESSMENT — PATIENT HEALTH QUESTIONNAIRE - PHQ9
SUM OF ALL RESPONSES TO PHQ QUESTIONS 1-9: 0
1. LITTLE INTEREST OR PLEASURE IN DOING THINGS: NOT AT ALL
2. FEELING DOWN, DEPRESSED OR HOPELESS: NOT AT ALL
SUM OF ALL RESPONSES TO PHQ9 QUESTIONS 1 & 2: 0

## 2024-12-13 NOTE — PROGRESS NOTES
Arrived to the Infusion Center.  FOLFIRI completed. Patient tolerated well.   Any issues or concerns during appointment: none.  Patient aware of next infusion appointment on 12/16 (date) at 9:15 AM (time).  Patient instructed to call provider with temperature of 100.4 or greater or nausea/vomiting/ diarrhea or pain not controlled by medications  Discharged ambulatory.

## 2024-12-13 NOTE — PROGRESS NOTES
Folfiri completed without complications.  Pt aware of next appt 12/16 at 0915.  Pt discharged ambulatory with pump infusing, no distress noted.  Patient instructed to call provider with temperature of 100.4 or greater or nausea/vomiting/ diarrhea or pain not controlled by medications

## 2024-12-13 NOTE — PATIENT INSTRUCTIONS
Patient Information from Today's Visit    The members of your [facility] are listed below:    Physician Provider: Barbara Bennett Medical Oncologist  Advanced Practice Clinician: Arlette Murrell NP  Registered Nurse: Thea SMITH RN  Navigator: Bertha CASTELLON RN  Medical Assistant: Lizbet MENCHACA MA  : Edwige COOPER   Supportive Care Services: Jacqueline COLLINS LMSW    Diagnosis: Abdominal Carcinomatosis      Follow Up Instructions: As scheduled.    Labs reviewed.  Symptoms reviewed.  Scan reviewed.  CT scan of chest, abdomen, and pelvis due beginning of March 2025.  You can call to schedule this at [phone].      Treatment Summary has been discussed and given to patient:N/A      Current Labs:   Hospital Outpatient Visit on 12/13/2024   Component Date Value Ref Range Status    Magnesium 12/13/2024 1.8  1.8 - 2.4 mg/dL Final    Sodium 12/13/2024 137  136 - 145 mmol/L Final    Potassium 12/13/2024 4.5  3.5 - 5.1 mmol/L Final    Chloride 12/13/2024 103  98 - 107 mmol/L Final    CO2 12/13/2024 26  20 - 29 mmol/L Final    Anion Gap 12/13/2024 8  7 - 16 mmol/L Final    Glucose 12/13/2024 218 (H)  70 - 99 mg/dL Final    Comment: <70 mg/dL Consistent with, but not fully diagnostic of hypoglycemia.  100 - 125 mg/dL Impaired fasting glucose/consistent with pre-diabetes mellitus.  > 126 mg/dl Fasting glucose consistent with overt diabetes mellitus      BUN 12/13/2024 12  8 - 23 MG/DL Final    Creatinine 12/13/2024 0.93  0.80 - 1.30 MG/DL Final    Est, Glom Filt Rate 12/13/2024 >90  >60 ml/min/1.73m2 Final    Comment:    Pediatric calculator link: https://www.kidney.org/professionals/kdoqi/gfr_calculatorped     These results are not intended for use in patients <18 years of age.     eGFR results are calculated without a race factor using  the 2021 CKD-EPI equation. Careful clinical correlation is recommended, particularly when comparing to results calculated using previous equations.  The CKD-EPI equation is less

## 2024-12-16 ENCOUNTER — OFFICE VISIT (OUTPATIENT)
Dept: INTERNAL MEDICINE CLINIC | Facility: CLINIC | Age: 64
End: 2024-12-16
Payer: COMMERCIAL

## 2024-12-16 ENCOUNTER — HOSPITAL ENCOUNTER (OUTPATIENT)
Dept: INFUSION THERAPY | Age: 64
Setting detail: INFUSION SERIES
Discharge: HOME | End: 2024-12-16
Payer: COMMERCIAL

## 2024-12-16 VITALS
BODY MASS INDEX: 33.36 KG/M2 | HEIGHT: 70 IN | DIASTOLIC BLOOD PRESSURE: 70 MMHG | HEART RATE: 77 BPM | WEIGHT: 233 LBS | OXYGEN SATURATION: 93 % | SYSTOLIC BLOOD PRESSURE: 122 MMHG

## 2024-12-16 VITALS
TEMPERATURE: 98.1 F | DIASTOLIC BLOOD PRESSURE: 80 MMHG | SYSTOLIC BLOOD PRESSURE: 137 MMHG | RESPIRATION RATE: 16 BRPM | HEART RATE: 82 BPM | OXYGEN SATURATION: 97 %

## 2024-12-16 DIAGNOSIS — E11.65 TYPE 2 DIABETES MELLITUS WITH HYPERGLYCEMIA, WITH LONG-TERM CURRENT USE OF INSULIN (HCC): Primary | ICD-10-CM

## 2024-12-16 DIAGNOSIS — E11.65 TYPE 2 DIABETES MELLITUS WITH HYPERGLYCEMIA, UNSPECIFIED WHETHER LONG TERM INSULIN USE (HCC): ICD-10-CM

## 2024-12-16 DIAGNOSIS — Z79.4 TYPE 2 DIABETES MELLITUS WITH HYPERGLYCEMIA, WITH LONG-TERM CURRENT USE OF INSULIN (HCC): Primary | ICD-10-CM

## 2024-12-16 PROCEDURE — 99214 OFFICE O/P EST MOD 30 MIN: CPT | Performed by: STUDENT IN AN ORGANIZED HEALTH CARE EDUCATION/TRAINING PROGRAM

## 2024-12-16 PROCEDURE — 3052F HG A1C>EQUAL 8.0%<EQUAL 9.0%: CPT | Performed by: STUDENT IN AN ORGANIZED HEALTH CARE EDUCATION/TRAINING PROGRAM

## 2024-12-16 PROCEDURE — G8482 FLU IMMUNIZE ORDER/ADMIN: HCPCS | Performed by: STUDENT IN AN ORGANIZED HEALTH CARE EDUCATION/TRAINING PROGRAM

## 2024-12-16 PROCEDURE — G8417 CALC BMI ABV UP PARAM F/U: HCPCS | Performed by: STUDENT IN AN ORGANIZED HEALTH CARE EDUCATION/TRAINING PROGRAM

## 2024-12-16 PROCEDURE — 2580000003 HC RX 258: Performed by: INTERNAL MEDICINE

## 2024-12-16 PROCEDURE — 2022F DILAT RTA XM EVC RTNOPTHY: CPT | Performed by: STUDENT IN AN ORGANIZED HEALTH CARE EDUCATION/TRAINING PROGRAM

## 2024-12-16 PROCEDURE — 3078F DIAST BP <80 MM HG: CPT | Performed by: STUDENT IN AN ORGANIZED HEALTH CARE EDUCATION/TRAINING PROGRAM

## 2024-12-16 PROCEDURE — G8427 DOCREV CUR MEDS BY ELIG CLIN: HCPCS | Performed by: STUDENT IN AN ORGANIZED HEALTH CARE EDUCATION/TRAINING PROGRAM

## 2024-12-16 PROCEDURE — 1036F TOBACCO NON-USER: CPT | Performed by: STUDENT IN AN ORGANIZED HEALTH CARE EDUCATION/TRAINING PROGRAM

## 2024-12-16 PROCEDURE — 3074F SYST BP LT 130 MM HG: CPT | Performed by: STUDENT IN AN ORGANIZED HEALTH CARE EDUCATION/TRAINING PROGRAM

## 2024-12-16 PROCEDURE — 3017F COLORECTAL CA SCREEN DOC REV: CPT | Performed by: STUDENT IN AN ORGANIZED HEALTH CARE EDUCATION/TRAINING PROGRAM

## 2024-12-16 PROCEDURE — 96523 IRRIG DRUG DELIVERY DEVICE: CPT

## 2024-12-16 RX ORDER — METFORMIN HYDROCHLORIDE 500 MG/1
500 TABLET, EXTENDED RELEASE ORAL
Qty: 60 TABLET | Refills: 1 | Status: SHIPPED | OUTPATIENT
Start: 2024-12-16

## 2024-12-16 RX ORDER — INSULIN GLARGINE 100 [IU]/ML
INJECTION, SOLUTION SUBCUTANEOUS
Qty: 10 ADJUSTABLE DOSE PRE-FILLED PEN SYRINGE | Refills: 5
Start: 2024-12-16

## 2024-12-16 RX ORDER — SODIUM CHLORIDE 0.9 % (FLUSH) 0.9 %
5-40 SYRINGE (ML) INJECTION 2 TIMES DAILY
Status: DISCONTINUED | OUTPATIENT
Start: 2024-12-16 | End: 2024-12-17 | Stop reason: HOSPADM

## 2024-12-16 RX ADMIN — SODIUM CHLORIDE, PRESERVATIVE FREE 10 ML: 5 INJECTION INTRAVENOUS at 09:05

## 2024-12-16 ASSESSMENT — PATIENT HEALTH QUESTIONNAIRE - PHQ9
SUM OF ALL RESPONSES TO PHQ9 QUESTIONS 1 & 2: 0
3. TROUBLE FALLING OR STAYING ASLEEP: NOT AT ALL
SUM OF ALL RESPONSES TO PHQ QUESTIONS 1-9: 1
6. FEELING BAD ABOUT YOURSELF - OR THAT YOU ARE A FAILURE OR HAVE LET YOURSELF OR YOUR FAMILY DOWN: NOT AT ALL
1. LITTLE INTEREST OR PLEASURE IN DOING THINGS: NOT AT ALL
9. THOUGHTS THAT YOU WOULD BE BETTER OFF DEAD, OR OF HURTING YOURSELF: NOT AT ALL
10. IF YOU CHECKED OFF ANY PROBLEMS, HOW DIFFICULT HAVE THESE PROBLEMS MADE IT FOR YOU TO DO YOUR WORK, TAKE CARE OF THINGS AT HOME, OR GET ALONG WITH OTHER PEOPLE: NOT DIFFICULT AT ALL
SUM OF ALL RESPONSES TO PHQ QUESTIONS 1-9: 1
SUM OF ALL RESPONSES TO PHQ QUESTIONS 1-9: 1
2. FEELING DOWN, DEPRESSED OR HOPELESS: NOT AT ALL
8. MOVING OR SPEAKING SO SLOWLY THAT OTHER PEOPLE COULD HAVE NOTICED. OR THE OPPOSITE, BEING SO FIGETY OR RESTLESS THAT YOU HAVE BEEN MOVING AROUND A LOT MORE THAN USUAL: NOT AT ALL
4. FEELING TIRED OR HAVING LITTLE ENERGY: SEVERAL DAYS
5. POOR APPETITE OR OVEREATING: NOT AT ALL
SUM OF ALL RESPONSES TO PHQ QUESTIONS 1-9: 1
7. TROUBLE CONCENTRATING ON THINGS, SUCH AS READING THE NEWSPAPER OR WATCHING TELEVISION: NOT AT ALL

## 2024-12-16 NOTE — PROGRESS NOTES
Arrived to the Infusion Center.  Adrucil pump discontinued. Patient tolerated without complication.   Any issues or concerns during appointment: No.  Patient aware of next infusion appointment on 01/10/25 (date) at 1030 (time).  Patient instructed to call provider with temperature of 100.4 or greater or nausea/vomiting/ diarrhea or pain not controlled by medications  Discharged ambulatory.

## 2024-12-16 NOTE — PROGRESS NOTES
FOLLOW UP VISIT    Subjective:    Denny Guevara (: 1960) is a 64 y.o., male,   Chief Complaint   Patient presents with    Diabetes       HPI:  Here with wife    Having issues with the insulin pen, seems like it is not working at higher doses of insulin, so he will inject small doses    Was told by [facility] to call insulin pen company but was then told to get rx sent to different drug store.    Glucose this . Lowest 128 or 130. He forgot to bring glucose log. At the cancer center 24 was 218. Didn't bring other readings.    He has cut out sweets.    The following portions of the patient's history were reviewed and updated as appropriate:      Current Outpatient Medications   Medication Sig Dispense Refill    Potassium Chloride Pamela ER (KLOR-CON M20 PO) Take 20 mEq by mouth daily      metFORMIN (GLUCOPHAGE-XR) 500 MG extended release tablet Take 1 tablet by mouth daily (with breakfast) 60 tablet 1    insulin glargine (SEMGLEE) 100 UNIT/ML injection pen Inject 38 units nightly, for fasting glucose over 130 every 3 days increase by 3 units up to 45 units nightly 10 Adjustable Dose Pre-filled Pen Syringe 5    pantoprazole (PROTONIX) 40 MG tablet TAKE 1 TABLET BY MOUTH EVERY DAY BEFORE BREAKFAST 90 tablet 3    rivaroxaban (XARELTO) 20 MG TABS tablet Take 1 tablet by mouth daily (with breakfast) 30 tablet 3    DULoxetine (CYMBALTA) 60 MG extended release capsule Take 1 capsule by mouth daily 90 capsule 3    busPIRone (BUSPAR) 7.5 MG tablet Take 1 tablet by mouth daily 90 tablet 3    rosuvastatin (CRESTOR) 10 MG tablet TAKE 1 TABLET BY MOUTH EVERY DAY 90 tablet 4    gabapentin (NEURONTIN) 400 MG capsule Take 1 capsule by mouth 3 times daily as needed (pain) for up to 180 days. (Patient taking differently: Take 1 capsule by mouth daily.) 180 capsule 2    blood glucose monitor strips OneTouch Verio Testing Strips check blood sugar 3-4 times daily as directed. DX: E11.9 100 strip 5    Blood Glucose

## 2024-12-27 DIAGNOSIS — C16.9 MALIGNANT NEOPLASM OF STOMACH, UNSPECIFIED LOCATION (HCC): ICD-10-CM

## 2024-12-27 DIAGNOSIS — C76.2 ABDOMINAL CARCINOMATOSIS (HCC): ICD-10-CM

## 2024-12-27 DIAGNOSIS — C79.9 METASTATIC ADENOCARCINOMA (HCC): ICD-10-CM

## 2024-12-27 DIAGNOSIS — C78.6 MALIGNANT NEOPLASM METASTATIC TO PERITONEUM (HCC): Primary | ICD-10-CM

## 2025-01-10 ENCOUNTER — HOSPITAL ENCOUNTER (OUTPATIENT)
Dept: INFUSION THERAPY | Age: 65
Setting detail: INFUSION SERIES
Discharge: HOME | End: 2025-01-10
Payer: COMMERCIAL

## 2025-01-10 ENCOUNTER — OFFICE VISIT (OUTPATIENT)
Dept: ONCOLOGY | Age: 65
End: 2025-01-10

## 2025-01-10 ENCOUNTER — HOSPITAL ENCOUNTER (OUTPATIENT)
Dept: INFUSION THERAPY | Age: 65
Setting detail: INFUSION SERIES
End: 2025-01-10
Payer: COMMERCIAL

## 2025-01-10 VITALS
OXYGEN SATURATION: 93 % | RESPIRATION RATE: 16 BRPM | SYSTOLIC BLOOD PRESSURE: 119 MMHG | WEIGHT: 246.6 LBS | TEMPERATURE: 97.6 F | DIASTOLIC BLOOD PRESSURE: 68 MMHG | HEART RATE: 72 BPM | BODY MASS INDEX: 35.38 KG/M2

## 2025-01-10 DIAGNOSIS — C78.6 MALIGNANT NEOPLASM METASTATIC TO PERITONEUM (HCC): ICD-10-CM

## 2025-01-10 DIAGNOSIS — C76.2 ABDOMINAL CARCINOMATOSIS (HCC): Primary | ICD-10-CM

## 2025-01-10 DIAGNOSIS — I82.509 CHRONIC DEEP VEIN THROMBOSIS (DVT) OF LOWER EXTREMITY, UNSPECIFIED LATERALITY, UNSPECIFIED VEIN (HCC): ICD-10-CM

## 2025-01-10 DIAGNOSIS — C78.6 MALIGNANT NEOPLASM METASTATIC TO PERITONEUM (HCC): Primary | ICD-10-CM

## 2025-01-10 DIAGNOSIS — Z79.899 HIGH RISK MEDICATION USE: ICD-10-CM

## 2025-01-10 DIAGNOSIS — E83.42 HYPOMAGNESEMIA: ICD-10-CM

## 2025-01-10 DIAGNOSIS — C76.2 ABDOMINAL CARCINOMATOSIS (HCC): ICD-10-CM

## 2025-01-10 DIAGNOSIS — C79.9 METASTATIC ADENOCARCINOMA (HCC): ICD-10-CM

## 2025-01-10 DIAGNOSIS — C16.9 MALIGNANT NEOPLASM OF STOMACH, UNSPECIFIED LOCATION (HCC): ICD-10-CM

## 2025-01-10 LAB
ALBUMIN SERPL-MCNC: 3.8 G/DL (ref 3.2–4.6)
ALBUMIN/GLOB SERPL: 1.3 (ref 1–1.9)
ALP SERPL-CCNC: 128 U/L (ref 40–129)
ALT SERPL-CCNC: 28 U/L (ref 8–55)
ANION GAP SERPL CALC-SCNC: 10 MMOL/L (ref 7–16)
AST SERPL-CCNC: 32 U/L (ref 15–37)
BASOPHILS # BLD: 0.08 K/UL (ref 0–0.2)
BASOPHILS NFR BLD: 1.3 % (ref 0–2)
BILIRUB SERPL-MCNC: 0.4 MG/DL (ref 0–1.2)
BUN SERPL-MCNC: 8 MG/DL (ref 8–23)
CALCIUM SERPL-MCNC: 8.6 MG/DL (ref 8.8–10.2)
CEA SERPL-MCNC: 1.9 NG/ML (ref 0–3.8)
CHLORIDE SERPL-SCNC: 105 MMOL/L (ref 98–107)
CO2 SERPL-SCNC: 26 MMOL/L (ref 20–29)
CREAT SERPL-MCNC: 0.87 MG/DL (ref 0.8–1.3)
DIFFERENTIAL METHOD BLD: NORMAL
EOSINOPHIL # BLD: 0.2 K/UL (ref 0–0.8)
EOSINOPHIL NFR BLD: 3.2 % (ref 0.5–7.8)
ERYTHROCYTE [DISTWIDTH] IN BLOOD BY AUTOMATED COUNT: 13.4 % (ref 11.9–14.6)
GLOBULIN SER CALC-MCNC: 3 G/DL (ref 2.3–3.5)
GLUCOSE SERPL-MCNC: 164 MG/DL (ref 70–99)
HCT VFR BLD AUTO: 43.1 % (ref 41.1–50.3)
HGB BLD-MCNC: 14.2 G/DL (ref 13.6–17.2)
IMM GRANULOCYTES # BLD AUTO: 0.01 K/UL (ref 0–0.5)
IMM GRANULOCYTES NFR BLD AUTO: 0.2 % (ref 0–5)
LYMPHOCYTES # BLD: 2.07 K/UL (ref 0.5–4.6)
LYMPHOCYTES NFR BLD: 33.2 % (ref 13–44)
MAGNESIUM SERPL-MCNC: 2 MG/DL (ref 1.8–2.4)
MCH RBC QN AUTO: 29.3 PG (ref 26.1–32.9)
MCHC RBC AUTO-ENTMCNC: 32.9 G/DL (ref 31.4–35)
MCV RBC AUTO: 89 FL (ref 82–102)
MONOCYTES # BLD: 0.51 K/UL (ref 0.1–1.3)
MONOCYTES NFR BLD: 8.2 % (ref 4–12)
NEUTS SEG # BLD: 3.37 K/UL (ref 1.7–8.2)
NEUTS SEG NFR BLD: 53.9 % (ref 43–78)
NRBC # BLD: 0 K/UL (ref 0–0.2)
PLATELET # BLD AUTO: 218 K/UL (ref 150–450)
PMV BLD AUTO: 10.4 FL (ref 9.4–12.3)
POTASSIUM SERPL-SCNC: 4.2 MMOL/L (ref 3.5–5.1)
PROT SERPL-MCNC: 6.8 G/DL (ref 6.3–8.2)
RBC # BLD AUTO: 4.84 M/UL (ref 4.23–5.6)
SODIUM SERPL-SCNC: 141 MMOL/L (ref 136–145)
WBC # BLD AUTO: 6.2 K/UL (ref 4.3–11.1)

## 2025-01-10 PROCEDURE — 96372 THER/PROPH/DIAG INJ SC/IM: CPT

## 2025-01-10 PROCEDURE — 6360000002 HC RX W HCPCS

## 2025-01-10 PROCEDURE — 96411 CHEMO IV PUSH ADDL DRUG: CPT

## 2025-01-10 PROCEDURE — 82378 CARCINOEMBRYONIC ANTIGEN: CPT

## 2025-01-10 PROCEDURE — 80053 COMPREHEN METABOLIC PANEL: CPT

## 2025-01-10 PROCEDURE — 85025 COMPLETE CBC W/AUTO DIFF WBC: CPT

## 2025-01-10 PROCEDURE — 2580000003 HC RX 258

## 2025-01-10 PROCEDURE — 96413 CHEMO IV INFUSION 1 HR: CPT

## 2025-01-10 PROCEDURE — 96367 TX/PROPH/DG ADDL SEQ IV INF: CPT

## 2025-01-10 PROCEDURE — 83735 ASSAY OF MAGNESIUM: CPT

## 2025-01-10 PROCEDURE — 96368 THER/DIAG CONCURRENT INF: CPT

## 2025-01-10 PROCEDURE — 96375 TX/PRO/DX INJ NEW DRUG ADDON: CPT

## 2025-01-10 PROCEDURE — G0498 CHEMO EXTEND IV INFUS W/PUMP: HCPCS

## 2025-01-10 RX ORDER — ONDANSETRON 2 MG/ML
8 INJECTION INTRAMUSCULAR; INTRAVENOUS ONCE
Status: COMPLETED | OUTPATIENT
Start: 2025-01-10 | End: 2025-01-10

## 2025-01-10 RX ORDER — HEPARIN SODIUM (PORCINE) LOCK FLUSH IV SOLN 100 UNIT/ML 100 UNIT/ML
500 SOLUTION INTRAVENOUS PRN
OUTPATIENT
Start: 2025-01-12

## 2025-01-10 RX ORDER — SODIUM CHLORIDE 9 MG/ML
5-250 INJECTION, SOLUTION INTRAVENOUS PRN
Status: CANCELLED | OUTPATIENT
Start: 2025-01-10

## 2025-01-10 RX ORDER — HYDROCORTISONE SODIUM SUCCINATE 100 MG/2ML
100 INJECTION INTRAMUSCULAR; INTRAVENOUS
Status: CANCELLED | OUTPATIENT
Start: 2025-01-10

## 2025-01-10 RX ORDER — FAMOTIDINE 10 MG/ML
20 INJECTION, SOLUTION INTRAVENOUS
Status: CANCELLED | OUTPATIENT
Start: 2025-01-10

## 2025-01-10 RX ORDER — ONDANSETRON 2 MG/ML
8 INJECTION INTRAMUSCULAR; INTRAVENOUS ONCE
Status: CANCELLED | OUTPATIENT
Start: 2025-01-10 | End: 2025-01-10

## 2025-01-10 RX ORDER — DEXAMETHASONE SODIUM PHOSPHATE 10 MG/ML
10 INJECTION INTRAMUSCULAR; INTRAVENOUS ONCE
Status: COMPLETED | OUTPATIENT
Start: 2025-01-10 | End: 2025-01-10

## 2025-01-10 RX ORDER — ATROPINE SULFATE 0.4 MG/ML
0.4 INJECTION, SOLUTION INTRAVENOUS ONCE
Status: COMPLETED | OUTPATIENT
Start: 2025-01-10 | End: 2025-01-10

## 2025-01-10 RX ORDER — SODIUM CHLORIDE 9 MG/ML
INJECTION, SOLUTION INTRAVENOUS CONTINUOUS
Status: CANCELLED | OUTPATIENT
Start: 2025-01-10

## 2025-01-10 RX ORDER — SODIUM CHLORIDE 0.9 % (FLUSH) 0.9 %
5-40 SYRINGE (ML) INJECTION PRN
Status: CANCELLED | OUTPATIENT
Start: 2025-01-10

## 2025-01-10 RX ORDER — ONDANSETRON 2 MG/ML
8 INJECTION INTRAMUSCULAR; INTRAVENOUS
Status: CANCELLED | OUTPATIENT
Start: 2025-01-10

## 2025-01-10 RX ORDER — SODIUM CHLORIDE 0.9 % (FLUSH) 0.9 %
5-40 SYRINGE (ML) INJECTION PRN
OUTPATIENT
Start: 2025-01-12

## 2025-01-10 RX ORDER — FLUOROURACIL 50 MG/ML
400 INJECTION, SOLUTION INTRAVENOUS ONCE
Status: CANCELLED | OUTPATIENT
Start: 2025-01-10 | End: 2025-01-10

## 2025-01-10 RX ORDER — ACETAMINOPHEN 325 MG/1
650 TABLET ORAL
Status: CANCELLED | OUTPATIENT
Start: 2025-01-10

## 2025-01-10 RX ORDER — FLUOROURACIL 50 MG/ML
400 INJECTION, SOLUTION INTRAVENOUS ONCE
Status: COMPLETED | OUTPATIENT
Start: 2025-01-10 | End: 2025-01-10

## 2025-01-10 RX ORDER — MEPERIDINE HYDROCHLORIDE 50 MG/ML
12.5 INJECTION INTRAMUSCULAR; INTRAVENOUS; SUBCUTANEOUS PRN
Status: CANCELLED | OUTPATIENT
Start: 2025-01-10

## 2025-01-10 RX ORDER — DIPHENHYDRAMINE HYDROCHLORIDE 50 MG/ML
50 INJECTION INTRAMUSCULAR; INTRAVENOUS
Status: CANCELLED | OUTPATIENT
Start: 2025-01-10

## 2025-01-10 RX ORDER — DEXTROSE MONOHYDRATE 50 MG/ML
5-250 INJECTION, SOLUTION INTRAVENOUS PRN
Status: DISCONTINUED | OUTPATIENT
Start: 2025-01-10 | End: 2025-01-11 | Stop reason: HOSPADM

## 2025-01-10 RX ORDER — SODIUM CHLORIDE 9 MG/ML
5-250 INJECTION, SOLUTION INTRAVENOUS PRN
OUTPATIENT
Start: 2025-01-12

## 2025-01-10 RX ORDER — EPINEPHRINE 1 MG/ML
0.3 INJECTION, SOLUTION, CONCENTRATE INTRAVENOUS PRN
Status: CANCELLED | OUTPATIENT
Start: 2025-01-10

## 2025-01-10 RX ORDER — DEXTROSE MONOHYDRATE 50 MG/ML
5-250 INJECTION, SOLUTION INTRAVENOUS PRN
Status: CANCELLED | OUTPATIENT
Start: 2025-01-10

## 2025-01-10 RX ORDER — HEPARIN SODIUM (PORCINE) LOCK FLUSH IV SOLN 100 UNIT/ML 100 UNIT/ML
500 SOLUTION INTRAVENOUS PRN
Status: CANCELLED | OUTPATIENT
Start: 2025-01-10

## 2025-01-10 RX ORDER — ALBUTEROL SULFATE 90 UG/1
4 INHALANT RESPIRATORY (INHALATION) PRN
Status: CANCELLED | OUTPATIENT
Start: 2025-01-10

## 2025-01-10 RX ADMIN — FLUOROURACIL 950 MG: 50 INJECTION, SOLUTION INTRAVENOUS at 10:56

## 2025-01-10 RX ADMIN — SODIUM CHLORIDE 150 MG: 9 INJECTION, SOLUTION INTRAVENOUS at 08:59

## 2025-01-10 RX ADMIN — DEXAMETHASONE SODIUM PHOSPHATE 10 MG: 10 INJECTION INTRAMUSCULAR; INTRAVENOUS at 08:44

## 2025-01-10 RX ADMIN — LEUCOVORIN CALCIUM 950 MG: 100 INJECTION, POWDER, LYOPHILIZED, FOR SUSPENSION INTRAMUSCULAR; INTRAVENOUS at 09:25

## 2025-01-10 RX ADMIN — IRINOTECAN HYDROCHLORIDE 340 MG: 20 INJECTION, SOLUTION INTRAVENOUS at 09:24

## 2025-01-10 RX ADMIN — FLUOROURACIL 5500 MG: 50 INJECTION, SOLUTION INTRAVENOUS at 11:03

## 2025-01-10 RX ADMIN — ONDANSETRON 8 MG: 2 INJECTION, SOLUTION INTRAMUSCULAR; INTRAVENOUS at 08:40

## 2025-01-10 RX ADMIN — ATROPINE SULFATE 0.4 MG: 0.4 INJECTION, SOLUTION INTRAVENOUS at 09:20

## 2025-01-10 ASSESSMENT — ENCOUNTER SYMPTOMS
COUGH: 0
SHORTNESS OF BREATH: 0
SORE THROAT: 0
HEMOPTYSIS: 0
SCLERAL ICTERUS: 0
BACK PAIN: 0
VOMITING: 0
BLOOD IN STOOL: 0
NAUSEA: 0
CONSTIPATION: 0
ABDOMINAL DISTENTION: 0
DIARRHEA: 0

## 2025-01-10 NOTE — PROGRESS NOTES
Arrived to the Infusion Center.  FOLFIRI completed and CI 5fu pump started . Patient tolerated well.   Any issues or concerns during appointment: no.  Patient aware of next infusion appointment on 1/13 at 1100  Patient instructed to call provider with temperature of 100.4 or greater or nausea/vomiting/ diarrhea or pain not controlled by medications  Discharged to home ambulatory.

## 2025-01-10 NOTE — PROGRESS NOTES
reviewed   PATHOLOGY:           6/2022         ASSESSMENT:   Diagnosis Orders   1. 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)  CBC with Auto Differential    Comprehensive Metabolic Panel    Magnesium    DISCONTINUED: dextrose 5 % solution    DISCONTINUED: fosaprepitant (EMEND) 150 mg in sodium chloride 0.9 % 250 mL IVPB    DISCONTINUED: dexAMETHasone (DECADRON) 12 mg in sodium chloride 0.9 % 50 mL IVPB    DISCONTINUED: ondansetron (ZOFRAN) injection 8 mg    DISCONTINUED: atropine injection 0.4 mg    DISCONTINUED: irinotecan (CAMPTOSAR) 340 mg in dextrose 5 % 250 mL chemo IVPB    DISCONTINUED: leucovorin calcium (WELLCOVORIN) 900 mg in dextrose 5 % 250 mL IVPB    DISCONTINUED: fluorouracil (ADRUCIL) chemo syringe 900 mg    DISCONTINUED: fluorouracil (ADRUCIL) 5,400 mg in sodium chloride 0.9 % 230 mL chemo elastomeric infusion      2. Abdominal carcinomatosis (HCC)  CBC with Auto Differential    Comprehensive Metabolic Panel    Magnesium    DISCONTINUED: dextrose 5 % solution    DISCONTINUED: fosaprepitant (EMEND) 150 mg in sodium chloride 0.9 % 250 mL IVPB    DISCONTINUED: dexAMETHasone (DECADRON) 12 mg in sodium chloride 0.9 % 50 mL IVPB    DISCONTINUED: ondansetron (ZOFRAN) injection 8 mg    DISCONTINUED: atropine injection 0.4 mg    DISCONTINUED: irinotecan (CAMPTOSAR) 340 mg in dextrose 5 % 250 mL chemo IVPB    DISCONTINUED: leucovorin calcium (WELLCOVORIN) 900 mg in dextrose 5 % 250 mL IVPB    DISCONTINUED: fluorouracil (ADRUCIL) chemo syringe 900 mg    DISCONTINUED: fluorouracil (ADRUCIL) 5,400 mg in sodium chloride 0.9 % 230 mL chemo elastomeric infusion      3. Hypomagnesemia  Magnesium      4. Chronic deep vein thrombosis (DVT) of lower extremity, unspecified laterality, unspecified vein (HCC)        5. High risk medication use        Mr. Guevara is here for FU of metastatic adenocarcinoma.      1. Metastatic adenocarcinoma   - s/p omental and mesenteric mass bx - c/w upper GI adenocarcinoma    - hx of

## 2025-01-13 ENCOUNTER — HOSPITAL ENCOUNTER (OUTPATIENT)
Dept: INFUSION THERAPY | Age: 65
Setting detail: INFUSION SERIES
Discharge: HOME | End: 2025-01-13
Payer: COMMERCIAL

## 2025-01-13 ENCOUNTER — OFFICE VISIT (OUTPATIENT)
Dept: INTERNAL MEDICINE CLINIC | Facility: CLINIC | Age: 65
End: 2025-01-13
Payer: COMMERCIAL

## 2025-01-13 VITALS
SYSTOLIC BLOOD PRESSURE: 130 MMHG | HEART RATE: 79 BPM | OXYGEN SATURATION: 93 % | HEIGHT: 70 IN | WEIGHT: 237 LBS | BODY MASS INDEX: 33.93 KG/M2 | DIASTOLIC BLOOD PRESSURE: 68 MMHG

## 2025-01-13 VITALS
RESPIRATION RATE: 18 BRPM | TEMPERATURE: 98 F | HEART RATE: 77 BPM | SYSTOLIC BLOOD PRESSURE: 142 MMHG | OXYGEN SATURATION: 94 % | DIASTOLIC BLOOD PRESSURE: 90 MMHG

## 2025-01-13 DIAGNOSIS — Z79.4 TYPE 2 DIABETES MELLITUS WITH HYPERGLYCEMIA, WITH LONG-TERM CURRENT USE OF INSULIN (HCC): Primary | ICD-10-CM

## 2025-01-13 DIAGNOSIS — E11.65 TYPE 2 DIABETES MELLITUS WITH HYPERGLYCEMIA, WITH LONG-TERM CURRENT USE OF INSULIN (HCC): Primary | ICD-10-CM

## 2025-01-13 DIAGNOSIS — J42 CHRONIC BRONCHITIS, UNSPECIFIED CHRONIC BRONCHITIS TYPE (HCC): ICD-10-CM

## 2025-01-13 DIAGNOSIS — C78.6 MALIGNANT NEOPLASM METASTATIC TO PERITONEUM (HCC): ICD-10-CM

## 2025-01-13 DIAGNOSIS — C76.2 ABDOMINAL CARCINOMATOSIS (HCC): Primary | ICD-10-CM

## 2025-01-13 PROCEDURE — 3046F HEMOGLOBIN A1C LEVEL >9.0%: CPT | Performed by: STUDENT IN AN ORGANIZED HEALTH CARE EDUCATION/TRAINING PROGRAM

## 2025-01-13 PROCEDURE — 3075F SYST BP GE 130 - 139MM HG: CPT | Performed by: STUDENT IN AN ORGANIZED HEALTH CARE EDUCATION/TRAINING PROGRAM

## 2025-01-13 PROCEDURE — 2500000003 HC RX 250 WO HCPCS

## 2025-01-13 PROCEDURE — 1036F TOBACCO NON-USER: CPT | Performed by: STUDENT IN AN ORGANIZED HEALTH CARE EDUCATION/TRAINING PROGRAM

## 2025-01-13 PROCEDURE — 3017F COLORECTAL CA SCREEN DOC REV: CPT | Performed by: STUDENT IN AN ORGANIZED HEALTH CARE EDUCATION/TRAINING PROGRAM

## 2025-01-13 PROCEDURE — 3023F SPIROM DOC REV: CPT | Performed by: STUDENT IN AN ORGANIZED HEALTH CARE EDUCATION/TRAINING PROGRAM

## 2025-01-13 PROCEDURE — G8417 CALC BMI ABV UP PARAM F/U: HCPCS | Performed by: STUDENT IN AN ORGANIZED HEALTH CARE EDUCATION/TRAINING PROGRAM

## 2025-01-13 PROCEDURE — 2022F DILAT RTA XM EVC RTNOPTHY: CPT | Performed by: STUDENT IN AN ORGANIZED HEALTH CARE EDUCATION/TRAINING PROGRAM

## 2025-01-13 PROCEDURE — G8427 DOCREV CUR MEDS BY ELIG CLIN: HCPCS | Performed by: STUDENT IN AN ORGANIZED HEALTH CARE EDUCATION/TRAINING PROGRAM

## 2025-01-13 PROCEDURE — 96523 IRRIG DRUG DELIVERY DEVICE: CPT

## 2025-01-13 PROCEDURE — 99214 OFFICE O/P EST MOD 30 MIN: CPT | Performed by: STUDENT IN AN ORGANIZED HEALTH CARE EDUCATION/TRAINING PROGRAM

## 2025-01-13 PROCEDURE — 3078F DIAST BP <80 MM HG: CPT | Performed by: STUDENT IN AN ORGANIZED HEALTH CARE EDUCATION/TRAINING PROGRAM

## 2025-01-13 RX ORDER — SODIUM CHLORIDE 0.9 % (FLUSH) 0.9 %
5-40 SYRINGE (ML) INJECTION PRN
Status: DISCONTINUED | OUTPATIENT
Start: 2025-01-13 | End: 2025-01-14 | Stop reason: HOSPADM

## 2025-01-13 RX ORDER — METFORMIN HYDROCHLORIDE 500 MG/1
500 TABLET, EXTENDED RELEASE ORAL
Qty: 90 TABLET | Refills: 1 | Status: SHIPPED | OUTPATIENT
Start: 2025-01-13

## 2025-01-13 RX ORDER — INSULIN GLARGINE 100 [IU]/ML
40 INJECTION, SOLUTION SUBCUTANEOUS NIGHTLY
Qty: 10 ADJUSTABLE DOSE PRE-FILLED PEN SYRINGE | Refills: 5
Start: 2025-01-13

## 2025-01-13 RX ORDER — INSULIN GLARGINE-YFGN 100 [IU]/ML
INJECTION, SOLUTION SUBCUTANEOUS
COMMUNITY
Start: 2025-01-08

## 2025-01-13 RX ADMIN — SODIUM CHLORIDE, PRESERVATIVE FREE 10 ML: 5 INJECTION INTRAVENOUS at 11:05

## 2025-01-13 SDOH — ECONOMIC STABILITY: FOOD INSECURITY: WITHIN THE PAST 12 MONTHS, YOU WORRIED THAT YOUR FOOD WOULD RUN OUT BEFORE YOU GOT MONEY TO BUY MORE.: NEVER TRUE

## 2025-01-13 SDOH — ECONOMIC STABILITY: FOOD INSECURITY: WITHIN THE PAST 12 MONTHS, THE FOOD YOU BOUGHT JUST DIDN'T LAST AND YOU DIDN'T HAVE MONEY TO GET MORE.: NEVER TRUE

## 2025-01-13 ASSESSMENT — PATIENT HEALTH QUESTIONNAIRE - PHQ9
6. FEELING BAD ABOUT YOURSELF - OR THAT YOU ARE A FAILURE OR HAVE LET YOURSELF OR YOUR FAMILY DOWN: NOT AT ALL
SUM OF ALL RESPONSES TO PHQ QUESTIONS 1-9: 0
7. TROUBLE CONCENTRATING ON THINGS, SUCH AS READING THE NEWSPAPER OR WATCHING TELEVISION: NOT AT ALL
1. LITTLE INTEREST OR PLEASURE IN DOING THINGS: NOT AT ALL
10. IF YOU CHECKED OFF ANY PROBLEMS, HOW DIFFICULT HAVE THESE PROBLEMS MADE IT FOR YOU TO DO YOUR WORK, TAKE CARE OF THINGS AT HOME, OR GET ALONG WITH OTHER PEOPLE: NOT DIFFICULT AT ALL
3. TROUBLE FALLING OR STAYING ASLEEP: NOT AT ALL
SUM OF ALL RESPONSES TO PHQ9 QUESTIONS 1 & 2: 0
4. FEELING TIRED OR HAVING LITTLE ENERGY: NOT AT ALL
9. THOUGHTS THAT YOU WOULD BE BETTER OFF DEAD, OR OF HURTING YOURSELF: NOT AT ALL
SUM OF ALL RESPONSES TO PHQ QUESTIONS 1-9: 0
2. FEELING DOWN, DEPRESSED OR HOPELESS: NOT AT ALL
5. POOR APPETITE OR OVEREATING: NOT AT ALL
8. MOVING OR SPEAKING SO SLOWLY THAT OTHER PEOPLE COULD HAVE NOTICED. OR THE OPPOSITE, BEING SO FIGETY OR RESTLESS THAT YOU HAVE BEEN MOVING AROUND A LOT MORE THAN USUAL: NOT AT ALL

## 2025-01-13 ASSESSMENT — ENCOUNTER SYMPTOMS: SHORTNESS OF BREATH: 0

## 2025-01-13 NOTE — PROGRESS NOTES
breast    Hypertension Mother     Heart Disease Mother     Diabetes Mother     Diabetes Father     Osteoarthritis Father     Alcohol Abuse Father     Hypertension Father      Social History     Tobacco Use   Smoking Status Former    Current packs/day: 0.00    Average packs/day: 1 pack/day for 45.0 years (45.0 ttl pk-yrs)    Types: Cigarettes    Start date: 1977    Quit date: 2022    Years since quittin.6    Passive exposure: Past   Smokeless Tobacco Never      Social History     Substance and Sexual Activity   Alcohol Use No      Social History     Substance and Sexual Activity   Drug Use No      Allergies as of 2025    (No Known Allergies)       Review of Systems   Respiratory:  Negative for shortness of breath.    Cardiovascular:  Negative for chest pain.       Objective:    Vitals:    25 1156   BP: 130/68   Site: Left Upper Arm   Position: Sitting   Pulse: 79   SpO2: 93%   Weight: 107.5 kg (237 lb)   Height: 1.778 m (5' 10\")        Physical Exam  Constitutional:       General: He is not in acute distress.     Appearance: Normal appearance.   HENT:      Head: Normocephalic and atraumatic.   Eyes:      Extraocular Movements: Extraocular movements intact.      Conjunctiva/sclera: Conjunctivae normal.   Cardiovascular:      Rate and Rhythm: Normal rate and regular rhythm.      Heart sounds: No murmur heard.  Pulmonary:      Effort: Pulmonary effort is normal.      Breath sounds: Normal breath sounds. No wheezing, rhonchi or rales.   Skin:     General: Skin is warm and dry.   Neurological:      Mental Status: He is alert. Mental status is at baseline.   Psychiatric:         Mood and Affect: Mood normal.         Behavior: Behavior normal.     Diabetic foot exam:   Left Foot:   Visual Exam: normal   Pulse DP: 2+ (normal)   Filament test: 6/6     Right Foot:   Visual Exam: normal   Pulse DP: 2+ (normal)   Filament test: 6/6         Assessent & Plan    1. Type 2 diabetes mellitus with

## 2025-01-13 NOTE — PROGRESS NOTES
Arrived to the Infusion Center.  Pump d/c completed.  Patient tolerated well.   Any issues or concerns during appointment: none.  Patient aware of next infusion appointment on 2/7/2025 (date) at 11:00 AM (time).  Discharged ambulatory.

## 2025-01-24 DIAGNOSIS — C76.2 ABDOMINAL CARCINOMATOSIS (HCC): Primary | ICD-10-CM

## 2025-02-03 DIAGNOSIS — E87.6 HYPOKALEMIA: ICD-10-CM

## 2025-02-04 RX ORDER — POTASSIUM CHLORIDE 1500 MG/1
20 TABLET, EXTENDED RELEASE ORAL DAILY
Qty: 90 TABLET | Refills: 3 | OUTPATIENT
Start: 2025-02-04

## 2025-02-07 ENCOUNTER — OFFICE VISIT (OUTPATIENT)
Dept: ONCOLOGY | Age: 65
End: 2025-02-07
Payer: COMMERCIAL

## 2025-02-07 ENCOUNTER — HOSPITAL ENCOUNTER (OUTPATIENT)
Dept: LAB | Age: 65
Discharge: HOME | End: 2025-02-07
Payer: COMMERCIAL

## 2025-02-07 ENCOUNTER — HOSPITAL ENCOUNTER (OUTPATIENT)
Dept: INFUSION THERAPY | Age: 65
Setting detail: INFUSION SERIES
Discharge: HOME | End: 2025-02-07
Payer: COMMERCIAL

## 2025-02-07 VITALS
OXYGEN SATURATION: 94 % | WEIGHT: 246 LBS | RESPIRATION RATE: 16 BRPM | DIASTOLIC BLOOD PRESSURE: 82 MMHG | HEART RATE: 79 BPM | TEMPERATURE: 97.9 F | BODY MASS INDEX: 35.22 KG/M2 | HEIGHT: 70 IN | SYSTOLIC BLOOD PRESSURE: 134 MMHG

## 2025-02-07 DIAGNOSIS — E83.42 HYPOMAGNESEMIA: ICD-10-CM

## 2025-02-07 DIAGNOSIS — Z79.899 HIGH RISK MEDICATION USE: ICD-10-CM

## 2025-02-07 DIAGNOSIS — C76.2 ABDOMINAL CARCINOMATOSIS (HCC): Primary | ICD-10-CM

## 2025-02-07 DIAGNOSIS — C76.2 ABDOMINAL CARCINOMATOSIS (HCC): ICD-10-CM

## 2025-02-07 DIAGNOSIS — C78.6 MALIGNANT NEOPLASM METASTATIC TO PERITONEUM (HCC): Primary | ICD-10-CM

## 2025-02-07 DIAGNOSIS — I82.509 CHRONIC DEEP VEIN THROMBOSIS (DVT) OF LOWER EXTREMITY, UNSPECIFIED LATERALITY, UNSPECIFIED VEIN (HCC): ICD-10-CM

## 2025-02-07 DIAGNOSIS — I82.721 CHRONIC DEEP VEIN THROMBOSIS (DVT) OF RIGHT UPPER EXTREMITY, UNSPECIFIED VEIN (HCC): ICD-10-CM

## 2025-02-07 DIAGNOSIS — C78.6 MALIGNANT NEOPLASM METASTATIC TO PERITONEUM (HCC): ICD-10-CM

## 2025-02-07 LAB
ALBUMIN SERPL-MCNC: 3.6 G/DL (ref 3.2–4.6)
ALBUMIN/GLOB SERPL: 1.3 (ref 1–1.9)
ALP SERPL-CCNC: 115 U/L (ref 40–129)
ALT SERPL-CCNC: 32 U/L (ref 8–55)
ANION GAP SERPL CALC-SCNC: 12 MMOL/L (ref 7–16)
AST SERPL-CCNC: 36 U/L (ref 15–37)
BASOPHILS # BLD: 0.07 K/UL (ref 0–0.2)
BASOPHILS NFR BLD: 1.3 % (ref 0–2)
BILIRUB SERPL-MCNC: 0.3 MG/DL (ref 0–1.2)
BUN SERPL-MCNC: 8 MG/DL (ref 8–23)
CALCIUM SERPL-MCNC: 8.9 MG/DL (ref 8.8–10.2)
CEA SERPL-MCNC: 2.2 NG/ML (ref 0–3.8)
CHLORIDE SERPL-SCNC: 108 MMOL/L (ref 98–107)
CO2 SERPL-SCNC: 23 MMOL/L (ref 20–29)
CREAT SERPL-MCNC: 0.88 MG/DL (ref 0.8–1.3)
DIFFERENTIAL METHOD BLD: ABNORMAL
EOSINOPHIL # BLD: 0.15 K/UL (ref 0–0.8)
EOSINOPHIL NFR BLD: 2.7 % (ref 0.5–7.8)
ERYTHROCYTE [DISTWIDTH] IN BLOOD BY AUTOMATED COUNT: 12.8 % (ref 11.9–14.6)
GLOBULIN SER CALC-MCNC: 2.8 G/DL (ref 2.3–3.5)
GLUCOSE SERPL-MCNC: 155 MG/DL (ref 70–99)
HCT VFR BLD AUTO: 41.1 % (ref 41.1–50.3)
HGB BLD-MCNC: 13.5 G/DL (ref 13.6–17.2)
IMM GRANULOCYTES # BLD AUTO: 0.02 K/UL (ref 0–0.5)
IMM GRANULOCYTES NFR BLD AUTO: 0.4 % (ref 0–5)
LYMPHOCYTES # BLD: 2.1 K/UL (ref 0.5–4.6)
LYMPHOCYTES NFR BLD: 37.5 % (ref 13–44)
MAGNESIUM SERPL-MCNC: 2 MG/DL (ref 1.8–2.4)
MCH RBC QN AUTO: 28.7 PG (ref 26.1–32.9)
MCHC RBC AUTO-ENTMCNC: 32.8 G/DL (ref 31.4–35)
MCV RBC AUTO: 87.3 FL (ref 82–102)
MONOCYTES # BLD: 0.35 K/UL (ref 0.1–1.3)
MONOCYTES NFR BLD: 6.3 % (ref 4–12)
NEUTS SEG # BLD: 2.91 K/UL (ref 1.7–8.2)
NEUTS SEG NFR BLD: 51.8 % (ref 43–78)
NRBC # BLD: 0 K/UL (ref 0–0.2)
PLATELET # BLD AUTO: 202 K/UL (ref 150–450)
PMV BLD AUTO: 9.8 FL (ref 9.4–12.3)
POTASSIUM SERPL-SCNC: 4.2 MMOL/L (ref 3.5–5.1)
PROT SERPL-MCNC: 6.5 G/DL (ref 6.3–8.2)
RBC # BLD AUTO: 4.71 M/UL (ref 4.23–5.6)
SODIUM SERPL-SCNC: 143 MMOL/L (ref 136–145)
WBC # BLD AUTO: 5.6 K/UL (ref 4.3–11.1)

## 2025-02-07 PROCEDURE — 82378 CARCINOEMBRYONIC ANTIGEN: CPT

## 2025-02-07 PROCEDURE — 96375 TX/PRO/DX INJ NEW DRUG ADDON: CPT

## 2025-02-07 PROCEDURE — 3075F SYST BP GE 130 - 139MM HG: CPT

## 2025-02-07 PROCEDURE — 80053 COMPREHEN METABOLIC PANEL: CPT

## 2025-02-07 PROCEDURE — G8427 DOCREV CUR MEDS BY ELIG CLIN: HCPCS

## 2025-02-07 PROCEDURE — 96413 CHEMO IV INFUSION 1 HR: CPT

## 2025-02-07 PROCEDURE — 96367 TX/PROPH/DG ADDL SEQ IV INF: CPT

## 2025-02-07 PROCEDURE — 1036F TOBACCO NON-USER: CPT

## 2025-02-07 PROCEDURE — 2500000003 HC RX 250 WO HCPCS

## 2025-02-07 PROCEDURE — 83735 ASSAY OF MAGNESIUM: CPT

## 2025-02-07 PROCEDURE — 3079F DIAST BP 80-89 MM HG: CPT

## 2025-02-07 PROCEDURE — G8417 CALC BMI ABV UP PARAM F/U: HCPCS

## 2025-02-07 PROCEDURE — 99214 OFFICE O/P EST MOD 30 MIN: CPT

## 2025-02-07 PROCEDURE — 2580000003 HC RX 258

## 2025-02-07 PROCEDURE — 3017F COLORECTAL CA SCREEN DOC REV: CPT

## 2025-02-07 PROCEDURE — 96411 CHEMO IV PUSH ADDL DRUG: CPT

## 2025-02-07 PROCEDURE — G0498 CHEMO EXTEND IV INFUS W/PUMP: HCPCS

## 2025-02-07 PROCEDURE — 96368 THER/DIAG CONCURRENT INF: CPT

## 2025-02-07 PROCEDURE — 2500000003 HC RX 250 WO HCPCS: Performed by: INTERNAL MEDICINE

## 2025-02-07 PROCEDURE — 6360000002 HC RX W HCPCS

## 2025-02-07 PROCEDURE — 85025 COMPLETE CBC W/AUTO DIFF WBC: CPT

## 2025-02-07 PROCEDURE — 96372 THER/PROPH/DIAG INJ SC/IM: CPT

## 2025-02-07 RX ORDER — FLUOROURACIL 50 MG/ML
400 INJECTION, SOLUTION INTRAVENOUS ONCE
Status: CANCELLED | OUTPATIENT
Start: 2025-02-07 | End: 2025-02-07

## 2025-02-07 RX ORDER — ERGOCALCIFEROL 1.25 MG/1
50000 CAPSULE, LIQUID FILLED ORAL WEEKLY
Qty: 12 CAPSULE | Refills: 0 | Status: SHIPPED | OUTPATIENT
Start: 2025-02-07

## 2025-02-07 RX ORDER — DIPHENHYDRAMINE HYDROCHLORIDE 50 MG/ML
50 INJECTION INTRAMUSCULAR; INTRAVENOUS
Status: DISCONTINUED | OUTPATIENT
Start: 2025-02-07 | End: 2025-02-08 | Stop reason: HOSPADM

## 2025-02-07 RX ORDER — SODIUM CHLORIDE 0.9 % (FLUSH) 0.9 %
5-40 SYRINGE (ML) INJECTION PRN
OUTPATIENT
Start: 2025-02-09

## 2025-02-07 RX ORDER — SODIUM CHLORIDE 9 MG/ML
5-250 INJECTION, SOLUTION INTRAVENOUS PRN
Status: CANCELLED | OUTPATIENT
Start: 2025-02-07

## 2025-02-07 RX ORDER — ACETAMINOPHEN 325 MG/1
650 TABLET ORAL
Status: DISCONTINUED | OUTPATIENT
Start: 2025-02-07 | End: 2025-02-08 | Stop reason: HOSPADM

## 2025-02-07 RX ORDER — ALBUTEROL SULFATE 90 UG/1
4 INHALANT RESPIRATORY (INHALATION) PRN
Status: CANCELLED | OUTPATIENT
Start: 2025-02-07

## 2025-02-07 RX ORDER — ATROPINE SULFATE 0.4 MG/ML
0.4 INJECTION, SOLUTION INTRAVENOUS ONCE
Status: COMPLETED | OUTPATIENT
Start: 2025-02-07 | End: 2025-02-07

## 2025-02-07 RX ORDER — EPINEPHRINE 1 MG/ML
0.3 INJECTION, SOLUTION, CONCENTRATE INTRAVENOUS PRN
Status: CANCELLED | OUTPATIENT
Start: 2025-02-07

## 2025-02-07 RX ORDER — SODIUM CHLORIDE 0.9 % (FLUSH) 0.9 %
5-40 SYRINGE (ML) INJECTION PRN
Status: CANCELLED | OUTPATIENT
Start: 2025-02-07

## 2025-02-07 RX ORDER — ALBUTEROL SULFATE 90 UG/1
4 INHALANT RESPIRATORY (INHALATION) PRN
Status: DISCONTINUED | OUTPATIENT
Start: 2025-02-07 | End: 2025-02-08 | Stop reason: HOSPADM

## 2025-02-07 RX ORDER — HEPARIN SODIUM (PORCINE) LOCK FLUSH IV SOLN 100 UNIT/ML 100 UNIT/ML
500 SOLUTION INTRAVENOUS PRN
Status: CANCELLED | OUTPATIENT
Start: 2025-02-07

## 2025-02-07 RX ORDER — ONDANSETRON 2 MG/ML
8 INJECTION INTRAMUSCULAR; INTRAVENOUS ONCE
Status: CANCELLED | OUTPATIENT
Start: 2025-02-07 | End: 2025-02-07

## 2025-02-07 RX ORDER — EPINEPHRINE 1 MG/ML
0.3 INJECTION, SOLUTION, CONCENTRATE INTRAVENOUS PRN
Status: DISCONTINUED | OUTPATIENT
Start: 2025-02-07 | End: 2025-02-08 | Stop reason: HOSPADM

## 2025-02-07 RX ORDER — DEXTROSE MONOHYDRATE 50 MG/ML
5-250 INJECTION, SOLUTION INTRAVENOUS PRN
Status: CANCELLED | OUTPATIENT
Start: 2025-02-07

## 2025-02-07 RX ORDER — MEPERIDINE HYDROCHLORIDE 25 MG/ML
12.5 INJECTION INTRAMUSCULAR; INTRAVENOUS; SUBCUTANEOUS PRN
Status: DISCONTINUED | OUTPATIENT
Start: 2025-02-07 | End: 2025-02-08 | Stop reason: HOSPADM

## 2025-02-07 RX ORDER — FLUOROURACIL 50 MG/ML
400 INJECTION, SOLUTION INTRAVENOUS ONCE
Status: COMPLETED | OUTPATIENT
Start: 2025-02-07 | End: 2025-02-07

## 2025-02-07 RX ORDER — SODIUM CHLORIDE 9 MG/ML
5-250 INJECTION, SOLUTION INTRAVENOUS PRN
OUTPATIENT
Start: 2025-02-09

## 2025-02-07 RX ORDER — SODIUM CHLORIDE 9 MG/ML
INJECTION, SOLUTION INTRAVENOUS CONTINUOUS
Status: DISCONTINUED | OUTPATIENT
Start: 2025-02-07 | End: 2025-02-08 | Stop reason: HOSPADM

## 2025-02-07 RX ORDER — SODIUM CHLORIDE 9 MG/ML
INJECTION, SOLUTION INTRAVENOUS CONTINUOUS
Status: CANCELLED | OUTPATIENT
Start: 2025-02-07

## 2025-02-07 RX ORDER — ONDANSETRON 2 MG/ML
8 INJECTION INTRAMUSCULAR; INTRAVENOUS
Status: CANCELLED | OUTPATIENT
Start: 2025-02-07

## 2025-02-07 RX ORDER — HEPARIN SODIUM (PORCINE) LOCK FLUSH IV SOLN 100 UNIT/ML 100 UNIT/ML
500 SOLUTION INTRAVENOUS PRN
OUTPATIENT
Start: 2025-02-09

## 2025-02-07 RX ORDER — SODIUM CHLORIDE 0.9 % (FLUSH) 0.9 %
5-40 SYRINGE (ML) INJECTION PRN
Status: DISCONTINUED | OUTPATIENT
Start: 2025-02-07 | End: 2025-02-08 | Stop reason: HOSPADM

## 2025-02-07 RX ORDER — HYDROCORTISONE SODIUM SUCCINATE 100 MG/2ML
100 INJECTION INTRAMUSCULAR; INTRAVENOUS
Status: DISCONTINUED | OUTPATIENT
Start: 2025-02-07 | End: 2025-02-08 | Stop reason: HOSPADM

## 2025-02-07 RX ORDER — HYDROCORTISONE SODIUM SUCCINATE 100 MG/2ML
100 INJECTION INTRAMUSCULAR; INTRAVENOUS
Status: CANCELLED | OUTPATIENT
Start: 2025-02-07

## 2025-02-07 RX ORDER — DEXAMETHASONE SODIUM PHOSPHATE 10 MG/ML
10 INJECTION, SOLUTION INTRAMUSCULAR; INTRAVENOUS ONCE
Status: COMPLETED | OUTPATIENT
Start: 2025-02-07 | End: 2025-02-07

## 2025-02-07 RX ORDER — MEPERIDINE HYDROCHLORIDE 50 MG/ML
12.5 INJECTION INTRAMUSCULAR; INTRAVENOUS; SUBCUTANEOUS PRN
Status: CANCELLED | OUTPATIENT
Start: 2025-02-07

## 2025-02-07 RX ORDER — DIPHENHYDRAMINE HYDROCHLORIDE 50 MG/ML
50 INJECTION INTRAMUSCULAR; INTRAVENOUS
Status: CANCELLED | OUTPATIENT
Start: 2025-02-07

## 2025-02-07 RX ORDER — FAMOTIDINE 10 MG/ML
20 INJECTION, SOLUTION INTRAVENOUS
Status: CANCELLED | OUTPATIENT
Start: 2025-02-07

## 2025-02-07 RX ORDER — ONDANSETRON 2 MG/ML
8 INJECTION INTRAMUSCULAR; INTRAVENOUS
Status: DISCONTINUED | OUTPATIENT
Start: 2025-02-07 | End: 2025-02-08 | Stop reason: HOSPADM

## 2025-02-07 RX ORDER — ONDANSETRON 2 MG/ML
8 INJECTION INTRAMUSCULAR; INTRAVENOUS ONCE
Status: COMPLETED | OUTPATIENT
Start: 2025-02-07 | End: 2025-02-07

## 2025-02-07 RX ORDER — ACETAMINOPHEN 325 MG/1
650 TABLET ORAL
Status: CANCELLED | OUTPATIENT
Start: 2025-02-07

## 2025-02-07 RX ADMIN — DEXAMETHASONE SODIUM PHOSPHATE 10 MG: 10 INJECTION, SOLUTION INTRAMUSCULAR; INTRAVENOUS at 11:09

## 2025-02-07 RX ADMIN — ONDANSETRON 8 MG: 2 INJECTION, SOLUTION INTRAMUSCULAR; INTRAVENOUS at 11:07

## 2025-02-07 RX ADMIN — IRINOTECAN HYDROCHLORIDE 340 MG: 20 INJECTION, SOLUTION INTRAVENOUS at 12:12

## 2025-02-07 RX ADMIN — SODIUM CHLORIDE 150 MG: 9 INJECTION, SOLUTION INTRAVENOUS at 11:41

## 2025-02-07 RX ADMIN — FLUOROURACIL 5500 MG: 50 INJECTION, SOLUTION INTRAVENOUS at 14:01

## 2025-02-07 RX ADMIN — FLUOROURACIL 950 MG: 50 INJECTION, SOLUTION INTRAVENOUS at 13:46

## 2025-02-07 RX ADMIN — SODIUM CHLORIDE, PRESERVATIVE FREE 10 ML: 5 INJECTION INTRAVENOUS at 14:01

## 2025-02-07 RX ADMIN — LEUCOVORIN CALCIUM 950 MG: 100 INJECTION, POWDER, LYOPHILIZED, FOR SUSPENSION INTRAMUSCULAR; INTRAVENOUS at 12:09

## 2025-02-07 RX ADMIN — ATROPINE SULFATE 0.4 MG: 0.4 INJECTION, SOLUTION INTRAVENOUS at 11:18

## 2025-02-07 RX ADMIN — SODIUM CHLORIDE, PRESERVATIVE FREE 10 ML: 5 INJECTION INTRAVENOUS at 11:00

## 2025-02-07 RX ADMIN — SODIUM CHLORIDE, PRESERVATIVE FREE 20 ML: 5 INJECTION INTRAVENOUS at 09:15

## 2025-02-07 ASSESSMENT — PATIENT HEALTH QUESTIONNAIRE - PHQ9
2. FEELING DOWN, DEPRESSED OR HOPELESS: NOT AT ALL
SUM OF ALL RESPONSES TO PHQ QUESTIONS 1-9: 0
1. LITTLE INTEREST OR PLEASURE IN DOING THINGS: NOT AT ALL
SUM OF ALL RESPONSES TO PHQ9 QUESTIONS 1 & 2: 0

## 2025-02-07 NOTE — PROGRESS NOTES
Arrived to the Infusion Center.  Folfiri completed. Patient tolerated without difficulty. Fluorouracil chemo pump infusing at 5ml/hr x 46 hrs.  Clamps x 2 unclamped and connections secured.  Verified with Caron Van RN  Any issues or concerns during appointment: None.  Patient aware of next infusion appointment on 02/10/2025 (date) at 0915 (time).    Patient instructed to call provider with temperature of 100.4 or greater or nausea/vomiting/ diarrhea or pain not controlled by medications  Discharged ambulatory to home.

## 2025-02-07 NOTE — PROGRESS NOTES
Patient to port lab for port access and lab draw. Port accessed per protocol; using 20g 0.75\" kim needle without difficulty. No blood return noted, flushed without difficulty. Labs drawn peripherally. Port remains accessed. Patient tolerated well. Discharged ambulatory.

## 2025-02-10 ENCOUNTER — HOSPITAL ENCOUNTER (OUTPATIENT)
Dept: INFUSION THERAPY | Age: 65
Setting detail: INFUSION SERIES
Discharge: HOME | End: 2025-02-10
Payer: COMMERCIAL

## 2025-02-10 VITALS
SYSTOLIC BLOOD PRESSURE: 130 MMHG | RESPIRATION RATE: 17 BRPM | DIASTOLIC BLOOD PRESSURE: 80 MMHG | HEART RATE: 78 BPM | TEMPERATURE: 98.1 F | OXYGEN SATURATION: 95 %

## 2025-02-10 PROCEDURE — 96523 IRRIG DRUG DELIVERY DEVICE: CPT

## 2025-02-10 PROCEDURE — 2500000003 HC RX 250 WO HCPCS: Performed by: INTERNAL MEDICINE

## 2025-02-10 RX ORDER — SODIUM CHLORIDE 0.9 % (FLUSH) 0.9 %
5-40 SYRINGE (ML) INJECTION 2 TIMES DAILY
Status: DISCONTINUED | OUTPATIENT
Start: 2025-02-10 | End: 2025-02-11 | Stop reason: HOSPADM

## 2025-02-10 RX ADMIN — SODIUM CHLORIDE, PRESERVATIVE FREE 10 ML: 5 INJECTION INTRAVENOUS at 09:18

## 2025-02-10 NOTE — PROGRESS NOTES
Arrived to the Infusion Center.  Adrucil pump discontinued. Patient tolerated without complication.   Any issues or concerns during appointment: No.  Patient aware of next infusion appointment on 03/07/25 (date) at 1100 (time).  Patient instructed to call provider with temperature of 100.4 or greater or nausea/vomiting/ diarrhea or pain not controlled by medications  Discharged ambulatory.

## 2025-02-18 ASSESSMENT — ENCOUNTER SYMPTOMS
SORE THROAT: 0
ABDOMINAL DISTENTION: 0
HEMOPTYSIS: 0
SCLERAL ICTERUS: 0
BLOOD IN STOOL: 0
CONSTIPATION: 0
NAUSEA: 0
COUGH: 0
VOMITING: 0
DIARRHEA: 0
SHORTNESS OF BREATH: 0
BACK PAIN: 0

## 2025-02-18 NOTE — PROGRESS NOTES
[facility] Hematology and Oncology: Established patient - follow up     Chief Complaint   Patient presents with    Follow-up     Reason for Referral: Abdominal pain; peritoneal metastatic disease  Referring Provider: Mo Dalton NP  Primary Care Provider: Oscar Nelson MD  Family History of Cancer/Hematologic Disorders: Family history is significant for sister with breast cancer.   Presenting Symptoms: Progressively worsening, persistent abdominal pain x several months    History of Present Illness:  Mr. Guevara is a 64 y.o. male who presents today for FU regarding adenocarcinoma with peritoneal metastatic disease.  The past medical history is significant for tobacco use (recent pack per day smoker x 30+ years - now down to 1/3PPD), PUD, paresthesia/pain of bilateral upper extremities, HLD, HTN, diverticulitis,  colon polyps, hiatal hernia, chronic right shoulder pain, bilateral carpal tunnel syndrome, and partial colon resection.  He presented to the [facility] ED on 5/12/22 with a complaint of persistent abdominal pain for several months that was becoming progressively  worse.  EGD and colonoscopy on 2/25/22 revealed findings of hiatal hernia, gastric polyps, several benign colon polyps, diverticulosis, and internal hemorrhoids.  CT of the abdomen on 3/8/22 showed no acute findings.  He presented to [facility] ER x 2 for  evaluation (5/3/22 and 5/10/22).  RUQ abdominal ultrasound was completed on 5/3/22 in the ED at [facility] demonstrating no acute findings to explain patient's pain; probable hepatic  steatosis; small echogenic mural foci in the gallbladder which are nonmobile, likely representing cholesterol polyps, largest measuring 4 mm; and small volume simple ascites in the right upper quadrant and left lower quadrant, nonspecific.  CT AP with  contrast at [facility] ED 5/10/22 showed a partial small bowel obstruction; small to moderate amount of free fluid in the abdomen and pelvis; and nonspecific stranding of

## 2025-02-26 NOTE — PROGRESS NOTES
1 capsule by mouth 3 times daily as needed (pain) for up to 180 days. (Patient taking differently: Take 1 capsule by mouth daily.) 180 capsule 2    blood glucose monitor strips OneTouch Verio Testing Strips check blood sugar 3-4 times daily as directed. DX: E11.9 100 strip 5    Blood Glucose Monitoring Suppl (ONETOUCH VERIO) w/Device KIT Check blood glucose daily and as needed 1 kit 0    glucose monitoring kit 1 kit by Does not apply route daily 1 kit 0    Docusate Calcium (STOOL SOFTENER PO) Take 1 capsule by mouth in the morning and at bedtime      B Complex-C (SUPER B COMPLEX PO) Take 1 tablet by mouth daily      Potassium Chloride Pamela ER (KLOR-CON M20 PO) Take 20 mEq by mouth daily (Patient not taking: Reported on 3/7/2025)      Insulin Pen Needle 32G X 4 MM MISC 1 each by Does not apply route daily Use daily with insulin pen as directed. DX: E 11.65 (Patient not taking: Reported on 2/7/2025) 100 each 5     No current facility-administered medications for this visit.     Facility-Administered Medications Ordered in Other Visits   Medication Dose Route Frequency Provider Last Rate Last Admin    diatrizoate meglumine-sodium (GASTROGRAFIN) 66-10 % solution 10 mL  10 mL Oral ONCE PRN Barbara Bennett MD   10 mL at 03/14/25 1208    sodium chloride flush 0.9 % injection 5-40 mL  5-40 mL IntraVENous PRN Barbara Bennett MD   10 mL at 11/18/24 1030     OBJECTIVE:  BP (!) 140/86 (BP Site: Left Upper Arm, Patient Position: Standing)   Pulse 70   Temp 98.3 °F (36.8 °C)   Resp 16   Ht 1.778 m (5' 10\")   Wt 112 kg (247 lb)   SpO2 97%   BMI 35.44 kg/m²     ECOG PERFORMANCE STATUS - 0-Fully active, able to carry on all pre-disease performance without restriction.  Pain - Pain Score:   0 - No pain (Fatigue-0)0 - No pain/10. None/Minimal pain - not affecting QOL     Physical Exam  Vitals reviewed. Exam conducted with a chaperone present.   Constitutional:       General: He is not in acute distress.     Appearance: He is

## 2025-03-03 ENCOUNTER — HOSPITAL ENCOUNTER (OUTPATIENT)
Dept: CT IMAGING | Age: 65
Discharge: HOME | End: 2025-03-05
Attending: INTERNAL MEDICINE
Payer: COMMERCIAL

## 2025-03-03 DIAGNOSIS — C78.6 MALIGNANT NEOPLASM METASTATIC TO PERITONEUM (HCC): ICD-10-CM

## 2025-03-03 DIAGNOSIS — C76.2 ABDOMINAL CARCINOMATOSIS (HCC): ICD-10-CM

## 2025-03-03 PROCEDURE — 6360000004 HC RX CONTRAST MEDICATION: Performed by: INTERNAL MEDICINE

## 2025-03-03 PROCEDURE — 74177 CT ABD & PELVIS W/CONTRAST: CPT

## 2025-03-03 RX ORDER — DIATRIZOATE MEGLUMINE AND DIATRIZOATE SODIUM 660; 100 MG/ML; MG/ML
15 SOLUTION ORAL; RECTAL
Status: DISCONTINUED | OUTPATIENT
Start: 2025-03-03 | End: 2025-03-06 | Stop reason: HOSPADM

## 2025-03-03 RX ORDER — IOPAMIDOL 755 MG/ML
100 INJECTION, SOLUTION INTRAVASCULAR
Status: COMPLETED | OUTPATIENT
Start: 2025-03-03 | End: 2025-03-03

## 2025-03-03 RX ADMIN — DIATRIZOATE MEGLUMINE AND DIATRIZOATE SODIUM 15 ML: 660; 100 LIQUID ORAL; RECTAL at 09:29

## 2025-03-03 RX ADMIN — IOPAMIDOL 100 ML: 755 INJECTION, SOLUTION INTRAVENOUS at 09:29

## 2025-03-04 DIAGNOSIS — E87.6 HYPOKALEMIA: ICD-10-CM

## 2025-03-04 RX ORDER — POTASSIUM CHLORIDE 1500 MG/1
20 TABLET, EXTENDED RELEASE ORAL DAILY
Qty: 90 TABLET | Refills: 3 | OUTPATIENT
Start: 2025-03-04

## 2025-03-07 ENCOUNTER — OFFICE VISIT (OUTPATIENT)
Dept: ONCOLOGY | Age: 65
End: 2025-03-07
Payer: COMMERCIAL

## 2025-03-07 ENCOUNTER — HOSPITAL ENCOUNTER (OUTPATIENT)
Dept: INFUSION THERAPY | Age: 65
Setting detail: INFUSION SERIES
Discharge: HOME | End: 2025-03-07

## 2025-03-07 ENCOUNTER — HOSPITAL ENCOUNTER (OUTPATIENT)
Dept: LAB | Age: 65
Discharge: HOME | End: 2025-03-07
Payer: COMMERCIAL

## 2025-03-07 VITALS
HEIGHT: 70 IN | DIASTOLIC BLOOD PRESSURE: 86 MMHG | WEIGHT: 247 LBS | SYSTOLIC BLOOD PRESSURE: 140 MMHG | HEART RATE: 70 BPM | BODY MASS INDEX: 35.36 KG/M2 | TEMPERATURE: 98.3 F | RESPIRATION RATE: 16 BRPM | OXYGEN SATURATION: 97 %

## 2025-03-07 DIAGNOSIS — C76.2 ABDOMINAL CARCINOMATOSIS (HCC): Primary | ICD-10-CM

## 2025-03-07 DIAGNOSIS — C78.6 MALIGNANT NEOPLASM METASTATIC TO PERITONEUM (HCC): ICD-10-CM

## 2025-03-07 DIAGNOSIS — E83.42 HYPOMAGNESEMIA: ICD-10-CM

## 2025-03-07 DIAGNOSIS — E87.6 HYPOKALEMIA: ICD-10-CM

## 2025-03-07 DIAGNOSIS — C76.2 ABDOMINAL CARCINOMATOSIS (HCC): ICD-10-CM

## 2025-03-07 DIAGNOSIS — C79.9 METASTATIC ADENOCARCINOMA (HCC): ICD-10-CM

## 2025-03-07 DIAGNOSIS — Z79.899 HIGH RISK MEDICATION USE: ICD-10-CM

## 2025-03-07 LAB
ALBUMIN SERPL-MCNC: 3.4 G/DL (ref 3.2–4.6)
ALBUMIN/GLOB SERPL: 1.2 (ref 1–1.9)
ALP SERPL-CCNC: 100 U/L (ref 40–129)
ALT SERPL-CCNC: 20 U/L (ref 8–55)
ANION GAP SERPL CALC-SCNC: 10 MMOL/L (ref 7–16)
AST SERPL-CCNC: 28 U/L (ref 15–37)
BASOPHILS # BLD: 0.1 K/UL (ref 0–0.2)
BASOPHILS NFR BLD: 1.6 % (ref 0–2)
BILIRUB SERPL-MCNC: 0.3 MG/DL (ref 0–1.2)
BUN SERPL-MCNC: 6 MG/DL (ref 8–23)
CALCIUM SERPL-MCNC: 8.4 MG/DL (ref 8.8–10.2)
CEA SERPL-MCNC: 2.2 NG/ML (ref 0–3.8)
CHLORIDE SERPL-SCNC: 107 MMOL/L (ref 98–107)
CO2 SERPL-SCNC: 25 MMOL/L (ref 20–29)
CREAT SERPL-MCNC: 0.86 MG/DL (ref 0.8–1.3)
DIFFERENTIAL METHOD BLD: ABNORMAL
EOSINOPHIL # BLD: 0.13 K/UL (ref 0–0.8)
EOSINOPHIL NFR BLD: 2.1 % (ref 0.5–7.8)
ERYTHROCYTE [DISTWIDTH] IN BLOOD BY AUTOMATED COUNT: 13.8 % (ref 11.9–14.6)
GLOBULIN SER CALC-MCNC: 2.8 G/DL (ref 2.3–3.5)
GLUCOSE SERPL-MCNC: 223 MG/DL (ref 70–99)
HCT VFR BLD AUTO: 39.4 % (ref 41.1–50.3)
HGB BLD-MCNC: 12.7 G/DL (ref 13.6–17.2)
IMM GRANULOCYTES # BLD AUTO: 0.02 K/UL (ref 0–0.5)
IMM GRANULOCYTES NFR BLD AUTO: 0.3 % (ref 0–5)
LYMPHOCYTES # BLD: 1.86 K/UL (ref 0.5–4.6)
LYMPHOCYTES NFR BLD: 30.3 % (ref 13–44)
MAGNESIUM SERPL-MCNC: 1.7 MG/DL (ref 1.8–2.4)
MCH RBC QN AUTO: 29 PG (ref 26.1–32.9)
MCHC RBC AUTO-ENTMCNC: 32.2 G/DL (ref 31.4–35)
MCV RBC AUTO: 90 FL (ref 82–102)
MONOCYTES # BLD: 0.4 K/UL (ref 0.1–1.3)
MONOCYTES NFR BLD: 6.5 % (ref 4–12)
NEUTS SEG # BLD: 3.62 K/UL (ref 1.7–8.2)
NEUTS SEG NFR BLD: 59.2 % (ref 43–78)
NRBC # BLD: 0 K/UL (ref 0–0.2)
PLATELET # BLD AUTO: 211 K/UL (ref 150–450)
PMV BLD AUTO: 10.3 FL (ref 9.4–12.3)
POTASSIUM SERPL-SCNC: 3.2 MMOL/L (ref 3.5–5.1)
PROT SERPL-MCNC: 6.2 G/DL (ref 6.3–8.2)
RBC # BLD AUTO: 4.38 M/UL (ref 4.23–5.6)
SODIUM SERPL-SCNC: 142 MMOL/L (ref 136–145)
WBC # BLD AUTO: 6.1 K/UL (ref 4.3–11.1)

## 2025-03-07 PROCEDURE — 3079F DIAST BP 80-89 MM HG: CPT | Performed by: INTERNAL MEDICINE

## 2025-03-07 PROCEDURE — 85025 COMPLETE CBC W/AUTO DIFF WBC: CPT

## 2025-03-07 PROCEDURE — 3017F COLORECTAL CA SCREEN DOC REV: CPT | Performed by: INTERNAL MEDICINE

## 2025-03-07 PROCEDURE — 80053 COMPREHEN METABOLIC PANEL: CPT

## 2025-03-07 PROCEDURE — 1036F TOBACCO NON-USER: CPT | Performed by: INTERNAL MEDICINE

## 2025-03-07 PROCEDURE — 3077F SYST BP >= 140 MM HG: CPT | Performed by: INTERNAL MEDICINE

## 2025-03-07 PROCEDURE — 2500000003 HC RX 250 WO HCPCS: Performed by: INTERNAL MEDICINE

## 2025-03-07 PROCEDURE — G8427 DOCREV CUR MEDS BY ELIG CLIN: HCPCS | Performed by: INTERNAL MEDICINE

## 2025-03-07 PROCEDURE — 99214 OFFICE O/P EST MOD 30 MIN: CPT | Performed by: INTERNAL MEDICINE

## 2025-03-07 PROCEDURE — 82378 CARCINOEMBRYONIC ANTIGEN: CPT

## 2025-03-07 PROCEDURE — 83735 ASSAY OF MAGNESIUM: CPT

## 2025-03-07 PROCEDURE — G8417 CALC BMI ABV UP PARAM F/U: HCPCS | Performed by: INTERNAL MEDICINE

## 2025-03-07 RX ORDER — POTASSIUM CHLORIDE 1500 MG/1
20 TABLET, EXTENDED RELEASE ORAL DAILY
Qty: 90 TABLET | Refills: 0 | Status: SHIPPED | OUTPATIENT
Start: 2025-03-07

## 2025-03-07 RX ORDER — SODIUM CHLORIDE 0.9 % (FLUSH) 0.9 %
5-40 SYRINGE (ML) INJECTION PRN
Status: DISCONTINUED | OUTPATIENT
Start: 2025-03-07 | End: 2025-03-08 | Stop reason: HOSPADM

## 2025-03-07 RX ADMIN — SODIUM CHLORIDE, PRESERVATIVE FREE 20 ML: 5 INJECTION INTRAVENOUS at 08:45

## 2025-03-07 ASSESSMENT — PATIENT HEALTH QUESTIONNAIRE - PHQ9
1. LITTLE INTEREST OR PLEASURE IN DOING THINGS: NOT AT ALL
SUM OF ALL RESPONSES TO PHQ QUESTIONS 1-9: 0
2. FEELING DOWN, DEPRESSED OR HOPELESS: NOT AT ALL
SUM OF ALL RESPONSES TO PHQ QUESTIONS 1-9: 0

## 2025-03-07 NOTE — PATIENT INSTRUCTIONS
Patient Information from Today's Visit    The members of your [facility] are listed below:    Physician Provider: Barbara Bennett, Medical Oncologist  Registered Nurse: Thea SMITH RN  Navigator: Bertha CASTELLON RN  Medical Assistant: Lizbet MENCHACA MA  : Edwige COOPER   Supportive Care Services: Jacqueline COLLINS LMSW    Diagnosis: Abdominal carcinomatosis      Follow Up Instructions: 6 weeks for labs only.    Labs reviewed.  Symptoms reviewed.  Scan reviewed.  Discussed adjusting treatment regimen.  PET scan ordered.  You can call to schedule this at [phone].  Prescription for potassium.  Add in magnesium as well over the counter.    Treatment Summary has been discussed and given to patient:N/A      Current Labs:   Hospital Outpatient Visit on 03/07/2025   Component Date Value Ref Range Status    Magnesium 03/07/2025 1.7 (L)  1.8 - 2.4 mg/dL Final    Sodium 03/07/2025 142  136 - 145 mmol/L Final    Potassium 03/07/2025 3.2 (L)  3.5 - 5.1 mmol/L Final    Chloride 03/07/2025 107  98 - 107 mmol/L Final    CO2 03/07/2025 25  20 - 29 mmol/L Final    Anion Gap 03/07/2025 10  7 - 16 mmol/L Final    Glucose 03/07/2025 223 (H)  70 - 99 mg/dL Final    Comment: <70 mg/dL Consistent with, but not fully diagnostic of hypoglycemia.  100 - 125 mg/dL Impaired fasting glucose/consistent with pre-diabetes mellitus.  > 126 mg/dl Fasting glucose consistent with overt diabetes mellitus      BUN 03/07/2025 6 (L)  8 - 23 MG/DL Final    Creatinine 03/07/2025 0.86  0.80 - 1.30 MG/DL Final    Est, Glom Filt Rate 03/07/2025 >90  >60 ml/min/1.73m2 Final    Comment:    Pediatric calculator link: https://www.kidney.org/professionals/kdoqi/gfr_calculatorped     These results are not intended for use in patients <18 years of age.     eGFR results are calculated without a race factor using  the 2021 CKD-EPI equation. Careful clinical correlation is recommended, particularly when comparing to results calculated using previous

## 2025-03-07 NOTE — PROGRESS NOTES
Patient to port lab for port access and lab draw. Port accessed per protocol; using 20g 0.75\" kim needle without difficulty. Labs drawn from port and port flushed. Port remains accessed. Patient tolerated well. Discharged ambulatory.

## 2025-03-10 ENCOUNTER — HOSPITAL ENCOUNTER (OUTPATIENT)
Dept: INFUSION THERAPY | Age: 65
Setting detail: INFUSION SERIES
End: 2025-03-10

## 2025-03-14 ENCOUNTER — HOSPITAL ENCOUNTER (OUTPATIENT)
Dept: PET IMAGING | Age: 65
Discharge: HOME | End: 2025-03-17
Payer: COMMERCIAL

## 2025-03-14 DIAGNOSIS — Z79.899 HIGH RISK MEDICATION USE: ICD-10-CM

## 2025-03-14 DIAGNOSIS — C78.6 MALIGNANT NEOPLASM METASTATIC TO PERITONEUM (HCC): ICD-10-CM

## 2025-03-14 DIAGNOSIS — C76.2 ABDOMINAL CARCINOMATOSIS (HCC): ICD-10-CM

## 2025-03-14 DIAGNOSIS — C79.9 METASTATIC ADENOCARCINOMA (HCC): ICD-10-CM

## 2025-03-14 LAB
GLUCOSE BLD STRIP.AUTO-MCNC: 127 MG/DL (ref 65–100)
SERVICE CMNT-IMP: ABNORMAL

## 2025-03-14 PROCEDURE — 6360000004 HC RX CONTRAST MEDICATION: Performed by: INTERNAL MEDICINE

## 2025-03-14 PROCEDURE — 82962 GLUCOSE BLOOD TEST: CPT

## 2025-03-14 PROCEDURE — A9609 HC RX DIAGNOSTIC RADIOPHARMACEUTICAL: HCPCS | Performed by: INTERNAL MEDICINE

## 2025-03-14 PROCEDURE — 3430000000 HC RX DIAGNOSTIC RADIOPHARMACEUTICAL: Performed by: INTERNAL MEDICINE

## 2025-03-14 PROCEDURE — 78815 PET IMAGE W/CT SKULL-THIGH: CPT

## 2025-03-14 PROCEDURE — 2500000003 HC RX 250 WO HCPCS: Performed by: INTERNAL MEDICINE

## 2025-03-14 RX ORDER — FLUDEOXYGLUCOSE F 18 200 MCI/ML
13.7 INJECTION, SOLUTION INTRAVENOUS
Status: COMPLETED | OUTPATIENT
Start: 2025-03-14 | End: 2025-03-14

## 2025-03-14 RX ORDER — SODIUM CHLORIDE 0.9 % (FLUSH) 0.9 %
10 SYRINGE (ML) INJECTION ONCE AS NEEDED
Status: COMPLETED | OUTPATIENT
Start: 2025-03-14 | End: 2025-03-14

## 2025-03-14 RX ORDER — DIATRIZOATE MEGLUMINE AND DIATRIZOATE SODIUM 660; 100 MG/ML; MG/ML
10 SOLUTION ORAL; RECTAL
Status: DISCONTINUED | OUTPATIENT
Start: 2025-03-14 | End: 2025-03-18 | Stop reason: HOSPADM

## 2025-03-14 RX ADMIN — FLUDEOXYGLUCOSE F 18 13.7 MILLICURIE: 200 INJECTION, SOLUTION INTRAVENOUS at 12:08

## 2025-03-14 RX ADMIN — SODIUM CHLORIDE, PRESERVATIVE FREE 10 ML: 5 INJECTION INTRAVENOUS at 12:08

## 2025-03-14 RX ADMIN — DIATRIZOATE MEGLUMINE AND DIATRIZOATE SODIUM 10 ML: 660; 100 LIQUID ORAL; RECTAL at 12:08

## 2025-03-18 ENCOUNTER — RESULTS FOLLOW-UP (OUTPATIENT)
Dept: PET IMAGING | Age: 65
End: 2025-03-18

## 2025-03-18 DIAGNOSIS — C78.6 MALIGNANT NEOPLASM METASTATIC TO PERITONEUM (HCC): ICD-10-CM

## 2025-03-18 DIAGNOSIS — C79.9 METASTATIC ADENOCARCINOMA (HCC): ICD-10-CM

## 2025-03-18 DIAGNOSIS — C76.2 ABDOMINAL CARCINOMATOSIS (HCC): Primary | ICD-10-CM

## 2025-04-18 ENCOUNTER — HOSPITAL ENCOUNTER (OUTPATIENT)
Dept: LAB | Age: 65
Discharge: HOME | End: 2025-04-18
Payer: COMMERCIAL

## 2025-04-18 DIAGNOSIS — C76.2 ABDOMINAL CARCINOMATOSIS (HCC): ICD-10-CM

## 2025-04-18 LAB
ALBUMIN SERPL-MCNC: 3.7 G/DL (ref 3.2–4.6)
ALBUMIN/GLOB SERPL: 1.2 (ref 1–1.9)
ALP SERPL-CCNC: 101 U/L (ref 40–129)
ALT SERPL-CCNC: 33 U/L (ref 8–55)
ANION GAP SERPL CALC-SCNC: 11 MMOL/L (ref 7–16)
AST SERPL-CCNC: 35 U/L (ref 15–37)
BASOPHILS # BLD: 0.05 K/UL (ref 0–0.2)
BASOPHILS NFR BLD: 0.9 % (ref 0–2)
BILIRUB SERPL-MCNC: 0.4 MG/DL (ref 0–1.2)
BUN SERPL-MCNC: 6 MG/DL (ref 8–23)
CALCIUM SERPL-MCNC: 9 MG/DL (ref 8.8–10.2)
CEA SERPL-MCNC: 2 NG/ML (ref 0–3.8)
CHLORIDE SERPL-SCNC: 106 MMOL/L (ref 98–107)
CO2 SERPL-SCNC: 23 MMOL/L (ref 20–29)
CREAT SERPL-MCNC: 0.91 MG/DL (ref 0.8–1.3)
DIFFERENTIAL METHOD BLD: NORMAL
EOSINOPHIL # BLD: 0.14 K/UL (ref 0–0.8)
EOSINOPHIL NFR BLD: 2.5 % (ref 0.5–7.8)
ERYTHROCYTE [DISTWIDTH] IN BLOOD BY AUTOMATED COUNT: 12.8 % (ref 11.9–14.6)
GLOBULIN SER CALC-MCNC: 3.2 G/DL (ref 2.3–3.5)
GLUCOSE SERPL-MCNC: 199 MG/DL (ref 70–99)
HCT VFR BLD AUTO: 42.9 % (ref 41.1–50.3)
HGB BLD-MCNC: 13.9 G/DL (ref 13.6–17.2)
IMM GRANULOCYTES # BLD AUTO: 0.01 K/UL (ref 0–0.5)
IMM GRANULOCYTES NFR BLD AUTO: 0.2 % (ref 0–5)
LYMPHOCYTES # BLD: 2.26 K/UL (ref 0.5–4.6)
LYMPHOCYTES NFR BLD: 40.9 % (ref 13–44)
MCH RBC QN AUTO: 28.9 PG (ref 26.1–32.9)
MCHC RBC AUTO-ENTMCNC: 32.4 G/DL (ref 31.4–35)
MCV RBC AUTO: 89.2 FL (ref 82–102)
MONOCYTES # BLD: 0.36 K/UL (ref 0.1–1.3)
MONOCYTES NFR BLD: 6.5 % (ref 4–12)
NEUTS SEG # BLD: 2.7 K/UL (ref 1.7–8.2)
NEUTS SEG NFR BLD: 49 % (ref 43–78)
NRBC # BLD: 0 K/UL (ref 0–0.2)
PLATELET # BLD AUTO: 170 K/UL (ref 150–450)
PMV BLD AUTO: 10.3 FL (ref 9.4–12.3)
POTASSIUM SERPL-SCNC: 4.3 MMOL/L (ref 3.5–5.1)
PROT SERPL-MCNC: 6.8 G/DL (ref 6.3–8.2)
RBC # BLD AUTO: 4.81 M/UL (ref 4.23–5.6)
SODIUM SERPL-SCNC: 140 MMOL/L (ref 136–145)
WBC # BLD AUTO: 5.5 K/UL (ref 4.3–11.1)

## 2025-04-18 PROCEDURE — 82378 CARCINOEMBRYONIC ANTIGEN: CPT

## 2025-04-18 PROCEDURE — 36415 COLL VENOUS BLD VENIPUNCTURE: CPT

## 2025-04-18 PROCEDURE — 80053 COMPREHEN METABOLIC PANEL: CPT

## 2025-04-18 PROCEDURE — 85025 COMPLETE CBC W/AUTO DIFF WBC: CPT

## 2025-05-06 ENCOUNTER — OFFICE VISIT (OUTPATIENT)
Dept: INTERNAL MEDICINE CLINIC | Facility: CLINIC | Age: 65
End: 2025-05-06
Payer: COMMERCIAL

## 2025-05-06 ENCOUNTER — TELEPHONE (OUTPATIENT)
Dept: DIABETES SERVICES | Age: 65
End: 2025-05-06

## 2025-05-06 VITALS
SYSTOLIC BLOOD PRESSURE: 118 MMHG | HEIGHT: 70 IN | BODY MASS INDEX: 35.53 KG/M2 | DIASTOLIC BLOOD PRESSURE: 80 MMHG | WEIGHT: 248.2 LBS

## 2025-05-06 DIAGNOSIS — E11.65 TYPE 2 DIABETES MELLITUS WITH HYPERGLYCEMIA, UNSPECIFIED WHETHER LONG TERM INSULIN USE (HCC): ICD-10-CM

## 2025-05-06 DIAGNOSIS — E78.5 HYPERLIPIDEMIA, UNSPECIFIED HYPERLIPIDEMIA TYPE: ICD-10-CM

## 2025-05-06 DIAGNOSIS — G62.9 NEUROPATHY: ICD-10-CM

## 2025-05-06 DIAGNOSIS — M72.0 DUPUYTREN CONTRACTURE: ICD-10-CM

## 2025-05-06 DIAGNOSIS — E11.65 TYPE 2 DIABETES MELLITUS WITH HYPERGLYCEMIA, WITH LONG-TERM CURRENT USE OF INSULIN (HCC): Primary | ICD-10-CM

## 2025-05-06 DIAGNOSIS — E66.01 MORBID (SEVERE) OBESITY DUE TO EXCESS CALORIES (HCC): ICD-10-CM

## 2025-05-06 DIAGNOSIS — K27.9 PUD (PEPTIC ULCER DISEASE): ICD-10-CM

## 2025-05-06 DIAGNOSIS — Z79.4 TYPE 2 DIABETES MELLITUS WITH HYPERGLYCEMIA, WITH LONG-TERM CURRENT USE OF INSULIN (HCC): Primary | ICD-10-CM

## 2025-05-06 LAB
CHOLEST SERPL-MCNC: 137 MG/DL (ref 0–200)
EST. AVERAGE GLUCOSE BLD GHB EST-MCNC: 162 MG/DL
HBA1C MFR BLD: 7.3 % (ref 0–5.6)
HDLC SERPL-MCNC: 31 MG/DL (ref 40–60)
HDLC SERPL: 4.4 (ref 0–5)
LDLC SERPL CALC-MCNC: 78 MG/DL (ref 0–100)
TRIGL SERPL-MCNC: 141 MG/DL (ref 0–150)
TSH W FREE THYROID IF ABNORMAL: 1.47 UIU/ML (ref 0.27–4.2)
VLDLC SERPL CALC-MCNC: 28 MG/DL (ref 6–23)

## 2025-05-06 PROCEDURE — 1036F TOBACCO NON-USER: CPT | Performed by: STUDENT IN AN ORGANIZED HEALTH CARE EDUCATION/TRAINING PROGRAM

## 2025-05-06 PROCEDURE — 3017F COLORECTAL CA SCREEN DOC REV: CPT | Performed by: STUDENT IN AN ORGANIZED HEALTH CARE EDUCATION/TRAINING PROGRAM

## 2025-05-06 PROCEDURE — G8427 DOCREV CUR MEDS BY ELIG CLIN: HCPCS | Performed by: STUDENT IN AN ORGANIZED HEALTH CARE EDUCATION/TRAINING PROGRAM

## 2025-05-06 PROCEDURE — G8417 CALC BMI ABV UP PARAM F/U: HCPCS | Performed by: STUDENT IN AN ORGANIZED HEALTH CARE EDUCATION/TRAINING PROGRAM

## 2025-05-06 PROCEDURE — 3074F SYST BP LT 130 MM HG: CPT | Performed by: STUDENT IN AN ORGANIZED HEALTH CARE EDUCATION/TRAINING PROGRAM

## 2025-05-06 PROCEDURE — 99214 OFFICE O/P EST MOD 30 MIN: CPT | Performed by: STUDENT IN AN ORGANIZED HEALTH CARE EDUCATION/TRAINING PROGRAM

## 2025-05-06 PROCEDURE — 3046F HEMOGLOBIN A1C LEVEL >9.0%: CPT | Performed by: STUDENT IN AN ORGANIZED HEALTH CARE EDUCATION/TRAINING PROGRAM

## 2025-05-06 PROCEDURE — 3079F DIAST BP 80-89 MM HG: CPT | Performed by: STUDENT IN AN ORGANIZED HEALTH CARE EDUCATION/TRAINING PROGRAM

## 2025-05-06 PROCEDURE — 2022F DILAT RTA XM EVC RTNOPTHY: CPT | Performed by: STUDENT IN AN ORGANIZED HEALTH CARE EDUCATION/TRAINING PROGRAM

## 2025-05-06 RX ORDER — PANTOPRAZOLE SODIUM 40 MG/1
40 TABLET, DELAYED RELEASE ORAL
Qty: 90 TABLET | Refills: 3 | Status: SHIPPED | OUTPATIENT
Start: 2025-05-06

## 2025-05-06 RX ORDER — DULOXETIN HYDROCHLORIDE 60 MG/1
60 CAPSULE, DELAYED RELEASE ORAL DAILY
Qty: 90 CAPSULE | Refills: 3 | Status: SHIPPED | OUTPATIENT
Start: 2025-05-06

## 2025-05-06 RX ORDER — ROSUVASTATIN CALCIUM 10 MG/1
10 TABLET, COATED ORAL DAILY
Qty: 90 TABLET | Refills: 3 | Status: SHIPPED | OUTPATIENT
Start: 2025-05-06

## 2025-05-06 RX ORDER — METFORMIN HYDROCHLORIDE 500 MG/1
1000 TABLET, EXTENDED RELEASE ORAL
Qty: 180 TABLET | Refills: 3 | Status: SHIPPED | OUTPATIENT
Start: 2025-05-06

## 2025-05-06 RX ORDER — INSULIN GLARGINE 100 [IU]/ML
45 INJECTION, SOLUTION SUBCUTANEOUS NIGHTLY
Qty: 10 ADJUSTABLE DOSE PRE-FILLED PEN SYRINGE | Refills: 5 | Status: SHIPPED | OUTPATIENT
Start: 2025-05-06

## 2025-05-06 NOTE — TELEPHONE ENCOUNTER
Type 2. Spoke with patient regarding diabetes education consult. Patient currently declines program but provided contact numbers for diabetes educators should they change their mind in the future: [phone] or [phone].

## 2025-05-06 NOTE — PROGRESS NOTES
FOLLOW UP VISIT    Subjective:    Denny Guevara (: 1960) is a 64 y.o., male,   Chief Complaint   Patient presents with    Finger Pain     Finger pain on his left hand. Patient states when he makes a fist his ring finger locks up on him.    Diabetes     4 month follow-up       HPI:    Off chemo now and on close surveillance with his cancer history  Lately glucose not great, fasting 150-180, he thinks it may be from eating bread. He continues to drink soda non-diet        The following portions of the patient's history were reviewed and updated as appropriate:      Current Outpatient Medications   Medication Sig Dispense Refill    Insulin Pen Needle 32G X 4 MM MISC 1 each by Does not apply route daily Use daily with insulin pen as directed. DX: E 11.65 100 each 11    DULoxetine (CYMBALTA) 60 MG extended release capsule Take 1 capsule by mouth daily 90 capsule 3    pantoprazole (PROTONIX) 40 MG tablet Take 1 tablet by mouth every morning (before breakfast) 90 tablet 3    metFORMIN (GLUCOPHAGE-XR) 500 MG extended release tablet Take 2 tablets by mouth daily (with breakfast) 180 tablet 3    rosuvastatin (CRESTOR) 10 MG tablet Take 1 tablet by mouth daily 90 tablet 3    insulin glargine (SEMGLEE) 100 UNIT/ML injection pen Inject 45 Units into the skin nightly 10 Adjustable Dose Pre-filled Pen Syringe 5    potassium chloride (KLOR-CON M20) 20 MEQ extended release tablet Take 1 tablet by mouth daily 90 tablet 0    rivaroxaban (XARELTO) 20 MG TABS tablet Take 1 tablet by mouth daily (with breakfast) 30 tablet 3    vitamin D (ERGOCALCIFEROL) 1.25 MG (06336 UT) CAPS capsule Take 1 capsule by mouth once a week 12 capsule 0    Calcium Carb-Cholecalciferol (CALCIUM 600 + D PO) Take by mouth      busPIRone (BUSPAR) 7.5 MG tablet Take 1 tablet by mouth daily 90 tablet 3    gabapentin (NEURONTIN) 400 MG capsule Take 1 capsule by mouth 3 times daily as needed (pain) for up to 180 days. (Patient taking differently: Take 1

## 2025-05-07 ENCOUNTER — RESULTS FOLLOW-UP (OUTPATIENT)
Dept: INTERNAL MEDICINE CLINIC | Facility: CLINIC | Age: 65
End: 2025-05-07

## 2025-05-12 ENCOUNTER — TELEPHONE (OUTPATIENT)
Dept: INTERNAL MEDICINE CLINIC | Facility: CLINIC | Age: 65
End: 2025-05-12

## 2025-05-12 DIAGNOSIS — M79.641 BILATERAL HAND PAIN: ICD-10-CM

## 2025-05-12 DIAGNOSIS — M79.642 BILATERAL HAND PAIN: ICD-10-CM

## 2025-05-12 DIAGNOSIS — M72.0 DUPUYTREN CONTRACTURE: Primary | ICD-10-CM

## 2025-05-12 NOTE — TELEPHONE ENCOUNTER
Patients spouse called stating Mr. Guevara would like to see a specialist regarding bilateral hand pain and fingers locking up.     Stated this was addressed at his last visit.     Please advise.

## 2025-05-12 NOTE — TELEPHONE ENCOUNTER
Please advise. Patient is wanting to see a specialist for his Dupuytren contracture. Referral pended, if ok.

## 2025-05-22 DIAGNOSIS — Z51.5 ENCOUNTER FOR PALLIATIVE CARE: ICD-10-CM

## 2025-05-22 DIAGNOSIS — R45.89 ANXIETY ABOUT HEALTH: ICD-10-CM

## 2025-05-22 RX ORDER — BUSPIRONE HYDROCHLORIDE 7.5 MG/1
7.5 TABLET ORAL DAILY
Qty: 90 TABLET | Refills: 3 | OUTPATIENT
Start: 2025-05-22

## 2025-05-27 ENCOUNTER — OFFICE VISIT (OUTPATIENT)
Age: 65
End: 2025-05-27

## 2025-05-27 DIAGNOSIS — M79.641 PAIN IN BOTH HANDS: Primary | ICD-10-CM

## 2025-05-27 DIAGNOSIS — M79.642 PAIN IN BOTH HANDS: Primary | ICD-10-CM

## 2025-05-27 RX ORDER — METHYLPREDNISOLONE ACETATE 40 MG/ML
40 INJECTION, SUSPENSION INTRA-ARTICULAR; INTRALESIONAL; INTRAMUSCULAR; SOFT TISSUE ONCE
Status: COMPLETED | OUTPATIENT
Start: 2025-05-27 | End: 2025-05-27

## 2025-05-27 RX ORDER — BLOOD SUGAR DIAGNOSTIC
STRIP MISCELLANEOUS
Qty: 100 STRIP | Refills: 5 | OUTPATIENT
Start: 2025-05-27

## 2025-05-27 RX ADMIN — METHYLPREDNISOLONE ACETATE 40 MG: 40 INJECTION, SUSPENSION INTRA-ARTICULAR; INTRALESIONAL; INTRAMUSCULAR; SOFT TISSUE at 09:20

## 2025-05-27 NOTE — PROGRESS NOTES
[facility] Note    Name: Denny Guevara  YOB: 1960  Gender: male  MRN: [id number]      CC: Patient referred for evaluation of bilateral hand pain    HPI: Denny Guevara is a 64 y.o. male right hand dominant with a chief complaint of bilateral hand pain.  He reports bilateral ring fingers are painful and locking.  He says they are worse in the morning.  He also said he is having some pain in his bilateral thumb MCP joints.  He is referred over by Dr. Vernon.  He is type II diabetic.  He is also on Xarelto.  He also has stage IV cancer.  He works on [employer].    ROS/Meds/PSH/PMH/FH/SH: I personally reviewed the patients standard intake form.  Pertinents are discussed in the HPI    Physical Examination:  General: Awake and alert.  HEENT: Normocephalic, atraumatic  CV/Pulm: Breathing even and unlabored  Skin: No obvious rashes noted.  Lymphatic: No obvious evidence of lymphedema or lymphadenopathy    Musculoskeletal Exam:  Examination on the bilateral upper extremity demonstrates cap refill < 5 seconds in all fingers  Examination on the left demonstrates Normal sensation to light touch in the median distribution, normal sensation in ulnar and radial distribution, negative carpal tunnel compression testing and Phalen testing. positive tenderness of the left ring A1 pulley with palpable clicking and positive  locking. The extensor tendons all track well over the MCP joints.  Patient is tender to palpation at the MCP joint of the left thumb.    Examination on the right demonstrates normal sensation to light touch in the median distribution, normal sensation in ulnar and radial distribution, negative carpal tunnel compression testing and Phalen testing.  Positive tenderness of the right ring A1 pulley with palpable clicking and negative locking.  The extensor tendons all track well over the MCP joints.  Patient is tender to palpation at the MCP joint of the right thumb.    Imaging /

## 2025-06-02 DIAGNOSIS — E87.6 HYPOKALEMIA: ICD-10-CM

## 2025-06-02 RX ORDER — POTASSIUM CHLORIDE 1500 MG/1
20 TABLET, EXTENDED RELEASE ORAL DAILY
Qty: 90 TABLET | Refills: 0 | OUTPATIENT
Start: 2025-06-02

## 2025-06-13 DIAGNOSIS — E11.65 TYPE 2 DIABETES MELLITUS WITH HYPERGLYCEMIA, WITH LONG-TERM CURRENT USE OF INSULIN (HCC): Primary | ICD-10-CM

## 2025-06-13 DIAGNOSIS — Z79.4 TYPE 2 DIABETES MELLITUS WITH HYPERGLYCEMIA, WITH LONG-TERM CURRENT USE OF INSULIN (HCC): Primary | ICD-10-CM

## 2025-06-13 RX ORDER — GLUCOSAMINE HCL/CHONDROITIN SU 500-400 MG
CAPSULE ORAL
Qty: 100 STRIP | Refills: 11 | Status: SHIPPED | OUTPATIENT
Start: 2025-06-13

## 2025-06-13 NOTE — TELEPHONE ENCOUNTER
Pt requesting refill. Rx last written 5/13/24 for #100 with 5 refills. Pt last seen 5/6/25 and next appt is 7/7/25. Rx pended.

## 2025-06-17 NOTE — PROGRESS NOTES
visual dx/tissue pathology to determine plan - recommend ex lap - refer to Dr Doyle - personally  reviewed case with Dr Doyle who will expedite the workup and will see pt Fri.  US with mild biliary duct dilation - HIDA/ERCP reviewed by PCP   + BM/flatus; He did not like how IV morphine made him feel in the hospital.  He tried tramadol but found no relief and has been taking acetaminophen at home with minimal relief.  Discussed different options and he settled on trying  Percocet - he will contact the office to inform us if this is working for him.  We discussed SE - not to drive while on the med.  Bowel regimen reviewed.   He is tired of tests, frustrated.  Feelings validated and stressed importance of workup to determine why he has pain.  Wt loss from 240 --> 209 noted and expressed concern to pt about this.    - completed course of Levaquin/Diflucan for klebsiella pneumoniae and citrobacter freundii both of which were sensitive to Levaquin found around G-tube site.  Wishes for tube removal - reviewed risks of this during chemotherapy - I would like for him to have labs day before scheduled procedure to make sure his counts are adequate per above; case reviewed with Dr Roy by me via tel today   - here cycle 8 FOLFIRINOX - labs reviewed-defer 1 week due to neutropenia.   - nutrition - G-tube out.  He is being weaned off TPN (3x week now) as his oral intake has greatly improved, weight up    - pain - Fentanyl 12mcg with prn oxycodone 10 mg, plans to stop patch on Nader 10/2 PC following  - Plan for surveillance reviewed.  Labs discussed.    - nausea - resolved. Has Zyprexa QHS (not taking currently) and Zofran PRN.   - wt loss/FTT - secondary to disease and tx - on TPN and eating more   - depression/anxiety - better affect, on lexapro 20mg daily, PC adding buspar prn  - here for follow-up prior to cycle 13 FOLFIRINOX.  Labs reviewed and adequate.    - CT October 2022 previously with no evidence of progressive disease.

## 2025-06-18 ENCOUNTER — HOSPITAL ENCOUNTER (OUTPATIENT)
Dept: CT IMAGING | Age: 65
Discharge: HOME | End: 2025-06-20
Attending: INTERNAL MEDICINE
Payer: COMMERCIAL

## 2025-06-18 DIAGNOSIS — C76.2 ABDOMINAL CARCINOMATOSIS (HCC): ICD-10-CM

## 2025-06-18 DIAGNOSIS — C79.9 METASTATIC ADENOCARCINOMA (HCC): ICD-10-CM

## 2025-06-18 DIAGNOSIS — C78.6 MALIGNANT NEOPLASM METASTATIC TO PERITONEUM (HCC): ICD-10-CM

## 2025-06-18 LAB — CREAT BLD-MCNC: 0.76 MG/DL (ref 0.8–1.5)

## 2025-06-18 PROCEDURE — 74177 CT ABD & PELVIS W/CONTRAST: CPT

## 2025-06-18 PROCEDURE — 6360000004 HC RX CONTRAST MEDICATION: Performed by: INTERNAL MEDICINE

## 2025-06-18 PROCEDURE — 82565 ASSAY OF CREATININE: CPT

## 2025-06-18 RX ORDER — IOPAMIDOL 755 MG/ML
100 INJECTION, SOLUTION INTRAVASCULAR
Status: COMPLETED | OUTPATIENT
Start: 2025-06-18 | End: 2025-06-18

## 2025-06-18 RX ADMIN — IOPAMIDOL 100 ML: 755 INJECTION, SOLUTION INTRAVENOUS at 09:52

## 2025-06-24 RX ORDER — SODIUM CHLORIDE 0.9 % (FLUSH) 0.9 %
5-40 SYRINGE (ML) INJECTION PRN
OUTPATIENT
Start: 2025-06-25

## 2025-06-25 ENCOUNTER — HOSPITAL ENCOUNTER (OUTPATIENT)
Dept: LAB | Age: 65
Discharge: HOME | End: 2025-06-25
Payer: COMMERCIAL

## 2025-06-25 ENCOUNTER — OFFICE VISIT (OUTPATIENT)
Dept: ONCOLOGY | Age: 65
End: 2025-06-25
Payer: COMMERCIAL

## 2025-06-25 ENCOUNTER — RESULTS FOLLOW-UP (OUTPATIENT)
Dept: ONCOLOGY | Age: 65
End: 2025-06-25

## 2025-06-25 VITALS
TEMPERATURE: 97.9 F | HEART RATE: 70 BPM | SYSTOLIC BLOOD PRESSURE: 142 MMHG | OXYGEN SATURATION: 96 % | HEIGHT: 70 IN | WEIGHT: 250.8 LBS | RESPIRATION RATE: 18 BRPM | DIASTOLIC BLOOD PRESSURE: 80 MMHG | BODY MASS INDEX: 35.9 KG/M2

## 2025-06-25 DIAGNOSIS — Z12.5 SCREENING PSA (PROSTATE SPECIFIC ANTIGEN): ICD-10-CM

## 2025-06-25 DIAGNOSIS — C76.2 ABDOMINAL CARCINOMATOSIS (HCC): ICD-10-CM

## 2025-06-25 DIAGNOSIS — R45.89 ANXIETY ABOUT HEALTH: ICD-10-CM

## 2025-06-25 DIAGNOSIS — C78.6 MALIGNANT NEOPLASM METASTATIC TO PERITONEUM (HCC): ICD-10-CM

## 2025-06-25 DIAGNOSIS — C76.2 ABDOMINAL CARCINOMATOSIS (HCC): Primary | ICD-10-CM

## 2025-06-25 DIAGNOSIS — G62.9 NEUROPATHY: ICD-10-CM

## 2025-06-25 DIAGNOSIS — I82.721 CHRONIC DEEP VEIN THROMBOSIS (DVT) OF RIGHT UPPER EXTREMITY, UNSPECIFIED VEIN (HCC): ICD-10-CM

## 2025-06-25 DIAGNOSIS — R79.89 ELEVATED LFTS: ICD-10-CM

## 2025-06-25 DIAGNOSIS — K76.0 FATTY LIVER: ICD-10-CM

## 2025-06-25 DIAGNOSIS — C79.9 METASTATIC ADENOCARCINOMA (HCC): ICD-10-CM

## 2025-06-25 LAB
ALBUMIN SERPL-MCNC: 3.7 G/DL (ref 3.2–4.6)
ALBUMIN/GLOB SERPL: 1.2 (ref 1–1.9)
ALP SERPL-CCNC: 116 U/L (ref 40–129)
ALT SERPL-CCNC: 58 U/L (ref 8–55)
ANION GAP SERPL CALC-SCNC: 13 MMOL/L (ref 7–16)
AST SERPL-CCNC: 42 U/L (ref 15–37)
BASOPHILS # BLD: 0.07 K/UL (ref 0–0.2)
BASOPHILS NFR BLD: 0.9 % (ref 0–2)
BILIRUB SERPL-MCNC: 0.4 MG/DL (ref 0–1.2)
BUN SERPL-MCNC: 11 MG/DL (ref 8–23)
CALCIUM SERPL-MCNC: 8.9 MG/DL (ref 8.8–10.2)
CEA SERPL-MCNC: 1.9 NG/ML (ref 0–3.8)
CHLORIDE SERPL-SCNC: 103 MMOL/L (ref 98–107)
CO2 SERPL-SCNC: 23 MMOL/L (ref 20–29)
CREAT SERPL-MCNC: 0.93 MG/DL (ref 0.8–1.3)
DIFFERENTIAL METHOD BLD: NORMAL
EOSINOPHIL # BLD: 0.25 K/UL (ref 0–0.8)
EOSINOPHIL NFR BLD: 3.3 % (ref 0.5–7.8)
ERYTHROCYTE [DISTWIDTH] IN BLOOD BY AUTOMATED COUNT: 13.2 % (ref 11.9–14.6)
GLOBULIN SER CALC-MCNC: 3 G/DL (ref 2.3–3.5)
GLUCOSE SERPL-MCNC: 286 MG/DL (ref 70–99)
HCT VFR BLD AUTO: 44.9 % (ref 41.1–50.3)
HGB BLD-MCNC: 14.8 G/DL (ref 13.6–17.2)
IMM GRANULOCYTES # BLD AUTO: 0.02 K/UL (ref 0–0.5)
IMM GRANULOCYTES NFR BLD AUTO: 0.3 % (ref 0–5)
LYMPHOCYTES # BLD: 2.28 K/UL (ref 0.5–4.6)
LYMPHOCYTES NFR BLD: 30.2 % (ref 13–44)
MCH RBC QN AUTO: 29.1 PG (ref 26.1–32.9)
MCHC RBC AUTO-ENTMCNC: 33 G/DL (ref 31.4–35)
MCV RBC AUTO: 88.2 FL (ref 82–102)
MONOCYTES # BLD: 0.35 K/UL (ref 0.1–1.3)
MONOCYTES NFR BLD: 4.6 % (ref 4–12)
NEUTS SEG # BLD: 4.59 K/UL (ref 1.7–8.2)
NEUTS SEG NFR BLD: 60.7 % (ref 43–78)
NRBC # BLD: 0 K/UL (ref 0–0.2)
PLATELET # BLD AUTO: 161 K/UL (ref 150–450)
PMV BLD AUTO: 10.1 FL (ref 9.4–12.3)
POTASSIUM SERPL-SCNC: 3.9 MMOL/L (ref 3.5–5.1)
PROT SERPL-MCNC: 6.7 G/DL (ref 6.3–8.2)
PSA SERPL-MCNC: 1.2 NG/ML (ref 0–4)
RBC # BLD AUTO: 5.09 M/UL (ref 4.23–5.6)
SODIUM SERPL-SCNC: 139 MMOL/L (ref 136–145)
WBC # BLD AUTO: 7.6 K/UL (ref 4.3–11.1)

## 2025-06-25 PROCEDURE — 82378 CARCINOEMBRYONIC ANTIGEN: CPT

## 2025-06-25 PROCEDURE — 36415 COLL VENOUS BLD VENIPUNCTURE: CPT

## 2025-06-25 PROCEDURE — 85025 COMPLETE CBC W/AUTO DIFF WBC: CPT

## 2025-06-25 PROCEDURE — 3079F DIAST BP 80-89 MM HG: CPT | Performed by: INTERNAL MEDICINE

## 2025-06-25 PROCEDURE — 3017F COLORECTAL CA SCREEN DOC REV: CPT | Performed by: INTERNAL MEDICINE

## 2025-06-25 PROCEDURE — G8428 CUR MEDS NOT DOCUMENT: HCPCS | Performed by: INTERNAL MEDICINE

## 2025-06-25 PROCEDURE — 84153 ASSAY OF PSA TOTAL: CPT

## 2025-06-25 PROCEDURE — 3077F SYST BP >= 140 MM HG: CPT | Performed by: INTERNAL MEDICINE

## 2025-06-25 PROCEDURE — 1036F TOBACCO NON-USER: CPT | Performed by: INTERNAL MEDICINE

## 2025-06-25 PROCEDURE — 80053 COMPREHEN METABOLIC PANEL: CPT

## 2025-06-25 PROCEDURE — G8417 CALC BMI ABV UP PARAM F/U: HCPCS | Performed by: INTERNAL MEDICINE

## 2025-06-25 PROCEDURE — 99214 OFFICE O/P EST MOD 30 MIN: CPT | Performed by: INTERNAL MEDICINE

## 2025-06-25 RX ORDER — GABAPENTIN 400 MG/1
400 CAPSULE ORAL DAILY
Qty: 90 CAPSULE | Refills: 1 | Status: SHIPPED | OUTPATIENT
Start: 2025-06-25 | End: 2025-12-22

## 2025-06-25 RX ORDER — BUSPIRONE HYDROCHLORIDE 7.5 MG/1
7.5 TABLET ORAL DAILY
Qty: 90 TABLET | Refills: 3 | Status: SHIPPED | OUTPATIENT
Start: 2025-06-25

## 2025-06-25 ASSESSMENT — PATIENT HEALTH QUESTIONNAIRE - PHQ9
SUM OF ALL RESPONSES TO PHQ QUESTIONS 1-9: 0
1. LITTLE INTEREST OR PLEASURE IN DOING THINGS: NOT AT ALL
SUM OF ALL RESPONSES TO PHQ QUESTIONS 1-9: 0
2. FEELING DOWN, DEPRESSED OR HOPELESS: NOT AT ALL

## 2025-06-25 NOTE — PATIENT INSTRUCTIONS
Patient Information from Today's Visit    The members of your [facility] are listed below:    Physician Provider: Dr. Barbara Bennett   Advanced Practice Clinician: PURNIMA  Registered Nurse: Thea SMITH  Nurse Navigator: Vasile TIRADO RN and Yudith SAGASTUME RN  Medical Assistant: Lizbet MENCHACA  : Edwige COOPER   Supportive Care Services: [facility] [place]    Diagnosis (Information Sheet Provided on Day of Diagnosis): Abdominal carcinomatosis    Follow Up Instructions: Labs in 1 month.    Labs reviewed.  Symptoms reviewed.  Scan reviewed.  Referral     Has Treatment Plan Been Finalized? N/A     Current Labs:   Hospital Outpatient Visit on 06/25/2025   Component Date Value Ref Range Status    CEA 06/25/2025 1.9  0.0 - 3.8 ng/mL Final    Comment: Nonsmoker: <3.9 ng/mL  Smoker: <5.6 ng/mL  Roche ECLIA methodology  Patient's results of tumor marker testing may not be comparable to labs using different manufacturers/methods.      Sodium 06/25/2025 139  136 - 145 mmol/L Final    Potassium 06/25/2025 3.9  3.5 - 5.1 mmol/L Final    Chloride 06/25/2025 103  98 - 107 mmol/L Final    CO2 06/25/2025 23  20 - 29 mmol/L Final    Anion Gap 06/25/2025 13  7 - 16 mmol/L Final    Glucose 06/25/2025 286 (H)  70 - 99 mg/dL Final    Comment: <70 mg/dL Consistent with, but not fully diagnostic of hypoglycemia.  100 - 125 mg/dL Impaired fasting glucose/consistent with pre-diabetes mellitus.  > 126 mg/dl Fasting glucose consistent with overt diabetes mellitus      BUN 06/25/2025 11  8 - 23 MG/DL Final    Creatinine 06/25/2025 0.93  0.80 - 1.30 MG/DL Final    Est, Glom Filt Rate 06/25/2025 >90  >60 ml/min/1.73m2 Final    Comment:    Pediatric calculator link: https://www.kidney.org/professionals/kdoqi/gfr_calculatorped     These results are not intended for use in patients <18 years of age.     eGFR results are calculated without a race factor using  the 2021 CKD-EPI equation. Careful clinical correlation is recommended, particularly when

## 2025-07-07 ENCOUNTER — TELEMEDICINE (OUTPATIENT)
Dept: INTERNAL MEDICINE CLINIC | Facility: CLINIC | Age: 65
End: 2025-07-07
Payer: COMMERCIAL

## 2025-07-07 DIAGNOSIS — Z79.4 TYPE 2 DIABETES MELLITUS WITH HYPERGLYCEMIA, WITH LONG-TERM CURRENT USE OF INSULIN (HCC): Primary | ICD-10-CM

## 2025-07-07 DIAGNOSIS — E11.65 TYPE 2 DIABETES MELLITUS WITH HYPERGLYCEMIA, WITH LONG-TERM CURRENT USE OF INSULIN (HCC): Primary | ICD-10-CM

## 2025-07-07 DIAGNOSIS — N40.0 ENLARGED PROSTATE: ICD-10-CM

## 2025-07-07 PROBLEM — R35.1 BENIGN PROSTATIC HYPERPLASIA WITH NOCTURIA: Status: ACTIVE | Noted: 2025-07-07

## 2025-07-07 PROBLEM — N40.1 BENIGN PROSTATIC HYPERPLASIA WITH NOCTURIA: Status: ACTIVE | Noted: 2025-07-07

## 2025-07-07 PROBLEM — R35.1 BENIGN PROSTATIC HYPERPLASIA WITH NOCTURIA: Status: RESOLVED | Noted: 2025-07-07 | Resolved: 2025-07-07

## 2025-07-07 PROBLEM — N40.1 BENIGN PROSTATIC HYPERPLASIA WITH NOCTURIA: Status: RESOLVED | Noted: 2025-07-07 | Resolved: 2025-07-07

## 2025-07-07 PROCEDURE — 3017F COLORECTAL CA SCREEN DOC REV: CPT | Performed by: STUDENT IN AN ORGANIZED HEALTH CARE EDUCATION/TRAINING PROGRAM

## 2025-07-07 PROCEDURE — 1036F TOBACCO NON-USER: CPT | Performed by: STUDENT IN AN ORGANIZED HEALTH CARE EDUCATION/TRAINING PROGRAM

## 2025-07-07 PROCEDURE — 2022F DILAT RTA XM EVC RTNOPTHY: CPT | Performed by: STUDENT IN AN ORGANIZED HEALTH CARE EDUCATION/TRAINING PROGRAM

## 2025-07-07 PROCEDURE — 3051F HG A1C>EQUAL 7.0%<8.0%: CPT | Performed by: STUDENT IN AN ORGANIZED HEALTH CARE EDUCATION/TRAINING PROGRAM

## 2025-07-07 PROCEDURE — 99214 OFFICE O/P EST MOD 30 MIN: CPT | Performed by: STUDENT IN AN ORGANIZED HEALTH CARE EDUCATION/TRAINING PROGRAM

## 2025-07-07 PROCEDURE — G8427 DOCREV CUR MEDS BY ELIG CLIN: HCPCS | Performed by: STUDENT IN AN ORGANIZED HEALTH CARE EDUCATION/TRAINING PROGRAM

## 2025-07-07 PROCEDURE — G8417 CALC BMI ABV UP PARAM F/U: HCPCS | Performed by: STUDENT IN AN ORGANIZED HEALTH CARE EDUCATION/TRAINING PROGRAM

## 2025-07-07 RX ORDER — INSULIN GLARGINE 100 [IU]/ML
48 INJECTION, SOLUTION SUBCUTANEOUS NIGHTLY
Qty: 30 ADJUSTABLE DOSE PRE-FILLED PEN SYRINGE | Refills: 5 | Status: SHIPPED | OUTPATIENT
Start: 2025-07-07

## 2025-07-07 RX ORDER — LANOLIN ALCOHOL/MO/W.PET/CERES
400 CREAM (GRAM) TOPICAL DAILY
COMMUNITY

## 2025-07-07 NOTE — PROGRESS NOTES
Denny Guevara (:  1960) is seen via virtual visit today for evaluation of the following:   Chief Complaint   Patient presents with    Diabetes     2 month follow up   .      HPI:    Following up on type 2 diabetes today.  A1c 2 months ago was 7.3.  One day glucose was 101 on 1 occasion. Today glucose fasting was 122.  His fasting sugar usually ranges from 120-150.  Currently using metformin and 48 units glargine.    Had CT recently with his oncologist Dr. Bennett ongoing surveillance for his abdominal carcinomatosis, which noted enlarged prostate    He will get up about 2x per night to urinate    The following portions of the patient's history were reviewed and updated as appropriate:      Current Outpatient Medications   Medication Sig Dispense Refill    magnesium oxide (MAG-OX) 400 (240 Mg) MG tablet Take 1 tablet by mouth daily      insulin glargine (SEMGLEE) 100 UNIT/ML injection pen Inject 48 Units into the skin nightly 30 Adjustable Dose Pre-filled Pen Syringe 5    busPIRone (BUSPAR) 7.5 MG tablet Take 1 tablet by mouth daily 90 tablet 3    gabapentin (NEURONTIN) 400 MG capsule Take 1 capsule by mouth daily for 180 days. 90 capsule 1    rivaroxaban (XARELTO) 20 MG TABS tablet Take 1 tablet by mouth daily (with breakfast) 30 tablet 3    blood glucose monitor strips OneTouch Verio Testing Strips check blood sugar 3-4 times daily as directed. DX: E11.9 100 strip 11    Insulin Pen Needle 32G X 4 MM MISC 1 each by Does not apply route daily Use daily with insulin pen as directed. DX: E 11.65 100 each 11    DULoxetine (CYMBALTA) 60 MG extended release capsule Take 1 capsule by mouth daily 90 capsule 3    pantoprazole (PROTONIX) 40 MG tablet Take 1 tablet by mouth every morning (before breakfast) 90 tablet 3    metFORMIN (GLUCOPHAGE-XR) 500 MG extended release tablet Take 2 tablets by mouth daily (with breakfast) 180 tablet 3    rosuvastatin (CRESTOR) 10 MG tablet Take 1 tablet by mouth daily 90 tablet 3

## 2025-07-25 PROBLEM — Z12.5 SCREENING PSA (PROSTATE SPECIFIC ANTIGEN): Status: RESOLVED | Noted: 2025-06-25 | Resolved: 2025-07-25

## 2025-07-28 ENCOUNTER — HOSPITAL ENCOUNTER (OUTPATIENT)
Dept: LAB | Age: 65
Discharge: HOME | End: 2025-07-28
Payer: COMMERCIAL

## 2025-07-28 ENCOUNTER — TELEPHONE (OUTPATIENT)
Dept: INTERNAL MEDICINE CLINIC | Facility: CLINIC | Age: 65
End: 2025-07-28

## 2025-07-28 DIAGNOSIS — R79.89 ELEVATED LFTS: ICD-10-CM

## 2025-07-28 LAB
ALBUMIN SERPL-MCNC: 3.6 G/DL (ref 3.2–4.6)
ALBUMIN/GLOB SERPL: 1.1 (ref 1–1.9)
ALP SERPL-CCNC: 89 U/L (ref 40–129)
ALT SERPL-CCNC: 45 U/L (ref 8–55)
ANION GAP SERPL CALC-SCNC: 12 MMOL/L (ref 7–16)
AST SERPL-CCNC: 27 U/L (ref 15–37)
BILIRUB SERPL-MCNC: 0.8 MG/DL (ref 0–1.2)
BUN SERPL-MCNC: 9 MG/DL (ref 8–23)
CALCIUM SERPL-MCNC: 9.4 MG/DL (ref 8.8–10.2)
CHLORIDE SERPL-SCNC: 104 MMOL/L (ref 98–107)
CO2 SERPL-SCNC: 21 MMOL/L (ref 20–29)
CREAT SERPL-MCNC: 0.99 MG/DL (ref 0.8–1.3)
GLOBULIN SER CALC-MCNC: 3.4 G/DL (ref 2.3–3.5)
GLUCOSE SERPL-MCNC: 242 MG/DL (ref 70–99)
POTASSIUM SERPL-SCNC: 4 MMOL/L (ref 3.5–5.1)
PROT SERPL-MCNC: 7.1 G/DL (ref 6.3–8.2)
SODIUM SERPL-SCNC: 137 MMOL/L (ref 136–145)

## 2025-07-28 PROCEDURE — 80053 COMPREHEN METABOLIC PANEL: CPT

## 2025-07-28 PROCEDURE — 36415 COLL VENOUS BLD VENIPUNCTURE: CPT

## 2025-07-28 NOTE — TELEPHONE ENCOUNTER
Spoke with patient and his wife. Wife reports that patient has complained of stomach pain since Saturday and refusing to go to ER. Wife explained that patient has hx of diverticulitis and umbilical hemia. Wife and patient encouraged to go to The ER for further evaluation. However if wife is not able to get him go a visit has been scheduled for wednesday July 30th. Patient has an appointment also for Gastro but its not until the 21 of August.

## 2025-07-30 ENCOUNTER — OFFICE VISIT (OUTPATIENT)
Dept: INTERNAL MEDICINE CLINIC | Facility: CLINIC | Age: 65
End: 2025-07-30
Payer: COMMERCIAL

## 2025-07-30 VITALS
HEART RATE: 83 BPM | BODY MASS INDEX: 35.73 KG/M2 | DIASTOLIC BLOOD PRESSURE: 82 MMHG | SYSTOLIC BLOOD PRESSURE: 140 MMHG | WEIGHT: 249 LBS | OXYGEN SATURATION: 99 %

## 2025-07-30 DIAGNOSIS — R10.9 ABDOMINAL PAIN, UNSPECIFIED ABDOMINAL LOCATION: ICD-10-CM

## 2025-07-30 DIAGNOSIS — R10.9 ABDOMINAL PAIN, UNSPECIFIED ABDOMINAL LOCATION: Primary | ICD-10-CM

## 2025-07-30 LAB
ALBUMIN SERPL-MCNC: 3.9 G/DL (ref 3.2–4.6)
ALBUMIN/GLOB SERPL: 1.1 (ref 1–1.9)
ALP SERPL-CCNC: 96 U/L (ref 40–129)
ALT SERPL-CCNC: 54 U/L (ref 8–55)
ANION GAP SERPL CALC-SCNC: 14 MMOL/L (ref 7–16)
AST SERPL-CCNC: 47 U/L (ref 15–37)
BASOPHILS # BLD: 0.07 K/UL (ref 0–0.2)
BASOPHILS NFR BLD: 0.9 % (ref 0–2)
BILIRUB SERPL-MCNC: 0.4 MG/DL (ref 0–1.2)
BUN SERPL-MCNC: 13 MG/DL (ref 8–23)
CALCIUM SERPL-MCNC: 9.9 MG/DL (ref 8.8–10.2)
CHLORIDE SERPL-SCNC: 103 MMOL/L (ref 98–107)
CO2 SERPL-SCNC: 24 MMOL/L (ref 20–29)
CREAT SERPL-MCNC: 0.99 MG/DL (ref 0.8–1.3)
DIFFERENTIAL METHOD BLD: NORMAL
EOSINOPHIL # BLD: 0.23 K/UL (ref 0–0.8)
EOSINOPHIL NFR BLD: 3 % (ref 0.5–7.8)
ERYTHROCYTE [DISTWIDTH] IN BLOOD BY AUTOMATED COUNT: 13.1 % (ref 11.9–14.6)
GLOBULIN SER CALC-MCNC: 3.6 G/DL (ref 2.3–3.5)
GLUCOSE SERPL-MCNC: 137 MG/DL (ref 70–99)
HCT VFR BLD AUTO: 45.5 % (ref 41.1–50.3)
HGB BLD-MCNC: 15 G/DL (ref 13.6–17.2)
IMM GRANULOCYTES # BLD AUTO: 0.02 K/UL (ref 0–0.5)
IMM GRANULOCYTES NFR BLD AUTO: 0.3 % (ref 0–5)
LIPASE SERPL-CCNC: 19 U/L (ref 13–60)
LYMPHOCYTES # BLD: 2.5 K/UL (ref 0.5–4.6)
LYMPHOCYTES NFR BLD: 32.4 % (ref 13–44)
MCH RBC QN AUTO: 29.6 PG (ref 26.1–32.9)
MCHC RBC AUTO-ENTMCNC: 33 G/DL (ref 31.4–35)
MCV RBC AUTO: 89.9 FL (ref 82–102)
MONOCYTES # BLD: 0.48 K/UL (ref 0.1–1.3)
MONOCYTES NFR BLD: 6.2 % (ref 4–12)
NEUTS SEG # BLD: 4.41 K/UL (ref 1.7–8.2)
NEUTS SEG NFR BLD: 57.2 % (ref 43–78)
NRBC # BLD: 0 K/UL (ref 0–0.2)
PLATELET # BLD AUTO: 227 K/UL (ref 150–450)
PMV BLD AUTO: 10.7 FL (ref 9.4–12.3)
POTASSIUM SERPL-SCNC: 4 MMOL/L (ref 3.5–5.1)
PROT SERPL-MCNC: 7.5 G/DL (ref 6.3–8.2)
RBC # BLD AUTO: 5.06 M/UL (ref 4.23–5.6)
SODIUM SERPL-SCNC: 140 MMOL/L (ref 136–145)
WBC # BLD AUTO: 7.7 K/UL (ref 4.3–11.1)

## 2025-07-30 PROCEDURE — 1036F TOBACCO NON-USER: CPT | Performed by: STUDENT IN AN ORGANIZED HEALTH CARE EDUCATION/TRAINING PROGRAM

## 2025-07-30 PROCEDURE — 3017F COLORECTAL CA SCREEN DOC REV: CPT | Performed by: STUDENT IN AN ORGANIZED HEALTH CARE EDUCATION/TRAINING PROGRAM

## 2025-07-30 PROCEDURE — G8417 CALC BMI ABV UP PARAM F/U: HCPCS | Performed by: STUDENT IN AN ORGANIZED HEALTH CARE EDUCATION/TRAINING PROGRAM

## 2025-07-30 PROCEDURE — 3079F DIAST BP 80-89 MM HG: CPT | Performed by: STUDENT IN AN ORGANIZED HEALTH CARE EDUCATION/TRAINING PROGRAM

## 2025-07-30 PROCEDURE — G8427 DOCREV CUR MEDS BY ELIG CLIN: HCPCS | Performed by: STUDENT IN AN ORGANIZED HEALTH CARE EDUCATION/TRAINING PROGRAM

## 2025-07-30 PROCEDURE — 3077F SYST BP >= 140 MM HG: CPT | Performed by: STUDENT IN AN ORGANIZED HEALTH CARE EDUCATION/TRAINING PROGRAM

## 2025-07-30 PROCEDURE — 99214 OFFICE O/P EST MOD 30 MIN: CPT | Performed by: STUDENT IN AN ORGANIZED HEALTH CARE EDUCATION/TRAINING PROGRAM

## 2025-07-30 NOTE — PROGRESS NOTES
Chief Complaint   Patient presents with    GI Problem     Patient reports pain in left side of abdomen started Friday evening. Patient believes he may strained some muscles in that area as he noticed the pain during lifting heavy items. However patient does have stage four stomach cancer and has Gastro appointment on 21st of August.wanting to make sure nothing else is going on and that he is okay until that appointment         SUBJECTIVE      History of Present Illness  The patient is a 64-year-old male presenting with abdominal pain.    The patient initially experienced severe left-sided abdominal cramping while operating a tractor when he was leaning forward to reach something, which was alleviated by rest. The pain was exacerbated the following day by lifting heavy batteries. Despite the discomfort, the patient continued with activities, including fishing, and the pain subsequently subsided to mild soreness. He reports no associated symptoms such as nausea, vomiting, constipation, or diarrhea. There is no history of recent trauma or injury. The patient has observed a bulge near the umbilicus post-surgery, which has been increasing in size. His medical history is significant for stomach cancer and diverticulitis.    PAST SURGICAL HISTORY:  Umbilical surgery.  Review of Systems     Current Outpatient Medications   Medication Sig Dispense Refill    magnesium oxide (MAG-OX) 400 (240 Mg) MG tablet Take 1 tablet by mouth daily      insulin glargine (SEMGLEE) 100 UNIT/ML injection pen Inject 48 Units into the skin nightly 30 Adjustable Dose Pre-filled Pen Syringe 5    busPIRone (BUSPAR) 7.5 MG tablet Take 1 tablet by mouth daily 90 tablet 3    gabapentin (NEURONTIN) 400 MG capsule Take 1 capsule by mouth daily for 180 days. 90 capsule 1    rivaroxaban (XARELTO) 20 MG TABS tablet Take 1 tablet by mouth daily (with breakfast) 30 tablet 3    blood glucose monitor strips OneTouch Verio Testing Strips check blood sugar 3-4

## 2025-07-31 ENCOUNTER — HOSPITAL ENCOUNTER (OUTPATIENT)
Dept: CT IMAGING | Age: 65
Discharge: HOME | End: 2025-07-31
Attending: STUDENT IN AN ORGANIZED HEALTH CARE EDUCATION/TRAINING PROGRAM
Payer: MEDICARE

## 2025-07-31 DIAGNOSIS — R10.9 ABDOMINAL PAIN, UNSPECIFIED ABDOMINAL LOCATION: ICD-10-CM

## 2025-07-31 PROBLEM — K57.92 DIVERTICULITIS: Status: ACTIVE | Noted: 2025-07-31

## 2025-07-31 PROCEDURE — 6360000004 HC RX CONTRAST MEDICATION: Performed by: STUDENT IN AN ORGANIZED HEALTH CARE EDUCATION/TRAINING PROGRAM

## 2025-07-31 PROCEDURE — 74177 CT ABD & PELVIS W/CONTRAST: CPT

## 2025-07-31 RX ORDER — IOPAMIDOL 755 MG/ML
100 INJECTION, SOLUTION INTRAVASCULAR
Status: COMPLETED | OUTPATIENT
Start: 2025-07-31 | End: 2025-07-31

## 2025-07-31 RX ADMIN — IOPAMIDOL 100 ML: 755 INJECTION, SOLUTION INTRAVENOUS at 11:53

## 2025-08-04 ENCOUNTER — OFFICE VISIT (OUTPATIENT)
Dept: UROLOGY | Age: 65
End: 2025-08-04
Payer: COMMERCIAL

## 2025-08-04 DIAGNOSIS — N40.0 BENIGN PROSTATIC HYPERPLASIA, UNSPECIFIED WHETHER LOWER URINARY TRACT SYMPTOMS PRESENT: Primary | ICD-10-CM

## 2025-08-04 PROCEDURE — G8417 CALC BMI ABV UP PARAM F/U: HCPCS | Performed by: UROLOGY

## 2025-08-04 PROCEDURE — 99203 OFFICE O/P NEW LOW 30 MIN: CPT | Performed by: UROLOGY

## 2025-08-04 PROCEDURE — 1036F TOBACCO NON-USER: CPT | Performed by: UROLOGY

## 2025-08-04 PROCEDURE — G8427 DOCREV CUR MEDS BY ELIG CLIN: HCPCS | Performed by: UROLOGY

## 2025-08-04 PROCEDURE — 3017F COLORECTAL CA SCREEN DOC REV: CPT | Performed by: UROLOGY

## 2025-08-04 ASSESSMENT — ENCOUNTER SYMPTOMS
EYES NEGATIVE: 1
RESPIRATORY NEGATIVE: 1

## 2025-08-21 ENCOUNTER — OFFICE VISIT (OUTPATIENT)
Dept: GASTROENTEROLOGY | Age: 65
End: 2025-08-21
Payer: COMMERCIAL

## 2025-08-21 ENCOUNTER — TELEPHONE (OUTPATIENT)
Dept: GASTROENTEROLOGY | Age: 65
End: 2025-08-21

## 2025-08-21 ENCOUNTER — TELEPHONE (OUTPATIENT)
Age: 65
End: 2025-08-21

## 2025-08-21 VITALS
WEIGHT: 254 LBS | HEIGHT: 71 IN | DIASTOLIC BLOOD PRESSURE: 81 MMHG | OXYGEN SATURATION: 94 % | BODY MASS INDEX: 35.56 KG/M2 | SYSTOLIC BLOOD PRESSURE: 131 MMHG | HEART RATE: 73 BPM | TEMPERATURE: 98 F | RESPIRATION RATE: 15 BRPM

## 2025-08-21 DIAGNOSIS — R74.8 ELEVATED LIVER ENZYMES: Primary | ICD-10-CM

## 2025-08-21 DIAGNOSIS — Z12.11 COLON CANCER SCREENING: ICD-10-CM

## 2025-08-21 PROCEDURE — G8427 DOCREV CUR MEDS BY ELIG CLIN: HCPCS | Performed by: STUDENT IN AN ORGANIZED HEALTH CARE EDUCATION/TRAINING PROGRAM

## 2025-08-21 PROCEDURE — 1036F TOBACCO NON-USER: CPT | Performed by: STUDENT IN AN ORGANIZED HEALTH CARE EDUCATION/TRAINING PROGRAM

## 2025-08-21 PROCEDURE — 3075F SYST BP GE 130 - 139MM HG: CPT | Performed by: STUDENT IN AN ORGANIZED HEALTH CARE EDUCATION/TRAINING PROGRAM

## 2025-08-21 PROCEDURE — 3017F COLORECTAL CA SCREEN DOC REV: CPT | Performed by: STUDENT IN AN ORGANIZED HEALTH CARE EDUCATION/TRAINING PROGRAM

## 2025-08-21 PROCEDURE — 3079F DIAST BP 80-89 MM HG: CPT | Performed by: STUDENT IN AN ORGANIZED HEALTH CARE EDUCATION/TRAINING PROGRAM

## 2025-08-21 PROCEDURE — G8417 CALC BMI ABV UP PARAM F/U: HCPCS | Performed by: STUDENT IN AN ORGANIZED HEALTH CARE EDUCATION/TRAINING PROGRAM

## 2025-08-21 PROCEDURE — 99204 OFFICE O/P NEW MOD 45 MIN: CPT | Performed by: STUDENT IN AN ORGANIZED HEALTH CARE EDUCATION/TRAINING PROGRAM

## 2025-08-22 ENCOUNTER — TELEPHONE (OUTPATIENT)
Dept: INTERNAL MEDICINE CLINIC | Facility: CLINIC | Age: 65
End: 2025-08-22

## 2025-08-22 ENCOUNTER — TELEPHONE (OUTPATIENT)
Dept: GASTROENTEROLOGY | Age: 65
End: 2025-08-22

## 2025-08-22 PROBLEM — Z12.11 ENCOUNTER FOR SCREENING COLONOSCOPY: Status: ACTIVE | Noted: 2025-08-22

## 2025-08-22 PROBLEM — R74.8 ELEVATED LIVER ENZYMES: Status: ACTIVE | Noted: 2025-08-22

## 2025-08-29 DIAGNOSIS — R74.8 ELEVATED LIVER ENZYMES: ICD-10-CM

## 2025-08-29 DIAGNOSIS — E11.65 TYPE 2 DIABETES MELLITUS WITH HYPERGLYCEMIA, WITH LONG-TERM CURRENT USE OF INSULIN (HCC): ICD-10-CM

## 2025-08-29 DIAGNOSIS — Z79.4 TYPE 2 DIABETES MELLITUS WITH HYPERGLYCEMIA, WITH LONG-TERM CURRENT USE OF INSULIN (HCC): ICD-10-CM

## 2025-08-29 LAB
EST. AVERAGE GLUCOSE BLD GHB EST-MCNC: 172 MG/DL
HAV IGM SER QL: NONREACTIVE
HBA1C MFR BLD: 7.6 % (ref 0–5.6)
HBV CORE IGM SER QL: NONREACTIVE
HBV SURFACE AG SER QL: NONREACTIVE
HCV AB SER QL: NONREACTIVE
IRON SATN MFR SERPL: 27 % (ref 20–50)
IRON SERPL-MCNC: 68 UG/DL (ref 35–100)
TIBC SERPL-MCNC: 252 UG/DL (ref 240–450)
UIBC SERPL-MCNC: 184 UG/DL (ref 112–347)

## 2025-08-31 LAB
CENTROMERE B AB SER-ACNC: <0.2 AI (ref 0–0.9)
CERULOPLASMIN SERPL-MCNC: 19 MG/DL (ref 16–31)
CHROMATIN AB SERPL-ACNC: <0.2 AI (ref 0–0.9)
DSDNA AB SER-ACNC: 1 IU/ML (ref 0–9)
ENA JO1 AB SER-ACNC: <0.2 AI (ref 0–0.9)
ENA RNP AB SER-ACNC: <0.2 AI (ref 0–0.9)
ENA SCL70 AB SER-ACNC: 0.5 AI (ref 0–0.9)
ENA SM AB SER-ACNC: <0.2 AI (ref 0–0.9)
ENA SS-A AB SER-ACNC: <0.2 AI (ref 0–0.9)
ENA SS-B AB SER-ACNC: <0.2 AI (ref 0–0.9)
IGA SERPL-MCNC: 192 MG/DL (ref 61–437)
IGG SERPL-MCNC: 975 MG/DL (ref 603–1613)
IGM SERPL-MCNC: 83 MG/DL (ref 20–172)
Lab: NORMAL
MITOCHONDRIA M2 IGG SER-ACNC: <20 UNITS (ref 0–20)
SMA IGG SER-ACNC: 8 UNITS (ref 0–19)

## 2025-09-03 LAB
A2 MACROGLOB SERPL-MCNC: 277 MG/DL (ref 110–276)
ALT SERPL-CCNC: 40 IU/L (ref 0–55)
APO A-I SERPL-MCNC: 110 MG/DL (ref 101–178)
AST SERPL W P-5'-P-CCNC: 40 IU/L (ref 0–40)
BILIRUB SERPL-MCNC: <0.2 MG/DL (ref 0–1.2)
CHOLEST SERPL-MCNC: 142 MG/DL (ref 100–199)
FIBROSIS SCORING:: ABNORMAL
FIBROSIS STAGE SERPL QL: ABNORMAL
GGT SERPL-CCNC: 78 IU/L (ref 0–65)
GLUCOSE SERPL-MCNC: 183 MG/DL (ref 70–99)
HAPTOGLOB SERPL-MCNC: 166 MG/DL (ref 32–363)
INTERPRETATION: ABNORMAL
LABORATORY COMMENT REPORT: ABNORMAL
LIVER FIBR SCORE SERPL CALC.FIBROSURE: 0.45 (ref 0–0.21)
Lab: ABNORMAL
NASH - STEATOSIS GRADE: ABNORMAL
NASH - STEATOSIS GRADING: ABNORMAL
NASH - STEATOSIS SCORE: 0.8 (ref 0–0.4)
NASH SCORING: ABNORMAL
NECROINFLAMMATORY ACT GRADE SERPL QL: ABNORMAL
NECROINFLAMMATORY ACT SCORE SERPL: 0.71 (ref 0–0.25)
SERVICE CMNT-IMP: ABNORMAL
TRIGL SERPL-MCNC: 155 MG/DL (ref 0–149)

## (undated) DEVICE — PAD,NON-ADHERENT,3X8,STERILE,LF,1/PK: Brand: MEDLINE

## (undated) DEVICE — GLOVE SURG SZ 6.5 L11.2IN THK8.6MIL LT BRN LTX FREE

## (undated) DEVICE — INTENDED FOR TISSUE SEPARATION, AND OTHER PROCEDURES THAT REQUIRE A SHARP SURGICAL BLADE TO PUNCTURE OR CUT.: Brand: BARD-PARKER ® STAINLESS STEEL BLADES

## (undated) DEVICE — GENERAL LAPAROSCOPY: Brand: MEDLINE INDUSTRIES, INC.

## (undated) DEVICE — BLADE ES L6IN ELASTOMERIC COAT EXT DURABLE BEND UPTO 90DEG

## (undated) DEVICE — STRIP,CLOSURE,WOUND,MEDI-STRIP,1/2X4: Brand: MEDLINE

## (undated) DEVICE — SUTURE PERMAHAND SZ 2-0 L18IN NONABSORBABLE BLK L26MM SH C012D

## (undated) DEVICE — GLOVE SURG SZ 65 L12IN FNGR THK79MIL GRN LTX FREE

## (undated) DEVICE — YANKAUER,BULB TIP,W/O VENT,RIGID,STERILE: Brand: MEDLINE

## (undated) DEVICE — TUBING INSUFFLATION SMK EVAC HI FLO SET PNEUMOCLEAR

## (undated) DEVICE — GLOVE ORANGE PI 7 1/2   MSG9075

## (undated) DEVICE — SYRINGE MED 10ML LUERLOCK TIP W/O SFTY DISP

## (undated) DEVICE — SUTURE PROL SZ 2-0 L36IN NONABSORBABLE BLU SH L26MM 1/2 CIR 8523H

## (undated) DEVICE — SOLUTION ANTIFOG VIS SYS CLEARIFY LAPSCP

## (undated) DEVICE — 3M™ IOBAN™ 2 ANTIMICROBIAL INCISE DRAPE 6650EZ: Brand: IOBAN™ 2

## (undated) DEVICE — TROCAR: Brand: KII FIOS FIRST ENTRY

## (undated) DEVICE — TROCAR: Brand: KII® SLEEVE

## (undated) DEVICE — TUBE GASTROSTMY 24FR MED GRD SIL FEED GAM RECESS DST TIP

## (undated) DEVICE — GAUZE,SPONGE,2"X2",8PLY,STERILE,LF,2'S: Brand: MEDLINE

## (undated) DEVICE — SUTURE PERMAHAND SZ 3-0 L30IN NONABSORBABLE BLK L26MM SH C017D

## (undated) DEVICE — DECANTER FLD 9IN ST BG FOR ASEP TRNSF OF FLD

## (undated) DEVICE — MINOR SPLIT GENERAL: Brand: MEDLINE INDUSTRIES, INC.

## (undated) DEVICE — INTENDED FOR TISSUE SEPARATION, AND OTHER PROCEDURES THAT REQUIRE A SHARP SURGICAL BLADE TO PUNCTURE OR CUT.: Brand: BARD-PARKER ® CARBON RIB-BACK BLADES

## (undated) DEVICE — SPONGE LAPAROTOMY W18XL18IN WHITE STRUNG RADIOPAQUE STERILE

## (undated) DEVICE — SUTURE PDS + SZ 1 L96IN ABSRB VLT L65MM TP-1 1/2 CIR PDP880G

## (undated) DEVICE — NEEDLE INSUF L120MM ULT VERES ENDOPATH

## (undated) DEVICE — SUTURE VCRL SZ 3-0 L27IN ABSRB UD L26MM SH 1/2 CIR J416H

## (undated) DEVICE — C-ARM: Brand: UNBRANDED

## (undated) DEVICE — TRAY,URINE METER,100% SILICONE,16FR10ML: Brand: MEDLINE

## (undated) DEVICE — SUTURE PROL SZ 2-0 L30IN NONABSORBABLE BLU L26MM SH 1/2 CIR 8833H

## (undated) DEVICE — SUTURE COAT VCRL SZ 4-0 L18IN ABSRB UD L19MM PS-2 1/2 CIR J496G

## (undated) DEVICE — TUBING, SUCTION, 1/4" X 10', STRAIGHT: Brand: MEDLINE

## (undated) DEVICE — MASTISOL ADHESIVE LIQ 2/3ML

## (undated) DEVICE — DRAPE,T,LAPARO,TRANS,STERILE: Brand: MEDLINE

## (undated) DEVICE — [HIGH FLOW INSUFFLATOR,  DO NOT USE IF PACKAGE IS DAMAGED,  KEEP DRY,  KEEP AWAY FROM SUNLIGHT,  PROTECT FROM HEAT AND RADIOACTIVE SOURCES.]: Brand: PNEUMOSURE

## (undated) DEVICE — 3M™ TEGADERM™ TRANSPARENT FILM DRESSING FRAME STYLE, 1626W, 4 IN X 4-3/4 IN (10 CM X 12 CM), 50/CT 4CT/CASE: Brand: 3M™ TEGADERM™

## (undated) DEVICE — NEEDLE HYPO 25GA L1.5IN BLU POLYPR HUB S STL REG BVL STR

## (undated) DEVICE — SUTURE VCRL SZ 4-0 L27IN ABSRB UD L19MM PS-2 3/8 CIR PRIM J426H

## (undated) DEVICE — SYRINGE MED 5ML STD CLR PLAS N CTRL SLIP TIP DISP